# Patient Record
Sex: FEMALE | Race: BLACK OR AFRICAN AMERICAN | NOT HISPANIC OR LATINO | Employment: OTHER | URBAN - METROPOLITAN AREA
[De-identification: names, ages, dates, MRNs, and addresses within clinical notes are randomized per-mention and may not be internally consistent; named-entity substitution may affect disease eponyms.]

---

## 2017-01-13 ENCOUNTER — OFFICE VISIT (OUTPATIENT)
Dept: SURGERY | Facility: CLINIC | Age: 42
End: 2017-01-13
Payer: MEDICARE

## 2017-01-13 VITALS
HEIGHT: 65 IN | TEMPERATURE: 99 F | SYSTOLIC BLOOD PRESSURE: 157 MMHG | HEART RATE: 99 BPM | BODY MASS INDEX: 41.65 KG/M2 | WEIGHT: 250 LBS | DIASTOLIC BLOOD PRESSURE: 101 MMHG

## 2017-01-13 DIAGNOSIS — E66.01 OBESITY, MORBID, BMI 40.0-49.9: ICD-10-CM

## 2017-01-13 DIAGNOSIS — K21.9 GASTROESOPHAGEAL REFLUX DISEASE, ESOPHAGITIS PRESENCE NOT SPECIFIED: ICD-10-CM

## 2017-01-13 DIAGNOSIS — I67.9 CEREBROVASCULAR DISEASE: ICD-10-CM

## 2017-01-13 DIAGNOSIS — R10.11 RUQ ABDOMINAL PAIN: Primary | ICD-10-CM

## 2017-01-13 DIAGNOSIS — R10.13 EPIGASTRIC PAIN: ICD-10-CM

## 2017-01-13 PROCEDURE — 99999 PR PBB SHADOW E&M-EST. PATIENT-LVL III: CPT | Mod: PBBFAC,,, | Performed by: SURGERY

## 2017-01-13 PROCEDURE — 1159F MED LIST DOCD IN RCRD: CPT | Mod: S$GLB,,, | Performed by: SURGERY

## 2017-01-13 PROCEDURE — 4010F ACE/ARB THERAPY RXD/TAKEN: CPT | Mod: S$GLB,,, | Performed by: SURGERY

## 2017-01-13 PROCEDURE — 3046F HEMOGLOBIN A1C LEVEL >9.0%: CPT | Mod: S$GLB,,, | Performed by: SURGERY

## 2017-01-13 PROCEDURE — 99215 OFFICE O/P EST HI 40 MIN: CPT | Mod: S$GLB,,, | Performed by: SURGERY

## 2017-01-13 RX ORDER — PANTOPRAZOLE SODIUM 40 MG/1
40 TABLET, DELAYED RELEASE ORAL DAILY
Qty: 30 TABLET | Refills: 11 | Status: SHIPPED | OUTPATIENT
Start: 2017-01-13 | End: 2021-02-05 | Stop reason: SDUPTHER

## 2017-01-13 NOTE — PROGRESS NOTES
Subjective:       Patient ID: Jaimee Quintana is a 41 y.o. female.    Chief Complaint: Follow-up    HPI   Patient presents for reevaluation of right upper quadrant pain.  She states that she is not having an in any abdominal pain.  She does notice occasional discomfort after meals.  She is unsure exactly of the location, then points the epigastrium.  She also complains of burning sensation every morning when she wakes up is present in the epigastrium and radiates towards the mediastinum.  Sometimes this is associated with nausea.  She states that she has acid reflux but has never been on any medication for it.  She has been trying to stay on a low-fat diet.  She is unsure if she has lost any weight.  She has a history of hypertension, her blood pressure was mildly elevated stable 157/101.  She did not take all of her blood pressure pills this morning, then states that she is not sure why her blood pressure is elevated.  We discussed that her blood pressure medications help keep her blood pressure within the normal range and encouraged her to take her medications.  She has a history of low back pain which is intermittently bothering her.    Review of Systems    All systems were reviewed and are negative, except that mentioned in the HPI.    Objective:      Physical Exam   Constitutional: She is oriented to person, place, and time. She appears well-developed and well-nourished. No distress.   HENT:   Head: Normocephalic and atraumatic.   Eyes: EOM are normal. Pupils are equal, round, and reactive to light. No scleral icterus.   Neck: Normal range of motion. No JVD present.   Cardiovascular: Normal rate and regular rhythm.    Pulmonary/Chest: Effort normal and breath sounds normal. No respiratory distress.   Large pendulous breasts   Abdominal: Soft. Bowel sounds are normal. She exhibits no distension and no mass. There is tenderness (mild tenderness to deep palpation in the epigastric region). There is no rebound and  no guarding.   Obese   Musculoskeletal: Normal range of motion.   Neurological: She is alert and oriented to person, place, and time. No cranial nerve deficit.   Skin: Skin is warm and dry. She is not diaphoretic.   Psychiatric: She has a normal mood and affect.       Lab Results   Component Value Date    WBC 10.92 07/23/2016    HGB 14.5 07/23/2016    HCT 41.8 07/23/2016    MCV 85 07/23/2016     07/23/2016     CMP  Sodium   Date Value Ref Range Status   07/23/2016 135 (L) 136 - 145 mmol/L Final     Potassium   Date Value Ref Range Status   07/23/2016 4.1 3.5 - 5.1 mmol/L Final     Chloride   Date Value Ref Range Status   07/23/2016 100 95 - 110 mmol/L Final     CO2   Date Value Ref Range Status   07/23/2016 21 (L) 23 - 29 mmol/L Final     Glucose   Date Value Ref Range Status   07/23/2016 310 (H) 70 - 110 mg/dL Final     BUN, Bld   Date Value Ref Range Status   07/23/2016 8 6 - 20 mg/dL Final     Creatinine   Date Value Ref Range Status   07/23/2016 0.8 0.5 - 1.4 mg/dL Final     Calcium   Date Value Ref Range Status   07/23/2016 9.9 8.7 - 10.5 mg/dL Final     Total Protein   Date Value Ref Range Status   07/23/2016 7.7 6.0 - 8.4 g/dL Final     Albumin   Date Value Ref Range Status   07/23/2016 3.6 3.5 - 5.2 g/dL Final     Total Bilirubin   Date Value Ref Range Status   07/23/2016 0.8 0.1 - 1.0 mg/dL Final     Comment:     For infants and newborns, interpretation of results should be based  on gestational age, weight and in agreement with clinical  observations.  Premature Infant recommended reference ranges:  Up to 24 hours.............<8.0 mg/dL  Up to 48 hours............<12.0 mg/dL  3-5 days..................<15.0 mg/dL  6-29 days.................<15.0 mg/dL       Alkaline Phosphatase   Date Value Ref Range Status   07/23/2016 78 55 - 135 U/L Final     AST   Date Value Ref Range Status   07/23/2016 16 10 - 40 U/L Final     ALT   Date Value Ref Range Status   07/23/2016 22 10 - 44 U/L Final     Anion Gap    Date Value Ref Range Status   07/23/2016 14 8 - 16 mmol/L Final     eGFR if    Date Value Ref Range Status   07/23/2016 >60 >60 mL/min/1.73 m^2 Final     eGFR if non    Date Value Ref Range Status   07/23/2016 >60 >60 mL/min/1.73 m^2 Final     Comment:     Calculation used to obtain the estimated glomerular filtration  rate (eGFR) is the CKD-EPI equation. Since race is unknown   in our information system, the eGFR values for   -American and Non--American patients are given   for each creatinine result.       Lab Results   Component Value Date    HGBA1C 9.7 (H) 12/05/2016       Ultrasound images and reports were personally reviewed from July 2016.  The report states:  Gallbladder is normally well distended.  There are small, shadowing mobile stones versus sludge.  Sonographic Roth sign is negative.  Gall bladder wall thickness is normal.  The common duct measures 4 mm in diameter near the regina hepatis.  Assessment:       1. RUQ abdominal pain    2. Gastroesophageal reflux disease, esophagitis presence not specified    3. Obesity, morbid, BMI 40.0-49.9    4. Epigastric pain    5. Cerebrovascular disease    6. Uncontrolled type 2 diabetes mellitus without complication, with long-term current use of insulin        Plan:   After further evaluation, the patient is unsure if she has abdominal discomfort after meals.  She also unsure if this is related to her reflux.  We discussed gallbladder surgery.  We also discussed treating her reflux with medication.  Protonix was prescribed at this visit and we will reevaluate her symptoms in 1 month.  If her symptoms are not improving at that time, she will reconsider gallbladder surgery.  We discussed continued weight loss and controlling her blood glucose.  Her last hemoglobin A1c was 9.7 in December 2016, which although quite high, is an improvement.  She also states she has not seen her primary care provider in a while so we  have coordinated that appointment for her.  All of her questions were answered.

## 2017-01-13 NOTE — MR AVS SNAPSHOT
[facility] Surgery  [address] [place] [address]  [address]  [place] [address]  Phone: [phone]                  Jaimee Quintana   2017 9:00 AM   Office Visit    Description:  Female : 1975   Provider:  Lashawn Collier DO   Department:  [facility]           Reason for Visit     Follow-up                To Do List           Future Appointments        Provider Department Dept Phone    3/23/2017 9:00 AM Karen Brown, APRN,ANP-C [facility] - Endocrinology [phone]      Goals (5 Years of Data)     None      [facility]     [facility] Care [facility] -  Assistance  Registered nurses in the [facility] On Call Center provide clinical advisement, health education, appointment booking, and other advisory services.  Call for this free service at [phone].             Medications           Message regarding Medications     Verify the changes and/or additions to your medication regime listed below are the same as discussed with your clinician today.  If any of these changes or additions are incorrect, please notify your healthcare provider.             Verify that the below list of medications is an accurate representation of the medications you are currently taking.  If none reported, the list may be blank. If incorrect, please contact your healthcare provider. Carry this list with you in case of emergency.           Current Medications     aspirin 81 MG Chew Take 81 mg by mouth once daily.    atorvastatin (LIPITOR) 80 MG tablet TAKE 1 TABLET (80 MG TOTAL) BY MOUTH EVERY EVENING.    blood sugar diagnostic Strp True Result; test BID    etodolac (LODINE XL) 400 MG 24 hr tablet Take 1 tablet (400 mg total) by mouth daily as needed (avoid all other NSAID's).    fluoxetine (PROZAC) 20 MG capsule Take 1 capsule (20 mg total) by mouth once daily.    hydrocodone-acetaminophen 5-325mg (NORCO) 5-325 mg per tablet Take 1 tablet by mouth every 6 (six) hours as needed for  "Pain.    insulin glargine (LANTUS SOLOSTAR) 100 unit/mL (3 mL) InPn pen Inject 45 units SQ BID    insulin regular (NOVOLIN R) 100 unit/mL Inj injection 42 units SQ TID AC    lancets Misc True Result; test BID    lisinopril-hydrochlorothiazide (PRINZIDE,ZESTORETIC) 20-12.5 mg per tablet TAKE 1 TABLET BY MOUTH 2 (TWO) TIMES DAILY.    metformin (GLUCOPHAGE) 1000 MG tablet Take 1 tablet (1,000 mg total) by mouth 2 (two) times daily with meals.    nifedipine (ADALAT CC) 60 MG TbSR TAKE 1 TABLET (30 MG TOTAL) BY MOUTH ONCE DAILY.    nifedipine (PROCARDIA-XL) 60 MG (OSM) 24 hr tablet     ondansetron (ZOFRAN-ODT) 4 MG TbDL Take 1 tablet (4 mg total) by mouth every 8 (eight) hours as needed.    pen needle, diabetic (BD ULTRA-FINE ROSA PEN NEEDLES) 32 gauge x 5/32" Ndle 1 Act by Misc.(Non-Drug; Combo Route) route 5 (five) times daily.           Clinical Reference Information           Vital Signs - Last Recorded  Most recent update: 1/13/2017  9:16 AM by Miriam Moody LPN    BP Pulse Temp Ht Wt LMP    (!) 157/101 99 98.6 °F (37 °C) 5' 5" (1.651 m) 113.4 kg (250 lb) 12/05/2016    BMI                41.6 kg/m2          Blood Pressure          Most Recent Value    BP  (!)  157/101      Allergies as of 1/13/2017     No Known Allergies      Immunizations Administered on Date of Encounter - 1/13/2017     None      [facility] Sign-Up     Activating your [facility] account is as easy as 1-2-3!     1) Visit [email or web address], select Sign Up Now, enter this activation code and your date of birth, then select Next.  [email or web address]  Expires: 2/27/2017  9:17 AM      2) Create a username and password to use when you visit [facility] in the future and select a security question in case you lose your password and select Next.    3) Enter your e-mail address and click Sign Up!    Additional Information  If you have questions, please e-mail [email or web address] or call [phone] to talk to our [facility] staff. Remember, [facility] is NOT to be " used for urgent needs. For medical emergencies, dial 911.

## 2017-01-15 DIAGNOSIS — E11.59 HYPERTENSION ASSOCIATED WITH DIABETES: ICD-10-CM

## 2017-01-15 DIAGNOSIS — I15.2 HYPERTENSION ASSOCIATED WITH DIABETES: ICD-10-CM

## 2017-01-15 DIAGNOSIS — E78.5 HYPERLIPIDEMIA: ICD-10-CM

## 2017-01-16 RX ORDER — LISINOPRIL AND HYDROCHLOROTHIAZIDE 12.5; 2 MG/1; MG/1
TABLET ORAL
Qty: 180 TABLET | Refills: 0 | Status: SHIPPED | OUTPATIENT
Start: 2017-01-16 | End: 2017-04-20 | Stop reason: SDUPTHER

## 2017-01-16 RX ORDER — ATORVASTATIN CALCIUM 80 MG/1
TABLET, FILM COATED ORAL
Qty: 90 TABLET | Refills: 0 | Status: SHIPPED | OUTPATIENT
Start: 2017-01-16 | End: 2017-06-16 | Stop reason: SDUPTHER

## 2017-01-17 ENCOUNTER — TELEPHONE (OUTPATIENT)
Dept: INTERNAL MEDICINE | Facility: CLINIC | Age: 42
End: 2017-01-17

## 2017-01-17 NOTE — TELEPHONE ENCOUNTER
Per [facility]'s Health Nurse Navigator, Donna. On 10/17/16 [facility] called to remind pt of her appt for CPAP/supplies on 10/26/16. On 12/1/16 Donna called Jaimee cause she missed her appt on 10/26. Pt was rescheduled on 12/14/16. On 1/16/17 Donna contacted [facility] and was informed that pt missed her appt again. Messages was left for the pt to reschedule her appt. Pt called Donna back and rescheduled her appt for 1/18 at 4pm for CPAP/supplies.

## 2017-02-06 ENCOUNTER — TELEPHONE (OUTPATIENT)
Dept: INTERNAL MEDICINE | Facility: CLINIC | Age: 42
End: 2017-02-06

## 2017-02-06 ENCOUNTER — OFFICE VISIT (OUTPATIENT)
Dept: INTERNAL MEDICINE | Facility: CLINIC | Age: 42
End: 2017-02-06
Payer: MEDICARE

## 2017-02-06 VITALS
WEIGHT: 250.88 LBS | DIASTOLIC BLOOD PRESSURE: 94 MMHG | HEIGHT: 65 IN | BODY MASS INDEX: 41.8 KG/M2 | SYSTOLIC BLOOD PRESSURE: 146 MMHG | HEART RATE: 104 BPM

## 2017-02-06 DIAGNOSIS — R10.13 DYSPEPSIA: ICD-10-CM

## 2017-02-06 DIAGNOSIS — I15.2 HYPERTENSION ASSOCIATED WITH DIABETES: ICD-10-CM

## 2017-02-06 DIAGNOSIS — E11.9 TYPE 2 DIABETES MELLITUS WITHOUT COMPLICATION, WITH LONG-TERM CURRENT USE OF INSULIN: ICD-10-CM

## 2017-02-06 DIAGNOSIS — Z79.4 TYPE 2 DIABETES MELLITUS WITHOUT COMPLICATION, WITH LONG-TERM CURRENT USE OF INSULIN: ICD-10-CM

## 2017-02-06 DIAGNOSIS — E11.9 TYPE 2 DIABETES MELLITUS WITHOUT COMPLICATION, WITH LONG-TERM CURRENT USE OF INSULIN: Primary | ICD-10-CM

## 2017-02-06 DIAGNOSIS — E11.59 HYPERTENSION ASSOCIATED WITH DIABETES: ICD-10-CM

## 2017-02-06 DIAGNOSIS — G47.33 OSA (OBSTRUCTIVE SLEEP APNEA): ICD-10-CM

## 2017-02-06 DIAGNOSIS — K80.20 CALCULUS OF GALLBLADDER WITHOUT CHOLECYSTITIS WITHOUT OBSTRUCTION: ICD-10-CM

## 2017-02-06 DIAGNOSIS — Z79.4 TYPE 2 DIABETES MELLITUS WITHOUT COMPLICATION, WITH LONG-TERM CURRENT USE OF INSULIN: Primary | ICD-10-CM

## 2017-02-06 DIAGNOSIS — E66.01 MORBID OBESITY WITH BMI OF 40.0-44.9, ADULT: ICD-10-CM

## 2017-02-06 PROBLEM — G47.30 SLEEP APNEA: Status: ACTIVE | Noted: 2017-02-06

## 2017-02-06 PROCEDURE — 99999 PR PBB SHADOW E&M-EST. PATIENT-LVL III: CPT | Mod: PBBFAC,,, | Performed by: INTERNAL MEDICINE

## 2017-02-06 PROCEDURE — 99214 OFFICE O/P EST MOD 30 MIN: CPT | Mod: S$GLB,,, | Performed by: INTERNAL MEDICINE

## 2017-02-06 PROCEDURE — 4010F ACE/ARB THERAPY RXD/TAKEN: CPT | Mod: S$GLB,,, | Performed by: INTERNAL MEDICINE

## 2017-02-06 PROCEDURE — 3046F HEMOGLOBIN A1C LEVEL >9.0%: CPT | Mod: S$GLB,,, | Performed by: INTERNAL MEDICINE

## 2017-02-06 RX ORDER — LANCETS
EACH MISCELLANEOUS
Qty: 300 EACH | Refills: 6 | Status: SHIPPED | OUTPATIENT
Start: 2017-02-06 | End: 2017-02-07 | Stop reason: SDUPTHER

## 2017-02-06 RX ORDER — INSULIN PUMP SYRINGE, 3 ML
EACH MISCELLANEOUS
Qty: 1 EACH | Refills: 0 | Status: SHIPPED | OUTPATIENT
Start: 2017-02-06 | End: 2021-02-05 | Stop reason: SDUPTHER

## 2017-02-06 RX ORDER — NIFEDIPINE 90 MG/1
TABLET, FILM COATED, EXTENDED RELEASE ORAL
Qty: 30 TABLET | Refills: 1 | Status: SHIPPED | OUTPATIENT
Start: 2017-02-06 | End: 2018-03-29

## 2017-02-06 RX ORDER — PEN NEEDLE, DIABETIC 30 GX3/16"
1 NEEDLE, DISPOSABLE MISCELLANEOUS
Qty: 300 EACH | Refills: 6 | Status: SHIPPED | OUTPATIENT
Start: 2017-02-06 | End: 2021-06-28 | Stop reason: SDUPTHER

## 2017-02-06 NOTE — MR AVS SNAPSHOT
"    [facility] Internal Medicine   [place]  [place] [address]  Phone: [phone]  Fax: [phone]                  Jaimee Quintana   2017 2:20 PM   Office Visit    Description:  Female : 1975   Provider:  Kory Taylor MD   Department:  [facility]           Reason for Visit     Diabetes           Diagnoses this Visit        Comments    Type 2 diabetes mellitus without complication, with long-term current use of insulin    -  Primary     Hypertension associated with diabetes         Calculus of gallbladder without cholecystitis without obstruction         Dyspepsia         CESAR (obstructive sleep apnea)         Morbid obesity with BMI of 40.0-44.9, adult                To Do List           Future Appointments        Provider Department Dept Phone    2017 11:20 AM Lashawn Collier DO [facility] General Surgery [phone]    3/15/2017 2:20 PM Kory Taylor MD [facility] [phone]    3/23/2017 9:00 AM Karen Brown APRN,ANP-C [facility] - Endocrinology [phone]      Goals (5 Years of Data)     None      Follow-Up and Disposition     Return in about 1 month (around 3/6/2017).       These Medications        Disp Refills Start End    nifedipine (ADALAT CC) 90 MG TbSR 30 tablet 1 2017     TAKE 1 TABLET (30 MG TOTAL) BY MOUTH ONCE DAILY.    Pharmacy: [facility] #[id number] - [place] - [address] [address] Ph #: [phone]       insulin regular (NOVOLIN R) 100 unit/mL Inj injection 30 mL 3 2017     45 units SQ TID AC    Pharmacy: [facility]/pharmacy #[id number] - [place] - [address] [address] Ph #: [phone]       pen needle, diabetic (BD ULTRA-FINE ROSA PEN NEEDLES) 32 gauge x 5/32" Ndle 300 each 6 2017     1 Act by Misc.(Non-Drug; Combo Route) route 5 (five) times daily. - Misc.(Non-Drug; Combo Route)    Pharmacy: [facility]/pharmacy #[id number] - [place], [place] - [address] [address] Ph #: [phone]       blood-glucose meter (FREESTYLE SYSTEM " KIT) kit 1 each 0 2/6/2017 2/6/2018    Use as instructed; Truemetrix    Pharmacy: [facility]/pharmacy #[id number] - [place], [place] - [address] [address] Ph #: [phone]       lancets Misc 300 each 6 2/6/2017     True Metrix; test BID    Pharmacy: [facility]/pharmacy #[id number] - [place], [place] - [address] [address] Ph #: [phone]       blood sugar diagnostic Strp 300 strip 6 2/6/2017     Truemetrix; test BID    Pharmacy: [facility]/pharmacy #[id number] - [place], [place] - [address] [address] Ph #: [phone]         [facility]     [facility] On Call Nurse [facility] Line - 24/7 Assistance  Registered nurses in the [facility] On Call Center provide clinical advisement, health education, appointment booking, and other advisory services.  Call for this free service at [phone].             Medications           Message regarding Medications     Verify the changes and/or additions to your medication regime listed below are the same as discussed with your clinician today.  If any of these changes or additions are incorrect, please notify your healthcare provider.        START taking these NEW medications        Refills    blood-glucose meter (FREESTYLE SYSTEM KIT) kit 0    Sig: Use as instructed; Truemetrix    Class: Print      CHANGE how you are taking these medications     Start Taking Instead of    nifedipine (ADALAT CC) 90 MG TbSR nifedipine (ADALAT CC) 60 MG TbSR    Dosage:  TAKE 1 TABLET (30 MG TOTAL) BY MOUTH ONCE DAILY. Dosage:  TAKE 1 TABLET (30 MG TOTAL) BY MOUTH ONCE DAILY.    Reason for Change:  Reorder     insulin regular (NOVOLIN R) 100 unit/mL Inj injection insulin regular (NOVOLIN R) 100 unit/mL Inj injection    Dosage:  45 units SQ TID AC Dosage:  42 units SQ TID AC    Reason for Change:  Reorder     lancets Misc lancets Misc    Dosage:  True Metrix; test BID Dosage:  True Result; test BID    Reason for Change:  Reorder     blood sugar diagnostic Strp blood sugar diagnostic Strp    Dosage:  Truemetrix; test BID Dosage:  True Result; test BID  "   Reason for Change:  Reorder       STOP taking these medications     nifedipine (PROCARDIA-XL) 60 MG (OSM) 24 hr tablet            Verify that the below list of medications is an accurate representation of the medications you are currently taking.  If none reported, the list may be blank. If incorrect, please contact your healthcare provider. Carry this list with you in case of emergency.           Current Medications     aspirin 81 MG Chew Take 81 mg by mouth once daily.    atorvastatin (LIPITOR) 80 MG tablet TAKE 1 TABLET EVERY EVENING    blood sugar diagnostic Strp Truemetrix; test BID    etodolac (LODINE XL) 400 MG 24 hr tablet Take 1 tablet (400 mg total) by mouth daily as needed (avoid all other NSAID's).    fluoxetine (PROZAC) 20 MG capsule Take 1 capsule (20 mg total) by mouth once daily.    hydrocodone-acetaminophen 5-325mg (NORCO) 5-325 mg per tablet Take 1 tablet by mouth every 6 (six) hours as needed for Pain.    insulin glargine (LANTUS SOLOSTAR) 100 unit/mL (3 mL) InPn pen Inject 45 units SQ BID    insulin regular (NOVOLIN R) 100 unit/mL Inj injection 45 units SQ TID AC    lancets Misc True Metrix; test BID    lisinopril-hydrochlorothiazide (PRINZIDE,ZESTORETIC) 20-12.5 mg per tablet TAKE 1 TABLET BY MOUTH 2 (TWO) TIMES DAILY.    metformin (GLUCOPHAGE) 1000 MG tablet Take 1 tablet (1,000 mg total) by mouth 2 (two) times daily with meals.    nifedipine (ADALAT CC) 90 MG TbSR TAKE 1 TABLET (30 MG TOTAL) BY MOUTH ONCE DAILY.    ondansetron (ZOFRAN-ODT) 4 MG TbDL Take 1 tablet (4 mg total) by mouth every 8 (eight) hours as needed.    pantoprazole (PROTONIX) 40 MG tablet Take 1 tablet (40 mg total) by mouth once daily.    pen needle, diabetic (BD ULTRA-FINE ROSA PEN NEEDLES) 32 gauge x 5/32" Ndle 1 Act by Misc.(Non-Drug; Combo Route) route 5 (five) times daily.    blood-glucose meter (FREESTYLE SYSTEM KIT) kit Use as instructed; Truemetrix           Clinical Reference Information           Your Vitals " "Were     BP Pulse Height Weight Last Period BMI    146/94 (BP Location: Left arm, Patient Position: Sitting, BP Method: Manual) 104 5' 5" (1.651 m) 113.8 kg (250 lb 14.1 oz) 12/05/2016 41.75 kg/m2      Blood Pressure          Most Recent Value    BP  (!)  146/94      Allergies as of 2/6/2017     No Known Allergies      Immunizations Administered on Date of Encounter - 2/6/2017     None      [facility] Sign-Up     Activating your [facility] account is as easy as 1-2-3!     1) Visit [email or web address], select Sign Up Now, enter this activation code and your date of birth, then select Next.  [email or web address]  Expires: 2/27/2017  9:17 AM      2) Create a username and password to use when you visit [facility] in the future and select a security question in case you lose your password and select Next.    3) Enter your e-mail address and click Sign Up!    Additional Information  If you have questions, please e-mail [email or web address] or call [phone] to talk to our [facility] staff. Remember, [facility] is NOT to be used for urgent needs. For medical emergencies, dial 911.         Language Assistance Services     ATTENTION: Language assistance services are available, free of charge. Please call [phone].      ATENCIÓN: Si habla español, tiene a reyes disposición servicios gratuitos de asistencia lingüística. Llame al [phone].     CHÚ Ý: N?u b?n nói Ti?ng Vi?t, có các d?ch v? h? tr? ngôn ng? mi?n phí dành cho b?n. G?i s? [phone].         [facility] complies with applicable Federal civil rights laws and does not discriminate on the basis of race, color, national origin, age, disability, or sex.        "

## 2017-02-06 NOTE — PROGRESS NOTES
Subjective:       Patient ID: Jaimee Quintana is a 41 y.o. female.    Chief Complaint: Diabetes    HPI  Pt. Here for f/u for diabetes and HTN; I reviewed labs dated 12/5/16; diabetes was poorly controlled but slightly improved; BP is borderline elevated; pt. Has gained weight; pt. Is using CPAP for CESAR; she is on lantus 38 units BID and 42 units TID but needs refill on needles and is not always compliant with insulin; I explained risks of non-compliance; she has increased nifedipine to 60 mg QD; pt. Is seeing surgery for cholelithiasis and surgery is being considered; she was started on protonix for dyspepsia; she also needs true metrix glucometer, test strips and lancets   Review of Systems   Constitutional: Negative for chills, fatigue and fever.   HENT: Negative for congestion, rhinorrhea and sore throat.    Respiratory: Negative for cough, shortness of breath and wheezing.    Cardiovascular: Negative for chest pain.   Gastrointestinal: Positive for abdominal pain. Negative for nausea and vomiting.        GERD: protonix helps; occassional RUQ pain noted   Genitourinary: Negative for dysuria.   Musculoskeletal: Negative for arthralgias.   Skin: Negative for rash.   Neurological: Negative for dizziness and headaches.   Psychiatric/Behavioral: Negative for sleep disturbance. The patient is not nervous/anxious.        Objective:      Physical Exam   Constitutional: She is oriented to person, place, and time.   Morbid obesity   Eyes: EOM are normal.   Neck: Normal range of motion.   Cardiovascular: Normal rate, regular rhythm and normal heart sounds.    Pulmonary/Chest: Effort normal and breath sounds normal.   Abdominal: Soft. There is no tenderness. There is no rebound and no guarding.   Musculoskeletal: Normal range of motion.   Neurological: She is alert and oriented to person, place, and time.   Skin: No rash noted.       Assessment:       1. Type 2 diabetes mellitus without complication, with long-term current  "use of insulin Sub-optimally controlled   2. Hypertension associated with diabetes Sub-optimally controlled   3. Calculus of gallbladder without cholecystitis without obstruction Active   4. Dyspepsia Active   5. CESAR (obstructive sleep apnea) Well controlled   6. Morbid obesity with BMI of 40.0-44.9, adult Sub-optimally controlled       Plan:         Type 2 diabetes mellitus without complication, with long-term current use of insulin  Comments:  continue lantus and increase short acting to 45 units TID AC; encouraged ADA diet and encouraged compliance; re-evaluate in 1 month  Orders:  -     insulin regular (NOVOLIN R) 100 unit/mL Inj injection; 45 units SQ TID AC  Dispense: 30 mL; Refill: 3  -     pen needle, diabetic (BD ULTRA-FINE ROSA PEN NEEDLES) 32 gauge x 5/32" Ndle; 1 Act by Misc.(Non-Drug; Combo Route) route 5 (five) times daily.  Dispense: 300 each; Refill: 6  -     blood-glucose meter (FREESTYLE SYSTEM KIT) kit; Use as instructed; Truemetrix  Dispense: 1 each; Refill: 0  -     lancets Misc; True Metrix; test BID  Dispense: 300 each; Refill: 6  -     blood sugar diagnostic Strp; Truemetrix; test BID  Dispense: 300 strip; Refill: 6    Hypertension associated with diabetes  Comments:  increase nefedipine to 90 mg QD and encouraged low Na diet and weight loss; repeat BP on f/u in 1 month   Orders:  -     nifedipine (ADALAT CC) 90 MG TbSR; TAKE 1 TABLET (30 MG TOTAL) BY MOUTH ONCE DAILY.  Dispense: 30 tablet; Refill: 1    Calculus of gallbladder without cholecystitis without obstruction  Comments:  asymptomatic at this time; f/u surgery who is monitoring    Dyspepsia  Comments:  GERD: continue protonix     CESAR (obstructive sleep apnea)  Comments:  continue CPAP    Morbid obesity with BMI of 40.0-44.9, adult  Comments:  encouraged diet and explained risks       "

## 2017-02-07 DIAGNOSIS — Z79.4 TYPE 2 DIABETES MELLITUS WITHOUT COMPLICATION, WITH LONG-TERM CURRENT USE OF INSULIN: ICD-10-CM

## 2017-02-07 DIAGNOSIS — E11.9 TYPE 2 DIABETES MELLITUS WITHOUT COMPLICATION, WITH LONG-TERM CURRENT USE OF INSULIN: ICD-10-CM

## 2017-02-07 RX ORDER — LANCETS
EACH MISCELLANEOUS
Qty: 200 EACH | Refills: 6 | Status: SHIPPED | OUTPATIENT
Start: 2017-02-07 | End: 2021-02-05 | Stop reason: SDUPTHER

## 2017-02-07 NOTE — TELEPHONE ENCOUNTER
Spoke to Donna, [facility] Nav[facility] and stated that pt's quantity on her lancets had to be changed from 300 to 200 to match the BID testing. Also, spoke to Chilo, pharmacist at [facility] and needs clarification on pts Nifedipine 90mg.

## 2017-02-07 NOTE — TELEPHONE ENCOUNTER
----- Message from Xochitl Rahman sent at 2/7/2017  9:30 AM CST -----  Contact: Donna From [facility] [phone]  Calling to talk to nurse concerning the quantity of lancets should be 300 if she is testing 3 times a day. Please advice

## 2017-02-08 RX ORDER — NAPROXEN SODIUM 220 MG
1 TABLET ORAL 2 TIMES DAILY
Qty: 200 EACH | Refills: 6 | COMMUNITY
Start: 2017-02-08 | End: 2017-03-17

## 2017-02-13 ENCOUNTER — OFFICE VISIT (OUTPATIENT)
Dept: SURGERY | Facility: CLINIC | Age: 42
End: 2017-02-13
Payer: MEDICARE

## 2017-02-13 ENCOUNTER — TELEPHONE (OUTPATIENT)
Dept: SURGERY | Facility: CLINIC | Age: 42
End: 2017-02-13

## 2017-02-13 ENCOUNTER — LAB VISIT (OUTPATIENT)
Dept: LAB | Facility: HOSPITAL | Age: 42
End: 2017-02-13
Attending: SURGERY
Payer: MEDICARE

## 2017-02-13 VITALS
TEMPERATURE: 98 F | HEIGHT: 65 IN | HEART RATE: 112 BPM | DIASTOLIC BLOOD PRESSURE: 77 MMHG | BODY MASS INDEX: 41.65 KG/M2 | SYSTOLIC BLOOD PRESSURE: 130 MMHG | WEIGHT: 250 LBS

## 2017-02-13 DIAGNOSIS — K80.50 BILIARY COLIC: Primary | ICD-10-CM

## 2017-02-13 DIAGNOSIS — R16.0 HEPATOMEGALY: ICD-10-CM

## 2017-02-13 DIAGNOSIS — E66.01 SEVERE OBESITY (BMI >= 40): ICD-10-CM

## 2017-02-13 DIAGNOSIS — K80.50 BILIARY COLIC: ICD-10-CM

## 2017-02-13 DIAGNOSIS — Z12.31 VISIT FOR SCREENING MAMMOGRAM: ICD-10-CM

## 2017-02-13 LAB
ALBUMIN SERPL BCP-MCNC: 3.8 G/DL
ALP SERPL-CCNC: 87 U/L
ALT SERPL W/O P-5'-P-CCNC: 41 U/L
ANION GAP SERPL CALC-SCNC: 10 MMOL/L
AST SERPL-CCNC: 28 U/L
BASOPHILS # BLD AUTO: 0.01 K/UL
BASOPHILS NFR BLD: 0.1 %
BILIRUB SERPL-MCNC: 0.4 MG/DL
BUN SERPL-MCNC: 9 MG/DL
CALCIUM SERPL-MCNC: 9.7 MG/DL
CHLORIDE SERPL-SCNC: 100 MMOL/L
CO2 SERPL-SCNC: 23 MMOL/L
CREAT SERPL-MCNC: 0.9 MG/DL
DIFFERENTIAL METHOD: NORMAL
EOSINOPHIL # BLD AUTO: 0.1 K/UL
EOSINOPHIL NFR BLD: 1.1 %
ERYTHROCYTE [DISTWIDTH] IN BLOOD BY AUTOMATED COUNT: 12.7 %
EST. GFR  (AFRICAN AMERICAN): >60 ML/MIN/1.73 M^2
EST. GFR  (NON AFRICAN AMERICAN): >60 ML/MIN/1.73 M^2
GLUCOSE SERPL-MCNC: 418 MG/DL
HCT VFR BLD AUTO: 44.6 %
HGB BLD-MCNC: 15.3 G/DL
LYMPHOCYTES # BLD AUTO: 2.4 K/UL
LYMPHOCYTES NFR BLD: 24.8 %
MCH RBC QN AUTO: 29.7 PG
MCHC RBC AUTO-ENTMCNC: 34.3 %
MCV RBC AUTO: 87 FL
MONOCYTES # BLD AUTO: 0.5 K/UL
MONOCYTES NFR BLD: 5.1 %
NEUTROPHILS # BLD AUTO: 6.7 K/UL
NEUTROPHILS NFR BLD: 68.6 %
PLATELET # BLD AUTO: 242 K/UL
PMV BLD AUTO: 11.5 FL
POTASSIUM SERPL-SCNC: 4.2 MMOL/L
PROT SERPL-MCNC: 8.2 G/DL
RBC # BLD AUTO: 5.15 M/UL
SODIUM SERPL-SCNC: 133 MMOL/L
WBC # BLD AUTO: 9.8 K/UL

## 2017-02-13 PROCEDURE — 99999 PR PBB SHADOW E&M-EST. PATIENT-LVL III: CPT | Mod: PBBFAC,,, | Performed by: SURGERY

## 2017-02-13 PROCEDURE — 99215 OFFICE O/P EST HI 40 MIN: CPT | Mod: 57,S$GLB,, | Performed by: SURGERY

## 2017-02-13 PROCEDURE — 36415 COLL VENOUS BLD VENIPUNCTURE: CPT

## 2017-02-13 PROCEDURE — 3046F HEMOGLOBIN A1C LEVEL >9.0%: CPT | Mod: S$GLB,,, | Performed by: SURGERY

## 2017-02-13 PROCEDURE — 80053 COMPREHEN METABOLIC PANEL: CPT

## 2017-02-13 PROCEDURE — 85025 COMPLETE CBC W/AUTO DIFF WBC: CPT

## 2017-02-13 PROCEDURE — 4010F ACE/ARB THERAPY RXD/TAKEN: CPT | Mod: S$GLB,,, | Performed by: SURGERY

## 2017-02-13 NOTE — TELEPHONE ENCOUNTER
----- Message from Lashawn Collier DO sent at 2/13/2017 11:55 AM CST -----  Please instruct the patient to avoid aspirin, fish oil and any other aspirin containing products (Goody powder) for 1 week prior to surgery (3/8).  Thanks.  2-13-17  1600-called, no answer, left message to call clinic.

## 2017-02-13 NOTE — PROGRESS NOTES
Subjective:       Patient ID: Jaimee Quintana is a 41 y.o. female.    Chief Complaint: Post-op Evaluation    HPI   Jaimee presents for follow-up of gallstones.  She had an ultrasound July which noted cholelithiasis.  Patient denies current abdominal pain.  She does not remember having abdominal pain recently.  She remembers having nausea 2 weeks ago without vomiting.  She states that this began after eating, but is not sure what she ate.  She is having normal bowel movements.  Blood sugar is not controlled, although improvement in her hemoglobin A1c was noted, now measuring 9.7.  We discussed the importance of controlling her sugars.  She pulled a piece of chocolate cake out of her pocket during our interview, and threw it in the trash.    Review of Systems    All systems were reviewed and are negative, except that mentioned in the HPI.    Objective:      Physical Exam   Constitutional: She is oriented to person, place, and time. She appears well-developed and well-nourished. No distress.   HENT:   Head: Normocephalic and atraumatic.   Eyes: EOM are normal. Pupils are equal, round, and reactive to light. No scleral icterus.   Neck: Normal range of motion. No JVD present.   Cardiovascular: Normal rate and regular rhythm.    No murmur heard.  Pulmonary/Chest: Effort normal and breath sounds normal. No respiratory distress.   Abdominal: Soft. Bowel sounds are normal. She exhibits no distension and no mass. There is tenderness (ight upper quadrant). There is no rebound and no guarding. No hernia.   Musculoskeletal: Normal range of motion.   Neurological: She is alert and oriented to person, place, and time. No cranial nerve deficit.   Skin: Skin is warm and dry. She is not diaphoretic.   Psychiatric: She has a normal mood and affect.       Lab Results   Component Value Date    WBC 10.92 07/23/2016    HGB 14.5 07/23/2016    HCT 41.8 07/23/2016    MCV 85 07/23/2016     07/23/2016     CMP  Sodium   Date Value Ref  Range Status   07/23/2016 135 (L) 136 - 145 mmol/L Final     Potassium   Date Value Ref Range Status   07/23/2016 4.1 3.5 - 5.1 mmol/L Final     Chloride   Date Value Ref Range Status   07/23/2016 100 95 - 110 mmol/L Final     CO2   Date Value Ref Range Status   07/23/2016 21 (L) 23 - 29 mmol/L Final     Glucose   Date Value Ref Range Status   07/23/2016 310 (H) 70 - 110 mg/dL Final     BUN, Bld   Date Value Ref Range Status   07/23/2016 8 6 - 20 mg/dL Final     Creatinine   Date Value Ref Range Status   07/23/2016 0.8 0.5 - 1.4 mg/dL Final     Calcium   Date Value Ref Range Status   07/23/2016 9.9 8.7 - 10.5 mg/dL Final     Total Protein   Date Value Ref Range Status   07/23/2016 7.7 6.0 - 8.4 g/dL Final     Albumin   Date Value Ref Range Status   07/23/2016 3.6 3.5 - 5.2 g/dL Final     Total Bilirubin   Date Value Ref Range Status   07/23/2016 0.8 0.1 - 1.0 mg/dL Final     Comment:     For infants and newborns, interpretation of results should be based  on gestational age, weight and in agreement with clinical  observations.  Premature Infant recommended reference ranges:  Up to 24 hours.............<8.0 mg/dL  Up to 48 hours............<12.0 mg/dL  3-5 days..................<15.0 mg/dL  6-29 days.................<15.0 mg/dL       Alkaline Phosphatase   Date Value Ref Range Status   07/23/2016 78 55 - 135 U/L Final     AST   Date Value Ref Range Status   07/23/2016 16 10 - 40 U/L Final     ALT   Date Value Ref Range Status   07/23/2016 22 10 - 44 U/L Final     Anion Gap   Date Value Ref Range Status   07/23/2016 14 8 - 16 mmol/L Final     eGFR if    Date Value Ref Range Status   07/23/2016 >60 >60 mL/min/1.73 m^2 Final     eGFR if non    Date Value Ref Range Status   07/23/2016 >60 >60 mL/min/1.73 m^2 Final     Comment:     Calculation used to obtain the estimated glomerular filtration  rate (eGFR) is the CKD-EPI equation. Since race is unknown   in our information system, the eGFR  values for   -American and Non--American patients are given   for each creatinine result.       Lab Results   Component Value Date    HGBA1C 9.7 (H) 12/05/2016     Hepatitis panel from August 2016 was negative.    U/S 7/2016 images and report were personally reviewed.  Report states:  There is diffusely increased hepatic echogenicity with loss of portal venous interfaces.  Hepatomegaly is noted.  No focal hepatic mass or intrahepatic biliary dilatation is seen.  Gallbladder is normally well distended.  There are small, shadowing mobile stones versus sludge.  Sonographic Roth sign is negative.  Gall bladder wall thickness is normal.  The common duct measures 4 mm in diameter near the regina hepatis.    Assessment:       1. Biliary colic    2. Severe obesity (BMI >= 40)    3. Uncontrolled type 2 diabetes mellitus without complication, with long-term current use of insulin    4. Hepatomegaly    5. Visit for screening mammogram        Plan:   The pathology of the patient's disease was discussed. Written handouts were explained and given to the patient.  In light of her RUQ discomfort on exam, elective laparoscopic cholecystectomy was recommended. The surgical procedure, risks, postop recovery and consent were discussed. All questions were answered and the consent was signed. Signs and symptoms of worsening disease was discussed.  The patient will call or go to ER should those symptoms develop.  Repeat labs were ordered at this visit.  Again, I stressed the importance of strict glucose control to optimize her wound healing.  Her screening mammo-was also scheduled at this visit.  All of her questions were answered.  Surgery is scheduled for March 8 by patient request.

## 2017-02-13 NOTE — MR AVS SNAPSHOT
[facility]  [address] [place] [address]  4th Floor [facility]  [place] [address]  Phone: [phone]                  Jaimee Quintana   2017 11:20 AM   Office Visit    Description:  Female : 1975   Provider:  Lashawn Collier DO   Department:  [facility]           Reason for Visit     Post-op Evaluation                To Do List           Future Appointments        Provider Department Dept Phone    2017 11:20 AM Lashawn Collier DO [facility] [phone]    3/15/2017 2:20 PM Kory Taylor MD [facility] Internal Medicine [phone]    3/23/2017 9:00 AM BLADIMIR Enciso,ANP-C [facility] - Endocrinology [phone]      Goals (5 Years of Data)     None      [facility]     [facility] -  Assistance  Registered nurses in the [facility] provide clinical advisement, health education, appointment booking, and other advisory services.  Call for this free service at [phone].             Medications           Message regarding Medications     Verify the changes and/or additions to your medication regime listed below are the same as discussed with your clinician today.  If any of these changes or additions are incorrect, please notify your healthcare provider.        STOP taking these medications     hydrocodone-acetaminophen 5-325mg (NORCO) 5-325 mg per tablet Take 1 tablet by mouth every 6 (six) hours as needed for Pain.           Verify that the below list of medications is an accurate representation of the medications you are currently taking.  If none reported, the list may be blank. If incorrect, please contact your healthcare provider. Carry this list with you in case of emergency.           Current Medications     aspirin 81 MG Chew Take 81 mg by mouth once daily.    atorvastatin (LIPITOR) 80 MG tablet TAKE 1 TABLET EVERY EVENING    blood sugar diagnostic Strp Truemetrix; test BID    blood-glucose  "meter (FREESTYLE SYSTEM KIT) kit Use as instructed; Truemetrix    etodolac (LODINE XL) 400 MG 24 hr tablet Take 1 tablet (400 mg total) by mouth daily as needed (avoid all other NSAID's).    fluoxetine (PROZAC) 20 MG capsule Take 1 capsule (20 mg total) by mouth once daily.    insulin glargine (LANTUS SOLOSTAR) 100 unit/mL (3 mL) InPn pen Inject 45 units SQ BID    insulin regular (NOVOLIN R) 100 unit/mL Inj injection 45 units SQ TID AC    insulin syringe-needle U-100 (BD INSULIN SYRINGE ULTRA-FINE) 0.5 mL 31 gauge x 5/16 Syrg 1 Syringe by Misc.(Non-Drug; Combo Route) route 2 (two) times daily. Test BID    DX: E11.9, Z79.4    lancets Misc True Metrix; test BID      DX: E11.9, Z79.4    lisinopril-hydrochlorothiazide (PRINZIDE,ZESTORETIC) 20-12.5 mg per tablet TAKE 1 TABLET BY MOUTH 2 (TWO) TIMES DAILY.    metformin (GLUCOPHAGE) 1000 MG tablet Take 1 tablet (1,000 mg total) by mouth 2 (two) times daily with meals.    nifedipine (ADALAT CC) 90 MG TbSR TAKE 1 TABLET (30 MG TOTAL) BY MOUTH ONCE DAILY.    ondansetron (ZOFRAN-ODT) 4 MG TbDL Take 1 tablet (4 mg total) by mouth every 8 (eight) hours as needed.    pantoprazole (PROTONIX) 40 MG tablet Take 1 tablet (40 mg total) by mouth once daily.    pen needle, diabetic (BD ULTRA-FINE ROSA PEN NEEDLES) 32 gauge x 5/32" Ndle 1 Act by Misc.(Non-Drug; Combo Route) route 5 (five) times daily.           Clinical Reference Information           Your Vitals Were     BP Pulse Temp Height Weight Last Period    130/77 112 98.3 °F (36.8 °C) 5' 5" (1.651 m) 113.4 kg (250 lb) 01/20/2017 (Approximate)    BMI                41.6 kg/m2          Blood Pressure          Most Recent Value    BP  130/77      Allergies as of 2/13/2017     No Known Allergies      Immunizations Administered on Date of Encounter - 2/13/2017     None      [facility] Sign-Up     Activating your [facility] account is as easy as 1-2-3!     1) Visit [email or web address], select Sign Up Now, enter this activation code and your " date of birth, then select Next.  [email or web address]  Expires: 2/27/2017  9:17 AM      2) Create a username and password to use when you visit [facility] in the future and select a security question in case you lose your password and select Next.    3) Enter your e-mail address and click Sign Up!    Additional Information  If you have questions, please e-mail [email or web address] or call [phone] to talk to our [facility] staff. Remember, [facility] is NOT to be used for urgent needs. For medical emergencies, dial 911.         Language Assistance Services     ATTENTION: Language assistance services are available, free of charge. Please call [phone].      ATENCIÓN: Si habla español, tiene a reyes disposición servicios gratuitos de asistencia lingüística. Llame al [phone].     CHÚ Ý: N?u b?n nói Ti?ng Vi?t, có các d?ch v? h? tr? ngôn ng? mi?n phí dành cho b?n. G?i s? [phone].         [facility] complies with applicable Federal civil rights laws and does not discriminate on the basis of race, color, national origin, age, disability, or sex.

## 2017-02-14 NOTE — TELEPHONE ENCOUNTER
----- Message from Lashawn Collier DO sent at 2/13/2017 11:55 AM CST -----  Please instruct the patient to avoid aspirin, fish oil and any other aspirin containing products (Goody powder) for 1 week prior to surgery (3/8).  Thanks.  2-14-17  Per Dr. Collier, patient given above instructions . Patient voiced understanding and will stop ASA/fish oil/ASA containgin productis on February 28,2017.

## 2017-02-14 NOTE — TELEPHONE ENCOUNTER
----- Message from Lashawn Collier, DO sent at 2/13/2017  2:08 PM CST -----  Pls let patient know that her labs were normal with the exception of very high glucose. She will have to have better glucose control during the time of her surgery. Thx  2-14-17 Per Dr. Collier, patient given about test results and to watch her sugar intake. Patient voiced understanding.

## 2017-02-16 ENCOUNTER — HOSPITAL ENCOUNTER (OUTPATIENT)
Dept: RADIOLOGY | Facility: HOSPITAL | Age: 42
Discharge: HOME | End: 2017-02-16
Attending: INTERNAL MEDICINE
Payer: MEDICARE

## 2017-02-16 DIAGNOSIS — Z12.31 OTHER SCREENING MAMMOGRAM: ICD-10-CM

## 2017-02-16 PROCEDURE — 77067 SCR MAMMO BI INCL CAD: CPT | Mod: TC

## 2017-02-16 PROCEDURE — 77063 BREAST TOMOSYNTHESIS BI: CPT | Mod: 26,,, | Performed by: RADIOLOGY

## 2017-02-16 PROCEDURE — 77067 SCR MAMMO BI INCL CAD: CPT | Mod: 26,,, | Performed by: RADIOLOGY

## 2017-03-02 ENCOUNTER — ANESTHESIA EVENT (OUTPATIENT)
Dept: SURGERY | Facility: HOSPITAL | Age: 42
End: 2017-03-02
Payer: MEDICARE

## 2017-03-02 ENCOUNTER — HOSPITAL ENCOUNTER (OUTPATIENT)
Dept: PREADMISSION TESTING | Facility: HOSPITAL | Age: 42
Discharge: HOME | End: 2017-03-02
Attending: ORTHOPAEDIC SURGERY
Payer: MEDICARE

## 2017-03-02 ENCOUNTER — TELEPHONE (OUTPATIENT)
Dept: SURGERY | Facility: CLINIC | Age: 42
End: 2017-03-02

## 2017-03-02 ENCOUNTER — CLINICAL SUPPORT (OUTPATIENT)
Dept: LAB | Facility: HOSPITAL | Age: 42
End: 2017-03-02
Attending: SURGERY
Payer: MEDICARE

## 2017-03-02 VITALS
DIASTOLIC BLOOD PRESSURE: 104 MMHG | SYSTOLIC BLOOD PRESSURE: 194 MMHG | OXYGEN SATURATION: 99 % | RESPIRATION RATE: 20 BRPM | BODY MASS INDEX: 42.69 KG/M2 | HEART RATE: 84 BPM | HEIGHT: 65 IN | WEIGHT: 256.19 LBS

## 2017-03-02 DIAGNOSIS — Z01.818 PRE-OP TESTING: ICD-10-CM

## 2017-03-02 DIAGNOSIS — Z01.818 PRE-OP TESTING: Primary | ICD-10-CM

## 2017-03-02 PROCEDURE — 93005 ELECTROCARDIOGRAM TRACING: CPT

## 2017-03-02 RX ORDER — LIDOCAINE HYDROCHLORIDE 10 MG/ML
1 INJECTION, SOLUTION EPIDURAL; INFILTRATION; INTRACAUDAL; PERINEURAL ONCE
Status: CANCELLED | OUTPATIENT
Start: 2017-03-02 | End: 2017-03-02

## 2017-03-02 RX ORDER — SODIUM CHLORIDE, SODIUM LACTATE, POTASSIUM CHLORIDE, CALCIUM CHLORIDE 600; 310; 30; 20 MG/100ML; MG/100ML; MG/100ML; MG/100ML
INJECTION, SOLUTION INTRAVENOUS CONTINUOUS
Status: CANCELLED | OUTPATIENT
Start: 2017-03-02

## 2017-03-02 NOTE — DISCHARGE INSTRUCTIONS
Your surgery is scheduled for 3/8/17.    Please report to Outpatient Surgery Intake Office on the 2nd FLOOR at 8:20 a.m.          INSTRUCTIONS IMPORTANT!!!  ¨ Do not eat or drink after 12 midnight-including water. OK to brush teeth, no   gum, candy or mints!    ¨ Take only these medicines with a small swallow of water-morning of surgery: Lisinopril       ____  Do not wear makeup, including mascara.  ____  No powder, lotions or creams to surgical area.  ____  Please remove all jewelry, including piercings and leave at home.  ____  No money or valuables needed. Please leave at home.  ____  Please bring any documents given by your doctor.  ____  If going home the same day, arrange for a ride home. You will not be able to             drive if Anesthesia was used.  ____  Wear loose fitting clothing. Allow for dressings, bandages.  ____  Stop Aspirin, Ibuprofen, Motrin and Aleve at least 3-5 days before surgery, unless otherwise instructed by your doctor, or the nurse.   You MAY use Tylenol/acetaminophen until day of surgery.  ____  Wash the surgical area with Hibiclens the night before surgery, and again the             morning of surgery.  Be sure to rinse hibiclens off completely (if instructed by   nurse).  ____  If you take diabetic medication, do not take am of surgery unless instructed by Doctor.  ____  Call MD for temperature above 101 degrees.  ____ Stop taking any Fish Oil supplement or any Vitamins that contain Vitamin E at least 5 days prior to surgery.  ____ Do Not wear your contact lenses the day of your procedure.  You may wear your glasses.        I have read or had read and explained to me, and understand the above information.  Additional comments or instructions:  For additional questions call [phone]     Take a Hibiclens shower twice a day for 3 days prior to surgery, including the morning of surgery.   Gargle with Listerine twice a day for 2 days prior to surgery, including the morning of  surgery.      Pre-Op Bathing Instructions    Before surgery, you can play an important role in your own health.    Because skin is not sterile, we need to be sure that your skin is as free of germs as possible. By following the instructions below, you can reduce the number of germs on your skin before surgery.    IMPORTANT: You will need to shower with a special soap called Hibiclens*, available at any pharmacy.  If you are allergic to Chlorhexidine (the antiseptic in Hibiclens), use an antibacterial soap such as Dial Soap for your preoperative shower.  You will shower with Hibiclens both the night before your surgery and the morning of your surgery.  Do not use Hibiclens on the head, face or genitals to avoid injury to those areas.    STEP #1: THE NIGHT BEFORE YOUR SURGERY     1. Do not shave the area of your body where your surgery will be performed.  2. Shower and wash your hair and body as usual with your normal soap and shampoo.  3. Rinse your hair and body thoroughly after you shower to remove all soap residue.  4. With your hand, apply one packet of Hibiclens soap to the surgical site.   5. Wash the site gently for five (5) minutes. Do not scrub your skin too hard.   6. Do not wash with your regular soap after Hibiclens is used.  7. Rinse your body thoroughly.  8. Pat yourself dry with a clean, soft towel.  9. Do not use lotion, cream, or powder.  10. Wear clean clothes.    STEP #2: THE MORNING OF YOUR SURGERY     1. Repeat Step #1.    * Not to be used by people allergic to Chlorhexidine.          Cholecystectomy     Clips close off the duct connecting the gallbladder to the bile duct. The gallbladder is then removed.     Youve had painful attacks caused by gallstones. To treat the problem, your healthcare provider wants to remove your gallbladder. This surgery is called cholecystectomy. Removing the gallbladder can relieve pain. It will also prevent future attacks. You can live a healthy life without your  gallbladder. You may also be able to go back to eating foods you enjoyed before your gallbladder problems started.  Before your surgery  Be prepared:  · Tell your provider what medicines you take. Include those bought over the counter. Also include herbs or supplements. Be sure to mention if you take prescription blood thinners. This includes warfarin, clopidogrel, and aspirin.  · Have any tests your provider asks for, such as blood tests.  · Dont eat or drink after midnight, the night before your surgery. This includes water, coffee, and mints. However, you may need to take some medicine with sips of water--talk with your healthcare provider.  The day of surgery  When you arrive, you will prepare for surgery:  · An IV line will be put into a vein in your arm or hand. This gives you fluids and medicine.  · An anesthesiologist will talk with you about anesthesia. This is medicine used to prevent pain. You will receive general anesthesia. This puts you into a state like deep sleep through the procedure.  During surgery  There are 2 methods for removing the gallbladder. Your healthcare provider will choose which method is best for you:  · Laparoscopic cholecystectomy. This is most common. During surgery, 2 to 4 small incisions are made. A thin tube with a camera is used. This is called a laparoscope. The scope is put through one of the incisions. It sends images to a video screen. Surgical tools are put through other incisions. The gallbladder is removed using the scope and these tools.  · Open cholecystectomy. One larger incision is made. The surgeon sees and works through this incision. Open surgery is most often used when scarring or other factors make it a better choice for you.  In some cases, safety requires a change from laparoscopic to open surgery during the procedure.  After surgery  You will be sent to a room to wake up from the anesthesia. You will likely go home the same day. In some cases, an overnight  stay is needed. If you had open cholecystectomy, you may need to stay in the hospital for a few days. When you are released to go home, have a family member or friend ready to drive you.  Risks and possible complications of gallbladder surgery  All surgeries have risks. The risks of gallbladder surgery include:  · Bleeding  · Infection  · Injury to the common bile duct or nearby organs  · Blood clots in the legs  · Bile leaks  · Hernia at incision site  · Pnemonia   Date Last Reviewed: 7/1/2016 © 2000-2016 [employer]. [address] [place] [address]. All rights reserved. This information is not intended as a substitute for professional medical care. Always follow your healthcare professional's instructions.        Having Laparoscopic Cholecystectomy  Small incisions are made in your belly (abdomen). The scope is put through one of the incisions. Surgical tools are put through other incisions.  Small clips are used to close off the connection between the gallbladder and the bile duct. The gallbladder is then detached from the liver.  The gallbladder is removed through one of the incisions. Bile still flows from the liver to the small intestine.  When the surgery is done, all tools are removed. Incisions are closed with stitches (sutures) or staples. Sometimes, a laparoscopic surgery may need to be changed to an open surgery. This method uses one large incision. This change may happen because of scar tissue, unusual anatomy, or for some other reason.     Possible incision sites.   Youve had painful attacks caused by gallstones. Because of this, you are having surgery to remove your gallbladder. This is called cholecystectomy. A method called laparoscopy will be used. This allows surgery to be done through a few small cuts (incisions).  Before your surgery  · Tell your provider what medicines you take. This includes prescription medicines, over-the-counter medicines, street drugs, herbs,  vitamins, and other supplements. Be sure to mention if you take prescription blood thinners. This includes warfarin, clopidogrel, ibuprofen, and aspirin.  · If you drink alcohol, tell your provider how much you drink. This is very important if you are a heavy drinker or have had alcohol withdrawal symptoms in the past. Alcohol withdrawal can be dangerous. But the symptoms can be safely managed if your healthcare provider knows your alcohol history.  · Have any tests your provider asks for, such as blood tests.  · Dont eat or drink after midnight the night before your surgery. This includes water, coffee, and mints.  The day of surgery  · Your provider may have you take your normal medicine with a sip of water. Check with your provider.   When you arrive, you will prepare for surgery:  · An IV (intravenous) line will be put into a vein in your arm or hand. This gives you fluids and medicine.  · An anesthesiologist will talk with you about anesthesia. This is medicine used to prevent pain. You will receive general anesthesia. This puts you into a deep sleep-like state through the procedure.  During laparoscopic surgery  For this surgery, a thin tube with a tiny camera is used. This is called a laparoscope. The scope sends images from inside your body to a video screen. This lets the surgeon view and work on your gallbladder:  · Small incisions are made in your belly (abdomen). The scope is put through one of the incisions. Surgical tools are put through other incisions.  · Small clips are used to close off the connection between the gallbladder and the bile duct. The gallbladder is then detached from the liver.  · The gallbladder is removed through one of the incisions. Bile still flows from the liver to the small intestine.  · When the surgery is done, all tools are removed. Incisions are closed with stitches (sutures) or staples. Sometimes, a laparoscopic surgery may need to be changed to an open surgery. This  method uses one large incision. This change may happen because of scar tissue, unusual anatomy, or for some other reason.  After surgery  You will be sent to a room to wake up from the anesthesia. You will likely go home the same day. In some cases, an overnight stay is needed. When you are released to go home, have a family member or friend ready to drive you.  Risks and possible complications of gallbladder surgery  All surgeries have risks. The risks of gallbladder surgery include:  · Bleeding  · Infection  · Injury to the common bile duct or nearby organs  · Blood clots in the legs  · Bile leaks  · Hernia at incision site  · Pneumonia   Date Last Reviewed: 7/1/2016  © 6702-5161 [employer]. [address] [place] [address], [place] [address]. All rights reserved. This information is not intended as a substitute for professional medical care. Always follow your healthcare professional's instructions.

## 2017-03-02 NOTE — ANESTHESIA PREPROCEDURE EVALUATION
2017     Jaimee Quintana is a 41 y.o., female is scheduled for laparoscopic cholecystectomy under [facility] on 3/08/2017.    PAST ANES IN EPIC 2017  none    ANES-RELATED MED/SURG 2017  HTN  DM II  HLD  Morbid Obesity  CESAR  Dyspepsia  CVA hx    Past Surgical History:   Procedure Laterality Date     SECTION       ALLERGIES  Review of patient's allergies indicates:  No Known Allergies    ANES-RELATED  HOME Rx 2017  Nifedipine  Metformin Pantoprazole  Lisinopril-hctz  Insulin  Atorvastatin  ASA-81    [facility] Anesthesia Evaluation      I have reviewed the Medications.     Review of Systems  Anesthesia Hx:  No problems with previous Anesthesia History of prior surgery of interest to airway management or planning: Previous anesthesia: Epidural, General Denies Family Hx of Anesthesia complications.   Denies Personal Hx of Anesthesia complications.   Social:  Former Smoker, Alcohol Use    Hematology/Oncology:  Hematology Normal        EENT/Dental:EENT/Dental Normal   Cardiovascular:   Hypertension (pt admits to non-compliance with all medications; did not take BP medications this morning), poorly controlled Denies Dysrhythmias.   Denies Angina. Orthopnea (pt with anxiety while lying flat) hyperlipidemia      Functional Capacity 3.5 METS    Pulmonary:   Denies Shortness of breath. Sleep Apnea, CPAP    Hepatic/GI:   GERD, well controlled Liver Disease, (fatty liver disease) RUQ pain with abdominal bloating   Neurological:   TIA,  TIA - Transient Ischemic Attack , Most recent TIA was on  , has had 1 TIA    Endocrine:   Diabetes, poorly controlled, type 2, using insulin  Diabetes, Type 2 Diabetes , controlled by oral hypoglycemics, insulin. , most recent HgA1c value was 9.5 on 2016.    Psych:   depression        Wt Readings from Last 3 Encounters:   17 116.2 kg (256 lb 2.8 oz)    02/13/17 113.4 kg (250 lb)   02/06/17 113.8 kg (250 lb 14.1 oz)     Temp Readings from Last 3 Encounters:   02/13/17 36.8 °C (98.3 °F)   01/13/17 37 °C (98.6 °F)   10/10/16 36.9 °C (98.4 °F)     BP Readings from Last 3 Encounters:   03/02/17 (!) 194/104   02/13/17 130/77   02/06/17 (!) 146/94     Pulse Readings from Last 3 Encounters:   03/02/17 84   02/13/17 (!) 112   02/06/17 104       Physical Exam  General:  Morbid Obesity Pt is poor historian    Airway/Jaw/Neck:  Airway Findings: Mouth Opening: Normal Tongue: Normal  General Airway Assessment: Adult  Mallampati: III  TM Distance: Normal, at least 6 cm  Jaw/Neck Findings:  Neck Findings:  Short Neck, Girth Increased     Eyes/Ears/Nose:  EYES/EARS/NOSE FINDINGS: Normal   Dental:  Dental Findings: Periodontal disease, Severe    Chest/Lungs:  Chest/Lungs Clear    Heart/Vascular:  Heart Findings: Normal Heart murmur: negative    Abdomen:  Abdomen Findings: (RUQ)  Normal, Soft, Tenderness       Mental Status:  Mental Status Findings:  Anxious, Alert and Oriented, Cooperative       Lab Results   Component Value Date    WBC 9.80 02/13/2017    HGB 15.3 02/13/2017    HCT 44.6 02/13/2017    MCV 87 02/13/2017     02/13/2017       Chemistry        Component Value Date/Time     (L) 02/13/2017 1201    K 4.2 02/13/2017 1201     02/13/2017 1201    CO2 23 02/13/2017 1201    BUN 9 02/13/2017 1201    CREATININE 0.9 02/13/2017 1201     (H) 02/13/2017 1201        Component Value Date/Time    CALCIUM 9.7 02/13/2017 1201    ALKPHOS 87 02/13/2017 1201    AST 28 02/13/2017 1201    ALT 41 02/13/2017 1201    BILITOT 0.4 02/13/2017 1201        Lab Results   Component Value Date    ALBUMIN 3.8 02/13/2017     Lab Results   Component Value Date    TSH 0.755 12/05/2016     CXR  none    EKG 2017-03-02  Normal sinus rhythm  Normal ECG  When compared with ECG of 01-APR-2015 13:14,  No significant change was found  Confirmed by Dameon    Exercise Stress Echo  12/2015  CONCLUSIONS   No evidence of stress induced myocardial ischemia.   duke treadmill score is 3 (intermediate risk for severe CAD). stress images are non-diagnostic, if further testing is needed consider another modality.   This document has been electronically    SIGNED BY: Renzo Santaan MD On: 04/17/2015 13:49      Anesthesia Plan  Type of Anesthesia, risks & benefits discussed:  Anesthesia Type:  general  Patient's Preference:   Intra-op Monitoring Plan:   Intra-op Monitoring Plan Comments:   Post Op Pain Control Plan:   Post Op Pain Control Plan Comments:   Induction:   IV  Beta Blocker:  Patient is not currently on a Beta-Blocker (No further documentation required).       Informed Consent: Patient understands risks and agrees with Anesthesia plan.  Questions answered.   ASA Score: 3     Day of Surgery Review of History & Physical:        Anesthesia Plan Notes: Anesthesia consent will be obtained prior to procedure on 3/08/2017.          Ready For Surgery From Anesthesia Perspective.

## 2017-03-02 NOTE — IP AVS SNAPSHOT
[facility]  [address]  [place] [address]  Phone: [phone]           Patient Discharge Instructions    Our goal is to set you up for success. This packet includes information on your condition, medications, and your home care. It will help you to care for yourself so you don't get sicker.     Please ask your nurse if you have any questions.        There are many details to remember when preparing for your surgery. Here is what you will need to do, please ask your nurse if there are more specific instructions and if you have any questions:    1. 24 hours before procedure Do not smoke or drink alcoholic beverages 24 hours prior to your procedure    2. Eating before procedure Do not eat or drink anything 8 hours before your procedure - this includes gum, mints, and candy.     3. Day of procedure Please remove all jewelry for the procedure. If you wear contact lenses, dentures, hearing aids or glasses, bring a container to put them in during your surgery and give to a family member for safekeeping.  If your doctor has scheduled you for an overnight stay, bring a small overnight bag with any personal items that you need.    4. After procedure Make arrangements in advance for transportation home by a responsible adult. It is not safe to drive a vehicle during the 24 hours following surgery.     PLEASE NOTE: You may be contacted the day before your surgery to confirm your surgery date and arrival time. The Surgery schedule has many variables which may affect the time of your surgery case. Family members should be available if your surgery time changes.                [facility]  Unless otherwise directed by your provider, please contact [facility], our nurse care line that is available for 24/7 assistance.     [phone] (toll-free)    Registered nurses in the [facility] provide clinical advisement, health education, appointment booking, and other advisory services.                     ** Verify the list of medication(s) below is accurate and up to date. Carry this with you in case of emergency. If your medications have changed, please notify your healthcare provider.             Medication List      TAKE these medications        Additional Info                      aspirin 81 MG Chew   Refills:  0   Dose:  81 mg    Instructions:  Take 81 mg by mouth once daily.     Begin Date    AM    Noon    PM    Bedtime       atorvastatin 80 MG tablet   Commonly known as:  LIPITOR   Quantity:  90 tablet   Refills:  0    Instructions:  TAKE 1 TABLET EVERY EVENING     Begin Date    AM    Noon    PM    Bedtime       blood sugar diagnostic Strp   Quantity:  300 strip   Refills:  6    Instructions:  Truemetrix; test BID     Begin Date    AM    Noon    PM    Bedtime       blood-glucose meter kit   Commonly known as:  FREESTYLE SYSTEM KIT   Quantity:  1 each   Refills:  0    Instructions:  Use as instructed; Truemetrix     Begin Date    AM    Noon    PM    Bedtime       insulin glargine 100 unit/mL (3 mL) Inpn pen   Commonly known as:  LANTUS SOLOSTAR   Quantity:  45 mL   Refills:  3    Instructions:  Inject 45 units SQ BID     Begin Date    AM    Noon    PM    Bedtime       insulin regular 100 unit/mL injection   Commonly known as:  NOVOLIN R   Quantity:  30 mL   Refills:  3    Instructions:  45 units SQ TID AC     Begin Date    AM    Noon    PM    Bedtime       insulin syringe-needle U-100 0.5 mL 31 gauge x 5/16 Syrg   Commonly known as:  BD INSULIN SYRINGE ULTRA-FINE   Quantity:  200 each   Refills:  6   Dose:  1 Syringe    Instructions:  1 Syringe by Misc.(Non-Drug; Combo Route) route 2 (two) times daily. Test BID    DX: E11.9, Z79.4     Begin Date    AM    Noon    PM    Bedtime       lancets Misc   Quantity:  200 each   Refills:  6    Instructions:  True Metrix; test BID      DX: E11.9, Z79.4     Begin Date    AM    Noon    PM    Bedtime       lisinopril-hydrochlorothiazide 20-12.5 mg per tablet  "  Commonly known as:  PRINZIDE,ZESTORETIC   Quantity:  180 tablet   Refills:  0    Instructions:  TAKE 1 TABLET BY MOUTH 2 (TWO) TIMES DAILY.     Begin Date    AM    Noon    PM    Bedtime       metformin 1000 MG tablet   Commonly known as:  GLUCOPHAGE   Quantity:  180 tablet   Refills:  3   Dose:  1000 mg    Instructions:  Take 1 tablet (1,000 mg total) by mouth 2 (two) times daily with meals.     Begin Date    AM    Noon    PM    Bedtime       nifedipine 90 MG Tbsr   Commonly known as:  ADALAT CC   Quantity:  30 tablet   Refills:  1    Instructions:  TAKE 1 TABLET (30 MG TOTAL) BY MOUTH ONCE DAILY.     Begin Date    AM    Noon    PM    Bedtime       pantoprazole 40 MG tablet   Commonly known as:  PROTONIX   Quantity:  30 tablet   Refills:  11   Dose:  40 mg    Instructions:  Take 1 tablet (40 mg total) by mouth once daily.     Begin Date    AM    Noon    PM    Bedtime       pen needle, diabetic 32 gauge x 5/32" Ndle   Commonly known as:  BD ULTRA-FINE ROSA PEN NEEDLES   Quantity:  300 each   Refills:  6   Dose:  1 Act    Instructions:  1 Act by Misc.(Non-Drug; Combo Route) route 5 (five) times daily.     Begin Date    AM    Noon    PM    Bedtime                  Please bring to all follow up appointments:    1. A copy of your discharge instructions.  2. All medicines you are currently taking in their original bottles.  3. Identification and insurance card.    Please arrive 15 minutes ahead of scheduled appointment time.    Please call 24 hours in advance if you must reschedule your appointment and/or time.        Your Scheduled Appointments     Mar 15, 2017  2:20 PM CDT   Established Patient Visit with MD David Sood - Internal Medicine ([place])    [address]  [place] [id number] [phone]            Mar 20, 2017 10:20 AM CDT   Post OP with DO [place] Ruiz - General Surgery ([place])    [address] [facility]  4th Floor [place] [address] [phone]            " Mar 23, 2017  9:00 AM CDT   Established Patient with BLADIMIR Enciso,ANP-C   [facility] - Endocrinology ([place] )    [address]  [facility] [address] [phone]            Apr 17, 2017 10:00 AM CDT   Post OP with Lashawn Collier DO   [place] [facility] ([place])    [address]  4th [address] [place]  [facility] [address] [phone]              Your Future Surgeries/Procedures     Mar 08, 2017   Surgery with Lashawn Collier, DO   [facility] ([facility])    [address]  [facility] [address] [phone]                  Discharge Instructions       Your surgery is scheduled for 3/8/17.    Please report to Outpatient Surgery Intake Office on the 2nd FLOOR at 8:20 a.m.          INSTRUCTIONS IMPORTANT!!!  ¨ Do not eat or drink after 12 midnight-including water. OK to brush teeth, no   gum, candy or mints!    ¨ Take only these medicines with a small swallow of water-morning of surgery: Lisinopril       ____  Do not wear makeup, including mascara.  ____  No powder, lotions or creams to surgical area.  ____  Please remove all jewelry, including piercings and leave at home.  ____  No money or valuables needed. Please leave at home.  ____  Please bring any documents given by your doctor.  ____  If going home the same day, arrange for a ride home. You will not be able to             drive if Anesthesia was used.  ____  Wear loose fitting clothing. Allow for dressings, bandages.  ____  Stop Aspirin, Ibuprofen, Motrin and Aleve at least 3-5 days before surgery, unless otherwise instructed by your doctor, or the nurse.   You MAY use Tylenol/acetaminophen until day of surgery.  ____  Wash the surgical area with Hibiclens the night before surgery, and again the             morning of surgery.  Be sure to rinse hibiclens off completely (if instructed by   nurse).  ____  If you take diabetic medication, do not take am of surgery unless instructed  by Doctor.  ____  Call MD for temperature above 101 degrees.  ____ Stop taking any Fish Oil supplement or any Vitamins that contain Vitamin E at least 5 days prior to surgery.  ____ Do Not wear your contact lenses the day of your procedure.  You may wear your glasses.        I have read or had read and explained to me, and understand the above information.  Additional comments or instructions:  For additional questions call [phone]     Take a Hibiclens shower twice a day for 3 days prior to surgery, including the morning of surgery.   Gargle with Listerine twice a day for 2 days prior to surgery, including the morning of surgery.      Pre-Op Bathing Instructions    Before surgery, you can play an important role in your own health.    Because skin is not sterile, we need to be sure that your skin is as free of germs as possible. By following the instructions below, you can reduce the number of germs on your skin before surgery.    IMPORTANT: You will need to shower with a special soap called Hibiclens*, available at any pharmacy.  If you are allergic to Chlorhexidine (the antiseptic in Hibiclens), use an antibacterial soap such as Dial Soap for your preoperative shower.  You will shower with Hibiclens both the night before your surgery and the morning of your surgery.  Do not use Hibiclens on the head, face or genitals to avoid injury to those areas.    STEP #1: THE NIGHT BEFORE YOUR SURGERY     1. Do not shave the area of your body where your surgery will be performed.  2. Shower and wash your hair and body as usual with your normal soap and shampoo.  3. Rinse your hair and body thoroughly after you shower to remove all soap residue.  4. With your hand, apply one packet of Hibiclens soap to the surgical site.   5. Wash the site gently for five (5) minutes. Do not scrub your skin too hard.   6. Do not wash with your regular soap after Hibiclens is used.  7. Rinse your body thoroughly.  8. Pat yourself dry with a  clean, soft towel.  9. Do not use lotion, cream, or powder.  10. Wear clean clothes.    STEP #2: THE MORNING OF YOUR SURGERY     1. Repeat Step #1.    * Not to be used by people allergic to Chlorhexidine.          Cholecystectomy     Clips close off the duct connecting the gallbladder to the bile duct. The gallbladder is then removed.     Youve had painful attacks caused by gallstones. To treat the problem, your healthcare provider wants to remove your gallbladder. This surgery is called cholecystectomy. Removing the gallbladder can relieve pain. It will also prevent future attacks. You can live a healthy life without your gallbladder. You may also be able to go back to eating foods you enjoyed before your gallbladder problems started.  Before your surgery  Be prepared:  · Tell your provider what medicines you take. Include those bought over the counter. Also include herbs or supplements. Be sure to mention if you take prescription blood thinners. This includes warfarin, clopidogrel, and aspirin.  · Have any tests your provider asks for, such as blood tests.  · Dont eat or drink after midnight, the night before your surgery. This includes water, coffee, and mints. However, you may need to take some medicine with sips of water--talk with your healthcare provider.  The day of surgery  When you arrive, you will prepare for surgery:  · An IV line will be put into a vein in your arm or hand. This gives you fluids and medicine.  · An anesthesiologist will talk with you about anesthesia. This is medicine used to prevent pain. You will receive general anesthesia. This puts you into a state like deep sleep through the procedure.  During surgery  There are 2 methods for removing the gallbladder. Your healthcare provider will choose which method is best for you:  · Laparoscopic cholecystectomy. This is most common. During surgery, 2 to 4 small incisions are made. A thin tube with a camera is used. This is called a  laparoscope. The scope is put through one of the incisions. It sends images to a video screen. Surgical tools are put through other incisions. The gallbladder is removed using the scope and these tools.  · Open cholecystectomy. One larger incision is made. The surgeon sees and works through this incision. Open surgery is most often used when scarring or other factors make it a better choice for you.  In some cases, safety requires a change from laparoscopic to open surgery during the procedure.  After surgery  You will be sent to a room to wake up from the anesthesia. You will likely go home the same day. In some cases, an overnight stay is needed. If you had open cholecystectomy, you may need to stay in the hospital for a few days. When you are released to go home, have a family member or friend ready to drive you.  Risks and possible complications of gallbladder surgery  All surgeries have risks. The risks of gallbladder surgery include:  · Bleeding  · Infection  · Injury to the common bile duct or nearby organs  · Blood clots in the legs  · Bile leaks  · Hernia at incision site  · Pnemonia   Date Last Reviewed: 7/1/2016 © 2000-2016 [employer]. [address] [place] [address]. All rights reserved. This information is not intended as a substitute for professional medical care. Always follow your healthcare professional's instructions.        Having Laparoscopic Cholecystectomy  Small incisions are made in your belly (abdomen). The scope is put through one of the incisions. Surgical tools are put through other incisions.  Small clips are used to close off the connection between the gallbladder and the bile duct. The gallbladder is then detached from the liver.  The gallbladder is removed through one of the incisions. Bile still flows from the liver to the small intestine.  When the surgery is done, all tools are removed. Incisions are closed with stitches (sutures) or staples. Sometimes, a  laparoscopic surgery may need to be changed to an open surgery. This method uses one large incision. This change may happen because of scar tissue, unusual anatomy, or for some other reason.     Possible incision sites.   Youve had painful attacks caused by gallstones. Because of this, you are having surgery to remove your gallbladder. This is called cholecystectomy. A method called laparoscopy will be used. This allows surgery to be done through a few small cuts (incisions).  Before your surgery  · Tell your provider what medicines you take. This includes prescription medicines, over-the-counter medicines, street drugs, herbs, vitamins, and other supplements. Be sure to mention if you take prescription blood thinners. This includes warfarin, clopidogrel, ibuprofen, and aspirin.  · If you drink alcohol, tell your provider how much you drink. This is very important if you are a heavy drinker or have had alcohol withdrawal symptoms in the past. Alcohol withdrawal can be dangerous. But the symptoms can be safely managed if your healthcare provider knows your alcohol history.  · Have any tests your provider asks for, such as blood tests.  · Dont eat or drink after midnight the night before your surgery. This includes water, coffee, and mints.  The day of surgery  · Your provider may have you take your normal medicine with a sip of water. Check with your provider.   When you arrive, you will prepare for surgery:  · An IV (intravenous) line will be put into a vein in your arm or hand. This gives you fluids and medicine.  · An anesthesiologist will talk with you about anesthesia. This is medicine used to prevent pain. You will receive general anesthesia. This puts you into a deep sleep-like state through the procedure.  During laparoscopic surgery  For this surgery, a thin tube with a tiny camera is used. This is called a laparoscope. The scope sends images from inside your body to a video screen. This lets the surgeon  view and work on your gallbladder:  · Small incisions are made in your belly (abdomen). The scope is put through one of the incisions. Surgical tools are put through other incisions.  · Small clips are used to close off the connection between the gallbladder and the bile duct. The gallbladder is then detached from the liver.  · The gallbladder is removed through one of the incisions. Bile still flows from the liver to the small intestine.  · When the surgery is done, all tools are removed. Incisions are closed with stitches (sutures) or staples. Sometimes, a laparoscopic surgery may need to be changed to an open surgery. This method uses one large incision. This change may happen because of scar tissue, unusual anatomy, or for some other reason.  After surgery  You will be sent to a room to wake up from the anesthesia. You will likely go home the same day. In some cases, an overnight stay is needed. When you are released to go home, have a family member or friend ready to drive you.  Risks and possible complications of gallbladder surgery  All surgeries have risks. The risks of gallbladder surgery include:  · Bleeding  · Infection  · Injury to the common bile duct or nearby organs  · Blood clots in the legs  · Bile leaks  · Hernia at incision site  · Pneumonia   Date Last Reviewed: 7/1/2016  © 2816-8961 [employer]. [address] [place] [address], [place] [address]. All rights reserved. This information is not intended as a substitute for professional medical care. Always follow your healthcare professional's instructions.            Admission Information     Date & Time Provider Department CSN    3/2/2017 11:00 AM Lashawn Collier DO [facility] [id number]      Care Providers     Provider Role Specialty Primary office phone    Lashawn Collier DO Attending Provider General Surgery [phone]      Your Vitals Were     Last Period                   01/05/2017           Recent Lab  Values        7/30/2010 8/31/2010 1/19/2015 10/26/2015 7/14/2016 12/5/2016            1:06 PM  3:55 PM 10:52 AM  9:01 AM  8:28 AM  9:32 AM      A1C 5.4 5.9 11.9 (H) 11.2 (H) 10.3 (H) 9.7 (H)      Comment for A1C at  8:28 AM on 7/14/2016:  According to ADA guidelines, hemoglobin A1C <7.0% represents  optimal control in non-pregnant diabetic patients.  Different  metrics may apply to specific populations.   Standards of Medical Care in Diabetes - 2016.  For the purpose of screening for the presence of diabetes:  <5.7%     Consistent with the absence of diabetes  5.7-6.4%  Consistent with increasing risk for diabetes   (prediabetes)  >or=6.5%  Consistent with diabetes  Currently no consensus exists for use of hemoglobin A1C  for diagnosis of diabetes for children.      Comment for A1C at  9:32 AM on 12/5/2016:  According to ADA guidelines, hemoglobin A1C <7.0% represents  optimal control in non-pregnant diabetic patients.  Different  metrics may apply to specific populations.   Standards of Medical Care in Diabetes - 2016.  For the purpose of screening for the presence of diabetes:  <5.7%     Consistent with the absence of diabetes  5.7-6.4%  Consistent with increasing risk for diabetes   (prediabetes)  >or=6.5%  Consistent with diabetes  Currently no consensus exists for use of hemoglobin A1C  for diagnosis of diabetes for children.        Allergies as of 3/2/2017     No Known Allergies      Advance Directives     An advance directive is a document which, in the event you are no longer able to make decisions for yourself, tells your healthcare team what kind of treatment you do or do not want to receive, or who you would like to make those decisions for you.  If you do not currently have an advance directive, [facility] encourages you to create one.  For more information call:  [phone]), [phone] ([phone]),  or log on to [email or web address].        Smoking Cessation     If you would like  to quit smoking:   You may be eligible for free services if you are a [place] resident and started smoking cigarettes before September 1, 1988.  Call the Smoking Cessation Trust (SCT) toll free at (544) 922-4674 or (724) 401-7336.   Call 1-800-QUIT-NOW if you do not meet the above criteria.            Language Assistance Services     ATTENTION: Language assistance services are available, free of charge. Please call [phone].      ATENCIÓN: Si habla meri, tiene a reyes disposición servicios gratuitos de asistencia lingüística. Llame al [phone].     CHÚ Ý: N?u b?n nói Ti?ng Vi?t, có các d?ch v? h? tr? ngôn ng? mi?n phí dành cho b?n. G?i s? [phone].        Diabetes Discharge Instructions                                   [facility] Sign-Up     Activating your [facility] account is as easy as 1-2-3!     1) Visit [email or web address], select Sign Up Now, enter this activation code and your date of birth, then select Next.  [email or web address]  Expires: 4/16/2017 11:21 AM      2) Create a username and password to use when you visit [facility] in the future and select a security question in case you lose your password and select Next.    3) Enter your e-mail address and click Sign Up!    Additional Information  If you have questions, please e-mail [email or web address] or call [phone] to talk to our [facility] staff. Remember, [facility] is NOT to be used for urgent needs. For medical emergencies, dial 911.          [facility] complies with applicable Federal civil rights laws and does not discriminate on the basis of race, color, national origin, age, disability, or sex.

## 2017-03-02 NOTE — TELEPHONE ENCOUNTER
----- Message from [place] Sams sent at 3/2/2017  2:09 PM CST -----  Contact: [phone]/ self   Pt its requesting to speak with you in regarding her up coming procedure . Please advise   3-2-17 Patient called with questions about her surgery and is requesting to speak with Dr Collier. I explained that Dr. Collier was out of the clinic but that I would make sure she got the message. Patient voiced understanding.

## 2017-03-08 ENCOUNTER — SURGERY (OUTPATIENT)
Age: 42
End: 2017-03-08

## 2017-03-08 ENCOUNTER — HOSPITAL ENCOUNTER (OUTPATIENT)
Facility: HOSPITAL | Age: 42
Discharge: HOME | End: 2017-03-08
Attending: SURGERY | Admitting: SURGERY
Payer: MEDICARE

## 2017-03-08 ENCOUNTER — ANESTHESIA (OUTPATIENT)
Dept: SURGERY | Facility: HOSPITAL | Age: 42
End: 2017-03-08
Payer: MEDICARE

## 2017-03-08 VITALS
TEMPERATURE: 98 F | BODY MASS INDEX: 42.65 KG/M2 | HEIGHT: 65 IN | SYSTOLIC BLOOD PRESSURE: 159 MMHG | DIASTOLIC BLOOD PRESSURE: 73 MMHG | WEIGHT: 256 LBS | HEART RATE: 90 BPM | OXYGEN SATURATION: 98 % | RESPIRATION RATE: 18 BRPM

## 2017-03-08 DIAGNOSIS — K80.50 BILIARY COLIC: ICD-10-CM

## 2017-03-08 PROBLEM — R16.0 HEPATOMEGALY: Status: ACTIVE | Noted: 2017-03-08

## 2017-03-08 LAB
B-HCG UR QL: NEGATIVE
CTP QC/QA: YES
POCT GLUCOSE: 187 MG/DL (ref 70–110)

## 2017-03-08 PROCEDURE — G0378 HOSPITAL OBSERVATION PER HR: HCPCS

## 2017-03-08 PROCEDURE — 71000039 HC RECOVERY, EACH ADD'L HOUR: Performed by: SURGERY

## 2017-03-08 PROCEDURE — 71000015 HC POSTOP RECOV 1ST HR: Performed by: SURGERY

## 2017-03-08 PROCEDURE — 81025 URINE PREGNANCY TEST: CPT | Performed by: SURGERY

## 2017-03-08 PROCEDURE — 37000008 HC ANESTHESIA 1ST 15 MINUTES: Performed by: SURGERY

## 2017-03-08 PROCEDURE — 63600175 PHARM REV CODE 636 W HCPCS: Performed by: ANESTHESIOLOGY

## 2017-03-08 PROCEDURE — 71000033 HC RECOVERY, INTIAL HOUR: Performed by: SURGERY

## 2017-03-08 PROCEDURE — 36000708 HC OR TIME LEV III 1ST 15 MIN: Performed by: SURGERY

## 2017-03-08 PROCEDURE — 25000003 PHARM REV CODE 250: Performed by: ANESTHESIOLOGY

## 2017-03-08 PROCEDURE — 37000009 HC ANESTHESIA EA ADD 15 MINS: Performed by: SURGERY

## 2017-03-08 PROCEDURE — 27201423 OPTIME MED/SURG SUP & DEVICES STERILE SUPPLY: Performed by: SURGERY

## 2017-03-08 PROCEDURE — 36000709 HC OR TIME LEV III EA ADD 15 MIN: Performed by: SURGERY

## 2017-03-08 PROCEDURE — 25000003 PHARM REV CODE 250: Performed by: SURGERY

## 2017-03-08 PROCEDURE — 71000016 HC POSTOP RECOV ADDL HR: Performed by: SURGERY

## 2017-03-08 PROCEDURE — 88304 TISSUE EXAM BY PATHOLOGIST: CPT | Performed by: PATHOLOGY

## 2017-03-08 PROCEDURE — 88304 TISSUE EXAM BY PATHOLOGIST: CPT | Mod: 26,,, | Performed by: PATHOLOGY

## 2017-03-08 PROCEDURE — 47562 LAPAROSCOPIC CHOLECYSTECTOMY: CPT | Mod: ,,, | Performed by: SURGERY

## 2017-03-08 RX ORDER — LABETALOL HYDROCHLORIDE 5 MG/ML
10 INJECTION, SOLUTION INTRAVENOUS ONCE
Status: COMPLETED | OUTPATIENT
Start: 2017-03-08 | End: 2017-03-08

## 2017-03-08 RX ORDER — PROPOFOL 10 MG/ML
VIAL (ML) INTRAVENOUS
Status: DISCONTINUED | OUTPATIENT
Start: 2017-03-08 | End: 2017-03-08

## 2017-03-08 RX ORDER — NEOSTIGMINE METHYLSULFATE 1 MG/ML
INJECTION, SOLUTION INTRAVENOUS
Status: DISCONTINUED | OUTPATIENT
Start: 2017-03-08 | End: 2017-03-08

## 2017-03-08 RX ORDER — ONDANSETRON 2 MG/ML
INJECTION INTRAMUSCULAR; INTRAVENOUS
Status: DISCONTINUED | OUTPATIENT
Start: 2017-03-08 | End: 2017-03-08

## 2017-03-08 RX ORDER — OXYCODONE AND ACETAMINOPHEN 5; 325 MG/1; MG/1
TABLET ORAL
Qty: 40 TABLET | Refills: 0 | Status: SHIPPED | OUTPATIENT
Start: 2017-03-08 | End: 2017-06-16

## 2017-03-08 RX ORDER — HYDROMORPHONE HYDROCHLORIDE 2 MG/ML
0.2 INJECTION, SOLUTION INTRAMUSCULAR; INTRAVENOUS; SUBCUTANEOUS EVERY 5 MIN PRN
Status: DISCONTINUED | OUTPATIENT
Start: 2017-03-08 | End: 2017-03-08 | Stop reason: HOSPADM

## 2017-03-08 RX ORDER — ONDANSETRON 2 MG/ML
4 INJECTION INTRAMUSCULAR; INTRAVENOUS DAILY PRN
Status: DISCONTINUED | OUTPATIENT
Start: 2017-03-08 | End: 2017-03-08 | Stop reason: HOSPADM

## 2017-03-08 RX ORDER — LIDOCAINE HCL/PF 100 MG/5ML
SYRINGE (ML) INTRAVENOUS
Status: DISCONTINUED | OUTPATIENT
Start: 2017-03-08 | End: 2017-03-08

## 2017-03-08 RX ORDER — DEXAMETHASONE SODIUM PHOSPHATE 4 MG/ML
INJECTION, SOLUTION INTRA-ARTICULAR; INTRALESIONAL; INTRAMUSCULAR; INTRAVENOUS; SOFT TISSUE
Status: DISCONTINUED | OUTPATIENT
Start: 2017-03-08 | End: 2017-03-08

## 2017-03-08 RX ORDER — SODIUM CHLORIDE 0.9 % (FLUSH) 0.9 %
3 SYRINGE (ML) INJECTION
Status: DISCONTINUED | OUTPATIENT
Start: 2017-03-08 | End: 2017-03-08 | Stop reason: HOSPADM

## 2017-03-08 RX ORDER — MIDAZOLAM HYDROCHLORIDE 1 MG/ML
INJECTION, SOLUTION INTRAMUSCULAR; INTRAVENOUS
Status: DISCONTINUED | OUTPATIENT
Start: 2017-03-08 | End: 2017-03-08

## 2017-03-08 RX ORDER — ACETAMINOPHEN 10 MG/ML
INJECTION, SOLUTION INTRAVENOUS
Status: DISCONTINUED | OUTPATIENT
Start: 2017-03-08 | End: 2017-03-08

## 2017-03-08 RX ORDER — ROCURONIUM BROMIDE 10 MG/ML
INJECTION, SOLUTION INTRAVENOUS
Status: DISCONTINUED | OUTPATIENT
Start: 2017-03-08 | End: 2017-03-08

## 2017-03-08 RX ORDER — HYDROMORPHONE HYDROCHLORIDE 2 MG/ML
0.4 INJECTION, SOLUTION INTRAMUSCULAR; INTRAVENOUS; SUBCUTANEOUS EVERY 5 MIN PRN
Status: COMPLETED | OUTPATIENT
Start: 2017-03-08 | End: 2017-03-08

## 2017-03-08 RX ORDER — OXYCODONE AND ACETAMINOPHEN 5; 325 MG/1; MG/1
1 TABLET ORAL EVERY 4 HOURS PRN
Status: DISCONTINUED | OUTPATIENT
Start: 2017-03-08 | End: 2017-03-08 | Stop reason: HOSPADM

## 2017-03-08 RX ORDER — KETOROLAC TROMETHAMINE 30 MG/ML
INJECTION, SOLUTION INTRAMUSCULAR; INTRAVENOUS
Status: DISCONTINUED | OUTPATIENT
Start: 2017-03-08 | End: 2017-03-08

## 2017-03-08 RX ORDER — FENTANYL CITRATE 50 UG/ML
INJECTION, SOLUTION INTRAMUSCULAR; INTRAVENOUS
Status: DISCONTINUED | OUTPATIENT
Start: 2017-03-08 | End: 2017-03-08

## 2017-03-08 RX ORDER — METRONIDAZOLE 500 MG/100ML
INJECTION, SOLUTION INTRAVENOUS
Status: DISCONTINUED | OUTPATIENT
Start: 2017-03-08 | End: 2017-03-08

## 2017-03-08 RX ORDER — SODIUM CHLORIDE, SODIUM LACTATE, POTASSIUM CHLORIDE, CALCIUM CHLORIDE 600; 310; 30; 20 MG/100ML; MG/100ML; MG/100ML; MG/100ML
INJECTION, SOLUTION INTRAVENOUS CONTINUOUS
Status: DISCONTINUED | OUTPATIENT
Start: 2017-03-08 | End: 2017-03-08 | Stop reason: HOSPADM

## 2017-03-08 RX ORDER — LABETALOL HYDROCHLORIDE 5 MG/ML
INJECTION, SOLUTION INTRAVENOUS
Status: DISCONTINUED | OUTPATIENT
Start: 2017-03-08 | End: 2017-03-08

## 2017-03-08 RX ORDER — BUPIVACAINE HYDROCHLORIDE 5 MG/ML
INJECTION, SOLUTION EPIDURAL; INTRACAUDAL
Status: DISCONTINUED | OUTPATIENT
Start: 2017-03-08 | End: 2017-03-08 | Stop reason: HOSPADM

## 2017-03-08 RX ORDER — SUCCINYLCHOLINE CHLORIDE 20 MG/ML
INJECTION INTRAMUSCULAR; INTRAVENOUS
Status: DISCONTINUED | OUTPATIENT
Start: 2017-03-08 | End: 2017-03-08

## 2017-03-08 RX ORDER — GLYCOPYRROLATE 0.2 MG/ML
INJECTION INTRAMUSCULAR; INTRAVENOUS
Status: DISCONTINUED | OUTPATIENT
Start: 2017-03-08 | End: 2017-03-08

## 2017-03-08 RX ORDER — LIDOCAINE HYDROCHLORIDE 10 MG/ML
1 INJECTION, SOLUTION EPIDURAL; INFILTRATION; INTRACAUDAL; PERINEURAL ONCE
Status: DISCONTINUED | OUTPATIENT
Start: 2017-03-08 | End: 2017-03-08 | Stop reason: HOSPADM

## 2017-03-08 RX ORDER — FAMOTIDINE 10 MG/ML
20 INJECTION INTRAVENOUS
Status: DISCONTINUED | OUTPATIENT
Start: 2017-03-08 | End: 2017-03-08 | Stop reason: HOSPADM

## 2017-03-08 RX ORDER — SODIUM CHLORIDE, SODIUM LACTATE, POTASSIUM CHLORIDE, CALCIUM CHLORIDE 600; 310; 30; 20 MG/100ML; MG/100ML; MG/100ML; MG/100ML
125 INJECTION, SOLUTION INTRAVENOUS CONTINUOUS
Status: ACTIVE | OUTPATIENT
Start: 2017-03-08 | End: 2017-03-08

## 2017-03-08 RX ORDER — SODIUM CHLORIDE, SODIUM LACTATE, POTASSIUM CHLORIDE, CALCIUM CHLORIDE 600; 310; 30; 20 MG/100ML; MG/100ML; MG/100ML; MG/100ML
INJECTION, SOLUTION INTRAVENOUS CONTINUOUS PRN
Status: DISCONTINUED | OUTPATIENT
Start: 2017-03-08 | End: 2017-03-08

## 2017-03-08 RX ADMIN — METRONIDAZOLE 500 MG: 500 SOLUTION INTRAVENOUS at 11:03

## 2017-03-08 RX ADMIN — FENTANYL CITRATE 50 MCG: 50 INJECTION, SOLUTION INTRAMUSCULAR; INTRAVENOUS at 11:03

## 2017-03-08 RX ADMIN — CEFTRIAXONE 2 G: 1 INJECTION, SOLUTION INTRAVENOUS at 11:03

## 2017-03-08 RX ADMIN — HYDROMORPHONE HYDROCHLORIDE 0.4 MG: 2 INJECTION INTRAMUSCULAR; INTRAVENOUS; SUBCUTANEOUS at 02:03

## 2017-03-08 RX ADMIN — LABETALOL HYDROCHLORIDE 10 MG: 5 INJECTION, SOLUTION INTRAVENOUS at 01:03

## 2017-03-08 RX ADMIN — PROMETHAZINE HYDROCHLORIDE 6.25 MG: 25 INJECTION INTRAMUSCULAR; INTRAVENOUS at 02:03

## 2017-03-08 RX ADMIN — HYDROMORPHONE HYDROCHLORIDE 0.4 MG: 2 INJECTION INTRAMUSCULAR; INTRAVENOUS; SUBCUTANEOUS at 01:03

## 2017-03-08 RX ADMIN — ACETAMINOPHEN 1000 MG: 10 INJECTION, SOLUTION INTRAVENOUS at 12:03

## 2017-03-08 RX ADMIN — LABETALOL HYDROCHLORIDE 10 MG: 5 INJECTION, SOLUTION INTRAVENOUS at 12:03

## 2017-03-08 RX ADMIN — SODIUM CHLORIDE, SODIUM LACTATE, POTASSIUM CHLORIDE, AND CALCIUM CHLORIDE: .6; .31; .03; .02 INJECTION, SOLUTION INTRAVENOUS at 11:03

## 2017-03-08 RX ADMIN — BUPIVACAINE HYDROCHLORIDE 30 ML: 5 INJECTION, SOLUTION EPIDURAL; INTRACAUDAL; PERINEURAL at 12:03

## 2017-03-08 RX ADMIN — MIDAZOLAM 2 MG: 1 INJECTION INTRAMUSCULAR; INTRAVENOUS at 11:03

## 2017-03-08 RX ADMIN — PROPOFOL 200 MG: 10 INJECTION, EMULSION INTRAVENOUS at 11:03

## 2017-03-08 RX ADMIN — ROCURONIUM BROMIDE 50 MG: 10 INJECTION, SOLUTION INTRAVENOUS at 11:03

## 2017-03-08 RX ADMIN — NEOSTIGMINE METHYLSULFATE 5 MG: 1 INJECTION INTRAVENOUS at 12:03

## 2017-03-08 RX ADMIN — FENTANYL CITRATE 100 MCG: 50 INJECTION, SOLUTION INTRAMUSCULAR; INTRAVENOUS at 11:03

## 2017-03-08 RX ADMIN — DEXAMETHASONE SODIUM PHOSPHATE 4 MG: 4 INJECTION, SOLUTION INTRAMUSCULAR; INTRAVENOUS at 11:03

## 2017-03-08 RX ADMIN — LIDOCAINE HYDROCHLORIDE 100 MG: 20 INJECTION, SOLUTION INTRAVENOUS at 11:03

## 2017-03-08 RX ADMIN — ONDANSETRON 4 MG: 2 INJECTION, SOLUTION INTRAMUSCULAR; INTRAVENOUS at 12:03

## 2017-03-08 RX ADMIN — SUCCINYLCHOLINE CHLORIDE 140 MG: 20 INJECTION, SOLUTION INTRAMUSCULAR; INTRAVENOUS at 11:03

## 2017-03-08 RX ADMIN — GLYCOPYRROLATE 0.4 MG: 0.2 INJECTION, SOLUTION INTRAMUSCULAR; INTRAVENOUS at 12:03

## 2017-03-08 RX ADMIN — KETOROLAC TROMETHAMINE 30 MG: 30 INJECTION, SOLUTION INTRAMUSCULAR; INTRAVENOUS at 12:03

## 2017-03-08 NOTE — ANESTHESIA POSTPROCEDURE EVALUATION
"Anesthesia Post Evaluation    Patient: Jaimee Quintana    Procedure(s) Performed: Procedure(s) (LRB):  CHOLECYSTECTOMY-LAPAROSCOPIC (N/A)      Patient location during evaluation: PACU  Patient participation: Yes- Able to Participate  Level of consciousness: awake and alert  Post-procedure vital signs: reviewed and stable  Pain management: adequate  Airway patency: patent  PONV status at discharge: No PONV  Anesthetic complications: no      Cardiovascular status: hemodynamically stable  Respiratory status: unassisted, spontaneous ventilation and nasal cannula  Hydration status: euvolemic  Follow-up not needed.        Visit Vitals    /65    Pulse 90    Temp 36.4 °C (97.5 °F) (Oral)    Resp 14    Ht 5' 5" (1.651 m)    Wt 116.1 kg (256 lb)    LMP 01/05/2017    SpO2 97%    Breastfeeding No    BMI 42.6 kg/m2       Pain/Deng Score: Pain Assessment Performed: Yes (3/8/2017  2:30 PM)  Presence of Pain: non-verbal indicators absent (3/8/2017  2:30 PM)  Pain Rating Prior to Med Admin: 9 (3/8/2017  1:55 PM)  Deng Score: 8 (3/8/2017  1:15 PM)      "

## 2017-03-08 NOTE — DISCHARGE INSTRUCTIONS
Discharge Instructions for Laparoscopic Cholecystectomy  You have had a procedure known as a laparoscopic cholecystectomy. A laparoscopic cholecystectomy is a procedure to remove your gallbladder. People who have this procedure usually recover more quickly and have less pain than with open gallbladder surgery (called open cholecystectomy). Many surgeons recommend a low-fat diet, avoiding fried food in particular, for the first month after surgery.   You can live a full and healthy life without your gallbladder. This includes eating the foods and doing the things you enjoyed before your gallbladder problems started.    Home care  Recommendations for home care include the following:   · Ask someone to drive you to your appointments for the next 3 days. Dont drive until you are no longer taking pain medicine and are able to step on the brake pedal without hesitation.   · Wash the skin around your incision daily with mild soap and water. It's OK to shower the day after your surgery.  · Eat your regular diet. It is wise to stay away from rich, greasy, or spicy food for a few days.  · Remember, it takes at least 1 week for you to get most of your strength and energy back.  · Make an office visit to talk to your healthcare provider if the following symptoms dont go away within a week after your surgery:  ¨ Fatigue  ¨ Pain around the incision  ¨ Diarrhea or constipation  ¨ Loss of appetite  ¨      When to call your healthcare provider  Call your healthcare provider immediately if you have any of the following:  · Yellowing of your eyes or skin (jaundice)  · Chills  · Fever of 100.4°F (38.0°C) or higher, or as directed by your healthcare provider   · Redness, swelling, increasing pain, pus, or a foul smell at the incision site  · Dark or rust-colored urine  · Stool that is lesli-colored or light in color instead of brown  · Increasing belly pain  · Rectal bleeding  · Leg swelling or shortness of breath       Incision  Care  Remember: Follow-up visits allow your doctor to make sure your incision is healing well. Be sure to keep your appointments.   Sutures (stitches), surgical staples, adhesive tapes,or surgical glue may be used to close incisions. They also help stop bleeding and speed healing. Instead of sutures, your wound may have been closed with special strips of tape called Steri-Strips. Treat these the same way you would sutures. To help your incision heal, follow the tips on this handout.    Keeping Your Incision Clean and Dry  · Avoid doing things that could cause dirt or sweat to get on your incision.  · Dont pick at scabs. They help protect the wound.  · To keep the wound dry when around water, cover it with a plastic bag or plastic wrap. You could also use rubber gloves to protect sutures on a hand.  · If sutures get damp, pat them dry.    Changing Your Dressing  Leave the dressing (bandage) in place until 48 hours (2 days) after surgery. Change it only as directed, using clean hands.  · After the first 48 hours the incision wound will have closed. At this point, you may leave the incision uncovered and open to the air. However, if your incision is in the axilla or groin (or another area that gets damp/moist), apply gauze to this region to keep your incision dry.  Change the gauze daily and as needed.   · Cover your incision if your clothing is rubbing it or causing irritation.  · Change your dressing if it gets wet or soiled.  · Leave the Steri-Strips (surgical tape) in place, these will fall off on their own.    Call Your Health Care Provider If You Notice Any of These Signs:  · The wound opens spontaneously  · Increased soreness, pain, or tenderness after 24 hours  · A red streak, increased redness, or puffiness near the wound  · White, yellowish, or bad-smelling discharge from the wound  · Bleeding that cant be stopped by applying pressure  · Steri-Strips fall off or stitches dissolve before the wound  heals  · Fever above 101.0°F (38.3°C)   © 7901-7238 [employer]. [address] [place] [address], [place] [address]. All rights reserved. This information is not intended as a substitute for professional medical care. Always follow your healthcare professional's instructions.      CONSTIPATION  Narcotic pain medication may cause constipation. Take over-the-counter stool softeners as directed (Colace, Senna, Dulcolax, etc.)    Pain Control  You make alternate ibuprofen with your narcotic pain medication for additional pain relief. Do not take extra Tylenol with your narcotic presciption.

## 2017-03-08 NOTE — INTERVAL H&P NOTE
The patient has been examined and the H&P has been reviewed:    Pt reports BG in 160's, which is improved.    Anesthesia/Surgery risks, benefits and alternative options discussed and understood by patient/family.          There are no hospital problems to display for this patient.

## 2017-03-08 NOTE — ANESTHESIA RELEASE NOTE
"Anesthesia Release from PACU Note    Patient: Jaimee Quintana    Procedure(s) Performed: Procedure(s) (LRB):  CHOLECYSTECTOMY-LAPAROSCOPIC (N/A)    Anesthesia type: general    Post pain: Adequate analgesia    Post assessment: no apparent anesthetic complications    Last Vitals:   Visit Vitals    /65    Pulse 90    Temp 36.4 °C (97.5 °F) (Oral)    Resp 14    Ht 5' 5" (1.651 m)    Wt 116.1 kg (256 lb)    LMP 01/05/2017    SpO2 97%    Breastfeeding No    BMI 42.6 kg/m2       Post vital signs: stable    Level of consciousness: awake and oriented    Nausea/Vomiting: no nausea/no vomiting    Complications: none    Airway Patency: patent    Respiratory: unassisted, spontaneous ventilation, nasal cannula    Cardiovascular: stable    Hydration: euvolemic     Pain under ribs no longer present  "

## 2017-03-08 NOTE — OP NOTE
[facility]  Surgery Department  Operative Note    SUMMARY     Date of Procedure: 3/8/2017     Procedure: Procedure(s) (LRB):  CHOLECYSTECTOMY-LAPAROSCOPIC (N/A)     Surgeon(s) and Role:     * Lashawn Collier DO - Primary    Assisting Surgeon: None    Pre-Operative Diagnosis: Biliary colic [K80.50], hepatomegaly, morbid obesity (BMI >44)    Post-Operative Diagnosis: Post-Op Diagnosis Codes:     * Biliary colic [K80.50], hepatomegaly, morbid obesity (BMI >44)     * Umbilical hernia    Anesthesia: General    Technical Procedures Used:   The patient was seen again in the Holding Room. The risks, benefits, complications, treatment options, and expected outcomes were discussed with the patient. The possibilities of reaction to medication, pulmonary aspiration, perforation of viscus, bleeding, recurrent infection, finding a normal gallbladder, the need for additional procedures, failure to diagnose a condition, the possible need to convert to an open procedure, and creating a complication requiring transfusion or operation were discussed with the patient. The patient concurred with the proposed plan, giving informed consent. The patient was taken to Operating Room and a Time Out was held.    Prior to the induction of general anesthesia, antibiotic prophylaxis was administered.  General endotracheal anesthesia was then administered and tolerated well. After the induction, the abdomen was prepped in the usual sterile fashion. The patient was positioned in the supine position.    Local anesthetic agent was injected into the skin approximately 15cm below the xiphoid (approximately 10cm above the umbilicus) and an incision made. The Veress needle was inserted through this defect and the abdomen was insufflated.  A 5mm trocar was placed through this defect next and the 5mm high definition camera was then inserted.  Additional trocars were introduced under direct vision. Two 5mm trocars were placed in the  right upper quadrant and an 11mm trocar was placed in the epigastric region. All skin incisions were infiltrated with a local anesthetic agent before making the incision and placing the trocars. Additional local was infiltrated at the conclusion of the procedure as well. A total of 30cc of 0.5% bupivicaine was used.  A fat-filled umbilical hernia (approximately 1.5-2cm) was noted upon intra-abdominal inspection.  This was not repaired at this operation as it was not included in our already placed trocars. Additionally, patient would benefit from mesh placement in the future due to her co-morbidities and morbid obesity which increase her risk of recurrence.     The liver was large and was carefully retracted when the tail of the gallbladder was retracted cephalad. Adhesions were lysed bluntly and with the electrocautery where indicated, taking care not to injure any adjacent organs or viscus. The infundibulum was grasped and retracted laterally, exposing the peritoneum overlying the triangle of Calot. This was then divided and exposed in a blunt fashion. The cystic duct was clearly identified and bluntly dissected circumferentially. This was photo-documented. The junctions of the gallbladder and cystic duct were clearly identified prior to the division of any linear structure.     The cystic duct was then doubly ligated with surgical clips proximally and singly clipped distally and divided. The cystic artery was identified, dissected free, ligated with clips and divided in a similar fashion.  The gallbladder was dissected from the liver bed in retrograde fashion with the electrocautery. The intact gallbladder was removed using the endopouch bag. The liver bed inspected. Hemostasis was achieved with the electrocautery.    Pneumoperitoneum was aspirated and the trocars were removed. The epigastric fascial layer was closed with a 0 Vicryl figure-of-eight suture. The subcuticular tissues were then closed with 4-0 undyed  monocryl, steri-strips were applied, followed by gauze and a tegaderm.  The patient was extubated successfully and transferred in stable condition to PACU.  Instrument, sponge, and needle counts were correct at closure and at the conclusion of the case.     Description of the Findings of the Procedure: minimal inflammation, large distended gallbladder, large fatty liver    Significant Surgical Tasks Conducted by the Assistant(s), if Applicable: none    Complications: No    Estimated Blood Loss (EBL): <25mL           Implants: * No implants in log *    Specimens:   Specimen (12h ago through future)    Start     Ordered    03/08/17 1205  Specimen to Pathology - Surgery  Once     Comments:  1. Gallbladder    03/08/17 1204                  Condition: Good    Disposition: PACU - hemodynamically stable.    Attestation: I was present and scrubbed for the entire procedure.

## 2017-03-08 NOTE — PLAN OF CARE
Signed out from pacu per Dr Carr. Report called to Audi Adan/ops. Transported to ops via stretcher,sr upx2

## 2017-03-08 NOTE — IP AVS SNAPSHOT
hospitals  [address]  [place] [address]  Phone: [phone]           Patient Discharge Instructions     Our goal is to set you up for success. This packet includes information on your condition, medications, and your home care. It will help you to care for yourself so you don't get sicker and need to go back to the hospital.     Please ask your nurse if you have any questions.        There are many details to remember when preparing to leave the hospital. Here is what you will need to do:    1. Take your medicine. If you are prescribed medications, review your Medication List in the following pages. You may have new medications to  at the pharmacy and others that you'll need to stop taking. Review the instructions for how and when to take your medications. Talk with your doctor or nurses if you are unsure of what to do.     2. Go to your follow-up appointments. Specific follow-up information is listed in the following pages. Your may be contacted by a transition nurse or clinical provider about future appointments. Be sure we have all of the phone numbers to reach you, if needed. Please contact your provider's office if you are unable to make an appointment.     3. Watch for warning signs. Your doctor or nurse will give you detailed warning signs to watch for and when to call for assistance. These instructions may also include educational information about your condition. If you experience any of warning signs to your health, call your doctor.               [facility]  Unless otherwise directed by your provider, please contact [facility], our nurse care line that is available for 24/7 assistance.     [phone] (toll-free)    Registered nurses in the [facility] provide clinical advisement, health education, appointment booking, and other advisory services.                    ** Verify the list of medication(s) below is accurate and up to date. Carry this  with you in case of emergency. If your medications have changed, please notify your healthcare provider.             Medication List      START taking these medications        Additional Info                      oxycodone-acetaminophen 5-325 mg per tablet   Commonly known as:  PERCOCET   Quantity:  40 tablet   Refills:  0    Instructions:  Take 1-2 tablets PO q4-6hours PRN pain     Begin Date    AM    Noon    PM    Bedtime         ASK your doctor about these medications        Additional Info                      aspirin 81 MG Chew   Refills:  0   Dose:  81 mg    Instructions:  Take 81 mg by mouth once daily.     Begin Date    AM    Noon    PM    Bedtime       atorvastatin 80 MG tablet   Commonly known as:  LIPITOR   Quantity:  90 tablet   Refills:  0    Instructions:  TAKE 1 TABLET EVERY EVENING     Begin Date    AM    Noon    PM    Bedtime       blood sugar diagnostic Strp   Quantity:  300 strip   Refills:  6    Instructions:  Truemetrix; test BID     Begin Date    AM    Noon    PM    Bedtime       blood-glucose meter kit   Commonly known as:  FREESTYLE SYSTEM KIT   Quantity:  1 each   Refills:  0    Instructions:  Use as instructed; Truemetrix     Begin Date    AM    Noon    PM    Bedtime       insulin glargine 100 unit/mL (3 mL) Inpn pen   Commonly known as:  LANTUS SOLOSTAR   Quantity:  45 mL   Refills:  3    Instructions:  Inject 45 units SQ BID     Begin Date    AM    Noon    PM    Bedtime       insulin regular 100 unit/mL injection   Commonly known as:  NOVOLIN R   Quantity:  30 mL   Refills:  3    Instructions:  45 units SQ TID AC     Begin Date    AM    Noon    PM    Bedtime       insulin syringe-needle U-100 0.5 mL 31 gauge x 5/16 Syrg   Commonly known as:  BD INSULIN SYRINGE ULTRA-FINE   Quantity:  200 each   Refills:  6   Dose:  1 Syringe    Instructions:  1 Syringe by Misc.(Non-Drug; Combo Route) route 2 (two) times daily. Test BID    DX: E11.9, Z79.4     Begin Date    AM    Noon    PM    Bedtime     "   lancets Misc   Quantity:  200 each   Refills:  6    Instructions:  True Metrix; test BID      DX: E11.9, Z79.4     Begin Date    AM    Noon    PM    Bedtime       lisinopril-hydrochlorothiazide 20-12.5 mg per tablet   Commonly known as:  PRINZIDE,ZESTORETIC   Quantity:  180 tablet   Refills:  0    Instructions:  TAKE 1 TABLET BY MOUTH 2 (TWO) TIMES DAILY.     Begin Date    AM    Noon    PM    Bedtime       metformin 1000 MG tablet   Commonly known as:  GLUCOPHAGE   Quantity:  180 tablet   Refills:  3   Dose:  1000 mg    Instructions:  Take 1 tablet (1,000 mg total) by mouth 2 (two) times daily with meals.     Begin Date    AM    Noon    PM    Bedtime       nifedipine 90 MG Tbsr   Commonly known as:  ADALAT CC   Quantity:  30 tablet   Refills:  1    Instructions:  TAKE 1 TABLET (30 MG TOTAL) BY MOUTH ONCE DAILY.     Begin Date    AM    Noon    PM    Bedtime       pantoprazole 40 MG tablet   Commonly known as:  PROTONIX   Quantity:  30 tablet   Refills:  11   Dose:  40 mg    Instructions:  Take 1 tablet (40 mg total) by mouth once daily.     Begin Date    AM    Noon    PM    Bedtime       pen needle, diabetic 32 gauge x 5/32" Ndle   Commonly known as:  BD ULTRA-FINE ROSA PEN NEEDLES   Quantity:  300 each   Refills:  6   Dose:  1 Act    Instructions:  1 Act by Misc.(Non-Drug; Combo Route) route 5 (five) times daily.     Begin Date    AM    Noon    PM    Bedtime            Where to Get Your Medications      These medications were sent to [facility] - [address]  [address], [place] [address]     Phone:  [phone]     oxycodone-acetaminophen 5-325 mg per tablet                  Please bring to all follow up appointments:    1. A copy of your discharge instructions.  2. All medicines you are currently taking in their original bottles.  3. Identification and insurance card.    Please arrive 15 minutes ahead of scheduled appointment " time.    Please call 24 hours in advance if you must reschedule your appointment and/or time.        Your Scheduled Appointments     Mar 15, 2017  2:20 PM CDT   Established Patient Visit with MD David Sood - Internal Medicine ([place])    [address]  [place] [address]   [phone]            Mar 20, 2017 10:20 AM CDT   Post OP with DO [place] Ruiz - General Surgery ([place])    [address]  4th [address] [facility]  [place] [id number] [phone]            Mar 23, 2017  9:00 AM CDT   Established Patient with Karen Brown, APRN,ANP-C   [facility] - Endocrinology ([address] [address] )    [address]  [facility] [id number] [phone]            Apr 17, 2017 10:00 AM CDT   Post OP with DO [place] Ruiz - General Surgery ([place])    [address]  [address] [facility]  [place] [id number] [phone]                  Discharge Instructions         Discharge Instructions for Laparoscopic Cholecystectomy  You have had a procedure known as a laparoscopic cholecystectomy. A laparoscopic cholecystectomy is a procedure to remove your gallbladder. People who have this procedure usually recover more quickly and have less pain than with open gallbladder surgery (called open cholecystectomy). Many surgeons recommend a low-fat diet, avoiding fried food in particular, for the first month after surgery.   You can live a full and healthy life without your gallbladder. This includes eating the foods and doing the things you enjoyed before your gallbladder problems started.    Home care  Recommendations for home care include the following:   · Ask someone to drive you to your appointments for the next 3 days. Dont drive until you are no longer taking pain medicine and are able to step on the brake pedal without hesitation.   · Wash the skin around your incision daily with mild soap and water. It's OK to shower the day after your surgery.  · Eat your  regular diet. It is wise to stay away from rich, greasy, or spicy food for a few days.  · Remember, it takes at least 1 week for you to get most of your strength and energy back.  · Make an office visit to talk to your healthcare provider if the following symptoms dont go away within a week after your surgery:  ¨ Fatigue  ¨ Pain around the incision  ¨ Diarrhea or constipation  ¨ Loss of appetite  ¨      When to call your healthcare provider  Call your healthcare provider immediately if you have any of the following:  · Yellowing of your eyes or skin (jaundice)  · Chills  · Fever of 100.4°F (38.0°C) or higher, or as directed by your healthcare provider   · Redness, swelling, increasing pain, pus, or a foul smell at the incision site  · Dark or rust-colored urine  · Stool that is lesli-colored or light in color instead of brown  · Increasing belly pain  · Rectal bleeding  · Leg swelling or shortness of breath       Incision Care  Remember: Follow-up visits allow your doctor to make sure your incision is healing well. Be sure to keep your appointments.   Sutures (stitches), surgical staples, adhesive tapes,or surgical glue may be used to close incisions. They also help stop bleeding and speed healing. Instead of sutures, your wound may have been closed with special strips of tape called Steri-Strips. Treat these the same way you would sutures. To help your incision heal, follow the tips on this handout.    Keeping Your Incision Clean and Dry  · Avoid doing things that could cause dirt or sweat to get on your incision.  · Dont pick at scabs. They help protect the wound.  · To keep the wound dry when around water, cover it with a plastic bag or plastic wrap. You could also use rubber gloves to protect sutures on a hand.  · If sutures get damp, pat them dry.    Changing Your Dressing  Leave the dressing (bandage) in place until 48 hours (2 days) after surgery. Change it only as directed, using clean hands.  · After the  first 48 hours the incision wound will have closed. At this point, you may leave the incision uncovered and open to the air. However, if your incision is in the axilla or groin (or another area that gets damp/moist), apply gauze to this region to keep your incision dry.  Change the gauze daily and as needed.   · Cover your incision if your clothing is rubbing it or causing irritation.  · Change your dressing if it gets wet or soiled.  · Leave the Steri-Strips (surgical tape) in place, these will fall off on their own.    Call Your Health Care Provider If You Notice Any of These Signs:  · The wound opens spontaneously  · Increased soreness, pain, or tenderness after 24 hours  · A red streak, increased redness, or puffiness near the wound  · White, yellowish, or bad-smelling discharge from the wound  · Bleeding that cant be stopped by applying pressure  · Steri-Strips fall off or stitches dissolve before the wound heals  · Fever above 101.0°F (38.3°C)   © 0176-3915 [employer]. [address] [place] [address]. All rights reserved. This information is not intended as a substitute for professional medical care. Always follow your healthcare professional's instructions.      CONSTIPATION  Narcotic pain medication may cause constipation. Take over-the-counter stool softeners as directed (Colace, Senna, Dulcolax, etc.)    Pain Control  You make alternate ibuprofen with your narcotic pain medication for additional pain relief. Do not take extra Tylenol with your narcotic presciption.              Primary Diagnosis     Your primary diagnosis was:  Gall Bladder Pain      Admission Information     Date & Time Provider Department CSN    3/8/2017  8:57 AM Lashawn Collier DO [facility] [id number]      Care Providers     Provider Role Specialty Primary office phone    Lashawn Collier DO Attending Provider General Surgery [phone]    Lashawn Collier DO Surgeon  General  "Surgery [phone]      Your Vitals Were     BP Pulse Temp Resp Height Weight    138/64 90 98 °F (36.7 °C) (Oral) 18 5' 5" (1.651 m) 116.1 kg (256 lb)    Last Period SpO2 BMI          01/05/2017 94% 42.6 kg/m2        Recent Lab Values        7/30/2010 8/31/2010 1/19/2015 10/26/2015 7/14/2016 12/5/2016            1:06 PM  3:55 PM 10:52 AM  9:01 AM  8:28 AM  9:32 AM      A1C 5.4 5.9 11.9 (H) 11.2 (H) 10.3 (H) 9.7 (H)      Comment for A1C at  8:28 AM on 7/14/2016:  According to ADA guidelines, hemoglobin A1C <7.0% represents  optimal control in non-pregnant diabetic patients.  Different  metrics may apply to specific populations.   Standards of Medical Care in Diabetes - 2016.  For the purpose of screening for the presence of diabetes:  <5.7%     Consistent with the absence of diabetes  5.7-6.4%  Consistent with increasing risk for diabetes   (prediabetes)  >or=6.5%  Consistent with diabetes  Currently no consensus exists for use of hemoglobin A1C  for diagnosis of diabetes for children.      Comment for A1C at  9:32 AM on 12/5/2016:  According to ADA guidelines, hemoglobin A1C <7.0% represents  optimal control in non-pregnant diabetic patients.  Different  metrics may apply to specific populations.   Standards of Medical Care in Diabetes - 2016.  For the purpose of screening for the presence of diabetes:  <5.7%     Consistent with the absence of diabetes  5.7-6.4%  Consistent with increasing risk for diabetes   (prediabetes)  >or=6.5%  Consistent with diabetes  Currently no consensus exists for use of hemoglobin A1C  for diagnosis of diabetes for children.        Pending Labs     Order Current Status    Specimen to Pathology - Surgery Collected (03/08/17 1205)      Allergies as of 3/8/2017     No Known Allergies      Advance Directives     An advance directive is a document which, in the event you are no longer able to make decisions for yourself, tells your healthcare team what kind of treatment you do or do not " want to receive, or who you would like to make those decisions for you.  If you do not currently have an advance directive, [facility] encourages you to create one.  For more information call:  [phone]), [phone] ([phone]),  or log on to [email or web address].        Language Assistance Services     ATTENTION: Language assistance services are available, free of charge. Please call [phone].      ATENCIÓN: Si habla español, tiene a reyes disposición servicios gratuitos de asistencia lingüística. Llame al [phone].     CHÚ Ý: N?u b?n nói Ti?ng Vi?t, có các d?ch v? h? tr? ngôn ng? mi?n phí dành cho b?n. G?i s? [phone].        Diabetes Discharge Instructions                                   [facility] Sign-Up     Activating your [facility] account is as easy as 1-2-3!     1) Visit [email or web address], select Sign Up Now, enter this activation code and your date of birth, then select Next.  [email or web address]  Expires: 4/16/2017 11:21 AM      2) Create a username and password to use when you visit [facility] in the future and select a security question in case you lose your password and select Next.    3) Enter your e-mail address and click Sign Up!    Additional Information  If you have questions, please e-mail [email or web address] or call [phone] to talk to our [facility] staff. Remember, [facility] is NOT to be used for urgent needs. For medical emergencies, dial 911.          [facility] complies with applicable Federal civil rights laws and does not discriminate on the basis of race, color, national origin, age, disability, or sex.

## 2017-03-08 NOTE — TRANSFER OF CARE
"Anesthesia Transfer of Care Note    Patient: Jaimee Quintana    Procedure(s) Performed: Procedure(s) (LRB):  CHOLECYSTECTOMY-LAPAROSCOPIC (N/A)    Patient location: PACU    Anesthesia Type: general    Transport from OR: Transported from OR on 6-10 L/min O2 by face mask with adequate spontaneous ventilation    Post pain: adequate analgesia    Post assessment: no apparent anesthetic complications    Post vital signs: stable    Level of consciousness: awake and alert    Nausea/Vomiting: no nausea/vomiting    Complications: none          Last vitals:   Visit Vitals 1300    /108    Pulse 87    Temp 36.4 °C (97.5 °F) (Oral)    Resp 14    Ht 5' 5" (1.651 m)    Wt 116.1 kg (256 lb)    LMP 01/05/2017    SpO2 98%    Breastfeeding No    BMI 42.6 kg/m2     "

## 2017-03-09 NOTE — DISCHARGE SUMMARY
[facility]  Discharge Summary  General Surgery      Admit Date: 3/8/2017    Discharge Date and Time: 3/8/2017  4:35 PM    Attending Physician: Lashawn Collier     Discharge Provider: Lashawn Collier    Reason for Admission: outpatient surgery    Procedures Performed: Procedure(s) (LRB):  CHOLECYSTECTOMY-LAPAROSCOPIC (N/A)    Hospital Course (synopsis of major diagnoses, care, treatment, and services provided during the course of the hospital stay): Patient presented for outpatient surgery, tolerated it well and was discharged home.     Consults: none    Significant Diagnostic Studies: path    Final Diagnoses:   Principal Problem: Biliary colic   Secondary Diagnoses:   Active Hospital Problems    Diagnosis  POA    *Biliary colic [K80.50]  Yes    Hepatomegaly [R16.0]  Yes    Diabetes mellitus type 2, uncontrolled, without complications [E11.65]  Yes    Fatty liver [K76.0]  Yes     encouraged weight loss      Morbid obesity with BMI of 40.0-44.9, adult [E66.01, Z68.41]  Not Applicable    HTN (hypertension) without retinopathy [I10]  Yes      Resolved Hospital Problems    Diagnosis Date Resolved POA   No resolved problems to display.       Discharged Condition: good    Disposition: Home or Self Care    Follow Up/Patient Instructions: see discharge instructions    Medications:  Reconciled Home Medications:   Discharge Medication List as of 3/8/2017  3:51 PM      START taking these medications    Details   oxycodone-acetaminophen (PERCOCET) 5-325 mg per tablet Take 1-2 tablets PO q4-6hours PRN pain, Normal         CONTINUE these medications which have NOT CHANGED    Details   aspirin 81 MG Chew Take 81 mg by mouth once daily., Until Discontinued, Historical Med      atorvastatin (LIPITOR) 80 MG tablet TAKE 1 TABLET EVERY EVENING, Normal      blood-glucose meter (FREESTYLE SYSTEM KIT) kit Use as instructed; Truemetrix, Print      insulin glargine (LANTUS SOLOSTAR) 100 unit/mL (3 mL) InPn pen Inject  "45 units SQ BID, Normal      insulin regular (NOVOLIN R) 100 unit/mL Inj injection 45 units SQ TID AC, Normal      insulin syringe-needle U-100 (BD INSULIN SYRINGE ULTRA-FINE) 0.5 mL 31 gauge x 5/16 Syrg 1 Syringe by Misc.(Non-Drug; Combo Route) route 2 (two) times daily. Test BID    DX: E11.9, Z79.4, Starting 2/8/2017, Until Discontinued, OTC      lancets Misc True Metrix; test BID      DX: E11.9, Z79.4, Print      lisinopril-hydrochlorothiazide (PRINZIDE,ZESTORETIC) 20-12.5 mg per tablet TAKE 1 TABLET BY MOUTH 2 (TWO) TIMES DAILY., Normal      metformin (GLUCOPHAGE) 1000 MG tablet Take 1 tablet (1,000 mg total) by mouth 2 (two) times daily with meals., Starting 9/27/2016, Until Wed 9/27/17, Normal      nifedipine (ADALAT CC) 90 MG TbSR TAKE 1 TABLET (30 MG TOTAL) BY MOUTH ONCE DAILY., Normal      pantoprazole (PROTONIX) 40 MG tablet Take 1 tablet (40 mg total) by mouth once daily., Starting 1/13/2017, Until Sat 1/13/18, Normal      pen needle, diabetic (BD ULTRA-FINE ROSA PEN NEEDLES) 32 gauge x 5/32" Ndle 1 Act by Misc.(Non-Drug; Combo Route) route 5 (five) times daily., Starting 2/6/2017, Until Discontinued, Normal      blood sugar diagnostic Strp Truemetrix; test BID, Normal           No discharge procedures on file.      "

## 2017-03-15 ENCOUNTER — OFFICE VISIT (OUTPATIENT)
Dept: INTERNAL MEDICINE | Facility: CLINIC | Age: 42
End: 2017-03-15
Payer: MEDICARE

## 2017-03-15 VITALS
WEIGHT: 253.06 LBS | DIASTOLIC BLOOD PRESSURE: 74 MMHG | HEART RATE: 103 BPM | HEIGHT: 65 IN | SYSTOLIC BLOOD PRESSURE: 132 MMHG | BODY MASS INDEX: 42.16 KG/M2

## 2017-03-15 DIAGNOSIS — E11.59 HYPERTENSION ASSOCIATED WITH DIABETES: Primary | ICD-10-CM

## 2017-03-15 DIAGNOSIS — Z90.49 HISTORY OF CHOLECYSTECTOMY: ICD-10-CM

## 2017-03-15 DIAGNOSIS — E11.9 TYPE 2 DIABETES MELLITUS WITHOUT COMPLICATION, WITH LONG-TERM CURRENT USE OF INSULIN: ICD-10-CM

## 2017-03-15 DIAGNOSIS — Z79.4 TYPE 2 DIABETES MELLITUS WITHOUT COMPLICATION, WITH LONG-TERM CURRENT USE OF INSULIN: ICD-10-CM

## 2017-03-15 DIAGNOSIS — E66.01 MORBID OBESITY WITH BMI OF 40.0-44.9, ADULT: ICD-10-CM

## 2017-03-15 DIAGNOSIS — Z00.00 PREVENTATIVE HEALTH CARE: ICD-10-CM

## 2017-03-15 DIAGNOSIS — I15.2 HYPERTENSION ASSOCIATED WITH DIABETES: Primary | ICD-10-CM

## 2017-03-15 PROCEDURE — 99999 PR PBB SHADOW E&M-EST. PATIENT-LVL III: CPT | Mod: PBBFAC,,, | Performed by: INTERNAL MEDICINE

## 2017-03-15 PROCEDURE — 4010F ACE/ARB THERAPY RXD/TAKEN: CPT | Mod: S$GLB,,, | Performed by: INTERNAL MEDICINE

## 2017-03-15 PROCEDURE — 99214 OFFICE O/P EST MOD 30 MIN: CPT | Mod: S$GLB,,, | Performed by: INTERNAL MEDICINE

## 2017-03-15 PROCEDURE — 3046F HEMOGLOBIN A1C LEVEL >9.0%: CPT | Mod: S$GLB,,, | Performed by: INTERNAL MEDICINE

## 2017-03-15 PROCEDURE — 1160F RVW MEDS BY RX/DR IN RCRD: CPT | Mod: S$GLB,,, | Performed by: INTERNAL MEDICINE

## 2017-03-15 NOTE — PROGRESS NOTES
Subjective:       Patient ID: Jaimee Quintana is a 41 y.o. female.    Chief Complaint: Diabetes    HPI Pt. Here for f/u for diabetes; she has increased novilin R to 45 units TID; BS run 170-200 which she states is an improvement; she is on lantus 30 units BID; she should be on 38 units BID and states she cannot draw 38 units with the needles she has; I will have [facility] nurse facilitator investigate what is covered by her insurance and asked her to take her normal dosing; of note, pt. Recently had cholecystectomy and is scheduled for post op f/u with surgery; she has gained a few pounds; HGBA1C dated 12/5/16 was 9.7  Review of Systems   Constitutional: Negative for chills, fatigue and fever.   HENT: Negative for congestion, rhinorrhea and sore throat.    Respiratory: Negative for cough, shortness of breath and wheezing.    Cardiovascular: Negative for chest pain.   Gastrointestinal: Negative for abdominal pain, nausea and vomiting.   Genitourinary: Negative for dysuria.   Musculoskeletal: Negative for arthralgias.   Skin: Negative for rash.   Neurological: Negative for dizziness and headaches.   Psychiatric/Behavioral: Negative for sleep disturbance. The patient is not nervous/anxious.        Objective:      Physical Exam   Constitutional: She is oriented to person, place, and time.   Morbid obesity   Eyes: EOM are normal.   Neck: Normal range of motion.   Cardiovascular: Normal rate, regular rhythm and normal heart sounds.    Pulmonary/Chest: Effort normal and breath sounds normal.   Abdominal: Soft. There is no tenderness. There is no rebound and no guarding.   Musculoskeletal: Normal range of motion.   Neurological: She is alert and oriented to person, place, and time.   Skin: No rash noted.       Assessment:       1. Hypertension associated with diabetes Well controlled   2. Type 2 diabetes mellitus without complication, with long-term current use of insulin Sub-optimally controlled   3. History of  cholecystectomy Active   4. Morbid obesity with BMI of 40.0-44.9, adult Sub-optimally controlled   5. Preventative health care Active       Plan:         Hypertension associated with diabetes  Comments:  continue current regimen and encouraged low Na diet and weight loss  Orders:  -     CBC auto differential; Future; Expected date: 4/5/17  -     Comprehensive metabolic panel; Future; Expected date: 4/5/17    Type 2 diabetes mellitus without complication, with long-term current use of insulin  Comments:  asked pt. to take insulin as prescribed and explained risks of non-compliance; Donna to clarify limiting factors with needles and I will refill  Orders:  -     CBC auto differential; Future; Expected date: 4/5/17  -     Hemoglobin A1c; Future; Expected date: 4/5/17    History of cholecystectomy  Comments:  f/u surgery who is managing post-op     Morbid obesity with BMI of 40.0-44.9, adult  Comments:  encouraged diet and explained risks     Preventative health care  -     CBC auto differential; Future; Expected date: 4/5/17  -     Comprehensive metabolic panel; Future; Expected date: 4/5/17

## 2017-03-15 NOTE — MR AVS SNAPSHOT
Formerly [facility], [facility]   [place]  [place] [address]  Phone: [phone]  Fax: [phone]                  Jaimee Quintana   3/15/2017 2:20 PM   Office Visit    Description:  Female : 1975   Provider:  Kory Taylor MD   Department:  [facility]           Reason for Visit     Diabetes           Diagnoses this Visit        Comments    Hypertension associated with diabetes    -  Primary continue current regimen and encouraged low Na diet and weight loss    Type 2 diabetes mellitus without complication, with long-term current use of insulin     asked pt. to take insulin as prescribed and explained risks of non-compliance; Donna to clarify limiting factors with needles and I will refill    History of cholecystectomy     f/u surgery who is managing post-op     Morbid obesity with BMI of 40.0-44.9, adult     encouraged diet and explained risks     Preventative health care                To Do List           Future Appointments        Provider Department Dept Phone    3/20/2017 10:20 AM Lashawn Collier DO [facility] [phone]    3/23/2017 9:00 AM BLADIMIR Enciso,[facility] - Endocrinology [phone]    2017 10:00 AM Lashawn Collier DO [facility] [phone]    2017 8:00 AM ERIC KENNER [facility] [phone]    2017 10:00 AM Kory Taylor MD [facility] [phone]      Goals (5 Years of Data)     None      Follow-Up and Disposition     Return in about 1 month (around 4/15/2017).    Follow-up and Disposition History      [facility]     [facility] -  Assistance  Registered nurses in the [facility] provide clinical advisement, health education, appointment booking, and other advisory services.  Call for this free service at [phone].             Medications           Message regarding Medications     Verify the changes and/or  "additions to your medication regime listed below are the same as discussed with your clinician today.  If any of these changes or additions are incorrect, please notify your healthcare provider.             Verify that the below list of medications is an accurate representation of the medications you are currently taking.  If none reported, the list may be blank. If incorrect, please contact your healthcare provider. Carry this list with you in case of emergency.           Current Medications     aspirin 81 MG Chew Take 81 mg by mouth once daily.    atorvastatin (LIPITOR) 80 MG tablet TAKE 1 TABLET EVERY EVENING    blood sugar diagnostic Strp Truemetrix; test BID    blood-glucose meter (FREESTYLE SYSTEM KIT) kit Use as instructed; Truemetrix    insulin glargine (LANTUS SOLOSTAR) 100 unit/mL (3 mL) InPn pen Inject 45 units SQ BID    insulin regular (NOVOLIN R) 100 unit/mL Inj injection 45 units SQ TID AC    insulin syringe-needle U-100 (BD INSULIN SYRINGE ULTRA-FINE) 0.5 mL 31 gauge x 5/16 Syrg 1 Syringe by Misc.(Non-Drug; Combo Route) route 2 (two) times daily. Test BID    DX: E11.9, Z79.4    lancets Misc True Metrix; test BID      DX: E11.9, Z79.4    lisinopril-hydrochlorothiazide (PRINZIDE,ZESTORETIC) 20-12.5 mg per tablet TAKE 1 TABLET BY MOUTH 2 (TWO) TIMES DAILY.    metformin (GLUCOPHAGE) 1000 MG tablet Take 1 tablet (1,000 mg total) by mouth 2 (two) times daily with meals.    nifedipine (ADALAT CC) 90 MG TbSR TAKE 1 TABLET (30 MG TOTAL) BY MOUTH ONCE DAILY.    oxycodone-acetaminophen (PERCOCET) 5-325 mg per tablet Take 1-2 tablets PO q4-6hours PRN pain    pantoprazole (PROTONIX) 40 MG tablet Take 1 tablet (40 mg total) by mouth once daily.    pen needle, diabetic (BD ULTRA-FINE ROSA PEN NEEDLES) 32 gauge x 5/32" Ndle 1 Act by Misc.(Non-Drug; Combo Route) route 5 (five) times daily.           Clinical Reference Information           Your Vitals Were     BP Pulse Height Weight Last Period BMI    132/74 (BP " "Location: Right arm, Patient Position: Sitting, BP Method: Manual) 103 5' 5" (1.651 m) 114.8 kg (253 lb 1.4 oz) 01/05/2017 42.12 kg/m2      Blood Pressure          Most Recent Value    BP  132/74      Allergies as of 3/15/2017     No Known Allergies      Immunizations Administered on Date of Encounter - 3/15/2017     None      Orders Placed During Today's Visit     Future Labs/Procedures Expected by Expires    CBC auto differential  4/5/2017 11/15/2017    Comprehensive metabolic panel  4/5/2017 11/15/2017    Hemoglobin A1c  4/5/2017 11/15/2017      [facility] Sign-Up     Activating your [facility] account is as easy as 1-2-3!     1) Visit [email or web address], select Sign Up Now, enter this activation code and your date of birth, then select Next.  [email or web address]  Expires: 4/16/2017 12:21 PM      2) Create a username and password to use when you visit [facility] in the future and select a security question in case you lose your password and select Next.    3) Enter your e-mail address and click Sign Up!    Additional Information  If you have questions, please e-mail [email or web address] or call [phone] to talk to our [facility] staff. Remember, [facility] is NOT to be used for urgent needs. For medical emergencies, dial 911.         Language Assistance Services     ATTENTION: Language assistance services are available, free of charge. Please call [phone].      ATENCIÓN: Si habla español, tiene a reyes disposición servicios gratuitos de asistencia lingüística. Llame al [phone].     SIMA Ý: N?u b?n nói Ti?ng Vi?t, có các d?ch v? h? tr? ngôn ng? mi?n phí dành cho b?n. G?i s? [phone].         [facility] complies with applicable Federal civil rights laws and does not discriminate on the basis of race, color, national origin, age, disability, or sex.        "

## 2017-03-17 RX ORDER — SYRINGE-NEEDLE,INSULIN,0.5 ML 30 GX5/16"
SYRINGE, EMPTY DISPOSABLE MISCELLANEOUS
Qty: 200 EACH | Refills: 5 | Status: SHIPPED | OUTPATIENT
Start: 2017-03-17 | End: 2018-08-03

## 2017-03-20 ENCOUNTER — OFFICE VISIT (OUTPATIENT)
Dept: SURGERY | Facility: CLINIC | Age: 42
End: 2017-03-20
Payer: MEDICARE

## 2017-03-20 VITALS
DIASTOLIC BLOOD PRESSURE: 93 MMHG | TEMPERATURE: 98 F | BODY MASS INDEX: 42.06 KG/M2 | HEIGHT: 65 IN | SYSTOLIC BLOOD PRESSURE: 138 MMHG | HEART RATE: 96 BPM | WEIGHT: 252.44 LBS

## 2017-03-20 DIAGNOSIS — K81.1 CHRONIC CHOLECYSTITIS: Primary | ICD-10-CM

## 2017-03-20 PROCEDURE — 99024 POSTOP FOLLOW-UP VISIT: CPT | Mod: S$GLB,,, | Performed by: SURGERY

## 2017-03-20 PROCEDURE — 99999 PR PBB SHADOW E&M-EST. PATIENT-LVL III: CPT | Mod: PBBFAC,,, | Performed by: SURGERY

## 2017-03-20 NOTE — PROGRESS NOTES
"Jaimee Quintana is a 41 y.o. female patient.   1. Chronic cholecystitis      Past Medical History:   Diagnosis Date    CVA (cerebral infarction) 2003         Depression     Diabetes mellitus, type 2     Hyperlipidemia     Hypertension      Past Surgical History Pertinent Negatives:   Procedure Date Noted    CATARACT EXTRACTION 10/16/2012    CORNEAL TRANSPLANT 10/16/2012    RETINAL DETACHMENT SURGERY 10/16/2012    STRABISMUS SURGERY 10/16/2012     Review of patient's allergies indicates:  No Known Allergies  There are no hospital problems to display for this patient.    Blood pressure (!) 138/93, pulse 96, temperature 97.8 °F (36.6 °C), temperature source Oral, height 5' 5" (1.651 m), weight 114.5 kg (252 lb 6.8 oz), last menstrual period 03/15/2017.    Subjective   Occas discomfort, no n/v, no f/c. No wound problems. She would like to return to work later this week.    Objective   NAD  Abd- incisions c/d/i, healing well    Path- chronic cholecystitis     Assessment & Plan   42yo female 2wk s/p lap sophie  -pt is healing well  -continue with restictions  -f/u in 4 weeks, or sooner with questions or problems  -all questions were answered  -work release note given    Lashawn Collier, DO  3/20/2017  "

## 2017-03-23 ENCOUNTER — OFFICE VISIT (OUTPATIENT)
Dept: ENDOCRINOLOGY | Facility: CLINIC | Age: 42
End: 2017-03-23
Payer: MEDICARE

## 2017-03-23 VITALS
HEART RATE: 76 BPM | DIASTOLIC BLOOD PRESSURE: 74 MMHG | BODY MASS INDEX: 41.73 KG/M2 | HEIGHT: 65 IN | SYSTOLIC BLOOD PRESSURE: 130 MMHG | WEIGHT: 250.5 LBS

## 2017-03-23 DIAGNOSIS — Z79.4 TYPE 2 DIABETES MELLITUS WITHOUT COMPLICATION, WITH LONG-TERM CURRENT USE OF INSULIN: ICD-10-CM

## 2017-03-23 DIAGNOSIS — Z79.4 TYPE 2 DIABETES MELLITUS WITH HYPERGLYCEMIA, WITH LONG-TERM CURRENT USE OF INSULIN: ICD-10-CM

## 2017-03-23 DIAGNOSIS — E11.9 TYPE 2 DIABETES MELLITUS WITHOUT COMPLICATION, WITH LONG-TERM CURRENT USE OF INSULIN: ICD-10-CM

## 2017-03-23 DIAGNOSIS — E78.5 HYPERLIPIDEMIA, UNSPECIFIED HYPERLIPIDEMIA TYPE: ICD-10-CM

## 2017-03-23 DIAGNOSIS — K76.0 FATTY LIVER: ICD-10-CM

## 2017-03-23 DIAGNOSIS — E11.59 HYPERTENSION ASSOCIATED WITH DIABETES: ICD-10-CM

## 2017-03-23 DIAGNOSIS — G47.33 OSA (OBSTRUCTIVE SLEEP APNEA): ICD-10-CM

## 2017-03-23 DIAGNOSIS — I15.2 HYPERTENSION ASSOCIATED WITH DIABETES: ICD-10-CM

## 2017-03-23 DIAGNOSIS — E66.01 MORBID OBESITY WITH BMI OF 40.0-44.9, ADULT: ICD-10-CM

## 2017-03-23 DIAGNOSIS — E11.65 TYPE 2 DIABETES MELLITUS WITH HYPERGLYCEMIA, WITH LONG-TERM CURRENT USE OF INSULIN: ICD-10-CM

## 2017-03-23 DIAGNOSIS — E11.42 DIABETIC PERIPHERAL NEUROPATHY ASSOCIATED WITH TYPE 2 DIABETES MELLITUS: Primary | ICD-10-CM

## 2017-03-23 PROBLEM — G47.30 SLEEP APNEA: Status: RESOLVED | Noted: 2017-02-06 | Resolved: 2017-03-23

## 2017-03-23 PROCEDURE — 99999 PR PBB SHADOW E&M-EST. PATIENT-LVL III: CPT | Mod: PBBFAC,,, | Performed by: NURSE PRACTITIONER

## 2017-03-23 PROCEDURE — 99214 OFFICE O/P EST MOD 30 MIN: CPT | Mod: S$GLB,,, | Performed by: NURSE PRACTITIONER

## 2017-03-23 PROCEDURE — 3046F HEMOGLOBIN A1C LEVEL >9.0%: CPT | Mod: S$GLB,,, | Performed by: NURSE PRACTITIONER

## 2017-03-23 PROCEDURE — 4010F ACE/ARB THERAPY RXD/TAKEN: CPT | Mod: S$GLB,,, | Performed by: NURSE PRACTITIONER

## 2017-03-23 PROCEDURE — 1160F RVW MEDS BY RX/DR IN RCRD: CPT | Mod: S$GLB,,, | Performed by: NURSE PRACTITIONER

## 2017-03-23 RX ORDER — INSULIN GLARGINE 100 [IU]/ML
INJECTION, SOLUTION SUBCUTANEOUS
Qty: 45 ML | Refills: 3
Start: 2017-03-23 | End: 2017-06-16 | Stop reason: SDUPTHER

## 2017-03-23 NOTE — PROGRESS NOTES
"CC: Patient is here for management of Type 2 diabetes and review of medical conditions as listed in visit diagnosis.      HPI: Ms. Jaimee Quintana is a 41 y.o. Black or  female who was diagnosed with Type 2 DM at age 23. She has never been hospitalized for DM. (+) FH of DM in father. No personal or family history of pancreatitis or thyroid cancer.     She arrives as a new patient to me today. Previously seen with Dr. Patel in September 2016.     BG log presented for review. States she checks her BG "from time to time when I'm feeling dizzy".     Log review shows she is monitoring infrequently and readings are 167-300s. She is noted to skips days up to 1 week of BG monitoring at a time.     She reports she sometimes misses doses of her regular insulin.     Takes Novolin R 20 minutes before meals.     Walks for exercise: 3x/week for 20 minutes.     Reports she sometimes snacks between meals on popcorn or chips.     States 1 bottle of Novolin R lasts her ~ 2 weeks and Lantus lasts ~3 days.     PREVIOUS DIABETES MEDICATIONS  Actos    CURRENT DIABETIC MEDS: Lantus 38 units bid, Novolin R 45 units before meals, Glucophage 1,000 mg bid (Lantus pen, Novolin R vial)    Last Podiatry Exam: No    REVIEW OF SYSTEMS  General: no weakness, fatigue, or weight changes.   Eyes: no double or blurred vision, eye pain, or redness; last eye exam="I have one coming up Monday. I had one last year".   Cardiovascular: no chest pain, palpitations, edema, or murmurs.   Respiratory: no cough or dyspnea.   GI: no heartburn, nausea, or changes in bowel patterns; good appetite.   Skin: no rashes, dryness, itching, or reactions at insulin injection sites; (+) rotation of insulin injection sites.    Neuro: no numbness, tingling, tremors, or vertigo.   Endocrine: no polyuria, polydipsia, polyphagia, heat or cold intolerance.     Vital Signs  /74 (BP Location: Right arm, Patient Position: Sitting)  Pulse 76  Ht 5' 5" (1.651 " m)  Wt 113.6 kg (250 lb 8 oz)  LMP 03/15/2017  BMI 41.69 kg/m2    Hemoglobin A1C   Date Value Ref Range Status   12/05/2016 9.7 (H) 4.5 - 6.2 % Final     Comment:     According to ADA guidelines, hemoglobin A1C <7.0% represents  optimal control in non-pregnant diabetic patients.  Different  metrics may apply to specific populations.   Standards of Medical Care in Diabetes - 2016.  For the purpose of screening for the presence of diabetes:  <5.7%     Consistent with the absence of diabetes  5.7-6.4%  Consistent with increasing risk for diabetes   (prediabetes)  >or=6.5%  Consistent with diabetes  Currently no consensus exists for use of hemoglobin A1C  for diagnosis of diabetes for children.     07/14/2016 10.3 (H) 4.5 - 6.2 % Final     Comment:     According to ADA guidelines, hemoglobin A1C <7.0% represents  optimal control in non-pregnant diabetic patients.  Different  metrics may apply to specific populations.   Standards of Medical Care in Diabetes - 2016.  For the purpose of screening for the presence of diabetes:  <5.7%     Consistent with the absence of diabetes  5.7-6.4%  Consistent with increasing risk for diabetes   (prediabetes)  >or=6.5%  Consistent with diabetes  Currently no consensus exists for use of hemoglobin A1C  for diagnosis of diabetes for children.     10/26/2015 11.2 (H) 4.5 - 6.2 % Final      Lab Results   Component Value Date    CREATININE 0.9 02/13/2017      Lab Results   Component Value Date    TSH 0.755 12/05/2016      Lab Results   Component Value Date    LDLCALC 112.4 07/14/2016        PHYSICAL EXAMINATION  Constitutional: Appears well, no distress  Neck: Supple, trachea midline.   Respiratory: CTA without wheezes, even and unlabored.  Cardiovascular: RRR; no carotid bruits or murmurs.   Lymph: DP pulses  2+ bilaterally; no edema.   Skin: warm and dry; no injection site reactions, no acanthosis nigracans observed.  Neuro:patient alert and cooperative, normal affect.    Diabetes Foot  Exam:   Decreased vibratory response to 128 Hz tuning fork bilaterally.   Protective Sensation (w/ 10 gram monofilament):  Right: Decreased  Left: Decreased    Visual Inspection:  Dry Skin -  Bilateral and no open wounds, amputations, callouses, deformities, or interdigital maceration.     Pedal Pulses (DP):   Right: Present  Left: Present     Assessment/Plan    1. Diabetic peripheral neuropathy associated with type 2 diabetes mellitus     DM uncontrolled. Upon additional questioning it is noted she frequently misses prandial insulin doses.     Continue current insulin doses along with Metformin. Instructed to avoid missing doses in order to improve BG readings.     Monitor readings 4x/day. Send logs in 1-2 weeks.     Discussed use of VGo. Will consider this depending on her readings.     She may also benefit from a GLP 1 Agonist.      2. Fatty liver: Lipid control, weight loss, BG control may help.    3. Hyperlipidemia, unspecified hyperlipidemia type: Continue Atorvastatin.    4. Hypertension associated with diabetes: BP stable. Continue current meds. On ACEi, diuretic, and CCB.    5. Morbid obesity with BMI of 40.0-44.9, adult: Weight loss, physical activity advised.    6. CESAR (obstructive sleep apnea): Follow with PCP for management. Doesn't use c-pap nightly.        FOLLOW UP  Return in about 3 months (around 6/23/2017).     Orders Placed This Encounter   Procedures    Hemoglobin A1c     Standing Status:   Future     Standing Expiration Date:   3/23/2018    Lipid panel     Standing Status:   Future     Standing Expiration Date:   3/23/2018    Hemoglobin A1c     Standing Status:   Future     Standing Expiration Date:   3/23/2018

## 2017-03-23 NOTE — MR AVS SNAPSHOT
[facility] - Endocrinology  [address]  [facility] [address]  Phone: [phone]                  Jaimee Quintnaa   3/23/2017 2:30 PM   Office Visit    Description:  Female : 1975   Provider:  BLADIMIR Enciso,ANP-C   Department:  [facility] - Endocrinology           Reason for Visit     Diabetes Mellitus           Diagnoses this Visit        Comments    Diabetic peripheral neuropathy associated with type 2 diabetes mellitus    -  Primary     Type 2 diabetes mellitus with hyperglycemia, with long-term current use of insulin         Fatty liver         Hyperlipidemia, unspecified hyperlipidemia type         Hypertension associated with diabetes         Morbid obesity with BMI of 40.0-44.9, adult         CESAR (obstructive sleep apnea)                To Do List           Future Appointments        Provider Department Dept Phone    3/23/2017 3:45 PM LAB, APPOINTMENT [place] [facility] [phone]    2017 10:00 AM Lashawn Collier DO [facility] General Surgery [phone]    2017 8:00 AM ERIC KENNER [facility] [phone]    2017 10:00 AM oKry Taylor MD [place] - Internal Medicine [phone]    2017 9:00 AM LAB, [place] [facility] [phone]      Goals (5 Years of Data)     None      Follow-Up and Disposition     Return in about 3 months (around 2017).      [facility]     [facility] Care [facility] -  Assistance  Registered nurses in the [facility] provide clinical advisement, health education, appointment booking, and other advisory services.  Call for this free service at [phone].             Medications           Message regarding Medications     Verify the changes and/or additions to your medication regime listed below are the same as discussed with your clinician today.  If any of these changes or additions are incorrect, please notify your healthcare  "provider.             Verify that the below list of medications is an accurate representation of the medications you are currently taking.  If none reported, the list may be blank. If incorrect, please contact your healthcare provider. Carry this list with you in case of emergency.           Current Medications     aspirin 81 MG Chew Take 81 mg by mouth once daily.    atorvastatin (LIPITOR) 80 MG tablet TAKE 1 TABLET EVERY EVENING    blood sugar diagnostic Strp Truemetrix; test BID    blood-glucose meter (FREESTYLE SYSTEM KIT) kit Use as instructed; Truemetrix    insulin glargine (LANTUS SOLOSTAR) 100 unit/mL (3 mL) InPn pen Inject 45 units SQ BID    insulin regular (NOVOLIN R) 100 unit/mL Inj injection 45 units SQ TID AC    insulin syringe-needle U-100 1 mL 30 gauge x 5/16 Syrg Use as directed; Test BID   DX: E11.9, Z79.4    lancets Misc True Metrix; test BID      DX: E11.9, Z79.4    lisinopril-hydrochlorothiazide (PRINZIDE,ZESTORETIC) 20-12.5 mg per tablet TAKE 1 TABLET BY MOUTH 2 (TWO) TIMES DAILY.    metformin (GLUCOPHAGE) 1000 MG tablet Take 1 tablet (1,000 mg total) by mouth 2 (two) times daily with meals.    nifedipine (ADALAT CC) 90 MG TbSR TAKE 1 TABLET (30 MG TOTAL) BY MOUTH ONCE DAILY.    oxycodone-acetaminophen (PERCOCET) 5-325 mg per tablet Take 1-2 tablets PO q4-6hours PRN pain    pantoprazole (PROTONIX) 40 MG tablet Take 1 tablet (40 mg total) by mouth once daily.    pen needle, diabetic (BD ULTRA-FINE ROSA PEN NEEDLES) 32 gauge x 5/32" Ndle 1 Act by Misc.(Non-Drug; Combo Route) route 5 (five) times daily.           Clinical Reference Information           Your Vitals Were     BP Pulse Height Weight Last Period BMI    130/74 (BP Location: Right arm, Patient Position: Sitting) 76 5' 5" (1.651 m) 113.6 kg (250 lb 8 oz) 03/15/2017 41.69 kg/m2      Blood Pressure          Most Recent Value    BP  130/74      Allergies as of 3/23/2017     No Known Allergies      Immunizations Administered on Date of " Encounter - 3/23/2017     None      Orders Placed During Today's Visit     Future Labs/Procedures Expected by Expires    Hemoglobin A1c  3/23/2017 3/23/2018    Hemoglobin A1c  3/23/2017 3/23/2018    Lipid panel  3/23/2017 3/23/2018      [facility] Sign-Up     Activating your [facility] account is as easy as 1-2-3!     1) Visit [email or web address], select Sign Up Now, enter this activation code and your date of birth, then select Next.  [email or web address]  Expires: 4/16/2017 12:21 PM      2) Create a username and password to use when you visit [facility] in the future and select a security question in case you lose your password and select Next.    3) Enter your e-mail address and click Sign Up!    Additional Information  If you have questions, please e-mail [email or web address] or call [phone] to talk to our [facility] staff. Remember, [facility] is NOT to be used for urgent needs. For medical emergencies, dial 911.         Language Assistance Services     ATTENTION: Language assistance services are available, free of charge. Please call [phone].      ATENCIÓN: Si habla español, tiene a reyes disposición servicios gratuitos de asistencia lingüística. Llame al [phone].     CHÚ Ý: N?u b?n nói Ti?ng Vi?t, có các d?ch v? h? tr? ngôn ng? mi?n phí dành cho b?n. G?i s? [phone].         [facility] complies with applicable Federal civil rights laws and does not discriminate on the basis of race, color, national origin, age, disability, or sex.

## 2017-04-17 ENCOUNTER — OFFICE VISIT (OUTPATIENT)
Dept: SURGERY | Facility: CLINIC | Age: 42
End: 2017-04-17
Payer: MEDICARE

## 2017-04-17 VITALS
HEART RATE: 96 BPM | TEMPERATURE: 98 F | BODY MASS INDEX: 42.7 KG/M2 | SYSTOLIC BLOOD PRESSURE: 147 MMHG | WEIGHT: 256.31 LBS | HEIGHT: 65 IN | DIASTOLIC BLOOD PRESSURE: 94 MMHG

## 2017-04-17 DIAGNOSIS — K81.1 CHRONIC CHOLECYSTITIS: Primary | ICD-10-CM

## 2017-04-17 DIAGNOSIS — E66.01 SEVERE OBESITY (BMI >= 40): ICD-10-CM

## 2017-04-17 PROCEDURE — 99024 POSTOP FOLLOW-UP VISIT: CPT | Mod: S$GLB,,, | Performed by: SURGERY

## 2017-04-17 PROCEDURE — 99999 PR PBB SHADOW E&M-EST. PATIENT-LVL III: CPT | Mod: PBBFAC,,, | Performed by: SURGERY

## 2017-04-17 PROCEDURE — 99499 UNLISTED E&M SERVICE: CPT | Mod: S$GLB,,, | Performed by: SURGERY

## 2017-04-17 NOTE — MR AVS SNAPSHOT
[facility] Surgery  [address] [place] [address]  [address] [place] [address]  Phone: [phone]                  Jaimee Quintana   2017 10:00 AM   Office Visit    Description:  Female : 1975   Provider:  Lashawn Collier DO   Department:  [facility]                To Do List           Future Appointments        Provider Department Dept Phone    2017 8:00 AM [facility] [place] [facility] [phone]    2017 10:00 AM Kory Taylor MD [facility] Internal Medicine [phone]    2017 9:00 AM [facility] [place] [facility] [phone]    2017 2:00 PM BLADIMIR Enciso,ANP-C [facility] - Endocrinology [phone]      Goals (5 Years of Data)     None      [facility]     [facility] Care [facility] -  Assistance  Unless otherwise directed by your provider, please contact [facility], our nurse care line that is available for  assistance.     Registered nurses in the [facility] provide: appointment scheduling, clinical advisement, health education, and other advisory services.  Call: [phone] (toll free)               Medications           Message regarding Medications     Verify the changes and/or additions to your medication regime listed below are the same as discussed with your clinician today.  If any of these changes or additions are incorrect, please notify your healthcare provider.             Verify that the below list of medications is an accurate representation of the medications you are currently taking.  If none reported, the list may be blank. If incorrect, please contact your healthcare provider. Carry this list with you in case of emergency.           Current Medications     aspirin 81 MG Chew Take 81 mg by mouth once daily.    atorvastatin (LIPITOR) 80 MG tablet TAKE 1 TABLET EVERY EVENING    blood sugar diagnostic Strp Truemetrix; test BID    blood-glucose meter (FREESTYLE  "SYSTEM KIT) kit Use as instructed; Truemetrix    insulin glargine (LANTUS SOLOSTAR) 100 unit/mL (3 mL) InPn pen Inject 38 units SQ BID    insulin regular (NOVOLIN R) 100 unit/mL Inj injection 45 units SQ TID AC    insulin syringe-needle U-100 1 mL 30 gauge x 5/16 Syrg Use as directed; Test BID   DX: E11.9, Z79.4    lancets Misc True Metrix; test BID      DX: E11.9, Z79.4    lisinopril-hydrochlorothiazide (PRINZIDE,ZESTORETIC) 20-12.5 mg per tablet TAKE 1 TABLET BY MOUTH 2 (TWO) TIMES DAILY.    metformin (GLUCOPHAGE) 1000 MG tablet Take 1 tablet (1,000 mg total) by mouth 2 (two) times daily with meals.    nifedipine (ADALAT CC) 90 MG TbSR TAKE 1 TABLET (30 MG TOTAL) BY MOUTH ONCE DAILY.    oxycodone-acetaminophen (PERCOCET) 5-325 mg per tablet Take 1-2 tablets PO q4-6hours PRN pain    pantoprazole (PROTONIX) 40 MG tablet Take 1 tablet (40 mg total) by mouth once daily.    pen needle, diabetic (BD ULTRA-FINE ROSA PEN NEEDLES) 32 gauge x 5/32" Ndle 1 Act by Misc.(Non-Drug; Combo Route) route 5 (five) times daily.           Clinical Reference Information           Your Vitals Were     BP Pulse Temp Height Weight Last Period    147/94 (BP Location: Left arm, Patient Position: Sitting) 96 98.2 °F (36.8 °C) (Oral) 5' 5" (1.651 m) 116.3 kg (256 lb 4.6 oz) 04/15/2017    BMI                42.65 kg/m2          Blood Pressure          Most Recent Value    BP  (!)  147/94      Allergies as of 4/17/2017     No Known Allergies      Immunizations Administered on Date of Encounter - 4/17/2017     None      [facility] Sign-Up     Activating your [facility] account is as easy as 1-2-3!     1) Visit [email or web address], select Sign Up Now, enter this activation code and your date of birth, then select Next.  [email or web address]  Expires: 6/1/2017 10:09 AM      2) Create a username and password to use when you visit [facility] in the future and select a security question in case you lose your password and select Next.    3) Enter your e-mail " address and click Sign Up!    Additional Information  If you have questions, please e-mail [email or web address] or call [phone] to talk to our [facility] staff. Remember, [facility] is NOT to be used for urgent needs. For medical emergencies, dial 911.         Language Assistance Services     ATTENTION: Language assistance services are available, free of charge. Please call [phone].      ATENCIÓN: Si habla meri, tiene a reyes disposición servicios gratuitos de asistencia lingüística. Llame al [phone].     CHÚ Ý: N?u b?n nói Ti?ng Vi?t, có các d?ch v? h? tr? ngôn ng? mi?n phí dành cho b?n. G?i s? [phone].         [facility] complies with applicable Federal civil rights laws and does not discriminate on the basis of race, color, national origin, age, disability, or sex.

## 2017-04-20 DIAGNOSIS — E11.59 HYPERTENSION ASSOCIATED WITH DIABETES: ICD-10-CM

## 2017-04-20 DIAGNOSIS — I15.2 HYPERTENSION ASSOCIATED WITH DIABETES: ICD-10-CM

## 2017-04-20 RX ORDER — LISINOPRIL AND HYDROCHLOROTHIAZIDE 12.5; 2 MG/1; MG/1
TABLET ORAL
Qty: 180 TABLET | Refills: 0 | Status: SHIPPED | OUTPATIENT
Start: 2017-04-20 | End: 2017-07-24 | Stop reason: SDUPTHER

## 2017-04-20 NOTE — PROGRESS NOTES
"Jaimee Quintana is a 41 y.o. female patient.   1. Chronic cholecystitis    2. Severe obesity (BMI >= 40)    3. Uncontrolled type 2 diabetes mellitus without complication, with long-term current use of insulin      Past Medical History:   Diagnosis Date    CVA (cerebral infarction) 2003         Depression     Diabetes mellitus, type 2     Hyperlipidemia     Hypertension      Past Surgical History Pertinent Negatives:   Procedure Date Noted    CATARACT EXTRACTION 10/16/2012    CORNEAL TRANSPLANT 10/16/2012    RETINAL DETACHMENT SURGERY 10/16/2012    STRABISMUS SURGERY 10/16/2012     Review of patient's allergies indicates:  No Known Allergies  There are no hospital problems to display for this patient.    Blood pressure (!) 147/94, pulse 96, temperature 98.2 °F (36.8 °C), temperature source Oral, height 5' 5" (1.651 m), weight 116.3 kg (256 lb 4.6 oz), last menstrual period 04/15/2017.    Subjective   Patient has no complaints.  Tolerating regular diet.  No pain.  Normal bowel movements.    Objective   NAD  Abd- incision are clean, dry, intact     Assessment & Plan    41-year-old female 6 weeks status post laparoscopic cholecystectomy:  The patient is healing well.  All questions were answered.  Can return to unrestricted activity.  RTC prn    Lashawn Collier DO  4/19/2017  "

## 2017-06-16 ENCOUNTER — LAB VISIT (OUTPATIENT)
Dept: LAB | Facility: HOSPITAL | Age: 42
End: 2017-06-16
Attending: INTERNAL MEDICINE
Payer: MEDICARE

## 2017-06-16 ENCOUNTER — OFFICE VISIT (OUTPATIENT)
Dept: INTERNAL MEDICINE | Facility: CLINIC | Age: 42
End: 2017-06-16
Payer: MEDICARE

## 2017-06-16 VITALS
SYSTOLIC BLOOD PRESSURE: 128 MMHG | DIASTOLIC BLOOD PRESSURE: 88 MMHG | HEIGHT: 65 IN | OXYGEN SATURATION: 97 % | HEART RATE: 104 BPM | WEIGHT: 253.5 LBS | BODY MASS INDEX: 42.24 KG/M2

## 2017-06-16 DIAGNOSIS — Z00.00 PREVENTATIVE HEALTH CARE: ICD-10-CM

## 2017-06-16 DIAGNOSIS — E66.01 MORBID OBESITY WITH BMI OF 40.0-44.9, ADULT: ICD-10-CM

## 2017-06-16 DIAGNOSIS — E11.59 HYPERTENSION ASSOCIATED WITH DIABETES: ICD-10-CM

## 2017-06-16 DIAGNOSIS — E11.9 TYPE 2 DIABETES MELLITUS WITHOUT COMPLICATION, WITH LONG-TERM CURRENT USE OF INSULIN: ICD-10-CM

## 2017-06-16 DIAGNOSIS — E11.9 TYPE 2 DIABETES MELLITUS WITHOUT COMPLICATION, WITH LONG-TERM CURRENT USE OF INSULIN: Primary | ICD-10-CM

## 2017-06-16 DIAGNOSIS — I15.2 HYPERTENSION ASSOCIATED WITH DIABETES: ICD-10-CM

## 2017-06-16 DIAGNOSIS — Z79.4 TYPE 2 DIABETES MELLITUS WITHOUT COMPLICATION, WITH LONG-TERM CURRENT USE OF INSULIN: ICD-10-CM

## 2017-06-16 DIAGNOSIS — Z79.4 TYPE 2 DIABETES MELLITUS WITHOUT COMPLICATION, WITH LONG-TERM CURRENT USE OF INSULIN: Primary | ICD-10-CM

## 2017-06-16 DIAGNOSIS — E78.5 HYPERLIPIDEMIA, UNSPECIFIED HYPERLIPIDEMIA TYPE: ICD-10-CM

## 2017-06-16 DIAGNOSIS — E11.42 DIABETIC PERIPHERAL NEUROPATHY ASSOCIATED WITH TYPE 2 DIABETES MELLITUS: ICD-10-CM

## 2017-06-16 LAB
ALBUMIN SERPL BCP-MCNC: 3.5 G/DL
ALP SERPL-CCNC: 89 U/L
ALT SERPL W/O P-5'-P-CCNC: 44 U/L
ANION GAP SERPL CALC-SCNC: 11 MMOL/L
AST SERPL-CCNC: 27 U/L
BASOPHILS # BLD AUTO: 0.01 K/UL
BASOPHILS NFR BLD: 0.1 %
BILIRUB SERPL-MCNC: 0.3 MG/DL
BUN SERPL-MCNC: 10 MG/DL
CALCIUM SERPL-MCNC: 9.4 MG/DL
CHLORIDE SERPL-SCNC: 101 MMOL/L
CO2 SERPL-SCNC: 23 MMOL/L
CREAT SERPL-MCNC: 0.8 MG/DL
DIFFERENTIAL METHOD: NORMAL
EOSINOPHIL # BLD AUTO: 0.2 K/UL
EOSINOPHIL NFR BLD: 2.2 %
ERYTHROCYTE [DISTWIDTH] IN BLOOD BY AUTOMATED COUNT: 13.1 %
EST. GFR  (AFRICAN AMERICAN): >60 ML/MIN/1.73 M^2
EST. GFR  (NON AFRICAN AMERICAN): >60 ML/MIN/1.73 M^2
GLUCOSE SERPL-MCNC: 342 MG/DL
HCT VFR BLD AUTO: 44.4 %
HGB BLD-MCNC: 14.9 G/DL
LYMPHOCYTES # BLD AUTO: 2.5 K/UL
LYMPHOCYTES NFR BLD: 32.3 %
MCH RBC QN AUTO: 28.9 PG
MCHC RBC AUTO-ENTMCNC: 33.6 %
MCV RBC AUTO: 86 FL
MONOCYTES # BLD AUTO: 0.4 K/UL
MONOCYTES NFR BLD: 5 %
NEUTROPHILS # BLD AUTO: 4.6 K/UL
NEUTROPHILS NFR BLD: 60 %
PLATELET # BLD AUTO: 210 K/UL
PMV BLD AUTO: 11.4 FL
POTASSIUM SERPL-SCNC: 4.4 MMOL/L
PROT SERPL-MCNC: 7.6 G/DL
RBC # BLD AUTO: 5.16 M/UL
SODIUM SERPL-SCNC: 135 MMOL/L
WBC # BLD AUTO: 7.73 K/UL

## 2017-06-16 PROCEDURE — 4010F ACE/ARB THERAPY RXD/TAKEN: CPT | Mod: S$GLB,,, | Performed by: INTERNAL MEDICINE

## 2017-06-16 PROCEDURE — 99214 OFFICE O/P EST MOD 30 MIN: CPT | Mod: S$GLB,,, | Performed by: INTERNAL MEDICINE

## 2017-06-16 PROCEDURE — 99999 PR PBB SHADOW E&M-EST. PATIENT-LVL III: CPT | Mod: PBBFAC,,, | Performed by: INTERNAL MEDICINE

## 2017-06-16 PROCEDURE — 3046F HEMOGLOBIN A1C LEVEL >9.0%: CPT | Mod: S$GLB,,, | Performed by: INTERNAL MEDICINE

## 2017-06-16 RX ORDER — INSULIN GLARGINE 100 [IU]/ML
INJECTION, SOLUTION SUBCUTANEOUS
Qty: 45 ML | Refills: 1
Start: 2017-06-16 | End: 2017-07-28 | Stop reason: SDUPTHER

## 2017-06-16 RX ORDER — ATORVASTATIN CALCIUM 80 MG/1
80 TABLET, FILM COATED ORAL NIGHTLY
Qty: 90 TABLET | Refills: 1 | Status: SHIPPED | OUTPATIENT
Start: 2017-06-16 | End: 2017-12-13 | Stop reason: SDUPTHER

## 2017-06-16 NOTE — PROGRESS NOTES
Subjective:       Patient ID: Jaimee Quintana is a 41 y.o. female.    Chief Complaint: Follow-up (3 mos); Hypertension; and Diabetes    HPI Pt. Here for f/u for HTN and DM; labs are pending for today; HGBA1C dated 3/23/17 was 10.7; she states she is taking lantus 38 units BID and 42 units TID AC; she will increase to 45 units TID; she is not taking lipitor and she would like refill; I explained risks of non-compliance; weight is elevated but stable; eye exam; Neurontin helps neuropathy   Review of Systems   Constitutional: Negative for chills, fatigue and fever.   HENT: Negative for congestion, rhinorrhea and sore throat.    Respiratory: Negative for cough, shortness of breath and wheezing.    Cardiovascular: Negative for chest pain.   Gastrointestinal: Negative for abdominal pain, nausea and vomiting.   Genitourinary: Negative for dysuria.   Musculoskeletal: Negative for arthralgias.   Skin: Negative for rash.   Neurological: Negative for dizziness and headaches.   Psychiatric/Behavioral: Negative for sleep disturbance. The patient is not nervous/anxious.        Objective:      Physical Exam   Constitutional: She is oriented to person, place, and time.   Morbid obesity   Eyes: EOM are normal.   Neck: Normal range of motion.   Cardiovascular: Normal rate, regular rhythm and normal heart sounds.    Pulmonary/Chest: Effort normal and breath sounds normal.   Abdominal: Soft. There is no tenderness. There is no rebound and no guarding.   Musculoskeletal: Normal range of motion.   Neurological: She is alert and oriented to person, place, and time.   Skin: No rash noted.       Assessment:       1. Type 2 diabetes mellitus without complication, with long-term current use of insulin Sub-optimally controlled   2. Hypertension associated with diabetes Well controlled   3. Diabetic peripheral neuropathy associated with type 2 diabetes mellitus Well controlled   4. Hyperlipidemia, unspecified hyperlipidemia type Active   5.  Morbid obesity with BMI of 40.0-44.9, adult Sub-optimally controlled       Plan:         Type 2 diabetes mellitus without complication, with long-term current use of insulin  Comments:  continue current regimen and increase novilin R to 45 units TID AC; encouraged strict ADA diet and f/u HGBA1C when available; re-evaluate in 1 month   Orders:  -     insulin glargine (LANTUS SOLOSTAR) 100 unit/mL (3 mL) InPn pen; Inject 38 units SQ BID  Dispense: 45 mL; Refill: 1  -     insulin regular (NOVOLIN R) 100 unit/mL Inj injection; 45 units SQ TID AC  Dispense: 30 mL; Refill: 1    Hypertension associated with diabetes  Comments:  continue current regimen and encouraged low Na diet and weight loss    Diabetic peripheral neuropathy associated with type 2 diabetes mellitus  Comments:  continue current regimen     Hyperlipidemia, unspecified hyperlipidemia type  Comments:  restart lipitor and encouraged diet modification; explained risks of non-compliance  Orders:  -     atorvastatin (LIPITOR) 80 MG tablet; Take 1 tablet (80 mg total) by mouth every evening.  Dispense: 90 tablet; Refill: 1    Morbid obesity with BMI of 40.0-44.9, adult  Comments:  encouraged diet and explained risks

## 2017-06-17 LAB
ESTIMATED AVG GLUCOSE: 280 MG/DL
HBA1C MFR BLD HPLC: 11.4 %

## 2017-06-19 ENCOUNTER — TELEPHONE (OUTPATIENT)
Dept: INTERNAL MEDICINE | Facility: CLINIC | Age: 42
End: 2017-06-19

## 2017-06-19 PROBLEM — Z00.00 PREVENTATIVE HEALTH CARE: Status: RESOLVED | Noted: 2017-03-15 | Resolved: 2017-06-19

## 2017-06-24 DIAGNOSIS — Z79.4 TYPE 2 DIABETES MELLITUS WITHOUT COMPLICATION, WITH LONG-TERM CURRENT USE OF INSULIN: ICD-10-CM

## 2017-06-24 DIAGNOSIS — E11.9 TYPE 2 DIABETES MELLITUS WITHOUT COMPLICATION, WITH LONG-TERM CURRENT USE OF INSULIN: ICD-10-CM

## 2017-06-24 RX ORDER — HUMAN INSULIN 100 [IU]/ML
INJECTION, SOLUTION SUBCUTANEOUS
Qty: 30 ML | Refills: 1 | Status: SHIPPED | OUTPATIENT
Start: 2017-06-24 | End: 2017-07-28 | Stop reason: SDUPTHER

## 2017-07-06 ENCOUNTER — OFFICE VISIT (OUTPATIENT)
Dept: OBSTETRICS AND GYNECOLOGY | Facility: CLINIC | Age: 42
End: 2017-07-06
Payer: MEDICARE

## 2017-07-06 VITALS
WEIGHT: 254.88 LBS | HEIGHT: 65 IN | BODY MASS INDEX: 42.46 KG/M2 | SYSTOLIC BLOOD PRESSURE: 160 MMHG | DIASTOLIC BLOOD PRESSURE: 92 MMHG

## 2017-07-06 DIAGNOSIS — Z01.411 ENCOUNTER FOR GYNECOLOGICAL EXAMINATION WITH ABNORMAL FINDING: Primary | ICD-10-CM

## 2017-07-06 DIAGNOSIS — N89.8 VAGINAL DISCHARGE: ICD-10-CM

## 2017-07-06 DIAGNOSIS — N76.2 ACUTE VULVITIS: ICD-10-CM

## 2017-07-06 DIAGNOSIS — R30.0 DYSURIA: ICD-10-CM

## 2017-07-06 DIAGNOSIS — Z12.4 SCREENING FOR MALIGNANT NEOPLASM OF THE CERVIX: ICD-10-CM

## 2017-07-06 DIAGNOSIS — Z91.89 PERSONAL HISTORY PRESENTING HAZARDS TO HEALTH: ICD-10-CM

## 2017-07-06 PROCEDURE — G0101 CA SCREEN;PELVIC/BREAST EXAM: HCPCS | Mod: S$GLB,,, | Performed by: OBSTETRICS & GYNECOLOGY

## 2017-07-06 PROCEDURE — 99999 PR PBB SHADOW E&M-EST. PATIENT-LVL II: CPT | Mod: PBBFAC,,, | Performed by: OBSTETRICS & GYNECOLOGY

## 2017-07-06 PROCEDURE — 88175 CYTOPATH C/V AUTO FLUID REDO: CPT

## 2017-07-06 PROCEDURE — 87086 URINE CULTURE/COLONY COUNT: CPT

## 2017-07-06 PROCEDURE — 87660 TRICHOMONAS VAGIN DIR PROBE: CPT

## 2017-07-06 PROCEDURE — 87480 CANDIDA DNA DIR PROBE: CPT

## 2017-07-06 PROCEDURE — 87591 N.GONORRHOEAE DNA AMP PROB: CPT

## 2017-07-06 RX ORDER — TRIAMCINOLONE ACETONIDE 1 MG/G
CREAM TOPICAL 2 TIMES DAILY
Qty: 45 G | Refills: 0 | Status: SHIPPED | OUTPATIENT
Start: 2017-07-06 | End: 2018-03-19 | Stop reason: SDUPTHER

## 2017-07-06 NOTE — PROGRESS NOTES
"OBSTETRIC HISTORY:   OB History      Para Term  AB Living    3 2 2 0 1 2    SAB TAB Ectopic Multiple Live Births    1 0 0 0 2        COMPREHENSIVE GYN HISTORY:  PAP History: Denies abnormal Paps.  Infection History: Denies STDs. Denies PID.  Benign History: Denies uterine fibroids. Reports ovarian cysts. Denies endometriosis. Denies other conditions.  Cancer History: Denies cervical cancer. Denies uterine cancer or hyperplasia. Denies ovarian cancer. Denies vulvar cancer or pre-cancer. Denies vaginal cancer or precancer.  Denies breast cancer. Denies colon cancer.  Sexual Activity History: Denies currently being sexually active.  Menstrual History: Age of menarche: 13 years.  Dysmenorrhea History: Denies dysmenorrhea.  Contraception History: Condoms  Patient is high risk for Medicare as she had greater than 5 sexual partners in a lifetime.       HPI:   41 y.o.  Patient's last menstrual period was 2017.   Patient is  here for Medicare pap, pelvic and breast exam. She has complaints of vulvar and vaginal itch, vaginal discharge and dysuria .    ROS:  GENERAL: Denies weight gain or weight loss. Feeling well overall.   SKIN: Denies rash or lesions.   HEAD: Denies headache.   NODES: Denies enlarged lymph nodes.   CHEST: Denies shortness of breath.   ABDOMEN: No abdominal pain, constipation, diarrhea, nausea, vomiting or rectal bleeding.   URINARY: No frequency, hematuria.  REPRODUCTIVE: See HPI.   BREASTS: The patient denies pain, lumps, or nipple discharge.   HEMATOLOGIC: No easy bruisability.   MUSCULOSKELETAL: Denies joint pain or back pain.   NEUROLOGIC: Denies weakness.   PSYCHIATRIC: Denies depression, anxiety or mood swings.    PE:   BP (!) 160/92   Ht 5' 5" (1.651 m)   Wt 115.6 kg (254 lb 13.6 oz)   LMP 2017   BMI 42.41 kg/m²   APPEARANCE: Well nourished, well developed, in no acute distress.  ABDOMEN: Soft. No tenderness or masses. No hernias.  BREASTS: Symmetrical, no skin changes " or visible lesions. No palpable masses, nipple discharge or adenopathy bilaterally.  PELVIC:   VULVA: No lesions. Normal female genitalia.  URETHRAL MEATUS: Normal size and location, no lesions, no prolapse.  URETHRA: No masses, tenderness, prolapse or scarring.  VAGINA: Moist and well rugated, some discharge, no significant cystocele or rectocele.  CERVIX: No lesions and discharge.  UTERUS: Normal size, regular shape, mobile, non-tender, bladder base nontender.  ADNEXA: No masses or tenderness.    PROCEDURES:  Pap smear      Assessment/Plan:  Medicare pap/pelvic/breast exam--high risk secondary to more than 5 partners in lifetime  Vaginal discharge/vulvar itch-- Affirm/Gc/CT  Dysuria-- urine culture  RTO 1 year

## 2017-07-07 LAB
C TRACH DNA SPEC QL NAA+PROBE: NOT DETECTED
CANDIDA RRNA VAG QL PROBE: NEGATIVE
G VAGINALIS RRNA GENITAL QL PROBE: POSITIVE
N GONORRHOEA DNA SPEC QL NAA+PROBE: NOT DETECTED
T VAGINALIS RRNA GENITAL QL PROBE: NEGATIVE

## 2017-07-08 ENCOUNTER — TELEPHONE (OUTPATIENT)
Dept: OBSTETRICS AND GYNECOLOGY | Facility: HOSPITAL | Age: 42
End: 2017-07-08

## 2017-07-08 LAB
BACTERIA UR CULT: NORMAL
BACTERIA UR CULT: NORMAL

## 2017-07-08 RX ORDER — METRONIDAZOLE 500 MG/1
500 TABLET ORAL EVERY 12 HOURS
Qty: 14 TABLET | Refills: 0 | Status: SHIPPED | OUTPATIENT
Start: 2017-07-08 | End: 2017-07-15

## 2017-07-08 NOTE — TELEPHONE ENCOUNTER
Notify has BV RX sent to [facility] for Flagyl  Notify urine culture contaminated if still having symptoms needs an in and out cath

## 2017-07-21 DIAGNOSIS — M54.50 LOW BACK PAIN WITHOUT SCIATICA: ICD-10-CM

## 2017-07-21 DIAGNOSIS — I15.2 HYPERTENSION ASSOCIATED WITH DIABETES: ICD-10-CM

## 2017-07-21 DIAGNOSIS — E11.59 HYPERTENSION ASSOCIATED WITH DIABETES: ICD-10-CM

## 2017-07-22 NOTE — TELEPHONE ENCOUNTER
Please have pt. Come in to discuss labs; have [facility] nurse coordinator, Donna, assist in contacting this pt. To come in for appt.

## 2017-07-24 DIAGNOSIS — I15.2 HYPERTENSION ASSOCIATED WITH DIABETES: ICD-10-CM

## 2017-07-24 DIAGNOSIS — E11.59 HYPERTENSION ASSOCIATED WITH DIABETES: ICD-10-CM

## 2017-07-24 RX ORDER — ETODOLAC 400 MG/1
400 TABLET, EXTENDED RELEASE ORAL DAILY PRN
Qty: 30 TABLET | Refills: 1 | Status: SHIPPED | OUTPATIENT
Start: 2017-07-24 | End: 2017-10-02 | Stop reason: SDUPTHER

## 2017-07-24 RX ORDER — LISINOPRIL AND HYDROCHLOROTHIAZIDE 12.5; 2 MG/1; MG/1
TABLET ORAL
Qty: 180 TABLET | Refills: 1 | Status: SHIPPED | OUTPATIENT
Start: 2017-07-24 | End: 2017-12-15

## 2017-07-24 RX ORDER — LISINOPRIL AND HYDROCHLOROTHIAZIDE 12.5; 2 MG/1; MG/1
TABLET ORAL
Qty: 180 TABLET | Refills: 1 | OUTPATIENT
Start: 2017-07-24

## 2017-07-28 ENCOUNTER — OFFICE VISIT (OUTPATIENT)
Dept: INTERNAL MEDICINE | Facility: CLINIC | Age: 42
End: 2017-07-28
Payer: MEDICARE

## 2017-07-28 ENCOUNTER — TELEPHONE (OUTPATIENT)
Dept: FAMILY MEDICINE | Facility: CLINIC | Age: 42
End: 2017-07-28

## 2017-07-28 VITALS
SYSTOLIC BLOOD PRESSURE: 144 MMHG | HEIGHT: 65 IN | HEART RATE: 98 BPM | OXYGEN SATURATION: 98 % | BODY MASS INDEX: 43.02 KG/M2 | WEIGHT: 258.19 LBS | DIASTOLIC BLOOD PRESSURE: 90 MMHG

## 2017-07-28 DIAGNOSIS — E66.01 MORBID OBESITY WITH BMI OF 40.0-44.9, ADULT: ICD-10-CM

## 2017-07-28 DIAGNOSIS — E11.59 HYPERTENSION ASSOCIATED WITH DIABETES: Primary | ICD-10-CM

## 2017-07-28 DIAGNOSIS — E11.9 TYPE 2 DIABETES MELLITUS WITHOUT COMPLICATION, WITH LONG-TERM CURRENT USE OF INSULIN: ICD-10-CM

## 2017-07-28 DIAGNOSIS — E78.5 HYPERLIPIDEMIA, UNSPECIFIED HYPERLIPIDEMIA TYPE: ICD-10-CM

## 2017-07-28 DIAGNOSIS — I15.2 HYPERTENSION ASSOCIATED WITH DIABETES: Primary | ICD-10-CM

## 2017-07-28 DIAGNOSIS — Z79.4 TYPE 2 DIABETES MELLITUS WITHOUT COMPLICATION, WITH LONG-TERM CURRENT USE OF INSULIN: ICD-10-CM

## 2017-07-28 PROCEDURE — 99214 OFFICE O/P EST MOD 30 MIN: CPT | Mod: S$GLB,,, | Performed by: INTERNAL MEDICINE

## 2017-07-28 PROCEDURE — 99999 PR PBB SHADOW E&M-EST. PATIENT-LVL IV: CPT | Mod: PBBFAC,,, | Performed by: INTERNAL MEDICINE

## 2017-07-28 PROCEDURE — 3046F HEMOGLOBIN A1C LEVEL >9.0%: CPT | Mod: S$GLB,,, | Performed by: INTERNAL MEDICINE

## 2017-07-28 PROCEDURE — 4010F ACE/ARB THERAPY RXD/TAKEN: CPT | Mod: S$GLB,,, | Performed by: INTERNAL MEDICINE

## 2017-07-28 RX ORDER — METOPROLOL SUCCINATE 25 MG/1
25 TABLET, EXTENDED RELEASE ORAL DAILY
Qty: 30 TABLET | Refills: 1 | Status: SHIPPED | OUTPATIENT
Start: 2017-07-28 | End: 2017-07-28 | Stop reason: SDUPTHER

## 2017-07-28 RX ORDER — INSULIN GLARGINE 100 [IU]/ML
INJECTION, SOLUTION SUBCUTANEOUS
Qty: 45 ML | Refills: 1 | Status: SHIPPED | OUTPATIENT
Start: 2017-07-28 | End: 2017-09-15 | Stop reason: SDUPTHER

## 2017-07-28 RX ORDER — METOPROLOL SUCCINATE 25 MG/1
25 TABLET, EXTENDED RELEASE ORAL DAILY
Qty: 30 TABLET | Refills: 1 | Status: SHIPPED | OUTPATIENT
Start: 2017-07-28 | End: 2017-11-08 | Stop reason: SDUPTHER

## 2017-07-28 NOTE — PROGRESS NOTES
Subjective:       Patient ID: Jaimee Quintana is a 41 y.o. female.    Chief Complaint: Results (discuss labs)    HPI Pt. Here for f/u for DM, hyperlipedemia and HTN; she has not increased novolin to 45 units TID; she is taking 40 units BID; taking lantus 38 units BID; I explained in detail risks of non-compliance; I reviewed labs dated 6/16/17; HGBA1C is 11.4; she has restarted lipitor; BP is borderline elevated; she has gained weight  Review of Systems   Constitutional: Negative for chills, fatigue and fever.   HENT: Negative for congestion, rhinorrhea and sore throat.    Respiratory: Negative for cough, shortness of breath and wheezing.    Cardiovascular: Negative for chest pain.   Gastrointestinal: Negative for abdominal pain, nausea and vomiting.   Genitourinary: Negative for dysuria.   Musculoskeletal: Negative for arthralgias.   Skin: Negative for rash.   Neurological: Negative for dizziness and headaches.   Psychiatric/Behavioral: Negative for sleep disturbance. The patient is not nervous/anxious.        Objective:      Physical Exam   Constitutional: She is oriented to person, place, and time.   Morbid obesity   Eyes: EOM are normal.   Neck: Normal range of motion.   Cardiovascular: Normal rate, regular rhythm and normal heart sounds.    Pulmonary/Chest: Effort normal and breath sounds normal.   Abdominal: Soft. There is no tenderness. There is no rebound and no guarding.   Musculoskeletal: Normal range of motion.   Neurological: She is alert and oriented to person, place, and time.   Skin: No rash noted.       Assessment:       1. Hypertension associated with diabetes Sub-optimally controlled   2. Type 2 diabetes mellitus without complication, with long-term current use of insulin Poorly controlled   3. Hyperlipidemia, unspecified hyperlipidemia type Active   4. Morbid obesity with BMI of 40.0-44.9, adult Sub-optimally controlled       Plan:         Hypertension associated with  diabetes  Comments:  continue current regimen and start toprol XL 25 mg QD; encouraged low Na diet and weight loss; repeat BP on f/u in 1 month   Orders:  -     Discontinue: metoprolol succinate (TOPROL-XL) 25 MG 24 hr tablet; Take 1 tablet (25 mg total) by mouth once daily.  Dispense: 30 tablet; Refill: 1  -     metoprolol succinate (TOPROL-XL) 25 MG 24 hr tablet; Take 1 tablet (25 mg total) by mouth once daily.  Dispense: 30 tablet; Refill: 1    Type 2 diabetes mellitus without complication, with long-term current use of insulin  Comments:  asked pt. to take novilin 45 units TID and increase lantus to 42 units BID; encouraged ADA diet and re-evaluate in 1 month; refer to endocrine  Orders:  -     Ambulatory consult to Endocrinology  -     insulin glargine (LANTUS SOLOSTAR) 100 unit/mL (3 mL) InPn pen; Inject 42 units SQ BID  Dispense: 45 mL; Refill: 1  -     insulin regular (NOVOLIN R) 100 unit/mL Inj injection; Inject 45 Units into the skin 3 (three) times daily before meals.  Dispense: 30 mL; Refill: 1    Hyperlipidemia, unspecified hyperlipidemia type  Comments:  continue lipitor and encouraged diet modification     Morbid obesity with BMI of 40.0-44.9, adult  Comments:  encouraged diet and explained risks

## 2017-07-28 NOTE — TELEPHONE ENCOUNTER
----- Message from Kami Bustamante sent at 7/28/2017 11:25 AM CDT -----  Contact: self/[phone]  Patient called to state she cannot come in at 1pm...wants to come in after 2pm

## 2017-08-03 ENCOUNTER — OFFICE VISIT (OUTPATIENT)
Dept: ENDOCRINOLOGY | Facility: CLINIC | Age: 42
End: 2017-08-03
Payer: MEDICARE

## 2017-08-03 VITALS
WEIGHT: 254.44 LBS | HEART RATE: 78 BPM | SYSTOLIC BLOOD PRESSURE: 128 MMHG | DIASTOLIC BLOOD PRESSURE: 80 MMHG | HEIGHT: 65 IN | BODY MASS INDEX: 42.39 KG/M2

## 2017-08-03 DIAGNOSIS — E11.9 TYPE 2 DIABETES MELLITUS WITHOUT COMPLICATION, WITH LONG-TERM CURRENT USE OF INSULIN: Primary | ICD-10-CM

## 2017-08-03 DIAGNOSIS — E04.2 MULTINODULAR GOITER (NONTOXIC): ICD-10-CM

## 2017-08-03 DIAGNOSIS — E66.01 MORBID OBESITY WITH BMI OF 40.0-44.9, ADULT: ICD-10-CM

## 2017-08-03 DIAGNOSIS — I67.9 CEREBROVASCULAR DISEASE: ICD-10-CM

## 2017-08-03 DIAGNOSIS — E78.5 HYPERLIPIDEMIA, UNSPECIFIED HYPERLIPIDEMIA TYPE: ICD-10-CM

## 2017-08-03 DIAGNOSIS — Z79.4 TYPE 2 DIABETES MELLITUS WITHOUT COMPLICATION, WITH LONG-TERM CURRENT USE OF INSULIN: Primary | ICD-10-CM

## 2017-08-03 DIAGNOSIS — E11.59 HYPERTENSION ASSOCIATED WITH DIABETES: ICD-10-CM

## 2017-08-03 DIAGNOSIS — I15.2 HYPERTENSION ASSOCIATED WITH DIABETES: ICD-10-CM

## 2017-08-03 DIAGNOSIS — E11.42 DIABETIC PERIPHERAL NEUROPATHY ASSOCIATED WITH TYPE 2 DIABETES MELLITUS: ICD-10-CM

## 2017-08-03 PROCEDURE — 99999 PR PBB SHADOW E&M-EST. PATIENT-LVL IV: CPT | Mod: PBBFAC,,, | Performed by: INTERNAL MEDICINE

## 2017-08-03 PROCEDURE — 99214 OFFICE O/P EST MOD 30 MIN: CPT | Mod: S$GLB,,, | Performed by: INTERNAL MEDICINE

## 2017-08-03 PROCEDURE — 3008F BODY MASS INDEX DOCD: CPT | Mod: S$GLB,,, | Performed by: INTERNAL MEDICINE

## 2017-08-03 PROCEDURE — 4010F ACE/ARB THERAPY RXD/TAKEN: CPT | Mod: S$GLB,,, | Performed by: INTERNAL MEDICINE

## 2017-08-03 PROCEDURE — 99499 UNLISTED E&M SERVICE: CPT | Mod: S$GLB,,, | Performed by: INTERNAL MEDICINE

## 2017-08-03 PROCEDURE — 3046F HEMOGLOBIN A1C LEVEL >9.0%: CPT | Mod: S$GLB,,, | Performed by: INTERNAL MEDICINE

## 2017-08-03 NOTE — MEDICAL/APP STUDENT
"Subjective:       Patient ID: Jaimee Quintana is a 41 y.o. female.    Chief Complaint: Diabetes Mellitus    HPI   Referred by PCP for uncontrolled DMII.     Pt has history of DM type 2, obesity, HTN, HLD, and CVA. DM type 2 diagnosed at 23. Was initially on oral medications for 1 year after diagnosis but has been on insulin since.     Checks BGL ~9x/wk.   Reports she knows she is supposed to check her sugar more regularly but does not because it's "a lot of sticking"   Reports sugars are 200-400's. States she feels like herself when her sugars are high.   Occasionally forgets to take insulin in AM throughout the day.     Has been to ED for hyperglycemia and biliary colic ~3 months ago.  Cholecystectomy 3/2017. States she is a little concerned about her diabetes and her weight since hospital admission and miscarriage in 2015.    Diet fair to poor. States she eats what is available including burger, croissant sandwich, mac and cheese, chicken and rice, etc. Drinks sugary drinks and teas. Snacks during the day on fruits. States she tries not to have too many desserts but will have a yogurt.     Occasional exercise - states she sometimes tries to walk 1/2 mile.     Diabetic foot exam performed today.   Last saw diabetes educator 3/2017        Review of Systems   Constitutional: Positive for activity change and fatigue. Negative for chills and fever.        Feels uninterested in doing things. Weight gain of 5-10lbs.    HENT: Negative for sneezing and sore throat.    Eyes: Negative for pain and discharge.        Endorses blurred vision at night   Respiratory: Positive for shortness of breath. Negative for chest tightness.         SOB with activity    Cardiovascular: Positive for palpitations. Negative for chest pain and leg swelling.   Gastrointestinal: Positive for diarrhea. Negative for abdominal pain, constipation, nausea and vomiting.   Endocrine: Positive for heat intolerance, polydipsia and polyuria. Negative for " cold intolerance and polyphagia.   Genitourinary: Positive for frequency and urgency. Negative for dysuria.   Musculoskeletal: Negative for arthralgias and myalgias.   Skin: Negative for color change.   Neurological: Negative for dizziness, light-headedness and headaches.   Psychiatric/Behavioral: The patient is nervous/anxious.        Objective:      Physical Exam   Constitutional: She is oriented to person, place, and time. She appears well-developed and well-nourished.   HENT:   Head: Normocephalic and atraumatic.   Eyes: EOM are normal. Pupils are equal, round, and reactive to light.   Neck: Normal range of motion. Neck supple.   Cardiovascular: Regular rhythm and normal heart sounds.    Pulmonary/Chest: Effort normal and breath sounds normal.   Abdominal: Soft. Bowel sounds are normal.   Musculoskeletal: Normal range of motion. She exhibits edema.   2+ LE edema b/l   Feet:   Right Foot:   Skin Integrity: Positive for dry skin.   Left Foot:   Skin Integrity: Positive for dry skin.   Neurological: She is alert and oriented to person, place, and time. She displays abnormal reflex.   Skin: Skin is warm and dry.       Assessment:       1. Diabetes Mellitus, Type 2, uncontrolled with long term insulin  2. Hypertension associated with diabetes  3.  Cerebrovascular disease  4. Morbid obesity  5. Hyperlipidemia  6. Goiter      Plan:       Diabetes Mellitus, Type 2, Uncontrolled with long-term Insulin    - Last HbA1c 11.4%    - Pt has significant insulin resistance    - Discussed regular BG checks     - Emphasized regularly taking insulin    - Referral to Diabetes Education for further diet and medication management     - Currently prescribed 45U Lantus BID and 42U Novolin TID     - Continue metformin 1000mg PO BID    - Referral for opthalmology     - BGL logs given to patient, will take to diabetes education    Hypertension Associated with Diabetes    - BP elevated today despite reported compliance with medication    -  Discussed weight loss and regular exercise    - Managed by PCP, currently taking toprolol XL 25mg     Morbid Obesity    - Encouraged weight loss and diet monitoring.     - Broached possible lap band      - Referral to education about weight loss surgery    Hyperlipidemia    - Encouraged weight loss and diet monitoring     - Continue Lipitor    Goiter    - Discussed importance of FNA with patient, as previous FNA appointment missed.     - Reschedule FNA       RTC in 6-8 weeks to meet with NP      Patient discussed with and seen by Dr. Patel, Endocrinology     Sofie López, MS4

## 2017-08-03 NOTE — PROGRESS NOTES
"Subjective:      Patient ID: Jaimee Quintana is a 41 y.o. female.    Chief Complaint:  Diabetes Mellitus    Referral from Dr. Horn    History of Present Illness  Ms. Quintana presents today for follow up of type 2 diabetes. Last seen by me in 9/2016 and VANDANA Brown NP in 3/2017.      Has active history of type 2 DM, obesity, HTN, and HLP. Has history of CVA.     Has had type 2 diabetes mellitus since the age of 23. Was on oral diabetes meds only for about 1 year after diagnosis and has been on insulin since that time. Previously followed with Dr. Chanel at [facility] for diabetes.     Current diabetes meds:  Metformin 100 mg PO TWICE DAILY  Lantus 42 units TWICE DAILY  Regular insulin via vial 45 units AC     Frequently missing insulin doses. Misses at least one shot of basal insulin every other day.  Also frequently misses prandial insulin doses and she does not take her mealtime insulin before each meal.      She does not eat 3 sets meals per day and will mostly graze throughout the day.     Checking glucoses very infrequently. Checks glucoses once per day on avg.     No logs available for review today:  Self reported glucoses are 200's-400's    She reports that she "feels better" when her glucoses are in the 400's    Endorses both polyuria and polydipsia.     No hypoglycemia     Complications:   No retinopathy - but due for eye exam now  No nephropathy - last urine micro WNL  Has neuropathy     Last saw diabetes educator 2/2016     Has HTN, with normal bp today. Reports compliance with antihypertensives. On ACEI     HLP on statin.    Was found to have a MNG with large left lobe thyroid nodule on previous ultrasound but patient never made appt for FNA.    Review of Systems   Constitutional: Negative for chills and fever.   Gastrointestinal: Negative for nausea.       Objective:   Physical Exam   Nursing note and vitals reviewed.  Feet no cuts or  scratches  Shoes appropriate  Decreased vibratory sensation in bilateral " feet  Acanthosis at the neck    Lab Review:   Results for REBA FLORES (MRN [id number]) as of 8/3/2017 08:22   Ref. Range 12/5/2016 09:32 2/13/2017 12:01 3/8/2017 11:10 3/23/2017 15:15 6/16/2017 09:41   Hemoglobin A1C Latest Ref Range: 4.0 - 5.6 % 9.7 (H)   10.7 (H) 11.4 (H)   Estimated Avg Glucose Latest Ref Range: 68 - 131 mg/dL 232 (H)   260 (H) 280 (H)     Results for REBA FLORES (MRN [id number]) as of 8/3/2017 08:22   Ref. Range 6/16/2017 09:41   Sodium Latest Ref Range: 136 - 145 mmol/L 135 (L)   Potassium Latest Ref Range: 3.5 - 5.1 mmol/L 4.4   Chloride Latest Ref Range: 95 - 110 mmol/L 101   CO2 Latest Ref Range: 23 - 29 mmol/L 23   Anion Gap Latest Ref Range: 8 - 16 mmol/L 11   BUN, Bld Latest Ref Range: 6 - 20 mg/dL 10   Creatinine Latest Ref Range: 0.5 - 1.4 mg/dL 0.8   eGFR if non African American Latest Ref Range: >60 mL/min/1.73 m^2 >60.0   eGFR if African American Latest Ref Range: >60 mL/min/1.73 m^2 >60.0   Glucose Latest Ref Range: 70 - 110 mg/dL 342 (H)   Calcium Latest Ref Range: 8.7 - 10.5 mg/dL 9.4   Alkaline Phosphatase Latest Ref Range: 55 - 135 U/L 89   Total Protein Latest Ref Range: 6.0 - 8.4 g/dL 7.6   Albumin Latest Ref Range: 3.5 - 5.2 g/dL 3.5   Total Bilirubin Latest Ref Range: 0.1 - 1.0 mg/dL 0.3   AST Latest Ref Range: 10 - 40 U/L 27   ALT Latest Ref Range: 10 - 44 U/L 44       Assessment:     1. Type 2 diabetes mellitus without complication, with long-term current use of insulin    2. Hypertension associated with diabetes    3. Cerebrovascular disease    4. Morbid obesity with BMI of 40.0-44.9, adult    5. Hyperlipidemia, unspecified hyperlipidemia type    6. Multinodular goiter (nontoxic)    7. Diabetic peripheral neuropathy associated with type 2 diabetes mellitus      Plan:        1.) Patient with long standing type 2 diabetes that is uncontrolled  --A1c worsening due to non-compliance and poor dietary habits  --Would not increase insulin doses any further at this point as  patient not taking insulin as prescribed  --She need diabetes education ASAP  --Explained the importance of compliance with her insulin and complications of poor diabetes control  --Explained importance of sticking to a regular diet with three meals daily and minimal snacks  --Unclear what overall insulin needs are at this point due to non-compliance, explained to her that we cannot make accurate or safe adjustments to her insulin regimen unless she checks her glucoses  --I have given her logs to fill out and advised her to check glucoses at least twice daily (fasting and to alternate pre-meal)  --Will continue Lantus at 42 units TWICE DAILY  --Continue regular insulin 45 units TID AC  --Will continue metformin to 1000 mg PO TWICE DAILY  --Needs eye exam and will schedule  --UTD with urine micro  --Will schedule f/u with NP in 6-8 weeks (stressed importance of not missing future appointments)     2.) Bp at goal  --Patient reports compliance with bp meds     3.) Avoid hypoglycemia     4.) Increases insulin resistance  --Counseled on diet/exercise and importance of weight loss  --Explained to her that she may consider bariatric surgery in the future if unable to lose weight     5.) On statin     6.) Needs FNA of left lobe thyroid nodule  --Will schedule    7.) Better long term blood glucose control to prevent progression     RTC in 6-8 weeks with NP     Copy to Dr. Kory Patel M.D. Staff Endocrinology

## 2017-08-03 NOTE — LETTER
August 3, 2017      Kory Taylor MD  [address] [place] [address]           [facility] - Endo/Diab/Metab  [address]  [facility] [address]  Phone: [phone]  Fax: [phone]          Patient: Jaimee Quintana   MR Number: [id number]   YOB: 1975   Date of Visit: 8/3/2017       Dear Dr. Kory Taylor:    Thank you for referring Jaimee Quintana to me for evaluation. Attached you will find relevant portions of my assessment and plan of care.    If you have questions, please do not hesitate to call me. I look forward to following Jaimee Quintana along with you.    Sincerely,    Alberto Patel MD    Enclosure  CC:  No Recipients    If you would like to receive this communication electronically, please contact [email or web address] or [phone] to request more information on [facility] [employer] Link access.    For providers and/or their staff who would like to refer a patient to [facility], please contact us through our one-stop-shop provider referral line, [facility], at [phone].    If you feel you have received this communication in error or would no longer like to receive these types of communications, please e-mail [email or web address]

## 2017-08-22 ENCOUNTER — OFFICE VISIT (OUTPATIENT)
Dept: OPTOMETRY | Facility: CLINIC | Age: 42
End: 2017-08-22
Payer: MEDICARE

## 2017-08-22 ENCOUNTER — CLINICAL SUPPORT (OUTPATIENT)
Dept: DIABETES | Facility: CLINIC | Age: 42
End: 2017-08-22
Payer: MEDICARE

## 2017-08-22 DIAGNOSIS — H52.13 MYOPIA WITH ASTIGMATISM AND PRESBYOPIA, BILATERAL: ICD-10-CM

## 2017-08-22 DIAGNOSIS — H52.203 MYOPIA WITH ASTIGMATISM AND PRESBYOPIA, BILATERAL: ICD-10-CM

## 2017-08-22 DIAGNOSIS — E11.9 TYPE 2 DIABETES MELLITUS WITHOUT OPHTHALMIC MANIFESTATIONS: Primary | ICD-10-CM

## 2017-08-22 DIAGNOSIS — Z79.4 TYPE 2 DIABETES MELLITUS WITHOUT COMPLICATION, WITH LONG-TERM CURRENT USE OF INSULIN: ICD-10-CM

## 2017-08-22 DIAGNOSIS — E11.9 TYPE 2 DIABETES MELLITUS WITHOUT COMPLICATION, WITH LONG-TERM CURRENT USE OF INSULIN: ICD-10-CM

## 2017-08-22 DIAGNOSIS — H52.4 MYOPIA WITH ASTIGMATISM AND PRESBYOPIA, BILATERAL: ICD-10-CM

## 2017-08-22 PROCEDURE — 92015 DETERMINE REFRACTIVE STATE: CPT | Mod: S$GLB,,, | Performed by: OPTOMETRIST

## 2017-08-22 PROCEDURE — G0108 DIAB MANAGE TRN  PER INDIV: HCPCS | Mod: S$GLB,,, | Performed by: DIETITIAN, REGISTERED

## 2017-08-22 PROCEDURE — 99499 UNLISTED E&M SERVICE: CPT | Mod: S$GLB,,, | Performed by: OPTOMETRIST

## 2017-08-22 PROCEDURE — 99999 PR PBB SHADOW E&M-EST. PATIENT-LVL II: CPT | Mod: PBBFAC,,, | Performed by: OPTOMETRIST

## 2017-08-22 PROCEDURE — 92014 COMPRE OPH EXAM EST PT 1/>: CPT | Mod: S$GLB,,, | Performed by: OPTOMETRIST

## 2017-08-22 NOTE — PROGRESS NOTES
Diabetes Education  Author: Elisha Kendrick RD  Date: 8/22/2017    Diabetes Education Visit  Diabetes Education Record Assessment/Progress: Initial    Diabetes Type  Diabetes Type : Type II         Nutrition  Meal Planning: snacks between meal (reports does not eat meals but snacks throughout the day - seafood, sandwich, a lot of fruit, hot dog, drinks orange juice, Crystal light, water, coffee with sweetener and cream)    Monitoring   Self Monitoring : Reports she is SMBG 3 times daily but then presented log with only dates 8/4-8/10 and only 5 readings all in 300s  Blood Glucose Logs: Yes    Exercise   Exercise Type: walking  Frequency:  (3-4 days weekly)    Current Diabetes Treatment   Current Treatment: Oral Medication, Insulin (Metformin 1000mg BID, Regular 45 units TID before each meal, Lantus 42 units BID. )    Social History  Preferred Learning Method: Face to Face, Reading Materials  Primary Support: Self      Barriers to Change  Barriers to Change: None  Learning Challenges : None    Readiness to Learn   Readiness to Learn : Acceptance    Cultural Influences  Cultural Influences: No    Diabetes Education Assessment/Progress    Acute Complications (preventing, detecting, and treating acute complications): Discussion, Instructed, Competent (verbalizes/demonstrates), Written Materials Provided, Individual Session (Discussed causes, s/s and appropriate treatment of hypoglycemia and hyperglycemia. She denies any hypos.)    Chronic Complications (preventing, detecting, and treating chronic complications): Discussion, Instructed, Competent (verbalizes/demonstrates), Written Materials Provided, Individual Session (Getting eye exam today. Reviewed care schedule and home foot care. )    Diabetes Disease Process (diabetes disease process and treatment options): Discussion, Instructed, Competent (verbalizes/demonstrates), Written Materials Provided, Individual Session    Nutrition (Incorporating nutritional management  into one's lifestyle): Discussion, Instructed, Competent (verbalizes/demonstrates), Written Materials Provided, Individual Session (Explained foods she eats as snacks constitute meals worth of CHO and discussed how grazing/snacking throughout the day hinders blood sugar control. Reviewed sources of CHO, stressed portion control and demonstrated with food models and measuring cups, and structuring 3 meals daily, ~4-5 hours apart and if snacking between, choosing mostly 0-5g CHO snacks. )    Physical Activity (incorporating physical activity into one's lifestyle): Discussion, Instructed, Competent (verbalizes/demonstrates), Written Materials Provided, Individual Session (Discussed goals and benefits. Encouraged 150 min per week. )    Medications (states correct name, dose, onset, peak, duration, side effects & timing of meds): Discussion, Instructed, Competent (verbalizes/demonstrates), Written Materials Provided, Individual Session (Reviewed timing, dosage and action time of Regular insulin and Lantus. Denies insulin getting exposed to heat - keeps in cold pack when away from home. Self-administers into abdomen about 20 min prior to eating, rotates sites, and denies any lumps/knots at injection sites. She endorses missing Regular about 4 times per week and Lantus about 3 times per week. Reinforced importance of taking doses consistently - explained role of insulin in the body using visual aid and stressed importance of avoiding missing doses. )    Monitoring (monitoring blood glucose/other parameters & using results): Discussion, Instructed, Competent (verbalizes/demonstrates), Written Materials Provided, Individual Session (Reviewed SMBG 4 times daily AC/HS, goal BG readings, keeping log and sending log in 1-2 weeks with taking MDI more consistently for her endocrine provider to review and titrate insulin as needed. Stressed importance of logs. )    Goal Setting and Problem Solving (verbalizes behavior change  strategies & sets realistic goals): Discussion, Individual Session    Behavior Change (developing personal strategies to health & behavior change): Discussion, Individual Session    Goals  Healthy Eating: Set (Will eat 3 meals daily.)  Start Date: 08/22/17  Target Date: 11/22/17  Medications: Set (Will set alarm on phone to help avoid missing insulin doses.)  Start Date: 08/22/17  Target Date: 11/22/17         Diabetes Care Plan/Intervention  Education Plan/Intervention: Individual Follow-Up DSMT (3 month follow-up scheduled. )    Diabetes Meal Plan  Carbohydrate Per Meal: 30-45g  Carbohydrate Per Snack :  (mostly 0-5g CHO)    Education Units of Time   Time Spent: 60 min      Health Maintenance Topics with due status: Not Due       Topic Last Completion Date    TETANUS VACCINE 11/19/2014    Pneumococcal PPSV23 (Medium Risk) 11/19/2014    Mammogram 02/16/2017    Foot Exam 03/23/2017    Eye Exam 05/16/2017    Hemoglobin A1c 06/16/2017    Pap Smear with HPV Cotest 07/06/2017     Health Maintenance Due   Topic Date Due    Lipid Panel  07/14/2017    Influenza Vaccine  08/01/2017

## 2017-08-22 NOTE — PROGRESS NOTES
HPI     Last eye exam was 3/14/16 with Dr. Caballero.  Patient states no vision changes since last exam. Didn't fill glasses rx   after last exam. Feels she needs them especially at night. OU are itchy   and watery when driving at night.   Patient denies diplopia, headaches, flashes/floaters, and pain.    Hemoglobin A1C       Date                     Value               Ref Range             Status                06/16/2017               11.4 (H)            4.0 - 5.6 %           Final                  Last edited by Tsering Alexis on 8/22/2017  2:58 PM. (History)            Assessment /Plan     For exam results, see Encounter Report.    Type 2 diabetes mellitus without ophthalmic manifestations    Myopia with astigmatism and presbyopia, bilateral            1. No retinopathy--monitor yearly.  Educated pt eye health correlates with blood sugar control.    2.  Rx given          RTC 1 year for dm exam.

## 2017-09-08 ENCOUNTER — LAB VISIT (OUTPATIENT)
Dept: LAB | Facility: HOSPITAL | Age: 42
End: 2017-09-08
Attending: INTERNAL MEDICINE
Payer: MEDICARE

## 2017-09-08 DIAGNOSIS — E11.9 TYPE 2 DIABETES MELLITUS WITHOUT COMPLICATION, WITH LONG-TERM CURRENT USE OF INSULIN: ICD-10-CM

## 2017-09-08 DIAGNOSIS — Z79.4 TYPE 2 DIABETES MELLITUS WITHOUT COMPLICATION, WITH LONG-TERM CURRENT USE OF INSULIN: ICD-10-CM

## 2017-09-08 LAB
ESTIMATED AVG GLUCOSE: 298 MG/DL
HBA1C MFR BLD HPLC: 12 %

## 2017-09-08 PROCEDURE — 36415 COLL VENOUS BLD VENIPUNCTURE: CPT

## 2017-09-08 PROCEDURE — 83036 HEMOGLOBIN GLYCOSYLATED A1C: CPT

## 2017-09-12 ENCOUNTER — TELEPHONE (OUTPATIENT)
Dept: ENDOCRINOLOGY | Facility: CLINIC | Age: 42
End: 2017-09-12

## 2017-09-12 NOTE — TELEPHONE ENCOUNTER
----- Message from Jennifer Aurea sent at 9/12/2017 10:40 AM CDT -----  Contact: Jaimee    tel:   [phone]   Pt.says she needs to get rescheduled for the biopsy.  Seeing you on 9/15th.@ 3pm.    Wants to have it done next week.   Would like a morning appt. Between 9:30- 12pm.        Would like to have the biopsy explained to her,  Has some questions.

## 2017-09-12 NOTE — TELEPHONE ENCOUNTER
Rescheduled u/s to Nov 1 at 3:15PM.  Mailed appt slip to her with information about procedure which will answer her questions she may have.

## 2017-09-15 ENCOUNTER — OFFICE VISIT (OUTPATIENT)
Dept: ENDOCRINOLOGY | Facility: CLINIC | Age: 42
End: 2017-09-15
Payer: MEDICARE

## 2017-09-15 VITALS
HEIGHT: 65 IN | SYSTOLIC BLOOD PRESSURE: 131 MMHG | WEIGHT: 254.81 LBS | DIASTOLIC BLOOD PRESSURE: 84 MMHG | HEART RATE: 79 BPM | BODY MASS INDEX: 42.45 KG/M2

## 2017-09-15 DIAGNOSIS — G47.33 OSA (OBSTRUCTIVE SLEEP APNEA): ICD-10-CM

## 2017-09-15 DIAGNOSIS — E11.42 DIABETIC PERIPHERAL NEUROPATHY ASSOCIATED WITH TYPE 2 DIABETES MELLITUS: Primary | ICD-10-CM

## 2017-09-15 DIAGNOSIS — E66.01 MORBID OBESITY WITH BMI OF 40.0-44.9, ADULT: ICD-10-CM

## 2017-09-15 DIAGNOSIS — E78.5 HYPERLIPIDEMIA, UNSPECIFIED HYPERLIPIDEMIA TYPE: ICD-10-CM

## 2017-09-15 DIAGNOSIS — I15.2 HYPERTENSION ASSOCIATED WITH DIABETES: ICD-10-CM

## 2017-09-15 DIAGNOSIS — E11.9 TYPE 2 DIABETES MELLITUS WITHOUT COMPLICATION, WITH LONG-TERM CURRENT USE OF INSULIN: ICD-10-CM

## 2017-09-15 DIAGNOSIS — Z79.4 TYPE 2 DIABETES MELLITUS WITHOUT COMPLICATION, WITH LONG-TERM CURRENT USE OF INSULIN: ICD-10-CM

## 2017-09-15 DIAGNOSIS — E11.59 HYPERTENSION ASSOCIATED WITH DIABETES: ICD-10-CM

## 2017-09-15 DIAGNOSIS — K76.0 FATTY LIVER: ICD-10-CM

## 2017-09-15 PROCEDURE — 3008F BODY MASS INDEX DOCD: CPT | Mod: S$GLB,,, | Performed by: NURSE PRACTITIONER

## 2017-09-15 PROCEDURE — 99999 PR PBB SHADOW E&M-EST. PATIENT-LVL IV: CPT | Mod: PBBFAC,,, | Performed by: NURSE PRACTITIONER

## 2017-09-15 PROCEDURE — 4010F ACE/ARB THERAPY RXD/TAKEN: CPT | Mod: S$GLB,,, | Performed by: NURSE PRACTITIONER

## 2017-09-15 PROCEDURE — 99214 OFFICE O/P EST MOD 30 MIN: CPT | Mod: S$GLB,,, | Performed by: NURSE PRACTITIONER

## 2017-09-15 PROCEDURE — 3046F HEMOGLOBIN A1C LEVEL >9.0%: CPT | Mod: S$GLB,,, | Performed by: NURSE PRACTITIONER

## 2017-09-15 RX ORDER — INSULIN GLARGINE 100 [IU]/ML
INJECTION, SOLUTION SUBCUTANEOUS
Qty: 45 ML | Refills: 1
Start: 2017-09-15 | End: 2017-11-03 | Stop reason: SDUPTHER

## 2017-09-15 RX ORDER — INSULIN ASPART 100 [IU]/ML
INJECTION, SOLUTION INTRAVENOUS; SUBCUTANEOUS
Qty: 2 VIAL | Refills: 11 | Status: SHIPPED | OUTPATIENT
Start: 2017-09-15 | End: 2017-11-08 | Stop reason: SDUPTHER

## 2017-09-15 NOTE — PROGRESS NOTES
"CC: Patient is here for management of Type 2 diabetes and review of medical conditions as listed in visit diagnosis.      HPI: Ms. Jaimee Quintana is a 42 y.o. Black or  female who was diagnosed with Type 2 DM at age 23. She has never been hospitalized for DM. (+) FH of DM in father. No personal or family history of pancreatitis or thyroid cancer.     Last seen with Dr. Patel for management of thyroid nodule. Needs FNA, which is scheduled for 11/1/17.     A1C has trended up. DM uncontrolled.     Reports she sometimes takes prandial insulin without eating.     Doesn't miss doses of basal insulin.     No logs to review. Checks BG readings 3x/day. States readings have been 280-314.     No hypoglycemia.     Exercises 3x/week via walking, climbing stairs. Walks for 5 blocks around home.     PREVIOUS DIABETES MEDICATIONS  Actos    CURRENT DIABETIC MEDS: Lantus 42 units bid, Novolin R 45 units before meals, Glucophage 1,000 mg bid (Lantus pen, Novolin R vial)  DM Education: 8/22/17    Last Podiatry Exam: No    REVIEW OF SYSTEMS  General: no weakness, fatigue, or weight changes.   Eyes: no double or blurred vision, eye pain, or redness; last eye exam=8/22/17   Cardiovascular: no chest pain, palpitations, edema, or murmurs.   Respiratory: no cough or dyspnea.   GI: no heartburn, nausea, or changes in bowel patterns; good appetite.   Skin: no rashes, dryness, itching, or reactions at insulin injection sites; (+) rotation of insulin injection sites.    Neuro: no numbness, tingling, tremors, or vertigo.   Endocrine: no polyuria, polydipsia, polyphagia, heat or cold intolerance.     Vital Signs  /84 (BP Location: Right arm, Patient Position: Sitting)   Pulse 79   Ht 5' 5" (1.651 m)   Wt 115.6 kg (254 lb 12.8 oz)   BMI 42.40 kg/m²     Hemoglobin A1C   Date Value Ref Range Status   09/08/2017 12.0 (H) 4.0 - 5.6 % Final     Comment:     According to ADA guidelines, hemoglobin A1c <7.0% represents  optimal " control in non-pregnant diabetic patients. Different  metrics may apply to specific patient populations.   Standards of Medical Care in Diabetes-2016.  For the purpose of screening for the presence of diabetes:  <5.7%     Consistent with the absence of diabetes  5.7-6.4%  Consistent with increasing risk for diabetes   (prediabetes)  >or=6.5%  Consistent with diabetes  Currently, no consensus exists for use of hemoglobin A1c  for diagnosis of diabetes for children.  This Hemoglobin A1c assay has significant interference with fetal   hemoglobin   (HbF). The results are invalid for patients with abnormal amounts of   HbF,   including those with known Hereditary Persistence   of Fetal Hemoglobin. Heterozygous hemoglobin variants (HbAS, HbAC,   HbAD, HbAE, HbA2) do not significantly interfere with this assay;   however, presence of multiple variants in a sample may impact the %   interference.     06/16/2017 11.4 (H) 4.0 - 5.6 % Final     Comment:     According to ADA guidelines, hemoglobin A1c <7.0% represents  optimal control in non-pregnant diabetic patients. Different  metrics may apply to specific patient populations.   Standards of Medical Care in Diabetes-2016.  For the purpose of screening for the presence of diabetes:  <5.7%     Consistent with the absence of diabetes  5.7-6.4%  Consistent with increasing risk for diabetes   (prediabetes)  >or=6.5%  Consistent with diabetes  Currently, no consensus exists for use of hemoglobin A1c  for diagnosis of diabetes for children.  This Hemoglobin A1c assay has significant interference with fetal   hemoglobin   (HbF). The results are invalid for patients with abnormal amounts of   HbF,   including those with known Hereditary Persistence   of Fetal Hemoglobin. Heterozygous hemoglobin variants (HbAS, HbAC,   HbAD, HbAE, HbA2) do not significantly interfere with this assay;   however, presence of multiple variants in a sample may impact the %   interference.     03/23/2017  10.7 (H) 4.5 - 6.2 % Final     Comment:     According to ADA guidelines, hemoglobin A1C <7.0% represents  optimal control in non-pregnant diabetic patients.  Different  metrics may apply to specific populations.   Standards of Medical Care in Diabetes - 2016.  For the purpose of screening for the presence of diabetes:  <5.7%     Consistent with the absence of diabetes  5.7-6.4%  Consistent with increasing risk for diabetes   (prediabetes)  >or=6.5%  Consistent with diabetes  Currently no consensus exists for use of hemoglobin A1C  for diagnosis of diabetes for children.        Lab Results   Component Value Date    CREATININE 0.8 06/16/2017      Lab Results   Component Value Date    TSH 0.755 12/05/2016      Lab Results   Component Value Date    LDLCALC 112.4 07/14/2016      Assessment/Plan  Diabetic peripheral neuropathy associated with type 2 diabetes mellitus   DM uncontrolled  Will try to start patient on VGo, depending on cost--begin VGo 30, 5 clicks with each meal  In the interim, increase Lantus to 50 units twice daily, Novolin R 48 units with meals.   Attempted to add Novolin R correction scale, but patient had difficulty understanding concepts today  Monitor readings 4x/day--bring readings to review with next visit  She may also benefit from a GLP 1 Agonist, but hold off until after thyroid biopsy    Fatty liver  Lipid control, weight loss, BG control    Hyperlipidemia, unspecified hyperlipidemia type  Continue Atorvastatin  Lipids with next visit    Hypertension associated with diabetes  BP stable  Continue current meds     Morbid obesity with BMI of 40.0-44.9, adult  Weight loss, physical activity advised  Body mass index is 42.4 kg/m².     CESAR (obstructive sleep apnea)  Follow with PCP for management  Encouraged c-pap adherence    FOLLOW UP  Return in about 3 months (around 12/15/2017).

## 2017-09-15 NOTE — PATIENT INSTRUCTIONS
Lantus 50 units twice daily  Novolin R 48 units before each meal  Do not give Novolin R if you are not eating      **If you are able to get the  VGo device and insulin, notify me and we will have you set up for training**    **Do not start VGo device without additional training**

## 2017-09-18 ENCOUNTER — TELEPHONE (OUTPATIENT)
Dept: ENDOCRINOLOGY | Facility: CLINIC | Age: 42
End: 2017-09-18

## 2017-09-18 ENCOUNTER — TELEPHONE (OUTPATIENT)
Dept: DIABETES | Facility: CLINIC | Age: 42
End: 2017-09-18

## 2017-09-18 NOTE — TELEPHONE ENCOUNTER
----- Message from Jennifer Villar sent at 9/18/2017  1:37 PM CDT -----  Contact: Jaimee  tel:  [phone]  Pt.says she got the medication, Insulin.  Needs you to help her schedule the training session for the insulin.  Would like to come for the education on this week around 10am.      Pt.says she can come Thursday or Friday for the class.  SHe needs time to inform her boss.

## 2017-09-18 NOTE — TELEPHONE ENCOUNTER
Left message for pt to return call to schedule Diabetes education - insulin training.  Contact information provided.

## 2017-10-02 DIAGNOSIS — M54.50 LOW BACK PAIN WITHOUT SCIATICA: ICD-10-CM

## 2017-10-02 RX ORDER — ETODOLAC 400 MG/1
400 TABLET, EXTENDED RELEASE ORAL DAILY PRN
Qty: 30 TABLET | Refills: 0 | Status: SHIPPED | OUTPATIENT
Start: 2017-10-02 | End: 2018-12-05 | Stop reason: SDUPTHER

## 2017-10-03 ENCOUNTER — PATIENT MESSAGE (OUTPATIENT)
Dept: ADMINISTRATIVE | Facility: HOSPITAL | Age: 42
End: 2017-10-03

## 2017-10-23 RX ORDER — METFORMIN HYDROCHLORIDE 1000 MG/1
TABLET ORAL
Qty: 180 TABLET | Refills: 3 | Status: SHIPPED | OUTPATIENT
Start: 2017-10-23 | End: 2018-05-28 | Stop reason: SDUPTHER

## 2017-11-01 ENCOUNTER — HOSPITAL ENCOUNTER (OUTPATIENT)
Dept: ENDOCRINOLOGY | Facility: CLINIC | Age: 42
Discharge: HOME | End: 2017-11-01
Attending: INTERNAL MEDICINE
Payer: MEDICARE

## 2017-11-01 DIAGNOSIS — E04.2 MULTINODULAR GOITER (NONTOXIC): ICD-10-CM

## 2017-11-01 PROCEDURE — 10022 US FINE NEEDLE ASPIRATION WITH IMAGING: CPT | Mod: S$GLB,,, | Performed by: INTERNAL MEDICINE

## 2017-11-01 PROCEDURE — 76942 ECHO GUIDE FOR BIOPSY: CPT | Mod: S$GLB,,, | Performed by: INTERNAL MEDICINE

## 2017-11-01 PROCEDURE — 88173 CYTOPATH EVAL FNA REPORT: CPT | Performed by: PATHOLOGY

## 2017-11-01 PROCEDURE — 88173 CYTOPATH EVAL FNA REPORT: CPT | Mod: 26,,, | Performed by: PATHOLOGY

## 2017-11-03 DIAGNOSIS — E11.9 TYPE 2 DIABETES MELLITUS WITHOUT COMPLICATION, WITH LONG-TERM CURRENT USE OF INSULIN: ICD-10-CM

## 2017-11-03 DIAGNOSIS — Z79.4 TYPE 2 DIABETES MELLITUS WITHOUT COMPLICATION, WITH LONG-TERM CURRENT USE OF INSULIN: ICD-10-CM

## 2017-11-03 NOTE — TELEPHONE ENCOUNTER
I received a refill request for lantus 42 units BID and med list shows she is on 50 units BID; please clarify with pt. So I can refill.

## 2017-11-03 NOTE — TELEPHONE ENCOUNTER
Spoke with patient to clarify lantus dose, patient states she does not know she is currently at work, states she will call back when she gets home

## 2017-11-06 ENCOUNTER — PATIENT MESSAGE (OUTPATIENT)
Dept: ENDOCRINOLOGY | Facility: CLINIC | Age: 42
End: 2017-11-06

## 2017-11-06 DIAGNOSIS — E04.2 MULTINODULAR GOITER (NONTOXIC): Primary | ICD-10-CM

## 2017-11-06 NOTE — TELEPHONE ENCOUNTER
Called patient regarding thyroid FNA result with no answer. Left voicemail with call back number and will send [facility] message. FNA of the left lobe nodule is benign. She needs a repeat thyroid ultrasound and follow up with me in 1 year.

## 2017-11-07 ENCOUNTER — TELEPHONE (OUTPATIENT)
Dept: INTERNAL MEDICINE | Facility: CLINIC | Age: 42
End: 2017-11-07

## 2017-11-07 NOTE — TELEPHONE ENCOUNTER
Patient states she does not remember how many units of Lantus she takes a day, she thinks its 50 units twice a day but not sure. Patient requested to be seen soon for medication problem, patient booked appt tomorrow for 10 am. Informed patient to bring medications w/ her so it would be easy to clarify meds and dosage. Patient voices understanding.

## 2017-11-08 ENCOUNTER — OFFICE VISIT (OUTPATIENT)
Dept: INTERNAL MEDICINE | Facility: CLINIC | Age: 42
End: 2017-11-08
Payer: MEDICARE

## 2017-11-08 VITALS
BODY MASS INDEX: 42.24 KG/M2 | DIASTOLIC BLOOD PRESSURE: 100 MMHG | HEIGHT: 65 IN | SYSTOLIC BLOOD PRESSURE: 148 MMHG | WEIGHT: 253.5 LBS

## 2017-11-08 DIAGNOSIS — Z23 NEEDS FLU SHOT: ICD-10-CM

## 2017-11-08 DIAGNOSIS — E66.01 MORBID OBESITY WITH BMI OF 40.0-44.9, ADULT: ICD-10-CM

## 2017-11-08 DIAGNOSIS — E11.9 TYPE 2 DIABETES MELLITUS WITHOUT COMPLICATION, WITH LONG-TERM CURRENT USE OF INSULIN: Primary | ICD-10-CM

## 2017-11-08 DIAGNOSIS — E04.1 THYROID NODULE: ICD-10-CM

## 2017-11-08 DIAGNOSIS — Z79.4 TYPE 2 DIABETES MELLITUS WITHOUT COMPLICATION, WITH LONG-TERM CURRENT USE OF INSULIN: Primary | ICD-10-CM

## 2017-11-08 DIAGNOSIS — I15.2 HYPERTENSION ASSOCIATED WITH DIABETES: ICD-10-CM

## 2017-11-08 DIAGNOSIS — E11.59 HYPERTENSION ASSOCIATED WITH DIABETES: ICD-10-CM

## 2017-11-08 DIAGNOSIS — G47.33 OSA ON CPAP: ICD-10-CM

## 2017-11-08 PROCEDURE — 90688 IIV4 VACCINE SPLT 0.5 ML IM: CPT | Mod: S$GLB,,, | Performed by: INTERNAL MEDICINE

## 2017-11-08 PROCEDURE — 99999 PR PBB SHADOW E&M-EST. PATIENT-LVL IV: CPT | Mod: PBBFAC,,, | Performed by: INTERNAL MEDICINE

## 2017-11-08 PROCEDURE — G0008 ADMIN INFLUENZA VIRUS VAC: HCPCS | Mod: S$GLB,,, | Performed by: INTERNAL MEDICINE

## 2017-11-08 PROCEDURE — 99214 OFFICE O/P EST MOD 30 MIN: CPT | Mod: S$GLB,,, | Performed by: INTERNAL MEDICINE

## 2017-11-08 RX ORDER — METOPROLOL SUCCINATE 25 MG/1
25 TABLET, EXTENDED RELEASE ORAL 2 TIMES DAILY
Qty: 60 TABLET | Refills: 1 | Status: SHIPPED | OUTPATIENT
Start: 2017-11-08 | End: 2017-12-13 | Stop reason: SDUPTHER

## 2017-11-08 RX ORDER — INSULIN GLARGINE 100 [IU]/ML
50 INJECTION, SOLUTION SUBCUTANEOUS 2 TIMES DAILY
Qty: 45 ML | Refills: 1 | Status: SHIPPED | OUTPATIENT
Start: 2017-11-08 | End: 2017-12-15

## 2017-11-08 NOTE — PROGRESS NOTES
Pt. ID: Jaimee Quintana is a 42 y.o. female      Chief complaint:   Chief Complaint   Patient presents with    Medication Refill       HPI: Pt. Here for f/u for DM and HTN; of note pt. Had thyroid biopsy which was benign and endocrine will repeat in 1 year; pt. Will be started on VGO and is on lantus 50 units BID and novolin R 48 units TID AC; she states she is taking 45 units R TID  and lantus 45 units BID; HGBA1C dated 9/8/17 was 12; BS run 130-170 range; pt. Is on CPAP and is not always compliant with CPAP; I explained in detail risks of non-compliance; pt. Has a hx of poor comprehension of meds and compliance with meds. I reviewed meds with pt. And explained risks of non-compliance; BP is borderline elevated; pt. Has lost a few pounds; she would like a flu shot      Review of Systems   Constitutional: Negative for chills and fever.   Respiratory: Negative for cough and shortness of breath.    Cardiovascular: Negative for chest pain.   Gastrointestinal: Negative for abdominal pain, nausea and vomiting.   Genitourinary: Negative for dysuria.   Psychiatric/Behavioral: Negative for depression. The patient is not nervous/anxious.          Objective:    Physical Exam   Constitutional: She is oriented to person, place, and time.   Morbid obesity   Eyes: EOM are normal.   Neck: Normal range of motion.   Cardiovascular: Normal rate, regular rhythm and normal heart sounds.    Pulmonary/Chest: Effort normal and breath sounds normal.   Abdominal: Soft. There is no tenderness. There is no rebound and no guarding.   Musculoskeletal: Normal range of motion.   Neurological: She is alert and oriented to person, place, and time.   Skin: No rash noted.         Health Maintenance   Topic Date Due    Lipid Panel  07/14/2017    Hemoglobin A1c  03/08/2018    Foot Exam  03/23/2018    Eye Exam  08/22/2018    Mammogram  02/16/2019    Pap Smear with HPV Cotest  07/06/2020    TETANUS VACCINE  11/19/2024    Pneumococcal PPSV23  (Medium Risk) (2) 09/15/2041    Influenza Vaccine  Completed         Assessment:     1. Type 2 diabetes mellitus without complication, with long-term current use of insulin Sub-optimally controlled   2. Hypertension associated with diabetes Sub-optimally controlled   3. Thyroid nodule Stable   4. CESAR on CPAP Active   5. Morbid obesity with BMI of 40.0-44.9, adult Sub-optimally controlled   6. Needs flu shot Active         Plan: Type 2 diabetes mellitus without complication, with long-term current use of insulin  Comments:  scheduled to start VGo; continue insulin and reviewed accurate dosing with pt.; encouraged stricter ADA diet and f/u endocrine who is managing  Orders:  -     insulin regular (NOVOLIN R) 100 unit/mL Inj injection; Inject 48 Units into the skin 3 (three) times daily before meals.  Dispense: 30 mL; Refill: 1    Hypertension associated with diabetes  Comments:  continue current regimen and increase toprol XL to 25 mg BID; encouraged low Na diet and weight loss; repeat BP on f/u in 1 month   Orders:  -     metoprolol succinate (TOPROL-XL) 25 MG 24 hr tablet; Take 1 tablet (25 mg total) by mouth 2 (two) times daily.  Dispense: 60 tablet; Refill: 1    Thyroid nodule  Comments:  f/u endocrine who is managing     CESAR on CPAP  Comments:  continue CPAP and encouraged nightly use and explained risks of non-compliance    Morbid obesity with BMI of 40.0-44.9, adult  Comments:  encouraged diet and explained risks     Needs flu shot  -     Influenza - Quadrivalent (3 years & older)        Problem List Items Addressed This Visit        Cardiac/Vascular    Hypertension associated with diabetes    Relevant Medications    metoprolol succinate (TOPROL-XL) 25 MG 24 hr tablet       ID    Needs flu shot    Relevant Orders    Influenza - Quadrivalent (3 years & older)       Endocrine    Morbid obesity with BMI of 40.0-44.9, adult    Type 2 diabetes mellitus without complication, with long-term current use of insulin -  Primary    Relevant Medications    insulin regular (NOVOLIN R) 100 unit/mL Inj injection    Thyroid nodule       Other    CESAR on CPAP

## 2017-11-22 ENCOUNTER — TELEPHONE (OUTPATIENT)
Dept: ENDOCRINOLOGY | Facility: CLINIC | Age: 42
End: 2017-11-22

## 2017-11-22 NOTE — TELEPHONE ENCOUNTER
----- Message from Corry Chester sent at 11/22/2017 11:33 AM CST -----  Contact: Self [phone]   PT called for test results.

## 2017-11-22 NOTE — TELEPHONE ENCOUNTER
Left message for patient that GHAZAL Patel Sent you a [employer] message on 11/6 that wasn't read. GHAZAL Patel would like to let you know that the biopsy result of the thyroid was benign or non-cancerous. The plan is to repeat a thyroid ultrasound in 1 year and see her back in clinic after that

## 2017-11-22 NOTE — TELEPHONE ENCOUNTER
Sent patient a [employer] message on 11/6 that wasn't read. Please let patient know that the biopsy result of the thyroid was benign or non-cancerous. The plan is to repeat a thyroid ultrasound in 1 year and see her back in clinic after that.

## 2017-11-28 ENCOUNTER — CLINICAL SUPPORT (OUTPATIENT)
Dept: DIABETES | Facility: CLINIC | Age: 42
End: 2017-11-28
Payer: MEDICARE

## 2017-11-28 VITALS — BODY MASS INDEX: 42.37 KG/M2 | WEIGHT: 254.63 LBS

## 2017-11-28 DIAGNOSIS — Z79.4 TYPE 2 DIABETES MELLITUS WITHOUT COMPLICATION, WITH LONG-TERM CURRENT USE OF INSULIN: ICD-10-CM

## 2017-11-28 DIAGNOSIS — E11.9 TYPE 2 DIABETES MELLITUS WITHOUT COMPLICATION, WITH LONG-TERM CURRENT USE OF INSULIN: ICD-10-CM

## 2017-11-28 PROCEDURE — G0108 DIAB MANAGE TRN  PER INDIV: HCPCS | Mod: S$GLB,,, | Performed by: DIETITIAN, REGISTERED

## 2017-11-28 NOTE — PROGRESS NOTES
Diabetes Education  Author: Elisha Kendrick RD  Date: 11/28/2017    Diabetes Education Visit  Diabetes Education Record Assessment/Progress: Comprehensive/Group (Here for VGo start)    Diabetes Type  Diabetes Type : Type II         Nutrition  Meal Planning: diet drinks, water, 3 meals per day, snacks between meal    Monitoring   Self Monitoring : Reports SMBG 3 times daily - no log today  Blood Glucose Logs: No         Current Diabetes Treatment   Current Treatment: Insulin, Oral Medication (Metformin 1000mg BID. Currently on Novolin R 48 units before each meal and Lantus 50 units BID. Changing to VGo30 with Novolin R 5 clicks before each meal. )    Social History  Preferred Learning Method: Face to Face, Reading Materials  Primary Support: Self             Barriers to Change  Barriers to Change: None  Learning Challenges : Other (Possible health literacy deficit - in NP's last visit note, pt was not started on correction scale d/t pt had difficulty comprehending scale use)    Readiness to Learn   Readiness to Learn : Acceptance    Cultural Influences  Cultural Influences: No    Diabetes Education Assessment/Progress    Medications (states correct name, dose, onset, peak, duration, side effects & timing of meds): Discussion, Return Demonstration, Demonstration, Needs Instruction, Individual Session, Instructed, Written Materials Provided (Provided VGo training.    Patient is here for instructions on use of the V-Go 30 which will provide 30 units of basal insulin given over 24 hours (1.25 units/hr) and up to 36 units of on-demand bolus dosing in 2-Unit increments. Patient will be giving 10 units of Novolin R at each meal (5 clicks). Patient had been instructed to hold long-acting insulin this am. Patient also advised will discontinue taking Lantus pen and Novolin R with syringe and only use the Novolin R vial with the V-Go system. She verbally confirm holding Lantus this am. She brought both Novolin N and Novolin R  to the appt today. Explained that Novolin N is a very different insulin and do not use it with VGo; only use the Novolin R with VGo as prescribed.     Patient was instructed on the following:    Insert vial into EZ Fill and leave in place until vial is empty   Remove plug and insert V-Go    Draw insulin into EZ Fill and fill V-Go with insulin (check that V-Go is full of insulin)    Remove V-Go and clean and replace plug (advised to wipe the circular opening with an alcohol swab before replacing)    Select site for V-Go (site selection reviewed) and prepare infusion site with aseptic technique    Remove button cover and adhesive liner    Attach V-Go to site selected and insert needle by pushing needle button in to activate basal rate    Instructed patient on how to activate the bolus ready button and to push the bolus delivery button into the V-Go to deliver 2 units of insulin (1 click = 2 units). Patient advised that once all 36 units of the on-demand bolus have been given, the bolus buttons will lock, but the basal insulin will continue for the remainder of the 24 hours    To remove, release needle by sliding and pressing the needle release button    Remove the V-Go and discard (the V-Go is 100% disposable)   Patient instructed that the V-Go needs to be changed every 24 hours.    Patient was able to perform successful return demonstration of all.     General precautions reviewed with the patient:    V-Go needs to be removed before having an X-ray, MRI or CT scan (or any similar test or procedure). Replace with a new V-Go after the test or procedure is completed. Patient also advised to check with provider before any type of surgical procedure to see if the V-Go can be worn.    Patient advised to monitor BG 4 times a day (pre-meals and at HS)    Patient advised to keep back up insulin pens (non-) should a problem develop with the V-Go. Patient also advised should have directions from endocrine  "provider regarding insulin doses should need arise to switch back to insulin pens temporarily.    The V-Go should not be exposed to direct sunlight, hot tub, whirlpool or sauna use (replace with a new filled V-Go afterward)    Check that the V-Go is securely in place during and after periods of increased physical activity, exposure to water or gone under water to the depth of 3 feet, 3 inches (the V-Go can go under water and continue to work safely)    Reviewed s/s and tx of hypoglycemia    All of patient's questions and concerns were addressed. Patient instructed to keep a BG log (log sheets provided) and send BG readings to endocrine provider in 1 week for review. Phone number was provided and patient advised to call with any questions or concerns.  )    Monitoring (monitoring blood glucose/other parameters & using results): Discussion, Individual Session, Needs Review, Instructed, Written Materials Provided, Comprehends Key Points (Advised to SMBG 4 times daily AC/HS, keep BG log and send log to NP in 1 week for review and insulin titration if needed. Provided log and addressed envelops.)    Acute Complications (preventing, detecting, and treating acute complications): Discussion, Needs Review, Individual Session, Instructed, Written Materials Provided, Comprehends Key Points (Reviewed s/s and proper treatment of hypoglycemia with "rule of 15.")    Cognitive (knowledge of self-management skills, functional health literacy): Discussion, Individual Session    Psychosocial (emotional response to diabetes): Discussion, Individual Session    Diabetes Distress and Support Systems: Not Covered/Deferred     Behavioral (readiness for change, lifestyle practices, self-care behaviors): Discussion, Individual Session    Goals  Patient has selected/evaluated goals during today's session: Yes, evaluated  Healthy Eating: In Progress  Medications: Set (Previous goal no longer relevant with medication change. New goal: Pt to " take grey button clicks before each meal and change VGo device Q24 hours. )  Start Date: 11/28/17  Target Date: 02/28/18         Diabetes Care Plan/Intervention  Education Plan/Intervention: Individual Follow-Up DSMT (DE follow-up scheduled for ~6 weeks. Pt has endocrine NP follow-up scheduled in ~2 weeks.)         Education Units of Time   Time Spent: 75 min      Health Maintenance Topics with due status: Not Due       Topic Last Completion Date    TETANUS VACCINE 11/19/2014    Pneumococcal PPSV23 (Medium Risk) 11/19/2014    Mammogram 02/16/2017    Foot Exam 03/23/2017    Pap Smear with HPV Cotest 07/06/2017    Eye Exam 08/22/2017    Hemoglobin A1c 09/08/2017     Health Maintenance Due   Topic Date Due    Lipid Panel  07/14/2017

## 2017-11-28 NOTE — Clinical Note
Weston Jones Ms. Mariano successfully started VGo with Novolin R today. I gave her log sheets and envelops to mail in logs. She also has an appt with you on 12/15.   Thank you! Elisha

## 2017-12-08 ENCOUNTER — LAB VISIT (OUTPATIENT)
Dept: LAB | Facility: HOSPITAL | Age: 42
End: 2017-12-08
Attending: NURSE PRACTITIONER
Payer: MEDICARE

## 2017-12-08 DIAGNOSIS — E11.42 DIABETIC PERIPHERAL NEUROPATHY ASSOCIATED WITH TYPE 2 DIABETES MELLITUS: ICD-10-CM

## 2017-12-08 LAB
CHOLEST SERPL-MCNC: 192 MG/DL
CHOLEST/HDLC SERPL: 3.6 {RATIO}
ESTIMATED AVG GLUCOSE: 298 MG/DL
HBA1C MFR BLD HPLC: 12 %
HDLC SERPL-MCNC: 53 MG/DL
HDLC SERPL: 27.6 %
LDLC SERPL CALC-MCNC: 113.2 MG/DL
NONHDLC SERPL-MCNC: 139 MG/DL
TRIGL SERPL-MCNC: 129 MG/DL

## 2017-12-08 PROCEDURE — 36415 COLL VENOUS BLD VENIPUNCTURE: CPT | Mod: PO

## 2017-12-08 PROCEDURE — 80061 LIPID PANEL: CPT

## 2017-12-08 PROCEDURE — 83036 HEMOGLOBIN GLYCOSYLATED A1C: CPT

## 2017-12-13 ENCOUNTER — OFFICE VISIT (OUTPATIENT)
Dept: INTERNAL MEDICINE | Facility: CLINIC | Age: 42
End: 2017-12-13
Payer: MEDICARE

## 2017-12-13 VITALS
SYSTOLIC BLOOD PRESSURE: 141 MMHG | BODY MASS INDEX: 42.42 KG/M2 | HEART RATE: 68 BPM | HEIGHT: 65 IN | WEIGHT: 254.63 LBS | DIASTOLIC BLOOD PRESSURE: 102 MMHG

## 2017-12-13 DIAGNOSIS — I15.2 HYPERTENSION ASSOCIATED WITH DIABETES: ICD-10-CM

## 2017-12-13 DIAGNOSIS — E11.9 TYPE 2 DIABETES MELLITUS WITHOUT COMPLICATION, WITH LONG-TERM CURRENT USE OF INSULIN: Primary | ICD-10-CM

## 2017-12-13 DIAGNOSIS — E11.59 HYPERTENSION ASSOCIATED WITH DIABETES: ICD-10-CM

## 2017-12-13 DIAGNOSIS — Z91.199 POOR COMPLIANCE: ICD-10-CM

## 2017-12-13 DIAGNOSIS — E78.5 HYPERLIPIDEMIA, UNSPECIFIED HYPERLIPIDEMIA TYPE: ICD-10-CM

## 2017-12-13 DIAGNOSIS — Z79.4 TYPE 2 DIABETES MELLITUS WITHOUT COMPLICATION, WITH LONG-TERM CURRENT USE OF INSULIN: Primary | ICD-10-CM

## 2017-12-13 DIAGNOSIS — E66.01 MORBID OBESITY WITH BMI OF 40.0-44.9, ADULT: ICD-10-CM

## 2017-12-13 PROCEDURE — 99999 PR PBB SHADOW E&M-EST. PATIENT-LVL IV: CPT | Mod: PBBFAC,,, | Performed by: INTERNAL MEDICINE

## 2017-12-13 PROCEDURE — 99214 OFFICE O/P EST MOD 30 MIN: CPT | Mod: S$GLB,,, | Performed by: INTERNAL MEDICINE

## 2017-12-13 RX ORDER — ATORVASTATIN CALCIUM 80 MG/1
80 TABLET, FILM COATED ORAL NIGHTLY
Qty: 90 TABLET | Refills: 1 | Status: SHIPPED | OUTPATIENT
Start: 2017-12-13 | End: 2018-05-28 | Stop reason: SDUPTHER

## 2017-12-13 RX ORDER — METOPROLOL SUCCINATE 25 MG/1
25 TABLET, EXTENDED RELEASE ORAL 2 TIMES DAILY
Qty: 180 TABLET | Refills: 1 | Status: SHIPPED | OUTPATIENT
Start: 2017-12-13 | End: 2018-05-28 | Stop reason: SDUPTHER

## 2017-12-13 NOTE — PROGRESS NOTES
Pt. ID: Jaimee Quintana is a 42 y.o. female      Chief complaint:   Chief Complaint   Patient presents with    Diabetes     follow up       HPI: Pt. Here for f/u for DM and HTN; I reviewed labs dated 12/8/17; HGBA1C was 12; she states she is not always compliant with VGO; she currently does not have VGO on; I explained risks of non-compliance; she is scheduled to see endocrine on Friday; cholesterol was slightly above goal and she is on lipitor 80 mg QPM but states she has been missing doses; she states she has increased toprol XL to 25 mg BID but on questioning, pt. Is unfamiliar with meds; BP is elevated; weight is elevated but stable; I hand a lengthy discussion with pt. Regarding poor compliance with meds and risks related to non-compliance discussed      Review of Systems   Constitutional: Negative for chills and fever.   Respiratory: Negative for cough and shortness of breath.    Cardiovascular: Negative for chest pain.   Gastrointestinal: Negative for abdominal pain, nausea and vomiting.   Genitourinary: Negative for dysuria.         Objective:    Physical Exam   Constitutional: She is oriented to person, place, and time.   Morbid obesity   Eyes: EOM are normal.   Neck: Normal range of motion.   Cardiovascular: Normal rate, regular rhythm and normal heart sounds.    Pulmonary/Chest: Effort normal and breath sounds normal.   Abdominal: Soft. There is no tenderness. There is no rebound and no guarding.   Musculoskeletal: Normal range of motion.   Neurological: She is alert and oriented to person, place, and time.   Skin: No rash noted.         Health Maintenance   Topic Date Due    Foot Exam  03/23/2018    Hemoglobin A1c  06/08/2018    Eye Exam  08/22/2018    Lipid Panel  12/08/2018    Mammogram  02/16/2019    Pap Smear with HPV Cotest  07/06/2020    TETANUS VACCINE  11/19/2024    Pneumococcal PPSV23 (Medium Risk) (2) 09/15/2041    Influenza Vaccine  Completed         Assessment:     1. Type 2  diabetes mellitus without complication, with long-term current use of insulin Sub-optimally controlled   2. Hypertension associated with diabetes Sub-optimally controlled   3. Hyperlipidemia, unspecified hyperlipidemia type Sub-optimally controlled   4. Poor compliance Active   5. Morbid obesity with BMI of 40.0-44.9, adult Sub-optimally controlled         Plan: Type 2 diabetes mellitus without complication, with long-term current use of insulin  Comments:  pt. is not taking insulin via VGO as prescribed and I advised her to comply; encouraged stricter ADA diet; f/u endocrine who is managing     Hypertension associated with diabetes  Comments:  elevation may be related to compliance issues; asked pt. to take meds as prescribed and encouraged low Na diet and weight loss; repeat BP on f/u in 1 month   Orders:  -     metoprolol succinate (TOPROL-XL) 25 MG 24 hr tablet; Take 1 tablet (25 mg total) by mouth 2 (two) times daily.  Dispense: 180 tablet; Refill: 1    Hyperlipidemia, unspecified hyperlipidemia type  Comments:  asked pt. to take statin nightly and explained risks of non-compliance; encouraged strict diet modification   Orders:  -     atorvastatin (LIPITOR) 80 MG tablet; Take 1 tablet (80 mg total) by mouth every evening.  Dispense: 90 tablet; Refill: 1    Poor compliance  Comments:  I asked pt. to bring all her meds on 1 month f/u to review together    Morbid obesity with BMI of 40.0-44.9, adult  Comments:  encouraged diet and explained risks         Problem List Items Addressed This Visit        Psychiatric    Poor compliance    Overview     I asked pt. to bring all her meds on 1 month f/u to review together            Cardiac/Vascular    Hyperlipidemia    Relevant Medications    atorvastatin (LIPITOR) 80 MG tablet    Hypertension associated with diabetes    Relevant Medications    metoprolol succinate (TOPROL-XL) 25 MG 24 hr tablet       Endocrine    Morbid obesity with BMI of 40.0-44.9, adult    Type 2  diabetes mellitus without complication, with long-term current use of insulin - Primary

## 2017-12-15 ENCOUNTER — OFFICE VISIT (OUTPATIENT)
Dept: ENDOCRINOLOGY | Facility: CLINIC | Age: 42
End: 2017-12-15
Payer: MEDICARE

## 2017-12-15 VITALS
SYSTOLIC BLOOD PRESSURE: 150 MMHG | DIASTOLIC BLOOD PRESSURE: 98 MMHG | HEIGHT: 65 IN | HEART RATE: 100 BPM | BODY MASS INDEX: 42.11 KG/M2 | WEIGHT: 252.75 LBS

## 2017-12-15 DIAGNOSIS — G47.33 OSA ON CPAP: ICD-10-CM

## 2017-12-15 DIAGNOSIS — K76.0 FATTY LIVER: ICD-10-CM

## 2017-12-15 DIAGNOSIS — E66.01 MORBID OBESITY WITH BMI OF 40.0-44.9, ADULT: ICD-10-CM

## 2017-12-15 DIAGNOSIS — E11.59 HYPERTENSION ASSOCIATED WITH DIABETES: ICD-10-CM

## 2017-12-15 DIAGNOSIS — I15.2 HYPERTENSION ASSOCIATED WITH DIABETES: ICD-10-CM

## 2017-12-15 DIAGNOSIS — E04.2 MULTINODULAR GOITER (NONTOXIC): ICD-10-CM

## 2017-12-15 DIAGNOSIS — E11.42 DIABETIC PERIPHERAL NEUROPATHY ASSOCIATED WITH TYPE 2 DIABETES MELLITUS: Primary | ICD-10-CM

## 2017-12-15 DIAGNOSIS — E78.5 HYPERLIPIDEMIA, UNSPECIFIED HYPERLIPIDEMIA TYPE: ICD-10-CM

## 2017-12-15 PROCEDURE — 99999 PR PBB SHADOW E&M-EST. PATIENT-LVL IV: CPT | Mod: PBBFAC,,, | Performed by: NURSE PRACTITIONER

## 2017-12-15 PROCEDURE — 99214 OFFICE O/P EST MOD 30 MIN: CPT | Mod: S$GLB,,, | Performed by: NURSE PRACTITIONER

## 2017-12-15 RX ORDER — LISINOPRIL AND HYDROCHLOROTHIAZIDE 20; 25 MG/1; MG/1
1 TABLET ORAL 2 TIMES DAILY
Qty: 180 TABLET | Refills: 3 | Status: SHIPPED | OUTPATIENT
Start: 2017-12-15 | End: 2018-03-12 | Stop reason: SDUPTHER

## 2017-12-15 NOTE — PATIENT INSTRUCTIONS
VGo 40, 6 clicks each meal  Lisinopril-HCTZ 20/25 mg twice daily  Follow up with Primary care for blood pressure  Check blood pressure daily at home

## 2017-12-15 NOTE — PROGRESS NOTES
"CC: Patient is here for management of Type 2 diabetes and review of medical conditions as listed in visit diagnosis.      HPI: Ms. Jaimee Quintana is a 42 y.o. Black or  female who was diagnosed with Type 2 DM at age 23. She has never been hospitalized for DM. (+) FH of DM in father. No personal or family history of pancreatitis or thyroid cancer.     Last seen with Dr. Patel for management of thyroid nodule. Had FNA November 2017, which was benign. Will have repeat thyroid US in 1 year.     A1C unchanged, uncontrolled.     No logs to review. States BG improving to 200s-300s when previous numbers were up to 500s.     Denies hypoglycemia.     Changes VGo daily and takes 5 clicks in relationship to meals.     Continues to exercise via walking at times.     24 hour diet recall  Breakfast=Grits, sausage, eggs  Lunch=Slice of pizza and tangerine; 1 cup of water  Dinner=[employer] hamburger and fries; lemonade slush    PREVIOUS DIABETES MEDICATIONS  Actos    CURRENT DIABETIC MEDS: VGo 30 units daily, 5 clicks each meal  DM Education: 8/22/17    Last Podiatry Exam: No    REVIEW OF SYSTEMS  General: no weakness, fatigue, or weight changes.   Eyes: no double or blurred vision, eye pain, or redness; last eye exam=8/22/17   Cardiovascular: no chest pain, palpitations, edema, or murmurs.   Respiratory: no cough or dyspnea.   GI: no heartburn, nausea, or changes in bowel patterns; good appetite.   Skin: no rashes, dryness, itching, or reactions at insulin injection sites; (+) rotation of insulin injection sites.    Neuro: no numbness, tingling, tremors, or vertigo.   Endocrine: no polyuria, polydipsia, polyphagia, heat or cold intolerance.     Vital Signs  BP (!) 150/98 (BP Location: Left arm, Patient Position: Sitting, BP Method: Medium (Manual))   Pulse 100   Ht 5' 5" (1.651 m)   Wt 114.7 kg (252 lb 12.1 oz)   LMP 12/04/2017   BMI 42.06 kg/m²     Hemoglobin A1C   Date Value Ref Range Status   12/08/2017 12.0 " (H) 4.0 - 5.6 % Final     Comment:     According to ADA guidelines, hemoglobin A1c <7.0% represents  optimal control in non-pregnant diabetic patients. Different  metrics may apply to specific patient populations.   Standards of Medical Care in Diabetes-2016.  For the purpose of screening for the presence of diabetes:  <5.7%     Consistent with the absence of diabetes  5.7-6.4%  Consistent with increasing risk for diabetes   (prediabetes)  >or=6.5%  Consistent with diabetes  Currently, no consensus exists for use of hemoglobin A1c  for diagnosis of diabetes for children.  This Hemoglobin A1c assay has significant interference with fetal   hemoglobin   (HbF). The results are invalid for patients with abnormal amounts of   HbF,   including those with known Hereditary Persistence   of Fetal Hemoglobin. Heterozygous hemoglobin variants (HbAS, HbAC,   HbAD, HbAE, HbA2) do not significantly interfere with this assay;   however, presence of multiple variants in a sample may impact the %   interference.     09/08/2017 12.0 (H) 4.0 - 5.6 % Final     Comment:     According to ADA guidelines, hemoglobin A1c <7.0% represents  optimal control in non-pregnant diabetic patients. Different  metrics may apply to specific patient populations.   Standards of Medical Care in Diabetes-2016.  For the purpose of screening for the presence of diabetes:  <5.7%     Consistent with the absence of diabetes  5.7-6.4%  Consistent with increasing risk for diabetes   (prediabetes)  >or=6.5%  Consistent with diabetes  Currently, no consensus exists for use of hemoglobin A1c  for diagnosis of diabetes for children.  This Hemoglobin A1c assay has significant interference with fetal   hemoglobin   (HbF). The results are invalid for patients with abnormal amounts of   HbF,   including those with known Hereditary Persistence   of Fetal Hemoglobin. Heterozygous hemoglobin variants (HbAS, HbAC,   HbAD, HbAE, HbA2) do not significantly interfere with this  assay;   however, presence of multiple variants in a sample may impact the %   interference.     06/16/2017 11.4 (H) 4.0 - 5.6 % Final     Comment:     According to ADA guidelines, hemoglobin A1c <7.0% represents  optimal control in non-pregnant diabetic patients. Different  metrics may apply to specific patient populations.   Standards of Medical Care in Diabetes-2016.  For the purpose of screening for the presence of diabetes:  <5.7%     Consistent with the absence of diabetes  5.7-6.4%  Consistent with increasing risk for diabetes   (prediabetes)  >or=6.5%  Consistent with diabetes  Currently, no consensus exists for use of hemoglobin A1c  for diagnosis of diabetes for children.  This Hemoglobin A1c assay has significant interference with fetal   hemoglobin   (HbF). The results are invalid for patients with abnormal amounts of   HbF,   including those with known Hereditary Persistence   of Fetal Hemoglobin. Heterozygous hemoglobin variants (HbAS, HbAC,   HbAD, HbAE, HbA2) do not significantly interfere with this assay;   however, presence of multiple variants in a sample may impact the %   interference.        Lab Results   Component Value Date    CREATININE 0.8 06/16/2017      Lab Results   Component Value Date    TSH 0.755 12/05/2016      Lab Results   Component Value Date    LDLCALC 113.2 12/08/2017      PHYSICAL EXAMINATION  Constitutional: Appears well, no distress  Neck: Supple, trachea midline.   Respiratory: CTA without wheezes, even and unlabored.  Cardiovascular: RRR; no carotid bruits or murmurs; (+) DP pulses; no edema.   Lymph: no lymphadenopathy palpated  Skin: warm and dry; no injection site reactions, no acanthosis nigracans observed.  Neuro:patient alert and cooperative, normal affect; steady gait.    Diabetes Foot Exam:   Protective Sensation (w/ 10 gram monofilament):  Right: Decreased  Left: Decreased    Visual Inspection:  Normal -  Bilateral    Pedal Pulses:   Right: Present  Left:  Present    Posterior tibialis:   Right:Present  Left: Present       Assessment/Plan  Diabetic peripheral neuropathy associated with type 2 diabetes mellitus   DM uncontrolled  Change to  VGo 40, 6 clicks each meal  Work on reducing intake of high carb foods  Continue Metformin  Consider SGLT2 inhibitor, GLP 1 agonist, or DPP-IV inhibitor  Monitor readings 4x/day--bring readings to review with next visit    Fatty liver  Lipid control, weight loss, BG control    Hyperlipidemia, unspecified hyperlipidemia type  Continue Atorvastatin 80 mg daily    Hypertension associated with diabetes  BP not at goal  Recently started Metoprolol  Managed by PCP  Currently on Metoprolol, Nifedipine, Lisinopril-HCTZ  Continue current meds     Morbid obesity with BMI of 40.0-44.9, adult  Weight loss, physical activity advised  Body mass index is 42.06 kg/m².     CESAR (obstructive sleep apnea)  Follow with PCP for management  Continue c-pap adherence    Goiter  Follows with Dr. Patel for management  See above    FOLLOW UP  Return in about 3 months (around 3/15/2018).

## 2018-01-19 DIAGNOSIS — E11.59 HYPERTENSION ASSOCIATED WITH DIABETES: ICD-10-CM

## 2018-01-19 DIAGNOSIS — I15.2 HYPERTENSION ASSOCIATED WITH DIABETES: ICD-10-CM

## 2018-01-22 ENCOUNTER — TELEPHONE (OUTPATIENT)
Dept: INTERNAL MEDICINE | Facility: CLINIC | Age: 43
End: 2018-01-22

## 2018-01-22 NOTE — TELEPHONE ENCOUNTER
Notify pt. I received a refill request for procardia and med list shows she is on nifedipine; please clarify what pt. Is taking and dosing so I can refill

## 2018-01-26 ENCOUNTER — TELEPHONE (OUTPATIENT)
Dept: FAMILY MEDICINE | Facility: CLINIC | Age: 43
End: 2018-01-26

## 2018-02-02 DIAGNOSIS — E11.9 TYPE 2 DIABETES MELLITUS WITHOUT COMPLICATION: ICD-10-CM

## 2018-02-15 RX ORDER — NIFEDIPINE 60 MG/1
TABLET, EXTENDED RELEASE ORAL
Qty: 30 TABLET | Refills: 0 | Status: SHIPPED | OUTPATIENT
Start: 2018-02-15 | End: 2018-03-29

## 2018-03-12 DIAGNOSIS — E11.59 HYPERTENSION ASSOCIATED WITH DIABETES: ICD-10-CM

## 2018-03-12 DIAGNOSIS — I15.2 HYPERTENSION ASSOCIATED WITH DIABETES: ICD-10-CM

## 2018-03-12 RX ORDER — LISINOPRIL AND HYDROCHLOROTHIAZIDE 12.5; 2 MG/1; MG/1
TABLET ORAL
Qty: 180 TABLET | Refills: 0 | Status: SHIPPED | OUTPATIENT
Start: 2018-03-12 | End: 2018-05-28 | Stop reason: SDUPTHER

## 2018-03-16 DIAGNOSIS — I15.2 HYPERTENSION ASSOCIATED WITH DIABETES: ICD-10-CM

## 2018-03-16 DIAGNOSIS — E11.59 HYPERTENSION ASSOCIATED WITH DIABETES: ICD-10-CM

## 2018-03-19 ENCOUNTER — TELEPHONE (OUTPATIENT)
Dept: INTERNAL MEDICINE | Facility: CLINIC | Age: 43
End: 2018-03-19

## 2018-03-19 NOTE — TELEPHONE ENCOUNTER
Notify pt. She has adalat and procardia in her med list; please clarify what she is on and dose, so I can refill

## 2018-03-20 RX ORDER — TRIAMCINOLONE ACETONIDE 1 MG/G
CREAM TOPICAL
Qty: 45 G | Refills: 0 | Status: SHIPPED | OUTPATIENT
Start: 2018-03-20 | End: 2018-08-03

## 2018-03-20 NOTE — TELEPHONE ENCOUNTER
Attempted to call pt, daughter answered the phone states mother is not home but she will tell her mom to call the office.

## 2018-03-29 RX ORDER — NIFEDIPINE 60 MG/1
TABLET, EXTENDED RELEASE ORAL
Qty: 30 TABLET | Refills: 0 | Status: SHIPPED | OUTPATIENT
Start: 2018-03-29 | End: 2018-05-01 | Stop reason: SDUPTHER

## 2018-04-12 ENCOUNTER — TELEPHONE (OUTPATIENT)
Dept: SURGERY | Facility: CLINIC | Age: 43
End: 2018-04-12

## 2018-04-12 NOTE — TELEPHONE ENCOUNTER
----- Message from Candi Rubio sent at 4/12/2018 11:57 AM CDT -----  Contact: [phone]/self  Refill request for pantoprazole (PROTONIX) 40 MG tablet  Please call and advise  4-12-18 [phone]  Returned call, no answer, left message for patient to call PCP for refill on above medication.

## 2018-05-01 DIAGNOSIS — E11.59 HYPERTENSION ASSOCIATED WITH DIABETES: ICD-10-CM

## 2018-05-01 DIAGNOSIS — I15.2 HYPERTENSION ASSOCIATED WITH DIABETES: ICD-10-CM

## 2018-05-01 RX ORDER — NIFEDIPINE 60 MG/1
TABLET, EXTENDED RELEASE ORAL
Qty: 30 TABLET | Refills: 3 | Status: SHIPPED | OUTPATIENT
Start: 2018-05-01 | End: 2018-05-28 | Stop reason: SDUPTHER

## 2018-05-28 ENCOUNTER — OFFICE VISIT (OUTPATIENT)
Dept: INTERNAL MEDICINE | Facility: CLINIC | Age: 43
End: 2018-05-28
Payer: MEDICARE

## 2018-05-28 VITALS
DIASTOLIC BLOOD PRESSURE: 82 MMHG | BODY MASS INDEX: 41.29 KG/M2 | HEIGHT: 65 IN | HEART RATE: 64 BPM | SYSTOLIC BLOOD PRESSURE: 136 MMHG | WEIGHT: 247.81 LBS

## 2018-05-28 DIAGNOSIS — Z00.00 PREVENTATIVE HEALTH CARE: ICD-10-CM

## 2018-05-28 DIAGNOSIS — R79.9 ABNORMAL FINDING OF BLOOD CHEMISTRY: ICD-10-CM

## 2018-05-28 DIAGNOSIS — E78.5 HYPERLIPIDEMIA, UNSPECIFIED HYPERLIPIDEMIA TYPE: ICD-10-CM

## 2018-05-28 DIAGNOSIS — Z79.4 TYPE 2 DIABETES MELLITUS WITHOUT COMPLICATION, WITH LONG-TERM CURRENT USE OF INSULIN: Primary | ICD-10-CM

## 2018-05-28 DIAGNOSIS — I15.2 HYPERTENSION ASSOCIATED WITH DIABETES: ICD-10-CM

## 2018-05-28 DIAGNOSIS — E11.9 TYPE 2 DIABETES MELLITUS WITHOUT COMPLICATION, WITH LONG-TERM CURRENT USE OF INSULIN: Primary | ICD-10-CM

## 2018-05-28 DIAGNOSIS — E66.01 MORBID OBESITY WITH BMI OF 40.0-44.9, ADULT: ICD-10-CM

## 2018-05-28 DIAGNOSIS — E11.59 HYPERTENSION ASSOCIATED WITH DIABETES: ICD-10-CM

## 2018-05-28 PROCEDURE — 99999 PR PBB SHADOW E&M-EST. PATIENT-LVL IV: CPT | Mod: PBBFAC,,, | Performed by: INTERNAL MEDICINE

## 2018-05-28 PROCEDURE — 99214 OFFICE O/P EST MOD 30 MIN: CPT | Mod: S$GLB,,, | Performed by: INTERNAL MEDICINE

## 2018-05-28 PROCEDURE — 3046F HEMOGLOBIN A1C LEVEL >9.0%: CPT | Mod: CPTII,S$GLB,, | Performed by: INTERNAL MEDICINE

## 2018-05-28 PROCEDURE — 3008F BODY MASS INDEX DOCD: CPT | Mod: CPTII,S$GLB,, | Performed by: INTERNAL MEDICINE

## 2018-05-28 RX ORDER — METFORMIN HYDROCHLORIDE 1000 MG/1
1000 TABLET ORAL 2 TIMES DAILY WITH MEALS
Qty: 180 TABLET | Refills: 1 | Status: SHIPPED | OUTPATIENT
Start: 2018-05-28 | End: 2018-08-30 | Stop reason: SDUPTHER

## 2018-05-28 RX ORDER — METOPROLOL SUCCINATE 25 MG/1
25 TABLET, EXTENDED RELEASE ORAL 2 TIMES DAILY
Qty: 180 TABLET | Refills: 1 | Status: SHIPPED | OUTPATIENT
Start: 2018-05-28 | End: 2018-06-28 | Stop reason: SDUPTHER

## 2018-05-28 RX ORDER — NIFEDIPINE 60 MG/1
TABLET, EXTENDED RELEASE ORAL
Qty: 90 TABLET | Refills: 1 | Status: SHIPPED | OUTPATIENT
Start: 2018-05-28 | End: 2018-08-30 | Stop reason: SDUPTHER

## 2018-05-28 RX ORDER — ATORVASTATIN CALCIUM 80 MG/1
80 TABLET, FILM COATED ORAL NIGHTLY
Qty: 90 TABLET | Refills: 1 | Status: SHIPPED | OUTPATIENT
Start: 2018-05-28 | End: 2018-08-30 | Stop reason: SDUPTHER

## 2018-05-28 RX ORDER — LISINOPRIL AND HYDROCHLOROTHIAZIDE 12.5; 2 MG/1; MG/1
1 TABLET ORAL 2 TIMES DAILY
Qty: 180 TABLET | Refills: 1 | Status: SHIPPED | OUTPATIENT
Start: 2018-05-28 | End: 2018-08-30 | Stop reason: ALTCHOICE

## 2018-05-28 NOTE — PROGRESS NOTES
Pt. ID: Jaimee Quintana is a 42 y.o. female      Chief complaint:   Chief Complaint   Patient presents with    Diabetes     follow up       HPI: Pt. Here for f/u for DM and HTN; HGBA1C dated 12/8/17 was 12; pt. Is seeing endocrine and is now compliant with V-go; BP is borderline elevated; pt. Has lost weight; she needs refill on meds;  She would like mammo every other year; pt. Has been non-compliant with ASA 81 mg QD and I advised her to restart and explained risks of non-compliance      Review of Systems   Constitutional: Negative for chills and fever.   Respiratory: Negative for cough and shortness of breath.    Cardiovascular: Negative for chest pain.   Gastrointestinal: Negative for abdominal pain, nausea and vomiting.   Genitourinary: Negative for dysuria.         Objective:    Physical Exam   Constitutional: She is oriented to person, place, and time.   Morbid obesity   Eyes: EOM are normal.   Neck: Normal range of motion.   Cardiovascular: Normal rate, regular rhythm and normal heart sounds.    Pulmonary/Chest: Effort normal and breath sounds normal.   Abdominal: Soft. There is no tenderness. There is no rebound and no guarding.   Musculoskeletal: Normal range of motion.   Neurological: She is alert and oriented to person, place, and time.   Skin: No rash noted.   Vitals reviewed.        Health Maintenance   Topic Date Due    Hemoglobin A1c  06/08/2018    Influenza Vaccine  08/01/2018    Eye Exam  08/22/2018    Lipid Panel  12/08/2018    Foot Exam  12/15/2018    Mammogram  02/16/2019    Pap Smear with HPV Cotest  07/06/2020    TETANUS VACCINE  11/19/2024    Pneumococcal PPSV23 (Medium Risk) (2) 09/15/2041         Assessment:     1. Type 2 diabetes mellitus without complication, with long-term current use of insulin Poorly controlled   2. Hypertension associated with diabetes Well controlled   3. Hyperlipidemia, unspecified hyperlipidemia type Sub-optimally controlled   4. Morbid obesity with BMI  of 40.0-44.9, adult Sub-optimally controlled   5. Preventative health care Active   6. Abnormal finding of blood chemistry  Active         Plan: Type 2 diabetes mellitus without complication, with long-term current use of insulin  Comments:  continue current regimen and f/u endocrine who is managing; restart asa 81 mg QD; repeat HGBA1C on f/u in 1 month   Orders:  -     CBC auto differential; Future; Expected date: 06/18/2018  -     Comprehensive metabolic panel; Future; Expected date: 06/18/2018  -     Hemoglobin A1c; Future; Expected date: 06/18/2018  -     Microalbumin/creatinine urine ratio; Future; Expected date: 06/18/2018  -     metFORMIN (GLUCOPHAGE) 1000 MG tablet; Take 1 tablet (1,000 mg total) by mouth 2 (two) times daily with meals.  Dispense: 180 tablet; Refill: 1    Hypertension associated with diabetes  Comments:  continue current regimen and encouraged low Na diet and weight loss  Orders:  -     CBC auto differential; Future; Expected date: 06/18/2018  -     Comprehensive metabolic panel; Future; Expected date: 06/18/2018  -     Urinalysis; Future; Expected date: 06/18/2018  -     lisinopril-hydrochlorothiazide (PRINZIDE,ZESTORETIC) 20-12.5 mg per tablet; Take 1 tablet by mouth 2 (two) times daily.  Dispense: 180 tablet; Refill: 1  -     metoprolol succinate (TOPROL-XL) 25 MG 24 hr tablet; Take 1 tablet (25 mg total) by mouth 2 (two) times daily.  Dispense: 180 tablet; Refill: 1  -     NIFEdipine (PROCARDIA-XL) 60 MG (OSM) 24 hr tablet; TAKE 1 TABLET (60 MG TOTAL) BY MOUTH ONCE DAILY.  Dispense: 90 tablet; Refill: 1    Hyperlipidemia, unspecified hyperlipidemia type  Comments:  asked pt. to take statin nightly and explained risks of non-compliance; encouraged strict diet modification   Orders:  -     atorvastatin (LIPITOR) 80 MG tablet; Take 1 tablet (80 mg total) by mouth every evening.  Dispense: 90 tablet; Refill: 1    Morbid obesity with BMI of 40.0-44.9, adult  Comments:  encouraged diet and  explained risks     Preventative health care  -     CBC auto differential; Future; Expected date: 06/18/2018  -     Comprehensive metabolic panel; Future; Expected date: 06/18/2018  -     Lipid panel; Future; Expected date: 06/18/2018  -     Urinalysis; Future; Expected date: 06/18/2018    Abnormal finding of blood chemistry   -     Lipid panel; Future; Expected date: 06/18/2018        Problem List Items Addressed This Visit        Cardiac/Vascular    Hyperlipidemia    Relevant Medications    atorvastatin (LIPITOR) 80 MG tablet    Hypertension associated with diabetes    Relevant Medications    lisinopril-hydrochlorothiazide (PRINZIDE,ZESTORETIC) 20-12.5 mg per tablet    metoprolol succinate (TOPROL-XL) 25 MG 24 hr tablet    NIFEdipine (PROCARDIA-XL) 60 MG (OSM) 24 hr tablet    Other Relevant Orders    CBC auto differential    Comprehensive metabolic panel    Urinalysis       Endocrine    Morbid obesity with BMI of 40.0-44.9, adult    Type 2 diabetes mellitus with diabetic polyneuropathy, with long-term current use of insulin - Primary    Relevant Medications    metFORMIN (GLUCOPHAGE) 1000 MG tablet       Other    Preventative health care    Relevant Orders    CBC auto differential    Comprehensive metabolic panel    Lipid panel    Urinalysis    Abnormal finding of blood chemistry     Relevant Orders    Lipid panel

## 2018-06-19 ENCOUNTER — LAB VISIT (OUTPATIENT)
Dept: LAB | Facility: HOSPITAL | Age: 43
End: 2018-06-19
Attending: INTERNAL MEDICINE
Payer: MEDICARE

## 2018-06-19 DIAGNOSIS — E11.9 TYPE 2 DIABETES MELLITUS WITHOUT COMPLICATION, WITH LONG-TERM CURRENT USE OF INSULIN: ICD-10-CM

## 2018-06-19 DIAGNOSIS — I15.2 HYPERTENSION ASSOCIATED WITH DIABETES: ICD-10-CM

## 2018-06-19 DIAGNOSIS — Z79.4 TYPE 2 DIABETES MELLITUS WITHOUT COMPLICATION, WITH LONG-TERM CURRENT USE OF INSULIN: ICD-10-CM

## 2018-06-19 DIAGNOSIS — R79.9 ABNORMAL FINDING OF BLOOD CHEMISTRY: ICD-10-CM

## 2018-06-19 DIAGNOSIS — E11.59 HYPERTENSION ASSOCIATED WITH DIABETES: ICD-10-CM

## 2018-06-19 DIAGNOSIS — Z00.00 PREVENTATIVE HEALTH CARE: ICD-10-CM

## 2018-06-19 LAB
ALBUMIN SERPL BCP-MCNC: 3.5 G/DL
ALP SERPL-CCNC: 91 U/L
ALT SERPL W/O P-5'-P-CCNC: 37 U/L
ANION GAP SERPL CALC-SCNC: 9 MMOL/L
AST SERPL-CCNC: 23 U/L
BASOPHILS # BLD AUTO: 0.02 K/UL
BASOPHILS NFR BLD: 0.2 %
BILIRUB SERPL-MCNC: 0.4 MG/DL
BUN SERPL-MCNC: 13 MG/DL
CALCIUM SERPL-MCNC: 9.2 MG/DL
CHLORIDE SERPL-SCNC: 102 MMOL/L
CHOLEST SERPL-MCNC: 204 MG/DL
CHOLEST/HDLC SERPL: 4.1 {RATIO}
CO2 SERPL-SCNC: 21 MMOL/L
CREAT SERPL-MCNC: 0.8 MG/DL
DIFFERENTIAL METHOD: NORMAL
EOSINOPHIL # BLD AUTO: 0.2 K/UL
EOSINOPHIL NFR BLD: 2.1 %
ERYTHROCYTE [DISTWIDTH] IN BLOOD BY AUTOMATED COUNT: 12.2 %
EST. GFR  (AFRICAN AMERICAN): >60 ML/MIN/1.73 M^2
EST. GFR  (NON AFRICAN AMERICAN): >60 ML/MIN/1.73 M^2
ESTIMATED AVG GLUCOSE: 301 MG/DL
GLUCOSE SERPL-MCNC: 351 MG/DL
HBA1C MFR BLD HPLC: 12.1 %
HCT VFR BLD AUTO: 46.4 %
HDLC SERPL-MCNC: 50 MG/DL
HDLC SERPL: 24.5 %
HGB BLD-MCNC: 15.6 G/DL
IMM GRANULOCYTES # BLD AUTO: 0.04 K/UL
IMM GRANULOCYTES NFR BLD AUTO: 0.5 %
LDLC SERPL CALC-MCNC: 125.2 MG/DL
LYMPHOCYTES # BLD AUTO: 2.4 K/UL
LYMPHOCYTES NFR BLD: 28.9 %
MCH RBC QN AUTO: 29.9 PG
MCHC RBC AUTO-ENTMCNC: 33.6 G/DL
MCV RBC AUTO: 89 FL
MONOCYTES # BLD AUTO: 0.5 K/UL
MONOCYTES NFR BLD: 6.4 %
NEUTROPHILS # BLD AUTO: 5.1 K/UL
NEUTROPHILS NFR BLD: 61.9 %
NONHDLC SERPL-MCNC: 154 MG/DL
NRBC BLD-RTO: 0 /100 WBC
PLATELET # BLD AUTO: 222 K/UL
PMV BLD AUTO: 12.1 FL
POTASSIUM SERPL-SCNC: 4.4 MMOL/L
PROT SERPL-MCNC: 7.7 G/DL
RBC # BLD AUTO: 5.21 M/UL
SODIUM SERPL-SCNC: 132 MMOL/L
TRIGL SERPL-MCNC: 144 MG/DL
WBC # BLD AUTO: 8.28 K/UL

## 2018-06-19 PROCEDURE — 36415 COLL VENOUS BLD VENIPUNCTURE: CPT | Mod: PO

## 2018-06-19 PROCEDURE — 80061 LIPID PANEL: CPT

## 2018-06-19 PROCEDURE — 85025 COMPLETE CBC W/AUTO DIFF WBC: CPT

## 2018-06-19 PROCEDURE — 80053 COMPREHEN METABOLIC PANEL: CPT

## 2018-06-19 PROCEDURE — 83036 HEMOGLOBIN GLYCOSYLATED A1C: CPT

## 2018-06-20 ENCOUNTER — TELEPHONE (OUTPATIENT)
Dept: INTERNAL MEDICINE | Facility: CLINIC | Age: 43
End: 2018-06-20

## 2018-06-28 ENCOUNTER — OFFICE VISIT (OUTPATIENT)
Dept: INTERNAL MEDICINE | Facility: CLINIC | Age: 43
End: 2018-06-28
Payer: MEDICARE

## 2018-06-28 VITALS
BODY MASS INDEX: 41.65 KG/M2 | DIASTOLIC BLOOD PRESSURE: 95 MMHG | SYSTOLIC BLOOD PRESSURE: 152 MMHG | HEIGHT: 65 IN | OXYGEN SATURATION: 98 % | WEIGHT: 250 LBS | HEART RATE: 90 BPM

## 2018-06-28 DIAGNOSIS — I15.2 HYPERTENSION ASSOCIATED WITH DIABETES: ICD-10-CM

## 2018-06-28 DIAGNOSIS — E87.1 HYPONATREMIA: ICD-10-CM

## 2018-06-28 DIAGNOSIS — F32.A DEPRESSION, UNSPECIFIED DEPRESSION TYPE: ICD-10-CM

## 2018-06-28 DIAGNOSIS — G47.33 OSA (OBSTRUCTIVE SLEEP APNEA): ICD-10-CM

## 2018-06-28 DIAGNOSIS — Z79.4 TYPE 2 DIABETES MELLITUS WITH DIABETIC POLYNEUROPATHY, WITH LONG-TERM CURRENT USE OF INSULIN: Primary | ICD-10-CM

## 2018-06-28 DIAGNOSIS — E66.01 MORBID OBESITY WITH BODY MASS INDEX (BMI) OF 40.0 TO 44.9 IN ADULT: ICD-10-CM

## 2018-06-28 DIAGNOSIS — E78.5 HYPERLIPIDEMIA, UNSPECIFIED HYPERLIPIDEMIA TYPE: ICD-10-CM

## 2018-06-28 DIAGNOSIS — E11.59 HYPERTENSION ASSOCIATED WITH DIABETES: ICD-10-CM

## 2018-06-28 DIAGNOSIS — E11.42 TYPE 2 DIABETES MELLITUS WITH DIABETIC POLYNEUROPATHY, WITH LONG-TERM CURRENT USE OF INSULIN: Primary | ICD-10-CM

## 2018-06-28 PROCEDURE — 99214 OFFICE O/P EST MOD 30 MIN: CPT | Mod: S$GLB,,, | Performed by: INTERNAL MEDICINE

## 2018-06-28 PROCEDURE — 3008F BODY MASS INDEX DOCD: CPT | Mod: CPTII,S$GLB,, | Performed by: INTERNAL MEDICINE

## 2018-06-28 PROCEDURE — 99999 PR PBB SHADOW E&M-EST. PATIENT-LVL V: CPT | Mod: PBBFAC,,, | Performed by: INTERNAL MEDICINE

## 2018-06-28 PROCEDURE — 3046F HEMOGLOBIN A1C LEVEL >9.0%: CPT | Mod: CPTII,S$GLB,, | Performed by: INTERNAL MEDICINE

## 2018-06-28 RX ORDER — FLUOXETINE HYDROCHLORIDE 20 MG/1
20 CAPSULE ORAL DAILY
Qty: 30 CAPSULE | Refills: 1 | Status: SHIPPED | OUTPATIENT
Start: 2018-06-28 | End: 2018-08-25 | Stop reason: SDUPTHER

## 2018-06-28 RX ORDER — METOPROLOL SUCCINATE 25 MG/1
TABLET, EXTENDED RELEASE ORAL
Qty: 90 TABLET | Refills: 1 | Status: SHIPPED | OUTPATIENT
Start: 2018-06-28 | End: 2018-08-25 | Stop reason: SDUPTHER

## 2018-06-28 RX ORDER — EZETIMIBE 10 MG/1
10 TABLET ORAL DAILY
Qty: 30 TABLET | Refills: 2 | Status: SHIPPED | OUTPATIENT
Start: 2018-06-28 | End: 2018-08-30 | Stop reason: SDUPTHER

## 2018-06-28 NOTE — PROGRESS NOTES
Pt. ID: Jaimee Quintana is a 42 y.o. female      Chief complaint:   Chief Complaint   Patient presents with    Follow-up       HPI: Pt. Here for f/u for DM and HTN; I reviewed labs dated 6/19/18; sodium dropped slightly; I advised pt. To limit water intake by 1/2 and drink other unsweetened fluids; cholesterol is not at goal; HGBA1C was poorly controlled at 12.1; glucosuria was noted; pt. Sees endocrine and is on VGO; BP is borderline elevated; she has gained a few pounds; she states she is not always compliant with CPAP and I advised her to use nightly and explained risks of non-compliance; she takes asa 81 mg QD; pt. Would like to restart prozac for depression which helped in the past; she states she just did not get it filled; she denies suicidal ideationpt. Has a hx of poor compliance and I encouraged compliance with medical management and explained risks of non-compliance      Review of Systems   Constitutional: Negative for chills and fever.   Respiratory: Negative for cough and shortness of breath.    Cardiovascular: Negative for chest pain.   Gastrointestinal: Negative for abdominal pain, nausea and vomiting.   Genitourinary: Negative for dysuria.   Psychiatric/Behavioral: Positive for depression. Negative for suicidal ideas.         Objective:    Physical Exam   Constitutional: She is oriented to person, place, and time.   Morbid obesity   Eyes: EOM are normal.   Neck: Normal range of motion.   Cardiovascular: Normal rate, regular rhythm and normal heart sounds.    Pulmonary/Chest: Effort normal and breath sounds normal.   Abdominal: Soft. There is no tenderness. There is no rebound and no guarding.   Musculoskeletal: Normal range of motion.   Neurological: She is alert and oriented to person, place, and time.   Skin: No rash noted.   Vitals reviewed.        Health Maintenance   Topic Date Due    Influenza Vaccine  08/01/2018    Eye Exam  08/22/2018    Foot Exam  12/15/2018    Hemoglobin A1c   12/19/2018    Mammogram  02/16/2019    Lipid Panel  06/19/2019    Pap Smear with HPV Cotest  07/06/2020    TETANUS VACCINE  11/19/2024    Pneumococcal PPSV23 (Medium Risk) (2) 09/15/2041         Assessment:     1. Type 2 diabetes mellitus with diabetic polyneuropathy, with long-term current use of insulin Sub-optimally controlled   2. Hypertension associated with diabetes Sub-optimally controlled   3. Hyponatremia Stable   4. CESAR (obstructive sleep apnea) Active   5. Hyperlipidemia, unspecified hyperlipidemia type Sub-optimally controlled   6. Depression, unspecified depression type Sub-optimally controlled   7. Morbid obesity with body mass index (BMI) of 40.0 to 44.9 in adult Sub-optimally controlled         Plan: Type 2 diabetes mellitus with diabetic polyneuropathy, with long-term current use of insulin  Comments:  continue current regimen/VGO and encouraged stricter ADA diet; I asked pt. to contact endocrine for further management of VGO  Orders:  -     Comprehensive metabolic panel; Future; Expected date: 07/28/2018    Hypertension associated with diabetes  Comments:  continue current regimen and increase metoprolal to 50 mg QAM and 25 mg QPM; encouraged low Na diet and weight loss; repeat BP on f/u in 2 months  Orders:  -     Comprehensive metabolic panel; Future; Expected date: 07/28/2018  -     metoprolol succinate (TOPROL-XL) 25 MG 24 hr tablet; 2 tabs (50 mg) PO QAM and 1 tab (25 mg) PO QPM  Dispense: 90 tablet; Refill: 1    Hyponatremia  Comments:  asked pt. to decrease water intake by 1/2 and substitute with other unsweetened fluids; repeat Na on f/u in 2 months  Orders:  -     Comprehensive metabolic panel; Future; Expected date: 07/28/2018    CESAR (obstructive sleep apnea)  Comments:  asked pt. to use CPAP nightly     Hyperlipidemia, unspecified hyperlipidemia type  Comments:  continue lipitor and start zetia; encouraged stricter diet modification  Orders:  -     ezetimibe (ZETIA) 10 mg tablet;  Take 1 tablet (10 mg total) by mouth once daily.  Dispense: 30 tablet; Refill: 2  -     Lipid panel; Future; Expected date: 07/28/2018    Depression, unspecified depression type  Comments:  start prozac and re-evaluate in 2 months  Orders:  -     FLUoxetine (PROZAC) 20 MG capsule; Take 1 capsule (20 mg total) by mouth once daily.  Dispense: 30 capsule; Refill: 1    Morbid obesity with body mass index (BMI) of 40.0 to 44.9 in adult  Comments:  encouraged diet and explained risks         Problem List Items Addressed This Visit        Psychiatric    Depression    Relevant Medications    FLUoxetine (PROZAC) 20 MG capsule       Cardiac/Vascular    Hyperlipidemia    Relevant Medications    ezetimibe (ZETIA) 10 mg tablet    Other Relevant Orders    Lipid panel    Hypertension associated with diabetes    Relevant Medications    metoprolol succinate (TOPROL-XL) 25 MG 24 hr tablet    Other Relevant Orders    Comprehensive metabolic panel       Renal/    Hyponatremia    Relevant Orders    Comprehensive metabolic panel       Endocrine    Type 2 diabetes mellitus with diabetic polyneuropathy, with long-term current use of insulin - Primary    Relevant Orders    Comprehensive metabolic panel    Morbid obesity with body mass index (BMI) of 40.0 to 44.9 in adult       Other    CESAR (obstructive sleep apnea)

## 2018-07-13 NOTE — TELEPHONE ENCOUNTER
"SCRIBE #1 NOTE: I, Lisa Alma, am scribing for, and in the presence of, Deshaun Brothers Jr., MD. I have scribed the entire note.      History      Chief Complaint   Patient presents with    Drug Overdose       Review of patient's allergies indicates:  No Known Allergies     HPI   HPI    7/13/2018, 1:14 PM   History obtained from the patient and nurses      History of Present Illness: Julien Trevino is a 27 y.o. male patient who presents to the Emergency Department for a drug overdose. Pt came in with no pulse. IV was established and pt was given 2 mg of Narcan with positive response. Pt admits to using heroine for "a while" in left AC. Pt denies HI and SI.    Arrival mode: Personal vehicle      PCP: Primary Doctor No       Past Medical History:  History reviewed. No pertinent medical Hx.    Past Surgical History:  History reviewed. No pertinent surgical Hx.      Family History:  History reviewed. No pertinent family Hx.    Social History:  Social History     Social History Main Topics    Smoking status: Current Every Day Smoker     Packs/day: 1.00    Smokeless tobacco: Unknown    Alcohol use Yes    Drug use: Yes     Types: IV    Sexual activity: Unknown       ROS   Review of Systems   Unable to perform ROS: Acuity of condition   Constitutional:        (+) drug overdose   Psychiatric/Behavioral: Negative for suicidal ideas.        (-) HI     Physical Exam      Initial Vitals [07/13/18 1309]   BP Pulse Resp Temp SpO2   139/75 106 16 98 °F (36.7 °C) 97 %      MAP       --          Physical Exam  Nursing Notes and Vital Signs Reviewed.  Vital signs and nursing notes reviewed.  Constitutional: Patient is in no acute distress. Awake and alert. Well-developed and well-nourished.   Head: Atraumatic. Normocephalic.  Eyes: PERRL. EOM intact. Conjunctivae nl. No scleral icterus.  ENT: Mucous membranes are moist. Oropharynx is clear.  Neck: Supple. Full ROM. No lymphadenopathy.  Cardiovascular: Regular rate and rhythm. " Left patient message to call office   No murmurs, rubs, or gallops. Distal pulses are 2+ and symmetric .  Pulmonary/Chest: No respiratory distress. Clear to auscultation bilaterally. No wheezing, rales, or rhonchi. Pt was apneic initially before Narcan was given.   Abdominal: Soft. Non-distended.   Musculoskeletal: Moves all extremities. No edema.   Skin: Warm and dry.  Neurological: Awake, alert, and oriented x3. GCS of 15. No acute focal neurological deficits are appreciated.  Psychiatric:               Suicidal Ideations: No              Suicidal Plan: No specific plan to harm self              Homicidal Ideations: No                ED Course    Critical Care  Date/Time: 7/14/2018 5:33 PM  Performed by: VIKY NAILS JR  Authorized by: VIKY NAILS JR   Direct patient critical care time: 10 minutes  Additional history critical care time: 5 minutes  Ordering / reviewing critical care time: 5 minutes  Documentation critical care time: 10 minutes  Consulting other physicians critical care time: 0 minutes  Consult with family critical care time: 5 minutes  Other critical care time: 10 minutes  Total critical care time (exclusive of procedural time) : 45 minutes  Critical care time was exclusive of separately billable procedures and treating other patients and teaching time.  Critical care was necessary to treat or prevent imminent or life-threatening deterioration of the following conditions: toxidrome and respiratory failure.  Critical care was time spent personally by me on the following activities: blood draw for specimens, development of treatment plan with patient or surrogate, discussions with consultants, evaluation of patient's response to treatment, examination of patient, obtaining history from patient or surrogate, ordering and performing treatments and interventions, ordering and review of laboratory studies, pulse oximetry and re-evaluation of patient's condition.        ED Vital Signs:  Vitals:    07/13/18 1309 07/13/18 1320 07/13/18  1321 07/13/18 1330   BP: 139/75 133/78  129/77   Pulse: 106 103 100 96   Resp: 16 11  13   Temp: 98 °F (36.7 °C)      TempSrc: Oral      SpO2: 97% 100%  100%   Weight: 69.6 kg (153 lb 8 oz)          Abnormal Lab Results:  Labs Reviewed   CBC W/ AUTO DIFFERENTIAL - Abnormal; Notable for the following:        Result Value    Mono # 1.2 (*)     All other components within normal limits   COMPREHENSIVE METABOLIC PANEL - Abnormal; Notable for the following:     Glucose 117 (*)     Alkaline Phosphatase 44 (*)     All other components within normal limits   ACETAMINOPHEN LEVEL - Abnormal; Notable for the following:     Acetaminophen (Tylenol), Serum <3.0 (*)     All other components within normal limits   TSH   ALCOHOL,MEDICAL (ETHANOL)   TROPONIN I   B-TYPE NATRIURETIC PEPTIDE        All Lab Results:  Results for orders placed or performed during the hospital encounter of 07/13/18   CBC auto differential   Result Value Ref Range    WBC 10.62 3.90 - 12.70 K/uL    RBC 5.37 4.60 - 6.20 M/uL    Hemoglobin 16.1 14.0 - 18.0 g/dL    Hematocrit 46.0 40.0 - 54.0 %    MCV 86 82 - 98 fL    MCH 30.0 27.0 - 31.0 pg    MCHC 35.0 32.0 - 36.0 g/dL    RDW 14.3 11.5 - 14.5 %    Platelets 341 150 - 350 K/uL    MPV 11.0 9.2 - 12.9 fL    Gran # (ANC) 5.0 1.8 - 7.7 K/uL    Lymph # 3.9 1.0 - 4.8 K/uL    Mono # 1.2 (H) 0.3 - 1.0 K/uL    Eos # 0.4 0.0 - 0.5 K/uL    Baso # 0.04 0.00 - 0.20 K/uL    Gran% 47.5 38.0 - 73.0 %    Lymph% 37.0 18.0 - 48.0 %    Mono% 11.2 4.0 - 15.0 %    Eosinophil% 4.1 0.0 - 8.0 %    Basophil% 0.4 0.0 - 1.9 %    Poik Slight     Tear Drop Cells Occasional     Differential Method Automated    Comprehensive metabolic panel   Result Value Ref Range    Sodium 140 136 - 145 mmol/L    Potassium 4.0 3.5 - 5.1 mmol/L    Chloride 106 95 - 110 mmol/L    CO2 24 23 - 29 mmol/L    Glucose 117 (H) 70 - 110 mg/dL    BUN, Bld 12 6 - 20 mg/dL    Creatinine 1.1 0.5 - 1.4 mg/dL    Calcium 9.5 8.7 - 10.5 mg/dL    Total Protein 7.3 6.0 - 8.4  g/dL    Albumin 4.1 3.5 - 5.2 g/dL    Total Bilirubin 0.9 0.1 - 1.0 mg/dL    Alkaline Phosphatase 44 (L) 55 - 135 U/L    AST 27 10 - 40 U/L    ALT 29 10 - 44 U/L    Anion Gap 10 8 - 16 mmol/L    eGFR if African American >60 >60 mL/min/1.73 m^2    eGFR if non African American >60 >60 mL/min/1.73 m^2   TSH   Result Value Ref Range    TSH 0.648 0.400 - 4.000 uIU/mL   Ethanol   Result Value Ref Range    Alcohol, Medical, Serum <10 <10 mg/dL   Acetaminophen level   Result Value Ref Range    Acetaminophen (Tylenol), Serum <3.0 (L) 10.0 - 20.0 ug/mL   Troponin I #1   Result Value Ref Range    Troponin I 0.009 0.000 - 0.026 ng/mL   B-Type natriuretic peptide (BNP)   Result Value Ref Range    BNP <10 0 - 99 pg/mL                The EKG was ordered, reviewed, and independently interpreted by the ED provider.  Interpretation time: 1335  Rate: 98 BPM  Rhythm: normal sinus rhythm  Interpretation: Normal ECG. No ST segment or T wave abnormalities. No STEMI.    The Emergency Provider reviewed the vital signs and test results, which are outlined above.    ED Discussion     2:17 PM: Advised patient on the risks of leaving without being treated. Patient disconnected self from monitor, walked out without waiting for further workup. Able to ambulate without assistance or difficulty. AAO X3 and not intoxicated. Patient is eloping at this time. Attempted to find pt. Bathrooms were checked and security was notified. Pt left premises and was picked up by one if his friends or a family member. Father was in the room prior to pt having eloped. It has been over an hour since pt received Narcan.    ED Medication(s):  Medications   sodium chloride 0.9% bolus 1,000 mL (1,000 mLs Intravenous New Bag 7/13/18 1198)       There are no discharge medications for this patient.            Medical Decision Making    Medical Decision Making:   Clinical Tests:   Lab Tests: Ordered  Medical Tests: Reviewed and Ordered           Scribe Attestation:   Scribe  #1: I performed the above scribed service and the documentation accurately describes the services I performed. I attest to the accuracy of the note.    Attending:   Physician Attestation Statement for Scribe #1: I, Deshaun Brothers Jr., MD, personally performed the services described in this documentation, as scribed by Lisa Marquez, in my presence, and it is both accurate and complete.          Clinical Impression       ICD-10-CM ICD-9-CM   1. Heroin overdose T40.1X1A 965.01     E980.0       Disposition:   Disposition: Eloped  Condition: Stable         Deshaun Brothers Jr., MD  07/14/18 1736

## 2018-08-03 ENCOUNTER — OFFICE VISIT (OUTPATIENT)
Dept: OBSTETRICS AND GYNECOLOGY | Facility: CLINIC | Age: 43
End: 2018-08-03
Payer: MEDICARE

## 2018-08-03 VITALS
DIASTOLIC BLOOD PRESSURE: 102 MMHG | SYSTOLIC BLOOD PRESSURE: 154 MMHG | WEIGHT: 253.75 LBS | BODY MASS INDEX: 42.28 KG/M2 | HEIGHT: 65 IN

## 2018-08-03 DIAGNOSIS — N89.8 VAGINAL DISCHARGE: ICD-10-CM

## 2018-08-03 DIAGNOSIS — Z12.4 ROUTINE CERVICAL SMEAR: ICD-10-CM

## 2018-08-03 DIAGNOSIS — N76.2 ACUTE VULVITIS: ICD-10-CM

## 2018-08-03 DIAGNOSIS — Z91.89 PERSONAL HISTORY PRESENTING HAZARDS TO HEALTH: Primary | ICD-10-CM

## 2018-08-03 DIAGNOSIS — Z12.31 ENCOUNTER FOR SCREENING MAMMOGRAM FOR MALIGNANT NEOPLASM OF BREAST: ICD-10-CM

## 2018-08-03 LAB
CANDIDA RRNA VAG QL PROBE: NEGATIVE
G VAGINALIS RRNA GENITAL QL PROBE: POSITIVE
T VAGINALIS RRNA GENITAL QL PROBE: NEGATIVE

## 2018-08-03 PROCEDURE — 88142 CYTOPATH C/V THIN LAYER: CPT

## 2018-08-03 PROCEDURE — 87491 CHLMYD TRACH DNA AMP PROBE: CPT

## 2018-08-03 PROCEDURE — 99999 PR PBB SHADOW E&M-EST. PATIENT-LVL III: CPT | Mod: PBBFAC,,, | Performed by: OBSTETRICS & GYNECOLOGY

## 2018-08-03 PROCEDURE — G0101 CA SCREEN;PELVIC/BREAST EXAM: HCPCS | Mod: S$GLB,,, | Performed by: OBSTETRICS & GYNECOLOGY

## 2018-08-03 PROCEDURE — 87510 GARDNER VAG DNA DIR PROBE: CPT

## 2018-08-03 PROCEDURE — 87480 CANDIDA DNA DIR PROBE: CPT

## 2018-08-03 RX ORDER — TRIAMCINOLONE ACETONIDE 1 MG/G
CREAM TOPICAL 2 TIMES DAILY
Qty: 15 G | Refills: 0 | Status: SHIPPED | OUTPATIENT
Start: 2018-08-03 | End: 2018-08-05

## 2018-08-03 NOTE — PROGRESS NOTES
OBSTETRIC HISTORY:   OB History      Para Term  AB Living    3 2 2 0 1 2    SAB TAB Ectopic Multiple Live Births    1 0 0 0 2         COMPREHENSIVE GYN HISTORY:  PAP History: Denies abnormal Paps.  Infection History: Denies STDs. Denies PID.  Benign History: Denies uterine fibroids. Reports ovarian cysts. Denies endometriosis. Denies other conditions.  Cancer History: Denies cervical cancer. Denies uterine cancer or hyperplasia. Denies ovarian cancer. Denies vulvar cancer or pre-cancer. Denies vaginal cancer or precancer.  Denies breast cancer. Denies colon cancer.  Sexual Activity History: Denies currently being sexually active.  Menstrual History: Age of menarche: 13 years.  Dysmenorrhea History: Denies dysmenorrhea.  Contraception History: Condoms  Patient is high risk for Medicare as she had greater than 5 sexual partners in a lifetime.      HPI:   42 y.o.  Patient's last menstrual period was 2018 (approximate).   Patient is  here for Medicare pap, pelvic and breast exam. She is high risk.  She has no complaints. Asking about whether safe to get pregnant but because of her personal risks she would be very high risk and it is NOT recommended she get pregnant. She states she is walking to try to lose weight to help lower blood pressure.She denies bladder, breast and bowel complaints.    ROS:  GENERAL: Denies weight gain or weight loss. Feeling well overall.   SKIN: Denies rash or lesions.   HEAD: Denies headache.   NODES: Denies enlarged lymph nodes.   CHEST: Denies shortness of breath.   ABDOMEN: No abdominal pain, constipation, diarrhea, nausea, vomiting or rectal bleeding.   URINARY: No frequency, dysuria, hematuria, or burning on urination.  REPRODUCTIVE: See HPI.   BREASTS: The patient denies pain, lumps, or nipple discharge.   HEMATOLOGIC: No easy bruisability.   MUSCULOSKELETAL: Denies joint pain or back pain.   NEUROLOGIC: Denies weakness.   PSYCHIATRIC: Denies depression, anxiety or  "mood swings.    PE:   BP (!) 154/102   Ht 5' 5" (1.651 m)   Wt 115.1 kg (253 lb 12 oz)   LMP 07/25/2018 (Approximate)   BMI 42.23 kg/m²   APPEARANCE: Well nourished, well developed, in no acute distress.  NECK: Neck symmetric without  thyromegaly.  NODES: No inguinal, cervical lymph node enlargement.  CHEST: Lungs clear to auscultation.  HEART: Regular rate and rhythm, no murmurs, rubs or gallops.  ABDOMEN: Soft. No tenderness or masses. No hernias.  BREASTS: Symmetrical, no skin changes or visible lesions. No palpable masses, nipple discharge or adenopathy bilaterally.  PELVIC:   VULVA: Hyperkeratosis. Normal female genitalia.  URETHRAL MEATUS: Normal size and location, no lesions, no prolapse.  URETHRA: No masses, tenderness, prolapse or scarring.  VAGINA: Moist and well rugated, some frothy discharge, no significant cystocele or rectocele.  CERVIX: No lesions and discharge.  UTERUS: Normal size, regular shape, mobile, non-tender, bladder base nontender.  ADNEXA: No masses or tenderness.    PROCEDURES:  Pap smear  Affirm  Gc/CT      Assessment/Plan:  Medicare pap/pelvic/breast exam--high risk secondary to more than 5 partners in lifetime  RTO 1 year  Hyperkeratosis-- acute vulvitis-- chronic scratching  Vaginal discharge  Continue condoms as birth control    "

## 2018-08-05 ENCOUNTER — TELEPHONE (OUTPATIENT)
Dept: OBSTETRICS AND GYNECOLOGY | Facility: CLINIC | Age: 43
End: 2018-08-05

## 2018-08-05 LAB
C TRACH DNA SPEC QL NAA+PROBE: NOT DETECTED
N GONORRHOEA DNA SPEC QL NAA+PROBE: NOT DETECTED

## 2018-08-05 RX ORDER — TRIAMCINOLONE ACETONIDE 1 MG/G
CREAM TOPICAL
Qty: 45 G | Refills: 0 | Status: SHIPPED | OUTPATIENT
Start: 2018-08-05 | End: 2018-10-05 | Stop reason: SDUPTHER

## 2018-08-06 NOTE — TELEPHONE ENCOUNTER
----- Message from Bryan Alvarez sent at 8/6/2018  1:34 PM CDT -----  Contact: self/[phone]  Papsmear results.

## 2018-08-06 NOTE — TELEPHONE ENCOUNTER
Called patient and notified her of her affirm results and patient would like oral medications sent to her pharmacy. Patient verbalized understanding.

## 2018-08-06 NOTE — TELEPHONE ENCOUNTER
----- Message from Kami Bustamante sent at 8/6/2018 10:29 AM CDT -----  Contact: self/[phone]  Patient is returning your call.  Please advise

## 2018-08-07 ENCOUNTER — HOSPITAL ENCOUNTER (OUTPATIENT)
Dept: RADIOLOGY | Facility: HOSPITAL | Age: 43
Discharge: HOME | End: 2018-08-07
Attending: OBSTETRICS & GYNECOLOGY
Payer: MEDICARE

## 2018-08-07 DIAGNOSIS — Z12.31 ENCOUNTER FOR SCREENING MAMMOGRAM FOR MALIGNANT NEOPLASM OF BREAST: ICD-10-CM

## 2018-08-07 PROCEDURE — 77067 SCR MAMMO BI INCL CAD: CPT | Mod: TC

## 2018-08-07 PROCEDURE — 77067 SCR MAMMO BI INCL CAD: CPT | Mod: 26,,, | Performed by: RADIOLOGY

## 2018-08-07 PROCEDURE — 77063 BREAST TOMOSYNTHESIS BI: CPT | Mod: 26,,, | Performed by: RADIOLOGY

## 2018-08-25 DIAGNOSIS — F32.A DEPRESSION, UNSPECIFIED DEPRESSION TYPE: ICD-10-CM

## 2018-08-25 DIAGNOSIS — I15.2 HYPERTENSION ASSOCIATED WITH DIABETES: ICD-10-CM

## 2018-08-25 DIAGNOSIS — E11.59 HYPERTENSION ASSOCIATED WITH DIABETES: ICD-10-CM

## 2018-08-27 RX ORDER — FLUOXETINE HYDROCHLORIDE 20 MG/1
CAPSULE ORAL
Qty: 90 CAPSULE | Refills: 0 | Status: SHIPPED | OUTPATIENT
Start: 2018-08-27 | End: 2018-10-22 | Stop reason: SDUPTHER

## 2018-08-27 RX ORDER — METOPROLOL SUCCINATE 25 MG/1
TABLET, EXTENDED RELEASE ORAL
Qty: 90 TABLET | Refills: 0 | Status: SHIPPED | OUTPATIENT
Start: 2018-08-27 | End: 2018-09-26 | Stop reason: SDUPTHER

## 2018-08-28 ENCOUNTER — LAB VISIT (OUTPATIENT)
Dept: LAB | Facility: HOSPITAL | Age: 43
End: 2018-08-28
Attending: INTERNAL MEDICINE
Payer: MEDICARE

## 2018-08-28 DIAGNOSIS — E11.9 TYPE 2 DIABETES MELLITUS WITHOUT COMPLICATION: ICD-10-CM

## 2018-08-28 LAB
ALBUMIN/CREAT UR: 64.8 UG/MG
CREAT UR-MCNC: 71 MG/DL
MICROALBUMIN UR DL<=1MG/L-MCNC: 46 UG/ML

## 2018-08-28 PROCEDURE — 82043 UR ALBUMIN QUANTITATIVE: CPT

## 2018-08-30 ENCOUNTER — OFFICE VISIT (OUTPATIENT)
Dept: INTERNAL MEDICINE | Facility: CLINIC | Age: 43
End: 2018-08-30
Payer: MEDICARE

## 2018-08-30 VITALS
OXYGEN SATURATION: 99 % | DIASTOLIC BLOOD PRESSURE: 105 MMHG | WEIGHT: 248.88 LBS | HEART RATE: 97 BPM | HEIGHT: 65 IN | SYSTOLIC BLOOD PRESSURE: 156 MMHG | BODY MASS INDEX: 41.47 KG/M2

## 2018-08-30 DIAGNOSIS — E87.1 HYPONATREMIA: ICD-10-CM

## 2018-08-30 DIAGNOSIS — I15.2 HYPERTENSION ASSOCIATED WITH DIABETES: ICD-10-CM

## 2018-08-30 DIAGNOSIS — Z91.199 POOR COMPLIANCE: ICD-10-CM

## 2018-08-30 DIAGNOSIS — E11.59 HYPERTENSION ASSOCIATED WITH DIABETES: ICD-10-CM

## 2018-08-30 DIAGNOSIS — E78.5 HYPERLIPIDEMIA, UNSPECIFIED HYPERLIPIDEMIA TYPE: ICD-10-CM

## 2018-08-30 DIAGNOSIS — E11.9 TYPE 2 DIABETES MELLITUS WITHOUT COMPLICATION, WITH LONG-TERM CURRENT USE OF INSULIN: Primary | ICD-10-CM

## 2018-08-30 DIAGNOSIS — E66.01 MORBID OBESITY WITH BODY MASS INDEX (BMI) OF 40.0 TO 44.9 IN ADULT: ICD-10-CM

## 2018-08-30 DIAGNOSIS — Z79.4 TYPE 2 DIABETES MELLITUS WITHOUT COMPLICATION, WITH LONG-TERM CURRENT USE OF INSULIN: Primary | ICD-10-CM

## 2018-08-30 DIAGNOSIS — R80.9 MICROALBUMINURIA: ICD-10-CM

## 2018-08-30 PROCEDURE — 99214 OFFICE O/P EST MOD 30 MIN: CPT | Mod: S$GLB,,, | Performed by: INTERNAL MEDICINE

## 2018-08-30 PROCEDURE — 99999 PR PBB SHADOW E&M-EST. PATIENT-LVL III: CPT | Mod: PBBFAC,,, | Performed by: INTERNAL MEDICINE

## 2018-08-30 PROCEDURE — 3008F BODY MASS INDEX DOCD: CPT | Mod: CPTII,S$GLB,, | Performed by: INTERNAL MEDICINE

## 2018-08-30 PROCEDURE — 3046F HEMOGLOBIN A1C LEVEL >9.0%: CPT | Mod: CPTII,S$GLB,, | Performed by: INTERNAL MEDICINE

## 2018-08-30 RX ORDER — METFORMIN HYDROCHLORIDE 1000 MG/1
1000 TABLET ORAL 2 TIMES DAILY WITH MEALS
Qty: 180 TABLET | Refills: 1 | Status: SHIPPED | OUTPATIENT
Start: 2018-08-30 | End: 2019-03-20 | Stop reason: SDUPTHER

## 2018-08-30 RX ORDER — ATORVASTATIN CALCIUM 80 MG/1
80 TABLET, FILM COATED ORAL NIGHTLY
Qty: 90 TABLET | Refills: 1 | Status: SHIPPED | OUTPATIENT
Start: 2018-08-30 | End: 2020-09-03 | Stop reason: SDUPTHER

## 2018-08-30 RX ORDER — EZETIMIBE 10 MG/1
10 TABLET ORAL DAILY
Qty: 90 TABLET | Refills: 1 | Status: SHIPPED | OUTPATIENT
Start: 2018-08-30 | End: 2019-01-31 | Stop reason: SDUPTHER

## 2018-08-30 RX ORDER — LISINOPRIL 20 MG/1
20 TABLET ORAL 2 TIMES DAILY
Qty: 180 TABLET | Refills: 0 | Status: SHIPPED | OUTPATIENT
Start: 2018-08-30 | End: 2018-12-17 | Stop reason: SDUPTHER

## 2018-08-30 RX ORDER — NIFEDIPINE 60 MG/1
TABLET, EXTENDED RELEASE ORAL
Qty: 90 TABLET | Refills: 1 | Status: SHIPPED | OUTPATIENT
Start: 2018-08-30 | End: 2021-02-05 | Stop reason: SDUPTHER

## 2018-08-30 NOTE — PROGRESS NOTES
Pt. ID: Jaimee Quintana is a 42 y.o. female      Chief complaint:   Chief Complaint   Patient presents with    Diabetes       HPI: Pt. Here for f/u for DM and HTN; she is using VGO and sees endocrine but has not f/u in some time; she has increased her metoprolal dose but just took AM dose in the office; BP remains elevated; I reviewed labs dated 8/28/18; microalbuminuria is noted and she is taking lisinopril; sodium is low but stable; she has decreased water intake and drinking other unsweetened liquids; cholesterol is elevated and she is on lipitor and zetia; pt. States she misses doses of meds and has hx of poor compliance with both meds and diet; I explained in detail risks of non-compliance with meds and diet;  Weight is elevated but stable; pt. Needs refills on meds; LMP: 3 weeks; cautioned on use of meds with pregnancy and notify me for medication changes if she is trying to get pregnant or becomes pregnant    Review of Systems   Constitutional: Negative for chills and fever.   Respiratory: Negative for cough and shortness of breath.    Cardiovascular: Negative for chest pain.   Gastrointestinal: Negative for abdominal pain, nausea and vomiting.   Genitourinary: Negative for dysuria.         Objective:    Physical Exam   Constitutional: She is oriented to person, place, and time.   Morbid obesity    Eyes: EOM are normal.   Neck: Normal range of motion.   Cardiovascular: Normal rate, regular rhythm and normal heart sounds.   Pulmonary/Chest: Effort normal and breath sounds normal.   Abdominal: Soft. There is no tenderness. There is no rebound and no guarding.   Musculoskeletal: Normal range of motion.   Neurological: She is alert and oriented to person, place, and time.   Skin: No rash noted.   Vitals reviewed.        Health Maintenance   Topic Date Due    Influenza Vaccine  08/01/2018    Eye Exam  08/22/2018    Foot Exam  12/15/2018    Hemoglobin A1c  12/19/2018    Lipid Panel  08/28/2019    Mammogram   08/07/2020    Pap Smear with HPV Cotest  08/03/2021    TETANUS VACCINE  11/19/2024    Pneumococcal PPSV23 (Medium Risk) (2) 09/15/2041         Assessment:     1. Type 2 diabetes mellitus without complication, with long-term current use of insulin Poorly controlled   2. Hypertension associated with diabetes Poorly controlled   3. Hyponatremia Stable   4. Hyperlipidemia, unspecified hyperlipidemia type Sub-optimally controlled   5. Microalbuminuria Active   6. Poor compliance Active   7. Morbid obesity with body mass index (BMI) of 40.0 to 44.9 in adult Poorly controlled         Plan: Type 2 diabetes mellitus without complication, with long-term current use of insulin  Comments:  VGO management as per endocrine and encouraged stricter ADA diet  Orders:  -     metFORMIN (GLUCOPHAGE) 1000 MG tablet; Take 1 tablet (1,000 mg total) by mouth 2 (two) times daily with meals.  Dispense: 180 tablet; Refill: 1  -     Comprehensive metabolic panel; Future; Expected date: 09/20/2018    Hypertension associated with diabetes  Comments:  D/C lisinopril HCTZ and start lisinopril 20 mg BID; continue other BP meds; encouraged low Na diet and weight loss; repeat BP on f/u in 1 month   Orders:  -     lisinopril (PRINIVIL,ZESTRIL) 20 MG tablet; Take 1 tablet (20 mg total) by mouth 2 (two) times daily.  Dispense: 180 tablet; Refill: 0  -     NIFEdipine (PROCARDIA-XL) 60 MG (OSM) 24 hr tablet; TAKE 1 TABLET (60 MG TOTAL) BY MOUTH ONCE DAILY.  Dispense: 90 tablet; Refill: 1  -     Comprehensive metabolic panel; Future; Expected date: 09/20/2018    Hyponatremia  Comments:  D/C HCTZ and limit water intake and substitute for other unsweetened liquids; repeat Na on f/u in 1 month   Orders:  -     Comprehensive metabolic panel; Future; Expected date: 09/20/2018    Hyperlipidemia, unspecified hyperlipidemia type  Comments:  continue current regimen and encouraged stricter diet modification which is an issue along with medication  compliance  Orders:  -     atorvastatin (LIPITOR) 80 MG tablet; Take 1 tablet (80 mg total) by mouth every evening.  Dispense: 90 tablet; Refill: 1  -     ezetimibe (ZETIA) 10 mg tablet; Take 1 tablet (10 mg total) by mouth once daily.  Dispense: 90 tablet; Refill: 1    Microalbuminuria  Comments:  continue ACE    Poor compliance  Comments:  encouraged compliance with meds and diet and explained risks of non-compliance    Morbid obesity with body mass index (BMI) of 40.0 to 44.9 in adult  Comments:  encouraged diet and explained risks         Problem List Items Addressed This Visit        Psychiatric    Poor compliance    Overview     I asked pt. to bring all her meds on 1 month f/u to review together            Cardiac/Vascular    Hyperlipidemia    Relevant Medications    atorvastatin (LIPITOR) 80 MG tablet    ezetimibe (ZETIA) 10 mg tablet    Hypertension associated with diabetes    Relevant Medications    lisinopril (PRINIVIL,ZESTRIL) 20 MG tablet    NIFEdipine (PROCARDIA-XL) 60 MG (OSM) 24 hr tablet    Other Relevant Orders    Comprehensive metabolic panel       Renal/    Hyponatremia    Relevant Orders    Comprehensive metabolic panel    Microalbuminuria       Endocrine    Type 2 diabetes mellitus with diabetic polyneuropathy, with long-term current use of insulin - Primary    Relevant Medications    metFORMIN (GLUCOPHAGE) 1000 MG tablet    Morbid obesity with body mass index (BMI) of 40.0 to 44.9 in adult

## 2018-09-26 DIAGNOSIS — E11.59 HYPERTENSION ASSOCIATED WITH DIABETES: ICD-10-CM

## 2018-09-26 DIAGNOSIS — I15.2 HYPERTENSION ASSOCIATED WITH DIABETES: ICD-10-CM

## 2018-09-26 RX ORDER — METOPROLOL SUCCINATE 25 MG/1
TABLET, EXTENDED RELEASE ORAL
Qty: 90 TABLET | Refills: 2 | Status: SHIPPED | OUTPATIENT
Start: 2018-09-26 | End: 2018-11-08 | Stop reason: SDUPTHER

## 2018-09-27 ENCOUNTER — LAB VISIT (OUTPATIENT)
Dept: LAB | Facility: HOSPITAL | Age: 43
End: 2018-09-27
Attending: INTERNAL MEDICINE
Payer: MEDICARE

## 2018-09-27 DIAGNOSIS — E11.59 HYPERTENSION ASSOCIATED WITH DIABETES: ICD-10-CM

## 2018-09-27 DIAGNOSIS — I15.2 HYPERTENSION ASSOCIATED WITH DIABETES: ICD-10-CM

## 2018-09-27 DIAGNOSIS — Z79.4 TYPE 2 DIABETES MELLITUS WITHOUT COMPLICATION, WITH LONG-TERM CURRENT USE OF INSULIN: ICD-10-CM

## 2018-09-27 DIAGNOSIS — E87.1 HYPONATREMIA: ICD-10-CM

## 2018-09-27 DIAGNOSIS — E11.9 TYPE 2 DIABETES MELLITUS WITHOUT COMPLICATION, WITH LONG-TERM CURRENT USE OF INSULIN: ICD-10-CM

## 2018-09-27 LAB
ALBUMIN SERPL BCP-MCNC: 3.5 G/DL
ALP SERPL-CCNC: 75 U/L
ALT SERPL W/O P-5'-P-CCNC: 37 U/L
ANION GAP SERPL CALC-SCNC: 8 MMOL/L
AST SERPL-CCNC: 20 U/L
BILIRUB SERPL-MCNC: 0.5 MG/DL
BUN SERPL-MCNC: 7 MG/DL
CALCIUM SERPL-MCNC: 9.3 MG/DL
CHLORIDE SERPL-SCNC: 103 MMOL/L
CO2 SERPL-SCNC: 25 MMOL/L
CREAT SERPL-MCNC: 0.8 MG/DL
EST. GFR  (AFRICAN AMERICAN): >60 ML/MIN/1.73 M^2
EST. GFR  (NON AFRICAN AMERICAN): >60 ML/MIN/1.73 M^2
GLUCOSE SERPL-MCNC: 316 MG/DL
POTASSIUM SERPL-SCNC: 4.3 MMOL/L
PROT SERPL-MCNC: 7.2 G/DL
SODIUM SERPL-SCNC: 136 MMOL/L

## 2018-09-27 PROCEDURE — 80053 COMPREHEN METABOLIC PANEL: CPT

## 2018-09-27 PROCEDURE — 36415 COLL VENOUS BLD VENIPUNCTURE: CPT | Mod: PO

## 2018-10-05 RX ORDER — TRIAMCINOLONE ACETONIDE 1 MG/G
CREAM TOPICAL
Qty: 45 G | Refills: 0 | Status: SHIPPED | OUTPATIENT
Start: 2018-10-05 | End: 2018-12-18 | Stop reason: SDUPTHER

## 2018-10-14 RX ORDER — TRIAMCINOLONE ACETONIDE 1 MG/G
CREAM TOPICAL
Qty: 45 G | Refills: 0 | OUTPATIENT
Start: 2018-10-14

## 2018-10-18 ENCOUNTER — PATIENT MESSAGE (OUTPATIENT)
Dept: FAMILY MEDICINE | Facility: CLINIC | Age: 43
End: 2018-10-18

## 2018-10-19 ENCOUNTER — TELEPHONE (OUTPATIENT)
Dept: INTERNAL MEDICINE | Facility: CLINIC | Age: 43
End: 2018-10-19

## 2018-10-19 DIAGNOSIS — F32.A DEPRESSION, UNSPECIFIED DEPRESSION TYPE: ICD-10-CM

## 2018-10-19 NOTE — TELEPHONE ENCOUNTER
----- Message from Soniya Garcia sent at 10/19/2018 11:33 AM CDT -----  Floridalma with [facility] called.   No. [phone]    What is the status of Fluoxetine refill.  Please call.

## 2018-10-22 ENCOUNTER — TELEPHONE (OUTPATIENT)
Dept: PAIN MEDICINE | Facility: CLINIC | Age: 43
End: 2018-10-22

## 2018-10-22 DIAGNOSIS — F32.A DEPRESSION, UNSPECIFIED DEPRESSION TYPE: ICD-10-CM

## 2018-10-22 RX ORDER — FLUOXETINE HYDROCHLORIDE 20 MG/1
20 CAPSULE ORAL DAILY
Qty: 90 CAPSULE | Refills: 0 | Status: SHIPPED | OUTPATIENT
Start: 2018-10-22 | End: 2018-12-05 | Stop reason: SDUPTHER

## 2018-10-25 RX ORDER — FLUOXETINE HYDROCHLORIDE 20 MG/1
20 CAPSULE ORAL DAILY
Qty: 90 CAPSULE | Refills: 0 | OUTPATIENT
Start: 2018-10-25

## 2018-11-08 DIAGNOSIS — I15.2 HYPERTENSION ASSOCIATED WITH DIABETES: ICD-10-CM

## 2018-11-08 DIAGNOSIS — E11.59 HYPERTENSION ASSOCIATED WITH DIABETES: ICD-10-CM

## 2018-11-08 RX ORDER — METOPROLOL SUCCINATE 25 MG/1
TABLET, EXTENDED RELEASE ORAL
Qty: 90 TABLET | Refills: 0 | Status: SHIPPED | OUTPATIENT
Start: 2018-11-08 | End: 2018-12-17 | Stop reason: SDUPTHER

## 2018-12-05 DIAGNOSIS — F32.A DEPRESSION, UNSPECIFIED DEPRESSION TYPE: ICD-10-CM

## 2018-12-05 DIAGNOSIS — M54.50 LOW BACK PAIN WITHOUT SCIATICA: ICD-10-CM

## 2018-12-05 RX ORDER — ETODOLAC 400 MG/1
400 TABLET, EXTENDED RELEASE ORAL DAILY PRN
Qty: 90 TABLET | Refills: 0 | Status: SHIPPED | OUTPATIENT
Start: 2018-12-05 | End: 2019-10-16

## 2018-12-05 RX ORDER — FLUOXETINE HYDROCHLORIDE 20 MG/1
20 CAPSULE ORAL DAILY
Qty: 90 CAPSULE | Refills: 0 | Status: SHIPPED | OUTPATIENT
Start: 2018-12-05 | End: 2019-03-01 | Stop reason: SDUPTHER

## 2018-12-05 NOTE — TELEPHONE ENCOUNTER
I received a pregnancy alert when trying to refill meds; I could not find positive pregnancy test; please clarify with pt. With LMP

## 2018-12-13 ENCOUNTER — TELEPHONE (OUTPATIENT)
Dept: ADMINISTRATIVE | Facility: HOSPITAL | Age: 43
End: 2018-12-13

## 2018-12-13 DIAGNOSIS — Z79.4 TYPE 2 DIABETES MELLITUS WITH DIABETIC POLYNEUROPATHY, WITH LONG-TERM CURRENT USE OF INSULIN: Primary | ICD-10-CM

## 2018-12-13 DIAGNOSIS — E11.42 TYPE 2 DIABETES MELLITUS WITH DIABETIC POLYNEUROPATHY, WITH LONG-TERM CURRENT USE OF INSULIN: Primary | ICD-10-CM

## 2018-12-17 ENCOUNTER — OFFICE VISIT (OUTPATIENT)
Dept: INTERNAL MEDICINE | Facility: CLINIC | Age: 43
End: 2018-12-17
Payer: MEDICARE

## 2018-12-17 ENCOUNTER — CLINICAL SUPPORT (OUTPATIENT)
Dept: FAMILY MEDICINE | Facility: CLINIC | Age: 43
End: 2018-12-17
Attending: INTERNAL MEDICINE
Payer: MEDICARE

## 2018-12-17 VITALS
DIASTOLIC BLOOD PRESSURE: 115 MMHG | HEIGHT: 65 IN | HEART RATE: 72 BPM | SYSTOLIC BLOOD PRESSURE: 150 MMHG | WEIGHT: 245.38 LBS | OXYGEN SATURATION: 97 % | BODY MASS INDEX: 40.88 KG/M2

## 2018-12-17 DIAGNOSIS — E11.3393 MODERATE NONPROLIFERATIVE DIABETIC RETINOPATHY OF BOTH EYES WITHOUT MACULAR EDEMA ASSOCIATED WITH TYPE 2 DIABETES MELLITUS: Primary | ICD-10-CM

## 2018-12-17 DIAGNOSIS — E11.9 DIABETIC EYE EXAM: ICD-10-CM

## 2018-12-17 DIAGNOSIS — E87.1 HYPONATREMIA: ICD-10-CM

## 2018-12-17 DIAGNOSIS — I15.2 HYPERTENSION ASSOCIATED WITH DIABETES: Primary | ICD-10-CM

## 2018-12-17 DIAGNOSIS — Z23 NEEDS FLU SHOT: ICD-10-CM

## 2018-12-17 DIAGNOSIS — Z79.4 TYPE 2 DIABETES MELLITUS WITH DIABETIC POLYNEUROPATHY, WITH LONG-TERM CURRENT USE OF INSULIN: ICD-10-CM

## 2018-12-17 DIAGNOSIS — E11.42 TYPE 2 DIABETES MELLITUS WITH DIABETIC POLYNEUROPATHY, WITH LONG-TERM CURRENT USE OF INSULIN: ICD-10-CM

## 2018-12-17 DIAGNOSIS — E78.5 HYPERLIPIDEMIA, UNSPECIFIED HYPERLIPIDEMIA TYPE: ICD-10-CM

## 2018-12-17 DIAGNOSIS — E11.59 HYPERTENSION ASSOCIATED WITH DIABETES: Primary | ICD-10-CM

## 2018-12-17 DIAGNOSIS — Z01.00 DIABETIC EYE EXAM: ICD-10-CM

## 2018-12-17 DIAGNOSIS — N91.2 AMENORRHEA: ICD-10-CM

## 2018-12-17 DIAGNOSIS — Z00.00 PREVENTATIVE HEALTH CARE: ICD-10-CM

## 2018-12-17 DIAGNOSIS — S21.209D WOUND OF BACK, UNSPECIFIED LATERALITY, SUBSEQUENT ENCOUNTER: ICD-10-CM

## 2018-12-17 DIAGNOSIS — E66.01 MORBID OBESITY WITH BMI OF 40.0-44.9, ADULT: ICD-10-CM

## 2018-12-17 PROBLEM — S21.209A BACK WOUND: Status: ACTIVE | Noted: 2018-12-17

## 2018-12-17 PROCEDURE — 99499 UNLISTED E&M SERVICE: CPT | Mod: S$GLB,,, | Performed by: INTERNAL MEDICINE

## 2018-12-17 PROCEDURE — 99999 PR PBB SHADOW E&M-EST. PATIENT-LVL V: CPT | Mod: PBBFAC,,, | Performed by: INTERNAL MEDICINE

## 2018-12-17 PROCEDURE — 90686 IIV4 VACC NO PRSV 0.5 ML IM: CPT | Mod: S$GLB,,, | Performed by: INTERNAL MEDICINE

## 2018-12-17 PROCEDURE — 99214 OFFICE O/P EST MOD 30 MIN: CPT | Mod: 25,S$GLB,, | Performed by: INTERNAL MEDICINE

## 2018-12-17 PROCEDURE — 99999 PR PBB SHADOW E&M-EST. PATIENT-LVL II: CPT | Mod: PBBFAC,,,

## 2018-12-17 PROCEDURE — G0008 ADMIN INFLUENZA VIRUS VAC: HCPCS | Mod: S$GLB,,, | Performed by: INTERNAL MEDICINE

## 2018-12-17 PROCEDURE — 92250 FUNDUS PHOTOGRAPHY W/I&R: CPT | Mod: S$GLB,,, | Performed by: OPHTHALMOLOGY

## 2018-12-17 PROCEDURE — 3046F HEMOGLOBIN A1C LEVEL >9.0%: CPT | Mod: CPTII,S$GLB,, | Performed by: INTERNAL MEDICINE

## 2018-12-17 PROCEDURE — 81025 URINE PREGNANCY TEST: CPT

## 2018-12-17 PROCEDURE — 3008F BODY MASS INDEX DOCD: CPT | Mod: CPTII,S$GLB,, | Performed by: INTERNAL MEDICINE

## 2018-12-17 RX ORDER — MUPIROCIN 20 MG/G
OINTMENT TOPICAL 2 TIMES DAILY
Qty: 30 G | Refills: 0 | Status: SHIPPED | OUTPATIENT
Start: 2018-12-17 | End: 2018-12-27

## 2018-12-17 RX ORDER — LISINOPRIL 20 MG/1
20 TABLET ORAL 2 TIMES DAILY
Qty: 60 TABLET | Refills: 1 | Status: SHIPPED | OUTPATIENT
Start: 2018-12-17 | End: 2019-02-01 | Stop reason: ALTCHOICE

## 2018-12-17 RX ORDER — METOPROLOL SUCCINATE 50 MG/1
50 TABLET, EXTENDED RELEASE ORAL 2 TIMES DAILY
Qty: 60 TABLET | Refills: 1 | Status: SHIPPED | OUTPATIENT
Start: 2018-12-17 | End: 2018-12-26 | Stop reason: SDUPTHER

## 2018-12-17 NOTE — PROGRESS NOTES
Pt. ID: Jaimee Quintana is a 43 y.o. female      Chief complaint:   Chief Complaint   Patient presents with    Follow-up    Diabetes       HPI: Pt. Here for f/u for DM and HTN; pt. Has stopped lisinopril HCTZ and started lisinopril but she does not have that bottle with her other bottles and is not sure if she is taking it; BP is elevated;  repeat sodium has normalized dated 9/27/18;  HGBA1C dated 6/19/18 was 12.1 and she is on VGO which is managed by endocrine; She has lost a few pounds; pt. Has a hx of poor history of compliance with meds and I explained risks of non-compliance; pt. Has ulceration to low back for past 6 months; LMP: 11/20/18; she would like a flu shot; she would like diabetic eye camera      Review of Systems   Constitutional: Negative for chills and fever.   Respiratory: Negative for cough and shortness of breath.    Cardiovascular: Negative for chest pain.   Gastrointestinal: Negative for abdominal pain, nausea and vomiting.   Genitourinary: Negative for dysuria.   Skin:        Wound to low back region         Objective:    Physical Exam   Constitutional: She is oriented to person, place, and time.   Morbid obesity     Eyes: EOM are normal.   Neck: Normal range of motion.   Cardiovascular: Normal rate, regular rhythm and normal heart sounds.   Pulmonary/Chest: Effort normal and breath sounds normal.   Abdominal: Soft. There is no tenderness. There is no rebound and no guarding.   Musculoskeletal: Normal range of motion.   Neurological: She is alert and oriented to person, place, and time.   Skin: No rash noted.   Diabetic foot exam: sensation intact L foot and diminished sensation to R foot and no ulcers B/L; quarter size wound to low back    Vitals reviewed.        Health Maintenance   Topic Date Due    Eye Exam  08/22/2018    Hemoglobin A1c  02/28/2019    Lipid Panel  08/28/2019    Foot Exam  12/17/2019    Mammogram  08/07/2020    Pap Smear with HPV Cotest  08/03/2021    TETANUS  VACCINE  11/19/2024    Pneumococcal Vaccine (Medium Risk)  Completed    Influenza Vaccine  Completed         Assessment:     1. Hypertension associated with diabetes Sub-optimally controlled   2. Type 2 diabetes mellitus with diabetic polyneuropathy, with long-term current use of insulin Well controlled   3. Hyponatremia Poorly controlled   4. Wound of back, unspecified laterality, subsequent encounter Active   5. Amenorrhea Active   6. Hyperlipidemia, unspecified hyperlipidemia type Active   7. Morbid obesity with BMI of 40.0-44.9, adult Sub-optimally controlled   8. Needs flu shot Active   9. Diabetic eye exam Active   10. Preventative health care Active         Plan: Hypertension associated with diabetes  Comments:  increase toprol XL to 50 mg BID and pt. will verify if she is taking lisinopril and bring all bottles to 1 month f/u; encouraged low Na diet and weight loss   Orders:  -     metoprolol succinate (TOPROL-XL) 50 MG 24 hr tablet; Take 1 tablet (50 mg total) by mouth 2 (two) times daily.  Dispense: 60 tablet; Refill: 1  -     CBC auto differential; Future; Expected date: 12/17/2018  -     Comprehensive metabolic panel; Future; Expected date: 12/17/2018  -     Urinalysis; Future; Expected date: 12/17/2018    Type 2 diabetes mellitus with diabetic polyneuropathy, with long-term current use of insulin  -     CBC auto differential; Future; Expected date: 12/17/2018  -     Comprehensive metabolic panel; Future; Expected date: 12/17/2018  -     Hemoglobin A1c; Future; Expected date: 12/17/2018  -     Urinalysis; Future; Expected date: 12/17/2018  -     Microalbumin/creatinine urine ratio; Future; Expected date: 12/17/2018    Hyponatremia  Comments:  D/C lisinopril HCTZ and start lisinopril 20 mg BID; continue other BP meds; encouraged low Na diet and weight loss; repeat BP on f/u in 1 month   Orders:  -     lisinopril (PRINIVIL,ZESTRIL) 20 MG tablet; Take 1 tablet (20 mg total) by mouth 2 (two) times daily.   Dispense: 60 tablet; Refill: 1  -     Comprehensive metabolic panel; Future; Expected date: 12/17/2018    Wound of back, unspecified laterality, subsequent encounter  Comments:  bactroban to wound and refer to wound care   Orders:  -     Ambulatory referral to Wound Clinic  -     mupirocin (BACTROBAN) 2 % ointment; Apply topically 2 (two) times daily. for 10 days  Dispense: 30 g; Refill: 0    Amenorrhea  Comments:  LMP close to 4 weeks ago; get urine pregnancy   Orders:  -     PREGNANCY TEST, URINE RAPID    Hyperlipidemia, unspecified hyperlipidemia type  Comments:  continue current regimen and encouraged diet modification; repeat lipids on f/u in 1 month   Orders:  -     Lipid panel; Future; Expected date: 12/17/2018    Morbid obesity with BMI of 40.0-44.9, adult  Comments:  encouraged diet and explained risks     Needs flu shot  -     Influenza - Quadrivalent (3 years & older) (PF)    Diabetic eye exam  -     Diabetic Eye Screening Photo; Future    Preventative health care  -     CBC auto differential; Future; Expected date: 12/17/2018  -     Comprehensive metabolic panel; Future; Expected date: 12/17/2018  -     Lipid panel; Future; Expected date: 12/17/2018  -     Urinalysis; Future; Expected date: 12/17/2018        Problem List Items Addressed This Visit        Ophtho    Diabetic eye exam    Relevant Orders    Diabetic Eye Screening Photo       Cardiac/Vascular    Hyperlipidemia    Relevant Orders    Lipid panel    Hypertension associated with diabetes - Primary    Relevant Medications    metoprolol succinate (TOPROL-XL) 50 MG 24 hr tablet    lisinopril (PRINIVIL,ZESTRIL) 20 MG tablet    Other Relevant Orders    CBC auto differential    Comprehensive metabolic panel    Urinalysis       Renal/    Amenorrhea    Relevant Orders    PREGNANCY TEST, URINE RAPID       ID    Needs flu shot    Relevant Orders    Influenza - Quadrivalent (3 years & older) (PF)       Endocrine    Type 2 diabetes mellitus with diabetic  polyneuropathy, with long-term current use of insulin    Relevant Orders    CBC auto differential    Comprehensive metabolic panel    Hemoglobin A1c    Urinalysis    Microalbumin/creatinine urine ratio    Morbid obesity with BMI of 40.0-44.9, adult       Orthopedic    Back wound    Relevant Medications    mupirocin (BACTROBAN) 2 % ointment    Other Relevant Orders    Ambulatory referral to Wound Clinic       Other    Preventative health care    Relevant Orders    CBC auto differential    Comprehensive metabolic panel    Lipid panel    Urinalysis

## 2018-12-17 NOTE — PROGRESS NOTES
Jaimee Quintana is a 43 y.o. female here for a diabetic eye screening with non-dilated fundus photos per Dr. Taylor.    Patient cooperative?: Yes  Small pupils?: Yes  Last eye exam: 08/22/2017  For exam results, see Encounter Report.

## 2018-12-18 LAB — B-HCG UR QL: NEGATIVE

## 2018-12-18 RX ORDER — TRIAMCINOLONE ACETONIDE 1 MG/G
CREAM TOPICAL
Qty: 45 G | Refills: 0 | Status: SHIPPED | OUTPATIENT
Start: 2018-12-18 | End: 2019-09-16 | Stop reason: ALTCHOICE

## 2018-12-24 ENCOUNTER — HOSPITAL ENCOUNTER (EMERGENCY)
Facility: HOSPITAL | Age: 43
Discharge: HOME | End: 2018-12-24
Attending: EMERGENCY MEDICINE
Payer: MEDICARE

## 2018-12-24 VITALS
DIASTOLIC BLOOD PRESSURE: 86 MMHG | HEART RATE: 85 BPM | OXYGEN SATURATION: 98 % | BODY MASS INDEX: 46.65 KG/M2 | RESPIRATION RATE: 16 BRPM | TEMPERATURE: 98 F | SYSTOLIC BLOOD PRESSURE: 164 MMHG | WEIGHT: 280 LBS | HEIGHT: 65 IN

## 2018-12-24 DIAGNOSIS — N76.0 VULVOVAGINITIS: Primary | ICD-10-CM

## 2018-12-24 LAB
B-HCG UR QL: NEGATIVE
BACTERIA #/AREA URNS HPF: ABNORMAL /HPF
BACTERIA GENITAL QL WET PREP: ABNORMAL
BILIRUB UR QL STRIP: ABNORMAL
CLARITY UR: CLEAR
CLUE CELLS VAG QL WET PREP: ABNORMAL
COLOR UR: YELLOW
CTP QC/QA: YES
FILAMENT FUNGI VAG WET PREP-#/AREA: ABNORMAL
GLUCOSE UR QL STRIP: ABNORMAL
HGB UR QL STRIP: ABNORMAL
KETONES UR QL STRIP: NEGATIVE
LEUKOCYTE ESTERASE UR QL STRIP: NEGATIVE
MICROSCOPIC COMMENT: ABNORMAL
NITRITE UR QL STRIP: NEGATIVE
PH UR STRIP: 6 [PH] (ref 5–8)
POCT GLUCOSE: 270 MG/DL (ref 70–110)
PROT UR QL STRIP: NEGATIVE
RBC #/AREA URNS HPF: 20 /HPF (ref 0–4)
SP GR UR STRIP: 1.02 (ref 1–1.03)
SPECIMEN SOURCE: ABNORMAL
T VAGINALIS GENITAL QL WET PREP: ABNORMAL
URN SPEC COLLECT METH UR: ABNORMAL
UROBILINOGEN UR STRIP-ACNC: NEGATIVE EU/DL
WBC #/AREA URNS HPF: 5 /HPF (ref 0–5)
WBC #/AREA VAG WET PREP: ABNORMAL
YEAST GENITAL QL WET PREP: ABNORMAL
YEAST URNS QL MICRO: ABNORMAL

## 2018-12-24 PROCEDURE — 25000003 PHARM REV CODE 250: Performed by: PHYSICIAN ASSISTANT

## 2018-12-24 PROCEDURE — 87491 CHLMYD TRACH DNA AMP PROBE: CPT

## 2018-12-24 PROCEDURE — 81000 URINALYSIS NONAUTO W/SCOPE: CPT

## 2018-12-24 PROCEDURE — 81025 URINE PREGNANCY TEST: CPT | Performed by: EMERGENCY MEDICINE

## 2018-12-24 PROCEDURE — 87210 SMEAR WET MOUNT SALINE/INK: CPT

## 2018-12-24 PROCEDURE — 99284 EMERGENCY DEPT VISIT MOD MDM: CPT

## 2018-12-24 RX ORDER — ACETAMINOPHEN 500 MG
1000 TABLET ORAL
Status: COMPLETED | OUTPATIENT
Start: 2018-12-24 | End: 2018-12-24

## 2018-12-24 RX ORDER — DOXYLAMINE SUCCINATE 25 MG
TABLET ORAL 2 TIMES DAILY
Qty: 30 G | Refills: 0 | Status: SHIPPED | OUTPATIENT
Start: 2018-12-24 | End: 2019-10-16

## 2018-12-24 RX ADMIN — ACETAMINOPHEN 1000 MG: 500 TABLET ORAL at 06:12

## 2018-12-24 NOTE — ED NOTES
APPEARANCE: Alert, oriented and in no acute distress.  CARDIAC: Normal rate and rhythm.   PERIPHERAL VASCULAR: peripheral pulses present. Normal cap refill. No edema. Warm to touch.    RESPIRATORY:Normal rate and effort. Respirations are equal and unlabored no obvious signs of distress.  GASTRO: soft, no tenderness, no abdominal distention.  MUSC: Full ROM. No bony tenderness or soft tissue tenderness. No obvious deformity.  SKIN: Skin is warm and dry, normal skin turgor, mucous membranes moist.  NEURO: 5/5 strength major flexors/extensors bilaterally. Sensory intact to light touch bilaterally. Jabari coma scale: eyes open spontaneously-4, oriented & converses-5, obeys commands-6. No neurological abnormalities.   MENTAL STATUS: awake, alert and aware of environment.  EYE: PERRL, both eyes: pupils brisk and reactive to light. Normal size.  ENT: EARS: no obvious drainage. NOSE: no active bleeding.

## 2018-12-24 NOTE — ED NOTES
"Burning and itching to genital area.  Was seen recently by GYN and diagnosed with UTI.  Pt was given a "cream" to use for the burning and itching which helped temporarily but has returned.  Denies fever, nausea/vomiting/dysuria or hematuria.  Pt reports has not taken her blood pressure medication today  "

## 2018-12-25 PROBLEM — E11.3393 MODERATE NONPROLIFERATIVE DIABETIC RETINOPATHY OF BOTH EYES WITHOUT MACULAR EDEMA ASSOCIATED WITH TYPE 2 DIABETES MELLITUS: Status: ACTIVE | Noted: 2018-12-25

## 2018-12-25 LAB
C TRACH DNA SPEC QL NAA+PROBE: NOT DETECTED
N GONORRHOEA DNA SPEC QL NAA+PROBE: NOT DETECTED

## 2018-12-25 NOTE — ED PROVIDER NOTES
Encounter Date: 2018       History     Chief Complaint   Patient presents with    Female  Problem     Burning upon urination, with vaginal itching and discharge x3 weeks     42 yo female with PMh of DM2, HTN, depression, CVA, GERD, HLD presents to the Ed with complaints of vaginal burning and itching x 2 weeks. Patient states the discomfort is constant but increases with urination. Also admits to a white/ yellow discharge. Patient states she saw her ob/gyn and was prescribed a cream which initially relieved symptoms but then stopped working. I cant not find a recent encounter with her Ob/gyn in her chart. Denies pelvic pain, vaginal bleeding, hematuria, lesions. She states her menstrual period just ended.       The history is provided by the patient. No  was used.     Review of patient's allergies indicates:  No Known Allergies  Past Medical History:   Diagnosis Date    CVA (cerebral infarction)          Depression     Diabetes mellitus, type 2     GERD (gastroesophageal reflux disease)     Hyperlipidemia     Hypertension     Obesity     Stroke      Past Surgical History:   Procedure Laterality Date     SECTION      CHOLECYSTECTOMY      CHOLECYSTECTOMY-LAPAROSCOPIC N/A 3/8/2017    [facility]    DILATION AND CURETTAGE OF UTERUS      GALLBLADDER SURGERY  2017    stone removed     Family History   Problem Relation Age of Onset    Stroke Mother     Thyroid disease Mother     Diabetes Mother     Cataracts Father     Hypertension Father     Arthritis Father     Diabetes Father     Hypertension Sister     Stroke Sister     Thyroid disease Sister     Heart disease Sister     Thyroid disease Daughter     Asthma Daughter     No Known Problems Brother     No Known Problems Maternal Aunt     No Known Problems Maternal Uncle     No Known Problems Paternal Aunt     No Known Problems Paternal Uncle     No Known Problems  Maternal Grandmother     No Known Problems Maternal Grandfather     No Known Problems Paternal Grandmother     No Known Problems Paternal Grandfather     Amblyopia Neg Hx     Blindness Neg Hx     Cancer Neg Hx     Glaucoma Neg Hx     Macular degeneration Neg Hx     Retinal detachment Neg Hx     Strabismus Neg Hx      Social History     Tobacco Use    Smoking status: Former Smoker     Packs/day: 1.00     Years: 18.00     Pack years: 18.00     Types: Cigarettes     Start date:      Last attempt to quit: 2005     Years since quittin.9    Smokeless tobacco: Never Used    Tobacco comment: quit approx 13 years ago   Substance Use Topics    Alcohol use: No    Drug use: No     Review of Systems   Constitutional: Negative for fever.   Genitourinary: Positive for dysuria, vaginal discharge and vaginal pain. Negative for hematuria, pelvic pain and vaginal bleeding.   All other systems reviewed and are negative.      Physical Exam     Initial Vitals [18 1650]   BP Pulse Resp Temp SpO2   (!) 196/108 99 16 98.3 °F (36.8 °C) 99 %      MAP       --         Physical Exam    Nursing note and vitals reviewed.  Constitutional: Vital signs are normal. She appears well-developed and well-nourished. No distress.   HENT:   Head: Normocephalic and atraumatic.   Nose: Nose normal.   Eyes: Conjunctivae and lids are normal.   Neck: Normal range of motion and phonation normal.   Cardiovascular: Normal rate, regular rhythm and normal heart sounds.   Pulmonary/Chest: Effort normal and breath sounds normal.   Abdominal: Soft. Normal appearance and bowel sounds are normal.   Genitourinary: Pelvic exam was performed with patient supine. There is erythema in the vagina. Vaginal discharge (brown) found.   Genitourinary Comments: Multiple linear fissures noted to vulva and vaginal os. Excoriations also noted. No ulcerations. Patient states increased pain with pelvic exam   Musculoskeletal: Normal range of motion.    Neurological: She is alert and oriented to person, place, and time.   Skin: Skin is warm and dry.   Psychiatric: She has a normal mood and affect. Her speech is normal and behavior is normal. Judgment and thought content normal. Cognition and memory are normal.         ED Course   Procedures  Labs Reviewed   URINALYSIS, REFLEX TO URINE CULTURE - Abnormal; Notable for the following components:       Result Value    Glucose, UA 3+ (*)     Bilirubin (UA) 2+ (*)     Occult Blood UA 3+ (*)     All other components within normal limits    Narrative:     Preferred Collection Type->Urine, Clean Catch   URINALYSIS MICROSCOPIC - Abnormal; Notable for the following components:    RBC, UA 20 (*)     All other components within normal limits    Narrative:     Preferred Collection Type->Urine, Clean Catch   VAGINAL SCREEN - Abnormal; Notable for the following components:    Bacteria - Vaginal Screen Few (*)     All other components within normal limits   POCT GLUCOSE - Abnormal; Notable for the following components:    POCT Glucose 270 (*)     All other components within normal limits   C. TRACHOMATIS/N. GONORRHOEAE BY AMP DNA   C. TRACHOMATIS/N. GONORRHOEAE BY AMP DNA   C. TRACHOMATIS/N. GONORRHOEAE BY AMP DNA   POCT URINE PREGNANCY   POCT GLUCOSE MONITORING CONTINUOUS          Imaging Results    None          Medical Decision Making:   Initial Assessment:   42 yo female with vaginal burning and itching x 2 weeks, yellow/ white discharge.  exam reveals brown discharge in the vaginal vault, multiple minor superficial fissures to vulva and vaginal os with excoriations. No ulcerations noted.   Differential Diagnosis:   Vulvovaginitis, yeast infection, herpes  Clinical Tests:   Lab Tests: Ordered and Reviewed  ED Management:  Vaginal screen with few bacteria, UA with glucose, protein, and blood.   Patients physical and symptoms consistent with vulvovaginitis. She will be discharged with rx for miconazole and instructed to follow  up with her Ob/Gyn as soon as possible. Return to ER with new or worsening symptoms. She states understanding and agreement with Treatment plan.                       Clinical Impression:   The encounter diagnosis was Vulvovaginitis.                             Melinda Vasquez PA-C  12/24/18 1912

## 2018-12-26 ENCOUNTER — PATIENT MESSAGE (OUTPATIENT)
Dept: FAMILY MEDICINE | Facility: CLINIC | Age: 43
End: 2018-12-26

## 2018-12-26 ENCOUNTER — TELEPHONE (OUTPATIENT)
Dept: FAMILY MEDICINE | Facility: CLINIC | Age: 43
End: 2018-12-26

## 2018-12-26 ENCOUNTER — HOSPITAL ENCOUNTER (OUTPATIENT)
Dept: WOUND CARE | Facility: HOSPITAL | Age: 43
Discharge: HOME | End: 2018-12-26
Attending: FAMILY MEDICINE
Payer: MEDICARE

## 2018-12-26 VITALS
DIASTOLIC BLOOD PRESSURE: 84 MMHG | TEMPERATURE: 98 F | HEIGHT: 65 IN | BODY MASS INDEX: 45.32 KG/M2 | HEART RATE: 98 BPM | WEIGHT: 272 LBS | SYSTOLIC BLOOD PRESSURE: 182 MMHG

## 2018-12-26 DIAGNOSIS — E11.3393 MODERATE NONPROLIFERATIVE DIABETIC RETINOPATHY OF BOTH EYES WITHOUT MACULAR EDEMA ASSOCIATED WITH TYPE 2 DIABETES MELLITUS: ICD-10-CM

## 2018-12-26 DIAGNOSIS — E11.59 HYPERTENSION ASSOCIATED WITH DIABETES: ICD-10-CM

## 2018-12-26 DIAGNOSIS — S21.201A WOUND OF RIGHT SIDE OF BACK, INITIAL ENCOUNTER: Primary | ICD-10-CM

## 2018-12-26 DIAGNOSIS — I15.2 HYPERTENSION ASSOCIATED WITH DIABETES: ICD-10-CM

## 2018-12-26 DIAGNOSIS — E11.42 DIABETIC PERIPHERAL NEUROPATHY ASSOCIATED WITH TYPE 2 DIABETES MELLITUS: ICD-10-CM

## 2018-12-26 DIAGNOSIS — S21.202A WOUND OF LEFT SIDE OF BACK, INITIAL ENCOUNTER: ICD-10-CM

## 2018-12-26 PROCEDURE — 99213 OFFICE O/P EST LOW 20 MIN: CPT

## 2018-12-26 PROCEDURE — 87070 CULTURE OTHR SPECIMN AEROBIC: CPT

## 2018-12-26 PROCEDURE — 87075 CULTR BACTERIA EXCEPT BLOOD: CPT

## 2018-12-26 PROCEDURE — 87147 CULTURE TYPE IMMUNOLOGIC: CPT

## 2018-12-26 RX ORDER — METOPROLOL SUCCINATE 50 MG/1
50 TABLET, EXTENDED RELEASE ORAL 2 TIMES DAILY
Qty: 180 TABLET | Refills: 0 | Status: SHIPPED | OUTPATIENT
Start: 2018-12-26 | End: 2019-01-10 | Stop reason: SDUPTHER

## 2018-12-26 RX ORDER — METRONIDAZOLE 500 MG/1
TABLET ORAL
Qty: 14 TABLET | Refills: 0 | Status: SHIPPED | OUTPATIENT
Start: 2018-12-26 | End: 2019-09-16 | Stop reason: ALTCHOICE

## 2018-12-26 RX ORDER — METOPROLOL SUCCINATE 25 MG/1
TABLET, EXTENDED RELEASE ORAL
Qty: 90 TABLET | Refills: 11 | OUTPATIENT
Start: 2018-12-26

## 2018-12-26 NOTE — PROGRESS NOTES
[facility]                Progress Note    Subjective:       Patient ID: Jaimee Quintana is a 43 y.o. female.    Chief Complaint: Non-healing Wound    Admit for a non healing wound to mid back.  Patient reports the wound has been present for a couple of months and may have started from a bug bite.  She admits to picking and scratching at the area.  Hx of DM.  Culture obtained.  Patient advised not to scratch or pick at the wound and instructed on wound care.  Verbalized understanding.          Review of Systems   Constitutional: Negative.    HENT: Negative.    Eyes: Negative.    Respiratory: Negative.    Cardiovascular: Negative.    Gastrointestinal: Negative.    Genitourinary: Negative.    Musculoskeletal: Negative.    Skin: Positive for color change, rash and wound.   Neurological: Negative.    Psychiatric/Behavioral: Negative.          Objective:        Physical Exam   Constitutional: She is oriented to person, place, and time. She appears well-developed and well-nourished.   HENT:   Head: Normocephalic.   Eyes: Conjunctivae and EOM are normal. Pupils are equal, round, and reactive to light.   Neck: Normal range of motion. Neck supple.   Cardiovascular: Normal rate, regular rhythm, normal heart sounds and intact distal pulses.   Pulmonary/Chest: Effort normal and breath sounds normal.   Abdominal: Soft. Bowel sounds are normal.   Musculoskeletal: Normal range of motion.   Neurological: She is alert and oriented to person, place, and time. She has normal reflexes.   Skin: Skin is warm and dry.       Vitals:    12/26/18 1007   BP: (!) 182/84   Pulse: 98   Temp: 98.2 °F (36.8 °C)       Assessment:           ICD-10-CM ICD-9-CM   1. Wound of right side of back, initial encounter S21.201A 876.0   2. Diabetic peripheral neuropathy associated with type 2 diabetes mellitus E11.42 250.60     357.2            Wound 12/26/18 1025 Ulceration midline Back #1 (Active)    12/26/18 1025    Pre-existing: Yes   Primary Wound Type: Ulceration   Side:    Orientation: midline   Location: Back   Wound/PI Number (optional): #1   Ankle-Brachial Index:    Pulses:    Removal Indication and Assessment:    Wound Outcome:    (Retired) Wound Type:    (Retired) Wound Length (cm):    (Retired) Wound Width (cm):    (Retired) Depth (cm):    Wound Description (Comments):    Removal Indications:    Wound Image   12/26/2018 10:24 AM   Dressing Appearance Open to air 12/26/2018 10:24 AM   Drainage Amount Scant 12/26/2018 10:24 AM   Drainage Characteristics/Odor Serosanguineous 12/26/2018 10:24 AM   Appearance Pink;Moist 12/26/2018 10:24 AM   Tissue loss description Full thickness 12/26/2018 10:24 AM   Red (%), Wound Tissue Color 100 % 12/26/2018 10:24 AM   Periwound Area Hemosiderin Staining;Indurated;Dry 12/26/2018 10:24 AM   Wound Edges Rolled/closed;Defined 12/26/2018 10:24 AM   Wound Length (cm) 1.7 cm 12/26/2018 10:24 AM   Wound Width (cm) 1 cm 12/26/2018 10:24 AM   Wound Depth (cm) 0.1 cm 12/26/2018 10:24 AM   Wound Volume (cm^3) 0.17 cm^3 12/26/2018 10:24 AM   Wound Surface Area (cm^2) 1.7 cm^2 12/26/2018 10:24 AM   Care Cleansed with: 12/26/2018 10:24 AM   Dressing Applied;Silver 12/26/2018 10:24 AM   Periwound Care Skin barrier film applied 12/26/2018 10:24 AM   Dressing Change Due 12/28/18 12/26/2018 10:24 AM       Wound #1: midline back  Cleansed with saline, cavilon reji wound, covered with aquacel ag with border dressing.    Wound care to be done on Mon, Wed, Fri  Culture obtained.     Plan:          Follow up with Dr. Larson in 1 week on Wed 1/2/19        [unfilled]

## 2018-12-28 ENCOUNTER — TELEPHONE (OUTPATIENT)
Dept: INTERNAL MEDICINE | Facility: CLINIC | Age: 43
End: 2018-12-28

## 2018-12-28 NOTE — TELEPHONE ENCOUNTER
----- Message from Ginger Eid sent at 12/28/2018  8:54 AM CST -----  Contact: self, [phone]  Patient called in returning your call. Please advise.

## 2018-12-29 LAB — BACTERIA SPEC AEROBE CULT: NORMAL

## 2018-12-31 LAB — BACTERIA SPEC ANAEROBE CULT: NORMAL

## 2019-01-06 ENCOUNTER — HOSPITAL ENCOUNTER (EMERGENCY)
Facility: HOSPITAL | Age: 44
Discharge: PSYCHIATRIC OR SUBSTANCE USE FACILITY | End: 2019-01-06
Attending: EMERGENCY MEDICINE
Payer: MEDICARE

## 2019-01-06 VITALS
OXYGEN SATURATION: 99 % | RESPIRATION RATE: 20 BRPM | BODY MASS INDEX: 45.82 KG/M2 | HEART RATE: 97 BPM | WEIGHT: 275 LBS | DIASTOLIC BLOOD PRESSURE: 91 MMHG | SYSTOLIC BLOOD PRESSURE: 160 MMHG | HEIGHT: 65 IN | TEMPERATURE: 98 F

## 2019-01-06 DIAGNOSIS — R45.851 SUICIDAL THOUGHTS: Primary | ICD-10-CM

## 2019-01-06 DIAGNOSIS — R06.02 SOB (SHORTNESS OF BREATH): ICD-10-CM

## 2019-01-06 LAB
ALBUMIN SERPL BCP-MCNC: 3.4 G/DL
ALP SERPL-CCNC: 89 U/L
ALT SERPL W/O P-5'-P-CCNC: 37 U/L
AMPHET+METHAMPHET UR QL: NEGATIVE
ANION GAP SERPL CALC-SCNC: 10 MMOL/L
APAP SERPL-MCNC: <3 UG/ML
AST SERPL-CCNC: 22 U/L
BACTERIA #/AREA URNS HPF: ABNORMAL /HPF
BARBITURATES UR QL SCN>200 NG/ML: NEGATIVE
BASOPHILS # BLD AUTO: 0.01 K/UL
BASOPHILS NFR BLD: 0.1 %
BENZODIAZ UR QL SCN>200 NG/ML: NEGATIVE
BILIRUB SERPL-MCNC: 0.2 MG/DL
BILIRUB UR QL STRIP: NEGATIVE
BUN SERPL-MCNC: 11 MG/DL
BZE UR QL SCN: NEGATIVE
CALCIUM SERPL-MCNC: 9.3 MG/DL
CANNABINOIDS UR QL SCN: NEGATIVE
CHLORIDE SERPL-SCNC: 102 MMOL/L
CLARITY UR: CLEAR
CO2 SERPL-SCNC: 22 MMOL/L
COLOR UR: YELLOW
CREAT SERPL-MCNC: 0.7 MG/DL
CREAT UR-MCNC: 40.2 MG/DL
DIFFERENTIAL METHOD: NORMAL
EOSINOPHIL # BLD AUTO: 0.2 K/UL
EOSINOPHIL NFR BLD: 2.3 %
ERYTHROCYTE [DISTWIDTH] IN BLOOD BY AUTOMATED COUNT: 12.3 %
EST. GFR  (AFRICAN AMERICAN): >60 ML/MIN/1.73 M^2
EST. GFR  (NON AFRICAN AMERICAN): >60 ML/MIN/1.73 M^2
ETHANOL SERPL-MCNC: <10 MG/DL
GLUCOSE SERPL-MCNC: 334 MG/DL
GLUCOSE UR QL STRIP: ABNORMAL
HCT VFR BLD AUTO: 43.7 %
HGB BLD-MCNC: 15 G/DL
HGB UR QL STRIP: NEGATIVE
KETONES UR QL STRIP: ABNORMAL
LEUKOCYTE ESTERASE UR QL STRIP: NEGATIVE
LYMPHOCYTES # BLD AUTO: 3.1 K/UL
LYMPHOCYTES NFR BLD: 39.1 %
MCH RBC QN AUTO: 30 PG
MCHC RBC AUTO-ENTMCNC: 34.3 G/DL
MCV RBC AUTO: 87 FL
METHADONE UR QL SCN>300 NG/ML: NEGATIVE
MICROSCOPIC COMMENT: ABNORMAL
MONOCYTES # BLD AUTO: 0.5 K/UL
MONOCYTES NFR BLD: 6.1 %
NEUTROPHILS # BLD AUTO: 4.1 K/UL
NEUTROPHILS NFR BLD: 52 %
NITRITE UR QL STRIP: NEGATIVE
OPIATES UR QL SCN: NEGATIVE
PCP UR QL SCN>25 NG/ML: NEGATIVE
PH UR STRIP: 8 [PH] (ref 5–8)
PLATELET # BLD AUTO: 235 K/UL
PMV BLD AUTO: 11.2 FL
POCT GLUCOSE: 303 MG/DL (ref 70–110)
POCT GLUCOSE: 307 MG/DL (ref 70–110)
POTASSIUM SERPL-SCNC: 4.6 MMOL/L
PROT SERPL-MCNC: 7.3 G/DL
PROT UR QL STRIP: NEGATIVE
RBC # BLD AUTO: 5 M/UL
SALICYLATES SERPL-MCNC: <5 MG/DL
SODIUM SERPL-SCNC: 134 MMOL/L
SP GR UR STRIP: 1.01 (ref 1–1.03)
TOXICOLOGY INFORMATION: NORMAL
TROPONIN I SERPL DL<=0.01 NG/ML-MCNC: 0.01 NG/ML
TSH SERPL DL<=0.005 MIU/L-ACNC: 2.78 UIU/ML
URN SPEC COLLECT METH UR: ABNORMAL
UROBILINOGEN UR STRIP-ACNC: NEGATIVE EU/DL
WBC # BLD AUTO: 7.93 K/UL
YEAST URNS QL MICRO: ABNORMAL

## 2019-01-06 PROCEDURE — 25000003 PHARM REV CODE 250: Performed by: EMERGENCY MEDICINE

## 2019-01-06 PROCEDURE — 93010 EKG 12-LEAD: ICD-10-PCS | Mod: ,,, | Performed by: INTERNAL MEDICINE

## 2019-01-06 PROCEDURE — 81000 URINALYSIS NONAUTO W/SCOPE: CPT | Mod: 59

## 2019-01-06 PROCEDURE — 93005 ELECTROCARDIOGRAM TRACING: CPT

## 2019-01-06 PROCEDURE — 80053 COMPREHEN METABOLIC PANEL: CPT

## 2019-01-06 PROCEDURE — 80307 DRUG TEST PRSMV CHEM ANLYZR: CPT

## 2019-01-06 PROCEDURE — 80320 DRUG SCREEN QUANTALCOHOLS: CPT

## 2019-01-06 PROCEDURE — 99285 EMERGENCY DEPT VISIT HI MDM: CPT | Mod: 25

## 2019-01-06 PROCEDURE — 96374 THER/PROPH/DIAG INJ IV PUSH: CPT

## 2019-01-06 PROCEDURE — 84443 ASSAY THYROID STIM HORMONE: CPT

## 2019-01-06 PROCEDURE — 96372 THER/PROPH/DIAG INJ SC/IM: CPT | Mod: 59

## 2019-01-06 PROCEDURE — 85025 COMPLETE CBC W/AUTO DIFF WBC: CPT

## 2019-01-06 PROCEDURE — 82962 GLUCOSE BLOOD TEST: CPT

## 2019-01-06 PROCEDURE — 80329 ANALGESICS NON-OPIOID 1 OR 2: CPT

## 2019-01-06 PROCEDURE — 93010 ELECTROCARDIOGRAM REPORT: CPT | Mod: ,,, | Performed by: INTERNAL MEDICINE

## 2019-01-06 PROCEDURE — 84484 ASSAY OF TROPONIN QUANT: CPT

## 2019-01-06 PROCEDURE — 63600175 PHARM REV CODE 636 W HCPCS: Performed by: EMERGENCY MEDICINE

## 2019-01-06 RX ORDER — FLUCONAZOLE 200 MG/1
200 TABLET ORAL
Status: COMPLETED | OUTPATIENT
Start: 2019-01-06 | End: 2019-01-06

## 2019-01-06 RX ORDER — METFORMIN HYDROCHLORIDE 500 MG/1
1000 TABLET ORAL ONCE
Status: COMPLETED | OUTPATIENT
Start: 2019-01-06 | End: 2019-01-06

## 2019-01-06 RX ADMIN — LORAZEPAM 1 MG: 2 INJECTION INTRAMUSCULAR; INTRAVENOUS at 03:01

## 2019-01-06 RX ADMIN — METFORMIN HYDROCHLORIDE 1000 MG: 500 TABLET ORAL at 08:01

## 2019-01-06 RX ADMIN — INSULIN HUMAN 10 UNITS: 100 INJECTION, SOLUTION PARENTERAL at 06:01

## 2019-01-06 RX ADMIN — FLUCONAZOLE 200 MG: 200 TABLET ORAL at 06:01

## 2019-01-06 NOTE — ED NOTES
Upon reassessment, Pt laying in bed, visibly calmer. She is speaking to family members that are not in the room with her.

## 2019-01-06 NOTE — ED PROVIDER NOTES
"Encounter Date: 2019    SCRIBE #1 NOTE: I, Helen Campbell, am scribing for, and in the presence of,  Dr. Hutchinson. I have scribed the entire note.       History     Chief Complaint   Patient presents with    Anxiety     PT C/O SOB AND FEELING ANXIOUS ONSET PTA AFTER FAMILY ISSUES THIS EVENING.     Jaimee Quintana is a 43 y.o. female who  has a past medical history of CVA (cerebral infarction) (), Depression, Diabetes mellitus, type 2, GERD (gastroesophageal reflux disease), Hyperlipidemia, Hypertension, Obesity, and Stroke.    The patient presents to the ED via EMS due to feeling anxious and SOB with an onset of PTA due to family issues this evening. The patient states she was gasping for air for a couple of hours. The patient reports she is not safe with the man that she is with at this moment, stating he is only there for child support. She reports she would like to find somewhere safe. The patient reports most of her stress is caused by the man she is with and her two daughters. She reports of wanting to hurt herself and "to be with my mother and brother." She has no other complaint at this time. She denies A/VH      The history is provided by the patient.     Review of patient's allergies indicates:  No Known Allergies  Past Medical History:   Diagnosis Date    CVA (cerebral infarction)          Depression     Diabetes mellitus, type 2     GERD (gastroesophageal reflux disease)     Hyperlipidemia     Hypertension     Obesity     Stroke      Past Surgical History:   Procedure Laterality Date     SECTION      CHOLECYSTECTOMY      CHOLECYSTECTOMY-LAPAROSCOPIC N/A 3/8/2017    Performed by Lashawn Collier DO at [facility] OR    DILATION AND CURETTAGE OF UTERUS      GALLBLADDER SURGERY  2017    stone removed     Family History   Problem Relation Age of Onset    Stroke Mother     Thyroid disease Mother     Diabetes Mother     Cataracts Father     Hypertension Father     Arthritis " Father     Diabetes Father     Hypertension Sister     Stroke Sister     Thyroid disease Sister     Heart disease Sister     Thyroid disease Daughter     Asthma Daughter     No Known Problems Brother     No Known Problems Maternal Aunt     No Known Problems Maternal Uncle     No Known Problems Paternal Aunt     No Known Problems Paternal Uncle     No Known Problems Maternal Grandmother     No Known Problems Maternal Grandfather     No Known Problems Paternal Grandmother     No Known Problems Paternal Grandfather     Amblyopia Neg Hx     Blindness Neg Hx     Cancer Neg Hx     Glaucoma Neg Hx     Macular degeneration Neg Hx     Retinal detachment Neg Hx     Strabismus Neg Hx      Social History     Tobacco Use    Smoking status: Former Smoker     Packs/day: 1.00     Years: 18.00     Pack years: 18.00     Types: Cigarettes     Start date:      Last attempt to quit:      Years since quittin.0    Smokeless tobacco: Never Used    Tobacco comment: quit approx 13 years ago   Substance Use Topics    Alcohol use: No    Drug use: No     Review of Systems   Constitutional: Negative for chills and fever.   HENT: Negative for sore throat.    Respiratory: Positive for shortness of breath. Negative for cough.    Cardiovascular: Negative for chest pain.   Gastrointestinal: Negative for nausea and vomiting.   Genitourinary: Negative for dysuria, frequency and urgency.   Musculoskeletal: Negative for back pain, neck pain and neck stiffness.   Skin: Negative for rash and wound.   Neurological: Negative for syncope and weakness.   Hematological: Does not bruise/bleed easily.   Psychiatric/Behavioral: Positive for suicidal ideas. Negative for agitation, behavioral problems and confusion. The patient is nervous/anxious.        Physical Exam     Initial Vitals [19 0300]   BP Pulse Resp Temp SpO2   (!) 175/96 94 18 98.7 °F (37.1 °C) 100 %      MAP       --         Physical Exam    Nursing note  and vitals reviewed.  Constitutional: She appears well-developed and well-nourished. She is not diaphoretic. No distress.   Tearful   HENT:   Head: Normocephalic and atraumatic.   Mouth/Throat: Oropharynx is clear and moist.   Eyes: EOM are normal. Pupils are equal, round, and reactive to light.   Neck: No tracheal deviation present.   Cardiovascular: Normal rate, regular rhythm, normal heart sounds and intact distal pulses.   Pulmonary/Chest: Breath sounds normal. No stridor. No respiratory distress. She has no wheezes.   Abdominal: Soft. Bowel sounds are normal. She exhibits no distension and no mass. There is no tenderness.   Musculoskeletal: Normal range of motion. She exhibits no edema.   Neurological: She is alert and oriented to person, place, and time. No cranial nerve deficit or sensory deficit.   Skin: Skin is warm and dry. Capillary refill takes less than 2 seconds. No rash noted.   Psychiatric: She has a normal mood and affect. Her behavior is normal. Thought content normal.         ED Course   Procedures  Labs Reviewed   COMPREHENSIVE METABOLIC PANEL - Abnormal; Notable for the following components:       Result Value    Sodium 134 (*)     CO2 22 (*)     Glucose 334 (*)     Albumin 3.4 (*)     All other components within normal limits   URINALYSIS, REFLEX TO URINE CULTURE - Abnormal; Notable for the following components:    Glucose, UA 3+ (*)     Ketones, UA Trace (*)     All other components within normal limits    Narrative:     Preferred Collection Type->Urine, Clean Catch   ACETAMINOPHEN LEVEL - Abnormal; Notable for the following components:    Acetaminophen (Tylenol), Serum <3.0 (*)     All other components within normal limits   SALICYLATE LEVEL - Abnormal; Notable for the following components:    Salicylate Lvl <5.0 (*)     All other components within normal limits   URINALYSIS MICROSCOPIC - Abnormal; Notable for the following components:    Yeast, UA Occasional (*)     All other components  within normal limits    Narrative:     Preferred Collection Type->Urine, Clean Catch   CBC W/ AUTO DIFFERENTIAL   TSH   DRUG SCREEN PANEL, URINE EMERGENCY    Narrative:     Preferred Collection Type->Urine, Clean Catch   ALCOHOL,MEDICAL (ETHANOL)   TROPONIN I     EKG Readings: (Independently Interpreted)   Rate of 97. Normal sinus rhythm. T wave inversion in lead I. No ST segment changes. No STEMI       Imaging Results          X-Ray Chest AP Portable (Final result)  Result time 01/06/19 04:11:41    Final result by Ninoska Burt MD (01/06/19 04:11:41)                 Impression:      No acute intrathoracic abnormality identified on this single radiographic view of the chest.      Electronically signed by: Ninoska Burt MD  Date:    01/06/2019  Time:    04:11             Narrative:    EXAMINATION:  XR CHEST AP PORTABLE    CLINICAL HISTORY:  dyspnea;    TECHNIQUE:  Single frontal view of the chest was performed.    COMPARISON:  None    FINDINGS:  The cardiomediastinal silhouette is within normal limits. The visualized airway is unremarkable.  The lungs appear symmetrically aerated without definite focal alveolar consolidation. No large pleural effusion or pneumothorax is appreciated.Visualized osseous structures demonstrate no acute abnormality.                                 Medical Decision Making:   Differential Diagnosis:   Differential Diagnosis includes, but is not limited to:  Decompensated psychiatric disease (schizophrenia, bipolar disorder, major depression), excited delirium, medication noncompliance, substance abuse/withdrawal, intentional drug overdose, medication toxicity, APAP/ASA overdose, acute stress reaction, personality disorder, malingering, metabolic derangement    Independently Interpreted Test(s):   I have ordered and independently interpreted EKG Reading(s) - see prior notes  Clinical Tests:   Lab Tests: Ordered and Reviewed  Radiological Study: Ordered and Reviewed  Medical Tests: Ordered  and Reviewed  ED Management:  44 yo female with anxiety, sob and si  Labs show hyperglycemia - given insulin , candiuria given diflucan  GIven ativan for anxiety, reports dyspnea has resolved      Due to patient's presentation, history, and current condition, a PEC was completed for the safety of the patient and medical staff during evaluation and management.  One-to-one observation was initiated.     The patient is medically cleared for psychiatric evaluation and transfer to inpatient psychiatric facility as necessary.                        Clinical Impression:     1. Suicidal thoughts    2. SOB (shortness of breath)         Scribe attestation I, Daniel Hutchinson,  personally performed the services described in this documentation. All medical record entries made by the scribe were at my direction and in my presence.  I have reviewed the chart and agree that the record reflects my personal performance and is accurate and complete. Daniel Hutchinson M.D. 1:34 PM01/07/2019                      Daniel Hutchinson Jr., MD  01/07/19 [id number]

## 2019-01-06 NOTE — ED NOTES
Pt moved to room 9 and sitter assigned to pt. Staff at BS. AC called for paper scrubs large enough to fit pt.

## 2019-01-06 NOTE — ED NOTES
Please check one of the boxes located underneath the Nearest Relative address section and one of the boxes after the number 2. under IS PATIENT CURRENTLY section on the PEC and re fax it.

## 2019-01-06 NOTE — ED NOTES
Pt getting increasing more upset and hysterical when attempting to complete suicide assessment.  Will attempt to reassess later when patient more clam.

## 2019-01-06 NOTE — ED TRIAGE NOTES
Patient accepted by Addie at [facility] ([address], [facility] [address]) for the service of Dr. Diaz.  Report to be called to [phone].

## 2019-01-06 NOTE — ED NOTES
SPOKE TO PT'S SISTER ABELARDO AND UPDATED HER ON PT WITH PT'S PERMISSION. ARRANGEMENTS MADE FOR ABELARDO TO COME AND  PT'S CHILDREN PER PT REQUEST.

## 2019-01-06 NOTE — ED NOTES
Updated PEC received in Centralized Placement.  Awaiting lab results and provider notes stating that the pt is medically cleared for placement.

## 2019-01-06 NOTE — ED NOTES
Admit packet faxed to [facility], [facility], [facility], and [facility], [facility], [facility], [facility], [facility], [facility]. Waiting for response.

## 2019-01-06 NOTE — ED NOTES
Pt clothes placed in pt belonging back and placed in closet.  Pt only has this 1 bag. All personal belongings were taken home with sister and daughters.

## 2019-01-06 NOTE — ED NOTES
Pt presents to ED via [facility]. Pt c/o SOB and feeling anxious after family issues this evening.

## 2019-01-06 NOTE — ED NOTES
PEC received in Centralized Placement.  Awaiting lab results and provider notes stating that the pt is medically cleared for placement.

## 2019-01-10 ENCOUNTER — TELEPHONE (OUTPATIENT)
Dept: FAMILY MEDICINE | Facility: CLINIC | Age: 44
End: 2019-01-10

## 2019-01-10 DIAGNOSIS — E11.59 HYPERTENSION ASSOCIATED WITH DIABETES: ICD-10-CM

## 2019-01-10 DIAGNOSIS — I15.2 HYPERTENSION ASSOCIATED WITH DIABETES: ICD-10-CM

## 2019-01-10 RX ORDER — METOPROLOL SUCCINATE 50 MG/1
50 TABLET, EXTENDED RELEASE ORAL 2 TIMES DAILY
Qty: 180 TABLET | Refills: 0 | Status: SHIPPED | OUTPATIENT
Start: 2019-01-10 | End: 2019-02-01

## 2019-01-10 RX ORDER — METOPROLOL SUCCINATE 25 MG/1
TABLET, EXTENDED RELEASE ORAL
Qty: 90 TABLET | Refills: 0 | OUTPATIENT
Start: 2019-01-10

## 2019-01-14 ENCOUNTER — LAB VISIT (OUTPATIENT)
Dept: LAB | Facility: HOSPITAL | Age: 44
End: 2019-01-14
Attending: INTERNAL MEDICINE
Payer: MEDICARE

## 2019-01-14 DIAGNOSIS — Z79.4 TYPE 2 DIABETES MELLITUS WITH DIABETIC POLYNEUROPATHY, WITH LONG-TERM CURRENT USE OF INSULIN: ICD-10-CM

## 2019-01-14 DIAGNOSIS — E87.1 HYPONATREMIA: ICD-10-CM

## 2019-01-14 DIAGNOSIS — I15.2 HYPERTENSION ASSOCIATED WITH DIABETES: ICD-10-CM

## 2019-01-14 DIAGNOSIS — E11.59 HYPERTENSION ASSOCIATED WITH DIABETES: ICD-10-CM

## 2019-01-14 DIAGNOSIS — E78.5 HYPERLIPIDEMIA, UNSPECIFIED HYPERLIPIDEMIA TYPE: ICD-10-CM

## 2019-01-14 DIAGNOSIS — Z00.00 PREVENTATIVE HEALTH CARE: ICD-10-CM

## 2019-01-14 DIAGNOSIS — E11.42 TYPE 2 DIABETES MELLITUS WITH DIABETIC POLYNEUROPATHY, WITH LONG-TERM CURRENT USE OF INSULIN: ICD-10-CM

## 2019-01-14 LAB
ALBUMIN SERPL BCP-MCNC: 3.4 G/DL
ALP SERPL-CCNC: 63 U/L
ALT SERPL W/O P-5'-P-CCNC: 34 U/L
ANION GAP SERPL CALC-SCNC: 9 MMOL/L
AST SERPL-CCNC: 15 U/L
BASOPHILS # BLD AUTO: 0.04 K/UL
BASOPHILS NFR BLD: 0.6 %
BILIRUB SERPL-MCNC: 0.3 MG/DL
BUN SERPL-MCNC: 8 MG/DL
CALCIUM SERPL-MCNC: 9.6 MG/DL
CHLORIDE SERPL-SCNC: 102 MMOL/L
CHOLEST SERPL-MCNC: 209 MG/DL
CHOLEST/HDLC SERPL: 3.7 {RATIO}
CO2 SERPL-SCNC: 23 MMOL/L
CREAT SERPL-MCNC: 0.7 MG/DL
DIFFERENTIAL METHOD: NORMAL
EOSINOPHIL # BLD AUTO: 0.2 K/UL
EOSINOPHIL NFR BLD: 2.2 %
ERYTHROCYTE [DISTWIDTH] IN BLOOD BY AUTOMATED COUNT: 12.1 %
EST. GFR  (AFRICAN AMERICAN): >60 ML/MIN/1.73 M^2
EST. GFR  (NON AFRICAN AMERICAN): >60 ML/MIN/1.73 M^2
ESTIMATED AVG GLUCOSE: 306 MG/DL
GLUCOSE SERPL-MCNC: 169 MG/DL
HBA1C MFR BLD HPLC: 12.3 %
HCT VFR BLD AUTO: 47.6 %
HDLC SERPL-MCNC: 57 MG/DL
HDLC SERPL: 27.3 %
HGB BLD-MCNC: 15.6 G/DL
IMM GRANULOCYTES # BLD AUTO: 0.03 K/UL
IMM GRANULOCYTES NFR BLD AUTO: 0.4 %
LDLC SERPL CALC-MCNC: 130.4 MG/DL
LYMPHOCYTES # BLD AUTO: 2.2 K/UL
LYMPHOCYTES NFR BLD: 30.2 %
MCH RBC QN AUTO: 29.5 PG
MCHC RBC AUTO-ENTMCNC: 32.8 G/DL
MCV RBC AUTO: 90 FL
MONOCYTES # BLD AUTO: 0.4 K/UL
MONOCYTES NFR BLD: 5.9 %
NEUTROPHILS # BLD AUTO: 4.4 K/UL
NEUTROPHILS NFR BLD: 60.7 %
NONHDLC SERPL-MCNC: 152 MG/DL
NRBC BLD-RTO: 0 /100 WBC
PLATELET # BLD AUTO: 231 K/UL
PMV BLD AUTO: 11.6 FL
POTASSIUM SERPL-SCNC: 4.1 MMOL/L
PROT SERPL-MCNC: 7.6 G/DL
RBC # BLD AUTO: 5.29 M/UL
SODIUM SERPL-SCNC: 134 MMOL/L
TRIGL SERPL-MCNC: 108 MG/DL
WBC # BLD AUTO: 7.24 K/UL

## 2019-01-14 PROCEDURE — 83036 HEMOGLOBIN GLYCOSYLATED A1C: CPT

## 2019-01-14 PROCEDURE — 80061 LIPID PANEL: CPT

## 2019-01-14 PROCEDURE — 85025 COMPLETE CBC W/AUTO DIFF WBC: CPT

## 2019-01-14 PROCEDURE — 36415 COLL VENOUS BLD VENIPUNCTURE: CPT | Mod: PO

## 2019-01-14 PROCEDURE — 80053 COMPREHEN METABOLIC PANEL: CPT

## 2019-01-15 ENCOUNTER — TELEPHONE (OUTPATIENT)
Dept: FAMILY MEDICINE | Facility: CLINIC | Age: 44
End: 2019-01-15

## 2019-01-31 DIAGNOSIS — E78.5 HYPERLIPIDEMIA, UNSPECIFIED HYPERLIPIDEMIA TYPE: ICD-10-CM

## 2019-01-31 DIAGNOSIS — M54.50 LOW BACK PAIN WITHOUT SCIATICA: ICD-10-CM

## 2019-02-01 ENCOUNTER — OFFICE VISIT (OUTPATIENT)
Dept: INTERNAL MEDICINE | Facility: CLINIC | Age: 44
End: 2019-02-01
Payer: MEDICARE

## 2019-02-01 VITALS
HEART RATE: 104 BPM | DIASTOLIC BLOOD PRESSURE: 96 MMHG | HEIGHT: 65 IN | SYSTOLIC BLOOD PRESSURE: 150 MMHG | OXYGEN SATURATION: 98 % | BODY MASS INDEX: 41.03 KG/M2 | WEIGHT: 246.25 LBS

## 2019-02-01 DIAGNOSIS — E11.59 HYPERTENSION ASSOCIATED WITH DIABETES: ICD-10-CM

## 2019-02-01 DIAGNOSIS — E78.5 HYPERLIPIDEMIA, UNSPECIFIED HYPERLIPIDEMIA TYPE: ICD-10-CM

## 2019-02-01 DIAGNOSIS — Z79.4 TYPE 2 DIABETES MELLITUS WITHOUT COMPLICATION, WITH LONG-TERM CURRENT USE OF INSULIN: Primary | ICD-10-CM

## 2019-02-01 DIAGNOSIS — F32.A DEPRESSION WITH SUICIDAL IDEATION: ICD-10-CM

## 2019-02-01 DIAGNOSIS — E11.9 TYPE 2 DIABETES MELLITUS WITHOUT COMPLICATION, WITH LONG-TERM CURRENT USE OF INSULIN: Primary | ICD-10-CM

## 2019-02-01 DIAGNOSIS — E87.1 HYPONATREMIA: ICD-10-CM

## 2019-02-01 DIAGNOSIS — I15.2 HYPERTENSION ASSOCIATED WITH DIABETES: ICD-10-CM

## 2019-02-01 DIAGNOSIS — E66.01 MORBID OBESITY WITH BMI OF 45.0-49.9, ADULT: ICD-10-CM

## 2019-02-01 DIAGNOSIS — R45.851 DEPRESSION WITH SUICIDAL IDEATION: ICD-10-CM

## 2019-02-01 PROCEDURE — 99499 UNLISTED E&M SERVICE: CPT | Mod: S$GLB,,, | Performed by: INTERNAL MEDICINE

## 2019-02-01 PROCEDURE — 99214 OFFICE O/P EST MOD 30 MIN: CPT | Mod: S$GLB,,, | Performed by: INTERNAL MEDICINE

## 2019-02-01 PROCEDURE — 99214 PR OFFICE/OUTPT VISIT, EST, LEVL IV, 30-39 MIN: ICD-10-PCS | Mod: S$GLB,,, | Performed by: INTERNAL MEDICINE

## 2019-02-01 PROCEDURE — 3046F PR MOST RECENT HEMOGLOBIN A1C LEVEL > 9.0%: ICD-10-PCS | Mod: CPTII,S$GLB,, | Performed by: INTERNAL MEDICINE

## 2019-02-01 PROCEDURE — 99999 PR PBB SHADOW E&M-EST. PATIENT-LVL V: CPT | Mod: PBBFAC,,, | Performed by: INTERNAL MEDICINE

## 2019-02-01 PROCEDURE — 3046F HEMOGLOBIN A1C LEVEL >9.0%: CPT | Mod: CPTII,S$GLB,, | Performed by: INTERNAL MEDICINE

## 2019-02-01 PROCEDURE — 3008F BODY MASS INDEX DOCD: CPT | Mod: CPTII,S$GLB,, | Performed by: INTERNAL MEDICINE

## 2019-02-01 PROCEDURE — 99499 RISK ADDL DX/OHS AUDIT: ICD-10-PCS | Mod: S$GLB,,, | Performed by: INTERNAL MEDICINE

## 2019-02-01 PROCEDURE — 99999 PR PBB SHADOW E&M-EST. PATIENT-LVL V: ICD-10-PCS | Mod: PBBFAC,,, | Performed by: INTERNAL MEDICINE

## 2019-02-01 PROCEDURE — 3008F PR BODY MASS INDEX (BMI) DOCUMENTED: ICD-10-PCS | Mod: CPTII,S$GLB,, | Performed by: INTERNAL MEDICINE

## 2019-02-01 RX ORDER — METOPROLOL SUCCINATE 100 MG/1
100 TABLET, EXTENDED RELEASE ORAL 2 TIMES DAILY
Qty: 60 TABLET | Refills: 1 | Status: SHIPPED | OUTPATIENT
Start: 2019-02-01 | End: 2019-03-19 | Stop reason: SDUPTHER

## 2019-02-01 RX ORDER — OLMESARTAN MEDOXOMIL 20 MG/1
20 TABLET ORAL DAILY
Qty: 30 TABLET | Refills: 1 | Status: SHIPPED | OUTPATIENT
Start: 2019-02-01 | End: 2019-10-16

## 2019-02-05 RX ORDER — ETODOLAC 400 MG/1
400 TABLET, EXTENDED RELEASE ORAL DAILY PRN
Qty: 90 TABLET | Refills: 0 | OUTPATIENT
Start: 2019-02-05

## 2019-02-05 RX ORDER — EZETIMIBE 10 MG/1
10 TABLET ORAL DAILY
Qty: 90 TABLET | Refills: 0 | Status: SHIPPED | OUTPATIENT
Start: 2019-02-05 | End: 2019-12-16

## 2019-02-12 RX ORDER — LISINOPRIL 20 MG/1
TABLET ORAL
Qty: 60 TABLET | Refills: 1 | OUTPATIENT
Start: 2019-02-12

## 2019-02-22 ENCOUNTER — PATIENT MESSAGE (OUTPATIENT)
Dept: INTERNAL MEDICINE | Facility: CLINIC | Age: 44
End: 2019-02-22

## 2019-02-22 DIAGNOSIS — E78.5 HYPERLIPIDEMIA, UNSPECIFIED HYPERLIPIDEMIA TYPE: ICD-10-CM

## 2019-02-22 DIAGNOSIS — E11.59 HYPERTENSION ASSOCIATED WITH DIABETES: ICD-10-CM

## 2019-02-22 DIAGNOSIS — I15.2 HYPERTENSION ASSOCIATED WITH DIABETES: ICD-10-CM

## 2019-02-22 NOTE — TELEPHONE ENCOUNTER
I received a pregnancy alert for pt. When trying to refill meds; please clarify pt. LMP, so I can refill

## 2019-02-22 NOTE — TELEPHONE ENCOUNTER
Called patient's cell and home #'s, w/ restrictions of not accepting this call.. Sent patient a portal message.

## 2019-02-27 ENCOUNTER — TELEPHONE (OUTPATIENT)
Dept: INTERNAL MEDICINE | Facility: CLINIC | Age: 44
End: 2019-02-27

## 2019-02-27 NOTE — TELEPHONE ENCOUNTER
Called patient's cell and home number, it has restrictions, could not leave a message.   Physical Therapy Daily Treatment     Visit Count: 4  Plan of Care Dates: Initial: 2018 Through: 2018  Insurance Information:   WORK COMP INFORMATION:  WORK COMP CARRIER:  Meredith Meza  CARRIER PHONE:  [phone]  :  Samanta Carrillo   PHONE:  [phone]  DIAGNOSIS: M48.02   DATE OF INJURY:  10/30/2017  CLAIM #:  [id number]  STATUS:  Active  Next Referring Provider Visit: None scheduled    Referred by: Andrzej Welch MD  Medical Diagnosis (from order):   Cervical spinal stenosis [M48.02]  Insurance: 1. EDDYRUDY JOHN  2. WC-MAXWELL ROSALINDADEMETRIUS    Date of onset/injury: 10/30/2017  Diagnosis Precautions: Previous history of left eye malignancy   Chart reviewed: Relevant co-morbidities, allergies, tests and medications:   2018 CERVICAL MRI:  1. Mild to moderate central stenosis at C4-5 secondary to central disc  small herniation and at C5-6 secondary to posterior disc osteophyte  complex. Residual AP diameter of the canal is 5.5 mm.  2. Relative foraminal stenosis bilaterally C4-C7.     2017 EMG:  This EMG/nerve conduction study of right arm was an abnormal study showin. Mild Right median neuropathy across the Right wrist joint. (mild right carpal tunnel syndrome).  2. Mild right C7,C8 cervical radiculopathy.    SUBJECTIVE   Patient reports that his arm pain can reach 8/10. It comes and goes with activity.     Current Pain: 0/10 at best up to 8/10.      Functional Change: None    OBJECTIVE     Treatment   Therapeutic Exercise:   Pec stretch in doorway 30 seconds x 3 no numbness with arms held below 90 degrees abduction.   Supine active assisted range of motion bilateral shoulder flexion x 10   Rows against blue  theratube 2 x 10   Pull backs against blue theratube  2 x 10     Manual Therapy:   Soft tissue mobilization to decrease muscle pain and tension bilateral cervical paraspinals, levator scapulae, Upper trapezii    Modalities:  Mechanical Cervical  Traction (93786):  With hot pack   Patient has been made aware of potential contraindications and possible risks associated with the use of modality and has agreed.  Position: supine Duration: 15 minutes   Intermittent: 25/13 pounds, 60/10 seconds hold/relax  Results: Patient will monitor ; no adverse reaction to treatment     Current Home Program (not performed this date except as noted above):   Seated chin tucks  Gentle medial nerve nerve flossing  Pectoral stretch   Supine self assisted shoulder flexion   Rows green theratube  Pull backs green theratube    PLAN   Continue to monitor response to cervical traction   Continue manual treatments as effective  Recheck rows and pull backs   Progress exercises as able    ASSESSMENT   51 year old male who originally presented with neck pain and Right upper extremity radicular symptoms secondary to cervical stenosis.      Today:  Patient reported intermittent right arm numbness with pectoralis stretching and pull backs. Increased maximum traction force to 25 lbs. Patient will continue to monitor effects of cervical traction.     Pain after treatment: 0/10    Result of above outlined education: Verbalizes understanding and Demonstrates understanding    Goals:       To be obtained by end of this plan of care:  1. HEP: Pt independent with modified and progressed HEP.  2. Pain: Patient will decrease cervical and arm pain to 2/10 to aid in sleeping undisturbed through a night.   3. Radicular: Patient will decrease radicular symptoms by 50 % to aid in completing work activities without discomfort or limitations.  4. Pt will be able to sleep 6 hours without disruption from pain.   5. Neck Disability Index: Patient will complete form to reflect an improved score from initial score of 22% to less than or equal to 1% (scored 0-100, higher score indicates higher disability) to indicate pt reported improvement in function/disability/impairment (minimal detectable change: 21%).  NOTE:  most of patient's symptoms are arm related and DASH may be more appropriate.    THERAPY DAILY BILLING   Primary Insurance: [employer]  Secondary Insurance: [employer]    Evaluation Procedures:  No evaluation codes were used on this date of service    Timed Procedures:  Manual Therapy, 10 minutes  Therapeutic Exercise, 15 minutes    Untimed Procedures:  Hot/Cold Pack Therapy  Mechanical Traction    Total Treatment Time: 40 minutes    The referring provider's electronic or written signature on the evaluation authorizes the therapy plan of care and certifies the need for these services, furnished under this plan of care while under their care.  Physician Signature on file.    Shawn Hunter PT was present and supervised this patient visit. Notes were reviewed and care agreed upon.

## 2019-03-01 ENCOUNTER — LAB VISIT (OUTPATIENT)
Dept: LAB | Facility: HOSPITAL | Age: 44
End: 2019-03-01
Attending: INTERNAL MEDICINE
Payer: MEDICARE

## 2019-03-01 DIAGNOSIS — E11.59 HYPERTENSION ASSOCIATED WITH DIABETES: ICD-10-CM

## 2019-03-01 DIAGNOSIS — E87.1 HYPONATREMIA: ICD-10-CM

## 2019-03-01 DIAGNOSIS — E78.5 HYPERLIPIDEMIA, UNSPECIFIED HYPERLIPIDEMIA TYPE: ICD-10-CM

## 2019-03-01 DIAGNOSIS — I15.2 HYPERTENSION ASSOCIATED WITH DIABETES: ICD-10-CM

## 2019-03-01 DIAGNOSIS — F32.A DEPRESSION, UNSPECIFIED DEPRESSION TYPE: ICD-10-CM

## 2019-03-01 DIAGNOSIS — E11.9 TYPE 2 DIABETES MELLITUS WITHOUT COMPLICATION, WITH LONG-TERM CURRENT USE OF INSULIN: ICD-10-CM

## 2019-03-01 DIAGNOSIS — Z79.4 TYPE 2 DIABETES MELLITUS WITHOUT COMPLICATION, WITH LONG-TERM CURRENT USE OF INSULIN: ICD-10-CM

## 2019-03-01 LAB
ALBUMIN SERPL BCP-MCNC: 3.4 G/DL
ALP SERPL-CCNC: 77 U/L
ALT SERPL W/O P-5'-P-CCNC: 36 U/L
ANION GAP SERPL CALC-SCNC: 9 MMOL/L
AST SERPL-CCNC: 22 U/L
BILIRUB SERPL-MCNC: 0.4 MG/DL
BUN SERPL-MCNC: 7 MG/DL
CALCIUM SERPL-MCNC: 9.7 MG/DL
CHLORIDE SERPL-SCNC: 101 MMOL/L
CO2 SERPL-SCNC: 25 MMOL/L
CREAT SERPL-MCNC: 0.7 MG/DL
EST. GFR  (AFRICAN AMERICAN): >60 ML/MIN/1.73 M^2
EST. GFR  (NON AFRICAN AMERICAN): >60 ML/MIN/1.73 M^2
GLUCOSE SERPL-MCNC: 229 MG/DL
POTASSIUM SERPL-SCNC: 3.7 MMOL/L
PROT SERPL-MCNC: 7.2 G/DL
SODIUM SERPL-SCNC: 135 MMOL/L

## 2019-03-01 PROCEDURE — 80053 COMPREHEN METABOLIC PANEL: CPT

## 2019-03-01 PROCEDURE — 36415 COLL VENOUS BLD VENIPUNCTURE: CPT | Mod: PO

## 2019-03-04 RX ORDER — FLUOXETINE HYDROCHLORIDE 20 MG/1
20 CAPSULE ORAL DAILY
Qty: 90 CAPSULE | Refills: 0 | Status: SHIPPED | OUTPATIENT
Start: 2019-03-04 | End: 2020-09-03 | Stop reason: SDUPTHER

## 2019-03-06 DIAGNOSIS — M54.50 LOW BACK PAIN WITHOUT SCIATICA: ICD-10-CM

## 2019-03-06 DIAGNOSIS — E78.5 HYPERLIPIDEMIA, UNSPECIFIED HYPERLIPIDEMIA TYPE: ICD-10-CM

## 2019-03-06 RX ORDER — ETODOLAC 400 MG/1
400 TABLET, EXTENDED RELEASE ORAL DAILY PRN
Qty: 90 TABLET | Refills: 0 | OUTPATIENT
Start: 2019-03-06

## 2019-03-06 RX ORDER — NIFEDIPINE 60 MG/1
TABLET, EXTENDED RELEASE ORAL
Qty: 30 TABLET | Refills: 2 | OUTPATIENT
Start: 2019-03-06

## 2019-03-06 RX ORDER — ATORVASTATIN CALCIUM 80 MG/1
TABLET, FILM COATED ORAL
Qty: 30 TABLET | Refills: 2 | OUTPATIENT
Start: 2019-03-06

## 2019-03-06 RX ORDER — EZETIMIBE 10 MG/1
10 TABLET ORAL DAILY
Qty: 90 TABLET | Refills: 0 | OUTPATIENT
Start: 2019-03-06 | End: 2020-03-05

## 2019-03-10 ENCOUNTER — PATIENT MESSAGE (OUTPATIENT)
Dept: FAMILY MEDICINE | Facility: CLINIC | Age: 44
End: 2019-03-10

## 2019-03-19 DIAGNOSIS — I15.2 HYPERTENSION ASSOCIATED WITH DIABETES: ICD-10-CM

## 2019-03-19 DIAGNOSIS — E11.59 HYPERTENSION ASSOCIATED WITH DIABETES: ICD-10-CM

## 2019-03-19 RX ORDER — METOPROLOL SUCCINATE 100 MG/1
100 TABLET, EXTENDED RELEASE ORAL 2 TIMES DAILY
Qty: 60 TABLET | Refills: 1 | Status: SHIPPED | OUTPATIENT
Start: 2019-03-19 | End: 2019-10-16

## 2019-03-20 DIAGNOSIS — Z79.4 TYPE 2 DIABETES MELLITUS WITHOUT COMPLICATION, WITH LONG-TERM CURRENT USE OF INSULIN: ICD-10-CM

## 2019-03-20 DIAGNOSIS — E11.9 TYPE 2 DIABETES MELLITUS WITHOUT COMPLICATION, WITH LONG-TERM CURRENT USE OF INSULIN: ICD-10-CM

## 2019-03-20 RX ORDER — METFORMIN HYDROCHLORIDE 1000 MG/1
TABLET ORAL
Qty: 60 TABLET | Refills: 2 | Status: SHIPPED | OUTPATIENT
Start: 2019-03-20 | End: 2019-10-16

## 2019-04-02 DIAGNOSIS — E78.5 HYPERLIPIDEMIA, UNSPECIFIED HYPERLIPIDEMIA TYPE: ICD-10-CM

## 2019-04-02 DIAGNOSIS — E11.59 HYPERTENSION ASSOCIATED WITH DIABETES: ICD-10-CM

## 2019-04-02 DIAGNOSIS — I15.2 HYPERTENSION ASSOCIATED WITH DIABETES: ICD-10-CM

## 2019-04-02 RX ORDER — ATORVASTATIN CALCIUM 80 MG/1
TABLET, FILM COATED ORAL
Qty: 30 TABLET | Refills: 2 | OUTPATIENT
Start: 2019-04-02

## 2019-04-02 RX ORDER — NIFEDIPINE 60 MG/1
TABLET, EXTENDED RELEASE ORAL
Qty: 30 TABLET | Refills: 2 | OUTPATIENT
Start: 2019-04-02

## 2019-04-11 ENCOUNTER — OFFICE VISIT (OUTPATIENT)
Dept: PSYCHIATRY | Facility: CLINIC | Age: 44
End: 2019-04-11
Payer: MEDICARE

## 2019-04-11 DIAGNOSIS — F32.A DEPRESSION, UNSPECIFIED DEPRESSION TYPE: Primary | ICD-10-CM

## 2019-04-11 DIAGNOSIS — F41.1 ANXIETY STATE: ICD-10-CM

## 2019-04-11 PROCEDURE — 90791 PR PSYCHIATRIC DIAGNOSTIC EVALUATION: ICD-10-PCS | Mod: S$GLB,,, | Performed by: SOCIAL WORKER

## 2019-04-11 PROCEDURE — 90791 PSYCH DIAGNOSTIC EVALUATION: CPT | Mod: S$GLB,,, | Performed by: SOCIAL WORKER
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
## 2019-04-26 DIAGNOSIS — E11.9 TYPE 2 DIABETES MELLITUS WITHOUT COMPLICATION: ICD-10-CM

## 2019-05-20 DIAGNOSIS — S21.209D WOUND OF BACK, UNSPECIFIED LATERALITY, SUBSEQUENT ENCOUNTER: ICD-10-CM

## 2019-05-20 RX ORDER — MUPIROCIN 20 MG/G
OINTMENT TOPICAL 2 TIMES DAILY
Qty: 22 G | Refills: 0 | OUTPATIENT
Start: 2019-05-20 | End: 2019-05-30

## 2019-05-30 ENCOUNTER — OFFICE VISIT (OUTPATIENT)
Dept: PSYCHIATRY | Facility: CLINIC | Age: 44
End: 2019-05-30
Payer: MEDICARE

## 2019-05-30 DIAGNOSIS — F32.A DEPRESSION, UNSPECIFIED DEPRESSION TYPE: Primary | ICD-10-CM

## 2019-05-30 DIAGNOSIS — F41.1 ANXIETY STATE: ICD-10-CM

## 2019-05-30 PROCEDURE — 90834 PR PSYCHOTHERAPY W/PATIENT, 45 MIN: ICD-10-PCS | Mod: HCNC,S$GLB,, | Performed by: SOCIAL WORKER

## 2019-05-30 PROCEDURE — 90834 PSYTX W PT 45 MINUTES: CPT | Mod: HCNC,S$GLB,, | Performed by: SOCIAL WORKER
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
## 2019-08-12 ENCOUNTER — TELEPHONE (OUTPATIENT)
Dept: OBSTETRICS AND GYNECOLOGY | Facility: CLINIC | Age: 44
End: 2019-08-12

## 2019-08-12 DIAGNOSIS — Z12.39 SCREENING BREAST EXAMINATION: Primary | ICD-10-CM

## 2019-08-12 NOTE — TELEPHONE ENCOUNTER
----- Message from Candi Rubio sent at 8/12/2019  1:43 PM CDT -----  Contact: [phone]/self  Patient requesting Mammo orders be put in the system  Please advise

## 2019-08-12 NOTE — TELEPHONE ENCOUNTER
Patient scheduled appointment for annual exam  She is requesting orders for her mammogram  Order pended

## 2019-08-12 NOTE — TELEPHONE ENCOUNTER
----- Message from Alejandra Sanz sent at 8/12/2019  9:53 AM CDT -----  Contact: [phone]  Pt called to speak to the nurse regarding her care and would like a call back today at

## 2019-08-21 ENCOUNTER — HOSPITAL ENCOUNTER (OUTPATIENT)
Dept: RADIOLOGY | Facility: HOSPITAL | Age: 44
Discharge: HOME | End: 2019-08-21
Attending: OBSTETRICS & GYNECOLOGY
Payer: MEDICARE

## 2019-08-21 DIAGNOSIS — Z12.39 SCREENING BREAST EXAMINATION: ICD-10-CM

## 2019-08-21 PROCEDURE — 77067 MAMMO DIGITAL SCREENING BILAT WITH TOMOSYNTHESIS_CAD: ICD-10-PCS | Mod: 26,,, | Performed by: RADIOLOGY

## 2019-08-21 PROCEDURE — 77063 BREAST TOMOSYNTHESIS BI: CPT | Mod: 26,,, | Performed by: RADIOLOGY

## 2019-08-21 PROCEDURE — 77063 MAMMO DIGITAL SCREENING BILAT WITH TOMOSYNTHESIS_CAD: ICD-10-PCS | Mod: 26,,, | Performed by: RADIOLOGY

## 2019-08-21 PROCEDURE — 77067 SCR MAMMO BI INCL CAD: CPT | Mod: TC

## 2019-08-21 PROCEDURE — 77067 SCR MAMMO BI INCL CAD: CPT | Mod: 26,,, | Performed by: RADIOLOGY

## 2019-09-12 RX ORDER — LISINOPRIL 20 MG/1
TABLET ORAL
Qty: 60 TABLET | Refills: 2 | OUTPATIENT
Start: 2019-09-12

## 2019-09-13 ENCOUNTER — PATIENT OUTREACH (OUTPATIENT)
Dept: ADMINISTRATIVE | Facility: OTHER | Age: 44
End: 2019-09-13

## 2019-09-16 ENCOUNTER — OFFICE VISIT (OUTPATIENT)
Dept: OBSTETRICS AND GYNECOLOGY | Facility: CLINIC | Age: 44
End: 2019-09-16
Payer: MEDICARE

## 2019-09-16 ENCOUNTER — LAB VISIT (OUTPATIENT)
Dept: LAB | Facility: HOSPITAL | Age: 44
End: 2019-09-16
Attending: OBSTETRICS & GYNECOLOGY
Payer: MEDICARE

## 2019-09-16 VITALS
DIASTOLIC BLOOD PRESSURE: 82 MMHG | HEIGHT: 65 IN | SYSTOLIC BLOOD PRESSURE: 130 MMHG | WEIGHT: 238.38 LBS | BODY MASS INDEX: 39.72 KG/M2

## 2019-09-16 DIAGNOSIS — Z11.3 SCREEN FOR STD (SEXUALLY TRANSMITTED DISEASE): ICD-10-CM

## 2019-09-16 DIAGNOSIS — Z91.89 PERSONAL HISTORY PRESENTING HAZARDS TO HEALTH: Primary | ICD-10-CM

## 2019-09-16 DIAGNOSIS — Z12.4 ROUTINE CERVICAL SMEAR: ICD-10-CM

## 2019-09-16 DIAGNOSIS — R35.0 FREQUENCY OF URINATION: ICD-10-CM

## 2019-09-16 DIAGNOSIS — Z12.39 SCREENING BREAST EXAMINATION: ICD-10-CM

## 2019-09-16 DIAGNOSIS — Z91.89 PERSONAL HISTORY PRESENTING HAZARDS TO HEALTH: ICD-10-CM

## 2019-09-16 DIAGNOSIS — Z12.31 ENCOUNTER FOR SCREENING MAMMOGRAM FOR MALIGNANT NEOPLASM OF BREAST: ICD-10-CM

## 2019-09-16 PROCEDURE — 87481 CANDIDA DNA AMP PROBE: CPT | Mod: 59

## 2019-09-16 PROCEDURE — 87491 CHLMYD TRACH DNA AMP PROBE: CPT | Mod: 59

## 2019-09-16 PROCEDURE — 99499 UNLISTED E&M SERVICE: CPT | Mod: S$PBB,HCNC,, | Performed by: OBSTETRICS & GYNECOLOGY

## 2019-09-16 PROCEDURE — G0101 CA SCREEN;PELVIC/BREAST EXAM: HCPCS | Mod: S$GLB,,, | Performed by: OBSTETRICS & GYNECOLOGY

## 2019-09-16 PROCEDURE — 99499 RISK ADDL DX/OHS AUDIT: ICD-10-PCS | Mod: S$PBB,HCNC,, | Performed by: OBSTETRICS & GYNECOLOGY

## 2019-09-16 PROCEDURE — 86703 HIV-1/HIV-2 1 RESULT ANTBDY: CPT

## 2019-09-16 PROCEDURE — 99999 PR PBB SHADOW E&M-EST. PATIENT-LVL III: CPT | Mod: PBBFAC,,, | Performed by: OBSTETRICS & GYNECOLOGY

## 2019-09-16 PROCEDURE — 87624 HPV HI-RISK TYP POOLED RSLT: CPT

## 2019-09-16 PROCEDURE — 99213 OFFICE O/P EST LOW 20 MIN: CPT | Mod: PBBFAC,PO,25 | Performed by: OBSTETRICS & GYNECOLOGY

## 2019-09-16 PROCEDURE — 87086 URINE CULTURE/COLONY COUNT: CPT

## 2019-09-16 PROCEDURE — 36415 COLL VENOUS BLD VENIPUNCTURE: CPT

## 2019-09-16 PROCEDURE — 87801 DETECT AGNT MULT DNA AMPLI: CPT

## 2019-09-16 PROCEDURE — G0101 PR CA SCREEN;PELVIC/BREAST EXAM: ICD-10-PCS | Mod: S$GLB,,, | Performed by: OBSTETRICS & GYNECOLOGY

## 2019-09-16 PROCEDURE — 87340 HEPATITIS B SURFACE AG IA: CPT

## 2019-09-16 PROCEDURE — 86803 HEPATITIS C AB TEST: CPT

## 2019-09-16 PROCEDURE — 88175 CYTOPATH C/V AUTO FLUID REDO: CPT

## 2019-09-16 PROCEDURE — G0101 CA SCREEN;PELVIC/BREAST EXAM: HCPCS | Mod: PBBFAC,PO | Performed by: OBSTETRICS & GYNECOLOGY

## 2019-09-16 PROCEDURE — 99999 PR PBB SHADOW E&M-EST. PATIENT-LVL III: ICD-10-PCS | Mod: PBBFAC,,, | Performed by: OBSTETRICS & GYNECOLOGY

## 2019-09-16 PROCEDURE — 86592 SYPHILIS TEST NON-TREP QUAL: CPT

## 2019-09-16 RX ORDER — IBUPROFEN 800 MG/1
800 TABLET ORAL EVERY 8 HOURS PRN
Refills: 0 | COMMUNITY
Start: 2019-09-09 | End: 2023-05-22 | Stop reason: DRUGHIGH

## 2019-09-16 RX ORDER — AMOXICILLIN 500 MG/1
500 CAPSULE ORAL 3 TIMES DAILY
Refills: 0 | COMMUNITY
Start: 2019-09-09 | End: 2019-10-16

## 2019-09-16 NOTE — PROGRESS NOTES
"OBSTETRIC HISTORY:   OB History        3    Para   2    Term   2       0    AB   1    Living   2       SAB   1    TAB   0    Ectopic   0    Multiple   0    Live Births   2                COMPREHENSIVE GYN HISTORY:  PAP History: Denies abnormal Paps.  Infection History: Denies STDs. Denies PID.  Benign History: Denies uterine fibroids. Reports ovarian cysts. Denies endometriosis. Denies other conditions.  Cancer History: Denies cervical cancer. Denies uterine cancer or hyperplasia. Denies ovarian cancer. Denies vulvar cancer or pre-cancer. Denies vaginal cancer or precancer.  Denies breast cancer. Denies colon cancer.  Sexual Activity History: Denies currently being sexually active.  Menstrual History: Age of menarche: 13 years.  Dysmenorrhea History: Denies dysmenorrhea.  Contraception History: Condoms  Patient is high risk for Medicare as she had greater than 5 sexual partners in a lifetime.         HPI:   44 y.o.  Patient's last menstrual period was 2019 (approximate).   Patient is  here for Medicare pap, pelvic and breast exam. She is high risk as above. She wants STD testing. Complaints of vulvar and vaginal irritation and loss of urine with sex. She denies breast and bowel complaints.    ROS:  GENERAL: Denies weight gain or weight loss. Feeling well overall.   SKIN: Denies rash or lesions.   HEAD: Denies headache.   NODES: Denies enlarged lymph nodes.   CHEST: Denies shortness of breath.   ABDOMEN: No abdominal pain, constipation, diarrhea, nausea, vomiting or rectal bleeding.   URINARY: No dysuria, hematuria, or burning on urination. +frequency  REPRODUCTIVE: See HPI.   BREASTS: The patient denies pain, lumps, or nipple discharge.   HEMATOLOGIC: No easy bruisability.   MUSCULOSKELETAL: Denies joint pain or back pain.   NEUROLOGIC: Denies weakness.   PSYCHIATRIC: Denies depression, anxiety or mood swings.    PE:   /82   Ht 5' 5" (1.651 m)   Wt 108.1 kg (238 lb 6.4 oz)   LMP " 09/02/2019 (Approximate)   BMI 39.67 kg/m²   APPEARANCE: Well nourished, well developed, in no acute distress.  NODES: No inguinal, cervical lymph node enlargement.  CHEST: Lungs clear to auscultation.  HEART: Regular rate and rhythm, no murmurs, rubs or gallops.  ABDOMEN: Soft. No tenderness or masses. No hernias.  BREASTS: Symmetrical, no skin changes or visible lesions. No palpable masses, nipple discharge or adenopathy bilaterally.  PELVIC:   VULVA: No lesions but hyperkeratosis. Normal female genitalia.  URETHRAL MEATUS: Normal size and location, no lesions, no prolapse.  URETHRA: No masses, tenderness, prolapse or scarring.  VAGINA: Moist and well rugated, some clear discharge, no significant cystocele or rectocele.  CERVIX: No lesions and discharge.  UTERUS: Normal size, regular shape, mobile, non-tender, bladder base nontender.  ADNEXA: No masses or tenderness. Difficult exam 2/2 habitus    PROCEDURES:  Pap smear  HRHPV  STD testing-- Affirm, GC/CT, HIV, RPR, Hepatitis B and C   Urine culture  Vaginal discharge-- clear    Assessment/Plan:  Medicare high risk greater than 5 partners in lifetime  Screen STD  Loss of urine with sex and frequency-- if lost weight and controlled diabetes this would help with both of these issues. I have encouraged patient to follow up with Endocrinology as it appears she has been non-compliant

## 2019-09-17 LAB
C TRACH DNA SPEC QL NAA+PROBE: NOT DETECTED
HBV SURFACE AG SERPL QL IA: NEGATIVE
HCV AB SERPL QL IA: NEGATIVE
HIV 1+2 AB+HIV1 P24 AG SERPL QL IA: NEGATIVE
N GONORRHOEA DNA SPEC QL NAA+PROBE: NOT DETECTED
RPR SER QL: NORMAL

## 2019-09-18 ENCOUNTER — PATIENT MESSAGE (OUTPATIENT)
Dept: OBSTETRICS AND GYNECOLOGY | Facility: CLINIC | Age: 44
End: 2019-09-18

## 2019-09-18 LAB
BACTERIA UR CULT: NORMAL
BACTERIA UR CULT: NORMAL
BACTERIAL VAGINOSIS DNA: NEGATIVE
CANDIDA GLABRATA DNA: POSITIVE
CANDIDA KRUSEI DNA: NEGATIVE
CANDIDA RRNA VAG QL PROBE: POSITIVE
T VAGINALIS RRNA GENITAL QL PROBE: NEGATIVE

## 2019-09-18 RX ORDER — CLOTRIMAZOLE AND BETAMETHASONE DIPROPIONATE 10; .64 MG/G; MG/G
CREAM TOPICAL
Qty: 15 G | Refills: 0 | Status: SHIPPED | OUTPATIENT
Start: 2019-09-18 | End: 2019-10-16

## 2019-09-18 RX ORDER — METRONIDAZOLE 500 MG/1
500 TABLET ORAL EVERY 12 HOURS
Qty: 14 TABLET | Refills: 0 | Status: SHIPPED | OUTPATIENT
Start: 2019-09-18 | End: 2019-09-25

## 2019-09-18 RX ORDER — TERCONAZOLE 4 MG/G
1 CREAM VAGINAL NIGHTLY
Qty: 45 G | Refills: 0 | Status: SHIPPED | OUTPATIENT
Start: 2019-09-18 | End: 2019-09-25

## 2019-09-19 ENCOUNTER — TELEPHONE (OUTPATIENT)
Dept: INTERNAL MEDICINE | Facility: CLINIC | Age: 44
End: 2019-09-19

## 2019-09-19 LAB
HPV HR 12 DNA CVX QL NAA+PROBE: POSITIVE
HPV16 AG SPEC QL: NEGATIVE
HPV18 DNA SPEC QL NAA+PROBE: NEGATIVE

## 2019-09-19 NOTE — TELEPHONE ENCOUNTER
----- Message from Gloria Mijares sent at 9/19/2019  3:40 PM CDT -----  Contact: pt  Pt missed a call and would the nurse to return their call.    Pt can be reached at [phone]

## 2019-09-20 ENCOUNTER — TELEPHONE (OUTPATIENT)
Dept: FAMILY MEDICINE | Facility: CLINIC | Age: 44
End: 2019-09-20

## 2019-09-23 ENCOUNTER — TELEPHONE (OUTPATIENT)
Dept: OBSTETRICS AND GYNECOLOGY | Facility: CLINIC | Age: 44
End: 2019-09-23

## 2019-09-23 NOTE — TELEPHONE ENCOUNTER
TRC    Pap smear was normal and HRHPV was positive (NOT 16 or 18)  Recommend repeat co-testing in one year

## 2019-09-24 NOTE — TELEPHONE ENCOUNTER
----- Message from Aurea Sims sent at 9/24/2019 11:49 AM CDT -----  Contact: patient  Pt missed a call and would like the nurse to return their call.    Pt can be reached at [phone]       Thanks  KB

## 2019-09-25 ENCOUNTER — PATIENT MESSAGE (OUTPATIENT)
Dept: OBSTETRICS AND GYNECOLOGY | Facility: CLINIC | Age: 44
End: 2019-09-25

## 2019-10-02 ENCOUNTER — LAB VISIT (OUTPATIENT)
Dept: LAB | Facility: HOSPITAL | Age: 44
End: 2019-10-02
Attending: INTERNAL MEDICINE
Payer: MEDICARE

## 2019-10-02 ENCOUNTER — OFFICE VISIT (OUTPATIENT)
Dept: INTERNAL MEDICINE | Facility: CLINIC | Age: 44
End: 2019-10-02
Payer: MEDICARE

## 2019-10-02 VITALS
WEIGHT: 239.44 LBS | BODY MASS INDEX: 39.89 KG/M2 | SYSTOLIC BLOOD PRESSURE: 130 MMHG | TEMPERATURE: 98 F | HEIGHT: 65 IN | HEART RATE: 95 BPM | OXYGEN SATURATION: 98 % | DIASTOLIC BLOOD PRESSURE: 90 MMHG

## 2019-10-02 DIAGNOSIS — E11.42 TYPE 2 DIABETES MELLITUS WITH DIABETIC POLYNEUROPATHY, WITH LONG-TERM CURRENT USE OF INSULIN: ICD-10-CM

## 2019-10-02 DIAGNOSIS — I10 ESSENTIAL HYPERTENSION: Primary | ICD-10-CM

## 2019-10-02 DIAGNOSIS — Z79.4 TYPE 2 DIABETES MELLITUS WITH DIABETIC POLYNEUROPATHY, WITH LONG-TERM CURRENT USE OF INSULIN: ICD-10-CM

## 2019-10-02 DIAGNOSIS — I10 ESSENTIAL HYPERTENSION: ICD-10-CM

## 2019-10-02 DIAGNOSIS — G47.33 OSA (OBSTRUCTIVE SLEEP APNEA): ICD-10-CM

## 2019-10-02 DIAGNOSIS — Z00.00 PREVENTATIVE HEALTH CARE: ICD-10-CM

## 2019-10-02 LAB
ALBUMIN SERPL BCP-MCNC: 3.7 G/DL (ref 3.5–5.2)
ALP SERPL-CCNC: 78 U/L (ref 55–135)
ALT SERPL W/O P-5'-P-CCNC: 26 U/L (ref 10–44)
ANION GAP SERPL CALC-SCNC: 11 MMOL/L (ref 8–16)
AST SERPL-CCNC: 17 U/L (ref 10–40)
BILIRUB SERPL-MCNC: 0.4 MG/DL (ref 0.1–1)
BUN SERPL-MCNC: 10 MG/DL (ref 6–20)
CALCIUM SERPL-MCNC: 9.2 MG/DL (ref 8.7–10.5)
CHLORIDE SERPL-SCNC: 103 MMOL/L (ref 95–110)
CO2 SERPL-SCNC: 21 MMOL/L (ref 23–29)
CREAT SERPL-MCNC: 0.8 MG/DL (ref 0.5–1.4)
EST. GFR  (AFRICAN AMERICAN): >60 ML/MIN/1.73 M^2
EST. GFR  (NON AFRICAN AMERICAN): >60 ML/MIN/1.73 M^2
ESTIMATED AVG GLUCOSE: 295 MG/DL (ref 68–131)
GLUCOSE SERPL-MCNC: 319 MG/DL (ref 70–110)
HBA1C MFR BLD HPLC: 11.9 % (ref 4–5.6)
POTASSIUM SERPL-SCNC: 4.2 MMOL/L (ref 3.5–5.1)
PROT SERPL-MCNC: 7.7 G/DL (ref 6–8.4)
SODIUM SERPL-SCNC: 135 MMOL/L (ref 136–145)

## 2019-10-02 PROCEDURE — 99499 UNLISTED E&M SERVICE: CPT | Mod: HCNC,,, | Performed by: INTERNAL MEDICINE

## 2019-10-02 PROCEDURE — 99499 RISK ADDL DX/OHS AUDIT: ICD-10-PCS | Mod: HCNC,S$GLB,, | Performed by: INTERNAL MEDICINE

## 2019-10-02 PROCEDURE — 99999 PR PBB SHADOW E&M-EST. PATIENT-LVL V: ICD-10-PCS | Mod: PBBFAC,HCNC,, | Performed by: INTERNAL MEDICINE

## 2019-10-02 PROCEDURE — 99999 PR PBB SHADOW E&M-EST. PATIENT-LVL V: CPT | Mod: PBBFAC,HCNC,, | Performed by: INTERNAL MEDICINE

## 2019-10-02 PROCEDURE — 3046F PR MOST RECENT HEMOGLOBIN A1C LEVEL > 9.0%: ICD-10-PCS | Mod: HCNC,CPTII,S$GLB, | Performed by: INTERNAL MEDICINE

## 2019-10-02 PROCEDURE — 80053 COMPREHEN METABOLIC PANEL: CPT

## 2019-10-02 PROCEDURE — 83036 HEMOGLOBIN GLYCOSYLATED A1C: CPT

## 2019-10-02 PROCEDURE — 3046F HEMOGLOBIN A1C LEVEL >9.0%: CPT | Mod: HCNC,CPTII,S$GLB, | Performed by: INTERNAL MEDICINE

## 2019-10-02 PROCEDURE — 99499 UNLISTED E&M SERVICE: CPT | Mod: HCNC,S$GLB,, | Performed by: INTERNAL MEDICINE

## 2019-10-02 PROCEDURE — 36415 COLL VENOUS BLD VENIPUNCTURE: CPT | Mod: PO

## 2019-10-02 PROCEDURE — 99499 RISK ADDL DX/OHS AUDIT: ICD-10-PCS | Mod: HCNC,,, | Performed by: INTERNAL MEDICINE

## 2019-10-02 PROCEDURE — 99396 PREV VISIT EST AGE 40-64: CPT | Mod: HCNC,S$GLB,, | Performed by: INTERNAL MEDICINE

## 2019-10-02 PROCEDURE — 99396 PR PREVENTIVE VISIT,EST,40-64: ICD-10-PCS | Mod: HCNC,S$GLB,, | Performed by: INTERNAL MEDICINE

## 2019-10-02 RX ORDER — ASPIRIN 81 MG/1
81 TABLET ORAL DAILY
COMMUNITY
End: 2023-05-22

## 2019-10-02 NOTE — PROGRESS NOTES
Subjective:      Patient ID: Jaimee Quintana is a 44 y.o. female.    Chief Complaint: Establish Care    #Last visit/Previous Provider  2/2019    #Interim Hx  No urgent care. Hospitalization. Going to dentist     #Concerns Today  See DM below    #Chronic Problems    DM assessment  Seen by endocrinology -last visit 2017   Complication; neuropathy, retinopathy --> recommended for retina specialist   Medication: adherence poor-  not taking VoG    Vision/Podiatry; recommended for retina specialist. UTD on podiatry   Glucose log; not checking  Diet/Exercise; not working out  , poor diet   labs   umacr-10mg/g   Hba1c 12.3%  Preventative  -Vaccine: PPS23, hepB  -Statin    4. HTN  Unclear what medication she is taking  Chart review shows - Metoprolol , benicar, Nifedipine,   Last bp 130/82    5. HLD  Unclear what she is taking - chart review shows Zetia, atorvastatin      Health Maintenance   Topic Date Due    Foot Exam  12/17/2019    Eye Exam  12/25/2019    Hemoglobin A1c  01/02/2020    Lipid Panel  01/14/2020    Low Dose Statin  10/02/2020    Mammogram  08/21/2021    Pap Smear with HPV Cotest  09/16/2022    TETANUS VACCINE  11/19/2024    Pneumococcal Vaccine (Medium Risk)  Completed         HPI  Review of Systems   Constitutional: Positive for fatigue. Negative for activity change, chills and diaphoresis.   HENT: Negative for congestion and postnasal drip.    Eyes: Positive for visual disturbance. Negative for redness.        One episode of blurry vision a few months ago just for a few minutes - self resolved   Respiratory: Negative for shortness of breath.    Cardiovascular: Negative for chest pain and palpitations.   Gastrointestinal: Negative for abdominal distention, blood in stool, diarrhea and nausea.   Endocrine: Positive for polyuria.   Musculoskeletal: Negative for arthralgias and back pain.   Skin: Negative for pallor.   Neurological: Positive for headaches. Negative for dizziness, tremors, seizures,  syncope, speech difficulty, weakness and numbness.       Objective:      Physical Exam   Constitutional: She is oriented to person, place, and time. She appears well-developed and well-nourished. No distress.   HENT:   Head: Normocephalic.   Nose: Nose normal.   Mouth/Throat: Oropharynx is clear and moist.   Poor dentition   Eyes: Pupils are equal, round, and reactive to light. Conjunctivae and EOM are normal. Right eye exhibits no discharge. Left eye exhibits no discharge.   Neck: Normal range of motion. No thyromegaly present.   Cardiovascular: Normal rate, regular rhythm and normal heart sounds. Exam reveals no gallop and no friction rub.   No murmur heard.  Pulmonary/Chest: Effort normal. No respiratory distress.   Abdominal: Soft. Bowel sounds are normal. She exhibits no distension. There is no tenderness.   Musculoskeletal: Normal range of motion. She exhibits no edema or deformity.   Neurological: She is alert and oriented to person, place, and time. She displays normal reflexes. No cranial nerve deficit or sensory deficit. She exhibits normal muscle tone. Coordination normal.   Skin: Skin is warm. Capillary refill takes less than 2 seconds. She is not diaphoretic.   Psychiatric: She has a normal mood and affect.   Nursing note and vitals reviewed.      Assessment:       1. Essential hypertension    2. Type 2 diabetes mellitus with diabetic polyneuropathy, with long-term current use of insulin    3. CESAR (obstructive sleep apnea)    4. Preventative health care            Plan:       Jaimee was seen today for establish care.    Diagnoses and all orders for this visit:    Essential hypertension   Slightly above goal today - unsure what medication  She is taking   Will have her call back with all meds to review to decide what  she should be taking   -     Hemoglobin A1c; Future  -     Comprehensive metabolic panel; Future    Type 2 diabetes mellitus with diabetic polyneuropathy, with long-term current use of  insulin   Repeat labs today . Suspect elevation given her poor control   Suspect we will be resuming insulin in the near future  -     Hemoglobin A1c; Future  -     Comprehensive metabolic panel; Future      Preventative health care

## 2019-10-02 NOTE — PATIENT INSTRUCTIONS
We were happy to see you today    1. For your Diabetes   -we would like to have check you sugar numbers in the lab   -we will likely need to go on the insulin  - if you are interested in going back on Vgo then you will have to go to endocrinology    2. Please return to clinic in 2-3 weeks and make sure to bring all your medication

## 2019-10-03 ENCOUNTER — TELEPHONE (OUTPATIENT)
Dept: INTERNAL MEDICINE | Facility: CLINIC | Age: 44
End: 2019-10-03

## 2019-10-03 DIAGNOSIS — E11.65 TYPE 2 DIABETES MELLITUS WITH HYPERGLYCEMIA, WITH LONG-TERM CURRENT USE OF INSULIN: Primary | ICD-10-CM

## 2019-10-03 DIAGNOSIS — Z79.4 TYPE 2 DIABETES MELLITUS WITH HYPERGLYCEMIA, WITH LONG-TERM CURRENT USE OF INSULIN: Primary | ICD-10-CM

## 2019-10-03 RX ORDER — INSULIN GLARGINE 100 [IU]/ML
40 INJECTION, SOLUTION SUBCUTANEOUS DAILY
Qty: 30 ML | Refills: 0 | Status: SHIPPED | OUTPATIENT
Start: 2019-10-03 | End: 2019-10-16

## 2019-10-03 NOTE — PROGRESS NOTES
Called patient and review results of labwork   Continue uncontrolled hba1c  Patient willing to resume insulin  Will send to pharmacy  Will return to call to review the rest of her medications   [facility]

## 2019-10-03 NOTE — TELEPHONE ENCOUNTER
----- Message from Pooja Alfonso sent at 10/3/2019  2:02 PM CDT -----  Contact: pt  Pt would like a call back about Rx      can be reached at [phone]

## 2019-10-16 ENCOUNTER — OFFICE VISIT (OUTPATIENT)
Dept: INTERNAL MEDICINE | Facility: CLINIC | Age: 44
End: 2019-10-16
Payer: MEDICARE

## 2019-10-16 ENCOUNTER — TELEPHONE (OUTPATIENT)
Dept: INTERNAL MEDICINE | Facility: CLINIC | Age: 44
End: 2019-10-16

## 2019-10-16 VITALS
SYSTOLIC BLOOD PRESSURE: 130 MMHG | HEART RATE: 95 BPM | OXYGEN SATURATION: 99 % | HEIGHT: 65 IN | WEIGHT: 240.31 LBS | DIASTOLIC BLOOD PRESSURE: 80 MMHG | BODY MASS INDEX: 40.04 KG/M2

## 2019-10-16 DIAGNOSIS — E11.42 DIABETIC PERIPHERAL NEUROPATHY ASSOCIATED WITH TYPE 2 DIABETES MELLITUS: ICD-10-CM

## 2019-10-16 DIAGNOSIS — Z79.4 TYPE 2 DIABETES MELLITUS WITH HYPERGLYCEMIA, WITH LONG-TERM CURRENT USE OF INSULIN: Primary | ICD-10-CM

## 2019-10-16 DIAGNOSIS — Z79.4 TYPE 2 DIABETES MELLITUS WITHOUT COMPLICATION, WITH LONG-TERM CURRENT USE OF INSULIN: ICD-10-CM

## 2019-10-16 DIAGNOSIS — I10 ESSENTIAL HYPERTENSION: ICD-10-CM

## 2019-10-16 DIAGNOSIS — E11.65 TYPE 2 DIABETES MELLITUS WITH HYPERGLYCEMIA, WITH LONG-TERM CURRENT USE OF INSULIN: Primary | ICD-10-CM

## 2019-10-16 DIAGNOSIS — I15.2 HYPERTENSION ASSOCIATED WITH DIABETES: ICD-10-CM

## 2019-10-16 DIAGNOSIS — E11.9 TYPE 2 DIABETES MELLITUS WITHOUT COMPLICATION, WITH LONG-TERM CURRENT USE OF INSULIN: ICD-10-CM

## 2019-10-16 DIAGNOSIS — E11.59 HYPERTENSION ASSOCIATED WITH DIABETES: ICD-10-CM

## 2019-10-16 DIAGNOSIS — E11.622 TYPE 2 DIABETES MELLITUS WITH OTHER SKIN ULCER (CODE): ICD-10-CM

## 2019-10-16 PROCEDURE — 99999 PR PBB SHADOW E&M-EST. PATIENT-LVL IV: CPT | Mod: PBBFAC,HCNC,, | Performed by: INTERNAL MEDICINE

## 2019-10-16 PROCEDURE — 3046F PR MOST RECENT HEMOGLOBIN A1C LEVEL > 9.0%: ICD-10-PCS | Mod: HCNC,CPTII,S$GLB, | Performed by: INTERNAL MEDICINE

## 2019-10-16 PROCEDURE — 99499 RISK ADDL DX/OHS AUDIT: ICD-10-PCS | Mod: HCNC,S$GLB,, | Performed by: INTERNAL MEDICINE

## 2019-10-16 PROCEDURE — 99999 PR PBB SHADOW E&M-EST. PATIENT-LVL IV: ICD-10-PCS | Mod: PBBFAC,HCNC,, | Performed by: INTERNAL MEDICINE

## 2019-10-16 PROCEDURE — 3046F HEMOGLOBIN A1C LEVEL >9.0%: CPT | Mod: HCNC,CPTII,S$GLB, | Performed by: INTERNAL MEDICINE

## 2019-10-16 PROCEDURE — 3008F BODY MASS INDEX DOCD: CPT | Mod: HCNC,CPTII,S$GLB, | Performed by: INTERNAL MEDICINE

## 2019-10-16 PROCEDURE — 99214 OFFICE O/P EST MOD 30 MIN: CPT | Mod: HCNC,S$GLB,, | Performed by: INTERNAL MEDICINE

## 2019-10-16 PROCEDURE — 3008F PR BODY MASS INDEX (BMI) DOCUMENTED: ICD-10-PCS | Mod: HCNC,CPTII,S$GLB, | Performed by: INTERNAL MEDICINE

## 2019-10-16 PROCEDURE — 99214 PR OFFICE/OUTPT VISIT, EST, LEVL IV, 30-39 MIN: ICD-10-PCS | Mod: HCNC,S$GLB,, | Performed by: INTERNAL MEDICINE

## 2019-10-16 PROCEDURE — 2024F 7 FLD RTA PHOTO EVC RTNOPTHY: CPT | Mod: HCNC,S$GLB,, | Performed by: INTERNAL MEDICINE

## 2019-10-16 PROCEDURE — 2024F PR 7 FIELD PHOTOS WITH INTERP/ REVIEW: ICD-10-PCS | Mod: HCNC,S$GLB,, | Performed by: INTERNAL MEDICINE

## 2019-10-16 PROCEDURE — 99499 UNLISTED E&M SERVICE: CPT | Mod: HCNC,S$GLB,, | Performed by: INTERNAL MEDICINE

## 2019-10-16 RX ORDER — BENAZEPRIL HYDROCHLORIDE AND HYDROCHLOROTHIAZIDE 20; 12.5 MG/1; MG/1
1 TABLET ORAL DAILY
Qty: 90 TABLET | Refills: 3 | Status: SHIPPED | OUTPATIENT
Start: 2019-10-16 | End: 2020-01-13

## 2019-10-16 RX ORDER — INSULIN GLARGINE 100 [IU]/ML
50 INJECTION, SOLUTION SUBCUTANEOUS DAILY
Qty: 30 ML | Refills: 0 | Status: SHIPPED | OUTPATIENT
Start: 2019-10-16 | End: 2019-10-19 | Stop reason: SDUPTHER

## 2019-10-16 RX ORDER — METOPROLOL SUCCINATE 100 MG/1
100 TABLET, EXTENDED RELEASE ORAL DAILY
Qty: 60 TABLET | Refills: 1 | Status: SHIPPED | OUTPATIENT
Start: 2019-10-16 | End: 2020-03-02

## 2019-10-16 RX ORDER — METFORMIN HYDROCHLORIDE 1000 MG/1
1000 TABLET ORAL 2 TIMES DAILY
Qty: 60 TABLET | Refills: 2 | Status: SHIPPED | OUTPATIENT
Start: 2019-10-16 | End: 2020-01-13

## 2019-10-16 NOTE — PROGRESS NOTES
Subjective:       Patient ID: Jaimee Quintana is a 44 y.o. female.    Chief Complaint: No chief complaint on file.    Patient returns today for DM follow up and multiple problem      DM assessment  Providers: Seen by endocrinology -last visit 2017   Complications; neuropathy, retinopathy  Medication: adherence poor due to poor understanding. patient has poor insight into medications. She brings her medications to clinic today excpet for insulin. She reports taking lantus 40 mg BID and occasionally with the BG below  Vision/Podiatry: recommended for retina specialist- Overdue 1/2019.  UTD on podiatry   Glucose log; last 2 weeks . Patient reports she checks her BG at random times irrespective of meal time    10/14- 394   10/   10/12 - 297   10/7 - 377  Diet/Exercise; not working out, poor diet   Last labs              umacr-10mg/g              Hba1c 12.3%  Preventative: Vaccine: UTD PPS23, on statin.  overdue for hep B testing          HTN   Medication: Pt brings her medications today and it appears she is on multiple different does of antihypertensivies. Was very unclear about what medications she should be taking Metoprolol , benicar, Nifedipine, and lisinopril brought today.   Home Bps: does not check  Symptoms: Denies CP, SOB, FNS, changes in urination  Last labs: BMP, BUN/Cr -wnl     HLD  She is on Zetia, atorvastatin without side effects of medicaitons    HPI  Review of Systems   Constitutional: Negative for activity change, chills, diaphoresis, fatigue, fever and unexpected weight change.   Eyes: Negative for redness and visual disturbance.   Respiratory: Negative for shortness of breath.    Cardiovascular: Negative for chest pain and palpitations.   Gastrointestinal: Negative for abdominal distention and blood in stool.   Genitourinary: Negative for difficulty urinating, dysuria, frequency and menstrual problem.   Neurological: Negative for syncope and headaches.       Objective:         /80 (BP  "Location: Left arm, Patient Position: Sitting, BP Method: Medium (Manual))   Pulse 95   Ht 5' 5" (1.651 m)   Wt 109 kg (240 lb 4.8 oz)   SpO2 99%   BMI 39.99 kg/m²     Physical Exam   Constitutional: She is oriented to person, place, and time. She appears well-developed and well-nourished. No distress.   HENT:   Head: Normocephalic.   Nose: Nose normal.   Mouth/Throat: Oropharynx is clear and moist.   Eyes: Pupils are equal, round, and reactive to light. Conjunctivae and EOM are normal. Right eye exhibits no discharge. Left eye exhibits no discharge.   Neck: Normal range of motion.   Cardiovascular: Normal rate, regular rhythm and normal heart sounds. Exam reveals no gallop and no friction rub.   No murmur heard.  Pulmonary/Chest: Effort normal. No respiratory distress.   Abdominal: Soft. Bowel sounds are normal. She exhibits no distension.   Musculoskeletal: Normal range of motion. She exhibits no edema or deformity.   Neurological: She is alert and oriented to person, place, and time. No cranial nerve deficit.   Skin: Skin is warm. She is not diaphoretic.   Psychiatric: She has a normal mood and affect.   Nursing note and vitals reviewed.        Lab Results   Component Value Date    HGBA1C 11.9 (H) 10/02/2019     CMP  Sodium   Date Value Ref Range Status   10/02/2019 135 (L) 136 - 145 mmol/L Final     Potassium   Date Value Ref Range Status   10/02/2019 4.2 3.5 - 5.1 mmol/L Final     Chloride   Date Value Ref Range Status   10/02/2019 103 95 - 110 mmol/L Final     CO2   Date Value Ref Range Status   10/02/2019 21 (L) 23 - 29 mmol/L Final     Glucose   Date Value Ref Range Status   10/02/2019 319 (H) 70 - 110 mg/dL Final     BUN, Bld   Date Value Ref Range Status   10/02/2019 10 6 - 20 mg/dL Final     Creatinine   Date Value Ref Range Status   10/02/2019 0.8 0.5 - 1.4 mg/dL Final     Calcium   Date Value Ref Range Status   10/02/2019 9.2 8.7 - 10.5 mg/dL Final     Total Protein   Date Value Ref Range Status "   10/02/2019 7.7 6.0 - 8.4 g/dL Final     Albumin   Date Value Ref Range Status   10/02/2019 3.7 3.5 - 5.2 g/dL Final     Total Bilirubin   Date Value Ref Range Status   10/02/2019 0.4 0.1 - 1.0 mg/dL Final     Comment:     For infants and newborns, interpretation of results should be based  on gestational age, weight and in agreement with clinical  observations.  Premature Infant recommended reference ranges:  Up to 24 hours.............<8.0 mg/dL  Up to 48 hours............<12.0 mg/dL  3-5 days..................<15.0 mg/dL  6-29 days.................<15.0 mg/dL       Alkaline Phosphatase   Date Value Ref Range Status   10/02/2019 78 55 - 135 U/L Final     AST   Date Value Ref Range Status   10/02/2019 17 10 - 40 U/L Final     ALT   Date Value Ref Range Status   10/02/2019 26 10 - 44 U/L Final     Anion Gap   Date Value Ref Range Status   10/02/2019 11 8 - 16 mmol/L Final     eGFR if    Date Value Ref Range Status   10/02/2019 >60.0 >60 mL/min/1.73 m^2 Final     eGFR if non    Date Value Ref Range Status   10/02/2019 >60.0 >60 mL/min/1.73 m^2 Final     Comment:     Calculation used to obtain the estimated glomerular filtration  rate (eGFR) is the CKD-EPI equation.            Assessment:       1. Diabetic peripheral neuropathy associated with type 2 diabetes mellitus    2. Essential hypertension        Plan:       Jaimee was seen today for follow-up and diabetes.    Diagnoses and all orders for this visit:    Diabetic peripheral neuropathy associated with type 2 diabetes mellitus  Poorly controlled and patient with limited understanding of medical management of chronic diseases. Will illicit the assistance of CM referral as well as DM management referral as she had little understand of checking her blood glucoses. As far as her medical management we will increase her insulin to 50BID daily as she is essentially already on this dose. I suspect we will need to change to basaglar in  the near future if her insulin requirement remain this high -although it will be helpful to have accurate BG which she was instructed on doing. As an adjunct we will add on trulicity for further assistance to manage BG and for wt loss and cardiovascular benefit.    - Trulicity 0.75mgqweeks   - DM education referral    - CM referral    - Lantus 50U BID    - check Hepatitis B status      Essential hypertension  Patient remain is is at goal today although she has limited understand into her medical management of this chronic disease. Referred her to digital medicine program to assist with the home monitoring of Bps for overall better control. Will switch to benzapril-hctz combination pill for easy of medication dosing. Unclear why metorpolol succinate XL was dosed 2 times a day but for ease of medication administration will change to Daily    - Benzapril-HCTZ 20mg-12.5mg and BMP in 2 weeks   - Metoprolol  Succinate 100mg daily    - Referred to Digital medicine program   - RTC in one months for follow up          I spent 30 mins with the patient explaining proper glucose home monitoring as well as reviewing and consueling on proper medication administration.

## 2019-10-16 NOTE — PATIENT INSTRUCTIONS
WE were happy to see you today    We gave you a referral to Diabetes education and  to help with your medical problem    Please return in one month for followup

## 2019-10-19 PROBLEM — E11.622 TYPE 2 DIABETES MELLITUS WITH OTHER SKIN ULCER (CODE): Status: ACTIVE | Noted: 2019-10-19

## 2019-10-19 RX ORDER — INSULIN GLARGINE 100 [IU]/ML
50 INJECTION, SOLUTION SUBCUTANEOUS 2 TIMES DAILY
Qty: 90 ML | Refills: 0
Start: 2019-10-19 | End: 2020-01-13

## 2019-10-29 ENCOUNTER — OUTPATIENT CASE MANAGEMENT (OUTPATIENT)
Dept: ADMINISTRATIVE | Facility: OTHER | Age: 44
End: 2019-10-29

## 2019-10-29 NOTE — LETTER
October 30, 2019    Jaimee Quintana  [address] [place] [address]             [facility] [address] [place] [address] Dear Ms. Jaimee Mariano:    Welcome to [facility]s Complex Care Management Program.  It was a pleasure talking with you today.  My name is Mckenzie Tolentino RN and I look forward to being your Care Manager.  My goal is to help you function at the healthiest and highest level possible.  You can contact me directly at [phone].     As an [facility] patient with [employer], some of the services we may be able to provide include:      Development of an individualized care plan with a Registered Nurse    Connection with a    Connection with available resources and services     Coordinate communication among your care team members    Provide coaching and education    Help you understand your doctors treatment plan   Help you obtain information about your insurance coverage.     All services provided by [facility]s Complex Care Managers and other care team members are coordinated with and communicated to your primary care team.    As part of your enrollment, you will be receiving education materials and more information about these services in your [facility] account, by phone or through the mail.  If you do not wish to participate or receive information, please contact our office at [phone].      Sincerely,        Mckenzie Tolentino RN  [facility]   Out-patient RN Complex Care Manager                                                                              -2-      [facility]:    Mckenzie Tolentino RN, [facility]- [facility]-    Tel: [phone], Mon-Fri, 8 am- 4:30 pm  Dom Alvarado,  [facility]  Tel:   [phone], Mon-Fri, 8 am- 4:30 pm  [facility] Main Office- TEL:  [phone]     Office Hours Mon-Fri, 8 am - 4:30 pm   [facility] On [facility], 24/7 Nurse Help  Line (non emergent)- TEL:  [phone]        [employer]:    [employer] 24 Hour Nurse Line--TEL:  [phone]  [employer]- Over-the-Counter Benefit-- TEL:  [phone]  Simi Cabrera, David Representative-- TEL:  [phone], ext [phone] can help to change [employer] plan to reflect either Diabetes or Heart Disease or both in order to lower co pay costs for medical appointments.   [employer]- [place], [facility]  [employer]- Well [employer] -TEL:  [phone].  Call once home within 30 days after a discharge from an Inpatient Stay at the Hospital, Rehab or Skilled Facility. 10 meals are delivered to your home from [employer]. All meals are Heart Healthy, Low Sodium.   [facility]- Transportation Reservation-- TEL:  [phone]  Mon-Fri, 8 am-5 pm, 3 business days notice required.

## 2019-10-31 NOTE — PROGRESS NOTES
Outpatient Care Management  Initial Patient Assessment    Patient: Jaimee Quintana  MRN: [id number]  Date of Service: 10/30/19  Completed by: Mckenzie Tolentino RN  Referral Date: 10/16/2019  Program: Case Management (High Risk)    Reason for Visit   Patient presents with    OPCM Chart Review     10/29/19    OPCM RN First Assessment Attempt     10/29/19    Initial Assessment     10/30/19    PHQ-9    Plan Of Care    OPCM Enrollment Call       Summary:    Patient referred to OPCM for high risk by Dr. Ponce, PCP.  Spoke with patient telephonically. Explained OPCM program. Patient in agreement to have OPCM assist & manage health issues.  Completed initial screen/assessment/Med Rec with discrepancies found:  Pt reports need to  the following rxs:  Atorvastatin, benazepril-HCTZ, Zetia & nifedipine from pharmacy/PHQ9= 3. Pt denies SI/HI. Pt initially vague to whether she has Paxil at home to take, then states that she does. Pt states her chief concerns are about dxs HTN, DM & Depression. Pt adds that not having a working vehicle currently is adding to her depression/stress. Pt currently reports not having BP machine, not having up-to-date BG test strips & unclear matching glucometer.   Care coordination with PCP and OPCM LCSW.   Pt agrees to be contacted by OPCM LCSW and for f/u RN CM call in one week.       .  Assessment Documentation     OPCM Initial Assessment    Involvement of Care  Do I have permission to speak with other family members about your care?:  Yes (Comment: Leonora Quintana- sister)  Assessment completed by:  Patient  Patient Reported Insurance  Verified current insurance plan:  [employer] Medicare Advantage  [employer] benefits discussed:  OTC prescription discounts  Current Health Status  Patient Health Rating  Compared to other people your age, how would you rate your health?:  Fair  Patient Reported Labs & Vitals  Any patient reported labs and/or vitals?:  No  Social Determinants  Advanced Care Planning  Do  you have any of the following?:  Caregiver make informed decisions on your behalf (Comment: I barely talk to my sister about my health, my daughter is 15 yo.  Leonora Quintana. )  If yes, do we have a copy?:  No  If no, would the patient like Advance Directive resources?:  Yes  Advanced Care Planning resources provided?:  Yes  Is Advanced Care Planning an area of need?:  Yes  Support  Caregiver presence?:  No (Comment: 15 and 7 yo daughters, pt hesitates to say her sisterHayley Quintana but that no one really knows about her health)  Present activity level:  not limited in any of these ways  Who takes you to your medical appointments?:  friend  Housing  Living arrangements:  family members  Number of people in home:  3  Type of residence:  apartment  Own or rent?:  rent  Permanent residence?:  yes  Does the patient's residence have any of the following?:  n/a (Comment: No steps)  Is housing an area of need?:  no  Access to [employer] & Technology  Does the patient have access to any of the following devices or technologies?:  SmartPhone  Clinical Assessment  Medication Adherence  How does the patient obtain their medications?:  pharmacy delivery  How many days a week do you miss medications?:  never  Do you use a pill box or medication chart to help you manage your medications?:  no  Do you sometimes have difficulty refilling your medications?:  yes (see comment)  Medication reconciliation completed?:  Yes  Is medication adherence an area of need?:  Yes  *Active medication list was reviewed and reconciled with patient and/or caregiver:    Nutritional Status  Diet:  Diabetic diet, low sodium, low cholesterol  Change in appetite?:  no  Recent changes in weight?:  no  Dentition:  N/A  Is nutrition an area of need?:  yes  Labs  Do you have regular lab work to monitor your medications?:  yes  Type of lab work:  A1c  Where do you get your lab work done?:  [facility]  Is lab adherence an area of need?:  no  PHQ Depression  "Screen  Does patient's PHQ Depression Screening indicate a barrier to meeting self-care needs?:  No  Cognitive/Behavioral Health  Alert and oriented?:  yes  Difficulty thinking?:  no (Comment: Difficulty understanding health conditions in general)  Requires prompting?:  no  Requires assistance for routine expression?:  no  Is Cognitive/Behavioral health an area of need?:  no  Culture/Christian  Does patient have cultural or Muslim beliefs that may impact ability to access healthcare?:  no  Communication  Language preference:  English   needed?:  no  Hearing problems?:  no  Decreased vision?:  yes (Comment: Night vision is hard. Wants to get transition glasses)  Legally blind?:  no  Vision assistance:  glasses  Is Communication an area of need?:  no  Health Literacy  Preferred learning method:   (Comment: "Doesn't matter" )  How often do you need to have someone help you read instructions, pamphlets, or other written material from your doctor or pharmacy?:  never  Is there a Health Literacy need?:  yes  Activities of Daily Living  Ambulation:  independent  Dressing:  independent  Bathing:  independent  Transfers:  independent  Toileting:  independent  Feeding:  independent  Cleaning home/chores:  independent  Telephone use:  independent  Shopping/attending doctors' appointments:  independent  Paying bills:  independent  Taking meds:  independent  Climbing stairs:  independent  Fall Risk  Patient mobility status:  Ambulatory  Number of falls in the past 12 months:  0  Fall risk?:  No  Equipment/Current Services  Equipment/supplies used in home:  tub seat, CPAP/BPAP machine (Comment: Reports using her CPAP)  Current services:  injections (Comment: new to [employer])  Is Equipment/Supplies/Services an area of need?:  no  Community & Government Programs  Support:  none  Government suppot assistance:  N/A  [facility]:  N/A  Community Resource Assessment  Based on the assessment of " needs:  Patient is in need of community resources  OPCM  consulted to assist with the following:  other (see comment) (Comment: Potential community resources )  Completion of Initial Assessment  Is the Initial Assessment Complete at this time?:  yes         Reviewed and provided basic information on available community resources for mental health, transportation, wellness resources, and palliative care programs with patient and/or caregiver.    Problem List and History     Patient Active Problem List   Diagnosis    Type 2 diabetes mellitus with diabetic polyneuropathy, with long-term current use of insulin    HTN (hypertension) without retinopathy    Hyperlipidemia    Cerebrovascular disease    Depression with suicidal ideation    AMA (advanced maternal age) multigravida 35+    Positive pregnancy test    Morbid obesity with BMI of 45.0-49.9, adult    Chest pain    Hyponatremia    Miscarriage    Hypertension associated with diabetes    Low back pain without sciatica    CESAR (obstructive sleep apnea)    Anxiety    Urinary tract infection with hematuria    RUQ abdominal pain    Calculus of gallbladder without cholecystitis without obstruction    Fatty liver    Bilateral low back pain without sciatica    Decreased functional mobility    Poor posture    Weakness of both lower extremities    Dyspepsia    Biliary colic    Hepatomegaly    History of cholecystectomy    Diabetic eye exam    Diabetic peripheral neuropathy associated with type 2 diabetes mellitus    Multinodular goiter (nontoxic)    Thyroid nodule    Needs flu shot    Poor compliance    Abnormal finding of blood chemistry     Microalbuminuria    Back wound    Amenorrhea    Moderate nonproliferative diabetic retinopathy of both eyes without macular edema associated with type 2 diabetes mellitus    Type 2 diabetes mellitus with other skin ulcer (CODE)       Reviewed Active Problem List with patient and/or  Caregiver.    Medical History:  Reviewed medical history with patient and/or caregiver    Social History:  Reviewed social history with patient and/or caregiver    Complex Care Plan     Care plan was discussed and completed today with input from patient and/or caregiver.    Goals        Outpatient Case Management     Patient/caregiver will have an action plan in place to manage and prevent complications of  Diabetes prior to discharge from OPCM. - Priority: HIGH       Overall Time to Completion  2 months from 10/31/2019        OPCM Identified Patient Barriers:  Advanced Care Planning:  Yes  -Referred to OPCM   Nutrition:  Yes     -Care Plan Created  Housing:  no  Medication Adherence:  Yes   -Care Plan Created  Lab Adherence:  no  Cognitive/Behavioral Health:  no  Communication:  no  Health Literacy:  Yes   -Care Plan Created  Equipment/Supplies/Services:  Yes- -Care Plan Created  Culural/Advent Beliefs:  no  PHQ:  Yes    -Referred to OPCM Social Workerr  Fall Risk:  Neg     OPCM Identified Disease Education Barriers:  Depression:  Pos     -Care Plan Created/-Referred to OPCM   Hypertension:  Pos   -Care Plan Created  Diabetes:  Pos (Comment: And needs Dilated Diabetes Eye Exam)   -Care Plan Created    Short Term Goals  Patient/caregiver will establish appointment with CDE within 1 week.  Interventions                   Encourage compliance with upcoming CDE appt 11/5.             10/30--- Patient/Caregiver agrees to attend CDE appt on 11/5.  (Pt reports plans to call [facility] transportation to schedule her ride to 11/5 appt)                          Patient/Caregiver agrees to OPCM follow up within one week to assess progress to goal.      Status  · Partially met           Clinical Reference Documents Added to Patient Instructions       Document    DEPRESSION, WHAT CAN CAUSE (ENGLISH)    DEPRESSION: TIPS TO HELP YOURSELF  (ENGLISH)    HIGH BLOOD PRESSURE, WHAT IS?  (ENGLISH)    SALT, TIPS  FOR USING LESS (ENGLISH)    SUICIDE, RECOGNIZING WARNING SIGNS IN YOURSELF (ENGLISH)             Patient/caregiver will have an action plan in place to manage and prevent complications of Depression prior to discharge from OPCM. - Priority: HIGH      Overall Time to Completion  2 months from 10/31/2019       OPCM Identified Patient Barriers:  Advanced Care Planning:  Yes  -Referred to OPCM   Nutrition:  Yes     -Care Plan Created  Housing:  no  Medication Adherence:  Yes   -Care Plan Created  Lab Adherence:  no  Cognitive/Behavioral Health:  no  Communication:  no  Health Literacy:  Yes   -Care Plan Created  Equipment/Supplies/Services:  Yes- -Care Plan Created  Culural/Temple Beliefs:  no  PHQ:  Yes    -Referred to OPCM   Fall Risk:  Neg     OPCM Identified Disease Education Barriers:  Depression:  Pos     -Care Plan Created/-Referred to OPCM   Hypertension:  Pos   -Care Plan Created  Diabetes:  Pos (Comment: And needs Dilated Diabetes Eye Exam)   -Care Plan Created        Short Term Goals  Patient/caregiver will verbalize dosage/route/frequency/indication of Paxil 20 mg daily within 1 week.  Interventions   · Encourage Medication Compliance.  · Provide contact information for 24 hour Crisis Line number- [facility].  · Recognize and provide educational material (WILLIAM).   · 10/30-- PHQ2= 2 today. Pt denies SI/HI.   · 10/30- Provided pt with education on the importance of taking Paxil daily and never stopping Paxil abruptly d/t to serious medical consequences involve if stopped ie severe depression, suicidal ideation.   10/30-- Patient/Caregiver agrees to adher to Paxil as prescribed without suddenly stopping Paxil or to discuss first with her physician before plans to stop Paxil by 1 wk.  · Patient/Caregiver agrees to OPCM follow up within one week to assess progress to goal.      Status  · Partially met           Clinical Reference Documents Added to Patient Instructions        Document    DEPRESSION, WHAT CAN CAUSE (ENGLISH)    DEPRESSION: TIPS TO HELP YOURSELF  (ENGLISH)    HIGH BLOOD PRESSURE, WHAT IS?  (ENGLISH)    SALT, TIPS FOR USING LESS (ENGLISH)    SUICIDE, RECOGNIZING WARNING SIGNS IN YOURSELF (ENGLISH)             Patient/caregiver will have an action plan in place to manage and prevent complications of HTN prior to discharge from OPCM. - Priority: HIGH      Overall Time to Completion  2 months from 10/31/2019     OPCM Identified Patient Barriers:  Advanced Care Planning:  Yes  -Referred to OPCM   Nutrition:  Yes     -Care Plan Created  Housing:  no  Medication Adherence:  Yes   -Care Plan Created  Lab Adherence:  no  Cognitive/Behavioral Health:  no  Communication:  no  Health Literacy:  Yes   -Care Plan Created  Equipment/Supplies/Services:  Yes- -Care Plan Created  Culural/Orthodoxy Beliefs:  no  PHQ:  Yes    -Referred to OPCM   Advanced Care Planning-- -Referred to OPCM   Fall Risk:  Neg     OPCM Identified Disease Education Barriers:  Depression:  Pos     -Care Plan Created/-Referred to OPCM   Hypertension:  Pos   -Care Plan Created  Diabetes:  Pos (Comment: And needs Dilated Diabetes Eye Exam)   -Care Plan Created           Short Term Goals  Patient/caregiver will maintain follow-up appointment with Physician within 1 month.  Interventions   · Empower patient/caregiver to discuss treatment plan with Physician/care team.  · Encourage compliance with Physician follow-ups.  · Encourage compliance with scheduled laboratory testing and procedure.  · Facilitate referral to Digital Hypertension program.        10/30-- pt currently has not been contacted through Patient Portal with questionnaire for DIG HTN.       Status  · Partially met      Patient/caregiver will measure and record the blood pressure 1 times per day for 2 weeks.  Interventions   · Assess for availability of working blood pressure cuff in home  setting.  · Mail Blood pressure logs for home use.   · 10/30-- Pt currently does not have own BP mach at home.  10/30--Patient/Caregiver agrees to purchase own BP machine from [facility] by 2 wks.   ·             Patient/Caregiver agrees to OPCM follow up within 2 wks to assess progress to goal.      Status  · Partially met      Patient/caregiver will verbalize 3 food items Low Sodium within 1 month.  Interventions   · Encourage Dietary Compliance.  · Recognize and provide educational material (WILLIAM).  · Review eating/nutrition habits.     Status  · Partially met      Patient/caregiver will verbalize 2 ways of preventing complications due to disease process within 1 month.  Interventions   · Empower patient/caregiver to discuss treatment plan with Physician/care team.  · Encourage compliance with Physician follow-ups.  · Encourage compliance with scheduled laboratory testing and procedure.  · Encourage Dietary Compliance.  · Encourage Exercise.  · Encourage Medication Compliance.  · Mail pill box.     Status  · Partially met           Clinical Reference Documents Added to Patient Instructions       Document    DEPRESSION, WHAT CAN CAUSE (ENGLISH)    DEPRESSION: TIPS TO HELP YOURSELF  (ENGLISH)    HIGH BLOOD PRESSURE, WHAT IS?  (ENGLISH)    SALT, TIPS FOR USING LESS (ENGLISH)    SUICIDE, RECOGNIZING WARNING SIGNS IN YOURSELF (ENGLISH)             Patient/caregiver will have knowledge of resources available in order to obtain the services that are needed prior to discharge from OPCM. - Priority: MOD      Overall Time to Completion  2 months from 10/31/2019     [facility] [place] Identified Patient Barriers:  Advanced Care Planning:  Yes  -Referred to OPCM   Nutrition:  Yes     -Care Plan Created  Housing:  no  Medication Adherence:  Yes   -Care Plan Created  Lab Adherence:  no  Cognitive/Behavioral Health:  no  Communication:  no  Health Literacy:  Yes   -Care Plan Created  Equipment/Supplies/Services:  Yes- -Care Plan  Created  Culural/Hindu Beliefs:  no  PHQ:  Yes    -Referred to OPCM   Fall Risk:  Neg     OPCM Identified Disease Education Barriers:  Depression:  Pos     -Care Plan Created/-Referred to OPCM   Hypertension:  Pos   -Care Plan Created  Diabetes:  Pos (Comment: And needs Dilated Diabetes Eye Exam)   -Care Plan Created        Short Term Goals  Patient/caregiver will have contact information for identified community resources IE:OPCM  for follow-up within 3 weeks.  Interventions         Mailed Diabetes Management Guide.    Done 10/31        Mail [employer] OTC Benefit Contact Information.   Done 10/31  · Refer to Outpatient Case Management Social Worker.   Done 10/31  · 10/30-- Provided pt with contact for OPCM RN ALONDRA & [facility] and mailed the same along with Welcome Letter, education material,  OTC 2019 catalog. Done 10/31  10/30-- Patient/Caregiver agrees to receive educational material and read information by 2 wks.  ·             Patient/Caregiver agrees to OPCM follow up within 2wks  to assess progress to goal.    Status  · Partially met           Clinical Reference Documents Added to Patient Instructions       Document    DEPRESSION, WHAT CAN CAUSE (ENGLISH)    DEPRESSION: TIPS TO HELP YOURSELF  (ENGLISH)    HIGH BLOOD PRESSURE, WHAT IS?  (ENGLISH)    SALT, TIPS FOR USING LESS (ENGLISH)    SUICIDE, RECOGNIZING WARNING SIGNS IN YOURSELF (ENGLISH)                  Patient Instructions     Instructions were provided via the [employer] patient resources and are available for the patient to view on the patient portal.    No follow-ups on file.    Todays OPCM Self-Management Care Plan was developed with the patients/caregivers input and was based on identified barriers from todays assessment.  Goals were written today with the patient/caregiver and the patient has agreed to work towards these goals to improve his/her overall well-being. Patient verbalized understanding of  the care plan, goals, and all of today's instructions. Encouraged patient/caregiver to communicate with his/her physician and health care team about health conditions and the treatment plan.  Provided my contact information today and encouraged patient/caregiver to call me with any questions as needed.

## 2019-11-05 ENCOUNTER — CLINICAL SUPPORT (OUTPATIENT)
Dept: DIABETES | Facility: CLINIC | Age: 44
End: 2019-11-05
Payer: MEDICARE

## 2019-11-05 DIAGNOSIS — E11.42 DIABETIC PERIPHERAL NEUROPATHY ASSOCIATED WITH TYPE 2 DIABETES MELLITUS: ICD-10-CM

## 2019-11-05 PROCEDURE — G0108 PR DIAB MANAGE TRN  PER INDIV: ICD-10-PCS | Mod: HCNC,S$GLB,, | Performed by: INTERNAL MEDICINE

## 2019-11-05 PROCEDURE — G0108 DIAB MANAGE TRN  PER INDIV: HCPCS | Mod: HCNC,S$GLB,, | Performed by: INTERNAL MEDICINE

## 2019-11-05 PROCEDURE — 99999 PR PBB SHADOW E&M-EST. PATIENT-LVL II: CPT | Mod: PBBFAC,HCNC,,

## 2019-11-05 PROCEDURE — 99999 PR PBB SHADOW E&M-EST. PATIENT-LVL II: ICD-10-PCS | Mod: PBBFAC,HCNC,,

## 2019-11-06 VITALS — WEIGHT: 248 LBS | BODY MASS INDEX: 41.27 KG/M2

## 2019-11-06 NOTE — PROGRESS NOTES
Diabetes Education  Author: Hal Ji RN  Date: 11/6/2019  Diabetes Care Management Summary  Diabetes Education Record Assessment/Progress: Initial  Current Diabetes Risk Level: High   Last A1c:   Lab Results   Component Value Date    HGBA1C 11.9 (H) 10/02/2019     Last Visit with Diabetes Educator: : 11/05/2019  Last OPCM Referral: : 10/16/2019   Enrolled in OPCM: Yes     Diabetes Type  Diabetes Type : Type II  Diabetes History  Diabetes Diagnosis: >10 years  Current Treatment: Oral Medication, Diet, Injectable, Exercise, Insulin  Health Maintenance was reviewed today with patient. Discussed with patient importance of routine eye exams, foot exams/foot care, blood work (i.e.: A1c, microalbumin, and lipid), dental visits, yearly flu vaccine, and pneumonia vaccine as indicated by PCP. Patient verbalized understanding.     Health Maintenance Topics with due status: Not Due       Topic Last Completion Date    TETANUS VACCINE 11/19/2014    Eye Exam 12/25/2018    Lipid Panel 01/14/2019    Mammogram 08/21/2019    Pap Smear with HPV Cotest 09/16/2019    Hemoglobin A1c 10/02/2019    Low Dose Statin 10/16/2019     Health Maintenance Due   Topic Date Due    Foot Exam  12/17/2019   Nutrition  Meal Planning: skipping meals, drinks regular soda, eats out often, snacks between meal  What type of beverages do you drink?: regular soda/tea, milk, sport drinks  Meal Plan 24 Hour Recall - Breakfast: nothing- chews gum  Meal Plan 24 Hour Recall - Lunch: smothered pork chop  Meal Plan 24 Hour Recall - Dinner: tuna salad  Meal Plan 24 Hour Recall - Snack: banana  Monitoring   Self Monitoring : pt has a meter and is testing 3 times a day. instructed pt to test fasting in am and before lantus at night and before trulicity once a week. instructed pt on the normal bs ranges. instructed pt to keep a record of her bs's and to bring the record to her md visits.  Blood Glucose Logs: No  Do you use a personal continuous glucose monitor?:  No  In the last month, how often have you had a low blood sugar reaction?: more than once a week  What are your symptoms of low blood sugar?: dizzy, weak, nervous, confused, grouchy-- several times a week  How do you treat low blood sugar?: did not treat- not aware low bs's  Can you tell when your blood sugar is too high?: no  Exercise   Exercise Type: none  Current Diabetes Treatment   Current Treatment: Oral Medication, Diet, Injectable, Exercise, Insulin  Social History  Preferred Learning Method: Face to Face, Group Education, Demonstration, Reading Materials  Primary Support: Self  Educational Level: High School    DDS-2 Score  ( > 3 = SIGNIFICANT DISTRESS): 2   Barriers to Change  Barriers to Change: Cognitive  Readiness to Learn   Readiness to Learn : Acceptance  Cultural Influences  Cultural Influences: None  Diabetes Education Assessment/Progress  Diabetes Disease Process (diabetes disease process and treatment options): Discussion, Instructed, Demonstrates Understanding/Competency(verbalizes/demonstrates), Individual Session, Written Materials Provided  Nutrition (Incorporating nutritional management into one's lifestyle): Discussion, Demonstration, Instructed, Demonstrates Understanding/Competency (verbalizes/demonstrates), Individual Session, Written Materials Provided  Physical Activity (incorporating physical activity into one's lifestyle): Discussion, Instructed, Demonstrates Understanding/Competency (verbalizes/demonstrates), Individual Session, Written Materials Provided  Medications (states correct name, dose, onset, peak, duration, side effects & timing of meds): Discussion, Instructed, Demonstrates Understanding/Competency(verbalizes/demonstrates), Individual Session, Written Materials Provided  Monitoring (monitoring blood glucose/other parameters & using results): Discussion, Instructed, Demonstrates Understanding/Competency (verbalizes/demonstrates), Individual Session, Written Materials  Provided  Acute Complications (preventing, detecting, and treating acute complications): Discussion, Instructed, Demonstrates Understanding/Competency (verbalizes/demonstrates), Individual Session, Written Materials Provided  Chronic Complications (preventing, detecting, and treating chronic complications): Discussion, Instructed, Demonstrates Understanding/Competency (verbalizes/demonstrates), Individual Session, Written Materials Provided  Clinical (diabetes, other pertinent medical history, and relevant comorbidities reviewed during visit): Discussion, Instructed, Demonstrates Understanding/Competency (verbalizes/demonstrates), Individual Session  Cognitive (knowledge of self-management skills, functional health literacy): Discussion, Instructed, Demonstrates Understanding/Competency (verbalizes/demonstrates), Individual Session  Psychosocial (emotional response to diabetes): Discussion, Instructed, Demonstrates Understanding/Competency (verbalizes/demonstrates), Individual Session  Diabetes Distress and Support Systems: Discussion, Instructed, Demonstrates Understanding/Competency (verbalizes/demonstrates), Individual Session  Behavioral (readiness for change, lifestyle practices, self-care behaviors): Discussion, Instructed, Demonstrates Understanding/Competency (verbalizes/demonstrates), Individual Session  Goals  Patient has selected/evaluated goals during today's session: Yes, selected  Healthy Eating: Set  Start Date: 11/06/19  Target Date: 01/06/20  Monitoring: Set  Start Date: 11/06/19  Target Date: 01/06/20  Diabetes Care Plan/Intervention  Education Plan/Intervention: Group Follow-Up DSMT, Eye Exam, Foot Exam  Diabetes Meal Plan  Restrictions: Restricted Carbohydrate  Calories: 1800  Carbohydrate Per Meal: 30-45g  Carbohydrate Per Snack : 15-20g  Instructed pt on the food groups, how to read labels and count carbs. Pt was given sample menus and meal plans as examples. Pt skips breakfast daily. Pt gets  most of her food from out and it is fast food. Pt has frequent episodes of hypoglycemia because she is not eating enough food,. Discussed with pt the importance of eating 3 balanced and portioned meals per day. Discussed with pt how to make the healthier choices when eating out. Discussed with pt how to put her meals together. Discussed with pt how to make balanced meals with little or no cooking using and sandwich and other foods. Discussed with pt foods that she likes that she could include in her meal plan. Discussed the use of the crock pot, insta pot and air fryer to eat healthier foods. Discussed with pt the importance of planning her meals and having the food available. The plate method was used to show pt how her food should look at at meal. Pt had fair understanding of meal plan and will need reinforcement. Pt was advised to call for any problems or questions.  Today's Self-Management Care Plan was developed with the patient's input and is based on barriers identified during today's assessment.    The long and short-term goals in the care plan were written with the patient/caregiver's input. The patient has agreed to work toward these goals to improve her overall diabetes control.      The patient received a copy of today's self-management plan and verbalized understanding of the care plan, goals, and all of today's instructions.      The patient was encouraged to communicate with her physician and care team regarding her condition(s) and treatment.  I provided the patient with my contact information today and encouraged her to contact me via phone or patient portal as needed.     Education Units of Time   Time Spent: 60 min

## 2019-11-13 ENCOUNTER — OUTPATIENT CASE MANAGEMENT (OUTPATIENT)
Dept: ADMINISTRATIVE | Facility: OTHER | Age: 44
End: 2019-11-13

## 2019-11-13 NOTE — PROGRESS NOTES
Outpatient Care Management  Plan of Care Follow Up Visit    Patient: Jaimee Quintana  MRN: [id number]  Date of Service: 11/13/2019  Completed by: Mckenzie Tolentino RN  Referral Date: 10/16/2019  Program: Case Management (High Risk)    Reason for Visit   Patient presents with    OPCM RN First Follow-Up Attempt     11/13/19       Brief Summary:   Pt completed CDE appt, has received new BP machine and is aware of upcoming Diabetes Class and next CDE appt.   Pt is adhering to Paxil and reports stability in mental health. Scheduling conflict with today's PCP appt. Pt to reschedule.   Pt continues with transportation barrier while vehicle in disrepair.     Patient Summary     Involvement of Care:  Do I have permission to speak with other family members about your care?       Problem List     Patient Active Problem List   Diagnosis    Type 2 diabetes mellitus with diabetic polyneuropathy, with long-term current use of insulin    HTN (hypertension) without retinopathy    Hyperlipidemia    Cerebrovascular disease    Depression with suicidal ideation    AMA (advanced maternal age) multigravida 35+    Positive pregnancy test    Morbid obesity with BMI of 45.0-49.9, adult    Chest pain    Hyponatremia    Miscarriage    Hypertension associated with diabetes    Low back pain without sciatica    CESAR (obstructive sleep apnea)    Anxiety    Urinary tract infection with hematuria    RUQ abdominal pain    Calculus of gallbladder without cholecystitis without obstruction    Fatty liver    Bilateral low back pain without sciatica    Decreased functional mobility    Poor posture    Weakness of both lower extremities    Dyspepsia    Biliary colic    Hepatomegaly    History of cholecystectomy    Diabetic eye exam    Diabetic peripheral neuropathy associated with type 2 diabetes mellitus    Multinodular goiter (nontoxic)    Thyroid nodule    Needs flu shot    Poor compliance    Abnormal finding of blood  chemistry     Microalbuminuria    Back wound    Amenorrhea    Moderate nonproliferative diabetic retinopathy of both eyes without macular edema associated with type 2 diabetes mellitus    Type 2 diabetes mellitus with other skin ulcer (CODE)       Reviewed Active Problem List with patient and/or Caregiver.    Patient Reported Labs & Vitals:  1.  Any Patient Reported Labs & Vitals?     2.  Patient Reported Blood Pressure:     3.  Patient Reported Pulse:     4.  Patient Reported Weight (Kg):     5.  Patient Reported Blood Glucose (mg/dl):       Medical History:  Reviewed medical history with patient and/or caregiver    Social History:  Reviewed social history with patient and/or caregiver    Clinical Assessment     Reviewed and provided basic information on available community resources for mental health, transportation, wellness resources, and palliative care programs with patient and/or caregiver.    Complex Care Plan     Care plan was discussed and completed today with input from patient and/or caregiver.    Goals        Outpatient Case Management     Patient/caregiver will have an action plan in place to manage and prevent complications of  Diabetes prior to discharge from [facility]. - Priority: HIGH       Overall Time to Completion  2 months from 10/31/2019        OPCM Identified Patient Barriers:  Advanced Care Planning:  Yes  -Referred to OPCM   Nutrition:  Yes     -Care Plan Created  Housing:  no  Medication Adherence:  Yes   -Care Plan Created  Lab Adherence:  no  Cognitive/Behavioral Health:  no  Communication:  no  Health Literacy:  Yes   -Care Plan Created  Equipment/Supplies/Services:  Yes- -Care Plan Created  Culural/Scientologist Beliefs:  no  PHQ:  Yes    -Referred to OPCM Social Workerr  Fall Risk:  Neg     OPCM Identified Disease Education Barriers:  Depression:  Pos     -Care Plan Created/-Referred to OPCM   Hypertension:  Pos   -Care Plan Created  Diabetes:  Pos (Comment: And  needs Dilated Diabetes Eye Exam)   -Care Plan Created    Short Term Goals  Patient/caregiver will establish appointment with CDE within 1 week.  Interventions                   Encourage compliance with upcoming CDE appt 11/5.             10/30--- Patient/Caregiver agrees to attend CDE appt on 11/5.  (Pt reports plans to call [facility] transportation to schedule her ride to 11/5 appt)                          Patient/Caregiver agrees to OPCM follow up within one week to assess progress to goal.   11/13-- Pt s/p CDE 11/5- with plans to attend Diabetes Class 11/21 and then 2nd CDE appt 12/12.      Status  · Partially met       Patient/caregiver will state 2 s/s hypoglycemia and 2 s/s hyperglycemia within 1 month. (Added 11/13/19)         Interventions    S/S hypoglycemia-  Shaking, sweating, weakness, dizziness, nervousness, irritable, hunger, mental confusion.    S/S hyperglycemia--Increased thirst, hunger, urination, sleepiness, nausea, blurry vision, rapid breathing, mental confusion.   11/13-- Provided education on possible effects on BG due to not eating a meal (breakfast). Pt reports not eating breakfast. Explained how not eating a meal can cause low blood sugars or high blood sugars from the body producing glucose from the liver to compensate for state of starvation.               Clinical Reference Documents Added to Patient Instructions       Document    DEPRESSION, WHAT CAN CAUSE (ENGLISH)    DEPRESSION: TIPS TO HELP YOURSELF  (ENGLISH)    HIGH BLOOD PRESSURE, WHAT IS?  (ENGLISH)    SALT, TIPS FOR USING LESS (ENGLISH)    SUICIDE, RECOGNIZING WARNING SIGNS IN YOURSELF (ENGLISH)             Patient/caregiver will have an action plan in place to manage and prevent complications of Depression prior to discharge from OPCM. - Priority: HIGH      Overall Time to Completion  2 months from 10/31/2019       OPCM Identified Patient Barriers:  Advanced Care Planning:  Yes  -Referred to OPCM   Nutrition:  Yes      -Care Plan Created  Housing:  no  Medication Adherence:  Yes   -Care Plan Created  Lab Adherence:  no  Cognitive/Behavioral Health:  no  Communication:  no  Health Literacy:  Yes   -Care Plan Created  Equipment/Supplies/Services:  Yes- -Care Plan Created  Culural/Islam Beliefs:  no  PHQ:  Yes    -Referred to OPCM   Fall Risk:  Neg     OPCM Identified Disease Education Barriers:  Depression:  Pos     -Care Plan Created/-Referred to OPCM   Hypertension:  Pos   -Care Plan Created  Diabetes:  Pos (Comment: And needs Dilated Diabetes Eye Exam)   -Care Plan Created        Short Term Goals  Patient/caregiver will verbalize dosage/route/frequency/indication of Paxil 20 mg daily within 1 week.  Interventions   · Encourage Medication Compliance.  · Provide contact information for 24 hour Crisis Line number- [facility].  · Recognize and provide educational material (WILLIAM).   · 10/30-- PHQ2= 2 today. Pt denies SI/HI.   · 10/30- Provided pt with education on the importance of taking Paxil daily and never stopping Paxil abruptly d/t to serious medical consequences involved if stopped ie severe depression, suicidal ideation.   10/30-- Patient/Caregiver agrees to adher to Paxil as prescribed without suddenly stopping Paxil or to discuss first with her physician before plans to stop Paxil by 1 wk.  · Patient/Caregiver agrees to OPCM follow up within one week to assess progress to goal.   · 11/13- Patient confirms adherence to Paxil daily. Pt reports managing her mental health adequately and reports her car is now going into the shop so progress with her transportation is being made.      Status  · Met   11/13           Clinical Reference Documents Added to Patient Instructions       Document    DEPRESSION, WHAT CAN CAUSE (ENGLISH)    DEPRESSION: TIPS TO HELP YOURSELF  (ENGLISH)    HIGH BLOOD PRESSURE, WHAT IS?  (ENGLISH)    SALT, TIPS FOR USING LESS (ENGLISH)    SUICIDE, RECOGNIZING WARNING SIGNS IN  YOURSELF (ENGLISH)             Patient/caregiver will have an action plan in place to manage and prevent complications of HTN prior to discharge from OPCM. - Priority: HIGH      Overall Time to Completion  2 months from 10/31/2019     OPCM Identified Patient Barriers:  Advanced Care Planning:  Yes  -Referred to OPCM   Nutrition:  Yes     -Care Plan Created  Housing:  no  Medication Adherence:  Yes   -Care Plan Created  Lab Adherence:  no  Cognitive/Behavioral Health:  no  Communication:  no  Health Literacy:  Yes   -Care Plan Created  Equipment/Supplies/Services:  Yes- -Care Plan Created  Culural/Mormon Beliefs:  no  PHQ:  Yes    -Referred to OPCM   Advanced Care Planning-- -Referred to OPCM   Fall Risk:  Neg     OPCM Identified Disease Education Barriers:  Depression:  Pos     -Care Plan Created/-Referred to OPCM   Hypertension:  Pos   -Care Plan Created  Diabetes:  Pos (Comment: And needs Dilated Diabetes Eye Exam)   -Care Plan Created           Short Term Goals  Patient/caregiver will maintain follow-up appointment with Physician within 1 month.  Interventions   · Empower patient/caregiver to discuss treatment plan with Physician/care team.  · Encourage compliance with Physician follow-ups.  · Encourage compliance with scheduled laboratory testing and procedure.  · Facilitate referral to Digital Hypertension program.        10/30-- pt currently has not been contacted through Patient Portal with questionnaire for DIG HTN.    · 11/13--Pt missed her PCP appt today 11/13 due to conflict with her daughter's med appt at same time.         11/13-- Patient/Caregiver agrees to reschedule her PCP appt by 2 wks.                     Patient/Caregiver agrees to OPCM follow up within 2 wks to assess progress to goal.      Status  · Partially met      Patient/caregiver will measure and record the blood pressure 1 times per day for 2 weeks.  Interventions   · Assess for  availability of working blood pressure cuff in home setting.  · Mail Blood pressure logs for home use.   · 10/30-- Pt currently does not have own BP mach at home.  10/30--Patient/Caregiver agrees to purchase own BP machine from [facility] by 2 wks.   ·             Patient/Caregiver agrees to OPCM follow up within 2 wks to assess progress to goal.   · 11/13-- Pt confirms receiving BP machine through [facility].      Status  · Partially met      Patient/caregiver will verbalize 3 food items Low Sodium within 1 month.  Interventions   · Encourage Dietary Compliance.  · Recognize and provide educational material (KRAMES).  · Review eating/nutrition habits.     Status  · Partially met      Patient/caregiver will verbalize 2 ways of preventing complications due to disease process within 1 month.  Interventions   · Empower patient/caregiver to discuss treatment plan with Physician/care team.  · Encourage compliance with Physician follow-ups.  · Encourage compliance with scheduled laboratory testing and procedure.  · Encourage Dietary Compliance.  · Encourage Exercise.  · Encourage Medication Compliance.  · Mail pill box.     Status  · Partially met           Clinical Reference Documents Added to Patient Instructions       Document    DEPRESSION, WHAT CAN CAUSE (ENGLISH)    DEPRESSION: TIPS TO HELP YOURSELF  (ENGLISH)    HIGH BLOOD PRESSURE, WHAT IS?  (ENGLISH)    SALT, TIPS FOR USING LESS (ENGLISH)    SUICIDE, RECOGNIZING WARNING SIGNS IN YOURSELF (ENGLISH)             Patient/caregiver will have knowledge of resources available in order to obtain the services that are needed prior to discharge from OPCM. - Priority: MOD      Overall Time to Completion  2 months from 10/31/2019     OPCM Identified Patient Barriers:  Advanced Care Planning:  Yes  -Referred to OPCM   Nutrition:  Yes     -Care Plan Created  Housing:  no  Medication Adherence:  Yes   -Care Plan Created  Lab Adherence:  no  Cognitive/Behavioral Health:   no  Communication:  no  Health Literacy:  Yes   -Care Plan Created  Equipment/Supplies/Services:  Yes- -Care Plan Created  Culural/Hinduism Beliefs:  no  PHQ:  Yes    -Referred to OPCM   Fall Risk:  Neg     OPCM Identified Disease Education Barriers:  Depression:  Pos     -Care Plan Created/-Referred to OPCM   Hypertension:  Pos   -Care Plan Created  Diabetes:  Pos (Comment: And needs Dilated Diabetes Eye Exam)   -Care Plan Created        Short Term Goals  Patient/caregiver will have contact information for identified community resources IE:OPCM  for follow-up within 3 weeks.  Interventions         Mailed Diabetes Management Guide.    Done 10/31        Mail [employer] [facility] Benefit Contact Information.   Done 10/31  · Refer to Outpatient Case Management Social Worker.   Done 10/31  · 10/30-- Provided pt with contact for OPCM RN [facility]ONDRA & [facility] and mailed the same along with Welcome Letter, education material, [facility] 2019 catalog, Pill Box. Done 10/31  10/30-- Patient/Caregiver agrees to receive educational material and read information by 2 wks.  ·             Patient/Caregiver agrees to OPCM follow up within 2wks  to assess progress to goal.   ·   11/13-- Pt confirms receipt of mailings and reports using the [facility] benefit to get her BP machine. Pt reports being very happy to use the OTC benefit and is working on having all meds delivered via [facility].      Status  · Met  11/13           Clinical Reference Documents Added to Patient Instructions       Document    DEPRESSION, WHAT CAN CAUSE (ENGLISH)    DEPRESSION: TIPS TO HELP YOURSELF  (ENGLISH)    HIGH BLOOD PRESSURE, WHAT IS?  (ENGLISH)    SALT, TIPS FOR USING LESS (ENGLISH)    SUICIDE, RECOGNIZING WARNING SIGNS IN YOURSELF (ENGLISH)                  Patient Instructions     Instructions were provided via the [employer] patient resources and are available for the patient to view on the patient portal.    Next  Steps:  Continue to provide education on DM/HTN management, f/u on rescheduled PCP appt and transfer of rxs to [facility].     No follow-ups on file.      Todays OPCM Self-Management Care Plan was developed with the patients/caregivers input and was based on identified barriers from todays assessment.  Goals were written today with the patient/caregiver and the patient has agreed to work towards these goals to improve his/her overall well-being. Patient verbalized understanding of the care plan, goals, and all of today's instructions. Encouraged patient/caregiver to communicate with his/her physician and health care team about health conditions and the treatment plan.  Provided my contact information today and encouraged patient/caregiver to call me with any questions as needed.

## 2019-11-27 ENCOUNTER — TELEPHONE (OUTPATIENT)
Dept: INTERNAL MEDICINE | Facility: CLINIC | Age: 44
End: 2019-11-27

## 2019-11-27 ENCOUNTER — OUTPATIENT CASE MANAGEMENT (OUTPATIENT)
Dept: ADMINISTRATIVE | Facility: OTHER | Age: 44
End: 2019-11-27

## 2019-11-27 NOTE — TELEPHONE ENCOUNTER
Patient just wanted to clarified that all medications needed to go to [facility]. FYI.

## 2019-11-27 NOTE — TELEPHONE ENCOUNTER
----- Message from Christiano Aranda sent at 11/27/2019 12:23 PM CST -----  Contact: [phone]/ self   Patient requesting to speak with you regarding a fax [facility] sent over. Please call.

## 2019-12-06 ENCOUNTER — CLINICAL SUPPORT (OUTPATIENT)
Dept: DIABETES | Facility: CLINIC | Age: 44
End: 2019-12-06
Payer: MEDICARE

## 2019-12-06 DIAGNOSIS — Z79.4 TYPE 2 DIABETES MELLITUS WITH DIABETIC POLYNEUROPATHY, WITH LONG-TERM CURRENT USE OF INSULIN: ICD-10-CM

## 2019-12-06 DIAGNOSIS — E11.42 TYPE 2 DIABETES MELLITUS WITH DIABETIC POLYNEUROPATHY, WITH LONG-TERM CURRENT USE OF INSULIN: ICD-10-CM

## 2019-12-06 PROCEDURE — G0109 PR DIAB MANAGE TRN GROUP: ICD-10-PCS | Mod: HCNC,S$GLB,, | Performed by: DIETITIAN, REGISTERED

## 2019-12-06 PROCEDURE — G0109 DIAB MANAGE TRN IND/GROUP: HCPCS | Mod: HCNC,S$GLB,, | Performed by: DIETITIAN, REGISTERED

## 2019-12-07 NOTE — PROGRESS NOTES
Diabetes Education  Author: Gela Prasad RD  Date: 12/6/2019    Diabetes Care Management Summary  Diabetes Education Record Assessment/Progress: Comprehensive/Group     Last A1c:   Lab Results   Component Value Date    HGBA1C 11.9 (H) 10/02/2019     Last Visit with Diabetes Educator: : 12/06/2019  Last OPCM Referral: : 10/16/2019   Enrolled in OPCM: Yes      Diabetes Type  Diabetes Type : Type II         Health Maintenance was reviewed today with patient. Discussed with patient importance of routine eye exams, foot exams/foot care, blood work (i.e.: A1c, microalbumin, and lipid), dental visits, yearly flu vaccine, and pneumonia vaccine as indicated by PCP. Patient verbalized understanding.     Health Maintenance Topics with due status: Not Due       Topic Last Completion Date    TETANUS VACCINE 11/19/2014    Lipid Panel 01/14/2019    Mammogram 08/21/2019    Pap Smear with HPV Cotest 09/16/2019    Hemoglobin A1c 10/02/2019    Low Dose Statin 10/16/2019     Health Maintenance Due   Topic Date Due    Foot Exam  12/17/2019    Eye Exam  12/25/2019     Diabetes Education Assessment/Progress  Diabetes Disease Process (diabetes disease process and treatment options): Comprehends Key Points, Written Materials Provided, Class, Discussion  Nutrition (Incorporating nutritional management into one's lifestyle): Comprehends Key Points, Written Materials Provided, Class, Discussion, Demonstration  Physical Activity (incorporating physical activity into one's lifestyle): Comprehends Key Points, Written Materials Provided, Class, Discussion  Medications (states correct name, dose, onset, peak, duration, side effects & timing of meds): Comprehends Key Points, Written Materials Provided, Class, Discussion  Monitoring (monitoring blood glucose/other parameters & using results): Comprehends Key Points, Written Materials Provided, Class, Discussion  Acute Complications (preventing, detecting, and treating acute complications):  Comprehends Key Points, Written Materials Provided, Class, Discussion  Chronic Complications (preventing, detecting, and treating chronic complications): Comprehends Key Points, Written Materials Provided, Class, Discussion  Cognitive (knowledge of self-management skills, functional health literacy): Comprehends Key Points, Written Materials Provided, Class, Discussion  Psychosocial (emotional response to diabetes): Comprehends Key Points, Written Materials Provided, Class, Discussion  Behavioral (readiness for change, lifestyle practices, self-care behaviors): Comprehends Key Points, Written Materials Provided, Class, Discussion    Today's Self-Management Care Plan was developed with the patient's input and is based on barriers identified during today's assessment.    The long and short-term goals in the care plan were written with the patient/caregiver's input. The patient has agreed to work toward these goals to improve her overall diabetes control.      The patient received a copy of today's self-management plan and verbalized understanding of the care plan, goals, and all of today's instructions.      The patient was encouraged to communicate with her physician and care team regarding her condition(s) and treatment.  I provided the patient with my contact information today and encouraged her to contact me via phone or patient portal as needed.     Education Units of Time   Time Spent: 180 min     Diabetes Education - Basic Group Class    Patient attended Diabetes Self Management Training group class today.     Class education content focus was on:    Healthy Eating - CHO Counting  Portion Sizes  Nutrition Facts Label Reading  Incorporating Physical Activity      Information presented today focused on preparing participants to make informed decisions, engage in effective diabetes self-management and implement self-care behaviors that allow individuals to maximize their physical and psychological well-being.      Provided each participant with  Managing Diabetes Guidebook    Class evaluation, verbal pre-test and written post-test were given and completed.     Class was taught by Gela Prasad RD    Post Test score: 80    Patient was participative in both parts of the class and asked appropriate questions.     Total Class Duration: 3 hours

## 2019-12-12 ENCOUNTER — CLINICAL SUPPORT (OUTPATIENT)
Dept: DIABETES | Facility: CLINIC | Age: 44
End: 2019-12-12
Payer: MEDICARE

## 2019-12-12 DIAGNOSIS — E78.5 HYPERLIPIDEMIA, UNSPECIFIED HYPERLIPIDEMIA TYPE: ICD-10-CM

## 2019-12-12 DIAGNOSIS — E11.42 TYPE 2 DIABETES MELLITUS WITH DIABETIC POLYNEUROPATHY, WITH LONG-TERM CURRENT USE OF INSULIN: ICD-10-CM

## 2019-12-12 DIAGNOSIS — Z79.4 TYPE 2 DIABETES MELLITUS WITH DIABETIC POLYNEUROPATHY, WITH LONG-TERM CURRENT USE OF INSULIN: ICD-10-CM

## 2019-12-12 PROCEDURE — G0108 DIAB MANAGE TRN  PER INDIV: HCPCS | Mod: HCNC,S$GLB,, | Performed by: INTERNAL MEDICINE

## 2019-12-12 PROCEDURE — 99999 PR PBB SHADOW E&M-EST. PATIENT-LVL I: ICD-10-PCS | Mod: PBBFAC,HCNC,,

## 2019-12-12 PROCEDURE — G0108 PR DIAB MANAGE TRN  PER INDIV: ICD-10-PCS | Mod: HCNC,S$GLB,, | Performed by: INTERNAL MEDICINE

## 2019-12-12 PROCEDURE — 99999 PR PBB SHADOW E&M-EST. PATIENT-LVL I: CPT | Mod: PBBFAC,HCNC,,

## 2019-12-16 VITALS — BODY MASS INDEX: 41.27 KG/M2 | WEIGHT: 248 LBS

## 2019-12-16 RX ORDER — EZETIMIBE 10 MG/1
TABLET ORAL
Qty: 30 TABLET | Refills: 5 | Status: SHIPPED | OUTPATIENT
Start: 2019-12-16 | End: 2021-02-05 | Stop reason: SDUPTHER

## 2019-12-16 NOTE — PROGRESS NOTES
Diabetes Education  Author: Hal Ji RN  Date: 12/16/2019  Diabetes Care Management Summary  Diabetes Education Record Assessment/Progress: Post Program/Follow-up  Current Diabetes Risk Level: High   Last A1c:   Lab Results   Component Value Date    HGBA1C 11.9 (H) 10/02/2019     Last Visit with Diabetes Educator: : 12/12/2019  Last OPCM Referral: : 10/16/2019   Enrolled in OPCM: Yes    Diabetes Type  Diabetes Type : Type II  Diabetes History  Current Treatment: Oral Medication, Diet, Injectable, Exercise  Health Maintenance was reviewed today with patient. Discussed with patient importance of routine eye exams, foot exams/foot care, blood work (i.e.: A1c, microalbumin, and lipid), dental visits, yearly flu vaccine, and pneumonia vaccine as indicated by PCP. Patient verbalized understanding.     Health Maintenance Topics with due status: Not Due       Topic Last Completion Date    TETANUS VACCINE 11/19/2014    Lipid Panel 01/14/2019    Mammogram 08/21/2019    Pap Smear with HPV Cotest 09/16/2019    Hemoglobin A1c 10/02/2019    Low Dose Statin 10/16/2019     Health Maintenance Due   Topic Date Due    Foot Exam  12/17/2019    Eye Exam  12/25/2019   Nutrition  Meal Planning: drinks regular soda, water, 3 meals per day, snacks between meal  What type of beverages do you drink?: regular soda/tea, juice, milk  Meal Plan 24 Hour Recall - Breakfast: 2 slices pizza  Meal Plan 24 Hour Recall - Lunch: turkey neck  Meal Plan 24 Hour Recall - Dinner: ham sandwich, milk, chips  Meal Plan 24 Hour Recall - Snack: cereal  Monitoring   Self Monitoring : pt has a meter and is checking 3 times a day. pt reports bs's between 195 and 400. instructed pt to keep log of bs's and to bring the log to her md visits.  Blood Glucose Logs: No  Do you use a personal continuous glucose monitor?: No  In the last month, how often have you had a low blood sugar reaction?: never  Can you tell when your blood sugar is too high?: no  Exercise    Exercise Type: none  Current Diabetes Treatment   Current Treatment: Oral Medication, Diet, Injectable, Exercise  Social History  Preferred Learning Method: Demonstration, Reading Materials  Primary Support: Self  DDS-2 Score  ( > 3 = SIGNIFICANT DISTRESS): 2   Barriers to Change  Barriers to Change: None  Learning Challenges : Literacy  Readiness to Learn   Readiness to Learn : Acceptance  Cultural Influences  Cultural Influences: None  Diabetes Education Assessment/Progress  Diabetes Disease Process (diabetes disease process and treatment options): Discussion, Instructed, Demonstrates Understanding/Competency(verbalizes/demonstrates), Individual Session  Nutrition (Incorporating nutritional management into one's lifestyle): Discussion, Demonstration, Instructed, Demonstrates Understanding/Competency (verbalizes/demonstrates), Individual Session, Written Materials Provided  Physical Activity (incorporating physical activity into one's lifestyle): Discussion, Instructed, Demonstrates Understanding/Competency (verbalizes/demonstrates), Individual Session  Medications (states correct name, dose, onset, peak, duration, side effects & timing of meds): Instructed, Demonstrates Understanding/Competency(verbalizes/demonstrates), Individual Session  Monitoring (monitoring blood glucose/other parameters & using results): Discussion, Instructed, Demonstrates Understanding/Competency (verbalizes/demonstrates), Individual Session  Acute Complications (preventing, detecting, and treating acute complications): Discussion, Needs Review, Instructed, Demonstrates Understanding/Competency (verbalizes/demonstrates), Individual Session  Chronic Complications (preventing, detecting, and treating chronic complications): Discussion, Needs Review, Instructed, Demonstrates Understanding/Competency (verbalizes/demonstrates), Individual Session  Clinical (diabetes, other pertinent medical history, and relevant comorbidities reviewed during  "visit): Discussion, Instructed, Demonstrates Understanding/Competency (verbalizes/demonstrates), Individual Session  Cognitive (knowledge of self-management skills, functional health literacy): Discussion, Instructed, Demonstrates Understanding/Competency (verbalizes/demonstrates), Individual Session  Psychosocial (emotional response to diabetes): Discussion, Instructed, Demonstrates Understanding/Competency (verbalizes/demonstrates), Individual Session  Diabetes Distress and Support Systems: Discussion, Instructed, Demonstrates Understanding/Competency (verbalizes/demonstrates), Individual Session  Behavioral (readiness for change, lifestyle practices, self-care behaviors): Discussion, Instructed, Demonstrates Understanding/Competency (verbalizes/demonstrates), Individual Session  Goals  Patient has selected/evaluated goals during today's session: Yes, evaluated  Healthy Eating: % Met  Met Percentage : 50%  Medications: % Met  Met Percentage : 75%  Diabetes Care Plan/Intervention  Education Plan/Intervention: Individual Follow-Up DSMT  Diabetes Meal Plan  Restrictions: Restricted Carbohydrate  Calories: 1800  Carbohydrate Per Meal: 30-45g  Carbohydrate Per Snack : 15-20g  Pt came to clinic for fu visit. Pt still is having difficulty grasping the concept of the meal planning. Pt states that her bs"s have elevated in the past week because she is out of Lantus and needs to get some. She drinks milk, sweet tea- about 2 cups per day and juice. Pt states that she does not like water. Discussed with pt the fact that sweet tea, milk and juice have carbs and need to be counted as part of her carbs for her meal. In addition, discussed portions with her which she has not been following. Encouraged pt to add water to her daily fluid intake. Pt appears to have some comprehension issues regarding understanding her meal plan. Pt is saving up food so that she can eat at her daughters birthday party. Discussed with pt that each " meal is individual and we cannot save food to eat a splurge at a meal. This can cause carb stacking and higher blood sugars. Discussed with pt the food groups, how to read labels and count carbs. Discussed with pt how to put her meals together. Discussed with pt foods that she likes that she can include in her meal plan. Discussed with pt the importance of eating balanced and portioned. Pt does not like planning and wants to eat what she wants when she wants. Will continue to work with ptl Pt was advised to call for any problems or questions. Pt will fu in one month.  Today's Self-Management Care Plan was developed with the patient's input and is based on barriers identified during today's assessment.    The long and short-term goals in the care plan were written with the patient/caregiver's input. The patient has agreed to work toward these goals to improve her overall diabetes control.      The patient received a copy of today's self-management plan and verbalized understanding of the care plan, goals, and all of today's instructions.      The patient was encouraged to communicate with her physician and care team regarding her condition(s) and treatment.  I provided the patient with my contact information today and encouraged her to contact me via phone or patient portal as needed.     Education Units of Time   Time Spent: 60 min

## 2020-01-06 ENCOUNTER — PATIENT OUTREACH (OUTPATIENT)
Dept: ADMINISTRATIVE | Facility: OTHER | Age: 45
End: 2020-01-06

## 2020-01-08 ENCOUNTER — CLINICAL SUPPORT (OUTPATIENT)
Dept: DIABETES | Facility: CLINIC | Age: 45
End: 2020-01-08
Payer: MEDICARE

## 2020-01-08 DIAGNOSIS — E11.42 TYPE 2 DIABETES MELLITUS WITH DIABETIC POLYNEUROPATHY, WITH LONG-TERM CURRENT USE OF INSULIN: ICD-10-CM

## 2020-01-08 DIAGNOSIS — Z79.4 TYPE 2 DIABETES MELLITUS WITH DIABETIC POLYNEUROPATHY, WITH LONG-TERM CURRENT USE OF INSULIN: ICD-10-CM

## 2020-01-08 PROCEDURE — 99999 PR PBB SHADOW E&M-EST. PATIENT-LVL I: CPT | Mod: PBBFAC,HCNC,,

## 2020-01-08 PROCEDURE — 99999 PR PBB SHADOW E&M-EST. PATIENT-LVL I: ICD-10-PCS | Mod: PBBFAC,HCNC,,

## 2020-01-08 PROCEDURE — G0108 DIAB MANAGE TRN  PER INDIV: HCPCS | Mod: HCNC,S$GLB,, | Performed by: INTERNAL MEDICINE

## 2020-01-08 PROCEDURE — G0108 PR DIAB MANAGE TRN  PER INDIV: ICD-10-PCS | Mod: HCNC,S$GLB,, | Performed by: INTERNAL MEDICINE

## 2020-01-09 VITALS — WEIGHT: 250 LBS | BODY MASS INDEX: 41.6 KG/M2

## 2020-01-09 NOTE — PROGRESS NOTES
Diabetes Education  Author: Hal Ji RN  Date: 1/9/2020  Diabetes Care Management Summary  Diabetes Education Record Assessment/Progress: Post Program/Follow-up  Current Diabetes Risk Level: High   Last A1c:   Lab Results   Component Value Date    HGBA1C 11.9 (H) 10/02/2019   Last Visit with Diabetes Educator: : 01/08/2020  Last OPCM Referral: : 10/16/2019   Enrolled in OPCM: Yes  Diabetes Type  Diabetes Type : Type II  Diabetes History  Current Treatment: Oral Medication, Diet, Injectable, Exercise  Health Maintenance was reviewed today with patient. Discussed with patient importance of routine eye exams, foot exams/foot care, blood work (i.e.: A1c, microalbumin, and lipid), dental visits, yearly flu vaccine, and pneumonia vaccine as indicated by PCP. Patient verbalized understanding.     Health Maintenance Topics with due status: Not Due       Topic Last Completion Date    TETANUS VACCINE 11/19/2014    Lipid Panel 01/14/2019    Mammogram 08/21/2019    Pap Smear with HPV Cotest 09/16/2019    Low Dose Statin 10/16/2019     Health Maintenance Due   Topic Date Due    Foot Exam  12/17/2019    Eye Exam  12/25/2019    Hemoglobin A1c  01/02/2020   Nutrition  Meal Planning: skipping meals, drinks regular soda, water, snacks between meal  What type of beverages do you drink?: regular soda/tea, juice, water  Meal Plan 24 Hour Recall - Breakfast: baked chicken, 2 slice bread  Meal Plan 24 Hour Recall - Lunch: nothing  Meal Plan 24 Hour Recall - Dinner: baked chicken, mashed potatoes, green beans  Meal Plan 24 Hour Recall - Snack: cereal  Monitoring   Self Monitoring : pt has a meter and is testing 2 times a day before injections. pt reports bs's ranging from 200-300. pt keeps a record of her bs's. Pt was instructed to continue on her testing regimen.  Blood Glucose Logs: Yes  In the last month, how often have you had a low blood sugar reaction?: never  Can you tell when your blood sugar is too high?: no  Exercise    Exercise Type: none  Current Diabetes Treatment   Current Treatment: Oral Medication, Diet, Injectable, Exercise  Social History  Preferred Learning Method: Face to Face, Demonstration, Reading Materials  Primary Support: Self  DDS-2 Score  ( > 3 = SIGNIFICANT DISTRESS): 2   Barriers to Change  Barriers to Change: None  Readiness to Learn   Readiness to Learn : Acceptance  Cultural Influences  Cultural Influences: None  Diabetes Education Assessment/Progress  Diabetes Disease Process (diabetes disease process and treatment options): Not Covered/Deferred  Nutrition (Incorporating nutritional management into one's lifestyle): Discussion, Demonstration, Instructed, Demonstrates Understanding/Competency (verbalizes/demonstrates), Individual Session, Written Materials Provided  Physical Activity (incorporating physical activity into one's lifestyle): Discussion, Instructed, Demonstrates Understanding/Competency (verbalizes/demonstrates), Individual Session  Medications (states correct name, dose, onset, peak, duration, side effects & timing of meds): Discussion, Instructed, Demonstrates Understanding/Competency(verbalizes/demonstrates), Individual Session  Monitoring (monitoring blood glucose/other parameters & using results): Discussion, Instructed, Demonstrates Understanding/Competency (verbalizes/demonstrates), Individual Session  Acute Complications (preventing, detecting, and treating acute complications): Discussion, Instructed, Demonstrates Understanding/Competency (verbalizes/demonstrates), Individual Session  Chronic Complications (preventing, detecting, and treating chronic complications): Discussion, Instructed, Demonstrates Understanding/Competency (verbalizes/demonstrates), Individual Session  Clinical (diabetes, other pertinent medical history, and relevant comorbidities reviewed during visit): Discussion, Instructed, Demonstrates Understanding/Competency (verbalizes/demonstrates), Individual  Session  Cognitive (knowledge of self-management skills, functional health literacy): Discussion, Instructed, Demonstrates Understanding/Competency (verbalizes/demonstrates), Individual Session  Psychosocial (emotional response to diabetes): Discussion, Instructed, Demonstrates Understanding/Competency (verbalizes/demonstrates), Individual Session  Diabetes Distress and Support Systems: Discussion, Instructed, Demonstrates Understanding/Competency (verbalizes/demonstrates), Individual Session  Behavioral (readiness for change, lifestyle practices, self-care behaviors): Discussion, Instructed, Demonstrates Understanding/Competency (verbalizes/demonstrates), Individual Session  Goals  Patient has selected/evaluated goals during today's session: Yes, evaluated  Healthy Eating: % Met  Met Percentage : 50%  Medications: % Met  Met Percentage : 50%  Diabetes Meal Plan  Restrictions: Restricted Carbohydrate  Calories: 1800  Carbohydrate Per Meal: 30-45g  Carbohydrate Per Snack : 15-20g  Pt came to clinic for fu visit. pts bs's are still running high in the 200-300 range. Pt reports that she is not taking her Metformin consistently and usually takes only 1000 mg per day instead of 2000 mg per day. Discussed with pt the importance of taking all of her medication prescribed for her diabetes to assist in lowering her bs's. Pt states that she would make sure that she takes the medication 2 times a day. Pt states that she is compliant with her other diabetic medications. 24 hour meal recall showed that pt usually skips one meal per day. Pt also states that she drinks a large thermos of juice daily because she is thirsty. Discussed with pt the fact that juice has carbs and those carbs need to be counted as part of her food for her meals. Discussed with pt the small portion of juice that is acceptable. Pt states that she would stop drinking the juice. Reviewed meal recall with pt and discussed what needed to be added to her meals  to make them balanced. Pt had fair understanding of meal plan but still seems to be missing some of the concepts. Pt will need reinforcement. Pt will work on meal plan. Will fu in one month. Pt was advised to call for any problems or questions.  Today's Self-Management Care Plan was developed with the patient's input and is based on barriers identified during today's assessment.    The long and short-term goals in the care plan were written with the patient/caregiver's input. The patient has agreed to work toward these goals to improve her overall diabetes control.      The patient received a copy of today's self-management plan and verbalized understanding of the care plan, goals, and all of today's instructions.      The patient was encouraged to communicate with her physician and care team regarding her condition(s) and treatment.  I provided the patient with my contact information today and encouraged her to contact me via phone or patient portal as needed.     Education Units of Time   Time Spent: 60 min

## 2020-01-10 DIAGNOSIS — E11.9 TYPE 2 DIABETES MELLITUS WITHOUT COMPLICATION: ICD-10-CM

## 2020-01-11 NOTE — PROGRESS NOTES
"Subjective:       Patient ID: Jaimee Quintana is a 44 y.o. female.    Chief Complaint: Follow-up and Diabetes    Patient returns today for DM follow up and multiple problem      DM assessment  Providers: previously Seen by endocrinology  Complications; neuropathy, retinopathy  Medication:   - Lantus 50 U BID  - metformin 1000mg BID not taking   - trulicity 0.75 mg qweeks   Vision/Podiatry:   recommended for retina specialist- Overdue since1/2019  Overdue on  podiatry   Glucose log;   See below  Uncontrolled but slightly improving   Diet/Exercise;   Last labs              umacr-10mg/g              Hba1c 12.3% -11.9 - 10/2019  Preventative: Vaccine: UTD PPS23, on statin.    .    HTN  Complication: no CVA/CKD/CAD  Last labs : 10/2019 - na-135 otherwise BMP wnl  Medication  - benazepril 20mg daily   - HCTz 12.5mg daily  - toprol 100mg dialy  - nifedipine 60mg   Home Bps; not checking . Has cuff at home will bring to next visit  Symptoms: denies CP, SOB, changes in urination, sudden weakness, numbness      HLD  She is on Zetia, atorvastatin without side effects of medicaitons    HPI  Review of Systems   Constitutional: Negative for activity change, chills, diaphoresis, fatigue, fever and unexpected weight change.   Eyes: Negative for redness and visual disturbance.   Respiratory: Negative for shortness of breath.    Cardiovascular: Negative for chest pain and palpitations.   Gastrointestinal: Negative for abdominal distention and blood in stool.   Genitourinary: Negative for difficulty urinating, dysuria, frequency and menstrual problem.   Neurological: Negative for syncope and headaches.       Objective:         BP (!) 160/100 (BP Location: Right arm, Patient Position: Sitting, BP Method: Medium (Manual))   Pulse 97   Ht 5' 5" (1.651 m)   Wt 113 kg (249 lb 1.9 oz)   SpO2 99%   BMI 41.46 kg/m²     Physical Exam   Constitutional: She is oriented to person, place, and time. She appears well-developed and " well-nourished. No distress.   HENT:   Head: Normocephalic.   Nose: Nose normal.   Mouth/Throat: Oropharynx is clear and moist.   Eyes: Pupils are equal, round, and reactive to light. Conjunctivae and EOM are normal. Right eye exhibits no discharge. Left eye exhibits no discharge.   Neck: Normal range of motion.   Cardiovascular: Normal rate, regular rhythm and normal heart sounds. Exam reveals no gallop and no friction rub.   No murmur heard.  Pulses:       Dorsalis pedis pulses are 1+ on the right side, and 1+ on the left side.        Posterior tibial pulses are 1+ on the right side, and 1+ on the left side.   Pulmonary/Chest: Effort normal. No respiratory distress.   Abdominal: Soft. Bowel sounds are normal. She exhibits no distension.   Musculoskeletal: Normal range of motion. She exhibits no edema or deformity.        Right foot: There is normal range of motion and no deformity.        Left foot: There is normal range of motion and no deformity.   Feet:   Right Foot:   Protective Sensation: 10 sites tested. 4 sites sensed.   Skin Integrity: Positive for callus and dry skin. Negative for ulcer, blister, skin breakdown, erythema or warmth.   Left Foot:   Protective Sensation: 10 sites tested. 4 sites sensed.   Skin Integrity: Positive for callus and dry skin. Negative for ulcer, blister, skin breakdown, erythema or warmth.   Neurological: She is alert and oriented to person, place, and time. No cranial nerve deficit.   Skin: Skin is warm. She is not diaphoretic.   Psychiatric: She has a normal mood and affect.   Nursing note and vitals reviewed.        Lab Results   Component Value Date    HGBA1C 8.4 (H) 01/13/2020     CMP  Sodium   Date Value Ref Range Status   10/02/2019 135 (L) 136 - 145 mmol/L Final     Potassium   Date Value Ref Range Status   10/02/2019 4.2 3.5 - 5.1 mmol/L Final     Chloride   Date Value Ref Range Status   10/02/2019 103 95 - 110 mmol/L Final     CO2   Date Value Ref Range Status    10/02/2019 21 (L) 23 - 29 mmol/L Final     Glucose   Date Value Ref Range Status   10/02/2019 319 (H) 70 - 110 mg/dL Final     BUN, Bld   Date Value Ref Range Status   10/02/2019 10 6 - 20 mg/dL Final     Creatinine   Date Value Ref Range Status   10/02/2019 0.8 0.5 - 1.4 mg/dL Final     Calcium   Date Value Ref Range Status   10/02/2019 9.2 8.7 - 10.5 mg/dL Final     Total Protein   Date Value Ref Range Status   10/02/2019 7.7 6.0 - 8.4 g/dL Final     Albumin   Date Value Ref Range Status   10/02/2019 3.7 3.5 - 5.2 g/dL Final     Total Bilirubin   Date Value Ref Range Status   10/02/2019 0.4 0.1 - 1.0 mg/dL Final     Comment:     For infants and newborns, interpretation of results should be based  on gestational age, weight and in agreement with clinical  observations.  Premature Infant recommended reference ranges:  Up to 24 hours.............<8.0 mg/dL  Up to 48 hours............<12.0 mg/dL  3-5 days..................<15.0 mg/dL  6-29 days.................<15.0 mg/dL       Alkaline Phosphatase   Date Value Ref Range Status   10/02/2019 78 55 - 135 U/L Final     AST   Date Value Ref Range Status   10/02/2019 17 10 - 40 U/L Final     ALT   Date Value Ref Range Status   10/02/2019 26 10 - 44 U/L Final     Anion Gap   Date Value Ref Range Status   10/02/2019 11 8 - 16 mmol/L Final     eGFR if    Date Value Ref Range Status   10/02/2019 >60.0 >60 mL/min/1.73 m^2 Final     eGFR if non    Date Value Ref Range Status   10/02/2019 >60.0 >60 mL/min/1.73 m^2 Final     Comment:     Calculation used to obtain the estimated glomerular filtration  rate (eGFR) is the CKD-EPI equation.            Assessment:       1. Hypertension associated with diabetes    2. Diabetic peripheral neuropathy associated with type 2 diabetes mellitus    3. Obesity, Class III, BMI 40-49.9 (morbid obesity)    4. Type 2 diabetes mellitus with diabetic polyneuropathy, with long-term current use of insulin    5.  Essential hypertension        Plan:         Jaimee was seen today for follow-up and diabetes.    Diagnoses and all orders for this visit:    Hypertension associated with diabetes  -- Patient is not at goal today  -- Labs are reviewed and wnl  -- antihypertensive regimen    Increase   - benazepril 20 --> 40mg daily    - HCTz 12.5 --> 25 mg daily  - Continue current toprol 100mg daily,  nifedipine 60mg   --  return for BP follow up 4 weeks   -     benazepril-hydrochlorthiazide (LOTENSIN HCT) 20-12.5 mg per tablet; Take 2 tablets by mouth once daily.      Type 2 diabetes mellitus with diabetic polyneuropathy, with long-term current use of insulin  Diabetic peripheral neuropathy associated with type 2 diabetes mellitus  -- Patient is at not goal today  -- Labs are reviewed and BG are somewhat improving in the last week  --  Instructed to take metformin BID every day  -- Increase trulicity to 1.5mg weekly  --  return for DM follow up in 4 weeks  -     Hemoglobin A1c; Future  -     Ambulatory Referral to Ophthalmology   -     Ambulatory Referral to Podiatry  -     dulaglutide (TRULICITY) 1.5 mg/0.5 mL PnIj; Inject 1.5 mg into the skin every 7 days.    Obesity, Class III, BMI 40-49.9 (morbid obesity)   I reviewed routine weight management option and recommendation for diet and exercise to include 4-5 servings of fruits and vegetables per day, avoidance of fast food, fried food, sweetend beverages and to exercise 150min/week.

## 2020-01-13 ENCOUNTER — OFFICE VISIT (OUTPATIENT)
Dept: INTERNAL MEDICINE | Facility: CLINIC | Age: 45
End: 2020-01-13
Payer: MEDICARE

## 2020-01-13 ENCOUNTER — LAB VISIT (OUTPATIENT)
Dept: LAB | Facility: HOSPITAL | Age: 45
End: 2020-01-13
Attending: INTERNAL MEDICINE
Payer: MEDICARE

## 2020-01-13 VITALS
OXYGEN SATURATION: 99 % | HEART RATE: 97 BPM | SYSTOLIC BLOOD PRESSURE: 160 MMHG | BODY MASS INDEX: 41.51 KG/M2 | WEIGHT: 249.13 LBS | DIASTOLIC BLOOD PRESSURE: 100 MMHG | HEIGHT: 65 IN

## 2020-01-13 DIAGNOSIS — Z79.4 TYPE 2 DIABETES MELLITUS WITHOUT COMPLICATION, WITH LONG-TERM CURRENT USE OF INSULIN: ICD-10-CM

## 2020-01-13 DIAGNOSIS — E11.42 TYPE 2 DIABETES MELLITUS WITH DIABETIC POLYNEUROPATHY, WITH LONG-TERM CURRENT USE OF INSULIN: ICD-10-CM

## 2020-01-13 DIAGNOSIS — E11.9 TYPE 2 DIABETES MELLITUS WITHOUT COMPLICATION, WITH LONG-TERM CURRENT USE OF INSULIN: ICD-10-CM

## 2020-01-13 DIAGNOSIS — E11.42 DIABETIC PERIPHERAL NEUROPATHY ASSOCIATED WITH TYPE 2 DIABETES MELLITUS: ICD-10-CM

## 2020-01-13 DIAGNOSIS — Z79.4 TYPE 2 DIABETES MELLITUS WITH DIABETIC POLYNEUROPATHY, WITH LONG-TERM CURRENT USE OF INSULIN: ICD-10-CM

## 2020-01-13 DIAGNOSIS — E11.65 TYPE 2 DIABETES MELLITUS WITH HYPERGLYCEMIA, WITH LONG-TERM CURRENT USE OF INSULIN: ICD-10-CM

## 2020-01-13 DIAGNOSIS — I15.2 HYPERTENSION ASSOCIATED WITH DIABETES: Primary | ICD-10-CM

## 2020-01-13 DIAGNOSIS — Z79.4 TYPE 2 DIABETES MELLITUS WITH HYPERGLYCEMIA, WITH LONG-TERM CURRENT USE OF INSULIN: ICD-10-CM

## 2020-01-13 DIAGNOSIS — I10 ESSENTIAL HYPERTENSION: ICD-10-CM

## 2020-01-13 DIAGNOSIS — E66.01 OBESITY, CLASS III, BMI 40-49.9 (MORBID OBESITY): ICD-10-CM

## 2020-01-13 DIAGNOSIS — E11.59 HYPERTENSION ASSOCIATED WITH DIABETES: Primary | ICD-10-CM

## 2020-01-13 LAB
ESTIMATED AVG GLUCOSE: 194 MG/DL (ref 68–131)
HBA1C MFR BLD HPLC: 8.4 % (ref 4–5.6)

## 2020-01-13 PROCEDURE — 99999 PR PBB SHADOW E&M-EST. PATIENT-LVL V: CPT | Mod: PBBFAC,HCNC,, | Performed by: INTERNAL MEDICINE

## 2020-01-13 PROCEDURE — 99214 OFFICE O/P EST MOD 30 MIN: CPT | Mod: HCNC,S$GLB,, | Performed by: INTERNAL MEDICINE

## 2020-01-13 PROCEDURE — 83036 HEMOGLOBIN GLYCOSYLATED A1C: CPT | Mod: HCNC

## 2020-01-13 PROCEDURE — 36415 COLL VENOUS BLD VENIPUNCTURE: CPT | Mod: HCNC

## 2020-01-13 PROCEDURE — 3008F BODY MASS INDEX DOCD: CPT | Mod: HCNC,CPTII,S$GLB, | Performed by: INTERNAL MEDICINE

## 2020-01-13 PROCEDURE — 99999 PR PBB SHADOW E&M-EST. PATIENT-LVL V: ICD-10-PCS | Mod: PBBFAC,HCNC,, | Performed by: INTERNAL MEDICINE

## 2020-01-13 PROCEDURE — 3008F PR BODY MASS INDEX (BMI) DOCUMENTED: ICD-10-PCS | Mod: HCNC,CPTII,S$GLB, | Performed by: INTERNAL MEDICINE

## 2020-01-13 PROCEDURE — 99214 PR OFFICE/OUTPT VISIT, EST, LEVL IV, 30-39 MIN: ICD-10-PCS | Mod: HCNC,S$GLB,, | Performed by: INTERNAL MEDICINE

## 2020-01-13 RX ORDER — BENAZEPRIL HYDROCHLORIDE AND HYDROCHLOROTHIAZIDE 20; 12.5 MG/1; MG/1
2 TABLET ORAL DAILY
Qty: 180 TABLET | Refills: 3 | Status: SHIPPED | OUTPATIENT
Start: 2020-01-13 | End: 2020-09-03 | Stop reason: SDUPTHER

## 2020-01-13 RX ORDER — INSULIN GLARGINE 100 [IU]/ML
50 INJECTION, SOLUTION SUBCUTANEOUS 2 TIMES DAILY
Qty: 30 ML | Refills: 0 | Status: SHIPPED | OUTPATIENT
Start: 2020-01-13 | End: 2021-02-05 | Stop reason: SDUPTHER

## 2020-01-13 RX ORDER — METFORMIN HYDROCHLORIDE 1000 MG/1
TABLET ORAL
Qty: 180 TABLET | Refills: 0 | Status: SHIPPED | OUTPATIENT
Start: 2020-01-13 | End: 2020-04-14

## 2020-01-13 NOTE — PATIENT INSTRUCTIONS
We were happy to see you today    For your Testing  Please have your labs and imaging test done at your earliest convience    1. Blood     For your Medication   1. We increase the truclicity to 1.5mg every week  2. Increase the blood pressure pill benazapirl-HCTZ to 2 tabs daily   For more information about side effects please visit medlineplus.gov      For your Referrals      Eye doctor       Please return to clinic in      One month

## 2020-01-15 NOTE — PROGRESS NOTES
Called patient and updated with labs  Hba1c is improved 8.4   Will return in 4 weeks for BP follow up

## 2020-01-17 DIAGNOSIS — E11.9 TYPE 2 DIABETES MELLITUS WITHOUT COMPLICATION: ICD-10-CM

## 2020-01-28 DIAGNOSIS — E11.42 DIABETIC PERIPHERAL NEUROPATHY ASSOCIATED WITH TYPE 2 DIABETES MELLITUS: ICD-10-CM

## 2020-01-28 RX ORDER — DULAGLUTIDE 0.75 MG/.5ML
INJECTION, SOLUTION SUBCUTANEOUS
Qty: 2 SYRINGE | Refills: 3 | OUTPATIENT
Start: 2020-01-28

## 2020-01-29 ENCOUNTER — PATIENT OUTREACH (OUTPATIENT)
Dept: ADMINISTRATIVE | Facility: HOSPITAL | Age: 45
End: 2020-01-29

## 2020-01-31 ENCOUNTER — PATIENT OUTREACH (OUTPATIENT)
Dept: ADMINISTRATIVE | Facility: OTHER | Age: 45
End: 2020-01-31

## 2020-02-03 ENCOUNTER — OFFICE VISIT (OUTPATIENT)
Dept: OPTOMETRY | Facility: CLINIC | Age: 45
End: 2020-02-03
Payer: COMMERCIAL

## 2020-02-03 DIAGNOSIS — E11.3493 SEVERE NONPROLIFERATIVE DIABETIC RETINOPATHY OF BOTH EYES WITHOUT MACULAR EDEMA ASSOCIATED WITH TYPE 2 DIABETES MELLITUS: ICD-10-CM

## 2020-02-03 DIAGNOSIS — H52.13 MYOPIA OF BOTH EYES WITH ASTIGMATISM: ICD-10-CM

## 2020-02-03 DIAGNOSIS — Z01.00 ROUTINE EYE EXAM: Primary | ICD-10-CM

## 2020-02-03 DIAGNOSIS — H52.203 MYOPIA OF BOTH EYES WITH ASTIGMATISM: ICD-10-CM

## 2020-02-03 PROCEDURE — 92015 DETERMINE REFRACTIVE STATE: CPT | Mod: S$GLB,,, | Performed by: OPTOMETRIST

## 2020-02-03 PROCEDURE — 92014 COMPRE OPH EXAM EST PT 1/>: CPT | Mod: S$GLB,,, | Performed by: OPTOMETRIST

## 2020-02-03 PROCEDURE — 99999 PR PBB SHADOW E&M-EST. PATIENT-LVL II: CPT | Mod: PBBFAC,,, | Performed by: OPTOMETRIST

## 2020-02-03 PROCEDURE — 99999 PR PBB SHADOW E&M-EST. PATIENT-LVL II: ICD-10-PCS | Mod: PBBFAC,,, | Performed by: OPTOMETRIST

## 2020-02-03 PROCEDURE — 92015 PR REFRACTION: ICD-10-PCS | Mod: S$GLB,,, | Performed by: OPTOMETRIST

## 2020-02-03 PROCEDURE — 92014 PR EYE EXAM, EST PATIENT,COMPREHESV: ICD-10-PCS | Mod: S$GLB,,, | Performed by: OPTOMETRIST

## 2020-02-03 NOTE — LETTER
February 3, 2020      Sukumar Ponce III, MD  [address] [facility] [place] [address]           [facility] - Optometry  [address]  [place] [address]  Phone: [phone]  Fax: [phone]          Patient: Jaimee Quintana   MR Number: [id number]   YOB: 1975   Date of Visit: 2/3/2020       Dear Dr. Sukumar Ponce III:    Thank you for referring Jaimee Quintana to me for evaluation. Attached you will find relevant portions of my assessment and plan of care.    If you have questions, please do not hesitate to call me. I look forward to following Jaimee Quintana along with you.    Sincerely,    Uzma Caballero, OD    Enclosure  CC:  No Recipients    If you would like to receive this communication electronically, please contact [email or web address] or [phone] to request more information on [employer] access.    For providers and/or their staff who would like to refer a patient to [facility], please contact us through our one-stop-shop provider referral line, [facility] , at [phone].    If you feel you have received this communication in error or would no longer like to receive these types of communications, please e-mail [email or web address]

## 2020-02-03 NOTE — PROGRESS NOTES
HPI     Last eye exam was 8/22/17 with   Pt here for routine eye exam.    Pt states she does not currently wear glasses for distance or near [facility], but   she will take a glasses rx if her prescription has changed.  Pt states the glare at night bothers her.  Pt would like to know if there is a certain type of glasses that she can   wear at night that are not transitions that can help with the glare.  Patient denies diplopia, headaches, flashes/floaters, and pain.  No eye drops, and no eye sx.      Hemoglobin A1C       Date                     Value               Ref Range             Status                01/13/2020               8.4 (H)             4.0 - 5.6 %           Final                   Last edited by Cleo Green on 2/3/2020  1:04 PM. (History)            Assessment /Plan     For exam results, see Encounter Report.    Routine eye exam    Myopia of both eyes with astigmatism    Severe nonproliferative diabetic retinopathy of both eyes without macular edema associated with type 2 diabetes mellitus          1-2.  Driving rx given.  3.  Educated pt on diabetic changes. Patient reports better blood sugar control since starting Trulicity.  Refer to retina.

## 2020-02-08 ENCOUNTER — LAB VISIT (OUTPATIENT)
Dept: LAB | Facility: HOSPITAL | Age: 45
End: 2020-02-08
Attending: INTERNAL MEDICINE
Payer: MEDICARE

## 2020-02-08 DIAGNOSIS — E11.9 TYPE 2 DIABETES MELLITUS WITHOUT COMPLICATION: ICD-10-CM

## 2020-02-08 LAB
CHOLEST SERPL-MCNC: 184 MG/DL (ref 120–199)
CHOLEST/HDLC SERPL: 3.8 {RATIO} (ref 2–5)
HDLC SERPL-MCNC: 48 MG/DL (ref 40–75)
HDLC SERPL: 26.1 % (ref 20–50)
LDLC SERPL CALC-MCNC: 113.4 MG/DL (ref 63–159)
NONHDLC SERPL-MCNC: 136 MG/DL
TRIGL SERPL-MCNC: 113 MG/DL (ref 30–150)

## 2020-02-08 PROCEDURE — 36415 COLL VENOUS BLD VENIPUNCTURE: CPT | Mod: HCNC,PO

## 2020-02-08 PROCEDURE — 80061 LIPID PANEL: CPT | Mod: HCNC

## 2020-02-11 ENCOUNTER — OFFICE VISIT (OUTPATIENT)
Dept: OPHTHALMOLOGY | Facility: CLINIC | Age: 45
End: 2020-02-11
Payer: MEDICARE

## 2020-02-11 VITALS — HEART RATE: 94 BPM | DIASTOLIC BLOOD PRESSURE: 100 MMHG | SYSTOLIC BLOOD PRESSURE: 178 MMHG

## 2020-02-11 DIAGNOSIS — H35.033 HYPERTENSIVE RETINOPATHY, BILATERAL: ICD-10-CM

## 2020-02-11 PROCEDURE — 99999 PR PBB SHADOW E&M-EST. PATIENT-LVL III: ICD-10-PCS | Mod: PBBFAC,HCNC,, | Performed by: OPHTHALMOLOGY

## 2020-02-11 PROCEDURE — 92201 OPSCPY EXTND RTA DRAW UNI/BI: CPT | Mod: HCNC,S$GLB,, | Performed by: OPHTHALMOLOGY

## 2020-02-11 PROCEDURE — 99999 PR PBB SHADOW E&M-EST. PATIENT-LVL III: CPT | Mod: PBBFAC,HCNC,, | Performed by: OPHTHALMOLOGY

## 2020-02-11 PROCEDURE — 92014 PR EYE EXAM, EST PATIENT,COMPREHESV: ICD-10-PCS | Mod: HCNC,S$GLB,, | Performed by: OPHTHALMOLOGY

## 2020-02-11 PROCEDURE — 92014 COMPRE OPH EXAM EST PT 1/>: CPT | Mod: HCNC,S$GLB,, | Performed by: OPHTHALMOLOGY

## 2020-02-11 PROCEDURE — 92134 POSTERIOR SEGMENT OCT RETINA (OCULAR COHERENCE TOMOGRAPHY)-BOTH EYES: ICD-10-PCS | Mod: HCNC,S$GLB,, | Performed by: OPHTHALMOLOGY

## 2020-02-11 PROCEDURE — 92134 CPTRZ OPH DX IMG PST SGM RTA: CPT | Mod: HCNC,S$GLB,, | Performed by: OPHTHALMOLOGY

## 2020-02-11 PROCEDURE — 92201 PR OPHTHALMOSCOPY, EXT, W/RET DRAW/SCLERAL DEPR, I&R, UNI/BI: ICD-10-PCS | Mod: HCNC,S$GLB,, | Performed by: OPHTHALMOLOGY

## 2020-02-11 RX ORDER — FLUORESCEIN 500 MG/ML
5 INJECTION INTRAVENOUS ONCE
Status: COMPLETED | OUTPATIENT
Start: 2020-02-11 | End: 2021-05-11

## 2020-02-11 NOTE — PROGRESS NOTES
"HPI     Severe nonproliferative diabetic retinopathy consult per Dr. Caballero   DLS-02/03/2020 Dr. Caballero     Pt sts only problem having with vision is driving at night or night time   vision in general has been going on for  Years. Just picked up new glasses   rx today and was told it will take a week or two to get use to them.   Denies pain   (?)Flashes "when in the dark everything starts getting blurry" (-)Floaters  (-)Photophobia  (-)Glare    No gtts     No past eye sx     Last edited by Elena Chan on 2/11/2020  9:56 AM. (History)        OCT non central DME OU    A/P    1. Severe NPDR OU  T2 uncontrolled on insulin    2. DME OU  Non central  Monitor    3. HTN Ret OU  BS/BP/chol control        6 months OCT/WF FA OD  "

## 2020-02-11 NOTE — LETTER
February 11, 2020      Uzam Caballero, OD  [address]  [place] [address]           [facility] - Ophthalmology  [address]  [place] [address]  Phone: [phone]  Fax: [phone]          Patient: Jaimee Quintana   MR Number: [id number]   YOB: 1975   Date of Visit: 2/11/2020       Dear Dr. Uzma Caballero:    Thank you for referring Jaimee Quintana to me for evaluation. Attached you will find relevant portions of my assessment and plan of care.    If you have questions, please do not hesitate to call me. I look forward to following Jaimee Quintana along with you.    Sincerely,    GHAZAL Tillman MD    Enclosure  CC:  No Recipients    If you would like to receive this communication electronically, please contact [email or web address] or [phone] to request more information on [employer] Link access.    For providers and/or their staff who would like to refer a patient to [facility], please contact us through our one-stop-shop provider referral line, [facility], at [phone].    If you feel you have received this communication in error or would no longer like to receive these types of communications, please e-mail [email or web address]

## 2020-02-12 ENCOUNTER — CLINICAL SUPPORT (OUTPATIENT)
Dept: DIABETES | Facility: CLINIC | Age: 45
End: 2020-02-12
Payer: MEDICARE

## 2020-02-12 PROCEDURE — G0108 PR DIAB MANAGE TRN  PER INDIV: ICD-10-PCS | Mod: HCNC,S$GLB,, | Performed by: INTERNAL MEDICINE

## 2020-02-12 PROCEDURE — G0108 DIAB MANAGE TRN  PER INDIV: HCPCS | Mod: HCNC,S$GLB,, | Performed by: INTERNAL MEDICINE

## 2020-02-12 PROCEDURE — 99999 PR PBB SHADOW E&M-EST. PATIENT-LVL I: ICD-10-PCS | Mod: PBBFAC,HCNC,,

## 2020-02-12 PROCEDURE — 99999 PR PBB SHADOW E&M-EST. PATIENT-LVL I: CPT | Mod: PBBFAC,HCNC,,

## 2020-02-13 ENCOUNTER — OFFICE VISIT (OUTPATIENT)
Dept: INTERNAL MEDICINE | Facility: CLINIC | Age: 45
End: 2020-02-13
Payer: MEDICARE

## 2020-02-13 VITALS
OXYGEN SATURATION: 100 % | WEIGHT: 246.94 LBS | HEIGHT: 65 IN | BODY MASS INDEX: 41.14 KG/M2 | WEIGHT: 248 LBS | SYSTOLIC BLOOD PRESSURE: 160 MMHG | BODY MASS INDEX: 41.27 KG/M2 | HEART RATE: 98 BPM | DIASTOLIC BLOOD PRESSURE: 90 MMHG

## 2020-02-13 DIAGNOSIS — I15.2 HYPERTENSION ASSOCIATED WITH DIABETES: ICD-10-CM

## 2020-02-13 DIAGNOSIS — E11.42 TYPE 2 DIABETES MELLITUS WITH DIABETIC POLYNEUROPATHY, WITH LONG-TERM CURRENT USE OF INSULIN: Primary | ICD-10-CM

## 2020-02-13 DIAGNOSIS — E11.59 HYPERTENSION ASSOCIATED WITH DIABETES: ICD-10-CM

## 2020-02-13 DIAGNOSIS — E66.01 OBESITY, CLASS III, BMI 40-49.9 (MORBID OBESITY): ICD-10-CM

## 2020-02-13 DIAGNOSIS — Z79.4 TYPE 2 DIABETES MELLITUS WITH DIABETIC POLYNEUROPATHY, WITH LONG-TERM CURRENT USE OF INSULIN: Primary | ICD-10-CM

## 2020-02-13 PROCEDURE — 99214 PR OFFICE/OUTPT VISIT, EST, LEVL IV, 30-39 MIN: ICD-10-PCS | Mod: HCNC,S$GLB,, | Performed by: INTERNAL MEDICINE

## 2020-02-13 PROCEDURE — 3052F PR MOST RECENT HEMOGLOBIN A1C LEVEL 8.0 - < 9.0%: ICD-10-PCS | Mod: HCNC,CPTII,S$GLB, | Performed by: INTERNAL MEDICINE

## 2020-02-13 PROCEDURE — 3008F BODY MASS INDEX DOCD: CPT | Mod: HCNC,CPTII,S$GLB, | Performed by: INTERNAL MEDICINE

## 2020-02-13 PROCEDURE — 99999 PR PBB SHADOW E&M-EST. PATIENT-LVL V: ICD-10-PCS | Mod: PBBFAC,HCNC,, | Performed by: INTERNAL MEDICINE

## 2020-02-13 PROCEDURE — 3052F HG A1C>EQUAL 8.0%<EQUAL 9.0%: CPT | Mod: HCNC,CPTII,S$GLB, | Performed by: INTERNAL MEDICINE

## 2020-02-13 PROCEDURE — 99214 OFFICE O/P EST MOD 30 MIN: CPT | Mod: HCNC,S$GLB,, | Performed by: INTERNAL MEDICINE

## 2020-02-13 PROCEDURE — 99999 PR PBB SHADOW E&M-EST. PATIENT-LVL V: CPT | Mod: PBBFAC,HCNC,, | Performed by: INTERNAL MEDICINE

## 2020-02-13 PROCEDURE — 3008F PR BODY MASS INDEX (BMI) DOCUMENTED: ICD-10-PCS | Mod: HCNC,CPTII,S$GLB, | Performed by: INTERNAL MEDICINE

## 2020-02-13 RX ORDER — SPIRONOLACTONE 25 MG/1
25 TABLET ORAL DAILY
Qty: 90 TABLET | Refills: 0 | Status: SHIPPED | OUTPATIENT
Start: 2020-02-13 | End: 2020-06-01 | Stop reason: SDUPTHER

## 2020-02-13 RX ORDER — SPIRONOLACTONE 25 MG/1
25 TABLET ORAL DAILY
Qty: 30 TABLET | Refills: 11 | Status: SHIPPED | OUTPATIENT
Start: 2020-02-13 | End: 2020-02-13

## 2020-02-13 NOTE — PROGRESS NOTES
"Subjective:       Patient ID: Jaimee Quintana is a 44 y.o. female.    Chief Complaint: Diabetes  DM, HTN     HTN  Complication: no CVA/CKD/CAD  Last labs : 10/2019 - na-135 otherwise BMP wnl  Medication  - benazepril  40mg daily   - HCTZ 25 mg mg daily  - metoprol 100mg daily   - nifedipine 60mg   Home Bps; she has been checking her blood pressures but she has had wild swings in these levels  Anywhere from the systolics 120s to 200s/diastolic 70s to 90s.  She is using a wrist cuff  Symptoms: denies CP, SOB, changes in urination, sudden weakness, numbness      DM assessment  Providers: previously Seen by endocrinology  Complications; neuropathy, retinopathy,   Medication:   - Lantus 50 U BID  - metformin 1000mg BID   -  increased to 1.5mg qweek   Vision/Podiatry:  Last ophtho appointment was 02/11/2020.   recommended for retina specialist- Overdue since1/2019  Glucose log; see below   Diet/Exercise; she reports that she is doing better with her diet.  She does not take juices  with her.  She has joint exam.  Last labs              umacr-10mg/g              Hba1c  8.4% -  1/13/2020   Preventative: Vaccine: UTD PPS23, on statin.      HLD  She is on Zetia, atorvastatin without side effects of medicaitons  Last cholesterol level was checked and within normal limits    HPI  Review of Systems   Constitutional: Negative for activity change, chills, diaphoresis, fatigue, fever and unexpected weight change.   Eyes: Negative for redness and visual disturbance.   Respiratory: Negative for shortness of breath.    Cardiovascular: Negative for chest pain and palpitations.   Gastrointestinal: Negative for abdominal distention and blood in stool.   Genitourinary: Negative for difficulty urinating, dysuria, frequency and menstrual problem.   Neurological: Negative for syncope and headaches.       Objective:         BP (!) 160/90 (BP Location: Right arm, Patient Position: Sitting, BP Method: Medium (Manual))   Pulse 98   Ht 5' 5" " (1.651 m)   Wt 112 kg (246 lb 14.6 oz)   SpO2 100%   BMI 41.09 kg/m²     Physical Exam   Constitutional: She is oriented to person, place, and time. She appears well-developed and well-nourished. No distress.   HENT:   Head: Normocephalic.   Nose: Nose normal.   Mouth/Throat: Oropharynx is clear and moist.   Eyes: Pupils are equal, round, and reactive to light. Conjunctivae and EOM are normal. Right eye exhibits no discharge. Left eye exhibits no discharge.   Neck: Normal range of motion.   Cardiovascular: Normal rate, regular rhythm and normal heart sounds. Exam reveals no gallop and no friction rub.   No murmur heard.  Pulmonary/Chest: Effort normal. No stridor. No respiratory distress. She has no wheezes. She has no rales. She exhibits no tenderness.   Abdominal: Soft. Bowel sounds are normal. She exhibits no distension.   Musculoskeletal: Normal range of motion. She exhibits no edema or deformity.   Neurological: She is alert and oriented to person, place, and time. No cranial nerve deficit.   Skin: Skin is warm. She is not diaphoretic.   Psychiatric: She has a normal mood and affect.   Nursing note and vitals reviewed.        Lab Results   Component Value Date    HGBA1C 8.4 (H) 01/13/2020     CMP  Sodium   Date Value Ref Range Status   10/02/2019 135 (L) 136 - 145 mmol/L Final     Potassium   Date Value Ref Range Status   10/02/2019 4.2 3.5 - 5.1 mmol/L Final     Chloride   Date Value Ref Range Status   10/02/2019 103 95 - 110 mmol/L Final     CO2   Date Value Ref Range Status   10/02/2019 21 (L) 23 - 29 mmol/L Final     Glucose   Date Value Ref Range Status   10/02/2019 319 (H) 70 - 110 mg/dL Final     BUN, Bld   Date Value Ref Range Status   10/02/2019 10 6 - 20 mg/dL Final     Creatinine   Date Value Ref Range Status   10/02/2019 0.8 0.5 - 1.4 mg/dL Final     Calcium   Date Value Ref Range Status   10/02/2019 9.2 8.7 - 10.5 mg/dL Final     Total Protein   Date Value Ref Range Status   10/02/2019 7.7 6.0 -  8.4 g/dL Final     Albumin   Date Value Ref Range Status   10/02/2019 3.7 3.5 - 5.2 g/dL Final     Total Bilirubin   Date Value Ref Range Status   10/02/2019 0.4 0.1 - 1.0 mg/dL Final     Comment:     For infants and newborns, interpretation of results should be based  on gestational age, weight and in agreement with clinical  observations.  Premature Infant recommended reference ranges:  Up to 24 hours.............<8.0 mg/dL  Up to 48 hours............<12.0 mg/dL  3-5 days..................<15.0 mg/dL  6-29 days.................<15.0 mg/dL       Alkaline Phosphatase   Date Value Ref Range Status   10/02/2019 78 55 - 135 U/L Final     AST   Date Value Ref Range Status   10/02/2019 17 10 - 40 U/L Final     ALT   Date Value Ref Range Status   10/02/2019 26 10 - 44 U/L Final     Anion Gap   Date Value Ref Range Status   10/02/2019 11 8 - 16 mmol/L Final     eGFR if    Date Value Ref Range Status   10/02/2019 >60.0 >60 mL/min/1.73 m^2 Final     eGFR if non    Date Value Ref Range Status   10/02/2019 >60.0 >60 mL/min/1.73 m^2 Final     Comment:     Calculation used to obtain the estimated glomerular filtration  rate (eGFR) is the CKD-EPI equation.            Assessment:       1. Type 2 diabetes mellitus with diabetic polyneuropathy, with long-term current use of insulin    2. Hypertension associated with diabetes        Plan:         Jaimee was seen today for diabetes.    Diagnoses and all orders for this visit:    Type 2 diabetes mellitus with diabetic polyneuropathy, with long-term current use of insulin  -- Patient is at not goal today  -- blood sugars are looking much better than previous and her hemoglobin A1c is trending in the right direction  -- will continue her insulin regimen and trulicit and metformin BID every day  --  return for DM follow up in 8 weeks  -     Hemoglobin A1c; Future  -     dulaglutide (TRULICITY) 1.5 mg/0.5 mL PnIj; Inject 1.5 mg into the skin every 7  days.    Obesity, Class III, BMI 40-49.9 (morbid obesity)   I reviewed routine weight management option and recommendation for diet and exercise to include 4-5 servings of fruits and vegetables per day, avoidance of fast food, fried food, sweetend beverages and to exercise 150min/week.     Hypertension associated with diabetes  -- Patient is not at goal today  -- she is a bit frustrated because she has done so well with her diabetes and her cholesterol and her blood pressure still not at goal  -- Labs are reviewed and wnl  -- antihypertensive regimen we will add   - aldactone 25mg daily   -- We will continue  benazepril  40mg daily- HCTZ  25 mg daily combication.  I am I prefer switching the hydrochlorothiazide to chlorthalidone although she is frustrated pill burden and switching medications so we will add on the additional therapy as above for now  -- Continue current toprol 100mg daily,  nifedipine 60mg   --  return for BP follow up 8 weeks   -     benazepril-hydrochlorthiazide (LOTENSIN HCT) 20-12.5 mg per tablet; Take 2 tablets by mouth once daily.  -     spironolactone (ALDACTONE) 25 MG tablet; Take 1 tablet (25 mg total) by mouth once daily.  -     Basic metabolic panel; Standing

## 2020-02-13 NOTE — PROGRESS NOTES
Diabetes Education  Author: Hal Ji RN  Date: 2/13/2020  Diabetes Care Management Summary  Diabetes Education Record Assessment/Progress: Post Program/Follow-up  Current Diabetes Risk Level: High   Last A1c:   Lab Results   Component Value Date    HGBA1C 8.4 (H) 01/13/2020     Last Visit with Diabetes Educator: : 02/12/2020  Last OPCM Referral: : 10/16/2019   Enrolled in OPCM: No  Diabetes Type  Diabetes Type : Type II  Diabetes History  Current Treatment: Oral Medication, Diet, Injectable, Exercise, Insulin  Health Maintenance was reviewed today with patient. Discussed with patient importance of routine eye exams, foot exams/foot care, blood work (i.e.: A1c, microalbumin, and lipid), dental visits, yearly flu vaccine, and pneumonia vaccine as indicated by PCP. Patient verbalized understanding.     Health Maintenance Topics with due status: Not Due       Topic Last Completion Date    TETANUS VACCINE 11/19/2014    Mammogram 08/21/2019    Pap Smear with HPV Cotest 09/16/2019    Hemoglobin A1c 01/13/2020    Lipid Panel 02/08/2020    Low Dose Statin 02/11/2020    Eye Exam 02/11/2020    Foot Exam 02/12/2020     There are no preventive care reminders to display for this patient.  Nutrition  Meal Planning: water, 3 meals per day, artificial sweeteners, eats out seldom  What type of sweetener do you use?: Equal  What type of beverages do you drink?: diet soda/tea, water  Meal Plan 24 Hour Recall - Breakfast: oatmeal, milk, butter  Meal Plan 24 Hour Recall - Lunch: pizza, fruit cup  Meal Plan 24 Hour Recall - Dinner: fish, salad corn  Monitoring   Self Monitoring : pt has a meter and is testing 2 times a day before her injections. pts bs's are ranging from . pt keeps a log of her bs's  Blood Glucose Logs: Yes  Do you use a personal continuous glucose monitor?: No  In the last month, how often have you had a low blood sugar reaction?: never  Can you tell when your blood sugar is too high?: no  Exercise    Exercise Type: none, exercise class  Current Diabetes Treatment   Current Treatment: Oral Medication, Diet, Injectable, Exercise, Insulin  Social History  Preferred Learning Method: Face to Face, Demonstration, Reading Materials  Primary Support: Self  DDS-2 Score  ( > 3 = SIGNIFICANT DISTRESS): 2   Barriers to Change  Barriers to Change: None  Learning Challenges : None  Readiness to Learn   Readiness to Learn : Acceptance  Cultural Influences  Cultural Influences: None  Diabetes Education Assessment/Progress  Diabetes Disease Process (diabetes disease process and treatment options): Not Covered/Deferred  Nutrition (Incorporating nutritional management into one's lifestyle): Discussion, Demonstration, Instructed, Demonstrates Understanding/Competency (verbalizes/demonstrates), Individual Session  Physical Activity (incorporating physical activity into one's lifestyle): Discussion, Instructed, Demonstrates Understanding/Competency (verbalizes/demonstrates), Individual Session  Medications (states correct name, dose, onset, peak, duration, side effects & timing of meds): Discussion, Instructed, Demonstrates Understanding/Competency(verbalizes/demonstrates), Individual Session  Monitoring (monitoring blood glucose/other parameters & using results): Discussion, Instructed, Demonstrates Understanding/Competency (verbalizes/demonstrates), Individual Session  Acute Complications (preventing, detecting, and treating acute complications): Discussion, Instructed, Demonstrates Understanding/Competency (verbalizes/demonstrates), Individual Session  Chronic Complications (preventing, detecting, and treating chronic complications): Discussion, Instructed, Demonstrates Understanding/Competency (verbalizes/demonstrates), Individual Session  Clinical (diabetes, other pertinent medical history, and relevant comorbidities reviewed during visit): Discussion, Instructed, Demonstrates Understanding/Competency  (verbalizes/demonstrates), Individual Session  Cognitive (knowledge of self-management skills, functional health literacy): Discussion, Instructed, Demonstrates Understanding/Competency (verbalizes/demonstrates), Individual Session  Psychosocial (emotional response to diabetes): Discussion, Instructed, Demonstrates Understanding/Competency (verbalizes/demonstrates), Individual Session  Diabetes Distress and Support Systems: Discussion, Instructed, Demonstrates Understanding/Competency (verbalizes/demonstrates), Individual Session  Behavioral (readiness for change, lifestyle practices, self-care behaviors): Discussion, Instructed, Demonstrates Understanding/Competency (verbalizes/demonstrates), Individual Session  Goals  Patient has selected/evaluated goals during today's session: Yes, evaluated  Healthy Eating: % Met  Met Percentage : 75%  Diabetes Care Plan/Intervention  Education Plan/Intervention: Individual Follow-Up DSMT  Diabetes Meal Plan  Restrictions: Restricted Carbohydrate  Calories: 1800  Carbohydrate Per Meal: 30-45g  Carbohydrate Per Snack : 15-20g  Pt came to clinic for fu visit. Since last visit pts A1C has gone from 11.9 to 8.4%. Pt has made many changes. She is now taking her Metformin 2 times a day instead of once a day and is consistent in taking her medication. She has discontinued drinking the jugs of juice that she was drinking and is now drinking water and crystal light. Pt is now eating 3 meals per day. She has joined a gym but has not started yet. She is having some eye issues and had to see a retina specialist. Pt has been working on her meals. 24 hour meal recall showed that pt is eating her 3 meals. Discussed with pt what needed to be added to her meals to make them more balanced. Discussed with pt how to put her meals together. Pt will continue to work on improving her eating. She is encouraged by the progress that she has made. Pt will fu in a month. Pt was advised to call for any  problems or questions.   Today's Self-Management Care Plan was developed with the patient's input and is based on barriers identified during today's assessment.    The long and short-term goals in the care plan were written with the patient/caregiver's input. The patient has agreed to work toward these goals to improve her overall diabetes control.      The patient received a copy of today's self-management plan and verbalized understanding of the care plan, goals, and all of today's instructions.      The patient was encouraged to communicate with her physician and care team regarding her condition(s) and treatment.  I provided the patient with my contact information today and encouraged her to contact me via phone or patient portal as needed.     Education Units of Time   Time Spent: 60 min

## 2020-02-13 NOTE — PATIENT INSTRUCTIONS
We were happy to see you today    For your Testing  Please have your labs and imaging test done at your earliest convience        For your Medication   We added a blood pressure medication   For more information about side effects please visit medlineplus.gov      Please return to clinic in      2 month

## 2020-03-01 DIAGNOSIS — I15.2 HYPERTENSION ASSOCIATED WITH DIABETES: ICD-10-CM

## 2020-03-01 DIAGNOSIS — E11.59 HYPERTENSION ASSOCIATED WITH DIABETES: ICD-10-CM

## 2020-03-02 RX ORDER — METOPROLOL SUCCINATE 100 MG/1
TABLET, EXTENDED RELEASE ORAL
Qty: 60 TABLET | Refills: 1 | Status: SHIPPED | OUTPATIENT
Start: 2020-03-02 | End: 2020-06-29

## 2020-03-05 ENCOUNTER — OFFICE VISIT (OUTPATIENT)
Dept: PODIATRY | Facility: CLINIC | Age: 45
End: 2020-03-05
Payer: MEDICARE

## 2020-03-05 VITALS — DIASTOLIC BLOOD PRESSURE: 85 MMHG | SYSTOLIC BLOOD PRESSURE: 150 MMHG | HEART RATE: 100 BPM

## 2020-03-05 PROCEDURE — 99999 PR PBB SHADOW E&M-EST. PATIENT-LVL II: ICD-10-PCS | Mod: PBBFAC,HCNC,, | Performed by: PODIATRIST

## 2020-03-05 PROCEDURE — 3052F PR MOST RECENT HEMOGLOBIN A1C LEVEL 8.0 - < 9.0%: ICD-10-PCS | Mod: HCNC,CPTII,S$GLB, | Performed by: PODIATRIST

## 2020-03-05 PROCEDURE — 3052F HG A1C>EQUAL 8.0%<EQUAL 9.0%: CPT | Mod: HCNC,CPTII,S$GLB, | Performed by: PODIATRIST

## 2020-03-05 PROCEDURE — 99999 PR PBB SHADOW E&M-EST. PATIENT-LVL II: CPT | Mod: PBBFAC,HCNC,, | Performed by: PODIATRIST

## 2020-03-05 PROCEDURE — 99203 OFFICE O/P NEW LOW 30 MIN: CPT | Mod: HCNC,S$GLB,, | Performed by: PODIATRIST

## 2020-03-05 PROCEDURE — 99203 PR OFFICE/OUTPT VISIT, NEW, LEVL III, 30-44 MIN: ICD-10-PCS | Mod: HCNC,S$GLB,, | Performed by: PODIATRIST

## 2020-03-05 NOTE — LETTER
March 10, 2020      Sukumar Ponce III, MD  [address] [facility] [address]           [facility]  [address]  [place] [address]  Phone: [phone]          Patient: Jaimee Quintana   MR Number: [id number]   YOB: 1975   Date of Visit: 3/5/2020       Dear Dr. Sukumar Ponce III:    Thank you for referring Jaimee Quintana to me for evaluation. Attached you will find relevant portions of my assessment and plan of care.    If you have questions, please do not hesitate to call me. I look forward to following Jaimee Quintana along with you.    Sincerely,    Prakash Coelho, DPMANUEL    Enclosure  CC:  No Recipients    If you would like to receive this communication electronically, please contact [email or web address] or [phone] to request more information on [employer] access.    For providers and/or their staff who would like to refer a patient to [facility], please contact us through our one-stop-shop provider referral line, [facility], at [phone].    If you feel you have received this communication in error or would no longer like to receive these types of communications, please e-mail [email or web address]

## 2020-03-10 NOTE — PROGRESS NOTES
Subjective:      Patient ID: Jaimee Quintana is a 44 y.o. female.    Chief Complaint: Diabetic Foot Exam (last pcp Inge  2/13/20); Foot Pain; and Numbness    Jaimee is a 44 y.o. female who presents to the clinic upon referral from Dr. Ponce  for evaluation and treatment of diabetic feet. Jaimee has a past medical history of CVA (cerebral infarction) (2003), Depression, Diabetes mellitus, type 2, GERD (gastroesophageal reflux disease), Hyperlipidemia, Hypertension, Moderate nonproliferative diabetic retinopathy, Obesity, and Stroke. Patient relates no major problem with feet. Only complaints today consist of routine diabetic foot evaluation.    PCP: Sam Ponce III, MD    Date Last Seen by PCP:   Chief Complaint   Patient presents with    Diabetic Foot Exam     last pcp Inge  2/13/20    Foot Pain    Numbness         Current shoe gear: Casual shoes    Hemoglobin A1C   Date Value Ref Range Status   01/13/2020 8.4 (H) 4.0 - 5.6 % Final     Comment:     ADA Screening Guidelines:  5.7-6.4%  Consistent with prediabetes  >or=6.5%  Consistent with diabetes  High levels of fetal hemoglobin interfere with the HbA1C  assay. Heterozygous hemoglobin variants (HbS, HgC, etc)do  not significantly interfere with this assay.   However, presence of multiple variants may affect accuracy.     10/02/2019 11.9 (H) 4.0 - 5.6 % Final     Comment:     ADA Screening Guidelines:  5.7-6.4%  Consistent with prediabetes  >or=6.5%  Consistent with diabetes  High levels of fetal hemoglobin interfere with the HbA1C  assay. Heterozygous hemoglobin variants (HbS, HgC, etc)do  not significantly interfere with this assay.   However, presence of multiple variants may affect accuracy.     01/14/2019 12.3 (H) 4.0 - 5.6 % Final     Comment:     ADA Screening Guidelines:  5.7-6.4%  Consistent with prediabetes  >or=6.5%  Consistent with diabetes  High levels of fetal hemoglobin interfere with the HbA1C  assay. Heterozygous hemoglobin variants (HbS,  HgC, etc)do  not significantly interfere with this assay.   However, presence of multiple variants may affect accuracy.             Review of Systems   Constitution: Negative for chills, decreased appetite, fever and malaise/fatigue.   HENT: Negative for congestion, hearing loss, nosebleeds and tinnitus.    Eyes: Negative for double vision, pain, photophobia and visual disturbance.   Cardiovascular: Negative for chest pain, claudication, cyanosis and leg swelling.   Respiratory: Negative for cough, hemoptysis, shortness of breath and wheezing.    Endocrine: Negative for cold intolerance and heat intolerance.   Hematologic/Lymphatic: Negative for adenopathy and bleeding problem.   Skin: Positive for dry skin and nail changes. Negative for color change, itching and suspicious lesions.   Musculoskeletal: Negative for arthritis, joint pain, myalgias and stiffness.   Gastrointestinal: Negative for abdominal pain, jaundice, nausea and vomiting.   Genitourinary: Negative for dysuria, frequency and hematuria.   Neurological: Negative for difficulty with concentration, loss of balance, numbness, paresthesias and sensory change.   Psychiatric/Behavioral: Negative for altered mental status, hallucinations and suicidal ideas. The patient is not nervous/anxious.    Allergic/Immunologic: Negative for environmental allergies and persistent infections.           Objective:      Physical Exam   Constitutional: She is oriented to person, place, and time. She appears well-developed and well-nourished.   HENT:   Head: Normocephalic and atraumatic.   Cardiovascular:   Pulses:       Dorsalis pedis pulses are 2+ on the right side, and 2+ on the left side.        Posterior tibial pulses are 2+ on the right side, and 2+ on the left side.   Pulmonary/Chest: Effort normal.   Musculoskeletal: Normal range of motion.   Inspection and palpation of the muscles joints and bones of both lower extremities reveal that muscle strength for the anterior  lateral and posterior muscle groups and intrinsic muscle groups of the foot are all 5 over 5 symmetrical.  Ankle subtalar midtarsal and digital joint range of motion are within normal limits, nonpainful, without crepitus or effusion.  Patient exhibits a normal angle and base of gait.  Palpation of the tendons reveal no defects.   Neurological: She is alert and oriented to person, place, and time.   Sharp dull light touch vibratory proprioceptive sensation are intact bilaterally.  Deep tendon reflexes to patellar and Achilles tendon are symmetrical 2 over 4 bilaterally.  No ankle clonus or Babinski reflexes noted bilaterally.  Coordination is normal to both feet and lower extremities.   Skin: Skin is warm and dry. Capillary refill takes 2 to 3 seconds.   Skin turgor is normal bilaterally.  Skin texture is dry to both lower extremities.  No lesions or rashes or wounds appreciated bilaterally.  Nail plates 1 through 5 bilaterally are minimally discolored and thickened.   Psychiatric: She has a normal mood and affect. Her behavior is normal.   Vitals reviewed.            Assessment:       Encounter Diagnosis   Name Primary?   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      Shoe inspection. Diabetic Foot Education. Patient reminded of the importance of good nutrition and blood sugar control to help prevent podiatric complications of diabetes. Patient instructed on proper foot hygeine. We discussed wearing proper shoe  gear, daily foot inspections and Diabetic foot education in detail.    Return to clinic in 3-6 months or sooner if problems arise   .

## 2020-03-11 ENCOUNTER — CLINICAL SUPPORT (OUTPATIENT)
Dept: DIABETES | Facility: CLINIC | Age: 45
End: 2020-03-11
Payer: MEDICARE

## 2020-03-11 PROCEDURE — 99999 PR PBB SHADOW E&M-EST. PATIENT-LVL I: CPT | Mod: PBBFAC,HCNC,,

## 2020-03-11 PROCEDURE — G0108 PR DIAB MANAGE TRN  PER INDIV: ICD-10-PCS | Mod: HCNC,S$GLB,, | Performed by: INTERNAL MEDICINE

## 2020-03-11 PROCEDURE — G0108 DIAB MANAGE TRN  PER INDIV: HCPCS | Mod: HCNC,S$GLB,, | Performed by: INTERNAL MEDICINE

## 2020-03-11 PROCEDURE — 99999 PR PBB SHADOW E&M-EST. PATIENT-LVL I: ICD-10-PCS | Mod: PBBFAC,HCNC,,

## 2020-03-12 VITALS — BODY MASS INDEX: 40.1 KG/M2 | WEIGHT: 241 LBS

## 2020-03-12 NOTE — PROGRESS NOTES
Diabetes Education  Author: Hal Ji RN  Date: 3/12/2020  Diabetes Care Management Summary  Diabetes Education Record Assessment/Progress: Post Program/Follow-up  Current Diabetes Risk Level: Moderate   Last A1c:   Lab Results   Component Value Date    HGBA1C 8.4 (H) 01/13/2020     Last visit with Diabetes Educator: : 03/11/2020  Diabetes Type  Diabetes Type : Type II  Diabetes History  Current Treatment: Oral Medication, Diet, Injectable, Exercise  Health Maintenance was reviewed today with patient. Discussed with patient importance of routine eye exams, foot exams/foot care, blood work (i.e.: A1c, microalbumin, and lipid), dental visits, yearly flu vaccine, and pneumonia vaccine as indicated by PCP. Patient verbalized understanding.     Health Maintenance Topics with due status: Not Due       Topic Last Completion Date    TETANUS VACCINE 11/19/2014    Mammogram 08/21/2019    Pap Smear with HPV Cotest 09/16/2019    Hemoglobin A1c 01/13/2020    Lipid Panel 02/08/2020    Eye Exam 02/11/2020    Low Dose Statin 02/13/2020    Foot Exam 02/13/2020     There are no preventive care reminders to display for this patient.    Nutrition  Meal Planning: skipping meals, water, eats out often, snacks between meal  What type of beverages do you drink?: water, diet soda/tea  Meal Plan 24 Hour Recall - Breakfast: grits, turkey sausage, egg  Meal Plan 24 Hour Recall - Lunch: 4 lbs crayfish  Meal Plan 24 Hour Recall - Dinner: salad, macaroni and cheese and hot dogs  Meal Plan 24 Hour Recall - Snack: ranch dip and chips  Monitoring   Self Monitoring : pt has a meter and is testing2 times a day fasting in am and in the evening pt reports am bs's  and evening 101-161. Pt states she keeps a record of her bs's but did not bring it today.  Blood Glucose Logs: Yes  Do you use a personal continuous glucose monitor?: No  In the last month, how often have you had a low blood sugar reaction?: never  Can you tell when your blood  sugar is too high?: no  Exercise   Exercise Type: use exercise equipment  Intensity: Low  Frequency: 3-5 Times per week  Duration: 30 min  Current Diabetes Treatment   Current Treatment: Oral Medication, Diet, Injectable, Exercise  Social History  Preferred Learning Method: Face to Face, Demonstration, Reading Materials  Primary Support: Self  DDS-2 Score  ( > 3 = SIGNIFICANT DISTRESS): 1   Barriers to Change  Barriers to Change: None  Learning Challenges : None  Readiness to Learn   Readiness to Learn : Acceptance  Cultural Influences  Cultural Influences: None  Diabetes Education Assessment/Progress  Diabetes Disease Process (diabetes disease process and treatment options): Not Covered/Deferred  Nutrition (Incorporating nutritional management into one's lifestyle): Discussion, Demonstration, Instructed, Demonstrates Understanding/Competency (verbalizes/demonstrates), Individual Session, Written Materials Provided  Physical Activity (incorporating physical activity into one's lifestyle): Discussion, Instructed, Demonstrates Understanding/Competency (verbalizes/demonstrates), Individual Session, Written Materials Provided  Medications (states correct name, dose, onset, peak, duration, side effects & timing of meds): Discussion, Instructed, Demonstrates Understanding/Competency(verbalizes/demonstrates), Individual Session  Monitoring (monitoring blood glucose/other parameters & using results): Discussion, Demonstrates Understanding/Competency (verbalizes/demonstrates), Individual Session  Acute Complications (preventing, detecting, and treating acute complications): Discussion, Instructed, Demonstrates Understanding/Competency (verbalizes/demonstrates), Individual Session  Chronic Complications (preventing, detecting, and treating chronic complications): Discussion, Instructed, Demonstrates Understanding/Competency (verbalizes/demonstrates), Individual Session  Clinical (diabetes, other pertinent medical history, and  relevant comorbidities reviewed during visit): Discussion, Instructed, Demonstrates Understanding/Competency (verbalizes/demonstrates), Individual Session  Cognitive (knowledge of self-management skills, functional health literacy): Discussion, Instructed, Demonstrates Understanding/Competency (verbalizes/demonstrates), Individual Session  Psychosocial (emotional response to diabetes): Discussion, Instructed, Demonstrates Understanding/Competency (verbalizes/demonstrates), Individual Session  Diabetes Distress and Support Systems: Discussion, Instructed, Demonstrates Understanding/Competency (verbalizes/demonstrates), Individual Session  Behavioral (readiness for change, lifestyle practices, self-care behaviors): Discussion, Instructed, Demonstrates Understanding/Competency (verbalizes/demonstrates), Individual Session  Goals  Patient has selected/evaluated goals during today's session: Yes, evaluated  Healthy Eating: % Met  Met Percentage : 75%  Monitoring: % Met  Met Percentage : 100%  Diabetes Care Plan/Intervention  Education Plan/Intervention: Individual Follow-Up DSMT  Diabetes Meal Plan  Restrictions: Restricted Carbohydrate  Calories: 1800  Carbohydrate Per Snack : 15-20g  Pt came to clinic for fu visit. Pt is doing much better with managing her diabetes and making life style changes. She had stopped drinking the jugs of juice that she was doing. She now drinks water and unsweetened tea. Pt is now eating 3 meals per day and is working on making them balanced. Pt has joined a fitness center and goes 2 times a week. Pt is taking her medications as indicated. 24 hour meal recall showed that pt is doing well with her meals. Discussed with pt what needed to be added to her meals to make them more balanced. Discussed with pt that when she eats crayfish which should be limited she needs to make sure that she eats her carbs and veggies along with that. Pt will continue to work on meal plas. Pt is also challenged  with a low sodium meal plan for her hypertension. Discussed with pt that she should avoid canned foods, processed foods, frozen foods, crayfish because they are high in sodium. Pt wants to get her b/p down so she will work on this. Pt was advised to call for any problems or questions.   Today's Self-Management Care Plan was developed with the patient's input and is based on barriers identified during today's assessment.    The long and short-term goals in the care plan were written with the patient/caregiver's input. The patient has agreed to work toward these goals to improve her overall diabetes control.      The patient received a copy of today's self-management plan and verbalized understanding of the care plan, goals, and all of today's instructions.      The patient was encouraged to communicate with her physician and care team regarding her condition(s) and treatment.  I provided the patient with my contact information today and encouraged her to contact me via phone or patient portal as needed.     Education Units of Time   Time Spent: 60 min

## 2020-04-14 ENCOUNTER — LAB VISIT (OUTPATIENT)
Dept: LAB | Facility: HOSPITAL | Age: 45
End: 2020-04-14
Attending: INTERNAL MEDICINE
Payer: MEDICARE

## 2020-04-14 DIAGNOSIS — E11.9 TYPE 2 DIABETES MELLITUS WITHOUT COMPLICATION, WITH LONG-TERM CURRENT USE OF INSULIN: ICD-10-CM

## 2020-04-14 DIAGNOSIS — E11.42 TYPE 2 DIABETES MELLITUS WITH DIABETIC POLYNEUROPATHY, WITH LONG-TERM CURRENT USE OF INSULIN: ICD-10-CM

## 2020-04-14 DIAGNOSIS — Z79.4 TYPE 2 DIABETES MELLITUS WITHOUT COMPLICATION, WITH LONG-TERM CURRENT USE OF INSULIN: ICD-10-CM

## 2020-04-14 DIAGNOSIS — Z79.4 TYPE 2 DIABETES MELLITUS WITH DIABETIC POLYNEUROPATHY, WITH LONG-TERM CURRENT USE OF INSULIN: ICD-10-CM

## 2020-04-14 LAB
ESTIMATED AVG GLUCOSE: 163 MG/DL (ref 68–131)
HBA1C MFR BLD HPLC: 7.3 % (ref 4–5.6)

## 2020-04-14 PROCEDURE — 36415 COLL VENOUS BLD VENIPUNCTURE: CPT | Mod: HCNC,PO

## 2020-04-14 PROCEDURE — 83036 HEMOGLOBIN GLYCOSYLATED A1C: CPT | Mod: HCNC

## 2020-04-14 RX ORDER — METFORMIN HYDROCHLORIDE 1000 MG/1
TABLET ORAL
Qty: 180 TABLET | Refills: 0 | Status: SHIPPED | OUTPATIENT
Start: 2020-04-14 | End: 2020-07-07

## 2020-04-27 NOTE — PROGRESS NOTES
Congratulations on the improvement in your Hemoglyobin A1c!!  Please continue to take your medications as prescribed  Please let us know if you have any questions with regards to medications  We will see you in 2 weeks at your scheduled follow-up appointment

## 2020-05-06 ENCOUNTER — TELEPHONE (OUTPATIENT)
Dept: DIABETES | Facility: CLINIC | Age: 45
End: 2020-05-06

## 2020-05-07 ENCOUNTER — CLINICAL SUPPORT (OUTPATIENT)
Dept: DIABETES | Facility: CLINIC | Age: 45
End: 2020-05-07
Payer: MEDICARE

## 2020-05-07 VITALS — WEIGHT: 240 LBS | BODY MASS INDEX: 39.94 KG/M2

## 2020-05-07 DIAGNOSIS — E11.622 TYPE 2 DIABETES MELLITUS WITH OTHER SKIN ULCER (CODE): ICD-10-CM

## 2020-05-07 PROCEDURE — G0108 DIAB MANAGE TRN  PER INDIV: HCPCS | Mod: HCNC,S$GLB,, | Performed by: INTERNAL MEDICINE

## 2020-05-07 PROCEDURE — 99999 PR PBB SHADOW E&M-EST. PATIENT-LVL I: CPT | Mod: PBBFAC,HCNC,,

## 2020-05-07 PROCEDURE — G0108 PR DIAB MANAGE TRN  PER INDIV: ICD-10-PCS | Mod: HCNC,S$GLB,, | Performed by: INTERNAL MEDICINE

## 2020-05-07 PROCEDURE — 99999 PR PBB SHADOW E&M-EST. PATIENT-LVL I: ICD-10-PCS | Mod: PBBFAC,HCNC,,

## 2020-05-07 NOTE — PROGRESS NOTES
Diabetes Education  Author: Hal Ji RN  Date: 5/7/2020  Diabetes Care Management Summary  Diabetes Education Record Assessment/Progress: Post Program/Follow-up  Current Diabetes Risk Level: Moderate     Last A1c:   Lab Results   Component Value Date    HGBA1C 7.3 (H) 04/14/2020     Last visit with Diabetes Educator: : 05/07/2020      Diabetes Type  Diabetes Type : Type II  Diabetes History  Current Treatment: Oral Medication, Diet, Exercise, Insulin  Health Maintenance was reviewed today with patient. Discussed with patient importance of routine eye exams, foot exams/foot care, blood work (i.e.: A1c, microalbumin, and lipid), dental visits, yearly flu vaccine, and pneumonia vaccine as indicated by PCP. Patient verbalized understanding.     Health Maintenance Topics with due status: Not Due       Topic Last Completion Date    TETANUS VACCINE 11/19/2014    Mammogram 08/21/2019    Pap Smear with HPV Cotest 09/16/2019    Lipid Panel 02/08/2020    Eye Exam 02/11/2020    Low Dose Statin 02/13/2020    Foot Exam 02/13/2020    Hemoglobin A1c 04/14/2020     There are no preventive care reminders to display for this patient.  Nutrition  Meal Planning: skipping meals, water  What type of beverages do you drink?: diet soda/tea, water  Meal Plan 24 Hour Recall - Breakfast: coffee  Meal Plan 24 Hour Recall - Lunch: pork chop, salad, milk  Meal Plan 24 Hour Recall - Dinner: 2 crabs  Monitoring   Self Monitoring : pt has a meter and is testing 3 times a day before injections and at bedtime. pt reports bs's in the 100-110 range in am and 149 in pm  Blood Glucose Logs: Yes  Do you use a personal continuous glucose monitor?: No  In the last month, how often have you had a low blood sugar reaction?: never  Can you tell when your blood sugar is too high?: no  Exercise   Exercise Type: use exercise equipment  Frequency: 3-5 Times per week  Duration: 15 min  Current Diabetes Treatment   Current Treatment: Oral Medication, Diet,  Exercise, Insulin  Social History  Primary Support: Self    DDS-2 Score  ( > 3 = SIGNIFICANT DISTRESS): 1   Barriers to Change  Barriers to Change: None  Learning Challenges : None  Readiness to Learn   Readiness to Learn : Acceptance  Cultural Influences  Cultural Influences: None  Diabetes Education Assessment/Progress  Diabetes Disease Process (diabetes disease process and treatment options): Not Covered/Deferred  Nutrition (Incorporating nutritional management into one's lifestyle): Discussion, Instructed, Demonstrates Understanding/Competency (verbalizes/demonstrates), Individual Session  Physical Activity (incorporating physical activity into one's lifestyle): Discussion, Instructed, Demonstrates Understanding/Competency (verbalizes/demonstrates), Individual Session  Medications (states correct name, dose, onset, peak, duration, side effects & timing of meds): Discussion, Instructed, Demonstrates Understanding/Competency(verbalizes/demonstrates), Individual Session  Monitoring (monitoring blood glucose/other parameters & using results): Discussion, Instructed, Demonstrates Understanding/Competency (verbalizes/demonstrates), Individual Session  Acute Complications (preventing, detecting, and treating acute complications): Not Covered/Deferred  Chronic Complications (preventing, detecting, and treating chronic complications): Not Covered/Deferred  Clinical (diabetes, other pertinent medical history, and relevant comorbidities reviewed during visit): Discussion, Instructed, Demonstrates Understanding/Competency (verbalizes/demonstrates), Individual Session  Cognitive (knowledge of self-management skills, functional health literacy): Discussion, Instructed, Demonstrates Understanding/Competency (verbalizes/demonstrates), Individual Session  Psychosocial (emotional response to diabetes): Discussion, Instructed, Demonstrates Understanding/Competency (verbalizes/demonstrates), Individual Session  Diabetes Distress  and Support Systems: Discussion, Instructed, Demonstrates Understanding/Competency (verbalizes/demonstrates), Individual Session  Behavioral (readiness for change, lifestyle practices, self-care behaviors): Discussion, Instructed, Demonstrates Understanding/Competency (verbalizes/demonstrates), Individual Session  Goals  Patient has selected/evaluated goals during today's session: Yes, evaluated  Healthy Eating: % Met  Met Percentage : 50%  Physical Activity: % Met  Met Percentage : 75%  Monitoring: % Met  Met Percentage : 100%  Diabetes Care Plan/Intervention  Education Plan/Intervention: Individual Follow-Up DSMT  Diabetes Meal Plan  Restrictions: Restricted Carbohydrate  Calories: 1800  Carbohydrate Per Meal: 30-45g  Carbohydrate Per Snack : 15-20g  Pt completed diabetes fu visit via phone. Pt reports that her bs's are in normal range. Pt has been doing squats and walking since she is unable to go to the fitness center. Pt states that her bs's have been lower since she has been home. Pt is drinking only water. pts a1c has decreased from 8.4 to 7.3. 24 hour meal recall showed that pt did not eat balanced yesterday. She skipped a meal and did not eat completely at her other meals. Discussed with pt again how to put her meals together and what needs to be included in her meals. Pt had been doing very well with her meals and the past and she was encouraged not to slip up. Pt will continue to work on meal plan, Discussed covid 19 precautions with pt. Pt was advised to call for any questions or problems. Will fu in 2 weeks.  Today's Self-Management Care Plan was developed with the patient's input and is based on barriers identified during today's assessment.    The long and short-term goals in the care plan were written with the patient/caregiver's input. The patient has agreed to work toward these goals to improve her overall diabetes control.      The patient received a copy of today's self-management plan and  verbalized understanding of the care plan, goals, and all of today's instructions.      The patient was encouraged to communicate with her physician and care team regarding her condition(s) and treatment.  I provided the patient with my contact information today and encouraged her to contact me via phone or patient portal as needed.     Education Units of Time   Time Spent: 60 min

## 2020-05-28 ENCOUNTER — CLINICAL SUPPORT (OUTPATIENT)
Dept: DIABETES | Facility: CLINIC | Age: 45
End: 2020-05-28
Payer: MEDICARE

## 2020-05-28 VITALS — WEIGHT: 240 LBS | BODY MASS INDEX: 39.94 KG/M2

## 2020-05-28 DIAGNOSIS — E11.622 TYPE 2 DIABETES MELLITUS WITH OTHER SKIN ULCER (CODE): ICD-10-CM

## 2020-05-28 PROCEDURE — G0108 DIAB MANAGE TRN  PER INDIV: HCPCS | Mod: HCNC,95,, | Performed by: INTERNAL MEDICINE

## 2020-05-28 PROCEDURE — 99999 PR PBB SHADOW E&M-EST. PATIENT-LVL I: CPT | Mod: PBBFAC,HCNC,,

## 2020-05-28 PROCEDURE — G0108 PR DIAB MANAGE TRN  PER INDIV: ICD-10-PCS | Mod: HCNC,95,, | Performed by: INTERNAL MEDICINE

## 2020-05-28 PROCEDURE — 99999 PR PBB SHADOW E&M-EST. PATIENT-LVL I: ICD-10-PCS | Mod: PBBFAC,HCNC,,

## 2020-05-28 NOTE — PROGRESS NOTES
Telephone - [id number] [phone]    Diabetes Care Specialist Telehealth Visit Note  This visit was conducted using Telehealth/Telephonic Technology. It was in the patient's best interest to receive diabetes self-management education and support services in this format to prevent the exposure to COVID-19. Diabetes Education  Author: Hal Ji RN  Date: 5/28/2020  Diabetes Care Management Summary  Diabetes Education Record Assessment/Progress: Post Program/Follow-up  Current Diabetes Risk Level: Moderate   Last A1c:   Lab Results   Component Value Date    HGBA1C 7.3 (H) 04/14/2020     Last visit with Diabetes Educator: : 05/28/2020  Diabetes Type  Diabetes Type : Type II  Diabetes History  Current Treatment: Diet, Injectable, Exercise, Insulin  Health Maintenance was reviewed today with patient. Discussed with patient importance of routine eye exams, foot exams/foot care, blood work (i.e.: A1c, microalbumin, and lipid), dental visits, yearly flu vaccine, and pneumonia vaccine as indicated by PCP. Patient verbalized understanding.     Health Maintenance Topics with due status: Not Due       Topic Last Completion Date    TETANUS VACCINE 11/19/2014    Mammogram 08/21/2019    Pap Smear with HPV Cotest 09/16/2019    Lipid Panel 02/08/2020    Eye Exam 02/11/2020    Low Dose Statin 02/13/2020    Foot Exam 02/13/2020    Hemoglobin A1c 04/14/2020     There are no preventive care reminders to display for this patient.  Nutrition  Meal Planning: skipping meals, water  What type of beverages do you drink?: water  Meal Plan 24 Hour Recall - Breakfast: nothing  Meal Plan 24 Hour Recall - Lunch: grilled chicken,   Meal Plan 24 Hour Recall - Dinner: meat pie, rice, shrimp  Monitoring   Self Monitoring : pt has a meter and is testing 3 times a day. pt reports bs's  130-150  Blood Glucose Logs: No  Do you use a personal continuous glucose monitor?: No  In the last month, how often have you had a low blood sugar reaction?:  never  Can you tell when your blood sugar is too high?: no  Exercise   Exercise Type: use exercise equipment  Intensity: Low  Frequency: 3-5 Times per week  Duration: 15 min  Current Diabetes Treatment   Current Treatment: Diet, Injectable, Exercise, Insulin  Social History  Primary Support: Self  DDS-2 Score  ( > 3 = SIGNIFICANT DISTRESS): 1   Barriers to Change  Barriers to Change: None  Learning Challenges : None  Readiness to Learn   Readiness to Learn : Acceptance  Cultural Influences  Cultural Influences: None  Diabetes Education Assessment/Progress  Diabetes Disease Process (diabetes disease process and treatment options): Not Covered/Deferred  Nutrition (Incorporating nutritional management into one's lifestyle): Discussion, Instructed, Individual Session, Demonstrates Understanding/Competency (verbalizes/demonstrates)  Physical Activity (incorporating physical activity into one's lifestyle): Discussion, Instructed, Individual Session, Demonstrates Understanding/Competency (verbalizes/demonstrates)  Medications (states correct name, dose, onset, peak, duration, side effects & timing of meds): Discussion, Instructed, Individual Session, Demonstrates Understanding/Competency(verbalizes/demonstrates)  Monitoring (monitoring blood glucose/other parameters & using results): Discussion, Instructed, Individual Session, Demonstrates Understanding/Competency (verbalizes/demonstrates)  Acute Complications (preventing, detecting, and treating acute complications): Not Covered/Deferred  Chronic Complications (preventing, detecting, and treating chronic complications): Not Covered/Deferred  Clinical (diabetes, other pertinent medical history, and relevant comorbidities reviewed during visit): Discussion, Instructed, Individual Session, Demonstrates Understanding/Competency (verbalizes/demonstrates)  Cognitive (knowledge of self-management skills, functional health literacy): Discussion, Instructed, Individual Session,  Demonstrates Understanding/Competency (verbalizes/demonstrates)  Psychosocial (emotional response to diabetes): Discussion, Instructed, Individual Session, Demonstrates Understanding/Competency (verbalizes/demonstrates)  Diabetes Distress and Support Systems: Discussion, Instructed, Individual Session, Demonstrates Understanding/Competency (verbalizes/demonstrates)  Behavioral (readiness for change, lifestyle practices, self-care behaviors): Discussion, Instructed, Individual Session, Demonstrates Understanding/Competency (verbalizes/demonstrates)  Goals  Patient has selected/evaluated goals during today's session: Yes, evaluated  Healthy Eating: % Met  Met Percentage : 50%  Monitoring: % Met  Met Percentage : 75%  Diabetes Care Plan/Intervention  Education Plan/Intervention: Individual Follow-Up DSMT  Diabetes Meal Plan  Restrictions: Restricted Carbohydrate  Calories: 1800  Carbohydrate Per Meal: 30-45g  Carbohydrate Per Snack : 15-20g  Pt completed diabetes fu visit via phone. Pt checks her bs's 3 times a day and states that they are a little high. Pt is drinking only water. Pt had been doing better with her meal plan in the past and now she is starting to skip meals and not eating complete meals. Reviewed the food groups, how to read labels and count carbs with pt. Discussed with pt what needs to be added to her meals to make them more balanced. Discussed with pt the importance of eating 3 balanced and portioned meals. Encouraged pt to try to get back on track. Encouraged pt to plan her meals either the day before or before each meal and do a plate check. Pt states that she will try to work on it. Discussed covid 19 precautions with pt. Pt was advised to call for any problems or questions. Will fu in one month  Today's Self-Management Care Plan was developed with the patient's input and is based on barriers identified during today's assessment.    The long and short-term goals in the care plan were written with  the patient/caregiver's input. The patient has agreed to work toward these goals to improve her overall diabetes control.      The patient received a copy of today's self-management plan and verbalized understanding of the care plan, goals, and all of today's instructions.      The patient was encouraged to communicate with her physician and care team regarding her condition(s) and treatment.  I provided the patient with my contact information today and encouraged her to contact me via phone or patient portal as needed.     Education Units of Time   Time Spent: 60 min

## 2020-06-01 ENCOUNTER — OFFICE VISIT (OUTPATIENT)
Dept: INTERNAL MEDICINE | Facility: CLINIC | Age: 45
End: 2020-06-01
Payer: MEDICARE

## 2020-06-01 VITALS
OXYGEN SATURATION: 99 % | DIASTOLIC BLOOD PRESSURE: 72 MMHG | BODY MASS INDEX: 41.51 KG/M2 | WEIGHT: 249.13 LBS | TEMPERATURE: 98 F | HEIGHT: 65 IN | SYSTOLIC BLOOD PRESSURE: 130 MMHG | HEART RATE: 97 BPM

## 2020-06-01 DIAGNOSIS — E11.42 DIABETIC PERIPHERAL NEUROPATHY ASSOCIATED WITH TYPE 2 DIABETES MELLITUS: ICD-10-CM

## 2020-06-01 DIAGNOSIS — E11.42 TYPE 2 DIABETES MELLITUS WITH DIABETIC POLYNEUROPATHY, WITH LONG-TERM CURRENT USE OF INSULIN: Primary | ICD-10-CM

## 2020-06-01 DIAGNOSIS — I15.2 HYPERTENSION ASSOCIATED WITH DIABETES: ICD-10-CM

## 2020-06-01 DIAGNOSIS — E66.01 OBESITY, CLASS III, BMI 40-49.9 (MORBID OBESITY): ICD-10-CM

## 2020-06-01 DIAGNOSIS — Z79.4 TYPE 2 DIABETES MELLITUS WITH DIABETIC POLYNEUROPATHY, WITH LONG-TERM CURRENT USE OF INSULIN: Primary | ICD-10-CM

## 2020-06-01 DIAGNOSIS — E11.59 HYPERTENSION ASSOCIATED WITH DIABETES: ICD-10-CM

## 2020-06-01 PROCEDURE — 99214 PR OFFICE/OUTPT VISIT, EST, LEVL IV, 30-39 MIN: ICD-10-PCS | Mod: HCNC,S$GLB,, | Performed by: INTERNAL MEDICINE

## 2020-06-01 PROCEDURE — 99499 UNLISTED E&M SERVICE: CPT | Mod: HCNC,S$GLB,, | Performed by: INTERNAL MEDICINE

## 2020-06-01 PROCEDURE — 3008F PR BODY MASS INDEX (BMI) DOCUMENTED: ICD-10-PCS | Mod: HCNC,CPTII,S$GLB, | Performed by: INTERNAL MEDICINE

## 2020-06-01 PROCEDURE — 99999 PR PBB SHADOW E&M-EST. PATIENT-LVL IV: CPT | Mod: PBBFAC,HCNC,, | Performed by: INTERNAL MEDICINE

## 2020-06-01 PROCEDURE — 3051F HG A1C>EQUAL 7.0%<8.0%: CPT | Mod: HCNC,CPTII,S$GLB, | Performed by: INTERNAL MEDICINE

## 2020-06-01 PROCEDURE — 99214 OFFICE O/P EST MOD 30 MIN: CPT | Mod: HCNC,S$GLB,, | Performed by: INTERNAL MEDICINE

## 2020-06-01 PROCEDURE — 3008F BODY MASS INDEX DOCD: CPT | Mod: HCNC,CPTII,S$GLB, | Performed by: INTERNAL MEDICINE

## 2020-06-01 PROCEDURE — 99999 PR PBB SHADOW E&M-EST. PATIENT-LVL IV: ICD-10-PCS | Mod: PBBFAC,HCNC,, | Performed by: INTERNAL MEDICINE

## 2020-06-01 PROCEDURE — 3051F PR MOST RECENT HEMOGLOBIN A1C LEVEL 7.0 - < 8.0%: ICD-10-PCS | Mod: HCNC,CPTII,S$GLB, | Performed by: INTERNAL MEDICINE

## 2020-06-01 PROCEDURE — 99499 RISK ADDL DX/OHS AUDIT: ICD-10-PCS | Mod: HCNC,S$GLB,, | Performed by: INTERNAL MEDICINE

## 2020-06-01 RX ORDER — SPIRONOLACTONE 25 MG/1
25 TABLET ORAL DAILY
Qty: 90 TABLET | Refills: 0 | Status: SHIPPED | OUTPATIENT
Start: 2020-06-01 | End: 2021-02-05 | Stop reason: SDUPTHER

## 2020-06-01 NOTE — PROGRESS NOTES
Subjective:       Patient ID: Jaimee Quintana is a 44 y.o. female.    Chief Complaint: No chief complaint on file.      Interim hx    None     Concerns today    none  Chronic problems         HTN  Complication: no CVA/CKD/CAD  Last labs : BMP wnl  Medication  - benazepril  40mg daily - HCTZ 25 mg mg daily  - metoprol 100mg daily   - nifedipine 60mg   - aldactone 25 mg daily   Home Bps; still wrist cuff   Symptoms: denies CP, SOB, changes in urination, sudden weakness, numbness      DM assessment  Providers: previously Seen by endocrinology  Complication; neuropathy, retinopathy,  Medication:adherent to  -   - hba1c -   Lab Results   Component Value Date    HGBA1C 7.3 (H) 04/14/2020    HGBA1C 8.4 (H) 01/13/2020    HGBA1C 11.9 (H) 10/02/2019      - umacr -   Lab Results   Component Value Date    MICALBCREAT 10.1 01/14/2019   ]  Vision/Podiatry: UTD  DM teaching; enrolled  Preventative: _UTD PPS23, on statin.        HLD  She is on Zetia, atorvastatin without side effects of medicaitons  Last cholesterol level was checked and within normal limits    Health Maintenance   Topic Date Due    Hemoglobin A1c  07/14/2020    Lipid Panel  02/08/2021    Eye Exam  02/11/2021    Foot Exam  02/13/2021    Low Dose Statin  06/01/2021    Mammogram  08/21/2021    Pap Smear with HPV Cotest  09/16/2022    TETANUS VACCINE  11/19/2024    Pneumococcal Vaccine (Medium Risk)  Completed       HPI  Review of Systems   Constitutional: Negative for activity change, chills, diaphoresis, fatigue, fever and unexpected weight change.   Eyes: Negative for redness and visual disturbance.   Respiratory: Negative for shortness of breath.    Cardiovascular: Negative for chest pain and palpitations.   Gastrointestinal: Negative for abdominal distention and blood in stool.   Genitourinary: Negative for difficulty urinating, dysuria, frequency and menstrual problem.   Neurological: Negative for syncope and headaches.       Objective:         There  were no vitals taken for this visit.    Physical Exam   Constitutional: She is oriented to person, place, and time. She appears well-developed and well-nourished. No distress.   HENT:   Head: Normocephalic.   Nose: Nose normal.   Mouth/Throat: Oropharynx is clear and moist.   Eyes: Pupils are equal, round, and reactive to light. Conjunctivae and EOM are normal. Right eye exhibits no discharge. Left eye exhibits no discharge.   Neck: Normal range of motion.   Cardiovascular: Normal rate, regular rhythm and normal heart sounds. Exam reveals no gallop and no friction rub.   No murmur heard.  Pulmonary/Chest: Effort normal. No stridor. No respiratory distress. She has no wheezes. She has no rales. She exhibits no tenderness.   Abdominal: Soft. Bowel sounds are normal. She exhibits no distension.   Musculoskeletal: Normal range of motion. She exhibits no edema or deformity.   Neurological: She is alert and oriented to person, place, and time. No cranial nerve deficit.   Skin: Skin is warm. She is not diaphoretic.   Psychiatric: She has a normal mood and affect.   Nursing note and vitals reviewed.        Lab Results   Component Value Date    HGBA1C 7.3 (H) 04/14/2020     CMP  Sodium   Date Value Ref Range Status   10/02/2019 135 (L) 136 - 145 mmol/L Final     Potassium   Date Value Ref Range Status   10/02/2019 4.2 3.5 - 5.1 mmol/L Final     Chloride   Date Value Ref Range Status   10/02/2019 103 95 - 110 mmol/L Final     CO2   Date Value Ref Range Status   10/02/2019 21 (L) 23 - 29 mmol/L Final     Glucose   Date Value Ref Range Status   10/02/2019 319 (H) 70 - 110 mg/dL Final     BUN, Bld   Date Value Ref Range Status   10/02/2019 10 6 - 20 mg/dL Final     Creatinine   Date Value Ref Range Status   10/02/2019 0.8 0.5 - 1.4 mg/dL Final     Calcium   Date Value Ref Range Status   10/02/2019 9.2 8.7 - 10.5 mg/dL Final     Total Protein   Date Value Ref Range Status   10/02/2019 7.7 6.0 - 8.4 g/dL Final     Albumin   Date  Value Ref Range Status   10/02/2019 3.7 3.5 - 5.2 g/dL Final     Total Bilirubin   Date Value Ref Range Status   10/02/2019 0.4 0.1 - 1.0 mg/dL Final     Comment:     For infants and newborns, interpretation of results should be based  on gestational age, weight and in agreement with clinical  observations.  Premature Infant recommended reference ranges:  Up to 24 hours.............<8.0 mg/dL  Up to 48 hours............<12.0 mg/dL  3-5 days..................<15.0 mg/dL  6-29 days.................<15.0 mg/dL       Alkaline Phosphatase   Date Value Ref Range Status   10/02/2019 78 55 - 135 U/L Final     AST   Date Value Ref Range Status   10/02/2019 17 10 - 40 U/L Final     ALT   Date Value Ref Range Status   10/02/2019 26 10 - 44 U/L Final     Anion Gap   Date Value Ref Range Status   10/02/2019 11 8 - 16 mmol/L Final     eGFR if    Date Value Ref Range Status   10/02/2019 >60.0 >60 mL/min/1.73 m^2 Final     eGFR if non    Date Value Ref Range Status   10/02/2019 >60.0 >60 mL/min/1.73 m^2 Final     Comment:     Calculation used to obtain the estimated glomerular filtration  rate (eGFR) is the CKD-EPI equation.            Assessment:       No diagnosis found.    Plan:           Jaimee was seen today for diabetes.    Diagnoses and all orders for this visit:    Type 2 diabetes mellitus with diabetic polyneuropathy, with long-term current use of insulin  -- Patient is at goal today  -- Labs are reviewed and wnl  -- DM  regimen will cont as below   --  return for DM follow up 3 months    -     Hemoglobin A1C; Future  -     Ambulatory referral/consult to Podiatry; Future    Hypertension associated with diabetes  -- Patient is at goal today  -- Labs are reviewed and wnl  -- antihypertensive regimen will cont as below  --  return for BP follow up 3 months  -     Basic metabolic panel; Future    Obesity, Class III, BMI 40-49.9 (morbid obesity)  I reviewed routine weight management option and  "recommendation for diet and exercise to include 4-5 servings of fruits and vegetables per day, avoidance of fast food, fried food, sweetend beverages and to exercise 150min/week.                   Future Appointments   Date Time Provider Department Center   6/1/2020 11:20 AM Sukumar Ponce III, MD [facility]     Medication List with Changes/Refills   Current Medications    ASPIRIN (ECOTRIN) 81 MG EC TABLET    Take 81 mg by mouth once daily.    ATORVASTATIN (LIPITOR) 80 MG TABLET    Take 1 tablet (80 mg total) by mouth every evening.    BENAZEPRIL-HYDROCHLORTHIAZIDE (LOTENSIN HCT) 20-12.5 MG PER TABLET    Take 2 tablets by mouth once daily.    BLOOD SUGAR DIAGNOSTIC STRP    Truemetrix; test BID    BLOOD-GLUCOSE METER (FREESTYLE SYSTEM KIT) KIT    Use as instructed; Truemetrix    DULAGLUTIDE (TRULICITY) 1.5 MG/0.5 ML PNIJ    Inject 1.5 mg into the skin every 7 days.    EZETIMIBE (ZETIA) 10 MG TABLET    TAKE 1 TABLET BY MOUTH EVERY DAY    FLUOXETINE 20 MG CAPSULE    Take 1 capsule (20 mg total) by mouth once daily.    IBUPROFEN (ADVIL,MOTRIN) 800 MG TABLET    Take 800 mg by mouth every 8 (eight) hours as needed.    INSULIN (LANTUS SOLOSTAR U-100 INSULIN) GLARGINE 100 UNITS/ML (3ML) SUBQ PEN    Inject 50 Units into the skin 2 (two) times daily.    LANCETS MISC    True Metrix; test BID      DX: E11.9, Z79.4    METFORMIN (GLUCOPHAGE) 1000 MG TABLET    TAKE 1 TABLET BY MOUTH TWICE A DAY    METOPROLOL SUCCINATE (TOPROL-XL) 100 MG 24 HR TABLET    TAKE 1 TABLET BY MOUTH EVERY DAY    NIFEDIPINE (PROCARDIA-XL) 60 MG (OSM) 24 HR TABLET    TAKE 1 TABLET (60 MG TOTAL) BY MOUTH ONCE DAILY.    PANTOPRAZOLE (PROTONIX) 40 MG TABLET    Take 1 tablet (40 mg total) by mouth once daily.    PEN NEEDLE, DIABETIC (BD ULTRA-FINE ROSA PEN NEEDLES) 32 GAUGE X 5/32" NDLE    1 Act by Misc.(Non-Drug; Combo Route) route 5 (five) times daily.    PEN NEEDLE, DIABETIC 32 GAUGE X 5/32" NDLE    Use with insulin once daily    SPIRONOLACTONE " (ALDACTONE) 25 MG TABLET    Take 1 tablet (25 mg total) by mouth once daily.       Disclaimer:  This note has been generated using voice-recognition software. There may be grammatical or spelling errors that have been missed during proof-reading     Disclaimer:  This note has been generated using voice-recognition software. There may be grammatical or spelling errors that have been missed during proof-reading

## 2020-06-01 NOTE — PATIENT INSTRUCTIONS
We were happy to see you today    For your Testing  Please have your labs and imaging test done in July 2020       For your Medication   No new medication changes  For more information about side effects please visit medlineplus.gov      For your Referrals  Podiatry         Please return to clinic in      3 months

## 2020-06-03 ENCOUNTER — OFFICE VISIT (OUTPATIENT)
Dept: PODIATRY | Facility: CLINIC | Age: 45
End: 2020-06-03
Payer: MEDICARE

## 2020-06-03 VITALS
SYSTOLIC BLOOD PRESSURE: 178 MMHG | WEIGHT: 252.19 LBS | HEIGHT: 65 IN | BODY MASS INDEX: 42.02 KG/M2 | HEART RATE: 103 BPM | DIASTOLIC BLOOD PRESSURE: 95 MMHG

## 2020-06-03 DIAGNOSIS — Z79.4 TYPE 2 DIABETES MELLITUS WITH DIABETIC POLYNEUROPATHY, WITH LONG-TERM CURRENT USE OF INSULIN: ICD-10-CM

## 2020-06-03 DIAGNOSIS — E11.42 TYPE 2 DIABETES MELLITUS WITH DIABETIC POLYNEUROPATHY, WITH LONG-TERM CURRENT USE OF INSULIN: ICD-10-CM

## 2020-06-03 DIAGNOSIS — E11.69 ONYCHOMYCOSIS OF MULTIPLE TOENAILS WITH TYPE 2 DIABETES MELLITUS: Primary | ICD-10-CM

## 2020-06-03 DIAGNOSIS — B35.1 ONYCHOMYCOSIS OF MULTIPLE TOENAILS WITH TYPE 2 DIABETES MELLITUS: Primary | ICD-10-CM

## 2020-06-03 PROCEDURE — 99999 PR PBB SHADOW E&M-EST. PATIENT-LVL V: ICD-10-PCS | Mod: PBBFAC,HCNC,, | Performed by: PODIATRIST

## 2020-06-03 PROCEDURE — 3008F BODY MASS INDEX DOCD: CPT | Mod: HCNC,CPTII,S$GLB, | Performed by: PODIATRIST

## 2020-06-03 PROCEDURE — 99213 OFFICE O/P EST LOW 20 MIN: CPT | Mod: HCNC,S$GLB,, | Performed by: PODIATRIST

## 2020-06-03 PROCEDURE — 3051F PR MOST RECENT HEMOGLOBIN A1C LEVEL 7.0 - < 8.0%: ICD-10-PCS | Mod: HCNC,CPTII,S$GLB, | Performed by: PODIATRIST

## 2020-06-03 PROCEDURE — 3051F HG A1C>EQUAL 7.0%<8.0%: CPT | Mod: HCNC,CPTII,S$GLB, | Performed by: PODIATRIST

## 2020-06-03 PROCEDURE — 99213 PR OFFICE/OUTPT VISIT, EST, LEVL III, 20-29 MIN: ICD-10-PCS | Mod: HCNC,S$GLB,, | Performed by: PODIATRIST

## 2020-06-03 PROCEDURE — 3008F PR BODY MASS INDEX (BMI) DOCUMENTED: ICD-10-PCS | Mod: HCNC,CPTII,S$GLB, | Performed by: PODIATRIST

## 2020-06-03 PROCEDURE — 99999 PR PBB SHADOW E&M-EST. PATIENT-LVL V: CPT | Mod: PBBFAC,HCNC,, | Performed by: PODIATRIST

## 2020-06-03 NOTE — LETTER
Rachel 3, 2020      Sukumar Ponce III, MD  [address]  [place] [address]           [facility]  [address], [address]  [place] [address]  Phone: [phone]  Fax: [phone]          Patient: Jaimee Quintana   MR Number: [id number]   YOB: 1975   Date of Visit: 6/3/2020       Dear Dr. Sukumar Ponce III:    Thank you for referring Jaimee Quintana to me for evaluation. Attached you will find relevant portions of my assessment and plan of care.    If you have questions, please do not hesitate to call me. I look forward to following Jaimee Quintana along with you.    Sincerely,    Fadia Wilson, EVONNE    Enclosure  CC:  No Recipients    If you would like to receive this communication electronically, please contact [email or web address] or [phone] to request more information on [employer] access.    For providers and/or their staff who would like to refer a patient to [facility], please contact us through our one-stop-shop provider referral line, [facility], at [phone].    If you feel you have received this communication in error or would no longer like to receive these types of communications, please e-mail [email or web address]

## 2020-06-03 NOTE — PROGRESS NOTES
Subjective:      Patient ID: Jaimee Quintana is a 44 y.o. female.    Chief Complaint: Numbness (B/L feet); Diabetes Mellitus; Diabetic Foot Exam; Nail Care; and PCP Visit (Dr. Ponce last visit 6/1/2020)    Jaimee is a 44 y.o. female who presents to the clinic for evaluation and treatment of high risk feet. Jaimee has a past medical history of CVA (cerebral infarction) (2003), Depression, Diabetes mellitus, type 2, GERD (gastroesophageal reflux disease), Hyperlipidemia, Hypertension, Moderate nonproliferative diabetic retinopathy, Obesity, and Stroke. The patient's chief complaint is long, thick toenails. This patient has documented high risk feet requiring routine maintenance secondary to peripheral neuropathy.  Complains of numbness and tingling.  Known to Dr. Coelho for diabetic foot assessment.        PCP: Sam Ponce III, MD    Date Last Seen by PCP:  In epic    Current shoe gear:  Affected Foot: Casual shoes     Unaffected Foot: Casual shoes    Hemoglobin A1C   Date Value Ref Range Status   04/14/2020 7.3 (H) 4.0 - 5.6 % Final     Comment:     ADA Screening Guidelines:  5.7-6.4%  Consistent with prediabetes  >or=6.5%  Consistent with diabetes  High levels of fetal hemoglobin interfere with the HbA1C  assay. Heterozygous hemoglobin variants (HbS, HgC, etc)do  not significantly interfere with this assay.   However, presence of multiple variants may affect accuracy.     01/13/2020 8.4 (H) 4.0 - 5.6 % Final     Comment:     ADA Screening Guidelines:  5.7-6.4%  Consistent with prediabetes  >or=6.5%  Consistent with diabetes  High levels of fetal hemoglobin interfere with the HbA1C  assay. Heterozygous hemoglobin variants (HbS, HgC, etc)do  not significantly interfere with this assay.   However, presence of multiple variants may affect accuracy.     10/02/2019 11.9 (H) 4.0 - 5.6 % Final     Comment:     ADA Screening Guidelines:  5.7-6.4%  Consistent with prediabetes  >or=6.5%  Consistent with diabetes  High levels  of fetal hemoglobin interfere with the HbA1C  assay. Heterozygous hemoglobin variants (HbS, HgC, etc)do  not significantly interfere with this assay.   However, presence of multiple variants may affect accuracy.         Review of Systems   Constitution: Negative for chills, decreased appetite, fever and malaise/fatigue.   HENT: Negative for congestion, ear discharge and sore throat.    Eyes: Negative for discharge and pain.   Cardiovascular: Negative for chest pain, claudication and leg swelling.   Respiratory: Negative for cough and shortness of breath.    Skin: Negative for color change, nail changes and rash.   Musculoskeletal: Positive for arthritis and back pain. Negative for joint pain, joint swelling and muscle weakness.   Gastrointestinal: Negative for bloating, abdominal pain, diarrhea, nausea and vomiting.   Genitourinary: Negative for flank pain and hematuria.   Neurological: Positive for numbness. Negative for headaches and weakness.   Psychiatric/Behavioral: Negative for altered mental status.             Past Medical History:   Diagnosis Date    CVA (cerebral infarction)          Depression     Diabetes mellitus, type 2     GERD (gastroesophageal reflux disease)     Hyperlipidemia     Hypertension     Moderate nonproliferative diabetic retinopathy     Obesity     Stroke        Past Surgical History:   Procedure Laterality Date     SECTION      CHOLECYSTECTOMY      DILATION AND CURETTAGE OF UTERUS      GALLBLADDER SURGERY  2017    stone removed       Family History   Problem Relation Age of Onset    Stroke Mother     Thyroid disease Mother     Diabetes Mother     Cataracts Father     Hypertension Father     Arthritis Father     Diabetes Father     Hypertension Sister     Stroke Sister     Thyroid disease Sister     Heart disease Sister     Thyroid disease Daughter     Asthma Daughter     No Known Problems Brother     No Known Problems Maternal Aunt     No  Known Problems Maternal Uncle     No Known Problems Paternal Aunt     No Known Problems Paternal Uncle     No Known Problems Maternal Grandmother     No Known Problems Maternal Grandfather     No Known Problems Paternal Grandmother     No Known Problems Paternal Grandfather     Amblyopia Neg Hx     Blindness Neg Hx     Cancer Neg Hx     Glaucoma Neg Hx     Macular degeneration Neg Hx     Retinal detachment Neg Hx     Strabismus Neg Hx        Social History     Socioeconomic History    Marital status: Single     Spouse name: Not on file    Number of children: Not on file    Years of education: Not on file    Highest education level: Not on file   Occupational History    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   Social Needs    Financial resource strain: Not on file    Food insecurity:     Worry: Not on file     Inability: Not on file    Transportation needs:     Medical: Not on file     Non-medical: Not on file   Tobacco Use    Smoking status: Former Smoker     Packs/day: 1.00     Years: 18.00     Pack years: 18.00     Types: Cigarettes     Start date: 1987     Last attempt to quit: 2005     Years since quitting: 15.4    Smokeless tobacco: Never Used    Tobacco comment: quit approx 13 years ago   Substance and Sexual Activity    Alcohol use: Yes     Alcohol/week: 3.0 standard drinks     Types: 3 Cans of beer per week    Drug use: No    Sexual activity: Yes     Partners: Male     Birth control/protection: None   Lifestyle    Physical activity:     Days per week: Not on file     Minutes per session: Not on file    Stress: Not on file   Relationships    Social connections:     Talks on phone: Not on file     Gets together: Not on file     Attends Restorationism service: Not on file     Active member of club or organization: Not on file     Attends meetings of clubs or organizations: Not on file     Relationship status: Not on file   Other Topics Concern    Not on file   Social  "History Narrative    Daughter - Kavon       Current Outpatient Medications   Medication Sig Dispense Refill    aspirin (ECOTRIN) 81 MG EC tablet Take 81 mg by mouth once daily.      atorvastatin (LIPITOR) 80 MG tablet Take 1 tablet (80 mg total) by mouth every evening. 90 tablet 1    benazepril-hydrochlorthiazide (LOTENSIN HCT) 20-12.5 mg per tablet Take 2 tablets by mouth once daily. 180 tablet 3    blood sugar diagnostic Strp Truemetrix; test  strip 6    dulaglutide (TRULICITY) 1.5 mg/0.5 mL PnIj Inject 1.5 mg into the skin every 7 days. 1 Syringe 3    FLUoxetine 20 MG capsule Take 1 capsule (20 mg total) by mouth once daily. 90 capsule 0    ibuprofen (ADVIL,MOTRIN) 800 MG tablet Take 800 mg by mouth every 8 (eight) hours as needed.  0    lancets Misc True Metrix; test BID      DX: E11.9, Z79.4 200 each 6    metFORMIN (GLUCOPHAGE) 1000 MG tablet TAKE 1 TABLET BY MOUTH TWICE A  tablet 0    metoprolol succinate (TOPROL-XL) 100 MG 24 hr tablet TAKE 1 TABLET BY MOUTH EVERY DAY 60 tablet 1    NIFEdipine (PROCARDIA-XL) 60 MG (OSM) 24 hr tablet TAKE 1 TABLET (60 MG TOTAL) BY MOUTH ONCE DAILY. 90 tablet 1    pantoprazole (PROTONIX) 40 MG tablet Take 1 tablet (40 mg total) by mouth once daily. 30 tablet 11    pen needle, diabetic (BD ULTRA-FINE ROSA PEN NEEDLES) 32 gauge x 5/32" Ndle 1 Act by Misc.(Non-Drug; Combo Route) route 5 (five) times daily. 300 each 6    pen needle, diabetic 32 gauge x 5/32" Ndle Use with insulin once daily 100 each 0    spironolactone (ALDACTONE) 25 MG tablet Take 1 tablet (25 mg total) by mouth once daily. 90 tablet 0    blood-glucose meter (FREESTYLE SYSTEM KIT) kit Use as instructed; Truemetrix 1 each 0    ezetimibe (ZETIA) 10 mg tablet TAKE 1 TABLET BY MOUTH EVERY DAY 30 tablet 5    insulin (LANTUS SOLOSTAR U-100 INSULIN) glargine 100 units/mL (3mL) SubQ pen Inject 50 Units into the skin 2 (two) times daily. 30 mL 0     Current Facility-Administered Medications " "  Medication Dose Route Frequency Provider Last Rate Last Dose    fluorescein 500 mg/5 mL (10 %) injection 500 mg  5 mL Intravenous Once D. Donte Tillman MD           Review of patient's allergies indicates:  No Known Allergies    Vitals:    06/03/20 0854   BP: (!) 178/95   Pulse: 103   Weight: 114.4 kg (252 lb 3.2 oz)   Height: 5' 5" (1.651 m)   PainSc:   7       Objective:      Physical Exam   Constitutional: She is oriented to person, place, and time. She appears well-developed and well-nourished. No distress.   HENT:   Nose: Nose normal.   Eyes: Conjunctivae are normal.   Neck: Normal range of motion.   Pulmonary/Chest: Effort normal. She exhibits no tenderness.   Abdominal: There is no tenderness.   Neurological: She is alert and oriented to person, place, and time.   Psychiatric: She has a normal mood and affect. Her behavior is normal.       Vascular: Distal DP/PT pulses palpable 2/4. CRT < 3 sec to tips of toes. No vericosities noted to LEs. Hair growth present LE, warm to touch LE, No edema noted to LE.    Dermatologic: No open lesions, lacerations or wounds. Interdigital spaces clean, dry and intact. No erythema, rubor, calor noted LE  Toenails 1-5 bilaterally are elongated by 2-3 mm, thickened by 2-3 mm, discolored/yellowed.     Musculoskeletal: MMT 5/5 in DF/PF/Inv/Ev resistance with no reproduction of pain in any direction. Passive range of motion of ankle and pedal joints is painless. No calf tenderness LE, Compartments soft/compressible.     Neurological: Light touch, proprioception, and sharp/dull sensation are all intact. Protective threshold with the [place] monofilament is intact. Vibratory sensation intact.         Assessment:       Encounter Diagnoses   Name Primary?    Type 2 diabetes mellitus with diabetic polyneuropathy, with long-term current use of insulin     Onychomycosis of multiple toenails with type 2 diabetes mellitus Yes         Plan:       Jaimee was seen today for " numbness, diabetes mellitus, diabetic foot exam, nail care and pcp visit.    Diagnoses and all orders for this visit:    Onychomycosis of multiple toenails with type 2 diabetes mellitus    Type 2 diabetes mellitus with diabetic polyneuropathy, with long-term current use of insulin  -     Ambulatory referral/consult to Podiatry  -     DIABETIC SHOES FOR HOME USE      I counseled the patient on her conditions, their implications and medical management.    44 y.o. female with diabetic foot risk assessment.     -Rx diabetic shoe  -nails x 10 sharply debrided with nail Nipper.  Tolerated well.  Verbal consent was obtained.    -Shoe inspection. General Foot Education. Patient reminded of the importance of good nutrition. Patient instructed on proper foot hygeine. We discussed wearing proper shoe gear, daily foot inspections, never walking without protective shoe gear, caution putting sharp instruments to feet. Discussed general foot care:  Wear comfortable, proper fitting shoes. Wash feet daily. Dry well. After drying, apply moisturizer to feet (no lotion to webspaces). Inspect feet daily for skin breaks, blisters, swelling, or redness. Wear cotton socks (preferably white)  Change socks every day. Do NOT walk barefoot. Do NOT use heating pads or warm/hot water soaks   -It was discussed the importance of wearing shoes with adequate room in toe box to accommodate toe deformities. Recommended New Balance/[employer]ics shoe brands with adequate arch supports to alleviate abnormal pressure and improve stability of foot while walking. Avoid flat shoes and barefoot walking as these will exacerbate or worsen symptoms.   -Advised for optimal glucose control and maintenance per primary care physician. Patient was also educated on healthy diet that is naturally rich in nutrients and low in fat and calories.   -The nature of the condition, options for management, as well as potential risks and complications were discussed in detail with  patient. Patient was amenable to my recommendations and left my office fully informed and will follow up as instructed or sooner if necessary.    -Patient was advised of signs and symptoms of infection including redness, drainage, purulence, odor, streaking, fever, chills and I advised patient to seek medical attention (ER or urgent care) if these symptoms arise.   -f/u 6 months       Note dictated with voice recognition software, please excuse any grammatical errors.

## 2020-06-26 ENCOUNTER — TELEPHONE (OUTPATIENT)
Dept: OBSTETRICS AND GYNECOLOGY | Facility: CLINIC | Age: 45
End: 2020-06-26

## 2020-06-26 DIAGNOSIS — Z12.31 ENCOUNTER FOR SCREENING MAMMOGRAM FOR BREAST CANCER: Primary | ICD-10-CM

## 2020-06-26 NOTE — TELEPHONE ENCOUNTER
----- Message from Mckenzie Miller sent at 6/26/2020 10:47 AM CDT -----  Contact: [phone]/Self  Type:  Mammogram    Caller is requesting to schedule their annual mammogram appointment.  Order is not listed in EPIC.  Please enter order and contact patient to schedule.  Name of Caller:Pt  Where would they like the mammogram performed?[facility]   Would the patient rather a call back or a response via [facility]? Call back   Best Call Back Number:[phone]  Additional Information:

## 2020-09-03 ENCOUNTER — LAB VISIT (OUTPATIENT)
Dept: LAB | Facility: HOSPITAL | Age: 45
End: 2020-09-03
Attending: INTERNAL MEDICINE
Payer: MEDICARE

## 2020-09-03 ENCOUNTER — OUTPATIENT CASE MANAGEMENT (OUTPATIENT)
Dept: ADMINISTRATIVE | Facility: OTHER | Age: 45
End: 2020-09-03

## 2020-09-03 ENCOUNTER — OFFICE VISIT (OUTPATIENT)
Dept: INTERNAL MEDICINE | Facility: CLINIC | Age: 45
End: 2020-09-03
Payer: MEDICARE

## 2020-09-03 VITALS
BODY MASS INDEX: 42.39 KG/M2 | HEART RATE: 104 BPM | OXYGEN SATURATION: 98 % | DIASTOLIC BLOOD PRESSURE: 76 MMHG | TEMPERATURE: 98 F | SYSTOLIC BLOOD PRESSURE: 120 MMHG | WEIGHT: 254.44 LBS | HEIGHT: 65 IN

## 2020-09-03 DIAGNOSIS — F32.A DEPRESSION, UNSPECIFIED DEPRESSION TYPE: ICD-10-CM

## 2020-09-03 DIAGNOSIS — I10 ESSENTIAL HYPERTENSION: ICD-10-CM

## 2020-09-03 DIAGNOSIS — E78.5 HYPERLIPIDEMIA, UNSPECIFIED HYPERLIPIDEMIA TYPE: ICD-10-CM

## 2020-09-03 DIAGNOSIS — E11.42 TYPE 2 DIABETES MELLITUS WITH DIABETIC POLYNEUROPATHY, WITH LONG-TERM CURRENT USE OF INSULIN: ICD-10-CM

## 2020-09-03 DIAGNOSIS — Z79.4 TYPE 2 DIABETES MELLITUS WITH DIABETIC POLYNEUROPATHY, WITH LONG-TERM CURRENT USE OF INSULIN: ICD-10-CM

## 2020-09-03 DIAGNOSIS — E11.42 TYPE 2 DIABETES MELLITUS WITH DIABETIC POLYNEUROPATHY, WITH LONG-TERM CURRENT USE OF INSULIN: Primary | ICD-10-CM

## 2020-09-03 DIAGNOSIS — Z79.4 TYPE 2 DIABETES MELLITUS WITH DIABETIC POLYNEUROPATHY, WITH LONG-TERM CURRENT USE OF INSULIN: Primary | ICD-10-CM

## 2020-09-03 LAB
ANION GAP SERPL CALC-SCNC: 8 MMOL/L (ref 8–16)
BUN SERPL-MCNC: 9 MG/DL (ref 6–20)
CALCIUM SERPL-MCNC: 9.4 MG/DL (ref 8.7–10.5)
CHLORIDE SERPL-SCNC: 105 MMOL/L (ref 95–110)
CO2 SERPL-SCNC: 24 MMOL/L (ref 23–29)
CREAT SERPL-MCNC: 0.7 MG/DL (ref 0.5–1.4)
EST. GFR  (AFRICAN AMERICAN): >60 ML/MIN/1.73 M^2
EST. GFR  (NON AFRICAN AMERICAN): >60 ML/MIN/1.73 M^2
ESTIMATED AVG GLUCOSE: 183 MG/DL (ref 68–131)
GLUCOSE SERPL-MCNC: 184 MG/DL (ref 70–110)
HBA1C MFR BLD HPLC: 8 % (ref 4–5.6)
POTASSIUM SERPL-SCNC: 4.1 MMOL/L (ref 3.5–5.1)
SODIUM SERPL-SCNC: 137 MMOL/L (ref 136–145)

## 2020-09-03 PROCEDURE — 99214 OFFICE O/P EST MOD 30 MIN: CPT | Mod: HCNC,25,S$GLB, | Performed by: INTERNAL MEDICINE

## 2020-09-03 PROCEDURE — 3051F PR MOST RECENT HEMOGLOBIN A1C LEVEL 7.0 - < 8.0%: ICD-10-PCS | Mod: HCNC,CPTII,S$GLB, | Performed by: INTERNAL MEDICINE

## 2020-09-03 PROCEDURE — 99499 UNLISTED E&M SERVICE: CPT | Mod: S$GLB,,, | Performed by: INTERNAL MEDICINE

## 2020-09-03 PROCEDURE — 80048 BASIC METABOLIC PNL TOTAL CA: CPT | Mod: HCNC

## 2020-09-03 PROCEDURE — 90686 IIV4 VACC NO PRSV 0.5 ML IM: CPT | Mod: HCNC,S$GLB,, | Performed by: INTERNAL MEDICINE

## 2020-09-03 PROCEDURE — 3051F HG A1C>EQUAL 7.0%<8.0%: CPT | Mod: HCNC,CPTII,S$GLB, | Performed by: INTERNAL MEDICINE

## 2020-09-03 PROCEDURE — 99499 RISK ADDL DX/OHS AUDIT: ICD-10-PCS | Mod: ,,, | Performed by: INTERNAL MEDICINE

## 2020-09-03 PROCEDURE — 90686 FLU VACCINE (QUAD) GREATER THAN OR EQUAL TO 3YO PRESERVATIVE FREE IM: ICD-10-PCS | Mod: HCNC,S$GLB,, | Performed by: INTERNAL MEDICINE

## 2020-09-03 PROCEDURE — 83036 HEMOGLOBIN GLYCOSYLATED A1C: CPT | Mod: HCNC

## 2020-09-03 PROCEDURE — 36415 COLL VENOUS BLD VENIPUNCTURE: CPT | Mod: HCNC,PO

## 2020-09-03 PROCEDURE — 99499 RISK ADDL DX/OHS AUDIT: ICD-10-PCS | Mod: S$GLB,,, | Performed by: INTERNAL MEDICINE

## 2020-09-03 PROCEDURE — 99999 PR PBB SHADOW E&M-EST. PATIENT-LVL V: CPT | Mod: PBBFAC,HCNC,, | Performed by: INTERNAL MEDICINE

## 2020-09-03 PROCEDURE — 3008F BODY MASS INDEX DOCD: CPT | Mod: HCNC,CPTII,S$GLB, | Performed by: INTERNAL MEDICINE

## 2020-09-03 PROCEDURE — G0008 FLU VACCINE (QUAD) GREATER THAN OR EQUAL TO 3YO PRESERVATIVE FREE IM: ICD-10-PCS | Mod: HCNC,S$GLB,, | Performed by: INTERNAL MEDICINE

## 2020-09-03 PROCEDURE — 99214 PR OFFICE/OUTPT VISIT, EST, LEVL IV, 30-39 MIN: ICD-10-PCS | Mod: HCNC,25,S$GLB, | Performed by: INTERNAL MEDICINE

## 2020-09-03 PROCEDURE — G0008 ADMIN INFLUENZA VIRUS VAC: HCPCS | Mod: HCNC,S$GLB,, | Performed by: INTERNAL MEDICINE

## 2020-09-03 PROCEDURE — 3008F PR BODY MASS INDEX (BMI) DOCUMENTED: ICD-10-PCS | Mod: HCNC,CPTII,S$GLB, | Performed by: INTERNAL MEDICINE

## 2020-09-03 PROCEDURE — 99499 UNLISTED E&M SERVICE: CPT | Mod: ,,, | Performed by: INTERNAL MEDICINE

## 2020-09-03 PROCEDURE — 99999 PR PBB SHADOW E&M-EST. PATIENT-LVL V: ICD-10-PCS | Mod: PBBFAC,HCNC,, | Performed by: INTERNAL MEDICINE

## 2020-09-03 RX ORDER — ATORVASTATIN CALCIUM 80 MG/1
80 TABLET, FILM COATED ORAL NIGHTLY
Qty: 30 TABLET | Refills: 0 | Status: SHIPPED | OUTPATIENT
Start: 2020-09-03 | End: 2020-09-25

## 2020-09-03 RX ORDER — FLUOXETINE HYDROCHLORIDE 20 MG/1
20 CAPSULE ORAL DAILY
Qty: 30 CAPSULE | Refills: 0 | Status: SHIPPED | OUTPATIENT
Start: 2020-09-03 | End: 2020-09-25

## 2020-09-03 RX ORDER — BENAZEPRIL HYDROCHLORIDE AND HYDROCHLOROTHIAZIDE 20; 12.5 MG/1; MG/1
2 TABLET ORAL DAILY
Qty: 180 TABLET | Refills: 0 | Status: SHIPPED | OUTPATIENT
Start: 2020-09-03 | End: 2020-12-14

## 2020-09-03 NOTE — PROGRESS NOTES
Subjective:       Patient ID: Jaimee Quintana is a 44 y.o. female.    Chief Complaint: Follow-up and Diabetes      Interim hx    None     Concerns today    none  Chronic problems      HTN  Complication: no CVA/CKD/CAD  Last labs : BMP wnl except hyponatremia  Medication  - benazepril  40mg daily - HCTZ 25 mg mg daily  - metoprol 100mg daily   - nifedipine 60mg   - aldactone 25 mg daily   Home Bps; still with wrist cuff   Symptoms: denies CP, SOB, changes in urination, sudden weakness, numbness      DM assessment  Providers: previously Seen by endocrinology  Complication; neuropathy, retinopathy,  Medication:adherent to  -Trulicity 1.5mg   - Lantus 50mg BID  - Metformin 1000mg BID   - hba1c -   Lab Results   Component Value Date    HGBA1C 7.3 (H) 04/14/2020    HGBA1C 8.4 (H) 01/13/2020    HGBA1C 11.9 (H) 10/02/2019      - umacr -   Lab Results   Component Value Date    MICALBCREAT 10.1 01/14/2019     Vision/Podiatry: UTD  DM teaching; enrolled  Preventative: _UTD PPS23, on statin.      Health Maintenance Due   Topic Date Due    Hemoglobin A1c  07/14/2020    Influenza Vaccine (1) 08/01/2020     Health Maintenance Topics with due status: Not Due       Topic Last Completion Date    TETANUS VACCINE 11/19/2014    Mammogram 08/21/2019    Cervical Cancer Screening 09/16/2019    Lipid Panel 02/08/2020    Eye Exam 02/11/2020    Foot Exam 02/13/2020    Low Dose Statin 09/03/2020           HPI  Review of Systems   Constitutional: Negative for activity change, chills, diaphoresis, fatigue, fever and unexpected weight change.   Eyes: Negative for redness and visual disturbance.   Respiratory: Negative for shortness of breath.    Cardiovascular: Negative for chest pain and palpitations.   Gastrointestinal: Negative for abdominal distention and blood in stool.   Genitourinary: Negative for difficulty urinating, dysuria, frequency and menstrual problem.   Neurological: Negative for syncope and headaches.       Objective:      "    /76 (BP Location: Right arm, Patient Position: Sitting, BP Method: Large (Manual))   Pulse 104   Temp 98.4 °F (36.9 °C) (Temporal)   Ht 5' 5" (1.651 m)   Wt 115.4 kg (254 lb 6.6 oz)   SpO2 98%   BMI 42.34 kg/m²     Physical Exam  Vitals signs and nursing note reviewed.   Constitutional:       General: She is not in acute distress.     Appearance: She is well-developed. She is not diaphoretic.   HENT:      Head: Normocephalic.      Nose: Nose normal.   Eyes:      General:         Right eye: No discharge.         Left eye: No discharge.      Conjunctiva/sclera: Conjunctivae normal.      Pupils: Pupils are equal, round, and reactive to light.   Neck:      Musculoskeletal: Normal range of motion.   Cardiovascular:      Rate and Rhythm: Normal rate and regular rhythm.      Heart sounds: Normal heart sounds. No murmur. No friction rub. No gallop.    Pulmonary:      Effort: Pulmonary effort is normal. No respiratory distress.      Breath sounds: No stridor. No wheezing or rales.   Chest:      Chest wall: No tenderness.   Abdominal:      General: Bowel sounds are normal. There is no distension.      Palpations: Abdomen is soft.   Musculoskeletal: Normal range of motion.         General: No deformity.   Skin:     General: Skin is warm.   Neurological:      Mental Status: She is alert and oriented to person, place, and time.      Cranial Nerves: No cranial nerve deficit.           ASCVD  The 10-year ASCVD risk score (Santhosh JUANIS Jr., et al., 2013) is: 4.1%    Values used to calculate the score:      Age: 44 years      Sex: Female      Is Non- : Yes      Diabetic: Yes      Tobacco smoker: No      Systolic Blood Pressure: 120 mmHg      Is BP treated: Yes      HDL Cholesterol: 48 mg/dL      Total Cholesterol: 184 mg/dL    Basic Labs  CMP  Sodium   Date Value Ref Range Status   10/02/2019 135 (L) 136 - 145 mmol/L Final     Potassium   Date Value Ref Range Status   10/02/2019 4.2 3.5 - 5.1 " mmol/L Final     Chloride   Date Value Ref Range Status   10/02/2019 103 95 - 110 mmol/L Final     CO2   Date Value Ref Range Status   10/02/2019 21 (L) 23 - 29 mmol/L Final     Glucose   Date Value Ref Range Status   10/02/2019 319 (H) 70 - 110 mg/dL Final     BUN, Bld   Date Value Ref Range Status   10/02/2019 10 6 - 20 mg/dL Final     Creatinine   Date Value Ref Range Status   10/02/2019 0.8 0.5 - 1.4 mg/dL Final     Calcium   Date Value Ref Range Status   10/02/2019 9.2 8.7 - 10.5 mg/dL Final     Total Protein   Date Value Ref Range Status   10/02/2019 7.7 6.0 - 8.4 g/dL Final     Albumin   Date Value Ref Range Status   10/02/2019 3.7 3.5 - 5.2 g/dL Final     Total Bilirubin   Date Value Ref Range Status   10/02/2019 0.4 0.1 - 1.0 mg/dL Final     Comment:     For infants and newborns, interpretation of results should be based  on gestational age, weight and in agreement with clinical  observations.  Premature Infant recommended reference ranges:  Up to 24 hours.............<8.0 mg/dL  Up to 48 hours............<12.0 mg/dL  3-5 days..................<15.0 mg/dL  6-29 days.................<15.0 mg/dL       Alkaline Phosphatase   Date Value Ref Range Status   10/02/2019 78 55 - 135 U/L Final     AST   Date Value Ref Range Status   10/02/2019 17 10 - 40 U/L Final     ALT   Date Value Ref Range Status   10/02/2019 26 10 - 44 U/L Final     Anion Gap   Date Value Ref Range Status   10/02/2019 11 8 - 16 mmol/L Final     eGFR if    Date Value Ref Range Status   10/02/2019 >60.0 >60 mL/min/1.73 m^2 Final     eGFR if non    Date Value Ref Range Status   10/02/2019 >60.0 >60 mL/min/1.73 m^2 Final     Comment:     Calculation used to obtain the estimated glomerular filtration  rate (eGFR) is the CKD-EPI equation.        Lab Results   Component Value Date    WBC 7.24 01/14/2019    HGB 15.6 01/14/2019    HCT 47.6 01/14/2019    MCV 90 01/14/2019     01/14/2019       Lab Results   Component  Value Date    TSH 2.783 01/06/2019         STD  Lab Results   Component Value Date    HIV1X2 Negative 10/21/2010    [id number] Negative 09/16/2019     Lab Results   Component Value Date    RPR Non-reactive 09/16/2019     Lab Results   Component Value Date    HEPAIGM Negative 08/22/2016    HEPBIGM Negative 08/22/2016    HEPCAB Negative 09/16/2019       Lipids  Lab Results   Component Value Date    CHOL 184 02/08/2020     Lab Results   Component Value Date    HDL 48 02/08/2020     Lab Results   Component Value Date    LDLCALC 113.4 02/08/2020     Lab Results   Component Value Date    TRIG 113 02/08/2020     Lab Results   Component Value Date    CHOLHDL 26.1 02/08/2020       DM  Lab Results   Component Value Date    HGBA1C 7.3 (H) 04/14/2020       Assessment:       1. Type 2 diabetes mellitus with diabetic polyneuropathy, with long-term current use of insulin    2. Hyperlipidemia, unspecified hyperlipidemia type Sub-optimally controlled   3. Depression, unspecified depression type Sub-optimally controlled   4. Essential hypertension        Plan:         Jaimee was seen today for follow-up and diabetes.    Diagnoses and all orders for this visit:    Type 2 diabetes mellitus with diabetic polyneuropathy, with long-term current use of insulin  Chronic ;controlled  -- Patient is at goal today  -- Labs are reviewed and wnl  -- cont current regimen as below ( she reports having issues with [employer] sending mail order)  --  return for  follow up 3 months   -     Hemoglobin A1C; Standing  -     Hemoglobin A1C; Future  -     Microalbumin/creatinine urine ratio  -     Basic metabolic panel; Future  -     Ambulatory referral/consult to Outpatient Case Management    Hyperlipidemia, unspecified hyperlipidemia type  Chronic; stable  Cont current meds    Depression, unspecified depression type  Chrnoic; stable  Cont currement     Essential hypertension  chrnoic ;stable  -- Patient is at goal today  -- Labs are reviewed and wnl  --  "antihypertensive regimen will cont as below   --  return for BP follow up 3 months  -     benazepril-hydrochlorthiazide (LOTENSIN HCT) 20-12.5 mg per tablet; Take 2 tablets by mouth once daily.          Future Appointments   Date Time Provider Department Center   9/17/2020 12:30 PM [facility] MAMMO1 [facility] MAMMO [place] [facility]   9/17/2020  1:15 PM Aubrie Dwyer MD [facility] OBGYN [facility]   12/2/2020  9:00 AM Fadia Wilson DPM [facility] POD [place]   12/3/2020  8:00 AM LAB, [facility] [place]     Medication List with Changes/Refills   Current Medications    ASPIRIN (ECOTRIN) 81 MG EC TABLET    Take 81 mg by mouth once daily.    BLOOD SUGAR DIAGNOSTIC STRP    Truemetrix; test BID    BLOOD-GLUCOSE METER (FREESTYLE SYSTEM KIT) KIT    Use as instructed; Truemetrix    DULAGLUTIDE (TRULICITY) 1.5 MG/0.5 ML PNIJ    Inject 1.5 mg into the skin every 7 days.    EZETIMIBE (ZETIA) 10 MG TABLET    TAKE 1 TABLET BY MOUTH EVERY DAY    IBUPROFEN (ADVIL,MOTRIN) 800 MG TABLET    Take 800 mg by mouth every 8 (eight) hours as needed.    INSULIN (LANTUS SOLOSTAR U-100 INSULIN) GLARGINE 100 UNITS/ML (3ML) SUBQ PEN    Inject 50 Units into the skin 2 (two) times daily.    LANCETS MISC    True Metrix; test BID      DX: E11.9, Z79.4    METFORMIN (GLUCOPHAGE) 1000 MG TABLET    TAKE 1 TABLET BY MOUTH TWICE A DAY    METOPROLOL SUCCINATE (TOPROL-XL) 100 MG 24 HR TABLET    TAKE 1 TABLET BY MOUTH EVERY DAY    NIFEDIPINE (PROCARDIA-XL) 60 MG (OSM) 24 HR TABLET    TAKE 1 TABLET (60 MG TOTAL) BY MOUTH ONCE DAILY.    PANTOPRAZOLE (PROTONIX) 40 MG TABLET    Take 1 tablet (40 mg total) by mouth once daily.    PEN NEEDLE, DIABETIC (BD ULTRA-FINE ROSA PEN NEEDLES) 32 GAUGE X 5/32" NDLE    1 Act by Misc.(Non-Drug; Combo Route) route 5 (five) times daily.    PEN NEEDLE, DIABETIC 32 GAUGE X 5/32" NDLE    Use with insulin once daily    SPIRONOLACTONE (ALDACTONE) 25 MG TABLET    Take 1 tablet (25 mg total) by mouth once daily.   Changed and/or Refilled Medications    " Modified Medication Previous Medication    ATORVASTATIN (LIPITOR) 80 MG TABLET atorvastatin (LIPITOR) 80 MG tablet       Take 1 tablet (80 mg total) by mouth every evening.    Take 1 tablet (80 mg total) by mouth every evening.    BENAZEPRIL-HYDROCHLORTHIAZIDE (LOTENSIN HCT) 20-12.5 MG PER TABLET benazepril-hydrochlorthiazide (LOTENSIN HCT) 20-12.5 mg per tablet       Take 2 tablets by mouth once daily.    Take 2 tablets by mouth once daily.    FLUOXETINE 20 MG CAPSULE FLUoxetine 20 MG capsule       Take 1 capsule (20 mg total) by mouth once daily.    Take 1 capsule (20 mg total) by mouth once daily.       Disclaimer:  This note has been generated using voice-recognition software. There may be grammatical or spelling errors that have been missed during proof-reading

## 2020-09-08 NOTE — PATIENT INSTRUCTIONS
Established High Blood Pressure    High blood pressure (hypertension) is a chronic disease. Often, healthcare providers dont know what causes it. But it can be caused by certain health conditions and medicines.  If you have high blood pressure, you may not have any symptoms. If you do have symptoms, they may include headache, dizziness, changes in your vision, chest pain, and shortness of breath. But even without symptoms, high blood pressure thats not treated raises your risk for heart attack and stroke. High blood pressure is a serious health risk and shouldnt be ignored.  A blood pressure reading is made up of two numbers: a higher number over a lower number. The top number is the systolic pressure. The bottom number is the diastolic pressure. A normal blood pressure is a systolic pressure of  less than 120 over a diastolic pressure of less than 80. You will see your blood pressure readings written together. For example, a person with a systolic pressure of 188 and a diastolic pressure of 78 will have 118/78 written in the medical record.  High blood pressure is when either the top number is 140 or higher, or the bottom number is 90 or higher. This must be the result when taking your blood pressure a number of times. The blood pressures between normal and high are called prehypertension.  Home care  If you have high blood pressure, you should do what is listed below to lower your blood pressure. If you are taking medicines for high blood pressure, these methods may reduce or end your need for medicines in the future.  · Begin a weight-loss program if you are overweight.  · Cut back on how much salt you get in your diet. Heres how to do this:  ¨ Dont eat foods that have a lot of salt. These include olives, pickles, smoked meats, and salted potato chips.  ¨ Dont add salt to your food at the table.  ¨ Use only small amounts of salt when cooking.  · Start an exercise program. Talk with your healthcare  provider about the type of exercise program that would be best for you. It doesn't have to be hard. Even brisk walking for 20 minutes 3 times a week is a good form of exercise.  · Dont take medicines that stimulate the heart. This includes many over-the-counter cold and sinus decongestant pills and sprays, as well as diet pills. Check the warnings about hypertension on the label. Before buying any over-the-counter medicines or supplements, always ask the pharmacist about the product's potential interaction with your high blood pressure and your high blood pressure medicines.  · Stimulants such as amphetamine or cocaine could be deadly for someone with high blood pressure. Never take these.  · Limit how much caffeine you get in your diet. Switch to caffeine-free products.  · Stop smoking. If you are a long-time smoker, this can be hard. Talk to your healthcare provider about medicines and nicotine replacement options to help you. Also, enroll in a stop-smoking program to make it more likely that you will quit for good.  · Learn how to handle stress. This is an important part of any program to lower blood pressure. Learn about relaxation methods like meditation, yoga, or biofeedback.  · If your provider prescribed medicines, take them exactly as directed. Missing doses may cause your blood pressure get out of control.  · If you miss a dose or doses, check with your healthcare provider or pharmacist about what to do.  · Consider buying an automatic blood pressure machine. Ask your provider for a recommendation. You can get one of these at most pharmacies.     The American Heart Association recommends the following guidelines for home blood pressure monitoring:  · Don't smoke or drink coffee for 30 minutes before taking your blood pressure.  · Go to the bathroom before the test.  · Relax for 5 minutes before taking the measurement.  · Sit with your back supported (don't sit on a couch or soft chair); keep your feet on  the floor uncrossed. Place your arm on a solid flat surface (like a table) with the upper part of the arm at heart level. Place the middle of the cuff directly above the eye of the elbow. Check the monitor's instruction manual for an illustration.  · Take multiple readings. When you measure, take 2 to 3 readings one minute apart and record all of the results.  · Take your blood pressure at the same time every day, or as your healthcare provider recommends.  · Record the date, time, and blood pressure reading.  · Take the record with you to your next medical appointment. If your blood pressure monitor has a built-in memory, simply take the monitor with you to your next appointment.  · Call your provider if you have several high readings. Don't be frightened by a single high blood pressure reading, but if you get several high readings, check in with your healthcare provider.  · Note: When blood pressure reaches a systolic (top number) of 180 or higher OR diastolic (bottom number) of 110 or higher, seek emergency medical treatment.  Follow-up care  You will need to see your healthcare provider regularly. This is to check your blood pressure and to make changes to your medicines. Make a follow-up appointment as directed. Bring the record of your home blood pressure readings to the appointment.  When to seek medical advice  Call your healthcare provider right away if any of these occur:  · Blood pressure reaches a systolic (upper number) of 180 or higher OR a diastolic (bottom number) of 110 or higher  · Chest pain or shortness of breath  · Severe headache  · Throbbing or rushing sound in the ears  · Nosebleed  · Sudden severe pain in your belly (abdomen)  · Extreme drowsiness, confusion, or fainting  · Dizziness or spinning sensation (vertigo)  · Weakness of an arm or leg or one side of the face  · You have problems speaking or seeing   Date Last Reviewed: 12/1/2016  © 5740-2057 [employer]. [address] [place] [address]  [place] [address]. All rights reserved. This information is not intended as a substitute for professional medical care. Always follow your healthcare professional's instructions.        Eating Heart-Healthy Food: Using the DASH Plan    Eating for your heart doesnt have to be hard or boring. You just need to know how to make healthier choices. The DASH eating plan has been developed to help you do just that. DASH stands for Dietary Approaches to Stop Hypertension. It is a plan that has been proven to be healthier for your heart and to lower your risk for high blood pressure. It can also help lower your risk for cancer, heart disease, osteoporosis, and diabetes.  Choosing from each food group  Choose foods from each of the food groups below each day. Try to get the recommended number of servings for each food group. The serving numbers are based on a diet of 2,000 calories a day. Talk to your doctor if youre unsure about your calorie needs. Along with getting the correct servings, the DASH plan also recommends a sodium intake less than 2,300 mg per day.        Grains  Servings: 6 to 8 a day  A serving is:  · 1 slice bread  · 1 ounce dry cereal  · Half a cup cooked rice, pasta or cereal  Best choices: Whole grains and any grains high in fiber. Vegetables  Servings: 4 to 5 a day  A serving is:  · 1 cup raw leafy vegetable  · Half a cup cut-up raw or cooked vegetable  · Half a cup vegetable juice  Best choices: Fresh or frozen vegetables prepared without added salt or fat.   Fruits  Servings: 4 to 5 a day  A serving is:  · 1 medium fruit  · One-quarter cup dried fruit  · Half a cup fresh, frozen, or canned fruit  · Half a cup of 100% fruit juices  Best choices: A variety of fresh fruits of different colors. Whole fruits are a better choice than fruit juices. Low-fat or fat-free dairy  Servings: 2 to 3 a day  A serving is:  · 1 cup milk  · 1 cup yogurt  · One and a half ounces cheese  Best choices: Skim or 1%  milk, low-fat or fat-free yogurt or buttermilk, and low-fat cheeses.         Lean meats, poultry, fish  Servings: 6 or fewer a day  A serving is:  · 1 ounce cooked meats, poultry, or fish  · 1 egg  Best choices: Lean poultry and fish. Trim away visible fat. Broil, grill, roast, or boil instead of frying. Remove skin from poultry before eating. Limit how much red meat you eat.  Nuts, seeds, beans  Servings: 4 to 5 a week  A serving is:  · One-third cup nuts (one and a half ounces)  · 2 tablespoons nut butter or seeds  · Half a cup cooked dry beans or legumes  Best choices: Dry roasted nuts with no salt added, lentils, kidney beans, garbanzo beans, and whole marques beans.   Fats and oils  Servings: 2 to 3 a day  A serving is:  · 1 teaspoon vegetable oil  · 1 teaspoon soft margarine  · 1 tablespoon mayonnaise  · 2 tablespoons salad dressing  Best choices: Nut and vegetable oils (nontropical vegetable oils), such as olive and canola oil. Sweets  Servings: 5 a week or fewer  A serving is:  · 1 tablespoon sugar, maple syrup, or honey  · 1 tablespoon jam or jelly  · 1 half-ounce jelly beans (about 15)  · 1 cup lemonade  Best choices: Dried fruit can be a satisfying sweet. Choose low-fat sweets. And watch your serving sizes!      For more on the DASH eating plan, visit:  www.nhlbi.nih.gov/health/health-topics/topics/dash   Date Last Reviewed: 6/1/2016  © 5581-7732 [employer]. [address] [place] [address], [place] [address]. All rights reserved. This information is not intended as a substitute for professional medical care. Always follow your healthcare professional's instructions.        Eating Heart-Healthy Foods  Eating has a big impact on your heart health. In fact, eating healthier can improve several of your heart risks at once. For instance, it helps you manage weight, cholesterol, and blood pressure. Here are ideas to help you make heart-healthy changes without giving up all the foods and flavors you  love.  Getting started  · Talk with your health care provider about eating plans, such as the DASH or Mediterranean diet. You may also be referred to a dietitian.  · Change a few things at a time. Give yourself time to get used to a few eating changes before adding more.  · Work to create a tasty, healthy eating plan that you can stick to for the rest of your life.    Goals for healthy eating  Below are some tips to improve your eating habits:  · Limit saturated fats and trans fats. Saturated fats raise your levels of cholesterol, so keep these fats to a minimum. They are found in foods such as fatty meats, whole milk, cheese, and palm and coconut oils. Avoid trans fats because they lower good cholesterol as well as raise bad cholesterol. Trans fats are most often found in processed foods.  · Reduce sodium (salt) intake. Eating too much salt may increase your blood pressure. Limit your sodium intake to 2,300 milligrams (mg) per day, or less if your health care provider recommends it. Dining out less often and eating fewer processed foods are two great ways to decrease the amount of salt you consume.  · Managing calories. A calorie is a unit of energy. Your body burns calories for fuel, but if you eat more calories than your body burns, the extras are stored as fat. Your health care provider can help you create a diet plan to manage your calories. This will likely include eating healthier foods as well as exercising regularly. To help you track your progress, keep a diary to record what you eat and how often you exercise.  Choose the right foods  Aim to make these foods staples of your diet. If you have diabetes, you may have different recommendations than what is listed here:  · Fruits and vegetable provide plenty of nutrients without a lot of calories. At meals, fill half your plate with these foods. Split the other half of your plate between whole grains and lean protein.  · Whole grains are high in fiber and rich  in vitamins and nutrients. Good choices include whole-wheat bread, pasta, and brown rice.  · Lean proteins give you nutrition with less fat. Good choices include fish, skinless chicken, and beans.  · Low-fat or nonfat dairy provides nutrients without a lot of fat. Try low-fat or nonfat milk, cheese, or yogurt.  · Healthy fats can be good for you in small amounts. These are unsaturated fats, such as olive oil, nuts, and fish. Try to have at least 2 servings per week of fatty fish such as salmon, sardines, mackerel, rainbow trout, and albacore tuna. These contain omega-3 fatty acids, which are good for your heart. Flaxseed is another source of a heart-healthy fat.  More on heart healthy eating    Read food labels  Healthy eating starts at the grocery store. Be sure to pay attention to food labels on packaged foods. Look for products that are high in fiber and protein, and low in saturated fat, cholesterol, and sodium. Avoid products that contain trans fat. And pay close attention to serving size. For instance, if you plan to eat two servings, double all the numbers on the label.  Prepare food right  A key part of healthy cooking is cutting down on added fat and salt. Look on the internet for lower-fat, lower-sodium recipes. Also, try these tips:  · Remove fat from meat and skin from poultry before cooking.  · Skim fat from the surface of soups and sauces.  · Broil, boil, bake, steam, grill, and microwave food without added fats.  · Choose ingredients that spice up your food without adding calories, fat, or sodium. Try these items: horseradish, hot sauce, lemon, mustard, nonfat salad dressings, and vinegar. For salt-free herbs and spices, try basil, cilantro, cinnamon, pepper, and rosemary.  Date Last Reviewed: 6/25/2015  © 6661-7131 [employer]. [address] [place] [address], [place] [address]. All rights reserved. This information is not intended as a substitute for professional medical care. Always follow  your healthcare professional's instructions.        Low-Salt Choices  Eating salt (sodium) can make your body retain too much water. Excess water makes your heart work harder. Canned, packaged, and frozen foods are easy to prepare, but they are often high in sodium. Here are some ideas for low-salt foods you can easily prepare yourself.    For breakfast  · Fruit or 100% fruit juice  · Whole-wheat bread or an English muffin. Compare sodium content on labels.  · Low-fat milk or yogurt  · Unsalted eggs  · Shredded wheat  · Corn tortillas  · Unsalted steamed rice  · Regular (not instant) hot cereal, made without salt  Stay away from:  · Sausage, greene, and ham  · Flour tortillas  · Packaged muffins, pancakes, and biscuits  · Instant hot cereals  · Cottage cheese  For lunch and dinner  · Fresh fish, chicken, turkey, or meat--baked, broiled, or roasted without salt  · Dry beans, cooked without salt  · Tofu, stir-fried without salt  · Unsalted fresh fruit and vegetables, or frozen or canned fruit and vegetables with no added salt  Stay away from:  · Lunch or deli meat that is cured or smoked  · Cheese  · Tomato juice and catsup  · Canned vegetables, soups, and fish not labeled as no-salt-added or reduced sodium  · Packaged gravies and sauces  · Olives, pickles, and relish  · Bottled salad dressings  For snacks and desserts  · Yogurt  · Unsalted, air popped popcorn  · Unsalted nuts or seeds  Stay away from:  · Pies and cakes  · Packaged dessert mixes  · Pizza  · Canned and packaged puddings  · Pretzels, chips, crackers, and nuts--unless the label says unsalted  Date Last Reviewed: 6/17/2015  © 1428-6312 [employer]. [address] [place] [address], [place] [address]. All rights reserved. This information is not intended as a substitute for professional medical care. Always follow your healthcare professional's instructions.

## 2020-09-08 NOTE — PROGRESS NOTES
Outpatient Care Management  Initial Patient Assessment    Patient: Jaimee Quintana  MRN: [id number]  Date of Service: 09/03/2020  Completed by: Tiffanie Beth RN  Referral Date: 09/03/2020  Program: Case Management (High Risk)    Reason for Visit   Patient presents with    OPCM Chart Review     9/3/20    OPCM RN First Assessment Attempt     9/4/20    OPCM Enrollment Call     9/8/20    Initial Assessment     9/8/20    Plan Of Care     9/8/20       Brief Summary: Opcm referral received on 9/3/20 from patient pcp Dr. Ponce for DM II. Risk Score 73% OPCM RN spoke with patient and explained OPCM and benefits of enrollment, patient agreed to enroll and she is aware she may opt out at any time and there is no cost to this program. Medication reconciliation complete no discrepancies noted. PHQ score=2 patient has expressed feeling down sometimes due to COVID and have to social distance from family. Patient reports she live at home with her two daughters 9 and 16. She takes care of all her ADLs and IADLs and still drives however currently having some car trouble so she reports using [employer] transportation when needed for clinic appointments. CM discussed digital med order for HTN and diabetes that has been placed by Dr. Ponce patient was not aware of order. CM advised she go into [facility] to sign up. Patient reports she has her own b/p cuff at home and feels she can take and monitor her own b/p but will go in and sign up for Digital diabetes program. Ms Quintana has agreed to monitor her b/p daily and record results. CM discussed signs and symptoms of HTN and medication and dietary compliance. Patient agrees to read [employer] material on HTN and low sodium diet once received. Patient reports no food ,housing or transportation insecurities at this time. CM discused POA with patient and she is willing to discuss further with OPCM LCSW. Patient reports she is interested in [employer] meal delivery and she reports she is already using  her [employer] OTC benefits. CM will continue to follow up.    Assessment Documentation     OPCM Initial Assessment    Involvement of Care  Do I have permission to speak with other family members about your care?: Yes (Comment: Hayley Quintana/ Sister or Iris)  Assessment completed by: Patient  Identified Areas of Need  Advanced Care Planning: Yes (Comment: refer to [facility])  Housing: no  Medication Adherence: No  *Active medication list was reviewed and reconciled with patient and/or caregiver:   Nutrition: no  Lab Adherence: no  Depression: No (Comment: Feels down sometime not often)  Cognitive/Behavioral Health: no  Communication: no  Health Literacy: no  Fall risk?: No  Equipment/Supplies/Services: no          Problem List and History         Reviewed medical and social history with patient and/or caregiver. A complex care plan was discussed and completed today, with input from patient and/or caregiver.    Patient Instructions     Instructions were provided via the [facility] patient resources and are available for the patient to view on the patient portal, if active.    Next steps: CM will call [employer] for meal delivery  C/m will follow up with b/p montoring  F/u digital med sign up for DM  F/u medication and dietary compliance        Todays OPCM Self-Management Care Plan was developed with the patients/caregivers input and was based on identified barriers from todays assessment.  Goals were written today with the patient/caregiver and the patient has agreed to work towards these goals to improve his/her overall well-being. Patient verbalized understanding of the care plan, goals, and all of today's instructions. Encouraged patient/caregiver to communicate with his/her physician and health care team about health conditions and the treatment plan.  Provided my contact information today and encouraged patient/caregiver to call me with any questions as needed.

## 2020-09-09 DIAGNOSIS — E11.9 TYPE 2 DIABETES MELLITUS: ICD-10-CM

## 2020-09-17 ENCOUNTER — OFFICE VISIT (OUTPATIENT)
Dept: OBSTETRICS AND GYNECOLOGY | Facility: CLINIC | Age: 45
End: 2020-09-17
Payer: MEDICARE

## 2020-09-17 ENCOUNTER — HOSPITAL ENCOUNTER (OUTPATIENT)
Dept: RADIOLOGY | Facility: HOSPITAL | Age: 45
Discharge: HOME | End: 2020-09-17
Attending: OBSTETRICS & GYNECOLOGY
Payer: MEDICARE

## 2020-09-17 VITALS
HEIGHT: 60 IN | BODY MASS INDEX: 49.35 KG/M2 | SYSTOLIC BLOOD PRESSURE: 154 MMHG | WEIGHT: 251.38 LBS | DIASTOLIC BLOOD PRESSURE: 100 MMHG

## 2020-09-17 DIAGNOSIS — Z91.89 PERSONAL HISTORY PRESENTING HAZARDS TO HEALTH: Primary | ICD-10-CM

## 2020-09-17 DIAGNOSIS — Z11.3 SCREEN FOR STD (SEXUALLY TRANSMITTED DISEASE): ICD-10-CM

## 2020-09-17 DIAGNOSIS — Z12.31 ENCOUNTER FOR SCREENING MAMMOGRAM FOR BREAST CANCER: ICD-10-CM

## 2020-09-17 DIAGNOSIS — R87.619 ABNORMAL CERVICAL PAPANICOLAOU SMEAR, UNSPECIFIED ABNORMAL PAP FINDING: ICD-10-CM

## 2020-09-17 DIAGNOSIS — N89.8 VAGINAL DISCHARGE: ICD-10-CM

## 2020-09-17 DIAGNOSIS — N77.1 VAGINITIS, VULVITIS AND VULVOVAGINITIS IN DISEASES CLASSIFIED ELSEWHERE: ICD-10-CM

## 2020-09-17 PROCEDURE — 88175 CYTOPATH C/V AUTO FLUID REDO: CPT | Mod: HCNC

## 2020-09-17 PROCEDURE — 77063 BREAST TOMOSYNTHESIS BI: CPT | Mod: 26,HCNC,, | Performed by: RADIOLOGY

## 2020-09-17 PROCEDURE — 77067 MAMMO DIGITAL SCREENING BILAT WITH TOMOSYNTHESIS_CAD: ICD-10-PCS | Mod: 26,HCNC,, | Performed by: RADIOLOGY

## 2020-09-17 PROCEDURE — G0101 CA SCREEN;PELVIC/BREAST EXAM: HCPCS | Mod: HCNC,S$GLB,, | Performed by: OBSTETRICS & GYNECOLOGY

## 2020-09-17 PROCEDURE — 87624 HPV HI-RISK TYP POOLED RSLT: CPT | Mod: HCNC

## 2020-09-17 PROCEDURE — 77063 MAMMO DIGITAL SCREENING BILAT WITH TOMOSYNTHESIS_CAD: ICD-10-PCS | Mod: 26,HCNC,, | Performed by: RADIOLOGY

## 2020-09-17 PROCEDURE — 77067 SCR MAMMO BI INCL CAD: CPT | Mod: 26,HCNC,, | Performed by: RADIOLOGY

## 2020-09-17 PROCEDURE — G0101 PR CA SCREEN;PELVIC/BREAST EXAM: ICD-10-PCS | Mod: HCNC,S$GLB,, | Performed by: OBSTETRICS & GYNECOLOGY

## 2020-09-17 PROCEDURE — 87491 CHLMYD TRACH DNA AMP PROBE: CPT | Mod: HCNC

## 2020-09-17 PROCEDURE — 99999 PR PBB SHADOW E&M-EST. PATIENT-LVL IV: CPT | Mod: PBBFAC,HCNC,, | Performed by: OBSTETRICS & GYNECOLOGY

## 2020-09-17 PROCEDURE — 99999 PR PBB SHADOW E&M-EST. PATIENT-LVL IV: ICD-10-PCS | Mod: PBBFAC,HCNC,, | Performed by: OBSTETRICS & GYNECOLOGY

## 2020-09-17 PROCEDURE — 77067 SCR MAMMO BI INCL CAD: CPT | Mod: TC,HCNC

## 2020-09-17 NOTE — PROGRESS NOTES
OBSTETRIC HISTORY:   OB History        3    Para   2    Term   2       0    AB   1    Living   2       SAB   1    TAB   0    Ectopic   0    Multiple   0    Live Births   2                COMPREHENSIVE GYN HISTORY:  PAP History: Reports abnormal Paps. Date  Normal Pap +HRHPV (not 16/18)  Infection History: Denies STDs. Denies PID.  Benign History: Denies uterine fibroids. Reports ovarian cysts. Denies endometriosis. Denies other conditions.  Cancer History: Denies cervical cancer. Denies uterine cancer or hyperplasia. Denies ovarian cancer. Denies vulvar cancer or pre-cancer. Denies vaginal cancer or precancer.  Denies breast cancer. Denies colon cancer.  Sexual Activity History: Denies currently being sexually active.  Menstrual History: Age of menarche: 13 years.  Dysmenorrhea History: Denies dysmenorrhea.  Contraception History: Condoms  Patient is high risk for Medicare as she had greater than 5 sexual partners in a lifetime.         HPI:   45 y.o.  Patient's last menstrual period was 2020.   Patient is  here for Medicare pap, pelvic and breast exam and she is high risk. She also had an abnormal pap last year. She has no complaints. She denies bladder, breast and bowel complaints.    ROS:  GENERAL: Denies weight gain or weight loss. Feeling well overall.   SKIN: Denies rash or lesions.   HEAD: Denies headache.   NODES: Denies enlarged lymph nodes.   CHEST: Denies shortness of breath.   ABDOMEN: No abdominal pain, constipation, diarrhea, nausea, vomiting or rectal bleeding.   URINARY: No frequency, dysuria, hematuria, or burning on urination.  REPRODUCTIVE: See HPI.   BREASTS: The patient denies pain, lumps, or nipple discharge.   HEMATOLOGIC: No easy bruisability.   MUSCULOSKELETAL: Denies joint pain or back pain.   NEUROLOGIC: Denies weakness.   PSYCHIATRIC: Denies depression, anxiety or mood swings.    PE:   BP (!) 154/100   Ht 5' (1.524 m)   Wt 114 kg (251 lb 6.4 oz)   LMP  09/13/2020   BMI 49.10 kg/m²   APPEARANCE: Well nourished, well developed, in no acute distress.  NECK: Neck symmetric without  thyromegaly.  NODES: No inguinal, cervical lymph node enlargement.  CHEST: Lungs clear to auscultation.  HEART: Regular rate and rhythm, no murmurs, rubs or gallops.  ABDOMEN: Soft. No tenderness or masses. No hernias.  BREASTS: Symmetrical, no skin changes or visible lesions. No palpable masses, nipple discharge or adenopathy bilaterally.  PELVIC:   VULVA: No lesions. Normal female genitalia.  URETHRAL MEATUS: Normal size and location, no lesions, no prolapse.  URETHRA: No masses, tenderness, prolapse or scarring.  VAGINA: Moist and well rugated, abnormal discharge, no significant cystocele or rectocele.  CERVIX: No lesions and discharge.  UTERUS: Normal size, regular shape, mobile, non-tender, bladder base nontender.  ADNEXA: No masses or tenderness.    PROCEDURES:  Pap smear  HRHPV  GC./CT    Assessment/Plan:  Medicare pap, pelvic and breast exam  High risk more than 5 partners in a year  Vaginal discharge and odor  Abnormal Pap smear  Screen STD

## 2020-09-21 ENCOUNTER — TELEPHONE (OUTPATIENT)
Dept: INTERNAL MEDICINE | Facility: CLINIC | Age: 45
End: 2020-09-21

## 2020-09-21 ENCOUNTER — OUTPATIENT CASE MANAGEMENT (OUTPATIENT)
Dept: ADMINISTRATIVE | Facility: OTHER | Age: 45
End: 2020-09-21

## 2020-09-21 NOTE — TELEPHONE ENCOUNTER
----- Message from Juanita Willis sent at 9/21/2020 12:06 PM CDT -----  CALL TYPE: OUTSIDE REQUEST    Contact/Name of Who is Calling: Danette monge/ [facility]  What is the request in detail: verify patient info, faxed diabetic supplies request  Call Back Number: [phone]

## 2020-09-21 NOTE — TELEPHONE ENCOUNTER
Returned pharmacy's phone call. Called to confirm faxed paperwork. Paperwork was indeed faxed correctly.

## 2020-09-21 NOTE — LETTER
October 1, 2020    Jaimee Quintana  [address] [place] [address]             [facility] [address] [place] [address] Dear Ms Quintana      My name is Tiffanie and I work with [facility]'s Outpatient Case Management Department. I have been unsuccessful at reaching you to follow-up to see how you have been doing. Please call me back at your earliest convenience to discuss your health care needs.      I can be reached at [phone] from 8:00AM to 4:30 PM on Monday thru Friday. [facility] also has a program where a nurse is available 24/7 to answer questions or provide medical advice, their number is [phone].    Thank You,        Tiffanie Beth RN  Outpatient Case Management/Disease Management  [phone]

## 2020-09-24 ENCOUNTER — TELEPHONE (OUTPATIENT)
Dept: OBSTETRICS AND GYNECOLOGY | Facility: CLINIC | Age: 45
End: 2020-09-24

## 2020-09-24 ENCOUNTER — PATIENT MESSAGE (OUTPATIENT)
Dept: OBSTETRICS AND GYNECOLOGY | Facility: CLINIC | Age: 45
End: 2020-09-24

## 2020-09-24 LAB
HPV HR 12 DNA SPEC QL NAA+PROBE: POSITIVE
HPV16 AG SPEC QL: NEGATIVE
HPV18 DNA SPEC QL NAA+PROBE: NEGATIVE

## 2020-09-24 NOTE — TELEPHONE ENCOUNTER
Sent portal message the HRHPV was positive and therefore as discussed at appt she will need COLPO.  I will need a large metal speculum for colpo

## 2020-09-25 ENCOUNTER — OUTPATIENT CASE MANAGEMENT (OUTPATIENT)
Dept: ADMINISTRATIVE | Facility: OTHER | Age: 45
End: 2020-09-25

## 2020-09-25 NOTE — TELEPHONE ENCOUNTER
"Patient contacted, colposcopy scheduled 11/18/2020 @ 12:45.  When asked if she will be menstruating at that time patient stated she didn't think so but she "trying to get rid of those".  I notified her that average age of menopause is 51-52, she is 45 so may be a little ways away from menopause.  Patient then stated she heard of "a procedure that makes your cycle go away".  Notified if she is referring to an endometrial ablation, she would have to meet certain criteria for insurance to pay for that surgery.  I asked her about her cycles, are they coming regularly and how heavy is the flow?  Patient states her cycle comes about every 28-30 days lasting for 3-4 days with a moderate flow.  Notified that her cycles sound normal and that I did not think that she would meet criteria.  Patient verbalized understanding. Notified needs to come the day of her colposcopy able to give urine specimen for pregnancy test.  Patient verbalized understanding.    "

## 2020-09-30 ENCOUNTER — PATIENT MESSAGE (OUTPATIENT)
Dept: ADMINISTRATIVE | Facility: OTHER | Age: 45
End: 2020-09-30

## 2020-09-30 NOTE — PROGRESS NOTES
2nd attempt to complete Social Work Assessment for OPCM; left message requesting a return call. LCSW will send a message to patient portal with contact information requesting a return call.  OPCM RN notified.

## 2020-10-03 LAB
FINAL PATHOLOGIC DIAGNOSIS: NORMAL
Lab: NORMAL

## 2020-10-09 NOTE — PROGRESS NOTES
OPCM Encounter  Unable to reach patient after 3 attempts  OPCM letter mailed  CM will close case at this time    Tiffanie Beth RN  Outpatient Care Management

## 2020-10-09 NOTE — PROGRESS NOTES
LCSW sent message to patient portal after 2nd attempt to complete SW Assessment with contact information requesting a return call and pt has not reached out to this LCSW.  LCSW will close case and notified OPCM RN.

## 2020-11-09 ENCOUNTER — TELEPHONE (OUTPATIENT)
Dept: OBSTETRICS AND GYNECOLOGY | Facility: CLINIC | Age: 45
End: 2020-11-09

## 2020-11-09 NOTE — TELEPHONE ENCOUNTER
----- Message from Bryan Alvarez sent at 11/9/2020  9:43 AM CST -----  Type:  Needs Medical Advice    Who Called: patient  Would the patient rather a call back or a response via [facility]? call  Best Call Back Number: [phone]  Additional Information: She needs to know how long will her procedure take.

## 2020-11-13 DIAGNOSIS — E11.9 TYPE 2 DIABETES MELLITUS: ICD-10-CM

## 2020-11-16 ENCOUNTER — TELEPHONE (OUTPATIENT)
Dept: FAMILY MEDICINE | Facility: CLINIC | Age: 45
End: 2020-11-16

## 2020-11-16 NOTE — TELEPHONE ENCOUNTER
Outreach to patient regarding b/p states she takes her medication at 10am - informed will call vback at noon for b/p check

## 2020-11-18 ENCOUNTER — PROCEDURE VISIT (OUTPATIENT)
Dept: OBSTETRICS AND GYNECOLOGY | Facility: CLINIC | Age: 45
End: 2020-11-18
Payer: MEDICARE

## 2020-11-18 VITALS — DIASTOLIC BLOOD PRESSURE: 86 MMHG | WEIGHT: 256 LBS | SYSTOLIC BLOOD PRESSURE: 122 MMHG | BODY MASS INDEX: 50 KG/M2

## 2020-11-18 DIAGNOSIS — R87.810 CERVICAL HIGH RISK HPV (HUMAN PAPILLOMAVIRUS) TEST POSITIVE: Primary | ICD-10-CM

## 2020-11-18 LAB
B-HCG UR QL: NEGATIVE
CTP QC/QA: YES

## 2020-11-18 PROCEDURE — 88342 IMHCHEM/IMCYTCHM 1ST ANTB: CPT | Mod: 26,HCNC,, | Performed by: STUDENT IN AN ORGANIZED HEALTH CARE EDUCATION/TRAINING PROGRAM

## 2020-11-18 PROCEDURE — 88342 IMHCHEM/IMCYTCHM 1ST ANTB: CPT | Mod: HCNC | Performed by: STUDENT IN AN ORGANIZED HEALTH CARE EDUCATION/TRAINING PROGRAM

## 2020-11-18 PROCEDURE — 88342 CHG IMMUNOCYTOCHEMISTRY: ICD-10-PCS | Mod: 26,HCNC,, | Performed by: STUDENT IN AN ORGANIZED HEALTH CARE EDUCATION/TRAINING PROGRAM

## 2020-11-18 PROCEDURE — 88305 TISSUE EXAM BY PATHOLOGIST: ICD-10-PCS | Mod: 26,HCNC,, | Performed by: STUDENT IN AN ORGANIZED HEALTH CARE EDUCATION/TRAINING PROGRAM

## 2020-11-18 PROCEDURE — 88305 TISSUE EXAM BY PATHOLOGIST: CPT | Mod: 26,HCNC,, | Performed by: STUDENT IN AN ORGANIZED HEALTH CARE EDUCATION/TRAINING PROGRAM

## 2020-11-18 PROCEDURE — 88305 TISSUE EXAM BY PATHOLOGIST: CPT | Mod: 59,HCNC | Performed by: STUDENT IN AN ORGANIZED HEALTH CARE EDUCATION/TRAINING PROGRAM

## 2020-11-18 PROCEDURE — 99499 UNLISTED E&M SERVICE: CPT | Mod: HCNC,S$GLB,, | Performed by: OBSTETRICS & GYNECOLOGY

## 2020-11-18 PROCEDURE — 57454 COLPOSCOPY: ICD-10-PCS | Mod: HCNC,S$GLB,, | Performed by: OBSTETRICS & GYNECOLOGY

## 2020-11-18 PROCEDURE — 99499 NO LOS: ICD-10-PCS | Mod: HCNC,S$GLB,, | Performed by: OBSTETRICS & GYNECOLOGY

## 2020-11-18 PROCEDURE — 57454 BX/CURETT OF CERVIX W/SCOPE: CPT | Mod: HCNC,S$GLB,, | Performed by: OBSTETRICS & GYNECOLOGY

## 2020-11-18 NOTE — PROCEDURES
Colposcopy    Date/Time: 11/18/2020 12:45 PM  Performed by: Aubrie Dwyer MD  Authorized by: Aubrie Dwyer MD     Consent Done?:  Yes (Written)  Timeout:Immediately prior to procedure a time out was called to verify the correct patient, procedure, equipment, support staff and site/side marked as required  Assistants?: Yes    List of assistants:  OCTAVIO Finn was present for the entire procedure.    Colposcopy Site:  Cervix  Acrowhite Lesion? Yes (Minimal hard to see entire transformation zone)    Atypical Vessels: No    Transformation Zone Adequate?: No    Biopsy?: Yes         Location:  Cervix ((9 00))  ECC Performed?: Yes    LEEP Performed?: No     Patient tolerated the procedure well with no immediate complications.   Post-operative instructions were provided for the patient.   Patient was discharged and will follow up if any complications occur     Accidentally noted as 3 on pathology report but was 9 o'clock

## 2020-11-25 LAB
FINAL PATHOLOGIC DIAGNOSIS: NORMAL
GROSS: NORMAL

## 2020-11-30 ENCOUNTER — PATIENT MESSAGE (OUTPATIENT)
Dept: OBSTETRICS AND GYNECOLOGY | Facility: HOSPITAL | Age: 45
End: 2020-11-30

## 2020-11-30 NOTE — TELEPHONE ENCOUNTER
[facility]    The following portal message also sent:    Jaimee,  I tried to call you but could not reach you. Your recent cervical biopsies were negative. This means you need a repeat Pap and HPV in one year. Dr. Dwyer

## 2020-12-02 ENCOUNTER — CLINICAL SUPPORT (OUTPATIENT)
Dept: DIABETES | Facility: CLINIC | Age: 45
End: 2020-12-02
Payer: MEDICARE

## 2020-12-02 DIAGNOSIS — E11.9 TYPE 2 DIABETES MELLITUS: ICD-10-CM

## 2020-12-02 DIAGNOSIS — E11.65 TYPE 2 DIABETES MELLITUS WITH HYPERGLYCEMIA, WITH LONG-TERM CURRENT USE OF INSULIN: ICD-10-CM

## 2020-12-02 DIAGNOSIS — Z79.4 TYPE 2 DIABETES MELLITUS WITH HYPERGLYCEMIA, WITH LONG-TERM CURRENT USE OF INSULIN: ICD-10-CM

## 2020-12-02 PROCEDURE — 99999 PR PBB SHADOW E&M-EST. PATIENT-LVL I: ICD-10-PCS | Mod: PBBFAC,HCNC,, | Performed by: DIETITIAN, REGISTERED

## 2020-12-02 PROCEDURE — 99999 PR PBB SHADOW E&M-EST. PATIENT-LVL I: CPT | Mod: PBBFAC,HCNC,, | Performed by: DIETITIAN, REGISTERED

## 2020-12-02 PROCEDURE — G0108 PR DIAB MANAGE TRN  PER INDIV: ICD-10-PCS | Mod: HCNC,S$GLB,, | Performed by: DIETITIAN, REGISTERED

## 2020-12-02 PROCEDURE — G0108 DIAB MANAGE TRN  PER INDIV: HCPCS | Mod: HCNC,S$GLB,, | Performed by: DIETITIAN, REGISTERED

## 2020-12-02 NOTE — PLAN OF CARE
Criteria met for discharge. IV removed,instructions explained,copy given, RX filled prior to discharge. Denies c/o pain, tolerating po well, voiding without difficulty.Verbalizes understanding of instructions, has no further questions.dishcharged home with family in good condition.   Per BEULAH Mattson, orders placed for covid swabbing.    Pat Hinds, CMA

## 2020-12-03 ENCOUNTER — PATIENT MESSAGE (OUTPATIENT)
Dept: PODIATRY | Facility: CLINIC | Age: 45
End: 2020-12-03

## 2020-12-07 NOTE — PROGRESS NOTES
Diabetes Education  Author: Gela Prasad RD  Date: 12/7/2020    Diabetes Care Management Summary  Diabetes Education Record Assessment/Progress: Post Program/Follow-up  Current Diabetes Risk Level: Moderate     Last A1c:   Lab Results   Component Value Date    HGBA1C 8.2 (H) 12/04/2020     Last visit with Diabetes Educator: : 05/28/2020      Diabetes Type  Diabetes Type : Type II    Diabetes History  Diabetes Diagnosis: >10 years  Current Treatment: Oral Medication, Insulin, Injectable(Metformin, Lantus 50u BID, Trulicity)  Reviewed Problem List with Patient: Yes    Health Maintenance was reviewed today with patient. Discussed with patient importance of routine eye exams, foot exams/foot care, blood work (i.e.: A1c, microalbumin, and lipid), dental visits, yearly flu vaccine, and pneumonia vaccine as indicated by PCP. Patient verbalized understanding.     Health Maintenance Topics with due status: Not Due       Topic Last Completion Date    TETANUS VACCINE 11/19/2014    Eye Exam 02/11/2020    Foot Exam 02/13/2020    Cervical Cancer Screening 09/17/2020    Mammogram 09/17/2020    Low Dose Statin 11/18/2020    Lipid Panel 12/04/2020    Hemoglobin A1c 12/04/2020     There are no preventive care reminders to display for this patient.    Nutrition  What type of beverages do you drink?: diet soda/tea, regular soda/tea(crystal light)  Meal Plan 24 Hour Recall - Breakfast: usually skips, cereal and milk or grits and eggs  Meal Plan 24 Hour Recall - Lunch: soup or sandwich  Meal Plan 24 Hour Recall - Dinner: corn and rice, same as bf & lunch  Meal Plan 24 Hour Recall - Snack: chips    Monitoring   Self Monitoring : 1-2x/day  Blood Glucose Logs: No  Do you use a personal continuous glucose monitor?: No    Exercise   Exercise Type: bike riding(Patient notes bike is sole method of transportation)  Intensity: Low  Frequency: Daily  Duration: 30 min    Current Diabetes Treatment   Current Treatment: Oral Medication, Insulin,  Injectable(Metformin, Lantus 50u BID, Trulicity)    Social History  Preferred Learning Method: Face to Face  Primary Support: Self         Barriers to Change  Barriers to Change: None  Learning Challenges : None    Readiness to Learn   Readiness to Learn : Acceptance    Cultural Influences  Cultural Influences: None    Diabetes Education Assessment/Progress  Diabetes Disease Process (diabetes disease process and treatment options): Written Materials Provided, Discussion, Individual Session, Demonstrates Understanding/Competency(verbalizes/demonstrates), Comprehends Key Points, Instructed  Nutrition (Incorporating nutritional management into one's lifestyle): Discussion, Comprehends Key Points, Needs Review, Written Materials Provided, Individual Session, Demonstrates Understanding/Competency (verbalizes/demonstrates), Instructed    Today's Self-Management Care Plan was developed with the patient's input and is based on barriers identified during today's assessment.    The long and short-term goals in the care plan were written with the patient/caregiver's input. The patient has agreed to work toward these goals to improve her overall diabetes control.      The patient received a copy of today's self-management plan and verbalized understanding of the care plan, goals, and all of today's instructions.      The patient was encouraged to communicate with her physician and care team regarding her condition(s) and treatment.  I provided the patient with my contact information today and encouraged her to contact me via phone or patient portal as needed.

## 2020-12-09 ENCOUNTER — PATIENT MESSAGE (OUTPATIENT)
Dept: INTERNAL MEDICINE | Facility: CLINIC | Age: 45
End: 2020-12-09

## 2020-12-09 NOTE — TELEPHONE ENCOUNTER
"Medication List with Changes/Refills   Current Medications    ASPIRIN (ECOTRIN) 81 MG EC TABLET    Take 81 mg by mouth once daily.    ATORVASTATIN (LIPITOR) 80 MG TABLET    TAKE 1 TABLET BY MOUTH EVERY EVENING    BENAZEPRIL-HYDROCHLORTHIAZIDE (LOTENSIN HCT) 20-12.5 MG PER TABLET    Take 2 tablets by mouth once daily.    BLOOD SUGAR DIAGNOSTIC STRP    Truemetrix; test BID    BLOOD-GLUCOSE METER (FREESTYLE SYSTEM KIT) KIT    Use as instructed; Truemetrix    DULAGLUTIDE (TRULICITY) 1.5 MG/0.5 ML PNIJ    Inject 1.5 mg into the skin every 7 days.    EZETIMIBE (ZETIA) 10 MG TABLET    TAKE 1 TABLET BY MOUTH EVERY DAY    FLUOXETINE 20 MG CAPSULE    TAKE 1 CAPSULE BY MOUTH EVERY DAY    IBUPROFEN (ADVIL,MOTRIN) 800 MG TABLET    Take 800 mg by mouth every 8 (eight) hours as needed.    INSULIN (LANTUS SOLOSTAR U-100 INSULIN) GLARGINE 100 UNITS/ML (3ML) SUBQ PEN    Inject 50 Units into the skin 2 (two) times daily.    LANCETS MISC    True Metrix; test BID      DX: E11.9, Z79.4    METFORMIN (GLUCOPHAGE) 1000 MG TABLET    TAKE 1 TABLET BY MOUTH TWICE A DAY    METOPROLOL SUCCINATE (TOPROL-XL) 100 MG 24 HR TABLET    TAKE 1 TABLET BY MOUTH EVERY DAY    NIFEDIPINE (PROCARDIA-XL) 60 MG (OSM) 24 HR TABLET    TAKE 1 TABLET (60 MG TOTAL) BY MOUTH ONCE DAILY.    PANTOPRAZOLE (PROTONIX) 40 MG TABLET    Take 1 tablet (40 mg total) by mouth once daily.    PEN NEEDLE, DIABETIC (BD ULTRA-FINE ROSA PEN NEEDLES) 32 GAUGE X 5/32" NDLE    1 Act by Misc.(Non-Drug; Combo Route) route 5 (five) times daily.    PEN NEEDLE, DIABETIC 32 GAUGE X 5/32" NDLE    Use with insulin once daily    SPIRONOLACTONE (ALDACTONE) 25 MG TABLET    Take 1 tablet (25 mg total) by mouth once daily.       "

## 2020-12-11 ENCOUNTER — PATIENT MESSAGE (OUTPATIENT)
Dept: OTHER | Facility: OTHER | Age: 45
End: 2020-12-11

## 2020-12-11 DIAGNOSIS — E11.9 TYPE 2 DIABETES MELLITUS WITHOUT COMPLICATION, WITH LONG-TERM CURRENT USE OF INSULIN: ICD-10-CM

## 2020-12-11 DIAGNOSIS — Z79.4 TYPE 2 DIABETES MELLITUS WITHOUT COMPLICATION, WITH LONG-TERM CURRENT USE OF INSULIN: ICD-10-CM

## 2020-12-11 DIAGNOSIS — I10 ESSENTIAL HYPERTENSION: ICD-10-CM

## 2020-12-11 DIAGNOSIS — F32.A DEPRESSION, UNSPECIFIED DEPRESSION TYPE: ICD-10-CM

## 2020-12-11 DIAGNOSIS — E78.5 HYPERLIPIDEMIA, UNSPECIFIED HYPERLIPIDEMIA TYPE: ICD-10-CM

## 2020-12-11 NOTE — TELEPHONE ENCOUNTER
No new care gaps identified.  Powered by [employer]. Reference number: [id number]. 12/11/2020 5:39:56 PM   CST

## 2020-12-14 RX ORDER — BENAZEPRIL HYDROCHLORIDE AND HYDROCHLOROTHIAZIDE 20; 12.5 MG/1; MG/1
TABLET ORAL
Qty: 180 TABLET | Refills: 2 | Status: SHIPPED | OUTPATIENT
Start: 2020-12-14 | End: 2021-01-05

## 2020-12-14 RX ORDER — METFORMIN HYDROCHLORIDE 1000 MG/1
TABLET ORAL
Qty: 180 TABLET | Refills: 0 | Status: SHIPPED | OUTPATIENT
Start: 2020-12-14 | End: 2021-02-05 | Stop reason: SDUPTHER

## 2020-12-14 RX ORDER — FLUOXETINE HYDROCHLORIDE 20 MG/1
CAPSULE ORAL
Qty: 90 CAPSULE | Refills: 2 | Status: SHIPPED | OUTPATIENT
Start: 2020-12-14 | End: 2021-02-17 | Stop reason: SDUPTHER

## 2020-12-14 RX ORDER — ATORVASTATIN CALCIUM 80 MG/1
TABLET, FILM COATED ORAL
Qty: 90 TABLET | Refills: 2 | Status: SHIPPED | OUTPATIENT
Start: 2020-12-14 | End: 2021-02-05 | Stop reason: SDUPTHER

## 2020-12-17 ENCOUNTER — OFFICE VISIT (OUTPATIENT)
Dept: PODIATRY | Facility: CLINIC | Age: 45
End: 2020-12-17
Payer: MEDICARE

## 2020-12-17 VITALS
WEIGHT: 258.5 LBS | RESPIRATION RATE: 16 BRPM | BODY MASS INDEX: 50.75 KG/M2 | HEART RATE: 91 BPM | HEIGHT: 60 IN | SYSTOLIC BLOOD PRESSURE: 186 MMHG | DIASTOLIC BLOOD PRESSURE: 108 MMHG

## 2020-12-17 DIAGNOSIS — E11.42 DIABETIC PERIPHERAL NEUROPATHY ASSOCIATED WITH TYPE 2 DIABETES MELLITUS: Primary | ICD-10-CM

## 2020-12-17 DIAGNOSIS — E11.9 COMPREHENSIVE DIABETIC FOOT EXAMINATION, TYPE 2 DM, ENCOUNTER FOR: ICD-10-CM

## 2020-12-17 DIAGNOSIS — B35.1 TINEA UNGUIUM: ICD-10-CM

## 2020-12-17 PROCEDURE — 99999 PR PBB SHADOW E&M-EST. PATIENT-LVL IV: CPT | Mod: PBBFAC,HCNC,, | Performed by: PODIATRIST

## 2020-12-17 PROCEDURE — 99499 NO LOS: ICD-10-PCS | Mod: HCNC,S$GLB,, | Performed by: PODIATRIST

## 2020-12-17 PROCEDURE — 1126F AMNT PAIN NOTED NONE PRSNT: CPT | Mod: HCNC,S$GLB,, | Performed by: PODIATRIST

## 2020-12-17 PROCEDURE — 3008F PR BODY MASS INDEX (BMI) DOCUMENTED: ICD-10-PCS | Mod: HCNC,CPTII,S$GLB, | Performed by: PODIATRIST

## 2020-12-17 PROCEDURE — 1126F PR PAIN SEVERITY QUANTIFIED, NO PAIN PRESENT: ICD-10-PCS | Mod: HCNC,S$GLB,, | Performed by: PODIATRIST

## 2020-12-17 PROCEDURE — 3008F BODY MASS INDEX DOCD: CPT | Mod: HCNC,CPTII,S$GLB, | Performed by: PODIATRIST

## 2020-12-17 PROCEDURE — 11721 PR DEBRIDEMENT OF NAILS, 6 OR MORE: ICD-10-PCS | Mod: Q9,HCNC,S$GLB, | Performed by: PODIATRIST

## 2020-12-17 PROCEDURE — 99999 PR PBB SHADOW E&M-EST. PATIENT-LVL IV: ICD-10-PCS | Mod: PBBFAC,HCNC,, | Performed by: PODIATRIST

## 2020-12-17 PROCEDURE — 99499 UNLISTED E&M SERVICE: CPT | Mod: HCNC,S$GLB,, | Performed by: PODIATRIST

## 2020-12-17 PROCEDURE — 11721 DEBRIDE NAIL 6 OR MORE: CPT | Mod: Q9,HCNC,S$GLB, | Performed by: PODIATRIST

## 2020-12-17 NOTE — PROGRESS NOTES
Subjective:      Patient ID: Jaimee Quintana is a 45 y.o. female.    Chief Complaint: Diabetic Foot Exam (B/L feet), Nail Care (cant clip small toenails), and Dr. Ponce 09/03/2020 (PCP visit)    Jaimee is a 45 y.o. female who presents to the clinic for evaluation and treatment of high risk feet. Jaimee has a past medical history of Cervical high risk HPV (human papillomavirus) test positive (09/17/2020), CVA (cerebral infarction) (2003), Depression, Diabetes mellitus, type 2, GERD (gastroesophageal reflux disease), Hyperlipidemia, Hypertension, Moderate nonproliferative diabetic retinopathy, Obesity, and Stroke. The patient's chief complaint is long, thick toenails. This patient has documented high risk feet requiring routine maintenance secondary to peripheral neuropathy.  Complains of numbness and tingling.  Known to Dr. Coelho for diabetic foot assessment. Ambulating in DM shoe.     PCP: Sam Ponce III, MD    Date Last Seen by PCP:  In epic        Hemoglobin A1C   Date Value Ref Range Status   12/04/2020 8.2 (H) 4.0 - 5.6 % Final     Comment:     ADA Screening Guidelines:  5.7-6.4%  Consistent with prediabetes  >or=6.5%  Consistent with diabetes  High levels of fetal hemoglobin interfere with the HbA1C  assay. Heterozygous hemoglobin variants (HbS, HgC, etc)do  not significantly interfere with this assay.   However, presence of multiple variants may affect accuracy.     09/03/2020 8.0 (H) 4.0 - 5.6 % Final     Comment:     ADA Screening Guidelines:  5.7-6.4%  Consistent with prediabetes  >or=6.5%  Consistent with diabetes  High levels of fetal hemoglobin interfere with the HbA1C  assay. Heterozygous hemoglobin variants (HbS, HgC, etc)do  not significantly interfere with this assay.   However, presence of multiple variants may affect accuracy.     04/14/2020 7.3 (H) 4.0 - 5.6 % Final     Comment:     ADA Screening Guidelines:  5.7-6.4%  Consistent with prediabetes  >or=6.5%  Consistent with diabetes  High  levels of fetal hemoglobin interfere with the HbA1C  assay. Heterozygous hemoglobin variants (HbS, HgC, etc)do  not significantly interfere with this assay.   However, presence of multiple variants may affect accuracy.         Review of Systems   Constitution: Negative for chills, decreased appetite, fever and malaise/fatigue.   HENT: Negative for congestion, ear discharge and sore throat.    Eyes: Negative for discharge and pain.   Cardiovascular: Negative for chest pain, claudication and leg swelling.   Respiratory: Negative for cough and shortness of breath.    Skin: Positive for color change. Negative for nail changes and rash.   Musculoskeletal: Positive for arthritis and back pain. Negative for joint pain, joint swelling and muscle weakness.   Gastrointestinal: Negative for bloating, abdominal pain, diarrhea, nausea and vomiting.   Genitourinary: Negative for flank pain and hematuria.   Neurological: Positive for numbness. Negative for headaches and weakness.   Psychiatric/Behavioral: Negative for altered mental status.             Past Medical History:   Diagnosis Date    Cervical high risk HPV (human papillomavirus) test positive 2020    NOT 16 OR 18    CVA (cerebral infarction) 2003         Depression     Diabetes mellitus, type 2     GERD (gastroesophageal reflux disease)     Hyperlipidemia     Hypertension     Moderate nonproliferative diabetic retinopathy     Obesity     Stroke        Past Surgical History:   Procedure Laterality Date     SECTION      CHOLECYSTECTOMY      DILATION AND CURETTAGE OF UTERUS      GALLBLADDER SURGERY  2017    stone removed       Family History   Problem Relation Age of Onset    Stroke Mother     Thyroid disease Mother     Diabetes Mother     Cataracts Father     Hypertension Father     Arthritis Father     Diabetes Father     Hypertension Sister     Stroke Sister     Thyroid disease Sister     Heart disease Sister     Thyroid disease  Daughter     Asthma Daughter     No Known Problems Brother     No Known Problems Maternal Aunt     No Known Problems Maternal Uncle     No Known Problems Paternal Aunt     No Known Problems Paternal Uncle     No Known Problems Maternal Grandmother     No Known Problems Maternal Grandfather     No Known Problems Paternal Grandmother     No Known Problems Paternal Grandfather     Amblyopia Neg Hx     Blindness Neg Hx     Cancer Neg Hx     Glaucoma Neg Hx     Macular degeneration Neg Hx     Retinal detachment Neg Hx     Strabismus Neg Hx        Social History     Socioeconomic History    Marital status: Single     Spouse name: Not on file    Number of children: Not on file    Years of education: Not on file    Highest education level: Not on file   Occupational History    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   Social Needs    Financial resource strain: Not on file    Food insecurity     Worry: Not on file     Inability: Not on file    Transportation needs     Medical: Not on file     Non-medical: Not on file   Tobacco Use    Smoking status: Current Every Day Smoker     Packs/day: 0.25     Types: Cigarettes    Smokeless tobacco: Never Used   Substance and Sexual Activity    Alcohol use: Yes     Alcohol/week: 3.0 standard drinks     Types: 3 Cans of beer per week    Drug use: No    Sexual activity: Yes     Partners: Male     Birth control/protection: None   Lifestyle    Physical activity     Days per week: Not on file     Minutes per session: Not on file    Stress: Not on file   Relationships    Social connections     Talks on phone: Not on file     Gets together: Not on file     Attends Jainism service: Not on file     Active member of club or organization: Not on file     Attends meetings of clubs or organizations: Not on file     Relationship status: Not on file   Other Topics Concern    Not on file   Social History Narrative    Daughter - Kavon       Current  "Outpatient Medications   Medication Sig Dispense Refill    aspirin (ECOTRIN) 81 MG EC tablet Take 81 mg by mouth once daily.      atorvastatin (LIPITOR) 80 MG tablet TAKE 1 TABLET BY MOUTH EVERY DAY IN THE EVENING 90 tablet 2    benazepril-hydrochlorthiazide (LOTENSIN HCT) 20-12.5 mg per tablet TAKE 2 TABLETS BY MOUTH EVERY  tablet 2    blood sugar diagnostic Strp Truemetrix; test  strip 6    dulaglutide (TRULICITY) 1.5 mg/0.5 mL PnIj Inject 1.5 mg into the skin every 7 days. 1 Syringe 3    ezetimibe (ZETIA) 10 mg tablet TAKE 1 TABLET BY MOUTH EVERY DAY 30 tablet 5    FLUoxetine 20 MG capsule TAKE 1 CAPSULE BY MOUTH EVERY DAY 90 capsule 2    ibuprofen (ADVIL,MOTRIN) 800 MG tablet Take 800 mg by mouth every 8 (eight) hours as needed.  0    lancets Misc True Metrix; test BID      DX: E11.9, Z79.4 200 each 6    metFORMIN (GLUCOPHAGE) 1000 MG tablet TAKE 1 TABLET BY MOUTH TWICE A  tablet 0    metoprolol succinate (TOPROL-XL) 100 MG 24 hr tablet TAKE 1 TABLET BY MOUTH EVERY DAY 90 tablet 1    NIFEdipine (PROCARDIA-XL) 60 MG (OSM) 24 hr tablet TAKE 1 TABLET (60 MG TOTAL) BY MOUTH ONCE DAILY. 90 tablet 1    pantoprazole (PROTONIX) 40 MG tablet Take 1 tablet (40 mg total) by mouth once daily. 30 tablet 11    pen needle, diabetic (BD ULTRA-FINE ROSA PEN NEEDLES) 32 gauge x 5/32" Ndle 1 Act by Misc.(Non-Drug; Combo Route) route 5 (five) times daily. 300 each 6    pen needle, diabetic 32 gauge x 5/32" Ndle Use with insulin once daily 100 each 0    spironolactone (ALDACTONE) 25 MG tablet Take 1 tablet (25 mg total) by mouth once daily. 90 tablet 0    blood-glucose meter (FREESTYLE SYSTEM KIT) kit Use as instructed; Truemetrix 1 each 0    insulin (LANTUS SOLOSTAR U-100 INSULIN) glargine 100 units/mL (3mL) SubQ pen Inject 50 Units into the skin 2 (two) times daily. 30 mL 0     Current Facility-Administered Medications   Medication Dose Route Frequency Provider Last Rate Last Dose    " fluorescein 500 mg/5 mL (10 %) injection 500 mg  5 mL Intravenous Once D. Donte Tillman MD           Review of patient's allergies indicates:  No Known Allergies    Vitals:    12/17/20 1258   BP: (!) 186/108   Pulse: 91   Resp: 16   Weight: 117.3 kg (258 lb 8 oz)   Height: 5' (1.524 m)   PainSc: 0-No pain       Objective:      Physical Exam  Constitutional:       General: She is not in acute distress.     Appearance: She is well-developed.   HENT:      Nose: Nose normal.   Eyes:      Conjunctiva/sclera: Conjunctivae normal.   Neck:      Musculoskeletal: Normal range of motion.   Pulmonary:      Effort: Pulmonary effort is normal.   Chest:      Chest wall: No tenderness.   Abdominal:      Tenderness: There is no abdominal tenderness.   Neurological:      Mental Status: She is alert and oriented to person, place, and time.   Psychiatric:         Behavior: Behavior normal.       Vascular: Distal DP pulses palpable 2/4 and PT 0/4. CRT < 3 sec to tips of toes. No vericosities noted to LEs. Hair growth present LE, warm to touch LE, No edema noted to LE.    Dermatologic: No open lesions, lacerations or wounds. Interdigital spaces clean, dry and intact. No erythema, rubor, calor noted LE  Toenails 1-5 bilaterally are elongated by 2-3 mm, thickened by 2-3 mm, discolored/yellowed.     Musculoskeletal: MMT 5/5 in DF/PF/Inv/Ev resistance with no reproduction of pain in any direction. Passive range of motion of ankle and pedal joints is painless. No calf tenderness LE, Compartments soft/compressible.     Neurological:  Light touch, proprioception, and sharp/dull sensation are decreased. Protective threshold with the [place]-Wienstein monofilament is decreased. Vibratory sensation decreased.          Assessment:       Encounter Diagnoses   Name Primary?    Diabetic peripheral neuropathy associated with type 2 diabetes mellitus Yes    Tinea unguium     Comprehensive diabetic foot examination, type 2 DM, encounter for           Plan:       Jaimee was seen today for diabetic foot exam, nail care and dr. ny 09/03/2020.    Diagnoses and all orders for this visit:    Diabetic peripheral neuropathy associated with type 2 diabetes mellitus    Tinea unguium    Comprehensive diabetic foot examination, type 2 DM, encounter for      I counseled the patient on her conditions, their implications and medical management.    45 y.o. female with diabetic foot risk assessment.     -nails x 10 sharply debrided with nail Nipper.  Tolerated well.  Verbal consent was obtained.    -Shoe inspection. General Foot Education. Patient reminded of the importance of good nutrition. Patient instructed on proper foot hygeine. We discussed wearing proper shoe gear, daily foot inspections, never walking without protective shoe gear, caution putting sharp instruments to feet. Discussed general foot care:  Wear comfortable, proper fitting shoes. Wash feet daily. Dry well. After drying, apply moisturizer to feet (no lotion to webspaces). Inspect feet daily for skin breaks, blisters, swelling, or redness. Wear cotton socks (preferably white)  Change socks every day. Do NOT walk barefoot. Do NOT use heating pads or warm/hot water soaks   -It was discussed the importance of wearing shoes with adequate room in toe box to accommodate toe deformities. Recommended New Balance/[employer]ics shoe brands with adequate arch supports to alleviate abnormal pressure and improve stability of foot while walking. Avoid flat shoes and barefoot walking as these will exacerbate or worsen symptoms.   -Advised for optimal glucose control and maintenance per primary care physician. Patient was also educated on healthy diet that is naturally rich in nutrients and low in fat and calories.   -The nature of the condition, options for management, as well as potential risks and complications were discussed in detail with patient. Patient was amenable to my recommendations and left my office fully informed  and will follow up as instructed or sooner if necessary.    -Patient was advised of signs and symptoms of infection including redness, drainage, purulence, odor, streaking, fever, chills and I advised patient to seek medical attention (ER or urgent care) if these symptoms arise.   -f/u 6 months     Note dictated with voice recognition software, please excuse any grammatical errors.

## 2020-12-22 ENCOUNTER — PATIENT MESSAGE (OUTPATIENT)
Dept: ADMINISTRATIVE | Facility: OTHER | Age: 45
End: 2020-12-22

## 2020-12-30 ENCOUNTER — NUTRITION (OUTPATIENT)
Dept: DIABETES | Facility: CLINIC | Age: 45
End: 2020-12-30
Payer: MEDICARE

## 2020-12-30 ENCOUNTER — PATIENT MESSAGE (OUTPATIENT)
Dept: ADMINISTRATIVE | Facility: OTHER | Age: 45
End: 2020-12-30

## 2020-12-30 DIAGNOSIS — E11.622 TYPE 2 DIABETES MELLITUS WITH OTHER SKIN ULCER (CODE): ICD-10-CM

## 2020-12-30 PROCEDURE — G0108 DIAB MANAGE TRN  PER INDIV: HCPCS | Mod: S$GLB,,, | Performed by: DIETITIAN, REGISTERED

## 2020-12-30 PROCEDURE — G0108 PR DIAB MANAGE TRN  PER INDIV: ICD-10-PCS | Mod: S$GLB,,, | Performed by: DIETITIAN, REGISTERED

## 2021-01-01 NOTE — TELEPHONE ENCOUNTER
Tried calling patient to inform her that the mammogram order was placed  No answer and voice mail not set up   Statement Selected

## 2021-01-04 DIAGNOSIS — I10 ESSENTIAL HYPERTENSION: ICD-10-CM

## 2021-01-05 RX ORDER — BENAZEPRIL HYDROCHLORIDE AND HYDROCHLOROTHIAZIDE 20; 12.5 MG/1; MG/1
TABLET ORAL
Qty: 90 TABLET | Refills: 3 | Status: SHIPPED | OUTPATIENT
Start: 2021-01-05 | End: 2021-02-05 | Stop reason: SDUPTHER

## 2021-01-26 ENCOUNTER — TELEPHONE (OUTPATIENT)
Dept: INTERNAL MEDICINE | Facility: CLINIC | Age: 46
End: 2021-01-26

## 2021-01-26 DIAGNOSIS — E11.9 TYPE 2 DIABETES MELLITUS WITHOUT COMPLICATION, WITH LONG-TERM CURRENT USE OF INSULIN: ICD-10-CM

## 2021-01-26 DIAGNOSIS — Z79.4 TYPE 2 DIABETES MELLITUS WITHOUT COMPLICATION, WITH LONG-TERM CURRENT USE OF INSULIN: ICD-10-CM

## 2021-02-04 ENCOUNTER — LAB VISIT (OUTPATIENT)
Dept: PRIMARY CARE CLINIC | Facility: OTHER | Age: 46
End: 2021-02-04
Attending: INTERNAL MEDICINE
Payer: MEDICARE

## 2021-02-04 DIAGNOSIS — Z20.822 ENCOUNTER FOR LABORATORY TESTING FOR COVID-19 VIRUS: ICD-10-CM

## 2021-02-04 PROCEDURE — U0003 INFECTIOUS AGENT DETECTION BY NUCLEIC ACID (DNA OR RNA); SEVERE ACUTE RESPIRATORY SYNDROME CORONAVIRUS 2 (SARS-COV-2) (CORONAVIRUS DISEASE [COVID-19]), AMPLIFIED PROBE TECHNIQUE, MAKING USE OF HIGH THROUGHPUT TECHNOLOGIES AS DESCRIBED BY CMS-2020-01-R: HCPCS

## 2021-02-05 ENCOUNTER — OFFICE VISIT (OUTPATIENT)
Dept: FAMILY MEDICINE | Facility: CLINIC | Age: 46
End: 2021-02-05
Payer: MEDICARE

## 2021-02-05 ENCOUNTER — TELEPHONE (OUTPATIENT)
Dept: FAMILY MEDICINE | Facility: CLINIC | Age: 46
End: 2021-02-05

## 2021-02-05 VITALS
BODY MASS INDEX: 51.68 KG/M2 | HEART RATE: 95 BPM | TEMPERATURE: 97 F | OXYGEN SATURATION: 99 % | SYSTOLIC BLOOD PRESSURE: 180 MMHG | DIASTOLIC BLOOD PRESSURE: 90 MMHG | HEIGHT: 60 IN | WEIGHT: 263.25 LBS

## 2021-02-05 DIAGNOSIS — E78.5 HYPERLIPIDEMIA, UNSPECIFIED HYPERLIPIDEMIA TYPE: ICD-10-CM

## 2021-02-05 DIAGNOSIS — E11.65 TYPE 2 DIABETES MELLITUS WITH HYPERGLYCEMIA, WITH LONG-TERM CURRENT USE OF INSULIN: Primary | ICD-10-CM

## 2021-02-05 DIAGNOSIS — I15.2 HYPERTENSION ASSOCIATED WITH DIABETES: ICD-10-CM

## 2021-02-05 DIAGNOSIS — E11.59 HYPERTENSION ASSOCIATED WITH DIABETES: ICD-10-CM

## 2021-02-05 DIAGNOSIS — Z79.4 TYPE 2 DIABETES MELLITUS WITH HYPERGLYCEMIA, WITH LONG-TERM CURRENT USE OF INSULIN: Primary | ICD-10-CM

## 2021-02-05 LAB — SARS-COV-2 RNA RESP QL NAA+PROBE: NOT DETECTED

## 2021-02-05 PROCEDURE — 99999 PR PBB SHADOW E&M-EST. PATIENT-LVL IV: CPT | Mod: PBBFAC,,, | Performed by: STUDENT IN AN ORGANIZED HEALTH CARE EDUCATION/TRAINING PROGRAM

## 2021-02-05 PROCEDURE — 3052F PR MOST RECENT HEMOGLOBIN A1C LEVEL 8.0 - < 9.0%: ICD-10-PCS | Mod: CPTII,S$GLB,, | Performed by: STUDENT IN AN ORGANIZED HEALTH CARE EDUCATION/TRAINING PROGRAM

## 2021-02-05 PROCEDURE — 3008F PR BODY MASS INDEX (BMI) DOCUMENTED: ICD-10-PCS | Mod: CPTII,S$GLB,, | Performed by: STUDENT IN AN ORGANIZED HEALTH CARE EDUCATION/TRAINING PROGRAM

## 2021-02-05 PROCEDURE — 99499 RISK ADDL DX/OHS AUDIT: ICD-10-PCS | Mod: S$GLB,,, | Performed by: STUDENT IN AN ORGANIZED HEALTH CARE EDUCATION/TRAINING PROGRAM

## 2021-02-05 PROCEDURE — 99499 UNLISTED E&M SERVICE: CPT | Mod: S$GLB,,, | Performed by: STUDENT IN AN ORGANIZED HEALTH CARE EDUCATION/TRAINING PROGRAM

## 2021-02-05 PROCEDURE — 99214 OFFICE O/P EST MOD 30 MIN: CPT | Mod: S$GLB,,, | Performed by: STUDENT IN AN ORGANIZED HEALTH CARE EDUCATION/TRAINING PROGRAM

## 2021-02-05 PROCEDURE — 3008F BODY MASS INDEX DOCD: CPT | Mod: CPTII,S$GLB,, | Performed by: STUDENT IN AN ORGANIZED HEALTH CARE EDUCATION/TRAINING PROGRAM

## 2021-02-05 PROCEDURE — 1126F AMNT PAIN NOTED NONE PRSNT: CPT | Mod: S$GLB,,, | Performed by: STUDENT IN AN ORGANIZED HEALTH CARE EDUCATION/TRAINING PROGRAM

## 2021-02-05 PROCEDURE — 1126F PR PAIN SEVERITY QUANTIFIED, NO PAIN PRESENT: ICD-10-PCS | Mod: S$GLB,,, | Performed by: STUDENT IN AN ORGANIZED HEALTH CARE EDUCATION/TRAINING PROGRAM

## 2021-02-05 PROCEDURE — 99214 PR OFFICE/OUTPT VISIT, EST, LEVL IV, 30-39 MIN: ICD-10-PCS | Mod: S$GLB,,, | Performed by: STUDENT IN AN ORGANIZED HEALTH CARE EDUCATION/TRAINING PROGRAM

## 2021-02-05 PROCEDURE — 3052F HG A1C>EQUAL 8.0%<EQUAL 9.0%: CPT | Mod: CPTII,S$GLB,, | Performed by: STUDENT IN AN ORGANIZED HEALTH CARE EDUCATION/TRAINING PROGRAM

## 2021-02-05 PROCEDURE — 99999 PR PBB SHADOW E&M-EST. PATIENT-LVL IV: ICD-10-PCS | Mod: PBBFAC,,, | Performed by: STUDENT IN AN ORGANIZED HEALTH CARE EDUCATION/TRAINING PROGRAM

## 2021-02-05 RX ORDER — ATORVASTATIN CALCIUM 80 MG/1
80 TABLET, FILM COATED ORAL NIGHTLY
Qty: 90 TABLET | Refills: 0 | Status: SHIPPED | OUTPATIENT
Start: 2021-02-05 | End: 2021-04-22 | Stop reason: SDUPTHER

## 2021-02-05 RX ORDER — BENAZEPRIL HYDROCHLORIDE AND HYDROCHLOROTHIAZIDE 20; 12.5 MG/1; MG/1
1 TABLET ORAL DAILY
Qty: 90 TABLET | Refills: 0 | Status: SHIPPED | OUTPATIENT
Start: 2021-02-05 | End: 2021-04-22 | Stop reason: SDUPTHER

## 2021-02-05 RX ORDER — PANTOPRAZOLE SODIUM 40 MG/1
40 TABLET, DELAYED RELEASE ORAL DAILY
Qty: 30 TABLET | Refills: 3 | Status: SHIPPED | OUTPATIENT
Start: 2021-02-05 | End: 2021-04-22 | Stop reason: SDUPTHER

## 2021-02-05 RX ORDER — INSULIN PUMP SYRINGE, 3 ML
EACH MISCELLANEOUS
Qty: 1 EACH | Refills: 0 | Status: SHIPPED | OUTPATIENT
Start: 2021-02-05 | End: 2021-02-05 | Stop reason: SDUPTHER

## 2021-02-05 RX ORDER — INSULIN PUMP SYRINGE, 3 ML
EACH MISCELLANEOUS
Qty: 1 EACH | Refills: 0 | Status: SHIPPED | OUTPATIENT
Start: 2021-02-05 | End: 2021-02-05

## 2021-02-05 RX ORDER — LANCETS
EACH MISCELLANEOUS
Qty: 200 EACH | Refills: 6 | Status: SHIPPED | OUTPATIENT
Start: 2021-02-05 | End: 2021-02-05

## 2021-02-05 RX ORDER — CLONIDINE HYDROCHLORIDE 0.1 MG/1
0.1 TABLET ORAL
Status: COMPLETED | OUTPATIENT
Start: 2021-02-05 | End: 2021-02-05

## 2021-02-05 RX ORDER — LANCETS
EACH MISCELLANEOUS
Qty: 200 EACH | Refills: 6 | Status: SHIPPED | OUTPATIENT
Start: 2021-02-05 | End: 2021-02-05 | Stop reason: SDUPTHER

## 2021-02-05 RX ORDER — METOPROLOL SUCCINATE 100 MG/1
100 TABLET, EXTENDED RELEASE ORAL DAILY
Qty: 90 TABLET | Refills: 0 | Status: SHIPPED | OUTPATIENT
Start: 2021-02-05 | End: 2021-02-17 | Stop reason: SDUPTHER

## 2021-02-05 RX ORDER — METFORMIN HYDROCHLORIDE 1000 MG/1
1000 TABLET ORAL 2 TIMES DAILY
Qty: 180 TABLET | Refills: 0 | Status: SHIPPED | OUTPATIENT
Start: 2021-02-05 | End: 2021-04-22 | Stop reason: SDUPTHER

## 2021-02-05 RX ORDER — SPIRONOLACTONE 25 MG/1
25 TABLET ORAL DAILY
Qty: 90 TABLET | Refills: 0 | Status: SHIPPED | OUTPATIENT
Start: 2021-02-05 | End: 2021-02-17 | Stop reason: SDUPTHER

## 2021-02-05 RX ORDER — INSULIN GLARGINE 100 [IU]/ML
65 INJECTION, SOLUTION SUBCUTANEOUS 2 TIMES DAILY
Qty: 30 ML | Refills: 0 | Status: SHIPPED | OUTPATIENT
Start: 2021-02-05 | End: 2021-04-22 | Stop reason: SDUPTHER

## 2021-02-05 RX ORDER — NIFEDIPINE 60 MG/1
TABLET, EXTENDED RELEASE ORAL
Qty: 90 TABLET | Refills: 0 | Status: SHIPPED | OUTPATIENT
Start: 2021-02-05 | End: 2021-04-22 | Stop reason: SDUPTHER

## 2021-02-05 RX ADMIN — CLONIDINE HYDROCHLORIDE 0.1 MG: 0.1 TABLET ORAL at 02:02

## 2021-02-08 RX ORDER — EZETIMIBE 10 MG/1
10 TABLET ORAL DAILY
Qty: 30 TABLET | Refills: 0 | Status: SHIPPED | OUTPATIENT
Start: 2021-02-08 | End: 2021-02-08

## 2021-02-08 RX ORDER — EZETIMIBE 10 MG/1
10 TABLET ORAL DAILY
Qty: 30 TABLET | Refills: 0 | Status: SHIPPED | OUTPATIENT
Start: 2021-02-08 | End: 2021-02-22

## 2021-02-11 ENCOUNTER — CLINICAL SUPPORT (OUTPATIENT)
Dept: FAMILY MEDICINE | Facility: CLINIC | Age: 46
End: 2021-02-11
Payer: MEDICARE

## 2021-02-11 VITALS — SYSTOLIC BLOOD PRESSURE: 120 MMHG | DIASTOLIC BLOOD PRESSURE: 86 MMHG

## 2021-02-11 DIAGNOSIS — I10 HYPERTENSION, UNSPECIFIED TYPE: Primary | ICD-10-CM

## 2021-02-11 PROCEDURE — 99999 PR PBB SHADOW E&M-EST. PATIENT-LVL I: ICD-10-PCS | Mod: PBBFAC,,,

## 2021-02-11 PROCEDURE — 99999 PR PBB SHADOW E&M-EST. PATIENT-LVL I: CPT | Mod: PBBFAC,,,

## 2021-02-16 DIAGNOSIS — E11.42 DIABETIC PERIPHERAL NEUROPATHY ASSOCIATED WITH TYPE 2 DIABETES MELLITUS: ICD-10-CM

## 2021-02-16 DIAGNOSIS — I15.2 HYPERTENSION ASSOCIATED WITH DIABETES: ICD-10-CM

## 2021-02-16 DIAGNOSIS — E11.59 HYPERTENSION ASSOCIATED WITH DIABETES: ICD-10-CM

## 2021-02-16 DIAGNOSIS — F32.A DEPRESSION, UNSPECIFIED DEPRESSION TYPE: ICD-10-CM

## 2021-02-18 RX ORDER — METOPROLOL SUCCINATE 100 MG/1
100 TABLET, EXTENDED RELEASE ORAL DAILY
Qty: 90 TABLET | Refills: 2 | Status: SHIPPED | OUTPATIENT
Start: 2021-02-18 | End: 2021-06-28 | Stop reason: SDUPTHER

## 2021-02-18 RX ORDER — DULAGLUTIDE 1.5 MG/.5ML
INJECTION, SOLUTION SUBCUTANEOUS
Qty: 6 ML | Refills: 0 | Status: SHIPPED | OUTPATIENT
Start: 2021-02-18 | End: 2021-04-21

## 2021-02-18 RX ORDER — SPIRONOLACTONE 25 MG/1
25 TABLET ORAL DAILY
Qty: 90 TABLET | Refills: 2 | Status: SHIPPED | OUTPATIENT
Start: 2021-02-18 | End: 2021-06-28 | Stop reason: SDUPTHER

## 2021-02-18 RX ORDER — FLUOXETINE HYDROCHLORIDE 20 MG/1
20 CAPSULE ORAL DAILY
Qty: 90 CAPSULE | Refills: 2 | Status: SHIPPED | OUTPATIENT
Start: 2021-02-18 | End: 2021-06-28 | Stop reason: SDUPTHER

## 2021-02-26 ENCOUNTER — PES CALL (OUTPATIENT)
Dept: ADMINISTRATIVE | Facility: CLINIC | Age: 46
End: 2021-02-26

## 2021-03-02 DIAGNOSIS — E11.65 TYPE 2 DIABETES MELLITUS WITH HYPERGLYCEMIA, WITH LONG-TERM CURRENT USE OF INSULIN: ICD-10-CM

## 2021-03-02 DIAGNOSIS — Z79.4 TYPE 2 DIABETES MELLITUS WITH HYPERGLYCEMIA, WITH LONG-TERM CURRENT USE OF INSULIN: ICD-10-CM

## 2021-03-09 ENCOUNTER — TELEPHONE (OUTPATIENT)
Dept: INTERNAL MEDICINE | Facility: CLINIC | Age: 46
End: 2021-03-09

## 2021-03-20 ENCOUNTER — IMMUNIZATION (OUTPATIENT)
Dept: PRIMARY CARE CLINIC | Facility: CLINIC | Age: 46
End: 2021-03-20
Payer: MEDICAID

## 2021-03-20 DIAGNOSIS — Z23 NEED FOR VACCINATION: Primary | ICD-10-CM

## 2021-03-20 PROCEDURE — 0001A PR IMMUNIZ ADMIN, SARS-COV-2 COVID-19 VACC, 30MCG/0.3ML, 1ST DOSE: ICD-10-PCS | Mod: CV19,S$GLB,, | Performed by: INTERNAL MEDICINE

## 2021-03-20 PROCEDURE — 91300 PR SARS-COV- 2 COVID-19 VACCINE, NO PRSV, 30MCG/0.3ML, IM: ICD-10-PCS | Mod: S$GLB,,, | Performed by: INTERNAL MEDICINE

## 2021-03-20 PROCEDURE — 0001A PR IMMUNIZ ADMIN, SARS-COV-2 COVID-19 VACC, 30MCG/0.3ML, 1ST DOSE: CPT | Mod: CV19,S$GLB,, | Performed by: INTERNAL MEDICINE

## 2021-03-20 PROCEDURE — 91300 PR SARS-COV- 2 COVID-19 VACCINE, NO PRSV, 30MCG/0.3ML, IM: CPT | Mod: S$GLB,,, | Performed by: INTERNAL MEDICINE

## 2021-03-20 RX ADMIN — Medication 0.3 ML: at 04:03

## 2021-03-22 ENCOUNTER — PATIENT OUTREACH (OUTPATIENT)
Dept: ADMINISTRATIVE | Facility: OTHER | Age: 46
End: 2021-03-22

## 2021-03-29 ENCOUNTER — OFFICE VISIT (OUTPATIENT)
Dept: OPTOMETRY | Facility: CLINIC | Age: 46
End: 2021-03-29
Payer: COMMERCIAL

## 2021-03-29 DIAGNOSIS — H53.9 VISION DISTURBANCE: Primary | ICD-10-CM

## 2021-03-29 DIAGNOSIS — H52.13 MYOPIA OF BOTH EYES WITH ASTIGMATISM: ICD-10-CM

## 2021-03-29 DIAGNOSIS — H52.203 MYOPIA OF BOTH EYES WITH ASTIGMATISM: ICD-10-CM

## 2021-03-29 PROCEDURE — 99213 OFFICE O/P EST LOW 20 MIN: CPT | Mod: PBBFAC | Performed by: OPTOMETRIST

## 2021-03-29 PROCEDURE — 92012 INTRM OPH EXAM EST PATIENT: CPT | Mod: S$GLB,,, | Performed by: OPTOMETRIST

## 2021-03-29 PROCEDURE — 92012 PR EYE EXAM, EST PATIENT,INTERMED: ICD-10-PCS | Mod: S$GLB,,, | Performed by: OPTOMETRIST

## 2021-03-29 PROCEDURE — 99999 PR PBB SHADOW E&M-EST. PATIENT-LVL III: ICD-10-PCS | Mod: PBBFAC,,, | Performed by: OPTOMETRIST

## 2021-03-29 PROCEDURE — 92015 PR REFRACTION: ICD-10-PCS | Mod: S$GLB,,, | Performed by: OPTOMETRIST

## 2021-03-29 PROCEDURE — 92015 DETERMINE REFRACTIVE STATE: CPT | Mod: S$GLB,,, | Performed by: OPTOMETRIST

## 2021-03-29 PROCEDURE — 99999 PR PBB SHADOW E&M-EST. PATIENT-LVL III: CPT | Mod: PBBFAC,,, | Performed by: OPTOMETRIST

## 2021-03-29 RX ORDER — GLUCOSAM/CHON-MSM1/C/MANG/BOSW 500-416.6
TABLET ORAL
COMMUNITY
Start: 2021-02-06 | End: 2021-06-28 | Stop reason: SDUPTHER

## 2021-03-29 RX ORDER — BLOOD-GLUCOSE METER
EACH MISCELLANEOUS
COMMUNITY
Start: 2021-02-06

## 2021-04-05 ENCOUNTER — PATIENT MESSAGE (OUTPATIENT)
Dept: ADMINISTRATIVE | Facility: HOSPITAL | Age: 46
End: 2021-04-05

## 2021-04-11 ENCOUNTER — IMMUNIZATION (OUTPATIENT)
Dept: PRIMARY CARE CLINIC | Facility: CLINIC | Age: 46
End: 2021-04-11
Payer: COMMERCIAL

## 2021-04-11 DIAGNOSIS — Z79.4 TYPE 2 DIABETES MELLITUS WITH HYPERGLYCEMIA, WITH LONG-TERM CURRENT USE OF INSULIN: Primary | ICD-10-CM

## 2021-04-11 DIAGNOSIS — Z23 NEED FOR VACCINATION: Primary | ICD-10-CM

## 2021-04-11 DIAGNOSIS — E11.65 TYPE 2 DIABETES MELLITUS WITH HYPERGLYCEMIA, WITH LONG-TERM CURRENT USE OF INSULIN: Primary | ICD-10-CM

## 2021-04-11 DIAGNOSIS — E78.5 HYPERLIPIDEMIA, UNSPECIFIED HYPERLIPIDEMIA TYPE: ICD-10-CM

## 2021-04-11 DIAGNOSIS — I15.2 HYPERTENSION ASSOCIATED WITH DIABETES: ICD-10-CM

## 2021-04-11 DIAGNOSIS — E11.59 HYPERTENSION ASSOCIATED WITH DIABETES: ICD-10-CM

## 2021-04-11 PROCEDURE — 91300 PR SARS-COV- 2 COVID-19 VACCINE, NO PRSV, 30MCG/0.3ML, IM: ICD-10-PCS | Mod: S$GLB,,, | Performed by: INTERNAL MEDICINE

## 2021-04-11 PROCEDURE — 0002A PR IMMUNIZ ADMIN, SARS-COV-2 COVID-19 VACC, 30MCG/0.3ML, 2ND DOSE: CPT | Mod: CV19,S$GLB,, | Performed by: INTERNAL MEDICINE

## 2021-04-11 PROCEDURE — 91300 PR SARS-COV- 2 COVID-19 VACCINE, NO PRSV, 30MCG/0.3ML, IM: CPT | Mod: S$GLB,,, | Performed by: INTERNAL MEDICINE

## 2021-04-11 PROCEDURE — 0002A PR IMMUNIZ ADMIN, SARS-COV-2 COVID-19 VACC, 30MCG/0.3ML, 2ND DOSE: ICD-10-PCS | Mod: CV19,S$GLB,, | Performed by: INTERNAL MEDICINE

## 2021-04-11 RX ADMIN — Medication 0.3 ML: at 03:04

## 2021-04-12 ENCOUNTER — TELEPHONE (OUTPATIENT)
Dept: INTERNAL MEDICINE | Facility: CLINIC | Age: 46
End: 2021-04-12

## 2021-04-13 ENCOUNTER — TELEPHONE (OUTPATIENT)
Dept: OBSTETRICS AND GYNECOLOGY | Facility: CLINIC | Age: 46
End: 2021-04-13

## 2021-04-21 ENCOUNTER — PATIENT OUTREACH (OUTPATIENT)
Dept: ADMINISTRATIVE | Facility: HOSPITAL | Age: 46
End: 2021-04-21

## 2021-04-21 ENCOUNTER — PATIENT MESSAGE (OUTPATIENT)
Dept: ADMINISTRATIVE | Facility: HOSPITAL | Age: 46
End: 2021-04-21

## 2021-04-22 DIAGNOSIS — E11.42 DIABETIC PERIPHERAL NEUROPATHY ASSOCIATED WITH TYPE 2 DIABETES MELLITUS: ICD-10-CM

## 2021-04-22 DIAGNOSIS — Z79.4 TYPE 2 DIABETES MELLITUS WITH HYPERGLYCEMIA, WITH LONG-TERM CURRENT USE OF INSULIN: ICD-10-CM

## 2021-04-22 DIAGNOSIS — E11.65 TYPE 2 DIABETES MELLITUS WITH HYPERGLYCEMIA, WITH LONG-TERM CURRENT USE OF INSULIN: ICD-10-CM

## 2021-04-22 DIAGNOSIS — F32.A DEPRESSION, UNSPECIFIED DEPRESSION TYPE: ICD-10-CM

## 2021-04-22 DIAGNOSIS — E11.59 HYPERTENSION ASSOCIATED WITH DIABETES: ICD-10-CM

## 2021-04-22 DIAGNOSIS — I15.2 HYPERTENSION ASSOCIATED WITH DIABETES: ICD-10-CM

## 2021-04-22 RX ORDER — SPIRONOLACTONE 25 MG/1
25 TABLET ORAL DAILY
Qty: 90 TABLET | Refills: 2 | Status: CANCELLED | OUTPATIENT
Start: 2021-04-22 | End: 2022-01-17

## 2021-04-22 RX ORDER — DULAGLUTIDE 1.5 MG/.5ML
1.5 INJECTION, SOLUTION SUBCUTANEOUS WEEKLY
Qty: 12 PEN | Refills: 0 | Status: CANCELLED | OUTPATIENT
Start: 2021-04-22

## 2021-04-22 RX ORDER — METOPROLOL SUCCINATE 100 MG/1
100 TABLET, EXTENDED RELEASE ORAL DAILY
Qty: 90 TABLET | Refills: 2 | Status: CANCELLED | OUTPATIENT
Start: 2021-04-22

## 2021-04-22 RX ORDER — FLUOXETINE HYDROCHLORIDE 20 MG/1
20 CAPSULE ORAL DAILY
Qty: 90 CAPSULE | Refills: 2 | Status: CANCELLED | OUTPATIENT
Start: 2021-04-22

## 2021-04-22 RX ORDER — PEN NEEDLE, DIABETIC 30 GX3/16"
NEEDLE, DISPOSABLE MISCELLANEOUS
Qty: 100 EACH | Refills: 0 | Status: CANCELLED | OUTPATIENT
Start: 2021-04-22

## 2021-05-07 ENCOUNTER — LAB VISIT (OUTPATIENT)
Dept: LAB | Facility: HOSPITAL | Age: 46
End: 2021-05-07
Attending: INTERNAL MEDICINE
Payer: COMMERCIAL

## 2021-05-07 DIAGNOSIS — E11.59 HYPERTENSION ASSOCIATED WITH DIABETES: ICD-10-CM

## 2021-05-07 DIAGNOSIS — I15.2 HYPERTENSION ASSOCIATED WITH DIABETES: ICD-10-CM

## 2021-05-07 DIAGNOSIS — E11.65 TYPE 2 DIABETES MELLITUS WITH HYPERGLYCEMIA, WITH LONG-TERM CURRENT USE OF INSULIN: ICD-10-CM

## 2021-05-07 DIAGNOSIS — Z79.4 TYPE 2 DIABETES MELLITUS WITH HYPERGLYCEMIA, WITH LONG-TERM CURRENT USE OF INSULIN: ICD-10-CM

## 2021-05-07 LAB
ALBUMIN/CREAT UR: 48.5 UG/MG (ref 0–30)
CREAT UR-MCNC: 103 MG/DL (ref 15–325)
MICROALBUMIN UR DL<=1MG/L-MCNC: 50 UG/ML

## 2021-05-07 PROCEDURE — 82570 ASSAY OF URINE CREATININE: CPT | Performed by: INTERNAL MEDICINE

## 2021-05-07 PROCEDURE — 82043 UR ALBUMIN QUANTITATIVE: CPT | Performed by: INTERNAL MEDICINE

## 2021-05-11 ENCOUNTER — OFFICE VISIT (OUTPATIENT)
Dept: OPHTHALMOLOGY | Facility: CLINIC | Age: 46
End: 2021-05-11
Payer: COMMERCIAL

## 2021-05-11 DIAGNOSIS — H35.033 HYPERTENSIVE RETINOPATHY, BILATERAL: ICD-10-CM

## 2021-05-11 PROCEDURE — 92201 OPSCPY EXTND RTA DRAW UNI/BI: CPT | Mod: S$GLB,,, | Performed by: OPHTHALMOLOGY

## 2021-05-11 PROCEDURE — 92235 FLUORESCEIN ANGIOGRAPHY - OU - BOTH EYES: ICD-10-PCS | Mod: S$GLB,,, | Performed by: OPHTHALMOLOGY

## 2021-05-11 PROCEDURE — 92014 COMPRE OPH EXAM EST PT 1/>: CPT | Mod: S$GLB,,, | Performed by: OPHTHALMOLOGY

## 2021-05-11 PROCEDURE — 92235 FLUORESCEIN ANGRPH MLTIFRAME: CPT | Mod: S$GLB,,, | Performed by: OPHTHALMOLOGY

## 2021-05-11 PROCEDURE — 92201 PR OPHTHALMOSCOPY, EXT, W/RET DRAW/SCLERAL DEPR, I&R, UNI/BI: ICD-10-PCS | Mod: S$GLB,,, | Performed by: OPHTHALMOLOGY

## 2021-05-11 PROCEDURE — 99999 PR PBB SHADOW E&M-EST. PATIENT-LVL IV: CPT | Mod: PBBFAC,,, | Performed by: OPHTHALMOLOGY

## 2021-05-11 PROCEDURE — 92134 POSTERIOR SEGMENT OCT RETINA (OCULAR COHERENCE TOMOGRAPHY)-BOTH EYES: ICD-10-PCS | Mod: S$GLB,,, | Performed by: OPHTHALMOLOGY

## 2021-05-11 PROCEDURE — 99999 PR PBB SHADOW E&M-EST. PATIENT-LVL IV: ICD-10-PCS | Mod: PBBFAC,,, | Performed by: OPHTHALMOLOGY

## 2021-05-11 PROCEDURE — 92014 PR EYE EXAM, EST PATIENT,COMPREHESV: ICD-10-PCS | Mod: S$GLB,,, | Performed by: OPHTHALMOLOGY

## 2021-05-11 PROCEDURE — 92134 CPTRZ OPH DX IMG PST SGM RTA: CPT | Mod: S$GLB,,, | Performed by: OPHTHALMOLOGY

## 2021-05-11 RX ORDER — FLUORESCEIN 500 MG/ML
5 INJECTION INTRAVENOUS ONCE
Status: SHIPPED | OUTPATIENT
Start: 2021-05-11

## 2021-05-11 RX ADMIN — FLUORESCEIN 500 MG: 500 INJECTION INTRAVENOUS at 10:05

## 2021-05-14 ENCOUNTER — OFFICE VISIT (OUTPATIENT)
Dept: INTERNAL MEDICINE | Facility: CLINIC | Age: 46
End: 2021-05-14
Payer: COMMERCIAL

## 2021-05-14 ENCOUNTER — HOSPITAL ENCOUNTER (OUTPATIENT)
Dept: RADIOLOGY | Facility: HOSPITAL | Age: 46
Discharge: HOME | End: 2021-05-14
Attending: INTERNAL MEDICINE
Payer: COMMERCIAL

## 2021-05-14 VITALS
SYSTOLIC BLOOD PRESSURE: 124 MMHG | WEIGHT: 252 LBS | RESPIRATION RATE: 16 BRPM | DIASTOLIC BLOOD PRESSURE: 72 MMHG | BODY MASS INDEX: 41.99 KG/M2 | OXYGEN SATURATION: 99 % | HEART RATE: 86 BPM | HEIGHT: 65 IN

## 2021-05-14 DIAGNOSIS — R06.02 SOB (SHORTNESS OF BREATH): Primary | ICD-10-CM

## 2021-05-14 DIAGNOSIS — R06.02 SOB (SHORTNESS OF BREATH): ICD-10-CM

## 2021-05-14 PROCEDURE — 99999 PR PBB SHADOW E&M-EST. PATIENT-LVL V: ICD-10-PCS | Mod: PBBFAC,,, | Performed by: INTERNAL MEDICINE

## 2021-05-14 PROCEDURE — 99999 PR PBB SHADOW E&M-EST. PATIENT-LVL V: CPT | Mod: PBBFAC,,, | Performed by: INTERNAL MEDICINE

## 2021-05-14 PROCEDURE — 71046 X-RAY EXAM CHEST 2 VIEWS: CPT | Mod: TC,FY,PO

## 2021-05-14 PROCEDURE — 71046 X-RAY EXAM CHEST 2 VIEWS: CPT | Mod: 26,,, | Performed by: RADIOLOGY

## 2021-05-14 PROCEDURE — 71046 XR CHEST PA AND LATERAL: ICD-10-PCS | Mod: 26,,, | Performed by: RADIOLOGY

## 2021-05-14 PROCEDURE — 93005 ELECTROCARDIOGRAM TRACING: CPT | Mod: S$GLB,,, | Performed by: INTERNAL MEDICINE

## 2021-05-14 PROCEDURE — 93010 EKG 12-LEAD: ICD-10-PCS | Mod: S$GLB,,, | Performed by: INTERNAL MEDICINE

## 2021-05-14 PROCEDURE — 93005 EKG 12-LEAD: ICD-10-PCS | Mod: S$GLB,,, | Performed by: INTERNAL MEDICINE

## 2021-05-14 PROCEDURE — 99214 PR OFFICE/OUTPT VISIT, EST, LEVL IV, 30-39 MIN: ICD-10-PCS | Mod: S$GLB,,, | Performed by: INTERNAL MEDICINE

## 2021-05-14 PROCEDURE — 99214 OFFICE O/P EST MOD 30 MIN: CPT | Mod: S$GLB,,, | Performed by: INTERNAL MEDICINE

## 2021-05-14 PROCEDURE — 93010 ELECTROCARDIOGRAM REPORT: CPT | Mod: S$GLB,,, | Performed by: INTERNAL MEDICINE

## 2021-05-14 RX ORDER — INSULIN ASPART 100 [IU]/ML
10 INJECTION, SOLUTION INTRAVENOUS; SUBCUTANEOUS DAILY
Qty: 9 ML | Refills: 0 | Status: SHIPPED | OUTPATIENT
Start: 2021-05-14 | End: 2021-06-28 | Stop reason: SDUPTHER

## 2021-05-18 ENCOUNTER — PATIENT MESSAGE (OUTPATIENT)
Dept: INTERNAL MEDICINE | Facility: CLINIC | Age: 46
End: 2021-05-18

## 2021-05-18 DIAGNOSIS — E11.42 DIABETIC PERIPHERAL NEUROPATHY ASSOCIATED WITH TYPE 2 DIABETES MELLITUS: ICD-10-CM

## 2021-05-18 DIAGNOSIS — I63.89 OTHER CEREBRAL INFARCTION: ICD-10-CM

## 2021-05-18 DIAGNOSIS — R06.02 SOB (SHORTNESS OF BREATH): ICD-10-CM

## 2021-05-18 DIAGNOSIS — Z79.4 TYPE 2 DIABETES MELLITUS WITH HYPERGLYCEMIA, WITH LONG-TERM CURRENT USE OF INSULIN: Primary | ICD-10-CM

## 2021-05-18 DIAGNOSIS — E11.65 TYPE 2 DIABETES MELLITUS WITH HYPERGLYCEMIA, WITH LONG-TERM CURRENT USE OF INSULIN: Primary | ICD-10-CM

## 2021-05-18 RX ORDER — DULAGLUTIDE 1.5 MG/.5ML
1.5 INJECTION, SOLUTION SUBCUTANEOUS WEEKLY
Qty: 12 PEN | Refills: 0 | Status: SHIPPED | OUTPATIENT
Start: 2021-05-18 | End: 2021-06-28 | Stop reason: SDUPTHER

## 2021-05-19 ENCOUNTER — TELEPHONE (OUTPATIENT)
Dept: DIABETES | Facility: CLINIC | Age: 46
End: 2021-05-19

## 2021-05-24 ENCOUNTER — HOSPITAL ENCOUNTER (OUTPATIENT)
Dept: RADIOLOGY | Facility: HOSPITAL | Age: 46
Discharge: HOME | End: 2021-05-24
Attending: INTERNAL MEDICINE
Payer: COMMERCIAL

## 2021-05-24 DIAGNOSIS — I63.89 OTHER CEREBRAL INFARCTION: ICD-10-CM

## 2021-05-24 PROCEDURE — 70450 CT HEAD/BRAIN W/O DYE: CPT | Mod: TC,PO

## 2021-06-11 ENCOUNTER — OFFICE VISIT (OUTPATIENT)
Dept: INTERNAL MEDICINE | Facility: CLINIC | Age: 46
End: 2021-06-11
Payer: COMMERCIAL

## 2021-06-11 ENCOUNTER — PATIENT MESSAGE (OUTPATIENT)
Dept: ADMINISTRATIVE | Facility: OTHER | Age: 46
End: 2021-06-11

## 2021-06-11 VITALS
SYSTOLIC BLOOD PRESSURE: 138 MMHG | BODY MASS INDEX: 44.08 KG/M2 | HEART RATE: 96 BPM | HEIGHT: 65 IN | RESPIRATION RATE: 17 BRPM | DIASTOLIC BLOOD PRESSURE: 94 MMHG | WEIGHT: 264.56 LBS | OXYGEN SATURATION: 99 %

## 2021-06-11 DIAGNOSIS — E11.65 TYPE 2 DIABETES MELLITUS WITH HYPERGLYCEMIA, WITH LONG-TERM CURRENT USE OF INSULIN: Primary | ICD-10-CM

## 2021-06-11 DIAGNOSIS — E78.5 HYPERLIPIDEMIA, UNSPECIFIED HYPERLIPIDEMIA TYPE: ICD-10-CM

## 2021-06-11 DIAGNOSIS — E11.42 DIABETIC PERIPHERAL NEUROPATHY ASSOCIATED WITH TYPE 2 DIABETES MELLITUS: ICD-10-CM

## 2021-06-11 DIAGNOSIS — I63.89 OTHER CEREBRAL INFARCTION: ICD-10-CM

## 2021-06-11 DIAGNOSIS — Z79.4 TYPE 2 DIABETES MELLITUS WITHOUT COMPLICATION, WITH LONG-TERM CURRENT USE OF INSULIN: ICD-10-CM

## 2021-06-11 DIAGNOSIS — K76.0 FATTY LIVER: ICD-10-CM

## 2021-06-11 DIAGNOSIS — Z79.4 TYPE 2 DIABETES MELLITUS WITH HYPERGLYCEMIA, WITH LONG-TERM CURRENT USE OF INSULIN: Primary | ICD-10-CM

## 2021-06-11 DIAGNOSIS — E04.2 MULTINODULAR GOITER (NONTOXIC): ICD-10-CM

## 2021-06-11 DIAGNOSIS — G47.33 OSA (OBSTRUCTIVE SLEEP APNEA): ICD-10-CM

## 2021-06-11 DIAGNOSIS — E11.9 TYPE 2 DIABETES MELLITUS WITHOUT COMPLICATION, WITH LONG-TERM CURRENT USE OF INSULIN: ICD-10-CM

## 2021-06-11 DIAGNOSIS — E11.59 HYPERTENSION ASSOCIATED WITH DIABETES: ICD-10-CM

## 2021-06-11 DIAGNOSIS — I15.2 HYPERTENSION ASSOCIATED WITH DIABETES: ICD-10-CM

## 2021-06-11 PROCEDURE — 99214 OFFICE O/P EST MOD 30 MIN: CPT | Mod: S$GLB,,, | Performed by: INTERNAL MEDICINE

## 2021-06-11 PROCEDURE — 99214 PR OFFICE/OUTPT VISIT, EST, LEVL IV, 30-39 MIN: ICD-10-PCS | Mod: S$GLB,,, | Performed by: INTERNAL MEDICINE

## 2021-06-11 PROCEDURE — 99999 PR PBB SHADOW E&M-EST. PATIENT-LVL V: ICD-10-PCS | Mod: PBBFAC,,, | Performed by: INTERNAL MEDICINE

## 2021-06-11 PROCEDURE — 99999 PR PBB SHADOW E&M-EST. PATIENT-LVL V: CPT | Mod: PBBFAC,,, | Performed by: INTERNAL MEDICINE

## 2021-06-11 PROCEDURE — 99215 OFFICE O/P EST HI 40 MIN: CPT | Mod: PBBFAC,PO | Performed by: INTERNAL MEDICINE

## 2021-06-11 RX ORDER — EMPAGLIFLOZIN 10 MG/1
10 TABLET, FILM COATED ORAL DAILY
Qty: 30 TABLET | Status: CANCELLED | OUTPATIENT
Start: 2021-06-11

## 2021-06-11 RX ORDER — INSULIN GLARGINE 100 [IU]/ML
60 INJECTION, SOLUTION SUBCUTANEOUS DAILY
Qty: 54 ML | Refills: 3 | Status: SHIPPED | OUTPATIENT
Start: 2021-06-11 | End: 2021-06-25 | Stop reason: SDUPTHER

## 2021-06-14 ENCOUNTER — TELEPHONE (OUTPATIENT)
Dept: ADMINISTRATIVE | Facility: OTHER | Age: 46
End: 2021-06-14

## 2021-06-15 ENCOUNTER — TELEPHONE (OUTPATIENT)
Dept: ADMINISTRATIVE | Facility: OTHER | Age: 46
End: 2021-06-15

## 2021-06-15 DIAGNOSIS — Z79.4 TYPE 2 DIABETES MELLITUS WITH HYPERGLYCEMIA, WITH LONG-TERM CURRENT USE OF INSULIN: Primary | ICD-10-CM

## 2021-06-15 DIAGNOSIS — E11.65 TYPE 2 DIABETES MELLITUS WITH HYPERGLYCEMIA, WITH LONG-TERM CURRENT USE OF INSULIN: Primary | ICD-10-CM

## 2021-06-16 ENCOUNTER — CLINICAL SUPPORT (OUTPATIENT)
Dept: DIABETES | Facility: CLINIC | Age: 46
End: 2021-06-16
Payer: COMMERCIAL

## 2021-06-16 DIAGNOSIS — E11.65 TYPE 2 DIABETES MELLITUS WITH HYPERGLYCEMIA, WITH LONG-TERM CURRENT USE OF INSULIN: ICD-10-CM

## 2021-06-16 DIAGNOSIS — Z79.4 TYPE 2 DIABETES MELLITUS WITH HYPERGLYCEMIA, WITH LONG-TERM CURRENT USE OF INSULIN: ICD-10-CM

## 2021-06-16 PROCEDURE — G0108 PR DIAB MANAGE TRN  PER INDIV: ICD-10-PCS | Mod: S$GLB,,, | Performed by: DIETITIAN, REGISTERED

## 2021-06-16 PROCEDURE — G0108 DIAB MANAGE TRN  PER INDIV: HCPCS | Mod: S$GLB,,, | Performed by: DIETITIAN, REGISTERED

## 2021-06-18 ENCOUNTER — OFFICE VISIT (OUTPATIENT)
Dept: PODIATRY | Facility: CLINIC | Age: 46
End: 2021-06-18
Payer: COMMERCIAL

## 2021-06-18 VITALS
DIASTOLIC BLOOD PRESSURE: 110 MMHG | WEIGHT: 257.5 LBS | HEART RATE: 98 BPM | SYSTOLIC BLOOD PRESSURE: 162 MMHG | HEIGHT: 65 IN | BODY MASS INDEX: 42.9 KG/M2 | OXYGEN SATURATION: 99 %

## 2021-06-18 DIAGNOSIS — F17.200 SMOKING: ICD-10-CM

## 2021-06-18 DIAGNOSIS — E11.9 COMPREHENSIVE DIABETIC FOOT EXAMINATION, TYPE 2 DM, ENCOUNTER FOR: ICD-10-CM

## 2021-06-18 DIAGNOSIS — E11.42 DIABETIC PERIPHERAL NEUROPATHY ASSOCIATED WITH TYPE 2 DIABETES MELLITUS: Primary | ICD-10-CM

## 2021-06-18 PROCEDURE — 99214 OFFICE O/P EST MOD 30 MIN: CPT | Mod: PBBFAC,PN | Performed by: PODIATRIST

## 2021-06-18 PROCEDURE — 99999 PR PBB SHADOW E&M-EST. PATIENT-LVL IV: ICD-10-PCS | Mod: PBBFAC,,, | Performed by: PODIATRIST

## 2021-06-18 PROCEDURE — 99213 PR OFFICE/OUTPT VISIT, EST, LEVL III, 20-29 MIN: ICD-10-PCS | Mod: S$GLB,,, | Performed by: PODIATRIST

## 2021-06-18 PROCEDURE — 99213 OFFICE O/P EST LOW 20 MIN: CPT | Mod: S$GLB,,, | Performed by: PODIATRIST

## 2021-06-18 PROCEDURE — 99999 PR PBB SHADOW E&M-EST. PATIENT-LVL IV: CPT | Mod: PBBFAC,,, | Performed by: PODIATRIST

## 2021-06-22 ENCOUNTER — OFFICE VISIT (OUTPATIENT)
Dept: OPHTHALMOLOGY | Facility: CLINIC | Age: 46
End: 2021-06-22
Payer: COMMERCIAL

## 2021-06-22 PROCEDURE — 99999 PR PBB SHADOW E&M-EST. PATIENT-LVL III: CPT | Mod: PBBFAC,,, | Performed by: OPHTHALMOLOGY

## 2021-06-22 PROCEDURE — 99999 PR PBB SHADOW E&M-EST. PATIENT-LVL III: ICD-10-PCS | Mod: PBBFAC,,, | Performed by: OPHTHALMOLOGY

## 2021-06-22 PROCEDURE — 99499 UNLISTED E&M SERVICE: CPT | Mod: S$GLB,,, | Performed by: OPHTHALMOLOGY

## 2021-06-22 PROCEDURE — 67228 TREATMENT X10SV RETINOPATHY: CPT | Mod: LT,S$GLB,, | Performed by: OPHTHALMOLOGY

## 2021-06-22 PROCEDURE — 67228 PR TREATMENT EXTENSIVE RETINOPATHY, PHOTOCOAGULATION: ICD-10-PCS | Mod: LT,S$GLB,, | Performed by: OPHTHALMOLOGY

## 2021-06-22 PROCEDURE — 99499 NO LOS: ICD-10-PCS | Mod: S$GLB,,, | Performed by: OPHTHALMOLOGY

## 2021-06-25 ENCOUNTER — TELEPHONE (OUTPATIENT)
Dept: FAMILY MEDICINE | Facility: CLINIC | Age: 46
End: 2021-06-25

## 2021-06-25 DIAGNOSIS — E11.65 TYPE 2 DIABETES MELLITUS WITH HYPERGLYCEMIA, WITH LONG-TERM CURRENT USE OF INSULIN: ICD-10-CM

## 2021-06-25 DIAGNOSIS — Z79.4 TYPE 2 DIABETES MELLITUS WITH HYPERGLYCEMIA, WITH LONG-TERM CURRENT USE OF INSULIN: ICD-10-CM

## 2021-06-25 RX ORDER — INSULIN GLARGINE 100 [IU]/ML
60 INJECTION, SOLUTION SUBCUTANEOUS DAILY
Qty: 54 ML | Refills: 3 | Status: SHIPPED | OUTPATIENT
Start: 2021-06-25 | End: 2021-07-14 | Stop reason: SDUPTHER

## 2021-06-28 DIAGNOSIS — E11.42 DIABETIC PERIPHERAL NEUROPATHY ASSOCIATED WITH TYPE 2 DIABETES MELLITUS: ICD-10-CM

## 2021-06-28 DIAGNOSIS — Z79.4 TYPE 2 DIABETES MELLITUS WITH HYPERGLYCEMIA, WITH LONG-TERM CURRENT USE OF INSULIN: ICD-10-CM

## 2021-06-28 DIAGNOSIS — E78.5 HYPERLIPIDEMIA, UNSPECIFIED HYPERLIPIDEMIA TYPE: ICD-10-CM

## 2021-06-28 DIAGNOSIS — E11.65 TYPE 2 DIABETES MELLITUS WITH HYPERGLYCEMIA, WITH LONG-TERM CURRENT USE OF INSULIN: ICD-10-CM

## 2021-06-28 DIAGNOSIS — R06.02 SOB (SHORTNESS OF BREATH): ICD-10-CM

## 2021-06-28 DIAGNOSIS — E11.59 HYPERTENSION ASSOCIATED WITH DIABETES: ICD-10-CM

## 2021-06-28 DIAGNOSIS — F32.A DEPRESSION, UNSPECIFIED DEPRESSION TYPE: ICD-10-CM

## 2021-06-28 DIAGNOSIS — I15.2 HYPERTENSION ASSOCIATED WITH DIABETES: ICD-10-CM

## 2021-06-28 RX ORDER — INSULIN ASPART 100 [IU]/ML
10 INJECTION, SOLUTION INTRAVENOUS; SUBCUTANEOUS DAILY
Qty: 9 ML | Refills: 1 | Status: SHIPPED | OUTPATIENT
Start: 2021-06-28 | End: 2021-07-14 | Stop reason: SDUPTHER

## 2021-06-28 RX ORDER — ATORVASTATIN CALCIUM 80 MG/1
80 TABLET, FILM COATED ORAL NIGHTLY
Qty: 90 TABLET | Refills: 3 | Status: SHIPPED | OUTPATIENT
Start: 2021-06-28 | End: 2021-07-16 | Stop reason: SDUPTHER

## 2021-06-28 RX ORDER — PANTOPRAZOLE SODIUM 40 MG/1
40 TABLET, DELAYED RELEASE ORAL DAILY
Qty: 90 TABLET | Refills: 3 | Status: SHIPPED | OUTPATIENT
Start: 2021-06-28 | End: 2021-07-16 | Stop reason: SDUPTHER

## 2021-06-28 RX ORDER — METFORMIN HYDROCHLORIDE 1000 MG/1
1000 TABLET ORAL 2 TIMES DAILY
Qty: 180 TABLET | Refills: 1 | Status: SHIPPED | OUTPATIENT
Start: 2021-06-28 | End: 2021-07-16 | Stop reason: SDUPTHER

## 2021-06-28 RX ORDER — SPIRONOLACTONE 25 MG/1
25 TABLET ORAL DAILY
Qty: 90 TABLET | Refills: 1 | Status: SHIPPED | OUTPATIENT
Start: 2021-06-28 | End: 2021-07-09 | Stop reason: SDUPTHER

## 2021-06-28 RX ORDER — GLUCOSAM/CHON-MSM1/C/MANG/BOSW 500-416.6
1 TABLET ORAL 2 TIMES DAILY
Qty: 200 EACH | Refills: 3 | Status: SHIPPED | OUTPATIENT
Start: 2021-06-28 | End: 2021-07-14 | Stop reason: SDUPTHER

## 2021-06-28 RX ORDER — PEN NEEDLE, DIABETIC 30 GX3/16"
NEEDLE, DISPOSABLE MISCELLANEOUS
Qty: 100 EACH | Refills: 3 | Status: SHIPPED | OUTPATIENT
Start: 2021-06-28 | End: 2021-07-14 | Stop reason: SDUPTHER

## 2021-06-28 RX ORDER — DULAGLUTIDE 1.5 MG/.5ML
1.5 INJECTION, SOLUTION SUBCUTANEOUS WEEKLY
Qty: 12 PEN | Refills: 1 | Status: SHIPPED | OUTPATIENT
Start: 2021-06-28 | End: 2021-07-09 | Stop reason: SDUPTHER

## 2021-06-28 RX ORDER — EZETIMIBE 10 MG/1
10 TABLET ORAL DAILY
Qty: 90 TABLET | Refills: 3 | Status: SHIPPED | OUTPATIENT
Start: 2021-06-28 | End: 2021-07-16 | Stop reason: SDUPTHER

## 2021-06-28 RX ORDER — BENAZEPRIL HYDROCHLORIDE AND HYDROCHLOROTHIAZIDE 20; 12.5 MG/1; MG/1
1 TABLET ORAL DAILY
Qty: 90 TABLET | Refills: 1 | Status: SHIPPED | OUTPATIENT
Start: 2021-06-28 | End: 2021-07-09 | Stop reason: SDUPTHER

## 2021-06-28 RX ORDER — NIFEDIPINE 60 MG/1
TABLET, EXTENDED RELEASE ORAL
Qty: 90 TABLET | Refills: 1 | Status: SHIPPED | OUTPATIENT
Start: 2021-06-28 | End: 2021-07-09 | Stop reason: SDUPTHER

## 2021-06-28 RX ORDER — METOPROLOL SUCCINATE 100 MG/1
100 TABLET, EXTENDED RELEASE ORAL DAILY
Qty: 90 TABLET | Refills: 1 | Status: SHIPPED | OUTPATIENT
Start: 2021-06-28 | End: 2021-07-09 | Stop reason: SDUPTHER

## 2021-06-28 RX ORDER — FLUOXETINE HYDROCHLORIDE 20 MG/1
20 CAPSULE ORAL DAILY
Qty: 90 CAPSULE | Refills: 3 | Status: SHIPPED | OUTPATIENT
Start: 2021-06-28 | End: 2021-07-09

## 2021-07-01 DIAGNOSIS — E11.42 DIABETIC PERIPHERAL NEUROPATHY ASSOCIATED WITH TYPE 2 DIABETES MELLITUS: ICD-10-CM

## 2021-07-01 RX ORDER — DULAGLUTIDE 1.5 MG/.5ML
1.5 INJECTION, SOLUTION SUBCUTANEOUS WEEKLY
Qty: 12 PEN | Refills: 1 | Status: CANCELLED | OUTPATIENT
Start: 2021-07-01

## 2021-07-02 ENCOUNTER — TELEPHONE (OUTPATIENT)
Dept: ADMINISTRATIVE | Facility: HOSPITAL | Age: 46
End: 2021-07-02

## 2021-07-02 ENCOUNTER — TELEPHONE (OUTPATIENT)
Dept: PODIATRY | Facility: CLINIC | Age: 46
End: 2021-07-02

## 2021-07-06 ENCOUNTER — TELEPHONE (OUTPATIENT)
Dept: SLEEP MEDICINE | Facility: CLINIC | Age: 46
End: 2021-07-06

## 2021-07-06 RX ORDER — PEN NEEDLE, DIABETIC 30 GX3/16"
NEEDLE, DISPOSABLE MISCELLANEOUS
Qty: 100 EACH | Refills: 0 | Status: SHIPPED | OUTPATIENT
Start: 2021-07-06 | End: 2021-07-16 | Stop reason: SDUPTHER

## 2021-07-09 ENCOUNTER — OFFICE VISIT (OUTPATIENT)
Dept: INTERNAL MEDICINE | Facility: CLINIC | Age: 46
End: 2021-07-09
Payer: COMMERCIAL

## 2021-07-09 VITALS
BODY MASS INDEX: 42.76 KG/M2 | DIASTOLIC BLOOD PRESSURE: 76 MMHG | SYSTOLIC BLOOD PRESSURE: 120 MMHG | HEIGHT: 65 IN | OXYGEN SATURATION: 99 % | HEART RATE: 96 BPM | RESPIRATION RATE: 18 BRPM | WEIGHT: 256.63 LBS

## 2021-07-09 DIAGNOSIS — E11.59 HYPERTENSION ASSOCIATED WITH DIABETES: Primary | ICD-10-CM

## 2021-07-09 DIAGNOSIS — E04.1 THYROID NODULE: ICD-10-CM

## 2021-07-09 DIAGNOSIS — R87.618 ABNORMAL PAPANICOLAOU SMEAR OF CERVIX WITH POSITIVE HUMAN PAPILLOMA VIRUS (HPV) TEST: ICD-10-CM

## 2021-07-09 DIAGNOSIS — F33.0 MILD EPISODE OF RECURRENT MAJOR DEPRESSIVE DISORDER: ICD-10-CM

## 2021-07-09 DIAGNOSIS — E11.42 DIABETIC PERIPHERAL NEUROPATHY ASSOCIATED WITH TYPE 2 DIABETES MELLITUS: ICD-10-CM

## 2021-07-09 DIAGNOSIS — E66.01 CLASS 3 SEVERE OBESITY DUE TO EXCESS CALORIES WITH SERIOUS COMORBIDITY AND BODY MASS INDEX (BMI) OF 40.0 TO 44.9 IN ADULT: ICD-10-CM

## 2021-07-09 DIAGNOSIS — I15.2 HYPERTENSION ASSOCIATED WITH DIABETES: Primary | ICD-10-CM

## 2021-07-09 DIAGNOSIS — F32.A DEPRESSION, UNSPECIFIED DEPRESSION TYPE: ICD-10-CM

## 2021-07-09 PROBLEM — S21.209A BACK WOUND: Status: RESOLVED | Noted: 2018-12-17 | Resolved: 2021-07-09

## 2021-07-09 PROBLEM — Z91.199 POOR COMPLIANCE: Status: RESOLVED | Noted: 2017-12-13 | Resolved: 2021-07-09

## 2021-07-09 PROBLEM — Z01.00 DIABETIC EYE EXAM: Status: RESOLVED | Noted: 2017-03-15 | Resolved: 2021-07-09

## 2021-07-09 PROBLEM — R16.0 HEPATOMEGALY: Status: RESOLVED | Noted: 2017-03-08 | Resolved: 2021-07-09

## 2021-07-09 PROBLEM — R80.9 MICROALBUMINURIA: Status: RESOLVED | Noted: 2018-08-30 | Resolved: 2021-07-09

## 2021-07-09 PROBLEM — N91.2 AMENORRHEA: Status: RESOLVED | Noted: 2018-12-17 | Resolved: 2021-07-09

## 2021-07-09 PROBLEM — E11.9 DIABETIC EYE EXAM: Status: RESOLVED | Noted: 2017-03-15 | Resolved: 2021-07-09

## 2021-07-09 PROBLEM — Z23 NEEDS FLU SHOT: Status: RESOLVED | Noted: 2017-11-08 | Resolved: 2021-07-09

## 2021-07-09 PROBLEM — K80.50 BILIARY COLIC: Status: RESOLVED | Noted: 2017-03-08 | Resolved: 2021-07-09

## 2021-07-09 PROBLEM — R10.13 DYSPEPSIA: Status: RESOLVED | Noted: 2017-02-06 | Resolved: 2021-07-09

## 2021-07-09 PROBLEM — E04.2 MULTINODULAR GOITER (NONTOXIC): Status: RESOLVED | Noted: 2017-08-03 | Resolved: 2021-07-09

## 2021-07-09 PROCEDURE — 99999 PR PBB SHADOW E&M-EST. PATIENT-LVL IV: CPT | Mod: PBBFAC,,, | Performed by: INTERNAL MEDICINE

## 2021-07-09 PROCEDURE — 99214 PR OFFICE/OUTPT VISIT, EST, LEVL IV, 30-39 MIN: ICD-10-PCS | Mod: S$GLB,,, | Performed by: INTERNAL MEDICINE

## 2021-07-09 PROCEDURE — 99214 OFFICE O/P EST MOD 30 MIN: CPT | Mod: S$GLB,,, | Performed by: INTERNAL MEDICINE

## 2021-07-09 PROCEDURE — 99214 OFFICE O/P EST MOD 30 MIN: CPT | Mod: PBBFAC,PO | Performed by: INTERNAL MEDICINE

## 2021-07-09 PROCEDURE — 99999 PR PBB SHADOW E&M-EST. PATIENT-LVL IV: ICD-10-PCS | Mod: PBBFAC,,, | Performed by: INTERNAL MEDICINE

## 2021-07-09 RX ORDER — METOPROLOL SUCCINATE 100 MG/1
100 TABLET, EXTENDED RELEASE ORAL DAILY
Qty: 90 TABLET | Refills: 1 | Status: SHIPPED | OUTPATIENT
Start: 2021-07-09 | End: 2021-07-14 | Stop reason: SDUPTHER

## 2021-07-09 RX ORDER — FLUOXETINE HYDROCHLORIDE 40 MG/1
40 CAPSULE ORAL DAILY
Qty: 90 CAPSULE | Refills: 0 | Status: SHIPPED | OUTPATIENT
Start: 2021-07-09 | End: 2021-07-14 | Stop reason: SDUPTHER

## 2021-07-09 RX ORDER — SPIRONOLACTONE 25 MG/1
25 TABLET ORAL DAILY
Qty: 90 TABLET | Refills: 1 | Status: SHIPPED | OUTPATIENT
Start: 2021-07-09 | End: 2021-07-14 | Stop reason: SDUPTHER

## 2021-07-09 RX ORDER — DULAGLUTIDE 1.5 MG/.5ML
1.5 INJECTION, SOLUTION SUBCUTANEOUS WEEKLY
Qty: 12 PEN | Refills: 1 | Status: SHIPPED | OUTPATIENT
Start: 2021-07-09 | End: 2021-07-14 | Stop reason: SDUPTHER

## 2021-07-09 RX ORDER — NIFEDIPINE 60 MG/1
TABLET, EXTENDED RELEASE ORAL
Qty: 90 TABLET | Refills: 1 | Status: SHIPPED | OUTPATIENT
Start: 2021-07-09 | End: 2021-07-14 | Stop reason: SDUPTHER

## 2021-07-09 RX ORDER — BENAZEPRIL HYDROCHLORIDE AND HYDROCHLOROTHIAZIDE 20; 12.5 MG/1; MG/1
1 TABLET ORAL DAILY
Qty: 90 TABLET | Refills: 1 | Status: SHIPPED | OUTPATIENT
Start: 2021-07-09 | End: 2021-07-15 | Stop reason: SDUPTHER

## 2021-07-12 ENCOUNTER — PATIENT MESSAGE (OUTPATIENT)
Dept: PODIATRY | Facility: CLINIC | Age: 46
End: 2021-07-12

## 2021-07-12 ENCOUNTER — TELEPHONE (OUTPATIENT)
Dept: PODIATRY | Facility: CLINIC | Age: 46
End: 2021-07-12

## 2021-07-14 DIAGNOSIS — I15.2 HYPERTENSION ASSOCIATED WITH DIABETES: ICD-10-CM

## 2021-07-14 DIAGNOSIS — E11.42 DIABETIC PERIPHERAL NEUROPATHY ASSOCIATED WITH TYPE 2 DIABETES MELLITUS: ICD-10-CM

## 2021-07-14 DIAGNOSIS — F32.A DEPRESSION, UNSPECIFIED DEPRESSION TYPE: ICD-10-CM

## 2021-07-14 DIAGNOSIS — E78.5 HYPERLIPIDEMIA, UNSPECIFIED HYPERLIPIDEMIA TYPE: ICD-10-CM

## 2021-07-14 DIAGNOSIS — E11.59 HYPERTENSION ASSOCIATED WITH DIABETES: ICD-10-CM

## 2021-07-14 DIAGNOSIS — E11.65 TYPE 2 DIABETES MELLITUS WITH HYPERGLYCEMIA, WITH LONG-TERM CURRENT USE OF INSULIN: ICD-10-CM

## 2021-07-14 DIAGNOSIS — R06.02 SOB (SHORTNESS OF BREATH): ICD-10-CM

## 2021-07-14 DIAGNOSIS — Z79.4 TYPE 2 DIABETES MELLITUS WITH HYPERGLYCEMIA, WITH LONG-TERM CURRENT USE OF INSULIN: ICD-10-CM

## 2021-07-14 RX ORDER — GLUCOSAM/CHON-MSM1/C/MANG/BOSW 500-416.6
1 TABLET ORAL 2 TIMES DAILY
Qty: 200 EACH | Refills: 3 | Status: CANCELLED | OUTPATIENT
Start: 2021-07-14

## 2021-07-14 RX ORDER — PEN NEEDLE, DIABETIC 30 GX3/16"
NEEDLE, DISPOSABLE MISCELLANEOUS
Qty: 100 EACH | Refills: 0 | Status: CANCELLED | OUTPATIENT
Start: 2021-07-14

## 2021-07-15 ENCOUNTER — HOSPITAL ENCOUNTER (OUTPATIENT)
Dept: RADIOLOGY | Facility: HOSPITAL | Age: 46
Discharge: HOME | End: 2021-07-15
Attending: INTERNAL MEDICINE
Payer: COMMERCIAL

## 2021-07-15 DIAGNOSIS — E04.2 MULTINODULAR GOITER (NONTOXIC): ICD-10-CM

## 2021-07-15 PROCEDURE — 76536 US SOFT TISSUE HEAD NECK THYROID: ICD-10-PCS | Mod: 26,,, | Performed by: RADIOLOGY

## 2021-07-15 PROCEDURE — 76536 US EXAM OF HEAD AND NECK: CPT | Mod: TC

## 2021-07-15 PROCEDURE — 76536 US EXAM OF HEAD AND NECK: CPT | Mod: 26,,, | Performed by: RADIOLOGY

## 2021-07-15 RX ORDER — NIFEDIPINE 60 MG/1
TABLET, EXTENDED RELEASE ORAL
Qty: 90 TABLET | Refills: 3 | Status: SHIPPED | OUTPATIENT
Start: 2021-07-15 | End: 2021-07-16 | Stop reason: SDUPTHER

## 2021-07-15 RX ORDER — PEN NEEDLE, DIABETIC 30 GX3/16"
NEEDLE, DISPOSABLE MISCELLANEOUS
Qty: 200 EACH | Refills: 0 | Status: SHIPPED | OUTPATIENT
Start: 2021-07-15 | End: 2021-07-16 | Stop reason: SDUPTHER

## 2021-07-15 RX ORDER — LANCETS 28 GAUGE
EACH MISCELLANEOUS
Qty: 200 EACH | Refills: 3 | Status: SHIPPED | OUTPATIENT
Start: 2021-07-15 | End: 2021-07-16 | Stop reason: SDUPTHER

## 2021-07-15 RX ORDER — EZETIMIBE 10 MG/1
10 TABLET ORAL DAILY
Qty: 90 TABLET | Refills: 3 | OUTPATIENT
Start: 2021-07-15

## 2021-07-15 RX ORDER — DULAGLUTIDE 1.5 MG/.5ML
1.5 INJECTION, SOLUTION SUBCUTANEOUS WEEKLY
Qty: 12 PEN | Refills: 0 | Status: SHIPPED | OUTPATIENT
Start: 2021-07-15 | End: 2021-07-16 | Stop reason: SDUPTHER

## 2021-07-15 RX ORDER — INSULIN ASPART 100 [IU]/ML
10 INJECTION, SOLUTION INTRAVENOUS; SUBCUTANEOUS DAILY
Qty: 15 ML | Refills: 0 | Status: SHIPPED | OUTPATIENT
Start: 2021-07-15 | End: 2021-07-16 | Stop reason: SDUPTHER

## 2021-07-15 RX ORDER — BENAZEPRIL HYDROCHLORIDE AND HYDROCHLOROTHIAZIDE 20; 12.5 MG/1; MG/1
1 TABLET ORAL DAILY
Qty: 90 TABLET | Refills: 1 | OUTPATIENT
Start: 2021-07-15

## 2021-07-15 RX ORDER — ATORVASTATIN CALCIUM 80 MG/1
80 TABLET, FILM COATED ORAL NIGHTLY
Qty: 90 TABLET | Refills: 3 | OUTPATIENT
Start: 2021-07-15

## 2021-07-15 RX ORDER — PANTOPRAZOLE SODIUM 40 MG/1
40 TABLET, DELAYED RELEASE ORAL DAILY
Qty: 90 TABLET | Refills: 3 | OUTPATIENT
Start: 2021-07-15 | End: 2022-07-15

## 2021-07-15 RX ORDER — METOPROLOL SUCCINATE 100 MG/1
100 TABLET, EXTENDED RELEASE ORAL DAILY
Qty: 90 TABLET | Refills: 0 | Status: SHIPPED | OUTPATIENT
Start: 2021-07-15 | End: 2021-07-16 | Stop reason: SDUPTHER

## 2021-07-15 RX ORDER — SPIRONOLACTONE 25 MG/1
25 TABLET ORAL DAILY
Qty: 90 TABLET | Refills: 1 | Status: SHIPPED | OUTPATIENT
Start: 2021-07-15 | End: 2021-07-16 | Stop reason: SDUPTHER

## 2021-07-15 RX ORDER — METFORMIN HYDROCHLORIDE 1000 MG/1
1000 TABLET ORAL 2 TIMES DAILY
Qty: 180 TABLET | Refills: 1 | OUTPATIENT
Start: 2021-07-15

## 2021-07-15 RX ORDER — INSULIN GLARGINE 100 [IU]/ML
60 INJECTION, SOLUTION SUBCUTANEOUS DAILY
Qty: 60 ML | Refills: 0 | Status: SHIPPED | OUTPATIENT
Start: 2021-07-15 | End: 2021-07-16 | Stop reason: SDUPTHER

## 2021-07-15 RX ORDER — FLUOXETINE HYDROCHLORIDE 40 MG/1
40 CAPSULE ORAL DAILY
Qty: 90 CAPSULE | Refills: 0 | Status: SHIPPED | OUTPATIENT
Start: 2021-07-15 | End: 2021-07-16 | Stop reason: SDUPTHER

## 2021-07-15 RX ORDER — BENAZEPRIL HYDROCHLORIDE AND HYDROCHLOROTHIAZIDE 20; 12.5 MG/1; MG/1
1 TABLET ORAL DAILY
Qty: 90 TABLET | Refills: 3 | Status: SHIPPED | OUTPATIENT
Start: 2021-07-15 | End: 2021-07-16 | Stop reason: SDUPTHER

## 2021-07-22 ENCOUNTER — TELEPHONE (OUTPATIENT)
Dept: INTERNAL MEDICINE | Facility: CLINIC | Age: 46
End: 2021-07-22

## 2021-07-22 DIAGNOSIS — E11.42 DIABETIC PERIPHERAL NEUROPATHY ASSOCIATED WITH TYPE 2 DIABETES MELLITUS: Primary | ICD-10-CM

## 2021-07-27 ENCOUNTER — PATIENT MESSAGE (OUTPATIENT)
Dept: PODIATRY | Facility: CLINIC | Age: 46
End: 2021-07-27

## 2021-08-02 ENCOUNTER — TELEPHONE (OUTPATIENT)
Dept: INTERNAL MEDICINE | Facility: CLINIC | Age: 46
End: 2021-08-02

## 2021-08-02 ENCOUNTER — PATIENT MESSAGE (OUTPATIENT)
Dept: ADMINISTRATIVE | Facility: HOSPITAL | Age: 46
End: 2021-08-02

## 2021-08-02 ENCOUNTER — PATIENT OUTREACH (OUTPATIENT)
Dept: ADMINISTRATIVE | Facility: HOSPITAL | Age: 46
End: 2021-08-02

## 2021-08-03 ENCOUNTER — OFFICE VISIT (OUTPATIENT)
Dept: OPHTHALMOLOGY | Facility: CLINIC | Age: 46
End: 2021-08-03
Payer: COMMERCIAL

## 2021-08-03 PROCEDURE — 67228 TREATMENT X10SV RETINOPATHY: CPT | Mod: LT,S$GLB,, | Performed by: OPHTHALMOLOGY

## 2021-08-03 PROCEDURE — 99499 NO LOS: ICD-10-PCS | Mod: S$GLB,,, | Performed by: OPHTHALMOLOGY

## 2021-08-03 PROCEDURE — 99499 UNLISTED E&M SERVICE: CPT | Mod: S$GLB,,, | Performed by: OPHTHALMOLOGY

## 2021-08-03 PROCEDURE — 67228 PR TREATMENT EXTENSIVE RETINOPATHY, PHOTOCOAGULATION: ICD-10-PCS | Mod: LT,S$GLB,, | Performed by: OPHTHALMOLOGY

## 2021-08-03 PROCEDURE — 99999 PR PBB SHADOW E&M-EST. PATIENT-LVL II: ICD-10-PCS | Mod: PBBFAC,,, | Performed by: OPHTHALMOLOGY

## 2021-08-03 PROCEDURE — 99999 PR PBB SHADOW E&M-EST. PATIENT-LVL II: CPT | Mod: PBBFAC,,, | Performed by: OPHTHALMOLOGY

## 2021-08-16 ENCOUNTER — TELEPHONE (OUTPATIENT)
Dept: ADMINISTRATIVE | Facility: HOSPITAL | Age: 46
End: 2021-08-16

## 2021-10-04 ENCOUNTER — PATIENT MESSAGE (OUTPATIENT)
Dept: ADMINISTRATIVE | Facility: HOSPITAL | Age: 46
End: 2021-10-04

## 2021-10-12 ENCOUNTER — TELEPHONE (OUTPATIENT)
Dept: OBSTETRICS AND GYNECOLOGY | Facility: CLINIC | Age: 46
End: 2021-10-12

## 2021-10-12 DIAGNOSIS — Z12.31 ENCOUNTER FOR SCREENING MAMMOGRAM FOR MALIGNANT NEOPLASM OF BREAST: Primary | ICD-10-CM

## 2021-11-02 DIAGNOSIS — E11.42 DIABETIC PERIPHERAL NEUROPATHY ASSOCIATED WITH TYPE 2 DIABETES MELLITUS: ICD-10-CM

## 2021-11-04 ENCOUNTER — PATIENT OUTREACH (OUTPATIENT)
Dept: ADMINISTRATIVE | Facility: OTHER | Age: 46
End: 2021-11-04
Payer: MEDICAID

## 2021-11-04 DIAGNOSIS — E11.42 DIABETIC PERIPHERAL NEUROPATHY ASSOCIATED WITH TYPE 2 DIABETES MELLITUS: ICD-10-CM

## 2021-11-05 ENCOUNTER — PATIENT MESSAGE (OUTPATIENT)
Dept: ADMINISTRATIVE | Facility: HOSPITAL | Age: 46
End: 2021-11-05
Payer: MEDICAID

## 2021-11-08 ENCOUNTER — OFFICE VISIT (OUTPATIENT)
Dept: OBSTETRICS AND GYNECOLOGY | Facility: CLINIC | Age: 46
End: 2021-11-08
Payer: COMMERCIAL

## 2021-11-08 VITALS — DIASTOLIC BLOOD PRESSURE: 78 MMHG | WEIGHT: 253.5 LBS | BODY MASS INDEX: 42.19 KG/M2 | SYSTOLIC BLOOD PRESSURE: 128 MMHG

## 2021-11-08 DIAGNOSIS — Z91.89 PERSONAL HISTORY PRESENTING HAZARDS TO HEALTH: ICD-10-CM

## 2021-11-08 DIAGNOSIS — N76.2 ACUTE VULVITIS: ICD-10-CM

## 2021-11-08 DIAGNOSIS — R87.810 CERVICAL HIGH RISK HPV (HUMAN PAPILLOMAVIRUS) TEST POSITIVE: Primary | ICD-10-CM

## 2021-11-08 DIAGNOSIS — N90.89 VULVAR LESION: ICD-10-CM

## 2021-11-08 DIAGNOSIS — Z11.3 SCREEN FOR STD (SEXUALLY TRANSMITTED DISEASE): ICD-10-CM

## 2021-11-08 DIAGNOSIS — R87.619 ABNORMAL CERVICAL PAPANICOLAOU SMEAR, UNSPECIFIED ABNORMAL PAP FINDING: ICD-10-CM

## 2021-11-08 PROCEDURE — 88141 CYTOPATH C/V INTERPRET: CPT | Mod: ,,, | Performed by: PATHOLOGY

## 2021-11-08 PROCEDURE — 99214 PR OFFICE/OUTPT VISIT, EST, LEVL IV, 30-39 MIN: ICD-10-PCS | Mod: S$GLB,,, | Performed by: OBSTETRICS & GYNECOLOGY

## 2021-11-08 PROCEDURE — 87591 N.GONORRHOEAE DNA AMP PROB: CPT | Mod: 59 | Performed by: OBSTETRICS & GYNECOLOGY

## 2021-11-08 PROCEDURE — 87491 CHLMYD TRACH DNA AMP PROBE: CPT | Mod: 59 | Performed by: OBSTETRICS & GYNECOLOGY

## 2021-11-08 PROCEDURE — 88175 CYTOPATH C/V AUTO FLUID REDO: CPT | Performed by: PATHOLOGY

## 2021-11-08 PROCEDURE — 99214 OFFICE O/P EST MOD 30 MIN: CPT | Mod: S$GLB,,, | Performed by: OBSTETRICS & GYNECOLOGY

## 2021-11-08 PROCEDURE — 99999 PR PBB SHADOW E&M-EST. PATIENT-LVL III: ICD-10-PCS | Mod: PBBFAC,,, | Performed by: OBSTETRICS & GYNECOLOGY

## 2021-11-08 PROCEDURE — 99999 PR PBB SHADOW E&M-EST. PATIENT-LVL III: CPT | Mod: PBBFAC,,, | Performed by: OBSTETRICS & GYNECOLOGY

## 2021-11-08 PROCEDURE — 88141 PR  CYTOPATH CERV/VAG INTERPRET: ICD-10-PCS | Mod: ,,, | Performed by: PATHOLOGY

## 2021-11-08 PROCEDURE — 87624 HPV HI-RISK TYP POOLED RSLT: CPT | Performed by: OBSTETRICS & GYNECOLOGY

## 2021-11-08 PROCEDURE — 99213 OFFICE O/P EST LOW 20 MIN: CPT | Mod: PBBFAC,PO | Performed by: OBSTETRICS & GYNECOLOGY

## 2021-11-08 PROCEDURE — 87481 CANDIDA DNA AMP PROBE: CPT | Mod: 59 | Performed by: OBSTETRICS & GYNECOLOGY

## 2021-11-09 ENCOUNTER — OFFICE VISIT (OUTPATIENT)
Dept: OPHTHALMOLOGY | Facility: CLINIC | Age: 46
End: 2021-11-09
Payer: COMMERCIAL

## 2021-11-09 DIAGNOSIS — H35.033 HYPERTENSIVE RETINOPATHY, BILATERAL: ICD-10-CM

## 2021-11-09 PROCEDURE — 99213 OFFICE O/P EST LOW 20 MIN: CPT | Mod: PBBFAC | Performed by: OPHTHALMOLOGY

## 2021-11-09 PROCEDURE — 92014 PR EYE EXAM, EST PATIENT,COMPREHESV: ICD-10-PCS | Mod: S$PBB,,, | Performed by: OPHTHALMOLOGY

## 2021-11-09 PROCEDURE — 92201 OPSCPY EXTND RTA DRAW UNI/BI: CPT | Mod: S$PBB,,, | Performed by: OPHTHALMOLOGY

## 2021-11-09 PROCEDURE — 92134 POSTERIOR SEGMENT OCT RETINA (OCULAR COHERENCE TOMOGRAPHY)-BOTH EYES: ICD-10-PCS | Mod: 26,S$PBB,, | Performed by: OPHTHALMOLOGY

## 2021-11-09 PROCEDURE — 92134 CPTRZ OPH DX IMG PST SGM RTA: CPT | Mod: PBBFAC | Performed by: OPHTHALMOLOGY

## 2021-11-09 PROCEDURE — 92201 PR OPHTHALMOSCOPY, EXT, W/RET DRAW/SCLERAL DEPR, I&R, UNI/BI: ICD-10-PCS | Mod: S$PBB,,, | Performed by: OPHTHALMOLOGY

## 2021-11-09 PROCEDURE — 92014 COMPRE OPH EXAM EST PT 1/>: CPT | Mod: S$PBB,,, | Performed by: OPHTHALMOLOGY

## 2021-11-09 PROCEDURE — 99999 PR PBB SHADOW E&M-EST. PATIENT-LVL III: ICD-10-PCS | Mod: PBBFAC,,, | Performed by: OPHTHALMOLOGY

## 2021-11-09 PROCEDURE — 99999 PR PBB SHADOW E&M-EST. PATIENT-LVL III: CPT | Mod: PBBFAC,,, | Performed by: OPHTHALMOLOGY

## 2021-11-09 PROCEDURE — 92201 OPSCPY EXTND RTA DRAW UNI/BI: CPT | Mod: PBBFAC | Performed by: OPHTHALMOLOGY

## 2021-11-09 RX ORDER — FLUORESCEIN 500 MG/ML
5 INJECTION INTRAVENOUS ONCE
Status: COMPLETED | OUTPATIENT
Start: 2021-11-09 | End: 2022-05-10

## 2021-11-11 ENCOUNTER — OFFICE VISIT (OUTPATIENT)
Dept: OBSTETRICS AND GYNECOLOGY | Facility: CLINIC | Age: 46
End: 2021-11-11
Payer: COMMERCIAL

## 2021-11-11 VITALS — DIASTOLIC BLOOD PRESSURE: 72 MMHG | WEIGHT: 253.5 LBS | BODY MASS INDEX: 42.19 KG/M2 | SYSTOLIC BLOOD PRESSURE: 120 MMHG

## 2021-11-11 DIAGNOSIS — N90.89 VULVAR LESION: Primary | ICD-10-CM

## 2021-11-11 LAB
C TRACH DNA SPEC QL NAA+PROBE: NOT DETECTED
N GONORRHOEA DNA SPEC QL NAA+PROBE: NOT DETECTED

## 2021-11-11 PROCEDURE — 88312 SPECIAL STAINS GROUP 1: CPT | Performed by: PATHOLOGY

## 2021-11-11 PROCEDURE — 88342 CHG IMMUNOCYTOCHEMISTRY: ICD-10-PCS | Mod: 26,,, | Performed by: PATHOLOGY

## 2021-11-11 PROCEDURE — 99499 NO LOS: ICD-10-PCS | Mod: S$GLB,,, | Performed by: OBSTETRICS & GYNECOLOGY

## 2021-11-11 PROCEDURE — 88305 TISSUE EXAM BY PATHOLOGIST: CPT | Mod: 26,,, | Performed by: PATHOLOGY

## 2021-11-11 PROCEDURE — 88305 TISSUE EXAM BY PATHOLOGIST: ICD-10-PCS | Mod: 26,,, | Performed by: PATHOLOGY

## 2021-11-11 PROCEDURE — 88342 IMHCHEM/IMCYTCHM 1ST ANTB: CPT | Mod: 26,,, | Performed by: PATHOLOGY

## 2021-11-11 PROCEDURE — 99999 PR PBB SHADOW E&M-EST. PATIENT-LVL III: ICD-10-PCS | Mod: PBBFAC,,, | Performed by: OBSTETRICS & GYNECOLOGY

## 2021-11-11 PROCEDURE — 56605 BIOPSY OF VULVA/PERINEUM: CPT | Mod: PBBFAC,PO | Performed by: OBSTETRICS & GYNECOLOGY

## 2021-11-11 PROCEDURE — 56605 BIOPSY OF VULVA/PERINEUM: CPT | Mod: S$GLB,,, | Performed by: OBSTETRICS & GYNECOLOGY

## 2021-11-11 PROCEDURE — 88312 SPECIAL STAINS GROUP 1: CPT | Mod: 26,,, | Performed by: PATHOLOGY

## 2021-11-11 PROCEDURE — 99213 OFFICE O/P EST LOW 20 MIN: CPT | Mod: PBBFAC,25,PO | Performed by: OBSTETRICS & GYNECOLOGY

## 2021-11-11 PROCEDURE — 88305 TISSUE EXAM BY PATHOLOGIST: CPT | Performed by: PATHOLOGY

## 2021-11-11 PROCEDURE — 99999 PR PBB SHADOW E&M-EST. PATIENT-LVL III: CPT | Mod: PBBFAC,,, | Performed by: OBSTETRICS & GYNECOLOGY

## 2021-11-11 PROCEDURE — 88342 IMHCHEM/IMCYTCHM 1ST ANTB: CPT | Performed by: PATHOLOGY

## 2021-11-11 PROCEDURE — 99499 UNLISTED E&M SERVICE: CPT | Mod: S$GLB,,, | Performed by: OBSTETRICS & GYNECOLOGY

## 2021-11-11 PROCEDURE — 88312 PR  SPECIAL STAINS,GROUP I: ICD-10-PCS | Mod: 26,,, | Performed by: PATHOLOGY

## 2021-11-11 PROCEDURE — 56605 PR BIOPSY VULVA/PERINEUM,ONE LESN: ICD-10-PCS | Mod: S$GLB,,, | Performed by: OBSTETRICS & GYNECOLOGY

## 2021-11-14 DIAGNOSIS — E11.42 DIABETIC PERIPHERAL NEUROPATHY ASSOCIATED WITH TYPE 2 DIABETES MELLITUS: ICD-10-CM

## 2021-11-17 ENCOUNTER — PATIENT MESSAGE (OUTPATIENT)
Dept: OBSTETRICS AND GYNECOLOGY | Facility: CLINIC | Age: 46
End: 2021-11-17
Payer: MEDICAID

## 2021-11-17 LAB
BACTERIAL VAGINOSIS DNA: POSITIVE
CANDIDA GLABRATA DNA: NEGATIVE
CANDIDA KRUSEI DNA: NEGATIVE
CANDIDA RRNA VAG QL PROBE: POSITIVE
FINAL PATHOLOGIC DIAGNOSIS: NORMAL
Lab: NORMAL
T VAGINALIS RRNA GENITAL QL PROBE: NEGATIVE

## 2021-11-17 RX ORDER — METRONIDAZOLE 500 MG/1
500 TABLET ORAL EVERY 12 HOURS
Qty: 14 TABLET | Refills: 0 | Status: SHIPPED | OUTPATIENT
Start: 2021-11-17 | End: 2021-11-24

## 2021-11-17 RX ORDER — NYSTATIN 100000 U/G
CREAM TOPICAL 2 TIMES DAILY
Qty: 30 G | Refills: 1 | Status: SHIPPED | OUTPATIENT
Start: 2021-11-17 | End: 2023-05-22

## 2021-11-17 RX ORDER — FLUCONAZOLE 100 MG/1
100 TABLET ORAL
Qty: 3 TABLET | Refills: 0 | Status: SHIPPED | OUTPATIENT
Start: 2021-11-17 | End: 2021-11-24

## 2021-11-22 ENCOUNTER — PATIENT MESSAGE (OUTPATIENT)
Dept: OBSTETRICS AND GYNECOLOGY | Facility: HOSPITAL | Age: 46
End: 2021-11-22
Payer: MEDICAID

## 2021-11-22 LAB
FINAL PATHOLOGIC DIAGNOSIS: NORMAL
GROSS: NORMAL
Lab: NORMAL
MICROSCOPIC EXAM: NORMAL

## 2021-12-08 ENCOUNTER — PATIENT OUTREACH (OUTPATIENT)
Dept: ADMINISTRATIVE | Facility: OTHER | Age: 46
End: 2021-12-08
Payer: MEDICAID

## 2021-12-09 ENCOUNTER — OFFICE VISIT (OUTPATIENT)
Dept: PODIATRY | Facility: CLINIC | Age: 46
End: 2021-12-09
Payer: COMMERCIAL

## 2021-12-09 VITALS — WEIGHT: 253 LBS | HEIGHT: 65 IN | BODY MASS INDEX: 42.15 KG/M2

## 2021-12-09 DIAGNOSIS — E11.9 COMPREHENSIVE DIABETIC FOOT EXAMINATION, TYPE 2 DM, ENCOUNTER FOR: ICD-10-CM

## 2021-12-09 DIAGNOSIS — F17.200 SMOKING: ICD-10-CM

## 2021-12-09 DIAGNOSIS — E11.42 DIABETIC PERIPHERAL NEUROPATHY ASSOCIATED WITH TYPE 2 DIABETES MELLITUS: Primary | ICD-10-CM

## 2021-12-09 PROCEDURE — 99213 PR OFFICE/OUTPT VISIT, EST, LEVL III, 20-29 MIN: ICD-10-PCS | Mod: S$GLB,,, | Performed by: PODIATRIST

## 2021-12-09 PROCEDURE — 99213 OFFICE O/P EST LOW 20 MIN: CPT | Mod: S$GLB,,, | Performed by: PODIATRIST

## 2021-12-09 PROCEDURE — 99999 PR PBB SHADOW E&M-EST. PATIENT-LVL IV: CPT | Mod: PBBFAC,,, | Performed by: PODIATRIST

## 2021-12-09 PROCEDURE — 99999 PR PBB SHADOW E&M-EST. PATIENT-LVL IV: ICD-10-PCS | Mod: PBBFAC,,, | Performed by: PODIATRIST

## 2021-12-09 PROCEDURE — 99214 OFFICE O/P EST MOD 30 MIN: CPT | Mod: PBBFAC,PN | Performed by: PODIATRIST

## 2022-03-14 ENCOUNTER — PATIENT MESSAGE (OUTPATIENT)
Dept: ADMINISTRATIVE | Facility: OTHER | Age: 47
End: 2022-03-14
Payer: MEDICAID

## 2022-03-19 ENCOUNTER — OFFICE VISIT (OUTPATIENT)
Dept: URGENT CARE | Facility: CLINIC | Age: 47
End: 2022-03-19
Payer: MEDICARE

## 2022-03-19 VITALS
WEIGHT: 253 LBS | OXYGEN SATURATION: 98 % | RESPIRATION RATE: 18 BRPM | BODY MASS INDEX: 42.15 KG/M2 | HEART RATE: 109 BPM | TEMPERATURE: 97 F | SYSTOLIC BLOOD PRESSURE: 144 MMHG | HEIGHT: 65 IN | DIASTOLIC BLOOD PRESSURE: 94 MMHG

## 2022-03-19 DIAGNOSIS — N89.8 VAGINAL ITCHING: ICD-10-CM

## 2022-03-19 DIAGNOSIS — L73.9 FOLLICULITIS: ICD-10-CM

## 2022-03-19 DIAGNOSIS — N89.8 VAGINAL DISCHARGE: Primary | ICD-10-CM

## 2022-03-19 LAB
B-HCG UR QL: NEGATIVE
BILIRUB UR QL STRIP: NEGATIVE
CTP QC/QA: YES
GLUCOSE UR QL STRIP: POSITIVE
KETONES UR QL STRIP: NEGATIVE
LEUKOCYTE ESTERASE UR QL STRIP: NEGATIVE
PH, POC UA: 5 (ref 5–8)
POC BLOOD, URINE: NEGATIVE
POC NITRATES, URINE: NEGATIVE
PROT UR QL STRIP: NEGATIVE
SP GR UR STRIP: 1.01 (ref 1–1.03)
UROBILINOGEN UR STRIP-ACNC: NORMAL (ref 0.1–1.1)

## 2022-03-19 PROCEDURE — 87491 CHLMYD TRACH DNA AMP PROBE: CPT | Mod: 59 | Performed by: PHYSICIAN ASSISTANT

## 2022-03-19 PROCEDURE — 3077F SYST BP >= 140 MM HG: CPT | Mod: CPTII,S$GLB,, | Performed by: PHYSICIAN ASSISTANT

## 2022-03-19 PROCEDURE — 3008F PR BODY MASS INDEX (BMI) DOCUMENTED: ICD-10-PCS | Mod: CPTII,S$GLB,, | Performed by: PHYSICIAN ASSISTANT

## 2022-03-19 PROCEDURE — 99213 PR OFFICE/OUTPT VISIT, EST, LEVL III, 20-29 MIN: ICD-10-PCS | Mod: S$GLB,,, | Performed by: PHYSICIAN ASSISTANT

## 2022-03-19 PROCEDURE — 3080F PR MOST RECENT DIASTOLIC BLOOD PRESSURE >= 90 MM HG: ICD-10-PCS | Mod: CPTII,S$GLB,, | Performed by: PHYSICIAN ASSISTANT

## 2022-03-19 PROCEDURE — 1160F PR REVIEW ALL MEDS BY PRESCRIBER/CLIN PHARMACIST DOCUMENTED: ICD-10-PCS | Mod: CPTII,S$GLB,, | Performed by: PHYSICIAN ASSISTANT

## 2022-03-19 PROCEDURE — 1160F RVW MEDS BY RX/DR IN RCRD: CPT | Mod: CPTII,S$GLB,, | Performed by: PHYSICIAN ASSISTANT

## 2022-03-19 PROCEDURE — 3080F DIAST BP >= 90 MM HG: CPT | Mod: CPTII,S$GLB,, | Performed by: PHYSICIAN ASSISTANT

## 2022-03-19 PROCEDURE — 81025 POCT URINE PREGNANCY: ICD-10-PCS | Mod: S$GLB,,, | Performed by: PHYSICIAN ASSISTANT

## 2022-03-19 PROCEDURE — 1159F PR MEDICATION LIST DOCUMENTED IN MEDICAL RECORD: ICD-10-PCS | Mod: CPTII,S$GLB,, | Performed by: PHYSICIAN ASSISTANT

## 2022-03-19 PROCEDURE — 3077F PR MOST RECENT SYSTOLIC BLOOD PRESSURE >= 140 MM HG: ICD-10-PCS | Mod: CPTII,S$GLB,, | Performed by: PHYSICIAN ASSISTANT

## 2022-03-19 PROCEDURE — 4010F PR ACE/ARB THEARPY RXD/TAKEN: ICD-10-PCS | Mod: CPTII,S$GLB,, | Performed by: PHYSICIAN ASSISTANT

## 2022-03-19 PROCEDURE — 87481 CANDIDA DNA AMP PROBE: CPT | Mod: 59 | Performed by: PHYSICIAN ASSISTANT

## 2022-03-19 PROCEDURE — 87591 N.GONORRHOEAE DNA AMP PROB: CPT | Mod: 59 | Performed by: PHYSICIAN ASSISTANT

## 2022-03-19 PROCEDURE — 1159F MED LIST DOCD IN RCRD: CPT | Mod: CPTII,S$GLB,, | Performed by: PHYSICIAN ASSISTANT

## 2022-03-19 PROCEDURE — 4010F ACE/ARB THERAPY RXD/TAKEN: CPT | Mod: CPTII,S$GLB,, | Performed by: PHYSICIAN ASSISTANT

## 2022-03-19 PROCEDURE — 3008F BODY MASS INDEX DOCD: CPT | Mod: CPTII,S$GLB,, | Performed by: PHYSICIAN ASSISTANT

## 2022-03-19 PROCEDURE — 99213 OFFICE O/P EST LOW 20 MIN: CPT | Mod: S$GLB,,, | Performed by: PHYSICIAN ASSISTANT

## 2022-03-19 PROCEDURE — 87801 DETECT AGNT MULT DNA AMPLI: CPT | Performed by: PHYSICIAN ASSISTANT

## 2022-03-19 PROCEDURE — 81003 POCT URINALYSIS, DIPSTICK, AUTOMATED, W/O SCOPE: ICD-10-PCS | Mod: QW,S$GLB,, | Performed by: PHYSICIAN ASSISTANT

## 2022-03-19 PROCEDURE — 81025 URINE PREGNANCY TEST: CPT | Mod: S$GLB,,, | Performed by: PHYSICIAN ASSISTANT

## 2022-03-19 PROCEDURE — 81003 URINALYSIS AUTO W/O SCOPE: CPT | Mod: QW,S$GLB,, | Performed by: PHYSICIAN ASSISTANT

## 2022-03-19 PROCEDURE — 86592 SYPHILIS TEST NON-TREP QUAL: CPT | Performed by: PHYSICIAN ASSISTANT

## 2022-03-19 PROCEDURE — 87389 HIV-1 AG W/HIV-1&-2 AB AG IA: CPT | Performed by: PHYSICIAN ASSISTANT

## 2022-03-19 RX ORDER — MUPIROCIN 20 MG/G
OINTMENT TOPICAL 2 TIMES DAILY
Qty: 15 G | Refills: 0 | Status: SHIPPED | OUTPATIENT
Start: 2022-03-19 | End: 2023-05-22

## 2022-03-19 RX ORDER — FLUCONAZOLE 150 MG/1
TABLET ORAL
Qty: 2 TABLET | Refills: 0 | Status: SHIPPED | OUTPATIENT
Start: 2022-03-19 | End: 2023-05-22

## 2022-03-19 RX ORDER — AMMONIUM LACTATE 12 G/100G
LOTION TOPICAL
COMMUNITY
Start: 2022-02-02 | End: 2023-05-22

## 2022-03-19 RX ORDER — LISINOPRIL AND HYDROCHLOROTHIAZIDE 10; 12.5 MG/1; MG/1
1 TABLET ORAL DAILY
COMMUNITY
Start: 2021-11-27 | End: 2023-05-22

## 2022-03-19 NOTE — PATIENT INSTRUCTIONS
PLEASE READ YOUR DISCHARGE INSTRUCTIONS ENTIRELY AS IT CONTAINS IMPORTANT INFORMATION.  Apply the antibiotic ointment twice daily to the bump on your left inner leg until healed.  - Rest.    - Drink plenty of fluids.    - Tylenol or Ibuprofen as directed as needed for fever/pain.    - If you were prescribed antibiotics, please take them to completion.  - If you are female and on birth control pills - please use additional methods of contraception to prevent pregnancy while on antibiotics and for one cycle after.   - If you were prescribed a narcotic medication, a cough syrup, or a muscle relaxer, do not drive or operate heavy equipment or machinery while taking these medications, as they can cause drowsiness.   - If you smoke, please stop smoking.  -You must understand that you've received an Urgent Care treatment only and that you may be released before all your medical problems are known or treated. You, the patient, will    arrange for follow up care as instructed. Please arrange follow up with your primary medical clinic as soon as possible.   - Follow up with your PCP or specialty clinic as directed in the next 1-2 weeks if not improved or as needed.  You can call [phone] to schedule an appointment with the appropriate provider.    - Please return to Urgent Care or to the Emergency Department if your symptoms worsen.    Patient aware and verbalized understanding.

## 2022-03-19 NOTE — PROGRESS NOTES
"Subjective:       Patient ID: Jaimee Quintana is a 46 y.o. female.    Vitals:  height is 5' 5" (1.651 m) and weight is 114.8 kg (253 lb). Her oral temperature is 97.4 °F (36.3 °C). Her blood pressure is 144/94 (abnormal) and her pulse is 109. Her respiration is 18 and oxygen saturation is 98%.     Chief Complaint: Vaginal discharge    Ms. Quintana presents for evaluation of vaginal discharge, itching that started 3 days ago.  She is sexually active and has multiple male partners, does not use protection.  She reports a yellowish discharge with a lot of itching.   She also has an ingrown hair on her left inner thigh.  She is having pain with intercourse.  She denies any flank pain, fevers, chills, N/V.  She has not taken anything for her symptoms.     Exposure to STD   The patient's primary symptoms include a discharge and genital itching. The patient's pertinent negatives include no dysuria, genital lesions or pelvic pain. This is a new problem. The current episode started in the past 7 days. The problem has been gradually worsening. The vaginal discharge was white. Pertinent negatives include no abdominal pain, diaphoresis, fever, sore throat or urinary frequency. She has tried nothing for the symptoms. Risk factors include history of STDs.       Constitution: Negative for appetite change, chills, sweating, fatigue and fever.   HENT: Negative for ear pain, ear discharge, hearing loss, drooling, congestion, postnasal drip, sinus pain, sinus pressure and sore throat.    Neck: Negative for neck stiffness and painful lymph nodes.   Cardiovascular: Negative for chest trauma, chest pain, leg swelling, palpitations, sob on exertion and passing out.   Eyes: Negative for eye pain and blurred vision.   Respiratory: Negative for chest tightness, cough, sputum production, shortness of breath and wheezing.    Gastrointestinal: Negative for abdominal trauma, abdominal pain, nausea, vomiting and diarrhea.   Genitourinary: Positive " for vaginal pain, vaginal discharge, painful intercourse and painful ejaculation. Negative for dysuria, frequency, urgency, vaginal bleeding, vaginal odor, genital sore and pelvic pain.   Musculoskeletal: Negative for joint pain, joint swelling, muscle cramps and muscle ache.   Skin: Negative for rash.   Allergic/Immunologic: Negative for itching and sneezing.   Neurological: Negative for dizziness, history of vertigo, light-headedness, passing out, facial drooping, speech difficulty, coordination disturbances, loss of balance, headaches and altered mental status.   Hematologic/Lymphatic: Negative for swollen lymph nodes and easy bruising/bleeding. Does not bruise/bleed easily.   Psychiatric/Behavioral: Negative for altered mental status.       Objective:      Physical Exam   Constitutional: She is oriented to person, place, and time. She appears well-developed.   HENT:   Head: Normocephalic and atraumatic.   Ears:   Right Ear: External ear normal.   Left Ear: External ear normal.   Nose: Nose normal. No nasal deformity. No epistaxis.   Mouth/Throat: Oropharynx is clear and moist and mucous membranes are normal.   Eyes: Lids are normal.   Neck: Trachea normal and phonation normal. Neck supple.   Cardiovascular: Normal pulses.   Pulmonary/Chest: Effort normal.   Abdominal: Normal appearance and bowel sounds are normal. She exhibits no distension. Soft. There is no abdominal tenderness.   Genitourinary:    Cervix normal.         No vaginal rash and no pubic lice.    Pelvic exam was performed with patient in the lithotomy position.   There is no rash, tenderness, lesion or injury on the right labia. There is no rash, tenderness, lesion or injury on the left labia.    Vaginal discharge present.      No vaginal erythema, tenderness or bleeding.   No erythema, tenderness or bleeding in the vagina.    No foreign body in the vagina.      No signs of injury or lesions in the vagina.           Comments: Inner labia with skin  "discoloration bilaterally, this has been noted in prior OBGYN exam.  Oily discharge on speculum exam - patient states used baby oil "all over" yesterday     Neurological: She is alert and oriented to person, place, and time.   Skin: Skin is warm, dry and intact.   Psychiatric: Her speech is normal and behavior is normal.   Nursing note and vitals reviewed.          Results for orders placed or performed in visit on 03/19/22   POCT Urinalysis, Dipstick, Automated, W/O Scope   Result Value Ref Range    POC Blood, Urine Negative Negative    POC Bilirubin, Urine Negative Negative    POC Urobilinogen, Urine Normal 0.1 - 1.1    POC Ketones, Urine Negative Negative    POC Protein, Urine Negative Negative    POC Nitrates, Urine Negative Negative    POC Glucose, Urine Positive (A) Negative    pH, UA 5.0 5 - 8    POC Specific Gravity, Urine 1.015 1.003 - 1.029    POC Leukocytes, Urine Negative Negative   POCT urine pregnancy   Result Value Ref Range    POC Preg Test, Ur Negative Negative     Acceptable Yes        Assessment:       1. Vaginal discharge    2. Vaginal itching    3. Folliculitis          Plan:         Vaginal discharge  -     POCT Urinalysis, Dipstick, Automated, W/O Scope  -     POCT urine pregnancy  -     HIV 1/2 Ag/Ab (4th Gen)  -     RPR  -     Vaginosis Screen by DNA Probe  -     C. trachomatis/N. gonorrhoeae by [facility]; Urine    Vaginal itching    Folliculitis    Other orders  -     fluconazole (DIFLUCAN) 150 MG Tab; Take 1 tab PO once.  You may repeat in 72 hours if symptoms persist.  Dispense: 2 tablet; Refill: 0  -     mupirocin (BACTROBAN) 2 % ointment; Apply topically 2 (two) times daily.  Dispense: 15 g; Refill: 0    Diagnoses and plan discussed with the patient, as well as the expected course and duration of her symptoms. All questions and concerns were addressed prior to discharge.  She was advised to follow up with her PCP within 1 week if symptoms do not improve. Emergency " department precautions were given. Patient verbalized understanding and was happy with the plan of care.   Note dictated with voice recognition software, please excuse any grammatical errors.    Patient Instructions   PLEASE READ YOUR DISCHARGE INSTRUCTIONS ENTIRELY AS IT CONTAINS IMPORTANT INFORMATION.  Apply the antibiotic ointment twice daily to the bump on your left inner leg until healed.  - Rest.    - Drink plenty of fluids.    - Tylenol or Ibuprofen as directed as needed for fever/pain.    - If you were prescribed antibiotics, please take them to completion.  - If you are female and on birth control pills - please use additional methods of contraception to prevent pregnancy while on antibiotics and for one cycle after.   - If you were prescribed a narcotic medication, a cough syrup, or a muscle relaxer, do not drive or operate heavy equipment or machinery while taking these medications, as they can cause drowsiness.   - If you smoke, please stop smoking.  -You must understand that you've received an Urgent Care treatment only and that you may be released before all your medical problems are known or treated. You, the patient, will    arrange for follow up care as instructed. Please arrange follow up with your primary medical clinic as soon as possible.   - Follow up with your PCP or specialty clinic as directed in the next 1-2 weeks if not improved or as needed.  You can call [phone] to schedule an appointment with the appropriate provider.    - Please return to Urgent Care or to the Emergency Department if your symptoms worsen.    Patient aware and verbalized understanding.

## 2022-03-20 LAB
C TRACH DNA SPEC QL NAA+PROBE: NOT DETECTED
N GONORRHOEA DNA SPEC QL NAA+PROBE: NOT DETECTED

## 2022-03-21 ENCOUNTER — TELEPHONE (OUTPATIENT)
Dept: URGENT CARE | Facility: CLINIC | Age: 47
End: 2022-03-21
Payer: MEDICARE

## 2022-03-21 LAB
BACTERIAL VAGINOSIS DNA: NEGATIVE
CANDIDA GLABRATA DNA: POSITIVE
CANDIDA KRUSEI DNA: NEGATIVE
CANDIDA RRNA VAG QL PROBE: POSITIVE
RPR SER QL: NORMAL
T VAGINALIS RRNA GENITAL QL PROBE: NEGATIVE

## 2022-03-21 NOTE — TELEPHONE ENCOUNTER
Called patient to discuss negative RPR & gonorrhea & chlamydia.  HIV & Vaginosis panel pending.  No answer, left VM.

## 2022-03-22 ENCOUNTER — TELEPHONE (OUTPATIENT)
Dept: URGENT CARE | Facility: CLINIC | Age: 47
End: 2022-03-22
Payer: MEDICARE

## 2022-03-22 LAB — HIV 1+2 AB+HIV1 P24 AG SERPL QL IA: NEGATIVE

## 2022-03-22 NOTE — TELEPHONE ENCOUNTER
Informed patient of +candida on vaginosis swab for which she was treated appropriately at time of clinic visit. GC/chlmaydia and RPR negative. HIV pending.

## 2022-03-23 ENCOUNTER — TELEPHONE (OUTPATIENT)
Dept: URGENT CARE | Facility: CLINIC | Age: 47
End: 2022-03-23
Payer: MEDICARE

## 2022-03-23 NOTE — TELEPHONE ENCOUNTER
Discussed negative HIV test with patient.  All other test results have been previously discussed.  She verbalized understanding.

## 2022-05-10 ENCOUNTER — OFFICE VISIT (OUTPATIENT)
Dept: OPHTHALMOLOGY | Facility: CLINIC | Age: 47
End: 2022-05-10
Payer: MEDICARE

## 2022-05-10 DIAGNOSIS — H35.033 HYPERTENSIVE RETINOPATHY, BILATERAL: ICD-10-CM

## 2022-05-10 PROCEDURE — 1160F PR REVIEW ALL MEDS BY PRESCRIBER/CLIN PHARMACIST DOCUMENTED: ICD-10-PCS | Mod: CPTII,S$GLB,, | Performed by: OPHTHALMOLOGY

## 2022-05-10 PROCEDURE — 99999 PR PBB SHADOW E&M-EST. PATIENT-LVL IV: ICD-10-PCS | Mod: PBBFAC,,, | Performed by: OPHTHALMOLOGY

## 2022-05-10 PROCEDURE — 1159F PR MEDICATION LIST DOCUMENTED IN MEDICAL RECORD: ICD-10-PCS | Mod: CPTII,S$GLB,, | Performed by: OPHTHALMOLOGY

## 2022-05-10 PROCEDURE — 4010F ACE/ARB THERAPY RXD/TAKEN: CPT | Mod: CPTII,S$GLB,, | Performed by: OPHTHALMOLOGY

## 2022-05-10 PROCEDURE — 92134 CPTRZ OPH DX IMG PST SGM RTA: CPT | Mod: S$GLB,,, | Performed by: OPHTHALMOLOGY

## 2022-05-10 PROCEDURE — 1159F MED LIST DOCD IN RCRD: CPT | Mod: CPTII,S$GLB,, | Performed by: OPHTHALMOLOGY

## 2022-05-10 PROCEDURE — 99999 PR PBB SHADOW E&M-EST. PATIENT-LVL IV: CPT | Mod: PBBFAC,,, | Performed by: OPHTHALMOLOGY

## 2022-05-10 PROCEDURE — 92235 FLUORESCEIN ANGRPH MLTIFRAME: CPT | Mod: S$GLB,,, | Performed by: OPHTHALMOLOGY

## 2022-05-10 PROCEDURE — 92235 FLUORESCEIN ANGIOGRAPHY - OU - BOTH EYES: ICD-10-PCS | Mod: S$GLB,,, | Performed by: OPHTHALMOLOGY

## 2022-05-10 PROCEDURE — 92014 PR EYE EXAM, EST PATIENT,COMPREHESV: ICD-10-PCS | Mod: S$GLB,,, | Performed by: OPHTHALMOLOGY

## 2022-05-10 PROCEDURE — 92134 POSTERIOR SEGMENT OCT RETINA (OCULAR COHERENCE TOMOGRAPHY)-BOTH EYES: ICD-10-PCS | Mod: S$GLB,,, | Performed by: OPHTHALMOLOGY

## 2022-05-10 PROCEDURE — 92201 OPSCPY EXTND RTA DRAW UNI/BI: CPT | Mod: S$GLB,,, | Performed by: OPHTHALMOLOGY

## 2022-05-10 PROCEDURE — 4010F PR ACE/ARB THEARPY RXD/TAKEN: ICD-10-PCS | Mod: CPTII,S$GLB,, | Performed by: OPHTHALMOLOGY

## 2022-05-10 PROCEDURE — 92014 COMPRE OPH EXAM EST PT 1/>: CPT | Mod: S$GLB,,, | Performed by: OPHTHALMOLOGY

## 2022-05-10 PROCEDURE — 92201 PR OPHTHALMOSCOPY, EXT, W/RET DRAW/SCLERAL DEPR, I&R, UNI/BI: ICD-10-PCS | Mod: S$GLB,,, | Performed by: OPHTHALMOLOGY

## 2022-05-10 PROCEDURE — 1160F RVW MEDS BY RX/DR IN RCRD: CPT | Mod: CPTII,S$GLB,, | Performed by: OPHTHALMOLOGY

## 2022-05-10 RX ORDER — FLUORESCEIN 500 MG/ML
5 INJECTION INTRAVENOUS ONCE
Status: COMPLETED | OUTPATIENT
Start: 2022-05-10 | End: 2023-01-24

## 2022-05-10 RX ADMIN — FLUORESCEIN 500 MG: 500 INJECTION INTRAVENOUS at 10:05

## 2022-05-10 NOTE — PROGRESS NOTES
"HPI     Diabetic Eye Exam      Additional comments: Patient Jaimee Quintana is a 46 year old female.              Comments     Pt here for 6 month mOCT/DFE OU and FA OD. Pt went to [facility] on   03/25/2022 with Dr. Cristofer Fry and was diagnosed with severe NPDR OU and   had fundus photos performed at visit. Pt states she saw her PCP ZEB Bobby in late-April 2022 and stated that BS levels are "getting   high." Pt states that she still has blurry VA but has not had glasses Rx   updated yet. Pt states white [place] flashes OU all day long, especially   when eyes are closed.    DLS: 11/09/2021 with Dr. 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 Severe NPDR OD, PDR OS  T2 uncontrolled on insulin  S/p PRP OS    Monitor for NV OD    2. DME OU  Non central  Monitor    3. HTN Ret OU  BS/BP/chol control      6 months OCT/WFFA OD        "

## 2022-08-05 NOTE — ED NOTES
Ok per pt to contact sister Iris with updates.  [phone]     Recorded ECG: HR=70 Condition=Condition 1

## 2022-09-12 ENCOUNTER — HOSPITAL ENCOUNTER (EMERGENCY)
Facility: HOSPITAL | Age: 47
Discharge: HOME | End: 2022-09-12
Attending: EMERGENCY MEDICINE
Payer: MEDICARE

## 2022-09-12 VITALS
WEIGHT: 255 LBS | OXYGEN SATURATION: 96 % | DIASTOLIC BLOOD PRESSURE: 85 MMHG | TEMPERATURE: 100 F | BODY MASS INDEX: 42.43 KG/M2 | RESPIRATION RATE: 20 BRPM | SYSTOLIC BLOOD PRESSURE: 186 MMHG | HEART RATE: 100 BPM

## 2022-09-12 DIAGNOSIS — I15.9 SECONDARY HYPERTENSION: ICD-10-CM

## 2022-09-12 DIAGNOSIS — E11.65 HYPERGLYCEMIA DUE TO DIABETES MELLITUS: Primary | ICD-10-CM

## 2022-09-12 LAB
ALBUMIN SERPL BCP-MCNC: 3.5 G/DL (ref 3.5–5.2)
ALLENS TEST: ABNORMAL
ALP SERPL-CCNC: 102 U/L (ref 55–135)
ALT SERPL W/O P-5'-P-CCNC: 46 U/L (ref 10–44)
ANION GAP SERPL CALC-SCNC: 15 MMOL/L (ref 8–16)
AST SERPL-CCNC: 25 U/L (ref 10–40)
BACTERIA #/AREA URNS HPF: ABNORMAL /HPF
BASOPHILS # BLD AUTO: 0.01 K/UL (ref 0–0.2)
BASOPHILS NFR BLD: 0.1 % (ref 0–1.9)
BILIRUB SERPL-MCNC: 0.4 MG/DL (ref 0.1–1)
BILIRUB UR QL STRIP: NEGATIVE
BUN SERPL-MCNC: 7 MG/DL (ref 6–20)
CALCIUM SERPL-MCNC: 9.4 MG/DL (ref 8.7–10.5)
CHLORIDE SERPL-SCNC: 100 MMOL/L (ref 95–110)
CLARITY UR: CLEAR
CO2 SERPL-SCNC: 17 MMOL/L (ref 23–29)
COLOR UR: COLORLESS
CREAT SERPL-MCNC: 1 MG/DL (ref 0.5–1.4)
DELSYS: ABNORMAL
DIFFERENTIAL METHOD: ABNORMAL
EOSINOPHIL # BLD AUTO: 0.1 K/UL (ref 0–0.5)
EOSINOPHIL NFR BLD: 1.2 % (ref 0–8)
ERYTHROCYTE [DISTWIDTH] IN BLOOD BY AUTOMATED COUNT: 12.1 % (ref 11.5–14.5)
EST. GFR  (NO RACE VARIABLE): >60 ML/MIN/1.73 M^2
FIO2: 21
GLUCOSE SERPL-MCNC: 554 MG/DL (ref 70–110)
GLUCOSE UR QL STRIP: ABNORMAL
HCO3 UR-SCNC: 24.1 MMOL/L (ref 24–28)
HCT VFR BLD AUTO: 43 % (ref 37–48.5)
HGB BLD-MCNC: 14.7 G/DL (ref 12–16)
HGB UR QL STRIP: NEGATIVE
IMM GRANULOCYTES # BLD AUTO: 0.06 K/UL (ref 0–0.04)
IMM GRANULOCYTES NFR BLD AUTO: 0.7 % (ref 0–0.5)
KETONES UR QL STRIP: NEGATIVE
LEUKOCYTE ESTERASE UR QL STRIP: ABNORMAL
LIPASE SERPL-CCNC: 38 U/L (ref 4–60)
LYMPHOCYTES # BLD AUTO: 2 K/UL (ref 1–4.8)
LYMPHOCYTES NFR BLD: 23.6 % (ref 18–48)
MCH RBC QN AUTO: 29.9 PG (ref 27–31)
MCHC RBC AUTO-ENTMCNC: 34.2 G/DL (ref 32–36)
MCV RBC AUTO: 87 FL (ref 82–98)
MICROSCOPIC COMMENT: ABNORMAL
MODE: ABNORMAL
MONOCYTES # BLD AUTO: 0.5 K/UL (ref 0.3–1)
MONOCYTES NFR BLD: 5.7 % (ref 4–15)
NEUTROPHILS # BLD AUTO: 5.9 K/UL (ref 1.8–7.7)
NEUTROPHILS NFR BLD: 68.7 % (ref 38–73)
NITRITE UR QL STRIP: NEGATIVE
NRBC BLD-RTO: 0 /100 WBC
PCO2 BLDA: 39.6 MMHG (ref 35–45)
PH SMN: 7.39 [PH] (ref 7.35–7.45)
PH UR STRIP: 6 [PH] (ref 5–8)
PLATELET # BLD AUTO: 207 K/UL (ref 150–450)
PMV BLD AUTO: 11.5 FL (ref 9.2–12.9)
PO2 BLDA: 54 MMHG (ref 40–60)
POC BE: -1 MMOL/L
POC SATURATED O2: 87 % (ref 95–100)
POC TCO2: 25 MMOL/L (ref 24–29)
POCT GLUCOSE: 389 MG/DL (ref 70–110)
POTASSIUM SERPL-SCNC: 3.8 MMOL/L (ref 3.5–5.1)
PROT SERPL-MCNC: 7.6 G/DL (ref 6–8.4)
PROT UR QL STRIP: NEGATIVE
RBC # BLD AUTO: 4.92 M/UL (ref 4–5.4)
RBC #/AREA URNS HPF: 1 /HPF (ref 0–4)
SAMPLE: ABNORMAL
SITE: ABNORMAL
SODIUM SERPL-SCNC: 132 MMOL/L (ref 136–145)
SP GR UR STRIP: 1.03 (ref 1–1.03)
SP02: 96
URN SPEC COLLECT METH UR: ABNORMAL
UROBILINOGEN UR STRIP-ACNC: NEGATIVE EU/DL
WBC # BLD AUTO: 8.64 K/UL (ref 3.9–12.7)
WBC #/AREA URNS HPF: 5 /HPF (ref 0–5)
YEAST URNS QL MICRO: ABNORMAL

## 2022-09-12 PROCEDURE — 81000 URINALYSIS NONAUTO W/SCOPE: CPT | Performed by: PHYSICIAN ASSISTANT

## 2022-09-12 PROCEDURE — 99900035 HC TECH TIME PER 15 MIN (STAT)

## 2022-09-12 PROCEDURE — 82803 BLOOD GASES ANY COMBINATION: CPT

## 2022-09-12 PROCEDURE — 96360 HYDRATION IV INFUSION INIT: CPT

## 2022-09-12 PROCEDURE — 99284 EMERGENCY DEPT VISIT MOD MDM: CPT | Mod: 25

## 2022-09-12 PROCEDURE — 25000003 PHARM REV CODE 250: Performed by: EMERGENCY MEDICINE

## 2022-09-12 PROCEDURE — 85025 COMPLETE CBC W/AUTO DIFF WBC: CPT | Performed by: PHYSICIAN ASSISTANT

## 2022-09-12 PROCEDURE — 94761 N-INVAS EAR/PLS OXIMETRY MLT: CPT

## 2022-09-12 PROCEDURE — 83690 ASSAY OF LIPASE: CPT | Performed by: PHYSICIAN ASSISTANT

## 2022-09-12 PROCEDURE — 82962 GLUCOSE BLOOD TEST: CPT

## 2022-09-12 PROCEDURE — 80053 COMPREHEN METABOLIC PANEL: CPT | Performed by: PHYSICIAN ASSISTANT

## 2022-09-12 RX ORDER — CLONIDINE HYDROCHLORIDE 0.1 MG/1
0.1 TABLET ORAL
Status: COMPLETED | OUTPATIENT
Start: 2022-09-12 | End: 2022-09-12

## 2022-09-12 RX ADMIN — CLONIDINE HYDROCHLORIDE 0.1 MG: 0.1 TABLET ORAL at 05:09

## 2022-09-12 RX ADMIN — SODIUM CHLORIDE 1000 ML: 0.9 INJECTION, SOLUTION INTRAVENOUS at 04:09

## 2022-09-12 NOTE — FIRST PROVIDER EVALUATION
Emergency Department TeleTriage Encounter Note      CHIEF COMPLAINT    Chief Complaint   Patient presents with    Abdominal Pain     Described as cramping, with n/v, + pain with urination, symptoms began today        VITAL SIGNS   Initial Vitals [09/12/22 1327]   BP Pulse Resp Temp SpO2   (!) 191/90 104 18 98.5 °F (36.9 °C) 98 %      MAP       --            ALLERGIES    Review of patient's allergies indicates:  No Known Allergies    PROVIDER TRIAGE NOTE  This is a teletriage evaluation of a 46 y.o. female presenting to the ED complaining of abdominal pain. Patient reports nausea, vomiting, and dysuria. Symptoms began this morning.     Initial orders will be placed and care will be transferred to an alternate provider when patient is roomed for a full evaluation. Any additional orders and the final disposition will be determined by that provider.         ORDERS  Labs Reviewed   CBC W/ AUTO DIFFERENTIAL   COMPREHENSIVE METABOLIC PANEL   LIPASE   URINALYSIS, REFLEX TO URINE CULTURE       ED Orders (720h ago, onward)      Start Ordered     Status Ordering Provider    09/12/22 1341 09/12/22 1340  Vital signs  Every 2 hours         Ordered JAYA, RADHA G.    09/12/22 1340 09/12/22 1340  Diet NPO  Diet effective now         Ordered JAYA, RADHA G.    09/12/22 1340 09/12/22 1340  Insert peripheral IV  Once         Ordered JAYA, RADHA G.    09/12/22 1340 09/12/22 1340  CBC W/ AUTO DIFFERENTIAL  STAT         Ordered JAYA, RADHA G.    09/12/22 1340 09/12/22 1340  Comp. Metabolic Panel  STAT         Ordered JAYA, RADHA G.    09/12/22 1340 09/12/22 1340  Lipase  STAT         Ordered JAYA, RADHA G.    09/12/22 1340 09/12/22 1340  Urinalysis, Reflex to Urine Culture Urine, Clean Catch  STAT         Ordered JAYA, RADHA G.              Virtual Visit Note: The provider triage portion of this emergency department evaluation and documentation was performed via [employer], a HIPAA-compliant telemedicine application, in concert  with a tele-presenter in the room. A face to face patient evaluation with one of my colleagues will occur once the patient is placed in an emergency department room.      DISCLAIMER: This note was prepared with [employer] voice recognition transcription software. Garbled syntax, mangled pronouns, and other bizarre constructions may be attributed to that software system.

## 2022-09-12 NOTE — ED PROVIDER NOTES
Chief Complaint: upper abdominal pain     History of Present Illness:    Jaimee Quintana 46 y.o. with a  has a past medical history of Cervical high risk HPV (human papillomavirus) test positive (2020), CVA (cerebral infarction) (), Depression, Diabetes mellitus, type 2, GERD (gastroesophageal reflux disease), Hyperlipidemia, Hypertension, Moderate nonproliferative diabetic retinopathy, Obesity, and Stroke. who presents to the emergency department today with a complaint of upper abdominal pain that started today. Cramping across the upper abdomen. Does not radiate. Had some nausea and vomiting. No hematemesis. No bilious vomiting, no coffee ground emesis. No diarrhea. No fevers, chills or sweats. She has been out of her meds for 3 months. She just got them filled today.     ROS    Constitutional: No fever, no chills.  Eyes: No discharge. No pain.  HENT: No nasal drainage. No ear ache. No sore throat.  Cardiovascular: No chest pain, no palpitations.  Respiratory: No cough, no shortness of breath.  Gastrointestinal: See above. No diarrhea.  Genitourinary: No hematuria, dysuria, urgency.  Musculoskeletal: No back pain.   Skin: No rashes, no lesions.  Neurological: No headache, no focal weakness.    Otherwise remaining ROS negative     The history is provided by the patient      ALLERGIES REVIEWED  MEDICATIONS REVIEWED  PMH/PSH/SOC/FH REVIEWED :  Jaimee Quintana  has a past medical history of Cervical high risk HPV (human papillomavirus) test positive (2020), CVA (cerebral infarction) (), Depression, Diabetes mellitus, type 2, GERD (gastroesophageal reflux disease), Hyperlipidemia, Hypertension, Moderate nonproliferative diabetic retinopathy, Obesity, and Stroke. and   has a past surgical history that includes  section; Gallbladder surgery (2017); Cholecystectomy; and Dilation and curettage of uterus. with  reports that she quit smoking about 19 months ago. Her smoking use included  cigarettes. She smoked an average of .25 packs per day. She has never used smokeless tobacco. She reports current alcohol use of about 3.0 standard drinks per week. She reports that she does not use drugs. and a family history includes Arthritis in her father; Asthma in her daughter; Cataracts in her father; Diabetes in her father and mother; Heart disease in her sister; Hypertension in her father and sister; No Known Problems in her brother, maternal aunt, maternal grandfather, maternal grandmother, maternal uncle, paternal aunt, paternal grandfather, paternal grandmother, and paternal uncle; Stroke in her mother and sister; Thyroid disease in her daughter, mother, and sister.      Nursing/Ancillary staff note reviewed.  VS reviewed         Physical Exam     BP (!) 186/85   Pulse 100   Temp 99.9 °F (37.7 °C) (Oral)   Resp 20   Wt 115.7 kg (255 lb)   SpO2 96%   BMI 42.43 kg/m²     Physical Exam    General Appearance: The patient is alert, has no immediate need for airway protection and no signs of toxicity. No acute distress. Lying in bed but able to sit up without difficulty.   HEENT: Eyes: Pupils equal and round no pallor or injection. Extra ocular movements intact. No drainage.       Mouth: Mucous membranes are moist. Oropharynx clear.   Neck:Neck is supple non-tender. No lymphadenopathy. No stridor.   Respiratory: There are no retractions, lungs are clear to auscultation. No wheezing, no crackles. Chest wall nontender to palpation.   Cardiovascular: Regular rate and rhythm. No murmurs, rubs or gallops.  Gastrointestinal:  Abdomen is soft and non-tender to palpation in all quadrants, no masses, bowel sounds normal. No guarding, no rebound.  No pulsatile mass.   Neurological: Alert and oriented x 4. CN II-XII grossly intact. No focal weakness. Strength intact 5/5 bilaterally in upper and lower extremities.   Skin: Warm and dry, no rashes.  Musculoskeletal: Extremities are non-tender, non-swollen and have  full range of motion. Back NTTP along the midline.     DIFFERENTIAL DIAGNOSIS: After history and physical exam a differential diagnosis was considered, but was not limited to, gastroenteritis, gastritis, ulcer, cholecystitis, gallstones, pancreatitis, ileus, small bowel obstruction, appendicitis, constipation, DKA, HHS.         I independently interpreted the lab results and it showed the following:hyperglycemia, Ph 7.393, CBC, UA unremarkable. Lipase normal.                 ED Course     Medications   sodium chloride 0.9% bolus 1,000 mL (0 mLs Intravenous Stopped 9/12/22 1711)   cloNIDine tablet 0.1 mg (0.1 mg Oral Given 9/12/22 1737)         ED Course as of 09/12/22 1747   Mon Sep 12, 2022   1653 POC PH: 7.393  Not in DKA.    [JA]      ED Course User Index  [JA] Dorian Chester MD              Medical Decision Making:   History:   I obtained history from:  The patient  Old Medical Records: I decided to obtain old medical records.   Reviewed and summarized the old medical record and it showed: pts PCP is ZEB Rios.      Pts LA  shows:       Clinical Tests:   I have ordered and independently interpreted Lab Tests: Ordered and Reviewed        ED Management:  Jaimee Quintana  presents to the emergency Department today with upper abdominal pain. Found to be hyperglycemic. Her glucose has improved with IVFs. She just refilled her insulin, she will take as prescribed. She is feeling much improved with IVFs. Pain resolved. Lipase normal. No need for admission at this time.  I will discharge her home to follow up with her PCP.  The pt is comfortable with this plan and comfortable going home at this time. After taking into careful account the historical factors and physical exam findings of the patient's presentation today, in conjunction with the empirical and objective data obtained on ED workup, no acute emergent medical condition requiring admission has been identified. The patient appears to be  low risk for an emergent medical condition and I feel it is safe and appropriate at this time for the patient to be discharged to follow-up as detailed in their discharge instructions for reevaluation and possible continued outpatient workup and management. Regardless, an unremarkable evaluation in the ED does not preclude the development or presence of a serious or life threatening condition. As such, patient was instructed to return immediately for any worsening or change in current symptoms. Precautions for return discussed at length.  Discharge and follow-up instructions discussed with the patient who expressed understanding and willingness to comply with my recommendations.    Voice recognition software utilized in this note.              Impression      The primary encounter diagnosis was Hyperglycemia due to diabetes mellitus. A diagnosis of Secondary hypertension was also pertinent to this visit.                New Prescriptions    No medications on file        Follow-up Information       Schedule an appointment as soon as possible for a visit  with ZEB Rios.    Specialty: Family Medicine  Contact information:  [address] [id number] [phone]                                            Dorian Cehster MD  09/12/22 8658

## 2022-10-25 ENCOUNTER — TELEPHONE (OUTPATIENT)
Dept: OBSTETRICS AND GYNECOLOGY | Facility: CLINIC | Age: 47
End: 2022-10-25
Payer: MEDICARE

## 2022-10-25 DIAGNOSIS — Z12.39 SCREENING BREAST EXAMINATION: Primary | ICD-10-CM

## 2022-10-25 NOTE — TELEPHONE ENCOUNTER
----- Message from Michael Sanz sent at 10/25/2022 10:34 AM CDT -----  Contact: pt  Type: Requesting to speak with nurse        Who Called: PT  Regarding: orders for mammo needed.  Would the patient rather a call back or a response via [facility]? Call back  Best Call Back Number: [phone]  Additional Information:

## 2022-11-01 ENCOUNTER — TELEPHONE (OUTPATIENT)
Dept: FAMILY MEDICINE | Facility: CLINIC | Age: 47
End: 2022-11-01
Payer: MEDICARE

## 2022-11-07 ENCOUNTER — TELEPHONE (OUTPATIENT)
Dept: FAMILY MEDICINE | Facility: CLINIC | Age: 47
End: 2022-11-07
Payer: MEDICARE

## 2022-11-21 ENCOUNTER — OFFICE VISIT (OUTPATIENT)
Dept: OBSTETRICS AND GYNECOLOGY | Facility: CLINIC | Age: 47
End: 2022-11-21
Payer: MEDICARE

## 2022-11-21 VITALS
DIASTOLIC BLOOD PRESSURE: 98 MMHG | WEIGHT: 241.38 LBS | SYSTOLIC BLOOD PRESSURE: 162 MMHG | BODY MASS INDEX: 40.17 KG/M2

## 2022-11-21 DIAGNOSIS — Z01.419 WELL WOMAN EXAM WITH ROUTINE GYNECOLOGICAL EXAM: ICD-10-CM

## 2022-11-21 DIAGNOSIS — Z12.4 SCREENING FOR CERVICAL CANCER: Primary | ICD-10-CM

## 2022-11-21 PROCEDURE — 1159F PR MEDICATION LIST DOCUMENTED IN MEDICAL RECORD: ICD-10-PCS | Mod: CPTII,S$GLB,, | Performed by: OBSTETRICS & GYNECOLOGY

## 2022-11-21 PROCEDURE — 88175 CYTOPATH C/V AUTO FLUID REDO: CPT | Performed by: STUDENT IN AN ORGANIZED HEALTH CARE EDUCATION/TRAINING PROGRAM

## 2022-11-21 PROCEDURE — 4010F PR ACE/ARB THEARPY RXD/TAKEN: ICD-10-PCS | Mod: CPTII,S$GLB,, | Performed by: OBSTETRICS & GYNECOLOGY

## 2022-11-21 PROCEDURE — 1160F PR REVIEW ALL MEDS BY PRESCRIBER/CLIN PHARMACIST DOCUMENTED: ICD-10-PCS | Mod: CPTII,S$GLB,, | Performed by: OBSTETRICS & GYNECOLOGY

## 2022-11-21 PROCEDURE — 4010F ACE/ARB THERAPY RXD/TAKEN: CPT | Mod: CPTII,S$GLB,, | Performed by: OBSTETRICS & GYNECOLOGY

## 2022-11-21 PROCEDURE — 99212 OFFICE O/P EST SF 10 MIN: CPT | Mod: PBBFAC,PO | Performed by: OBSTETRICS & GYNECOLOGY

## 2022-11-21 PROCEDURE — 3080F DIAST BP >= 90 MM HG: CPT | Mod: CPTII,S$GLB,, | Performed by: OBSTETRICS & GYNECOLOGY

## 2022-11-21 PROCEDURE — 87625 HPV TYPES 16 & 18 ONLY: CPT | Performed by: STUDENT IN AN ORGANIZED HEALTH CARE EDUCATION/TRAINING PROGRAM

## 2022-11-21 PROCEDURE — 1160F RVW MEDS BY RX/DR IN RCRD: CPT | Mod: CPTII,S$GLB,, | Performed by: OBSTETRICS & GYNECOLOGY

## 2022-11-21 PROCEDURE — 3008F BODY MASS INDEX DOCD: CPT | Mod: CPTII,S$GLB,, | Performed by: OBSTETRICS & GYNECOLOGY

## 2022-11-21 PROCEDURE — 3080F PR MOST RECENT DIASTOLIC BLOOD PRESSURE >= 90 MM HG: ICD-10-PCS | Mod: CPTII,S$GLB,, | Performed by: OBSTETRICS & GYNECOLOGY

## 2022-11-21 PROCEDURE — G0101 CA SCREEN;PELVIC/BREAST EXAM: HCPCS | Mod: GC,S$GLB,, | Performed by: OBSTETRICS & GYNECOLOGY

## 2022-11-21 PROCEDURE — G0101 PR CA SCREEN;PELVIC/BREAST EXAM: ICD-10-PCS | Mod: GC,S$GLB,, | Performed by: OBSTETRICS & GYNECOLOGY

## 2022-11-21 PROCEDURE — 3077F PR MOST RECENT SYSTOLIC BLOOD PRESSURE >= 140 MM HG: ICD-10-PCS | Mod: CPTII,S$GLB,, | Performed by: OBSTETRICS & GYNECOLOGY

## 2022-11-21 PROCEDURE — 99999 PR PBB SHADOW E&M-EST. PATIENT-LVL II: ICD-10-PCS | Mod: PBBFAC,,, | Performed by: OBSTETRICS & GYNECOLOGY

## 2022-11-21 PROCEDURE — 87624 HPV HI-RISK TYP POOLED RSLT: CPT | Performed by: STUDENT IN AN ORGANIZED HEALTH CARE EDUCATION/TRAINING PROGRAM

## 2022-11-21 PROCEDURE — 99999 PR PBB SHADOW E&M-EST. PATIENT-LVL II: CPT | Mod: PBBFAC,,, | Performed by: OBSTETRICS & GYNECOLOGY

## 2022-11-21 PROCEDURE — 3008F PR BODY MASS INDEX (BMI) DOCUMENTED: ICD-10-PCS | Mod: CPTII,S$GLB,, | Performed by: OBSTETRICS & GYNECOLOGY

## 2022-11-21 PROCEDURE — 3077F SYST BP >= 140 MM HG: CPT | Mod: CPTII,S$GLB,, | Performed by: OBSTETRICS & GYNECOLOGY

## 2022-11-21 PROCEDURE — 1159F MED LIST DOCD IN RCRD: CPT | Mod: CPTII,S$GLB,, | Performed by: OBSTETRICS & GYNECOLOGY

## 2022-11-21 NOTE — PROGRESS NOTES
CC: Annual check-up    SUBJECTIVE:   47 y.o. female   for annual routine Pap and checkup. Last LMP about 1 month ago, regular, 4-5 days. Has history of 2 miscarriages. S/p colposcopy in . Reports occasional yeast infections, uses nystatin prn. Normal mammo 1 month ago. No other complaints.          Past Medical History:   Diagnosis Date    Cervical high risk HPV (human papillomavirus) test positive 2020    NOT 16 OR 18    CVA (cerebral infarction)          Depression     Diabetes mellitus, type 2     GERD (gastroesophageal reflux disease)     Hyperlipidemia     Hypertension     Moderate nonproliferative diabetic retinopathy     Obesity     Stroke      Past Surgical History:   Procedure Laterality Date     SECTION      CHOLECYSTECTOMY      DILATION AND CURETTAGE OF UTERUS      GALLBLADDER SURGERY  2017    stone removed     Social History     Socioeconomic History    Marital status: Single   Occupational History    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   Tobacco Use    Smoking status: Former     Packs/day: 0.25     Types: Cigarettes     Quit date: 2021     Years since quittin.8    Smokeless tobacco: Never   Substance and Sexual Activity    Alcohol use: Yes     Alcohol/week: 3.0 standard drinks     Types: 3 Cans of beer per week    Drug use: No    Sexual activity: Yes     Partners: Male     Birth control/protection: None   Social History Narrative    Daughter - Kavon     Family History   Problem Relation Age of Onset    Stroke Mother     Thyroid disease Mother     Diabetes Mother     Cataracts Father     Hypertension Father     Arthritis Father     Diabetes Father     Hypertension Sister     Stroke Sister     Thyroid disease Sister     Heart disease Sister     Thyroid disease Daughter     Asthma Daughter     No Known Problems Brother     No Known Problems Maternal Aunt     No Known Problems Maternal Uncle     No Known Problems Paternal Aunt     No Known Problems  Paternal Uncle     No Known Problems Maternal Grandmother     No Known Problems Maternal Grandfather     No Known Problems Paternal Grandmother     No Known Problems Paternal Grandfather     Amblyopia Neg Hx     Blindness Neg Hx     Cancer Neg Hx     Glaucoma Neg Hx     Macular degeneration Neg Hx     Retinal detachment Neg Hx     Strabismus Neg Hx      OB History    Para Term  AB Living   3 2 2 0 1 2   SAB IAB Ectopic Multiple Live Births   1 0 0 0 2      # Outcome Date GA Lbr Jose/2nd Weight Sex Delivery Anes PTL Lv   3 SAB 03/30/15     SAB      2 Term 12/15/10 38w0d   F CS-LTranv   ANNY   1 Term 03 38w0d   F CS-LTranv  N ANNY         Current Outpatient Medications   Medication Sig Dispense Refill    ammonium lactate (LAC-HYDRIN) 12 % lotion SMARTSIG:Sparingly Topical Twice Daily      aspirin (ECOTRIN) 81 MG EC tablet Take 81 mg by mouth once daily.      atorvastatin (LIPITOR) 80 MG tablet Take 1 tablet (80 mg total) by mouth every evening. 90 tablet 3    benazepril-hydrochlorthiazide (LOTENSIN HCT) 20-12.5 mg per tablet Take 1 tablet by mouth once daily. 90 tablet 3    blood sugar diagnostic Strp Use to test blood glucose two (2) times daily as directed with insurance preferred meter and supplies 200 each 3    dulaglutide (TRULICITY) 1.5 mg/0.5 mL pen injector Inject 1.5 mg into the skin once a week. 12 pen 0    ezetimibe (ZETIA) 10 mg tablet Take 1 tablet (10 mg total) by mouth once daily. 90 tablet 3    fluconazole (DIFLUCAN) 150 MG Tab Take 1 tab PO once.  You may repeat in 72 hours if symptoms persist. 2 tablet 0    FLUoxetine 40 MG capsule Take 1 capsule (40 mg total) by mouth once daily. 90 capsule 0    ibuprofen (ADVIL,MOTRIN) 800 MG tablet Take 800 mg by mouth every 8 (eight) hours as needed.  0    insulin (BASAGLAR KWIKPEN U-100 INSULIN) glargine 100 units/mL (3mL) SubQ pen Inject 60 Units into the skin once daily. 60 mL 0    insulin aspart U-100 (NOVOLOG) 100 unit/mL (3 mL) InPn pen  "Inject 10 Units into the skin once daily. 15 mL 0    lancets (TRUEPLUS LANCETS) 28 gauge Misc Use to test blood glucose two (2) times daily as directed with insurance preferred meter and supplies 200 each 3    LIDOcaine HCL 2 % Crea Apply 1 application topically daily as needed (pain). 1 each 0    lisinopriL-hydrochlorothiazide (PRINZIDE,ZESTORETIC) 10-12.5 mg per tablet Take 1 tablet by mouth once daily.      metFORMIN (GLUCOPHAGE) 1000 MG tablet Take 1 tablet (1,000 mg total) by mouth 2 (two) times daily. 180 tablet 1    metoprolol succinate (TOPROL-XL) 100 MG 24 hr tablet Take 1 tablet (100 mg total) by mouth once daily. 90 tablet 0    mupirocin (BACTROBAN) 2 % ointment Apply topically 2 (two) times daily. 15 g 0    NIFEdipine (PROCARDIA-XL) 60 MG (OSM) 24 hr tablet TAKE 1 TABLET (60 MG TOTAL) BY MOUTH ONCE DAILY. 90 tablet 3    nystatin (MYCOSTATIN) cream Apply topically 2 (two) times daily. Apply for 14 days to vulva 30 g 1    pantoprazole (PROTONIX) 40 MG tablet Take 1 tablet (40 mg total) by mouth once daily. 90 tablet 3    pen needle, diabetic (BD ULTRA-FINE ROSA PEN NEEDLE) 32 gauge x 5/32" Ndle Use with insulin once daily 100 each 0    pen needle, diabetic 32 gauge x 5/32" Ndle Use with injectable DM supplies twice daily as directed 200 each 0    spironolactone (ALDACTONE) 25 MG tablet Take 1 tablet (25 mg total) by mouth once daily. 90 tablet 1    TRUE METRIX GO GLUCOSE METER [facility] [place] [facility]        Current Facility-Administered Medications   Medication Dose Route Frequency Provider Last Rate Last Admin    fluorescein 500 mg/5 mL (10 %) injection 500 mg  5 mL Intravenous Once D. Donte Tillman MD        fluorescein 500 mg/5 mL (10 %) injection 500 mg  5 mL Intravenous Once D. Donte Tillman MD         Allergies: Patient has no known allergies.     ROS:  Constitutional: no weight loss, weight gain, fever, fatigue  Eyes:  No vision changes, glasses/contacts  ENT/Mouth: No ulcers, sinus problems, ears ringing, " headache  Cardiovascular: No inability to lie flat, chest pain, exercise intolerance, swelling, heart palpitations  Respiratory: No wheezing, coughing blood, shortness of breath, or cough  Gastrointestinal: No diarrhea, bloody stool, nausea/vomiting, constipation, gas, hemorrhoids  Genitourinary: No blood in urine, painful urination, urgency of urination, frequency of urination, incomplete emptying, incontinence, abnormal bleeding, painful periods, heavy periods, vaginal discharge, vaginal odor, painful intercourse, sexual problems, bleeding after intercourse.  Musculoskeletal: No muscle weakness  Skin/Breast: No painful breasts, nipple discharge, masses, rash, ulcers  Neurological: No passing out, seizures, numbness, headache  Endocrine: No diabetes, hypothyroid, hyperthyroid, hot flashes, hair loss, abnormal hair growth, ance  Psychiatric: No depression, crying  Hematologic: No bruises, bleeding, swollen lymph nodes, anemia.      OBJECTIVE:   The patient appears well, alert, oriented x 3, in no distress.  BP (!) 162/98   Wt 109.5 kg (241 lb 6.5 oz)   BMI 40.17 kg/m²   NECK: no thyromegaly, trachea midline  SKIN: no acne, striae, hirsutism  BREAST EXAM: breasts appear normal, no suspicious masses, no skin or nipple changes or axillary nodes  ABDOMEN: no hernias, masses, or hepatosplenomegaly  GENITALIA: normal external genitalia, no erythema, no discharge  URETHRA: normal urethra, normal urethral meatus  VAGINA: Normal  CERVIX: no lesions or cervical motion tenderness  UTERUS: normal size, contour, position, consistency, mobility, non-tender  ADNEXA: no mass, fullness, tenderness      ASSESSMENT:   well woman  1. Screening for cervical cancer        PLAN:   pap smear  return annually or prn    Donald Acosta MD  [facility] PGY-2  [place] - OBGYN

## 2022-11-28 LAB
CLINICAL INFO: NORMAL
CYTO CVX: NORMAL
CYTOLOGIST CVX/VAG CYTO: NORMAL
CYTOLOGIST CVX/VAG CYTO: NORMAL
CYTOLOGY CMNT CVX/VAG CYTO-IMP: NORMAL
CYTOLOGY PAP THIN PREP EXPLANATION: NORMAL
DATE OF PREVIOUS PAP: NORMAL
DATE PREVIOUS BX: NO
GEN CATEG CVX/VAG CYTO-IMP: NORMAL
HPV I/H RISK 4 DNA CVX QL NAA+PROBE: DETECTED
HPV16 DNA CVX QL PROBE+SIG AMP: NOT DETECTED
HPV18 DNA CVX QL PROBE+SIG AMP: NOT DETECTED
LMP START DATE: NORMAL
MICROORGANISM CVX/VAG CYTO: NORMAL
PATHOLOGIST CVX/VAG CYTO: NORMAL
SERVICE CMNT-IMP: NORMAL
SPECIMEN SOURCE CVX/VAG CYTO: NORMAL
STAT OF ADQ CVX/VAG CYTO-IMP: NORMAL

## 2022-11-29 ENCOUNTER — TELEPHONE (OUTPATIENT)
Dept: OBSTETRICS AND GYNECOLOGY | Facility: HOSPITAL | Age: 47
End: 2022-11-29
Payer: MEDICARE

## 2022-11-29 NOTE — TELEPHONE ENCOUNTER
Notify pt    Your most recent pap smear was mildly abnormal. As you know, the pap smear is a screening test for cervical cancer.  Your test showed you did NOT have an HPV type that puts you at immediate risk of severe dysplasia or cancer and therefore does NOT warrant any further testing. All we should is another pap and rpt HPV test in 1yr.   Girish Alvarado MD

## 2022-11-30 ENCOUNTER — TELEPHONE (OUTPATIENT)
Dept: OBSTETRICS AND GYNECOLOGY | Facility: CLINIC | Age: 47
End: 2022-11-30
Payer: MEDICARE

## 2022-11-30 NOTE — TELEPHONE ENCOUNTER
----- Message from Beatrice Young sent at 11/30/2022 11:25 AM CST -----  Type:  Patient Returning Call    Who Called:Pt   Who Left Message for Patient:Yessi   Does the patient know what this is regarding?: yes   Would the patient rather a call back or a response via [facility]? Call back   Best Call Back Number:[phone]  Additional Information: pt is at home

## 2022-12-22 ENCOUNTER — OFFICE VISIT (OUTPATIENT)
Dept: PODIATRY | Facility: CLINIC | Age: 47
End: 2022-12-22
Payer: MEDICARE

## 2022-12-22 VITALS — HEIGHT: 65 IN | WEIGHT: 240.19 LBS | BODY MASS INDEX: 40.02 KG/M2

## 2022-12-22 DIAGNOSIS — E66.01 MORBID OBESITY: ICD-10-CM

## 2022-12-22 DIAGNOSIS — M79.672 LEFT FOOT PAIN: Primary | ICD-10-CM

## 2022-12-22 DIAGNOSIS — E11.9 COMPREHENSIVE DIABETIC FOOT EXAMINATION, TYPE 2 DM, ENCOUNTER FOR: ICD-10-CM

## 2022-12-22 DIAGNOSIS — F17.200 SMOKING: ICD-10-CM

## 2022-12-22 DIAGNOSIS — E11.42 DIABETIC PERIPHERAL NEUROPATHY ASSOCIATED WITH TYPE 2 DIABETES MELLITUS: ICD-10-CM

## 2022-12-22 PROCEDURE — 3008F PR BODY MASS INDEX (BMI) DOCUMENTED: ICD-10-PCS | Mod: CPTII,S$GLB,, | Performed by: PODIATRIST

## 2022-12-22 PROCEDURE — 99213 PR OFFICE/OUTPT VISIT, EST, LEVL III, 20-29 MIN: ICD-10-PCS | Mod: S$GLB,,, | Performed by: PODIATRIST

## 2022-12-22 PROCEDURE — 99999 PR PBB SHADOW E&M-EST. PATIENT-LVL II: CPT | Mod: PBBFAC,,, | Performed by: PODIATRIST

## 2022-12-22 PROCEDURE — 4010F PR ACE/ARB THEARPY RXD/TAKEN: ICD-10-PCS | Mod: CPTII,S$GLB,, | Performed by: PODIATRIST

## 2022-12-22 PROCEDURE — 99999 PR PBB SHADOW E&M-EST. PATIENT-LVL II: ICD-10-PCS | Mod: PBBFAC,,, | Performed by: PODIATRIST

## 2022-12-22 PROCEDURE — 3008F BODY MASS INDEX DOCD: CPT | Mod: CPTII,S$GLB,, | Performed by: PODIATRIST

## 2022-12-22 PROCEDURE — 99213 OFFICE O/P EST LOW 20 MIN: CPT | Mod: S$GLB,,, | Performed by: PODIATRIST

## 2022-12-22 PROCEDURE — 4010F ACE/ARB THERAPY RXD/TAKEN: CPT | Mod: CPTII,S$GLB,, | Performed by: PODIATRIST

## 2022-12-22 NOTE — PROGRESS NOTES
Subjective:      Patient ID: Jaimee Quintana is a 47 y.o. female.    Chief Complaint: Nail Care (Pt present for nail care with Rt foot pain)    Jaimee is a 47 y.o. female who presents to the clinic for evaluation and treatment of high risk feet. Jaimee has a past medical history of Cervical high risk HPV (human papillomavirus) test positive (09/17/2020), CVA (cerebral infarction) (2003), Depression, Diabetes mellitus, type 2, GERD (gastroesophageal reflux disease), Hyperlipidemia, Hypertension, Moderate nonproliferative diabetic retinopathy, Obesity, and Stroke. This patient has documented high risk feet requiring routine maintenance secondary to peripheral neuropathy.  Complains of numbness and tingling.  Known to Dr. Coelho for diabetic foot assessment.  She smokes. 1 pack lasts for a week.  Ambulating in flat shoes.  She has not tried topical pain cream for neuropathic symptoms.  Tells me glucose is not well controlled.    12/22/22 Follow-up for diabetic foot risk assessment and left foot pain over 5th toe.  Patient tells me she dropped a can on top of 5th toe 4 days ago.  Complains of pain as aching and throbbing.  Ambulating in regular walking shoes.  Patient tells me glucose is not well controlled at this time.      PCP: ZEB Rios    Date Last Seen by PCP:  In epic      Hemoglobin A1C   Date Value Ref Range Status   05/07/2021 9.0 (H) 4.0 - 5.6 % Final     Comment:     ADA Screening Guidelines:  5.7-6.4%  Consistent with prediabetes  >or=6.5%  Consistent with diabetes    High levels of fetal hemoglobin interfere with the HbA1C  assay. Heterozygous hemoglobin variants (HbS, HgC, etc)do  not significantly interfere with this assay.   However, presence of multiple variants may affect accuracy.     12/04/2020 8.2 (H) 4.0 - 5.6 % Final     Comment:     ADA Screening Guidelines:  5.7-6.4%  Consistent with prediabetes  >or=6.5%  Consistent with diabetes  High levels of fetal hemoglobin interfere  with the HbA1C  assay. Heterozygous hemoglobin variants (HbS, HgC, etc)do  not significantly interfere with this assay.   However, presence of multiple variants may affect accuracy.     2020 8.0 (H) 4.0 - 5.6 % Final     Comment:     ADA Screening Guidelines:  5.7-6.4%  Consistent with prediabetes  >or=6.5%  Consistent with diabetes  High levels of fetal hemoglobin interfere with the HbA1C  assay. Heterozygous hemoglobin variants (HbS, HgC, etc)do  not significantly interfere with this assay.   However, presence of multiple variants may affect accuracy.         Review of Systems   Constitutional: Negative for chills, decreased appetite, fever and malaise/fatigue.   HENT:  Negative for congestion, ear discharge and sore throat.    Eyes:  Negative for discharge and pain.   Cardiovascular:  Negative for chest pain, claudication and leg swelling.   Respiratory:  Negative for cough and shortness of breath.    Skin:  Positive for color change. Negative for nail changes and rash.   Musculoskeletal:  Positive for arthritis and back pain. Negative for joint pain, joint swelling and muscle weakness.        L foot pain     Gastrointestinal:  Negative for bloating, abdominal pain, diarrhea, nausea and vomiting.   Genitourinary:  Negative for flank pain and hematuria.   Neurological:  Positive for numbness. Negative for headaches and weakness.   Psychiatric/Behavioral:  Negative for altered mental status.            Past Medical History:   Diagnosis Date    Cervical high risk HPV (human papillomavirus) test positive 2020    NOT 16 OR 18    CVA (cerebral infarction) 2003         Depression     Diabetes mellitus, type 2     GERD (gastroesophageal reflux disease)     Hyperlipidemia     Hypertension     Moderate nonproliferative diabetic retinopathy     Obesity     Stroke        Past Surgical History:   Procedure Laterality Date     SECTION      CHOLECYSTECTOMY      DILATION AND CURETTAGE OF UTERUS       GALLBLADDER SURGERY  2017    stone removed       Family History   Problem Relation Age of Onset    Stroke Mother     Thyroid disease Mother     Diabetes Mother     Cataracts Father     Hypertension Father     Arthritis Father     Diabetes Father     Hypertension Sister     Stroke Sister     Thyroid disease Sister     Heart disease Sister     Thyroid disease Daughter     Asthma Daughter     No Known Problems Brother     No Known Problems Maternal Aunt     No Known Problems Maternal Uncle     No Known Problems Paternal Aunt     No Known Problems Paternal Uncle     No Known Problems Maternal Grandmother     No Known Problems Maternal Grandfather     No Known Problems Paternal Grandmother     No Known Problems Paternal Grandfather     Amblyopia Neg Hx     Blindness Neg Hx     Cancer Neg Hx     Glaucoma Neg Hx     Macular degeneration Neg Hx     Retinal detachment Neg Hx     Strabismus Neg Hx        Social History     Socioeconomic History    Marital status: Single   Occupational History    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   Tobacco Use    Smoking status: Former     Packs/day: 0.25     Types: Cigarettes     Quit date: 2021     Years since quittin.8    Smokeless tobacco: Never   Substance and Sexual Activity    Alcohol use: Yes     Alcohol/week: 3.0 standard drinks     Types: 3 Cans of beer per week    Drug use: No    Sexual activity: Yes     Partners: Male     Birth control/protection: None   Social History Narrative    Daughter - Kavon       Current Outpatient Medications   Medication Sig Dispense Refill    ammonium lactate (LAC-HYDRIN) 12 % lotion SMARTSIG:Sparingly Topical Twice Daily      aspirin (ECOTRIN) 81 MG EC tablet Take 81 mg by mouth once daily.      atorvastatin (LIPITOR) 80 MG tablet Take 1 tablet (80 mg total) by mouth every evening. 90 tablet 3    benazepril-hydrochlorthiazide (LOTENSIN HCT) 20-12.5 mg per tablet Take 1 tablet by mouth once daily. 90 tablet 3    blood  sugar diagnostic Strp Use to test blood glucose two (2) times daily as directed with insurance preferred meter and supplies 200 each 3    dulaglutide (TRULICITY) 1.5 mg/0.5 mL pen injector Inject 1.5 mg into the skin once a week. 12 pen 0    ezetimibe (ZETIA) 10 mg tablet Take 1 tablet (10 mg total) by mouth once daily. 90 tablet 3    fluconazole (DIFLUCAN) 150 MG Tab Take 1 tab PO once.  You may repeat in 72 hours if symptoms persist. 2 tablet 0    FLUoxetine 40 MG capsule Take 1 capsule (40 mg total) by mouth once daily. 90 capsule 0    ibuprofen (ADVIL,MOTRIN) 800 MG tablet Take 800 mg by mouth every 8 (eight) hours as needed.  0    insulin (BASAGLAR KWIKPEN U-100 INSULIN) glargine 100 units/mL (3mL) SubQ pen Inject 60 Units into the skin once daily. 60 mL 0    insulin aspart U-100 (NOVOLOG) 100 unit/mL (3 mL) InPn pen Inject 10 Units into the skin once daily. 15 mL 0    lancets (TRUEPLUS LANCETS) 28 gauge Misc Use to test blood glucose two (2) times daily as directed with insurance preferred meter and supplies 200 each 3    LIDOcaine HCL 2 % Crea Apply 1 application topically daily as needed (pain). 1 each 0    lisinopriL-hydrochlorothiazide (PRINZIDE,ZESTORETIC) 10-12.5 mg per tablet Take 1 tablet by mouth once daily.      metFORMIN (GLUCOPHAGE) 1000 MG tablet Take 1 tablet (1,000 mg total) by mouth 2 (two) times daily. 180 tablet 1    metoprolol succinate (TOPROL-XL) 100 MG 24 hr tablet Take 1 tablet (100 mg total) by mouth once daily. 90 tablet 0    mupirocin (BACTROBAN) 2 % ointment Apply topically 2 (two) times daily. 15 g 0    NIFEdipine (PROCARDIA-XL) 60 MG (OSM) 24 hr tablet TAKE 1 TABLET (60 MG TOTAL) BY MOUTH ONCE DAILY. 90 tablet 3    nystatin (MYCOSTATIN) cream Apply topically 2 (two) times daily. Apply for 14 days to vulva 30 g 1    pantoprazole (PROTONIX) 40 MG tablet Take 1 tablet (40 mg total) by mouth once daily. 90 tablet 3    pen needle, diabetic (BD ULTRA-FINE ROSA PEN NEEDLE) 32 gauge x  "5/32" Ndle Use with insulin once daily 100 each 0    pen needle, diabetic 32 gauge x 5/32" Ndle Use with injectable DM supplies twice daily as directed 200 each 0    spironolactone (ALDACTONE) 25 MG tablet Take 1 tablet (25 mg total) by mouth once daily. 90 tablet 1    TRUE METRIX GO GLUCOSE METER Misc        Current Facility-Administered Medications   Medication Dose Route Frequency Provider Last Rate Last Admin    fluorescein 500 mg/5 mL (10 %) injection 500 mg  5 mL Intravenous Once D. Donte Tillman MD        fluorescein 500 mg/5 mL (10 %) injection 500 mg  5 mL Intravenous Once D. Donte Tillman MD           Review of patient's allergies indicates:  No Known Allergies    Vitals:    12/22/22 1310   Weight: 109 kg (240 lb 3.2 oz)   Height: 5' 5" (1.651 m)   PainSc:   8   PainLoc: Foot         Objective:      Physical Exam  Constitutional:       General: She is not in acute distress.     Appearance: She is well-developed.   HENT:      Nose: Nose normal.   Eyes:      Conjunctiva/sclera: Conjunctivae normal.   Pulmonary:      Effort: Pulmonary effort is normal.   Chest:      Chest wall: No tenderness.   Abdominal:      Tenderness: There is no abdominal tenderness.   Musculoskeletal:      Cervical back: Normal range of motion.   Neurological:      Mental Status: She is alert and oriented to person, place, and time.   Psychiatric:         Behavior: Behavior normal.     Vascular: Distal DP pulses palpable 1/4 and PT 0/4. CRT < 3 sec to tips of toes. No vericosities noted to LEs. Hair growth present LE, warm to touch LE, No edema noted to LE.    Dermatologic: No open lesions, lacerations or wounds. Interdigital spaces clean, dry and intact. No erythema, rubor, calor noted LE  B/l feet: Toenails 5 bilaterally are elongated by 2-3 mm, thickened by 2-3 mm, discolored/yellowed, dystrophic, brittle with subungual debris. No incurvation. Mild xerosis noted with some skin pigmentation changes.     Musculoskeletal: MMT " 5/5 in DF/PF/Inv/Ev resistance with no reproduction of pain in any direction. Passive range of motion of ankle and pedal joints is painless. No calf tenderness LE, Compartments soft/compressible.   L foot: pain over 5th toe.    Neurological:  Light touch, proprioception, and sharp/dull sensation are decreased. Protective threshold with the [place] monofilament is decreased. Vibratory sensation decreased.        Assessment:       Encounter Diagnoses   Name Primary?    Left foot pain Yes    Diabetic peripheral neuropathy associated with type 2 diabetes mellitus     Comprehensive diabetic foot examination, type 2 DM, encounter for     Morbid obesity     Smoking            Plan:       Jaimee was seen today for nail care.    Diagnoses and all orders for this visit:    Left foot pain  -     X-Ray Foot Complete Left; Future    Diabetic peripheral neuropathy associated with type 2 diabetes mellitus    Comprehensive diabetic foot examination, type 2 DM, encounter for    Morbid obesity    Smoking      I counseled the patient on her conditions, their implications and medical management.    47 y.o. female with diabetic foot risk assessment with L 5th toe pain.     -L foot xrays reviewed. No fracture on 5th toe.   -doing well from diabetic foot standpoint. Intact pulses. No open ulcers.  Chronically thickened and painful bilateral 5th toe nail.  I sharply debrided 5th toenail with nail nipper.     -Shoe inspection. General Foot Education. Patient reminded of the importance of good nutrition. Patient instructed on proper foot hygeine. We discussed wearing proper shoe gear, daily foot inspections, never walking without protective shoe gear, caution putting sharp instruments to feet. Discussed general foot care:  Wear comfortable, proper fitting shoes. Wash feet daily. Dry well. After drying, apply moisturizer to feet (no lotion to webspaces). Inspect feet daily for skin breaks, blisters, swelling, or redness. Wear  cotton socks (preferably white)  Change socks every day. Do NOT walk barefoot. Do NOT use heating pads or warm/hot water soaks   -It was discussed the importance of wearing shoes with adequate room in toe box to accommodate toe deformities. Recommended New Balance/[employer]ics shoe brands with adequate arch supports to alleviate abnormal pressure and improve stability of foot while walking. Avoid flat shoes and barefoot walking as these will exacerbate or worsen symptoms.   -Advised for optimal glucose control and maintenance per primary care physician. Patient was also educated on healthy diet that is naturally rich in nutrients and low in fat and calories.   -The nature of the condition, options for management, as well as potential risks and complications were discussed in detail with patient. Patient was amenable to my recommendations and left my office fully informed and will follow up as instructed or sooner if necessary.    -Patient was advised of signs and symptoms of infection including redness, drainage, purulence, odor, streaking, fever, chills and I advised patient to seek medical attention (ER or urgent care) if these symptoms arise.   -Discuss in detail the harmful effects of nicotine/tobacco/cigarette smoking, especially in relation to the lower extremity. Recommend consultation with primary care provider for further discussed of smoking cessation methods. Smoking & Tobacco use cessation couseling was rendered at today's visit; intermediate, bewteen 3 and 10 minutes.  -f/u 1 year     Note dictated with voice recognition software, please excuse any grammatical errors.

## 2023-01-03 ENCOUNTER — PES CALL (OUTPATIENT)
Dept: ADMINISTRATIVE | Facility: CLINIC | Age: 48
End: 2023-01-03
Payer: MEDICARE

## 2023-01-24 ENCOUNTER — OFFICE VISIT (OUTPATIENT)
Dept: OPHTHALMOLOGY | Facility: CLINIC | Age: 48
End: 2023-01-24
Payer: MEDICARE

## 2023-01-24 DIAGNOSIS — E11.3513 TYPE 2 DIABETES MELLITUS WITH BOTH EYES AFFECTED BY PROLIFERATIVE RETINOPATHY AND MACULAR EDEMA, WITH LONG-TERM CURRENT USE OF INSULIN: Primary | ICD-10-CM

## 2023-01-24 DIAGNOSIS — Z79.4 TYPE 2 DIABETES MELLITUS WITH BOTH EYES AFFECTED BY PROLIFERATIVE RETINOPATHY AND MACULAR EDEMA, WITH LONG-TERM CURRENT USE OF INSULIN: Primary | ICD-10-CM

## 2023-01-24 DIAGNOSIS — H35.033 HYPERTENSIVE RETINOPATHY, BILATERAL: ICD-10-CM

## 2023-01-24 PROCEDURE — 1159F PR MEDICATION LIST DOCUMENTED IN MEDICAL RECORD: ICD-10-PCS | Mod: CPTII,S$GLB,, | Performed by: OPHTHALMOLOGY

## 2023-01-24 PROCEDURE — 92134 POSTERIOR SEGMENT OCT RETINA (OCULAR COHERENCE TOMOGRAPHY)-BOTH EYES: ICD-10-PCS | Mod: S$GLB,,, | Performed by: OPHTHALMOLOGY

## 2023-01-24 PROCEDURE — 92235 FLUORESCEIN ANGIOGRAPHY - OU - BOTH EYES: ICD-10-PCS | Mod: S$GLB,,, | Performed by: OPHTHALMOLOGY

## 2023-01-24 PROCEDURE — 92014 PR EYE EXAM, EST PATIENT,COMPREHESV: ICD-10-PCS | Mod: S$GLB,,, | Performed by: OPHTHALMOLOGY

## 2023-01-24 PROCEDURE — 92014 COMPRE OPH EXAM EST PT 1/>: CPT | Mod: S$GLB,,, | Performed by: OPHTHALMOLOGY

## 2023-01-24 PROCEDURE — 99999 PR PBB SHADOW E&M-EST. PATIENT-LVL IV: ICD-10-PCS | Mod: PBBFAC,,, | Performed by: OPHTHALMOLOGY

## 2023-01-24 PROCEDURE — 92134 CPTRZ OPH DX IMG PST SGM RTA: CPT | Mod: S$GLB,,, | Performed by: OPHTHALMOLOGY

## 2023-01-24 PROCEDURE — 1159F MED LIST DOCD IN RCRD: CPT | Mod: CPTII,S$GLB,, | Performed by: OPHTHALMOLOGY

## 2023-01-24 PROCEDURE — 92235 FLUORESCEIN ANGRPH MLTIFRAME: CPT | Mod: S$GLB,,, | Performed by: OPHTHALMOLOGY

## 2023-01-24 PROCEDURE — 1160F RVW MEDS BY RX/DR IN RCRD: CPT | Mod: CPTII,S$GLB,, | Performed by: OPHTHALMOLOGY

## 2023-01-24 PROCEDURE — 1160F PR REVIEW ALL MEDS BY PRESCRIBER/CLIN PHARMACIST DOCUMENTED: ICD-10-PCS | Mod: CPTII,S$GLB,, | Performed by: OPHTHALMOLOGY

## 2023-01-24 PROCEDURE — 99999 PR PBB SHADOW E&M-EST. PATIENT-LVL IV: CPT | Mod: PBBFAC,,, | Performed by: OPHTHALMOLOGY

## 2023-01-24 RX ADMIN — FLUORESCEIN 500 MG: 500 INJECTION INTRAVENOUS at 02:01

## 2023-01-24 NOTE — PROGRESS NOTES
HPI    Overdue 6m OCT/DFE/WFFA >OD    Pt states her va is worse since last visit. Pt advised she noticed an   sudden decrease about 2 weeks ago. Pt also advised that her latest A1C was   17.9. Pt c/o flashes for about 2 weeks, no floaters. Dry/ itchy OU    Flashes  No floaters  No pain  Gtts: At's PRN OU    Last edited by Amairani Dyer on 1/24/2023  1:53 PM.             OCT non central DME OU    WFFA - OD sig NP with NV  OS - Regressed NV after BP  with late macular leakage    A/P    1. PDR OU  T2 uncontrolled on insulin  S/p PRP OS    Plan PRP OD    2. DME OU  Non central  Monitor    3. HTN Ret OU  BS/BP/chol control      2-3 weeks PRP OD

## 2023-02-14 ENCOUNTER — OFFICE VISIT (OUTPATIENT)
Dept: OPHTHALMOLOGY | Facility: CLINIC | Age: 48
End: 2023-02-14
Payer: MEDICARE

## 2023-02-14 DIAGNOSIS — Z79.4 TYPE 2 DIABETES MELLITUS WITH BOTH EYES AFFECTED BY PROLIFERATIVE RETINOPATHY AND MACULAR EDEMA, WITH LONG-TERM CURRENT USE OF INSULIN: Primary | ICD-10-CM

## 2023-02-14 DIAGNOSIS — E11.3513 TYPE 2 DIABETES MELLITUS WITH BOTH EYES AFFECTED BY PROLIFERATIVE RETINOPATHY AND MACULAR EDEMA, WITH LONG-TERM CURRENT USE OF INSULIN: Primary | ICD-10-CM

## 2023-02-14 DIAGNOSIS — H35.033 HYPERTENSIVE RETINOPATHY, BILATERAL: ICD-10-CM

## 2023-02-14 PROCEDURE — 99999 PR PBB SHADOW E&M-EST. PATIENT-LVL II: CPT | Mod: PBBFAC,,, | Performed by: OPHTHALMOLOGY

## 2023-02-14 PROCEDURE — 67228 PR TREATMENT EXTENSIVE RETINOPATHY, PHOTOCOAGULATION: ICD-10-PCS | Mod: RT,S$GLB,, | Performed by: OPHTHALMOLOGY

## 2023-02-14 PROCEDURE — 1160F PR REVIEW ALL MEDS BY PRESCRIBER/CLIN PHARMACIST DOCUMENTED: ICD-10-PCS | Mod: CPTII,S$GLB,, | Performed by: OPHTHALMOLOGY

## 2023-02-14 PROCEDURE — 67228 TREATMENT X10SV RETINOPATHY: CPT | Mod: RT,S$GLB,, | Performed by: OPHTHALMOLOGY

## 2023-02-14 PROCEDURE — 99499 NO LOS: ICD-10-PCS | Mod: S$GLB,,, | Performed by: OPHTHALMOLOGY

## 2023-02-14 PROCEDURE — 99999 PR PBB SHADOW E&M-EST. PATIENT-LVL II: ICD-10-PCS | Mod: PBBFAC,,, | Performed by: OPHTHALMOLOGY

## 2023-02-14 PROCEDURE — 1159F PR MEDICATION LIST DOCUMENTED IN MEDICAL RECORD: ICD-10-PCS | Mod: CPTII,S$GLB,, | Performed by: OPHTHALMOLOGY

## 2023-02-14 PROCEDURE — 1159F MED LIST DOCD IN RCRD: CPT | Mod: CPTII,S$GLB,, | Performed by: OPHTHALMOLOGY

## 2023-02-14 PROCEDURE — 1160F RVW MEDS BY RX/DR IN RCRD: CPT | Mod: CPTII,S$GLB,, | Performed by: OPHTHALMOLOGY

## 2023-02-14 PROCEDURE — 99499 UNLISTED E&M SERVICE: CPT | Mod: S$GLB,,, | Performed by: OPHTHALMOLOGY

## 2023-02-14 RX ORDER — INSULIN GLARGINE 100 [IU]/ML
80 INJECTION, SOLUTION SUBCUTANEOUS 2 TIMES DAILY
COMMUNITY
End: 2023-05-31

## 2023-02-20 NOTE — PROGRESS NOTES
HPI    3 wk PRP OD today.     DLS 01/24/2023 by Dr. CULLEN Tillman MD    Cc: pt states : no new changes and I am nervous about the laser and   driving afterwards.     -eye pain  ++diplopia  ++blurriness OD  --headaches  ++dizziness ( onset x 2 days)     Eye Meds: NONE    POHx:             Prior OCT non central DME OU    Prior WFFA - OD sig NP with NV  OS - Regressed NV after BP  with late macular leakage    A/P    1. PDR OU  T2 uncontrolled on insulin  S/p PRP OS    Plan PRP OD today    2. DME OU  Non central  Monitor    3. HTN Ret OU  BS/BP/chol control      2-3 weeks PRP OD complete      Risks, benefits, and alternatives to treatment discussed in detail with the patient.  The patient voiced understanding and wished to proceed with the procedure    Laser Procedure Note  Dx: PDR OD  Laser: PRP OD  Argon  Spot: 200  Power: 150-  Dur: 0.1s  #:  [id number]  Complications: None  F/U as above

## 2023-03-14 ENCOUNTER — OFFICE VISIT (OUTPATIENT)
Dept: OPHTHALMOLOGY | Facility: CLINIC | Age: 48
End: 2023-03-14
Payer: MEDICARE

## 2023-03-14 DIAGNOSIS — E11.3513 TYPE 2 DIABETES MELLITUS WITH BOTH EYES AFFECTED BY PROLIFERATIVE RETINOPATHY AND MACULAR EDEMA, WITH LONG-TERM CURRENT USE OF INSULIN: Primary | ICD-10-CM

## 2023-03-14 DIAGNOSIS — Z79.4 TYPE 2 DIABETES MELLITUS WITH BOTH EYES AFFECTED BY PROLIFERATIVE RETINOPATHY AND MACULAR EDEMA, WITH LONG-TERM CURRENT USE OF INSULIN: Primary | ICD-10-CM

## 2023-03-14 PROCEDURE — 99499 UNLISTED E&M SERVICE: CPT | Mod: S$GLB,,, | Performed by: OPHTHALMOLOGY

## 2023-03-14 PROCEDURE — 67228 PR TREATMENT EXTENSIVE RETINOPATHY, PHOTOCOAGULATION: ICD-10-PCS | Mod: RT,S$GLB,, | Performed by: OPHTHALMOLOGY

## 2023-03-14 PROCEDURE — 1159F MED LIST DOCD IN RCRD: CPT | Mod: CPTII,S$GLB,, | Performed by: OPHTHALMOLOGY

## 2023-03-14 PROCEDURE — 67228 TREATMENT X10SV RETINOPATHY: CPT | Mod: RT,S$GLB,, | Performed by: OPHTHALMOLOGY

## 2023-03-14 PROCEDURE — 99999 PR PBB SHADOW E&M-EST. PATIENT-LVL III: CPT | Mod: PBBFAC,,, | Performed by: OPHTHALMOLOGY

## 2023-03-14 PROCEDURE — 99999 PR PBB SHADOW E&M-EST. PATIENT-LVL III: ICD-10-PCS | Mod: PBBFAC,,, | Performed by: OPHTHALMOLOGY

## 2023-03-14 PROCEDURE — 99499 NO LOS: ICD-10-PCS | Mod: S$GLB,,, | Performed by: OPHTHALMOLOGY

## 2023-03-14 PROCEDURE — 1159F PR MEDICATION LIST DOCUMENTED IN MEDICAL RECORD: ICD-10-PCS | Mod: CPTII,S$GLB,, | Performed by: OPHTHALMOLOGY

## 2023-03-14 NOTE — PROGRESS NOTES
HPI    Patient here today for PRP fill- in OD. VA stable ou, no pain and no f/f.    Last edited by Elena Massey on 3/14/2023  3:09 PM.             Prior OCT non central DME OU    Prior WFFA - OD sig NP with NV  OS - Regressed NV after BP  with late macular leakage    A/P    1. PDR OU  T2 uncontrolled on insulin  S/p PRP OS    Plan PRP OD today    2. DME OU  Non central  Monitor    3. HTN Ret OU  BS/BP/chol control      1 month OCT and dilate      Risks, benefits, and alternatives to treatment discussed in detail with the patient.  The patient voiced understanding and wished to proceed with the procedure    Laser Procedure Note  Dx: PDR OD  Laser: PRP OD superior 180  Argon  Spot: 200  Power: 150-170  Dur: 0.1s  #:  802  Complications: None  F/U as above

## 2023-04-14 ENCOUNTER — TELEPHONE (OUTPATIENT)
Dept: OPHTHALMOLOGY | Facility: CLINIC | Age: 48
End: 2023-04-14
Payer: MEDICARE

## 2023-04-14 NOTE — TELEPHONE ENCOUNTER
----- Message from Alia Regalado sent at 4/14/2023  2:32 PM CDT -----  Regarding: advice  Contact: self @ [phone]  Pt says she is returning Elena's call.

## 2023-04-14 NOTE — TELEPHONE ENCOUNTER
----- Message from Addie Bentley sent at 4/14/2023 11:59 AM CDT -----  Regarding: Upcoming appt  Contact: Pt Transportation  Pt transportation is calling in regards to pt appt for Tuesday 4/18. They would like to know if the pt appt is urgent or if it can be safely rescheduled. Please adv pt      Confirmed contact below:   Contact Name:Jaimee Quintana  Phone Number: [phone]

## 2023-05-16 ENCOUNTER — OFFICE VISIT (OUTPATIENT)
Dept: OPHTHALMOLOGY | Facility: CLINIC | Age: 48
End: 2023-05-16
Payer: MEDICARE

## 2023-05-16 DIAGNOSIS — H35.033 HYPERTENSIVE RETINOPATHY, BILATERAL: ICD-10-CM

## 2023-05-16 DIAGNOSIS — Z79.4 TYPE 2 DIABETES MELLITUS WITH BOTH EYES AFFECTED BY PROLIFERATIVE RETINOPATHY AND MACULAR EDEMA, WITH LONG-TERM CURRENT USE OF INSULIN: Primary | ICD-10-CM

## 2023-05-16 DIAGNOSIS — E11.3513 TYPE 2 DIABETES MELLITUS WITH BOTH EYES AFFECTED BY PROLIFERATIVE RETINOPATHY AND MACULAR EDEMA, WITH LONG-TERM CURRENT USE OF INSULIN: Primary | ICD-10-CM

## 2023-05-16 PROCEDURE — 92201 OPSCPY EXTND RTA DRAW UNI/BI: CPT | Mod: ,,, | Performed by: OPHTHALMOLOGY

## 2023-05-16 PROCEDURE — 92201 PR OPHTHALMOSCOPY, EXT, W/RET DRAW/SCLERAL DEPR, I&R, UNI/BI: ICD-10-PCS | Mod: ,,, | Performed by: OPHTHALMOLOGY

## 2023-05-16 PROCEDURE — 1159F MED LIST DOCD IN RCRD: CPT | Mod: CPTII,,, | Performed by: OPHTHALMOLOGY

## 2023-05-16 PROCEDURE — 99999 PR PBB SHADOW E&M-EST. PATIENT-LVL IV: ICD-10-PCS | Mod: PBBFAC,,, | Performed by: OPHTHALMOLOGY

## 2023-05-16 PROCEDURE — 1160F RVW MEDS BY RX/DR IN RCRD: CPT | Mod: CPTII,,, | Performed by: OPHTHALMOLOGY

## 2023-05-16 PROCEDURE — 92134 CPTRZ OPH DX IMG PST SGM RTA: CPT | Mod: ,,, | Performed by: OPHTHALMOLOGY

## 2023-05-16 PROCEDURE — 92014 PR EYE EXAM, EST PATIENT,COMPREHESV: ICD-10-PCS | Mod: ,,, | Performed by: OPHTHALMOLOGY

## 2023-05-16 PROCEDURE — 1160F PR REVIEW ALL MEDS BY PRESCRIBER/CLIN PHARMACIST DOCUMENTED: ICD-10-PCS | Mod: CPTII,,, | Performed by: OPHTHALMOLOGY

## 2023-05-16 PROCEDURE — 92014 COMPRE OPH EXAM EST PT 1/>: CPT | Mod: ,,, | Performed by: OPHTHALMOLOGY

## 2023-05-16 PROCEDURE — 92134 POSTERIOR SEGMENT OCT RETINA (OCULAR COHERENCE TOMOGRAPHY)-BOTH EYES: ICD-10-PCS | Mod: ,,, | Performed by: OPHTHALMOLOGY

## 2023-05-16 PROCEDURE — 1159F PR MEDICATION LIST DOCUMENTED IN MEDICAL RECORD: ICD-10-PCS | Mod: CPTII,,, | Performed by: OPHTHALMOLOGY

## 2023-05-16 PROCEDURE — 99999 PR PBB SHADOW E&M-EST. PATIENT-LVL IV: CPT | Mod: PBBFAC,,, | Performed by: OPHTHALMOLOGY

## 2023-05-16 NOTE — PROGRESS NOTES
HPI    DLS: 03/14/2023 Dr. Tillman    Eye Med's: No gtts    47 y.o. female is here for 1 month OCT and dilate. Denies eye pain and   flashes/floaters. Occasional tearing, right eye. No noticeable VA changes   since last visit. No problems with glare.   Last edited by LARISA Leach on 5/16/2023 10:39 AM.             OCT non central DME OU - low grade and stable    Prior WFFA - OD sig NP with NV  OS - Regressed NV after BP  with late macular leakage    A/P    1. PDR OU  T2 uncontrolled on insulin  S/p PRP OU      2. DME OU  Non central  Monitor    3. HTN Ret OU  BS/BP/chol control      6 month OCT and dilate

## 2023-05-22 ENCOUNTER — HOSPITAL ENCOUNTER (INPATIENT)
Facility: HOSPITAL | Age: 48
LOS: 1 days | Discharge: HOME | DRG: 291 | End: 2023-05-23
Attending: EMERGENCY MEDICINE | Admitting: STUDENT IN AN ORGANIZED HEALTH CARE EDUCATION/TRAINING PROGRAM
Payer: MEDICARE

## 2023-05-22 DIAGNOSIS — E78.5 HYPERLIPIDEMIA ASSOCIATED WITH TYPE 2 DIABETES MELLITUS: ICD-10-CM

## 2023-05-22 DIAGNOSIS — Z86.73 HISTORY OF CVA (CEREBROVASCULAR ACCIDENT) WITHOUT RESIDUAL DEFICITS: ICD-10-CM

## 2023-05-22 DIAGNOSIS — R06.02 SHORTNESS OF BREATH: ICD-10-CM

## 2023-05-22 DIAGNOSIS — Z91.148 NONCOMPLIANCE WITH MEDICATIONS: ICD-10-CM

## 2023-05-22 DIAGNOSIS — E11.69 HYPERLIPIDEMIA ASSOCIATED WITH TYPE 2 DIABETES MELLITUS: ICD-10-CM

## 2023-05-22 DIAGNOSIS — E78.5 HYPERLIPIDEMIA, UNSPECIFIED HYPERLIPIDEMIA TYPE: ICD-10-CM

## 2023-05-22 DIAGNOSIS — R09.02 HYPOXIA: ICD-10-CM

## 2023-05-22 DIAGNOSIS — E11.69 TYPE 2 DIABETES MELLITUS WITH OTHER SPECIFIED COMPLICATION, WITH LONG-TERM CURRENT USE OF INSULIN: ICD-10-CM

## 2023-05-22 DIAGNOSIS — R07.9 CHEST PAIN, UNSPECIFIED TYPE: ICD-10-CM

## 2023-05-22 DIAGNOSIS — R79.89 ELEVATED TROPONIN: ICD-10-CM

## 2023-05-22 DIAGNOSIS — Z79.4 TYPE 2 DIABETES MELLITUS WITH OTHER SPECIFIED COMPLICATION, WITH LONG-TERM CURRENT USE OF INSULIN: ICD-10-CM

## 2023-05-22 DIAGNOSIS — K21.9 GASTROESOPHAGEAL REFLUX DISEASE, UNSPECIFIED WHETHER ESOPHAGITIS PRESENT: ICD-10-CM

## 2023-05-22 DIAGNOSIS — I50.9 ACUTE CONGESTIVE HEART FAILURE, UNSPECIFIED HEART FAILURE TYPE: Primary | ICD-10-CM

## 2023-05-22 DIAGNOSIS — I50.23 ACUTE ON CHRONIC SYSTOLIC CONGESTIVE HEART FAILURE: ICD-10-CM

## 2023-05-22 PROBLEM — E11.9 TYPE 2 DIABETES MELLITUS: Status: ACTIVE | Noted: 2023-05-22

## 2023-05-22 LAB
ALBUMIN SERPL BCP-MCNC: 3.8 G/DL (ref 3.5–5.2)
ALLENS TEST: ABNORMAL
ALP SERPL-CCNC: 108 U/L (ref 55–135)
ALT SERPL W/O P-5'-P-CCNC: 91 U/L (ref 10–44)
ANION GAP SERPL CALC-SCNC: 10 MMOL/L (ref 8–16)
ANION GAP SERPL CALC-SCNC: 11 MMOL/L (ref 8–16)
ASCENDING AORTA: 2.71 CM
AST SERPL-CCNC: 39 U/L (ref 10–40)
AV INDEX (PROSTH): 0.85
AV MEAN GRADIENT: 2 MMHG
AV PEAK GRADIENT: 3 MMHG
AV VALVE AREA: 2.6 CM2
AV VELOCITY RATIO: 0.83
B-OH-BUTYR BLD STRIP-SCNC: 0.1 MMOL/L (ref 0–0.5)
BASOPHILS # BLD AUTO: 0.03 K/UL (ref 0–0.2)
BASOPHILS NFR BLD: 0.2 % (ref 0–1.9)
BILIRUB SERPL-MCNC: 0.6 MG/DL (ref 0.1–1)
BNP SERPL-MCNC: 209 PG/ML (ref 0–99)
BSA FOR ECHO PROCEDURE: 2.31 M2
BUN SERPL-MCNC: 6 MG/DL (ref 6–20)
BUN SERPL-MCNC: 6 MG/DL (ref 6–20)
CALCIUM SERPL-MCNC: 10 MG/DL (ref 8.7–10.5)
CALCIUM SERPL-MCNC: 10.4 MG/DL (ref 8.7–10.5)
CHLORIDE SERPL-SCNC: 101 MMOL/L (ref 95–110)
CHLORIDE SERPL-SCNC: 103 MMOL/L (ref 95–110)
CO2 SERPL-SCNC: 23 MMOL/L (ref 23–29)
CO2 SERPL-SCNC: 26 MMOL/L (ref 23–29)
CREAT SERPL-MCNC: 0.7 MG/DL (ref 0.5–1.4)
CREAT SERPL-MCNC: 0.8 MG/DL (ref 0.5–1.4)
CV ECHO LV RWT: 0.44 CM
DELSYS: ABNORMAL
DIFFERENTIAL METHOD: ABNORMAL
DOP CALC AO PEAK VEL: 0.81 M/S
DOP CALC AO VTI: 13 CM
DOP CALC LVOT AREA: 3 CM2
DOP CALC LVOT DIAMETER: 1.97 CM
DOP CALC LVOT PEAK VEL: 0.67 M/S
DOP CALC LVOT STROKE VOLUME: 33.82 CM3
DOP CALCLVOT PEAK VEL VTI: 11.1 CM
E WAVE DECELERATION TIME: 186.2 MSEC
E/A RATIO: 0.84
ECHO LV POSTERIOR WALL: 0.87 CM (ref 0.6–1.1)
EJECTION FRACTION: 50 %
EOSINOPHIL # BLD AUTO: 0.1 K/UL (ref 0–0.5)
EOSINOPHIL NFR BLD: 0.9 % (ref 0–8)
ERYTHROCYTE [DISTWIDTH] IN BLOOD BY AUTOMATED COUNT: 12.2 % (ref 11.5–14.5)
EST. GFR  (NO RACE VARIABLE): >60 ML/MIN/1.73 M^2
EST. GFR  (NO RACE VARIABLE): >60 ML/MIN/1.73 M^2
ESTIMATED AVG GLUCOSE: 292 MG/DL (ref 68–131)
FIO2: 21
FRACTIONAL SHORTENING: 22 % (ref 28–44)
GLUCOSE SERPL-MCNC: 167 MG/DL (ref 70–110)
GLUCOSE SERPL-MCNC: 197 MG/DL (ref 70–110)
HBA1C MFR BLD: 11.8 % (ref 4–5.6)
HCO3 UR-SCNC: 27.1 MMOL/L (ref 24–28)
HCT VFR BLD AUTO: 46.2 % (ref 37–48.5)
HGB BLD-MCNC: 15.5 G/DL (ref 12–16)
IMM GRANULOCYTES # BLD AUTO: 0.12 K/UL (ref 0–0.04)
IMM GRANULOCYTES NFR BLD AUTO: 1 % (ref 0–0.5)
INTERVENTRICULAR SEPTUM: 0.82 CM (ref 0.6–1.1)
IVRT: 117.03 MSEC
LA MAJOR: 4.96 CM
LA MINOR: 5.39 CM
LA WIDTH: 4 CM
LEFT ATRIUM SIZE: 4.27 CM
LEFT ATRIUM VOLUME INDEX MOD: 19.9 ML/M2
LEFT ATRIUM VOLUME INDEX: 34.1 ML/M2
LEFT ATRIUM VOLUME MOD: 43.88 CM3
LEFT ATRIUM VOLUME: 75 CM3
LEFT INTERNAL DIMENSION IN SYSTOLE: 3.08 CM (ref 2.1–4)
LEFT VENTRICLE DIASTOLIC VOLUME INDEX: 28.18 ML/M2
LEFT VENTRICLE DIASTOLIC VOLUME: 61.99 ML
LEFT VENTRICLE MASS INDEX: 45 G/M2
LEFT VENTRICLE SYSTOLIC VOLUME INDEX: 13.3 ML/M2
LEFT VENTRICLE SYSTOLIC VOLUME: 29.35 ML
LEFT VENTRICULAR INTERNAL DIMENSION IN DIASTOLE: 3.95 CM (ref 3.5–6)
LEFT VENTRICULAR MASS: 98.59 G
LVOT MG: 0.9 MMHG
LVOT MV: 0.45 CM/S
LYMPHOCYTES # BLD AUTO: 2.1 K/UL (ref 1–4.8)
LYMPHOCYTES NFR BLD: 17.3 % (ref 18–48)
MAGNESIUM SERPL-MCNC: 1.7 MG/DL (ref 1.6–2.6)
MCH RBC QN AUTO: 29.8 PG (ref 27–31)
MCHC RBC AUTO-ENTMCNC: 33.5 G/DL (ref 32–36)
MCV RBC AUTO: 89 FL (ref 82–98)
MODE: ABNORMAL
MONOCYTES # BLD AUTO: 0.6 K/UL (ref 0.3–1)
MONOCYTES NFR BLD: 4.8 % (ref 4–15)
MV PEAK A VEL: 0.69 M/S
MV PEAK E VEL: 0.58 M/S
MV STENOSIS PRESSURE HALF TIME: 54 MS
MV VALVE AREA P 1/2 METHOD: 4.07 CM2
NEUTROPHILS # BLD AUTO: 9.4 K/UL (ref 1.8–7.7)
NEUTROPHILS NFR BLD: 75.8 % (ref 38–73)
NRBC BLD-RTO: 0 /100 WBC
OHS LV EJECTION FRACTION SIMPSONS BIPLANE MOD: 5 %
PCO2 BLDA: 44.2 MMHG (ref 35–45)
PH SMN: 7.4 [PH] (ref 7.35–7.45)
PHOSPHATE SERPL-MCNC: 3 MG/DL (ref 2.7–4.5)
PLATELET # BLD AUTO: 332 K/UL (ref 150–450)
PMV BLD AUTO: 10.9 FL (ref 9.2–12.9)
PO2 BLDA: 30 MMHG (ref 40–60)
POC BE: 2 MMOL/L
POC SATURATED O2: 56 % (ref 95–100)
POC TCO2: 28 MMOL/L (ref 24–29)
POCT GLUCOSE: 143 MG/DL (ref 70–110)
POCT GLUCOSE: 150 MG/DL (ref 70–110)
POCT GLUCOSE: 195 MG/DL (ref 70–110)
POTASSIUM SERPL-SCNC: 3.8 MMOL/L (ref 3.5–5.1)
POTASSIUM SERPL-SCNC: 4 MMOL/L (ref 3.5–5.1)
PROT SERPL-MCNC: 8.7 G/DL (ref 6–8.4)
PULM VEIN S/D RATIO: 1.29
PV PEAK D VEL: 0.14 M/S
PV PEAK S VEL: 0.18 M/S
RA MAJOR: 4.84 CM
RA WIDTH: 4 CM
RBC # BLD AUTO: 5.2 M/UL (ref 4–5.4)
RIGHT VENTRICULAR END-DIASTOLIC DIMENSION: 2.69 CM
RV TISSUE DOPPLER FREE WALL SYSTOLIC VELOCITY 1 (APICAL 4 CHAMBER VIEW): 0.01 CM/S
SAMPLE: ABNORMAL
SITE: ABNORMAL
SODIUM SERPL-SCNC: 137 MMOL/L (ref 136–145)
SODIUM SERPL-SCNC: 137 MMOL/L (ref 136–145)
SP02: 93
STJ: 2.83 CM
TROPONIN I SERPL DL<=0.01 NG/ML-MCNC: 0.05 NG/ML (ref 0–0.03)
TROPONIN I SERPL DL<=0.01 NG/ML-MCNC: 0.05 NG/ML (ref 0–0.03)
WBC # BLD AUTO: 12.4 K/UL (ref 3.9–12.7)

## 2023-05-22 PROCEDURE — 11000001 HC ACUTE MED/SURG PRIVATE ROOM

## 2023-05-22 PROCEDURE — 83880 ASSAY OF NATRIURETIC PEPTIDE: CPT | Performed by: EMERGENCY MEDICINE

## 2023-05-22 PROCEDURE — 84484 ASSAY OF TROPONIN QUANT: CPT | Mod: 91 | Performed by: STUDENT IN AN ORGANIZED HEALTH CARE EDUCATION/TRAINING PROGRAM

## 2023-05-22 PROCEDURE — 93010 EKG 12-LEAD: ICD-10-PCS | Mod: ,,, | Performed by: INTERNAL MEDICINE

## 2023-05-22 PROCEDURE — 63600175 PHARM REV CODE 636 W HCPCS: Performed by: EMERGENCY MEDICINE

## 2023-05-22 PROCEDURE — 99291 CRITICAL CARE FIRST HOUR: CPT

## 2023-05-22 PROCEDURE — 93005 ELECTROCARDIOGRAM TRACING: CPT

## 2023-05-22 PROCEDURE — 25000003 PHARM REV CODE 250: Performed by: STUDENT IN AN ORGANIZED HEALTH CARE EDUCATION/TRAINING PROGRAM

## 2023-05-22 PROCEDURE — 82803 BLOOD GASES ANY COMBINATION: CPT

## 2023-05-22 PROCEDURE — 84484 ASSAY OF TROPONIN QUANT: CPT | Performed by: EMERGENCY MEDICINE

## 2023-05-22 PROCEDURE — 63600175 PHARM REV CODE 636 W HCPCS: Performed by: STUDENT IN AN ORGANIZED HEALTH CARE EDUCATION/TRAINING PROGRAM

## 2023-05-22 PROCEDURE — 80053 COMPREHEN METABOLIC PANEL: CPT | Performed by: EMERGENCY MEDICINE

## 2023-05-22 PROCEDURE — 83036 HEMOGLOBIN GLYCOSYLATED A1C: CPT | Performed by: STUDENT IN AN ORGANIZED HEALTH CARE EDUCATION/TRAINING PROGRAM

## 2023-05-22 PROCEDURE — 82962 GLUCOSE BLOOD TEST: CPT

## 2023-05-22 PROCEDURE — 99900035 HC TECH TIME PER 15 MIN (STAT)

## 2023-05-22 PROCEDURE — 94761 N-INVAS EAR/PLS OXIMETRY MLT: CPT

## 2023-05-22 PROCEDURE — 83735 ASSAY OF MAGNESIUM: CPT | Performed by: STUDENT IN AN ORGANIZED HEALTH CARE EDUCATION/TRAINING PROGRAM

## 2023-05-22 PROCEDURE — 85025 COMPLETE CBC W/AUTO DIFF WBC: CPT | Performed by: EMERGENCY MEDICINE

## 2023-05-22 PROCEDURE — 93010 ELECTROCARDIOGRAM REPORT: CPT | Mod: 76,,, | Performed by: INTERNAL MEDICINE

## 2023-05-22 PROCEDURE — 93010 ELECTROCARDIOGRAM REPORT: CPT | Mod: ,,, | Performed by: INTERNAL MEDICINE

## 2023-05-22 PROCEDURE — 27000221 HC OXYGEN, UP TO 24 HOURS

## 2023-05-22 PROCEDURE — 36415 COLL VENOUS BLD VENIPUNCTURE: CPT | Performed by: STUDENT IN AN ORGANIZED HEALTH CARE EDUCATION/TRAINING PROGRAM

## 2023-05-22 PROCEDURE — 25000003 PHARM REV CODE 250: Performed by: EMERGENCY MEDICINE

## 2023-05-22 PROCEDURE — 96374 THER/PROPH/DIAG INJ IV PUSH: CPT

## 2023-05-22 PROCEDURE — 82010 KETONE BODYS QUAN: CPT | Performed by: EMERGENCY MEDICINE

## 2023-05-22 PROCEDURE — 84100 ASSAY OF PHOSPHORUS: CPT | Performed by: STUDENT IN AN ORGANIZED HEALTH CARE EDUCATION/TRAINING PROGRAM

## 2023-05-22 PROCEDURE — 80048 BASIC METABOLIC PNL TOTAL CA: CPT | Mod: XB | Performed by: STUDENT IN AN ORGANIZED HEALTH CARE EDUCATION/TRAINING PROGRAM

## 2023-05-22 PROCEDURE — 25500020 PHARM REV CODE 255: Performed by: INTERNAL MEDICINE

## 2023-05-22 RX ORDER — NIFEDIPINE 60 MG/1
TABLET, EXTENDED RELEASE ORAL
COMMUNITY
End: 2023-05-22 | Stop reason: SDUPTHER

## 2023-05-22 RX ORDER — INSULIN ASPART 100 [IU]/ML
1-10 INJECTION, SOLUTION INTRAVENOUS; SUBCUTANEOUS
Status: DISCONTINUED | OUTPATIENT
Start: 2023-05-22 | End: 2023-05-23 | Stop reason: HOSPADM

## 2023-05-22 RX ORDER — PANTOPRAZOLE SODIUM 40 MG/1
40 TABLET, DELAYED RELEASE ORAL DAILY
COMMUNITY

## 2023-05-22 RX ORDER — FUROSEMIDE 10 MG/ML
40 INJECTION INTRAMUSCULAR; INTRAVENOUS
Status: DISCONTINUED | OUTPATIENT
Start: 2023-05-22 | End: 2023-05-23 | Stop reason: HOSPADM

## 2023-05-22 RX ORDER — LIDOCAINE 50 MG/G
1 PATCH TOPICAL DAILY PRN
Status: DISCONTINUED | OUTPATIENT
Start: 2023-05-22 | End: 2023-05-23 | Stop reason: HOSPADM

## 2023-05-22 RX ORDER — TALC
9 POWDER (GRAM) TOPICAL NIGHTLY PRN
Status: DISCONTINUED | OUTPATIENT
Start: 2023-05-22 | End: 2023-05-23 | Stop reason: HOSPADM

## 2023-05-22 RX ORDER — FUROSEMIDE 10 MG/ML
40 INJECTION INTRAMUSCULAR; INTRAVENOUS
Status: COMPLETED | OUTPATIENT
Start: 2023-05-22 | End: 2023-05-22

## 2023-05-22 RX ORDER — ONDANSETRON 8 MG/1
8 TABLET, ORALLY DISINTEGRATING ORAL EVERY 8 HOURS PRN
Status: DISCONTINUED | OUTPATIENT
Start: 2023-05-22 | End: 2023-05-23 | Stop reason: HOSPADM

## 2023-05-22 RX ORDER — NIFEDIPINE 60 MG/1
60 TABLET, EXTENDED RELEASE ORAL EVERY MORNING
COMMUNITY
Start: 2023-05-13 | End: 2023-05-22 | Stop reason: SDUPTHER

## 2023-05-22 RX ORDER — IBUPROFEN 200 MG
200 TABLET ORAL EVERY 6 HOURS PRN
COMMUNITY

## 2023-05-22 RX ORDER — DEXTROSE 40 %
30 GEL (GRAM) ORAL
Status: DISCONTINUED | OUTPATIENT
Start: 2023-05-22 | End: 2023-05-23 | Stop reason: HOSPADM

## 2023-05-22 RX ORDER — GLUCAGON 1 MG
1 KIT INJECTION
Status: DISCONTINUED | OUTPATIENT
Start: 2023-05-22 | End: 2023-05-23 | Stop reason: HOSPADM

## 2023-05-22 RX ORDER — ACETAMINOPHEN 325 MG/1
650 TABLET ORAL EVERY 8 HOURS PRN
Status: DISCONTINUED | OUTPATIENT
Start: 2023-05-22 | End: 2023-05-23 | Stop reason: HOSPADM

## 2023-05-22 RX ORDER — ASPIRIN 81 MG/1
81 TABLET ORAL DAILY
Status: DISCONTINUED | OUTPATIENT
Start: 2023-05-23 | End: 2023-05-23 | Stop reason: HOSPADM

## 2023-05-22 RX ORDER — ENOXAPARIN SODIUM 100 MG/ML
40 INJECTION SUBCUTANEOUS EVERY 12 HOURS
Status: DISCONTINUED | OUTPATIENT
Start: 2023-05-22 | End: 2023-05-23 | Stop reason: HOSPADM

## 2023-05-22 RX ORDER — DEXTROSE 40 %
15 GEL (GRAM) ORAL
Status: DISCONTINUED | OUTPATIENT
Start: 2023-05-22 | End: 2023-05-23 | Stop reason: HOSPADM

## 2023-05-22 RX ORDER — ATORVASTATIN CALCIUM 40 MG/1
80 TABLET, FILM COATED ORAL NIGHTLY
Status: DISCONTINUED | OUTPATIENT
Start: 2023-05-22 | End: 2023-05-23 | Stop reason: HOSPADM

## 2023-05-22 RX ORDER — ASPIRIN 325 MG
325 TABLET ORAL
Status: COMPLETED | OUTPATIENT
Start: 2023-05-22 | End: 2023-05-22

## 2023-05-22 RX ORDER — NIFEDIPINE 60 MG/1
60 TABLET, EXTENDED RELEASE ORAL DAILY
Status: DISCONTINUED | OUTPATIENT
Start: 2023-05-23 | End: 2023-05-23 | Stop reason: HOSPADM

## 2023-05-22 RX ORDER — AMOXICILLIN 250 MG
1 CAPSULE ORAL 2 TIMES DAILY PRN
Status: DISCONTINUED | OUTPATIENT
Start: 2023-05-22 | End: 2023-05-23 | Stop reason: HOSPADM

## 2023-05-22 RX ORDER — SODIUM CHLORIDE 0.9 % (FLUSH) 0.9 %
5 SYRINGE (ML) INJECTION
Status: DISCONTINUED | OUTPATIENT
Start: 2023-05-22 | End: 2023-05-23 | Stop reason: HOSPADM

## 2023-05-22 RX ADMIN — ENOXAPARIN SODIUM 40 MG: 40 INJECTION SUBCUTANEOUS at 09:05

## 2023-05-22 RX ADMIN — ATORVASTATIN CALCIUM 80 MG: 40 TABLET, FILM COATED ORAL at 02:05

## 2023-05-22 RX ADMIN — FUROSEMIDE 40 MG: 10 INJECTION, SOLUTION INTRAMUSCULAR; INTRAVENOUS at 09:05

## 2023-05-22 RX ADMIN — HUMAN ALBUMIN MICROSPHERES AND PERFLUTREN 0.11 MG: 10; .22 INJECTION, SOLUTION INTRAVENOUS at 03:05

## 2023-05-22 RX ADMIN — Medication 9 MG: at 09:05

## 2023-05-22 RX ADMIN — ASPIRIN 325 MG: 325 TABLET ORAL at 01:05

## 2023-05-22 RX ADMIN — FUROSEMIDE 40 MG: 10 INJECTION, SOLUTION INTRAMUSCULAR; INTRAVENOUS at 01:05

## 2023-05-22 NOTE — ASSESSMENT & PLAN NOTE
Reports not taking cholesterol medications    Plan:  - Qualifies for Atorvastatin given chronic conditions, will start while inpatient  - F/u FLP

## 2023-05-22 NOTE — ASSESSMENT & PLAN NOTE
Taking Pantoprazole at home, but reports inconsistent use    Plan:  - Given adverse affects of PPI, hold for now, consider restarting/ choosing alternative agent if warranted

## 2023-05-22 NOTE — SUBJECTIVE & OBJECTIVE
Past Medical History:   Diagnosis Date    Cataract     Cervical high risk HPV (human papillomavirus) test positive 2020    NOT 16 OR 18    CVA (cerebral infarction) 2003         Depression     Diabetes mellitus, type 2     GERD (gastroesophageal reflux disease)     Hyperlipidemia     Hypertension     Moderate nonproliferative diabetic retinopathy     Obesity     Stroke        Past Surgical History:   Procedure Laterality Date     SECTION      CHOLECYSTECTOMY      DILATION AND CURETTAGE OF UTERUS      GALLBLADDER SURGERY  2017    stone removed       Review of patient's allergies indicates:  No Known Allergies    Current Facility-Administered Medications on File Prior to Encounter   Medication    fluorescein 500 mg/5 mL (10 %) injection 500 mg     Current Outpatient Medications on File Prior to Encounter   Medication Sig    ammonium lactate (LAC-HYDRIN) 12 % lotion SMARTSIG:Sparingly Topical Twice Daily    aspirin (ECOTRIN) 81 MG EC tablet Take 81 mg by mouth once daily.    atorvastatin (LIPITOR) 80 MG tablet Take 1 tablet (80 mg total) by mouth every evening.    benazepril-hydrochlorthiazide (LOTENSIN HCT) 20-12.5 mg per tablet Take 1 tablet by mouth once daily.    blood sugar diagnostic Strp Use to test blood glucose two (2) times daily as directed with insurance preferred meter and supplies    dulaglutide (TRULICITY) 1.5 mg/0.5 mL pen injector Inject 1.5 mg into the skin once a week.    ergocalciferol (ERGOCALCIFEROL) 50,000 unit Cap 1 capsule.    ezetimibe (ZETIA) 10 mg tablet Take 1 tablet (10 mg total) by mouth once daily.    fluconazole (DIFLUCAN) 150 MG Tab Take 1 tab PO once.  You may repeat in 72 hours if symptoms persist.    FLUoxetine 40 MG capsule Take 1 capsule (40 mg total) by mouth once daily.    ibuprofen (ADVIL,MOTRIN) 800 MG tablet Take 800 mg by mouth every 8 (eight) hours as needed.    insulin aspart U-100 (NOVOLOG) 100 unit/mL (3 mL) InPn pen Inject 10 Units into the skin once  "daily.    insulin glargine 100 units/mL SubQ pen Inject 100 Units into the skin 2 (two) times daily.    lancets (TRUEPLUS LANCETS) 28 gauge Misc Use to test blood glucose two (2) times daily as directed with insurance preferred meter and supplies    LIDOcaine HCL 2 % Crea Apply 1 application topically daily as needed (pain).    lisinopriL-hydrochlorothiazide (PRINZIDE,ZESTORETIC) 10-12.5 mg per tablet Take 1 tablet by mouth once daily.    metFORMIN (GLUCOPHAGE) 1000 MG tablet Take 1 tablet (1,000 mg total) by mouth 2 (two) times daily.    metoprolol succinate (TOPROL-XL) 100 MG 24 hr tablet Take 1 tablet (100 mg total) by mouth once daily.    mupirocin (BACTROBAN) 2 % ointment Apply topically 2 (two) times daily.    NIFEdipine (PROCARDIA-XL) 60 MG (OSM) 24 hr tablet TAKE 1 TABLET (60 MG TOTAL) BY MOUTH ONCE DAILY.    nystatin (MYCOSTATIN) cream Apply topically 2 (two) times daily. Apply for 14 days to vulva    pantoprazole (PROTONIX) 40 MG tablet Take 1 tablet (40 mg total) by mouth once daily.    pen needle, diabetic (BD ULTRA-FINE ROSA PEN NEEDLE) 32 gauge x 5/32" Ndle Use with insulin once daily    pen needle, diabetic 32 gauge x 5/32" Ndle Use with injectable DM supplies twice daily as directed    spironolactone (ALDACTONE) 25 MG tablet Take 1 tablet (25 mg total) by mouth once daily.    TRUE METRIX GO GLUCOSE METER Misc      Family History       Problem Relation (Age of Onset)    Arthritis Father    Asthma Daughter    Cataracts Father    Diabetes Mother, Father    Heart disease Sister    Hypertension Father, Sister    No Known Problems Brother, Maternal Aunt, Maternal Uncle, Paternal Aunt, Paternal Uncle, Maternal Grandmother, Maternal Grandfather, Paternal Grandmother, Paternal Grandfather    Stroke Mother, Sister    Thyroid disease Mother, Sister, Daughter          Tobacco Use    Smoking status: Former     Packs/day: 0.25     Types: Cigarettes     Quit date: 2021     Years since quittin.3    Smokeless " tobacco: Never   Substance and Sexual Activity    Alcohol use: Yes     Alcohol/week: 3.0 standard drinks     Types: 3 Cans of beer per week     Comment: Rare    Drug use: No    Sexual activity: Yes     Partners: Male     Birth control/protection: None     Review of Systems   Constitutional:  Negative for fatigue and fever.   HENT:  Negative for congestion and sore throat.    Respiratory:  Positive for cough and shortness of breath.    Cardiovascular:  Positive for chest pain and leg swelling.   Gastrointestinal:  Negative for abdominal pain, diarrhea, nausea and vomiting.   Genitourinary:  Negative for dysuria.   Musculoskeletal:  Negative for back pain and myalgias.   Skin:  Negative for color change.   Neurological:  Negative for dizziness and headaches.   Objective:     Vital Signs (Most Recent):  Temp: 97.1 °F (36.2 °C) (05/22/23 1104)  Pulse: 102 (05/22/23 1254)  Resp: 18 (05/22/23 1254)  BP: (!) 123/59 (05/22/23 1204)  SpO2: 95 % (05/22/23 1254) Vital Signs (24h Range):  Temp:  [97.1 °F (36.2 °C)] 97.1 °F (36.2 °C)  Pulse:  [] 102  Resp:  [18-19] 18  SpO2:  [88 %-96 %] 95 %  BP: (112-123)/(56-59) 123/59     Weight: 116.1 kg (256 lb)  Body mass index is 42.6 kg/m².     Physical Exam  Constitutional:       Appearance: Normal appearance. She is normal weight.      Comments: Resting comfortably in bed. On 3 L NC satting well   HENT:      Head: Normocephalic and atraumatic.      Right Ear: External ear normal.      Left Ear: External ear normal.      Mouth/Throat:      Mouth: Mucous membranes are moist.      Pharynx: Oropharynx is clear.   Eyes:      Extraocular Movements: Extraocular movements intact.      Conjunctiva/sclera: Conjunctivae normal.      Pupils: Pupils are equal, round, and reactive to light.   Cardiovascular:      Rate and Rhythm: Normal rate and regular rhythm.      Pulses: Normal pulses.      Heart sounds: Normal heart sounds.   Pulmonary:      Effort: Pulmonary effort is normal.      Breath  sounds: Normal breath sounds. No wheezing or rales.   Abdominal:      General: Abdomen is flat. Bowel sounds are normal.      Tenderness: There is no abdominal tenderness.   Musculoskeletal:      Cervical back: Normal range of motion.      Right lower leg: Edema (1+) present.      Left lower leg: Edema (1+) present.   Neurological:      Mental Status: She is alert and oriented to person, place, and time. Mental status is at baseline.   Psychiatric:         Mood and Affect: Mood normal.         Behavior: Behavior normal.         Thought Content: Thought content normal.            CRANIAL NERVES     CN III, IV, VI   Pupils are equal, round, and reactive to light.     Significant Labs: All pertinent labs within the past 24 hours have been reviewed.  CBC:   Recent Labs   Lab 05/22/23  1243   WBC 12.40   HGB 15.5   HCT 46.2        CMP:   Recent Labs   Lab 05/22/23  1243      K 4.0      CO2 23   *   BUN 6   CREATININE 0.8   CALCIUM 10.4   PROT 8.7*   ALBUMIN 3.8   BILITOT 0.6   ALKPHOS 108   AST 39   ALT 91*   ANIONGAP 11     Cardiac Markers:   Recent Labs   Lab 05/22/23  1243   *     Troponin:   Recent Labs   Lab 05/22/23  1243   TROPONINI 0.049*       Significant Imaging: I have reviewed all pertinent imaging results/findings within the past 24 hours.

## 2023-05-22 NOTE — ASSESSMENT & PLAN NOTE
Patient's dyspnea, volume overload indicated by BNP, elevated trop, and early edematous changes on chest x-ray suggest possible acute heart failure exacerbation.  Stress Echo 5 years ago appears unremerkable  S/p Lasix 40 mg IV in ED    Plan:  - Administer loop diuretics as needed for volume overload, titrate to a UOP > 0.5cc/kg/hr  - Obtain baseline ECHO to evaluate cardiac function.  - Consider cardiology consult for further management if indicated

## 2023-05-22 NOTE — ASSESSMENT & PLAN NOTE
Reported history, no residual deficits.  Reports not taking ASA or other risk modifying agents    Plan:  - Will start ASA and high intensity statin and continue while inpatient

## 2023-05-22 NOTE — PHARMACY MED REC
"  Admission Medication History     The home medication history was taken by Juanita Worthington CPhT.    Medication history obtained from, Patient, [facility], and [facility] Verified    You may go to "Admission" then "Reconcile Home Medications" tabs to review and/or act upon these items.     The home medication list has been updated by the Pharmacy department.   Please read ALL comments highlighted in yellow.   Please address this information as you see fit.    Feel free to contact us if you have any questions or require assistance.      The medications listed below were removed from the home medication list.  Please reorder if appropriate:  Patient reports no longer taking the following medication(s):  Ammonium Lactate 12% lotion  Aspirin 81 mg  Atorvastatin 80 mg  Lotensin HCT 20-12.5 mg  Dulaglutide 1.5 mg/0.5ml pen  Ezetimibe 10 mg  Fluconazole 150 mg  Fluoxetine 40 mg  Novolog 100 unit/ml   Lidocaine 2% cream  Prinzide 10-12.5 mg  Metformin 1000 mg  Metoprolol  mg  Mupirocin 2% ointment  Nystatin cream  Spironolactone 25 mg      Juanita Worthington CPhT.  Ext [phone]             .        "

## 2023-05-22 NOTE — ASSESSMENT & PLAN NOTE
Patient with history of cardiovascular risk factors (hypertension, diabetes, hyperlipidemia, obesity, and smoking) presents with dyspnea and chest pain. Elevated troponin and non-specific ST abnormalities on EKG raise concern for Acute Coronary Syndrome.  All of these findings may be consistent with CHF exacerbation, but must r/o ACS    Plan:  - Continue cardiac monitoring.  - Obtain repeat troponin/EKGs, continue to trend until plateau/downtrend  - Low threshold for ACS protocol and cards consult

## 2023-05-22 NOTE — ASSESSMENT & PLAN NOTE
The patient reports not being compliant with her medications, including nifedipine for hypertension, metformin for diabetes, pantoprazole for possible GERD, and insulin. This non-compliance likely contributed to her current presentation.  Patient has a long list of medication on her medication list, appears to not be taking most medications    Plan:  - Reinforce the importance of medication compliance.  - Involve social work to address barriers to compliance.  - Close follow-up with her primary care provider for medication reconciliation and adherence monitoring.

## 2023-05-22 NOTE — ASSESSMENT & PLAN NOTE
A1c 9 (5/2021)  Unclear of patient's compliance with insulin regimen but does report to take Metformin (but not in dispense report)  Insulin regimen per dispense report is Lantus, unclear the amount of units  On chart review, patient appears to have had associated retinopathy and macular edema relating to DM    Plan:  - SSI with goal of 140-180, will like need long acting  - F/u A1c

## 2023-05-22 NOTE — ED NOTES
Patient presents to ED from home via EMS with c/o anxiety. Patient O2 sat 89-91% on RA. Denies chest pain, but reports SOB. Patient reports she has been having lots of stress in her home life lately. Denies SI/HI, but states she feels very anxious today. Patient AAOx4 and ambulatory @ baseline.

## 2023-05-22 NOTE — H&P
"[facility] Emergency [facility] Medicine  History & Physical    Patient Name: Jaimee Quintana  MRN: [id number]  Patient Class: IP- Inpatient  Admission Date: 5/22/2023  Attending Physician: Cal Sin MD  Primary Care Provider: Donald Acosta MD         Patient information was obtained from patient and ER records.     Subjective:     Principal Problem:SOB (shortness of breath)    Chief Complaint:   Chief Complaint   Patient presents with    Anxiety     Pt arrived from home via [facility] with complaint of anxiety after taking care of her children this morning without any help. Pt denies HI or SI, pt states "I just need help, it's too overwhelming". Pt arrived awake and alert.         HPI: A 47-year-old female with a past medical history of obesity, diabetes, hypertension, hyperlipidemia, and a self-reported history of stroke (with no residual deficits) presents with a chief complaint of shortness of breath and chest pain. 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 She reports having chest pain and shortness of breath over the past two weeks to the point where she nearly passed out while riding a bike and was unable to walk her daughter to school this morning. She denies any current chest pain but reports ongoing shortness of breath. She does not have a history of using home oxygen but is currently on oxygen supplementation due to her current symptoms. Unsure about her home medications. However, reports taking a blood pressure medication, Metformin, a type of insulin, and acid reflux medication. Denies taking ASA s/p CVA/TIA or any cholesterol medications. Has had primary care done in [place], scheduled to establish with [facility] next month. Denies ELSY, HA.    ED Course: Upon initial presentation to the ED, the patient was dyspneic and her oxygen saturation was at 88%. The EKG showed non-specific ST abnormalities. Her initial troponin was elevated at 0.049 and there was " evidence of volume overload as indicated by her BNP of 209, though this may be confounded by her body habitus. Chest x-ray revealed early edematous changes. She was given aspirin in the ED and her oxygen saturation improved to 93% on 2L supplemental oxygen. She did not report any pain with breathing, and there were no obvious signs of DVT or PE. She was given Lasix 40 mg IV. The decision was made to admit her for further evaluation and management, primarily focusing on the potential need for further diuresis and echocardiogram baseline.      Past Medical History:   Diagnosis Date    Cataract     Cervical high risk HPV (human papillomavirus) test positive 2020    NOT 16 OR 18    CVA (cerebral infarction)          Depression     Diabetes mellitus, type 2     GERD (gastroesophageal reflux disease)     Hyperlipidemia     Hypertension     Moderate nonproliferative diabetic retinopathy     Obesity     Stroke        Past Surgical History:   Procedure Laterality Date     SECTION      CHOLECYSTECTOMY      DILATION AND CURETTAGE OF UTERUS      GALLBLADDER SURGERY  2017    stone removed       Review of patient's allergies indicates:  No Known Allergies    Current Facility-Administered Medications on File Prior to Encounter   Medication    fluorescein 500 mg/5 mL (10 %) injection 500 mg     Current Outpatient Medications on File Prior to Encounter   Medication Sig    ammonium lactate (LAC-HYDRIN) 12 % lotion SMARTSIG:Sparingly Topical Twice Daily    aspirin (ECOTRIN) 81 MG EC tablet Take 81 mg by mouth once daily.    atorvastatin (LIPITOR) 80 MG tablet Take 1 tablet (80 mg total) by mouth every evening.    benazepril-hydrochlorthiazide (LOTENSIN HCT) 20-12.5 mg per tablet Take 1 tablet by mouth once daily.    blood sugar diagnostic Strp Use to test blood glucose two (2) times daily as directed with insurance preferred meter and supplies    dulaglutide (TRULICITY) 1.5 mg/0.5 mL pen injector Inject 1.5 mg  "into the skin once a week.    ergocalciferol (ERGOCALCIFEROL) 50,000 unit Cap 1 capsule.    ezetimibe (ZETIA) 10 mg tablet Take 1 tablet (10 mg total) by mouth once daily.    fluconazole (DIFLUCAN) 150 MG Tab Take 1 tab PO once.  You may repeat in 72 hours if symptoms persist.    FLUoxetine 40 MG capsule Take 1 capsule (40 mg total) by mouth once daily.    ibuprofen (ADVIL,MOTRIN) 800 MG tablet Take 800 mg by mouth every 8 (eight) hours as needed.    insulin aspart U-100 (NOVOLOG) 100 unit/mL (3 mL) InPn pen Inject 10 Units into the skin once daily.    insulin glargine 100 units/mL SubQ pen Inject 100 Units into the skin 2 (two) times daily.    lancets (TRUEPLUS LANCETS) 28 gauge Misc Use to test blood glucose two (2) times daily as directed with insurance preferred meter and supplies    LIDOcaine HCL 2 % Crea Apply 1 application topically daily as needed (pain).    lisinopriL-hydrochlorothiazide (PRINZIDE,ZESTORETIC) 10-12.5 mg per tablet Take 1 tablet by mouth once daily.    metFORMIN (GLUCOPHAGE) 1000 MG tablet Take 1 tablet (1,000 mg total) by mouth 2 (two) times daily.    metoprolol succinate (TOPROL-XL) 100 MG 24 hr tablet Take 1 tablet (100 mg total) by mouth once daily.    mupirocin (BACTROBAN) 2 % ointment Apply topically 2 (two) times daily.    NIFEdipine (PROCARDIA-XL) 60 MG (OSM) 24 hr tablet TAKE 1 TABLET (60 MG TOTAL) BY MOUTH ONCE DAILY.    nystatin (MYCOSTATIN) cream Apply topically 2 (two) times daily. Apply for 14 days to vulva    pantoprazole (PROTONIX) 40 MG tablet Take 1 tablet (40 mg total) by mouth once daily.    pen needle, diabetic (BD ULTRA-FINE ROSA PEN NEEDLE) 32 gauge x 5/32" Ndle Use with insulin once daily    pen needle, diabetic 32 gauge x 5/32" Ndle Use with injectable DM supplies twice daily as directed    spironolactone (ALDACTONE) 25 MG tablet Take 1 tablet (25 mg total) by mouth once daily.    TRUE METRIX GO GLUCOSE METER Misc      Family History       Problem Relation (Age of " Onset)    Arthritis Father    Asthma Daughter    Cataracts Father    Diabetes Mother, Father    Heart disease Sister    Hypertension Father, Sister    No Known Problems Brother, Maternal Aunt, Maternal Uncle, Paternal Aunt, Paternal Uncle, Maternal Grandmother, Maternal Grandfather, Paternal Grandmother, Paternal Grandfather    Stroke Mother, Sister    Thyroid disease Mother, Sister, Daughter          Tobacco Use    Smoking status: Former     Packs/day: 0.25     Types: Cigarettes     Quit date: 2021     Years since quittin.3    Smokeless tobacco: Never   Substance and Sexual Activity    Alcohol use: Yes     Alcohol/week: 3.0 standard drinks     Types: 3 Cans of beer per week     Comment: Rare    Drug use: No    Sexual activity: Yes     Partners: Male     Birth control/protection: None     Review of Systems   Constitutional:  Negative for fatigue and fever.   HENT:  Negative for congestion and sore throat.    Respiratory:  Positive for cough and shortness of breath.    Cardiovascular:  Positive for chest pain and leg swelling.   Gastrointestinal:  Negative for abdominal pain, diarrhea, nausea and vomiting.   Genitourinary:  Negative for dysuria.   Musculoskeletal:  Negative for back pain and myalgias.   Skin:  Negative for color change.   Neurological:  Negative for dizziness and headaches.   Objective:     Vital Signs (Most Recent):  Temp: 97.1 °F (36.2 °C) (23 1104)  Pulse: 102 (23 1254)  Resp: 18 (23 1254)  BP: (!) 123/59 (23 1204)  SpO2: 95 % (23 1254) Vital Signs (24h Range):  Temp:  [97.1 °F (36.2 °C)] 97.1 °F (36.2 °C)  Pulse:  [] 102  Resp:  [18-19] 18  SpO2:  [88 %-96 %] 95 %  BP: (112-123)/(56-59) 123/59     Weight: 116.1 kg (256 lb)  Body mass index is 42.6 kg/m².     Physical Exam  Constitutional:       Appearance: Normal appearance. She is normal weight.      Comments: Resting comfortably in bed. On 3 L NC satting well   HENT:      Head: Normocephalic and  atraumatic.      Right Ear: External ear normal.      Left Ear: External ear normal.      Mouth/Throat:      Mouth: Mucous membranes are moist.      Pharynx: Oropharynx is clear.   Eyes:      Extraocular Movements: Extraocular movements intact.      Conjunctiva/sclera: Conjunctivae normal.      Pupils: Pupils are equal, round, and reactive to light.   Cardiovascular:      Rate and Rhythm: Normal rate and regular rhythm.      Pulses: Normal pulses.      Heart sounds: Normal heart sounds.   Pulmonary:      Effort: Pulmonary effort is normal.      Breath sounds: Normal breath sounds. No wheezing or rales.   Abdominal:      General: Abdomen is flat. Bowel sounds are normal.      Tenderness: There is no abdominal tenderness.   Musculoskeletal:      Cervical back: Normal range of motion.      Right lower leg: Edema (1+) present.      Left lower leg: Edema (1+) present.   Neurological:      Mental Status: She is alert and oriented to person, place, and time. Mental status is at baseline.   Psychiatric:         Mood and Affect: Mood normal.         Behavior: Behavior normal.         Thought Content: Thought content normal.            CRANIAL NERVES     CN III, IV, VI   Pupils are equal, round, and reactive to light.     Significant Labs: All pertinent labs within the past 24 hours have been reviewed.  CBC:   Recent Labs   Lab 05/22/23  1243   WBC 12.40   HGB 15.5   HCT 46.2        CMP:   Recent Labs   Lab 05/22/23  1243      K 4.0      CO2 23   *   BUN 6   CREATININE 0.8   CALCIUM 10.4   PROT 8.7*   ALBUMIN 3.8   BILITOT 0.6   ALKPHOS 108   AST 39   ALT 91*   ANIONGAP 11     Cardiac Markers:   Recent Labs   Lab 05/22/23  1243   *     Troponin:   Recent Labs   Lab 05/22/23  1243   TROPONINI 0.049*       Significant Imaging: I have reviewed all pertinent imaging results/findings within the past 24 hours.    Assessment/Plan:     * SOB (shortness of breath)  Patient's dyspnea, volume overload  indicated by BNP, elevated trop, and early edematous changes on chest x-ray suggest possible acute heart failure exacerbation.  Stress Echo 5 years ago appears unremerkable  S/p Lasix 40 mg IV in ED    Plan:  - Administer loop diuretics as needed for volume overload, titrate to a UOP > 0.5-1 cc/kg/hr  - Obtain baseline ECHO to evaluate cardiac function.  - Consider cardiology consult for further management if indicated      Elevated troponin  Patient with history of cardiovascular risk factors (hypertension, diabetes, hyperlipidemia, obesity, and smoking) presents with dyspnea and chest pain. Elevated troponin and non-specific ST abnormalities on EKG raise concern for Acute Coronary Syndrome.  All of these findings may be consistent with CHF exacerbation, but must r/o ACS    Plan:  - Continue cardiac monitoring.  - Obtain repeat troponin/EKGs, continue to trend until plateau/downtrend  - Low threshold for ACS protocol and cards consult      Type 2 diabetes mellitus  A1c 9 (5/2021)  Unclear of patient's compliance with insulin regimen but does report to take Metformin (but not in dispense report)  Insulin regimen per dispense report is Lantus, unclear the amount of units  On chart review, patient appears to have had associated retinopathy and macular edema relating to DM    Plan:  - SSI with goal of 140-180, will like need long acting  - F/u A1c     HTN (hypertension) without retinopathy  On CR, appears to have had associated retinopathy in the past, appears stable at this time  Extensive medication list, however reports only on Nifidpine 60 mg QD (recently started taking again)    Plan:  - Continue home meds  - Monitor BP  - Monitor for signs of retinopathy      Hyperlipidemia  Reports not taking cholesterol medications    Plan:  - Qualifies for Atorvastatin given chronic conditions, will start while inpatient  - F/u FLP      Noncompliance with medications  The patient reports not being compliant with her  medications, including nifedipine for hypertension, metformin for diabetes, pantoprazole for possible GERD, and insulin. This non-compliance likely contributed to her current presentation.  Patient has a long list of medication on her medication list, appears to not be taking most medications    Plan:  - Reinforce the importance of medication compliance.  - Involve social work to address barriers to compliance.  - Close follow-up with her primary care provider for medication reconciliation and adherence monitoring.      History of CVA (cerebrovascular accident) without residual deficits  Reported history, no residual deficits.  Reports not taking ASA or other risk modifying agents    Plan:  - Will start ASA and high intensity statin and continue while inpatient      GERD (gastroesophageal reflux disease)  Taking Pantoprazole at home, but reports inconsistent use    Plan:  - Given adverse affects of PPI, hold for now, consider restarting/ choosing alternative agent if warranted        VTE Risk Mitigation (From admission, onward)           Ordered     enoxaparin injection 40 mg  Every 12 hours         05/22/23 1356     IP VTE HIGH RISK PATIENT  Once         05/22/23 1356     Place sequential compression device  Until discontinued         05/22/23 1356                               Amrit Miller MD  Department of Hospital Medicine  [place] - Emergency Dept

## 2023-05-22 NOTE — ED PROVIDER NOTES
"Encounter Date: 2023       History     Chief Complaint   Patient presents with    Anxiety     Pt arrived from home via [facility] with complaint of anxiety after taking care of her children this morning without any help. Pt denies HI or SI, pt states "I just need help, it's too overwhelming". Pt arrived awake and alert.      47-year-old female with multiple medical problems including depression, diabetes, GERD, hyperlipidemia, hypertension, obesity, prior stroke although patient declines deficits comes in complaining of anxiety.  States she is been under lot of stress lately.  She had take her 12-year-old to walk to the local library for a LEEP test.  She felt she was having some chest discomfort and shortness breath and could not walk to the place so she called her older daughter to bring her child came to the emergency room.  She is not having chest pain at this time.  She is not having as much short of breath but still feels short of breath.  History is limited as patient is a poor historian.  Unable to describe or give context to the chest pain.    Review of patient's allergies indicates:  No Known Allergies  Past Medical History:   Diagnosis Date    Cataract     Cervical high risk HPV (human papillomavirus) test positive 2020    NOT 16 OR 18    CVA (cerebral infarction) 2003         Depression     Diabetes mellitus, type 2     GERD (gastroesophageal reflux disease)     Hyperlipidemia     Hypertension     Moderate nonproliferative diabetic retinopathy     Obesity     Stroke      Past Surgical History:   Procedure Laterality Date     SECTION      CHOLECYSTECTOMY      DILATION AND CURETTAGE OF UTERUS      GALLBLADDER SURGERY  2017    stone removed     Family History   Problem Relation Age of Onset    Stroke Mother     Thyroid disease Mother     Diabetes Mother     Cataracts Father     Hypertension Father     Arthritis Father     Diabetes Father     Hypertension Sister     Stroke Sister  "    Thyroid disease Sister     Heart disease Sister     Thyroid disease Daughter     Asthma Daughter     No Known Problems Brother     No Known Problems Maternal Aunt     No Known Problems Maternal Uncle     No Known Problems Paternal Aunt     No Known Problems Paternal Uncle     No Known Problems Maternal Grandmother     No Known Problems Maternal Grandfather     No Known Problems Paternal Grandmother     No Known Problems Paternal Grandfather     Amblyopia Neg Hx     Blindness Neg Hx     Cancer Neg Hx     Glaucoma Neg Hx     Macular degeneration Neg Hx     Retinal detachment Neg Hx     Strabismus Neg Hx      Social History     Tobacco Use    Smoking status: Former     Packs/day: 0.25     Types: Cigarettes     Quit date: 2021     Years since quittin.3    Smokeless tobacco: Never   Substance Use Topics    Alcohol use: Yes     Alcohol/week: 3.0 standard drinks     Types: 3 Cans of beer per week     Comment: Rare    Drug use: No     Review of Systems   Constitutional:  Negative for fever.   HENT:  Negative for sore throat.    Eyes:  Negative for visual disturbance.   Respiratory:  Positive for shortness of breath.    Cardiovascular:  Positive for chest pain.   Gastrointestinal:  Negative for abdominal pain.   Genitourinary:  Negative for difficulty urinating.   Musculoskeletal:  Negative for back pain.   Skin:  Negative for rash.   Neurological:  Negative for headaches.     Physical Exam     Initial Vitals   BP Pulse Resp Temp SpO2   23 1104 23 1104 23 1241 23 1104 23 1104   (!) 112/56 96 19 97.1 °F (36.2 °C) (!) 88 %      MAP       --                Physical Exam    Nursing note and vitals reviewed.  Constitutional: She appears well-developed and well-nourished.   Eyes: EOM are normal. Pupils are equal, round, and reactive to light.   Neck: Neck supple. No thyromegaly present. No JVD present.   Normal range of motion.  Cardiovascular:  Normal rate, regular rhythm, normal heart  sounds and intact distal pulses.     Exam reveals no gallop and no friction rub.       No murmur heard.  Pulmonary/Chest: She has no wheezes. She has no rhonchi. She has rales.   tachypnea   Abdominal: Abdomen is soft. Bowel sounds are normal. She exhibits no distension. There is no abdominal tenderness. There is no rebound and no guarding.   Musculoskeletal:         General: Edema present. No tenderness. Normal range of motion.      Cervical back: Normal range of motion and neck supple.     Neurological: She is alert and oriented to person, place, and time. She has normal strength.   Skin: Skin is warm and dry. Capillary refill takes less than 2 seconds.       ED Course   Critical Care    Date/Time: 5/22/2023 7:05 PM  Performed by: Filiberto Ghosh MD  Authorized by: Filiberto Ghosh MD   Direct patient critical care time: 20 minutes  Additional history critical care time: 5 minutes  Ordering / reviewing critical care time: 10 minutes  Documentation critical care time: 10 minutes  Consulting other physicians critical care time: 10 minutes  Total critical care time (exclusive of procedural time) : 55 minutes  Critical care time was exclusive of teaching time and separately billable procedures and treating other patients.  Critical care was necessary to treat or prevent imminent or life-threatening deterioration of the following conditions: cardiac failure.  Critical care was time spent personally by me on the following activities: development of treatment plan with patient or surrogate, discussions with consultants, evaluation of patient's response to treatment, examination of patient, obtaining history from patient or surrogate, ordering and performing treatments and interventions, ordering and review of laboratory studies, ordering and review of radiographic studies, pulse oximetry, re-evaluation of patient's condition, review of old charts and interpretation of cardiac output measurements.      Labs  Reviewed   CBC W/ AUTO DIFFERENTIAL - Abnormal; Notable for the following components:       Result Value    Immature Granulocytes 1.0 (*)     Gran # (ANC) 9.4 (*)     Immature Grans (Abs) 0.12 (*)     Gran % 75.8 (*)     Lymph % 17.3 (*)     All other components within normal limits   COMPREHENSIVE METABOLIC PANEL - Abnormal; Notable for the following components:    Glucose 197 (*)     Total Protein 8.7 (*)     ALT 91 (*)     All other components within normal limits   TROPONIN I - Abnormal; Notable for the following components:    Troponin I 0.049 (*)     All other components within normal limits   B-TYPE NATRIURETIC PEPTIDE - Abnormal; Notable for the following components:     (*)     All other components within normal limits   POCT GLUCOSE - Abnormal; Notable for the following components:    POCT Glucose 195 (*)     All other components within normal limits   ISTAT PROCEDURE - Abnormal; Notable for the following components:    POC PO2 30 (*)     POC SATURATED O2 56 (*)     All other components within normal limits   BETA - HYDROXYBUTYRATE, SERUM   MAGNESIUM   PHOSPHORUS     EKG Readings: (Independently Interpreted)   Initial Reading: No STEMI. Rhythm: Normal Sinus Rhythm. Heart Rate: 98. Ectopy: No Ectopy. ST Segments: Non-Specific ST Segment Depression.   Inferolateral ST and T-wave abnormalities.   ECG Results              EKG 12-lead (Final result)  Result time 05/23/23 08:03:54      Final result by Interface, Lab In [facility] (05/23/23 08:03:54)                   Narrative:    Test Reason : R06.02,    Vent. Rate : 098 BPM     Atrial Rate : 098 BPM     P-R Int : 136 ms          QRS Dur : 072 ms      QT Int : 358 ms       P-R-T Axes : 072 054 234 degrees     QTc Int : 457 ms    Normal sinus rhythm  ST and T wave abnormality, consider inferolateral ischemia  Abnormal ECG  When compared with ECG of 21-FEB-2023 00:36,  T wave changes worse inferolateral leads   Confirmed by Anthony Xiao MDmillpark ([id number]) on  5/23/2023 8:03:46 AM    Referred By: System System           Confirmed By:Emanuel Xiao MD                                  Imaging Results              X-Ray Chest AP Portable (Final result)  Result time 05/22/23 13:35:33      Final result by Juan Carlos Patel MD (05/22/23 13:35:33)                   Impression:      Findings may reflect sequela of pulmonary edema/CHF pattern with trace right pleural effusion versus artifact accentuated by AP portable technique and chest wall.      Electronically signed by: Juan Carlos Patel MD  Date:    05/22/2023  Time:    13:35               Narrative:    EXAMINATION:  XR CHEST AP PORTABLE    CLINICAL HISTORY:  CHF;    TECHNIQUE:  Single frontal view of the chest was performed.    COMPARISON:  Chest radiograph 05/14/2021    FINDINGS:  Monitoring leads overlie the chest.  Patient is rotated.  Resolution is limited by body habitus with underpenetration.    Cardiomediastinal silhouette is midline and mildly prominent which may be magnified by chest wall and AP portable technique.  There is slight prominence of the central pulmonary vasculature.  Hilar contours are within normal limits.  Interval increased bilateral perihilar and basilar interstitial coarsening with patchy opacities.  Slight blunting of the right costophrenic angle which may relate to artifact versus pleural thickening/scarring or trace pleural effusion.  No sizable pleural effusion on the left.  The lungs are otherwise well expanded without large consolidation or pneumothorax.  PA and lateral views can be obtained.                                       Medications   aspirin EC tablet 81 mg (81 mg Oral Given 5/23/23 0926)   atorvastatin tablet 80 mg (80 mg Oral Given 5/22/23 1450)   NIFEdipine 24 hr tablet 60 mg (60 mg Oral Given 5/23/23 0926)   sodium chloride 0.9% flush 5 mL (has no administration in time range)   melatonin tablet 9 mg (9 mg Oral Given 5/22/23 2034)   senna-docusate 8.6-50 mg per tablet 1 tablet  (has no administration in time range)   acetaminophen tablet 650 mg (has no administration in time range)   LIDOcaine 5 % patch 1 patch (has no administration in time range)   insulin aspart U-100 pen 1-10 Units (has no administration in time range)   glucagon (human recombinant) injection 1 mg (has no administration in time range)   dextrose 10% bolus 125 mL 125 mL (has no administration in time range)   dextrose 10% bolus 250 mL 250 mL (has no administration in time range)   dextrose 40 % gel 15,000 mg (has no administration in time range)   dextrose 40 % gel 30,000 mg (has no administration in time range)   enoxaparin injection 40 mg (40 mg Subcutaneous Given 5/23/23 0926)   ondansetron disintegrating tablet 8 mg (8 mg Oral Given 5/23/23 0929)   furosemide injection 40 mg (40 mg Intravenous Given 5/23/23 0807)   potassium chloride SA CR tablet 40 mEq (40 mEq Oral Not Given 5/23/23 1400)   furosemide injection 40 mg (40 mg Intravenous Given 5/22/23 1315)   aspirin tablet 325 mg (325 mg Oral Given 5/22/23 1335)   perflutren protein-A microsphr 0.22 mg/mL IV susp (0.11 mg Intravenous Given 5/22/23 1530)   Patient's troponin is minimally elevated.  Her EKG is abnormal.  She appears to have some evidence of new onset congestive heart failure.  Her initial room air oxygen saturation was 88%.  Will admit for further evaluation of acute coronary syndrome and evaluation and treatment of new onset congestive heart failure.     Additional MDM:   Heart Score:    History:          Slightly suspicious.  ECG:             Nonspecific repolarisation disturbance  Age:               45-65 years  Risk factors: >= 3 risk factors or history of atherosclerotic disease  Troponin:       1-2x normal limit  Final Score: 5                       Clinical Impression:   Final diagnoses:  [R06.02] Shortness of breath  [R07.9] Chest pain, unspecified type  [R09.02] Hypoxia  [I50.9] Acute congestive heart failure, unspecified heart failure type  (Primary)        ED Disposition Condition    Admit Stable                Filiberto Ghosh MD  05/23/23 4953

## 2023-05-22 NOTE — ED NOTES
Patient voided 500 ml urine output. Patient transferred to 5th floor via stretcher with transporter wearing cardiac and O2 monitoring, and 3L NC.

## 2023-05-22 NOTE — HPI
A 47-year-old female with a past medical history of obesity, diabetes, hypertension, hyperlipidemia, and a self-reported history of stroke (with no residual deficits) presents with a chief complaint of shortness of breath and chest pain. 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 She reports having chest pain and shortness of breath over the past two weeks to the point where she nearly passed out while riding a bike and was unable to walk her daughter to school this morning. She denies any current chest pain but reports ongoing shortness of breath. She does not have a history of using home oxygen but is currently on oxygen supplementation due to her current symptoms. Unsure about her home medications. However, reports taking a blood pressure medication, Metformin, a type of insulin, and acid reflux medication. Denies taking ASA s/p CVA/TIA or any cholesterol medications. Has had primary care done in [place], scheduled to establish with [facility] next month. Denies ELSY, HA.    ED Course: Upon initial presentation to the ED, the patient was dyspneic and her oxygen saturation was at 88%. The EKG showed non-specific ST abnormalities. Her initial troponin was elevated at 0.049 and there was evidence of volume overload as indicated by her BNP of 209, though this may be confounded by her body habitus. Chest x-ray revealed early edematous changes. She was given aspirin in the ED and her oxygen saturation improved to 93% on 2L supplemental oxygen. She did not report any pain with breathing, and there were no obvious signs of DVT or PE. She was given Lasix 40 mg IV. The decision was made to admit her for further evaluation and management, primarily focusing on the potential need for further diuresis and echocardiogram baseline.

## 2023-05-22 NOTE — ASSESSMENT & PLAN NOTE
On CR, appears to have had associated retinopathy in the past, appears stable at this time  Extensive medication list, however reports only on Nifidpine 60 mg QD (recently started taking again)    Plan:  - Continue home meds  - Monitor BP  - Monitor for signs of retinopathy

## 2023-05-22 NOTE — PLAN OF CARE
VN cued into pt's room for introduction with pt's permission.  VN role explained and informed pt that VN would be working with bedside nurse and the rest of the care team.  Fall risk and bed alarm protocol education provided.  Instructed pt to call for assistance and agreeable.  Allowed time for questions. Pt discussed anxiety issues regarding her children and not having support. Emotional support provided. NAD noted.  Will cont to be available as needed.      05/22/23 1715   Admission   Initial VN Admission Questions Complete   Communication Issues? None   Shift   Virtual Nurse - Patient Verbalized Approval Of Camera Use;VN Rounding   Safety/Activity   Patient Rounds call light in patient/parent reach;clutter free environment maintained;visualized patient   Safety Promotion/Fall Prevention Fall Risk reviewed with patient/family;instructed to call staff for mobility   Pain/Comfort/Sleep   Preferred Pain Scale number (Numeric Rating Pain Scale)   Pain Rating (0-10): Rest 0

## 2023-05-22 NOTE — ED NOTES
Report called to [facility] 5th floor. Patient awake and alert, sitting up in bed on 3L NC @ 95% SaO2, IV lasix given, pure wick device in place. Telemetry box requested with transport.

## 2023-05-23 VITALS
SYSTOLIC BLOOD PRESSURE: 132 MMHG | HEIGHT: 65 IN | BODY MASS INDEX: 42.54 KG/M2 | TEMPERATURE: 98 F | DIASTOLIC BLOOD PRESSURE: 73 MMHG | RESPIRATION RATE: 20 BRPM | OXYGEN SATURATION: 96 % | HEART RATE: 95 BPM | WEIGHT: 255.31 LBS

## 2023-05-23 PROBLEM — I50.23 ACUTE ON CHRONIC SYSTOLIC CONGESTIVE HEART FAILURE: Status: ACTIVE | Noted: 2023-05-22

## 2023-05-23 LAB
ALBUMIN SERPL BCP-MCNC: 3 G/DL (ref 3.5–5.2)
ALP SERPL-CCNC: 85 U/L (ref 55–135)
ALT SERPL W/O P-5'-P-CCNC: 63 U/L (ref 10–44)
ANION GAP SERPL CALC-SCNC: 10 MMOL/L (ref 8–16)
AST SERPL-CCNC: 23 U/L (ref 10–40)
BASOPHILS # BLD AUTO: 0.02 K/UL (ref 0–0.2)
BASOPHILS NFR BLD: 0.2 % (ref 0–1.9)
BILIRUB SERPL-MCNC: 0.7 MG/DL (ref 0.1–1)
BUN SERPL-MCNC: 7 MG/DL (ref 6–20)
CALCIUM SERPL-MCNC: 9.5 MG/DL (ref 8.7–10.5)
CHLORIDE SERPL-SCNC: 101 MMOL/L (ref 95–110)
CHOLEST SERPL-MCNC: 186 MG/DL (ref 120–199)
CHOLEST/HDLC SERPL: 3.8 {RATIO} (ref 2–5)
CO2 SERPL-SCNC: 25 MMOL/L (ref 23–29)
CREAT SERPL-MCNC: 0.7 MG/DL (ref 0.5–1.4)
DIFFERENTIAL METHOD: NORMAL
EOSINOPHIL # BLD AUTO: 0.2 K/UL (ref 0–0.5)
EOSINOPHIL NFR BLD: 2.2 % (ref 0–8)
ERYTHROCYTE [DISTWIDTH] IN BLOOD BY AUTOMATED COUNT: 12.4 % (ref 11.5–14.5)
EST. GFR  (NO RACE VARIABLE): >60 ML/MIN/1.73 M^2
GLUCOSE SERPL-MCNC: 143 MG/DL (ref 70–110)
HCT VFR BLD AUTO: 42.1 % (ref 37–48.5)
HDLC SERPL-MCNC: 49 MG/DL (ref 40–75)
HDLC SERPL: 26.3 % (ref 20–50)
HGB BLD-MCNC: 14.1 G/DL (ref 12–16)
IMM GRANULOCYTES # BLD AUTO: 0.04 K/UL (ref 0–0.04)
IMM GRANULOCYTES NFR BLD AUTO: 0.4 % (ref 0–0.5)
LDLC SERPL CALC-MCNC: 113 MG/DL (ref 63–159)
LYMPHOCYTES # BLD AUTO: 2.7 K/UL (ref 1–4.8)
LYMPHOCYTES NFR BLD: 29.8 % (ref 18–48)
MAGNESIUM SERPL-MCNC: 1.6 MG/DL (ref 1.6–2.6)
MCH RBC QN AUTO: 29.6 PG (ref 27–31)
MCHC RBC AUTO-ENTMCNC: 33.5 G/DL (ref 32–36)
MCV RBC AUTO: 88 FL (ref 82–98)
MONOCYTES # BLD AUTO: 0.5 K/UL (ref 0.3–1)
MONOCYTES NFR BLD: 5.4 % (ref 4–15)
NEUTROPHILS # BLD AUTO: 5.6 K/UL (ref 1.8–7.7)
NEUTROPHILS NFR BLD: 62 % (ref 38–73)
NONHDLC SERPL-MCNC: 137 MG/DL
NRBC BLD-RTO: 0 /100 WBC
PHOSPHATE SERPL-MCNC: 4.9 MG/DL (ref 2.7–4.5)
PLATELET # BLD AUTO: 299 K/UL (ref 150–450)
PMV BLD AUTO: 10.5 FL (ref 9.2–12.9)
POCT GLUCOSE: 152 MG/DL (ref 70–110)
POCT GLUCOSE: 261 MG/DL (ref 70–110)
POTASSIUM SERPL-SCNC: 3.5 MMOL/L (ref 3.5–5.1)
PROT SERPL-MCNC: 7 G/DL (ref 6–8.4)
RBC # BLD AUTO: 4.77 M/UL (ref 4–5.4)
SODIUM SERPL-SCNC: 136 MMOL/L (ref 136–145)
TRIGL SERPL-MCNC: 120 MG/DL (ref 30–150)
WBC # BLD AUTO: 9 K/UL (ref 3.9–12.7)

## 2023-05-23 PROCEDURE — 94761 N-INVAS EAR/PLS OXIMETRY MLT: CPT

## 2023-05-23 PROCEDURE — 80061 LIPID PANEL: CPT | Performed by: STUDENT IN AN ORGANIZED HEALTH CARE EDUCATION/TRAINING PROGRAM

## 2023-05-23 PROCEDURE — 99900035 HC TECH TIME PER 15 MIN (STAT)

## 2023-05-23 PROCEDURE — 25000003 PHARM REV CODE 250: Performed by: STUDENT IN AN ORGANIZED HEALTH CARE EDUCATION/TRAINING PROGRAM

## 2023-05-23 PROCEDURE — 85025 COMPLETE CBC W/AUTO DIFF WBC: CPT | Performed by: STUDENT IN AN ORGANIZED HEALTH CARE EDUCATION/TRAINING PROGRAM

## 2023-05-23 PROCEDURE — 83735 ASSAY OF MAGNESIUM: CPT | Performed by: STUDENT IN AN ORGANIZED HEALTH CARE EDUCATION/TRAINING PROGRAM

## 2023-05-23 PROCEDURE — 84100 ASSAY OF PHOSPHORUS: CPT | Performed by: STUDENT IN AN ORGANIZED HEALTH CARE EDUCATION/TRAINING PROGRAM

## 2023-05-23 PROCEDURE — 27000221 HC OXYGEN, UP TO 24 HOURS

## 2023-05-23 PROCEDURE — 63600175 PHARM REV CODE 636 W HCPCS: Performed by: STUDENT IN AN ORGANIZED HEALTH CARE EDUCATION/TRAINING PROGRAM

## 2023-05-23 PROCEDURE — 80053 COMPREHEN METABOLIC PANEL: CPT | Performed by: STUDENT IN AN ORGANIZED HEALTH CARE EDUCATION/TRAINING PROGRAM

## 2023-05-23 PROCEDURE — 36415 COLL VENOUS BLD VENIPUNCTURE: CPT | Performed by: STUDENT IN AN ORGANIZED HEALTH CARE EDUCATION/TRAINING PROGRAM

## 2023-05-23 RX ORDER — ASPIRIN 81 MG/1
81 TABLET ORAL DAILY
Qty: 60 TABLET | Refills: 1 | Status: SHIPPED | OUTPATIENT
Start: 2023-05-23 | End: 2024-01-06

## 2023-05-23 RX ORDER — POTASSIUM CHLORIDE 29.8 MG/ML
40 INJECTION INTRAVENOUS EVERY 4 HOURS
Status: DISCONTINUED | OUTPATIENT
Start: 2023-05-23 | End: 2023-05-23

## 2023-05-23 RX ORDER — POTASSIUM CHLORIDE 20 MEQ/1
20 TABLET, EXTENDED RELEASE ORAL DAILY PRN
Qty: 30 TABLET | Refills: 1 | Status: SHIPPED | OUTPATIENT
Start: 2023-05-23 | End: 2023-05-31

## 2023-05-23 RX ORDER — FUROSEMIDE 20 MG/1
20 TABLET ORAL DAILY PRN
Qty: 30 TABLET | Refills: 1 | Status: SHIPPED | OUTPATIENT
Start: 2023-05-23 | End: 2023-06-19 | Stop reason: DRUGHIGH

## 2023-05-23 RX ORDER — POTASSIUM CHLORIDE 20 MEQ/1
40 TABLET, EXTENDED RELEASE ORAL EVERY 4 HOURS
Status: DISCONTINUED | OUTPATIENT
Start: 2023-05-23 | End: 2023-05-23 | Stop reason: HOSPADM

## 2023-05-23 RX ORDER — ATORVASTATIN CALCIUM 80 MG/1
80 TABLET, FILM COATED ORAL NIGHTLY
Qty: 60 TABLET | Refills: 1 | Status: SHIPPED | OUTPATIENT
Start: 2023-05-23

## 2023-05-23 RX ADMIN — ENOXAPARIN SODIUM 40 MG: 40 INJECTION SUBCUTANEOUS at 09:05

## 2023-05-23 RX ADMIN — FUROSEMIDE 40 MG: 10 INJECTION, SOLUTION INTRAMUSCULAR; INTRAVENOUS at 08:05

## 2023-05-23 RX ADMIN — ASPIRIN 81 MG: 81 TABLET, COATED ORAL at 09:05

## 2023-05-23 RX ADMIN — NIFEDIPINE 60 MG: 60 TABLET, FILM COATED, EXTENDED RELEASE ORAL at 09:05

## 2023-05-23 RX ADMIN — POTASSIUM CHLORIDE 40 MEQ: 1500 TABLET, EXTENDED RELEASE ORAL at 09:05

## 2023-05-23 RX ADMIN — ONDANSETRON 8 MG: 8 TABLET, ORALLY DISINTEGRATING ORAL at 09:05

## 2023-05-23 NOTE — PLAN OF CARE
Discharge orders noted. No DME or Home Health ordered. [facility] appointment scheduled. Family will transport home at discharge. No further Case Management needs.    I verified with nurse patient was weaned from her oxygen.    Patient Contacts    Name Relation Home Work Mobile   Iris Quintana Sister   [phone]   Kimberly Quintana Daughter   [phone]   Abilio Quintana Father   [phone]   KAYLAN QUINTANA Sister   [phone]     Future Appointments   Date Time Provider Department Center   5/31/2023  3:00 PM [facility]   6/19/2023  1:20 PM Donald Acosta MD [facility]         05/23/23 1446   Final Note   Assessment Type Final Discharge Note   Anticipated Discharge Disposition Home   Hospital Resources/Appts/Education Provided Appointments scheduled by Navigator/Coordinator   Post-Acute Status   Discharge Delays None known at this time     Crystal Sevilla RN    [phone]

## 2023-05-23 NOTE — ASSESSMENT & PLAN NOTE
Reports not taking cholesterol medications  FLP on admission unremarkable    Plan:  - Qualifies for Atorvastatin given DM and history of CVA, will start while inpatient

## 2023-05-23 NOTE — ASSESSMENT & PLAN NOTE
A1c 9 (5/2021) with repeat 11.8 on admission  Unclear of patient's compliance with insulin regimen but does report to take Metformin (but not in dispense report)  Insulin regimen per dispense report is Lantus, apparently 80 U BID, again unclear if compliant  On chart review, patient appears to have had associated retinopathy and macular edema relating to DM  Has not needed SSI or long acting during this admission    Plan:  - SSI with goal of 140-180

## 2023-05-23 NOTE — SUBJECTIVE & OBJECTIVE
Interval History: NAEON. VSS, afebrile. SOB improved. Satting well on 1L NC. Good UOP    Review of Systems   Constitutional:  Negative for fatigue and fever.   HENT:  Negative for congestion and sore throat.    Respiratory:  Positive for shortness of breath. Negative for cough.    Cardiovascular:  Positive for leg swelling. Negative for chest pain.   Gastrointestinal:  Negative for abdominal pain, diarrhea, nausea and vomiting.   Genitourinary:  Negative for dysuria.   Musculoskeletal:  Negative for back pain and myalgias.   Skin:  Negative for color change.   Neurological:  Negative for dizziness and headaches.   Objective:     Vital Signs (Most Recent):  Temp: 98.1 °F (36.7 °C) (05/23/23 0742)  Pulse: 91 (05/23/23 0742)  Resp: 20 (05/23/23 0742)  BP: 127/67 (05/23/23 0742)  SpO2: 96 % (05/23/23 0742) Vital Signs (24h Range):  Temp:  [97.1 °F (36.2 °C)-98.2 °F (36.8 °C)] 98.1 °F (36.7 °C)  Pulse:  [] 91  Resp:  [18-34] 20  SpO2:  [88 %-96 %] 96 %  BP: (112-165)/() 127/67     Weight: 115.8 kg (255 lb 4.7 oz)  Body mass index is 42.48 kg/m².    Intake/Output Summary (Last 24 hours) at 5/23/2023 0864  Last data filed at 5/23/2023 0412  Gross per 24 hour   Intake --   Output 1400 ml   Net -1400 ml         Physical Exam  Constitutional:       Appearance: Normal appearance. She is normal weight.      Comments: Resting comfortably in bed. On 1 L NC satting well   HENT:      Head: Normocephalic and atraumatic.      Right Ear: External ear normal.      Left Ear: External ear normal.      Mouth/Throat:      Mouth: Mucous membranes are moist.      Pharynx: Oropharynx is clear.   Eyes:      Extraocular Movements: Extraocular movements intact.      Conjunctiva/sclera: Conjunctivae normal.      Pupils: Pupils are equal, round, and reactive to light.   Cardiovascular:      Rate and Rhythm: Normal rate and regular rhythm.      Pulses: Normal pulses.      Heart sounds: Normal heart sounds.   Pulmonary:      Effort:  Pulmonary effort is normal.      Breath sounds: Normal breath sounds. No wheezing or rales.   Abdominal:      General: Abdomen is flat. Bowel sounds are normal.      Tenderness: There is no abdominal tenderness.   Musculoskeletal:      Cervical back: Normal range of motion.      Right lower leg: Edema (Trace) present.      Left lower leg: Edema (Trace) present.   Neurological:      Mental Status: She is alert and oriented to person, place, and time. Mental status is at baseline.   Psychiatric:         Mood and Affect: Mood normal.         Behavior: Behavior normal.         Thought Content: Thought content normal.           Significant Labs: All pertinent labs within the past 24 hours have been reviewed.  CBC:   Recent Labs   Lab 05/22/23  1243 05/23/23  0246   WBC 12.40 9.00   HGB 15.5 14.1   HCT 46.2 42.1    299     CMP:   Recent Labs   Lab 05/22/23  1243 05/22/23  1846 05/23/23  0246    137 136   K 4.0 3.8 3.5    101 101   CO2 23 26 25   * 167* 143*   BUN 6 6 7   CREATININE 0.8 0.7 0.7   CALCIUM 10.4 10.0 9.5   PROT 8.7*  --  7.0   ALBUMIN 3.8  --  3.0*   BILITOT 0.6  --  0.7   ALKPHOS 108  --  85   AST 39  --  23   ALT 91*  --  63*   ANIONGAP 11 10 10       Significant Imaging: I have reviewed all pertinent imaging results/findings within the past 24 hours.    Echo    Result Date: 5/22/2023  · The left ventricle is normal in size with concentric remodeling and low   normal systolic function.  · The estimated ejection fraction is 50%.  · Normal left ventricular diastolic function.  · With normal right ventricular systolic function.

## 2023-05-23 NOTE — PROGRESS NOTES
[facility] Medicine  Progress Note    Patient Name: Jaimee Quintana  MRN: [id number]  Patient Class: IP- Inpatient   Admission Date: 5/22/2023  Length of Stay: 1 days  Attending Physician: Merrick Mccall III, MD  Primary Care Provider: Donald Acosta MD        Subjective:     Principal Problem:Acute on chronic systolic congestive heart failure        HPI:  A 47-year-old female with a past medical history of obesity, diabetes, hypertension, hyperlipidemia, and a self-reported history of stroke (with no residual deficits) presents with a chief complaint of shortness of breath and chest pain. 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 She reports having chest pain and shortness of breath over the past two weeks to the point where she nearly passed out while riding a bike and was unable to walk her daughter to school this morning. She denies any current chest pain but reports ongoing shortness of breath. She does not have a history of using home oxygen but is currently on oxygen supplementation due to her current symptoms. Unsure about her home medications. However, reports taking a blood pressure medication, Metformin, a type of insulin, and acid reflux medication. Denies taking ASA s/p CVA/TIA or any cholesterol medications. Has had primary care done in [place], scheduled to establish with [facility] next month. Denies NVD, HA.    ED Course: Upon initial presentation to the ED, the patient was dyspneic and her oxygen saturation was at 88%. The EKG showed non-specific ST abnormalities. Her initial troponin was elevated at 0.049 and there was evidence of volume overload as indicated by her BNP of 209, though this may be confounded by her body habitus. Chest x-ray revealed early edematous changes. She was given aspirin in the ED and her oxygen saturation improved to 93% on 2L supplemental oxygen. She did not report any pain with breathing, and there were no obvious signs  of DVT or PE. She was given Lasix 40 mg IV. The decision was made to admit her for further evaluation and management, primarily focusing on the potential need for further diuresis and echocardiogram baseline.      Overview/Hospital Course:  No notes on file    Interval History: NAEON. VSS, afebrile. SOB improved. Satting well on 1L NC. Good UOP    Review of Systems   Constitutional:  Negative for fatigue and fever.   HENT:  Negative for congestion and sore throat.    Respiratory:  Positive for shortness of breath. Negative for cough.    Cardiovascular:  Positive for leg swelling. Negative for chest pain.   Gastrointestinal:  Negative for abdominal pain, diarrhea, nausea and vomiting.   Genitourinary:  Negative for dysuria.   Musculoskeletal:  Negative for back pain and myalgias.   Skin:  Negative for color change.   Neurological:  Negative for dizziness and headaches.   Objective:     Vital Signs (Most Recent):  Temp: 98.1 °F (36.7 °C) (05/23/23 0742)  Pulse: 91 (05/23/23 0742)  Resp: 20 (05/23/23 0742)  BP: 127/67 (05/23/23 0742)  SpO2: 96 % (05/23/23 0742) Vital Signs (24h Range):  Temp:  [97.1 °F (36.2 °C)-98.2 °F (36.8 °C)] 98.1 °F (36.7 °C)  Pulse:  [] 91  Resp:  [18-34] 20  SpO2:  [88 %-96 %] 96 %  BP: (112-165)/() 127/67     Weight: 115.8 kg (255 lb 4.7 oz)  Body mass index is 42.48 kg/m².    Intake/Output Summary (Last 24 hours) at 5/23/2023 0833  Last data filed at 5/23/2023 0412  Gross per 24 hour   Intake --   Output 1400 ml   Net -1400 ml         Physical Exam  Constitutional:       Appearance: Normal appearance. She is normal weight.      Comments: Resting comfortably in bed. On 1 L NC satting well   HENT:      Head: Normocephalic and atraumatic.      Right Ear: External ear normal.      Left Ear: External ear normal.      Mouth/Throat:      Mouth: Mucous membranes are moist.      Pharynx: Oropharynx is clear.   Eyes:      Extraocular Movements: Extraocular movements intact.       Conjunctiva/sclera: Conjunctivae normal.      Pupils: Pupils are equal, round, and reactive to light.   Cardiovascular:      Rate and Rhythm: Normal rate and regular rhythm.      Pulses: Normal pulses.      Heart sounds: Normal heart sounds.   Pulmonary:      Effort: Pulmonary effort is normal.      Breath sounds: Normal breath sounds. No wheezing or rales.   Abdominal:      General: Abdomen is flat. Bowel sounds are normal.      Tenderness: There is no abdominal tenderness.   Musculoskeletal:      Cervical back: Normal range of motion.      Right lower leg: Edema (Trace) present.      Left lower leg: Edema (Trace) present.   Neurological:      Mental Status: She is alert and oriented to person, place, and time. Mental status is at baseline.   Psychiatric:         Mood and Affect: Mood normal.         Behavior: Behavior normal.         Thought Content: Thought content normal.           Significant Labs: All pertinent labs within the past 24 hours have been reviewed.  CBC:   Recent Labs   Lab 05/22/23  1243 05/23/23  0246   WBC 12.40 9.00   HGB 15.5 14.1   HCT 46.2 42.1    299     CMP:   Recent Labs   Lab 05/22/23  1243 05/22/23  1846 05/23/23  0246    137 136   K 4.0 3.8 3.5    101 101   CO2 23 26 25   * 167* 143*   BUN 6 6 7   CREATININE 0.8 0.7 0.7   CALCIUM 10.4 10.0 9.5   PROT 8.7*  --  7.0   ALBUMIN 3.8  --  3.0*   BILITOT 0.6  --  0.7   ALKPHOS 108  --  85   AST 39  --  23   ALT 91*  --  63*   ANIONGAP 11 10 10       Significant Imaging: I have reviewed all pertinent imaging results/findings within the past 24 hours.    Echo    Result Date: 5/22/2023  · The left ventricle is normal in size with concentric remodeling and low   normal systolic function.  · The estimated ejection fraction is 50%.  · Normal left ventricular diastolic function.  · With normal right ventricular systolic function.           Assessment/Plan:      * Acute on chronic systolic congestive heart failure  Patient's  dyspnea, volume overload indicated by BNP, elevated trop, and early edematous changes on chest x-ray suggest acute heart failure exacerbation.  This is further evidenced by echo revealing a mildy decreased EF  S/p Lasix 40 mg IV in ED with good symptomatic improvement    Plan:  - Continue loop diuretics as needed for volume overload, titrate to a UOP > 0.5cc/kg/hr  - Wean O2, amb O2 trial when appropriate  - Will likely need to be discharged with diuretics outpatient      Elevated troponin  Patient with history of cardiovascular risk factors (hypertension, diabetes, hyperlipidemia, obesity, and smoking) presents with dyspnea and chest pain.   Elevated troponin and non-specific ST abnormalities on EKG raise concern for Acute Coronary Syndrome.  All of these findings may be consistent with CHF exacerbation, but must r/o ACS    Plan:  - Trop downtrended, discontinued trending, low concerns for ACS at this time, but low threshold to initiate ACS protocol if acute change in cardiovascular status  - Continue cardiac monitoring.      Type 2 diabetes mellitus  A1c 9 (5/2021) with repeat 11.8 on admission  Unclear of patient's compliance with insulin regimen but does report to take Metformin (but not in dispense report)  Insulin regimen per dispense report is Lantus, apparently 80 U BID, again unclear if compliant  On chart review, patient appears to have had associated retinopathy and macular edema relating to DM  Has not needed SSI or long acting during this admission    Plan:  - SSI with goal of 140-180    HTN (hypertension) without retinopathy  On CR, appears to have had associated retinopathy in the past, appears stable at this time  Extensive medication list, however reports only on Nifidpine 60 mg QD (recently started taking again)    Plan:  - Continue home meds  - Monitor BP  - Monitor for signs of retinopathy      Hyperlipidemia  Reports not taking cholesterol medications  FLP on admission unremarkable    Plan:  -  Qualifies for Atorvastatin given DM and history of CVA, will start while inpatient    Noncompliance with medications  The patient reports not being compliant with her medications, including nifedipine for hypertension, metformin for diabetes, pantoprazole for possible GERD, and insulin. This non-compliance likely contributed to her current presentation.  Patient has a long list of medication on her medication list, appears to not be taking most medications    Plan:  - Reinforce the importance of medication compliance.  - Involve social work to address barriers to compliance.  - Close follow-up with her primary care provider for medication reconciliation and adherence monitoring.      History of CVA (cerebrovascular accident) without residual deficits  Reported history, no residual deficits.  Reports not taking ASA or other risk modifying agents    Plan:  - Will start ASA and high intensity statin and continue while inpatient      GERD (gastroesophageal reflux disease)  Taking Pantoprazole at home, but reports inconsistent use    Plan:  - Given adverse affects of PPI, hold for now, consider restarting/ choosing alternative agent if warranted        VTE Risk Mitigation (From admission, onward)           Ordered     enoxaparin injection 40 mg  Every 12 hours         05/22/23 1356     IP VTE HIGH RISK PATIENT  Once         05/22/23 1356     Place sequential compression device  Until discontinued         05/22/23 1356                    Discharge Planning   MALGORZATA:      Code Status: Full Code   Is the patient medically ready for discharge?:     Reason for patient still in hospital (select all that apply): Patient trending condition and Treatment  Discharge Plan A: Home                  Amrit Miller MD  Department of Hospital Medicine   [facility]

## 2023-05-23 NOTE — PLAN OF CARE
Introduced as VN and will be reviewing discharge instructions.  Educated patient on reason for admission, home medication list, and discharge instructions including when to return to ED and the following doctor appointments.  Education per flowsheet.  Opportunity given for questions and questions answered.  Nurse notified of   completion of discharge education. Wheelchair at bedside

## 2023-05-23 NOTE — ASSESSMENT & PLAN NOTE
Patient with history of cardiovascular risk factors (hypertension, diabetes, hyperlipidemia, obesity, and smoking) presents with dyspnea and chest pain.   Elevated troponin and non-specific ST abnormalities on EKG raise concern for Acute Coronary Syndrome.  All of these findings may be consistent with CHF exacerbation, but must r/o ACS    Plan:  - Trop downtrended, discontinued trending, low concerns for ACS at this time, but low threshold to initiate ACS protocol if acute change in cardiovascular status  - Continue cardiac monitoring.

## 2023-05-23 NOTE — ASSESSMENT & PLAN NOTE
Patient's dyspnea, volume overload indicated by BNP, elevated trop, and early edematous changes on chest x-ray suggest acute heart failure exacerbation.  This is further evidenced by echo revealing a mildy decreased EF  S/p Lasix 40 mg IV in ED with good symptomatic improvement    Plan:  - Continue loop diuretics as needed for volume overload, titrate to a UOP > 0.5cc/kg/hr  - Wean O2, amb O2 trial when appropriate  - Will likely need to be discharged with diuretics outpatient

## 2023-05-23 NOTE — PLAN OF CARE
went to meet with patient. Patient reports she is independent and lives with her children at home. She has a cane and glucometer at home. Patient does not have Home Health. She is currently wearing oxygen now, but she does not wear at home (02 weaning prior to discharge). Patient does not drive, but she uses Medicaid Transportation or family provides assistance. Family/Friend will transport home at discharge. PCP follow-up noted to be scheduled.  will request sooner appointment. Per Dr. Aranda, no Cardiology follow-up needed. Patient encouraged to call with any questions or concerns.  will continue to follow patient through transitions of care and assist with any discharge needs.     [facility] appointment scheduled.    Patient Contacts    Name Relation Home Work Mobile   Iris Quintana Sister   [phone]   Kimberly Quintana Daughter   [phone]   Abilio Quintana Father   [phone]   KAYLAN QUINTANA Sister   [phone]     Future Appointments   Date Time Provider Department Center   5/31/2023  3:00 PM [facility]   6/19/2023  1:20 PM Donald Acosta MD [facility]         05/23/23 1018   Discharge Assessment   Assessment Type Discharge Planning Assessment   Confirmed/corrected address, phone number and insurance Yes   Confirmed Demographics Correct on Facesheet   Source of Information patient   People in Home child(shaylee), adult;child(shaylee), dependent   Facility Arrived From: Home   Do you expect to return to your current living situation? Yes   Prior to hospitilization cognitive status: Alert/Oriented   Current cognitive status: Alert/Oriented   Walking or Climbing Stairs ambulation difficulty, requires equipment   Equipment Currently Used at Home cane, quad;glucometer   Do you take prescription medications? Yes   Do you have prescription coverage? Yes   Do you have any problems affording any of your prescribed medications? No   Is  the patient taking medications as prescribed? yes   Who is going to help you get home at discharge? Iris (Sister) Phone#[phone]   How do you get to doctors appointments? family or friend will provide;health plan transportation   Are you on dialysis? No   Do you take coumadin? No   Discharge Plan A Home   Discharge Plan B Home with family   DME Needed Upon Discharge  none   Discharge Plan discussed with: Patient   Transition of Care Barriers None   Physical Activity   On average, how many days per week do you engage in moderate to strenuous exercise (like a brisk walk)? Patient refu   On average, how many minutes do you engage in exercise at this level? Patient refu   Financial Resource Strain   How hard is it for you to pay for the very basics like food, housing, medical care, and heating? Patient refu   Housing Stability   In the last 12 months, was there a time when you were not able to pay the mortgage or rent on time? OR   In the last 12 months, was there a time when you did not have a steady place to sleep or slept in a shelter (including now)? OR   Transportation Needs   In the past 12 months, has lack of transportation kept you from medical appointments or from getting medications? Patient refu   In the past 12 months, has lack of transportation kept you from meetings, work, or from getting things needed for daily living? Patient refu   Food Insecurity   Within the past 12 months, you worried that your food would run out before you got the money to buy more. Patient refu   Within the past 12 months, the food you bought just didn't last and you didn't have money to get more. Patient refu   Stress   Do you feel stress - tense, restless, nervous, or anxious, or unable to sleep at night because your mind is troubled all the time - these days? Patient refu   Social Connections   In a typical week, how many times do you talk on the phone with family, friends, or neighbors? More than 3   How often do you get  together with friends or relatives? More than 3   How often do you attend Synagogue or Druze services? Patient refu   Do you belong to any clubs or organizations such as Synagogue groups, unions, fraternal or athletic groups, or school groups? Patient refu   How often do you attend meetings of the clubs or organizations you belong to? Patient refu   Are you , , , , never , or living with a partner? Patient refu   Alcohol Use   Q1: How often do you have a drink containing alcohol? Patient refu   Q2: How many drinks containing alcohol do you have on a typical day when you are drinking? Patient refu   Q3: How often do you have six or more drinks on one occasion? Patient refu     Crystal Sevilla RN    [phone]

## 2023-05-24 ENCOUNTER — PATIENT OUTREACH (OUTPATIENT)
Dept: ADMINISTRATIVE | Facility: CLINIC | Age: 48
End: 2023-05-24
Payer: MEDICARE

## 2023-05-24 NOTE — DISCHARGE SUMMARY
[facility] Medicine  Discharge Summary      Patient Name: Jaimee Quintana  MRN: [id number]  JEREMY: [id number]  Patient Class: IP- Inpatient  Admission Date: 5/22/2023  Hospital Length of Stay: 1 days  Discharge Date and Time: 5/23/2023  4:36 PM  Attending Physician: Cal Sin III, MD   Discharging Provider: Amrit Miller MD  Primary Care Provider: Donald Acosta MD    Primary Care Team: Networked reference to record PCT     HPI:   A 47-year-old female with a past medical history of obesity, diabetes, hypertension, hyperlipidemia, and a self-reported history of stroke (with no residual deficits) presents with a chief complaint of shortness of breath and chest pain. 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 She reports having chest pain and shortness of breath over the past two weeks to the point where she nearly passed out while riding a bike and was unable to walk her daughter to school this morning. She denies any current chest pain but reports ongoing shortness of breath. She does not have a history of using home oxygen but is currently on oxygen supplementation due to her current symptoms. Unsure about her home medications. However, reports taking a blood pressure medication, Metformin, a type of insulin, and acid reflux medication. Denies taking ASA s/p CVA/TIA or any cholesterol medications. Has had primary care done in [place], scheduled to establish with [facility] next month. Denies NVD, HA.    ED Course: Upon initial presentation to the ED, the patient was dyspneic and her oxygen saturation was at 88%. The EKG showed non-specific ST abnormalities. Her initial troponin was elevated at 0.049 and there was evidence of volume overload as indicated by her BNP of 209, though this may be confounded by her body habitus. Chest x-ray revealed early edematous changes. She was given aspirin in the ED and her oxygen saturation improved to 93% on 2L supplemental  oxygen. She did not report any pain with breathing, and there were no obvious signs of DVT or PE. She was given Lasix 40 mg IV. The decision was made to admit her for further evaluation and management, primarily focusing on the potential need for further diuresis and echocardiogram baseline.      * No surgery found *      Hospital Course:   Patient's dyspnea, volume overload indicated by BNP, elevated trop, and early edematous changes on chest x-ray suggested acute heart failure exacerbation. This is was confirmed by echo revealing a mildy decreased EF (50%). She was continue on scheduled lasix with appropriate UOP. The following day we were able to wean O2, and patient passed ambulatory O2 trial. Patient with history of cardiovascular risk factors (hypertension, diabetes, hyperlipidemia, obesity, and smoking) presents with dyspnea and chest pain. In the setting of tropenemia, ACS was initially on the differential. However, trop downtrended, and patient's cardiovascular status remained stable. Patient has T2DM with an A1c 11.8 on admission. It was unclear of patient's compliance with insulin regimen but does report to take Metformin (but not in dispense report). She also reported to be taking short acting as well, but it was unable to be confirmed on med list/dispense report. Interestingly, patient did not require any insulin during the admission (neither SSI or long acting) and her BG's remained stable. She was tolerated a diabetic diet. Given history of CVA and current diabetic, patient was started on high intensity statin. The patient reports not being compliant with her medications, including nifedipine for hypertension, metformin for diabetes, pantoprazole for possible GERD, and insulin. This non-compliance likely contributed to her current presentation. Nonetheless, patient's chronic issues were adequately managed during the admission. On the day of discharge patient was back to baseline and hemodynamically  stable. She was ambulating and satting well on RA. She was sent home with Lasix prn (with potassium), ASA (history of CVA), as well as high intensity statin. Patient will need to follow up with PCP for hospital D/C Follow up. Given history of medication non compliance and new Dx of HFrEF, close follow-up with PCP is strongly advised. She has apt with [facility] early next month. Advised to go to apt. Reinforced the importance of medication compliance. May need social work to address barriers to compliance. Current med rec is most appropriate going forward. Although may need to have diabetic medications adjusted. Educated patient on appropriate diabetic management and appropriate times to inject insulin. Given controlled BG without the use of insulin while inpatient, advised patient to be cautious with current dose of insuline. Instructed only to inject if sugars are above 200 and if she is tolerating PO intake. Patient sent home with educational handouts on how to track her weight going forward. Patient agreeable to discharge plan, expressed understanding, all questions answered, return precautions were discussed.        Goals of Care Treatment Preferences:  Code Status: Full Code      Consults:     No new Assessment & Plan notes have been filed under this hospital service since the last note was generated.  Service: Hospital Medicine    Final Active Diagnoses:    Diagnosis Date Noted POA    PRINCIPAL PROBLEM:  Acute on chronic systolic congestive heart failure [I50.23] 05/22/2023 Yes    Elevated troponin [R77.8] 05/22/2023 Unknown    Type 2 diabetes mellitus [E11.9] 05/22/2023 Unknown    HTN (hypertension) without retinopathy [I10] 03/13/2014 Yes    Hyperlipidemia [E78.5]  Yes    Noncompliance with medications [Z91.148] 05/22/2023 Not Applicable    GERD (gastroesophageal reflux disease) [K21.9] 05/22/2023 Unknown    History of CVA (cerebrovascular accident) without residual deficits [Z86.73] 05/22/2023 Not  Applicable      Problems Resolved During this Admission:       Discharged Condition: good    Disposition: Home or Self Care    Follow Up:    Patient Instructions:      Diet diabetic     Diet Cardiac     Notify your health care provider if you experience any of the following:  persistent nausea and vomiting or diarrhea     Notify your health care provider if you experience any of the following:  severe uncontrolled pain     Notify your health care provider if you experience any of the following:  difficulty breathing or increased cough     Notify your health care provider if you experience any of the following:  severe persistent headache     Notify your health care provider if you experience any of the following:  persistent dizziness, light-headedness, or visual disturbances     Notify your health care provider if you experience any of the following:  increased confusion or weakness     Activity as tolerated       Significant Diagnostic Studies:     Recent Labs   Lab 05/22/23  1243 05/23/23  0246   WBC 12.40 9.00   HGB 15.5 14.1   HCT 46.2 42.1   MCV 89 88   RBC 5.20 4.77   MCH 29.8 29.6   MCHC 33.5 33.5   RDW 12.2 12.4    299   MPV 10.9 10.5   GRAN 75.8*  9.4* 62.0  5.6   LYMPH 17.3*  2.1 29.8  2.7   MONO 4.8  0.6 5.4  0.5   EOSINOPHIL 0.9 2.2   BASOPHIL 0.2 0.2       Recent Labs   Lab 05/22/23  1243 05/22/23  1509 05/22/23  1846 05/23/23  0246     --  137 136   K 4.0  --  3.8 3.5     --  101 101   CO2 23  --  26 25   BUN 6  --  6 7   CREATININE 0.8  --  0.7 0.7   *  --  167* 143*   CALCIUM 10.4  --  10.0 9.5   PROT 8.7*  --   --  7.0   ALBUMIN 3.8  --   --  3.0*   ALKPHOS 108  --   --  85   BILITOT 0.6  --   --  0.7   ALT 91*  --   --  63*   AST 39  --   --  23   ANIONGAP 11  --  10 10   MG  --  1.7  --  1.6   PHOS  --  3.0  --  4.9*       No results for input(s): COLORU, APPEARANCEUA, PHUR, SPECGRAV, PROTEINUA, GLUCUA, KETONESU, BILIRUBINUA, OCCULTUA, UROBILINOGEN, NITRITE,  LEUKOCYTESUR, RBCUA, WBCUA, BACTERIA, SQUAMEPITHEL, HYALINECASTS, GRANULARCAST, MICROCMT in the last 168 hours.    Invalid input(s): SPECIMENU, AMORPHOUSU    Recent Labs   Lab 05/22/23  1241   PH 7.396   PCO2 44.2   PO2 30*   HCO3 27.1   POCSATURATED 56*   BE 2       Recent Labs   Lab 05/22/23  1243 05/22/23  1845   TROPONINI 0.049* 0.046*       No results for input(s): PT, INR, APTT in the last 168 hours.    Lab Results   Component Value Date    HGBA1C 11.8 (H) 05/22/2023       No results for input(s): TSH, E6LVJZG, S3LBESI, THYROIDAB, FREET4 in the last 168 hours.    Microbiology Results (last 7 days)     ** No results found for the last 168 hours. **          X-Ray Chest AP Portable    Result Date: 5/22/2023  EXAMINATION: XR CHEST AP PORTABLE CLINICAL HISTORY: CHF; TECHNIQUE: Single frontal view of the chest was performed. COMPARISON: Chest radiograph 05/14/2021 FINDINGS: Monitoring leads overlie the chest.  Patient is rotated.  Resolution is limited by body habitus with underpenetration. Cardiomediastinal silhouette is midline and mildly prominent which may be magnified by chest wall and AP portable technique.  There is slight prominence of the central pulmonary vasculature.  Hilar contours are within normal limits.  Interval increased bilateral perihilar and basilar interstitial coarsening with patchy opacities.  Slight blunting of the right costophrenic angle which may relate to artifact versus pleural thickening/scarring or trace pleural effusion.  No sizable pleural effusion on the left.  The lungs are otherwise well expanded without large consolidation or pneumothorax.  PA and lateral views can be obtained.     Findings may reflect sequela of pulmonary edema/CHF pattern with trace right pleural effusion versus artifact accentuated by AP portable technique and chest wall. Electronically signed by: Juan Carlos Patel MD Date:    05/22/2023 Time:    13:35    Posterior Segment OCT Retina-Both eyes    Result Date:  5/16/2023  See prog note    Echo    Result Date: 5/22/2023  · The left ventricle is normal in size with concentric remodeling and low normal systolic function. · The estimated ejection fraction is 50%. · Normal left ventricular diastolic function. · With normal right ventricular systolic function.      Pending Diagnostic Studies:     None         Medications:  Reconciled Home Medications:      Medication List      START taking these medications    furosemide 20 MG tablet  Commonly known as: LASIX  Take 1 tablet (20 mg total) by mouth daily as needed (Swelling or shortness of breath).     potassium chloride SA 20 MEQ tablet  Commonly known as: K-DUR,KLOR-CON  Take 1 tablet (20 mEq total) by mouth daily as needed (Take with Lasix).        CHANGE how you take these medications    NIFEdipine 60 MG (OSM) 24 hr tablet  Commonly known as: PROCARDIA-XL  TAKE 1 TABLET (60 MG TOTAL) BY MOUTH ONCE DAILY.  What changed:   · how much to take  · how to take this  · when to take this  · additional instructions        CONTINUE taking these medications    aspirin 81 MG EC tablet  Commonly known as: ECOTRIN  Take 1 tablet (81 mg total) by mouth once daily.     atorvastatin 80 MG tablet  Commonly known as: LIPITOR  Take 1 tablet (80 mg total) by mouth every evening.     blood sugar diagnostic Strp  Use to test blood glucose two (2) times daily as directed with insurance preferred meter and supplies     ergocalciferol 50,000 unit Cap  Commonly known as: ERGOCALCIFEROL  Take 50,000 Units by mouth every Saturday.     ibuprofen 200 MG tablet  Commonly known as: ADVIL,MOTRIN  Take 200 mg by mouth every 6 (six) hours as needed for Pain.     insulin glargine 100 units/mL SubQ pen  Inject 80 Units into the skin 2 (two) times daily.     lancets 28 gauge Misc  Commonly known as: TRUEPLUS LANCETS  Use to test blood glucose two (2) times daily as directed with insurance preferred meter and supplies     pantoprazole 40 MG tablet  Commonly known  "as: PROTONIX  Take 40 mg by mouth once daily.     * pen needle, diabetic 32 gauge x 5/32" Ndle  Commonly known as: BD ULTRA-FINE ROSA PEN NEEDLE  Use with insulin once daily     * pen needle, diabetic 32 gauge x 5/32" Ndle  Use with injectable DM supplies twice daily as directed     TRUE METRIX GO GLUCOSE METER Misc  Generic drug: blood-glucose meter         * This list has 2 medication(s) that are the same as other medications prescribed for you. Read the directions carefully, and ask your doctor or other care provider to review them with you.                Indwelling Lines/Drains at time of discharge:   Lines/Drains/Airways     None                 Time spent on the discharge of patient: >30 minutes         Amrit Miller MD  Department of Hospital Medicine  [facility]  "

## 2023-05-24 NOTE — HOSPITAL COURSE
Patient's dyspnea, volume overload indicated by BNP, elevated trop, and early edematous changes on chest x-ray suggested acute heart failure exacerbation. This is was confirmed by echo revealing a mildy decreased EF (50%). She was continue on scheduled lasix with appropriate UOP. The following day we were able to wean O2, and patient passed ambulatory O2 trial. Patient with history of cardiovascular risk factors (hypertension, diabetes, hyperlipidemia, obesity, and smoking) presents with dyspnea and chest pain. In the setting of tropenemia, ACS was initially on the differential. However, trop downtrended, and patient's cardiovascular status remained stable. Patient has T2DM with an A1c 11.8 on admission. It was unclear of patient's compliance with insulin regimen but does report to take Metformin (but not in dispense report). She also reported to be taking short acting as well, but it was unable to be confirmed on med list/dispense report. Interestingly, patient did not require any insulin during the admission (neither SSI or long acting) and her BG's remained stable. She was tolerated a diabetic diet. Given history of CVA and current diabetic, patient was started on high intensity statin. The patient reports not being compliant with her medications, including nifedipine for hypertension, metformin for diabetes, pantoprazole for possible GERD, and insulin. This non-compliance likely contributed to her current presentation. Nonetheless, patient's chronic issues were adequately managed during the admission. On the day of discharge patient was back to baseline and hemodynamically stable. She was ambulating and satting well on RA. She was sent home with Lasix prn (with potassium), ASA (history of CVA), as well as high intensity statin. Patient will need to follow up with PCP for hospital D/C Follow up. Given history of medication non compliance and new Dx of HFrEF, close follow-up with PCP is strongly advised. She has  apt with [facility] early next month. Advised to go to apt. Reinforced the importance of medication compliance. May need social work to address barriers to compliance. Current med rec is most appropriate going forward. Although may need to have diabetic medications adjusted. Educated patient on appropriate diabetic management and appropriate times to inject insulin. Given controlled BG without the use of insulin while inpatient, advised patient to be cautious with current dose of insuline. Instructed only to inject if sugars are above 200 and if she is tolerating PO intake. Patient sent home with educational handouts on how to track her weight going forward. Patient agreeable to discharge plan, expressed understanding, all questions answered, return precautions were discussed.

## 2023-05-24 NOTE — PROGRESS NOTES
C3 nurse attempted to contact Jaimee Quintana  for a TCC post hospital discharge follow up call. No answer. Left voicemail with callback information. The patient has a scheduled HOSFU appointment with [facility] Tierney on 5/31/23 @ 1500.     Please do not reply to this message, as this inbox is not routinely monitored.

## 2023-05-25 ENCOUNTER — NURSE TRIAGE (OUTPATIENT)
Dept: ADMINISTRATIVE | Facility: CLINIC | Age: 48
End: 2023-05-25
Payer: MEDICARE

## 2023-05-25 NOTE — PROGRESS NOTES
2nd Attempt made to reach patient for TCC call. Left voicemail please call [phone] leave first name, last name, and  for Lorena I will return your call.

## 2023-05-25 NOTE — PROGRESS NOTES
C3 nurse spoke with Jaimee Quintana  for a TCC post hospital discharge follow up call. The patient has a scheduled HOSFU appointment with [facility] Tierney on 5/31/23 @ 1500.

## 2023-05-25 NOTE — TELEPHONE ENCOUNTER
Pt calling for a missed call and I I told her of the follow up appt and she said thats what she was calling about. Pt said that she missed a call and was reaching back out but saw the appt in her upcoming appt and she said that she will schedule transportation

## 2023-05-25 NOTE — TELEPHONE ENCOUNTER
Pt called x2 with no contact made. VM left    Reason for Disposition   Message left on identified voicemail    Protocols used: No Contact or Duplicate Contact Call-A-[place]

## 2023-05-31 ENCOUNTER — OFFICE VISIT (OUTPATIENT)
Dept: FAMILY MEDICINE | Facility: HOSPITAL | Age: 48
End: 2023-05-31
Payer: MEDICARE

## 2023-05-31 VITALS
BODY MASS INDEX: 41.36 KG/M2 | WEIGHT: 248.25 LBS | HEART RATE: 95 BPM | DIASTOLIC BLOOD PRESSURE: 80 MMHG | HEIGHT: 65 IN | OXYGEN SATURATION: 98 % | SYSTOLIC BLOOD PRESSURE: 146 MMHG

## 2023-05-31 DIAGNOSIS — E11.622 TYPE 2 DIABETES MELLITUS WITH OTHER SKIN ULCER (CODE): ICD-10-CM

## 2023-05-31 DIAGNOSIS — I50.23 ACUTE ON CHRONIC SYSTOLIC CONGESTIVE HEART FAILURE: Primary | ICD-10-CM

## 2023-05-31 DIAGNOSIS — E87.6 LOW SERUM POTASSIUM: ICD-10-CM

## 2023-05-31 DIAGNOSIS — E55.9 HYPOVITAMINOSIS D: ICD-10-CM

## 2023-05-31 PROCEDURE — 99214 OFFICE O/P EST MOD 30 MIN: CPT | Performed by: PHYSICIAN ASSISTANT

## 2023-05-31 RX ORDER — INSULIN GLARGINE 100 [IU]/ML
80 INJECTION, SOLUTION SUBCUTANEOUS 2 TIMES DAILY
Qty: 30 ML | Refills: 6 | Status: SHIPPED | OUTPATIENT
Start: 2023-05-31 | End: 2023-09-21

## 2023-05-31 RX ORDER — DULAGLUTIDE 0.75 MG/.5ML
0.75 INJECTION, SOLUTION SUBCUTANEOUS
Qty: 4 PEN | Refills: 2 | Status: SHIPPED | OUTPATIENT
Start: 2023-05-31 | End: 2023-07-19 | Stop reason: SDUPTHER

## 2023-05-31 RX ORDER — POTASSIUM CHLORIDE 600 MG/1
16 TABLET, FILM COATED, EXTENDED RELEASE ORAL 2 TIMES DAILY
Qty: 180 TABLET | Refills: 3 | Status: SHIPPED | OUTPATIENT
Start: 2023-05-31 | End: 2023-08-21

## 2023-05-31 RX ORDER — LISINOPRIL AND HYDROCHLOROTHIAZIDE 10; 12.5 MG/1; MG/1
1 TABLET ORAL DAILY
COMMUNITY
End: 2023-06-19

## 2023-05-31 RX ORDER — SPIRONOLACTONE 25 MG/1
25 TABLET ORAL DAILY
COMMUNITY
End: 2023-06-19

## 2023-05-31 RX ORDER — METFORMIN HYDROCHLORIDE 500 MG/1
1000 TABLET, EXTENDED RELEASE ORAL 2 TIMES DAILY WITH MEALS
Qty: 360 TABLET | Refills: 3 | Status: SHIPPED | OUTPATIENT
Start: 2023-05-31 | End: 2023-06-19

## 2023-05-31 RX ORDER — METOPROLOL SUCCINATE 100 MG/1
100 TABLET, EXTENDED RELEASE ORAL DAILY
COMMUNITY

## 2023-05-31 RX ORDER — INSULIN ASPART 100 [IU]/ML
30 INJECTION, SOLUTION INTRAVENOUS; SUBCUTANEOUS 2 TIMES DAILY
Qty: 30 ML | Refills: 12 | Status: SHIPPED | OUTPATIENT
Start: 2023-05-31 | End: 2024-05-30

## 2023-05-31 RX ORDER — FLUOXETINE HYDROCHLORIDE 40 MG/1
40 CAPSULE ORAL DAILY
COMMUNITY

## 2023-05-31 RX ORDER — ERGOCALCIFEROL 1.25 MG/1
50000 CAPSULE ORAL
Qty: 12 CAPSULE | Refills: 1 | Status: SHIPPED | OUTPATIENT
Start: 2023-06-03 | End: 2023-09-21

## 2023-05-31 NOTE — PROGRESS NOTES
FAMILY MEDICINE  New Visit Progress Note   Recent Hospital Discharge     PRESENTING HISTORY     Chief Complaint/Reason for Admission:  Follow up Hospital Discharge   Chief Complaint   Patient presents with    Follow-up     hos     PCP: Donald Acosta MD    History of Present Illness:  Ms. Jaimee Quintana is a 47 y.o. female who was recently admitted to the hospital.    Admission Date: 5/22/2023  Hospital Length of Stay: 1 days  Discharge Date and Time: 5/23/2023  4:36 PM  ___________________________________________________________________    HPI:   A 47-year-old female with PMH of HTN, T2DM, HLD, and a self-reported history of stroke (with no residual deficits) here today for hospital follow up. Recently presented to the ED with SOB and chest pain over the prior two weeks.    In the ED she was dyspneic and her oxygen saturation was at 88%. EKG showed non-specific ST abnormalities. Initial troponin was elevated at 0.049 and BNP was 209. CXR with edematous changes. O2 93% on 2L supplemental oxygen. Also given Lasix 40 mg IV. Presentation suggested acute heart failure exacerbation. ECHO with a mildy decreased EF (50%). Lasix continued with appropriate UOP. Supplemental O2 d/c'd prior to discharge. Discharged with Lasix PRN for swelling, SOB, weight gain.     Today Ms. Quintana is unaccompanied during visit. She has been doing well since admission. Her SOB was resolved. She reports taking all medication as prescribed (has them all with her, but still seems to be some confusion). Has been taking Lasix daily instead of PRN. Has not been doing daily weights. Has been trying to work on diet. Today she has with her statin, ASA, lisinopril-hctz, metoprolol 100mg, nifedipine, and spironolactone. Also PRN Lasix. No SOB, LE swelling, PND, orthopnea.     T2DM: Prescribed Trulicity, Lantus, Novolog, metformin. Patient reporting quite high doses of insulin. Home BG still high. Some question of adherence.    HTN: BP a bit elevated.  Prescribed meds above. Room to increase ACEi or CCB. No CP, SOB.     Review of Systems  Negative other than those listed above.    PAST HISTORY:     Past Medical History:   Diagnosis Date    Cataract     Cervical high risk HPV (human papillomavirus) test positive 2020    NOT 16 OR 18    CVA (cerebral infarction)          Depression     Diabetes mellitus, type 2     GERD (gastroesophageal reflux disease)     Hyperlipidemia     Hypertension     Moderate nonproliferative diabetic retinopathy     Obesity     Stroke        Past Surgical History:   Procedure Laterality Date     SECTION      CHOLECYSTECTOMY      DILATION AND CURETTAGE OF UTERUS      GALLBLADDER SURGERY  2017    stone removed       Family History   Problem Relation Age of Onset    Stroke Mother     Thyroid disease Mother     Diabetes Mother     Cataracts Father     Hypertension Father     Arthritis Father     Diabetes Father     Hypertension Sister     Stroke Sister     Thyroid disease Sister     Heart disease Sister     Thyroid disease Daughter     Asthma Daughter     No Known Problems Brother     No Known Problems Maternal Aunt     No Known Problems Maternal Uncle     No Known Problems Paternal Aunt     No Known Problems Paternal Uncle     No Known Problems Maternal Grandmother     No Known Problems Maternal Grandfather     No Known Problems Paternal Grandmother     No Known Problems Paternal Grandfather     Amblyopia Neg Hx     Blindness Neg Hx     Cancer Neg Hx     Glaucoma Neg Hx     Macular degeneration Neg Hx     Retinal detachment Neg Hx     Strabismus Neg Hx        Social History     Socioeconomic History    Marital status: Single   Occupational History    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   Tobacco Use    Smoking status: Former     Packs/day: 0.25     Types: Cigarettes     Quit date: 2021     Years since quittin.3    Smokeless tobacco: Never   Substance and Sexual Activity    Alcohol use: Yes      Alcohol/week: 3.0 standard drinks     Types: 3 Cans of beer per week     Comment: Rare    Drug use: No    Sexual activity: Yes     Partners: Male     Birth control/protection: None   Social History Narrative    Daughter - Kavon     Social Determinants of Health     Financial Resource Strain: Unknown    Difficulty of Paying Living Expenses: Patient refused   Food Insecurity: Unknown    Worried About Running Out of Food in the Last Year: Patient refused    Ran Out of Food in the Last Year: Patient refused   Transportation Needs: Unknown    Lack of Transportation (Medical): Patient refused    Lack of Transportation (Non-Medical): Patient refused   Physical Activity: Unknown    Days of Exercise per Week: Patient refused    Minutes of Exercise per Session: Patient refused   Stress: Unknown    Feeling of Stress : Patient refused   Social Connections: Unknown    Frequency of Communication with Friends and Family: More than three times a week    Frequency of Social Gatherings with Friends and Family: More than three times a week    Attends [employer] Services: Patient refused    Active Member of Clubs or Organizations: Patient refused    Attends Club or Organization Meetings: Patient refused    Marital Status: Patient refused   Housing Stability: Unknown    Unable to Pay for Housing in the Last Year: Patient refused    Unstable Housing in the Last Year: Patient refused       MEDICATIONS & ALLERGIES:     Current Outpatient Medications on File Prior to Visit   Medication Sig Dispense Refill    aspirin (ECOTRIN) 81 MG EC tablet Take 1 tablet (81 mg total) by mouth once daily. 60 tablet 1    atorvastatin (LIPITOR) 80 MG tablet Take 1 tablet (80 mg total) by mouth every evening. 60 tablet 1    blood sugar diagnostic Strp Use to test blood glucose two (2) times daily as directed with insurance preferred meter and supplies 200 each 3    FLUoxetine 40 MG capsule Take 40 mg by mouth once daily.      furosemide (LASIX) 20 MG tablet  "Take 1 tablet (20 mg total) by mouth daily as needed (Swelling or shortness of breath). 30 tablet 1    ibuprofen (ADVIL,MOTRIN) 200 MG tablet Take 200 mg by mouth every 6 (six) hours as needed for Pain.      lancets (TRUEPLUS LANCETS) 28 gauge Misc Use to test blood glucose two (2) times daily as directed with insurance preferred meter and supplies 200 each 3    lisinopriL-hydrochlorothiazide (PRINZIDE,ZESTORETIC) 10-12.5 mg per tablet Take 1 tablet by mouth once daily.      metoprolol succinate (TOPROL-XL) 100 MG 24 hr tablet Take 100 mg by mouth once daily.      NIFEdipine (PROCARDIA-XL) 60 MG (OSM) 24 hr tablet TAKE 1 TABLET (60 MG TOTAL) BY MOUTH ONCE DAILY. (Patient taking differently: Take 60 mg by mouth once daily.) 90 tablet 3    pantoprazole (PROTONIX) 40 MG tablet Take 40 mg by mouth once daily.      pen needle, diabetic (BD ULTRA-FINE ROSA PEN NEEDLE) 32 gauge x 5/32" Ndle Use with insulin once daily 100 each 0    pen needle, diabetic 32 gauge x 5/32" Ndle Use with injectable DM supplies twice daily as directed 200 each 0    spironolactone (ALDACTONE) 25 MG tablet Take 25 mg by mouth once daily.      TRUE METRIX GO GLUCOSE METER [facility]        Current Facility-Administered Medications on File Prior to Visit   Medication Dose Route Frequency Provider Last Rate Last Admin    fluorescein 500 mg/5 mL (10 %) injection 500 mg  5 mL Intravenous Once D. Donte Tillman MD            Review of patient's allergies indicates:  No Known Allergies    OBJECTIVE:     Vital Signs:  Vitals:    05/31/23 1445   BP: (!) 146/80   BP Location: Left arm   Patient Position: Sitting   BP Method: Large (Automatic)   Pulse: 95   SpO2: 98%   Weight: 112.6 kg (248 lb 3.8 oz)   Height: 5' 5" (1.651 m)     Wt Readings from Last 3 Encounters:   05/31/23 1445 112.6 kg (248 lb 3.8 oz)   05/22/23 1600 115.8 kg (255 lb 4.7 oz)   05/22/23 1522 116.1 kg (256 lb)   05/22/23 1104 116.1 kg (256 lb)   02/21/23 0016 108.9 kg (240 lb)     Body mass " "index is 41.31 kg/m².   98%    Physical Exam:  BP (!) 146/80 (BP Location: Left arm, Patient Position: Sitting, BP Method: Large (Automatic))   Pulse 95   Ht 5' 5" (1.651 m)   Wt 112.6 kg (248 lb 3.8 oz)   LMP 05/08/2023   SpO2 98%   BMI 41.31 kg/m²   General appearance: Alert, cooperative, no distress  Constitutional: Oriented to person, place, and time. +Obese  HEENT: Normocephalic, atraumatic, neck symmetrical, no nasal discharge   Eyes: Conjunctivae/corneas clear, EOM's intact  Lungs: +Clear to auscultation bilaterally  Heart: +Regular rate and rhythm   Extremities: extremities symmetric; no edema  Integument: Skin color, texture, turgor normal  Neurologic: Alert and oriented X 3, normal strength, normal coordination and gait  Psychiatric: no pressured speech     Laboratory  Lab Results   Component Value Date    WBC 9.00 05/23/2023    HGB 14.1 05/23/2023    HCT 42.1 05/23/2023    MCV 88 05/23/2023     05/23/2023     BMP  Lab Results   Component Value Date     05/23/2023    K 3.5 05/23/2023     05/23/2023    CO2 25 05/23/2023    BUN 7 05/23/2023    CREATININE 0.7 05/23/2023    CALCIUM 9.5 05/23/2023    ANIONGAP 10 05/23/2023    EGFRNORACEVR >60 05/23/2023     Lab Results   Component Value Date    ALT 63 (H) 05/23/2023    AST 23 05/23/2023    ALKPHOS 85 05/23/2023    BILITOT 0.7 05/23/2023     Lab Results   Component Value Date    INR 0.9 12/17/2010    INR 0.9 12/15/2010     Lab Results   Component Value Date    HGBA1C 11.8 (H) 05/22/2023     No results for input(s): POCTGLUCOSE in the last 72 hours.    ASSESSMENT & PLAN:     Acute on chronic systolic congestive heart failure   -Currently prescribed GDMT; some adherence questions. Discussed daily weights and PRN lasix dosing, low salt diet. Consider Cardiology referral.     Hypovitaminosis D   -Will refill today. Repeat with next labs.  -     ergocalciferol (ERGOCALCIFEROL) 50,000 unit Cap; Take 1 capsule (50,000 Units total) by mouth " every Saturday.  Dispense: 12 capsule; Refill: 1    Type 2 diabetes mellitus with other skin ulcer (CODE)   -A1c while inpatient 11.8%. Patient on quite large doses of insulin, will restart metformin and increase to 2000mg daily. Would likely benefit from SGLT2 as well; could increase Trulicity.     -     insulin (LANTUS SOLOSTAR U-100 INSULIN) glargine 100 units/mL SubQ pen; Inject 80 Units into the skin 2 (two) times a day.  Dispense: 30 mL; Refill: 6  -     insulin aspart U-100 (NOVOLOG FLEXPEN U-100 INSULIN) 100 unit/mL (3 mL) InPn pen; Inject 30 Units into the skin 2 (two) times a day.  Dispense: 30 mL; Refill: 12  -     metFORMIN (GLUCOPHAGE-XR) 500 MG ER 24hr tablet; Take 2 tablets (1,000 mg total) by mouth 2 (two) times daily with meals.  Dispense: 360 tablet; Refill: 3  -     dulaglutide (TRULICITY) 0.75 mg/0.5 mL pen injector; Inject 0.75 mg into the skin every 7 days.  Dispense: 4 pen; Refill: 2    Low serum potassium  -     potassium chloride (KLOR-CON) 8 MEQ TbSR; Take 2 tablets (16 mEq total) by mouth 2 (two) times daily.  Dispense: 180 tablet; Refill: 3      Scheduled Follow-up :  Future Appointments   Date Time Provider Department Center   6/19/2023  1:20 PM Donald Acosta MD [facility]       Post Visit Medication List:     Medication List            Accurate as of May 31, 2023 11:59 PM. If you have any questions, ask your nurse or doctor.                START taking these medications      insulin aspart U-100 100 unit/mL (3 mL) Inpn pen  Commonly known as: NovoLOG FlexPen U-100 Insulin  Inject 30 Units into the skin 2 (two) times a day.  Started by: Cleveland Tierney PA-C     metFORMIN 500 MG ER 24hr tablet  Commonly known as: GLUCOPHAGE-XR  Take 2 tablets (1,000 mg total) by mouth 2 (two) times daily with meals.  Started by: Cleveland Tierney PA-C     potassium chloride 8 MEQ Tbsr  Commonly known as: KLOR-CON  Take 2 tablets (16 mEq total) by mouth 2 (two) times daily.  Replaces: potassium  "chloride SA 20 MEQ tablet  Started by: Cleveland Tierney PA-C     TRULICITY 0.75 mg/0.5 mL pen injector  Generic drug: dulaglutide  Inject 0.75 mg into the skin every 7 days.  Started by: Cleveland Tierney PA-C            CHANGE how you take these medications      insulin glargine 100 units/mL SubQ pen  Commonly known as: LANTUS SOLOSTAR U-100 INSULIN  Inject 80 Units into the skin 2 (two) times a day.  What changed: when to take this  Changed by: Cleveland Tierney PA-C     NIFEdipine 60 MG (OSM) 24 hr tablet  Commonly known as: PROCARDIA-XL  TAKE 1 TABLET (60 MG TOTAL) BY MOUTH ONCE DAILY.  What changed:   how much to take  how to take this  when to take this  additional instructions            CONTINUE taking these medications      aspirin 81 MG EC tablet  Commonly known as: ECOTRIN  Take 1 tablet (81 mg total) by mouth once daily.     atorvastatin 80 MG tablet  Commonly known as: LIPITOR  Take 1 tablet (80 mg total) by mouth every evening.     blood sugar diagnostic Strp  Use to test blood glucose two (2) times daily as directed with insurance preferred meter and supplies     ergocalciferol 50,000 unit Cap  Commonly known as: ERGOCALCIFEROL  Take 1 capsule (50,000 Units total) by mouth every Saturday.  Start taking on: Rachel 3, 2023     FLUoxetine 40 MG capsule     furosemide 20 MG tablet  Commonly known as: LASIX  Take 1 tablet (20 mg total) by mouth daily as needed (Swelling or shortness of breath).     ibuprofen 200 MG tablet  Commonly known as: ADVIL,MOTRIN     lancets 28 gauge Misc  Commonly known as: TRUEPLUS LANCETS  Use to test blood glucose two (2) times daily as directed with insurance preferred meter and supplies     lisinopriL-hydrochlorothiazide 10-12.5 mg per tablet  Commonly known as: PRINZIDE,ZESTORETIC     metoprolol succinate 100 MG 24 hr tablet  Commonly known as: TOPROL-XL     pantoprazole 40 MG tablet  Commonly known as: PROTONIX     * pen needle, diabetic 32 gauge x 5/32" Ndle  Commonly known as: BD " "ULTRA-FINE ROSA PEN NEEDLE  Use with insulin once daily     * pen needle, diabetic 32 gauge x 5/32" Ndle  Use with injectable DM supplies twice daily as directed     spironolactone 25 MG tablet  Commonly known as: ALDACTONE     TRUE METRIX GO GLUCOSE METER Misc  Generic drug: blood-glucose meter           * This list has 2 medication(s) that are the same as other medications prescribed for you. Read the directions carefully, and ask your doctor or other care provider to review them with you.                STOP taking these medications      potassium chloride SA 20 MEQ tablet  Commonly known as: K-DUR,KLOR-CON  Replaced by: potassium chloride 8 MEQ Tbsr  Stopped by: Cleveland Tierney PA-C               Where to Get Your Medications        These medications were sent to [facility]/pharmacy #[id number] - [place] - [address] [facility]  [address] [place] [address]      Phone: [phone]   ergocalciferol 50,000 unit Cap  insulin aspart U-100 100 unit/mL (3 mL) Inpn pen  insulin glargine 100 units/mL SubQ pen  metFORMIN 500 MG ER 24hr tablet  potassium chloride 8 MEQ Tbsr  TRULICITY 0.75 mg/0.5 mL pen injector         Signing Provider:  Cleveland Tierney PA-C      "

## 2023-06-19 ENCOUNTER — OFFICE VISIT (OUTPATIENT)
Dept: FAMILY MEDICINE | Facility: HOSPITAL | Age: 48
End: 2023-06-19
Payer: MEDICARE

## 2023-06-19 VITALS
HEART RATE: 92 BPM | SYSTOLIC BLOOD PRESSURE: 159 MMHG | WEIGHT: 251.31 LBS | BODY MASS INDEX: 41.87 KG/M2 | DIASTOLIC BLOOD PRESSURE: 92 MMHG | HEIGHT: 65 IN

## 2023-06-19 DIAGNOSIS — I15.2 HYPERTENSION ASSOCIATED WITH DIABETES: ICD-10-CM

## 2023-06-19 DIAGNOSIS — Z79.4 TYPE 2 DIABETES MELLITUS WITH BOTH EYES AFFECTED BY PROLIFERATIVE RETINOPATHY AND MACULAR EDEMA, WITH LONG-TERM CURRENT USE OF INSULIN: Primary | ICD-10-CM

## 2023-06-19 DIAGNOSIS — E11.3513 TYPE 2 DIABETES MELLITUS WITH BOTH EYES AFFECTED BY PROLIFERATIVE RETINOPATHY AND MACULAR EDEMA, WITH LONG-TERM CURRENT USE OF INSULIN: Primary | ICD-10-CM

## 2023-06-19 DIAGNOSIS — E11.59 HYPERTENSION ASSOCIATED WITH DIABETES: ICD-10-CM

## 2023-06-19 DIAGNOSIS — I50.32 CHRONIC HEART FAILURE WITH PRESERVED EJECTION FRACTION: ICD-10-CM

## 2023-06-19 DIAGNOSIS — Z12.11 SCREENING FOR COLON CANCER: ICD-10-CM

## 2023-06-19 PROCEDURE — 99215 OFFICE O/P EST HI 40 MIN: CPT | Performed by: STUDENT IN AN ORGANIZED HEALTH CARE EDUCATION/TRAINING PROGRAM

## 2023-06-19 RX ORDER — FUROSEMIDE 40 MG/1
40 TABLET ORAL DAILY
Qty: 60 TABLET | Refills: 1 | Status: SHIPPED | OUTPATIENT
Start: 2023-06-19 | End: 2024-01-06

## 2023-06-19 RX ORDER — LISINOPRIL AND HYDROCHLOROTHIAZIDE 20; 25 MG/1; MG/1
1 TABLET ORAL DAILY
Qty: 90 TABLET | Refills: 1 | Status: SHIPPED | OUTPATIENT
Start: 2023-06-19 | End: 2023-09-29 | Stop reason: DRUGHIGH

## 2023-06-19 RX ORDER — NIFEDIPINE 90 MG/1
90 TABLET, EXTENDED RELEASE ORAL DAILY
Qty: 90 TABLET | Refills: 1 | Status: SHIPPED | OUTPATIENT
Start: 2023-06-19 | End: 2023-10-04 | Stop reason: DRUGHIGH

## 2023-06-19 RX ORDER — METFORMIN HYDROCHLORIDE 1000 MG/1
1000 TABLET ORAL 2 TIMES DAILY WITH MEALS
Qty: 180 TABLET | Refills: 3 | Status: SHIPPED | OUTPATIENT
Start: 2023-06-19 | End: 2023-10-04 | Stop reason: DRUGHIGH

## 2023-06-19 NOTE — PROGRESS NOTES
Clinic Note  [facility]    Subjective:      Jaimee Quintana is a 47 y.o. female with PMHx HF, DM2, HTN, obesity.    HF - hospitalized for exacerbation last month, now on lasix PRN 20 mg but today still complains of periodic SOB, leg swelling. Sent home with metoprolol 100, aldactone 25, lisinopril 10/hctz 12.5 and reports adherence. Does not have follow up with cardiology.    DM - A1C from last month 11.8, on metformin 500 BID, 0.75 trulicity and 80 BID lantus, 30 BID short acting.     HTN - checks at home, consistently -190. Reports adherence to lisinopril 10/hctz 12.5, metoprolol 100, nifedipine 60, aldactone 25.       Review of Systems   Constitutional:  Negative for chills and fever.   HENT:  Negative for congestion and sore throat.    Respiratory:  Negative for cough.    Cardiovascular:  Negative for chest pain and palpitations.   Gastrointestinal:  Negative for abdominal pain, diarrhea, nausea and vomiting.   Genitourinary:  Negative for dysuria.   Musculoskeletal:  Negative for joint pain and myalgias.   Neurological:  Negative for dizziness, tingling, weakness and headaches.   Psychiatric/Behavioral:  Negative for depression. The patient is not nervous/anxious.         Objective:      Vitals:    06/19/23 1307   BP: (!) 159/92   Pulse: 92     Body mass index is 41.82 kg/m².    Physical Exam  Constitutional:       General: She is not in acute distress.     Appearance: Normal appearance. She is well-developed. She is obese. She is not diaphoretic.   Cardiovascular:      Rate and Rhythm: Normal rate and regular rhythm.      Pulses: Normal pulses.   Pulmonary:      Effort: Pulmonary effort is normal. No respiratory distress.      Breath sounds: Normal breath sounds. No wheezing.   Abdominal:      General: Abdomen is flat. Bowel sounds are normal. There is no distension.      Palpations: Abdomen is soft.   Musculoskeletal:         General: No signs of injury. Normal range of motion.       Cervical back: Normal range of motion. No rigidity.      Right lower leg: Edema present.      Left lower leg: Edema present.   Skin:     General: Skin is warm and dry.      Capillary Refill: Capillary refill takes less than 2 seconds.      Coloration: Skin is not pale.      Findings: No rash.   Neurological:      General: No focal deficit present.      Mental Status: She is alert and oriented to person, place, and time.   Psychiatric:         Mood and Affect: Mood normal.         Behavior: Behavior normal.        Assessment/Plan:      Jaieme Quintana is a 47 y.o. year old female who presents to clinic     1. Type 2 diabetes mellitus with both eyes affected by proliferative retinopathy and macular edema, with long-term current use of insulin    Uncontrolled, raising dose of metformin from to 1,000 BID    - metFORMIN (GLUCOPHAGE) 1000 MG tablet; Take 1 tablet (1,000 mg total) by mouth 2 (two) times daily with meals.  Dispense: 180 tablet; Refill: 3  - Hemoglobin A1C; Future  - Comprehensive Metabolic Panel; Future    2. Chronic heart failure with preserved ejection fraction    Uncontrolled, no respiratory distress today but patient with 2+ bilateral edema, history concerning for pulmonary congestion. Raising dose of lasix to scheduled 40 mg daily, with request to follow up in 2 weeks for repeat labs and clinical assessment.    - furosemide (LASIX) 40 MG tablet; Take 1 tablet (40 mg total) by mouth once daily.  Dispense: 60 tablet; Refill: 1  - Ambulatory referral/consult to Cardiology; Future    3. Screening for colon cancer    - Cologuard Screening (Multitarget Stool DNA); Future  - Cologuard Screening (Multitarget Stool DNA)    4. Hypertension associated with diabetes    Uncontrolled, raising dose of meds    - lisinopriL-hydrochlorothiazide (PRINZIDE,ZESTORETIC) 20-25 mg Tab; Take 1 tablet by mouth once daily.  Dispense: 90 tablet; Refill: 1  - NIFEdipine (PROCARDIA-XL) 90 MG (OSM) 24 hr tablet; Take 1 tablet (90  mg total) by mouth once daily.  Dispense: 90 tablet; Refill: 1      Patient discussed with attending physician, Dr. Yesy Acosta MD  [facility] PGY-2  06/19/2023      The following information is provided to all patients.  This information is to help you find resources for any of the problems found today that may be affecting your health:                Living healthy guide: www.[facility].[place].[facility]       Understanding Diabetes: www.diabetes.org       Eating healthy: www.cdc.gov/healthyweight      [facility] home safety checklist: www.cdc.gov/steadi/patient.html      Agency on Aging: www.goea.[place].[facility]       Alcoholics anonymous (AA): www.aa.org      Physical Activity: www.shi.nih.gov/cp9zhfp       Tobacco use: www.quitwithusla.org

## 2023-06-27 ENCOUNTER — OFFICE VISIT (OUTPATIENT)
Dept: CARDIOLOGY | Facility: CLINIC | Age: 48
End: 2023-06-27
Payer: MEDICARE

## 2023-06-27 VITALS
OXYGEN SATURATION: 98 % | SYSTOLIC BLOOD PRESSURE: 103 MMHG | DIASTOLIC BLOOD PRESSURE: 68 MMHG | BODY MASS INDEX: 41.48 KG/M2 | HEIGHT: 65 IN | WEIGHT: 249 LBS | HEART RATE: 88 BPM

## 2023-06-27 DIAGNOSIS — E66.01 CLASS 3 SEVERE OBESITY DUE TO EXCESS CALORIES WITH SERIOUS COMORBIDITY AND BODY MASS INDEX (BMI) OF 40.0 TO 44.9 IN ADULT: ICD-10-CM

## 2023-06-27 DIAGNOSIS — I15.2 HYPERTENSION ASSOCIATED WITH DIABETES: Primary | ICD-10-CM

## 2023-06-27 DIAGNOSIS — E78.5 HYPERLIPIDEMIA ASSOCIATED WITH TYPE 2 DIABETES MELLITUS: ICD-10-CM

## 2023-06-27 DIAGNOSIS — Z79.4 TYPE 2 DIABETES MELLITUS WITH BOTH EYES AFFECTED BY PROLIFERATIVE RETINOPATHY AND MACULAR EDEMA, WITH LONG-TERM CURRENT USE OF INSULIN: ICD-10-CM

## 2023-06-27 DIAGNOSIS — Z86.73 HISTORY OF CVA (CEREBROVASCULAR ACCIDENT) WITHOUT RESIDUAL DEFICITS: ICD-10-CM

## 2023-06-27 DIAGNOSIS — F33.0 MILD EPISODE OF RECURRENT MAJOR DEPRESSIVE DISORDER: ICD-10-CM

## 2023-06-27 DIAGNOSIS — E11.69 HYPERLIPIDEMIA ASSOCIATED WITH TYPE 2 DIABETES MELLITUS: ICD-10-CM

## 2023-06-27 DIAGNOSIS — E11.42 DIABETIC PERIPHERAL NEUROPATHY ASSOCIATED WITH TYPE 2 DIABETES MELLITUS: ICD-10-CM

## 2023-06-27 DIAGNOSIS — I50.32 CHRONIC DIASTOLIC CONGESTIVE HEART FAILURE: ICD-10-CM

## 2023-06-27 DIAGNOSIS — I50.32 CHRONIC HEART FAILURE WITH PRESERVED EJECTION FRACTION: ICD-10-CM

## 2023-06-27 DIAGNOSIS — E11.59 HYPERTENSION ASSOCIATED WITH DIABETES: Primary | ICD-10-CM

## 2023-06-27 DIAGNOSIS — G47.33 OSA (OBSTRUCTIVE SLEEP APNEA): ICD-10-CM

## 2023-06-27 DIAGNOSIS — I73.9 BILATERAL CLAUDICATION OF LOWER LIMB: ICD-10-CM

## 2023-06-27 DIAGNOSIS — E11.3513 TYPE 2 DIABETES MELLITUS WITH BOTH EYES AFFECTED BY PROLIFERATIVE RETINOPATHY AND MACULAR EDEMA, WITH LONG-TERM CURRENT USE OF INSULIN: ICD-10-CM

## 2023-06-27 PROBLEM — R79.89 ELEVATED TROPONIN: Status: RESOLVED | Noted: 2023-05-22 | Resolved: 2023-06-27

## 2023-06-27 PROCEDURE — 99204 PR OFFICE/OUTPT VISIT, NEW, LEVL IV, 45-59 MIN: ICD-10-PCS | Mod: 25,S$GLB,, | Performed by: INTERNAL MEDICINE

## 2023-06-27 PROCEDURE — 1159F MED LIST DOCD IN RCRD: CPT | Mod: CPTII,S$GLB,, | Performed by: INTERNAL MEDICINE

## 2023-06-27 PROCEDURE — 1160F PR REVIEW ALL MEDS BY PRESCRIBER/CLIN PHARMACIST DOCUMENTED: ICD-10-PCS | Mod: CPTII,S$GLB,, | Performed by: INTERNAL MEDICINE

## 2023-06-27 PROCEDURE — 99204 OFFICE O/P NEW MOD 45 MIN: CPT | Mod: 25,S$GLB,, | Performed by: INTERNAL MEDICINE

## 2023-06-27 PROCEDURE — 93010 EKG 12-LEAD: ICD-10-PCS | Mod: S$GLB,,, | Performed by: INTERNAL MEDICINE

## 2023-06-27 PROCEDURE — 3046F PR MOST RECENT HEMOGLOBIN A1C LEVEL > 9.0%: ICD-10-PCS | Mod: CPTII,S$GLB,, | Performed by: INTERNAL MEDICINE

## 2023-06-27 PROCEDURE — 3008F BODY MASS INDEX DOCD: CPT | Mod: CPTII,S$GLB,, | Performed by: INTERNAL MEDICINE

## 2023-06-27 PROCEDURE — 3074F PR MOST RECENT SYSTOLIC BLOOD PRESSURE < 130 MM HG: ICD-10-PCS | Mod: CPTII,S$GLB,, | Performed by: INTERNAL MEDICINE

## 2023-06-27 PROCEDURE — 93010 ELECTROCARDIOGRAM REPORT: CPT | Mod: S$GLB,,, | Performed by: INTERNAL MEDICINE

## 2023-06-27 PROCEDURE — 3078F PR MOST RECENT DIASTOLIC BLOOD PRESSURE < 80 MM HG: ICD-10-PCS | Mod: CPTII,S$GLB,, | Performed by: INTERNAL MEDICINE

## 2023-06-27 PROCEDURE — 93005 ELECTROCARDIOGRAM TRACING: CPT | Mod: S$GLB,,, | Performed by: INTERNAL MEDICINE

## 2023-06-27 PROCEDURE — 93005 EKG 12-LEAD: ICD-10-PCS | Mod: S$GLB,,, | Performed by: INTERNAL MEDICINE

## 2023-06-27 PROCEDURE — 4010F PR ACE/ARB THEARPY RXD/TAKEN: ICD-10-PCS | Mod: CPTII,S$GLB,, | Performed by: INTERNAL MEDICINE

## 2023-06-27 PROCEDURE — 3078F DIAST BP <80 MM HG: CPT | Mod: CPTII,S$GLB,, | Performed by: INTERNAL MEDICINE

## 2023-06-27 PROCEDURE — 3046F HEMOGLOBIN A1C LEVEL >9.0%: CPT | Mod: CPTII,S$GLB,, | Performed by: INTERNAL MEDICINE

## 2023-06-27 PROCEDURE — 1160F RVW MEDS BY RX/DR IN RCRD: CPT | Mod: CPTII,S$GLB,, | Performed by: INTERNAL MEDICINE

## 2023-06-27 PROCEDURE — 99999 PR PBB SHADOW E&M-EST. PATIENT-LVL V: ICD-10-PCS | Mod: PBBFAC,,, | Performed by: INTERNAL MEDICINE

## 2023-06-27 PROCEDURE — 1159F PR MEDICATION LIST DOCUMENTED IN MEDICAL RECORD: ICD-10-PCS | Mod: CPTII,S$GLB,, | Performed by: INTERNAL MEDICINE

## 2023-06-27 PROCEDURE — 3074F SYST BP LT 130 MM HG: CPT | Mod: CPTII,S$GLB,, | Performed by: INTERNAL MEDICINE

## 2023-06-27 PROCEDURE — 4010F ACE/ARB THERAPY RXD/TAKEN: CPT | Mod: CPTII,S$GLB,, | Performed by: INTERNAL MEDICINE

## 2023-06-27 PROCEDURE — 3008F PR BODY MASS INDEX (BMI) DOCUMENTED: ICD-10-PCS | Mod: CPTII,S$GLB,, | Performed by: INTERNAL MEDICINE

## 2023-06-27 PROCEDURE — 99999 PR PBB SHADOW E&M-EST. PATIENT-LVL V: CPT | Mod: PBBFAC,,, | Performed by: INTERNAL MEDICINE

## 2023-06-27 NOTE — ASSESSMENT & PLAN NOTE
Check ALEENA and arterial doppler of BLE given multiple risk factors for PAD.    
Continue current therapy.  Management per PCP.    
Continue medical therapy.  Management per PCP.    
Continue medical therapy.  Risk factor and lifestyle modifications.    
Encouraged lifestyle modifications (diet, exercise, and weight loss).    - refer to bariatric med    
Goal LDL < 70, last  5/23/2023.  On high intensity statin therapy.  - continue statin therapy and add ezetimibe 10 mg daily   - enroll in dig med prgm   - risk factor and lifestyle modifications   
Improving.  Goal BP < 130/80.  Compliant w/ meds.    - continue current therapy   - enroll in dig med prgm  - risk factor and lifestyle modifications   
Not on CPAP.  Refer to sleep med.      
Patient is identified as having Diastolic (HFpEF) heart failure that is Chronic. CHF is currently controlled. Latest ECHO performed and demonstrates- Results for orders placed during the hospital encounter of 05/22/23    Echo    Interpretation Summary  · The left ventricle is normal in size with concentric remodeling and low normal systolic function.  · The estimated ejection fraction is 50%.  · Normal left ventricular diastolic function.  · With normal right ventricular systolic function.  . Continue Beta Blocker, ACE/ARB and Furosemide and monitor clinical status closely. Monitor on telemetry. Patient is on CHF pathway.  Monitor strict Is&Os and daily weights.  Place on fluid restriction of 2 L. Cardiology has been consulted. Continue to stress to patient importance of self efficacy and  on diet for CHF. Last BNP reviewed- and noted below @LABRCNTIP(BNP,BNPTRIAGEBLO)@.    Continue current therapy   Risk factor and lifestyle modifications   
Uncontrolled.  Goal hgba1c < 7%, last 11.4% 6/19/2023.    - continue current therapy for now   - management per PCP  - risk factor and lifestyle modifications   
yes

## 2023-06-27 NOTE — PROGRESS NOTES
Subjective:    Patient ID:  Jaimee Quintana is a 47 y.o. female who presents for evaluation of [facility] (Family history of CHF)      PCP/Referring: Donald Acosta MD     HPI  Pt is a 46 yo F w/ PMH of CVA, DM2 w/ hgba1c 11.4%, HTN, HLD, CESAR not on CPAP, MO w/ BMI 41.8, and HFrEF- recovered LVEF 50% who presents to [facility].  She was hospitalized 2023-2023 for decompensated CHF and was diuresed and meds were adjusted (see d/c sum 2023).  Since this time she notes intermittent episodes of BLE edema.  She notes compliance w/ meds and denies side effects.  She has been monitoring her BP at home however states that it has been high at home but has improved since med changes per her PCP.  She denies cp, sob, orthopnea, PND, presyncope, LOC, palpitations.  She notes claudication of her BLE w/ ambulation < 1/2 block.  She does not exercise however states she is active w/ riding her bike and walking around her block and denies cp or sob.        Past Medical History:   Diagnosis Date    Cataract     Cervical high risk HPV (human papillomavirus) test positive 2020    NOT 16 OR 18    CVA (cerebral infarction)          Depression     Diabetes mellitus, type 2     GERD (gastroesophageal reflux disease)     Hyperlipidemia     Hypertension     Moderate nonproliferative diabetic retinopathy     Obesity     Stroke      Past Surgical History:   Procedure Laterality Date     SECTION      CHOLECYSTECTOMY      DILATION AND CURETTAGE OF UTERUS      GALLBLADDER SURGERY  2017    stone removed     Social History     Socioeconomic History    Marital status: Single   Occupational History    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   Tobacco Use    Smoking status: Former     Packs/day: 0.25     Types: Cigarettes     Quit date: 2021     Years since quittin.4    Smokeless tobacco: Never   Substance and Sexual Activity    Alcohol use: Yes     Alcohol/week: 3.0 standard drinks      Types: 3 Cans of beer per week     Comment: Rare    Drug use: No    Sexual activity: Yes     Partners: Male     Birth control/protection: None   Social History Narrative    Daughter - Kavon     Social Determinants of Health     Financial Resource Strain: Unknown    Difficulty of Paying Living Expenses: Patient refused   Food Insecurity: Unknown    Worried About Running Out of Food in the Last Year: Patient refused    Ran Out of Food in the Last Year: Patient refused   Transportation Needs: Unknown    Lack of Transportation (Medical): Patient refused    Lack of Transportation (Non-Medical): Patient refused   Physical Activity: Unknown    Days of Exercise per Week: Patient refused    Minutes of Exercise per Session: Patient refused   Stress: Unknown    Feeling of Stress : Patient refused   Social Connections: Unknown    Frequency of Communication with Friends and Family: More than three times a week    Frequency of Social Gatherings with Friends and Family: More than three times a week    Attends Christian Services: Patient refused    Active Member of Clubs or Organizations: Patient refused    Attends Club or Organization Meetings: Patient refused    Marital Status: Patient refused   Housing Stability: Unknown    Unable to Pay for Housing in the Last Year: Patient refused    Unstable Housing in the Last Year: Patient refused     Family History   Problem Relation Age of Onset    Stroke Mother     Thyroid disease Mother     Diabetes Mother     Cataracts Father     Hypertension Father     Arthritis Father     Diabetes Father     Hypertension Sister     Stroke Sister     Thyroid disease Sister     Heart disease Sister     Thyroid disease Daughter     Asthma Daughter     No Known Problems Brother     No Known Problems Maternal Aunt     No Known Problems Maternal Uncle     No Known Problems Paternal Aunt     No Known Problems Paternal Uncle     No Known Problems Maternal Grandmother     No Known Problems Maternal  Grandfather     No Known Problems Paternal Grandmother     No Known Problems Paternal Grandfather     Amblyopia Neg Hx     Blindness Neg Hx     Cancer Neg Hx     Glaucoma Neg Hx     Macular degeneration Neg Hx     Retinal detachment Neg Hx     Strabismus Neg Hx        Review of patient's allergies indicates:  No Known Allergies    Medication List with Changes/Refills   Current Medications    ASPIRIN (ECOTRIN) 81 MG EC TABLET    Take 1 tablet (81 mg total) by mouth once daily.    ATORVASTATIN (LIPITOR) 80 MG TABLET    Take 1 tablet (80 mg total) by mouth every evening.    BLOOD SUGAR DIAGNOSTIC STRP    Use to test blood glucose two (2) times daily as directed with insurance preferred meter and supplies    DULAGLUTIDE (TRULICITY) 0.75 MG/0.5 ML PEN INJECTOR    Inject 0.75 mg into the skin every 7 days.    ERGOCALCIFEROL (ERGOCALCIFEROL) 50,000 UNIT CAP    Take 1 capsule (50,000 Units total) by mouth every Saturday.    FLUOXETINE 40 MG CAPSULE    Take 40 mg by mouth once daily.    FUROSEMIDE (LASIX) 40 MG TABLET    Take 1 tablet (40 mg total) by mouth once daily.    IBUPROFEN (ADVIL,MOTRIN) 200 MG TABLET    Take 200 mg by mouth every 6 (six) hours as needed for Pain.    INSULIN (LANTUS SOLOSTAR U-100 INSULIN) GLARGINE 100 UNITS/ML SUBQ PEN    Inject 80 Units into the skin 2 (two) times a day.    INSULIN ASPART U-100 (NOVOLOG FLEXPEN U-100 INSULIN) 100 UNIT/ML (3 ML) INPN PEN    Inject 30 Units into the skin 2 (two) times a day.    LANCETS (TRUEPLUS LANCETS) 28 GAUGE MISC    Use to test blood glucose two (2) times daily as directed with insurance preferred meter and supplies    LISINOPRIL-HYDROCHLOROTHIAZIDE (PRINZIDE,ZESTORETIC) 20-25 MG TAB    Take 1 tablet by mouth once daily.    METFORMIN (GLUCOPHAGE) 1000 MG TABLET    Take 1 tablet (1,000 mg total) by mouth 2 (two) times daily with meals.    METOPROLOL SUCCINATE (TOPROL-XL) 100 MG 24 HR TABLET    Take 100 mg by mouth once daily.    NIFEDIPINE (PROCARDIA-XL) 90 MG  "(OSM) 24 HR TABLET    Take 1 tablet (90 mg total) by mouth once daily.    PANTOPRAZOLE (PROTONIX) 40 MG TABLET    Take 40 mg by mouth once daily.    PEN NEEDLE, DIABETIC (BD ULTRA-FINE ROSA PEN NEEDLE) 32 GAUGE X 5/32" NDLE    Use with insulin once daily    PEN NEEDLE, DIABETIC 32 GAUGE X 5/32" NDLE    Use with injectable DM supplies twice daily as directed    POTASSIUM CHLORIDE (KLOR-CON) 8 MEQ TBSR    Take 2 tablets (16 mEq total) by mouth 2 (two) times daily.    TRUE METRIX GO GLUCOSE METER MISC           Review of Systems   Constitutional: Negative for diaphoresis and fever.   HENT:  Negative for congestion and hearing loss.    Eyes:  Negative for blurred vision and pain.   Cardiovascular:  Positive for claudication and leg swelling. Negative for chest pain, dyspnea on exertion, near-syncope, palpitations and syncope.   Respiratory:  Positive for sleep disturbances due to breathing and snoring. Negative for shortness of breath.    Hematologic/Lymphatic: Negative for bleeding problem. Does not bruise/bleed easily.   Skin:  Negative for color change and poor wound healing.   Gastrointestinal:  Negative for abdominal pain and nausea.   Genitourinary:  Negative for bladder incontinence and flank pain.   Neurological:  Negative for focal weakness and light-headedness.      Objective:   /68 (BP Location: Right arm, Patient Position: Sitting, BP Method: Large (Manual))   Pulse 88   Ht 5' 5" (1.651 m)   Wt 112.9 kg (249 lb)   LMP 06/02/2023   SpO2 98%   BMI 41.44 kg/m²    Physical Exam  Constitutional:       Appearance: She is well-developed. She is obese. She is not diaphoretic.   HENT:      Head: Normocephalic and atraumatic.   Eyes:      General: No scleral icterus.     Pupils: Pupils are equal, round, and reactive to light.   Neck:      Vascular: No JVD.   Cardiovascular:      Rate and Rhythm: Normal rate and regular rhythm.      Pulses: Intact distal pulses.      Heart sounds: S1 normal and S2 normal. " No murmur heard.    No friction rub. No gallop.   Pulmonary:      Effort: Pulmonary effort is normal. No respiratory distress.      Breath sounds: Normal breath sounds. No wheezing or rales.   Chest:      Chest wall: No tenderness.   Abdominal:      General: Bowel sounds are normal. There is no distension.      Palpations: Abdomen is soft. There is no mass.      Tenderness: There is no abdominal tenderness. There is no rebound.   Musculoskeletal:         General: No tenderness. Normal range of motion.      Cervical back: Normal range of motion and neck supple.      Right lower leg: No edema.      Left lower leg: No edema.   Skin:     General: Skin is warm and dry.      Coloration: Skin is not pale.   Neurological:      Mental Status: She is alert and oriented to person, place, and time.      Coordination: Coordination normal.      Deep Tendon Reflexes: Reflexes normal.   Psychiatric:         Behavior: Behavior normal.         Judgment: Judgment normal.         EC2023- reviewed.  NSR, TWI inferolaterally, prolonged Qtc.      Echo: 2023- reviewed.    Summary    The left ventricle is normal in size with concentric remodeling and low normal systolic function.  The estimated ejection fraction is 50%.  Normal left ventricular diastolic function.  With normal right ventricular systolic function.       Assessment:       1. Hypertension associated with diabetes    2. Chronic heart failure with preserved ejection fraction    3. Hyperlipidemia associated with type 2 diabetes mellitus    4. History of CVA (cerebrovascular accident) without residual deficits    5. Diabetic peripheral neuropathy associated with type 2 diabetes mellitus    6. Mild episode of recurrent major depressive disorder    7. Chronic diastolic congestive heart failure    8. Type 2 diabetes mellitus with both eyes affected by proliferative retinopathy and macular edema, with long-term current use of insulin    9. Class 3 severe obesity due to  excess calories with serious comorbidity and body mass index (BMI) of 40.0 to 44.9 in adult    10. CESAR (obstructive sleep apnea)    11. Bilateral claudication of lower limb         Plan:         History of CVA (cerebrovascular accident) without residual deficits  Continue medical therapy.  Risk factor and lifestyle modifications.      Diabetic peripheral neuropathy associated with type 2 diabetes mellitus  Continue medical therapy.  Management per PCP.      Mild episode of recurrent major depressive disorder  Continue current therapy.  Management per PCP.      Hypertension associated with diabetes  Improving.  Goal BP < 130/80.  Compliant w/ meds.    - continue current therapy   - enroll in dig med prgm  - risk factor and lifestyle modifications     Hyperlipidemia associated with type 2 diabetes mellitus  Goal LDL < 70, last  5/23/2023.  On high intensity statin therapy.  - continue statin therapy and add ezetimibe 10 mg daily   - enroll in dig med prgm   - risk factor and lifestyle modifications     Chronic diastolic congestive heart failure  Patient is identified as having Diastolic (HFpEF) heart failure that is Chronic. CHF is currently controlled. Latest ECHO performed and demonstrates- Results for orders placed during the hospital encounter of 05/22/23    Echo    Interpretation Summary  · The left ventricle is normal in size with concentric remodeling and low normal systolic function.  · The estimated ejection fraction is 50%.  · Normal left ventricular diastolic function.  · With normal right ventricular systolic function.  . Continue Beta Blocker, ACE/ARB and Furosemide and monitor clinical status closely. Monitor on telemetry. Patient is on CHF pathway.  Monitor strict Is&Os and daily weights.  Place on fluid restriction of 2 L. Cardiology has been consulted. Continue to stress to patient importance of self efficacy and  on diet for CHF. Last BNP reviewed- and noted below  @LABRCNTIP(BNP,BNPTRIAGEBLO)@.    Continue current therapy   Risk factor and lifestyle modifications     Type 2 diabetes mellitus with both eyes affected by proliferative retinopathy and macular edema, with long-term current use of insulin  Uncontrolled.  Goal hgba1c < 7%, last 11.4% 6/19/2023.    - continue current therapy for now   - management per PCP  - risk factor and lifestyle modifications     Class 3 severe obesity due to excess calories with serious comorbidity and body mass index (BMI) of 40.0 to 44.9 in adult  Encouraged lifestyle modifications (diet, exercise, and weight loss).    - refer to bariatric med      CESAR (obstructive sleep apnea)  Not on CPAP.  Refer to sleep med.        Bilateral claudication of lower limb  Check ALEENA and arterial doppler of BLE given multiple risk factors for PAD.        Total duration of face to face visit time 30 minutes.  Total time spent counseling greater than fifty percent of total visit time.  Counseling included discussion regarding imaging findings, diagnosis, possibilities, treatment options, risks and benefits.  The patient had many questions regarding the options and long-term effects      Anoop Beth M.D.  Interventional Cardiology

## 2023-07-16 LAB — NONINV COLON CA DNA+OCC BLD SCRN STL QL: NEGATIVE

## 2023-07-18 ENCOUNTER — TELEPHONE (OUTPATIENT)
Dept: CARDIOLOGY | Facility: CLINIC | Age: 48
End: 2023-07-18
Payer: MEDICAID

## 2023-07-18 ENCOUNTER — TELEPHONE (OUTPATIENT)
Dept: PODIATRY | Facility: CLINIC | Age: 48
End: 2023-07-18
Payer: MEDICAID

## 2023-07-18 NOTE — TELEPHONE ENCOUNTER
----- Message from Anoop Beth III, MD sent at 7/18/2023  2:10 PM CDT -----  Please contact the patient and let them know that their results were mildly abnormal however I could get her into PAD rehab which would help her walk further w/o pain through exercise if she is interested.  Thank you for your time.

## 2023-07-18 NOTE — TELEPHONE ENCOUNTER
Attempted to call pt no answer and her voicemail is full, I called pt's sister Iris and she stated that she was not around pt and that she would try calling her and tell her to call office back.

## 2023-07-19 ENCOUNTER — TELEPHONE (OUTPATIENT)
Dept: CARDIOLOGY | Facility: CLINIC | Age: 48
End: 2023-07-19
Payer: MEDICAID

## 2023-07-19 DIAGNOSIS — E11.69 HYPERLIPIDEMIA ASSOCIATED WITH TYPE 2 DIABETES MELLITUS: ICD-10-CM

## 2023-07-19 DIAGNOSIS — E11.59 HYPERTENSION ASSOCIATED WITH DIABETES: Primary | ICD-10-CM

## 2023-07-19 DIAGNOSIS — I70.213 ATHEROSCLEROSIS OF NATIVE ARTERIES OF EXTREMITIES WITH INTERMITTENT CLAUDICATION, BILATERAL LEGS: ICD-10-CM

## 2023-07-19 DIAGNOSIS — E11.622 TYPE 2 DIABETES MELLITUS WITH OTHER SKIN ULCER (CODE): ICD-10-CM

## 2023-07-19 DIAGNOSIS — E78.5 HYPERLIPIDEMIA ASSOCIATED WITH TYPE 2 DIABETES MELLITUS: ICD-10-CM

## 2023-07-19 DIAGNOSIS — I15.2 HYPERTENSION ASSOCIATED WITH DIABETES: Primary | ICD-10-CM

## 2023-07-19 NOTE — TELEPHONE ENCOUNTER
Spoke with pt to inform her that  her results showed mildly abnormal, and that Dr. Beth could get her into PAD rehab which would help her walk further w/o pain through exercise if she is interested. Pt stated that she is interested in PAD rehab.

## 2023-07-20 RX ORDER — DULAGLUTIDE 0.75 MG/.5ML
0.75 INJECTION, SOLUTION SUBCUTANEOUS
Qty: 4 PEN | Refills: 2 | Status: SHIPPED | OUTPATIENT
Start: 2023-07-20 | End: 2023-09-12 | Stop reason: SDUPTHER

## 2023-07-21 ENCOUNTER — PATIENT MESSAGE (OUTPATIENT)
Dept: ADMINISTRATIVE | Facility: OTHER | Age: 48
End: 2023-07-21
Payer: MEDICAID

## 2023-08-02 ENCOUNTER — OFFICE VISIT (OUTPATIENT)
Dept: PODIATRY | Facility: CLINIC | Age: 48
End: 2023-08-02
Payer: MEDICARE

## 2023-08-02 VITALS — HEIGHT: 65 IN | WEIGHT: 249 LBS | BODY MASS INDEX: 41.48 KG/M2

## 2023-08-02 DIAGNOSIS — E11.42 DIABETIC PERIPHERAL NEUROPATHY ASSOCIATED WITH TYPE 2 DIABETES MELLITUS: Primary | ICD-10-CM

## 2023-08-02 DIAGNOSIS — E66.01 MORBID OBESITY: ICD-10-CM

## 2023-08-02 DIAGNOSIS — M79.676 PAIN DUE TO ONYCHOMYCOSIS OF TOENAIL: ICD-10-CM

## 2023-08-02 DIAGNOSIS — B35.1 PAIN DUE TO ONYCHOMYCOSIS OF TOENAIL: ICD-10-CM

## 2023-08-02 DIAGNOSIS — E11.9 COMPREHENSIVE DIABETIC FOOT EXAMINATION, TYPE 2 DM, ENCOUNTER FOR: ICD-10-CM

## 2023-08-02 DIAGNOSIS — B35.1 TINEA UNGUIUM: ICD-10-CM

## 2023-08-02 PROCEDURE — 1159F MED LIST DOCD IN RCRD: CPT | Mod: CPTII,S$GLB,, | Performed by: PODIATRIST

## 2023-08-02 PROCEDURE — 4010F PR ACE/ARB THEARPY RXD/TAKEN: ICD-10-PCS | Mod: CPTII,S$GLB,, | Performed by: PODIATRIST

## 2023-08-02 PROCEDURE — 99213 OFFICE O/P EST LOW 20 MIN: CPT | Mod: 25,S$GLB,, | Performed by: PODIATRIST

## 2023-08-02 PROCEDURE — 4010F ACE/ARB THERAPY RXD/TAKEN: CPT | Mod: CPTII,S$GLB,, | Performed by: PODIATRIST

## 2023-08-02 PROCEDURE — 1160F RVW MEDS BY RX/DR IN RCRD: CPT | Mod: CPTII,S$GLB,, | Performed by: PODIATRIST

## 2023-08-02 PROCEDURE — 3046F PR MOST RECENT HEMOGLOBIN A1C LEVEL > 9.0%: ICD-10-PCS | Mod: CPTII,S$GLB,, | Performed by: PODIATRIST

## 2023-08-02 PROCEDURE — 99999 PR PBB SHADOW E&M-EST. PATIENT-LVL III: CPT | Mod: PBBFAC,,, | Performed by: PODIATRIST

## 2023-08-02 PROCEDURE — 1160F PR REVIEW ALL MEDS BY PRESCRIBER/CLIN PHARMACIST DOCUMENTED: ICD-10-PCS | Mod: CPTII,S$GLB,, | Performed by: PODIATRIST

## 2023-08-02 PROCEDURE — 11721 DEBRIDE NAIL 6 OR MORE: CPT | Mod: Q9,S$GLB,, | Performed by: PODIATRIST

## 2023-08-02 PROCEDURE — 1159F PR MEDICATION LIST DOCUMENTED IN MEDICAL RECORD: ICD-10-PCS | Mod: CPTII,S$GLB,, | Performed by: PODIATRIST

## 2023-08-02 PROCEDURE — 3008F PR BODY MASS INDEX (BMI) DOCUMENTED: ICD-10-PCS | Mod: CPTII,S$GLB,, | Performed by: PODIATRIST

## 2023-08-02 PROCEDURE — 99213 PR OFFICE/OUTPT VISIT, EST, LEVL III, 20-29 MIN: ICD-10-PCS | Mod: 25,S$GLB,, | Performed by: PODIATRIST

## 2023-08-02 PROCEDURE — 11721 PR DEBRIDEMENT OF NAILS, 6 OR MORE: ICD-10-PCS | Mod: Q9,S$GLB,, | Performed by: PODIATRIST

## 2023-08-02 PROCEDURE — 3046F HEMOGLOBIN A1C LEVEL >9.0%: CPT | Mod: CPTII,S$GLB,, | Performed by: PODIATRIST

## 2023-08-02 PROCEDURE — 99999 PR PBB SHADOW E&M-EST. PATIENT-LVL III: ICD-10-PCS | Mod: PBBFAC,,, | Performed by: PODIATRIST

## 2023-08-02 PROCEDURE — 3008F BODY MASS INDEX DOCD: CPT | Mod: CPTII,S$GLB,, | Performed by: PODIATRIST

## 2023-08-02 NOTE — PROGRESS NOTES
Subjective:      Patient ID: Jaimee Quintana is a 47 y.o. female.    Chief Complaint: Nail Care (Nail care bilateral + left ankle f/u )    Jaimee is a 47 y.o. female who presents to the clinic for evaluation and treatment of high risk feet. Jaimee has a past medical history of Cataract, Cervical high risk HPV (human papillomavirus) test positive (09/17/2020), CVA (cerebral infarction) (2003), Depression, Diabetes mellitus, type 2, GERD (gastroesophageal reflux disease), Hyperlipidemia, Hypertension, Moderate nonproliferative diabetic retinopathy, Obesity, and Stroke. This patient has documented high risk feet requiring routine maintenance secondary to peripheral neuropathy.  Complains of numbness and tingling.  Known to Dr. Coelho for diabetic foot assessment.  She smokes. 1 pack lasts for a week.  Ambulating in flat shoes.  She has not tried topical pain cream for neuropathic symptoms.  Tells me glucose is not well controlled.    12/22/22 Follow-up for diabetic foot risk assessment and left foot pain over 5th toe.  Patient tells me she dropped a can on top of 5th toe 4 days ago.  Complains of pain as aching and throbbing.  Ambulating in regular walking shoes.  Patient tells me glucose is not well controlled at this time.     8/2/23 diabetic foot risk assessments with painful, elongated toenails x 10. Recently had Adup which shows no stenosis on b/l LE. Recently diagnosed with CHF. Known to Dr. Beth.      PCP: Donald Acosta MD    Date Last Seen by PCP:  In epic      Hemoglobin A1C   Date Value Ref Range Status   06/19/2023 11.4 (H) 4.0 - 5.6 % Final     Comment:     ADA Screening Guidelines:  5.7-6.4%  Consistent with prediabetes  >or=6.5%  Consistent with diabetes    High levels of fetal hemoglobin interfere with the HbA1C  assay. Heterozygous hemoglobin variants (HbS, HgC, etc)do  not significantly interfere with this assay.   However, presence of multiple variants may affect accuracy.     05/22/2023  11.8 (H) 4.0 - 5.6 % Final     Comment:     ADA Screening Guidelines:  5.7-6.4%  Consistent with prediabetes  >or=6.5%  Consistent with diabetes    High levels of fetal hemoglobin interfere with the HbA1C  assay. Heterozygous hemoglobin variants (HbS, HgC, etc)do  not significantly interfere with this assay.   However, presence of multiple variants may affect accuracy.     05/07/2021 9.0 (H) 4.0 - 5.6 % Final     Comment:     ADA Screening Guidelines:  5.7-6.4%  Consistent with prediabetes  >or=6.5%  Consistent with diabetes    High levels of fetal hemoglobin interfere with the HbA1C  assay. Heterozygous hemoglobin variants (HbS, HgC, etc)do  not significantly interfere with this assay.   However, presence of multiple variants may affect accuracy.         Review of Systems   Constitutional: Negative for chills, decreased appetite, fever and malaise/fatigue.   HENT:  Negative for congestion, ear discharge and sore throat.    Eyes:  Negative for discharge and pain.   Cardiovascular:  Negative for chest pain, claudication and leg swelling.   Respiratory:  Negative for cough and shortness of breath.    Skin:  Positive for color change. Negative for nail changes and rash.   Musculoskeletal:  Positive for arthritis and back pain. Negative for joint pain, joint swelling and muscle weakness.   Gastrointestinal:  Negative for bloating, abdominal pain, diarrhea, nausea and vomiting.   Genitourinary:  Negative for flank pain and hematuria.   Neurological:  Positive for numbness. Negative for headaches and weakness.   Psychiatric/Behavioral:  Negative for altered mental status.              Past Medical History:   Diagnosis Date    Cataract     Cervical high risk HPV (human papillomavirus) test positive 09/17/2020    NOT 16 OR 18    CVA (cerebral infarction) 2003         Depression     Diabetes mellitus, type 2     GERD (gastroesophageal reflux disease)     Hyperlipidemia     Hypertension     Moderate nonproliferative diabetic  retinopathy     Obesity     Stroke        Past Surgical History:   Procedure Laterality Date     SECTION      CHOLECYSTECTOMY      DILATION AND CURETTAGE OF UTERUS      GALLBLADDER SURGERY  2017    stone removed       Family History   Problem Relation Age of Onset    Stroke Mother     Thyroid disease Mother     Diabetes Mother     Cataracts Father     Hypertension Father     Arthritis Father     Diabetes Father     Hypertension Sister     Stroke Sister     Thyroid disease Sister     Heart disease Sister     Thyroid disease Daughter     Asthma Daughter     No Known Problems Brother     No Known Problems Maternal Aunt     No Known Problems Maternal Uncle     No Known Problems Paternal Aunt     No Known Problems Paternal Uncle     No Known Problems Maternal Grandmother     No Known Problems Maternal Grandfather     No Known Problems Paternal Grandmother     No Known Problems Paternal Grandfather     Amblyopia Neg Hx     Blindness Neg Hx     Cancer Neg Hx     Glaucoma Neg Hx     Macular degeneration Neg Hx     Retinal detachment Neg Hx     Strabismus Neg Hx        Social History     Socioeconomic History    Marital status: Single   Occupational History    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   Tobacco Use    Smoking status: Former     Current packs/day: 0.00     Types: Cigarettes     Quit date: 2021     Years since quittin.4    Smokeless tobacco: Never   Substance and Sexual Activity    Alcohol use: Yes     Alcohol/week: 3.0 standard drinks of alcohol     Types: 3 Cans of beer per week     Comment: Rare    Drug use: No    Sexual activity: Yes     Partners: Male     Birth control/protection: None   Social History Narrative    Daughter - Kavon     Social Determinants of Health     Financial Resource Strain: Unknown (2023)    Overall Financial Resource Strain (CARDIA)     Difficulty of Paying Living Expenses: Patient refused   Food Insecurity: Unknown (2023)    Hunger Vital  Sign     Worried About Running Out of Food in the Last Year: Patient refused     Ran Out of Food in the Last Year: Patient refused   Transportation Needs: Unknown (5/23/2023)    PRAPARE - Transportation     Lack of Transportation (Medical): Patient refused     Lack of Transportation (Non-Medical): Patient refused   Physical Activity: Unknown (5/23/2023)    Exercise Vital Sign     Days of Exercise per Week: Patient refused     Minutes of Exercise per Session: Patient refused   Stress: Unknown (5/23/2023)    [place] citizen [place] [employer] - Occupational Stress Questionnaire     Feeling of Stress : Patient refused   Social Connections: Unknown (5/23/2023)    Social Connection and Isolation Panel [NHANES]     Frequency of Communication with Friends and Family: More than three times a week     Frequency of Social Gatherings with Friends and Family: More than three times a week     Attends Gnosticism Services: Patient refused     Active Member of Clubs or Organizations: Patient refused     Attends Club or Organization Meetings: Patient refused     Marital Status: Patient refused   Housing Stability: Unknown (5/23/2023)    Housing Stability Vital Sign     Unable to Pay for Housing in the Last Year: Patient refused     Unstable Housing in the Last Year: Patient refused       Current Outpatient Medications   Medication Sig Dispense Refill    aspirin (ECOTRIN) 81 MG EC tablet Take 1 tablet (81 mg total) by mouth once daily. 60 tablet 1    atorvastatin (LIPITOR) 80 MG tablet Take 1 tablet (80 mg total) by mouth every evening. 60 tablet 1    blood sugar diagnostic Strp Use to test blood glucose two (2) times daily as directed with insurance preferred meter and supplies 200 each 3    dulaglutide (TRULICITY) 0.75 mg/0.5 mL pen injector Inject 0.75 mg into the skin every 7 days. 4 pen 2    ergocalciferol (ERGOCALCIFEROL) 50,000 unit Cap Take 1 capsule (50,000 Units total) by mouth every Saturday. 12 capsule 1     "FLUoxetine 40 MG capsule Take 40 mg by mouth once daily.      furosemide (LASIX) 40 MG tablet Take 1 tablet (40 mg total) by mouth once daily. 60 tablet 1    ibuprofen (ADVIL,MOTRIN) 200 MG tablet Take 200 mg by mouth every 6 (six) hours as needed for Pain.      insulin (LANTUS SOLOSTAR U-100 INSULIN) glargine 100 units/mL SubQ pen Inject 80 Units into the skin 2 (two) times a day. 30 mL 6    insulin aspart U-100 (NOVOLOG FLEXPEN U-100 INSULIN) 100 unit/mL (3 mL) InPn pen Inject 30 Units into the skin 2 (two) times a day. 30 mL 12    lancets (TRUEPLUS LANCETS) 28 gauge Misc Use to test blood glucose two (2) times daily as directed with insurance preferred meter and supplies 200 each 3    lisinopriL-hydrochlorothiazide (PRINZIDE,ZESTORETIC) 20-25 mg Tab Take 1 tablet by mouth once daily. 90 tablet 1    metFORMIN (GLUCOPHAGE) 1000 MG tablet Take 1 tablet (1,000 mg total) by mouth 2 (two) times daily with meals. 180 tablet 3    metoprolol succinate (TOPROL-XL) 100 MG 24 hr tablet Take 100 mg by mouth once daily.      NIFEdipine (PROCARDIA-XL) 90 MG (OSM) 24 hr tablet Take 1 tablet (90 mg total) by mouth once daily. 90 tablet 1    pantoprazole (PROTONIX) 40 MG tablet Take 40 mg by mouth once daily.      pen needle, diabetic (BD ULTRA-FINE ROSA PEN NEEDLE) 32 gauge x 5/32" Ndle Use with insulin once daily 100 each 0    pen needle, diabetic 32 gauge x 5/32" Ndle Use with injectable DM supplies twice daily as directed 200 each 0    potassium chloride (KLOR-CON) 8 MEQ TbSR Take 2 tablets (16 mEq total) by mouth 2 (two) times daily. 180 tablet 3    TRUE METRIX GO GLUCOSE METER [facility] [place]        Current Facility-Administered Medications   Medication Dose Route Frequency Provider Last Rate Last Admin    fluorescein 500 mg/5 mL (10 %) injection 500 mg  5 mL Intravenous Once D. Donte Tillman MD           Review of patient's allergies indicates:  No Known Allergies    Vitals:    08/02/23 1314   Weight: 112.9 kg (249 lb)   Height: " "5' 5" (1.651 m)   PainSc:   6   PainLoc: Foot           Objective:      Physical Exam  Constitutional:       General: She is not in acute distress.     Appearance: She is well-developed.   HENT:      Nose: Nose normal.   Eyes:      Conjunctiva/sclera: Conjunctivae normal.   Pulmonary:      Effort: Pulmonary effort is normal.   Chest:      Chest wall: No tenderness.   Abdominal:      Tenderness: There is no abdominal tenderness.   Musculoskeletal:      Cervical back: Normal range of motion.   Neurological:      Mental Status: She is alert and oriented to person, place, and time.   Psychiatric:         Behavior: Behavior normal.       Vascular: Distal DP pulses palpable 1/4 and PT 1/4. CRT < 3 sec to tips of toes. No vericosities noted to LEs. Hair growth present LE, warm to touch LE, No edema noted to LE.    Dermatologic: No open lesions, lacerations or wounds. Interdigital spaces clean, dry and intact. No erythema, rubor, calor noted LE  B/l feet: Toenails 5 bilaterally are elongated by 2-3 mm, thickened by 2-3 mm, discolored/yellowed, dystrophic, brittle with subungual debris. No incurvation. Mild xerosis noted with some skin pigmentation changes.     Musculoskeletal: MMT 5/5 in DF/PF/Inv/Ev resistance with no reproduction of pain in any direction. Passive range of motion of ankle and pedal joints is painless. No calf tenderness LE, Compartments soft/compressible.     Neurological:  Light touch, proprioception, and sharp/dull sensation are decreased. Protective threshold with the Williford-Wienstein monofilament is decreased. Vibratory sensation decreased.        Assessment:       Encounter Diagnoses   Name Primary?    Diabetic peripheral neuropathy associated with type 2 diabetes mellitus Yes    Comprehensive diabetic foot examination, type 2 DM, encounter for     Morbid obesity     Pain due to onychomycosis of toenail     Tinea unguium              Plan:       Jaimee was seen today for nail care.    Diagnoses and " all orders for this visit:    Diabetic peripheral neuropathy associated with type 2 diabetes mellitus    Comprehensive diabetic foot examination, type 2 DM, encounter for    Morbid obesity    Pain due to onychomycosis of toenail    Tinea unguium        I counseled the patient on her conditions, their implications and medical management.    47 y.o. female with diabetic foot risk assessment with painful, elongated, mycotic toenails x 10.     -toenails x 10 sharply debrided with nail nipper. Tolerated well.     -Shoe inspection. General Foot Education. Patient reminded of the importance of good nutrition. Patient instructed on proper foot hygeine. We discussed wearing proper shoe gear, daily foot inspections, never walking without protective shoe gear, caution putting sharp instruments to feet. Discussed general foot care:  Wear comfortable, proper fitting shoes. Wash feet daily. Dry well. After drying, apply moisturizer to feet (no lotion to webspaces). Inspect feet daily for skin breaks, blisters, swelling, or redness. Wear cotton socks (preferably white)  Change socks every day. Do NOT walk barefoot. Do NOT use heating pads or warm/hot water soaks   -It was discussed the importance of wearing shoes with adequate room in toe box to accommodate toe deformities. Recommended New Balance/[employer]ics shoe brands with adequate arch supports to alleviate abnormal pressure and improve stability of foot while walking. Avoid flat shoes and barefoot walking as these will exacerbate or worsen symptoms.   -Advised for optimal glucose control and maintenance per primary care physician. Patient was also educated on healthy diet that is naturally rich in nutrients and low in fat and calories.   -The nature of the condition, options for management, as well as potential risks and complications were discussed in detail with patient. Patient was amenable to my recommendations and left my office fully informed and will follow up as instructed  or sooner if necessary.    -Patient was advised of signs and symptoms of infection including redness, drainage, purulence, odor, streaking, fever, chills and I advised patient to seek medical attention (ER or urgent care) if these symptoms arise.   -f/u 1 year     Note dictated with voice recognition software, please excuse any grammatical errors.

## 2023-08-21 DIAGNOSIS — E87.6 LOW SERUM POTASSIUM: ICD-10-CM

## 2023-08-21 RX ORDER — POTASSIUM CHLORIDE 600 MG/1
TABLET, FILM COATED, EXTENDED RELEASE ORAL
Qty: 180 TABLET | Refills: 11 | Status: ON HOLD | OUTPATIENT
Start: 2023-08-21 | End: 2023-10-10

## 2023-09-11 ENCOUNTER — TELEPHONE (OUTPATIENT)
Dept: OPHTHALMOLOGY | Facility: CLINIC | Age: 48
End: 2023-09-11
Payer: MEDICARE

## 2023-09-11 NOTE — TELEPHONE ENCOUNTER
----- Message from Allyn Grossman sent at 9/11/2023  3:12 PM CDT -----  Regarding: appt  Contact: [phone]  Pt got a call to schedule from recall letter. It is for Oct. Attempted to schedule, no access. Pls call to discuss.

## 2023-09-12 DIAGNOSIS — E11.622 TYPE 2 DIABETES MELLITUS WITH OTHER SKIN ULCER (CODE): ICD-10-CM

## 2023-09-13 RX ORDER — DULAGLUTIDE 0.75 MG/.5ML
0.75 INJECTION, SOLUTION SUBCUTANEOUS
Qty: 4 PEN | Refills: 2 | Status: ON HOLD | OUTPATIENT
Start: 2023-09-13 | End: 2023-10-11 | Stop reason: HOSPADM

## 2023-09-18 DIAGNOSIS — E55.9 HYPOVITAMINOSIS D: ICD-10-CM

## 2023-09-19 DIAGNOSIS — E11.622 TYPE 2 DIABETES MELLITUS WITH OTHER SKIN ULCER (CODE): ICD-10-CM

## 2023-09-21 RX ORDER — INSULIN GLARGINE 100 [IU]/ML
INJECTION, SOLUTION SUBCUTANEOUS
Qty: 30 ML | Refills: 5 | Status: ON HOLD | OUTPATIENT
Start: 2023-09-21 | End: 2023-10-10 | Stop reason: HOSPADM

## 2023-09-21 RX ORDER — ERGOCALCIFEROL 1.25 MG/1
CAPSULE ORAL
Qty: 12 CAPSULE | Refills: 11 | Status: ON HOLD | OUTPATIENT
Start: 2023-09-21 | End: 2023-10-10

## 2023-09-25 ENCOUNTER — TELEPHONE (OUTPATIENT)
Dept: BARIATRICS | Facility: CLINIC | Age: 48
End: 2023-09-25
Payer: MEDICARE

## 2023-09-29 ENCOUNTER — OFFICE VISIT (OUTPATIENT)
Dept: CARDIOLOGY | Facility: CLINIC | Age: 48
End: 2023-09-29
Payer: MEDICARE

## 2023-09-29 VITALS
HEIGHT: 65 IN | OXYGEN SATURATION: 97 % | BODY MASS INDEX: 40.65 KG/M2 | SYSTOLIC BLOOD PRESSURE: 140 MMHG | DIASTOLIC BLOOD PRESSURE: 90 MMHG | WEIGHT: 244 LBS | HEART RATE: 96 BPM

## 2023-09-29 DIAGNOSIS — E11.42 DIABETIC PERIPHERAL NEUROPATHY ASSOCIATED WITH TYPE 2 DIABETES MELLITUS: ICD-10-CM

## 2023-09-29 DIAGNOSIS — E11.69 HYPERLIPIDEMIA ASSOCIATED WITH TYPE 2 DIABETES MELLITUS: ICD-10-CM

## 2023-09-29 DIAGNOSIS — F33.0 MILD EPISODE OF RECURRENT MAJOR DEPRESSIVE DISORDER: ICD-10-CM

## 2023-09-29 DIAGNOSIS — G47.33 OSA (OBSTRUCTIVE SLEEP APNEA): ICD-10-CM

## 2023-09-29 DIAGNOSIS — E11.3513 TYPE 2 DIABETES MELLITUS WITH BOTH EYES AFFECTED BY PROLIFERATIVE RETINOPATHY AND MACULAR EDEMA, WITH LONG-TERM CURRENT USE OF INSULIN: ICD-10-CM

## 2023-09-29 DIAGNOSIS — I73.9 BILATERAL CLAUDICATION OF LOWER LIMB: ICD-10-CM

## 2023-09-29 DIAGNOSIS — Z79.4 TYPE 2 DIABETES MELLITUS WITH BOTH EYES AFFECTED BY PROLIFERATIVE RETINOPATHY AND MACULAR EDEMA, WITH LONG-TERM CURRENT USE OF INSULIN: ICD-10-CM

## 2023-09-29 DIAGNOSIS — E66.01 CLASS 3 SEVERE OBESITY DUE TO EXCESS CALORIES WITH SERIOUS COMORBIDITY AND BODY MASS INDEX (BMI) OF 40.0 TO 44.9 IN ADULT: ICD-10-CM

## 2023-09-29 DIAGNOSIS — E11.59 HYPERTENSION ASSOCIATED WITH DIABETES: Primary | ICD-10-CM

## 2023-09-29 DIAGNOSIS — E78.5 HYPERLIPIDEMIA ASSOCIATED WITH TYPE 2 DIABETES MELLITUS: ICD-10-CM

## 2023-09-29 DIAGNOSIS — I15.2 HYPERTENSION ASSOCIATED WITH DIABETES: Primary | ICD-10-CM

## 2023-09-29 DIAGNOSIS — I50.32 CHRONIC DIASTOLIC CONGESTIVE HEART FAILURE: ICD-10-CM

## 2023-09-29 DIAGNOSIS — Z86.73 HISTORY OF CVA (CEREBROVASCULAR ACCIDENT) WITHOUT RESIDUAL DEFICITS: ICD-10-CM

## 2023-09-29 PROCEDURE — 4010F PR ACE/ARB THEARPY RXD/TAKEN: ICD-10-PCS | Mod: CPTII,S$GLB,,

## 2023-09-29 PROCEDURE — 3080F PR MOST RECENT DIASTOLIC BLOOD PRESSURE >= 90 MM HG: ICD-10-PCS | Mod: CPTII,S$GLB,,

## 2023-09-29 PROCEDURE — 99214 PR OFFICE/OUTPT VISIT, EST, LEVL IV, 30-39 MIN: ICD-10-PCS | Mod: S$GLB,,,

## 2023-09-29 PROCEDURE — 3046F PR MOST RECENT HEMOGLOBIN A1C LEVEL > 9.0%: ICD-10-PCS | Mod: CPTII,S$GLB,,

## 2023-09-29 PROCEDURE — 1159F PR MEDICATION LIST DOCUMENTED IN MEDICAL RECORD: ICD-10-PCS | Mod: CPTII,S$GLB,,

## 2023-09-29 PROCEDURE — 1160F PR REVIEW ALL MEDS BY PRESCRIBER/CLIN PHARMACIST DOCUMENTED: ICD-10-PCS | Mod: CPTII,S$GLB,,

## 2023-09-29 PROCEDURE — 99999 PR PBB SHADOW E&M-EST. PATIENT-LVL IV: CPT | Mod: PBBFAC,,,

## 2023-09-29 PROCEDURE — 3008F PR BODY MASS INDEX (BMI) DOCUMENTED: ICD-10-PCS | Mod: CPTII,S$GLB,,

## 2023-09-29 PROCEDURE — 3077F SYST BP >= 140 MM HG: CPT | Mod: CPTII,S$GLB,,

## 2023-09-29 PROCEDURE — 3077F PR MOST RECENT SYSTOLIC BLOOD PRESSURE >= 140 MM HG: ICD-10-PCS | Mod: CPTII,S$GLB,,

## 2023-09-29 PROCEDURE — 3080F DIAST BP >= 90 MM HG: CPT | Mod: CPTII,S$GLB,,

## 2023-09-29 PROCEDURE — 4010F ACE/ARB THERAPY RXD/TAKEN: CPT | Mod: CPTII,S$GLB,,

## 2023-09-29 PROCEDURE — 1160F RVW MEDS BY RX/DR IN RCRD: CPT | Mod: CPTII,S$GLB,,

## 2023-09-29 PROCEDURE — 99214 OFFICE O/P EST MOD 30 MIN: CPT | Mod: S$GLB,,,

## 2023-09-29 PROCEDURE — 99999 PR PBB SHADOW E&M-EST. PATIENT-LVL IV: ICD-10-PCS | Mod: PBBFAC,,,

## 2023-09-29 PROCEDURE — 3008F BODY MASS INDEX DOCD: CPT | Mod: CPTII,S$GLB,,

## 2023-09-29 PROCEDURE — 1159F MED LIST DOCD IN RCRD: CPT | Mod: CPTII,S$GLB,,

## 2023-09-29 PROCEDURE — 3046F HEMOGLOBIN A1C LEVEL >9.0%: CPT | Mod: CPTII,S$GLB,,

## 2023-09-29 RX ORDER — LISINOPRIL 40 MG/1
40 TABLET ORAL DAILY
Qty: 90 TABLET | Refills: 3 | Status: SHIPPED | OUTPATIENT
Start: 2023-09-29 | End: 2024-02-29

## 2023-09-29 RX ORDER — HYDROCHLOROTHIAZIDE 25 MG/1
25 TABLET ORAL DAILY
Qty: 90 TABLET | Refills: 3 | Status: ON HOLD | OUTPATIENT
Start: 2023-09-29 | End: 2023-10-10 | Stop reason: HOSPADM

## 2023-09-29 NOTE — ASSESSMENT & PLAN NOTE
CV Exercise ALEENA study: 7/18/23  Conclusion     Mild PAD of left lower extremity with exercise (ALEENA 0.89).   Normal TBIs and toe pressures bilaterally  Arterial US BLE 7/3/23  Impression:     Major arteries of both lower extremities are patent.     Hemodynamically significant stenoses identified within the distal popliteal arteries bilaterally.  A hemodynamically significant stenosis within the proximal right anterior tibial artery is also suspected.    -Currently attends [facility] 3 X week

## 2023-09-29 NOTE — ASSESSMENT & PLAN NOTE
Goal LDL < 70, last  5/23/2023.  On high intensity statin therapy.  - continue statin therapy -atorvastatin 80 mg  and add ezetimibe 10 mg daily   - enroll in dig med prgm   - risk factor and lifestyle modifications

## 2023-09-29 NOTE — ASSESSMENT & PLAN NOTE
Uncontrolled.  Goal hgba1c < 7%, last 11.4% 6/19/2023.    - continue current therapy for now   - management per PCP  - risk factor and lifestyle modifications

## 2023-09-29 NOTE — ASSESSMENT & PLAN NOTE
Improving.  Goal BP < 130/80.  Compliant w/ meds.    -in office 140/90   - continue current therapy: furosemide 40 mg , nifedipine 90 mg,   -increase lisinopril to 40 mg, HCTZ 20-25 mg,   - enroll in dig med prgm  - risk factor and lifestyle modifications

## 2023-09-29 NOTE — ASSESSMENT & PLAN NOTE
Patient is identified as having Diastolic (HFpEF) heart failure that is Chronic. CHF is currently controlled. Latest ECHO performed and demonstrates- Results for orders placed during the hospital encounter of 05/22/23    Echo    Interpretation Summary  · The left ventricle is normal in size with concentric remodeling and low normal systolic function.  · The estimated ejection fraction is 50%.  · Normal left ventricular diastolic function.  · With normal right ventricular systolic function.  . Continue Beta Blocker, ACE/ARB and Furosemide and monitor clinical status closely. Monitor on telemetry. Patient is on CHF pathway.  Monitor strict Is&Os and daily weights.  Place on fluid restriction of 2 L. Cardiology has been consulted. Continue to stress to patient importance of self efficacy and  on diet for CHF. Last BNP reviewed- and noted below @LABRCNTIP(BNP,BNPTRIAGEBLO)@.    Continue current therapy : furosemide 40 mg, metoprolol succinate 100 mg, lisinopril-HCTZ 20-25 mg   Risk factor and lifestyle modifications

## 2023-09-29 NOTE — ASSESSMENT & PLAN NOTE
Encouraged lifestyle modifications (diet, exercise, and weight loss).    - refer to bariatric med

## 2023-09-29 NOTE — PROGRESS NOTES
Subjective:    Patient ID:  Jaimee Quintana is a 48 y.o. female who presents for follow-up of Follow-up (3 month f/u)      PCP: Donald Acosta MD     Referring Provider:     HPI: Pt is a 48 yo F w/ PMH of CVA, DM2 w/ hgba1c 11.4%, HTN, HLD, CESAR not on CPAP, MO w/ BMI 41.8, and HFrEF- recovered LVEF 50% who presents today for f/u appt. She was last seen by Dr. Beth on 23 to establish care, post hospital admission 2023-2023 for decompensated CHF and was diuresed and meds were adjusted (see d/c sum 2023). She notes compliance w/ meds and denies side effects. She has been monitoring her BP at home however states that her BP runs high at times, -170s. She denies cp, sob, orthopnea, PND, presyncope, LOC, palpitations. She is currently enrolled in [facility] re[facility] and notes some improvement in BLE claudication.   She does not exercise however states she is active w/ riding her bike and walking around her block and denies cp or sob    Past Medical History:   Diagnosis Date    Cataract     Cervical high risk HPV (human papillomavirus) test positive 2020    NOT 16 OR 18    CVA (cerebral infarction)          Depression     Diabetes mellitus, type 2     GERD (gastroesophageal reflux disease)     Hyperlipidemia     Hypertension     Moderate nonproliferative diabetic retinopathy     Obesity     Stroke      Past Surgical History:   Procedure Laterality Date     SECTION      CHOLECYSTECTOMY      DILATION AND CURETTAGE OF UTERUS      GALLBLADDER SURGERY  2017    stone removed     Social History     Socioeconomic History    Marital status: Single   Occupational History    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   Tobacco Use    Smoking status: Former     Current packs/day: 0.00     Types: Cigarettes     Quit date: 2021     Years since quittin.6    Smokeless tobacco: Never   Substance and Sexual Activity    Alcohol use: Yes     Alcohol/week: 3.0 standard drinks of  alcohol     Types: 3 Cans of beer per week     Comment: Rare    Drug use: No    Sexual activity: Yes     Partners: Male     Birth control/protection: None   Social History Narrative    Daughter - Kavon     Social Determinants of Health     Financial Resource Strain: Unknown (5/23/2023)    Overall Financial Resource Strain (CARDIA)     Difficulty of Paying Living Expenses: Patient refused   Food Insecurity: Unknown (5/23/2023)    Hunger Vital Sign     Worried About Running Out of Food in the Last Year: Patient refused     Ran Out of Food in the Last Year: Patient refused   Transportation Needs: Unknown (5/23/2023)    PRAPARE - Transportation     Lack of Transportation (Medical): Patient refused     Lack of Transportation (Non-Medical): Patient refused   Physical Activity: Unknown (5/23/2023)    Exercise Vital Sign     Days of Exercise per Week: Patient refused     Minutes of Exercise per Session: Patient refused   Stress: Unknown (5/23/2023)    [employer] - Occupational Stress Questionnaire     Feeling of Stress : Patient refused   Social Connections: Unknown (5/23/2023)    Social Connection and Isolation Panel [NHANES]     Frequency of Communication with Friends and Family: More than three times a week     Frequency of Social Gatherings with Friends and Family: More than three times a week     Attends Jain Services: Patient refused     Active Member of Clubs or Organizations: Patient refused     Attends Club or Organization Meetings: Patient refused     Marital Status: Patient refused   Housing Stability: Unknown (5/23/2023)    Housing Stability Vital Sign     Unable to Pay for Housing in the Last Year: Patient refused     Unstable Housing in the Last Year: Patient refused     Family History   Problem Relation Age of Onset    Stroke Mother     Thyroid disease Mother     Diabetes Mother     Cataracts Father     Hypertension Father     Arthritis Father     Diabetes Father      Hypertension Sister     Stroke Sister     Thyroid disease Sister     Heart disease Sister     Thyroid disease Daughter     Asthma Daughter     No Known Problems Brother     No Known Problems Maternal Aunt     No Known Problems Maternal Uncle     No Known Problems Paternal Aunt     No Known Problems Paternal Uncle     No Known Problems Maternal Grandmother     No Known Problems Maternal Grandfather     No Known Problems Paternal Grandmother     No Known Problems Paternal Grandfather     Amblyopia Neg Hx     Blindness Neg Hx     Cancer Neg Hx     Glaucoma Neg Hx     Macular degeneration Neg Hx     Retinal detachment Neg Hx     Strabismus Neg Hx        Review of patient's allergies indicates:  No Known Allergies    Medication List with Changes/Refills   Current Medications    ASPIRIN (ECOTRIN) 81 MG EC TABLET    Take 1 tablet (81 mg total) by mouth once daily.    ATORVASTATIN (LIPITOR) 80 MG TABLET    Take 1 tablet (80 mg total) by mouth every evening.    BLOOD SUGAR DIAGNOSTIC STRP    Use to test blood glucose two (2) times daily as directed with insurance preferred meter and supplies    DULAGLUTIDE (TRULICITY) 0.75 MG/0.5 ML PEN INJECTOR    Inject 0.75 mg into the skin every 7 days.    ERGOCALCIFEROL (ERGOCALCIFEROL) 50,000 UNIT CAP    TAKE ONE CAPSULE (50,000 UNITS TOTAL) BY MOUTH EVERY SATURDAY AT 9AM    EZETIMIBE (ZETIA) 10 MG TABLET    Take 1 tablet (10 mg total) by mouth once daily. (For cholesterol)    FLUOXETINE 40 MG CAPSULE    Take 40 mg by mouth once daily.    FUROSEMIDE (LASIX) 40 MG TABLET    Take 1 tablet (40 mg total) by mouth once daily.    IBUPROFEN (ADVIL,MOTRIN) 200 MG TABLET    Take 200 mg by mouth every 6 (six) hours as needed for Pain.    INSULIN ASPART U-100 (NOVOLOG FLEXPEN U-100 INSULIN) 100 UNIT/ML (3 ML) INPN PEN    Inject 30 Units into the skin 2 (two) times a day.    LANCETS (TRUEPLUS LANCETS) 28 GAUGE MISC    Use to test blood glucose two (2) times daily as directed with insurance  "preferred meter and supplies    LANTUS SOLOSTAR U-100 INSULIN GLARGINE 100 UNITS/ML SUBQ PEN    INJECT 80 UNITS SUBCUTANEOUSLY TWICE DAILY (BULK)    LISINOPRIL-HYDROCHLOROTHIAZIDE (PRINZIDE,ZESTORETIC) 20-25 MG TAB    Take 1 tablet by mouth once daily.    METFORMIN (GLUCOPHAGE) 1000 MG TABLET    Take 1 tablet (1,000 mg total) by mouth 2 (two) times daily with meals.    METOPROLOL SUCCINATE (TOPROL-XL) 100 MG 24 HR TABLET    Take 100 mg by mouth once daily.    NIFEDIPINE (PROCARDIA-XL) 90 MG (OSM) 24 HR TABLET    Take 1 tablet (90 mg total) by mouth once daily.    PANTOPRAZOLE (PROTONIX) 40 MG TABLET    Take 40 mg by mouth once daily.    PEN NEEDLE, DIABETIC (BD ULTRA-FINE ROSA PEN NEEDLE) 32 GAUGE X 5/32" NDLE    Use with insulin once daily    PEN NEEDLE, DIABETIC 32 GAUGE X 5/32" NDLE    Use with injectable DM supplies twice daily as directed    POTASSIUM CHLORIDE (KLOR-CON) 8 MEQ TBSR    TAKE TWO TABLETS BY MOUTH TWICE DAILY @ 9AM & 5PM    TRUE METRIX GO GLUCOSE METER MISC           Review of Systems   Constitutional: Negative for diaphoresis and fever.   HENT:  Negative for congestion and hearing loss.    Eyes:  Negative for blurred vision and pain.   Cardiovascular:  Positive for claudication. Negative for chest pain, dyspnea on exertion, leg swelling, near-syncope, palpitations and syncope.   Respiratory:  Positive for sleep disturbances due to breathing and snoring. Negative for shortness of breath.    Hematologic/Lymphatic: Negative for bleeding problem. Does not bruise/bleed easily.   Skin:  Negative for color change and poor wound healing.   Gastrointestinal:  Negative for abdominal pain and nausea.   Genitourinary:  Negative for bladder incontinence and flank pain.   Neurological:  Negative for focal weakness and light-headedness.        Objective:   BP (!) 140/90 (BP Location: Left arm, Patient Position: Sitting, BP Method: Large (Manual))   Pulse 96   Ht 5' 5" (1.651 m)   Wt 110.7 kg (244 lb)   SpO2 " 97%   BMI 40.60 kg/m²    Physical Exam  Constitutional:       Appearance: She is well-developed. She is obese. She is not diaphoretic.   HENT:      Head: Normocephalic and atraumatic.   Eyes:      General: No scleral icterus.     Pupils: Pupils are equal, round, and reactive to light.   Neck:      Vascular: No JVD.   Cardiovascular:      Rate and Rhythm: Normal rate and regular rhythm.      Pulses: Intact distal pulses.      Heart sounds: S1 normal and S2 normal. No murmur heard.     No friction rub. No gallop.   Pulmonary:      Effort: Pulmonary effort is normal. No respiratory distress.      Breath sounds: Normal breath sounds. No wheezing or rales.   Chest:      Chest wall: No tenderness.   Abdominal:      General: Bowel sounds are normal. There is no distension.      Palpations: Abdomen is soft. There is no mass.      Tenderness: There is no abdominal tenderness. There is no rebound.   Musculoskeletal:         General: No tenderness. Normal range of motion.      Cervical back: Normal range of motion and neck supple.   Skin:     General: Skin is warm and dry.      Coloration: Skin is not pale.   Neurological:      Mental Status: She is alert and oriented to person, place, and time.      Coordination: Coordination normal.      Deep Tendon Reflexes: Reflexes normal.   Psychiatric:         Behavior: Behavior normal.         Judgment: Judgment normal.           Arterial US BLE 7/3/23  Impression:     Major arteries of both lower extremities are patent.     Hemodynamically significant stenoses identified within the distal popliteal arteries bilaterally.  A hemodynamically significant stenosis within the proximal right anterior tibial artery is also suspected.     CV Exercise ALEENA study: 23  Conclusion    Mild PAD of left lower extremity with exercise (ALEENA 0.89).  Normal TBIs and toe pressures bilaterally.    EC2023- reviewed.  NSR, TWI inferolaterally, prolonged Qtc.       Echo: 2023- reviewed.     Summary     The left ventricle is normal in size with concentric remodeling and low normal systolic function.  The estimated ejection fraction is 50%.  Normal left ventricular diastolic function.  With normal right ventricular systolic function.    Assessment:       1. Hypertension associated with diabetes    2. Hyperlipidemia associated with type 2 diabetes mellitus    3. Chronic diastolic congestive heart failure    4. Bilateral claudication of lower limb    5. History of CVA (cerebrovascular accident) without residual deficits    6. Diabetic peripheral neuropathy associated with type 2 diabetes mellitus    7. Mild episode of recurrent major depressive disorder    8. Type 2 diabetes mellitus with both eyes affected by proliferative retinopathy and macular edema, with long-term current use of insulin    9. Class 3 severe obesity due to excess calories with serious comorbidity and body mass index (BMI) of 40.0 to 44.9 in adult    10. CESAR (obstructive sleep apnea)         Plan:         Hypertension associated with diabetes  Improving.  Goal BP < 130/80.  Compliant w/ meds.    -in office 140/90   - continue current therapy: furosemide 40 mg , nifedipine 90 mg,   -increase lisinopril to 40 mg, HCTZ 20-25 mg,   - enroll in dig med prgm  - risk factor and lifestyle modifications     Hyperlipidemia associated with type 2 diabetes mellitus  Goal LDL < 70, last  5/23/2023.  On high intensity statin therapy.  - continue statin therapy -atorvastatin 80 mg  and add ezetimibe 10 mg daily   - enroll in dig med prgm   - risk factor and lifestyle modifications     Chronic diastolic congestive heart failure  Patient is identified as having Diastolic (HFpEF) heart failure that is Chronic. CHF is currently controlled. Latest ECHO performed and demonstrates- Results for orders placed during the hospital encounter of 05/22/23    Echo    Interpretation Summary  · The left ventricle is normal in size with concentric remodeling  and low normal systolic function.  · The estimated ejection fraction is 50%.  · Normal left ventricular diastolic function.  · With normal right ventricular systolic function.  . Continue Beta Blocker, ACE/ARB and Furosemide and monitor clinical status closely. Monitor on telemetry. Patient is on CHF pathway.  Monitor strict Is&Os and daily weights.  Place on fluid restriction of 2 L. Cardiology has been consulted. Continue to stress to patient importance of self efficacy and  on diet for CHF. Last BNP reviewed- and noted below @LABRCNTIP(BNP,BNPTRIAGEBLO)@.    Continue current therapy : furosemide 40 mg, metoprolol succinate 100 mg, lisinopril-HCTZ 20-25 mg   Risk factor and lifestyle modifications     Bilateral claudication of lower limb  CV Exercise ALEENA study: 7/18/23  Conclusion    Mild PAD of left lower extremity with exercise (ALEENA 0.89).  Normal TBIs and toe pressures bilaterally  Arterial US BLE 7/3/23  Impression:     Major arteries of both lower extremities are patent.     Hemodynamically significant stenoses identified within the distal popliteal arteries bilaterally.  A hemodynamically significant stenosis within the proximal right anterior tibial artery is also suspected.    -Currently attends [facility] 3 X week     History of CVA (cerebrovascular accident) without residual deficits  Continue medical therapy.  Risk factor and lifestyle modifications.      Diabetic peripheral neuropathy associated with type 2 diabetes mellitus  Continue medical therapy.  Management per PCP.      Mild episode of recurrent major depressive disorder  Continue current therapy.  Management per PCP.      Type 2 diabetes mellitus with both eyes affected by proliferative retinopathy and macular edema, with long-term current use of insulin  Uncontrolled.  Goal hgba1c < 7%, last 11.4% 6/19/2023.    - continue current therapy for now   - management per PCP  - risk factor and lifestyle modifications     Class 3 severe obesity  due to excess calories with serious comorbidity and body mass index (BMI) of 40.0 to 44.9 in adult  Encouraged lifestyle modifications (diet, exercise, and weight loss).    - refer to bariatric med      CESAR (obstructive sleep apnea)  Not on CPAP.  Refer to sleep med.        Total duration of face to face visit time 30 minutes.  Total time spent counseling greater than fifty percent of total visit time.  Counseling included discussion regarding imaging findings, diagnosis, possibilities, treatment options, risks and benefits.  The patient had many questions regarding the options and long-term effects      Vicente Quinones NP  Cardiology

## 2023-10-04 ENCOUNTER — OFFICE VISIT (OUTPATIENT)
Dept: FAMILY MEDICINE | Facility: HOSPITAL | Age: 48
DRG: 871 | End: 2023-10-04
Payer: MEDICARE

## 2023-10-04 ENCOUNTER — HOSPITAL ENCOUNTER (INPATIENT)
Facility: HOSPITAL | Age: 48
LOS: 7 days | Discharge: NURSING, REHAB OR LONG-TERM CARE | DRG: 871 | End: 2023-10-11
Attending: EMERGENCY MEDICINE | Admitting: INTERNAL MEDICINE
Payer: MEDICARE

## 2023-10-04 VITALS
HEART RATE: 122 BPM | DIASTOLIC BLOOD PRESSURE: 78 MMHG | SYSTOLIC BLOOD PRESSURE: 135 MMHG | BODY MASS INDEX: 40.11 KG/M2 | WEIGHT: 240.75 LBS | HEIGHT: 65 IN

## 2023-10-04 DIAGNOSIS — R06.02 SHORTNESS OF BREATH: Primary | ICD-10-CM

## 2023-10-04 DIAGNOSIS — E11.69 TYPE 2 DIABETES MELLITUS WITH OTHER SPECIFIED COMPLICATION, WITH LONG-TERM CURRENT USE OF INSULIN: ICD-10-CM

## 2023-10-04 DIAGNOSIS — R79.89 ELEVATED TROPONIN: ICD-10-CM

## 2023-10-04 DIAGNOSIS — Z91.148 NONCOMPLIANCE WITH MEDICATIONS: ICD-10-CM

## 2023-10-04 DIAGNOSIS — R00.0 TACHYCARDIA: ICD-10-CM

## 2023-10-04 DIAGNOSIS — R73.9 HYPERGLYCEMIA: ICD-10-CM

## 2023-10-04 DIAGNOSIS — R78.81 BACTEREMIA: ICD-10-CM

## 2023-10-04 DIAGNOSIS — I50.32 CHRONIC DIASTOLIC CONGESTIVE HEART FAILURE: ICD-10-CM

## 2023-10-04 DIAGNOSIS — R11.2 NAUSEA AND VOMITING, UNSPECIFIED VOMITING TYPE: Primary | ICD-10-CM

## 2023-10-04 DIAGNOSIS — I21.4 NSTEMI (NON-ST ELEVATED MYOCARDIAL INFARCTION): ICD-10-CM

## 2023-10-04 DIAGNOSIS — I15.2 HYPERTENSION ASSOCIATED WITH DIABETES: ICD-10-CM

## 2023-10-04 DIAGNOSIS — Z79.4 TYPE 2 DIABETES MELLITUS WITH OTHER SPECIFIED COMPLICATION, WITH LONG-TERM CURRENT USE OF INSULIN: ICD-10-CM

## 2023-10-04 DIAGNOSIS — R31.9 URINARY TRACT INFECTION WITH HEMATURIA, SITE UNSPECIFIED: ICD-10-CM

## 2023-10-04 DIAGNOSIS — E11.10 DIABETIC KETOACIDOSIS WITHOUT COMA ASSOCIATED WITH TYPE 2 DIABETES MELLITUS: ICD-10-CM

## 2023-10-04 DIAGNOSIS — R40.0 SOMNOLENCE: ICD-10-CM

## 2023-10-04 DIAGNOSIS — R07.9 CHEST PAIN AT REST: ICD-10-CM

## 2023-10-04 DIAGNOSIS — F33.0 MILD EPISODE OF RECURRENT MAJOR DEPRESSIVE DISORDER: ICD-10-CM

## 2023-10-04 DIAGNOSIS — N39.0 URINARY TRACT INFECTION WITH HEMATURIA, SITE UNSPECIFIED: ICD-10-CM

## 2023-10-04 DIAGNOSIS — E87.20 LACTIC ACIDOSIS: ICD-10-CM

## 2023-10-04 DIAGNOSIS — I49.8 SINUS ARRHYTHMIA: ICD-10-CM

## 2023-10-04 DIAGNOSIS — E87.6 LOW SERUM POTASSIUM: ICD-10-CM

## 2023-10-04 DIAGNOSIS — E11.622 TYPE 2 DIABETES MELLITUS WITH OTHER SKIN ULCER (CODE): ICD-10-CM

## 2023-10-04 DIAGNOSIS — E11.59 HYPERTENSION ASSOCIATED WITH DIABETES: ICD-10-CM

## 2023-10-04 LAB
ALBUMIN SERPL BCP-MCNC: 2.3 G/DL (ref 3.5–5.2)
ALBUMIN SERPL BCP-MCNC: 3.2 G/DL (ref 3.5–5.2)
ALP SERPL-CCNC: 147 U/L (ref 55–135)
ALT SERPL W/O P-5'-P-CCNC: 15 U/L (ref 10–44)
AMPHET+METHAMPHET UR QL: NEGATIVE
ANION GAP SERPL CALC-SCNC: 16 MMOL/L (ref 8–16)
ANION GAP SERPL CALC-SCNC: 19 MMOL/L (ref 8–16)
ANION GAP SERPL CALC-SCNC: 22 MMOL/L (ref 8–16)
AST SERPL-CCNC: 17 U/L (ref 10–40)
B-HCG UR QL: NEGATIVE
B-OH-BUTYR BLD STRIP-SCNC: 0.7 MMOL/L (ref 0–0.5)
BACTERIA #/AREA URNS HPF: ABNORMAL /HPF
BARBITURATES UR QL SCN>200 NG/ML: NEGATIVE
BASOPHILS NFR BLD: 0 % (ref 0–1.9)
BENZODIAZ UR QL SCN>200 NG/ML: NEGATIVE
BILIRUB SERPL-MCNC: 1.1 MG/DL (ref 0.1–1)
BILIRUB UR QL STRIP: NEGATIVE
BNP SERPL-MCNC: 252 PG/ML (ref 0–99)
BUN SERPL-MCNC: 11 MG/DL (ref 6–20)
BUN SERPL-MCNC: 11 MG/DL (ref 6–20)
BUN SERPL-MCNC: 12 MG/DL (ref 6–20)
BZE UR QL SCN: NEGATIVE
CALCIUM SERPL-MCNC: 10.6 MG/DL (ref 8.7–10.5)
CALCIUM SERPL-MCNC: 9 MG/DL (ref 8.7–10.5)
CALCIUM SERPL-MCNC: 9.1 MG/DL (ref 8.7–10.5)
CANNABINOIDS UR QL SCN: NEGATIVE
CHLORIDE SERPL-SCNC: 86 MMOL/L (ref 95–110)
CHLORIDE SERPL-SCNC: 93 MMOL/L (ref 95–110)
CHLORIDE SERPL-SCNC: 94 MMOL/L (ref 95–110)
CLARITY UR: ABNORMAL
CO2 SERPL-SCNC: 14 MMOL/L (ref 23–29)
CO2 SERPL-SCNC: 19 MMOL/L (ref 23–29)
CO2 SERPL-SCNC: 20 MMOL/L (ref 23–29)
COLOR UR: ABNORMAL
CREAT SERPL-MCNC: 1.1 MG/DL (ref 0.5–1.4)
CREAT SERPL-MCNC: 1.2 MG/DL (ref 0.5–1.4)
CREAT SERPL-MCNC: 1.4 MG/DL (ref 0.5–1.4)
CREAT UR-MCNC: 32 MG/DL (ref 15–325)
CREAT UR-MCNC: 32 MG/DL (ref 15–325)
CTP QC/QA: YES
DIFFERENTIAL METHOD: ABNORMAL
EOSINOPHIL NFR BLD: 0 % (ref 0–8)
ERYTHROCYTE [DISTWIDTH] IN BLOOD BY AUTOMATED COUNT: 12.1 % (ref 11.5–14.5)
EST. GFR  (NO RACE VARIABLE): 47 ML/MIN/1.73 M^2
EST. GFR  (NO RACE VARIABLE): 56 ML/MIN/1.73 M^2
EST. GFR  (NO RACE VARIABLE): >60 ML/MIN/1.73 M^2
ESTIMATED AVG GLUCOSE: 258 MG/DL (ref 68–131)
FIO2: 21 %
GIANT PLATELETS BLD QL SMEAR: PRESENT
GLUCOSE SERPL-MCNC: 440 MG/DL (ref 70–110)
GLUCOSE SERPL-MCNC: 533 MG/DL (ref 70–110)
GLUCOSE SERPL-MCNC: 562 MG/DL (ref 70–110)
GLUCOSE UR QL STRIP: ABNORMAL
HBA1C MFR BLD: 10.6 % (ref 4–5.6)
HCT VFR BLD AUTO: 47.1 % (ref 37–48.5)
HGB BLD-MCNC: 16.4 G/DL (ref 12–16)
HGB UR QL STRIP: ABNORMAL
HYALINE CASTS #/AREA URNS LPF: 0 /LPF
IMM GRANULOCYTES # BLD AUTO: ABNORMAL K/UL (ref 0–0.04)
IMM GRANULOCYTES NFR BLD AUTO: ABNORMAL % (ref 0–0.5)
KETONES UR QL STRIP: ABNORMAL
LACTATE SERPL-SCNC: 4.9 MMOL/L (ref 0.5–2.2)
LACTATE SERPL-SCNC: 5.4 MMOL/L (ref 0.5–2.2)
LACTATE SERPL-SCNC: 6.1 MMOL/L (ref 0.5–2.2)
LEUKOCYTE ESTERASE UR QL STRIP: ABNORMAL
LIPASE SERPL-CCNC: 5 U/L (ref 4–60)
LYMPHOCYTES NFR BLD: 4 % (ref 18–48)
MAGNESIUM SERPL-MCNC: 1.4 MG/DL (ref 1.6–2.6)
MCH RBC QN AUTO: 30.3 PG (ref 27–31)
MCHC RBC AUTO-ENTMCNC: 34.8 G/DL (ref 32–36)
MCV RBC AUTO: 87 FL (ref 82–98)
METHADONE UR QL SCN>300 NG/ML: NEGATIVE
MICROSCOPIC COMMENT: ABNORMAL
MONOCYTES NFR BLD: 7 % (ref 4–15)
NEUTROPHILS NFR BLD: 76 % (ref 38–73)
NEUTS BAND NFR BLD MANUAL: 13 %
NITRITE UR QL STRIP: NEGATIVE
NON-SQ EPI CELLS #/AREA URNS HPF: 1 /HPF
NRBC BLD-RTO: 0 /100 WBC
OPIATES UR QL SCN: NEGATIVE
PCO2 BLDA: 34.3 MMHG (ref 35–45)
PCP UR QL SCN>25 NG/ML: NEGATIVE
PH SMN: 7.44 [PH] (ref 7.35–7.45)
PH UR STRIP: 5 [PH] (ref 5–8)
PHOSPHATE SERPL-MCNC: 1.5 MG/DL (ref 2.7–4.5)
PLATELET # BLD AUTO: 164 K/UL (ref 150–450)
PLATELET BLD QL SMEAR: ABNORMAL
PMV BLD AUTO: 12 FL (ref 9.2–12.9)
PO2 BLDA: 32.4 MMHG (ref 40–60)
POC BASE DEFICIT: -0.2 MMOL/L (ref -2–2)
POC HCO3: 23.4 MMOL/L (ref 24–28)
POC MOLECULAR INFLUENZA A AGN: NEGATIVE
POC MOLECULAR INFLUENZA B AGN: NEGATIVE
POC PERFORMED BY: ABNORMAL
POC SATURATED O2: 64.3 % (ref 95–100)
POCT GLUCOSE: 364 MG/DL (ref 70–110)
POCT GLUCOSE: 386 MG/DL (ref 70–110)
POCT GLUCOSE: 386 MG/DL (ref 70–110)
POCT GLUCOSE: 403 MG/DL (ref 70–110)
POCT GLUCOSE: 469 MG/DL (ref 70–110)
POCT GLUCOSE: >500 MG/DL (ref 70–110)
POTASSIUM SERPL-SCNC: 3 MMOL/L (ref 3.5–5.1)
POTASSIUM SERPL-SCNC: 3.3 MMOL/L (ref 3.5–5.1)
POTASSIUM SERPL-SCNC: 3.9 MMOL/L (ref 3.5–5.1)
PROT SERPL-MCNC: 9.3 G/DL (ref 6–8.4)
PROT UR QL STRIP: ABNORMAL
RBC # BLD AUTO: 5.42 M/UL (ref 4–5.4)
RBC #/AREA URNS HPF: 3 /HPF (ref 0–4)
SARS-COV-2 RDRP RESP QL NAA+PROBE: NEGATIVE
SODIUM SERPL-SCNC: 126 MMOL/L (ref 136–145)
SODIUM SERPL-SCNC: 127 MMOL/L (ref 136–145)
SODIUM SERPL-SCNC: 130 MMOL/L (ref 136–145)
SODIUM UR-SCNC: <20 MMOL/L (ref 20–250)
SP GR UR STRIP: 1.02 (ref 1–1.03)
SPECIMEN SOURCE: ABNORMAL
SQUAMOUS #/AREA URNS HPF: 33 /HPF
TOXICOLOGY INFORMATION: NORMAL
TROPONIN I SERPL DL<=0.01 NG/ML-MCNC: 0.03 NG/ML (ref 0–0.03)
TROPONIN I SERPL DL<=0.01 NG/ML-MCNC: 0.04 NG/ML (ref 0–0.03)
TSH SERPL DL<=0.005 MIU/L-ACNC: 0.76 UIU/ML (ref 0.4–4)
URN SPEC COLLECT METH UR: ABNORMAL
UROBILINOGEN UR STRIP-ACNC: NEGATIVE EU/DL
UUN UR-MCNC: 159 MG/DL (ref 140–1050)
WBC # BLD AUTO: 22.64 K/UL (ref 3.9–12.7)
WBC #/AREA URNS HPF: 32 /HPF (ref 0–5)
WBC CLUMPS URNS QL MICRO: ABNORMAL
WBC TOXIC VACUOLES BLD QL SMEAR: PRESENT
YEAST URNS QL MICRO: 0

## 2023-10-04 PROCEDURE — 83605 ASSAY OF LACTIC ACID: CPT | Mod: 91 | Performed by: INTERNAL MEDICINE

## 2023-10-04 PROCEDURE — 63600175 PHARM REV CODE 636 W HCPCS

## 2023-10-04 PROCEDURE — 87186 SC STD MICRODIL/AGAR DIL: CPT | Mod: 59 | Performed by: PHYSICIAN ASSISTANT

## 2023-10-04 PROCEDURE — 20000000 HC ICU ROOM

## 2023-10-04 PROCEDURE — 87186 SC STD MICRODIL/AGAR DIL: CPT | Performed by: EMERGENCY MEDICINE

## 2023-10-04 PROCEDURE — 87077 CULTURE AEROBIC IDENTIFY: CPT | Mod: 59 | Performed by: PHYSICIAN ASSISTANT

## 2023-10-04 PROCEDURE — 83735 ASSAY OF MAGNESIUM: CPT

## 2023-10-04 PROCEDURE — 84540 ASSAY OF URINE/UREA-N: CPT | Performed by: STUDENT IN AN ORGANIZED HEALTH CARE EDUCATION/TRAINING PROGRAM

## 2023-10-04 PROCEDURE — 93010 ELECTROCARDIOGRAM REPORT: CPT | Mod: 76,,, | Performed by: INTERNAL MEDICINE

## 2023-10-04 PROCEDURE — 83036 HEMOGLOBIN GLYCOSYLATED A1C: CPT | Performed by: INTERNAL MEDICINE

## 2023-10-04 PROCEDURE — 63600175 PHARM REV CODE 636 W HCPCS: Performed by: HOSPITALIST

## 2023-10-04 PROCEDURE — 93005 ELECTROCARDIOGRAM TRACING: CPT

## 2023-10-04 PROCEDURE — 85007 BL SMEAR W/DIFF WBC COUNT: CPT | Performed by: PHYSICIAN ASSISTANT

## 2023-10-04 PROCEDURE — 96361 HYDRATE IV INFUSION ADD-ON: CPT

## 2023-10-04 PROCEDURE — 80048 BASIC METABOLIC PNL TOTAL CA: CPT | Mod: XB | Performed by: STUDENT IN AN ORGANIZED HEALTH CARE EDUCATION/TRAINING PROGRAM

## 2023-10-04 PROCEDURE — 25000003 PHARM REV CODE 250: Performed by: STUDENT IN AN ORGANIZED HEALTH CARE EDUCATION/TRAINING PROGRAM

## 2023-10-04 PROCEDURE — 87154 CUL TYP ID BLD PTHGN 6+ TRGT: CPT | Performed by: PHYSICIAN ASSISTANT

## 2023-10-04 PROCEDURE — 87502 INFLUENZA DNA AMP PROBE: CPT

## 2023-10-04 PROCEDURE — 83880 ASSAY OF NATRIURETIC PEPTIDE: CPT | Performed by: STUDENT IN AN ORGANIZED HEALTH CARE EDUCATION/TRAINING PROGRAM

## 2023-10-04 PROCEDURE — 84484 ASSAY OF TROPONIN QUANT: CPT | Mod: 91 | Performed by: INTERNAL MEDICINE

## 2023-10-04 PROCEDURE — 96374 THER/PROPH/DIAG INJ IV PUSH: CPT

## 2023-10-04 PROCEDURE — 63600175 PHARM REV CODE 636 W HCPCS: Performed by: EMERGENCY MEDICINE

## 2023-10-04 PROCEDURE — 84300 ASSAY OF URINE SODIUM: CPT | Performed by: STUDENT IN AN ORGANIZED HEALTH CARE EDUCATION/TRAINING PROGRAM

## 2023-10-04 PROCEDURE — 99214 OFFICE O/P EST MOD 30 MIN: CPT | Mod: 25 | Performed by: STUDENT IN AN ORGANIZED HEALTH CARE EDUCATION/TRAINING PROGRAM

## 2023-10-04 PROCEDURE — 80069 RENAL FUNCTION PANEL: CPT

## 2023-10-04 PROCEDURE — 87088 URINE BACTERIA CULTURE: CPT | Performed by: EMERGENCY MEDICINE

## 2023-10-04 PROCEDURE — 83605 ASSAY OF LACTIC ACID: CPT | Mod: 91 | Performed by: PHYSICIAN ASSISTANT

## 2023-10-04 PROCEDURE — 81025 URINE PREGNANCY TEST: CPT | Performed by: PHYSICIAN ASSISTANT

## 2023-10-04 PROCEDURE — 87077 CULTURE AEROBIC IDENTIFY: CPT | Performed by: EMERGENCY MEDICINE

## 2023-10-04 PROCEDURE — 25000003 PHARM REV CODE 250: Performed by: HOSPITALIST

## 2023-10-04 PROCEDURE — 85027 COMPLETE CBC AUTOMATED: CPT | Performed by: PHYSICIAN ASSISTANT

## 2023-10-04 PROCEDURE — 25000003 PHARM REV CODE 250: Performed by: INTERNAL MEDICINE

## 2023-10-04 PROCEDURE — 80053 COMPREHEN METABOLIC PANEL: CPT | Performed by: PHYSICIAN ASSISTANT

## 2023-10-04 PROCEDURE — 87086 URINE CULTURE/COLONY COUNT: CPT | Performed by: EMERGENCY MEDICINE

## 2023-10-04 PROCEDURE — 84484 ASSAY OF TROPONIN QUANT: CPT | Performed by: PHYSICIAN ASSISTANT

## 2023-10-04 PROCEDURE — 93010 ELECTROCARDIOGRAM REPORT: CPT | Mod: ,,, | Performed by: INTERNAL MEDICINE

## 2023-10-04 PROCEDURE — 80307 DRUG TEST PRSMV CHEM ANLYZR: CPT | Performed by: STUDENT IN AN ORGANIZED HEALTH CARE EDUCATION/TRAINING PROGRAM

## 2023-10-04 PROCEDURE — 82962 GLUCOSE BLOOD TEST: CPT

## 2023-10-04 PROCEDURE — 81000 URINALYSIS NONAUTO W/SCOPE: CPT | Performed by: EMERGENCY MEDICINE

## 2023-10-04 PROCEDURE — 87040 BLOOD CULTURE FOR BACTERIA: CPT | Mod: 59 | Performed by: PHYSICIAN ASSISTANT

## 2023-10-04 PROCEDURE — 99900035 HC TECH TIME PER 15 MIN (STAT)

## 2023-10-04 PROCEDURE — 63600175 PHARM REV CODE 636 W HCPCS: Performed by: INTERNAL MEDICINE

## 2023-10-04 PROCEDURE — 82803 BLOOD GASES ANY COMBINATION: CPT

## 2023-10-04 PROCEDURE — 25000003 PHARM REV CODE 250

## 2023-10-04 PROCEDURE — 84443 ASSAY THYROID STIM HORMONE: CPT | Performed by: STUDENT IN AN ORGANIZED HEALTH CARE EDUCATION/TRAINING PROGRAM

## 2023-10-04 PROCEDURE — 87635 SARS-COV-2 COVID-19 AMP PRB: CPT | Performed by: EMERGENCY MEDICINE

## 2023-10-04 PROCEDURE — 96365 THER/PROPH/DIAG IV INF INIT: CPT

## 2023-10-04 PROCEDURE — 83690 ASSAY OF LIPASE: CPT | Performed by: PHYSICIAN ASSISTANT

## 2023-10-04 PROCEDURE — 25000003 PHARM REV CODE 250: Performed by: PHYSICIAN ASSISTANT

## 2023-10-04 PROCEDURE — 25000003 PHARM REV CODE 250: Performed by: EMERGENCY MEDICINE

## 2023-10-04 PROCEDURE — 93010 EKG 12-LEAD: ICD-10-PCS | Mod: ,,, | Performed by: INTERNAL MEDICINE

## 2023-10-04 PROCEDURE — 99291 CRITICAL CARE FIRST HOUR: CPT

## 2023-10-04 PROCEDURE — 82010 KETONE BODYS QUAN: CPT | Performed by: PHYSICIAN ASSISTANT

## 2023-10-04 RX ORDER — MAGNESIUM SULFATE HEPTAHYDRATE 40 MG/ML
2 INJECTION, SOLUTION INTRAVENOUS ONCE
Status: COMPLETED | OUTPATIENT
Start: 2023-10-04 | End: 2023-10-05

## 2023-10-04 RX ORDER — SODIUM CHLORIDE AND POTASSIUM CHLORIDE 150; 900 MG/100ML; MG/100ML
INJECTION, SOLUTION INTRAVENOUS
Status: COMPLETED | OUTPATIENT
Start: 2023-10-04 | End: 2023-10-04

## 2023-10-04 RX ORDER — FLUOXETINE HYDROCHLORIDE 20 MG/1
40 CAPSULE ORAL DAILY
Status: DISCONTINUED | OUTPATIENT
Start: 2023-10-05 | End: 2023-10-11 | Stop reason: HOSPADM

## 2023-10-04 RX ORDER — NIFEDIPINE 60 MG/1
60 TABLET, EXTENDED RELEASE ORAL EVERY MORNING
Status: ON HOLD | COMMUNITY
Start: 2023-09-08 | End: 2024-01-13 | Stop reason: HOSPADM

## 2023-10-04 RX ORDER — DEXTROSE MONOHYDRATE AND SODIUM CHLORIDE 5; .45 G/100ML; G/100ML
INJECTION, SOLUTION INTRAVENOUS CONTINUOUS PRN
Status: DISCONTINUED | OUTPATIENT
Start: 2023-10-04 | End: 2023-10-11 | Stop reason: HOSPADM

## 2023-10-04 RX ORDER — ONDANSETRON 2 MG/ML
4 INJECTION INTRAMUSCULAR; INTRAVENOUS
Status: COMPLETED | OUTPATIENT
Start: 2023-10-04 | End: 2023-10-04

## 2023-10-04 RX ORDER — LISINOPRIL 20 MG/1
40 TABLET ORAL DAILY
Status: DISCONTINUED | OUTPATIENT
Start: 2023-10-05 | End: 2023-10-11 | Stop reason: HOSPADM

## 2023-10-04 RX ORDER — SODIUM CHLORIDE 0.9 % (FLUSH) 0.9 %
10 SYRINGE (ML) INJECTION
Status: DISCONTINUED | OUTPATIENT
Start: 2023-10-04 | End: 2023-10-11 | Stop reason: HOSPADM

## 2023-10-04 RX ORDER — METOPROLOL SUCCINATE 50 MG/1
100 TABLET, EXTENDED RELEASE ORAL DAILY
Status: DISCONTINUED | OUTPATIENT
Start: 2023-10-05 | End: 2023-10-11 | Stop reason: HOSPADM

## 2023-10-04 RX ORDER — IBUPROFEN 200 MG
24 TABLET ORAL
Status: DISCONTINUED | OUTPATIENT
Start: 2023-10-04 | End: 2023-10-04

## 2023-10-04 RX ORDER — ATORVASTATIN CALCIUM 40 MG/1
80 TABLET, FILM COATED ORAL NIGHTLY
Status: DISCONTINUED | OUTPATIENT
Start: 2023-10-04 | End: 2023-10-11 | Stop reason: HOSPADM

## 2023-10-04 RX ORDER — POTASSIUM CHLORIDE 750 MG/1
30 TABLET, EXTENDED RELEASE ORAL
Status: DISCONTINUED | OUTPATIENT
Start: 2023-10-04 | End: 2023-10-04

## 2023-10-04 RX ORDER — METFORMIN HYDROCHLORIDE 500 MG/1
1000 TABLET, EXTENDED RELEASE ORAL 2 TIMES DAILY WITH MEALS
COMMUNITY
Start: 2023-09-08

## 2023-10-04 RX ORDER — POTASSIUM CHLORIDE 750 MG/1
30 TABLET, EXTENDED RELEASE ORAL
Status: DISCONTINUED | OUTPATIENT
Start: 2023-10-04 | End: 2023-10-05

## 2023-10-04 RX ORDER — MUPIROCIN 20 MG/G
OINTMENT TOPICAL 2 TIMES DAILY
Status: COMPLETED | OUTPATIENT
Start: 2023-10-04 | End: 2023-10-09

## 2023-10-04 RX ORDER — EZETIMIBE 10 MG/1
10 TABLET ORAL DAILY
Status: DISCONTINUED | OUTPATIENT
Start: 2023-10-05 | End: 2023-10-11 | Stop reason: HOSPADM

## 2023-10-04 RX ORDER — SODIUM CHLORIDE, SODIUM LACTATE, POTASSIUM CHLORIDE, CALCIUM CHLORIDE 600; 310; 30; 20 MG/100ML; MG/100ML; MG/100ML; MG/100ML
INJECTION, SOLUTION INTRAVENOUS CONTINUOUS
Status: DISCONTINUED | OUTPATIENT
Start: 2023-10-04 | End: 2023-10-04

## 2023-10-04 RX ORDER — DEXTROSE MONOHYDRATE 100 MG/ML
INJECTION, SOLUTION INTRAVENOUS
Status: DISCONTINUED | OUTPATIENT
Start: 2023-10-04 | End: 2023-10-05

## 2023-10-04 RX ORDER — HEPARIN SODIUM 5000 [USP'U]/ML
5000 INJECTION, SOLUTION INTRAVENOUS; SUBCUTANEOUS EVERY 12 HOURS
Status: DISCONTINUED | OUTPATIENT
Start: 2023-10-04 | End: 2023-10-04

## 2023-10-04 RX ORDER — INSULIN ASPART 100 [IU]/ML
0-10 INJECTION, SOLUTION INTRAVENOUS; SUBCUTANEOUS
Status: DISCONTINUED | OUTPATIENT
Start: 2023-10-04 | End: 2023-10-04

## 2023-10-04 RX ORDER — NIFEDIPINE 30 MG/1
30 TABLET, EXTENDED RELEASE ORAL DAILY
Status: DISCONTINUED | OUTPATIENT
Start: 2023-10-05 | End: 2023-10-11 | Stop reason: HOSPADM

## 2023-10-04 RX ORDER — GLUCAGON 1 MG
1 KIT INJECTION
Status: DISCONTINUED | OUTPATIENT
Start: 2023-10-04 | End: 2023-10-04

## 2023-10-04 RX ORDER — ASPIRIN 81 MG/1
81 TABLET ORAL DAILY
Status: DISCONTINUED | OUTPATIENT
Start: 2023-10-05 | End: 2023-10-11 | Stop reason: HOSPADM

## 2023-10-04 RX ORDER — SODIUM CHLORIDE 9 MG/ML
INJECTION, SOLUTION INTRAVENOUS
Status: DISCONTINUED | OUTPATIENT
Start: 2023-10-04 | End: 2023-10-11 | Stop reason: HOSPADM

## 2023-10-04 RX ORDER — SODIUM CHLORIDE 9 MG/ML
1000 INJECTION, SOLUTION INTRAVENOUS CONTINUOUS
Status: DISCONTINUED | OUTPATIENT
Start: 2023-10-04 | End: 2023-10-04

## 2023-10-04 RX ORDER — ENOXAPARIN SODIUM 100 MG/ML
40 INJECTION SUBCUTANEOUS EVERY 24 HOURS
Status: DISCONTINUED | OUTPATIENT
Start: 2023-10-04 | End: 2023-10-11

## 2023-10-04 RX ORDER — SODIUM,POTASSIUM PHOSPHATES 280-250MG
2 POWDER IN PACKET (EA) ORAL ONCE
Status: COMPLETED | OUTPATIENT
Start: 2023-10-04 | End: 2023-10-04

## 2023-10-04 RX ORDER — IBUPROFEN 200 MG
16 TABLET ORAL
Status: DISCONTINUED | OUTPATIENT
Start: 2023-10-04 | End: 2023-10-04

## 2023-10-04 RX ORDER — PANTOPRAZOLE SODIUM 40 MG/1
40 TABLET, DELAYED RELEASE ORAL DAILY
Status: DISCONTINUED | OUTPATIENT
Start: 2023-10-05 | End: 2023-10-11 | Stop reason: HOSPADM

## 2023-10-04 RX ORDER — HYDROCHLOROTHIAZIDE 25 MG/1
25 TABLET ORAL DAILY
Status: DISCONTINUED | OUTPATIENT
Start: 2023-10-05 | End: 2023-10-08

## 2023-10-04 RX ADMIN — SODIUM CHLORIDE, POTASSIUM CHLORIDE, SODIUM LACTATE AND CALCIUM CHLORIDE 1000 ML: 600; 310; 30; 20 INJECTION, SOLUTION INTRAVENOUS at 04:10

## 2023-10-04 RX ADMIN — CEFTRIAXONE 1 G: 1 INJECTION, POWDER, FOR SOLUTION INTRAMUSCULAR; INTRAVENOUS at 07:10

## 2023-10-04 RX ADMIN — POTASSIUM CHLORIDE: 149 INJECTION, SOLUTION, CONCENTRATE INTRAVENOUS at 09:10

## 2023-10-04 RX ADMIN — VANCOMYCIN HYDROCHLORIDE 2500 MG: 1.25 INJECTION, POWDER, LYOPHILIZED, FOR SOLUTION INTRAVENOUS at 04:10

## 2023-10-04 RX ADMIN — SODIUM CHLORIDE 1000 ML: 9 INJECTION, SOLUTION INTRAVENOUS at 06:10

## 2023-10-04 RX ADMIN — INSULIN DETEMIR 30 UNITS: 100 INJECTION, SOLUTION SUBCUTANEOUS at 04:10

## 2023-10-04 RX ADMIN — ATORVASTATIN CALCIUM 80 MG: 40 TABLET, FILM COATED ORAL at 10:10

## 2023-10-04 RX ADMIN — MUPIROCIN: 20 OINTMENT TOPICAL at 10:10

## 2023-10-04 RX ADMIN — SODIUM CHLORIDE 1000 ML: 9 INJECTION, SOLUTION INTRAVENOUS at 03:10

## 2023-10-04 RX ADMIN — MAGNESIUM SULFATE HEPTAHYDRATE 2 G: 40 INJECTION, SOLUTION INTRAVENOUS at 11:10

## 2023-10-04 RX ADMIN — SODIUM CHLORIDE AND POTASSIUM CHLORIDE: .9; .15 SOLUTION INTRAVENOUS at 04:10

## 2023-10-04 RX ADMIN — POTASSIUM & SODIUM PHOSPHATES POWDER PACK 280-160-250 MG 2 PACKET: 280-160-250 PACK at 10:10

## 2023-10-04 RX ADMIN — SODIUM CHLORIDE 0.1 UNITS/KG/HR: 9 INJECTION, SOLUTION INTRAVENOUS at 06:10

## 2023-10-04 RX ADMIN — POTASSIUM CHLORIDE 30 MEQ: 750 TABLET, EXTENDED RELEASE ORAL at 09:10

## 2023-10-04 RX ADMIN — MAGNESIUM SULFATE HEPTAHYDRATE 2 G: 40 INJECTION, SOLUTION INTRAVENOUS at 10:10

## 2023-10-04 RX ADMIN — ONDANSETRON 4 MG: 2 INJECTION INTRAMUSCULAR; INTRAVENOUS at 04:10

## 2023-10-04 RX ADMIN — SODIUM CHLORIDE: 9 INJECTION, SOLUTION INTRAVENOUS at 10:10

## 2023-10-04 RX ADMIN — PIPERACILLIN AND TAZOBACTAM 4.5 G: 4; .5 INJECTION, POWDER, LYOPHILIZED, FOR SOLUTION INTRAVENOUS; PARENTERAL at 03:10

## 2023-10-04 RX ADMIN — ENOXAPARIN SODIUM 40 MG: 40 INJECTION SUBCUTANEOUS at 07:10

## 2023-10-04 NOTE — ED NOTES
Both Ed providers were notified of patient glucose greater than 500 and lactic acid of 6.1. Nursing started sepsis protocol but needs further orders.  Waiting for provider to sign on to see the patient. Requested for one of provider to see the patient and order IV insulin and antibiotics for sepsis protocol .

## 2023-10-04 NOTE — HPI
49 yo F w/ PMHx of HFpEF (EF 50% 5/23), T2DM (A1c 11.4), HLD, and obesity who presented to the ED from clinic and here with DKA. Pt reports 3 days of worsening malaise, HA's, N, V, and D. She denies any hemoptysis or hematochezia. She also endorses increased urinary frequency but no dysuria during this time. She reports having been out of her trulicity for 1 wk but has been compliant with other home medications including insulin at home. She denies any sick contacts but does live with her two teen children who are in school. She denies any EtoH or additional substance use.      In the ED pt afebrile, , RR 50, /87 and sat 100% on RA. EKG with sinus tach. CXR without signs of consolidation or effusion. VBG w/ pH 7.44, CO2 34.3, O2 32.4, and HCO3 23.4. CB notable for WBC 22.6 Hg 16.4. CMP notable for Na 127, K 3.9, , BUN/Crt 11/1.4, and AG 22. LA 6.1, BHB 0.7, and trop 0.037. UA w/ hazy urine, 1+ protein, 4+ glucose, trace ketones, occult blood, and leuks. Micro with 32 wbc and moderate deshawn. UPT neg. Flu and COVID swab neg. She was given 30u detemir, 1L LR, and 1L NS as well as 1x vanc/zosyn and zofran. Pt was admitted to the [facility] service for management of DKA.

## 2023-10-04 NOTE — ED NOTES
Patient reports nausea and one episode of vomiting while nurse was in the room. Anti nausea medication requested from provider. Insulin also requested due to patient elevated BGL and Gap. Zofran to be ordered. Insulin on hold until ED provider speaks to hospital medicine.

## 2023-10-04 NOTE — PHARMACY MED REC
"    [facility]           Pharmacy  Admission Medication History     The home medication history was taken by Silvia Padgett.      Medication history obtained from Medications listed below were obtained from: Patient/family    Based on information gathered for medication list, you may go to "Admission" then "Reconcile Home Medications" tabs to review and/or act upon those items.     The home medication list has been updated by the Pharmacy department.   Please read ALL comments highlighted in yellow.   Please address this information as you see fit.    Feel free to contact us if you have any questions or require assistance.        Current Facility-Administered Medications on File Prior to Encounter   Medication Dose Route Frequency Provider Last Rate Last Admin    fluorescein 500 mg/5 mL (10 %) injection 500 mg  5 mL Intravenous Once GHAZAL Tillman MD         Current Outpatient Medications on File Prior to Encounter   Medication Sig Dispense Refill    aspirin (ECOTRIN) 81 MG EC tablet Take 1 tablet (81 mg total) by mouth once daily. 60 tablet 1    atorvastatin (LIPITOR) 80 MG tablet Take 1 tablet (80 mg total) by mouth every evening. 60 tablet 1    dulaglutide (TRULICITY) 0.75 mg/0.5 mL pen injector Inject 0.75 mg into the skin every 7 days. (Patient taking differently: Inject 0.75 mg into the skin every Sunday.) 4 pen 2    ergocalciferol (ERGOCALCIFEROL) 50,000 unit Cap TAKE ONE CAPSULE (50,000 UNITS TOTAL) BY MOUTH EVERY SATURDAY AT 9AM (Patient taking differently: Take 50,000 Units by mouth every Saturday.) 12 capsule 11    ezetimibe (ZETIA) 10 mg tablet Take 1 tablet (10 mg total) by mouth once daily. (For cholesterol) 90 tablet 1    furosemide (LASIX) 40 MG tablet Take 1 tablet (40 mg total) by mouth once daily. 60 tablet 1    hydroCHLOROthiazide (HYDRODIURIL) 25 MG tablet Take 1 tablet (25 mg total) by mouth once daily. 90 tablet 3    ibuprofen (ADVIL,MOTRIN) 200 MG tablet Take 200 " "mg by mouth every 6 (six) hours as needed for Pain.      insulin aspart U-100 (NOVOLOG FLEXPEN U-100 INSULIN) 100 unit/mL (3 mL) InPn pen Inject 30 Units into the skin 2 (two) times a day. 30 mL 12    LANTUS SOLOSTAR U-100 INSULIN glargine 100 units/mL SubQ pen INJECT 80 UNITS SUBCUTANEOUSLY TWICE DAILY (BULK) (Patient taking differently: Inject 80 Units into the skin 2 (two) times a day.) 30 mL 5    lisinopriL (PRINIVIL,ZESTRIL) 40 MG tablet Take 1 tablet (40 mg total) by mouth once daily. 90 tablet 3    metFORMIN (GLUCOPHAGE-XR) 500 MG ER 24hr tablet Take 1,000 mg by mouth 2 (two) times daily with meals.      metoprolol succinate (TOPROL-XL) 100 MG 24 hr tablet Take 100 mg by mouth once daily.      NIFEdipine (ADALAT CC) 60 MG TbSR Take 60 mg by mouth every morning.      pantoprazole (PROTONIX) 40 MG tablet Take 40 mg by mouth once daily.      potassium chloride (KLOR-CON) 8 MEQ TbSR TAKE TWO TABLETS BY MOUTH TWICE DAILY @ 9AM & 5PM (Patient taking differently: Take 8 mEq by mouth 2 (two) times daily.) 180 tablet 11    blood sugar diagnostic Strp Use to test blood glucose two (2) times daily as directed with insurance preferred meter and supplies 200 each 3    FLUoxetine 40 MG capsule Take 40 mg by mouth once daily.      lancets (TRUEPLUS LANCETS) 28 gauge Misc Use to test blood glucose two (2) times daily as directed with insurance preferred meter and supplies 200 each 3    pen needle, diabetic (BD ULTRA-FINE ROSA PEN NEEDLE) 32 gauge x 5/32" Ndle Use with insulin once daily 100 each 0    pen needle, diabetic 32 gauge x 5/32" Ndle Use with injectable DM supplies twice daily as directed 200 each 0    TRUE METRIX GO GLUCOSE METER [facility]          Please address this information as you see fit.  Feel free to contact us if you have any questions or require assistance.    Silvia Padgett  [phone]              .          "

## 2023-10-04 NOTE — ED PROVIDER NOTES
Encounter Date: 10/4/2023       History     Chief Complaint   Patient presents with    Vomiting     Pt arrived from clinic next door for further eval of vomiting that started yesterday. Pt also has a headache and increased respirations and heart rate. Pt has been out of her trulicity for about a month. Accu check >500     Patient presents from clinic next door where she was being seen for routine appointment with chief complaint of vomiting.  She has a history of diabetes and has been out of her Trulicity.  She does have a history of DKA in the past.  She admits to some nausea but has no other acute complaints.      Review of patient's allergies indicates:  No Known Allergies  Past Medical History:   Diagnosis Date    Cataract     Cervical high risk HPV (human papillomavirus) test positive 2020    NOT 16 OR 18    CVA (cerebral infarction)          Depression     Diabetes mellitus, type 2     GERD (gastroesophageal reflux disease)     Hyperlipidemia     Hypertension     Moderate nonproliferative diabetic retinopathy     Obesity     Stroke      Past Surgical History:   Procedure Laterality Date     SECTION      CHOLECYSTECTOMY      DILATION AND CURETTAGE OF UTERUS      GALLBLADDER SURGERY  2017    stone removed     Family History   Problem Relation Age of Onset    Stroke Mother     Thyroid disease Mother     Diabetes Mother     Cataracts Father     Hypertension Father     Arthritis Father     Diabetes Father     Hypertension Sister     Stroke Sister     Thyroid disease Sister     Heart disease Sister     Thyroid disease Daughter     Asthma Daughter     No Known Problems Brother     No Known Problems Maternal Aunt     No Known Problems Maternal Uncle     No Known Problems Paternal Aunt     No Known Problems Paternal Uncle     No Known Problems Maternal Grandmother     No Known Problems Maternal Grandfather     No Known Problems Paternal Grandmother     No Known Problems Paternal Grandfather      Amblyopia Neg Hx     Blindness Neg Hx     Cancer Neg Hx     Glaucoma Neg Hx     Macular degeneration Neg Hx     Retinal detachment Neg Hx     Strabismus Neg Hx      Social History     Tobacco Use    Smoking status: Former     Current packs/day: 0.00     Types: Cigarettes     Quit date: 2021     Years since quittin.6    Smokeless tobacco: Never   Substance Use Topics    Alcohol use: Yes     Alcohol/week: 3.0 standard drinks of alcohol     Types: 3 Cans of beer per week     Comment: Rare    Drug use: No     Review of Systems   Gastrointestinal:  Positive for nausea.       Physical Exam     Initial Vitals [10/04/23 1350]   BP Pulse Resp Temp SpO2   (!) 172/87 (!) 123 (!) 50 98.8 °F (37.1 °C) 100 %      MAP       --         Physical Exam    Vitals reviewed.  Constitutional:   Patient is ill-appearing   Cardiovascular:            Tachycardic rate, regular rhythm, no murmurs noted   Pulmonary/Chest:   Tachypnea with clear breath sounds bilaterally   Abdominal: Abdomen is soft. There is no rebound and no guarding.   Musculoskeletal:         General: Normal range of motion.     Neurological:   Patient is lethargic but is able to answer my questions and follow commands   Skin: Skin is warm and dry. No rash noted.         ED Course   Critical Care    Date/Time: 10/4/2023 6:00 PM    Performed by: Bentley Garcia DO  Authorized by: Bentley Garcia DO  Direct patient critical care time: 25 minutes  Ordering / reviewing critical care time: 10 minutes  Documentation critical care time: 5 minutes  Consulting other physicians critical care time: 5 minutes  Total critical care time (exclusive of procedural time) : 45 minutes  Critical care time was exclusive of separately billable procedures and treating other patients.  Critical care was necessary to treat or prevent imminent or life-threatening deterioration of the following conditions: sepsis, circulatory failure and endocrine crisis.  Critical care was time  spent personally by me on the following activities: discussions with consultants, evaluation of patient's response to treatment, examination of patient, obtaining history from patient or surrogate, ordering and performing treatments and interventions, ordering and review of laboratory studies, ordering and review of radiographic studies, re-evaluation of patient's condition and review of old charts.        Labs Reviewed   CBC W/ AUTO DIFFERENTIAL - Abnormal; Notable for the following components:       Result Value    WBC 22.64 (*)     RBC 5.42 (*)     Hemoglobin 16.4 (*)     Gran % 76.0 (*)     Lymph % 4.0 (*)     All other components within normal limits   COMPREHENSIVE METABOLIC PANEL - Abnormal; Notable for the following components:    Sodium 127 (*)     Chloride 86 (*)     CO2 19 (*)     Glucose 562 (*)     Calcium 10.6 (*)     Total Protein 9.3 (*)     Albumin 3.2 (*)     Total Bilirubin 1.1 (*)     Alkaline Phosphatase 147 (*)     eGFR 47 (*)     Anion Gap 22 (*)     All other components within normal limits    Narrative:     GLU critical result(s) called and verbal readback obtained from   Sakina Yee RN. by [facility] 10/04/2023 15:19   LACTIC ACID, PLASMA - Abnormal; Notable for the following components:    Lactate (Lactic Acid) 6.1 (*)     All other components within normal limits    Narrative:     LA critical result(s) called and verbal readback obtained from   Felicita England RN.  by [facility] 10/04/2023 15:11   BETA - HYDROXYBUTYRATE, SERUM - Abnormal; Notable for the following components:    Beta-Hydroxybutyrate 0.7 (*)     All other components within normal limits   TROPONIN I - Abnormal; Notable for the following components:    Troponin I 0.037 (*)     All other components within normal limits   URINALYSIS, REFLEX TO URINE CULTURE - Abnormal; Notable for the following components:    Color, UA Orange (*)     Appearance, UA Hazy (*)     Protein, UA 1+ (*)     Glucose, UA 4+ (*)     Ketones, UA Trace (*)      Occult Blood UA Trace (*)     Leukocytes, UA Trace (*)     All other components within normal limits    Narrative:     Specimen Source->Urine   URINALYSIS MICROSCOPIC - Abnormal; Notable for the following components:    WBC, UA 32 (*)     WBC Clumps, UA Occasional (*)     Bacteria Moderate (*)     Non-Squam Epith 1 (*)     All other components within normal limits    Narrative:     Specimen Source->Urine   LACTIC ACID, PLASMA - Abnormal; Notable for the following components:    Lactate (Lactic Acid) 4.9 (*)     All other components within normal limits    Narrative:     LA critical result(s) called and verbal readback obtained from   Felicita England RN. by [facility] 10/04/2023 17:24   HEMOGLOBIN A1C - Abnormal; Notable for the following components:    Hemoglobin A1C 10.6 (*)     Estimated Avg Glucose 258 (*)     All other components within normal limits   BASIC METABOLIC PANEL - Abnormal; Notable for the following components:    Sodium 126 (*)     Potassium 3.3 (*)     Chloride 93 (*)     CO2 14 (*)     Glucose 533 (*)     Anion Gap 19 (*)     eGFR 56 (*)     All other components within normal limits    Narrative:     GLU critical result(s) called and verbal readback obtained from   Gail Bustamante RN. by [facility] 10/04/2023 18:26   B-TYPE NATRIURETIC PEPTIDE - Abnormal; Notable for the following components:     (*)     All other components within normal limits   SODIUM, URINE, RANDOM - Abnormal; Notable for the following components:    Sodium, Urine <20 (*)     All other components within normal limits    Narrative:     Specimen Source->Urine   POCT GLUCOSE - Abnormal; Notable for the following components:    POCT Glucose >500 (*)     All other components within normal limits   POCT GLUCOSE - Abnormal; Notable for the following components:    POCT Glucose >500 (*)     All other components within normal limits   POCT GLUCOSE - Abnormal; Notable for the following components:    POCT Glucose >500 (*)     All other  components within normal limits   POCT GLUCOSE - Abnormal; Notable for the following components:    POCT Glucose 469 (*)     All other components within normal limits   CULTURE, URINE   LIPASE   CREATININE, URINE, RANDOM    Narrative:     Specimen Source->Urine   UREA NITROGEN, URINE, RANDOM    Narrative:     Specimen Source->Urine   POCT URINE PREGNANCY   POCT INFLUENZA A/B MOLECULAR   SARS-COV-2 RDRP GENE   SARS-COV-2 RDRP GENE     EKG Readings: (Independently Interpreted)   ST rate of 120 right axis, nonspecific st and t wave changes     ECG Results              EKG 12-lead (Final result)  Result time 10/05/23 08:16:30      Final result by Interface, Lab In [facility] (10/05/23 08:16:30)                   Narrative:    Test Reason : R00.0,    Vent. Rate : 120 BPM     Atrial Rate : 120 BPM     P-R Int : 130 ms          QRS Dur : 072 ms      QT Int : 334 ms       P-R-T Axes : 067 054 199 degrees     QTc Int : 472 ms    Sinus tachycardia  Right atrial enlargement  ST/T changes inferolateral leads, consider ischemia   Abnormal ECG  When compared with ECG of 27-JUN-2023 09:25,  ST now depressed in Inferior leads  T wave changes less prominent high lateral leads   Confirmed by Emanuel Xiao MD ([id number]) on 10/5/2023 8:16:23 AM    Referred By: [email or web address]   SELF           Confirmed By:Emanuel Xiao MD                                  Imaging Results              US Retroperitoneal Complete (Final result)  Result time 10/04/23 20:21:56      Final result by Joni Stewart DO (10/04/23 20:21:56)                   Impression:      No significant abnormality.      Electronically signed by: Joni Stewart  Date:    10/04/2023  Time:    20:21               Narrative:    EXAMINATION:  US RETROPERITONEAL COMPLETE    CLINICAL HISTORY:  CATE;    TECHNIQUE:  Ultrasound of the kidneys and urinary bladder was performed including color flow and Doppler evaluation of the kidneys.    COMPARISON:  None.    FINDINGS:  Right  kidney: The right kidney measures 14.3 cm. No cortical thinning. No loss of corticomedullary distinction. Resistive index measures 0.67.  No mass. No renal stone. No hydronephrosis.    Left kidney: The left kidney measures 14.3 cm. No cortical thinning. No loss of corticomedullary distinction. Resistive index measures 0.79.  No mass. No renal stone. No hydronephrosis.    The bladder is partially distended at the time of scanning and has an unremarkable appearance.                                       X-Ray Chest AP Portable (Final result)  Result time 10/04/23 16:46:51      Final result by Richard Anthony MD (10/04/23 16:46:51)                   Impression:      As above.      Electronically signed by: Richard Anthony MD  Date:    10/04/2023  Time:    16:46               Narrative:    EXAMINATION:  XR CHEST AP PORTABLE    CLINICAL HISTORY:  Sepsis;    TECHNIQUE:  Frontal view of the chest was performed.  Patient mildly rotated RPO.    COMPARISON:  05/22/2023    FINDINGS:  Multiple overlying cardiac monitoring leads. The cardiomediastinal silhouette is upper normal in size, likely exaggerated by AP technique.  Pulmonary vascularity appears within normal limits.    Low lung volumes with mild basilar atelectasis.  Mid to upper lung zones clear without large confluent opacity.  No significant pleural fluid or pneumothorax evident on this single projection.                                       Medications   aspirin EC tablet 81 mg (81 mg Oral Given 10/5/23 0824)   atorvastatin tablet 80 mg (80 mg Oral Given 10/4/23 2200)   ezetimibe tablet 10 mg (10 mg Oral Given 10/5/23 0824)   sodium chloride 0.9% flush 10 mL (has no administration in time range)   sodium chloride 0.9% flush 10 mL (has no administration in time range)   dextrose 5 % and 0.45 % NaCl infusion (has no administration in time range)   FLUoxetine capsule 40 mg (40 mg Oral Given 10/5/23 0823)   hydroCHLOROthiazide tablet 25 mg (25 mg Oral Given 10/5/23  0825)   lisinopriL tablet 40 mg (40 mg Oral Given 10/5/23 0824)   metoprolol succinate (TOPROL-XL) 24 hr tablet 100 mg (100 mg Oral Given 10/5/23 0824)   NIFEdipine 24 hr tablet 30 mg (30 mg Oral Given 10/5/23 0824)   pantoprazole EC tablet 40 mg (40 mg Oral Given 10/5/23 0824)   enoxaparin injection 40 mg (40 mg Subcutaneous Given 10/4/23 1925)   0.9%  NaCl infusion ( Intravenous Stopped 10/5/23 0844)   mupirocin 2 % ointment ( Nasal Given 10/5/23 0823)   acetaminophen tablet 500 mg (500 mg Oral Given 10/5/23 0334)   sodium phosphate 30 mmol in dextrose 5 % (D5W) 250 mL IVPB ( Intravenous Verify Only 10/5/23 1131)   potassium, sodium phosphates 280-160-250 mg packet 2 packet (has no administration in time range)   cefTRIAXone (ROCEPHIN) 2 g in dextrose 5 % in water (D5W) 100 mL IVPB (MB+) (has no administration in time range)   glucose chewable tablet 16 g (has no administration in time range)   glucose chewable tablet 24 g (has no administration in time range)   glucagon (human recombinant) injection 1 mg (has no administration in time range)   insulin aspart U-100 pen 0-10 Units (has no administration in time range)   dextrose 10% bolus 125 mL 125 mL (has no administration in time range)   dextrose 10% bolus 250 mL 250 mL (has no administration in time range)   sodium chloride 0.9% bolus 1,000 mL 1,000 mL (0 mLs Intravenous Stopped 10/4/23 1613)   0.9 % NaCl with KCl 20 mEq infusion (0 mL/hr Intravenous Stopped 10/4/23 1858)   piperacillin-tazobactam (ZOSYN) 4.5 g in dextrose 5 % in water (D5W) 100 mL IVPB (MB+) (0 g Intravenous Stopped 10/4/23 1622)   vancomycin (VANCOCIN) 2,500 mg in dextrose 5 % (D5W) 500 mL IVPB (0 mg Intravenous Stopped 10/4/23 1913)   ondansetron injection 4 mg (4 mg Intravenous Given 10/4/23 1602)   lactated ringers bolus 1,000 mL (0 mLs Intravenous Stopped 10/4/23 1838)   insulin regular bolus from bag/infusion 10.9 Units 10.9 mL (10.9 Units Intravenous Bolus from Bag 10/4/23 1847)  "  magnesium sulfate 2g in water 50mL IVPB (premix) (0 g Intravenous Stopped 10/5/23 0007)   magnesium sulfate 2g in water 50mL IVPB (premix) (0 g Intravenous Stopped 10/5/23 0153)   potassium, sodium phosphates 280-160-250 mg packet 2 packet (2 packets Oral Given 10/4/23 2253)   potassium chloride SA CR tablet 40 mEq (40 mEq Oral Given 10/5/23 0440)   insulin detemir U-100 (Levemir) pen 60 Units (60 Units Subcutaneous Given 10/5/23 0637)     Medical Decision Making  Spoke with [facility] who will admit    Amount and/or Complexity of Data Reviewed  Labs:  Decision-making details documented in ED Course.  Radiology:  Decision-making details documented in ED Course.    Risk  Prescription drug management.  Decision regarding hospitalization.               ED Course as of 10/05/23 1148   Wed Oct 04, 2023   1736 CBC auto differential(!)  leukocytosis [CD]   1737 Comprehensive metabolic panel(!!)  Hyperglycemia anion gap met acidosis, psuedohyponatremia [CD]   1738 Troponin I(!)  Chronically elevated [CD]   1738 Lipase  wnl [CD]   1738 Urinalysis, Reflex to Urine Culture Urine, Clean Catch(!)  uti [CD]   1738 POCT COVID-19 Rapid Screening  wnl [CD]   1738 POCT Venous Blood Gas(!!)  No acidosis [CD]   1739 Lactic acid, plasma #2(!!)  Lactic acidosis [CD]   1739 POCT glucose(!!)  hyperglycemia [CD]   1739 X-Ray Chest AP Portable  No acute findings [CD]   1739 Beta - Hydroxybutyrate, Serum(!)  Mildly elevated [CD]      ED Course User Index  [CD] Bentley Garcia,     Sepsis Perfusion Assessment: "I attest a sepsis perfusion exam was performed within 6 hours of sepsis, severe sepsis, or septic shock presentation, following fluid resuscitation."               Clinical Impression:   Final diagnoses:  [R00.0] Tachycardia  [R11.2] Nausea and vomiting, unspecified vomiting type (Primary)  [E87.20] Lactic acidosis  [R73.9] Hyperglycemia        ED Disposition Condition    Admit                 Bentley Garcia, " DO  10/05/23 1153

## 2023-10-04 NOTE — ED NOTES
Charge nurse sent primary Dr. Marie a message to treat glucose greater than 500 before patient assigned a floor bed. MD stated that he ordered levemir. Will administer levemir but requested a fast acting insulin. Glucose recheck was still greater than 500, bmp will be recollected. MD has no further insulin orders at this present time.

## 2023-10-04 NOTE — Clinical Note
Diagnosis: Tachycardia [003552]   Admitting Provider:: RAMON TOLENTINO [[id number]]   Future Attending Provider: AMINA DOBBS [[id number]]   Reason for IP Medical Treatment  (Clinical interventions that can only be accomplished in the IP setting? ) :: sepsis, hyperglycemia   I certify that Inpatient services for greater than or equal to 2 midnights are medically necessary:: Yes   Plans for Post-Acute care--if anticipated (pick the single best option):: A. No post acute care anticipated at this time

## 2023-10-04 NOTE — PROGRESS NOTES
"Clinic Note  [facility]    Subjective:      Jaimee Quintana is a 48 y.o. female with PMHx HFpEF, uncontrolled DM, HTN, class III obesity, HLD. Patient presented to clinic because "wasn't feeling well" for past 2 days. Reports difficulty breathing, abdominal pain, 3 episodes watery diarrhea/day. No sick contacts. Denies cough, CP. History limited by patient's somnolence.    Review of Systems   Constitutional:  Negative for chills and fever.   HENT:  Negative for congestion and sore throat.    Respiratory:  Positive for shortness of breath. Negative for cough.    Cardiovascular:  Negative for chest pain and palpitations.   Gastrointestinal:  Positive for abdominal pain and diarrhea. Negative for nausea and vomiting.   Genitourinary:  Negative for dysuria.   Musculoskeletal:  Negative for joint pain and myalgias.   Neurological:  Negative for dizziness, tingling, weakness and headaches.   Psychiatric/Behavioral:  Negative for depression. The patient is not nervous/anxious.           Objective:      Vitals:    10/04/23 1311   BP: 135/78   Pulse: (!) 122     RR - 40   O2 - 98%  Temp. 97    Body mass index is 40.06 kg/m².      Physical Exam  Constitutional:       Appearance: Normal appearance. She is well-developed. She is obese. She is ill-appearing.   Cardiovascular:      Rate and Rhythm: Regular rhythm. Tachycardia present.      Pulses: Normal pulses.   Pulmonary:      Effort: Respiratory distress present.      Breath sounds: Normal breath sounds.      Comments: Tachynnic, shallow breathing  Abdominal:      General: Abdomen is flat. Bowel sounds are normal.      Palpations: Abdomen is soft.   Musculoskeletal:         General: Normal range of motion.      Cervical back: Normal range of motion.      Right lower leg: No edema.      Left lower leg: No edema.   Skin:     General: Skin is warm and dry.      Capillary Refill: Capillary refill takes less than 2 seconds.      Coloration: Skin is not pale. "   Neurological:      General: No focal deficit present.      Mental Status: She is alert and oriented to person, place, and time.   Psychiatric:         Mood and Affect: Mood normal.         Behavior: Behavior normal.            Assessment/Plan:      Obese, HFpEF 49 yo F subjectively short of breath, hx limited by somnolence. Tachypnic and tachycardic. O2 sats and temp WNL. Patient referred to ED for workup of respiratory distress, increased somnolence.    1. Shortness of breath  2. Somnolence    Patient seen and discussed with attending physician, Dr. Serena Acosta MD  [facility] PGY-3  10/04/2023      The following information is provided to all patients.  This information is to help you find resources for any of the problems found today that may be affecting your health:                Living healthy guide: www.[facility].[place].gov       Understanding Diabetes: www.diabetes.org       Eating healthy: www.cdc.gov/healthyweight      CDC home safety checklist: www.cdc.gov/steadi/patient.html      Agency on Aging: www.goea.[place].gov       Alcoholics anonymous (AA): www.aa.org      Physical Activity: www.shi.nih.gov/nq2gsig       Tobacco use: www.quitwithusla.org

## 2023-10-04 NOTE — H&P
H&P  [facility]    Attending: Serena  Admit Date: 10/4/2023  Today's Date: 10/04/2023      49 yo F w/ PMHx of HFpEF (EF 50% 5/23), T2DM (A1c 11.4), HLD, and obesity who presented to the ED from clinic and here with DKA. Pt reports 3 days of worsening malaise, HA's, N, V, and D. She denies any hemoptysis or hematochezia. She also endorses increased urinary frequency but no dysuria during this time. She reports having been out of her trulicity for 1 wk but has been compliant with other home medications including insulin at home. She denies any sick contacts but does live with her two teen children who are in school. She denies any EtoH or additional substance use.     In the ED pt afebrile, , RR 50, /87 and sat 100% on RA. EKG with sinus tach. CXR without signs of consolidation or effusion. VBG w/ pH 7.44, CO2 34.3, O2 32.4, and HCO3 23.4. CB notable for WBC 22.6 Hg 16.4. CMP notable for Na 127, K 3.9, , BUN/Crt 11/1.4, and AG 22. LA 6.1, BHB 0.7, and trop 0.037. UA w/ hazy urine, 1+ protein, 4+ glucose, trace ketones, occult blood, and leuks. Micro with 32 wbc and moderate deshawn. UPT neg. Flu and COVID swab neg. She was given 30u detemir, 1L LR, and 1L NS as well as 1x vanc/zosyn and zofran. Pt was admitted to the [facility] service for management of DKA.     Review of Systems   Constitutional:  Positive for malaise/fatigue. Negative for chills and fever.   HENT:  Negative for congestion, sinus pain and sore throat.    Eyes:  Negative for photophobia.   Respiratory:  Negative for cough, sputum production and shortness of breath.    Cardiovascular:  Negative for chest pain, palpitations and leg swelling.   Gastrointestinal:  Positive for diarrhea, nausea and vomiting. Negative for abdominal pain, blood in stool and constipation.   Genitourinary:  Positive for frequency. Negative for dysuria and hematuria.   Musculoskeletal:  Negative for myalgias.   Skin:  Negative for rash.   Neurological:   Positive for headaches. Negative for sensory change and weakness.       OBJECTIVE:     Vital Signs Trends/Hx Reviewed  Vitals:    10/04/23 1559 10/04/23 1602 10/04/23 1621 10/04/23 1716   BP:  (!) 186/86  (!) 170/79   BP Location:    Left arm   Pulse: (!) 115 (!) 117     Resp:  (!) 37 (!) 24    Temp:       TempSrc:       SpO2: 96% 96%     Weight:                  Physical Exam  Vitals reviewed.   Constitutional:       General: She is not in acute distress.  HENT:      Head: Normocephalic and atraumatic.      Right Ear: External ear normal.      Left Ear: External ear normal.      Nose: Nose normal.   Eyes:      Conjunctiva/sclera: Conjunctivae normal.      Pupils: Pupils are equal, round, and reactive to light.   Cardiovascular:      Rate and Rhythm: Normal rate and regular rhythm.      Pulses: Normal pulses.      Heart sounds: Normal heart sounds. No murmur heard.     No friction rub. No gallop.   Pulmonary:      Effort: Pulmonary effort is normal.      Breath sounds: Normal breath sounds. No wheezing, rhonchi or rales.   Abdominal:      General: Bowel sounds are normal.      Palpations: There is no mass.      Tenderness: There is no abdominal tenderness.   Musculoskeletal:         General: No swelling or tenderness. Normal range of motion.      Cervical back: Normal range of motion.   Lymphadenopathy:      Cervical: No cervical adenopathy.   Skin:     General: Skin is warm and dry.      Findings: No rash.   Neurological:      General: No focal deficit present.      Mental Status: She is alert.         Recent Labs   Lab 10/04/23  1440   WBC 22.64*   HGB 16.4*   HCT 47.1   MCV 87   RBC 5.42*   MCH 30.3   MCHC 34.8   RDW 12.1      MPV 12.0   GRAN 76.0*   LYMPH 4.0*   MONO 7.0   EOSINOPHIL 0.0   BASOPHIL 0.0     Recent Labs   Lab 10/04/23  1440 10/04/23  1752   * 126*   K 3.9 3.3*   CL 86* 93*   CO2 19* 14*   ANIONGAP 22* 19*   BUN 11 12   CREATININE 1.4 1.2   * 533*   CALCIUM 10.6* 9.1   PROT 9.3*  " --    ALBUMIN 3.2*  --    ALKPHOS 147*  --    BILITOT 1.1*  --    ALT 15  --    AST 17  --      Recent Labs   Lab 10/04/23  1530   COLORU [place]*   APPEARANCEUA Hazy*   PHUR 5.0   SPECGRAV 1.020   PROTEINUA 1+*   GLUCUA 4+*   KETONESU Trace*   BILIRUBINUA Negative   OCCULTUA Trace*   UROBILINOGEN Negative   NITRITE Negative   LEUKOCYTESUR Trace*   RBCUA 3   WBCUA 32*   BACTERIA Moderate*   SQUAMEPITHEL 33   HYALINECASTS 0   MICROCMT SEE COMMENT     Recent Labs   Lab 10/04/23  1440   TROPONINI 0.037*     No results for input(s): "PT", "INR", "APTT" in the last 168 hours.  No results for input(s): "TSH", "M4CAISX", "R4ZYUVT", "THYROIDAB", "FREET4" in the last 168 hours.  X-Ray Chest AP Portable    Result Date: 10/4/2023  EXAMINATION: XR CHEST AP PORTABLE CLINICAL HISTORY: Sepsis; TECHNIQUE: Frontal view of the chest was performed.  Patient mildly rotated RPO. COMPARISON: 05/22/2023 FINDINGS: Multiple overlying cardiac monitoring leads. The cardiomediastinal silhouette is upper normal in size, likely exaggerated by AP technique.  Pulmonary vascularity appears within normal limits. Low lung volumes with mild basilar atelectasis.  Mid to upper lung zones clear without large confluent opacity.  No significant pleural fluid or pneumothorax evident on this single projection.     As above. Electronically signed by: Richard Anthony MD Date:    10/04/2023 Time:    16:46    Microbiology Results (last 7 days)       Procedure Component Value Units Date/Time    Urine culture [[id number]] Collected: 10/04/23 1530    Order Status: No result Specimen: Urine Updated: 10/04/23 1634    Blood culture x two cultures. Draw prior to antibiotics. [[id number]] Collected: 10/04/23 1453    Order Status: Sent Specimen: Blood from Peripheral, Antecubital, Left Updated: 10/04/23 1454    Blood culture x two cultures. Draw prior to antibiotics. [[id number]] Collected: 10/04/23 1440    Order Status: Sent Specimen: Blood from Peripheral, Antecubital, " Right Updated: 10/04/23 1441    Influenza A & B by Molecular [[id number]]     Order Status: Canceled Specimen: Nasopharyngeal Swab               ASSESSMENT & RECOMMENDATIONS     HFpEF (EF 50% 5/23), T2DM (A1c 11.4), HLD, MDD, GERD and obesity who presented to the ED from clinic and here with DKA now on insulin gtt.       Neuro/Psych  #MDD  -Cnt home prozac 40mg qd     CV  #HFpEF (EF 50% 5/23)  - Pt does not appear volume overloaded on exam and CXR clear  -Repeat ECHO pending  -Cnt home medications including metoprolol 100 24hr, lisinopril 40mg qd, and hold lasix 40mg qd while evaluating for volume status     #NSTEMI  -possibly related to elevated bp in /97  -Pt denies any CP or SoB  -EKG w/ sinus tach  -Trop elevated to 0.037  -Repeat q6hr w/ EKG     #HTN  -Pt with elevated BP in ED endorses daily compliance with medications  -cnt home medications including Nifedipine 60mg qd and HCTZ 25mg qd     #HLD:   -Cnt home medications including Asa 81mg qd, atorvastatin 80mg qd, and zetia 10mg qd     Pulm  Sating 100% on RA in no respiratory distress  Pt denies cigarette smoking    #CESAR  -Documented sleep study 2016 but no CPAP at home    FEN/GI  F: NS   E: Na 126, K 3.3  N: NPO    GI  #GERD  -Cnt home Protonix 40mg qd     RENAL  BUN/Cr: 11/1.4, baseline 8/0.8    #CATE  -Likely related to dehydration in setting of DKA  -Urine studies pending  -Retroperitoneal US pending    Continue to monitor renal status and urine output     Heme  H/H 16.4/47.1; stable  WBC 22.64  DVT prophylaxis:Lovenox    Endo  #DKA  -A1c 10.6  -BG on admit 562  -Pt endorses 3 days worsening sx's   -Out of Trulicity (0.75 qwk) for 1 wk but endorses compliance with home aspart 30u BID and lantus 80u bid as well as metformin 500mg ER    TSH pending    ID  -Pt afebrile on admit with elevated wbc (22) on admit likely related to stress/dka  -UA w/ moderate deshawn, leukocytes, and blood  -Urine and bld cx collected  -Pt received 1x Vanc/Zosyn in ED and started  rocephin for potential UTI  -Flu/COVID neg       Oh Manriquez M.D.  [facility]  10/04/2023 6:41 PM

## 2023-10-05 PROBLEM — R78.81 BACTEREMIA: Status: ACTIVE | Noted: 2023-10-05

## 2023-10-05 LAB
ACINETOBACTER CALCOACETICUS/BAUMANNII COMPLEX: NOT DETECTED
ALBUMIN SERPL BCP-MCNC: 1.9 G/DL (ref 3.5–5.2)
ALBUMIN SERPL BCP-MCNC: 2 G/DL (ref 3.5–5.2)
ALBUMIN SERPL BCP-MCNC: 2.3 G/DL (ref 3.5–5.2)
ALBUMIN SERPL BCP-MCNC: 2.3 G/DL (ref 3.5–5.2)
ANION GAP SERPL CALC-SCNC: 11 MMOL/L (ref 8–16)
ANION GAP SERPL CALC-SCNC: 12 MMOL/L (ref 8–16)
ANION GAP SERPL CALC-SCNC: 14 MMOL/L (ref 8–16)
ANION GAP SERPL CALC-SCNC: 15 MMOL/L (ref 8–16)
ANION GAP SERPL CALC-SCNC: 15 MMOL/L (ref 8–16)
ASCENDING AORTA: 2.54 CM
AV INDEX (PROSTH): 0.6
AV MEAN GRADIENT: 4 MMHG
AV PEAK GRADIENT: 7 MMHG
AV VALVE AREA BY VELOCITY RATIO: 1.96 CM²
AV VALVE AREA: 1.77 CM²
AV VELOCITY RATIO: 0.66
BACTEROIDES FRAGILIS: NOT DETECTED
BASOPHILS NFR BLD: 0 % (ref 0–1.9)
BSA FOR ECHO PROCEDURE: 2.22 M2
BUN SERPL-MCNC: 11 MG/DL (ref 6–20)
BUN SERPL-MCNC: 11 MG/DL (ref 6–20)
BUN SERPL-MCNC: 12 MG/DL (ref 6–20)
BUN SERPL-MCNC: 15 MG/DL (ref 6–20)
BUN SERPL-MCNC: 16 MG/DL (ref 6–20)
CALCIUM SERPL-MCNC: 8 MG/DL (ref 8.7–10.5)
CALCIUM SERPL-MCNC: 9 MG/DL (ref 8.7–10.5)
CALCIUM SERPL-MCNC: 9 MG/DL (ref 8.7–10.5)
CALCIUM SERPL-MCNC: 9.1 MG/DL (ref 8.7–10.5)
CALCIUM SERPL-MCNC: 9.1 MG/DL (ref 8.7–10.5)
CANDIDA ALBICANS: NOT DETECTED
CANDIDA AURIS: NOT DETECTED
CANDIDA GLABRATA: NOT DETECTED
CANDIDA KRUSEI: NOT DETECTED
CANDIDA PARAPSILOSIS: NOT DETECTED
CHLORIDE SERPL-SCNC: 102 MMOL/L (ref 95–110)
CHLORIDE SERPL-SCNC: 94 MMOL/L (ref 95–110)
CHLORIDE SERPL-SCNC: 94 MMOL/L (ref 95–110)
CHLORIDE SERPL-SCNC: 98 MMOL/L (ref 95–110)
CHLORIDE SERPL-SCNC: 99 MMOL/L (ref 95–110)
CO2 SERPL-SCNC: 20 MMOL/L (ref 23–29)
CO2 SERPL-SCNC: 21 MMOL/L (ref 23–29)
CO2 SERPL-SCNC: 22 MMOL/L (ref 23–29)
CREAT SERPL-MCNC: 0.8 MG/DL (ref 0.5–1.4)
CREAT SERPL-MCNC: 0.9 MG/DL (ref 0.5–1.4)
CREAT SERPL-MCNC: 1 MG/DL (ref 0.5–1.4)
CREAT SERPL-MCNC: 1.1 MG/DL (ref 0.5–1.4)
CREAT SERPL-MCNC: 1.1 MG/DL (ref 0.5–1.4)
CRYPTOCOCCUS NEOFORMANS/GATTII: NOT DETECTED
CTX-M GENE: NOT DETECTED
CV ECHO LV RWT: 0.58 CM
DIFFERENTIAL METHOD: ABNORMAL
DOHLE BOD BLD QL SMEAR: PRESENT
DOP CALC AO PEAK VEL: 1.28 M/S
DOP CALC AO VTI: 18.2 CM
DOP CALC LVOT AREA: 3 CM2
DOP CALC LVOT DIAMETER: 1.94 CM
DOP CALC LVOT PEAK VEL: 0.85 M/S
DOP CALC LVOT STROKE VOLUME: 32.2 CM3
DOP CALC MV VTI: 12.8 CM
DOP CALCLVOT PEAK VEL VTI: 10.9 CM
E WAVE DECELERATION TIME: 140.71 MSEC
E/A RATIO: 1.14
E/E' RATIO: 13.27 M/S
ECHO LV POSTERIOR WALL: 1.34 CM (ref 0.6–1.1)
ENTEROBACTER CLOACAE COMPLEX: NOT DETECTED
ENTEROBACTERALES: ABNORMAL
ENTEROCOCCUS FAECALIS: NOT DETECTED
ENTEROCOCCUS FAECIUM: NOT DETECTED
EOSINOPHIL NFR BLD: 0 % (ref 0–8)
ERYTHROCYTE [DISTWIDTH] IN BLOOD BY AUTOMATED COUNT: 12.1 % (ref 11.5–14.5)
ESCHERICHIA COLI: DETECTED
EST. GFR  (NO RACE VARIABLE): >60 ML/MIN/1.73 M^2
FRACTIONAL SHORTENING: 18 % (ref 28–44)
GLUCOSE SERPL-MCNC: 187 MG/DL (ref 70–110)
GLUCOSE SERPL-MCNC: 222 MG/DL (ref 70–110)
GLUCOSE SERPL-MCNC: 226 MG/DL (ref 70–110)
GLUCOSE SERPL-MCNC: 403 MG/DL (ref 70–110)
GLUCOSE SERPL-MCNC: 403 MG/DL (ref 70–110)
HAEMOPHILUS INFLUENZAE: NOT DETECTED
HCT VFR BLD AUTO: 34.8 % (ref 37–48.5)
HGB BLD-MCNC: 12.2 G/DL (ref 12–16)
IMM GRANULOCYTES # BLD AUTO: ABNORMAL K/UL (ref 0–0.04)
IMM GRANULOCYTES NFR BLD AUTO: ABNORMAL % (ref 0–0.5)
IMP GENE: NOT DETECTED
INTERVENTRICULAR SEPTUM: 1.13 CM (ref 0.6–1.1)
KLEBSIELLA AEROGENES: NOT DETECTED
KLEBSIELLA OXYTOCA: NOT DETECTED
KLEBSIELLA PNEUMONIAE GROUP: NOT DETECTED
KPC: NOT DETECTED
LA MAJOR: 5.9 CM
LA MINOR: 5.56 CM
LA WIDTH: 3.9 CM
LACTATE SERPL-SCNC: 3.1 MMOL/L (ref 0.5–2.2)
LACTATE SERPL-SCNC: 3.2 MMOL/L (ref 0.5–2.2)
LACTATE SERPL-SCNC: 4.7 MMOL/L (ref 0.5–2.2)
LEFT ATRIUM SIZE: 3.92 CM
LEFT ATRIUM VOLUME INDEX MOD: 27.4 ML/M2
LEFT ATRIUM VOLUME INDEX: 34.9 ML/M2
LEFT ATRIUM VOLUME MOD: 58.3 CM3
LEFT ATRIUM VOLUME: 74.39 CM3
LEFT INTERNAL DIMENSION IN SYSTOLE: 3.75 CM (ref 2.1–4)
LEFT VENTRICLE DIASTOLIC VOLUME INDEX: 45.54 ML/M2
LEFT VENTRICLE DIASTOLIC VOLUME: 97 ML
LEFT VENTRICLE MASS INDEX: 100 G/M2
LEFT VENTRICLE SYSTOLIC VOLUME INDEX: 28.2 ML/M2
LEFT VENTRICLE SYSTOLIC VOLUME: 60.01 ML
LEFT VENTRICULAR INTERNAL DIMENSION IN DIASTOLE: 4.59 CM (ref 3.5–6)
LEFT VENTRICULAR MASS: 212.92 G
LISTERIA MONOCYTOGENES: NOT DETECTED
LV LATERAL E/E' RATIO: 12.17 M/S
LV SEPTAL E/E' RATIO: 14.6 M/S
LVOT MG: 1.92 MMHG
LVOT MV: 0.67 CM/S
LYMPHOCYTES NFR BLD: 6 % (ref 18–48)
MAGNESIUM SERPL-MCNC: 1.8 MG/DL (ref 1.6–2.6)
MAGNESIUM SERPL-MCNC: 1.8 MG/DL (ref 1.6–2.6)
MAGNESIUM SERPL-MCNC: 2.3 MG/DL (ref 1.6–2.6)
MAGNESIUM SERPL-MCNC: 2.5 MG/DL (ref 1.6–2.6)
MAGNESIUM SERPL-MCNC: 2.5 MG/DL (ref 1.6–2.6)
MCH RBC QN AUTO: 30 PG (ref 27–31)
MCHC RBC AUTO-ENTMCNC: 35.1 G/DL (ref 32–36)
MCR-1: NOT DETECTED
MCV RBC AUTO: 86 FL (ref 82–98)
MEC A/C AND MREJ (MRSA): ABNORMAL
MEC A/C: ABNORMAL
MONOCYTES NFR BLD: 5 % (ref 4–15)
MV PEAK A VEL: 0.64 M/S
MV PEAK E VEL: 0.73 M/S
MV PEAK GRADIENT: 2 MMHG
MV STENOSIS PRESSURE HALF TIME: 40.81 MS
MV VALVE AREA BY CONTINUITY EQUATION: 2.52 CM2
MV VALVE AREA P 1/2 METHOD: 5.39 CM2
NDM GENE: NOT DETECTED
NEISSERIA MENINGITIDIS: NOT DETECTED
NEUTROPHILS NFR BLD: 67 % (ref 38–73)
NEUTS BAND NFR BLD MANUAL: 22 %
NRBC BLD-RTO: 0 /100 WBC
OHS LV EJECTION FRACTION SIMPSONS BIPLANE MOD: 36 %
OXA-48-LIKE: NOT DETECTED
PHOSPHATE SERPL-MCNC: 1.3 MG/DL (ref 2.7–4.5)
PHOSPHATE SERPL-MCNC: 1.5 MG/DL (ref 2.7–4.5)
PHOSPHATE SERPL-MCNC: 2 MG/DL (ref 2.7–4.5)
PHOSPHATE SERPL-MCNC: 2.1 MG/DL (ref 2.7–4.5)
PHOSPHATE SERPL-MCNC: 2.1 MG/DL (ref 2.7–4.5)
PISA MRMAX VEL: 1.78 M/S
PISA TR MAX VEL: 2.4 M/S
PLATELET # BLD AUTO: 131 K/UL (ref 150–450)
PLATELET BLD QL SMEAR: ABNORMAL
PMV BLD AUTO: 12.1 FL (ref 9.2–12.9)
POCT GLUCOSE: 184 MG/DL (ref 70–110)
POCT GLUCOSE: 199 MG/DL (ref 70–110)
POCT GLUCOSE: 206 MG/DL (ref 70–110)
POCT GLUCOSE: 221 MG/DL (ref 70–110)
POCT GLUCOSE: 227 MG/DL (ref 70–110)
POCT GLUCOSE: 241 MG/DL (ref 70–110)
POCT GLUCOSE: 258 MG/DL (ref 70–110)
POCT GLUCOSE: 312 MG/DL (ref 70–110)
POCT GLUCOSE: 315 MG/DL (ref 70–110)
POCT GLUCOSE: 327 MG/DL (ref 70–110)
POCT GLUCOSE: 330 MG/DL (ref 70–110)
POTASSIUM SERPL-SCNC: 3.4 MMOL/L (ref 3.5–5.1)
POTASSIUM SERPL-SCNC: 3.6 MMOL/L (ref 3.5–5.1)
POTASSIUM SERPL-SCNC: 3.7 MMOL/L (ref 3.5–5.1)
POTASSIUM SERPL-SCNC: 3.8 MMOL/L (ref 3.5–5.1)
POTASSIUM SERPL-SCNC: 4.8 MMOL/L (ref 3.5–5.1)
POTASSIUM SERPL-SCNC: 4.8 MMOL/L (ref 3.5–5.1)
PROTEUS SPECIES: NOT DETECTED
PSEUDOMONAS AERUGINOSA: NOT DETECTED
PULM VEIN S/D RATIO: 1.7
PV PEAK D VEL: 0.23 M/S
PV PEAK GRADIENT: 3 MMHG
PV PEAK S VEL: 0.39 M/S
PV PEAK VELOCITY: 0.87 M/S
RA MAJOR: 5.24 CM
RA PRESSURE ESTIMATED: 3 MMHG
RA WIDTH: 3.76 CM
RBC # BLD AUTO: 4.07 M/UL (ref 4–5.4)
RIGHT VENTRICULAR END-DIASTOLIC DIMENSION: 2.98 CM
RV TB RVSP: 5 MMHG
RV TISSUE DOPPLER FREE WALL SYSTOLIC VELOCITY 1 (APICAL 4 CHAMBER VIEW): 15.38 CM/S
SALMONELLA SP: NOT DETECTED
SERRATIA MARCESCENS: NOT DETECTED
SINUS: 2.87 CM
SODIUM SERPL-SCNC: 129 MMOL/L (ref 136–145)
SODIUM SERPL-SCNC: 129 MMOL/L (ref 136–145)
SODIUM SERPL-SCNC: 132 MMOL/L (ref 136–145)
SODIUM SERPL-SCNC: 133 MMOL/L (ref 136–145)
SODIUM SERPL-SCNC: 134 MMOL/L (ref 136–145)
STAPHYLOCOCCUS AUREUS: NOT DETECTED
STAPHYLOCOCCUS EPIDERMIDIS: NOT DETECTED
STAPHYLOCOCCUS LUGDUNESIS: NOT DETECTED
STAPHYLOCOCCUS SPECIES: NOT DETECTED
STENOTROPHOMONAS MALTOPHILIA: NOT DETECTED
STJ: 2.4 CM
STREPTOCOCCUS AGALACTIAE: NOT DETECTED
STREPTOCOCCUS PNEUMONIAE: NOT DETECTED
STREPTOCOCCUS PYOGENES: NOT DETECTED
STREPTOCOCCUS SPECIES: NOT DETECTED
TDI LATERAL: 0.06 M/S
TDI SEPTAL: 0.05 M/S
TDI: 0.06 M/S
TR MAX PG: 23 MMHG
TRICUSPID ANNULAR PLANE SYSTOLIC EXCURSION: 1.78 CM
TV REST PULMONARY ARTERY PRESSURE: 26 MMHG
VAN A/B: ABNORMAL
VIM GENE: NOT DETECTED
WBC # BLD AUTO: 19.3 K/UL (ref 3.9–12.7)
WBC TOXIC VACUOLES BLD QL SMEAR: PRESENT
Z-SCORE OF LEFT VENTRICULAR DIMENSION IN END DIASTOLE: -3.91
Z-SCORE OF LEFT VENTRICULAR DIMENSION IN END SYSTOLE: -0.78

## 2023-10-05 PROCEDURE — 83735 ASSAY OF MAGNESIUM: CPT

## 2023-10-05 PROCEDURE — 93005 ELECTROCARDIOGRAM TRACING: CPT

## 2023-10-05 PROCEDURE — 83735 ASSAY OF MAGNESIUM: CPT | Mod: 91

## 2023-10-05 PROCEDURE — 83605 ASSAY OF LACTIC ACID: CPT | Mod: 91 | Performed by: HOSPITALIST

## 2023-10-05 PROCEDURE — 25000003 PHARM REV CODE 250: Performed by: HOSPITALIST

## 2023-10-05 PROCEDURE — 25000003 PHARM REV CODE 250: Performed by: INTERNAL MEDICINE

## 2023-10-05 PROCEDURE — 63600175 PHARM REV CODE 636 W HCPCS: Performed by: STUDENT IN AN ORGANIZED HEALTH CARE EDUCATION/TRAINING PROGRAM

## 2023-10-05 PROCEDURE — 63600175 PHARM REV CODE 636 W HCPCS

## 2023-10-05 PROCEDURE — 25000003 PHARM REV CODE 250: Performed by: STUDENT IN AN ORGANIZED HEALTH CARE EDUCATION/TRAINING PROGRAM

## 2023-10-05 PROCEDURE — 25000003 PHARM REV CODE 250

## 2023-10-05 PROCEDURE — 63600175 PHARM REV CODE 636 W HCPCS: Performed by: INTERNAL MEDICINE

## 2023-10-05 PROCEDURE — 84132 ASSAY OF SERUM POTASSIUM: CPT | Performed by: HOSPITALIST

## 2023-10-05 PROCEDURE — 83605 ASSAY OF LACTIC ACID: CPT | Mod: 91

## 2023-10-05 PROCEDURE — 93010 EKG 12-LEAD: ICD-10-PCS | Mod: ,,, | Performed by: INTERNAL MEDICINE

## 2023-10-05 PROCEDURE — 36415 COLL VENOUS BLD VENIPUNCTURE: CPT

## 2023-10-05 PROCEDURE — 80048 BASIC METABOLIC PNL TOTAL CA: CPT

## 2023-10-05 PROCEDURE — 93010 ELECTROCARDIOGRAM REPORT: CPT | Mod: ,,, | Performed by: INTERNAL MEDICINE

## 2023-10-05 PROCEDURE — 99900035 HC TECH TIME PER 15 MIN (STAT)

## 2023-10-05 PROCEDURE — 84100 ASSAY OF PHOSPHORUS: CPT | Performed by: HOSPITALIST

## 2023-10-05 PROCEDURE — 80069 RENAL FUNCTION PANEL: CPT

## 2023-10-05 PROCEDURE — 85027 COMPLETE CBC AUTOMATED: CPT | Performed by: INTERNAL MEDICINE

## 2023-10-05 PROCEDURE — 80069 RENAL FUNCTION PANEL: CPT | Mod: 91

## 2023-10-05 PROCEDURE — 83605 ASSAY OF LACTIC ACID: CPT

## 2023-10-05 PROCEDURE — 27000221 HC OXYGEN, UP TO 24 HOURS

## 2023-10-05 PROCEDURE — 83735 ASSAY OF MAGNESIUM: CPT | Mod: 91 | Performed by: HOSPITALIST

## 2023-10-05 PROCEDURE — 94761 N-INVAS EAR/PLS OXIMETRY MLT: CPT

## 2023-10-05 PROCEDURE — 11000001 HC ACUTE MED/SURG PRIVATE ROOM

## 2023-10-05 PROCEDURE — 85007 BL SMEAR W/DIFF WBC COUNT: CPT | Performed by: INTERNAL MEDICINE

## 2023-10-05 RX ORDER — IBUPROFEN 200 MG
24 TABLET ORAL
Status: DISCONTINUED | OUTPATIENT
Start: 2023-10-05 | End: 2023-10-11 | Stop reason: HOSPADM

## 2023-10-05 RX ORDER — POTASSIUM CHLORIDE 20 MEQ/1
40 TABLET, EXTENDED RELEASE ORAL ONCE
Status: COMPLETED | OUTPATIENT
Start: 2023-10-05 | End: 2023-10-05

## 2023-10-05 RX ORDER — SODIUM,POTASSIUM PHOSPHATES 280-250MG
1 POWDER IN PACKET (EA) ORAL EVERY 4 HOURS
Status: DISCONTINUED | OUTPATIENT
Start: 2023-10-05 | End: 2023-10-05

## 2023-10-05 RX ORDER — SODIUM,POTASSIUM PHOSPHATES 280-250MG
2 POWDER IN PACKET (EA) ORAL EVERY 6 HOURS
Status: DISCONTINUED | OUTPATIENT
Start: 2023-10-05 | End: 2023-10-06

## 2023-10-05 RX ORDER — DEXTROSE, SODIUM CHLORIDE, SODIUM LACTATE, POTASSIUM CHLORIDE, AND CALCIUM CHLORIDE 5; .6; .31; .03; .02 G/100ML; G/100ML; G/100ML; G/100ML; G/100ML
INJECTION, SOLUTION INTRAVENOUS CONTINUOUS
Status: DISCONTINUED | OUTPATIENT
Start: 2023-10-05 | End: 2023-10-05

## 2023-10-05 RX ORDER — INSULIN ASPART 100 [IU]/ML
0-10 INJECTION, SOLUTION INTRAVENOUS; SUBCUTANEOUS
Status: DISCONTINUED | OUTPATIENT
Start: 2023-10-05 | End: 2023-10-11 | Stop reason: HOSPADM

## 2023-10-05 RX ORDER — IBUPROFEN 200 MG
16 TABLET ORAL
Status: DISCONTINUED | OUTPATIENT
Start: 2023-10-05 | End: 2023-10-11 | Stop reason: HOSPADM

## 2023-10-05 RX ORDER — ACETAMINOPHEN 500 MG
500 TABLET ORAL EVERY 6 HOURS PRN
Status: DISCONTINUED | OUTPATIENT
Start: 2023-10-05 | End: 2023-10-11 | Stop reason: HOSPADM

## 2023-10-05 RX ORDER — GLUCAGON 1 MG
1 KIT INJECTION
Status: DISCONTINUED | OUTPATIENT
Start: 2023-10-05 | End: 2023-10-11 | Stop reason: HOSPADM

## 2023-10-05 RX ADMIN — CEFTRIAXONE SODIUM 2 G: 2 INJECTION, POWDER, FOR SOLUTION INTRAMUSCULAR; INTRAVENOUS at 06:10

## 2023-10-05 RX ADMIN — PANTOPRAZOLE SODIUM 40 MG: 40 TABLET, DELAYED RELEASE ORAL at 08:10

## 2023-10-05 RX ADMIN — INSULIN ASPART 4 UNITS: 100 INJECTION, SOLUTION INTRAVENOUS; SUBCUTANEOUS at 12:10

## 2023-10-05 RX ADMIN — ATORVASTATIN CALCIUM 80 MG: 40 TABLET, FILM COATED ORAL at 09:10

## 2023-10-05 RX ADMIN — MUPIROCIN: 20 OINTMENT TOPICAL at 09:10

## 2023-10-05 RX ADMIN — INSULIN ASPART 4 UNITS: 100 INJECTION, SOLUTION INTRAVENOUS; SUBCUTANEOUS at 09:10

## 2023-10-05 RX ADMIN — ACETAMINOPHEN 500 MG: 500 TABLET ORAL at 12:10

## 2023-10-05 RX ADMIN — ASPIRIN 81 MG: 81 TABLET, COATED ORAL at 08:10

## 2023-10-05 RX ADMIN — INSULIN ASPART 4 UNITS: 100 INJECTION, SOLUTION INTRAVENOUS; SUBCUTANEOUS at 04:10

## 2023-10-05 RX ADMIN — ENOXAPARIN SODIUM 40 MG: 40 INJECTION SUBCUTANEOUS at 04:10

## 2023-10-05 RX ADMIN — SODIUM PHOSPHATE, MONOBASIC, MONOHYDRATE AND SODIUM PHOSPHATE, DIBASIC, ANHYDROUS 30 MMOL: 142; 276 INJECTION, SOLUTION INTRAVENOUS at 08:10

## 2023-10-05 RX ADMIN — FLUOXETINE 40 MG: 20 CAPSULE ORAL at 08:10

## 2023-10-05 RX ADMIN — POTASSIUM & SODIUM PHOSPHATES POWDER PACK 280-160-250 MG 2 PACKET: 280-160-250 PACK at 12:10

## 2023-10-05 RX ADMIN — METOPROLOL SUCCINATE 100 MG: 50 TABLET, EXTENDED RELEASE ORAL at 08:10

## 2023-10-05 RX ADMIN — LISINOPRIL 40 MG: 20 TABLET ORAL at 08:10

## 2023-10-05 RX ADMIN — MUPIROCIN: 20 OINTMENT TOPICAL at 08:10

## 2023-10-05 RX ADMIN — POTASSIUM & SODIUM PHOSPHATES POWDER PACK 280-160-250 MG 2 PACKET: 280-160-250 PACK at 06:10

## 2023-10-05 RX ADMIN — POTASSIUM & SODIUM PHOSPHATES POWDER PACK 280-160-250 MG 2 PACKET: 280-160-250 PACK at 11:10

## 2023-10-05 RX ADMIN — POTASSIUM & SODIUM PHOSPHATES POWDER PACK 280-160-250 MG 1 PACKET: 280-160-250 PACK at 02:10

## 2023-10-05 RX ADMIN — INSULIN DETEMIR 60 UNITS: 100 INJECTION, SOLUTION SUBCUTANEOUS at 06:10

## 2023-10-05 RX ADMIN — POTASSIUM CHLORIDE 40 MEQ: 1500 TABLET, EXTENDED RELEASE ORAL at 04:10

## 2023-10-05 RX ADMIN — EZETIMIBE 10 MG: 10 TABLET ORAL at 08:10

## 2023-10-05 RX ADMIN — DEXTROSE, SODIUM CHLORIDE, SODIUM LACTATE, POTASSIUM CHLORIDE, AND CALCIUM CHLORIDE: 5; .6; .31; .03; .02 INJECTION, SOLUTION INTRAVENOUS at 06:10

## 2023-10-05 RX ADMIN — INSULIN DETEMIR 25 UNITS: 100 INJECTION, SOLUTION SUBCUTANEOUS at 09:10

## 2023-10-05 RX ADMIN — NIFEDIPINE 30 MG: 30 TABLET, FILM COATED, EXTENDED RELEASE ORAL at 08:10

## 2023-10-05 RX ADMIN — ACETAMINOPHEN 500 MG: 500 TABLET ORAL at 03:10

## 2023-10-05 RX ADMIN — SODIUM CHLORIDE 0.1 UNITS/KG/HR: 9 INJECTION, SOLUTION INTRAVENOUS at 06:10

## 2023-10-05 RX ADMIN — POTASSIUM CHLORIDE 40 MEQ: 1500 TABLET, EXTENDED RELEASE ORAL at 02:10

## 2023-10-05 RX ADMIN — HYDROCHLOROTHIAZIDE 25 MG: 25 TABLET ORAL at 08:10

## 2023-10-05 RX ADMIN — POTASSIUM & SODIUM PHOSPHATES POWDER PACK 280-160-250 MG 1 PACKET: 280-160-250 PACK at 05:10

## 2023-10-05 NOTE — PLAN OF CARE
CM met with pt - she is awake but seems confused and drowsy   CM called and spoke with daughter Kimberly Eric [phone]     Dx NSTEMI, DKA     Pt lives with her two adult daughters.  Per Kimberly - pt is independent at home - takes [facility] transportation to get to apts and errands.    Pt will need transportation to home at discharge per daughter.      Pt has a st cane, glucometer and blood pressure machine. No HH prior to admit    [facility] MDs asked to consider pt/ot eval. per Secure Chat.     CM will ask Tita with Scheduling to assist with pcp, Cards f/u.    Future Appointments   Date Time Provider Department Center   10/9/2023  1:00 PM [facility] REHAB, [facility]   10/12/2023  1:00 PM CARD[facility] REHAB, [facility]   10/16/2023  1:00 PM [facility] REHAB, [facility]   10/19/2023  1:00 PM [facility] RE[facility]   10/23/2023  [facility]   10[facility]26/2023  1:00 [facility]   10[facility]30/2023  1:00 PM [facility] REHAB, [facility]   11/2/2023  1:00 [facility]   11/6/2023  1:00 [facility]   11/9/2023  1:00 PM CARDIAC REHAB, [facility]   11/13/2023  1:00 [facility]   11/16/2023  1:00 [facility]   11/20/2023  1:00 [facility]   11/27/2023  1:00 [facility]   11/30/2023  1:00 [facility]   12/4/2023  1:00 [facility]   12/7/2023  1:00 PM [facility]ARD[facility]AC REHAB, [facility]   12/11/2023  1:00 PM CARDIAC REHAB, [facility]   12/14/2023  1:00 PM [facility]ARDIAC REHAB, [facility]   12/18/2023  1:00 PM [facility] REHAB, [facility]   12/21/2023  1:00 PM [facility]            10/05/23 1755   Discharge Assessment   Assessment Type  Discharge Planning Assessment   Confirmed/corrected address, phone number and insurance Yes   Confirmed Demographics Correct on Facesheet   Source of Information family;health record   Communicated MALGORZATA with patient/caregiver Yes   People in Home child(shaylee), adult   Do you expect to return to your current living situation? Yes   Do you have help at home or someone to help you manage your care at home? Yes   Who are your caregiver(s) and their phone number(s)? Daughters Kelley  [phone]   Prior to hospitilization cognitive status: Unable to Assess   Current cognitive status: Unable to Assess   Do you have any problems with: Needs other help   Specify other help family assist; [employer]ish [employer] or [employer]   Equipment Currently Used at Home cane, straight;glucometer;blood pressure machine   Patient currently being followed by outpatient case management? No   Do you currently have service(s) that help you manage your care at home? No   Do you take prescription medications? Yes  ([facility])   Do you have any problems affording any of your prescribed medications? No   Is the patient taking medications as prescribed? yes   Who is going to help you get home at discharge? PT WILL NEED TRANSPORTATION TO HOME AT D/C   How do you get to doctors appointments? public transportation;other (see comments)  ([employer])   Are you on dialysis? No   Do you take coumadin? No   DME Needed Upon Discharge    (TBD)   Discharge Plan discussed with: Adult children   Discharge Plan A Home;Home with family   Discharge Plan B Home;Home with family;Home Health

## 2023-10-05 NOTE — ED NOTES
Received report from Yolis CHOUDHURY; pt AAO x4, in no acute distress, siderails x2, insuline and vanc infusing.

## 2023-10-05 NOTE — NURSING TRANSFER
Nursing Transfer Note      10/4/2023   9:20 PM    Nurse giving handoff: MACEY Zuñiga  Nurse receiving handoff: MACEY Brar    Reason patient is being transferred: meets ICU criteria    Transfer From: ED to ICU    Transfer via stretcher    Transfer with cardiac monitoring    Transported by MACEY Zuñiga    Transfer Vital Signs:  Blood Pressure:137/90  Heart Rate:120  SpO2:96% on room air  Temperature:100.7  Respirations:31    Telemetry: Box Number   Order for Tele Monitor? Yes    4eyes on Skin: yes    Medicines sent: Insulin regular IVPB    Any special needs or follow-up needed: monitor blood glucose every hour    Patient belongings transferred with patient: Yes    Chart send with patient: Yes    Notified: sister Simmons ([phone])    Patient reassessed at: 10/04/2023 @ 2025     Upon arrival to floor: cardiac monitor applied, patient oriented to room, call bell in reach, and bed in lowest position

## 2023-10-05 NOTE — CONSULTS
Consult Note  [facility]    Pulm/Critical Care Attending: Larry Jennings MD  Resident: Candace Smith  Admit Date: 10/4/2023  Today's Date: 10/05/2023  Reason for Consult:  DKA    SUBJECTIVE:     HPI: 48 y.o. F with PMHX of HFpEF, DM type 2, HTN, Obesity and HLD who presented to the ED after being seen at routine clinic appointment. Patient endorsing generalized malaise for approximately 2-3 days. In addition patient has also been endorsing diffuse abdominal pain, watery diarrhea 3x and increased urinary frequency. In the ED initial vital signs notable for hypertension/elevated BP, tachycardia and tachypnea. Patient oxygen saturation stable on RA.  EKG notable for sinus tachycardia. CXR unremarkable. CMP notable for hyponatremia, mild hypokalemia and hyperglycemia. Beta-hydroxybutyrate 0.07. Noted to have lactic acidosis and elevated troponin. CBC notable for leucocytosis.UA notable for evidence of UTI. Patient given 30 units of Detemir, 1 L LR and 1 L NS. Patient given 1x dose of Vanc/zosyn for concern for sepsis. Admitted to ICU level care for medical management of DKA.       Interval History: NAEON. VSS. Initiated on insulin gtt. Plan to switch to sub q insulin this AM. Able to tolerate PO. Endorses mild diffuse abdominal pain. Denies any episodes of diarrhea or vomiting this AM.      Review of patient's allergies indicates:  No Known Allergies    Past Medical History:   Diagnosis Date    Cataract     Cervical high risk HPV (human papillomavirus) test positive 2020    NOT 16 OR 18    CVA (cerebral infarction)          Depression     Diabetes mellitus, type 2     GERD (gastroesophageal reflux disease)     Hyperlipidemia     Hypertension     Moderate nonproliferative diabetic retinopathy     Obesity     Stroke      Past Surgical History:   Procedure Laterality Date     SECTION      CHOLECYSTECTOMY      DILATION AND CURETTAGE OF UTERUS      GALLBLADDER  SURGERY  2017    stone removed     Family History   Problem Relation Age of Onset    Stroke Mother     Thyroid disease Mother     Diabetes Mother     Cataracts Father     Hypertension Father     Arthritis Father     Diabetes Father     Hypertension Sister     Stroke Sister     Thyroid disease Sister     Heart disease Sister     Thyroid disease Daughter     Asthma Daughter     No Known Problems Brother     No Known Problems Maternal Aunt     No Known Problems Maternal Uncle     No Known Problems Paternal Aunt     No Known Problems Paternal Uncle     No Known Problems Maternal Grandmother     No Known Problems Maternal Grandfather     No Known Problems Paternal Grandmother     No Known Problems Paternal Grandfather     Amblyopia Neg Hx     Blindness Neg Hx     Cancer Neg Hx     Glaucoma Neg Hx     Macular degeneration Neg Hx     Retinal detachment Neg Hx     Strabismus Neg Hx      Social History     Tobacco Use    Smoking status: Former     Current packs/day: 0.00     Types: Cigarettes     Quit date: 2021     Years since quittin.6    Smokeless tobacco: Never   Substance Use Topics    Alcohol use: Yes     Alcohol/week: 3.0 standard drinks of alcohol     Types: 3 Cans of beer per week     Comment: Rare    Drug use: No       All medications reviewed.    Review of Systems   Constitutional:  Negative for chills and fever.   HENT:  Negative for congestion and sore throat.    Eyes:  Negative for blurred vision.   Respiratory:  Negative for cough, shortness of breath and wheezing.    Cardiovascular:  Negative for chest pain and leg swelling.   Gastrointestinal:  Positive for abdominal pain. Negative for nausea and vomiting.   Genitourinary:  Negative for dysuria.   Musculoskeletal:  Negative for joint pain and myalgias.   Skin:  Negative for rash.   Neurological:  Negative for dizziness and headaches.       OBJECTIVE:     Vital Signs Trends/Hx Reviewed  Vitals:    10/05/23 0715 10/05/23 0716 10/05/23 0720 10/05/23  0725   BP:       BP Location:       Patient Position:       Pulse: (!) 118 (!) 117 (!) 117 (!) 118   Resp: (!) 36 (!) 33 (!) 33 (!) 42   Temp:   100.2 °F (37.9 °C)    TempSrc:   Oral    SpO2: 99% 100% 99% 99%   Weight:       Height:           Ventilator settings: None  Physical Exam:  General: NAD, cooperative & interactive.  HEENT: AT/NC, PERRL, EOMI, oral and nasal mucosa moist.   Neck: Supple without JVD or palpable LAD.   Cardiac: normal rate, regular rhythm, with no MRG with brisk cap refill and symmetric pulses in distal extremities.  Respiratory: Normal inspection. Symmetric chest rise. Normal palpation and percussion. Auscultation clear bilaterally. No increased work of breathing noted.   Abdomen: Soft, Diffuse tenderness to palpation of abdomen. ND. +BS. No hepatosplenomegaly.   Extremities: No edema.   Neuro: Grossly intact to brief exam. Oriented x3 with appropriate mood/affect to situation.       Laboratory:  Recent Labs   Lab 10/04/23  1558   PH 7.442   PCO2 34.3*   PO2 32.4*   HCO3 23.4*   POCSATURATED 64.3*     Recent Labs   Lab 10/05/23  0510   WBC 19.30*   RBC 4.07   HGB 12.2   HCT 34.8*   *   MCV 86   MCH 30.0   MCHC 35.1     Recent Labs   Lab 10/05/23  0510   *   K 3.4*      CO2 21*   BUN 12   CREATININE 0.8   CALCIUM 8.0*   MG 2.3       Microbiology Data:   Microbiology Results (last 7 days)       Procedure Component Value Units Date/Time    Blood culture x two cultures. Draw prior to antibiotics. [[id number]] Collected: 10/04/23 1453    Order Status: Completed Specimen: Blood from Peripheral, Antecubital, Left Updated: 10/05/23 0636     Blood Culture, Routine Gram stain aer bottle: Gram negative rods      Gram stain rodriguez bottle: Gram negative rods      Results called to and read back by: Nancy Juarez RN 10/05/2023  06:35    Narrative:      Aerobic and anaerobic    Blood culture x two cultures. Draw prior to antibiotics. [[id number]] Collected: 10/04/23 1440    Order Status:  Completed Specimen: Blood from Peripheral, Antecubital, Right Updated: 10/05/23 0635     Blood Culture, Routine Gram stain aer bottle: Gram negative rods      Gram stain rodriguez bottle: Gram negative rods      Results called to and read back by: Nancy Juarez RN 10/05/2023  06:35    Narrative:      Aerobic and anaerobic    Rapid Organism ID by PCR (from Blood culture) [[id number]] Collected: 10/04/23 1440    Order Status: No result Updated: 10/05/23 0614    Influenza A & B by Molecular [[id number]]     Order Status: No result Specimen: Nasopharyngeal Swab     Urine culture [[id number]] Collected: 10/04/23 1530    Order Status: No result Specimen: Urine Updated: 10/04/23 1634    Influenza A & B by Molecular [[id number]]     Order Status: Canceled Specimen: Nasopharyngeal Swab              Chest Imaging:   No new imaging.     Infusions:     dextrose 5 % and 0.45 % NaCl      dextrose 5% lactated ringers 125 mL/hr at 10/05/23 0725    insulin regular 1 units/mL infusion orderable (DKA) 0.05 Units/kg/hr (10/05/23 0727)    potassium chloride 40 mEq in lactated ringers 1,000 mL 125 mL/hr at 10/05/23 0725       Scheduled Medications:    aspirin  81 mg Oral Daily    atorvastatin  80 mg Oral QHS    cefTRIAXone (ROCEPHIN) IVPB  1 g Intravenous Q24H    enoxparin  40 mg Subcutaneous Daily    ezetimibe  10 mg Oral Daily    FLUoxetine  40 mg Oral Daily    hydroCHLOROthiazide  25 mg Oral Daily    lisinopriL  40 mg Oral Daily    metoprolol succinate  100 mg Oral Daily    mupirocin   Nasal BID    NIFEdipine  30 mg Oral Daily    pantoprazole  40 mg Oral Daily    potassium, sodium phosphates  1 packet Oral Q4H    sodium phosphate 30 mmol in dextrose 5 % (D5W) 250 mL IVPB  30 mmol Intravenous Once       PRN Medications:   sodium chloride 0.9%, acetaminophen, dextrose 10 % in water (D10W), dextrose 10 % in water (D10W), dextrose 10%, dextrose 10%, dextrose 10%, dextrose 10%, dextrose 5 % and 0.45 % NaCl, sodium chloride 0.9%, sodium chloride  0.9%    Assessment & Plan:   Patient Active Problem List   Diagnosis    Uncontrolled type 2 diabetes mellitus with right eye affected by severe nonproliferative retinopathy and macular edema, with long-term current use of insulin    HTN (hypertension) without retinopathy    Hyperlipidemia associated with type 2 diabetes mellitus    Cerebrovascular disease    Class 3 severe obesity due to excess calories with serious comorbidity and body mass index (BMI) of 40.0 to 44.9 in adult    Hypertension associated with diabetes    CESAR (obstructive sleep apnea)    UTI (urinary tract infection)    Fatty liver    Bilateral low back pain without sciatica    History of cholecystectomy    Diabetic peripheral neuropathy associated with type 2 diabetes mellitus    Thyroid nodule    Abnormal finding of blood chemistry     Type 2 diabetes mellitus with other skin ulcer (CODE)    Hypertensive retinopathy, bilateral    Type 2 diabetes mellitus with both eyes affected by proliferative retinopathy and macular edema, with long-term current use of insulin    Mild episode of recurrent major depressive disorder    Abnormal Papanicolaou smear of cervix with positive human papilloma virus (HPV) test    Chronic diastolic congestive heart failure    Type 2 diabetes mellitus    Noncompliance with medications    GERD (gastroesophageal reflux disease)    History of CVA (cerebrovascular accident) without residual deficits    Bilateral claudication of lower limb    Tachycardia       ASSESSMENT & RECOMMENDATIONS       Neuro/Psych  No acute issues at this time  Continue to monitor    #MDD  On Prozac 40 mg daily at home.  Will continue home dose    CV    #NSTEMI  Likely secondary to demand ischemia  Initial troponin 0.037. Down trended to 0.03  EKG with evidence of Sinus Tachycardia    #HFpEF  Echo: with EF of 50% and low normal systolic function.  On metoprolol 100 mg, lasix 40 mg and lisinopril 40 mg daily  No evidence of volume overload    Plan  Hold  lasix, continue rest of home meds    #HTN  On ASA 81 mg, Nifedipine 60 mg and HCTZ 25 mg    Plan  Continue home meds    #HLD  On Lipitor 80 mg and Zetia 10mg     Plan  Continue home meds      Pulm  #Tachypnea-Resolved  Most recent VBG as above  Likely 2/2 to concomitant metabolic acidosis and alkalosis in setting of N/V and DKA    #CESAR  Prior sleep study. Does not use CPAP.     FEN/GI  F: Insulin gtt, D5 in  ml/hr and Potassium 40 meq in LR   E: : Na: 134 , K 3.7, CO2: 22, Mg 2.5 and Phos 2  N: Diabetic Cardiac diet  Fluid restriction   Keep Mg 2, K 4  Zofran prn for n/v     #GERD  On Protonix 40 mg daily    Plan  On medication since 2017. Assess need for medication and consider discontinuing medication.    RENAL    #CATE  Likely secondary to DKA  Retroperitoneal US unremarkable  FEurea 49.7%- intrinsic  UOP: 850cc , In: 2152.3cc, net 1302.3cc in last 24 hrs  Strict I&Os  Avoid renal toxic meds  BUN/Cr: 15/0.9, baseline 8/0.8  Continue to monitor renal status and urine output    #Lactic Acidosis  Likely combination of sepsis and DKA  Initial Lactic of 6.1; down trended to 3.2    Plan  Continue to monitor     Heme  H/H 12.2/34.8; stable  WBC 19.3  DVT prophylaxis: Lovenox    Endo  #DKA  BG in 500's on admin. A1C 10.6  Home regimen includes Trulicity 0.75 qwk, Aspart 30 units BID, Lantus 80 units BID and metformin 500 mg ER  Has been out of Trulicity for 1 week since medication was never delivered per patient. Endorses medication adherence  On insulin gtt. Given sub q insulin 60 units this AM. Plan to transition to sub q today.  Maintain glucose 140-180  SSI    TSH:0.759    ID  #Urosepsis concern  UA w/ moderate deshawn, leukocytes, and blood  Blood cultures with Gram negative rods  Urine culture pending  Rapid ID PCR notable for E.coli and Enterobacterales  S/p 1x dose of Vanc and Zosyn  On Rocephin    Plan  Continue Rocephin  Await sensitivities      DVT PPx: -Lovenox  Diet: Diabetic-Cardiac diet  GI: Protonix 40  (for GERD)  Deconditioning: PT/OT  Code Status: Full      Dispo  -  Plan to transition to sub q insulin today. Can step down if AG closes.      Thank you for allowing us to participate in the care of this patient. We will continue to follow. Please call with questions.      Candace Smith MD  [facility], PGY-2  10/05/2023    Pt seen and examined with Pulmonary/Critical Care team and this note was reviewed and validated with the following additional comments: This is urosepsis with septic shock.  Her DKA is mild and is a complication of her sepsis.  Improving with IVFs, abx, insulin.  Abx appropriate.    Critical Care time was spent validating the history and physical exam, reviewing the lab and imaging results, and discussing the care of the patient with the bedside nurse and the patient and/or surrogates. This critical care time did not overlap with that of any other provider or involve time for any procedures.  This patient has a high probability of sudden clinically significant deterioration which requires the highest level of physician preparedness to intervene urgently. I managed/supervised life or organ supporting interventions that required frequent physician assessments. I devoted my full attention in the ICU to the direct care of this patient for this period of time. Organ systems which are failing and require intensive, critical care support are: cardiovascular, renal, infectious disease, metabolic  Critical Care time: 35 minutes    Zeke Avilez MD  Phone [phone]

## 2023-10-05 NOTE — PLAN OF CARE
10/05/23 1617   Admission   Initial VN Admission Questions Complete  (Completed overdue questions)   Shift   Virtual Nurse - Rounding Complete   Pain Management Interventions care clustered;diversional activity provided;pain management plan reviewed with patient/caregiver;quiet environment facilitated;relaxation techniques promoted   Virtual Nurse - Patient Verbalized Approval Of Camera Use;VN Rounding   Type of Frequent Check   Type Patient Rounds;Telemetry Monitoring   Safety/Activity   Patient Rounds bed in low position;ID band on;placement of personal items at bedside;bed wheels locked;call light in patient/parent reach;visualized patient;clutter free environment maintained   Safety Promotion/Fall Prevention assistive device/personal item within reach;bed alarm set;diversional activities provided;Fall Risk reviewed with patient/family;high risk medications identified;medications reviewed;nonskid shoes/socks when out of bed;room near unit station;side rails raised x 2;instructed to call staff for mobility   Safety Precautions emergency equipment at bedside   Activity Management Rolling - L1   Positioning   Body Position position changed independently;supine;head facing, left   Head of Bed (HOB) Positioning HOB at 30-45 degrees   Pain/Comfort/Sleep   Preferred Pain Scale number (Numeric Rating Pain Scale)   Cardiac   Cardiac/Telemetry Monitor On Yes   ECG   Rhythm normal sinus rhythm     VN cued into patient's room for introduction with patient's permission.  VN role explained and informed patient that VN would be working with bedside nurse and rest of care team.  Plan of care reviewed. Fall risk and bed alarm protocol education provided.  Instructed patient to call for assistance.  Patient aware and agreeable.  Patient's chart, labs and vital signs reviewed.  Allowed time for questions.  No acute distress noted.  Will continue to be available as needed.

## 2023-10-05 NOTE — PROGRESS NOTES
eICU Brief Admission Note       Briefly, 49 yo F w/ PMHx of HFpEF (EF 50% 5/23), T2DM (A1c 11.4), HLD, and obesity who presented to the ED from clinic with 3 days of worsening malaise, headache, nausea-vomiting & increased urinary frequency.       Video assessment :  Lying comfortably in bed     Vitals reviewed   Febrile, stable vitals     LABs reviewed       Radiology reviewed     CXR   Multiple overlying cardiac monitoring leads. The cardiomediastinal silhouette is upper normal in size, likely exaggerated by AP technique.  Pulmonary vascularity appears within normal limits.   Low lung volumes with mild basilar atelectasis.  Mid to upper lung zones clear without large confluent opacity.  No significant pleural fluid or pneumothorax evident on this single projection.        Assessment / Plan :  DKA   Sepsis , lactic acidosis -- possible UTI   Low K, Na, Phos , Mg   H/o HFpEF (EF 50% 5/23), T2DM (A1c 11.4), HLD, and obesity  - Insulin drip on hold due to low electrolytes; labs as per protocol   - will supplement electrolytes first   - on Ceftriaxone; f/u cultures   - s/p 2.5 L IVF; on a drip now   - trend lactate   - home meds as tolerated     DVT Px : Lovenox SQ   GI Px : N/A       Patient seen over video : Yes  Chart reviewed : Yes  Spoke with RN : Yes

## 2023-10-05 NOTE — ED NOTES
Sister of patient named Hayley  called for update. Will ask patient if information can be shared with sister. Phone number [phone]

## 2023-10-05 NOTE — PROGRESS NOTES
Progress Note  [facility]    Attending: Dr. Merrick Mccall  Admit Date: 10/4/2023  Today's Date: 10/05/2023    49 yo F w/ PMHx of HFpEF (EF 50% 5/23), T2DM (A1c 11.4), HLD, and obesity who presented to the ED from clinic and here with DKA. Pt reports 3 days of worsening malaise, HA's, N, V, and D. She denies any hemoptysis or hematochezia. She also endorses increased urinary frequency but no dysuria during this time. She reports having been out of her trulicity for 1 wk but has been compliant with other home medications including insulin at home. She denies any sick contacts but does live with her two teen children who are in school. She denies any EtoH or additional substance use.      In the ED pt afebrile, , RR 50, /87 and sat 100% on RA. EKG with sinus tach. CXR without signs of consolidation or effusion. VBG w/ pH 7.44, CO2 34.3, O2 32.4, and HCO3 23.4. CB notable for WBC 22.6 Hg 16.4. CMP notable for Na 127, K 3.9, , BUN/Crt 11/1.4, and AG 22. LA 6.1, BHB 0.7, and trop 0.037. UA w/ hazy urine, 1+ protein, 4+ glucose, trace ketones, occult blood, and leuks. Micro with 32 wbc and moderate deshawn. UPT neg. Flu and COVID swab neg. She was given 30u detemir, 1L LR, and 1L NS as well as 1x vanc/zosyn and zofran. Pt was admitted to the [facility] service for management of DKA.   SUBJECTIVE:     Patient seen and examined this morning. Reports that she is doing better overall, has some lower abdominal pain.  Review of Systems   Constitutional:  Negative for chills and fever.   Cardiovascular:  Negative for chest pain.   Gastrointestinal:  Negative for nausea and vomiting.     OBJECTIVE:     Vital Signs Trends/Hx Reviewed  Vitals:    10/05/23 0430 10/05/23 0445 10/05/23 0645 10/05/23 0700   BP:       Pulse: (!) 121 (!) 120 (!) 117 (!) 118   Resp: (!) 36 (!) 35 (!) 30 (!) 39   Temp:       TempSrc:       SpO2: 97% 98% 99% 98%   Weight:       Height:                    Physical  Exam  Constitutional:       General: She is not in acute distress.  HENT:      Head: Normocephalic.      Mouth/Throat:      Mouth: Mucous membranes are dry.   Eyes:      Extraocular Movements: Extraocular movements intact.   Cardiovascular:      Heart sounds: Normal heart sounds.   Pulmonary:      Effort: Pulmonary effort is normal.      Breath sounds: Normal breath sounds.   Abdominal:      Palpations: Abdomen is soft.      Tenderness: There is abdominal tenderness.      Comments: Tenderness to palpation on the right and left Lower quadrants   Musculoskeletal:         General: Normal range of motion.      Cervical back: Normal range of motion.      Right lower leg: No edema.      Left lower leg: No edema.   Skin:     General: Skin is warm and dry.   Neurological:      Mental Status: She is alert.      Motor: No weakness.         Laboratory:  Recent Labs   Lab 10/05/23  0510   WBC 19.30*   RBC 4.07   HGB 12.2   HCT 34.8*   *   MCV 86   MCH 30.0   MCHC 35.1     Recent Labs   Lab 10/05/23  0510   *   K 3.4*      CO2 21*   BUN 12   CREATININE 0.8   CALCIUM 8.0*   MG 2.3     Recent Labs   Lab 10/04/23  1558   PH 7.442   PCO2 34.3*   PO2 32.4*   HCO3 23.4*   POCSATURATED 64.3*         Chest Imaging:   No new chest imaging.     ASSESSMENT & RECOMMENDATIONS   The patient is a 47y/o with a past med of T2DM, HFpEF, HTN, who presented with DKA despite adherence to medication regimen. She appears to be improving but was noted to have bacteremia    Neuro/Psych  #MDD  -Continue home prozac 40mg qd     CV  BP: 133/66 HR: 118 MAP: 91    #HFpEF (EF 50% 5/2023)  - Pt did not appear volume overloaded on exam and CXR clear    Plan:  -Repeat ECHO pending  -Cnt home medications including metoprolol 100 24hr, lisinopril 40mg qd  - Will hold lasix 40mg qd      #NSTEMI  -possibly related to elevated bp in /97  -Pt denies any CP or SoB  -EKG w/ sinus tach  -Trop elevated to 0.037 trended down to 0.03    #HTN  -Pt  with elevated BP in ED endorses daily compliance with medications  -Continue Nifedipine 60mg qd and HCTZ 25mg qd      #HLD:   -Continue Asa 81mg qd, atorvastatin 80mg qd, and zetia 10mg qd     Pulm  Sating 95% on RA in no respiratory distress off O2  Pt denies cigarette smoking     #CESAR  -Documented sleep study  but no CPAP at home      FEN/GI  F: D5 in LR 125mL/hr, KCL 40meq in LR 1L  E: Na: 134 , K 3.7, CO2: 22, Phos: 2  N: Diabetic and cardiac diet  mery     GI  #GERD  -Cnt home Protonix 40mg qd     RENAL  UOP: 850cc , In: 2152.3cc, net 1302.3cc in last 24 hrs    BUN/Cr: 15/0.9, baseline 8/0.8  Continue to monitor renal status and urine output     #CATE  -Likely related to dehydration in setting of DKA  -Urine studies showed FEUrea of 49.7% (suggests intrinsic)  -Retroperitoneal US: No significant abnormality  Improving    Continue to monitor renal status and urine output    Heme  H/H 12.2/34.8; stable  WBC 19.3 (improved from 22.6)  DVT prophylaxis: Lovenox    Endo  #DKA  -A1c 10.6  -BG on admit 562 improved to 222 today  -Pt endorses 3 days worsening sx's   -Out of Trulicity (0.75 qwk) for 1 wk but endorses compliance with home aspart 30u BID and lantus 80u bid as well as metformin 500mg ER  - Initial A BHB: 0.7, now: A  - TSH: 0.759  - 0.05u/kg/hr regular insulin      ID  -Pt afebrile on admit with elevated wbc (22) on admit likely related to stress/dka  -UA w/ moderate deshawn, leukocytes, and blood  -Urine and bld cx collected  -Pt received 1x Vanc/Zosyn in ED and started rocephin for potential UTI  -Flu/COVID neg   - Initial Lactate: 6.1 improved to 3.1 today  - GNR in blood cx  - Started rocephin     Feeding: Diabetic and Cardiac Diet 200 Calories  Analgesia: None  Sedation: None  Thrombo PPX: Lovenox 40mg  Head of Bed: > 30 degrees  Ulcer PPX: Pantoprazole 40mg   Glucose: goal 140-180s  SBT/SAT: N/A  Bowel Regimen: None  Indwelling Lines: Right hand IV, Left antecubital IV  Abx: None     Code  Status: FULL    ________________________  Jas Bynum Jr, MD  [facility] PGY-1

## 2023-10-05 NOTE — NURSING
2215 Labs reviewed with Dr. Taylor ([facility]), and Dr. Jacome ([facility]).  Pt currently on 40 KCL mEq in LR cont. @ 125/hr.   Orders placed for KCL 30 mEq, PO, x 2 doses, Mag 2 g IVPB x 2, and Na+/PHOS packets x2. Will hold off on restarting Insulin gtt until K+ >3.3 per team.    0011 Renal panel results reviewed with team. Insulin gtt restarted at 0.1. Last .

## 2023-10-05 NOTE — CARE UPDATE
I spoke with the patient and was given permission to discuss her case with her daughter. I talked with the patient's daughter, Kimberly and gave her an update on the patient's condition and the current plans. All questions were answered.

## 2023-10-05 NOTE — PLAN OF CARE
AAOx4. No c/o pain. Tolerating diabetic diet. Remains on RA. Ambulated to toilet, pt very unsteady. Purewick placed. Blood glucose and cardiac monitoring maintained. Bed locked in lowest position, bed alarm set and call bell within reach.

## 2023-10-06 PROBLEM — E11.10 DIABETIC KETOACIDOSIS ASSOCIATED WITH TYPE 2 DIABETES MELLITUS: Status: ACTIVE | Noted: 2023-10-06

## 2023-10-06 PROBLEM — E87.20 LACTIC ACIDOSIS: Status: ACTIVE | Noted: 2023-10-06

## 2023-10-06 PROBLEM — I49.8 SINUS ARRHYTHMIA: Status: ACTIVE | Noted: 2023-10-06

## 2023-10-06 PROBLEM — R11.2 NAUSEA AND VOMITING: Status: ACTIVE | Noted: 2023-10-06

## 2023-10-06 PROBLEM — I21.4 NSTEMI (NON-ST ELEVATED MYOCARDIAL INFARCTION): Status: ACTIVE | Noted: 2023-10-06

## 2023-10-06 PROBLEM — R73.9 HYPERGLYCEMIA: Status: ACTIVE | Noted: 2023-10-06

## 2023-10-06 LAB
ALBUMIN SERPL BCP-MCNC: 2.1 G/DL (ref 3.5–5.2)
ALP SERPL-CCNC: 161 U/L (ref 55–135)
ALT SERPL W/O P-5'-P-CCNC: 16 U/L (ref 10–44)
ANION GAP SERPL CALC-SCNC: 13 MMOL/L (ref 8–16)
AST SERPL-CCNC: 31 U/L (ref 10–40)
BACTERIA UR CULT: ABNORMAL
BASOPHILS # BLD AUTO: 0.08 K/UL (ref 0–0.2)
BASOPHILS NFR BLD: 0.5 % (ref 0–1.9)
BILIRUB SERPL-MCNC: 0.4 MG/DL (ref 0.1–1)
BUN SERPL-MCNC: 18 MG/DL (ref 6–20)
CALCIUM SERPL-MCNC: 8.5 MG/DL (ref 8.7–10.5)
CHLORIDE SERPL-SCNC: 100 MMOL/L (ref 95–110)
CO2 SERPL-SCNC: 15 MMOL/L (ref 23–29)
CREAT SERPL-MCNC: 1 MG/DL (ref 0.5–1.4)
DIFFERENTIAL METHOD: ABNORMAL
EOSINOPHIL # BLD AUTO: 0 K/UL (ref 0–0.5)
EOSINOPHIL NFR BLD: 0.3 % (ref 0–8)
ERYTHROCYTE [DISTWIDTH] IN BLOOD BY AUTOMATED COUNT: 12.4 % (ref 11.5–14.5)
EST. GFR  (NO RACE VARIABLE): >60 ML/MIN/1.73 M^2
GLUCOSE SERPL-MCNC: 266 MG/DL (ref 70–110)
HCT VFR BLD AUTO: 38.6 % (ref 37–48.5)
HGB BLD-MCNC: 13.4 G/DL (ref 12–16)
IMM GRANULOCYTES # BLD AUTO: 0.1 K/UL (ref 0–0.04)
IMM GRANULOCYTES NFR BLD AUTO: 0.6 % (ref 0–0.5)
LACTATE SERPL-SCNC: 1.8 MMOL/L (ref 0.5–2.2)
LYMPHOCYTES # BLD AUTO: 0.8 K/UL (ref 1–4.8)
LYMPHOCYTES NFR BLD: 5.1 % (ref 18–48)
MAGNESIUM SERPL-MCNC: 2.7 MG/DL (ref 1.6–2.6)
MCH RBC QN AUTO: 30.1 PG (ref 27–31)
MCHC RBC AUTO-ENTMCNC: 34.7 G/DL (ref 32–36)
MCV RBC AUTO: 87 FL (ref 82–98)
MONOCYTES # BLD AUTO: 0.7 K/UL (ref 0.3–1)
MONOCYTES NFR BLD: 4.8 % (ref 4–15)
NEUTROPHILS # BLD AUTO: 13.8 K/UL (ref 1.8–7.7)
NEUTROPHILS NFR BLD: 88.7 % (ref 38–73)
NRBC BLD-RTO: 0 /100 WBC
PHOSPHATE SERPL-MCNC: 2.3 MG/DL (ref 2.7–4.5)
PLATELET # BLD AUTO: 121 K/UL (ref 150–450)
PMV BLD AUTO: 12.2 FL (ref 9.2–12.9)
POCT GLUCOSE: 236 MG/DL (ref 70–110)
POCT GLUCOSE: 241 MG/DL (ref 70–110)
POCT GLUCOSE: 241 MG/DL (ref 70–110)
POCT GLUCOSE: 249 MG/DL (ref 70–110)
POTASSIUM SERPL-SCNC: 4.5 MMOL/L (ref 3.5–5.1)
PROT SERPL-MCNC: 6.9 G/DL (ref 6–8.4)
RBC # BLD AUTO: 4.45 M/UL (ref 4–5.4)
SODIUM SERPL-SCNC: 128 MMOL/L (ref 136–145)
TROPONIN I SERPL DL<=0.01 NG/ML-MCNC: 0.03 NG/ML (ref 0–0.03)
WBC # BLD AUTO: 15.57 K/UL (ref 3.9–12.7)

## 2023-10-06 PROCEDURE — 97165 OT EVAL LOW COMPLEX 30 MIN: CPT

## 2023-10-06 PROCEDURE — 63600175 PHARM REV CODE 636 W HCPCS

## 2023-10-06 PROCEDURE — 84100 ASSAY OF PHOSPHORUS: CPT

## 2023-10-06 PROCEDURE — 27000221 HC OXYGEN, UP TO 24 HOURS

## 2023-10-06 PROCEDURE — 94761 N-INVAS EAR/PLS OXIMETRY MLT: CPT

## 2023-10-06 PROCEDURE — 85025 COMPLETE CBC W/AUTO DIFF WBC: CPT

## 2023-10-06 PROCEDURE — 93010 ELECTROCARDIOGRAM REPORT: CPT | Mod: ,,, | Performed by: INTERNAL MEDICINE

## 2023-10-06 PROCEDURE — 36415 COLL VENOUS BLD VENIPUNCTURE: CPT

## 2023-10-06 PROCEDURE — 83605 ASSAY OF LACTIC ACID: CPT

## 2023-10-06 PROCEDURE — 93005 ELECTROCARDIOGRAM TRACING: CPT

## 2023-10-06 PROCEDURE — 25000003 PHARM REV CODE 250: Performed by: STUDENT IN AN ORGANIZED HEALTH CARE EDUCATION/TRAINING PROGRAM

## 2023-10-06 PROCEDURE — 11000001 HC ACUTE MED/SURG PRIVATE ROOM

## 2023-10-06 PROCEDURE — 97116 GAIT TRAINING THERAPY: CPT

## 2023-10-06 PROCEDURE — 87040 BLOOD CULTURE FOR BACTERIA: CPT | Mod: 59

## 2023-10-06 PROCEDURE — 83735 ASSAY OF MAGNESIUM: CPT

## 2023-10-06 PROCEDURE — 63600175 PHARM REV CODE 636 W HCPCS: Performed by: STUDENT IN AN ORGANIZED HEALTH CARE EDUCATION/TRAINING PROGRAM

## 2023-10-06 PROCEDURE — 97535 SELF CARE MNGMENT TRAINING: CPT

## 2023-10-06 PROCEDURE — 27000207 HC ISOLATION

## 2023-10-06 PROCEDURE — 25000003 PHARM REV CODE 250: Performed by: INTERNAL MEDICINE

## 2023-10-06 PROCEDURE — 25000003 PHARM REV CODE 250

## 2023-10-06 PROCEDURE — 99900035 HC TECH TIME PER 15 MIN (STAT)

## 2023-10-06 PROCEDURE — 97162 PT EVAL MOD COMPLEX 30 MIN: CPT

## 2023-10-06 PROCEDURE — 97530 THERAPEUTIC ACTIVITIES: CPT

## 2023-10-06 PROCEDURE — 93010 EKG 12-LEAD: ICD-10-PCS | Mod: ,,, | Performed by: INTERNAL MEDICINE

## 2023-10-06 PROCEDURE — 80053 COMPREHEN METABOLIC PANEL: CPT

## 2023-10-06 PROCEDURE — 84484 ASSAY OF TROPONIN QUANT: CPT

## 2023-10-06 RX ORDER — MAG HYDROX/ALUMINUM HYD/SIMETH 200-200-20
30 SUSPENSION, ORAL (FINAL DOSE FORM) ORAL ONCE
Status: COMPLETED | OUTPATIENT
Start: 2023-10-06 | End: 2023-10-06

## 2023-10-06 RX ORDER — FUROSEMIDE 10 MG/ML
20 INJECTION INTRAMUSCULAR; INTRAVENOUS ONCE
Status: COMPLETED | OUTPATIENT
Start: 2023-10-06 | End: 2023-10-06

## 2023-10-06 RX ORDER — ONDANSETRON 2 MG/ML
4 INJECTION INTRAMUSCULAR; INTRAVENOUS EVERY 8 HOURS PRN
Status: DISCONTINUED | OUTPATIENT
Start: 2023-10-06 | End: 2023-10-11 | Stop reason: HOSPADM

## 2023-10-06 RX ORDER — METOCLOPRAMIDE HYDROCHLORIDE 5 MG/ML
10 INJECTION INTRAMUSCULAR; INTRAVENOUS EVERY 6 HOURS PRN
Status: DISCONTINUED | OUTPATIENT
Start: 2023-10-06 | End: 2023-10-11 | Stop reason: HOSPADM

## 2023-10-06 RX ADMIN — INSULIN ASPART 4 UNITS: 100 INJECTION, SOLUTION INTRAVENOUS; SUBCUTANEOUS at 12:10

## 2023-10-06 RX ADMIN — ENOXAPARIN SODIUM 40 MG: 40 INJECTION SUBCUTANEOUS at 04:10

## 2023-10-06 RX ADMIN — ASPIRIN 81 MG: 81 TABLET, COATED ORAL at 08:10

## 2023-10-06 RX ADMIN — INSULIN ASPART 4 UNITS: 100 INJECTION, SOLUTION INTRAVENOUS; SUBCUTANEOUS at 05:10

## 2023-10-06 RX ADMIN — ATORVASTATIN CALCIUM 80 MG: 40 TABLET, FILM COATED ORAL at 08:10

## 2023-10-06 RX ADMIN — LISINOPRIL 40 MG: 20 TABLET ORAL at 08:10

## 2023-10-06 RX ADMIN — CEFTRIAXONE SODIUM 2 G: 2 INJECTION, POWDER, FOR SOLUTION INTRAMUSCULAR; INTRAVENOUS at 06:10

## 2023-10-06 RX ADMIN — EZETIMIBE 10 MG: 10 TABLET ORAL at 08:10

## 2023-10-06 RX ADMIN — SODIUM PHOSPHATE, MONOBASIC, MONOHYDRATE AND SODIUM PHOSPHATE, DIBASIC, ANHYDROUS 15 MMOL: 142; 276 INJECTION, SOLUTION INTRAVENOUS at 12:10

## 2023-10-06 RX ADMIN — FUROSEMIDE 20 MG: 10 INJECTION, SOLUTION INTRAMUSCULAR; INTRAVENOUS at 12:10

## 2023-10-06 RX ADMIN — FLUOXETINE 40 MG: 20 CAPSULE ORAL at 08:10

## 2023-10-06 RX ADMIN — POTASSIUM & SODIUM PHOSPHATES POWDER PACK 280-160-250 MG 2 PACKET: 280-160-250 PACK at 05:10

## 2023-10-06 RX ADMIN — NIFEDIPINE 30 MG: 30 TABLET, FILM COATED, EXTENDED RELEASE ORAL at 08:10

## 2023-10-06 RX ADMIN — PANTOPRAZOLE SODIUM 40 MG: 40 TABLET, DELAYED RELEASE ORAL at 08:10

## 2023-10-06 RX ADMIN — HYDROCHLOROTHIAZIDE 25 MG: 25 TABLET ORAL at 08:10

## 2023-10-06 RX ADMIN — INSULIN DETEMIR 40 UNITS: 100 INJECTION, SOLUTION SUBCUTANEOUS at 08:10

## 2023-10-06 RX ADMIN — MUPIROCIN: 20 OINTMENT TOPICAL at 08:10

## 2023-10-06 RX ADMIN — ONDANSETRON 4 MG: 2 INJECTION INTRAMUSCULAR; INTRAVENOUS at 08:10

## 2023-10-06 RX ADMIN — INSULIN ASPART 2 UNITS: 100 INJECTION, SOLUTION INTRAVENOUS; SUBCUTANEOUS at 10:10

## 2023-10-06 RX ADMIN — ALUMINUM HYDROXIDE, MAGNESIUM HYDROXIDE, AND SIMETHICONE 30 ML: 200; 200; 20 SUSPENSION ORAL at 04:10

## 2023-10-06 RX ADMIN — METOPROLOL SUCCINATE 100 MG: 50 TABLET, EXTENDED RELEASE ORAL at 08:10

## 2023-10-06 RX ADMIN — INSULIN ASPART 4 UNITS: 100 INJECTION, SOLUTION INTRAVENOUS; SUBCUTANEOUS at 04:10

## 2023-10-06 NOTE — PLAN OF CARE
Pt. AAOx4. C/O abdominal pain and nausea, PRN administered. Pt. C/o chest pain, see previous nursing note. STAT CT, ID consult, EKG, and troponin completed during shift due to Pt. C/o chest pain. Pt. Up to BSC and chair during shift with therapy. CDIFF precautions in place; waiting for stool sample to rule-out. Bed alarm on and call light in reach. AVASYS in place. Pt. Instructed to call for assistance. Plan of care continued.   Problem: Adult Inpatient Plan of Care  Goal: Plan of Care Review  Outcome: Ongoing, Progressing     Problem: Adult Inpatient Plan of Care  Goal: Patient-Specific Goal (Individualized)  Outcome: Ongoing, Progressing     Problem: Adult Inpatient Plan of Care  Goal: Absence of Hospital-Acquired Illness or Injury  Outcome: Ongoing, Progressing     Problem: Adult Inpatient Plan of Care  Goal: Optimal Comfort and Wellbeing  Outcome: Ongoing, Progressing

## 2023-10-06 NOTE — ASSESSMENT & PLAN NOTE
The patient presented from clinic with elevated glucose (>500), trace ketones in the urine, and mildly elevated BHB. DKA thought likely to be 2/2 bacteremia. The patient was stepped down from the ICU on 10/05 and transitioned to 25u Detemir nightly. On 10/06 she was increased to 40u BID.    Plan:  Will continue to monitor glucose levels and adjust insulin regimen as needed.  Will continue with Zofran for Nausea and vomiting

## 2023-10-06 NOTE — PLAN OF CARE
CHIP noted PT/OT recommendations and discussed placement options with pt at bedside. Pt is agreeable to SNF placement near her home. CHIP sent SNF referrals via [facility]. CHIP placed call to [facility] to call in LOCET (Level of Care Eligibility Tool)   SW faxed PASRR (Level 1Pre-Admission Screening and Resident Review)  to [facility]. SW to await 142 for placement. Cm will continue to follow pt through transitions of care and assist with any discharge needs.    Edwin Chaidez, MSBRYON  [phone]    Future Appointments   Date Time Provider Department Center   10/9/2023  1:00 PM CARDIAC REHAB, [facility]   10/12/2023 10:00 AM Cleveland Tierney PA-C [facility]   10/12/2023  1:[facility]   10/16/2023  1:00 [facility]   10/19/2023  1:00 [facility]   10/23/2023  1:00 PM [facility]   10/26/2023  1:00 PM [facility]   10/30/2023  1:00 PM [facility]   11/2/2023  1:00 PM [facility]   11/6/2023  1:00 PM [facility]   11/9/2023  1:00 PM [facility]   11/13/2023  1:00 PM [facility]   11/16/2023  1:00 PM [facility] REHAB, [facility]   11/20/2023  1:00 [facility]   11/27/2023  1:00 PM [facility]   11/30/2023  1:00 PM [facility]   12/4/2023  1:00 PM [facility]   12/7/2023  1:00 PM [facility]   12/11/2023  1:00 PM CARDIAC REHAB, [facility]   12/14/2023  1:00 PM CARDIAC REHAB, [facility]   12/18/2023  1:00 PM CARDIAC REHAB, [facility]   12/21/2023  1:00 [facility]        10/06/23 1603    Post-Acute Status   Post-Acute Authorization Placement   Post-Acute Placement Status Referrals Sent   Coverage [facility]ARE   Discharge Delays None known at this time   Discharge Plan   Discharge Plan A Skilled Nursing Facility

## 2023-10-06 NOTE — NURSING
"Pt. Called nurse to bedside. Pt. C/o chest pain. Pt. States she, "doesn't know how to explain the chest pain, it just hurts." Dr. Lyons team notified. No new orders at this time. Plan of care continued. Pt. VSS.   "

## 2023-10-06 NOTE — CONSULTS
[facility] Infectious Diseases Consult Note    Primary Attending Physician: Adry Lyons MD     Reason for Consult:     E coli bacteremia    Assessment (see problem list below):     Jaimee Quintana is a 48 y.o. female with:  E coli bacteremia from urinary tract source. Left CVA tenderness and CT with left kidney changes  Chest pain. Diabetes, hypertension, hyperlipidemia, heart failure, obesity, CVA, high-risk HPV medical screening    Plan:     Continue ceftriaxone.  Likely will need at least 7 days of therapy depending upon response.  ABx can be given orally depending upon antibiotics susceptibility  Monitor patient progress    Thank you for allowing us to participate in the care of this patient. Please contact me if you have any questions regarding this consult.    Gurpreet GRISSOM Max  [facility]  Pager: [phone]    Subjective:      History of Present Illness:  Jaimee Quintana is a 48 y.o. female with diabetes, hypertension, hyperlipidemia, heart failure, obesity, CVA, high-risk HPV vaginal screening presented from clinic with malaise headaches nausea and vomiting for 3 days.  Found to be in DKA    07/03/2022 patient was seen in the emergency department for dysuria and vaginal irritation.  Patient was found to have E coli in the urine.  Patient was discharged cephalexin and follow up in gyn clinic.    05/22/2023 patient was admitted for increasing shortness of breath and chest pain.  She was found to have a hemoglobin A1c at 11.8.  Patient was treated with statin therapy as well as antihypertensives and discharged on 05/23/2023    10/04/2023 patient presented to clinic then emergency department with 3 day history of weakness, malaise, headaches, nausea and vomiting, loose stool.  Upon evaluation, the patient had T-max of 100.7°.  WBC 22.6.  Blood cultures and urine cultures were obtained and are now positive for Gram-negative rods BC ID E coli.  10/06/2023 abdominal CT was performed showing asymmetric left kidney  enlargement with streaking consistent with pyelonephritis    Infectious disease was consulted for E coli bacteremia    Past Medical History:  Past Medical History:   Diagnosis Date    Cataract     Cervical high risk HPV (human papillomavirus) test positive 2020    NOT 16 OR 18    CVA (cerebral infarction)          Depression     Diabetes mellitus, type 2     GERD (gastroesophageal reflux disease)     Hyperlipidemia     Hypertension     Moderate nonproliferative diabetic retinopathy     Obesity     Stroke        Past Surgical History:  Past Surgical History:   Procedure Laterality Date     SECTION      CHOLECYSTECTOMY      DILATION AND CURETTAGE OF UTERUS      GALLBLADDER SURGERY  2017    stone removed       Allergies:  Review of patient's allergies indicates:  No Known Allergies    Medications:  Reviewed  Antibiotics  Ceftriaxone  Family History:  Family History   Problem Relation Age of Onset    Stroke Mother     Thyroid disease Mother     Diabetes Mother     Cataracts Father     Hypertension Father     Arthritis Father     Diabetes Father     Hypertension Sister     Stroke Sister     Thyroid disease Sister     Heart disease Sister     Thyroid disease Daughter     Asthma Daughter     No Known Problems Brother     No Known Problems Maternal Aunt     No Known Problems Maternal Uncle     No Known Problems Paternal Aunt     No Known Problems Paternal Uncle     No Known Problems Maternal Grandmother     No Known Problems Maternal Grandfather     No Known Problems Paternal Grandmother     No Known Problems Paternal Grandfather     Amblyopia Neg Hx     Blindness Neg Hx     Cancer Neg Hx     Glaucoma Neg Hx     Macular degeneration Neg Hx     Retinal detachment Neg Hx     Strabismus Neg Hx        Social History:  Social History     Tobacco Use    Smoking status: Former     Current packs/day: 0.00     Types: Cigarettes     Quit date: 2021     Years since quittin.6    Smokeless tobacco: Never    Substance Use Topics    Alcohol use: Yes     Alcohol/week: 3.0 standard drinks of alcohol     Types: 3 Cans of beer per week     Comment: Rare    Drug use: No     Review of Systems   Cardiovascular:  Positive for chest pain.   All other systems reviewed and are negative.      Objective:   Last 24 Hour Vital Signs:  BP  Min: 102/65  Max: 141/63  Temp  Av.6 °F (37 °C)  Min: 97.5 °F (36.4 °C)  Max: 100.4 °F (38 °C)  Pulse  Av.1  Min: 99  Max: 112  Resp  Av.8  Min: 16  Max: 49  SpO2  Av %  Min: 86 %  Max: 96 %  Weight  Av.1 kg (247 lb 2.2 oz)  Min: 112.1 kg (247 lb 2.2 oz)  Max: 112.1 kg (247 lb 2.2 oz)  I/O last 3 completed shifts:  In: 3662.9 [P.O.:840; I.V.:890.1; IV Piggyback:1932.9]  Out: 1052 [Urine:1050; Stool:2]    Physical Exam  Vitals and nursing note reviewed.   HENT:      Head: Normocephalic and atraumatic.      Nose: Nose normal. No congestion or rhinorrhea.      Mouth/Throat:      Mouth: Mucous membranes are moist.      Pharynx: Oropharynx is clear. No oropharyngeal exudate.   Eyes:      Extraocular Movements: Extraocular movements intact.      Conjunctiva/sclera: Conjunctivae normal.      Pupils: Pupils are equal, round, and reactive to light.   Cardiovascular:      Rate and Rhythm: Normal rate.      Pulses: Normal pulses.      Heart sounds: No murmur heard.     No friction rub. No gallop.   Pulmonary:      Breath sounds: Normal breath sounds. No rales.   Abdominal:      General: Bowel sounds are normal. There is no distension.      Palpations: Abdomen is soft.      Tenderness: There is no abdominal tenderness. There is left CVA tenderness. There is no guarding.   Genitourinary:     Comments: No marie  Musculoskeletal:      Cervical back: Normal range of motion and neck supple.      Right lower leg: No edema.      Left lower leg: No edema.   Lymphadenopathy:      Cervical: No cervical adenopathy.   Skin:     Findings: No erythema, lesion or rash.   Neurological:      General:  No focal deficit present.      Mental Status: She is alert and oriented to person, place, and time. Mental status is at baseline.       Devices:  PIV  External catheter    Laboratory Results: reviewed  Most Recent Data:  CBC:   Lab Results   Component Value Date    WBC 15.57 (H) 10/06/2023    HGB 13.4 10/06/2023    HCT 38.6 10/06/2023     (L) 10/06/2023    MCV 87 10/06/2023    RDW 12.4 10/06/2023     BMP:   Lab Results   Component Value Date     (L) 10/06/2023    K 4.5 10/06/2023     10/06/2023    CO2 15 (L) 10/06/2023    BUN 18 10/06/2023     (H) 10/06/2023    CALCIUM 8.5 (L) 10/06/2023    MG 2.7 (H) 10/06/2023    PHOS 2.3 (L) 10/06/2023     LFTs:   Lab Results   Component Value Date    PROT 6.9 10/06/2023    ALBUMIN 2.1 (L) 10/06/2023    BILITOT 0.4 10/06/2023    AST 31 10/06/2023    ALKPHOS 161 (H) 10/06/2023    ALT 16 10/06/2023     Urinalysis:   Lab Results   Component Value Date    LABURIN (A) 10/04/2023     GRAM NEGATIVE EDOUARD  >100,000 cfu/ml  Identification and susceptibility pending      COLORU Orange (A) 10/04/2023    SPECGRAV 1.020 10/04/2023    NITRITE Negative 10/04/2023    KETONESU Trace (A) 10/04/2023    UROBILINOGEN Negative 10/04/2023       Trended Lab Data:  Recent Labs   Lab 10/04/23  1440 10/04/23  1752 10/05/23  0510 10/05/23  0752 10/05/23  1326 10/06/23  0403   WBC 22.64*  --  19.30*  --   --  15.57*   HGB 16.4*  --  12.2  --   --  13.4   HCT 47.1  --  34.8*  --   --  38.6     --  131*  --   --  121*   MCV 87  --  86  --   --  87   RDW 12.1  --  12.1  --   --  12.4   *   < > 134* 133* 132* 128*   K 3.9   < > 3.4* 3.7 3.6 4.5   CL 86*   < > 102 99 98 100   CO2 19*   < > 21* 22* 20* 15*   BUN 11   < > 12 15 16 18   *   < > 222* 187* 226* 266*   PROT 9.3*  --   --   --   --  6.9   ALBUMIN 3.2*   < > 1.9* 2.0*  --  2.1*   BILITOT 1.1*  --   --   --   --  0.4   AST 17  --   --   --   --  31   ALKPHOS 147*  --   --   --   --  161*   ALT 15  --   --   --    --  16    < > = values in this interval not displayed.       Microbiology Data:  Blood cultures 10/04/2023 Gram-negative rods.  BC ID E coli.  No ESBL detected  Urine culture 10/04/2023 Gram-negative rods  Blood culture 10/06/2023 in process    Other Results:    Radiology:  Reviewed  Abdominal CT 10/06/2023 asymmetric left kidney enlargement with fat stranding  Ultrasound 10/04/2023 no significant abnormality  Chest x-ray 10/04/2023 with bilateral atelectasis    Problem List  Patient Active Problem List   Diagnosis    Uncontrolled type 2 diabetes mellitus with right eye affected by severe nonproliferative retinopathy and macular edema, with long-term current use of insulin    HTN (hypertension) without retinopathy    Hyperlipidemia associated with type 2 diabetes mellitus    Cerebrovascular disease    Class 3 severe obesity due to excess calories with serious comorbidity and body mass index (BMI) of 40.0 to 44.9 in adult    Hypertension associated with diabetes    CESAR (obstructive sleep apnea)    UTI (urinary tract infection)    Fatty liver    Bilateral low back pain without sciatica    History of cholecystectomy    Diabetic peripheral neuropathy associated with type 2 diabetes mellitus    Thyroid nodule    Abnormal finding of blood chemistry     Type 2 diabetes mellitus with other skin ulcer (CODE)    Hypertensive retinopathy, bilateral    Type 2 diabetes mellitus with both eyes affected by proliferative retinopathy and macular edema, with long-term current use of insulin    Mild episode of recurrent major depressive disorder    Abnormal Papanicolaou smear of cervix with positive human papilloma virus (HPV) test    Chronic diastolic congestive heart failure    Type 2 diabetes mellitus    Noncompliance with medications    GERD (gastroesophageal reflux disease)    History of CVA (cerebrovascular accident) without residual deficits    Bilateral claudication of lower limb    Tachycardia    Bacteremia    Diabetic  ketoacidosis associated with type 2 diabetes mellitus    Nausea and vomiting    Hyperglycemia    Lactic acidosis    NSTEMI (non-ST elevated myocardial infarction)    Sinus arrhythmia     See above for impression and recommendations.

## 2023-10-06 NOTE — PT/OT/SLP EVAL
Physical Therapy Evaluation and Treatment    Patient Name:  Jaimee Quintana   MRN:  [id number]    Recommendations:     Discharge Recommendations:  (moderate intensity therapy)   Discharge Equipment Recommendations: walker, rolling   Barriers to discharge:  limited functional endurance and safety.     Assessment:     Jaimee Quintana is a 48 y.o. female admitted with a medical diagnosis of Bacteremia.  She presents with the following impairments/functional limitations: weakness, impaired endurance, impaired self care skills, impaired functional mobility, gait instability, impaired balance, impaired sensation, decreased lower extremity function, pain, decreased safety awareness, impaired cardiopulmonary response to activity.    PT/OT co-evaluation performed due to anticipated complexity of pt's presentation. Pt was prior MOD I with use of SPC as needed. Pt at this time requires CGA with bed mobility and MIN/MOD A with transfers to standing/ambulation with use of RW. Pt requires MAX verbal cues to progress safety/endurance with mobility. PT recommending moderate intensity therapy. Therapy will continue progress pt as able.     Rehab Prognosis: Good; patient would benefit from acute skilled PT services to address these deficits and reach maximum level of function.    Recent Surgery: * No surgery found *      Plan:     During this hospitalization, patient to be seen 3 x/week to address the identified rehab impairments via therapeutic activities, gait training, therapeutic exercises, neuromuscular re-education and progress toward the following goals:    Plan of Care Expires:  11/06/23    Subjective     Chief Complaint: pain in stomach  Patient/Family Comments/goals: none stated  Pain/Comfort:  Pain Rating 1: 8/10  Location 1:  (stomach)  Pain Addressed 1: Reposition, Cessation of Activity, Nurse notified  Pain Rating Post-Intervention 1: 8/10    Patients cultural, spiritual, Hinduism conflicts given the current situation:  no    Living Environment:  Lives with 2 teen daughters (ages 19 and 12) in 1 story home with no steps. Walk-in shower.   Prior to admission, patients level of function was MOD I with prn use of SPC.  Equipment used at home: bedside commode, bath bench, cane, straight.  DME owned (not currently used): bedside commode and transfer tub bench.  Upon discharge, patient will have assistance from family (not able to provide 24/7 assistance).    Objective:     Communicated with Nurse prior to session.  Patient found HOB elevated with bed alarm, telemetry, oxygen (telesitter)  upon PT entry to room.    General Precautions: Standard, fall  Orthopedic Precautions:N/A   Braces: N/A  Respiratory Status: Nasal cannula, flow 2 L/min    Exams:  Cognitive Exam:  Patient is oriented to Person, Place, Time, and Situation  Sensation:    -       inconsistent testing; limited pt attention span due to drowsiness  RLE ROM: WFL  RLE Strength: WFL  LLE ROM: WFL  LLE Strength: WFL    Functional Mobility:  Bed Mobility:     Supine to Sit: contact guard assistance and increased time and verbal cues to improve safety  Transfers:     Sit to Stand:  minimum assistance with rolling walker  Bed to Chair/BSC: moderate assistance with  rolling walker  using  Step Transfer  Gait: ~15ft with use of RW and MOD A; second person on stand-by with chair follow due to pt's fatigue and limited endurance      AM-PAC 6 CLICK MOBILITY  Total Score:16       Treatment & Education:  Pt educated on role of therapy.   Pt performs mobility as stated above in functional mobility section of note with MIN/MOD A with use of RW.   Pt very fatigued during session and requires verbal cues to improve safe participation during session.   Pt has reported dizziness upon sitting (/66); SpO2 96% on 2L O2.   Pt educated on importance of continued mobility with staff/therapy assistance to progress functional independence and safety.     Patient left up in chair with all lines  intact, call button in reach, chair alarm on, and nurse notified.    GOALS:   Multidisciplinary Problems       Physical Therapy Goals          Problem: Physical Therapy    Goal Priority Disciplines Outcome Goal Variances Interventions   Physical Therapy Goal     PT, PT/OT Ongoing, Progressing     Description: Goals to be met by: 23     Patient will increase functional independence with mobility by performin. Supine to sit with Modified [place]  2. Sit to supine with Modified [place]  3. Sit to stand transfer with Modified [place] with RW  4. Bed to chair transfer with Modified [place] using Rolling Walker  5. Gait  x 150 feet with Modified [place] using Rolling Walker.                          History:     Past Medical History:   Diagnosis Date    Cataract     Cervical high risk HPV (human papillomavirus) test positive 2020    NOT 16 OR 18    CVA (cerebral infarction)          Depression     Diabetes mellitus, type 2     GERD (gastroesophageal reflux disease)     Hyperlipidemia     Hypertension     Moderate nonproliferative diabetic retinopathy     Obesity     Stroke        Past Surgical History:   Procedure Laterality Date     SECTION      CHOLECYSTECTOMY      DILATION AND CURETTAGE OF UTERUS      GALLBLADDER SURGERY  2017    stone removed       Time Tracking:     PT Received On: 10/06/23  PT Start Time: 1053     PT Stop Time: 1122  PT Total Time (min): 29 min With OT    Billable Minutes: Evaluation 10 and Gait Training 19      10/06/2023

## 2023-10-06 NOTE — PROGRESS NOTES
10/06/23 0712   Nurse Notification   Charge Nurse Notified? Yes   Name of Charge Nurse Crystal Garcia RN   Bedside Nurse Notified? Yes   Name of Bedside Nurse Sandra Peña RN   Nurse Notfication Method Secure Chat   Provider Notification   Provider Notified? Yes   Name of Provider Adry Lyons MD   Provider Notification Method Secure Chat   Provider Notified Of AI Deterioration Alert

## 2023-10-06 NOTE — PT/OT/SLP EVAL
Occupational Therapy   Evaluation and Treatment    Name: Jaimee Quintana  MRN: [id number]  Admitting Diagnosis: Bacteremia  Recent Surgery: * No surgery found *      Recommendations:     Discharge Recommendations: other (see comments) (moderate intensity therapy)  Discharge Equipment Recommendations:  other (see comments) (tbd; anticipate rolling walker)  Barriers to discharge:  Other (Comment) (decreased occupational performance)    Assessment:     Jaimee Quintana is a 48 y.o. female with a medical diagnosis of Bacteremia.  She presents with ..The primary encounter diagnosis was Nausea and vomiting, unspecified vomiting type. Diagnoses of Tachycardia, Lactic acidosis, Hyperglycemia, NSTEMI (non-ST elevated myocardial infarction), Elevated troponin, Sinus arrhythmia, Bacteremia [R78.81], Chronic diastolic congestive heart failure [I50.32], Diabetic ketoacidosis without coma associated with type 2 diabetes mellitus [E11.10], Hypertension associated with diabetes [E11.59, I15.2], Mild episode of recurrent major depressive disorder [F33.0], and Urinary tract infection with hematuria, site unspecified [N39.0, R31.9] were also pertinent to this visit.  . Performance deficits affecting function: weakness, impaired endurance, impaired cognition, visual deficits, impaired sensation, impaired self care skills, impaired functional mobility, gait instability, impaired balance, pain, decreased safety awareness, decreased lower extremity function, decreased upper extremity function, decreased coordination, impaired cardiopulmonary response to activity.      Occupational therapy evaluation completed, coeval overlap with physical therapy. Patient with prehospitalization baseline functioning of primarily modified independence in ADLs/IADLs, and mobility, using straight cane during community mobility, self reporting stopping driving about a year ago, residing with 2 children (ages 12 and 19), and depends on older daughter for  transportation assist. Patient on evaluation this date with lethargic presentation, confusion, decreased word finding, decreased temporal pain in stomach limiting performance, and with decreased balance with uncoordinated movements during functional mobility around room requiring partial assist with chair follow. Increased assist needed for all ADL out of true context. Recommending moderate intensity therapy when medically stable. Anticipate need for rolling walker.  Rehab Prognosis: Good; patient would benefit from acute skilled OT services to address these deficits and reach maximum level of function.       Plan:     Patient to be seen 5 x/week to address the above listed problems via self-care/home management, therapeutic activities, therapeutic exercises  Plan of Care Expires: 11/03/23  Plan of Care Reviewed with: patient    Subjective     Chief Complaint: indicates sleepiness  Patient/Family Comments/goals: patient with reports of stomach pain    Occupational Profile:  Living Environment: Patient resides in single story home, no steps to enter, tub shower with a bath bench.  Previous level of function: Patient with prehospitalization baseline functioning of primarily modified independence in ADLs/IADLs, and mobility, using straight cane during community mobility, self reporting stopping driving about a year ago, residing with 2 children (ages 12 and 19), and depends on older daughter for transportation assist - but older daughter works.   Equipment Used at Home: bedside commode, bath bench, cane, straight  Assistance upon Discharge: limited    Pain/Comfort:  Pain Rating 1: 8/10 (stomach)  Pain Addressed 1: Pre-medicate for activity, Distraction, Cessation of Activity  Pain Rating Post-Intervention 1: 8/10 (stomach)      Objective:     Communicated with: nursing prior to session.  Patient found HOB elevated with bed alarm, telemetry, Other (comments) (telesitter) upon OT entry to room.    General Precautions:  Standard, fall  Orthopedic Precautions: N/A  Braces: N/A  Respiratory Status: Nasal cannula, flow 2 L/min    Occupational Performance:    Bed Mobility:    Patient completed Supine to Sit with contact guard assistance and scooting forward with CGA.    Functional Mobility/Transfers:  Patient completed Sit <> Stand Transfer with minimum assistance  with  rolling walker   Patient completed Toilet Transfer Step Transfer technique with moderate assistance with  rolling walker  Patient completed transfer to recliner with moderate assist with rolling walker and eccentric control assist.  Functional Mobility: in room mobility around bed to recliner requiring moderate assist, rolling walker, management of rolling walker cues and chair follow 2/2 decreased upright posture, decreased safety, and swaying while mobilizing    Activities of Daily Living:  Grooming: set up  Upper Body Dressing: minimum assistance ; decreased participation  Lower Body Dressing: maximal assistance ; cueing for cross leg technique; decreased body awareness - cueing needed to prevent excess trunk flexion during performance  Toileting: maximal assistance ; sequential instructions; follow up hygiene assist 2/2 decreased completion    Cognitive/Visual Perceptual:  Cognitive/Psychosocial Skills:     -       Oriented to: Person, Place, Time, and Situation   -       Follows Commands/attention:follow one to two step commands, lethargic  -       Communication: clear; however, decreased / word finding deficits; only can report 5 words with letter F in 60 seconds  -       Memory: Impaired STM and ..Brief Interview for Mental Status (BIMS) performed on this date with score of 6/15 indicating deficits (score of 00-07: severe impairment; score of 08-12: moderate impairment; score of 13-15 is cognitively intact).     -       Safety awareness/insight to disability: impaired   -       Mood/Affect/Coping skills/emotional control: Flat affect and fatigued; but then at  end of session in recliner with cell phone patient with improved attention  Visual/Perceptual:      -decreased acuity; WFL for ADL; DM2 with neuropathy    Physical Exam:  Balance:    -       needs walker and min assist to stabilize  Postural examination/scapula alignment:    -       Rounded shoulders  Upper Extremity Range of Motion:     -       Right Upper Extremity: WFL  -       Left Upper Extremity: WFL  Upper Extremity Strength:    -       Right Upper Extremity: WFL; decreased endurance  -       Left Upper Extremity: WFL; decreased endurance    AMPAC 6 Click ADL:  AMPAC Total Score: 16    Treatment & Education:  Patient educated on role of OT/ POC development. Co-evaluation with physical therapy for safety.. Occupational profile developed with max cues to maximize attention. Patient guided to transition eob for assessment. Initiated ADL / functional mobility training as above with emphasis on safety, proper hand / foot placement, and task simplification. Answered questions within scope.      Patient left up in chair with all lines intact, call button in reach, chair alarm on, and nursing notified    GOALS:   Multidisciplinary Problems       Occupational Therapy Goals          Problem: Occupational Therapy    Goal Priority Disciplines Outcome Interventions   Occupational Therapy Goal     OT, PT/OT Ongoing, Progressing    Description: Goals to be met by: 11/3/2023     Patient will increase functional independence with ADLs by performing:    UE Dressing with Modified [place].  LE Dressing with Modified [place].  Grooming while standing at sink with Modified [place].  Toileting from toilet with Modified [place] for hygiene and clothing management.   Bathing from  sitting eob with setup for wash up method.  Functional mobility to / from bathroom with least restrictive device and transfer to / from commode with modified independence.  Upper extremity exercise program x12 reps per handout, with  independence.                           History:     Past Medical History:   Diagnosis Date    Cataract     Cervical high risk HPV (human papillomavirus) test positive 2020    NOT 16 OR 18    CVA (cerebral infarction)          Depression     Diabetes mellitus, type 2     GERD (gastroesophageal reflux disease)     Hyperlipidemia     Hypertension     Moderate nonproliferative diabetic retinopathy     Obesity     Stroke          Past Surgical History:   Procedure Laterality Date     SECTION      CHOLECYSTECTOMY      DILATION AND CURETTAGE OF UTERUS      GALLBLADDER SURGERY  2017    stone removed       Time Tracking:     OT Date of Treatment: 10/06/23  OT Start Time: 1053  OT Stop Time: 1122  OT Total Time (min): 29 min    Billable Minutes:Evaluation 15 min  Self Care/Home Management 14 min    10/6/2023

## 2023-10-06 NOTE — ASSESSMENT & PLAN NOTE
The patient had blood cultures drawn on admission. These have grown GNR, specifically E.coli. Susceptibilities are still pending. On 10/06, the patient reports worsening diffuse abdominal pain.    Plan:  - Continue with Cftx 2g IV QD  - Repeat Blood cx pending  - Will get CT abdomen/pelvis  - Infectious disease consulted, recommendations welcome

## 2023-10-06 NOTE — PLAN OF CARE
Pt answers questions appropriately. NE at bedside. Medications administered per order. 2L NC. BG monitored. Cardiac monitor maintained. Ambulates with assist to bedside commode; gait unsteady. Encouraged to call with questions/concerns/assistance. Safety.

## 2023-10-06 NOTE — PLAN OF CARE
SW met with pt at bedside this AM to complete DCA. Pt reported that she was having 8/10 pain, but was in a pleasant mood throughout assessment. Pt was drowsy but agreed to answer sw questions. Per pt she lives at home with her drt Tonia [phone], but will likely need transportation at time of d/c as her drt works late hours. Pt stated that she has a cane, BSC, and bath bench at home. Pt expressed that she attended cardiac rehab Monday, Tuesday, and Thursdays. Pt did not have any additional questions or concerns for sw at this time. White board updated with CM name and contact information.  Discharge brochure provided.  Pt encouraged to call with any questions or concerns.  Cm will continue to follow pt through transitions of care and assist with any discharge needs.    ETHEL Hernandez  [phone]    Future Appointments   Date Time Provider Department Center   10/9/2023  1:00 PM [facility]   10/12/2023  1:00 PM [facility]   10/16/2023  1:00 PM [facility]   10/19/2023  1:00 PM [facility]   10/23/2023  1:00 PM [facility]   10/26/2023  1:00 PM [facility]   10/30/2023  1:00 PM [facility]   11/2/2023  1:00 PM CARDIAC REHAB, [facility]   11/6/2023  1:00 PM CARDIAC REHAB, [facility]   11/9/2023  1:00 PM CARDIAC REHAB, [facility]   11/13/2023  1:00 PM CARDIAC REHAB, [facility]   11/16/2023  1:00 PM CARDIAC REHAB, [facility]   11/20/2023  1:00 PM [facility]   11/27/2023  1:00 PM [facility]   11/30/2023  1:00 PM CARDIAC REHAB, [facility]   12/4/2023  1:00 PM CARDIAC REHAB, [facility]   12/7/2023  1:00 PM CARDIAC REHAB, [facility]   12/11/2023  1:00 PM CARDIAC REHAB, [facility]    12/14/2023  1:00 PM CARDIAC REHAB, [facility]   12/18/2023  1:00 PM CARDIAC REHAB, [facility]   12/21/2023  1:00 PM CARDIAC REHAB, [facility]        10/06/23 0944   Discharge Assessment   Assessment Type Discharge Planning Assessment   Confirmed/corrected address, phone number and insurance Yes   Confirmed Demographics Correct on Facesheet   Source of Information patient   When was your last doctors appointment? 10/04/23   Communicated MALGORZATA with patient/caregiver Yes   Reason For Admission Bacteremia   People in Home child(shaylee), adult   Facility Arrived From: Home   Do you expect to return to your current living situation? Yes   Do you have help at home or someone to help you manage your care at home? Yes   Who are your caregiver(s) and their phone number(s)? Melany Randall [phone]   Prior to hospitilization cognitive status: Alert/Oriented   Current cognitive status: Alert/Oriented   Walking or Climbing Stairs ambulation difficulty, requires equipment   Do you have any problems with: Errands/Grocery   Home Accessibility wheelchair accessible   Home Layout Able to live on 1st floor   Equipment Currently Used at Home cane, straight;bedside commode;bath bench   Patient currently being followed by outpatient case management? No   Do you currently have service(s) that help you manage your care at home? No   Do you take prescription medications? Yes   Do you have prescription coverage? Yes   Coverage [employer]   Do you have any problems affording any of your prescribed medications? No   Is the patient taking medications as prescribed? yes   Who is going to help you get home at discharge? likely will need transportation   How do you get to doctors appointments? health plan transportation   Are you on dialysis? No   Do you take coumadin? No   DME Needed Upon Discharge  other (see comments)  (TBD)   Discharge Plan discussed with: Patient   Transition of Care Barriers None    Discharge Plan A Home with family   Physical Activity   On average, how many days per week do you engage in moderate to strenuous exercise (like a brisk walk)? 3 days   On average, how many minutes do you engage in exercise at this level? 140 min   Financial Resource Strain   How hard is it for you to pay for the very basics like food, housing, medical care, and heating? Not hard   Housing Stability   In the last 12 months, was there a time when you were not able to pay the mortgage or rent on time? N   In the last 12 months, was there a time when you did not have a steady place to sleep or slept in a shelter (including now)? N   Transportation Needs   In the past 12 months, has lack of transportation kept you from medical appointments or from getting medications? no   In the past 12 months, has lack of transportation kept you from meetings, work, or from getting things needed for daily living? No   Food Insecurity   Within the past 12 months, you worried that your food would run out before you got the money to buy more. Never true   Within the past 12 months, the food you bought just didn't last and you didn't have money to get more. Never true   Stress   Do you feel stress - tense, restless, nervous, or anxious, or unable to sleep at night because your mind is troubled all the time - these days? Only a littl   Social Connections   In a typical week, how many times do you talk on the phone with family, friends, or neighbors? More than 3   How often do you get together with friends or relatives? More than 3   How often do you attend Judaism or Spiritism services? Never   Do you belong to any clubs or organizations such as Judaism groups, unions, fraternal or athletic groups, or school groups? No   How often do you attend meetings of the clubs or organizations you belong to? Never   Alcohol Use   Q1: How often do you have a drink containing alcohol? Never   Q2: How many drinks containing alcohol do you have on a  typical day when you are drinking? None   Q3: How often do you have six or more drinks on one occasion? Never   OTHER   Name(s) of People in Home Melany Randall [phone]

## 2023-10-06 NOTE — AI DETERIORATION ALERT
Artificial Intelligence Notification  Jose Antonio      Admit Date: 10/4/2023  LOS: 2  Code Status: Full Code   Date of Consult: 10/06/2023  : 1975  Age: 48 y.o.  Weight:   Wt Readings from Last 1 Encounters:   10/05/23 112.1 kg (247 lb 2.2 oz)     Sex: female  Bed: [place] [address] A:   MRN: [id number]  Attending Physician: Adry Lyons MD  Primary Service: Networked reference to record PCT   Time AI Alert Received: ***  Time at Bedside: ***           ***      Vital Signs (Most Recent):  Temp: (!) 100.4 °F (38 °C) (10/06/23 0713)  Pulse: 108 (10/06/23 0713)  Resp: (!) 22 (10/06/23 0713)  BP: (!) 141/63 (10/06/23 0713)  SpO2: (!) 90 % (10/06/23 0713) Vital Signs (24h Range):  Temp:  [97.5 °F (36.4 °C)-101.2 °F (38.4 °C)] 100.4 °F (38 °C)  Pulse:  [] 108  Resp:  [6-49] 22  SpO2:  [86 %-98 %] 90 %  BP: (102-141)/(56-82) 141/63         This encounter was triggered by an Artificial Intelligence Notification.     Artificial Intelligence alert discussed with Primary team:  Name ***      Evaluation: ***    Disposition: ***

## 2023-10-06 NOTE — PLAN OF CARE
Occupational therapy evaluation completed, coeval overlap with physical therapy. Patient with prehospitalization baseline functioning of primarily modified independence in ADLs/IADLs, and mobility, using straight cane during community mobility, self reporting stopping driving about a year ago, residing with 2 children (ages 12 and 19), and depends on older daughter for transportation assist. Patient on evaluation this date with lethargic presentation, confusion, decreased word finding, decreased temporal pain in stomach limiting performance, and with decreased balance with uncoordinated movements during functional mobility around room requiring partial assist with chair follow. Increased assist needed for all ADL out of true context. Recommending moderate intensity therapy when medically stable. Anticipate need for rolling walker.    Problem: Occupational Therapy  Goal: Occupational Therapy Goal  Description: Goals to be met by: 11/3/2023     Patient will increase functional independence with ADLs by performing:    UE Dressing with Modified [place].  LE Dressing with Modified [place].  Grooming while standing at sink with Modified [place].  Toileting from toilet with Modified [place] for hygiene and clothing management.   Bathing from  sitting eob with setup for wash up method.  Functional mobility to / from bathroom with least restrictive device and transfer to / from commode with modified independence.  Upper extremity exercise program x12 reps per handout, with independence.      Outcome: Ongoing, Progressing

## 2023-10-06 NOTE — PLAN OF CARE
Problem: Physical Therapy  Goal: Physical Therapy Goal  Description: Goals to be met by: 23     Patient will increase functional independence with mobility by performin. Supine to sit with Modified [place]  2. Sit to supine with Modified [place]  3. Sit to stand transfer with Modified [place] with RW  4. Bed to chair transfer with Modified [place] using Rolling Walker  5. Gait  x 150 feet with Modified [place] using Rolling Walker.     Outcome: Ongoing, Progressing     PT/OT co-evaluation performed due to anticipated complexity of pt's presentation. Pt was prior MOD I with use of SPC as needed. Pt at this time requires CGA with bed mobility and MIN/MOD A with transfers to standing/ambulation with use of RW. Pt requires MAX verbal cues to progress safety/endurance with mobility. PT recommending moderate intensity therapy. Therapy will continue progress pt as able.

## 2023-10-06 NOTE — PROGRESS NOTES
[facility] Medicine  Progress Note    Patient Name: Jaimee Quintana  MRN: [id number]  Patient Class: IP- Inpatient   Admission Date: 10/4/2023  Length of Stay: 2 days  Attending Physician: Adry Lyons MD  Primary Care Provider: [facility]        Subjective:     Principal Problem:Bacteremia        HPI:  47 yo F w/ PMHx of HFpEF (EF 50% 5/23), T2DM (A1c 11.4), HLD, and obesity who presented to the ED from clinic and here with DKA. Pt reports 3 days of worsening malaise, HA's, N, V, and D. She denies any hemoptysis or hematochezia. She also endorses increased urinary frequency but no dysuria during this time. She reports having been out of her trulicity for 1 wk but has been compliant with other home medications including insulin at home. She denies any sick contacts but does live with her two teen children who are in school. She denies any EtoH or additional substance use.      In the ED pt afebrile, , RR 50, /87 and sat 100% on RA. EKG with sinus tach. CXR without signs of consolidation or effusion. VBG w/ pH 7.44, CO2 34.3, O2 32.4, and HCO3 23.4. CB notable for WBC 22.6 Hg 16.4. CMP notable for Na 127, K 3.9, , BUN/Crt 11/1.4, and AG 22. LA 6.1, BHB 0.7, and trop 0.037. UA w/ hazy urine, 1+ protein, 4+ glucose, trace ketones, occult blood, and leuks. Micro with 32 wbc and moderate deshawn. UPT neg. Flu and COVID swab neg. She was given 30u detemir, 1L LR, and 1L NS as well as 1x vanc/zosyn and zofran. Pt was admitted to the [facility] FM service for management of DKA.       Overview/Hospital Course:  No notes on file    Interval History: The patient was seen this morning. She was noted to have a fever of 100.4F and reported some nausea, vomiting, and diarrhea this morning.     Review of Systems   Gastrointestinal:  Positive for abdominal pain, diarrhea, nausea and vomiting.     Objective:     Vital Signs (Most Recent):  Temp: (!) 100.4 °F (38 °C) (10/06/23  0713)  Pulse: 100 (10/06/23 0759)  Resp: 16 (10/06/23 0759)  BP: (!) 141/63 (10/06/23 0713)  SpO2: (!) 91 % (10/06/23 0759) Vital Signs (24h Range):  Temp:  [97.5 °F (36.4 °C)-101.2 °F (38.4 °C)] 100.4 °F (38 °C)  Pulse:  [] 100  Resp:  [16-49] 16  SpO2:  [86 %-95 %] 91 %  BP: (102-141)/(56-82) 141/63     Weight: 112.1 kg (247 lb 2.2 oz)  Body mass index is 41.13 kg/m².    Intake/Output Summary (Last 24 hours) at 10/6/2023 0948  Last data filed at 10/5/2023 2136  Gross per 24 hour   Intake 982.45 ml   Output 102 ml   Net 880.45 ml         Physical Exam  Constitutional:       General: She is not in acute distress.  HENT:      Head: Normocephalic.   Eyes:      Extraocular Movements: Extraocular movements intact.   Cardiovascular:      Heart sounds: Normal heart sounds.   Pulmonary:      Effort: Pulmonary effort is normal.      Breath sounds: Normal breath sounds.   Abdominal:      Palpations: Abdomen is soft.      Tenderness: There is abdominal tenderness.   Musculoskeletal:      Right lower leg: Edema present.      Left lower leg: Edema present.      Comments: 1+ on bilateral lower extremities   Skin:     General: Skin is warm and dry.   Neurological:      Mental Status: She is alert.             Significant Labs: All pertinent labs within the past 24 hours have been reviewed.  CBC:   Recent Labs   Lab 10/04/23  1440 10/05/23  0510 10/06/23  0403   WBC 22.64* 19.30* 15.57*   HGB 16.4* 12.2 13.4   HCT 47.1 34.8* 38.6    131* 121*     CMP:   Recent Labs   Lab 10/04/23  1440 10/04/23  1752 10/05/23  0510 10/05/23  0752 10/05/23  1326 10/06/23  0403   *   < > 134* 133* 132* 128*   K 3.9   < > 3.4* 3.7 3.6 4.5   CL 86*   < > 102 99 98 100   CO2 19*   < > 21* 22* 20* 15*   *   < > 222* 187* 226* 266*   BUN 11   < > 12 15 16 18   CREATININE 1.4   < > 0.8 0.9 1.0 1.0   CALCIUM 10.6*   < > 8.0* 9.0 9.0 8.5*   PROT 9.3*  --   --   --   --  6.9   ALBUMIN 3.2*   < > 1.9* 2.0*  --  2.1*   BILITOT 1.1*   --   --   --   --  0.4   ALKPHOS 147*  --   --   --   --  161*   AST 17  --   --   --   --  31   ALT 15  --   --   --   --  16   ANIONGAP 22*   < > 11 12 14 13    < > = values in this interval not displayed.       Significant Imaging: I have reviewed all pertinent imaging results/findings within the past 24 hours.      Assessment/Plan:      * Bacteremia  The patient had blood cultures drawn on admission. These have grown GNR, specifically E.coli. Susceptibilities are still pending. On 10/06, the patient reports worsening diffuse abdominal pain.    Plan:  - Continue with Cftx 2g IV QD  - Repeat Blood cx pending  - Will get CT abdomen/pelvis  - Infectious disease consulted, recommendations welcome    Diabetic ketoacidosis associated with type 2 diabetes mellitus  The patient presented from clinic with elevated glucose (>500), trace ketones in the urine, and mildly elevated BHB. DKA thought likely to be 2/2 bacteremia. The patient was stepped down from the ICU on 10/05 and transitioned to 25u Detemir nightly. On 10/06 she was increased to 40u BID.    Plan:  Will continue to monitor glucose levels and adjust insulin regimen as needed.  Will continue with Zofran for Nausea and vomiting      Chronic diastolic congestive heart failure  Plan:  Continue Lisinopril, HCTZ, Metoprolol    Mild episode of recurrent major depressive disorder  Plan:  - Continue with Fluoxetine      UTI (urinary tract infection)  See bacteremia.       Hypertension associated with diabetes  Plan:  Continue HCTZ, Lisinopril, Nifedipine      VTE Risk Mitigation (From admission, onward)         Ordered     enoxaparin injection 40 mg  Daily         10/04/23 1908     IP VTE HIGH RISK PATIENT  Once         10/04/23 1908     Place sequential compression device  Until discontinued         10/04/23 1723                Discharge Planning   MALGORZATA:      Code Status: Full Code   Is the patient medically ready for discharge?:     Reason for patient still in hospital  (select all that apply): Treatment  Discharge Plan A: Home with family                  Jas Bynum MD  Department of [facility] Medicine   [facility]

## 2023-10-07 LAB
ALBUMIN SERPL BCP-MCNC: 1.8 G/DL (ref 3.5–5.2)
ALLENS TEST: YES
ALP SERPL-CCNC: 162 U/L (ref 55–135)
ALT SERPL W/O P-5'-P-CCNC: 16 U/L (ref 10–44)
ANION GAP SERPL CALC-SCNC: 11 MMOL/L (ref 8–16)
ANION GAP SERPL CALC-SCNC: 9 MMOL/L (ref 8–16)
AST SERPL-CCNC: 20 U/L (ref 10–40)
BACTERIA BLD CULT: ABNORMAL
BASOPHILS # BLD AUTO: 0.06 K/UL (ref 0–0.2)
BASOPHILS NFR BLD: 0.4 % (ref 0–1.9)
BILIRUB SERPL-MCNC: 0.4 MG/DL (ref 0.1–1)
BUN SERPL-MCNC: 13 MG/DL (ref 6–20)
BUN SERPL-MCNC: 13 MG/DL (ref 6–20)
BUN SERPL-MCNC: 14 MG/DL (ref 6–20)
BUN SERPL-MCNC: 16 MG/DL (ref 6–20)
CALCIUM SERPL-MCNC: 8.8 MG/DL (ref 8.7–10.5)
CALCIUM SERPL-MCNC: 8.8 MG/DL (ref 8.7–10.5)
CALCIUM SERPL-MCNC: 8.9 MG/DL (ref 8.7–10.5)
CALCIUM SERPL-MCNC: 8.9 MG/DL (ref 8.7–10.5)
CHLORIDE SERPL-SCNC: 95 MMOL/L (ref 95–110)
CHLORIDE SERPL-SCNC: 96 MMOL/L (ref 95–110)
CO2 SERPL-SCNC: 24 MMOL/L (ref 23–29)
CO2 SERPL-SCNC: 25 MMOL/L (ref 23–29)
CO2 SERPL-SCNC: 27 MMOL/L (ref 23–29)
CO2 SERPL-SCNC: 30 MMOL/L (ref 23–29)
CREAT SERPL-MCNC: 0.9 MG/DL (ref 0.5–1.4)
CREAT SERPL-MCNC: 0.9 MG/DL (ref 0.5–1.4)
CREAT SERPL-MCNC: 1 MG/DL (ref 0.5–1.4)
CREAT SERPL-MCNC: 1 MG/DL (ref 0.5–1.4)
DIFFERENTIAL METHOD: ABNORMAL
EOSINOPHIL # BLD AUTO: 0.1 K/UL (ref 0–0.5)
EOSINOPHIL NFR BLD: 0.5 % (ref 0–8)
ERYTHROCYTE [DISTWIDTH] IN BLOOD BY AUTOMATED COUNT: 12.6 % (ref 11.5–14.5)
EST. GFR  (NO RACE VARIABLE): >60 ML/MIN/1.73 M^2
FIO2: 21 %
GLUCOSE SERPL-MCNC: 136 MG/DL (ref 70–110)
GLUCOSE SERPL-MCNC: 139 MG/DL (ref 70–110)
GLUCOSE SERPL-MCNC: 169 MG/DL (ref 70–110)
GLUCOSE SERPL-MCNC: 172 MG/DL (ref 70–110)
HCT VFR BLD AUTO: 37.9 % (ref 37–48.5)
HGB BLD-MCNC: 12.9 G/DL (ref 12–16)
IMM GRANULOCYTES # BLD AUTO: 0.36 K/UL (ref 0–0.04)
IMM GRANULOCYTES NFR BLD AUTO: 2.7 % (ref 0–0.5)
LPM: 5
LYMPHOCYTES # BLD AUTO: 1.2 K/UL (ref 1–4.8)
LYMPHOCYTES NFR BLD: 8.6 % (ref 18–48)
MAGNESIUM SERPL-MCNC: 2.3 MG/DL (ref 1.6–2.6)
MCH RBC QN AUTO: 29.5 PG (ref 27–31)
MCHC RBC AUTO-ENTMCNC: 34 G/DL (ref 32–36)
MCV RBC AUTO: 87 FL (ref 82–98)
MONOCYTES # BLD AUTO: 0.8 K/UL (ref 0.3–1)
MONOCYTES NFR BLD: 5.9 % (ref 4–15)
NEUTROPHILS # BLD AUTO: 11 K/UL (ref 1.8–7.7)
NEUTROPHILS NFR BLD: 81.9 % (ref 38–73)
NRBC BLD-RTO: 0 /100 WBC
PCO2 BLDA: 39.1 MMHG (ref 35–45)
PH SMN: 7.48 [PH] (ref 7.35–7.45)
PHOSPHATE SERPL-MCNC: 2 MG/DL (ref 2.7–4.5)
PHOSPHATE SERPL-MCNC: 2.2 MG/DL (ref 2.7–4.5)
PLATELET # BLD AUTO: 157 K/UL (ref 150–450)
PMV BLD AUTO: 12.1 FL (ref 9.2–12.9)
PO2 BLDA: 52.9 MMHG (ref 80–100)
POC BASE DEFICIT: 5 MMOL/L (ref -2–2)
POC HCO3: 28.9 MMOL/L (ref 24–28)
POC PERFORMED BY: ABNORMAL
POC SATURATED O2: 88.8 % (ref 95–100)
POCT GLUCOSE: 128 MG/DL (ref 70–110)
POCT GLUCOSE: 138 MG/DL (ref 70–110)
POCT GLUCOSE: 162 MG/DL (ref 70–110)
POCT GLUCOSE: 162 MG/DL (ref 70–110)
POTASSIUM SERPL-SCNC: 2.6 MMOL/L (ref 3.5–5.1)
POTASSIUM SERPL-SCNC: 2.9 MMOL/L (ref 3.5–5.1)
POTASSIUM SERPL-SCNC: 3 MMOL/L (ref 3.5–5.1)
POTASSIUM SERPL-SCNC: 3.2 MMOL/L (ref 3.5–5.1)
PROT SERPL-MCNC: 7.1 G/DL (ref 6–8.4)
RBC # BLD AUTO: 4.38 M/UL (ref 4–5.4)
SODIUM SERPL-SCNC: 131 MMOL/L (ref 136–145)
SODIUM SERPL-SCNC: 132 MMOL/L (ref 136–145)
SODIUM SERPL-SCNC: 134 MMOL/L (ref 136–145)
SODIUM SERPL-SCNC: 134 MMOL/L (ref 136–145)
SPECIMEN SOURCE: ABNORMAL
WBC # BLD AUTO: 13.43 K/UL (ref 3.9–12.7)

## 2023-10-07 PROCEDURE — 84100 ASSAY OF PHOSPHORUS: CPT | Mod: 91 | Performed by: STUDENT IN AN ORGANIZED HEALTH CARE EDUCATION/TRAINING PROGRAM

## 2023-10-07 PROCEDURE — 27000646 HC AEROBIKA DEVICE

## 2023-10-07 PROCEDURE — 36415 COLL VENOUS BLD VENIPUNCTURE: CPT

## 2023-10-07 PROCEDURE — 36415 COLL VENOUS BLD VENIPUNCTURE: CPT | Performed by: STUDENT IN AN ORGANIZED HEALTH CARE EDUCATION/TRAINING PROGRAM

## 2023-10-07 PROCEDURE — 25000003 PHARM REV CODE 250: Performed by: INTERNAL MEDICINE

## 2023-10-07 PROCEDURE — 99900035 HC TECH TIME PER 15 MIN (STAT)

## 2023-10-07 PROCEDURE — 94761 N-INVAS EAR/PLS OXIMETRY MLT: CPT

## 2023-10-07 PROCEDURE — 84100 ASSAY OF PHOSPHORUS: CPT

## 2023-10-07 PROCEDURE — 25000003 PHARM REV CODE 250

## 2023-10-07 PROCEDURE — 85025 COMPLETE CBC W/AUTO DIFF WBC: CPT

## 2023-10-07 PROCEDURE — 21400001 HC TELEMETRY ROOM

## 2023-10-07 PROCEDURE — 27000221 HC OXYGEN, UP TO 24 HOURS

## 2023-10-07 PROCEDURE — 36600 WITHDRAWAL OF ARTERIAL BLOOD: CPT

## 2023-10-07 PROCEDURE — 83735 ASSAY OF MAGNESIUM: CPT

## 2023-10-07 PROCEDURE — 25000003 PHARM REV CODE 250: Performed by: STUDENT IN AN ORGANIZED HEALTH CARE EDUCATION/TRAINING PROGRAM

## 2023-10-07 PROCEDURE — 80053 COMPREHEN METABOLIC PANEL: CPT

## 2023-10-07 PROCEDURE — 94799 UNLISTED PULMONARY SVC/PX: CPT

## 2023-10-07 PROCEDURE — 82803 BLOOD GASES ANY COMBINATION: CPT

## 2023-10-07 PROCEDURE — 80048 BASIC METABOLIC PNL TOTAL CA: CPT

## 2023-10-07 PROCEDURE — 63600175 PHARM REV CODE 636 W HCPCS

## 2023-10-07 PROCEDURE — 97530 THERAPEUTIC ACTIVITIES: CPT | Mod: CQ

## 2023-10-07 PROCEDURE — 63600175 PHARM REV CODE 636 W HCPCS: Performed by: INTERNAL MEDICINE

## 2023-10-07 PROCEDURE — 94664 DEMO&/EVAL PT USE INHALER: CPT

## 2023-10-07 RX ORDER — POTASSIUM CHLORIDE 7.45 MG/ML
10 INJECTION INTRAVENOUS
Status: DISCONTINUED | OUTPATIENT
Start: 2023-10-07 | End: 2023-10-07

## 2023-10-07 RX ORDER — SODIUM,POTASSIUM PHOSPHATES 280-250MG
1 POWDER IN PACKET (EA) ORAL ONCE
Status: COMPLETED | OUTPATIENT
Start: 2023-10-07 | End: 2023-10-07

## 2023-10-07 RX ORDER — POTASSIUM CHLORIDE 7.45 MG/ML
10 INJECTION INTRAVENOUS
Status: COMPLETED | OUTPATIENT
Start: 2023-10-07 | End: 2023-10-07

## 2023-10-07 RX ORDER — POTASSIUM CHLORIDE 20 MEQ/1
40 TABLET, EXTENDED RELEASE ORAL ONCE
Status: DISCONTINUED | OUTPATIENT
Start: 2023-10-07 | End: 2023-10-07

## 2023-10-07 RX ORDER — FUROSEMIDE 10 MG/ML
40 INJECTION INTRAMUSCULAR; INTRAVENOUS
Status: DISCONTINUED | OUTPATIENT
Start: 2023-10-07 | End: 2023-10-11 | Stop reason: HOSPADM

## 2023-10-07 RX ORDER — FUROSEMIDE 10 MG/ML
40 INJECTION INTRAMUSCULAR; INTRAVENOUS ONCE
Status: COMPLETED | OUTPATIENT
Start: 2023-10-07 | End: 2023-10-07

## 2023-10-07 RX ADMIN — METOPROLOL SUCCINATE 100 MG: 50 TABLET, EXTENDED RELEASE ORAL at 09:10

## 2023-10-07 RX ADMIN — POTASSIUM CHLORIDE 10 MEQ: 7.46 INJECTION, SOLUTION INTRAVENOUS at 02:10

## 2023-10-07 RX ADMIN — INSULIN DETEMIR 40 UNITS: 100 INJECTION, SOLUTION SUBCUTANEOUS at 09:10

## 2023-10-07 RX ADMIN — MUPIROCIN: 20 OINTMENT TOPICAL at 09:10

## 2023-10-07 RX ADMIN — ASPIRIN 81 MG: 81 TABLET, COATED ORAL at 09:10

## 2023-10-07 RX ADMIN — FLUOXETINE 40 MG: 20 CAPSULE ORAL at 09:10

## 2023-10-07 RX ADMIN — FUROSEMIDE 40 MG: 10 INJECTION, SOLUTION INTRAMUSCULAR; INTRAVENOUS at 02:10

## 2023-10-07 RX ADMIN — INSULIN ASPART 2 UNITS: 100 INJECTION, SOLUTION INTRAVENOUS; SUBCUTANEOUS at 06:10

## 2023-10-07 RX ADMIN — LISINOPRIL 40 MG: 20 TABLET ORAL at 09:10

## 2023-10-07 RX ADMIN — ATORVASTATIN CALCIUM 80 MG: 40 TABLET, FILM COATED ORAL at 08:10

## 2023-10-07 RX ADMIN — NIFEDIPINE 30 MG: 30 TABLET, FILM COATED, EXTENDED RELEASE ORAL at 09:10

## 2023-10-07 RX ADMIN — AMPICILLIN SODIUM 2 G: 2 INJECTION, POWDER, FOR SOLUTION INTRAMUSCULAR; INTRAVENOUS at 12:10

## 2023-10-07 RX ADMIN — HYDROCHLOROTHIAZIDE 25 MG: 25 TABLET ORAL at 09:10

## 2023-10-07 RX ADMIN — POTASSIUM & SODIUM PHOSPHATES POWDER PACK 280-160-250 MG 1 PACKET: 280-160-250 PACK at 10:10

## 2023-10-07 RX ADMIN — MUPIROCIN: 20 OINTMENT TOPICAL at 08:10

## 2023-10-07 RX ADMIN — AMPICILLIN SODIUM 2 G: 2 INJECTION, POWDER, FOR SOLUTION INTRAMUSCULAR; INTRAVENOUS at 05:10

## 2023-10-07 RX ADMIN — PANTOPRAZOLE SODIUM 40 MG: 40 TABLET, DELAYED RELEASE ORAL at 09:10

## 2023-10-07 RX ADMIN — AMPICILLIN SODIUM 2 G: 2 INJECTION, POWDER, FOR SOLUTION INTRAMUSCULAR; INTRAVENOUS at 08:10

## 2023-10-07 RX ADMIN — INSULIN ASPART 2 UNITS: 100 INJECTION, SOLUTION INTRAVENOUS; SUBCUTANEOUS at 12:10

## 2023-10-07 RX ADMIN — POTASSIUM & SODIUM PHOSPHATES POWDER PACK 280-160-250 MG 1 PACKET: 280-160-250 PACK at 05:10

## 2023-10-07 RX ADMIN — EZETIMIBE 10 MG: 10 TABLET ORAL at 09:10

## 2023-10-07 RX ADMIN — POTASSIUM CHLORIDE 10 MEQ: 7.46 INJECTION, SOLUTION INTRAVENOUS at 10:10

## 2023-10-07 RX ADMIN — POTASSIUM CHLORIDE 10 MEQ: 7.46 INJECTION, SOLUTION INTRAVENOUS at 03:10

## 2023-10-07 RX ADMIN — ENOXAPARIN SODIUM 40 MG: 40 INJECTION SUBCUTANEOUS at 05:10

## 2023-10-07 RX ADMIN — POTASSIUM CHLORIDE 10 MEQ: 7.46 INJECTION, SOLUTION INTRAVENOUS at 12:10

## 2023-10-07 NOTE — ASSESSMENT & PLAN NOTE
Patient noted to have pulmonary edema on CXR on 10/07. TTE done during this admission showed decreased systolic function.   Plan:  Continue Lisinopril, HCTZ, Metoprolol  Will continue with diuresis with IV 40 lasix BID  Will start pure wick for monitoring UOP

## 2023-10-07 NOTE — ASSESSMENT & PLAN NOTE
The patient had blood cultures drawn on admission. These have grown GNR, specifically E.coli. Susceptibilities are still pending. On 10/06, the patient reports worsening diffuse abdominal pain.    Plan:  - Continue with Cftx 2g IV QD  - Repeat Blood cx pending, NGTD  - Infectious disease consulted, recommendations welcome

## 2023-10-07 NOTE — PT/OT/SLP PROGRESS
Physical Therapy Treatment    Patient Name:  Jaimee Quintana   MRN:  [id number]    Recommendations:     Discharge Recommendations: other (see comments) (Moderate intensity therapy.)  Discharge Equipment Recommendations: walker, rolling  Barriers to discharge:  limited functional endurance.     Assessment:     Jaimee Quintana is a 48 y.o. female admitted with a medical diagnosis of Bacteremia.  She presents with the following impairments/functional limitations: weakness, impaired endurance, decreased coordination, decreased lower extremity function, impaired functional mobility, gait instability, impaired self care skills.    Rehab Prognosis: Good; patient would benefit from acute skilled PT services to address these deficits and reach maximum level of function.    Recent Surgery: * No surgery found *      Plan:     During this hospitalization, patient to be seen 3 x/week to address the identified rehab impairments via gait training, therapeutic activities, therapeutic exercises, neuromuscular re-education and progress toward the following goals:    Plan of Care Expires:  11/06/23    Subjective     Chief Complaint: Pt with no complaints or concerns at this time.   Patient/Family Comments/goals: Pt agreeable to PT treatment.   Pain/Comfort:  Pain Rating 1: 0/10      Objective:      Patient found HOB elevated with telemetry, oxygen, peripheral IV, PureWick upon PT entry to room.     General Precautions: Standard, fall  Orthopedic Precautions: N/A  Braces: N/A       Functional Mobility:  Bed Mobility:     Supine to Sit: contact guard assistance  Sit to Supine: contact guard assistance  Transfers:     Sit to Stand:  contact guard assistance with rolling walker  Gait: Pt was able to take lateral steps along EOB, RW min assist requiring verbal as well as tactile cueing on proper AD placement and LE step sequencing.    Balance: Pt with good sitting and fair- standing balance.       AM-PAC 6 CLICK MOBILITY  Turning over in  bed (including adjusting bedclothes, sheets and blankets)?: 3  Sitting down on and standing up from a chair with arms (e.g., wheelchair, bedside commode, etc.): 3  Moving from lying on back to sitting on the side of the bed?: 3  Moving to and from a bed to a chair (including a wheelchair)?: 3  Need to walk in hospital room?: 2  Climbing 3-5 steps with a railing?: 2  Basic Mobility Total Score: 16       Treatment & Education:      Patient left right sidelying with all lines intact and call button in reach..    GOALS:   Multidisciplinary Problems       Physical Therapy Goals          Problem: Physical Therapy    Goal Priority Disciplines Outcome Goal Variances Interventions   Physical Therapy Goal     PT, PT/OT Ongoing, Progressing     Description: Goals to be met by: 23     Patient will increase functional independence with mobility by performin. Supine to sit with Modified [place]  2. Sit to supine with Modified [place]  3. Sit to stand transfer with Modified [place] with RW  4. Bed to chair transfer with Modified [place] using Rolling Walker  5. Gait  x 150 feet with Modified [place] using Rolling Walker.                          Time Tracking:     PT Received On: 10/07/23  PT Start Time: 1321     PT Stop Time: 1341  PT Total Time (min): 20 min     Billable Minutes: Therapeutic Activity 20    Treatment Type: Treatment  PT/PTA: PTA     Number of PTA visits since last PT visit: 1     10/07/2023

## 2023-10-07 NOTE — PROGRESS NOTES
[facility] Medicine  Progress Note    Patient Name: Jaimee Quintana  MRN: [id number]  Patient Class: IP- Inpatient   Admission Date: 10/4/2023  Length of Stay: 3 days  Attending Physician: Adry Lyons MD  Primary Care Provider: [facility]        Subjective:     Principal Problem:Bacteremia        HPI:  47 yo F w/ PMHx of HFpEF (EF 50% 5/23), T2DM (A1c 11.4), HLD, and obesity who presented to the ED from clinic and here with DKA. Pt reports 3 days of worsening malaise, HA's, N, V, and D. She denies any hemoptysis or hematochezia. She also endorses increased urinary frequency but no dysuria during this time. She reports having been out of her trulicity for 1 wk but has been compliant with other home medications including insulin at home. She denies any sick contacts but does live with her two teen children who are in school. She denies any EtoH or additional substance use.      In the ED pt afebrile, , RR 50, /87 and sat 100% on RA. EKG with sinus tach. CXR without signs of consolidation or effusion. VBG w/ pH 7.44, CO2 34.3, O2 32.4, and HCO3 23.4. CB notable for WBC 22.6 Hg 16.4. CMP notable for Na 127, K 3.9, , BUN/Crt 11/1.4, and AG 22. LA 6.1, BHB 0.7, and trop 0.037. UA w/ hazy urine, 1+ protein, 4+ glucose, trace ketones, occult blood, and leuks. Micro with 32 wbc and moderate deshawn. UPT neg. Flu and COVID swab neg. She was given 30u detemir, 1L LR, and 1L NS as well as 1x vanc/zosyn and zofran. Pt was admitted to the [facility] FM service for management of DKA.       Overview/Hospital Course:  No notes on file    Interval History: The patient was noted to have increasing oxygen demand, up to 5L. She was seen this morning on 5L in no acute distress. Diarrhea has improved.    Review of Systems   Constitutional:  Negative for fever.   Cardiovascular:  Negative for chest pain.   Gastrointestinal:  Positive for abdominal pain. Negative for diarrhea.      Objective:     Vital Signs (Most Recent):  Temp: 98.8 °F (37.1 °C) (10/07/23 1154)  Pulse: 92 (10/07/23 1202)  Resp: 20 (10/07/23 1154)  BP: 118/67 (10/07/23 1154)  SpO2: (!) 90 % (10/07/23 1154) Vital Signs (24h Range):  Temp:  [97.6 °F (36.4 °C)-99.4 °F (37.4 °C)] 98.8 °F (37.1 °C)  Pulse:  [89-98] 92  Resp:  [18-22] 20  SpO2:  [84 %-94 %] 90 %  BP: (118-141)/(58-73) 118/67     Weight: 111.6 kg (246 lb 0.5 oz)  Body mass index is 40.94 kg/m².    Intake/Output Summary (Last 24 hours) at 10/7/2023 1204  Last data filed at 10/7/2023 1118  Gross per 24 hour   Intake 411.32 ml   Output 750 ml   Net -338.68 ml         Physical Exam  Constitutional:       General: She is not in acute distress.  HENT:      Head: Normocephalic.   Eyes:      Extraocular Movements: Extraocular movements intact.   Cardiovascular:      Heart sounds: Normal heart sounds.   Pulmonary:      Effort: Pulmonary effort is normal. No respiratory distress.      Breath sounds: Normal breath sounds.   Abdominal:      Palpations: Abdomen is soft.      Tenderness: There is abdominal tenderness. There is no guarding or rebound.      Comments: Tenderness improved from yesterday.   Musculoskeletal:      Right lower leg: No edema.      Left lower leg: No edema.   Skin:     General: Skin is warm and dry.   Neurological:      Mental Status: She is alert.   Psychiatric:         Mood and Affect: Mood normal.             Significant Labs: All pertinent labs within the past 24 hours have been reviewed.  CBC:   Recent Labs   Lab 10/06/23  0403 10/07/23  0558   WBC 15.57* 13.43*   HGB 13.4 12.9   HCT 38.6 37.9   * 157     CMP:   Recent Labs   Lab 10/06/23  0403 10/07/23  0558 10/07/23  1206   * 132* 131*   K 4.5 2.6* 3.2*    96 96   CO2 15* 25 24   * 172* 169*   BUN 18 16 14   CREATININE 1.0 0.9 0.9   CALCIUM 8.5* 8.9 8.8   PROT 6.9 7.1  --    ALBUMIN 2.1* 1.8*  --    BILITOT 0.4 0.4  --    ALKPHOS 161* 162*  --    AST 31 20  --    ALT 16  16  --    ANIONGAP 13 11 11       Significant Imaging: I have reviewed all pertinent imaging results/findings within the past 24 hours.      Assessment/Plan:      * Bacteremia  The patient had blood cultures drawn on admission. These have grown GNR, specifically E.coli. Susceptibilities are still pending. On 10/06, the patient reports worsening diffuse abdominal pain.    Plan:  - Continue with Cftx 2g IV QD  - Repeat Blood cx pending, NGTD  - Infectious disease consulted, recommendations welcome    Diabetic ketoacidosis associated with type 2 diabetes mellitus  The patient presented from clinic with elevated glucose (>500), trace ketones in the urine, and mildly elevated BHB. DKA thought likely to be 2/2 bacteremia. The patient was stepped down from the ICU on 10/05 and transitioned to 25u Detemir nightly. On 10/06 she was increased to 40u BID.    Plan:  Will continue to monitor glucose levels and adjust insulin regimen as needed.  Will continue with Zofran for Nausea and vomiting.      Chronic diastolic congestive heart failure  Patient noted to have pulmonary edema on CXR on 10/07. TTE done during this admission showed decreased systolic function.   Plan:  Continue Lisinopril, HCTZ, Metoprolol  Will continue with diuresis with IV 40 lasix BID  Will start pure wick for monitoring UOP    Mild episode of recurrent major depressive disorder  Plan:  - Continue with Fluoxetine.      UTI (urinary tract infection)  See bacteremia.       Hypertension associated with diabetes  Plan:  Continue HCTZ, Lisinopril, Nifedipine today  Will consider switching HCTZ to Spironolactone tomorrow in setting of patient's hypokalemia        VTE Risk Mitigation (From admission, onward)         Ordered     enoxaparin injection 40 mg  Daily         10/04/23 1908     IP VTE HIGH RISK PATIENT  Once         10/04/23 1908     Place sequential compression device  Until discontinued         10/04/23 1723                Discharge Planning   MALGORZATA:       Code Status: Full Code   Is the patient medically ready for discharge?:     Reason for patient still in hospital (select all that apply): Treatment  Discharge Plan A: Skilled Nursing Facility   Discharge Delays: None known at this time              Jas Bynum MD  Department of Hospital Medicine   [facility] Surg

## 2023-10-07 NOTE — PROGRESS NOTES
10/06/23 2320   Nurse Notification   Charge Nurse Notified? Yes  (AI alert)   Name of Charge Nurse MACEY Solis   Bedside Nurse Notified? Yes   Name of Bedside Nurse MACEY Duarte   Nurse Notfication Method Secure Chat   Nurse Notified Of Other  (AI alert)   Provider Notification   Provider Notified? Yes   Name of Provider Dr Colon   Provider Notification Method Telephone   Provider Notified Of AI Deterioration Alert        10/06/23 2308   Vital Signs   Temp 99.2 °F (37.3 °C)   Pulse 95   Resp (!) 22   SpO2 (!) 89 %   BP (!) 119/58   MAP (mmHg) 82     Dr Colon notified of AI alert and pt's most recent vitals above. Md also notified that it was last documented that pt is on o2 nc at 5 liters

## 2023-10-07 NOTE — SUBJECTIVE & OBJECTIVE
Interval History: The patient was noted to have increasing oxygen demand, up to 5L. She was seen this morning on 5L in no acute distress. Diarrhea has improved.    Review of Systems   Constitutional:  Negative for fever.   Cardiovascular:  Negative for chest pain.   Gastrointestinal:  Positive for abdominal pain. Negative for diarrhea.     Objective:     Vital Signs (Most Recent):  Temp: 98.8 °F (37.1 °C) (10/07/23 1154)  Pulse: 92 (10/07/23 1202)  Resp: 20 (10/07/23 1154)  BP: 118/67 (10/07/23 1154)  SpO2: (!) 90 % (10/07/23 1154) Vital Signs (24h Range):  Temp:  [97.6 °F (36.4 °C)-99.4 °F (37.4 °C)] 98.8 °F (37.1 °C)  Pulse:  [89-98] 92  Resp:  [18-22] 20  SpO2:  [84 %-94 %] 90 %  BP: (118-141)/(58-73) 118/67     Weight: 111.6 kg (246 lb 0.5 oz)  Body mass index is 40.94 kg/m².    Intake/Output Summary (Last 24 hours) at 10/7/2023 1204  Last data filed at 10/7/2023 1118  Gross per 24 hour   Intake 411.32 ml   Output 750 ml   Net -338.68 ml         Physical Exam  Constitutional:       General: She is not in acute distress.  HENT:      Head: Normocephalic.   Eyes:      Extraocular Movements: Extraocular movements intact.   Cardiovascular:      Heart sounds: Normal heart sounds.   Pulmonary:      Effort: Pulmonary effort is normal. No respiratory distress.      Breath sounds: Normal breath sounds.   Abdominal:      Palpations: Abdomen is soft.      Tenderness: There is abdominal tenderness. There is no guarding or rebound.      Comments: Tenderness improved from yesterday.   Musculoskeletal:      Right lower leg: No edema.      Left lower leg: No edema.   Skin:     General: Skin is warm and dry.   Neurological:      Mental Status: She is alert.   Psychiatric:         Mood and Affect: Mood normal.             Significant Labs: All pertinent labs within the past 24 hours have been reviewed.  CBC:   Recent Labs   Lab 10/06/23  0403 10/07/23  0558   WBC 15.57* 13.43*   HGB 13.4 12.9   HCT 38.6 37.9   * 157      CMP:   Recent Labs   Lab 10/06/23  0403 10/07/23  0558 10/07/23  1206   * 132* 131*   K 4.5 2.6* 3.2*    96 96   CO2 15* 25 24   * 172* 169*   BUN 18 16 14   CREATININE 1.0 0.9 0.9   CALCIUM 8.5* 8.9 8.8   PROT 6.9 7.1  --    ALBUMIN 2.1* 1.8*  --    BILITOT 0.4 0.4  --    ALKPHOS 161* 162*  --    AST 31 20  --    ALT 16 16  --    ANIONGAP 13 11 11       Significant Imaging: I have reviewed all pertinent imaging results/findings within the past 24 hours.

## 2023-10-07 NOTE — AI DETERIORATION ALERT
Artificial Intelligence Notification  Jose Antonio      Admit Date: 10/4/2023  LOS: 2  Code Status: Full Code   Date of Consult: 10/06/2023  : 1975  Age: 48 y.o.  Weight:   Wt Readings from Last 1 Encounters:   10/05/23 112.1 kg (247 lb 2.2 oz)     Sex: female  Bed: [facility] A:   MRN: [id number]  Attending Physician: Adry Lyons MD  Primary Service: Networked reference to record PCT   Time AI Alert Received: ***  Time at Bedside: ***           ***      Vital Signs (Most Recent):  Temp: 99.2 °F (37.3 °C) (10/06/23 2308)  Pulse: 95 (10/06/23 2308)  Resp: (!) 22 (10/06/23 2308)  BP: (!) 119/58 (10/06/23 2308)  SpO2: (!) 89 % (10/06/23 2308) Vital Signs (24h Range):  Temp:  [97.7 °F (36.5 °C)-100.4 °F (38 °C)] 99.2 °F (37.3 °C)  Pulse:  [] 95  Resp:  [16-24] 22  SpO2:  [84 %-96 %] 89 %  BP: (114-141)/(58-66) 119/58         This encounter was triggered by an Artificial Intelligence Notification.     Artificial Intelligence alert discussed with Primary team:  Name ***      Evaluation: ***    Disposition: ***

## 2023-10-07 NOTE — NURSING
RAPID RESPONSE NURSE PROACTIVE ROUNDING NOTE       Time of Visit:     Admit Date: 10/4/2023  LOS: 2  Code Status: Full Code   Date of Visit: 10/06/2023  : 1975  Age: 48 y.o.  Sex: female  Race: Black or   Bed: K524/K524 A:   MRN: [id number]  Was the patient discharged from an ICU this admission? Yes   Was the patient discharged from a PACU within last 24 hours? No   Did the patient receive conscious sedation/general anesthesia in last 24 hours? No   Was the patient in the ED within the past 24 hours? No   Was the patient on NIPPV within the past 24 hours? No   Attending Physician: Adry Lyons MD  Primary Service: Family Medicine   Time spent at the bedside: < 15 min    SITUATION    Diagnosis: Bacteremia   has a past medical history of Cataract, Cervical high risk HPV (human papillomavirus) test positive, CVA (cerebral infarction), Depression, Diabetes mellitus, type 2, GERD (gastroesophageal reflux disease), Hyperlipidemia, Hypertension, Moderate nonproliferative diabetic retinopathy, Obesity, and Stroke.    Last Vitals:  Temp: 99.4 °F (37.4 °C) (10/06 1917)  Pulse: 91 (10/06 1917)  Resp: 20 (10/06 1917)  BP: 121/66 (10/06 1917)  SpO2: 92 % (10/06 2008)    24 Hour Vitals Range:  Temp:  [97.7 °F (36.5 °C)-100.4 °F (38 °C)]   Pulse:  []   Resp:  [16-24]   BP: (114-141)/(58-66)   SpO2:  [84 %-96 %]       ASSESSMENT/INTERVENTIONS    Pt noted to by hypoxic (O2 84%) on chart review. On my assessment pt sleeping, wakes easily to answer questions, on 2-3 L NC. Increased to 5 L, O2 92%. Respiratory notified.    RECOMMENDATIONS  Wean oxygen as able, possible bipap QHS if continues to desat while sleeping.      Disposition:Remain in room 524    FOLLOW UP    Call back the Rapid Response NurseSamuel at [phone] for additional questions or concerns.

## 2023-10-07 NOTE — AI DETERIORATION ALERT
Artificial Intelligence Notification  Jose Antonio      Admit Date: 10/4/2023  LOS: 2  Code Status: Full Code   Date of Consult: 10/06/2023  : 1975  Age: 48 y.o.  Weight:   Wt Readings from Last 1 Encounters:   10/05/23 112.1 kg (247 lb 2.2 oz)     Sex: female  Bed: [facility] A:   MRN: [id number]  Attending Physician: Adry Lyons MD  Primary Service: Networked reference to record PCT   Time AI Alert Received: 11:18 PM  Time at Bedside: 11:23 PM             Vital Signs (Most Recent):  Temp: 99.2 °F (37.3 °C) (10/06/23 2308)  Pulse: 95 (10/06/23 2308)  Resp: (!) 22 (10/06/23 2308)  BP: (!) 119/58 (10/06/23 2308)  SpO2: (!) 89 % (10/06/23 2308) Vital Signs (24h Range):  Temp:  [98.2 °F (36.8 °C)-100.4 °F (38 °C)] 99.2 °F (37.3 °C)  Pulse:  [] 95  Resp:  [16-24] 22  SpO2:  [84 %-96 %] 89 %  BP: (114-141)/(58-66) 119/58       This encounter was triggered by an Artificial Intelligence Notification.       Artificial Intelligence alert discussed with Primary team: [facility] - Kimberly Colon      Evaluation: Pt not in distress, lying down in bed in Right lateral decubitus position. Bilateral rhonchi appreciated on exam. Pt reports sore throat x2 days. Flu and COVID negative on 10/4/2023. Increasing O2 requirement; she is on 5L NC satting 89% currently.    Disposition: Continue to monitor, will get portable CXR stat.        Kimberly Colon MD, MPH  [facility] PGY-2  10/06/2023

## 2023-10-07 NOTE — ASSESSMENT & PLAN NOTE
Plan:  Continue HCTZ, Lisinopril, Nifedipine today  Will consider switching HCTZ to Spironolactone tomorrow in setting of patient's hypokalemia

## 2023-10-07 NOTE — ASSESSMENT & PLAN NOTE
The patient presented from clinic with elevated glucose (>500), trace ketones in the urine, and mildly elevated BHB. DKA thought likely to be 2/2 bacteremia. The patient was stepped down from the ICU on 10/05 and transitioned to 25u Detemir nightly. On 10/06 she was increased to 40u BID.    Plan:  Will continue to monitor glucose levels and adjust insulin regimen as needed.  Will continue with Zofran for Nausea and vomiting.

## 2023-10-07 NOTE — PLAN OF CARE
Pt is AAOx4. Pt worked with PT today and took lateral steps on side the bed. Pt has a purewick, but uses the bedside commode with assist. Pt has slept most of the shift and reported no pain. Pt has irritation/redness on buttocks, triad cream applied. Antibx administered as ordered.

## 2023-10-07 NOTE — PROGRESS NOTES
[facility] ID note    Patient afebrile.  WBC 13.4.  On ceftriaxone.    Repeat blood cultures negative  Urine culture with E coli pan susceptible   Blood cultures with E coli pan susceptible    We will change patient to IV ampicillin.  If patient continues to improve, patient can be discharged on oral cephalexin 1 g 4 times daily or cefadroxil 1 g twice daily to complete course.  Likely 7-10 days.    Please call if any questions   Gurpreet Santana  [facility] ID  [phone]

## 2023-10-08 PROBLEM — R13.10 DYSPHAGIA: Status: ACTIVE | Noted: 2023-10-08

## 2023-10-08 PROBLEM — R78.81 BACTEREMIA: Chronic | Status: ACTIVE | Noted: 2023-10-05

## 2023-10-08 PROBLEM — R73.9 HYPERGLYCEMIA: Status: RESOLVED | Noted: 2023-10-06 | Resolved: 2023-10-08

## 2023-10-08 PROBLEM — R00.0 TACHYCARDIA: Status: RESOLVED | Noted: 2023-10-04 | Resolved: 2023-10-08

## 2023-10-08 PROBLEM — E87.20 LACTIC ACIDOSIS: Status: RESOLVED | Noted: 2023-10-06 | Resolved: 2023-10-08

## 2023-10-08 PROBLEM — R11.2 NAUSEA AND VOMITING: Status: RESOLVED | Noted: 2023-10-06 | Resolved: 2023-10-08

## 2023-10-08 LAB
ALBUMIN SERPL BCP-MCNC: 1.8 G/DL (ref 3.5–5.2)
ALP SERPL-CCNC: 127 U/L (ref 55–135)
ALT SERPL W/O P-5'-P-CCNC: 18 U/L (ref 10–44)
ANION GAP SERPL CALC-SCNC: 13 MMOL/L (ref 8–16)
ANION GAP SERPL CALC-SCNC: 14 MMOL/L (ref 8–16)
ANION GAP SERPL CALC-SCNC: 14 MMOL/L (ref 8–16)
ANION GAP SERPL CALC-SCNC: 15 MMOL/L (ref 8–16)
ANION GAP SERPL CALC-SCNC: 15 MMOL/L (ref 8–16)
AST SERPL-CCNC: 26 U/L (ref 10–40)
BASOPHILS NFR BLD: 1 % (ref 0–1.9)
BILIRUB SERPL-MCNC: 0.6 MG/DL (ref 0.1–1)
BUN SERPL-MCNC: 12 MG/DL (ref 6–20)
BUN SERPL-MCNC: 13 MG/DL (ref 6–20)
BUN SERPL-MCNC: 14 MG/DL (ref 6–20)
CALCIUM SERPL-MCNC: 8.7 MG/DL (ref 8.7–10.5)
CALCIUM SERPL-MCNC: 8.8 MG/DL (ref 8.7–10.5)
CALCIUM SERPL-MCNC: 8.9 MG/DL (ref 8.7–10.5)
CALCIUM SERPL-MCNC: 9 MG/DL (ref 8.7–10.5)
CALCIUM SERPL-MCNC: 9 MG/DL (ref 8.7–10.5)
CHLORIDE SERPL-SCNC: 92 MMOL/L (ref 95–110)
CHLORIDE SERPL-SCNC: 92 MMOL/L (ref 95–110)
CHLORIDE SERPL-SCNC: 93 MMOL/L (ref 95–110)
CHLORIDE SERPL-SCNC: 94 MMOL/L (ref 95–110)
CHLORIDE SERPL-SCNC: 95 MMOL/L (ref 95–110)
CO2 SERPL-SCNC: 25 MMOL/L (ref 23–29)
CO2 SERPL-SCNC: 26 MMOL/L (ref 23–29)
CO2 SERPL-SCNC: 26 MMOL/L (ref 23–29)
CO2 SERPL-SCNC: 27 MMOL/L (ref 23–29)
CO2 SERPL-SCNC: 28 MMOL/L (ref 23–29)
CREAT SERPL-MCNC: 0.9 MG/DL (ref 0.5–1.4)
CREAT SERPL-MCNC: 1 MG/DL (ref 0.5–1.4)
CREAT SERPL-MCNC: 1 MG/DL (ref 0.5–1.4)
DIFFERENTIAL METHOD: ABNORMAL
DOHLE BOD BLD QL SMEAR: PRESENT
EOSINOPHIL NFR BLD: 0 % (ref 0–8)
ERYTHROCYTE [DISTWIDTH] IN BLOOD BY AUTOMATED COUNT: 12.6 % (ref 11.5–14.5)
EST. GFR  (NO RACE VARIABLE): >60 ML/MIN/1.73 M^2
GIANT PLATELETS BLD QL SMEAR: PRESENT
GLUCOSE SERPL-MCNC: 114 MG/DL (ref 70–110)
GLUCOSE SERPL-MCNC: 117 MG/DL (ref 70–110)
GLUCOSE SERPL-MCNC: 121 MG/DL (ref 70–110)
GLUCOSE SERPL-MCNC: 139 MG/DL (ref 70–110)
GLUCOSE SERPL-MCNC: 151 MG/DL (ref 70–110)
GLUCOSE SERPL-MCNC: 94 MG/DL (ref 70–110)
GLUCOSE SERPL-MCNC: 94 MG/DL (ref 70–110)
HCT VFR BLD AUTO: 37.7 % (ref 37–48.5)
HGB BLD-MCNC: 13 G/DL (ref 12–16)
IMM GRANULOCYTES # BLD AUTO: ABNORMAL K/UL (ref 0–0.04)
IMM GRANULOCYTES NFR BLD AUTO: ABNORMAL % (ref 0–0.5)
LYMPHOCYTES NFR BLD: 9 % (ref 18–48)
MAGNESIUM SERPL-MCNC: 2 MG/DL (ref 1.6–2.6)
MCH RBC QN AUTO: 29.1 PG (ref 27–31)
MCHC RBC AUTO-ENTMCNC: 34.5 G/DL (ref 32–36)
MCV RBC AUTO: 85 FL (ref 82–98)
MONOCYTES NFR BLD: 6 % (ref 4–15)
NEUTROPHILS NFR BLD: 81 % (ref 38–73)
NEUTS BAND NFR BLD MANUAL: 3 %
NRBC BLD-RTO: 0 /100 WBC
PHOSPHATE SERPL-MCNC: 2.4 MG/DL (ref 2.7–4.5)
PLATELET # BLD AUTO: 159 K/UL (ref 150–450)
PLATELET BLD QL SMEAR: ABNORMAL
PMV BLD AUTO: 11.4 FL (ref 9.2–12.9)
POCT GLUCOSE: 125 MG/DL (ref 70–110)
POCT GLUCOSE: 137 MG/DL (ref 70–110)
POCT GLUCOSE: 143 MG/DL (ref 70–110)
POCT GLUCOSE: 94 MG/DL (ref 70–110)
POTASSIUM SERPL-SCNC: 2.6 MMOL/L (ref 3.5–5.1)
POTASSIUM SERPL-SCNC: 2.8 MMOL/L (ref 3.5–5.1)
POTASSIUM SERPL-SCNC: 2.9 MMOL/L (ref 3.5–5.1)
POTASSIUM SERPL-SCNC: 3 MMOL/L (ref 3.5–5.1)
POTASSIUM SERPL-SCNC: 3 MMOL/L (ref 3.5–5.1)
PROT SERPL-MCNC: 7.2 G/DL (ref 6–8.4)
RBC # BLD AUTO: 4.46 M/UL (ref 4–5.4)
SODIUM SERPL-SCNC: 133 MMOL/L (ref 136–145)
SODIUM SERPL-SCNC: 133 MMOL/L (ref 136–145)
SODIUM SERPL-SCNC: 134 MMOL/L (ref 136–145)
SODIUM SERPL-SCNC: 135 MMOL/L (ref 136–145)
WBC # BLD AUTO: 15.69 K/UL (ref 3.9–12.7)
WBC TOXIC VACUOLES BLD QL SMEAR: PRESENT

## 2023-10-08 PROCEDURE — 94799 UNLISTED PULMONARY SVC/PX: CPT

## 2023-10-08 PROCEDURE — 25000003 PHARM REV CODE 250: Performed by: INTERNAL MEDICINE

## 2023-10-08 PROCEDURE — 92610 EVALUATE SWALLOWING FUNCTION: CPT

## 2023-10-08 PROCEDURE — 63600175 PHARM REV CODE 636 W HCPCS

## 2023-10-08 PROCEDURE — 94664 DEMO&/EVAL PT USE INHALER: CPT

## 2023-10-08 PROCEDURE — 36415 COLL VENOUS BLD VENIPUNCTURE: CPT

## 2023-10-08 PROCEDURE — 63600175 PHARM REV CODE 636 W HCPCS: Performed by: INTERNAL MEDICINE

## 2023-10-08 PROCEDURE — 25000003 PHARM REV CODE 250: Performed by: HOSPITALIST

## 2023-10-08 PROCEDURE — 87427 SHIGA-LIKE TOXIN AG IA: CPT | Performed by: STUDENT IN AN ORGANIZED HEALTH CARE EDUCATION/TRAINING PROGRAM

## 2023-10-08 PROCEDURE — 87449 NOS EACH ORGANISM AG IA: CPT | Performed by: STUDENT IN AN ORGANIZED HEALTH CARE EDUCATION/TRAINING PROGRAM

## 2023-10-08 PROCEDURE — 89055 LEUKOCYTE ASSESSMENT FECAL: CPT | Performed by: STUDENT IN AN ORGANIZED HEALTH CARE EDUCATION/TRAINING PROGRAM

## 2023-10-08 PROCEDURE — 80048 BASIC METABOLIC PNL TOTAL CA: CPT | Mod: 91,XB

## 2023-10-08 PROCEDURE — 83735 ASSAY OF MAGNESIUM: CPT

## 2023-10-08 PROCEDURE — 25000003 PHARM REV CODE 250: Performed by: STUDENT IN AN ORGANIZED HEALTH CARE EDUCATION/TRAINING PROGRAM

## 2023-10-08 PROCEDURE — 85007 BL SMEAR W/DIFF WBC COUNT: CPT

## 2023-10-08 PROCEDURE — 84100 ASSAY OF PHOSPHORUS: CPT

## 2023-10-08 PROCEDURE — 27000221 HC OXYGEN, UP TO 24 HOURS

## 2023-10-08 PROCEDURE — 87046 STOOL CULTR AEROBIC BACT EA: CPT | Performed by: STUDENT IN AN ORGANIZED HEALTH CARE EDUCATION/TRAINING PROGRAM

## 2023-10-08 PROCEDURE — 99900035 HC TECH TIME PER 15 MIN (STAT)

## 2023-10-08 PROCEDURE — 92523 SPEECH SOUND LANG COMPREHEN: CPT

## 2023-10-08 PROCEDURE — 25000003 PHARM REV CODE 250

## 2023-10-08 PROCEDURE — 85027 COMPLETE CBC AUTOMATED: CPT

## 2023-10-08 PROCEDURE — 87045 FECES CULTURE AEROBIC BACT: CPT | Performed by: STUDENT IN AN ORGANIZED HEALTH CARE EDUCATION/TRAINING PROGRAM

## 2023-10-08 PROCEDURE — 21400001 HC TELEMETRY ROOM

## 2023-10-08 PROCEDURE — 80053 COMPREHEN METABOLIC PANEL: CPT

## 2023-10-08 PROCEDURE — 94761 N-INVAS EAR/PLS OXIMETRY MLT: CPT

## 2023-10-08 RX ORDER — POTASSIUM CHLORIDE 7.45 MG/ML
10 INJECTION INTRAVENOUS
Status: COMPLETED | OUTPATIENT
Start: 2023-10-08 | End: 2023-10-08

## 2023-10-08 RX ORDER — POTASSIUM CHLORIDE 7.45 MG/ML
10 INJECTION INTRAVENOUS
Status: DISCONTINUED | OUTPATIENT
Start: 2023-10-08 | End: 2023-10-08

## 2023-10-08 RX ORDER — POTASSIUM CHLORIDE 7.45 MG/ML
10 INJECTION INTRAVENOUS 2 TIMES DAILY
Status: DISCONTINUED | OUTPATIENT
Start: 2023-10-08 | End: 2023-10-08

## 2023-10-08 RX ORDER — SODIUM,POTASSIUM PHOSPHATES 280-250MG
2 POWDER IN PACKET (EA) ORAL EVERY 6 HOURS
Status: COMPLETED | OUTPATIENT
Start: 2023-10-08 | End: 2023-10-08

## 2023-10-08 RX ORDER — POTASSIUM CHLORIDE 7.45 MG/ML
10 INJECTION INTRAVENOUS
Status: COMPLETED | OUTPATIENT
Start: 2023-10-08 | End: 2023-10-09

## 2023-10-08 RX ORDER — POTASSIUM CHLORIDE 750 MG/1
10 TABLET, EXTENDED RELEASE ORAL ONCE
Status: DISCONTINUED | OUTPATIENT
Start: 2023-10-08 | End: 2023-10-08

## 2023-10-08 RX ADMIN — ATORVASTATIN CALCIUM 80 MG: 40 TABLET, FILM COATED ORAL at 08:10

## 2023-10-08 RX ADMIN — POTASSIUM CHLORIDE 10 MEQ: 7.46 INJECTION, SOLUTION INTRAVENOUS at 03:10

## 2023-10-08 RX ADMIN — POTASSIUM CHLORIDE 10 MEQ: 7.46 INJECTION, SOLUTION INTRAVENOUS at 04:10

## 2023-10-08 RX ADMIN — MUPIROCIN: 20 OINTMENT TOPICAL at 08:10

## 2023-10-08 RX ADMIN — HYDROCHLOROTHIAZIDE 25 MG: 25 TABLET ORAL at 08:10

## 2023-10-08 RX ADMIN — INSULIN DETEMIR 40 UNITS: 100 INJECTION, SOLUTION SUBCUTANEOUS at 08:10

## 2023-10-08 RX ADMIN — PANTOPRAZOLE SODIUM 40 MG: 40 TABLET, DELAYED RELEASE ORAL at 08:10

## 2023-10-08 RX ADMIN — POTASSIUM CHLORIDE 10 MEQ: 7.46 INJECTION, SOLUTION INTRAVENOUS at 10:10

## 2023-10-08 RX ADMIN — POTASSIUM CHLORIDE 10 MEQ: 7.46 INJECTION, SOLUTION INTRAVENOUS at 02:10

## 2023-10-08 RX ADMIN — AMPICILLIN SODIUM 2 G: 2 INJECTION, POWDER, FOR SOLUTION INTRAMUSCULAR; INTRAVENOUS at 05:10

## 2023-10-08 RX ADMIN — POTASSIUM CHLORIDE 10 MEQ: 7.46 INJECTION, SOLUTION INTRAVENOUS at 08:10

## 2023-10-08 RX ADMIN — FLUOXETINE 40 MG: 20 CAPSULE ORAL at 08:10

## 2023-10-08 RX ADMIN — METOPROLOL SUCCINATE 100 MG: 50 TABLET, EXTENDED RELEASE ORAL at 08:10

## 2023-10-08 RX ADMIN — ACETAMINOPHEN 500 MG: 500 TABLET ORAL at 08:10

## 2023-10-08 RX ADMIN — LISINOPRIL 40 MG: 20 TABLET ORAL at 08:10

## 2023-10-08 RX ADMIN — POTASSIUM CHLORIDE 10 MEQ: 7.46 INJECTION, SOLUTION INTRAVENOUS at 12:10

## 2023-10-08 RX ADMIN — POTASSIUM BICARBONATE 20 MEQ: 391 TABLET, EFFERVESCENT ORAL at 02:10

## 2023-10-08 RX ADMIN — POTASSIUM & SODIUM PHOSPHATES POWDER PACK 280-160-250 MG 2 PACKET: 280-160-250 PACK at 08:10

## 2023-10-08 RX ADMIN — POTASSIUM & SODIUM PHOSPHATES POWDER PACK 280-160-250 MG 2 PACKET: 280-160-250 PACK at 05:10

## 2023-10-08 RX ADMIN — FUROSEMIDE 40 MG: 10 INJECTION, SOLUTION INTRAMUSCULAR; INTRAVENOUS at 02:10

## 2023-10-08 RX ADMIN — EZETIMIBE 10 MG: 10 TABLET ORAL at 08:10

## 2023-10-08 RX ADMIN — NIFEDIPINE 30 MG: 30 TABLET, FILM COATED, EXTENDED RELEASE ORAL at 08:10

## 2023-10-08 RX ADMIN — AMPICILLIN SODIUM 2 G: 2 INJECTION, POWDER, FOR SOLUTION INTRAMUSCULAR; INTRAVENOUS at 12:10

## 2023-10-08 RX ADMIN — INSULIN DETEMIR 40 UNITS: 100 INJECTION, SOLUTION SUBCUTANEOUS at 10:10

## 2023-10-08 RX ADMIN — AMPICILLIN SODIUM 2 G: 2 INJECTION, POWDER, FOR SOLUTION INTRAMUSCULAR; INTRAVENOUS at 09:10

## 2023-10-08 RX ADMIN — POTASSIUM CHLORIDE 10 MEQ: 7.46 INJECTION, SOLUTION INTRAVENOUS at 11:10

## 2023-10-08 RX ADMIN — ENOXAPARIN SODIUM 40 MG: 40 INJECTION SUBCUTANEOUS at 04:10

## 2023-10-08 RX ADMIN — ONDANSETRON 4 MG: 2 INJECTION INTRAMUSCULAR; INTRAVENOUS at 08:10

## 2023-10-08 RX ADMIN — POTASSIUM CHLORIDE 10 MEQ: 7.46 INJECTION, SOLUTION INTRAVENOUS at 05:10

## 2023-10-08 RX ADMIN — AMPICILLIN SODIUM 2 G: 2 INJECTION, POWDER, FOR SOLUTION INTRAMUSCULAR; INTRAVENOUS at 08:10

## 2023-10-08 RX ADMIN — ASPIRIN 81 MG: 81 TABLET, COATED ORAL at 08:10

## 2023-10-08 NOTE — PLAN OF CARE
Pt has been lethargic most of shift. Pt responds to voice and touch but mostly keeps her eyes closed. Pt had a few episodes of loose stool today. ST evaluated pt's swallowing and changed her diet to a soft diet.

## 2023-10-08 NOTE — PT/OT/SLP EVAL
"Speech Language Pathology Evaluation  Cognitive/Bedside Swallow    Patient Name:  Jaimee Quintana   MRN:  [id number]  Admitting Diagnosis: Bacteremia    Recommendations:                  General Recommendations:  follow up indicated  Diet recommendations:  Mechanical soft, Thin liquids - IDDSI Level 0   Aspiration Precautions: 1 bite/sip at a time, Alternating bites/sips, Assistance with meals, and Feed only when awake/alert   General Precautions: Standard, fall  Communication strategies:  none    Assessment:     Jaimee Quintana is a 48 y.o. female with an SLP diagnosis of Dysphagia.  She presents with increased fatigue, decreased attention and decreased seal around utensils   History:     Past Medical History:   Diagnosis Date    Cataract     Cervical high risk HPV (human papillomavirus) test positive 2020    NOT 16 OR 18    CVA (cerebral infarction)          Depression     Diabetes mellitus, type 2     GERD (gastroesophageal reflux disease)     Hyperlipidemia     Hypertension     Lactic acidosis 10/6/2023    Moderate nonproliferative diabetic retinopathy     Nausea and vomiting 10/6/2023    Obesity     Stroke     Tachycardia 10/4/2023       Past Surgical History:   Procedure Laterality Date     SECTION      CHOLECYSTECTOMY      DILATION AND CURETTAGE OF UTERUS      GALLBLADDER SURGERY  2017    stone removed       Social History: Patient lives with 2 daughters.    Prior Intubation HX:  n/a    Modified Barium Swallow: n/a    Chest X-Rays: 10/7/2023 FINDINGS:  "The lungs are hypoexpanded.  There are small bilateral pleural effusions.  There are perihilar interstitial opacities with pulmonary vascular congestion compatible with pulmonary edema.  The there is no pneumothorax.  The cardiac silhouette is enlarged.  Osseous structures demonstrate degenerative changes."       Prior diet: regular diet/thin liquids.      Subjective     Communicated with RN prior to entry. Patient found asleep in bed upon " entry. Patient remained with inconsistent alertness during po trials and required multiple cues to remain awake.     Pain/Comfort:  Pain Rating 1: 0/10  Pain Rating Post-Intervention 1: 0/10    Respiratory Status: Nasal cannula, flow 2 L/min    Objective:     Cognitive Status:    Arousal/Alertness Delayed response to stimuli -required multiple cues to respond in conversation, required 1-2 reminders to sit up while eating   Orientation Oriented x4  Memory Immediate Recall 3/3, delayed recall 1/3       Receptive Language:   Comprehension:      Questions Simple yes/no 80% acc  Complex yes/no 0% acc  Commands  two step basic commands 100%    Expressive Language:  Verbal:    Automatic Speech  Phrase completion 100% and Sentence completion 100%  Repetition Words 100%  Naming Confrontation 100% and Divergent responsive 7 in 1 minute    Motor Speech:  WFL    Voice:   [facility]    Oral Musculature Evaluation  Oral Musculature: WFL  Dentition: edentulous  Secretion Management: adequate  Mucosal Quality: good  Mandibular Strength and Mobility: [facility]  Oral Labial Strength and Mobility: impaired seal  Velar Elevation: [facility]  Buccal Strength and Mobility: impaired  Volitional Cough: strong  Volitional Swallow: able to palpate  Voice Prior to PO Intake: clear    Bedside Swallow Eval:   Consistencies Assessed:  Thin liquids via straw x5, via cup x2  Puree x3  Soft solids x2  Solids x1      Oral Phase:   Decreased closure around utensil  Prolonged mastication  Slow oral transit time    Pharyngeal Phase:   Immediate cough on 1/5 trials of thin  delayed swallow initation  throat clearing    Compensatory Strategies  None    Treatment: SLP provided patient education on SLP recommendations, SLP role, s/s and risks of aspiration, safe swallow precautions, and POC.  SLP communicated recommendations with RN.        Goals:   Multidisciplinary Problems       SLP Goals          Problem: SLP    Goal Priority Disciplines Outcome   SLP Goal     SLP  Ongoing, Progressing   Description: 1. Patient will tolerate mechanical soft diet/thin liquids with no overt s/s of aspiration  2.Patient will participate in ongoing swallow evaluation and tolerate least restrictive diet with no overt s/s of aspiration.   3. Patient will answer complex yes/no questions with 50% acc and mod cueing  4. Patient will participate in ongoing cognitive-linguistic evaluation to best determine cognitive linguistic deficits and goals                           Plan:     Patient to be seen:  2 x/week   Plan of Care expires:     Plan of Care reviewed with:  patient   SLP Follow-Up:  Yes       Discharge recommendations:  Discharge Facility/Level of Care Needs:   Barriers to Discharge:  None    Time Tracking:     SLP Treatment Date:   10/08/23  Speech Start Time:  1343  Speech Stop Time:  1403     Speech Total Time (min):  20 min    Billable Minutes: Yoel Speech/language 10  and Treatment Swallowing Dysfunction 10    10/08/2023

## 2023-10-08 NOTE — PLAN OF CARE
Bedside swallow evaluation completed. Informally, patient was noted to have decreased attention and increased fatigue and confusion and required multiple cues during conversation and po trials to remain awake. Recommend: mechanical soft diet/ thin liquids. ST will continue to follow for ongoing swallow and cognitive assessment.

## 2023-10-08 NOTE — PROGRESS NOTES
[facility] Medicine  Progress Note    Patient Name: Jaimee Quintana  MRN: [id number]  Patient Class: IP- Inpatient   Admission Date: 10/4/2023  Length of Stay: 4 days  Attending Physician: Adry Lyons MD  Primary Care Provider: [facility]        Subjective:     Principal Problem:Bacteremia        HPI:  49 yo F w/ PMHx of HFpEF (EF 50% 5/23), T2DM (A1c 11.4), HLD, and obesity who presented to the ED from clinic and here with DKA. Pt reports 3 days of worsening malaise, HA's, N, V, and D. She denies any hemoptysis or hematochezia. She also endorses increased urinary frequency but no dysuria during this time. She reports having been out of her trulicity for 1 wk but has been compliant with other home medications including insulin at home. She denies any sick contacts but does live with her two teen children who are in school. She denies any EtoH or additional substance use.      In the ED pt afebrile, , RR 50, /87 and sat 100% on RA. EKG with sinus tach. CXR without signs of consolidation or effusion. VBG w/ pH 7.44, CO2 34.3, O2 32.4, and HCO3 23.4. CB notable for WBC 22.6 Hg 16.4. CMP notable for Na 127, K 3.9, , BUN/Crt 11/1.4, and AG 22. LA 6.1, BHB 0.7, and trop 0.037. UA w/ hazy urine, 1+ protein, 4+ glucose, trace ketones, occult blood, and leuks. Micro with 32 wbc and moderate deshawn. UPT neg. Flu and COVID swab neg. She was given 30u detemir, 1L LR, and 1L NS as well as 1x vanc/zosyn and zofran. Pt was admitted to the \[facility]\"" FM service for management of DKA.       Overview/Hospital Course:  No notes on file    Interval History: NAEON. Nursing concerned about dysphagia with medication. SLP evaluation placed. Patient remains on 5L O2, encouraged patient sit up in bed, chair as opposed to lying in bed all day. Will perform ambulatory O2 to further evaluated O2 needs.     Review of Systems   Constitutional:  Negative for chills and fever.   Respiratory:   Negative for shortness of breath and wheezing.    Cardiovascular:  Negative for chest pain.   Gastrointestinal:  Positive for abdominal pain. Negative for diarrhea, nausea and vomiting.     Objective:     Vital Signs (Most Recent):  Temp: 98.1 °F (36.7 °C) (10/08/23 1219)  Pulse: 85 (10/08/23 1219)  Resp: (!) 22 (10/08/23 1219)  BP: 124/68 (10/08/23 1219)  SpO2: (!) 92 % (10/08/23 1219) Vital Signs (24h Range):  Temp:  [98 °F (36.7 °C)-98.6 °F (37 °C)] 98.1 °F (36.7 °C)  Pulse:  [85-98] 85  Resp:  [18-26] 22  SpO2:  [91 %-94 %] 92 %  BP: (118-137)/(61-74) 124/68     Weight: 109.9 kg (242 lb 4.6 oz)  Body mass index is 40.32 kg/m².    Intake/Output Summary (Last 24 hours) at 10/8/2023 1322  Last data filed at 10/7/2023 2230  Gross per 24 hour   Intake 638.18 ml   Output 1050 ml   Net -411.82 ml         Physical Exam  Vitals and nursing note reviewed.   Constitutional:       Appearance: Normal appearance.   HENT:      Head: Normocephalic and atraumatic.   Cardiovascular:      Rate and Rhythm: Normal rate and regular rhythm.      Pulses: Normal pulses.      Heart sounds: Normal heart sounds.   Pulmonary:      Effort: Pulmonary effort is normal.      Breath sounds: Normal breath sounds.   Neurological:      Mental Status: She is alert and oriented to person, place, and time.             Significant Labs: All pertinent labs within the past 24 hours have been reviewed.    Significant Imaging: I have reviewed all pertinent imaging results/findings within the past 24 hours.      Assessment/Plan:      * Bacteremia  The patient had blood cultures drawn on admission. These have grown GNR, specifically E.coli. Susceptibilities are still pending. On 10/06, the patient reports worsening diffuse abdominal pain.  10/8: BCX 10/4: 2/2 E.coli (pansensitive), BCx 10/6: NGTD    Plan:  - Continue with ampicillin  - Repeat Blood cx pending, NGTD  - Infectious disease consulted, may be able to switch to PO regimen if continues to improve      Dysphagia  New onset difficulty with swallowing PO meds  SLP eval ordered      NSTEMI (non-ST elevated myocardial infarction)        Diabetic ketoacidosis associated with type 2 diabetes mellitus  The patient presented from clinic with elevated glucose (>500), trace ketones in the urine, and mildly elevated BHB. DKA thought likely to be 2/2 bacteremia. The patient was stepped down from the ICU on 10/05 and transitioned to 25u Detemir nightly. On 10/06 she was increased to 40u BID.    Plan:  Will continue to monitor glucose levels and adjust insulin regimen as needed.  Will continue with Zofran for Nausea and vomiting.      Chronic diastolic congestive heart failure  Patient noted to have pulmonary edema on CXR on 10/07. TTE done during this admission showed decreased systolic function.   Plan:  Continue Lisinopril, HCTZ, Metoprolol  Will continue with diuresis with IV 40 lasix BID  Will start pure wick for monitoring UOP    Mild episode of recurrent major depressive disorder  Plan:  - Continue with Fluoxetine.      UTI (urinary tract infection)  See bacteremia.       Hypertension associated with diabetes  Plan:  Continue HCTZ, Lisinopril, Nifedipine today  Will consider switching HCTZ to Spironolactone tomorrow in setting of patient's hypokalemia        VTE Risk Mitigation (From admission, onward)         Ordered     enoxaparin injection 40 mg  Daily         10/04/23 1908     IP VTE HIGH RISK PATIENT  Once         10/04/23 1908     Place sequential compression device  Until discontinued         10/04/23 1723                Discharge Planning   MALGORZATA:      Code Status: Full Code   Is the patient medically ready for discharge?:     Reason for patient still in hospital (select all that apply): Treatment and Consult recommendations  Discharge Plan A: Skilled Nursing Facility   Discharge Delays: None known at this time              Michael Mallory DO  Department of Hospital Medicine   [facility] PGY-2

## 2023-10-08 NOTE — PLAN OF CARE
AAOx4. No complaints of pain. Tele monitored. Glucose monitored. Critical lab 2.6 potassium, MD notified, orders placed. 5L NC. Safety maintained. Call bell within reach.

## 2023-10-08 NOTE — SUBJECTIVE & OBJECTIVE
Interval History: NAEON. Nursing concerned about dysphagia with medication. SLP evaluation placed. Patient remains on 5L O2, encouraged patient sit up in bed, chair as opposed to lying in bed all day. Will perform ambulatory O2 to further evaluated O2 needs.     Review of Systems   Constitutional:  Negative for chills and fever.   Respiratory:  Negative for shortness of breath and wheezing.    Cardiovascular:  Negative for chest pain.   Gastrointestinal:  Positive for abdominal pain. Negative for diarrhea, nausea and vomiting.     Objective:     Vital Signs (Most Recent):  Temp: 98.1 °F (36.7 °C) (10/08/23 1219)  Pulse: 85 (10/08/23 1219)  Resp: (!) 22 (10/08/23 1219)  BP: 124/68 (10/08/23 1219)  SpO2: (!) 92 % (10/08/23 1219) Vital Signs (24h Range):  Temp:  [98 °F (36.7 °C)-98.6 °F (37 °C)] 98.1 °F (36.7 °C)  Pulse:  [85-98] 85  Resp:  [18-26] 22  SpO2:  [91 %-94 %] 92 %  BP: (118-137)/(61-74) 124/68     Weight: 109.9 kg (242 lb 4.6 oz)  Body mass index is 40.32 kg/m².    Intake/Output Summary (Last 24 hours) at 10/8/2023 1322  Last data filed at 10/7/2023 2230  Gross per 24 hour   Intake 638.18 ml   Output 1050 ml   Net -411.82 ml         Physical Exam  Vitals and nursing note reviewed.   Constitutional:       Appearance: Normal appearance.   HENT:      Head: Normocephalic and atraumatic.   Cardiovascular:      Rate and Rhythm: Normal rate and regular rhythm.      Pulses: Normal pulses.      Heart sounds: Normal heart sounds.   Pulmonary:      Effort: Pulmonary effort is normal.      Breath sounds: Normal breath sounds.   Neurological:      Mental Status: She is alert and oriented to person, place, and time.             Significant Labs: All pertinent labs within the past 24 hours have been reviewed.    Significant Imaging: I have reviewed all pertinent imaging results/findings within the past 24 hours.

## 2023-10-08 NOTE — ASSESSMENT & PLAN NOTE
The patient had blood cultures drawn on admission. These have grown GNR, specifically E.coli. Susceptibilities are still pending. On 10/06, the patient reports worsening diffuse abdominal pain.  10/8: BCX 10/4: 2/2 E.coli (pansensitive), BCx 10/6: NGTD    Plan:  - Continue with ampicillin  - Repeat Blood cx pending, NGTD  - Infectious disease consulted, may be able to switch to PO regimen if continues to improve

## 2023-10-08 NOTE — PLAN OF CARE
Patient on oxygen in no apparent distress, given aerobika and IS treatment, no adverse reactions will continue to monitor.    [id number] Comprehensive

## 2023-10-09 DIAGNOSIS — E87.6 LOW SERUM POTASSIUM: ICD-10-CM

## 2023-10-09 DIAGNOSIS — E55.9 HYPOVITAMINOSIS D: ICD-10-CM

## 2023-10-09 LAB
ALBUMIN SERPL BCP-MCNC: 1.6 G/DL (ref 3.5–5.2)
ALP SERPL-CCNC: 161 U/L (ref 55–135)
ALT SERPL W/O P-5'-P-CCNC: 15 U/L (ref 10–44)
ANION GAP SERPL CALC-SCNC: 14 MMOL/L (ref 8–16)
ANION GAP SERPL CALC-SCNC: 14 MMOL/L (ref 8–16)
ANION GAP SERPL CALC-SCNC: 15 MMOL/L (ref 8–16)
AST SERPL-CCNC: 27 U/L (ref 10–40)
BASOPHILS # BLD AUTO: 0.11 K/UL (ref 0–0.2)
BASOPHILS NFR BLD: 0.5 % (ref 0–1.9)
BILIRUB SERPL-MCNC: 0.5 MG/DL (ref 0.1–1)
BUN SERPL-MCNC: 14 MG/DL (ref 6–20)
CALCIUM SERPL-MCNC: 8.3 MG/DL (ref 8.7–10.5)
CALCIUM SERPL-MCNC: 8.5 MG/DL (ref 8.7–10.5)
CALCIUM SERPL-MCNC: 8.7 MG/DL (ref 8.7–10.5)
CHLORIDE SERPL-SCNC: 92 MMOL/L (ref 95–110)
CHLORIDE SERPL-SCNC: 93 MMOL/L (ref 95–110)
CHLORIDE SERPL-SCNC: 93 MMOL/L (ref 95–110)
CO2 SERPL-SCNC: 25 MMOL/L (ref 23–29)
CO2 SERPL-SCNC: 25 MMOL/L (ref 23–29)
CO2 SERPL-SCNC: 26 MMOL/L (ref 23–29)
CO2 SERPL-SCNC: 26 MMOL/L (ref 23–29)
CO2 SERPL-SCNC: 27 MMOL/L (ref 23–29)
CREAT SERPL-MCNC: 1 MG/DL (ref 0.5–1.4)
DIFFERENTIAL METHOD: ABNORMAL
EOSINOPHIL # BLD AUTO: 0 K/UL (ref 0–0.5)
EOSINOPHIL NFR BLD: 0.1 % (ref 0–8)
ERYTHROCYTE [DISTWIDTH] IN BLOOD BY AUTOMATED COUNT: 13 % (ref 11.5–14.5)
EST. GFR  (NO RACE VARIABLE): >60 ML/MIN/1.73 M^2
GLUCOSE SERPL-MCNC: 113 MG/DL (ref 70–110)
GLUCOSE SERPL-MCNC: 113 MG/DL (ref 70–110)
GLUCOSE SERPL-MCNC: 129 MG/DL (ref 70–110)
GLUCOSE SERPL-MCNC: 160 MG/DL (ref 70–110)
GLUCOSE SERPL-MCNC: 173 MG/DL (ref 70–110)
HCT VFR BLD AUTO: 37.8 % (ref 37–48.5)
HGB BLD-MCNC: 13.1 G/DL (ref 12–16)
IMM GRANULOCYTES # BLD AUTO: 0.91 K/UL (ref 0–0.04)
IMM GRANULOCYTES NFR BLD AUTO: 4.3 % (ref 0–0.5)
LYMPHOCYTES # BLD AUTO: 1.3 K/UL (ref 1–4.8)
LYMPHOCYTES NFR BLD: 6 % (ref 18–48)
MAGNESIUM SERPL-MCNC: 2 MG/DL (ref 1.6–2.6)
MCH RBC QN AUTO: 29.5 PG (ref 27–31)
MCHC RBC AUTO-ENTMCNC: 34.7 G/DL (ref 32–36)
MCV RBC AUTO: 85 FL (ref 82–98)
MONOCYTES # BLD AUTO: 1.1 K/UL (ref 0.3–1)
MONOCYTES NFR BLD: 5 % (ref 4–15)
NEUTROPHILS # BLD AUTO: 17.9 K/UL (ref 1.8–7.7)
NEUTROPHILS NFR BLD: 84.1 % (ref 38–73)
NRBC BLD-RTO: 0 /100 WBC
PHOSPHATE SERPL-MCNC: 3.5 MG/DL (ref 2.7–4.5)
PLATELET # BLD AUTO: 198 K/UL (ref 150–450)
PMV BLD AUTO: 11.3 FL (ref 9.2–12.9)
POCT GLUCOSE: 114 MG/DL (ref 70–110)
POCT GLUCOSE: 135 MG/DL (ref 70–110)
POCT GLUCOSE: 166 MG/DL (ref 70–110)
POCT GLUCOSE: 173 MG/DL (ref 70–110)
POTASSIUM SERPL-SCNC: 2.9 MMOL/L (ref 3.5–5.1)
POTASSIUM SERPL-SCNC: 3.1 MMOL/L (ref 3.5–5.1)
POTASSIUM SERPL-SCNC: 3.4 MMOL/L (ref 3.5–5.1)
PROT SERPL-MCNC: 7.3 G/DL (ref 6–8.4)
RBC # BLD AUTO: 4.44 M/UL (ref 4–5.4)
SODIUM SERPL-SCNC: 132 MMOL/L (ref 136–145)
SODIUM SERPL-SCNC: 133 MMOL/L (ref 136–145)
WBC # BLD AUTO: 21.26 K/UL (ref 3.9–12.7)
WBC #/AREA STL HPF: NORMAL /[HPF]

## 2023-10-09 PROCEDURE — 87449 NOS EACH ORGANISM AG IA: CPT

## 2023-10-09 PROCEDURE — 83735 ASSAY OF MAGNESIUM: CPT

## 2023-10-09 PROCEDURE — 94799 UNLISTED PULMONARY SVC/PX: CPT

## 2023-10-09 PROCEDURE — 63600175 PHARM REV CODE 636 W HCPCS: Performed by: STUDENT IN AN ORGANIZED HEALTH CARE EDUCATION/TRAINING PROGRAM

## 2023-10-09 PROCEDURE — 94664 DEMO&/EVAL PT USE INHALER: CPT

## 2023-10-09 PROCEDURE — 97535 SELF CARE MNGMENT TRAINING: CPT | Mod: CO

## 2023-10-09 PROCEDURE — 99900035 HC TECH TIME PER 15 MIN (STAT)

## 2023-10-09 PROCEDURE — 63600175 PHARM REV CODE 636 W HCPCS: Performed by: INTERNAL MEDICINE

## 2023-10-09 PROCEDURE — 63600175 PHARM REV CODE 636 W HCPCS

## 2023-10-09 PROCEDURE — 25000003 PHARM REV CODE 250: Performed by: STUDENT IN AN ORGANIZED HEALTH CARE EDUCATION/TRAINING PROGRAM

## 2023-10-09 PROCEDURE — 25000003 PHARM REV CODE 250

## 2023-10-09 PROCEDURE — 25000003 PHARM REV CODE 250: Performed by: INTERNAL MEDICINE

## 2023-10-09 PROCEDURE — 36415 COLL VENOUS BLD VENIPUNCTURE: CPT

## 2023-10-09 PROCEDURE — 27000207 HC ISOLATION

## 2023-10-09 PROCEDURE — 94761 N-INVAS EAR/PLS OXIMETRY MLT: CPT

## 2023-10-09 PROCEDURE — 85025 COMPLETE CBC W/AUTO DIFF WBC: CPT

## 2023-10-09 PROCEDURE — 27000221 HC OXYGEN, UP TO 24 HOURS

## 2023-10-09 PROCEDURE — 84100 ASSAY OF PHOSPHORUS: CPT

## 2023-10-09 PROCEDURE — 80048 BASIC METABOLIC PNL TOTAL CA: CPT | Mod: XB

## 2023-10-09 PROCEDURE — 80053 COMPREHEN METABOLIC PANEL: CPT

## 2023-10-09 PROCEDURE — 27000646 HC AEROBIKA DEVICE

## 2023-10-09 PROCEDURE — 21400001 HC TELEMETRY ROOM

## 2023-10-09 RX ORDER — POTASSIUM CHLORIDE 20 MEQ/1
40 TABLET, EXTENDED RELEASE ORAL ONCE
Status: COMPLETED | OUTPATIENT
Start: 2023-10-09 | End: 2023-10-09

## 2023-10-09 RX ORDER — LIDOCAINE HYDROCHLORIDE 10 MG/ML
1 INJECTION INFILTRATION; PERINEURAL ONCE
Status: DISCONTINUED | OUTPATIENT
Start: 2023-10-09 | End: 2023-10-09

## 2023-10-09 RX ORDER — POTASSIUM CHLORIDE 7.45 MG/ML
10 INJECTION INTRAVENOUS
Status: COMPLETED | OUTPATIENT
Start: 2023-10-09 | End: 2023-10-09

## 2023-10-09 RX ORDER — FUROSEMIDE 10 MG/ML
40 INJECTION INTRAMUSCULAR; INTRAVENOUS ONCE
Status: COMPLETED | OUTPATIENT
Start: 2023-10-09 | End: 2023-10-09

## 2023-10-09 RX ORDER — CEFTRIAXONE 1 G/1
1 INJECTION, POWDER, FOR SOLUTION INTRAMUSCULAR; INTRAVENOUS
Status: DISCONTINUED | OUTPATIENT
Start: 2023-10-09 | End: 2023-10-09

## 2023-10-09 RX ADMIN — PANTOPRAZOLE SODIUM 40 MG: 40 TABLET, DELAYED RELEASE ORAL at 08:10

## 2023-10-09 RX ADMIN — LISINOPRIL 40 MG: 20 TABLET ORAL at 08:10

## 2023-10-09 RX ADMIN — ATORVASTATIN CALCIUM 80 MG: 40 TABLET, FILM COATED ORAL at 09:10

## 2023-10-09 RX ADMIN — POTASSIUM CHLORIDE 10 MEQ: 7.46 INJECTION, SOLUTION INTRAVENOUS at 11:10

## 2023-10-09 RX ADMIN — EZETIMIBE 10 MG: 10 TABLET ORAL at 08:10

## 2023-10-09 RX ADMIN — AMPICILLIN SODIUM 2 G: 2 INJECTION, POWDER, FOR SOLUTION INTRAMUSCULAR; INTRAVENOUS at 12:10

## 2023-10-09 RX ADMIN — ENOXAPARIN SODIUM 40 MG: 40 INJECTION SUBCUTANEOUS at 04:10

## 2023-10-09 RX ADMIN — POTASSIUM CHLORIDE 10 MEQ: 7.46 INJECTION, SOLUTION INTRAVENOUS at 12:10

## 2023-10-09 RX ADMIN — FUROSEMIDE 40 MG: 10 INJECTION, SOLUTION INTRAMUSCULAR; INTRAVENOUS at 02:10

## 2023-10-09 RX ADMIN — POTASSIUM CHLORIDE 10 MEQ: 7.46 INJECTION, SOLUTION INTRAVENOUS at 09:10

## 2023-10-09 RX ADMIN — METOPROLOL SUCCINATE 100 MG: 50 TABLET, EXTENDED RELEASE ORAL at 08:10

## 2023-10-09 RX ADMIN — AMPICILLIN SODIUM 2 G: 2 INJECTION, POWDER, FOR SOLUTION INTRAMUSCULAR; INTRAVENOUS at 04:10

## 2023-10-09 RX ADMIN — INSULIN DETEMIR 40 UNITS: 100 INJECTION, SOLUTION SUBCUTANEOUS at 09:10

## 2023-10-09 RX ADMIN — FLUOXETINE 40 MG: 20 CAPSULE ORAL at 08:10

## 2023-10-09 RX ADMIN — NIFEDIPINE 30 MG: 30 TABLET, FILM COATED, EXTENDED RELEASE ORAL at 08:10

## 2023-10-09 RX ADMIN — INSULIN ASPART 2 UNITS: 100 INJECTION, SOLUTION INTRAVENOUS; SUBCUTANEOUS at 04:10

## 2023-10-09 RX ADMIN — FUROSEMIDE 40 MG: 10 INJECTION, SOLUTION INTRAMUSCULAR; INTRAVENOUS at 11:10

## 2023-10-09 RX ADMIN — MUPIROCIN: 20 OINTMENT TOPICAL at 08:10

## 2023-10-09 RX ADMIN — ASPIRIN 81 MG: 81 TABLET, COATED ORAL at 08:10

## 2023-10-09 RX ADMIN — INSULIN ASPART 1 UNITS: 100 INJECTION, SOLUTION INTRAVENOUS; SUBCUTANEOUS at 09:10

## 2023-10-09 RX ADMIN — CEFTRIAXONE 1 G: 1 INJECTION, POWDER, FOR SOLUTION INTRAMUSCULAR; INTRAVENOUS at 09:10

## 2023-10-09 RX ADMIN — INSULIN DETEMIR 40 UNITS: 100 INJECTION, SOLUTION SUBCUTANEOUS at 08:10

## 2023-10-09 RX ADMIN — FUROSEMIDE 40 MG: 10 INJECTION, SOLUTION INTRAMUSCULAR; INTRAVENOUS at 04:10

## 2023-10-09 RX ADMIN — POTASSIUM CHLORIDE 40 MEQ: 1500 TABLET, EXTENDED RELEASE ORAL at 09:10

## 2023-10-09 RX ADMIN — POTASSIUM CHLORIDE 10 MEQ: 7.46 INJECTION, SOLUTION INTRAVENOUS at 08:10

## 2023-10-09 NOTE — SUBJECTIVE & OBJECTIVE
Interval History: The patient had a fever last night to 100.5F, she had diarrhea this morning. She described it as watery, nonbloody. Still on 5L NC.    Review of Systems   Respiratory:  Negative for shortness of breath.    Cardiovascular:  Negative for chest pain.   Gastrointestinal:  Positive for diarrhea. Negative for abdominal pain.     Objective:     Vital Signs (Most Recent):  Temp: 98.2 °F (36.8 °C) (10/09/23 0744)  Pulse: 93 (10/09/23 0744)  Resp: 18 (10/09/23 0744)  BP: 130/65 (10/09/23 0744)  SpO2: 98 % (10/09/23 0744) Vital Signs (24h Range):  Temp:  [98.1 °F (36.7 °C)-100.5 °F (38.1 °C)] 98.2 °F (36.8 °C)  Pulse:  [85-93] 93  Resp:  [16-22] 18  SpO2:  [91 %-98 %] 98 %  BP: (116-142)/(58-75) 130/65     Weight: 111.3 kg (245 lb 6 oz)  Body mass index is 40.83 kg/m².    Intake/Output Summary (Last 24 hours) at 10/9/2023 0840  Last data filed at 10/9/2023 0551  Gross per 24 hour   Intake 1471.83 ml   Output 825 ml   Net 646.83 ml         Physical Exam  Constitutional:       General: She is not in acute distress.  HENT:      Head: Normocephalic.   Neurological:      Mental Status: She is alert and oriented to person, place, and time.           Significant Labs: All pertinent labs within the past 24 hours have been reviewed.  CBC:   Recent Labs   Lab 10/08/23  0405 10/09/23  0358   WBC 15.69* 21.26*   HGB 13.0 13.1   HCT 37.7 37.8    198     CMP:   Recent Labs   Lab 10/08/23  0405 10/08/23  0835 10/08/23  2008 10/08/23  2339 10/09/23  0358   *  134*   < > 133* 133* 133*  133*   K 2.8*  2.8*   < > 3.0* 3.1* 3.4*  3.4*   CL 94*  94*   < > 92* 92* 93*  93*   CO2 27  27   < > 26 26 25  25   GLU 94  94   < > 139* 129* 113*  113*   BUN 13  13   < > 14 14 14  14   CREATININE 0.9  0.9   < > 1.0 1.0 1.0  1.0   CALCIUM 8.8  8.8   < > 8.8 8.7 8.7  8.7   PROT 7.2  --   --   --  7.3   ALBUMIN 1.8*  --   --   --  1.6*   BILITOT 0.6  --   --   --  0.5   ALKPHOS 127  --   --   --  161*   AST 26   --   --   --  27   ALT 18  --   --   --  15   ANIONGAP 13  13   < > 15 15 15  15    < > = values in this interval not displayed.       Significant Imaging: I have reviewed all pertinent imaging results/findings within the past 24 hours.

## 2023-10-09 NOTE — PT/OT/SLP PROGRESS
"Occupational Therapy   Treatment    Name: Jaimee Quintana  MRN: [id number]  Admitting Diagnosis:  Bacteremia       Recommendations:     Discharge Recommendations: other (see comments) (moderate intensity therapy)  Discharge Equipment Recommendations:  walker, rolling  Barriers to discharge:       Assessment:     Jaimee Quintana is a 48 y.o. female with a medical diagnosis of Bacteremia.  Performance deficits affecting function are weakness, impaired endurance, decreased coordination, decreased lower extremity function, impaired functional mobility, gait instability, impaired self care skills.     Rehab Prognosis:  Fair; patient would benefit from acute skilled OT services to address these deficits and reach maximum level of function.       Plan:     Patient to be seen 5 x/week to address the above listed problems via self-care/home management  Plan of Care Expires: 11/03/23  Plan of Care Reviewed with: patient    Subjective     Chief Complaint: "I have a headache and feel dizzy"  Patient/Family Comments/goals: return to PLOF  Pain/Comfort:  Location - Orientation 1: generalized  Location 1: head  Pain Addressed 1: Nurse notified    Objective:     Communicated with: nsg prior to session.  Patient found right sidelying with PureWick, telemetry, peripheral IV, oxygen (oxygen 3L NC) upon OT entry to room.    General Precautions: Standard, fall    Orthopedic Precautions:N/A  Braces: N/A  Respiratory Status: Nasal cannula, flow 3 L/min/. Pt NC found misplaced on forehead, SpO2 89% after properly positioning NC.      [facility] 6 Click ADL: 16    Treatment & Education:  Pt was lethargic and presented with flat affect   Pt declined ADLs and when asked about oral hygiene stated "I don't have teeth to brush"; educated on importance on oral hygiene, despite being edentulous  Pt educated on importance of keeping NC in the proper position to maintain appropriate SpO2 levels, thus preventing headaches and dizziness  Relayed info to " nurse; reports patient /c frequent episodes of diarrhea     Patient left left sidelying with all lines intact, call button in reach, bed alarm on, and nurse notified    GOALS:   Multidisciplinary Problems       Occupational Therapy Goals          Problem: Occupational Therapy    Goal Priority Disciplines Outcome Interventions   Occupational Therapy Goal     OT, PT/OT Ongoing, Progressing    Description: Goals to be met by: 11/3/2023     Patient will increase functional independence with ADLs by performing:    UE Dressing with Modified [place].  LE Dressing with Modified [place].  Grooming while standing at sink with Modified [place].  Toileting from toilet with Modified [place] for hygiene and clothing management.   Bathing from  sitting eob with setup for wash up method.  Functional mobility to / from bathroom with least restrictive device and transfer to / from commode with modified independence.  Upper extremity exercise program x12 reps per handout, with independence.                           Time Tracking:     OT Date of Treatment: 10/09/23  OT Start Time: 0957  OT Stop Time: 1007  OT Total Time (min): 10 min    Billable Minutes:Self Care/Home Management 10      10/9/2023

## 2023-10-09 NOTE — ASSESSMENT & PLAN NOTE
The patient had blood cultures drawn on admission. These have grown GNR, specifically E.coli. Susceptibilities are still pending. On 10/06, the patient reports worsening diffuse abdominal pain.  10/8: BCX 10/4: 2/2 E.coli (pansensitive), (repeat) BCx 10/6: NGTD    Plan:  - Switched to Rocephin 2/2 worsening condition on ampicillin  - Repeat Blood cx pending, NGTD  - Stool cultures showing campylobacter species  - Infectious disease consulted, may be able to switch to PO regimen if continues to improve

## 2023-10-09 NOTE — ASSESSMENT & PLAN NOTE
Plan:  Continue HCTZ, Lisinopril, Nifedipine today  Will consider switching HCTZ to Spironolactone tomorrow in setting of patient's hypokalemia.

## 2023-10-09 NOTE — PLAN OF CARE
AAOx4. No complaints of pain or n/v. 3 episodes of diarrhea, stool sample collected. Tele monitored. Glucose  monitored. 5L NC. Medications administered per MAR. Safety maintained. Call bell within reach.

## 2023-10-09 NOTE — PROGRESS NOTES
[facility] Medicine  Progress Note    Patient Name: Jaimee Quintana  MRN: [id number]  Patient Class: IP- Inpatient   Admission Date: 10/4/2023  Length of Stay: 5 days  Attending Physician: Adry Lyons MD  Primary Care Provider: [facility]        Subjective:     Principal Problem:Bacteremia        HPI:  47 yo F w/ PMHx of HFpEF (EF 50% 5/23), T2DM (A1c 11.4), HLD, and obesity who presented to the ED from clinic and here with DKA. Pt reports 3 days of worsening malaise, HA's, N, V, and D. She denies any hemoptysis or hematochezia. She also endorses increased urinary frequency but no dysuria during this time. She reports having been out of her trulicity for 1 wk but has been compliant with other home medications including insulin at home. She denies any sick contacts but does live with her two teen children who are in school. She denies any EtoH or additional substance use.      In the ED pt afebrile, , RR 50, /87 and sat 100% on RA. EKG with sinus tach. CXR without signs of consolidation or effusion. VBG w/ pH 7.44, CO2 34.3, O2 32.4, and HCO3 23.4. CB notable for WBC 22.6 Hg 16.4. CMP notable for Na 127, K 3.9, , BUN/Crt 11/1.4, and AG 22. LA 6.1, BHB 0.7, and trop 0.037. UA w/ hazy urine, 1+ protein, 4+ glucose, trace ketones, occult blood, and leuks. Micro with 32 wbc and moderate deshawn. UPT neg. Flu and COVID swab neg. She was given 30u detemir, 1L LR, and 1L NS as well as 1x vanc/zosyn and zofran. Pt was admitted to the [facility] FM service for management of DKA.       Overview/Hospital Course:  No notes on file    Interval History: The patient had a fever last night to 100.5F, she had diarrhea this morning. She described it as watery, nonbloody. Still on 5L NC.    Review of Systems   Respiratory:  Negative for shortness of breath.    Cardiovascular:  Negative for chest pain.   Gastrointestinal:  Positive for diarrhea. Negative for abdominal pain.      Objective:     Vital Signs (Most Recent):  Temp: 98.2 °F (36.8 °C) (10/09/23 0744)  Pulse: 93 (10/09/23 0744)  Resp: 18 (10/09/23 0744)  BP: 130/65 (10/09/23 0744)  SpO2: 98 % (10/09/23 0744) Vital Signs (24h Range):  Temp:  [98.1 °F (36.7 °C)-100.5 °F (38.1 °C)] 98.2 °F (36.8 °C)  Pulse:  [85-93] 93  Resp:  [16-22] 18  SpO2:  [91 %-98 %] 98 %  BP: (116-142)/(58-75) 130/65     Weight: 111.3 kg (245 lb 6 oz)  Body mass index is 40.83 kg/m².    Intake/Output Summary (Last 24 hours) at 10/9/2023 0840  Last data filed at 10/9/2023 0551  Gross per 24 hour   Intake 1471.83 ml   Output 825 ml   Net 646.83 ml         Physical Exam  Constitutional:       General: She is not in acute distress.  HENT:      Head: Normocephalic.   Neurological:      Mental Status: She is alert and oriented to person, place, and time.           Significant Labs: All pertinent labs within the past 24 hours have been reviewed.  CBC:   Recent Labs   Lab 10/08/23  0405 10/09/23  0358   WBC 15.69* 21.26*   HGB 13.0 13.1   HCT 37.7 37.8    198     CMP:   Recent Labs   Lab 10/08/23  0405 10/08/23  0835 10/08/23  2008 10/08/23  2339 10/09/23  0358   *  134*   < > 133* 133* 133*  133*   K 2.8*  2.8*   < > 3.0* 3.1* 3.4*  3.4*   CL 94*  94*   < > 92* 92* 93*  93*   CO2 27  27   < > 26 26 25  25   GLU 94  94   < > 139* 129* 113*  113*   BUN 13  13   < > 14 14 14  14   CREATININE 0.9  0.9   < > 1.0 1.0 1.0  1.0   CALCIUM 8.8  8.8   < > 8.8 8.7 8.7  8.7   PROT 7.2  --   --   --  7.3   ALBUMIN 1.8*  --   --   --  1.6*   BILITOT 0.6  --   --   --  0.5   ALKPHOS 127  --   --   --  161*   AST 26  --   --   --  27   ALT 18  --   --   --  15   ANIONGAP 13  13   < > 15 15 15  15    < > = values in this interval not displayed.       Significant Imaging: I have reviewed all pertinent imaging results/findings within the past 24 hours.      Assessment/Plan:      * Bacteremia  The patient had blood cultures drawn on admission. These  have grown GNR, specifically E.coli. Susceptibilities are still pending. On 10/06, the patient reports worsening diffuse abdominal pain.  10/8: BCX 10/4: 2/2 E.coli (pansensitive), (repeat) BCx 10/6: NGTD    Plan:  - Switched to Rocephin 2/2 worsening condition on ampicillin  - Repeat Blood cx pending, NGTD  - Stool cultures showing campylobacter species  - Infectious disease consulted, may be able to switch to PO regimen if continues to improve     Dysphagia  New onset difficulty with swallowing PO meds  SLP recs include: 1 bite/sip at a time, Alternating bites/sips, Assistance with meals, and Feed only when awake/alert       NSTEMI (non-ST elevated myocardial infarction)        Diabetic ketoacidosis associated with type 2 diabetes mellitus  The patient presented from clinic with elevated glucose (>500), trace ketones in the urine, and mildly elevated BHB. DKA thought likely to be 2/2 bacteremia. The patient was stepped down from the ICU on 10/05 and transitioned to 25u Detemir nightly. On 10/06 she was increased to 40u BID.    Plan:  Will continue to monitor glucose levels and adjust insulin regimen as needed.  Will continue with Zofran for Nausea and vomiting      Chronic diastolic congestive heart failure  Patient noted to have pulmonary edema on CXR on 10/07. TTE done during this admission showed decreased systolic function.   Plan:  Continue Lisinopril and Metoprolol  Will continue with diuresis with IV 40 lasix BID  Given one dose of IV 40mg Lasix extra  Will start pure wick for monitoring UOP    Mild episode of recurrent major depressive disorder  Plan:  - Continue with Fluoxetine      UTI (urinary tract infection)  See bacteremia.       Hypertension associated with diabetes  Plan:  Continue HCTZ, Lisinopril, Nifedipine today  Will consider switching HCTZ to Spironolactone tomorrow in setting of patient's hypokalemia.        VTE Risk Mitigation (From admission, onward)         Ordered     enoxaparin  injection 40 mg  Daily         10/04/23 1908     IP VTE HIGH RISK PATIENT  Once         10/04/23 1908     Place sequential compression device  Until discontinued         10/04/23 1723                Discharge Planning   MALGORZATA:      Code Status: Full Code   Is the patient medically ready for discharge?:     Reason for patient still in hospital (select all that apply): Treatment  Discharge Plan A: Skilled Nursing Facility   Discharge Delays: None known at this time              Jas Bynum MD  Department of Hospital Medicine   [facility] Surg

## 2023-10-09 NOTE — ASSESSMENT & PLAN NOTE
New onset difficulty with swallowing PO meds  SLP recs include: 1 bite/sip at a time, Alternating bites/sips, Assistance with meals, and Feed only when awake/alert

## 2023-10-09 NOTE — PT/OT/SLP PROGRESS
Physical Therapy      Patient Name:  Jaimee Quintana   MRN:  [id number]    Patient not seen today secondary to  (pt asleep in AM and experiencing increased diarrhea in PM(1401),nsg aware). Will follow-up tomorrow.

## 2023-10-09 NOTE — PLAN OF CARE
SW uploaded pt's 142 approval from the [facility] to [facility]. RN CM will continue to follow pt through transitions of care and assist with any discharge needs.    Edwin Dm, MSW  [phone]    Future Appointments   Date Time Provider Department Center   10/9/2023  1:00 PM [facility]   10/12/2023 10:00 AM Cleveland Tierney PA-C [facility] TIMOTHYUFJACIEL [facility]   10/12/2023  1:00 PM [facility]   10/16/2023  1:00 PM [facility]   10/19/2023  1:00 PM [facility]   10/23/2023  1:00 PM [facility]   10/26/2023  1:00 PM [facility]   10/30/2023  1:00 PM [facility]   11/2/2023  1:00 PM [facility]   11/6/2023  1:00 PM [facility]   11/9/2023  1:00 PM [facility]   11/13/2023  1:00 PM [facility]   11/16/2023  1:00 PM [facility]   11/20/2023  1:00 PM [facility]   11/27/2023  1:00 PM [facility]   11/30/2023  1:00 PM [facility]   12/4/2023  1:00 PM [facility]   12/7/2023  1:00 PM [facility]   12/11/2023  1:00 PM [facility]   12/14/2023  1:00 PM [facility]   12/18/2023  1:00 PM CARDIAC REHAB, [facility] CAR [facility]   12/21/2023  1:00 PM CARDIAC REHAB, [facility] CAR [facility]        10/09/23 1110   Post-Acute Status   Post-Acute Authorization Placement   Coverage [facility] MCARE   Discharge Plan   Discharge Plan A Skilled Nursing Facility

## 2023-10-09 NOTE — PROGRESS NOTES
Progress Note  [facility]ious [facility]    ASSESSMENT:   Ecoli septicemia from UTI. PanS. Changed back to ceftriaxone by primary team. Had some low grade temps and increased WBC  Stool culture with campylobacter antigen.  Chest pain. Diabetes, hypertension, hyperlipidemia, heart failure, obesity, CVA, high-risk HPV medical screening    RECOMMENDATIONS:   Can continue ceftriaxone for now although not sure that that she failed ampicillin  May need re-imaging of kidneys  No specific Rx for campylobacter at this time.   No Cdiff testing  Monitor pt progress    Thanks for this consult!. Please call if you have any questions.  Gurpreet Santana  [employer] ID  [phone] pager    INTERVAL HISTORY:   Pt eating with fewer loose stools. Some mild temp elevation    Medications reviewed; current antibiotics:  ceftriaxone    Review of Systems:  ROS    OBJECTIVE:     Vital Signs (Most Recent)  Temp: 97.6 °F (36.4 °C) (10/09/23 1552)  Pulse: 88 (10/09/23 1500)  Resp: 18 (10/09/23 1101)  BP: 121/68 (10/09/23 1431)  SpO2: (!) 94 % (10/09/23 1500)    Temperature Range Min/Max (Last 24H):  Temp:  [97.6 °F (36.4 °C)-100.2 °F (37.9 °C)]     Physical Exam:  Physical Exam  Vitals and nursing note reviewed.   HENT:      Head: Normocephalic and atraumatic.      Nose: Nose normal. No congestion or rhinorrhea.      Mouth/Throat:      Mouth: Mucous membranes are moist.      Pharynx: Oropharynx is clear. No oropharyngeal exudate.   Eyes:      Conjunctiva/sclera: Conjunctivae normal.      Pupils: Pupils are equal, round, and reactive to light.   Cardiovascular:      Rate and Rhythm: Normal rate.      Pulses: Normal pulses.      Heart sounds: No murmur heard.     No friction rub. No gallop.   Pulmonary:      Effort: Pulmonary effort is normal.      Breath sounds: Normal breath sounds. No rales.   Abdominal:      General: Bowel sounds are normal. There is no distension.      Palpations: Abdomen is soft.      Tenderness: There is no abdominal tenderness.  There is no guarding.   Genitourinary:     Comments: No marie  Musculoskeletal:      Cervical back: Neck supple.      Right lower leg: No edema.      Left lower leg: No edema.   Lymphadenopathy:      Cervical: No cervical adenopathy.   Skin:     Findings: No erythema, lesion or rash.   Neurological:      General: No focal deficit present.      Mental Status: She is alert and oriented to person, place, and time. Mental status is at baseline.   Psychiatric:         Mood and Affect: Mood normal.         Behavior: Behavior normal.         Laboratory:  Recent Labs   Lab 10/07/23  0558 10/07/23  1206 10/08/23  0405 10/08/23  0835 10/08/23  2339 10/09/23  0358 10/09/23  1410   WBC 13.43*  --  15.69*  --   --  21.26*  --    HGB 12.9  --  13.0  --   --  13.1  --    HCT 37.9  --  37.7  --   --  37.8  --      --  159  --   --  198  --    *   < > 134*  134*   < > 133* 133*  133* 132*   K 2.6*   < > 2.8*  2.8*   < > 3.1* 3.4*  3.4* 3.4*   CL 96   < > 94*  94*   < > 92* 93*  93* 92*   CO2 25   < > 27  27   < > 26 25  25 26   BUN 16   < > 13  13   < > 14 14  14 14   CREATININE 0.9   < > 0.9  0.9   < > 1.0 1.0  1.0 1.0   AST 20  --  26  --   --  27  --    ALT 16  --  18  --   --  15  --    ALKPHOS 162*  --  127  --   --  161*  --    BILITOT 0.4  --  0.6  --   --  0.5  --     < > = values in this interval not displayed.     Labs: Reviewed    Microbiology:  Stool culture with Campy  BCX and UCX with Ecoli  Other Diagnostic Results:  Reviewed    ASSESSMENT/PLAN:     Active Hospital Problems    Diagnosis  POA    *Bacteremia [R78.81]  Yes     Chronic    Dysphagia [R13.10]  No    Diabetic ketoacidosis associated with type 2 diabetes mellitus [E11.10]  Unknown    NSTEMI (non-ST elevated myocardial infarction) [I21.4]  Yes    Sinus arrhythmia [I49.8]  Yes    Chronic diastolic congestive heart failure [I50.32]  Yes    Mild episode of recurrent major depressive disorder [F33.0]  Yes    UTI (urinary tract infection)  [N39.0]  Yes    Hypertension associated with diabetes [E11.59, I15.2]  Yes      Resolved Hospital Problems    Diagnosis Date Resolved POA    Nausea and vomiting [R11.2] 10/08/2023 Yes    Hyperglycemia [R73.9] 10/08/2023 Yes    Lactic acidosis [E87.20] 10/08/2023 Yes    Tachycardia [R00.0] 10/08/2023 Yes

## 2023-10-09 NOTE — PLAN OF CARE
Referrals for SNF sent Friday per [facility]ined[facility]   Only one facility has expressed interest - [facility]   ALONDRA spoke with [facility]  [phone]    Pt is still on 5 l/min per NC per documentation - the cut off flow is 4l/min for admission.      Updated notes sent per [facility].       CM met with pt and spoke with daughter Kimberly - [phone]  to let them know [facility] the only accepting facility  - pt/daughter agree with [facility].   CM sent daughter's name/# to [facility] per [facility].          10/09/23 1634   Post-Acute Status   Post-Acute Authorization Placement   Post-Acute Placement Status Referrals Sent   Discharge Plan   Discharge Plan A Skilled Nursing Facility

## 2023-10-09 NOTE — PT/OT/SLP PROGRESS
Occupational Therapy      Patient Name:  Jaimee Quintana   MRN:  [id number]    9:23 - Patient not seen today secondary to Xray. Will follow-up in PM.    10/9/2023

## 2023-10-09 NOTE — CONSULTS
Patient with history of T2DM in with DKA/UTI.  Patient states has a working glucometer at home and checks her sugars twice a day in the am/pm.  She states her sugars range from 200s-300s.  She states she takes prescribed Metformin, Lantus and Aspart and ran out of Trulicity about a week ago.  Patient did not know her previous A1C level of 11.8.  Informed this stay the level was done and was 10.6.  States she does not count carbs but could reiterate to me that she should limit high carbohydrate foods such as rice, breads, etc.  Verbal/written education (Diabetes Management Guide) on hyper/hypoglycemia signs & symptoms/treatment, carbohydrate counting diet including how to read a nutrition label for carbs, sample meal plans, and choosing foods low in carbs & sugar. Including physical activity  such as walking was discussed as well.  States she has no barriers to getting to appointments or medications/food.  Would be willing to go to see an Outpatient Diabetic Care &  to assist with further education on an on-going basis.  Will let care management know.

## 2023-10-09 NOTE — ASSESSMENT & PLAN NOTE
Patient noted to have pulmonary edema on CXR on 10/07. TTE done during this admission showed decreased systolic function.   Plan:  Continue Lisinopril and Metoprolol  Will continue with diuresis with IV 40 lasix BID  Given one dose of IV 40mg Lasix extra  Will start pure wick for monitoring UOP

## 2023-10-10 LAB
ALBUMIN SERPL BCP-MCNC: 1.6 G/DL (ref 3.5–5.2)
ALP SERPL-CCNC: 306 U/L (ref 55–135)
ALT SERPL W/O P-5'-P-CCNC: 12 U/L (ref 10–44)
ANION GAP SERPL CALC-SCNC: 11 MMOL/L (ref 8–16)
ANION GAP SERPL CALC-SCNC: 11 MMOL/L (ref 8–16)
ANION GAP SERPL CALC-SCNC: 12 MMOL/L (ref 8–16)
ANION GAP SERPL CALC-SCNC: 13 MMOL/L (ref 8–16)
ANION GAP SERPL CALC-SCNC: 14 MMOL/L (ref 8–16)
AST SERPL-CCNC: 23 U/L (ref 10–40)
BASOPHILS # BLD AUTO: 0.07 K/UL (ref 0–0.2)
BASOPHILS NFR BLD: 0.3 % (ref 0–1.9)
BILIRUB SERPL-MCNC: 0.5 MG/DL (ref 0.1–1)
BUN SERPL-MCNC: 15 MG/DL (ref 6–20)
BUN SERPL-MCNC: 16 MG/DL (ref 6–20)
BUN SERPL-MCNC: 17 MG/DL (ref 6–20)
CALCIUM SERPL-MCNC: 8.2 MG/DL (ref 8.7–10.5)
CALCIUM SERPL-MCNC: 8.5 MG/DL (ref 8.7–10.5)
CALCIUM SERPL-MCNC: 8.6 MG/DL (ref 8.7–10.5)
CALCIUM SERPL-MCNC: 8.6 MG/DL (ref 8.7–10.5)
CALCIUM SERPL-MCNC: 8.7 MG/DL (ref 8.7–10.5)
CHLORIDE SERPL-SCNC: 92 MMOL/L (ref 95–110)
CHLORIDE SERPL-SCNC: 92 MMOL/L (ref 95–110)
CHLORIDE SERPL-SCNC: 93 MMOL/L (ref 95–110)
CO2 SERPL-SCNC: 22 MMOL/L (ref 23–29)
CO2 SERPL-SCNC: 24 MMOL/L (ref 23–29)
CO2 SERPL-SCNC: 27 MMOL/L (ref 23–29)
CO2 SERPL-SCNC: 28 MMOL/L (ref 23–29)
CO2 SERPL-SCNC: 29 MMOL/L (ref 23–29)
CREAT SERPL-MCNC: 1 MG/DL (ref 0.5–1.4)
CREAT SERPL-MCNC: 1.1 MG/DL (ref 0.5–1.4)
DIFFERENTIAL METHOD: ABNORMAL
E COLI SXT1 STL QL IA: NEGATIVE
E COLI SXT2 STL QL IA: NEGATIVE
EOSINOPHIL # BLD AUTO: 0.1 K/UL (ref 0–0.5)
EOSINOPHIL NFR BLD: 0.3 % (ref 0–8)
ERYTHROCYTE [DISTWIDTH] IN BLOOD BY AUTOMATED COUNT: 13.3 % (ref 11.5–14.5)
EST. GFR  (NO RACE VARIABLE): >60 ML/MIN/1.73 M^2
GLUCOSE SERPL-MCNC: 109 MG/DL (ref 70–110)
GLUCOSE SERPL-MCNC: 129 MG/DL (ref 70–110)
GLUCOSE SERPL-MCNC: 139 MG/DL (ref 70–110)
GLUCOSE SERPL-MCNC: 264 MG/DL (ref 70–110)
GLUCOSE SERPL-MCNC: 293 MG/DL (ref 70–110)
HCT VFR BLD AUTO: 38.3 % (ref 37–48.5)
HGB BLD-MCNC: 12.9 G/DL (ref 12–16)
IMM GRANULOCYTES # BLD AUTO: 1.04 K/UL (ref 0–0.04)
IMM GRANULOCYTES NFR BLD AUTO: 4.5 % (ref 0–0.5)
LYMPHOCYTES # BLD AUTO: 1.6 K/UL (ref 1–4.8)
LYMPHOCYTES NFR BLD: 6.8 % (ref 18–48)
MAGNESIUM SERPL-MCNC: 1.9 MG/DL (ref 1.6–2.6)
MCH RBC QN AUTO: 29.3 PG (ref 27–31)
MCHC RBC AUTO-ENTMCNC: 33.7 G/DL (ref 32–36)
MCV RBC AUTO: 87 FL (ref 82–98)
MONOCYTES # BLD AUTO: 0.9 K/UL (ref 0.3–1)
MONOCYTES NFR BLD: 4 % (ref 4–15)
NEUTROPHILS # BLD AUTO: 19.3 K/UL (ref 1.8–7.7)
NEUTROPHILS NFR BLD: 84.1 % (ref 38–73)
NRBC BLD-RTO: 0 /100 WBC
PHOSPHATE SERPL-MCNC: 3.7 MG/DL (ref 2.7–4.5)
PLATELET # BLD AUTO: 248 K/UL (ref 150–450)
PMV BLD AUTO: 11.9 FL (ref 9.2–12.9)
POCT GLUCOSE: 117 MG/DL (ref 70–110)
POCT GLUCOSE: 236 MG/DL (ref 70–110)
POCT GLUCOSE: 256 MG/DL (ref 70–110)
POCT GLUCOSE: 294 MG/DL (ref 70–110)
POTASSIUM SERPL-SCNC: 2.9 MMOL/L (ref 3.5–5.1)
POTASSIUM SERPL-SCNC: 3.2 MMOL/L (ref 3.5–5.1)
POTASSIUM SERPL-SCNC: 3.5 MMOL/L (ref 3.5–5.1)
POTASSIUM SERPL-SCNC: 3.5 MMOL/L (ref 3.5–5.1)
POTASSIUM SERPL-SCNC: 3.7 MMOL/L (ref 3.5–5.1)
PROT SERPL-MCNC: 7.5 G/DL (ref 6–8.4)
RBC # BLD AUTO: 4.4 M/UL (ref 4–5.4)
SARS-COV-2 RDRP RESP QL NAA+PROBE: NEGATIVE
SODIUM SERPL-SCNC: 127 MMOL/L (ref 136–145)
SODIUM SERPL-SCNC: 131 MMOL/L (ref 136–145)
SODIUM SERPL-SCNC: 131 MMOL/L (ref 136–145)
SODIUM SERPL-SCNC: 132 MMOL/L (ref 136–145)
SODIUM SERPL-SCNC: 133 MMOL/L (ref 136–145)
WBC # BLD AUTO: 22.97 K/UL (ref 3.9–12.7)

## 2023-10-10 PROCEDURE — 97530 THERAPEUTIC ACTIVITIES: CPT | Mod: CQ

## 2023-10-10 PROCEDURE — 94664 DEMO&/EVAL PT USE INHALER: CPT

## 2023-10-10 PROCEDURE — 84100 ASSAY OF PHOSPHORUS: CPT

## 2023-10-10 PROCEDURE — 30200315 PPD INTRADERMAL TEST REV CODE 302: Performed by: STUDENT IN AN ORGANIZED HEALTH CARE EDUCATION/TRAINING PROGRAM

## 2023-10-10 PROCEDURE — 21400001 HC TELEMETRY ROOM

## 2023-10-10 PROCEDURE — 97535 SELF CARE MNGMENT TRAINING: CPT | Mod: CO

## 2023-10-10 PROCEDURE — 86580 TB INTRADERMAL TEST: CPT | Performed by: STUDENT IN AN ORGANIZED HEALTH CARE EDUCATION/TRAINING PROGRAM

## 2023-10-10 PROCEDURE — 80053 COMPREHEN METABOLIC PANEL: CPT

## 2023-10-10 PROCEDURE — 63600175 PHARM REV CODE 636 W HCPCS

## 2023-10-10 PROCEDURE — 36415 COLL VENOUS BLD VENIPUNCTURE: CPT | Performed by: STUDENT IN AN ORGANIZED HEALTH CARE EDUCATION/TRAINING PROGRAM

## 2023-10-10 PROCEDURE — 80048 BASIC METABOLIC PNL TOTAL CA: CPT | Mod: 91,XB

## 2023-10-10 PROCEDURE — 83735 ASSAY OF MAGNESIUM: CPT

## 2023-10-10 PROCEDURE — U0002 COVID-19 LAB TEST NON-CDC: HCPCS | Performed by: HOSPITALIST

## 2023-10-10 PROCEDURE — 36415 COLL VENOUS BLD VENIPUNCTURE: CPT

## 2023-10-10 PROCEDURE — 25000003 PHARM REV CODE 250

## 2023-10-10 PROCEDURE — 94761 N-INVAS EAR/PLS OXIMETRY MLT: CPT

## 2023-10-10 PROCEDURE — 80048 BASIC METABOLIC PNL TOTAL CA: CPT | Mod: 91,XB | Performed by: STUDENT IN AN ORGANIZED HEALTH CARE EDUCATION/TRAINING PROGRAM

## 2023-10-10 PROCEDURE — 25000003 PHARM REV CODE 250: Performed by: STUDENT IN AN ORGANIZED HEALTH CARE EDUCATION/TRAINING PROGRAM

## 2023-10-10 PROCEDURE — 94799 UNLISTED PULMONARY SVC/PX: CPT

## 2023-10-10 PROCEDURE — 80048 BASIC METABOLIC PNL TOTAL CA: CPT | Mod: XB

## 2023-10-10 PROCEDURE — 85025 COMPLETE CBC W/AUTO DIFF WBC: CPT

## 2023-10-10 PROCEDURE — 63600175 PHARM REV CODE 636 W HCPCS: Performed by: STUDENT IN AN ORGANIZED HEALTH CARE EDUCATION/TRAINING PROGRAM

## 2023-10-10 PROCEDURE — 97116 GAIT TRAINING THERAPY: CPT | Mod: CQ

## 2023-10-10 PROCEDURE — 99900035 HC TECH TIME PER 15 MIN (STAT)

## 2023-10-10 PROCEDURE — 25000003 PHARM REV CODE 250: Performed by: INTERNAL MEDICINE

## 2023-10-10 RX ORDER — POTASSIUM CHLORIDE 600 MG/1
TABLET, FILM COATED, EXTENDED RELEASE ORAL
Qty: 180 TABLET | Refills: 0 | Status: SHIPPED | OUTPATIENT
Start: 2023-10-10

## 2023-10-10 RX ORDER — CIPROFLOXACIN 500 MG/1
500 TABLET ORAL EVERY 12 HOURS
Qty: 6 TABLET | Refills: 0
Start: 2023-10-10 | End: 2023-10-11 | Stop reason: HOSPADM

## 2023-10-10 RX ORDER — POTASSIUM CHLORIDE 7.45 MG/ML
10 INJECTION INTRAVENOUS
Status: COMPLETED | OUTPATIENT
Start: 2023-10-10 | End: 2023-10-10

## 2023-10-10 RX ORDER — POTASSIUM CHLORIDE 20 MEQ/1
40 TABLET, EXTENDED RELEASE ORAL ONCE
Status: COMPLETED | OUTPATIENT
Start: 2023-10-10 | End: 2023-10-10

## 2023-10-10 RX ORDER — ERGOCALCIFEROL 1.25 MG/1
CAPSULE ORAL
Qty: 12 CAPSULE | Refills: 0 | OUTPATIENT
Start: 2023-10-10 | End: 2023-10-11

## 2023-10-10 RX ORDER — CIPROFLOXACIN 250 MG/1
750 TABLET, FILM COATED ORAL
Status: DISCONTINUED | OUTPATIENT
Start: 2023-10-10 | End: 2023-10-11 | Stop reason: HOSPADM

## 2023-10-10 RX ADMIN — CEFTRIAXONE 1 G: 1 INJECTION, POWDER, FOR SOLUTION INTRAMUSCULAR; INTRAVENOUS at 08:10

## 2023-10-10 RX ADMIN — METOPROLOL SUCCINATE 100 MG: 50 TABLET, EXTENDED RELEASE ORAL at 08:10

## 2023-10-10 RX ADMIN — INSULIN DETEMIR 40 UNITS: 100 INJECTION, SOLUTION SUBCUTANEOUS at 08:10

## 2023-10-10 RX ADMIN — EZETIMIBE 10 MG: 10 TABLET ORAL at 08:10

## 2023-10-10 RX ADMIN — POTASSIUM CHLORIDE 10 MEQ: 7.46 INJECTION, SOLUTION INTRAVENOUS at 07:10

## 2023-10-10 RX ADMIN — FUROSEMIDE 40 MG: 10 INJECTION, SOLUTION INTRAMUSCULAR; INTRAVENOUS at 01:10

## 2023-10-10 RX ADMIN — CIPROFLOXACIN HYDROCHLORIDE 750 MG: 250 TABLET, FILM COATED ORAL at 11:10

## 2023-10-10 RX ADMIN — FLUOXETINE 40 MG: 20 CAPSULE ORAL at 08:10

## 2023-10-10 RX ADMIN — INSULIN ASPART 2 UNITS: 100 INJECTION, SOLUTION INTRAVENOUS; SUBCUTANEOUS at 09:10

## 2023-10-10 RX ADMIN — ENOXAPARIN SODIUM 40 MG: 40 INJECTION SUBCUTANEOUS at 04:10

## 2023-10-10 RX ADMIN — PANTOPRAZOLE SODIUM 40 MG: 40 TABLET, DELAYED RELEASE ORAL at 08:10

## 2023-10-10 RX ADMIN — POTASSIUM CHLORIDE 10 MEQ: 7.46 INJECTION, SOLUTION INTRAVENOUS at 09:10

## 2023-10-10 RX ADMIN — POTASSIUM CHLORIDE 10 MEQ: 7.46 INJECTION, SOLUTION INTRAVENOUS at 10:10

## 2023-10-10 RX ADMIN — FUROSEMIDE 40 MG: 10 INJECTION, SOLUTION INTRAMUSCULAR; INTRAVENOUS at 03:10

## 2023-10-10 RX ADMIN — INSULIN ASPART 6 UNITS: 100 INJECTION, SOLUTION INTRAVENOUS; SUBCUTANEOUS at 12:10

## 2023-10-10 RX ADMIN — INSULIN ASPART 6 UNITS: 100 INJECTION, SOLUTION INTRAVENOUS; SUBCUTANEOUS at 04:10

## 2023-10-10 RX ADMIN — POTASSIUM CHLORIDE 40 MEQ: 1500 TABLET, EXTENDED RELEASE ORAL at 07:10

## 2023-10-10 RX ADMIN — POTASSIUM CHLORIDE 10 MEQ: 7.46 INJECTION, SOLUTION INTRAVENOUS at 08:10

## 2023-10-10 RX ADMIN — TUBERCULIN PURIFIED PROTEIN DERIVATIVE 5 UNITS: 5 INJECTION, SOLUTION INTRADERMAL at 03:10

## 2023-10-10 RX ADMIN — ASPIRIN 81 MG: 81 TABLET, COATED ORAL at 08:10

## 2023-10-10 RX ADMIN — CIPROFLOXACIN HYDROCHLORIDE 750 MG: 250 TABLET, FILM COATED ORAL at 12:10

## 2023-10-10 RX ADMIN — ATORVASTATIN CALCIUM 80 MG: 40 TABLET, FILM COATED ORAL at 08:10

## 2023-10-10 NOTE — PT/OT/SLP PROGRESS
Physical Therapy Treatment    Patient Name:  Jaimee Quintana   MRN:  [id number]    Recommendations:     Discharge Recommendations:  (moderate intensity therapy)  Discharge Equipment Recommendations: walker, rolling  Barriers to discharge:  decreased mobility,strength and endurance    Assessment:     Jaimee Quintana is a 48 y.o. female admitted with a medical diagnosis of Bacteremia.  She presents with the following impairments/functional limitations: weakness, impaired endurance, impaired functional mobility, gait instability, impaired balance, decreased lower extremity function, decreased ROM, impaired coordination, impaired skin,pt with good participation and requires assistance with all mobility at this time,pt will benefit from moderate intensity therapy upon discharge.    Rehab Prognosis: Good; patient would benefit from acute skilled PT services to address these deficits and reach maximum level of function.    Recent Surgery: * No surgery found *      Plan:     During this hospitalization, patient to be seen 3 x/week to address the identified rehab impairments via gait training, therapeutic activities, therapeutic exercises, neuromuscular re-education and progress toward the following goals:    Plan of Care Expires:  11/06/23    Subjective     Chief Complaint: n/a  Patient/Family Comments/goals: pt agreeable to up in chair.  Pain/Comfort:  Pain Rating 1: 0/10      Objective:     Communicated with nsg prior to session.  Patient found supine with bed alarm, PureWick, peripheral IV, telemetry upon PT entry to room.     General Precautions: Standard, fall  Orthopedic Precautions: N/A  Braces: N/A  Respiratory Status: Room air     Functional Mobility:  Bed Mobility:     Supine to Sit: stand by assistance and contact guard assistance  Transfers:     Sit to Stand:  minimum assistance with hand-held assist  Bed to Chair: minimum assistance and of 2 persons with  hand-held assist  using  ambulation  Gait: amb ~15' with  HHA/Min A X 2(Bariatric RW unable to fit behind bed,HHA ambulation was safer)  Balance: fair sitting balance      AM-PAC 6 CLICK MOBILITY  Turning over in bed (including adjusting bedclothes, sheets and blankets)?: 3  Sitting down on and standing up from a chair with arms (e.g., wheelchair, bedside commode, etc.): 3  Moving from lying on back to sitting on the side of the bed?: 3  Moving to and from a bed to a chair (including a wheelchair)?: 3  Need to walk in hospital room?: 2  Climbing 3-5 steps with a railing?: 2  Basic Mobility Total Score: 16       Treatment & Education: pt sat EOB ~6 mins with S,up in chair with needs in reach and set up pt's breakfast tray.      Patient left up in chair with all lines intact, call button in reach, chair alarm on, and nsg notified..    GOALS: see general POC  Multidisciplinary Problems       Physical Therapy Goals          Problem: Physical Therapy    Goal Priority Disciplines Outcome Goal Variances Interventions   Physical Therapy Goal     PT, PT/OT Ongoing, Progressing     Description: Goals to be met by: 23     Patient will increase functional independence with mobility by performin. Supine to sit with Modified [place]  2. Sit to supine with Modified [place]  3. Sit to stand transfer with Modified [place] with RW  4. Bed to chair transfer with Modified [place] using Rolling Walker  5. Gait  x 150 feet with Modified [place] using Rolling Walker.                          Time Tracking:     PT Received On: 10/10/23  PT Start Time: 0815     PT Stop Time: 0838  PT Total Time (min): 23 min     Billable Minutes: Gait Training 12 and Therapeutic Activity 11    Treatment Type: Treatment  PT/PTA: PTA     Number of PTA visits since last PT visit: 2     10/10/2023

## 2023-10-10 NOTE — PLAN OF CARE
Problem: Physical Therapy  Goal: Physical Therapy Goal  Description: Goals to be met by: 23     Patient will increase functional independence with mobility by performin. Supine to sit with Modified [place]  2. Sit to supine with Modified [place]  3. Sit to stand transfer with Modified [place] with RW  4. Bed to chair transfer with Modified [place] using Rolling Walker  5. Gait  x 150 feet with Modified [place] using Rolling Walker.     Outcome: Ongoing, Progressing

## 2023-10-10 NOTE — PROGRESS NOTES
Pharmacist Renal Dose Adjustment Note    Jaimee Quintana is a 48 y.o. female being treated with the medication Ciprofloxacin    Patient Data:    Vital Signs (Most Recent):  Temp: 98.8 °F (37.1 °C) (10/10/23 0716)  Pulse: 86 (10/10/23 0900)  Resp: 18 (10/10/23 0716)  BP: (!) 116/57 (10/10/23 0716)  SpO2: 96 % (10/10/23 0716) Vital Signs (72h Range):  Temp:  [97.6 °F (36.4 °C)-100.5 °F (38.1 °C)]   Pulse:  [85-98]   Resp:  [16-26]   BP: ()/(47-75)   SpO2:  [85 %-98 %]      Recent Labs   Lab 10/10/23  0007 10/10/23  0545 10/10/23  0812   CREATININE 1.0 1.0 1.0     Serum creatinine: 1 mg/dL 10/10/23 0812  Estimated creatinine clearance: 85.6 mL/min    Medication:Ciprofloxacin dose: 500 mg frequency q12h will be changed to medication:Ciprofloxacin dose:750 mg frequency:q12h    Pharmacist's Name: Jaren Pretty  Pharmacist's Extension: [phone]

## 2023-10-10 NOTE — PROGRESS NOTES
CM met with pt - discussed post discharge plans.  Pt states she wants to d/c to [facility] for a SNF stay and therapy at d/c   CM called pt's daughter Daughter:  Kimberly - [phone]--  Confirmed that she agrees with d/c to [facility] - she will complete paperwork in am.

## 2023-10-10 NOTE — CARE UPDATE
[facility]    FACILITY TRANSFER ORDERS      Patient Name: Jaimee Quintana  YOB: 1975    PCP: Donald Acosta MD   PCP Address: [address] / [place] [address]  PCP Phone Number: [phone]  PCP Fax: [phone]    Encounter Date: 10/10/2023    Admit to: SNF    Vital Signs:  Routine    Diagnoses:   Active Hospital Problems    Diagnosis  POA    *Bacteremia [R78.81]  Yes     Chronic    Dysphagia [R13.10]  No    Diabetic ketoacidosis associated with type 2 diabetes mellitus [E11.10]  Unknown    NSTEMI (non-ST elevated myocardial infarction) [I21.4]  Yes    Sinus arrhythmia [I49.8]  Yes    Chronic diastolic congestive heart failure [I50.32]  Yes    Mild episode of recurrent major depressive disorder [F33.0]  Yes    UTI (urinary tract infection) [N39.0]  Yes    Hypertension associated with diabetes [E11.59, I15.2]  Yes      Resolved Hospital Problems    Diagnosis Date Resolved POA    Nausea and vomiting [R11.2] 10/08/2023 Yes    Hyperglycemia [R73.9] 10/08/2023 Yes    Lactic acidosis [E87.20] 10/08/2023 Yes    Tachycardia [R00.0] 10/08/2023 Yes       Allergies:Review of patient's allergies indicates:  No Known Allergies    Diet: Mechanical Soft    Activities: Activity as tolerated    Goals of Care Treatment Preferences:  Code Status: Full Code      Nursing: Per Facility     CONSULTS:    Physical Therapy to evaluate and treat. , Occupational Therapy to evaluate and treat., and Speech Therapy to evaluate and treat for Swallowing.      Medications: Review discharge medications with patient and family and provide education.      Current Discharge Medication List        START taking these medications    Details   ciprofloxacin HCl (CIPRO) 500 MG tablet Take 1 tablet (500 mg total) by mouth every 12 (twelve) hours. for 3 days  Qty: 6 tablet, Refills: 0      insulin detemir U-100, Levemir, 100 unit/mL (3 mL) SubQ InPn pen Inject 40 Units into the skin 2 (two) times daily.  Qty:  24 mL, Refills: 0           CONTINUE these medications which have NOT CHANGED    Details   aspirin (ECOTRIN) 81 MG EC tablet Take 1 tablet (81 mg total) by mouth once daily.  Qty: 60 tablet, Refills: 1      atorvastatin (LIPITOR) 80 MG tablet Take 1 tablet (80 mg total) by mouth every evening.  Qty: 60 tablet, Refills: 1    Comments: Please inactivate all prior scripts with same name and strength including on holds. Please delete all prior scripts with same name and strength including on holds.  Associated Diagnoses: Hyperlipidemia, unspecified hyperlipidemia type      dulaglutide (TRULICITY) 0.75 mg/0.5 mL pen injector Inject 0.75 mg into the skin every 7 days.  Qty: 4 pen , Refills: 2    Associated Diagnoses: Type 2 diabetes mellitus with other skin ulcer (CODE)      ergocalciferol (ERGOCALCIFEROL) 50,000 unit Cap TAKE ONE CAPSULE (50,000 UNITS TOTAL) BY MOUTH EVERY SATURDAY AT 9AM  Qty: 12 capsule, Refills: 11    Associated Diagnoses: Hypovitaminosis D      ezetimibe (ZETIA) 10 mg tablet Take 1 tablet (10 mg total) by mouth once daily. (For cholesterol)  Qty: 90 tablet, Refills: 1    Associated Diagnoses: Hyperlipidemia associated with type 2 diabetes mellitus      furosemide (LASIX) 40 MG tablet Take 1 tablet (40 mg total) by mouth once daily.  Qty: 60 tablet, Refills: 1    Associated Diagnoses: Chronic heart failure with preserved ejection fraction      ibuprofen (ADVIL,MOTRIN) 200 MG tablet Take 200 mg by mouth every 6 (six) hours as needed for Pain.      insulin aspart U-100 (NOVOLOG FLEXPEN U-100 INSULIN) 100 unit/mL (3 mL) InPn pen Inject 30 Units into the skin 2 (two) times a day.  Qty: 30 mL, Refills: 12    Associated Diagnoses: Type 2 diabetes mellitus with other skin ulcer (CODE)      lisinopriL (PRINIVIL,ZESTRIL) 40 MG tablet Take 1 tablet (40 mg total) by mouth once daily.  Qty: 90 tablet, Refills: 3    Comments: Patient request mail-order medications  Associated Diagnoses: Hypertension associated  "with diabetes; Chronic diastolic congestive heart failure      metFORMIN (GLUCOPHAGE-XR) 500 MG ER 24hr tablet Take 1,000 mg by mouth 2 (two) times daily with meals.      metoprolol succinate (TOPROL-XL) 100 MG 24 hr tablet Take 100 mg by mouth once daily.      NIFEdipine (ADALAT CC) 60 MG TbSR Take 60 mg by mouth every morning.      pantoprazole (PROTONIX) 40 MG tablet Take 40 mg by mouth once daily.      potassium chloride (KLOR-CON) 8 MEQ TbSR TAKE TWO TABLETS BY MOUTH TWICE DAILY @ 9AM & 5PM  Qty: 180 tablet, Refills: 11    Associated Diagnoses: Low serum potassium      blood sugar diagnostic Strp Use to test blood glucose two (2) times daily as directed with insurance preferred meter and supplies  Qty: 200 each, Refills: 3    Comments: Please inactivate all prior scripts with same name and strength including any scripts on hold.  Associated Diagnoses: Type 2 diabetes mellitus with hyperglycemia, with long-term current use of insulin      FLUoxetine 40 MG capsule Take 40 mg by mouth once daily.      lancets (TRUEPLUS LANCETS) 28 gauge Misc Use to test blood glucose two (2) times daily as directed with insurance preferred meter and supplies  Qty: 200 each, Refills: 3    Comments: Please inactivate all prior scripts with same name and strength including any scripts on hold.  Associated Diagnoses: Type 2 diabetes mellitus with hyperglycemia, with long-term current use of insulin      !! pen needle, diabetic (BD ULTRA-FINE ROSA PEN NEEDLE) 32 gauge x 5/32" Ndle Use with insulin once daily  Qty: 100 each, Refills: 0      !! pen needle, diabetic 32 gauge x 5/32" Ndle Use with injectable DM supplies twice daily as directed  Qty: 200 each, Refills: 0    Comments: Please inactivate all prior scripts with same name and strength including any scripts on hold.  Associated Diagnoses: Type 2 diabetes mellitus with hyperglycemia, with long-term current use of insulin      TRUE METRIX GO GLUCOSE METER Misc        !! - Potential " duplicate medications found. Please discuss with provider.        STOP taking these medications       hydroCHLOROthiazide (HYDRODIURIL) 25 MG tablet Comments:   Reason for Stopping:         LANTUS SOLOSTAR U-100 INSULIN glargine 100 units/mL SubQ pen Comments:   Reason for Stopping:                  Immunizations Administered as of 10/10/2023       Name Date Dose VIS Date Route Exp Date    COVID-19, MRNA, LN-S, PF (Pfizer) (Purple Cap) 4/11/2021  3:49 PM 0.3 mL 12/12/2020 Intramuscular 7/31/2021    Site: Right deltoid     Given By: Ricky Florez, MACEY     : [employer]     Lot: [id number]     COVID-19, MRNA, LN-S, PF (Pfizer) (Purple Cap) 3/20/2021  4:39 PM 0.3 mL 12/12/2020 Intramuscular 6/30/2021    Site: Right deltoid     Given By: Funmi Arredondo     : [employer]     Lot: [id number]             _________________________________  Jas Bynum MD  10/10/2023

## 2023-10-10 NOTE — PROGRESS NOTES
Per am meeting - pt is ready for d/c to [facility]   CM called and spoke with  Chirag Eric [phone]  ---   She will contact pt's daughter and submit for auth - CM advised by Chirag - pt has [facility] -- auth may take more than a day to receive.   She will expedite request for auth      Daughter:  Kimberly -      -------------------------------  1345 - per Carlyn - No auth rec'd  yet; daughter hasn't yet done paperwork;   orders sent per [facility].

## 2023-10-10 NOTE — PROGRESS NOTES
I assume primary medical responsibility for this patient. I have reviewed the history, physical, and assessement & treatment plan with the resident and agree that the care is reasonable and necessary. This service has been performed by a resident with the presence of a teaching physician under the primary care exception. If necessary, an addendum of additional findings or evaluation beyond the resident documentation will be noted below.    Unknown etiology of SOB, but patient appears very ill on exam and not able to fully follow commands for breathing in office. Sent to ED for admission and further emergent evaluation of potentially life-threatening etiologies.      Adry Lyons MD    [facility]

## 2023-10-10 NOTE — PT/OT/SLP PROGRESS
Occupational Therapy  Visit Attempt    Patient Name:  Jaimee Quintana   MRN:  [id number]    Patient not seen today secondary to Testing/imaging (xray/CT/MRI) (14:19 - Patient [place]     10/10/2023

## 2023-10-10 NOTE — PT/OT/SLP PROGRESS
"Occupational Therapy   Treatment    Name: Jaimee Quintana  MRN: [id number]  Admitting Diagnosis:  Bacteremia       Recommendations:     Discharge Recommendations: other (see comments) (moderate intensity therapy)  Discharge Equipment Recommendations:  walker, rolling  Barriers to discharge:  Other (Comment) (decreased occupational performance)    Assessment:     Jaimee Quintana is a 48 y.o. female with a medical diagnosis of Bacteremia. Performance deficits affecting function are weakness, impaired endurance, impaired functional mobility, gait instability, impaired balance, decreased lower extremity function, decreased ROM, impaired coordination, impaired skin.     Rehab Prognosis:  Good; patient would benefit from acute skilled OT services to address these deficits and reach maximum level of function.       Plan:     Patient to be seen 5 x/week to address the above listed problems via self-care/home management  Plan of Care Expires: 11/03/23  Plan of Care Reviewed with: patient    Subjective     Chief Complaint: "I have to go to the bathroom"  Patient/Family Comments/goals: Return to PLOF  Pain/Comfort:  Pain Rating 1: 0/10    Objective:     Communicated with: nsg prior to session.  Patient found left sidelying with peripheral IV, telemetry upon OT entry to room.    General Precautions: Standard, fall    Orthopedic Precautions:N/A  Braces: N/A  Respiratory Status: Room air     Occupational Performance:     Bed Mobility:    Patient completed Rolling/Turning to Left with  stand by assistance  Patient completed Rolling/Turning to Right with stand by assistance  Patient completed Scooting/Bridging with stand by assistance  Patient completed Supine to Sit with stand by assistance, with side rail, and increased time/effort  Patient completed Sit to Supine with stand by assistance and with side rail     Functional Mobility/Transfers:  Patient completed Sit <> Stand Transfer with minimum assistance  with  rolling walker "   Patient completed Toilet Transfer Step Transfer technique with minimum assistance with  rolling walker and grab bars  Functional Mobility: Pt ambulated from EOB to bathroom w/Marissa using RW     Activities of Daily Living:  Toileting: Pt completed perineal hygiene w/setup assistance, but required Marissa for balance when wiping using RW and grab bars for stability.         [facility] 6 Click ADL: 16    Treatment & Education:  Pt required verbal and visual cues for proper hand placement during transfers throughout session   Demos impaired balance and decreased endurance during functional tasks  Required VCs for PLB throughout session to maintain appropriate breathing pattern/RR  Educated patient on OOB to chair for meals  All questions answered within OT scope of practice     Patient left supine with all lines intact, call button in reach, and nsg notified    GOALS:   Multidisciplinary Problems       Occupational Therapy Goals          Problem: Occupational Therapy    Goal Priority Disciplines Outcome Interventions   Occupational Therapy Goal     OT, PT/OT Ongoing, Progressing    Description: Goals to be met by: 11/3/2023     Patient will increase functional independence with ADLs by performing:    UE Dressing with Modified [place].  LE Dressing with Modified [place].  Grooming while standing at sink with Modified [place].  Toileting from toilet with Modified [place] for hygiene and clothing management.   Bathing from  sitting eob with setup for wash up method.  Functional mobility to / from bathroom with least restrictive device and transfer to / from commode with modified independence.  Upper extremity exercise program x12 reps per handout, with independence.                           Time Tracking:     OT Date of Treatment: 10/10/23  OT Start Time: 1503  OT Stop Time: 1526  OT Total Time (min): 23 min    Billable Minutes:Self Care/Home Management 23    OT/SLICK: SLICK (SOTA)     Number of SLICK visits since  last OT visit: 1    10/10/2023

## 2023-10-11 VITALS
SYSTOLIC BLOOD PRESSURE: 148 MMHG | HEART RATE: 97 BPM | DIASTOLIC BLOOD PRESSURE: 67 MMHG | OXYGEN SATURATION: 92 % | BODY MASS INDEX: 40.55 KG/M2 | TEMPERATURE: 98 F | WEIGHT: 243.38 LBS | RESPIRATION RATE: 20 BRPM | HEIGHT: 65 IN

## 2023-10-11 LAB
ALBUMIN SERPL BCP-MCNC: 1.6 G/DL (ref 3.5–5.2)
ALP SERPL-CCNC: 141 U/L (ref 55–135)
ALT SERPL W/O P-5'-P-CCNC: 11 U/L (ref 10–44)
ANION GAP SERPL CALC-SCNC: 12 MMOL/L (ref 8–16)
ANION GAP SERPL CALC-SCNC: 13 MMOL/L (ref 8–16)
ANION GAP SERPL CALC-SCNC: 13 MMOL/L (ref 8–16)
AST SERPL-CCNC: 17 U/L (ref 10–40)
BACTERIA BLD CULT: NORMAL
BACTERIA BLD CULT: NORMAL
BACTERIA STL CULT: ABNORMAL
BACTERIA STL CULT: ABNORMAL
BASOPHILS # BLD AUTO: 0.05 K/UL (ref 0–0.2)
BASOPHILS NFR BLD: 0.2 % (ref 0–1.9)
BILIRUB SERPL-MCNC: 0.6 MG/DL (ref 0.1–1)
BUN SERPL-MCNC: 14 MG/DL (ref 6–20)
BUN SERPL-MCNC: 14 MG/DL (ref 6–20)
BUN SERPL-MCNC: 15 MG/DL (ref 6–20)
CALCIUM SERPL-MCNC: 8.4 MG/DL (ref 8.7–10.5)
CALCIUM SERPL-MCNC: 8.5 MG/DL (ref 8.7–10.5)
CALCIUM SERPL-MCNC: 8.5 MG/DL (ref 8.7–10.5)
CHLORIDE SERPL-SCNC: 94 MMOL/L (ref 95–110)
CHLORIDE SERPL-SCNC: 95 MMOL/L (ref 95–110)
CHLORIDE SERPL-SCNC: 96 MMOL/L (ref 95–110)
CO2 SERPL-SCNC: 25 MMOL/L (ref 23–29)
CREAT SERPL-MCNC: 0.9 MG/DL (ref 0.5–1.4)
CREAT SERPL-MCNC: 0.9 MG/DL (ref 0.5–1.4)
CREAT SERPL-MCNC: 1 MG/DL (ref 0.5–1.4)
DIFFERENTIAL METHOD: ABNORMAL
EOSINOPHIL # BLD AUTO: 0.1 K/UL (ref 0–0.5)
EOSINOPHIL NFR BLD: 0.3 % (ref 0–8)
ERYTHROCYTE [DISTWIDTH] IN BLOOD BY AUTOMATED COUNT: 13.3 % (ref 11.5–14.5)
EST. GFR  (NO RACE VARIABLE): >60 ML/MIN/1.73 M^2
GLUCOSE SERPL-MCNC: 165 MG/DL (ref 70–110)
GLUCOSE SERPL-MCNC: 171 MG/DL (ref 70–110)
GLUCOSE SERPL-MCNC: 213 MG/DL (ref 70–110)
HCT VFR BLD AUTO: 38.1 % (ref 37–48.5)
HGB BLD-MCNC: 12.8 G/DL (ref 12–16)
IMM GRANULOCYTES # BLD AUTO: 0.95 K/UL (ref 0–0.04)
IMM GRANULOCYTES NFR BLD AUTO: 4.2 % (ref 0–0.5)
LYMPHOCYTES # BLD AUTO: 1.3 K/UL (ref 1–4.8)
LYMPHOCYTES NFR BLD: 5.9 % (ref 18–48)
MAGNESIUM SERPL-MCNC: 1.8 MG/DL (ref 1.6–2.6)
MCH RBC QN AUTO: 29.5 PG (ref 27–31)
MCHC RBC AUTO-ENTMCNC: 33.6 G/DL (ref 32–36)
MCV RBC AUTO: 88 FL (ref 82–98)
MONOCYTES # BLD AUTO: 0.8 K/UL (ref 0.3–1)
MONOCYTES NFR BLD: 3.7 % (ref 4–15)
NEUTROPHILS # BLD AUTO: 19.3 K/UL (ref 1.8–7.7)
NEUTROPHILS NFR BLD: 85.7 % (ref 38–73)
NRBC BLD-RTO: 0 /100 WBC
PHOSPHATE SERPL-MCNC: 2.9 MG/DL (ref 2.7–4.5)
PLATELET # BLD AUTO: 295 K/UL (ref 150–450)
PMV BLD AUTO: 10.3 FL (ref 9.2–12.9)
POCT GLUCOSE: 159 MG/DL (ref 70–110)
POCT GLUCOSE: 174 MG/DL (ref 70–110)
POCT GLUCOSE: 204 MG/DL (ref 70–110)
POTASSIUM SERPL-SCNC: 3.5 MMOL/L (ref 3.5–5.1)
PROT SERPL-MCNC: 7.4 G/DL (ref 6–8.4)
RBC # BLD AUTO: 4.34 M/UL (ref 4–5.4)
SODIUM SERPL-SCNC: 132 MMOL/L (ref 136–145)
SODIUM SERPL-SCNC: 133 MMOL/L (ref 136–145)
SODIUM SERPL-SCNC: 133 MMOL/L (ref 136–145)
WBC # BLD AUTO: 22.51 K/UL (ref 3.9–12.7)

## 2023-10-11 PROCEDURE — 85025 COMPLETE CBC W/AUTO DIFF WBC: CPT

## 2023-10-11 PROCEDURE — 83735 ASSAY OF MAGNESIUM: CPT

## 2023-10-11 PROCEDURE — 94761 N-INVAS EAR/PLS OXIMETRY MLT: CPT

## 2023-10-11 PROCEDURE — 25000003 PHARM REV CODE 250

## 2023-10-11 PROCEDURE — 63600175 PHARM REV CODE 636 W HCPCS: Performed by: HOSPITALIST

## 2023-10-11 PROCEDURE — 25000003 PHARM REV CODE 250: Performed by: STUDENT IN AN ORGANIZED HEALTH CARE EDUCATION/TRAINING PROGRAM

## 2023-10-11 PROCEDURE — 97535 SELF CARE MNGMENT TRAINING: CPT | Mod: CO

## 2023-10-11 PROCEDURE — 80048 BASIC METABOLIC PNL TOTAL CA: CPT | Mod: XB

## 2023-10-11 PROCEDURE — 63600175 PHARM REV CODE 636 W HCPCS

## 2023-10-11 PROCEDURE — 27000646 HC AEROBIKA DEVICE

## 2023-10-11 PROCEDURE — 94664 DEMO&/EVAL PT USE INHALER: CPT

## 2023-10-11 PROCEDURE — 84100 ASSAY OF PHOSPHORUS: CPT

## 2023-10-11 PROCEDURE — 97530 THERAPEUTIC ACTIVITIES: CPT | Mod: CO

## 2023-10-11 PROCEDURE — 25000003 PHARM REV CODE 250: Performed by: INTERNAL MEDICINE

## 2023-10-11 PROCEDURE — 80053 COMPREHEN METABOLIC PANEL: CPT

## 2023-10-11 PROCEDURE — 94799 UNLISTED PULMONARY SVC/PX: CPT

## 2023-10-11 RX ORDER — DULAGLUTIDE 0.75 MG/.5ML
0.75 INJECTION, SOLUTION SUBCUTANEOUS
Qty: 4 PEN | Refills: 11 | Status: SHIPPED | OUTPATIENT
Start: 2023-10-11 | End: 2024-10-10

## 2023-10-11 RX ORDER — MAGNESIUM SULFATE HEPTAHYDRATE 40 MG/ML
2 INJECTION, SOLUTION INTRAVENOUS ONCE
Status: COMPLETED | OUTPATIENT
Start: 2023-10-11 | End: 2023-10-11

## 2023-10-11 RX ORDER — POTASSIUM CHLORIDE 20 MEQ/1
40 TABLET, EXTENDED RELEASE ORAL ONCE
Status: COMPLETED | OUTPATIENT
Start: 2023-10-11 | End: 2023-10-11

## 2023-10-11 RX ORDER — CIPROFLOXACIN 750 MG/1
750 TABLET, FILM COATED ORAL EVERY 12 HOURS
Qty: 4 TABLET | Refills: 0
Start: 2023-10-11 | End: 2023-10-11 | Stop reason: SDUPTHER

## 2023-10-11 RX ORDER — ENOXAPARIN SODIUM 100 MG/ML
40 INJECTION SUBCUTANEOUS EVERY 12 HOURS
Status: DISCONTINUED | OUTPATIENT
Start: 2023-10-11 | End: 2023-10-11 | Stop reason: HOSPADM

## 2023-10-11 RX ORDER — CIPROFLOXACIN 750 MG/1
750 TABLET, FILM COATED ORAL EVERY 12 HOURS
Qty: 4 TABLET | Refills: 0
Start: 2023-10-12 | End: 2023-10-14

## 2023-10-11 RX ADMIN — MAGNESIUM SULFATE HEPTAHYDRATE 2 G: 40 INJECTION, SOLUTION INTRAVENOUS at 08:10

## 2023-10-11 RX ADMIN — POTASSIUM CHLORIDE 40 MEQ: 1500 TABLET, EXTENDED RELEASE ORAL at 08:10

## 2023-10-11 RX ADMIN — INSULIN DETEMIR 40 UNITS: 100 INJECTION, SOLUTION SUBCUTANEOUS at 08:10

## 2023-10-11 RX ADMIN — EZETIMIBE 10 MG: 10 TABLET ORAL at 08:10

## 2023-10-11 RX ADMIN — ENOXAPARIN SODIUM 40 MG: 40 INJECTION SUBCUTANEOUS at 10:10

## 2023-10-11 RX ADMIN — INSULIN ASPART 2 UNITS: 100 INJECTION, SOLUTION INTRAVENOUS; SUBCUTANEOUS at 06:10

## 2023-10-11 RX ADMIN — FUROSEMIDE 40 MG: 10 INJECTION, SOLUTION INTRAMUSCULAR; INTRAVENOUS at 02:10

## 2023-10-11 RX ADMIN — LISINOPRIL 40 MG: 20 TABLET ORAL at 08:10

## 2023-10-11 RX ADMIN — ONDANSETRON 4 MG: 2 INJECTION INTRAMUSCULAR; INTRAVENOUS at 08:10

## 2023-10-11 RX ADMIN — METOPROLOL SUCCINATE 100 MG: 50 TABLET, EXTENDED RELEASE ORAL at 08:10

## 2023-10-11 RX ADMIN — ASPIRIN 81 MG: 81 TABLET, COATED ORAL at 08:10

## 2023-10-11 RX ADMIN — INSULIN ASPART 2 UNITS: 100 INJECTION, SOLUTION INTRAVENOUS; SUBCUTANEOUS at 01:10

## 2023-10-11 RX ADMIN — NIFEDIPINE 30 MG: 30 TABLET, FILM COATED, EXTENDED RELEASE ORAL at 08:10

## 2023-10-11 RX ADMIN — PANTOPRAZOLE SODIUM 40 MG: 40 TABLET, DELAYED RELEASE ORAL at 08:10

## 2023-10-11 RX ADMIN — FLUOXETINE 40 MG: 20 CAPSULE ORAL at 08:10

## 2023-10-11 RX ADMIN — CIPROFLOXACIN HYDROCHLORIDE 750 MG: 250 TABLET, FILM COATED ORAL at 01:10

## 2023-10-11 RX ADMIN — FUROSEMIDE 40 MG: 10 INJECTION, SOLUTION INTRAMUSCULAR; INTRAVENOUS at 03:10

## 2023-10-11 NOTE — PLAN OF CARE
Pt for d/c today to [facility]   [facility] van is set for 5pm Please call  pt is going to room 301. Ask for Nurse Emperatriz.   Daughter Kimberly informed   Pt signed Pt Choice form    MD finalized Cipro orders.      10/11/23 1641   Final Note   Assessment Type Final Discharge Note   Anticipated Discharge Disposition Home   What phone number can be called within the next 1-3 days to see how you are doing after discharge? [phone]   Hospital Resources/Appts/Education Provided Post-Acute resouces added to AVS   Post-Acute Status   Post-Acute Authorization Placement   Post-Acute Placement Status Set-up Complete/Auth obtained   Discharge Delays None known at this time

## 2023-10-11 NOTE — PROGRESS NOTES
[facility]  Progress Note    Patient Name: Jaimee Quintana  MRN: [id number]  Patient Class: IP- Inpatient   Admission Date: 10/4/2023  Length of Stay: 7 days  Attending Physician: Den Faye MD  Primary Care Provider: Donald Acosta MD        Subjective:     Principal Problem:Bacteremia        HPI:  49 yo F w/ PMHx of HFpEF (EF 50% 5/23), T2DM (A1c 11.4), HLD, and obesity who presented to the ED from clinic and here with DKA. Pt reports 3 days of worsening malaise, HA's, N, V, and D. She denies any hemoptysis or hematochezia. She also endorses increased urinary frequency but no dysuria during this time. She reports having been out of her trulicity for 1 wk but has been compliant with other home medications including insulin at home. She denies any sick contacts but does live with her two teen children who are in school. She denies any EtoH or additional substance use.      In the ED pt afebrile, , RR 50, /87 and sat 100% on RA. EKG with sinus tach. CXR without signs of consolidation or effusion. VBG w/ pH 7.44, CO2 34.3, O2 32.4, and HCO3 23.4. CB notable for WBC 22.6 Hg 16.4. CMP notable for Na 127, K 3.9, , BUN/Crt 11/1.4, and AG 22. LA 6.1, BHB 0.7, and trop 0.037. UA w/ hazy urine, 1+ protein, 4+ glucose, trace ketones, occult blood, and leuks. Micro with 32 wbc and moderate deshawn. UPT neg. Flu and COVID swab neg. She was given 30u detemir, 1L LR, and 1L NS as well as 1x vanc/zosyn and zofran. Pt was admitted to the [facility] FM service for management of DKA.     Overview/Hospital Course:  No notes on file    Interval History: Feeling well this am. Pending dc to SNF    Review of Systems   All other systems reviewed and are negative.    Objective:     Vital Signs (Most Recent):  Temp: 98.4 °F (36.9 °C) (10/11/23 1613)  Pulse: 97 (10/11/23 1613)  Resp: 20 (10/11/23 1613)  BP: (!) 148/67 (10/11/23 1613)  SpO2: (!) 92 % (10/11/23 1613) Vital Signs (24h Range):  Temp:  [98.1  °F (36.7 °C)-98.4 °F (36.9 °C)] 98.4 °F (36.9 °C)  Pulse:  [] 97  Resp:  [16-20] 20  SpO2:  [92 %-96 %] 92 %  BP: (127-152)/(60-68) 148/67     Weight: 110.4 kg (243 lb 6.2 oz)  Body mass index is 40.5 kg/m².    Intake/Output Summary (Last 24 hours) at 10/11/2023 1718  Last data filed at 10/11/2023 1230  Gross per 24 hour   Intake 360 ml   Output 0 ml   Net 360 ml         Physical Exam  Vitals and nursing note reviewed.   Constitutional:       Appearance: Normal appearance.   Cardiovascular:      Rate and Rhythm: Normal rate and regular rhythm.      Pulses: Normal pulses.      Heart sounds: Normal heart sounds.   Pulmonary:      Effort: Pulmonary effort is normal.      Breath sounds: Normal breath sounds.   Neurological:      Mental Status: She is alert and oriented to person, place, and time.   Psychiatric:         Mood and Affect: Mood normal.         Behavior: Behavior normal.             Significant Labs: All pertinent labs within the past 24 hours have been reviewed.    Significant Imaging: I have reviewed all pertinent imaging results/findings within the past 24 hours.      Assessment/Plan:      * Bacteremia  The patient had blood cultures drawn on admission. These have grown GNR, specifically E.coli. Susceptibilities are still pending. On 10/06, the patient reports worsening diffuse abdominal pain.  10/8: BCX 10/4: 2/2 E.coli (pansensitive), (repeat) BCx 10/6: NGTD    Plan:  - Switched rocephin to ciprofloxacin for continued outpatient antibiotic management  - Will follow for any acute changes.    Dysphagia  New onset difficulty with swallowing PO meds  SLP recs include: 1 bite/sip at a time, Alternating bites/sips, Assistance with meals, and Feed only when awake/alert       NSTEMI (non-ST elevated myocardial infarction)        Diabetic ketoacidosis associated with type 2 diabetes mellitus  The patient presented from clinic with elevated glucose (>500), trace ketones in the urine, and mildly elevated  BHB. DKA thought likely to be 2/2 bacteremia. The patient was stepped down from the ICU on 10/05 and transitioned to 25u Detemir nightly. On 10/06 she was increased to 40u BID.    Plan:  Will continue to monitor glucose levels and adjust insulin regimen as needed.  Will continue with Zofran for Nausea and vomiting      Chronic diastolic congestive heart failure  Patient noted to have pulmonary edema on CXR on 10/07. TTE done during this admission showed decreased systolic function.   Plan:  Continue Lisinopril and Metoprolol  Will continue with diuresis with IV 40 lasix BID  Given one dose of IV 40mg Lasix extra  Will start pure wick for monitoring UOP    Mild episode of recurrent major depressive disorder  Plan:  - Continue with Fluoxetine      UTI (urinary tract infection)  See bacteremia.       Hypertension associated with diabetes  Plan:  Continue, Lisinopril, Nifedipine   HCTZ d/c'd      VTE Risk Mitigation (From admission, onward)           Ordered     enoxaparin injection 40 mg  Every 12 hours         10/11/23 0851     IP VTE HIGH RISK PATIENT  Once         10/04/23 1908     Place sequential compression device  Until discontinued         10/04/23 1723                    Discharge Planning   MALGORZATA: 10/11/2023     Code Status: Full Code   Is the patient medically ready for discharge?:     Reason for patient still in hospital (select all that apply): Pending disposition  Discharge Plan A: Skilled Nursing Facility   Discharge Delays: None known at this time          Michael Mallory DO  Department of Hospital Medicine   [facility]

## 2023-10-11 NOTE — PROGRESS NOTES
[facility] Medicine  Progress Note    Patient Name: Jaimee Quintana  MRN: [id number]  Patient Class: IP- Inpatient   Admission Date: 10/4/2023  Length of Stay: 6 days  Attending Physician: Den Faye MD  Primary Care Provider: Donald Acosta MD        Subjective:     Principal Problem:Bacteremia        HPI:  47 yo F w/ PMHx of HFpEF (EF 50% 5/23), T2DM (A1c 11.4), HLD, and obesity who presented to the ED from clinic and here with DKA. Pt reports 3 days of worsening malaise, HA's, N, V, and D. She denies any hemoptysis or hematochezia. She also endorses increased urinary frequency but no dysuria during this time. She reports having been out of her trulicity for 1 wk but has been compliant with other home medications including insulin at home. She denies any sick contacts but does live with her two teen children who are in school. She denies any EtoH or additional substance use.      In the ED pt afebrile, , RR 50, /87 and sat 100% on RA. EKG with sinus tach. CXR without signs of consolidation or effusion. VBG w/ pH 7.44, CO2 34.3, O2 32.4, and HCO3 23.4. CB notable for WBC 22.6 Hg 16.4. CMP notable for Na 127, K 3.9, , BUN/Crt 11/1.4, and AG 22. LA 6.1, BHB 0.7, and trop 0.037. UA w/ hazy urine, 1+ protein, 4+ glucose, trace ketones, occult blood, and leuks. Micro with 32 wbc and moderate deshawn. UPT neg. Flu and COVID swab neg. She was given 30u detemir, 1L LR, and 1L NS as well as 1x vanc/zosyn and zofran. Pt was admitted to the [facility] FM service for management of DKA.       Overview/Hospital Course:  No notes on file    Interval History: NAEON. The patient was seen this morning and reports she is doing well overall. She passed her ambulatory O2 yesterday.    Review of Systems   Constitutional:  Negative for chills and fever.   Respiratory:  Negative for shortness of breath.    Cardiovascular:  Negative for chest pain.   Gastrointestinal:  Negative for abdominal pain.      Objective:     Vital Signs (Most Recent):  Temp: 98.1 °F (36.7 °C) (10/10/23 1915)  Pulse: 89 (10/10/23 2023)  Resp: 19 (10/10/23 1915)  BP: (!) 152/67 (10/10/23 1915)  SpO2: (!) 93 % (10/10/23 2043) Vital Signs (24h Range):  Temp:  [98.1 °F (36.7 °C)-98.8 °F (37.1 °C)] 98.1 °F (36.7 °C)  Pulse:  [86-94] 89  Resp:  [18-19] 19  SpO2:  [84 %-98 %] 93 %  BP: (114-152)/(56-69) 152/67     Weight: 109.6 kg (241 lb 10 oz)  Body mass index is 40.21 kg/m².    Intake/Output Summary (Last 24 hours) at 10/10/2023 2323  Last data filed at 10/10/2023 1258  Gross per 24 hour   Intake 120 ml   Output 600 ml   Net -480 ml         Physical Exam  Constitutional:       General: She is not in acute distress.  HENT:      Head: Normocephalic.   Eyes:      Extraocular Movements: Extraocular movements intact.   Cardiovascular:      Heart sounds: Normal heart sounds.   Pulmonary:      Effort: Pulmonary effort is normal.      Breath sounds: Normal breath sounds.   Abdominal:      Palpations: Abdomen is soft.      Tenderness: There is no abdominal tenderness. There is no guarding.   Musculoskeletal:      Cervical back: Normal range of motion.      Right lower leg: No edema.      Left lower leg: No edema.   Neurological:      Mental Status: She is alert.             Significant Labs: All pertinent labs within the past 24 hours have been reviewed.  CBC:   Recent Labs   Lab 10/09/23  0358 10/10/23  0545   WBC 21.26* 22.97*   HGB 13.1 12.9   HCT 37.8 38.3    248     CMP:   Recent Labs   Lab 10/09/23  0358 10/09/23  1410 10/10/23  0545 10/10/23  0812 10/10/23  1310 10/10/23  1820   *  133*   < > 133* 132* 127* 131*   K 3.4*  3.4*   < > 3.2* 3.5 3.7 3.5   CL 93*  93*   < > 93* 92* 93* 93*   CO2 25  25   < > 27 29 22* 24   *  113*   < > 109 129* 293* 264*   BUN 14  14   < > 17 16 16 16   CREATININE 1.0  1.0   < > 1.0 1.0 1.0 1.1   CALCIUM 8.7  8.7   < > 8.5* 8.7 8.2* 8.6*   PROT 7.3  --  7.5  --   --   --    ALBUMIN  1.6*  --  1.6*  --   --   --    BILITOT 0.5  --  0.5  --   --   --    ALKPHOS 161*  --  306*  --   --   --    AST 27  --  23  --   --   --    ALT 15  --  12  --   --   --    ANIONGAP 15  15   < > 13 11 12 14    < > = values in this interval not displayed.       Significant Imaging: I have reviewed all pertinent imaging results/findings within the past 24 hours.      Assessment/Plan:      * Bacteremia  The patient had blood cultures drawn on admission. These have grown GNR, specifically E.coli. Susceptibilities are still pending. On 10/06, the patient reports worsening diffuse abdominal pain.  10/8: BCX 10/4: 2/2 E.coli (pansensitive), (repeat) BCx 10/6: NGTD    Plan:  - Switched rocephin to ciprofloxacin for continued outpatient antibiotic management  - Will follow for any acute changes.    Dysphagia  New onset difficulty with swallowing PO meds  SLP recs include: 1 bite/sip at a time, Alternating bites/sips, Assistance with meals, and Feed only when awake/alert       NSTEMI (non-ST elevated myocardial infarction)        Diabetic ketoacidosis associated with type 2 diabetes mellitus  The patient presented from clinic with elevated glucose (>500), trace ketones in the urine, and mildly elevated BHB. DKA thought likely to be 2/2 bacteremia. The patient was stepped down from the ICU on 10/05 and transitioned to 25u Detemir nightly. On 10/06 she was increased to 40u BID.    Plan:  Will continue to monitor glucose levels and adjust insulin regimen as needed.  Will continue with Zofran for Nausea and vomiting      Chronic diastolic congestive heart failure  Patient noted to have pulmonary edema on CXR on 10/07. TTE done during this admission showed decreased systolic function.   Plan:  Continue Lisinopril and Metoprolol  Will continue with diuresis with IV 40 lasix BID  Given one dose of IV 40mg Lasix extra  Will start pure wick for monitoring UOP    Mild episode of recurrent major depressive disorder  Plan:  - Continue  with Fluoxetine      UTI (urinary tract infection)  See bacteremia.       Hypertension associated with diabetes  Plan:  Continue, Lisinopril, Nifedipine   HCTZ d/c'd      VTE Risk Mitigation (From admission, onward)         Ordered     enoxaparin injection 40 mg  Daily         10/04/23 1908     IP VTE HIGH RISK PATIENT  Once         10/04/23 1908     Place sequential compression device  Until discontinued         10/04/23 1723                Discharge Planning   MALGORZATA:      Code Status: Full Code   Is the patient medically ready for discharge?:     Reason for patient still in hospital (select all that apply): Treatment  Discharge Plan A: Skilled Nursing Facility   Discharge Delays: None known at this time              Jas Bynum MD  Department of Hospital Medicine   [facility]

## 2023-10-11 NOTE — PT/OT/SLP PROGRESS
Physical Therapy      Patient Name:  Jaimee Quintana   MRN:  [id number]    Patient not seen today secondary to  (pt experiencing nausea/vomiting today in AM/PM(0905)). Will follow-up tomorrow.

## 2023-10-11 NOTE — PROGRESS NOTES
Pharmacist Renal Dose Adjustment Note    Jaimee Quintana is a 48 y.o. female being treated with the medication enoxaparin    Patient Data:    Vital Signs (Most Recent):  Temp: 98.1 °F (36.7 °C) (10/11/23 0755)  Pulse: 86 (10/11/23 0832)  Resp: 16 (10/11/23 0832)  BP: 132/63 (10/11/23 0755)  SpO2: 95 % (10/11/23 0832) Vital Signs (72h Range):  Temp:  [97.6 °F (36.4 °C)-100.5 °F (38.1 °C)]   Pulse:  [85-96]   Resp:  [16-22]   BP: ()/(47-75)   SpO2:  [84 %-98 %]      Recent Labs   Lab 10/10/23  1820 10/10/23  2337 10/11/23  0546   CREATININE 1.1 1.0 0.9  0.9     Serum creatinine: 0.9 mg/dL 10/11/23 0546  Estimated creatinine clearance: 95.6 mL/min    Medication:enoxaparin dose: 40mg frequency q24h will be changed to medication:enoxaparin dose:40mg frequency:q12h    Pharmacist's Name: Damien Fuentes  Pharmacist's Extension: [phone]

## 2023-10-11 NOTE — ASSESSMENT & PLAN NOTE
The patient had blood cultures drawn on admission. These have grown GNR, specifically E.coli. Susceptibilities are still pending. On 10/06, the patient reports worsening diffuse abdominal pain.  10/8: BCX 10/4: 2/2 E.coli (pansensitive), (repeat) BCx 10/6: NGTD    Plan:  - Switched rocephin to ciprofloxacin for continued outpatient antibiotic management  - Will follow for any acute changes.

## 2023-10-11 NOTE — PLAN OF CARE
Problem: Adult Inpatient Plan of Care  Goal: Plan of Care Review  10/11/2023 1709 by Aracely Edwards, RN  Outcome: Met  10/11/2023 0839 by Aracely Edwards RN  Outcome: Ongoing, Progressing  Goal: Patient-Specific Goal (Individualized)  Outcome: Met  Goal: Absence of Hospital-Acquired Illness or Injury  Outcome: Met  Goal: Optimal Comfort and Wellbeing  Outcome: Met  Goal: Readiness for Transition of Care  Outcome: Met     Problem: Diabetes Comorbidity  Goal: Blood Glucose Level Within Targeted Range  Outcome: Met     Problem: Skin Injury Risk Increased  Goal: Skin Health and Integrity  Outcome: Met     Problem: Fall Injury Risk  Goal: Absence of Fall and Fall-Related Injury  Outcome: Met     Problem: Heart Failure Comorbidity  Goal: Maintenance of Heart Failure Symptom Control  Outcome: Met     Problem: Hypertension Comorbidity  Goal: Blood Pressure in Desired Range  Outcome: Met     Problem: Adjustment to Illness (Sepsis/Septic Shock)  Goal: Optimal Coping  Outcome: Met     Problem: Bleeding (Sepsis/Septic Shock)  Goal: Absence of Bleeding  Outcome: Met     Problem: Glycemic Control Impaired (Sepsis/Septic Shock)  Goal: Blood Glucose Level Within Desired Range  Outcome: Met     Problem: Infection Progression (Sepsis/Septic Shock)  Goal: Absence of Infection Signs and Symptoms  Outcome: Met     Problem: Nutrition Impaired (Sepsis/Septic Shock)  Goal: Optimal Nutrition Intake  Outcome: Met     Problem: Bariatric Environmental Safety  Goal: Safety Maintained with Care  Outcome: Met     Problem: Fever  Goal: Body Temperature in Desired Range  Outcome: Met     Problem: Occupational Therapy  Goal: Occupational Therapy Goal  Description: Goals to be met by: 11/3/2023     Patient will increase functional independence with ADLs by performing:    UE Dressing with Modified [place].  LE Dressing with Modified [place].  Grooming while standing at sink with Modified [place].  Toileting from toilet with Modified  [place] for hygiene and clothing management.   Bathing from  sitting eob with setup for wash up method.  Functional mobility to / from bathroom with least restrictive device and transfer to / from commode with modified independence.  Upper extremity exercise program x12 reps per handout, with independence.      Outcome: Met     Problem: Physical Therapy  Goal: Physical Therapy Goal  Description: Goals to be met by: 23     Patient will increase functional independence with mobility by performin. Supine to sit with Modified [place]  2. Sit to supine with Modified [place]  3. Sit to stand transfer with Modified [place] with RW  4. Bed to chair transfer with Modified [place] using Rolling Walker  5. Gait  x 150 feet with Modified [place] using Rolling Walker.     Outcome: Met     Problem: Infection  Goal: Absence of Infection Signs and Symptoms  Outcome: Met     Problem: UTI (Urinary Tract Infection)  Goal: Improved Infection Symptoms  Outcome: Met     Problem: Pain Acute  Goal: Acceptable Pain Control and Functional Ability  Outcome: Met     Problem: Nausea and Vomiting  Goal: Fluid and Electrolyte Balance  Outcome: Met     Problem: Activity Intolerance (Pulmonary Impairment)  Goal: Improved Activity Tolerance  Outcome: Met     Problem: Airway Clearance Ineffective (Pulmonary Impairment)  Goal: Effective Airway Clearance  Outcome: Met     Problem: Gas Exchange Impaired (Pulmonary Impairment)  Goal: Optimal Gas Exchange  Outcome: Met     Problem: Fluid Volume Excess  Goal: Fluid Balance  Outcome: Met     Problem: Diarrhea  Goal: Fluid and Electrolyte Balance  Outcome: Met     Problem: Impaired Wound Healing  Goal: Optimal Wound Healing  Outcome: Met     Problem: SLP  Goal: SLP Goal  Description: 1. Patient will tolerate mechanical soft diet/thin liquids with no overt s/s of aspiration  2.Patient will participate in ongoing swallow evaluation and tolerate least restrictive diet  with no overt s/s of aspiration.   3. Patient will answer complex yes/no questions with 50% acc and mod cueing  4. Patient will participate in ongoing cognitive-linguistic evaluation to best determine cognitive linguistic deficits and goals      Outcome: Met     Problem: Swallowing Impairment  Goal: Optimal Eating and Swallowing Without Aspiration  Outcome: Met     Problem: Fatigue  Goal: Improved Activity Tolerance  Outcome: Met     Problem: Confusion Acute  Goal: Optimal Cognitive Function  Outcome: Met

## 2023-10-11 NOTE — PROGRESS NOTES
ALONDRA spoke with Chirag  [phone]  ---   Daughter has done paperwork  needs ppd, neg covid -- sent per [employer]   Per Anabela - she asks that MD change trulicity to generic / long acting insulin.    Secure Chat sent to [facility] FP team with this request.     Auth still pending     CM called Daniela with [facility]  [phone]  asking about an update on auth - left message.   CM called Pa with [facility]  [phone] - left message -- cm rec'd callback - This is not a [facility] #  CM called Mariana with [facility]  [phone] -- left message.      CM called JARROD ANDRES Number of Contact: [phone] -- left message asking for assistance with auth for SNF.

## 2023-10-11 NOTE — PLAN OF CARE
Problem: Adult Inpatient Plan of Care  Goal: Plan of Care Review  Outcome: Ongoing, Progressing  Goal: Patient-Specific Goal (Individualized)  Outcome: Ongoing, Progressing  Goal: Absence of Hospital-Acquired Illness or Injury  Outcome: Ongoing, Progressing  Goal: Optimal Comfort and Wellbeing  Outcome: Ongoing, Progressing     Problem: Diabetes Comorbidity  Goal: Blood Glucose Level Within Targeted Range  Outcome: Ongoing, Progressing     Problem: Skin Injury Risk Increased  Goal: Skin Health and Integrity  Outcome: Ongoing, Progressing     Problem: Fall Injury Risk  Goal: Absence of Fall and Fall-Related Injury  Outcome: Ongoing, Progressing     Problem: Heart Failure Comorbidity  Goal: Maintenance of Heart Failure Symptom Control  Outcome: Ongoing, Progressing     Problem: Hypertension Comorbidity  Goal: Blood Pressure in Desired Range  Outcome: Ongoing, Progressing

## 2023-10-11 NOTE — SUBJECTIVE & OBJECTIVE
Interval History: Feeling well this am. Pending dc to SNF    Review of Systems   All other systems reviewed and are negative.    Objective:     Vital Signs (Most Recent):  Temp: 98.4 °F (36.9 °C) (10/11/23 1613)  Pulse: 97 (10/11/23 1613)  Resp: 20 (10/11/23 1613)  BP: (!) 148/67 (10/11/23 1613)  SpO2: (!) 92 % (10/11/23 1613) Vital Signs (24h Range):  Temp:  [98.1 °F (36.7 °C)-98.4 °F (36.9 °C)] 98.4 °F (36.9 °C)  Pulse:  [] 97  Resp:  [16-20] 20  SpO2:  [92 %-96 %] 92 %  BP: (127-152)/(60-68) 148/67     Weight: 110.4 kg (243 lb 6.2 oz)  Body mass index is 40.5 kg/m².    Intake/Output Summary (Last 24 hours) at 10/11/2023 1718  Last data filed at 10/11/2023 1230  Gross per 24 hour   Intake 360 ml   Output 0 ml   Net 360 ml         Physical Exam  Vitals and nursing note reviewed.   Constitutional:       Appearance: Normal appearance.   Cardiovascular:      Rate and Rhythm: Normal rate and regular rhythm.      Pulses: Normal pulses.      Heart sounds: Normal heart sounds.   Pulmonary:      Effort: Pulmonary effort is normal.      Breath sounds: Normal breath sounds.   Neurological:      Mental Status: She is alert and oriented to person, place, and time.   Psychiatric:         Mood and Affect: Mood normal.         Behavior: Behavior normal.             Significant Labs: All pertinent labs within the past 24 hours have been reviewed.    Significant Imaging: I have reviewed all pertinent imaging results/findings within the past 24 hours.

## 2023-10-11 NOTE — SUBJECTIVE & OBJECTIVE
Interval History: NAEON. The patient was seen this morning and reports she is doing well overall. She passed her ambulatory O2 yesterday.    Review of Systems   Constitutional:  Negative for chills and fever.   Respiratory:  Negative for shortness of breath.    Cardiovascular:  Negative for chest pain.   Gastrointestinal:  Negative for abdominal pain.     Objective:     Vital Signs (Most Recent):  Temp: 98.1 °F (36.7 °C) (10/10/23 1915)  Pulse: 89 (10/10/23 2023)  Resp: 19 (10/10/23 1915)  BP: (!) 152/67 (10/10/23 1915)  SpO2: (!) 93 % (10/10/23 2043) Vital Signs (24h Range):  Temp:  [98.1 °F (36.7 °C)-98.8 °F (37.1 °C)] 98.1 °F (36.7 °C)  Pulse:  [86-94] 89  Resp:  [18-19] 19  SpO2:  [84 %-98 %] 93 %  BP: (114-152)/(56-69) 152/67     Weight: 109.6 kg (241 lb 10 oz)  Body mass index is 40.21 kg/m².    Intake/Output Summary (Last 24 hours) at 10/10/2023 2323  Last data filed at 10/10/2023 1258  Gross per 24 hour   Intake 120 ml   Output 600 ml   Net -480 ml         Physical Exam  Constitutional:       General: She is not in acute distress.  HENT:      Head: Normocephalic.   Eyes:      Extraocular Movements: Extraocular movements intact.   Cardiovascular:      Heart sounds: Normal heart sounds.   Pulmonary:      Effort: Pulmonary effort is normal.      Breath sounds: Normal breath sounds.   Abdominal:      Palpations: Abdomen is soft.      Tenderness: There is no abdominal tenderness. There is no guarding.   Musculoskeletal:      Cervical back: Normal range of motion.      Right lower leg: No edema.      Left lower leg: No edema.   Neurological:      Mental Status: She is alert.             Significant Labs: All pertinent labs within the past 24 hours have been reviewed.  CBC:   Recent Labs   Lab 10/09/23  0358 10/10/23  0545   WBC 21.26* 22.97*   HGB 13.1 12.9   HCT 37.8 38.3    248     CMP:   Recent Labs   Lab 10/09/23  0358 10/09/23  1410 10/10/23  0545 10/10/23  0812 10/10/23  1310 10/10/23  1820   *   133*   < > 133* 132* 127* 131*   K 3.4*  3.4*   < > 3.2* 3.5 3.7 3.5   CL 93*  93*   < > 93* 92* 93* 93*   CO2 25  25   < > 27 29 22* 24   *  113*   < > 109 129* 293* 264*   BUN 14  14   < > 17 16 16 16   CREATININE 1.0  1.0   < > 1.0 1.0 1.0 1.1   CALCIUM 8.7  8.7   < > 8.5* 8.7 8.2* 8.6*   PROT 7.3  --  7.5  --   --   --    ALBUMIN 1.6*  --  1.6*  --   --   --    BILITOT 0.5  --  0.5  --   --   --    ALKPHOS 161*  --  306*  --   --   --    AST 27  --  23  --   --   --    ALT 15  --  12  --   --   --    ANIONGAP 15  15   < > 13 11 12 14    < > = values in this interval not displayed.       Significant Imaging: I have reviewed all pertinent imaging results/findings within the past 24 hours.

## 2023-10-11 NOTE — CARE UPDATE
[facility]    FACILITY TRANSFER ORDERS      Patient Name: Jaimee Quintana  YOB: 1975    PCP: Donald Acosta MD   PCP Address: [address] / [place] [address]  PCP Phone Number: [phone]  PCP Fax: [phone]    Encounter Date: 10/11/2023    Admit to: SNF    Vital Signs:  Routine    Diagnoses:   Active Hospital Problems    Diagnosis  POA    *Bacteremia [R78.81]  Yes     Chronic    Dysphagia [R13.10]  No    Diabetic ketoacidosis associated with type 2 diabetes mellitus [E11.10]  Unknown    NSTEMI (non-ST elevated myocardial infarction) [I21.4]  Yes    Sinus arrhythmia [I49.8]  Yes    Chronic diastolic congestive heart failure [I50.32]  Yes    Mild episode of recurrent major depressive disorder [F33.0]  Yes    UTI (urinary tract infection) [N39.0]  Yes    Hypertension associated with diabetes [E11.59, I15.2]  Yes      Resolved Hospital Problems    Diagnosis Date Resolved POA    Nausea and vomiting [R11.2] 10/08/2023 Yes    Hyperglycemia [R73.9] 10/08/2023 Yes    Lactic acidosis [E87.20] 10/08/2023 Yes    Tachycardia [R00.0] 10/08/2023 Yes       Allergies:Review of patient's allergies indicates:  No Known Allergies    Diet: Mechanical Soft    Activities: Activity as tolerated    Goals of Care Treatment Preferences:  Code Status: Full Code      Nursing: Per Facility     CONSULTS:    Physical Therapy to evaluate and treat. , Occupational Therapy to evaluate and treat., and Speech Therapy to evaluate and treat for Swallowing.      Medications: Review discharge medications with patient and family and provide education.      Current Discharge Medication List        START taking these medications    Details   ciprofloxacin HCl (CIPRO) 750 MG tablet Take 1 tablet (750 mg total) by mouth every 12 (twelve) hours. for 2 days  Qty: 4 tablet, Refills: 0      insulin detemir U-100, Levemir, 100 unit/mL (3 mL) SubQ InPn pen Inject 40 Units into the skin 2 (two) times daily.  Qty:  24 mL, Refills: 0           CONTINUE these medications which have CHANGED    Details   dulaglutide (TRULICITY) 0.75 mg/0.5 mL pen injector Inject 0.75 mg into the skin every 7 days. OK to discontinue this med during SNF stay - resume upon discharge  Qty: 4 pen , Refills: 11           CONTINUE these medications which have NOT CHANGED    Details   aspirin (ECOTRIN) 81 MG EC tablet Take 1 tablet (81 mg total) by mouth once daily.  Qty: 60 tablet, Refills: 1      atorvastatin (LIPITOR) 80 MG tablet Take 1 tablet (80 mg total) by mouth every evening.  Qty: 60 tablet, Refills: 1    Comments: Please inactivate all prior scripts with same name and strength including on holds. Please delete all prior scripts with same name and strength including on holds.  Associated Diagnoses: Hyperlipidemia, unspecified hyperlipidemia type      ezetimibe (ZETIA) 10 mg tablet Take 1 tablet (10 mg total) by mouth once daily. (For cholesterol)  Qty: 90 tablet, Refills: 1    Associated Diagnoses: Hyperlipidemia associated with type 2 diabetes mellitus      furosemide (LASIX) 40 MG tablet Take 1 tablet (40 mg total) by mouth once daily.  Qty: 60 tablet, Refills: 1    Associated Diagnoses: Chronic heart failure with preserved ejection fraction      ibuprofen (ADVIL,MOTRIN) 200 MG tablet Take 200 mg by mouth every 6 (six) hours as needed for Pain.      insulin aspart U-100 (NOVOLOG FLEXPEN U-100 INSULIN) 100 unit/mL (3 mL) InPn pen Inject 30 Units into the skin 2 (two) times a day.  Qty: 30 mL, Refills: 12    Associated Diagnoses: Type 2 diabetes mellitus with other skin ulcer (CODE)      lisinopriL (PRINIVIL,ZESTRIL) 40 MG tablet Take 1 tablet (40 mg total) by mouth once daily.  Qty: 90 tablet, Refills: 3    Comments: Patient request mail-order medications  Associated Diagnoses: Hypertension associated with diabetes; Chronic diastolic congestive heart failure      metFORMIN (GLUCOPHAGE-XR) 500 MG ER 24hr tablet Take 1,000 mg by mouth 2 (two)  "times daily with meals.      metoprolol succinate (TOPROL-XL) 100 MG 24 hr tablet Take 100 mg by mouth once daily.      NIFEdipine (ADALAT CC) 60 MG TbSR Take 60 mg by mouth every morning.      pantoprazole (PROTONIX) 40 MG tablet Take 40 mg by mouth once daily.      blood sugar diagnostic Strp Use to test blood glucose two (2) times daily as directed with insurance preferred meter and supplies  Qty: 200 each, Refills: 3    Comments: Please inactivate all prior scripts with same name and strength including any scripts on hold.  Associated Diagnoses: Type 2 diabetes mellitus with hyperglycemia, with long-term current use of insulin      FLUoxetine 40 MG capsule Take 40 mg by mouth once daily.      lancets (TRUEPLUS LANCETS) 28 gauge Misc Use to test blood glucose two (2) times daily as directed with insurance preferred meter and supplies  Qty: 200 each, Refills: 3    Comments: Please inactivate all prior scripts with same name and strength including any scripts on hold.  Associated Diagnoses: Type 2 diabetes mellitus with hyperglycemia, with long-term current use of insulin      !! pen needle, diabetic (BD ULTRA-FINE ROSA PEN NEEDLE) 32 gauge x 5/32" Ndle Use with insulin once daily  Qty: 100 each, Refills: 0      !! pen needle, diabetic 32 gauge x 5/32" Ndle Use with injectable DM supplies twice daily as directed  Qty: 200 each, Refills: 0    Comments: Please inactivate all prior scripts with same name and strength including any scripts on hold.  Associated Diagnoses: Type 2 diabetes mellitus with hyperglycemia, with long-term current use of insulin      potassium chloride (KLOR-CON) 8 MEQ TbSR TAKE TWO TABLETS BY MOUTH TWICE DAILY @ 9AM & 5PM  Qty: 180 tablet, Refills: 0    Associated Diagnoses: Low serum potassium      TRUE METRIX GO GLUCOSE METER Misc        !! - Potential duplicate medications found. Please discuss with provider.        STOP taking these medications       hydroCHLOROthiazide (HYDRODIURIL) 25 MG " tablet Comments:   Reason for Stopping:         LANTUS SOLOSTAR U-100 INSULIN glargine 100 units/mL SubQ pen Comments:   Reason for Stopping:                  Immunizations Administered as of 10/11/2023       Name Date Dose VIS Date Route Exp Date    COVID-19, MRNA, LN-S, PF (Pfizer) (Purple Cap) 4/11/2021  3:49 PM 0.3 mL 12/12/2020 Intramuscular 7/31/2021    Site: Right deltoid     Given By: Ricky Florez RN     : [employer]     Lot: [id number]     COVID-19, MRNA, LN-S, PF (Pfizer) (Purple Cap) 3/20/2021  4:39 PM 0.3 mL 12/12/2020 Intramuscular 6/30/2021    Site: Right deltoid     Given By: Funmi Arredondo     : [employer]     Lot: [id number]             _________________________________  Edna Chou MD  10/11/2023

## 2023-10-11 NOTE — PT/OT/SLP PROGRESS
"Occupational Therapy   Treatment    Name: Jaimee Quintana  MRN: [id number]  Admitting Diagnosis:  Bacteremia       Recommendations:     Discharge Recommendations: other (see comments) (moderate intensity therapy)  Discharge Equipment Recommendations:  walker, rolling  Barriers to discharge:  Other (Comment) (decreased occupational performance)    Assessment:     Jaimee Quintana is a 48 y.o. female with a medical diagnosis of Bacteremia. Performance deficits affecting function are weakness, impaired endurance, impaired functional mobility, gait instability, impaired balance, decreased lower extremity function, decreased ROM, impaired coordination, impaired skin.     Rehab Prognosis:  Good; patient would benefit from acute skilled OT services to address these deficits and reach maximum level of function.       Plan:     Patient to be seen 5 x/week to address the above listed problems via self-care/home management  Plan of Care Expires: 11/03/23  Plan of Care Reviewed with: patient    Subjective     Chief Complaint: "I'm tired"  Patient/Family Comments/goals: Return to PLOF  Pain/Comfort:  Pain Rating 1: 0/10    Objective:     Communicated with: nsg prior to session.  Patient found supine with peripheral IV, telemetry upon OT entry to room.    General Precautions: Standard, fall    Orthopedic Precautions:N/A  Braces: N/A  Respiratory Status: Room air     Occupational Performance:     Bed Mobility:    Patient completed Rolling/Turning to Left with  stand by assistance  Patient completed Supine to Sit with contact guard assistance     Functional Mobility/Transfers:  Patient completed Sit <> Stand Transfer with contact guard assistance  with  rolling walker   Patient completed Bed <> Chair Transfer using Step Transfer technique with contact guard assistance with rolling walker and arm rests for support  Functional Mobility: Pt ambulated in room and out in montero with CGA using RW for support and VC's for walker management "     Activities of Daily Living:  Grooming: contact guard assistance for stability. Pt stood at sink with RW and completed oral hygiene and face washing with set up assistance.   Upper Body Dressing: Pt donned gown as robe w/Marissa seated EOB       [facility] 6 Click ADL: 16    Treatment & Education:  Pt has fair balance and poor safety awareness, as demo'd by running RW into objects/walls, narrow COLBY during gait, increased impulsivity with all tasks   Pt required VCs for RW mgmt/proximity, gait sequence   All questions answered within OT scope of practice     Patient left up in chair with all lines intact, call button in reach, chair alarm on, and nsg notified    GOALS:   Multidisciplinary Problems       Occupational Therapy Goals          Problem: Occupational Therapy    Goal Priority Disciplines Outcome Interventions   Occupational Therapy Goal     OT, PT/OT Ongoing, Progressing    Description: Goals to be met by: 11/3/2023     Patient will increase functional independence with ADLs by performing:    UE Dressing with Modified Boyd.  LE Dressing with Modified Boyd.  Grooming while standing at sink with Modified Boyd.  Toileting from toilet with Modified Boyd for hygiene and clothing management.   Bathing from  sitting eob with setup for wash up method.  Functional mobility to / from bathroom with least restrictive device and transfer to / from commode with modified independence.  Upper extremity exercise program x12 reps per handout, with independence.                           Time Tracking:     OT Date of Treatment: 10/11/23  OT Start Time: 1045  OT Stop Time: 1116  OT Total Time (min): 31 min    Billable Minutes:Self Care/Home Management 23  Therapeutic Activity 8    OT/SILCK: SLICK (SOTA)     Number of SLICK visits since last OT visit: 2    10/11/2023

## 2023-10-13 NOTE — DISCHARGE SUMMARY
[facility] Medicine  Discharge Summary      Patient Name: Jaimee Quintana  MRN: [id number]  JEREMY: [id number]  Patient Class: IP- Inpatient  Admission Date: 10/4/2023  Hospital Length of Stay: 7 days  Discharge Date and Time: 10/11/2023  Attending Physician: No att. providers found   Discharging Provider: Jas Bynum MD  Primary Care Provider: Donald Acosta MD    Primary Care Team: Networked reference to record PCT     HPI:   47 yo F w/ PMHx of HFpEF (EF 50% 5/23), T2DM (A1c 11.4), HLD, and obesity who presented to the ED from clinic and here with DKA. Pt reports 3 days of worsening malaise, HA's, N, V, and D. She denies any hemoptysis or hematochezia. She also endorses increased urinary frequency but no dysuria during this time. She reports having been out of her trulicity for 1 wk but has been compliant with other home medications including insulin at home. She denies any sick contacts but does live with her two teen children who are in school. She denies any EtoH or additional substance use.      In the ED pt afebrile, , RR 50, /87 and sat 100% on RA. EKG with sinus tach. CXR without signs of consolidation or effusion. VBG w/ pH 7.44, CO2 34.3, O2 32.4, and HCO3 23.4. CB notable for WBC 22.6 Hg 16.4. CMP notable for Na 127, K 3.9, , BUN/Crt 11/1.4, and AG 22. LA 6.1, BHB 0.7, and trop 0.037. UA w/ hazy urine, 1+ protein, 4+ glucose, trace ketones, occult blood, and leuks. Micro with 32 wbc and moderate deshawn. UPT neg. Flu and COVID swab neg. She was given 30u detemir, 1L LR, and 1L NS as well as 1x vanc/zosyn and zofran. Pt was admitted to the [facility] FM service for management of DKA.       * No surgery found *      Hospital Course:   The patient was admitted on 10/04 for DKA. She was sent to the ICU following her ED arrival. Retroperitoneal U/S showed no abnormalities. She was noted to have ST depressions on initial EKG. She received 30u detemir in the ED and was started on an  insulin drip. The drip was weaned down and the patient was eventually transitioned to 25u detemir nightly. Repeat Echo was ordered and showed an EF of 35-40% with grade II diastolic dysfunction. The patient's glucose was still not controlled and insulin was increased to 40u detemir BID. The patient endorsed abdominal pain and tenderness on palpation. CT A/P ordered and showed asymmetric enlargement of left kidney and intraluminal fluid in loops of colon. Both blood and Urine cultures grew pansensitive E. Coli. The patient was noted to have increased oxygen demand, CXR showed pulmonary edema. The patient was given IV lasix 20mg once, but was later transitioned to IV 40mg BID. Repeat CXR showed improvement of edema and effusions. Stool cultures grew campylobacter, per ID, no antibiotic was needed for management of this. The patient was switched from Rocephin to ampicillin, but showed worsening of her condition so she was switched back to rocephin. The patient had and ambulatory O2 test done which showed no need for supplemental oxygen. Repeat U/S ordered per ID recommendation, Retroperitoneal Ultrasound showed no abnormalities. The patient was switched for rocephin to ciprofloxacin while inpatient.    At discharged, the patient will go to a Skilled Nursing Facility to complete her antibiotic regimen and to continue physical therapy. She will be discharged with Ciprofloxacin 750mg for 2 more days of therapy for a total of 10 days of antibiotics. Patient medically stable for discharge at this time. Return precautions discussed. All questions answered. Patient verbalized understanding.          Goals of Care Treatment Preferences:  Code Status: Full Code      Consults:   Consults (From admission, onward)        Status Ordering Provider     Inpatient consult to Diabetes educator  Once        Provider:  (Not yet assigned)    Completed TERESSA BELLO     Inpatient consult to Infectious Diseases  Once        Provider:   Gurpreet Santana MD    Completed LORI HOLDER          No new Assessment & Plan notes have been filed under this hospital service since the last note was generated.  Service: Hospital Medicine    Final Active Diagnoses:    Diagnosis Date Noted POA    PRINCIPAL PROBLEM:  Bacteremia [R78.81] 10/05/2023 Yes     Chronic    Dysphagia [R13.10] 10/08/2023 No    Diabetic ketoacidosis associated with type 2 diabetes mellitus [E11.10] 10/06/2023 Unknown    NSTEMI (non-ST elevated myocardial infarction) [I21.4] 10/06/2023 Yes    Sinus arrhythmia [I49.8] 10/06/2023 Yes    Chronic diastolic congestive heart failure [I50.32] 05/22/2023 Yes    Mild episode of recurrent major depressive disorder [F33.0] 07/09/2021 Yes    UTI (urinary tract infection) [N39.0] 08/18/2016 Yes    Hypertension associated with diabetes [E11.59, I15.2] 10/23/2015 Yes      Problems Resolved During this Admission:    Diagnosis Date Noted Date Resolved POA    Nausea and vomiting [R11.2] 10/06/2023 10/08/2023 Yes    Hyperglycemia [R73.9] 10/06/2023 10/08/2023 Yes    Lactic acidosis [E87.20] 10/06/2023 10/08/2023 Yes    Tachycardia [R00.0] 10/04/2023 10/08/2023 Yes       Discharged Condition: stable    Disposition: Home or Self Care    Follow Up:   Follow-up Information     Center, [facility] Follow up.    Contact information:  [address] [place] [address] [phone]                       Patient Instructions:      Diet diabetic     Nursing communication   Order Comments: Trulicity to be restarted when patient returns home     HOME HEALTH ORDERS     Order Specific Question Answer Comments   What Home Health Agency is the patient currently using? [facility]      Notify your health care provider if you experience any of the following:  temperature >100.4     Notify your health care provider if you experience any of the following:  difficulty breathing or increased cough     Notify your health care provider if you  experience any of the following:  persistent dizziness, light-headedness, or visual disturbances     Notify your health care provider if you experience any of the following:  increased confusion or weakness     Notify your health care provider if you experience any of the following:  persistent nausea and vomiting or diarrhea     Activity as tolerated       Significant Diagnostic Studies: Labs:   CMP   Recent Labs   Lab 10/10/23  2337 10/11/23  0546   * 133*  133*   K 3.5 3.5  3.5   CL 94* 95  96   CO2 25 25  25   * 171*  165*   BUN 15 14  14   CREATININE 1.0 0.9  0.9   CALCIUM 8.5* 8.4*  8.5*   PROT  --  7.4   ALBUMIN  --  1.6*   BILITOT  --  0.6   ALKPHOS  --  141*   AST  --  17   ALT  --  11   ANIONGAP 13 13  12    and CBC   Recent Labs   Lab 10/11/23  0546   WBC 22.51*   HGB 12.8   HCT 38.1          Pending Diagnostic Studies:     None         Medications:  Reconciled Home Medications:      Medication List      START taking these medications    ciprofloxacin HCl 750 MG tablet  Commonly known as: CIPRO  Take 1 tablet (750 mg total) by mouth every 12 (twelve) hours. for 2 days     insulin detemir U-100 (Levemir) 100 unit/mL (3 mL) Inpn pen  Inject 40 Units into the skin 2 (two) times daily.        CHANGE how you take these medications    potassium chloride 8 MEQ Tbsr  Commonly known as: KLOR-CON  TAKE TWO TABLETS BY MOUTH TWICE DAILY @ 9AM & 5PM  What changed: See the new instructions.     TRULICITY 0.75 mg/0.5 mL pen injector  Generic drug: dulaglutide  Inject 0.75 mg into the skin every 7 days. OK to discontinue this med during SNF stay - resume upon discharge  What changed: additional instructions        CONTINUE taking these medications    aspirin 81 MG EC tablet  Commonly known as: ECOTRIN  Take 1 tablet (81 mg total) by mouth once daily.     atorvastatin 80 MG tablet  Commonly known as: LIPITOR  Take 1 tablet (80 mg total) by mouth every evening.     blood sugar diagnostic  "Strp  Use to test blood glucose two (2) times daily as directed with insurance preferred meter and supplies     ezetimibe 10 mg tablet  Commonly known as: ZETIA  Take 1 tablet (10 mg total) by mouth once daily. (For cholesterol)     FLUoxetine 40 MG capsule  Take 40 mg by mouth once daily.     furosemide 40 MG tablet  Commonly known as: LASIX  Take 1 tablet (40 mg total) by mouth once daily.     ibuprofen 200 MG tablet  Commonly known as: ADVIL,MOTRIN  Take 200 mg by mouth every 6 (six) hours as needed for Pain.     insulin aspart U-100 100 unit/mL (3 mL) Inpn pen  Commonly known as: NovoLOG FlexPen U-100 Insulin  Inject 30 Units into the skin 2 (two) times a day.     lancets 28 gauge Misc  Commonly known as: TRUEPLUS LANCETS  Use to test blood glucose two (2) times daily as directed with insurance preferred meter and supplies     lisinopriL 40 MG tablet  Commonly known as: PRINIVIL,ZESTRIL  Take 1 tablet (40 mg total) by mouth once daily.     metFORMIN 500 MG ER 24hr tablet  Commonly known as: GLUCOPHAGE-XR  Take 1,000 mg by mouth 2 (two) times daily with meals.     metoprolol succinate 100 MG 24 hr tablet  Commonly known as: TOPROL-XL  Take 100 mg by mouth once daily.     NIFEdipine 60 MG Tbsr  Commonly known as: ADALAT CC  Take 60 mg by mouth every morning.     pantoprazole 40 MG tablet  Commonly known as: PROTONIX  Take 40 mg by mouth once daily.     * pen needle, diabetic 32 gauge x 5/32" Ndle  Commonly known as: BD ULTRA-FINE ROSA PEN NEEDLE  Use with insulin once daily     * pen needle, diabetic 32 gauge x 5/32" Ndle  Use with injectable DM supplies twice daily as directed     TRUE METRIX GO GLUCOSE METER Misc  Generic drug: blood-glucose meter         * This list has 2 medication(s) that are the same as other medications prescribed for you. Read the directions carefully, and ask your doctor or other care provider to review them with you.            STOP taking these medications    ergocalciferol 50,000 unit " Cap  Commonly known as: ERGOCALCIFEROL     hydroCHLOROthiazide 25 MG tablet  Commonly known as: HYDRODIURIL     LANTUS SOLOSTAR U-100 INSULIN glargine 100 units/mL SubQ pen  Generic drug: insulin            Indwelling Lines/Drains at time of discharge:   Lines/Drains/Airways     None                 Time spent on the discharge of patient: >30 minutes         Jas Bynum MD  Department of [facility] Medicine  [facility]

## 2023-10-13 NOTE — PHYSICIAN QUERY
PT Name: Jaimee Quintana  MR #: [id number]     DOCUMENTATION CLARIFICATION     CDS/: Aracely Navarro RN          Contact Information: [email or web address]    This form is a permanent document in the medical record.     Query Date: October 13, 2023    By submitting this query, we are merely seeking further clarification of documentation.  Please utilize your independent clinical judgment when addressing the question(s) below.  The Medical Record contains the following:  Indicators Supporting Clinical Findings Location in Medical Record   x HR         RR          BP        Temp T 100-100.7, -126, RR 22-37, -79  T 100.9-101.2, -127, RR 21-42, -73  T 100.4, HR , RR 22-24, /59 10/4 vitals  10/5 vitals  10/6 vitals   x Lactic Acid          Procalcitonin  10/04/23 14:40 10/05/23 03:00 10/05/23 10:31   Lactate, Shant 6.1 (HH)    4.7(HH) 3.2 (H)    Lab results    x WBC           Bands          CRP  10/04/23 14:40 10/06/23 04:03 10/08/23 04:05 10/10/23 05:45   WBC 22.64 (H) 15.57 (H) 15.69 (H) 22.97 (H)         10/04/23 14:40 10/05/23 05:10 10/08/23 04:05   Bands 13.0 22.0 3.0    Lab results    x Culture(s) Blood culture:  ESCHERICHIA COLI    Urine culture:  ESCHERICHIA COLI >100,000 cfu/ml 10/4 microbiology     AMS, Confusion, LOC, etc.     x Organ Dysfunction/Failure lactic acidosis     CATE 10/4 ICU PN    10/5 HM PN   x Bacteremia or Sepsis / Septic Sepsis     noted to have bacteremia    Urosepsis concern    Bacteremia      E coli bacteremia from urinary tract source    Ecoli septicemia from UTI 10/4 ICU PN    10/5 HM PN    10/5 CCM Consult    10/6  PN-10/11 DC Summary    10/6 ID Consult    10/9 ID PN    x Known or Suspected Source of Infection documented potential UTI    UA notable for evidence of UTI    UTI 10/4 H&P    10/5 CCM Consult    10/6 HM PN    (Failed) Outpatient Treatment     x Medication piperacillin-tazobactam (ZOSYN) 4.5 g IVPB x1   vancomycin (VANCOCIN) 2,500 mg  IV x1  cefTRIAXone (ROCEPHIN) 1-2g IVPB q24hrs   ampicillin (OMNIPEN) 2 g IVPB q4hrs   ciprofloxacin HCl tablet 750 mg oral q12hrs  10/4 medication    10/4-10/7, 10/9-10/10 meds  10/7-10/9 medications  10/10-10/11 medications   x Treatment lactated ringers bolus 1,000 mL IV x2    sodium chloride 0.9% bolus 1,000 mL x1  0.9 % NaCl with KCl 20 mEq infusion continuous IV at 250ml/hr  10/4 medications    Other elevated wbc (22) on admit likely related to stress/dka 10/4 H&P        Provider, please clarify the diagnosis or diagnoses associated with above clinical findings.    [ x  ] Ecoli septicemia from UTI   [   ] Sepsis Ruled Out. E coli bacteremia from urinary tract source   [   ] Other Infectious Disease (please specify): __________   [  ] Clinically Undetermined         Please document in your progress notes daily for the duration of treatment until resolved and include in your discharge summary.

## 2023-10-13 NOTE — HOSPITAL COURSE
The patient was admitted on 10/04 for DKA. She was sent to the ICU following her ED arrival. Retroperitoneal U/S showed no abnormalities. She was noted to have ST depressions on initial EKG. She received 30u detemir in the ED and was started on an insulin drip. The drip was weaned down and the patient was eventually transitioned to 25u detemir nightly. Repeat Echo was ordered and showed an EF of 35-40% with grade II diastolic dysfunction. The patient's glucose was still not controlled and insulin was increased to 40u detemir BID. The patient endorsed abdominal pain and tenderness on palpation. CT A/P ordered and showed asymmetric enlargement of left kidney and intraluminal fluid in loops of colon. Both blood and Urine cultures grew pansensitive E. Coli. The patient was noted to have increased oxygen demand, CXR showed pulmonary edema. The patient was given IV lasix 20mg once, but was later transitioned to IV 40mg BID. Repeat CXR showed improvement of edema and effusions. Stool cultures grew campylobacter, per ID, no antibiotic was needed for management of this. The patient was switched from Rocephin to ampicillin, but showed worsening of her condition so she was switched back to rocephin. The patient had and ambulatory O2 test done which showed no need for supplemental oxygen. Repeat U/S ordered per ID recommendation, Retroperitoneal Ultrasound showed no abnormalities. The patient was switched for rocephin to ciprofloxacin while inpatient.    At discharged, the patient will go to a Skilled Nursing Facility to complete her antibiotic regimen and to continue physical therapy. She will be discharged with Ciprofloxacin 750mg for 2 more days of therapy for a total of 10 days of antibiotics. Patient medically stable for discharge at this time. Return precautions discussed. All questions answered. Patient verbalized understanding.

## 2023-10-13 NOTE — PHYSICIAN QUERY
PT Name: Jaimee Quintana  MR #: [id number]     DOCUMENTATION CLARIFICATION      CDS/: Aracely Navraro RN          Contact Information: [email or web address]    This form is a permanent document in the medical record.    Query Date: October 13, 2023    By submitting this query, we are merely seeking further clarification of documentation to reflect the severity of illness of your patient. Please utilize your independent clinical judgment when addressing the question(s) below.     The Medical Record contains the following:   Indicators   Supporting Clinical Findings Location in Medical Record     x Chest Pain, Angina Positive for chest pain 10/6 ID PN    Coronary Artery Disease       x EKG EKG w/ sinus tach    EKG 12-lead  Normal sinus rhythm   T wave abnormality, consider lateral ischemia   Abnormal ECG   When compared with ECG of 05-OCT-2023 05:28,   T wave inversion no longer evident in Inferior leads  10/4 H&P    10/6 EKG      x Troponin Trop elevated to 0.037     10/04/23 14:40 10/04/23 20:44 10/06/23 15:42   Troponin I 0.037 (H) 0.030 (H) 0.025    10/4 H&P    Lab results     x Echo Results TTE Summary:  Left Ventricle: The left ventricle is normal in size. There is concentric hypertrophy. Mild global hypokinesis present. There is moderately reduced systolic function with a visually estimated ejection fraction of 35 - 40%. Grade II diastolic dysfunction.  Right Ventricle: Normal right ventricular cavity size. Wall thickness is normal. Right ventricle wall motion  is normal. Systolic function is normal. 10/5 Echo     Angiography       x Documentation of acute cardiac condition NSTEMI  -possibly related to elevated bp in /97  -Pt denies any CP or SoB    NSTEMI  10/4 H&P        10/5 HM PN -10/11 DC Summary     x Medication/Treatment Repeat q6hr w/ EKG   cnt home medications including Nifedipine 60mg qd and HCTZ 25mg qd  10/4 H&P    Other        Provider, please clarify the diagnosis related to the above  documentation:  [  x ] NSTEMI   [   ] NSTEMI/Myocardial Infarction Type 2 due to (please specify): __________   [   ] Other Cardiac Diagnosis (please specify): _______________       Please document in your progress notes daily for the duration of treatment until resolved, and include in your discharge summary.    Form No. [id number]

## 2023-10-13 NOTE — PHYSICIAN QUERY
PT Name: Jaimee Quintana  MR #: [id number]     DOCUMENTATION CLARIFICATION     CDS/: Aracely Navarro RN          Contact Information: [email or web address]    This form is a permanent document in the medical record.     Query Date: October 13, 2023    By submitting this query, we are merely seeking further clarification of documentation.  Please utilize your independent clinical judgment when addressing the question(s) below.    The Medical Record contains the following   Indicators Supporting Clinical Findings Location in Medical Record     x Heart Failure documented PMHx of HFpEF (EF 50% 5/23)    Chronic diastolic congestive heart failure 10/4 H&P    10/7 HM PN      x BNP  10/04/23 17:52    (H)         x EF/Echo TTE Summary:  Left Ventricle: The left ventricle is normal in size. There is concentric hypertrophy. Mild global hypokinesis present. There is moderately reduced systolic function with a visually estimated ejection fraction of 35 - 40%. Grade II diastolic dysfunction.  Right Ventricle: Normal right ventricular cavity size. Wall thickness is normal. Right ventricle wall motion  is normal. Systolic function is normal.    TTE done during this admission showed decreased systolic function.  10/5 Echo                     10/7 HM PN      x Radiology findings Patient noted to have pulmonary edema on CXR on 10/07    CXR Impression:  Pulmonary edema.  Small effusions. 10/7 HM PN     10/7 Radiology     Subjective/Objective Respiratory Conditions        x Recent/Current MI NSTEMI  -possibly related to elevated bp in /97 10/4 H&P    Heart Transplant, LVAD       x Edema, JVD  Right lower leg: Edema present.    Left lower leg: Edema present.      Comments: 1+ on bilateral lower extremities  10/6 HM PN     Ascites       x Diuretics/Meds furosemide injection 20 mg IV x1   furosemide injection 40 mg IV q12hrs   furosemide injection 40 mg IV x1     Continue Lisinopril, HCTZ, Metoprolol  Will continue with  diuresis with IV 40 lasix BID 10/6 medication  10/7-10/11 medications  10/9 medication    10/7 HM PN      x Other Treatment increasing oxygen demand, up to 5L 10/7 HM PN     Other       Heart failure is a clinical diagnosis which includes symptomatic fluid retention, elevated intracardiac pressures, and/or the inability of the heart to deliver adequate blood flow.    Heart Failure with reduced Ejection Fraction (HFrEF) or Systolic Heart Failure (loses ability to contract normally, EF is <40%)    Heart Failure with preserved Ejection Fraction (HFpEF) or Diastolic Heart Failure (stiff ventricles, does not relax properly, EF is >50%)     Heart Failure with Combined Systolic and Diastolic Failure (stiff ventricles, does not relax properly and EF is <50%)    Mid-range or mildly reduced ejection fraction (HFmrEF) is classified as systolic heart failure.  Congestive heart failure with a recovered EF is classified as Diastolic Heart Failure.  Common clues to acute exacerbation:  Rapidly progressive symptoms (w/in 2 weeks of presentation), using IV diuretics, using supplemental O2, pulmonary edema on Xray, new or worsening pleural effusion, +JVD or other signs of volume overload, MI w/in 4 weeks, and/or BNP >500  The clinical guidelines noted are only system guidelines, and do not replace the providers clinical judgment.    Provider, please clarify the acuity and type of the Heart Failure diagnosis associated with the above clinical findings.    [ x  ]  Acute on Chronic Combined Systolic and Diastolic Heart Failure - worsening of CHF signs/symptoms in preexisting CHF   [   ]  Chronic Diastolic Heart Failure (HFpEF) - preexisting and stable   [   ]  Other (please specify): ___________________________________   [  ]  Clinically Undetermined     Please document in your progress notes daily for the duration of treatment until resolved and include in your discharge summary.    References:  American Heart Association editorial  staff. (2017, May). Ejection Fraction Heart Failure Measurement. American Heart Association. https://www.heart.org/en/health-topics/heart-failure/diagnosing-heart-failure/ejection-fraction-heart-failure-measurement#:~:text=Ejection%20fraction%20(EF)%20is%20a,pushed%20out%20with%20each%20heartbeat  PETRA Grewal (2020, December 15). Heart failure with preserved ejection fraction: Clinical manifestations and diagnosis. [employer]. https://www.[employer].Etohum/contents/heart-failure-with-preserved-ejection-fraction-clinical-manifestations-and-diagnosis.  ICD-10-CM/PCS Coding Clinic Third Quarter ICD-10, Effective with discharges: September 8, 2020 [facility] [employer] § Heart failure with mid-range or mildly reduced ejection fraction (2020).  ICD-10-CM/PCS Coding Clinic Third Quarter ICD-10, Effective with discharges: September 8, 2020 [facility] [employer] § Heart failure with recovered ejection fraction (2020).  Form No. [id number]

## 2023-10-25 NOTE — SUBJECTIVE & OBJECTIVE
Interval History: The patient was seen this morning. She was noted to have a fever of 100.4F and reported some nausea, vomiting, and diarrhea this morning.     Review of Systems   Gastrointestinal:  Positive for abdominal pain, diarrhea, nausea and vomiting.     Objective:     Vital Signs (Most Recent):  Temp: (!) 100.4 °F (38 °C) (10/06/23 0713)  Pulse: 100 (10/06/23 0759)  Resp: 16 (10/06/23 0759)  BP: (!) 141/63 (10/06/23 0713)  SpO2: (!) 91 % (10/06/23 0759) Vital Signs (24h Range):  Temp:  [97.5 °F (36.4 °C)-101.2 °F (38.4 °C)] 100.4 °F (38 °C)  Pulse:  [] 100  Resp:  [16-49] 16  SpO2:  [86 %-95 %] 91 %  BP: (102-141)/(56-82) 141/63     Weight: 112.1 kg (247 lb 2.2 oz)  Body mass index is 41.13 kg/m².    Intake/Output Summary (Last 24 hours) at 10/6/2023 0948  Last data filed at 10/5/2023 2136  Gross per 24 hour   Intake 982.45 ml   Output 102 ml   Net 880.45 ml         Physical Exam  Constitutional:       General: She is not in acute distress.  HENT:      Head: Normocephalic.   Eyes:      Extraocular Movements: Extraocular movements intact.   Cardiovascular:      Heart sounds: Normal heart sounds.   Pulmonary:      Effort: Pulmonary effort is normal.      Breath sounds: Normal breath sounds.   Abdominal:      Palpations: Abdomen is soft.      Tenderness: There is abdominal tenderness.   Musculoskeletal:      Right lower leg: Edema present.      Left lower leg: Edema present.      Comments: 1+ on bilateral lower extremities   Skin:     General: Skin is warm and dry.   Neurological:      Mental Status: She is alert.             Significant Labs: All pertinent labs within the past 24 hours have been reviewed.  CBC:   Recent Labs   Lab 10/04/23  1440 10/05/23  0510 10/06/23  0403   WBC 22.64* 19.30* 15.57*   HGB 16.4* 12.2 13.4   HCT 47.1 34.8* 38.6    131* 121*     CMP:   Recent Labs   Lab 10/04/23  1440 10/04/23  1752 10/05/23  0510 10/05/23  0752 10/05/23  1326 10/06/23  0403   *   < > 134*  Is This A New Presentation, Or A Follow-Up?: Skin Lesion 133* 132* 128*   K 3.9   < > 3.4* 3.7 3.6 4.5   CL 86*   < > 102 99 98 100   CO2 19*   < > 21* 22* 20* 15*   *   < > 222* 187* 226* 266*   BUN 11   < > 12 15 16 18   CREATININE 1.4   < > 0.8 0.9 1.0 1.0   CALCIUM 10.6*   < > 8.0* 9.0 9.0 8.5*   PROT 9.3*  --   --   --   --  6.9   ALBUMIN 3.2*   < > 1.9* 2.0*  --  2.1*   BILITOT 1.1*  --   --   --   --  0.4   ALKPHOS 147*  --   --   --   --  161*   AST 17  --   --   --   --  31   ALT 15  --   --   --   --  16   ANIONGAP 22*   < > 11 12 14 13    < > = values in this interval not displayed.       Significant Imaging: I have reviewed all pertinent imaging results/findings within the past 24 hours.   What Type Of Note Output Would You Prefer (Optional)?: Standard Output How Severe Is Your Skin Lesion?: mild Has Your Skin Lesion Been Treated?: not been treated

## 2023-11-02 ENCOUNTER — TELEPHONE (OUTPATIENT)
Dept: FAMILY MEDICINE | Facility: HOSPITAL | Age: 48
End: 2023-11-02
Payer: MEDICARE

## 2023-11-02 DIAGNOSIS — I51.89 COMBINED SYSTOLIC AND DIASTOLIC CARDIAC DYSFUNCTION: Primary | ICD-10-CM

## 2023-11-02 NOTE — TELEPHONE ENCOUNTER
----- Message from Maddie Overton MA sent at 11/2/2023  2:35 PM CDT -----  Regarding: FW: referral    ----- Message -----  From: Floridalma Luna  Sent: 11/2/2023  12:31 PM CDT  To: [facility] Staff  Subject: referral                                         Pt called and said she need a referral for cardiologist . pt # [phone]

## 2023-11-13 DIAGNOSIS — R06.02 SHORTNESS OF BREATH: ICD-10-CM

## 2023-11-13 DIAGNOSIS — I51.89 COMBINED SYSTOLIC AND DIASTOLIC CARDIAC DYSFUNCTION: Primary | ICD-10-CM

## 2023-12-07 ENCOUNTER — TELEPHONE (OUTPATIENT)
Dept: FAMILY MEDICINE | Facility: HOSPITAL | Age: 48
End: 2023-12-07
Payer: MEDICARE

## 2023-12-07 NOTE — TELEPHONE ENCOUNTER
----- Message from Cleveland Tierney PA-C sent at 12/6/2023  8:29 AM CST -----  Regarding: FW: high bllod sugar  Can we get Ms. Quintana a follow up to discuss her diabetes??    MRN: [id number]  ----- Message -----  From: Dorie Lizama RN  Sent: 12/4/2023   4:17 PM CST  To: Ginny Alvarez, CRT; Cleveland Tierney PA-C  Subject: high bllod sugar                                 Good Evening!!                            Ms. Dami Quintana comes to our monitored phase 2 program and her blood sugar was very high today. It was 436 but reports no symptoms. I seen you prescribed her diabetic medications. She took 80 units of lantus last night but also did not take novolog today because she was told not to take it if she did not eat. Ms. Quintana also informed me that  she has been without her trucility since October ( because of pharmacy reasons) and the last time she took it was last night. We informed her that she cannot come to cardiac rehab due to policy if her blood sugar is more than 270. I asked her what she ate last night and she told me steak, gravy and rice plus blackberries with sugar. Ms. Soto reports stress due to death in family and being out of her medications may have attributed to her blood sugar being hi and also has reported to me that she has been better with her diet than previously. Can you please assist her with her blood sugar being so hi. Thank you and if you need to reach out to me please call Monday thru [employer] [phone]

## 2023-12-18 ENCOUNTER — OFFICE VISIT (OUTPATIENT)
Dept: URGENT CARE | Facility: CLINIC | Age: 48
End: 2023-12-18
Payer: MEDICARE

## 2023-12-18 VITALS
TEMPERATURE: 99 F | SYSTOLIC BLOOD PRESSURE: 154 MMHG | WEIGHT: 243.38 LBS | OXYGEN SATURATION: 97 % | HEART RATE: 101 BPM | HEIGHT: 65 IN | RESPIRATION RATE: 18 BRPM | BODY MASS INDEX: 40.55 KG/M2 | DIASTOLIC BLOOD PRESSURE: 96 MMHG

## 2023-12-18 DIAGNOSIS — R09.81 NASAL CONGESTION WITH RHINORRHEA: ICD-10-CM

## 2023-12-18 DIAGNOSIS — R05.8 PRODUCTIVE COUGH: ICD-10-CM

## 2023-12-18 DIAGNOSIS — U07.1 COVID-19: Primary | ICD-10-CM

## 2023-12-18 DIAGNOSIS — I10 ELEVATED BLOOD PRESSURE READING IN OFFICE WITH DIAGNOSIS OF HYPERTENSION: ICD-10-CM

## 2023-12-18 DIAGNOSIS — J34.89 NASAL CONGESTION WITH RHINORRHEA: ICD-10-CM

## 2023-12-18 LAB
CTP QC/QA: YES
SARS-COV-2 AG RESP QL IA.RAPID: POSITIVE

## 2023-12-18 PROCEDURE — 99213 OFFICE O/P EST LOW 20 MIN: CPT | Mod: S$GLB,,, | Performed by: PHYSICIAN ASSISTANT

## 2023-12-18 PROCEDURE — 87811 SARS CORONAVIRUS 2 ANTIGEN POCT, MANUAL READ: ICD-10-PCS | Mod: QW,S$GLB,, | Performed by: PHYSICIAN ASSISTANT

## 2023-12-18 PROCEDURE — 87811 SARS-COV-2 COVID19 W/OPTIC: CPT | Mod: QW,S$GLB,, | Performed by: PHYSICIAN ASSISTANT

## 2023-12-18 PROCEDURE — 99213 PR OFFICE/OUTPT VISIT, EST, LEVL III, 20-29 MIN: ICD-10-PCS | Mod: S$GLB,,, | Performed by: PHYSICIAN ASSISTANT

## 2023-12-18 RX ORDER — BENZONATATE 100 MG/1
100 CAPSULE ORAL 3 TIMES DAILY PRN
Qty: 30 CAPSULE | Refills: 0 | Status: SHIPPED | OUTPATIENT
Start: 2023-12-18 | End: 2023-12-28

## 2023-12-18 RX ORDER — FLUTICASONE PROPIONATE 50 MCG
2 SPRAY, SUSPENSION (ML) NASAL DAILY PRN
Qty: 16 G | Refills: 0 | Status: SHIPPED | OUTPATIENT
Start: 2023-12-18 | End: 2024-01-19

## 2023-12-18 NOTE — PROGRESS NOTES
"Subjective:      Patient ID: Jaimee Quintana is a 48 y.o. female.    Vitals:  height is 5' 5" (1.651 m) and weight is 110.4 kg (243 lb 6.2 oz). Her oral temperature is 98.5 °F (36.9 °C). Her blood pressure is 154/96 (abnormal) and her pulse is 101. Her respiration is 18 and oxygen saturation is 97%.     Chief Complaint: Cough    Jaimee Quintana is a 48 y.o. female who complains of vomiting, cough, runny nose, headaches, sneezing. Sx started 4 days ago. Treatment include nothing at home.     Cough  This is a new problem. The current episode started in the past 7 days. The problem has been gradually worsening. The problem occurs constantly. The cough is Productive of sputum. Associated symptoms include headaches, nasal congestion, postnasal drip and rhinorrhea. Pertinent negatives include no chest pain, chills, ear congestion, ear pain, eye redness, fever, heartburn, hemoptysis, myalgias, rash, sore throat, shortness of breath, sweats, weight loss or wheezing. Nothing aggravates the symptoms. She has tried nothing for the symptoms. The treatment provided no relief. There is no history of asthma, bronchiectasis, bronchitis, COPD, environmental allergies or pneumonia.       Constitution: Positive for fatigue. Negative for chills, fever and generalized weakness.   HENT:  Positive for congestion and postnasal drip. Negative for ear pain, sinus pain, sinus pressure, sore throat, trouble swallowing and voice change.    Neck: Negative for neck stiffness.   Cardiovascular:  Negative for chest pain, leg swelling, palpitations and sob on exertion.   Eyes:  Negative for eye discharge and eye redness.   Respiratory:  Positive for sleep apnea, cough and sputum production. Negative for bloody sputum, shortness of breath and wheezing.    Gastrointestinal:  Positive for vomiting. Negative for abdominal pain, nausea and heartburn.   Musculoskeletal:  Negative for muscle cramps and muscle ache.   Skin:  Negative for rash. "   Allergic/Immunologic: Positive for sneezing. Negative for environmental allergies and seasonal allergies.   Neurological:  Positive for headaches.      Objective:     Physical Exam   Constitutional: She is oriented to person, place, and time.      Comments:Patient is awake and alert, sitting up in exam chair, speaking and answering in complete sentences, in no signs of acute distress     normal  HENT:   Head: Normocephalic and atraumatic.   Ears:   Right Ear: Tympanic membrane, external ear and ear canal normal.   Left Ear: Tympanic membrane, external ear and ear canal normal.   Nose: Rhinorrhea and congestion present.   Mouth/Throat: Mucous membranes are moist. No oropharyngeal exudate or posterior oropharyngeal erythema. Oropharynx is clear.      Comments:  postnasal discharge noted on the posterior pharyngeal wall    Eyes: Conjunctivae are normal. Pupils are equal, round, and reactive to light. Extraocular movement intact   Neck: Neck supple.   Cardiovascular: Normal rate, regular rhythm, normal heart sounds and normal pulses.   Pulmonary/Chest: Effort normal and breath sounds normal. No respiratory distress. She has no wheezes. She has no rhonchi. She has no rales.   Abdominal: Normal appearance.   Musculoskeletal: Normal range of motion.         General: Normal range of motion.      Cervical back: She exhibits tenderness.   Lymphadenopathy:     She has cervical adenopathy.   Neurological: She is alert and oriented to person, place, and time.   Skin: Skin is warm.   Psychiatric: Her behavior is normal. Mood, judgment and thought content normal.   Nursing note and vitals reviewed.      Assessment:     1. COVID-19    2. Nasal congestion with rhinorrhea    3. Productive cough    4. Elevated blood pressure reading in office with diagnosis of hypertension      Patient presents with clinical exam findings and history consistent with above.      On exam, patient is nontoxic appearing and vitals are stable.       Diagnostic testing results were reviewed and discussed with patient/guardian.   Tests ordered in clinic:  Results for orders placed or performed in visit on 12/18/23   SARS Coronavirus 2 Antigen, POCT Manual Read   Result Value Ref Range    SARS Coronavirus 2 Antigen Positive (A) Negative     Acceptable Yes      *Note: Due to a large number of results and/or encounters for the requested time period, some results have not been displayed. A complete set of results can be found in Results Review.       Previous progress notes/admissions/labs and medications were reviewed.      Plan:       COVID-19  -     nirmatrelvir-ritonavir 300 mg (150 mg x 2)-100 mg copackaged tablets (EUA); Take 3 tablets by mouth 2 (two) times daily for 5 days. Each dose contains 2 nirmatrelvir (pink tablets) and 1 ritonavir (white tablet). Take all 3 tablets together  Dispense: 30 tablet; Refill: 0  -     Ambulatory referral/consult to Family Practice    Nasal congestion with rhinorrhea  -     SARS Coronavirus 2 Antigen, POCT Manual Read  -     fluticasone propionate (FLONASE) 50 mcg/actuation nasal spray; 2 sprays (100 mcg total) by Each Nostril route daily as needed for Allergies or Rhinitis.  Dispense: 15.8 mL; Refill: 0    Productive cough  -     benzonatate (TESSALON) 100 MG capsule; Take 1 capsule (100 mg total) by mouth 3 (three) times daily as needed for Cough.  Dispense: 30 capsule; Refill: 0    Elevated blood pressure reading in office with diagnosis of hypertension  -     Ambulatory referral/consult to Family Practice      Recommend oral antihistamine (claritin, zyrtec, allegra,xyzal)  for rhinorrhea, steroid nasal spray (flonase), prescription (tessalon pearls) /OTC cough medicine (Coricidin HBP® Maximum Strength Cold & Flu Day,     Discussed Paxlovid and its use during COVID19. Patient is on a statin(lipitor) for high cholesterol; please hold medication for 10 days while taking Paxlovid.            1) See orders  "for this visit as documented in the electronic medical record.  2) Symptomatic therapy suggested: use acetaminophen/ibuprofen every 6-8 hours prn pain or fever, push fluids.   3) Call or return to clinic prn if these symptoms worsen or fail to improve as anticipated.    Discussed results/diagnosis/plan with patient in clinic.  We had shared decision making for patient's treatment. Patient verbalized understanding and in agreement with current treatment plan.     Patient was instructed to return for re-evaluation with urgent care or PCP for continued outpatient workup and management if symptoms do not improve/worsening symptoms. Strict ED versus clinic precautions given in depth.    Discharge and follow-up instructions given verbally/printed with the patient who expressed understanding. The instructions and results are also available on [facility].              Lena "Karly Fuentes PA-C          Patient Instructions   Recommend oral antihistamine (claritin, zyrtec, allegra,xyzal)  for rhinorrhea, steroid nasal spray (flonase), prescription (tessalon pearls) /OTC cough medicine (Coricidin HBP® Maximum Strength Cold & Flu Day,  Tylenol (Acetaminophen) and/or Motrin (Ibuprofen) as directed for control of pain and/or fever.      Please drink plenty of fluids.  Please get plenty of rest.  Nasal irrigation with a saline spray or Netti Pot like device per their directions is also recommended.  If you  smoke, please stop smoking.    To help ease a sore throat, you can:  Use a sore throat spray.  Suck on hard candy or throat lozenges.  Gargle with warm saltwater a few times each day. Mix of 1/4 teaspoon (1.25 grams) salt in 8 ounces (240 mL) of warm water.  Use a cool mist humidifier to help you breathe easier.      Patient positive for COVID-19.Counseled on CDC recommendations to stay home for at least 5 days and isolate from others in your home. Patient may end isolation after day 5 if asymptomatic or fever-free for 24 hours " (without the use of fever-reducing medication).    Discussed Paxlovid and its use during COVID19. Reviewed medications with patient and possible drug interactions with Paxlovid. patient is on a statin(lipitor) for high cholesterol; please hold medication for 10 days while taking Paxlovid.Paxlovid is given twice daily for 5 days, starting as soon as possible and within 5 days of symptom onset.    The most common side effects of PAXLOVID include: altered sense of taste and diarrhea. Other possible side effects include: headache, vomiting, abdominal pain, nausea,high blood pressure, and feeling generally unwell. PAXLOVID may cause serious side effects, including:Allergic reactions, including severe allergic reactions (anaphylaxis),skin rash, hives, blisters or peeling  skin, painful sores or ulcers in the mouth, nose, throat or genital area, and/ swelling of the mouth, lips, tongue or face.      Discussed prescriptions and over-the-counter medicines to help with patient's symptoms:  A steroid nose spray (flonase) can help with a stuffy nose. It can also help with drainage down the back of your throat.  An antihistamine (loratadine,zyrtec,allegra, xyzal) can help with itching, sneezing, or runny nose.  An antihistamine eye drop can help with itchy eyes.  A decongestant (pseudoephedrine,  Phenylephrine) can help with a stuffy nose. Take <10 days for congestion and rhinorrhea. Once symptoms improve, proceed with loratadine/zyrtec once a day. These ingredients can keep you up all night, decrease appetite, feel jittery, and raise blood pressure with long term use.  OTC Coricidin can be used for patients with hypertension and palpitations because you cannot use ingredients such as pseudoephedrine and phenylephrine for oral decongestants.  Coricidin HBP Cough & Cold (Chlorpheniramine/Dextromethorphan)  Coricidin HBP Maximum Strength Multi-Symptom Flu  (Acetaminophen,Dextromethorphan, Chlorpheniramine)  Coricidin HBP® Maximum  Strength Cold & Flu Day/Night (Acetaminophen,Dextromethorphan, Doxylamine, Guaifenesin)  Coricidin HBP Chest Congestion & Cough (Dextromethorphan + Guaifenesin)    Medications that control cough are suppressants and expectorants. Suppressants are tessalon pearls and dextromethorphan. If you have a productive cough with sputum, you need an expectorant called guaifenesin. Dextromethorphan and Guaifenesin are active ingredients in many OTC cough/cold medications such as Dayquil/Nyquil, Mucinex, and Robitussin Mucus+Chest Congestion.            Common Cold Medicine Ingredients Cheat sheet  Acetaminophen (APAP) -pain reliever/fever reducer  Dextromethorphan - cough suppressant  Guaifenesin - expectorant/thins and loosens mucus  Phenylephrine - nasal decongestant  Diphenhydramine or Doxylamine succinate - antihistamine, helps you fall asleep  Promethazine or Brompheniramine - Prescription strength antihistamines    These OTC cold medications are safe to use if you do not have high blood pressure (hypertension) or palpitations.  DayQuil and NyQuil - Cough, Cold & Flu Relief LiquiCaps  DayQuil: Acetaminophen, Dextromethorphan, and Phenylephrine   NyQuil: Acetaminophen, Dextromethorphan, and Doxylamine  DayQuil and NyQuil SEVERE Maximum Strength Cough, Cold & Flu Relief LiquiCaps  DAYQUIL: Acetaminophen, Dextromethorphan, Guaifenesin, and Phenylephrine  NYQUIL: Acetaminophen, Dextromethorphan, Doxylamine, and Phenylephrine   Mucinex DM: Guaifenesin,Dextromethorphan  Mucinex Maximum Strength Sinus-Max® Pressure, Pain & Cough Liquid Gels: Acetaminophen/Dextromethorphan/ Guaifenesin/Phenylephrine         If not allergic, take Tylenol (Acetaminophen) 650 mg to  1 g every 6 hours as needed for fever and/or Motrin (Ibuprofen) 600 to 800 mg every 6 hours as needed for fever as directed for control of pain and/or fever      If your blood pressure was elevated during your visit and this was discussed with you, please obtain a home  blood pressure cuff and take your blood pressure once daily for two weeks, record values and follow up with your PCP. If you were started on blood pressure medication today, ensure you obtain a follow up appointment with your PCP in 5-7 days for a re-check, if you develop shortness of breath, severe headache, weakness, chest pain, vision problems after leaving urgent care, call 911 and go to the ER immediately.       Please remember that you have received care at an urgent care today. Urgent cares are not emergency rooms and are not equipped to handle life threatening emergencies and cannot rule in or out certain medical conditions and you may be released before all of your medical problems are known or treated.     Please arrange follow up with your primary care physician or speciality clinic within 2-5 days if your signs and symptoms have not resolved or worsen.     Patient can call our Referral Hotline at [phone] to make an appointment.      Please return here or go to the Emergency Department for any concerns or worsening of condition.  Signs of infection. These include a fever of 100.4°F (38°C) or higher, chills, cough, more sputum or change in color of sputum.  You are having so much trouble breathing that you can only say one or two words at a time.  You need to sit upright at all times to be able to breathe and or cannot lie down.  You have trouble breathing when talking or sitting still.  You have a fever of 100.4°F (38°C) or higher or chills.  You have chest pain when you cough, have trouble breathing but can still talk in full sentences, or cough up blood.

## 2023-12-18 NOTE — PATIENT INSTRUCTIONS
Recommend oral antihistamine (claritin, zyrtec, allegra,xyzal)  for rhinorrhea, steroid nasal spray (flonase), prescription (tessalon pearls) /OTC cough medicine (Coricidin HBP® Maximum Strength Cold & Flu Day,  Tylenol (Acetaminophen) and/or Motrin (Ibuprofen) as directed for control of pain and/or fever.      Please drink plenty of fluids.  Please get plenty of rest.  Nasal irrigation with a saline spray or Netti Pot like device per their directions is also recommended.  If you  smoke, please stop smoking.    To help ease a sore throat, you can:  Use a sore throat spray.  Suck on hard candy or throat lozenges.  Gargle with warm saltwater a few times each day. Mix of 1/4 teaspoon (1.25 grams) salt in 8 ounces (240 mL) of warm water.  Use a cool mist humidifier to help you breathe easier.      Patient positive for COVID-19.Counseled on CDC recommendations to stay home for at least 5 days and isolate from others in your home. Patient may end isolation after day 5 if asymptomatic or fever-free for 24 hours (without the use of fever-reducing medication).    Discussed Paxlovid and its use during COVID19. Reviewed medications with patient and possible drug interactions with Paxlovid. patient is on a statin(lipitor) for high cholesterol; please hold medication for 10 days while taking Paxlovid.Paxlovid is given twice daily for 5 days, starting as soon as possible and within 5 days of symptom onset.    The most common side effects of PAXLOVID include: altered sense of taste and diarrhea. Other possible side effects include: headache, vomiting, abdominal pain, nausea,high blood pressure, and feeling generally unwell. PAXLOVID may cause serious side effects, including:Allergic reactions, including severe allergic reactions (anaphylaxis),skin rash, hives, blisters or peeling  skin, painful sores or ulcers in the mouth, nose, throat or genital area, and/ swelling of the mouth, lips, tongue or face.      Discussed prescriptions  and over-the-counter medicines to help with patient's symptoms:  A steroid nose spray (flonase) can help with a stuffy nose. It can also help with drainage down the back of your throat.  An antihistamine (loratadine,zyrtec,allegra, xyzal) can help with itching, sneezing, or runny nose.  An antihistamine eye drop can help with itchy eyes.  A decongestant (pseudoephedrine,  Phenylephrine) can help with a stuffy nose. Take <10 days for congestion and rhinorrhea. Once symptoms improve, proceed with loratadine/zyrtec once a day. These ingredients can keep you up all night, decrease appetite, feel jittery, and raise blood pressure with long term use.  OTC Coricidin can be used for patients with hypertension and palpitations because you cannot use ingredients such as pseudoephedrine and phenylephrine for oral decongestants.  Coricidin HBP Cough & Cold (Chlorpheniramine/Dextromethorphan)  Coricidin HBP Maximum Strength Multi-Symptom Flu  (Acetaminophen,Dextromethorphan, Chlorpheniramine)  Coricidin HBP® Maximum Strength Cold & Flu Day/Night (Acetaminophen,Dextromethorphan, Doxylamine, Guaifenesin)  Coricidin HBP Chest Congestion & Cough (Dextromethorphan + Guaifenesin)    Medications that control cough are suppressants and expectorants. Suppressants are tessalon pearls and dextromethorphan. If you have a productive cough with sputum, you need an expectorant called guaifenesin. Dextromethorphan and Guaifenesin are active ingredients in many OTC cough/cold medications such as Dayquil/Nyquil, Mucinex, and Robitussin Mucus+Chest Congestion.            Common Cold Medicine Ingredients Cheat sheet  Acetaminophen (APAP) -pain reliever/fever reducer  Dextromethorphan - cough suppressant  Guaifenesin - expectorant/thins and loosens mucus  Phenylephrine - nasal decongestant  Diphenhydramine or Doxylamine succinate - antihistamine, helps you fall asleep  Promethazine or Brompheniramine - Prescription strength antihistamines    These  OTC cold medications are safe to use if you do not have high blood pressure (hypertension) or palpitations.  DayQuil and NyQuil - Cough, Cold & Flu Relief LiquiCaps  DayQuil: Acetaminophen, Dextromethorphan, and Phenylephrine   NyQuil: Acetaminophen, Dextromethorphan, and Doxylamine  DayQuil and NyQuil SEVERE Maximum Strength Cough, Cold & Flu Relief LiquiCaps  DAYQUIL: Acetaminophen, Dextromethorphan, Guaifenesin, and Phenylephrine  NYQUIL: Acetaminophen, Dextromethorphan, Doxylamine, and Phenylephrine   Mucinex DM: Guaifenesin,Dextromethorphan  Mucinex Maximum Strength Sinus-Max® Pressure, Pain & Cough Liquid Gels: Acetaminophen/Dextromethorphan/ Guaifenesin/Phenylephrine         If not allergic, take Tylenol (Acetaminophen) 650 mg to  1 g every 6 hours as needed for fever and/or Motrin (Ibuprofen) 600 to 800 mg every 6 hours as needed for fever as directed for control of pain and/or fever      If your blood pressure was elevated during your visit and this was discussed with you, please obtain a home blood pressure cuff and take your blood pressure once daily for two weeks, record values and follow up with your PCP. If you were started on blood pressure medication today, ensure you obtain a follow up appointment with your PCP in 5-7 days for a re-check, if you develop shortness of breath, severe headache, weakness, chest pain, vision problems after leaving urgent care, call 911 and go to the ER immediately.       Please remember that you have received care at an urgent care today. Urgent cares are not emergency rooms and are not equipped to handle life threatening emergencies and cannot rule in or out certain medical conditions and you may be released before all of your medical problems are known or treated.     Please arrange follow up with your primary care physician or speciality clinic within 2-5 days if your signs and symptoms have not resolved or worsen.     Patient can call our Referral Hotline at  [phone] to make an appointment.      Please return here or go to the Emergency Department for any concerns or worsening of condition.  Signs of infection. These include a fever of 100.4°F (38°C) or higher, chills, cough, more sputum or change in color of sputum.  You are having so much trouble breathing that you can only say one or two words at a time.  You need to sit upright at all times to be able to breathe and or cannot lie down.  You have trouble breathing when talking or sitting still.  You have a fever of 100.4°F (38°C) or higher or chills.  You have chest pain when you cough, have trouble breathing but can still talk in full sentences, or cough up blood.

## 2024-01-02 ENCOUNTER — TELEPHONE (OUTPATIENT)
Dept: FAMILY MEDICINE | Facility: CLINIC | Age: 49
End: 2024-01-02
Payer: MEDICARE

## 2024-01-02 NOTE — TELEPHONE ENCOUNTER
----- Message from Dennis Jacques sent at 1/2/2024  3:43 PM CST -----  Contact: Pt  .Type:  Needs Medical Advice    Who Called: pt    Would the patient rather a call back or a response via [facility]?  Call back  Best Call Back Number:  [phone]  Additional Information: Pt. Would like to establish care with the provider

## 2024-01-02 NOTE — TELEPHONE ENCOUNTER
Called and spoke with pt in regards of her message. Pt called to est care with Dr. Mendez. Pt made her apt for 2/29/2024 for 9:40 am to est care.

## 2024-01-05 ENCOUNTER — HOSPITAL ENCOUNTER (EMERGENCY)
Facility: HOSPITAL | Age: 49
Discharge: ANOTHER HOSPITAL | End: 2024-01-06
Attending: EMERGENCY MEDICINE
Payer: MEDICARE

## 2024-01-05 DIAGNOSIS — L02.91 ABSCESS: Primary | ICD-10-CM

## 2024-01-05 DIAGNOSIS — E11.622 TYPE 2 DIABETES MELLITUS WITH OTHER SKIN ULCER (CODE): ICD-10-CM

## 2024-01-05 LAB
ALBUMIN SERPL BCP-MCNC: 2.8 G/DL (ref 3.5–5.2)
ALP SERPL-CCNC: 88 U/L (ref 55–135)
ALT SERPL W/O P-5'-P-CCNC: 9 U/L (ref 10–44)
ANION GAP SERPL CALC-SCNC: 15 MMOL/L (ref 8–16)
AST SERPL-CCNC: 10 U/L (ref 10–40)
B-HCG UR QL: NEGATIVE
B-OH-BUTYR BLD STRIP-SCNC: 0 MMOL/L (ref 0–0.5)
BACTERIA #/AREA URNS HPF: ABNORMAL /HPF
BASOPHILS # BLD AUTO: 0.02 K/UL (ref 0–0.2)
BASOPHILS NFR BLD: 0.1 % (ref 0–1.9)
BILIRUB SERPL-MCNC: 0.5 MG/DL (ref 0.1–1)
BILIRUB UR QL STRIP: NEGATIVE
BUN SERPL-MCNC: 6 MG/DL (ref 6–20)
CALCIUM SERPL-MCNC: 9.6 MG/DL (ref 8.7–10.5)
CHLORIDE SERPL-SCNC: 98 MMOL/L (ref 95–110)
CLARITY UR: CLEAR
CO2 SERPL-SCNC: 18 MMOL/L (ref 23–29)
COLOR UR: COLORLESS
CREAT SERPL-MCNC: 0.9 MG/DL (ref 0.5–1.4)
CTP QC/QA: YES
DIFFERENTIAL METHOD BLD: ABNORMAL
EOSINOPHIL # BLD AUTO: 0 K/UL (ref 0–0.5)
EOSINOPHIL NFR BLD: 0.1 % (ref 0–8)
ERYTHROCYTE [DISTWIDTH] IN BLOOD BY AUTOMATED COUNT: 12.7 % (ref 11.5–14.5)
EST. GFR  (NO RACE VARIABLE): >60 ML/MIN/1.73 M^2
FIO2: 21 %
GLUCOSE SERPL-MCNC: 418 MG/DL (ref 70–110)
GLUCOSE UR QL STRIP: ABNORMAL
HCT VFR BLD AUTO: 36.5 % (ref 37–48.5)
HCT VFR BLD CALC: 41 % (ref 36–54)
HGB BLD-MCNC: 12.4 G/DL (ref 12–16)
HGB BLD-MCNC: 13.4 G/DL (ref 9–18)
HGB UR QL STRIP: ABNORMAL
HYALINE CASTS #/AREA URNS LPF: 0 /LPF
IMM GRANULOCYTES # BLD AUTO: 0.12 K/UL (ref 0–0.04)
IMM GRANULOCYTES NFR BLD AUTO: 0.7 % (ref 0–0.5)
KETONES UR QL STRIP: NEGATIVE
LACTATE SERPL-SCNC: 1.3 MMOL/L (ref 0.5–2.2)
LACTATE SERPL-SCNC: 4.1 MMOL/L (ref 0.5–2.2)
LEUKOCYTE ESTERASE UR QL STRIP: ABNORMAL
LYMPHOCYTES # BLD AUTO: 1.4 K/UL (ref 1–4.8)
LYMPHOCYTES NFR BLD: 7.5 % (ref 18–48)
MCH RBC QN AUTO: 29 PG (ref 27–31)
MCHC RBC AUTO-ENTMCNC: 34 G/DL (ref 32–36)
MCV RBC AUTO: 85 FL (ref 82–98)
MICROSCOPIC COMMENT: ABNORMAL
MONOCYTES # BLD AUTO: 0.9 K/UL (ref 0.3–1)
MONOCYTES NFR BLD: 5 % (ref 4–15)
NEUTROPHILS # BLD AUTO: 15.9 K/UL (ref 1.8–7.7)
NEUTROPHILS NFR BLD: 86.6 % (ref 38–73)
NITRITE UR QL STRIP: NEGATIVE
NRBC BLD-RTO: 0 /100 WBC
PCO2 BLDA: 37.1 MMHG (ref 35–45)
PH SMN: 7.43 [PH] (ref 7.35–7.45)
PH UR STRIP: 7 [PH] (ref 5–8)
PLATELET # BLD AUTO: 255 K/UL (ref 150–450)
PMV BLD AUTO: 10.3 FL (ref 9.2–12.9)
PO2 BLDA: 29.2 MMHG (ref 40–60)
POC BASE DEFICIT: 0.3 MMOL/L (ref -2–2)
POC HCO3: 24.4 MMOL/L (ref 24–28)
POC IONIZED CALCIUM: 1.16 MMOL/L (ref 1.06–1.42)
POC PERFORMED BY: ABNORMAL
POC SATURATED O2: 56.9 % (ref 95–100)
POCT GLUCOSE: 366 MG/DL (ref 70–110)
POCT GLUCOSE: 460 MG/DL (ref 70–110)
POTASSIUM BLD-SCNC: 3.3 MMOL/L (ref 3.5–5.1)
POTASSIUM SERPL-SCNC: 3.7 MMOL/L (ref 3.5–5.1)
PROCALCITONIN SERPL IA-MCNC: 0.37 NG/ML
PROT SERPL-MCNC: 8.1 G/DL (ref 6–8.4)
PROT UR QL STRIP: ABNORMAL
RBC # BLD AUTO: 4.28 M/UL (ref 4–5.4)
RBC #/AREA URNS HPF: 3 /HPF (ref 0–4)
SODIUM BLD-SCNC: 137 MMOL/L (ref 136–145)
SODIUM SERPL-SCNC: 131 MMOL/L (ref 136–145)
SP GR UR STRIP: 1.02 (ref 1–1.03)
SPECIMEN SOURCE: ABNORMAL
URN SPEC COLLECT METH UR: ABNORMAL
UROBILINOGEN UR STRIP-ACNC: NEGATIVE EU/DL
WBC # BLD AUTO: 18.3 K/UL (ref 3.9–12.7)
WBC #/AREA URNS HPF: 15 /HPF (ref 0–5)
YEAST URNS QL MICRO: ABNORMAL

## 2024-01-05 PROCEDURE — 83605 ASSAY OF LACTIC ACID: CPT | Mod: 91,HCNC | Performed by: EMERGENCY MEDICINE

## 2024-01-05 PROCEDURE — 87088 URINE BACTERIA CULTURE: CPT | Mod: HCNC | Performed by: NURSE PRACTITIONER

## 2024-01-05 PROCEDURE — 25000003 PHARM REV CODE 250: Mod: HCNC | Performed by: NURSE PRACTITIONER

## 2024-01-05 PROCEDURE — 87040 BLOOD CULTURE FOR BACTERIA: CPT | Mod: HCNC

## 2024-01-05 PROCEDURE — 56405 I&D VULVA/PERINEAL ABSCESS: CPT | Mod: HCNC

## 2024-01-05 PROCEDURE — 83605 ASSAY OF LACTIC ACID: CPT | Mod: HCNC

## 2024-01-05 PROCEDURE — 82803 BLOOD GASES ANY COMBINATION: CPT | Mod: HCNC

## 2024-01-05 PROCEDURE — 84145 PROCALCITONIN (PCT): CPT | Mod: HCNC

## 2024-01-05 PROCEDURE — 96367 TX/PROPH/DG ADDL SEQ IV INF: CPT | Mod: HCNC

## 2024-01-05 PROCEDURE — 96361 HYDRATE IV INFUSION ADD-ON: CPT | Mod: HCNC,59

## 2024-01-05 PROCEDURE — 82010 KETONE BODYS QUAN: CPT | Mod: HCNC | Performed by: NURSE PRACTITIONER

## 2024-01-05 PROCEDURE — 87086 URINE CULTURE/COLONY COUNT: CPT | Mod: HCNC | Performed by: NURSE PRACTITIONER

## 2024-01-05 PROCEDURE — 87077 CULTURE AEROBIC IDENTIFY: CPT | Mod: HCNC | Performed by: NURSE PRACTITIONER

## 2024-01-05 PROCEDURE — 81000 URINALYSIS NONAUTO W/SCOPE: CPT | Mod: HCNC | Performed by: NURSE PRACTITIONER

## 2024-01-05 PROCEDURE — 99900035 HC TECH TIME PER 15 MIN (STAT): Mod: HCNC

## 2024-01-05 PROCEDURE — 87070 CULTURE OTHR SPECIMN AEROBIC: CPT | Mod: HCNC,59 | Performed by: NURSE PRACTITIONER

## 2024-01-05 PROCEDURE — 25500020 PHARM REV CODE 255: Mod: HCNC | Performed by: EMERGENCY MEDICINE

## 2024-01-05 PROCEDURE — 82962 GLUCOSE BLOOD TEST: CPT | Mod: HCNC,91

## 2024-01-05 PROCEDURE — 25000003 PHARM REV CODE 250: Mod: HCNC | Performed by: EMERGENCY MEDICINE

## 2024-01-05 PROCEDURE — 85025 COMPLETE CBC W/AUTO DIFF WBC: CPT | Mod: HCNC | Performed by: NURSE PRACTITIONER

## 2024-01-05 PROCEDURE — 25000003 PHARM REV CODE 250: Mod: HCNC

## 2024-01-05 PROCEDURE — 87186 SC STD MICRODIL/AGAR DIL: CPT | Mod: 59,HCNC | Performed by: NURSE PRACTITIONER

## 2024-01-05 PROCEDURE — 96365 THER/PROPH/DIAG IV INF INIT: CPT | Mod: 59,HCNC

## 2024-01-05 PROCEDURE — 96375 TX/PRO/DX INJ NEW DRUG ADDON: CPT | Mod: HCNC,59

## 2024-01-05 PROCEDURE — 63600175 PHARM REV CODE 636 W HCPCS: Mod: JZ,JG,HCNC

## 2024-01-05 PROCEDURE — 81025 URINE PREGNANCY TEST: CPT | Mod: HCNC | Performed by: NURSE PRACTITIONER

## 2024-01-05 PROCEDURE — 80053 COMPREHEN METABOLIC PANEL: CPT | Mod: HCNC | Performed by: NURSE PRACTITIONER

## 2024-01-05 PROCEDURE — 63600175 PHARM REV CODE 636 W HCPCS: Mod: HCNC | Performed by: EMERGENCY MEDICINE

## 2024-01-05 RX ORDER — HYDROCODONE BITARTRATE AND ACETAMINOPHEN 5; 325 MG/1; MG/1
1 TABLET ORAL
Status: DISCONTINUED | OUTPATIENT
Start: 2024-01-05 | End: 2024-01-05

## 2024-01-05 RX ORDER — LIDOCAINE HYDROCHLORIDE 10 MG/ML
5 INJECTION, SOLUTION EPIDURAL; INFILTRATION; INTRACAUDAL; PERINEURAL
Status: COMPLETED | OUTPATIENT
Start: 2024-01-05 | End: 2024-01-05

## 2024-01-05 RX ORDER — CLINDAMYCIN PHOSPHATE 600 MG/50ML
600 INJECTION, SOLUTION INTRAVENOUS
Status: DISCONTINUED | OUTPATIENT
Start: 2024-01-05 | End: 2024-01-05 | Stop reason: ALTCHOICE

## 2024-01-05 RX ORDER — KETOROLAC TROMETHAMINE 30 MG/ML
15 INJECTION, SOLUTION INTRAMUSCULAR; INTRAVENOUS
Status: COMPLETED | OUTPATIENT
Start: 2024-01-05 | End: 2024-01-05

## 2024-01-05 RX ORDER — VANCOMYCIN HCL IN 5 % DEXTROSE 1G/250ML
1000 PLASTIC BAG, INJECTION (ML) INTRAVENOUS
Status: DISCONTINUED | OUTPATIENT
Start: 2024-01-05 | End: 2024-01-05

## 2024-01-05 RX ADMIN — CLINDAMYCIN IN 5 PERCENT DEXTROSE 600 MG: 12 INJECTION, SOLUTION INTRAVENOUS at 07:01

## 2024-01-05 RX ADMIN — VANCOMYCIN HYDROCHLORIDE 2500 MG: 500 INJECTION, POWDER, LYOPHILIZED, FOR SOLUTION INTRAVENOUS at 10:01

## 2024-01-05 RX ADMIN — SODIUM CHLORIDE 1000 ML: 9 INJECTION, SOLUTION INTRAVENOUS at 05:01

## 2024-01-05 RX ADMIN — PIPERACILLIN AND TAZOBACTAM 4.5 G: 4; .5 INJECTION, POWDER, LYOPHILIZED, FOR SOLUTION INTRAVENOUS; PARENTERAL at 09:01

## 2024-01-05 RX ADMIN — IOHEXOL 100 ML: 350 INJECTION, SOLUTION INTRAVENOUS at 07:01

## 2024-01-05 RX ADMIN — KETOROLAC TROMETHAMINE 15 MG: 30 INJECTION, SOLUTION INTRAMUSCULAR; INTRAVENOUS at 07:01

## 2024-01-05 RX ADMIN — LIDOCAINE HYDROCHLORIDE 50 MG: 10 INJECTION, SOLUTION EPIDURAL; INFILTRATION; INTRACAUDAL at 06:01

## 2024-01-06 ENCOUNTER — HOSPITAL ENCOUNTER (INPATIENT)
Facility: HOSPITAL | Age: 49
LOS: 7 days | Discharge: HOME HEALTH | DRG: 264 | End: 2024-01-13
Attending: STUDENT IN AN ORGANIZED HEALTH CARE EDUCATION/TRAINING PROGRAM | Admitting: HOSPITALIST
Payer: MEDICARE

## 2024-01-06 ENCOUNTER — ANESTHESIA EVENT (OUTPATIENT)
Dept: SURGERY | Facility: HOSPITAL | Age: 49
DRG: 264 | End: 2024-01-06
Payer: MEDICARE

## 2024-01-06 VITALS
DIASTOLIC BLOOD PRESSURE: 69 MMHG | OXYGEN SATURATION: 97 % | RESPIRATION RATE: 18 BRPM | WEIGHT: 246 LBS | SYSTOLIC BLOOD PRESSURE: 142 MMHG | HEART RATE: 110 BPM | HEIGHT: 65 IN | BODY MASS INDEX: 40.98 KG/M2 | TEMPERATURE: 99 F

## 2024-01-06 DIAGNOSIS — E11.69 TYPE 2 DIABETES MELLITUS WITH OTHER SPECIFIED COMPLICATION, WITH LONG-TERM CURRENT USE OF INSULIN: ICD-10-CM

## 2024-01-06 DIAGNOSIS — L02.214 GROIN ABSCESS: ICD-10-CM

## 2024-01-06 DIAGNOSIS — Z91.89 AT RISK FOR LONG QT SYNDROME: ICD-10-CM

## 2024-01-06 DIAGNOSIS — G47.33 OSA (OBSTRUCTIVE SLEEP APNEA): ICD-10-CM

## 2024-01-06 DIAGNOSIS — Z79.4 TYPE 2 DIABETES MELLITUS WITH OTHER SPECIFIED COMPLICATION, WITH LONG-TERM CURRENT USE OF INSULIN: ICD-10-CM

## 2024-01-06 DIAGNOSIS — N76.82 FOURNIER'S GANGRENE IN FEMALE: Primary | ICD-10-CM

## 2024-01-06 DIAGNOSIS — N15.1 PERINEPHRIC ABSCESS: ICD-10-CM

## 2024-01-06 DIAGNOSIS — Z01.810 PREOP CARDIOVASCULAR EXAM: ICD-10-CM

## 2024-01-06 DIAGNOSIS — R07.9 CHEST PAIN: ICD-10-CM

## 2024-01-06 PROBLEM — E66.01 CLASS 2 SEVERE OBESITY DUE TO EXCESS CALORIES WITH SERIOUS COMORBIDITY AND BODY MASS INDEX (BMI) OF 38.0 TO 38.9 IN ADULT: Status: ACTIVE | Noted: 2024-01-06

## 2024-01-06 PROBLEM — E66.812 CLASS 2 SEVERE OBESITY DUE TO EXCESS CALORIES WITH SERIOUS COMORBIDITY AND BODY MASS INDEX (BMI) OF 38.0 TO 38.9 IN ADULT: Status: ACTIVE | Noted: 2024-01-06

## 2024-01-06 PROBLEM — U07.1 COVID-19: Status: ACTIVE | Noted: 2024-01-06

## 2024-01-06 PROBLEM — N12 PYELONEPHRITIS: Status: ACTIVE | Noted: 2024-01-06

## 2024-01-06 PROBLEM — I50.42 CHRONIC COMBINED SYSTOLIC AND DIASTOLIC CONGESTIVE HEART FAILURE: Status: ACTIVE | Noted: 2024-01-06

## 2024-01-06 LAB
ESTIMATED AVG GLUCOSE: 226 MG/DL (ref 68–131)
HBA1C MFR BLD: 9.5 % (ref 4–5.6)
POCT GLUCOSE: 229 MG/DL (ref 70–110)
POCT GLUCOSE: 237 MG/DL (ref 70–110)
POCT GLUCOSE: 256 MG/DL (ref 70–110)
POCT GLUCOSE: 257 MG/DL (ref 70–110)
POCT GLUCOSE: 266 MG/DL (ref 70–110)
POCT GLUCOSE: 282 MG/DL (ref 70–110)
POCT GLUCOSE: 292 MG/DL (ref 70–110)
POCT GLUCOSE: 334 MG/DL (ref 70–110)
POCT GLUCOSE: 334 MG/DL (ref 70–110)
SARS-COV-2 RDRP RESP QL NAA+PROBE: NEGATIVE
VANCOMYCIN SERPL-MCNC: 7.3 UG/ML

## 2024-01-06 PROCEDURE — 96365 THER/PROPH/DIAG IV INF INIT: CPT | Mod: HCNC

## 2024-01-06 PROCEDURE — 63600175 PHARM REV CODE 636 W HCPCS: Mod: HCNC | Performed by: EMERGENCY MEDICINE

## 2024-01-06 PROCEDURE — 96372 THER/PROPH/DIAG INJ SC/IM: CPT | Mod: 59 | Performed by: STUDENT IN AN ORGANIZED HEALTH CARE EDUCATION/TRAINING PROGRAM

## 2024-01-06 PROCEDURE — 96376 TX/PRO/DX INJ SAME DRUG ADON: CPT | Mod: HCNC

## 2024-01-06 PROCEDURE — 63600175 PHARM REV CODE 636 W HCPCS: Mod: HCNC | Performed by: STUDENT IN AN ORGANIZED HEALTH CARE EDUCATION/TRAINING PROGRAM

## 2024-01-06 PROCEDURE — 83036 HEMOGLOBIN GLYCOSYLATED A1C: CPT | Mod: HCNC | Performed by: STUDENT IN AN ORGANIZED HEALTH CARE EDUCATION/TRAINING PROGRAM

## 2024-01-06 PROCEDURE — 25000003 PHARM REV CODE 250: Mod: HCNC | Performed by: STUDENT IN AN ORGANIZED HEALTH CARE EDUCATION/TRAINING PROGRAM

## 2024-01-06 PROCEDURE — 25000003 PHARM REV CODE 250: Mod: HCNC | Performed by: EMERGENCY MEDICINE

## 2024-01-06 PROCEDURE — 99223 1ST HOSP IP/OBS HIGH 75: CPT | Mod: HCNC,,, | Performed by: SURGERY

## 2024-01-06 PROCEDURE — 25000003 PHARM REV CODE 250: Mod: HCNC | Performed by: HOSPITALIST

## 2024-01-06 PROCEDURE — 82962 GLUCOSE BLOOD TEST: CPT | Mod: HCNC,91

## 2024-01-06 PROCEDURE — 11000001 HC ACUTE MED/SURG PRIVATE ROOM: Mod: HCNC

## 2024-01-06 PROCEDURE — 96366 THER/PROPH/DIAG IV INF ADDON: CPT | Mod: HCNC

## 2024-01-06 PROCEDURE — 36415 COLL VENOUS BLD VENIPUNCTURE: CPT | Mod: HCNC | Performed by: HOSPITALIST

## 2024-01-06 PROCEDURE — 96375 TX/PRO/DX INJ NEW DRUG ADDON: CPT | Mod: HCNC

## 2024-01-06 PROCEDURE — 80202 ASSAY OF VANCOMYCIN: CPT | Mod: HCNC | Performed by: HOSPITALIST

## 2024-01-06 PROCEDURE — 99285 EMERGENCY DEPT VISIT HI MDM: CPT | Mod: 25,HCNC

## 2024-01-06 PROCEDURE — 63600175 PHARM REV CODE 636 W HCPCS: Mod: JZ,JG,HCNC | Performed by: HOSPITALIST

## 2024-01-06 PROCEDURE — U0002 COVID-19 LAB TEST NON-CDC: HCPCS | Mod: HCNC | Performed by: STUDENT IN AN ORGANIZED HEALTH CARE EDUCATION/TRAINING PROGRAM

## 2024-01-06 RX ORDER — FLUTICASONE PROPIONATE 50 MCG
2 SPRAY, SUSPENSION (ML) NASAL DAILY
Status: DISCONTINUED | OUTPATIENT
Start: 2024-01-07 | End: 2024-01-13 | Stop reason: HOSPADM

## 2024-01-06 RX ORDER — IBUPROFEN 200 MG
24 TABLET ORAL
Status: DISCONTINUED | OUTPATIENT
Start: 2024-01-06 | End: 2024-01-13 | Stop reason: HOSPADM

## 2024-01-06 RX ORDER — MORPHINE SULFATE 4 MG/ML
4 INJECTION, SOLUTION INTRAMUSCULAR; INTRAVENOUS EVERY 4 HOURS PRN
Status: DISCONTINUED | OUTPATIENT
Start: 2024-01-06 | End: 2024-01-13 | Stop reason: HOSPADM

## 2024-01-06 RX ORDER — NIFEDIPINE 30 MG/1
60 TABLET, EXTENDED RELEASE ORAL DAILY
Status: DISCONTINUED | OUTPATIENT
Start: 2024-01-07 | End: 2024-01-13

## 2024-01-06 RX ORDER — IBUPROFEN 200 MG
24 TABLET ORAL
Status: DISCONTINUED | OUTPATIENT
Start: 2024-01-06 | End: 2024-01-06 | Stop reason: HOSPADM

## 2024-01-06 RX ORDER — INSULIN ASPART 100 [IU]/ML
0-5 INJECTION, SOLUTION INTRAVENOUS; SUBCUTANEOUS
Status: DISCONTINUED | OUTPATIENT
Start: 2024-01-06 | End: 2024-01-13 | Stop reason: HOSPADM

## 2024-01-06 RX ORDER — CLINDAMYCIN PHOSPHATE 600 MG/50ML
600 INJECTION, SOLUTION INTRAVENOUS
Status: DISCONTINUED | OUTPATIENT
Start: 2024-01-06 | End: 2024-01-08

## 2024-01-06 RX ORDER — FLUOXETINE HYDROCHLORIDE 20 MG/1
40 CAPSULE ORAL DAILY
Status: DISCONTINUED | OUTPATIENT
Start: 2024-01-07 | End: 2024-01-13 | Stop reason: HOSPADM

## 2024-01-06 RX ORDER — PROCHLORPERAZINE EDISYLATE 5 MG/ML
5 INJECTION INTRAMUSCULAR; INTRAVENOUS EVERY 6 HOURS PRN
Status: DISCONTINUED | OUTPATIENT
Start: 2024-01-06 | End: 2024-01-13 | Stop reason: HOSPADM

## 2024-01-06 RX ORDER — NIFEDIPINE 30 MG/1
60 TABLET, EXTENDED RELEASE ORAL DAILY
Status: DISCONTINUED | OUTPATIENT
Start: 2024-01-06 | End: 2024-01-06 | Stop reason: HOSPADM

## 2024-01-06 RX ORDER — EZETIMIBE 10 MG/1
10 TABLET ORAL NIGHTLY
Status: DISCONTINUED | OUTPATIENT
Start: 2024-01-06 | End: 2024-01-13 | Stop reason: HOSPADM

## 2024-01-06 RX ORDER — IBUPROFEN 200 MG
16 TABLET ORAL
Status: DISCONTINUED | OUTPATIENT
Start: 2024-01-06 | End: 2024-01-06 | Stop reason: HOSPADM

## 2024-01-06 RX ORDER — MUPIROCIN 20 MG/G
OINTMENT TOPICAL 2 TIMES DAILY
Status: COMPLETED | OUTPATIENT
Start: 2024-01-06 | End: 2024-01-11

## 2024-01-06 RX ORDER — NALOXONE HCL 0.4 MG/ML
0.02 VIAL (ML) INJECTION
Status: DISCONTINUED | OUTPATIENT
Start: 2024-01-06 | End: 2024-01-13 | Stop reason: HOSPADM

## 2024-01-06 RX ORDER — ALUMINUM HYDROXIDE, MAGNESIUM HYDROXIDE, AND SIMETHICONE 1200; 120; 1200 MG/30ML; MG/30ML; MG/30ML
30 SUSPENSION ORAL 4 TIMES DAILY PRN
Status: DISCONTINUED | OUTPATIENT
Start: 2024-01-06 | End: 2024-01-13 | Stop reason: HOSPADM

## 2024-01-06 RX ORDER — INSULIN ASPART 100 [IU]/ML
0-10 INJECTION, SOLUTION INTRAVENOUS; SUBCUTANEOUS
Status: DISCONTINUED | OUTPATIENT
Start: 2024-01-06 | End: 2024-01-06 | Stop reason: HOSPADM

## 2024-01-06 RX ORDER — SODIUM CHLORIDE 0.9 % (FLUSH) 0.9 %
10 SYRINGE (ML) INJECTION EVERY 12 HOURS PRN
Status: DISCONTINUED | OUTPATIENT
Start: 2024-01-06 | End: 2024-01-13 | Stop reason: HOSPADM

## 2024-01-06 RX ORDER — GLUCAGON 1 MG
1 KIT INJECTION
Status: DISCONTINUED | OUTPATIENT
Start: 2024-01-06 | End: 2024-01-13 | Stop reason: HOSPADM

## 2024-01-06 RX ORDER — LISINOPRIL 20 MG/1
40 TABLET ORAL DAILY
Status: DISCONTINUED | OUTPATIENT
Start: 2024-01-07 | End: 2024-01-13 | Stop reason: HOSPADM

## 2024-01-06 RX ORDER — HYDROCODONE BITARTRATE AND ACETAMINOPHEN 5; 325 MG/1; MG/1
1 TABLET ORAL EVERY 6 HOURS PRN
Status: DISCONTINUED | OUTPATIENT
Start: 2024-01-06 | End: 2024-01-13 | Stop reason: HOSPADM

## 2024-01-06 RX ORDER — LISINOPRIL 10 MG/1
40 TABLET ORAL DAILY
Status: DISCONTINUED | OUTPATIENT
Start: 2024-01-06 | End: 2024-01-06 | Stop reason: HOSPADM

## 2024-01-06 RX ORDER — TALC
6 POWDER (GRAM) TOPICAL NIGHTLY PRN
Status: DISCONTINUED | OUTPATIENT
Start: 2024-01-06 | End: 2024-01-13 | Stop reason: HOSPADM

## 2024-01-06 RX ORDER — ATORVASTATIN CALCIUM 40 MG/1
80 TABLET, FILM COATED ORAL NIGHTLY
Status: DISCONTINUED | OUTPATIENT
Start: 2024-01-07 | End: 2024-01-13 | Stop reason: HOSPADM

## 2024-01-06 RX ORDER — INSULIN GLARGINE 100 [IU]/ML
80 INJECTION, SOLUTION SUBCUTANEOUS 2 TIMES DAILY
COMMUNITY
Start: 2023-12-16

## 2024-01-06 RX ORDER — EZETIMIBE 10 MG/1
10 TABLET ORAL NIGHTLY
Status: DISCONTINUED | OUTPATIENT
Start: 2024-01-06 | End: 2024-01-06 | Stop reason: HOSPADM

## 2024-01-06 RX ORDER — ASPIRIN 81 MG/1
81 TABLET ORAL DAILY
Status: DISCONTINUED | OUTPATIENT
Start: 2024-01-06 | End: 2024-01-06 | Stop reason: HOSPADM

## 2024-01-06 RX ORDER — PANTOPRAZOLE SODIUM 40 MG/1
40 TABLET, DELAYED RELEASE ORAL DAILY
Status: DISCONTINUED | OUTPATIENT
Start: 2024-01-07 | End: 2024-01-13 | Stop reason: HOSPADM

## 2024-01-06 RX ORDER — ONDANSETRON HYDROCHLORIDE 2 MG/ML
4 INJECTION, SOLUTION INTRAVENOUS
Status: COMPLETED | OUTPATIENT
Start: 2024-01-06 | End: 2024-01-06

## 2024-01-06 RX ORDER — HEPARIN SODIUM 5000 [USP'U]/ML
5000 INJECTION, SOLUTION INTRAVENOUS; SUBCUTANEOUS EVERY 8 HOURS
Status: DISCONTINUED | OUTPATIENT
Start: 2024-01-06 | End: 2024-01-13 | Stop reason: HOSPADM

## 2024-01-06 RX ORDER — METOPROLOL SUCCINATE 50 MG/1
100 TABLET, EXTENDED RELEASE ORAL DAILY
Status: DISCONTINUED | OUTPATIENT
Start: 2024-01-07 | End: 2024-01-13 | Stop reason: HOSPADM

## 2024-01-06 RX ORDER — ASPIRIN 81 MG/1
81 TABLET ORAL DAILY
COMMUNITY

## 2024-01-06 RX ORDER — IBUPROFEN 200 MG
16 TABLET ORAL
Status: DISCONTINUED | OUTPATIENT
Start: 2024-01-06 | End: 2024-01-13 | Stop reason: HOSPADM

## 2024-01-06 RX ORDER — CLINDAMYCIN PHOSPHATE 900 MG/50ML
900 INJECTION, SOLUTION INTRAVENOUS
Status: DISCONTINUED | OUTPATIENT
Start: 2024-01-06 | End: 2024-01-06 | Stop reason: RX

## 2024-01-06 RX ORDER — ERGOCALCIFEROL 1.25 MG/1
50000 CAPSULE ORAL
COMMUNITY
Start: 2023-12-07

## 2024-01-06 RX ORDER — ACETAMINOPHEN 325 MG/1
650 TABLET ORAL EVERY 4 HOURS PRN
Status: DISCONTINUED | OUTPATIENT
Start: 2024-01-06 | End: 2024-01-13 | Stop reason: HOSPADM

## 2024-01-06 RX ORDER — ATORVASTATIN CALCIUM 40 MG/1
80 TABLET, FILM COATED ORAL NIGHTLY
Status: DISCONTINUED | OUTPATIENT
Start: 2024-01-06 | End: 2024-01-06 | Stop reason: HOSPADM

## 2024-01-06 RX ORDER — ASPIRIN 81 MG/1
81 TABLET ORAL DAILY
Status: DISCONTINUED | OUTPATIENT
Start: 2024-01-07 | End: 2024-01-13 | Stop reason: HOSPADM

## 2024-01-06 RX ORDER — ACETAMINOPHEN 325 MG/1
650 TABLET ORAL EVERY 8 HOURS PRN
Status: DISCONTINUED | OUTPATIENT
Start: 2024-01-06 | End: 2024-01-13 | Stop reason: HOSPADM

## 2024-01-06 RX ORDER — GLUCAGON 1 MG
1 KIT INJECTION
Status: DISCONTINUED | OUTPATIENT
Start: 2024-01-06 | End: 2024-01-06 | Stop reason: HOSPADM

## 2024-01-06 RX ADMIN — PIPERACILLIN AND TAZOBACTAM 4.5 G: 4; .5 INJECTION, POWDER, LYOPHILIZED, FOR SOLUTION INTRAVENOUS; PARENTERAL at 11:01

## 2024-01-06 RX ADMIN — HEPARIN SODIUM 5000 UNITS: 5000 INJECTION INTRAVENOUS; SUBCUTANEOUS at 10:01

## 2024-01-06 RX ADMIN — CLINDAMYCIN IN 5 PERCENT DEXTROSE 600 MG: 12 INJECTION, SOLUTION INTRAVENOUS at 10:01

## 2024-01-06 RX ADMIN — EZETIMIBE 10 MG: 10 TABLET ORAL at 08:01

## 2024-01-06 RX ADMIN — MEROPENEM 1 G: 1 INJECTION INTRAVENOUS at 08:01

## 2024-01-06 RX ADMIN — HYDROCODONE BITARTRATE AND ACETAMINOPHEN 1 TABLET: 5; 325 TABLET ORAL at 08:01

## 2024-01-06 RX ADMIN — INSULIN HUMAN 11 UNITS: 100 INJECTION, SOLUTION PARENTERAL at 05:01

## 2024-01-06 RX ADMIN — INSULIN ASPART 6 UNITS: 100 INJECTION, SOLUTION INTRAVENOUS; SUBCUTANEOUS at 01:01

## 2024-01-06 RX ADMIN — INSULIN ASPART 1 UNITS: 100 INJECTION, SOLUTION INTRAVENOUS; SUBCUTANEOUS at 09:01

## 2024-01-06 RX ADMIN — INSULIN DETEMIR 25 UNITS: 100 INJECTION, SOLUTION SUBCUTANEOUS at 11:01

## 2024-01-06 RX ADMIN — VANCOMYCIN HYDROCHLORIDE 1500 MG: 1.5 INJECTION, POWDER, LYOPHILIZED, FOR SOLUTION INTRAVENOUS at 11:01

## 2024-01-06 RX ADMIN — INSULIN HUMAN 11 UNITS: 100 INJECTION, SOLUTION PARENTERAL at 01:01

## 2024-01-06 RX ADMIN — ONDANSETRON 4 MG: 2 INJECTION INTRAMUSCULAR; INTRAVENOUS at 05:01

## 2024-01-06 RX ADMIN — LISINOPRIL 40 MG: 10 TABLET ORAL at 11:01

## 2024-01-06 RX ADMIN — ASPIRIN 81 MG: 81 TABLET, COATED ORAL at 11:01

## 2024-01-06 RX ADMIN — NIFEDIPINE 60 MG: 30 TABLET, FILM COATED, EXTENDED RELEASE ORAL at 11:01

## 2024-01-06 RX ADMIN — MUPIROCIN: 20 OINTMENT TOPICAL at 08:01

## 2024-01-06 RX ADMIN — DEXTROSE MONOHYDRATE 100 MG: 5 INJECTION INTRAVENOUS at 07:01

## 2024-01-06 NOTE — ED NOTES
Problem: PHYSICAL THERAPY ADULT  Goal: Performs mobility at highest level of function for planned discharge setting  See evaluation for individualized goals  Description: Treatment/Interventions: Functional transfer training, LE strengthening/ROM, Elevations, Therapeutic exercise, Endurance training, Patient/family training, Equipment eval/education, Bed mobility, Gait training, Spoke to nursing, Spoke to case management, OT  Equipment Recommended: Lonnie Ngo       See flowsheet documentation for full assessment, interventions and recommendations  Outcome: Progressing  Note: Prognosis: Good  Problem List: Decreased strength, Decreased range of motion, Decreased endurance, Impaired balance, Decreased mobility, Pain, Orthopedic restrictions  Assessment: Pt seated in recliner upon arrival, reports that he has been getting up to go to the bathroom frequently, pt admits to placing LLE on floor once with increased pain  Pt educated on the important to maintain NWB at all times  Pt agreeable to ambualtion  Pt requires S only for sit to stand trasnfers, once standing pt ambulates hoppinf 10' fwd and 10' bkward declining further due to fatigue and increased pain  Pt will continue to benenfit from skilled PT to increased strength, endurance and balance to maximize safe fucntional mobility  Barriers to Discharge: Inaccessible home environment, Decreased caregiver support     PT Discharge Recommendation: [id number]     PT - OK to Discharge: Yes(to rehab when medically ready)    See flowsheet documentation for full assessment  Provider at bedside

## 2024-01-06 NOTE — NURSING
Arrived to room 413. AAOx4, VSS on RA, ambulatory, oriented to room, call light in reach, Contact isolation in place, remains NPO. Dr. Nesbitt & Dr. Snyder notified.

## 2024-01-06 NOTE — ED NOTES
"Pt ambulated to and from bathroom by self with steady gait, NADN. Upon returning to room, pt reports having an emesis episode immediately after urinating stating "I be doing that, I have reflux issues". Pt reports still having nausea. Notified MD.  "

## 2024-01-06 NOTE — ED NOTES
Received report from FRANCIE Skaggs RN and assumed care of patient. Patient resting in stretcher comfortably at this time. Respirations even and unlabored. Equal rise and fall to chest noted. Will continue to monitor.

## 2024-01-06 NOTE — ED PROVIDER NOTES
Pt evaluated in person by general surgery. Pt stable per general surgery.     Plan as of Saturday 1/6/24 is to potentially transfer pt to , possibly take to the OR once transferred.     Pt to continue receiving IV abx as scheduled; [facility] consulted for management of hyperglycemia.    Pt comfortable and in no acute distress at this time.    Pt updated regarding plan of care.       Yifan Ansari MD, FACEP   Emergency Medicine       Yifan Ansari MD  01/06/24 [id number]

## 2024-01-06 NOTE — CARE UPDATE
General Surgery Care Update    Patient's case reviewed. I believe that patient would benefit from formal debridement of the right groin in the OR due to tissue non-viability. Spoke with patient and primary provider regarding plans. Plan for OR tomorrow, 1/07/24 at 8:00am. Plan to continue broad spectrum antibiotics, pain control, and wound care until that time. NPO at midnight.     Arturo Snyder MD  [facility]  PGY - 4

## 2024-01-06 NOTE — PROVIDER TRANSFER
Outside Transfer Acceptance Note / [facility]    Referring facility: [facility]   Referring provider: Kelly Loredo  Accepting facility: [facility]   Accepting provider: Cristian Kaplan   Admitting provider: Prabhjot Serrato  Reason for transfer:  General Surgery and Urology consult  Transfer diagnosis: Pyelonephritis, Groin Abscess  Transfer specialty requested: Urology/ General surgery   Transfer specialty notified: Yes  Transfer level: NUMBER 1-5: 2  Bed type requested: Telemetry   Isolation status: Airborne and Contact and Droplet   Admission class or status: IP- Inpatient      Narrative     The patient is a 49 y/o female with PMH of DM, HTN, HLP, CVA, and GERD who presents with concern for gas forming abscess in groin area. WBC 18K, LA 4.1 and down to 1.3. CTAP showed: Findings most compatible with left renal pyelonephritis with perinephric abscesses along the lower pole as described.  There are no calculi and there is no hydronephrosis.  There is retroperitoneal adenopathy in the periaortic region likely reactive. Subcutaneous gas and inflammatory/edematous changes in the mons pubis region on the right with extending to the right inguinal region.  No evidence of focal abscess in this region.  Associated adenopathy.  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 Patient was given vancomycin and zosyn. Patient tachycardic in the 110s but rest of vitals stable. No significant pain. Case discussed with both urology and general surgery; general surgery will consult.     Objective     Vitals: Temp: 98.6 °F (37 °C) (01/05/24 2330)  Pulse: (!) 117 (01/06/24 0101)  Resp: 18 (01/05/24 2330)  BP: (!) 124/98 (01/06/24 0101)  SpO2: 97 % (01/06/24 0101)  Recent Labs: All pertinent labs within the past 24 hours have been reviewed.  Recent imaging: see CT    Airway:     Vent settings:         IV access:        Peripheral IV - Single Lumen 01/05/24 2340 20 G  Posterior;Right Forearm (Active)   Site Assessment Dry;Intact;Clean 01/05/24 2340   Dressing Status Clean;Dry;Intact 01/05/24 2340     Infusions:   Allergies: Review of patient's allergies indicates:  No Known Allergies   NPO: No    Anticoagulation:   Anticoagulants       None             Instructions      [facility]  Admit to Hospital Medicine  Upon patient arrival to floor, please contact Hospital Medicine on call.

## 2024-01-06 NOTE — PHARMACY MED REC
"Admission Medication History     The home medication history was taken by Juanita Worthington CPhT.    Medication history obtained from, Patient Verified    You may go to "Admission" then "Reconcile Home Medications" tabs to review and/or act upon these items.     The home medication list has been updated by the Pharmacy department.   Please read ALL comments highlighted in yellow.   Please address this information as you see fit.    Feel free to contact us if you have any questions or require assistance.      The medications listed below were removed from the home medication list.  Please reorder if appropriate:  Patient reports no longer taking the following medication(s):  Levemir 100 unit/ml pen        Juanita Worthington CPhT.  Ext [phone]               .          "

## 2024-01-06 NOTE — CONSULTS
Consult note  [facility]    Consulted by:Dr. DeJ esus  Reason for Consult: Medical Co-management     History of Present Illness:  Patient is a 48 y.o. female presents with concern for gas-forming abscess of the groin area, leukocytosis.  Patient has past medical history of diabetes, hypertension, hyperlipidemia, prior CVA, GERD.  Hospital is currently on diversion, awaiting transfer to [facility] for surgical evaluation.  However patient has been waiting for transfer for extended period of time, at request of transfer , [facility] family Medicine was consulted for medical comanagement of patient's diabetes and hypertension while patient awaits transfer in ED.    Review of patient's allergies indicates:  No Known Allergies    Past Medical History:   Diagnosis Date    Cataract     Cervical high risk HPV (human papillomavirus) test positive 2020    NOT 16 OR 18    CVA (cerebral infarction)          Depression     Diabetes mellitus, type 2     GERD (gastroesophageal reflux disease)     Hyperlipidemia     Hypertension     Lactic acidosis 10/6/2023    Moderate nonproliferative diabetic retinopathy     Nausea and vomiting 10/6/2023    Obesity     Stroke     Tachycardia 10/4/2023     Past Surgical History:   Procedure Laterality Date     SECTION      CHOLECYSTECTOMY      DILATION AND CURETTAGE OF UTERUS      GALLBLADDER SURGERY  2017    stone removed     Family History   Problem Relation Age of Onset    Stroke Mother     Thyroid disease Mother     Diabetes Mother     Cataracts Father     Hypertension Father     Arthritis Father     Diabetes Father     Hypertension Sister     Stroke Sister     Thyroid disease Sister     Heart disease Sister     Thyroid disease Daughter     Asthma Daughter     No Known Problems Brother     No Known Problems Maternal Aunt     No Known Problems Maternal Uncle     No Known Problems Paternal Aunt     No Known Problems Paternal Uncle     No Known Problems Maternal  Grandmother     No Known Problems Maternal Grandfather     No Known Problems Paternal Grandmother     No Known Problems Paternal Grandfather     Amblyopia Neg Hx     Blindness Neg Hx     Cancer Neg Hx     Glaucoma Neg Hx     Macular degeneration Neg Hx     Retinal detachment Neg Hx     Strabismus Neg Hx      Social History     Tobacco Use    Smoking status: Every Day     Current packs/day: 0.00     Types: Cigarettes     Last attempt to quit: 2021     Years since quittin.9    Smokeless tobacco: Never   Substance Use Topics    Alcohol use: Yes     Alcohol/week: 3.0 standard drinks of alcohol     Types: 3 Cans of beer per week     Comment: Rare    Drug use: No        Review of Systems:   Review of Systems   Constitutional:  Negative for chills, fever and malaise/fatigue.   HENT:  Negative for congestion, ear pain, hearing loss and sore throat.    Eyes:  Negative for blurred vision, pain and redness.   Respiratory:  Negative for cough and shortness of breath.    Cardiovascular:  Negative for chest pain, palpitations and leg swelling.   Gastrointestinal:  Positive for abdominal pain.   Musculoskeletal:  Positive for back pain. Negative for joint pain and myalgias.   Skin:  Negative for rash.   Neurological:  Negative for focal weakness, weakness and headaches.   Psychiatric/Behavioral:  Negative for depression and substance abuse.       OBJECTIVE:     Vital Signs (Most Recent)  Temp: 98.6 °F (37 °C) (24 2330)  Pulse: 108 (24 07)  Resp: 18 (24 07)  BP: (!) 133/59 (24 07)  SpO2: 95 % (24)    Physical Exam  Vitals and nursing note reviewed.   Constitutional:       Appearance: Normal appearance.   HENT:      Head: Normocephalic and atraumatic.      Right Ear: Tympanic membrane normal.      Left Ear: Tympanic membrane normal.      Mouth/Throat:      Mouth: Mucous membranes are moist.      Pharynx: Oropharynx is clear.   Eyes:      Extraocular Movements: Extraocular movements  intact.      Pupils: Pupils are equal, round, and reactive to light.   Cardiovascular:      Rate and Rhythm: Normal rate and regular rhythm.      Pulses: Normal pulses.      Heart sounds: Normal heart sounds.   Pulmonary:      Effort: Pulmonary effort is normal.      Breath sounds: Normal breath sounds.   Abdominal:      General: Abdomen is flat.      Palpations: Abdomen is soft.   Musculoskeletal:         General: Normal range of motion.      Cervical back: Normal range of motion.   Skin:     General: Skin is warm.      Comments: Abscess, see media    Neurological:      General: No focal deficit present.      Mental Status: She is alert and oriented to person, place, and time.   Psychiatric:         Mood and Affect: Mood normal.           Laboratory  Lab Results   Component Value Date    WBC 18.30 (H) 01/05/2024    HGB 12.4 01/05/2024    HCT 41.0 01/05/2024    MCV 85 01/05/2024     01/05/2024      CMP  Sodium   Date Value Ref Range Status   01/05/2024 131 (L) 136 - 145 mmol/L Final     Potassium   Date Value Ref Range Status   01/05/2024 3.7 3.5 - 5.1 mmol/L Final     Chloride   Date Value Ref Range Status   01/05/2024 98 95 - 110 mmol/L Final     CO2   Date Value Ref Range Status   01/05/2024 18 (L) 23 - 29 mmol/L Final     Glucose   Date Value Ref Range Status   01/05/2024 418 (H) 70 - 110 mg/dL Final     BUN   Date Value Ref Range Status   01/05/2024 6 6 - 20 mg/dL Final     Creatinine   Date Value Ref Range Status   01/05/2024 0.9 0.5 - 1.4 mg/dL Final     Calcium   Date Value Ref Range Status   01/05/2024 9.6 8.7 - 10.5 mg/dL Final     Total Protein   Date Value Ref Range Status   01/05/2024 8.1 6.0 - 8.4 g/dL Final     Albumin   Date Value Ref Range Status   01/05/2024 2.8 (L) 3.5 - 5.2 g/dL Final     Total Bilirubin   Date Value Ref Range Status   01/05/2024 0.5 0.1 - 1.0 mg/dL Final     Comment:     For infants and newborns, interpretation of results should be based  on gestational age, weight and in  "agreement with clinical  observations.    Premature Infant recommended reference ranges:  Up to 24 hours.............<8.0 mg/dL  Up to 48 hours............<12.0 mg/dL  3-5 days..................<15.0 mg/dL  6-29 days.................<15.0 mg/dL       Alkaline Phosphatase   Date Value Ref Range Status   01/05/2024 88 55 - 135 U/L Final     AST   Date Value Ref Range Status   01/05/2024 10 10 - 40 U/L Final     Comment:     Specimen slightly hemolyzed     ALT   Date Value Ref Range Status   01/05/2024 9 (L) 10 - 44 U/L Final     Anion Gap   Date Value Ref Range Status   01/05/2024 15 8 - 16 mmol/L Final     eGFR if    Date Value Ref Range Status   05/24/2021 >60.0 >60 mL/min/1.73 m^2 Final     eGFR if non    Date Value Ref Range Status   05/24/2021 >60.0 >60 mL/min/1.73 m^2 Final     Comment:     Calculation used to obtain the estimated glomerular filtration  rate (eGFR) is the CKD-EPI equation.           Lab Results   Component Value Date    INR 0.9 12/17/2010    INR 0.9 12/15/2010      No results for input(s): "CPK", "CPKMB", "TROPONINI", "MB" in the last 168 hours.   No results for input(s): "TROPONINI", "CKTOTAL", "CKMB" in the last 168 hours.  ABGs  Recent Labs   Lab 01/05/24 2039   PH 7.427   PCO2 37.1   PO2 29.2*   HCO3 24.4   POCSATURATED 56.9*     BNP  No results for input(s): "BNP" in the last 168 hours.    Urinalysis  Recent Labs   Lab 01/05/24  1818   COLORU Colorless*   SPECGRAV 1.020   PHUR 7.0   PROTEINUA 1+*   BACTERIA Rare   NITRITE Negative   LEUKOCYTESUR 1+*   UROBILINOGEN Negative   HYALINECASTS 0      LAST HbA1c  Lab Results   Component Value Date    HGBA1C 10.6 (H) 10/04/2023         Diagnostic Results:  Labs: Reviewed  CT: Reviewed  Imaging Results               CT Abdomen Pelvis With IV Contrast NO Oral Contrast (Final result)  Result time 01/05/24 20:28:24      Final result by Audrey Ashley MD (01/05/24 20:28:24)                   Impression:    "   Findings most compatible with left renal pyelonephritis with perinephric abscesses along the lower pole as described.  There are no calculi and there is no hydronephrosis.  There is retroperitoneal adenopathy in the periaortic region likely reactive.    Subcutaneous gas and inflammatory/edematous changes in the mons pubis region on the right with extending to the right inguinal region.  No evidence of focal abscess in this region.  Associated adenopathy.  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    Hepatic steatosis and enlarged right lobe of the liver.    Right renal 2.4 cm cyst.    Cholecystectomy.    Diverticulosis without diverticulitis.    This report was flagged in Epic as abnormal.      Electronically signed by: Audrey Ashley  Date:    01/05/2024  Time:    20:28               Narrative:    EXAMINATION:  CT ABDOMEN PELVIS WITH IV CONTRAST    CLINICAL HISTORY:  Abdominal abscess/infection suspected;    TECHNIQUE:  Low dose axial images, sagittal and coronal reformations were obtained from the lung bases to the pubic symphysis before and following the IV administration of 100 mL of Omnipaque 350 .  Oral contrast was not administered..    COMPARISON:  Renal ultrasound 10/10/2023, CT abdomen pelvis 10/06/2023 .    FINDINGS:  Abdomen:    - Lung bases: Basilar atelectasis noted.  There is no consolidation or pleural effusion.  The imaged lower heart is not significantly enlarged there is no pericardial effusion.    - Liver: There is decreased density of the liver suggesting hepatic steatosis.  No appreciable focal lesions.  There is a prominent right lobe of the liver.    - Gallbladder: Cholecystectomy    - Bile Ducts: No evidence of intra or extra hepatic biliary ductal dilation.    - Spleen: Negative.    - Kidneys: There is symmetric cortical enhancement bilaterally.  There is no evidence of hydronephrosis or nephrolithiasis.  There is no ureterectasis.    Left  kidney contains 2 peripheral cortical and subcapsular lesions which were not identified on the ultrasound 10/10/2023 and were not confidently visualized on the noncontrast CT 10/06/2023 noting the presence of perinephric stranding on the prior CT on the left.  One rim enhancing lesion measures 3.3 cm as seen on axial image 77 series 2.  It is located along the lateral lower pole.  The 2nd similar but smaller lesion is along the lower pole anteriorly and is seen on the axial image 85 series 2 and the coronal image 81 series 601 measuring 2.5 cm.  Low density centers suggest abscesses.  There is also heterogeneous enhancement of the upper pole cortex as best seen on the coronal series is image 97 without convincing abscess.  Motion artifact somewhat limits detection.  Findings along the cortex are consistent with pyelonephritis.    - Adrenals: Unremarkable.    - Pancreas: No mass or peripancreatic fat stranding.    - Retroperitoneum:  There is adenopathy in the periaortic region to the left of midline and.    - Vascular: No abdominal aortic aneurysm.    - Abdominal wall:  Unremarkable.    Pelvis:    In the right mons pubis there is infiltrating/dissecting air/gas throughout the superficial soft tissues extending to the right groin with associated strandy changes but with no focal fluid collection to suggest focal abscess.  There is also a skin defect along the mons to the right of midline with findings most compatible with Jeremiah's gangrene.  Prominent lymph nodes are noted along the right inguinal region and to a lesser degree in the left inguinal region.  Within the pelvis there is no evidence of adenopathy or free fluid.  There is a 2.4 cm right adnexal/ovarian cyst.  Uterus is unremarkable as imaged.  Bladder is unremarkable.    Bowel/Mesentery:    Appendix is normal.  There is no evidence of bowel distension, mesenteric inflammation or mesenteric adenopathy.  Scattered diverticuli are seen in the distal  descending and sigmoid colon.  No evidence of diverticulitis.    Bones:  No acute osseous abnormality and no suspicious lytic or blastic lesion.                                      ASSESSMENT/PLAN:   48 y.o.female has a past medical history of Cataract, Cervical high risk HPV (human papillomavirus) test positive (09/17/2020), CVA (cerebral infarction) (2003), Depression, Diabetes mellitus, type 2, GERD (gastroesophageal reflux disease), Hyperlipidemia, Hypertension, Lactic acidosis (10/6/2023), Moderate nonproliferative diabetic retinopathy, Nausea and vomiting (10/6/2023), Obesity, Stroke, and Tachycardia (10/4/2023). here for concern for gas-forming abscess in the groin area.  [facility] consulted for medical comanagement of diabetes, hypertension while patient is awaiting transfer for surgical evaluation of her primary concern.    Plan:     Diabetes Mellitus, type 2  On 40 units long-acting insulin b.i.d., 20 units short-acting insulin postprandially at home    -currently NPO awaiting surgical evaluation  -restart long-acting insulin at 25 units b.i.d. while NPO, hold postprandial  -MD SSI   -monitor blood glucose t.i.d. while npo., t.i.d. with meals if diet started.    HTN  Resume home nifedipine and lisinopril    Family medicine will continue to follow. Please contact us if you have any further questions. Thank you for the consult.     Vincent Edmonds MD, MPH  [facility], PGY-3

## 2024-01-06 NOTE — CLINICAL REVIEW
Sepsis Screen (most recent)       Sepsis Screen (IP) - 01/06/24 9186       Is the patient's history or complaint suggestive of a possible infection? Yes  -    Are there at least two of the following signs and symptoms present? Yes  -    Sepsis signs/symptoms - Tachycardia Tachycardia     >90  -    Sepsis signs/symptoms - WBC WBC < 4,000 or WBC > 12,000  -    Are any of the following organ dysfunction criteria present and not considered to be due to a chronic condition? Yes  -    Initiate Sepsis Protocol No  -    Reason sepsis not considered Pt. receiving appropriate management  -              User Key  (r) = Recorded By, (t) = Taken By, (c) = Cosigned By      Initials Name     Elena Toledo RN

## 2024-01-06 NOTE — Clinical Note
Left: Back.   Scrubbed with Chlorhexidine/Alcohol.    Hair: N/A.  Skin prep dry before draping.  Prepped by: Marcio Garzon MD 1/8/2024 3:53 PM.

## 2024-01-06 NOTE — CONSULTS
.History and Physical Exam    Patient ID: Jaimee Quintana is a 48 y.o. female.    Chief Complaint: Abscess (Noted to right groin x 3 days, hx of DM CBG in triage- 460, b/p in triage= 205/88)      HPI:  49 y/o woman with several days to weeks history of right groin pain and elevated BG.  Evaluated in ED and found to have abscess and concern for necrotizing infection.  Had I&D at bedside and gas was released.  Currently she is resting comfortably with foul smelling wound of right groin.        Review of Systems   Constitutional:  Negative for chills and unexpected weight change.   HENT:  Negative for congestion, ear pain and sore throat.    Eyes:  Negative for pain and redness.   Respiratory:  Negative for cough and shortness of breath.    Cardiovascular:  Negative for chest pain and palpitations.   Gastrointestinal:  Negative for abdominal distention, abdominal pain and constipation.   Endocrine: Negative for cold intolerance and heat intolerance.   Genitourinary:  Negative for dysuria and frequency.   Musculoskeletal:  Negative for back pain and neck pain.   Skin:  Positive for wound (open wound of right groin with foul smelling drainage on bandage). Negative for pallor and rash.   Neurological:  Negative for syncope, light-headedness and headaches.   Hematological:  Negative for adenopathy. Does not bruise/bleed easily.   Psychiatric/Behavioral:  Negative for confusion and hallucinations.        Current Facility-Administered Medications   Medication Dose Route Frequency Provider Last Rate Last Admin    aspirin EC tablet 81 mg  81 mg Oral Daily Vincent Edmonds MD        atorvastatin tablet 80 mg  80 mg Oral QHS Vincent Edmonds MD        dextrose 10% bolus 125 mL 125 mL  12.5 g Intravenous PRN Vincent Edmonds MD        dextrose 10% bolus 250 mL 250 mL  25 g Intravenous PRN Vincent Edmonds MD        ezetimibe tablet 10 mg  10 mg Oral QHS Vincent Edmonds MD        fluorescein 500 mg/5 mL (10 %) injection 500 mg  5 mL Intravenous  Once GHAZAL Tillman MD        glucagon (human recombinant) injection 1 mg  1 mg Intramuscular PRN Vincent Edmonds MD        glucose chewable tablet 16 g  16 g Oral PRN Vincent Edmonds MD        glucose chewable tablet 24 g  24 g Oral PRN Vincent Edmonds MD        insulin aspart U-100 pen 0-10 Units  0-10 Units Subcutaneous QID (AC + HS) PRN Vincent Edmonds MD        insulin detemir U-100 (Levemir) pen 25 Units  25 Units Subcutaneous BID Vincent Edmonds MD        lisinopriL tablet 40 mg  40 mg Oral Daily Vincent Edmonds MD        NIFEdipine 24 hr tablet 60 mg  60 mg Oral Daily Vincent Edmonds MD        piperacillin-tazobactam (ZOSYN) 4.5 g in dextrose 5 % in water (D5W) 100 mL IVPB (MB+)  4.5 g Intravenous ED 1 Time Yogesh De Jesus MD         Current Outpatient Medications   Medication Sig Dispense Refill    aspirin (ECOTRIN) 81 MG EC tablet Take 81 mg by mouth once daily.      atorvastatin (LIPITOR) 80 MG tablet Take 1 tablet (80 mg total) by mouth every evening. 60 tablet 1    dulaglutide (TRULICITY) 0.75 mg/0.5 mL pen injector Inject 0.75 mg into the skin every 7 days. OK to discontinue this med during SNF stay - resume upon discharge (Patient taking differently: Inject 0.75 mg into the skin every Sunday.) 4 pen 11    ergocalciferol (ERGOCALCIFEROL) 50,000 unit Cap Take 50,000 Units by mouth every Saturday.      ezetimibe (ZETIA) 10 mg tablet Take 1 tablet (10 mg total) by mouth once daily. (For cholesterol) 90 tablet 1    FLUoxetine 40 MG capsule Take 40 mg by mouth once daily.      fluticasone propionate (FLONASE) 50 mcg/actuation nasal spray 2 sprays (100 mcg total) by Each Nostril route daily as needed for Allergies or Rhinitis. 16 g 0    furosemide (LASIX) 40 MG tablet Take 1 tablet (40 mg total) by mouth once daily. 60 tablet 1    ibuprofen (ADVIL,MOTRIN) 200 MG tablet Take 200 mg by mouth every 6 (six) hours as needed for Pain.      insulin aspart U-100 (NOVOLOG FLEXPEN U-100 INSULIN) 100 unit/mL (3 mL) InPn pen  "Inject 30 Units into the skin 2 (two) times a day. 30 mL 12    LANTUS SOLOSTAR U-100 INSULIN glargine 100 units/mL SubQ pen Inject 80 Units into the skin 2 (two) times a day.      lisinopriL (PRINIVIL,ZESTRIL) 40 MG tablet Take 1 tablet (40 mg total) by mouth once daily. 90 tablet 3    metFORMIN (GLUCOPHAGE-XR) 500 MG ER 24hr tablet Take 1,000 mg by mouth 2 (two) times daily with meals.      metoprolol succinate (TOPROL-XL) 100 MG 24 hr tablet Take 100 mg by mouth once daily.      NIFEdipine (ADALAT CC) 60 MG TbSR Take 60 mg by mouth every morning.      pantoprazole (PROTONIX) 40 MG tablet Take 40 mg by mouth once daily.      potassium chloride (KLOR-CON) 8 MEQ TbSR TAKE TWO TABLETS BY MOUTH TWICE DAILY @ 9AM & 5PM 180 tablet 0    blood sugar diagnostic Strp Use to test blood glucose two (2) times daily as directed with insurance preferred meter and supplies 200 each 3    lancets (TRUEPLUS LANCETS) 28 gauge Misc Use to test blood glucose two (2) times daily as directed with insurance preferred meter and supplies 200 each 3    pen needle, diabetic (BD ULTRA-FINE ROSA PEN NEEDLE) 32 gauge x 5/32" Ndle Use with insulin once daily 100 each 0    pen needle, diabetic 32 gauge x 5/32" Ndle Use with injectable DM supplies twice daily as directed 200 each 0    TRUE METRIX GO GLUCOSE METER Misc          Review of patient's allergies indicates:  No Known Allergies    Past Medical History:   Diagnosis Date    Cataract     Cervical high risk HPV (human papillomavirus) test positive 2020    NOT 16 OR 18    CVA (cerebral infarction)          Depression     Diabetes mellitus, type 2     GERD (gastroesophageal reflux disease)     Hyperlipidemia     Hypertension     Lactic acidosis 10/6/2023    Moderate nonproliferative diabetic retinopathy     Nausea and vomiting 10/6/2023    Obesity     Stroke     Tachycardia 10/4/2023       Past Surgical History:   Procedure Laterality Date     SECTION      CHOLECYSTECTOMY      " DILATION AND CURETTAGE OF UTERUS      GALLBLADDER SURGERY  2017    stone removed       Family History   Problem Relation Age of Onset    Stroke Mother     Thyroid disease Mother     Diabetes Mother     Cataracts Father     Hypertension Father     Arthritis Father     Diabetes Father     Hypertension Sister     Stroke Sister     Thyroid disease Sister     Heart disease Sister     Thyroid disease Daughter     Asthma Daughter     No Known Problems Brother     No Known Problems Maternal Aunt     No Known Problems Maternal Uncle     No Known Problems Paternal Aunt     No Known Problems Paternal Uncle     No Known Problems Maternal Grandmother     No Known Problems Maternal Grandfather     No Known Problems Paternal Grandmother     No Known Problems Paternal Grandfather     Amblyopia Neg Hx     Blindness Neg Hx     Cancer Neg Hx     Glaucoma Neg Hx     Macular degeneration Neg Hx     Retinal detachment Neg Hx     Strabismus Neg Hx        Social History     Socioeconomic History    Marital status: Single   Occupational History    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   Tobacco Use    Smoking status: Every Day     Current packs/day: 0.00     Types: Cigarettes     Last attempt to quit: 2021     Years since quittin.9    Smokeless tobacco: Never   Substance and Sexual Activity    Alcohol use: Yes     Alcohol/week: 3.0 standard drinks of alcohol     Types: 3 Cans of beer per week     Comment: Rare    Drug use: No    Sexual activity: Yes     Partners: Male     Birth control/protection: None   Social History Narrative    Daughter - Kavon     Social Determinants of Health     Financial Resource Strain: Low Risk  (10/6/2023)    Overall Financial Resource Strain (CARDIA)     Difficulty of Paying Living Expenses: Not hard at all   Food Insecurity: No Food Insecurity (10/6/2023)    Hunger Vital Sign     Worried About Running Out of Food in the Last Year: Never true     Ran Out of Food in the Last Year:  Never true   Transportation Needs: No Transportation Needs (10/6/2023)    PRAPARE - Transportation     Lack of Transportation (Medical): No     Lack of Transportation (Non-Medical): No   Physical Activity: Sufficiently Active (10/6/2023)    Exercise Vital Sign     Days of Exercise per Week: 3 days     Minutes of Exercise per Session: 140 min   Stress: No Stress Concern Present (10/6/2023)    [employer] [place] [employer] - Occupational Stress Questionnaire     Feeling of Stress : Only a little   Social Connections: Unknown (10/6/2023)    Social Connection and Isolation Panel [NHANES]     Frequency of Communication with Friends and Family: More than three times a week     Frequency of Social Gatherings with Friends and Family: More than three times a week     Attends Yarsanism Services: Never     Active Member of Clubs or Organizations: No     Attends Club or Organization Meetings: Never     Marital Status: Patient declined   Housing Stability: Unknown (10/6/2023)    Housing Stability Vital Sign     Unable to Pay for Housing in the Last Year: No     Unstable Housing in the Last Year: No       Vitals:    01/06/24 0702   BP: (!) 133/59   Pulse: 108   Resp: 18   Temp:        Physical Exam  Constitutional:       Appearance: She is well-developed. She is obese.   HENT:      Head: Normocephalic and atraumatic.   Eyes:      Pupils: Pupils are equal, round, and reactive to light.   Cardiovascular:      Rate and Rhythm: Normal rate and regular rhythm.      Heart sounds: No murmur heard.  Pulmonary:      Effort: Pulmonary effort is normal.      Breath sounds: Normal breath sounds. No wheezing.   Abdominal:      General: There is no distension.      Palpations: Abdomen is soft.      Tenderness: There is no abdominal tenderness.   Musculoskeletal:         General: Normal range of motion.      Cervical back: Normal range of motion and neck supple.   Skin:     General: Skin is warm and dry.      Capillary Refill:  Capillary refill takes less than 2 seconds.      Findings: No rash.      Comments: Small wound opening with foul smelling drainage. Some necrotic tissue underlying and surrounding edema and induration   Neurological:      Mental Status: She is alert and oriented to person, place, and time.   Psychiatric:         Judgment: Judgment normal.         Assessment & Plan:   Not likely necrotizing fascititis or héctor's gangrene, but a significant soft tissue infection with underlying necrotic fat, will need a debriedement in the OR.      Looking at the pictures of her wound prior to the I&D, the skin was already ulcerated explaining the air within the soft tissues and in the abscess cavity.      Plan: recommend transfer to location with functional OR (recommend hospitalist service due to co-morbidities) and should ideally have debridement today, although she is hemodynamically stable.     Risks, benefits, alternatives to the procedure were discussed with the patient, who appears to understand and agree with the treatment plan.

## 2024-01-06 NOTE — ED PROVIDER NOTES
Encounter Date: 2024       History     Chief Complaint   Patient presents with    Abscess     Noted to right groin x 3 days, hx of DM CBG in triage- 460, b/p in triage= 205/88     Patient is a 48-year-old female with a PMHx of CVA, depression, diabetes mellitus type 2, GERD, hyperlipidemia, hypertension, and diabetic retinopathy who presents to emergency room for abscess to right groin area.  Patient states that she believes abscess may have started a few weeks ago, but worsened over the past few weeks.  Denies fever, body aches, numbness, weakness, tingling, other abdominal pain, nausea, vomiting, or changes in bowel movements. Pain is severe in area with overlying skin changes. No treatment attempted PTA. CBG in triage over 500. Patient states that she took insulin last night, but has not taken any medication today.     The history is provided by the patient. No  was used.     Review of patient's allergies indicates:  No Known Allergies  Past Medical History:   Diagnosis Date    Cataract     Cervical high risk HPV (human papillomavirus) test positive 2020    NOT 16 OR 18    CHF (congestive heart failure)     CVA (cerebral infarction)          Depression     Diabetes mellitus, type 2     GERD (gastroesophageal reflux disease)     Hyperlipidemia     Hypertension     Lactic acidosis 10/06/2023    Moderate nonproliferative diabetic retinopathy     Nausea and vomiting 10/06/2023    Obesity     Stroke     Tachycardia 10/04/2023     Past Surgical History:   Procedure Laterality Date     SECTION      CHOLECYSTECTOMY      DILATION AND CURETTAGE OF UTERUS      EYE SURGERY Bilateral     laser    GALLBLADDER SURGERY  2017    stone removed     Family History   Problem Relation Age of Onset    Stroke Mother     Thyroid disease Mother     Diabetes Mother     Cataracts Father     Hypertension Father     Arthritis Father     Diabetes Father     Hypertension Sister     Stroke Sister      Thyroid disease Sister     Heart disease Sister     Thyroid disease Daughter     Asthma Daughter     No Known Problems Brother     No Known Problems Maternal Aunt     No Known Problems Maternal Uncle     No Known Problems Paternal Aunt     No Known Problems Paternal Uncle     No Known Problems Maternal Grandmother     No Known Problems Maternal Grandfather     No Known Problems Paternal Grandmother     No Known Problems Paternal Grandfather     Amblyopia Neg Hx     Blindness Neg Hx     Cancer Neg Hx     Glaucoma Neg Hx     Macular degeneration Neg Hx     Retinal detachment Neg Hx     Strabismus Neg Hx      Social History     Tobacco Use    Smoking status: Every Day     Current packs/day: 0.00     Types: Cigarettes     Last attempt to quit: 2021     Years since quittin.9    Smokeless tobacco: Never   Substance Use Topics    Alcohol use: Yes     Alcohol/week: 3.0 standard drinks of alcohol     Types: 3 Cans of beer per week     Comment: Rare    Drug use: No     Review of Systems   Constitutional:  Negative for chills, diaphoresis, fatigue and fever.   HENT:  Negative for congestion, sore throat and trouble swallowing.    Respiratory:  Negative for cough and shortness of breath.    Cardiovascular:  Negative for chest pain and palpitations.   Gastrointestinal:  Negative for abdominal pain, blood in stool, constipation, diarrhea, nausea and vomiting.   Genitourinary:  Negative for difficulty urinating, dysuria, frequency and urgency.   Musculoskeletal:  Negative for back pain and myalgias.   Skin:  Positive for color change (redness) and wound (groin abscess). Negative for rash.   Neurological:  Negative for weakness, light-headedness, numbness and headaches.       Physical Exam     Initial Vitals [24 1341]   BP Pulse Resp Temp SpO2   (!) 205/88 (!) 114 (!) 24 98.7 °F (37.1 °C) 98 %      MAP       --         Physical Exam    Nursing note and vitals reviewed.  Constitutional: She appears well-developed. She  is not diaphoretic. She is Obese . No distress.   Patient lying in bed, uncomfortable appearing.  Awake and alert.   HENT:   Head: Normocephalic and atraumatic.   Right Ear: External ear normal.   Left Ear: External ear normal.   Eyes: Conjunctivae and EOM are normal. Pupils are equal, round, and reactive to light.   Neck: Neck supple.   Normal range of motion.  Pulmonary/Chest: No respiratory distress.   Abdominal: Abdomen is soft. She exhibits no distension. There is no rebound and no guarding.   Musculoskeletal:         General: No tenderness or edema. Normal range of motion.      Cervical back: Normal range of motion and neck supple.     Neurological: She is alert and oriented to person, place, and time. She has normal strength.   Skin: Skin is warm. Abscess noted.        Psychiatric: She has a normal mood and affect. Her behavior is normal. Thought content normal.         ED Course   I & D - Incision and Drainage    Date/Time: 1/5/2024 11:04 PM  Location procedure was performed: [facility] EMERGENCY DEPARTMENT    Performed by: Sada Chan PA-C  Authorized by: Yifan Ansari MD  Assisting provider: Yifan Ansari MD  Pre-operative diagnosis: Abscess  Post-operative diagnosis: Possible gangrene  Consent Done: Yes  Consent: Verbal consent obtained.  Consent given by: patient  Patient identity confirmed: verbally with patient  Type: abscess  Body area: anogenital  Anesthesia: local infiltration    Anesthesia:  Local Anesthetic: lidocaine 1% without epinephrine  Description of findings: Upon I&D, air expressed from abscess site along with bloody drainage. No pus.   Scalpel size: 10  Incision type: single straight  Complexity: complex  Drainage: bloody  Drainage amount: moderate  Wound treatment: incision, drainage, wound left open and wound packed  Packing material: 1/2 in gauze  Technical procedures used: Incision and drainage with wound packing  Significant surgical tasks conducted by the assistant(s):  None  Complications: No  Estimated blood loss (mL): 2  Specimens: No  Implants: No      Critical Care    Date/Time: 1/5/2024 11:07 PM    Performed by: Sada Chan PA-C  Authorized by: Yifan Ansari MD  Direct patient critical care time: 20 minutes  Additional history critical care time: 5 minutes  Ordering / reviewing critical care time: 15 minutes  Documentation critical care time: 15 minutes  Consulting other physicians critical care time: 10 minutes  Total critical care time (exclusive of procedural time) : 65 minutes  Critical care time was exclusive of separately billable procedures and treating other patients and teaching time.  Critical care was necessary to treat or prevent imminent or life-threatening deterioration of the following conditions: metabolic crisis.  Critical care was time spent personally by me on the following activities: blood draw for specimens, development of treatment plan with patient or surrogate, discussions with consultants, evaluation of patient's response to treatment, examination of patient, obtaining history from patient or surrogate, ordering and performing treatments and interventions, ordering and review of laboratory studies, ordering and review of radiographic studies, pulse oximetry, re-evaluation of patient's condition and review of old charts.        Labs Reviewed   CULTURE, AEROBIC  (SPECIFY SOURCE) - Abnormal; Notable for the following components:       Result Value    Aerobic Bacterial Culture   (*)     Value: GRAM NEGATIVE EDOUARD  Many  Identification and susceptibility pending      Aerobic Bacterial Culture   (*)     Value: STREPTOCOCCUS ANGINOSUS  Many  Susceptibility testing not routinely performed      All other components within normal limits   CULTURE, URINE - Abnormal; Notable for the following components:    Urine Culture, Routine   (*)     Value: GRAM NEGATIVE EDOUARD  >100,000 cfu/ml  Identification and susceptibility pending      All other components  within normal limits    Narrative:     Specimen Source->Urine   CBC W/ AUTO DIFFERENTIAL - Abnormal; Notable for the following components:    WBC 18.30 (*)     Hematocrit 36.5 (*)     Immature Granulocytes 0.7 (*)     Gran # (ANC) 15.9 (*)     Immature Grans (Abs) 0.12 (*)     Gran % 86.6 (*)     Lymph % 7.5 (*)     All other components within normal limits   COMPREHENSIVE METABOLIC PANEL - Abnormal; Notable for the following components:    Sodium 131 (*)     CO2 18 (*)     Glucose 418 (*)     Albumin 2.8 (*)     ALT 9 (*)     All other components within normal limits   URINALYSIS, REFLEX TO URINE CULTURE - Abnormal; Notable for the following components:    Color, UA Colorless (*)     Protein, UA 1+ (*)     Glucose, UA 4+ (*)     Occult Blood UA 1+ (*)     Leukocytes, UA 1+ (*)     All other components within normal limits    Narrative:     Specimen Source->Urine   URINALYSIS MICROSCOPIC - Abnormal; Notable for the following components:    WBC, UA 15 (*)     All other components within normal limits    Narrative:     Specimen Source->Urine   LACTIC ACID, PLASMA - Abnormal; Notable for the following components:    Lactate (Lactic Acid) 4.1 (*)     All other components within normal limits    Narrative:     LA critical result(s) called and verbal readback obtained from   Kimberly Milian RN by KAUSHAL 01/05/2024 19:40   PROCALCITONIN - Abnormal; Notable for the following components:    Procalcitonin 0.37 (*)     All other components within normal limits   HEMOGLOBIN A1C - Abnormal; Notable for the following components:    Hemoglobin A1C 9.5 (*)     Estimated Avg Glucose 226 (*)     All other components within normal limits   POCT GLUCOSE - Abnormal; Notable for the following components:    POCT Glucose 460 (*)     All other components within normal limits   POCT GLUCOSE - Abnormal; Notable for the following components:    POCT Glucose 366 (*)     All other components within normal limits   POCT GLUCOSE - Abnormal; Notable  for the following components:    POCT Glucose 334 (*)     All other components within normal limits   POCT GLUCOSE - Abnormal; Notable for the following components:    POCT Glucose 334 (*)     All other components within normal limits   POCT GLUCOSE - Abnormal; Notable for the following components:    POCT Glucose 292 (*)     All other components within normal limits   POCT GLUCOSE - Abnormal; Notable for the following components:    POCT Glucose 282 (*)     All other components within normal limits   POCT GLUCOSE - Abnormal; Notable for the following components:    POCT Glucose 266 (*)     All other components within normal limits   POCT GLUCOSE - Abnormal; Notable for the following components:    POCT Glucose 256 (*)     All other components within normal limits   POCT GLUCOSE - Abnormal; Notable for the following components:    POCT Glucose 257 (*)     All other components within normal limits   CULTURE, BLOOD   BETA - HYDROXYBUTYRATE, SERUM   LACTIC ACID, PLASMA   POCT URINE PREGNANCY          Imaging Results               CT Abdomen Pelvis With IV Contrast NO Oral Contrast (Final result)  Result time 01/05/24 20:28:24      Final result by Audrey Ashley MD (01/05/24 20:28:24)                   Impression:      Findings most compatible with left renal pyelonephritis with perinephric abscesses along the lower pole as described.  There are no calculi and there is no hydronephrosis.  There is retroperitoneal adenopathy in the periaortic region likely reactive.    Subcutaneous gas and inflammatory/edematous changes in the mons pubis region on the right with extending to the right inguinal region.  No evidence of focal abscess in this region.  Associated adenopathy.  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    Hepatic steatosis and enlarged right lobe of the liver.    Right renal 2.4 cm cyst.    Cholecystectomy.    Diverticulosis without diverticulitis.    This report  was flagged in Epic as abnormal.      Electronically signed by: Audrey Ashley  Date:    01/05/2024  Time:    20:28               Narrative:    EXAMINATION:  CT ABDOMEN PELVIS WITH IV CONTRAST    CLINICAL HISTORY:  Abdominal abscess/infection suspected;    TECHNIQUE:  Low dose axial images, sagittal and coronal reformations were obtained from the lung bases to the pubic symphysis before and following the IV administration of 100 mL of Omnipaque 350 .  Oral contrast was not administered..    COMPARISON:  Renal ultrasound 10/10/2023, CT abdomen pelvis 10/06/2023 .    FINDINGS:  Abdomen:    - Lung bases: Basilar atelectasis noted.  There is no consolidation or pleural effusion.  The imaged lower heart is not significantly enlarged there is no pericardial effusion.    - Liver: There is decreased density of the liver suggesting hepatic steatosis.  No appreciable focal lesions.  There is a prominent right lobe of the liver.    - Gallbladder: Cholecystectomy    - Bile Ducts: No evidence of intra or extra hepatic biliary ductal dilation.    - Spleen: Negative.    - Kidneys: There is symmetric cortical enhancement bilaterally.  There is no evidence of hydronephrosis or nephrolithiasis.  There is no ureterectasis.    Left kidney contains 2 peripheral cortical and subcapsular lesions which were not identified on the ultrasound 10/10/2023 and were not confidently visualized on the noncontrast CT 10/06/2023 noting the presence of perinephric stranding on the prior CT on the left.  One rim enhancing lesion measures 3.3 cm as seen on axial image 77 series 2.  It is located along the lateral lower pole.  The 2nd similar but smaller lesion is along the lower pole anteriorly and is seen on the axial image 85 series 2 and the coronal image 81 series 601 measuring 2.5 cm.  Low density centers suggest abscesses.  There is also heterogeneous enhancement of the upper pole cortex as best seen on the coronal series is image 97 without  convincing abscess.  Motion artifact somewhat limits detection.  Findings along the cortex are consistent with pyelonephritis.    - Adrenals: Unremarkable.    - Pancreas: No mass or peripancreatic fat stranding.    - Retroperitoneum:  There is adenopathy in the periaortic region to the left of midline and.    - Vascular: No abdominal aortic aneurysm.    - Abdominal wall:  Unremarkable.    Pelvis:    In the right mons pubis there is infiltrating/dissecting air/gas throughout the superficial soft tissues extending to the right groin with associated strandy changes but with no focal fluid collection to suggest focal abscess.  There is also a skin defect along the mons to the right of midline with findings most compatible with Jeremiah's gangrene.  Prominent lymph nodes are noted along the right inguinal region and to a lesser degree in the left inguinal region.  Within the pelvis there is no evidence of adenopathy or free fluid.  There is a 2.4 cm right adnexal/ovarian cyst.  Uterus is unremarkable as imaged.  Bladder is unremarkable.    Bowel/Mesentery:    Appendix is normal.  There is no evidence of bowel distension, mesenteric inflammation or mesenteric adenopathy.  Scattered diverticuli are seen in the distal descending and sigmoid colon.  No evidence of diverticulitis.    Bones:  No acute osseous abnormality and no suspicious lytic or blastic lesion.                                       Medications   sodium chloride 0.9% bolus 1,000 mL 1,000 mL (0 mLs Intravenous Stopped 1/5/24 1852)   LIDOcaine (PF) 10 mg/ml (1%) injection 50 mg (50 mg Infiltration Given 1/5/24 1835)   iohexoL (OMNIPAQUE 350) injection 100 mL (100 mLs Intravenous Given 1/5/24 1911)   ketorolac injection 15 mg (15 mg Intravenous Given 1/5/24 1946)   piperacillin-tazobactam (ZOSYN) 4.5 g in dextrose 5 % in water (D5W) 100 mL IVPB (MB+) (0 g Intravenous Stopped 1/5/24 2207)   vancomycin (VANCOCIN) 2,500 mg in dextrose 5 % (D5W) 500 mL IVPB (0 mg  Intravenous Stopped 1/6/24 0241)   insulin regular injection 11 Units 0.11 mL (11 Units Intravenous Given 1/6/24 0153)   insulin regular injection 11 Units 0.11 mL (11 Units Intravenous Given 1/6/24 0517)   ondansetron injection 4 mg (4 mg Intravenous Given 1/6/24 0540)   piperacillin-tazobactam (ZOSYN) 4.5 g in dextrose 5 % in water (D5W) 100 mL IVPB (MB+) (0 g Intravenous Stopped 1/6/24 1213)     Medical Decision Making  Patient presents for abscess with elevated blood sugar and hypertension.  Blood pressure initially 205/88 with a pulse of 114.  Patient afebrile.  Physical exam as stated above.    Differential Diagnosis includes, but is not limited to necrotizing fasciitis, erythema multiforme, Estrella-Manoj syndrome, toxic epidermal necrolysis, cellulitis, abscess, MRSA, or local trauma/contusion.  Lesion not suggestive of erythema multiform, Raj Manoj syndrome, or toxic epidermal necrolysis.  Some evidence of cellulitis on physical exam.  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 Lab work remarkable for elevated white blood cell count to 18 with a lactic acid of 4.1.  No evidence of DKA or hyperosmolar hyperosmotic non-ketotic state. LRINEC score of 3, suggesting immediate operative debridement. Patient given IV Vancomycin and Zosyn.  Plan for transfer versus admission with continued IV antibiotic management and possible surgical intervention.    Diagnostics and plan discussed with patient who verbalized understanding and is amenable.  She is stable at this time.  Patient handed off to Dr. Loredo at 23:00. Pending aerobic, blood cultures, and repeat labs. Awaiting placement.       Amount and/or Complexity of Data Reviewed  Labs: ordered. Decision-making details documented in ED Course.  Radiology: ordered. Decision-making details documented in ED Course.    Risk  Prescription drug management.    Critical Care  Total time providing critical care: 60 minutes               ED Course as of  01/07/24 1549   Fri Jan 05, 2024   1757 POCT glucose(!!)  POCT glucose 460. [BJ]   1830 CBC auto differential(!)  CBC with increase in white blood cell count to 18.3.  Otherwise unremarkable. [BJ]   1832 POCT urine pregnancy  Urine pregnancy negative. [BJ]   1832 Beta - Hydroxybutyrate, Serum  Beta hydroxybutyrate within normal limits. [BJ]   1847 Urinalysis, Reflex to Urine Culture Urine, Clean Catch(!)  Urinalysis with 1+ leukocytes and 1+ occult blood.  4+ glucose. [BJ]   1847 Urinalysis Microscopic(!)  Microscopic urinalysis with increase in white blood cells to 15. [BJ]   1847 Aerobic culture (Specify Source) **CANNOT BE ORDERED AS STAT**  Aerobic culture pending [BJ]   1850 Anion Gap: 15 [LC]   1850 Sodium(!): 131 [LC]   1850 Glucose(!): 418 [LC]   1850 Beta-Hydroxybutyrate: 0.0 [BJ]   1853 WBC(!): 18.30 [LC]   1934 Due to size of abscess and multiple risk factors including diabetes mellitus. Order placed for CT abdomen. Will also give patient pain medication and start on IV antibiotics. [BJ]   1946 Lactic acid, plasma(!!)  Lactic acid of 4.1. [BJ]   2039 POCT Venous Blood Gas with Electrolytes(!!)  Venous blood gas without signs of acidosis. [BJ]   2056 CT Abdomen Pelvis With IV Contrast NO Oral Contrast(!)  Impression:     Findings most compatible with left renal pyelonephritis with perinephric abscesses along the lower pole as described.  There are no calculi and there is no hydronephrosis.  There is retroperitoneal adenopathy in the periaortic region likely reactive.     Subcutaneous gas and inflammatory/edematous changes in the mons pubis region on the right with extending to the right inguinal region.  No evidence of focal abscess in this region.  Associated adenopathy.  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     Hepatic steatosis and enlarged right lobe of the liver.     Right renal 2.4 cm cyst.     Cholecystectomy.     Diverticulosis without diverticulitis.  [BJ]   2056 Due to concern for Jeremiah's gangrene, patient will need transfer to other facility with Urology, general surgery, and possibly ICU placement.  [BJ]   2226 Spoke to transfer center who states they discussed patient with niurka Maldonado, who believes Jeremiah's gangrene highly unlikely due to patient's gender. Will take a look at imaging and call back with further recommendations.  [BJ]   2256 Spoke with Dr. Jesus urology, who believes patient may have subcutaneous gangrene due to findings. Does not believe patient warrants urology consult, as she is without findings of affected urethra. Advised to contact general surgery. [BJ]   2257 Discussed patient with Dr. Padgett, general surgery, who does not believe patient warrants emergent surgical intervention at this time.  States vital signs are stable and are not reflective of Jeremiah's gangrene. Since I&D done, recommends consult to surgery if patient requires addition debridement. Suggested transferring patient to [facility] to have debridement done tomorrow, as [facility] OR's are not available on weekends.  [BJ]   2300 Spoke with supervising physician, Dr. Loredo, who believes patient will need emergent surgical intervention due to LRINEC score of 3 and concerning CT findings. Awaiting placement.  [BJ]   2340 Spoke with Dr Jesus (Urology) and Dr Padgett (surgery) - no indication for Urology at this point. Dr Padgett [place] [facility] [place] [facility] this weekend. I will recheck labs to see if pt is deteriorating; clinically she has been stable.  [CS]   Sat Jan 06, 2024   0135 Accepted to  under Dr Kaplan [CS]      ED Course User Index  [BJ] Sada Chan PA-C  [CS] Kelly Loredo MD  [LC] Yifan Ansari MD                           Clinical Impression:  Final diagnoses:  [L02.91] Abscess (Primary)          ED Disposition Condition    Transfer to Another Facility Stable                Yifan Ansari,  MD  01/07/24 1549

## 2024-01-07 ENCOUNTER — ANESTHESIA (OUTPATIENT)
Dept: SURGERY | Facility: HOSPITAL | Age: 49
DRG: 264 | End: 2024-01-07
Payer: MEDICARE

## 2024-01-07 LAB
ALBUMIN SERPL BCP-MCNC: 2.2 G/DL (ref 3.5–5.2)
ALP SERPL-CCNC: 104 U/L (ref 55–135)
ALT SERPL W/O P-5'-P-CCNC: 8 U/L (ref 10–44)
ANION GAP SERPL CALC-SCNC: 10 MMOL/L (ref 8–16)
AST SERPL-CCNC: 10 U/L (ref 10–40)
BASOPHILS # BLD AUTO: 0.02 K/UL (ref 0–0.2)
BASOPHILS NFR BLD: 0.1 % (ref 0–1.9)
BILIRUB SERPL-MCNC: 0.5 MG/DL (ref 0.1–1)
BUN SERPL-MCNC: 9 MG/DL (ref 6–20)
CALCIUM SERPL-MCNC: 8.6 MG/DL (ref 8.7–10.5)
CHLORIDE SERPL-SCNC: 100 MMOL/L (ref 95–110)
CK SERPL-CCNC: 68 U/L (ref 20–180)
CO2 SERPL-SCNC: 23 MMOL/L (ref 23–29)
CREAT SERPL-MCNC: 0.9 MG/DL (ref 0.5–1.4)
DIFFERENTIAL METHOD BLD: ABNORMAL
EOSINOPHIL # BLD AUTO: 0.2 K/UL (ref 0–0.5)
EOSINOPHIL NFR BLD: 1 % (ref 0–8)
ERYTHROCYTE [DISTWIDTH] IN BLOOD BY AUTOMATED COUNT: 12.3 % (ref 11.5–14.5)
EST. GFR  (NO RACE VARIABLE): >60 ML/MIN/1.73 M^2
GLUCOSE SERPL-MCNC: 297 MG/DL (ref 70–110)
GRAM STN SPEC: NORMAL
HCT VFR BLD AUTO: 31.2 % (ref 37–48.5)
HGB BLD-MCNC: 10.1 G/DL (ref 12–16)
IMM GRANULOCYTES # BLD AUTO: 0.2 K/UL (ref 0–0.04)
IMM GRANULOCYTES NFR BLD AUTO: 1.3 % (ref 0–0.5)
LYMPHOCYTES # BLD AUTO: 1.4 K/UL (ref 1–4.8)
LYMPHOCYTES NFR BLD: 9.7 % (ref 18–48)
MAGNESIUM SERPL-MCNC: 1.6 MG/DL (ref 1.6–2.6)
MCH RBC QN AUTO: 28.5 PG (ref 27–31)
MCHC RBC AUTO-ENTMCNC: 32.4 G/DL (ref 32–36)
MCV RBC AUTO: 88 FL (ref 82–98)
MONOCYTES # BLD AUTO: 0.9 K/UL (ref 0.3–1)
MONOCYTES NFR BLD: 5.7 % (ref 4–15)
NEUTROPHILS # BLD AUTO: 12.2 K/UL (ref 1.8–7.7)
NEUTROPHILS NFR BLD: 82.2 % (ref 38–73)
NRBC BLD-RTO: 0 /100 WBC
PHOSPHATE SERPL-MCNC: 3.2 MG/DL (ref 2.7–4.5)
PLATELET # BLD AUTO: 253 K/UL (ref 150–450)
PMV BLD AUTO: 10.6 FL (ref 9.2–12.9)
POCT GLUCOSE: 258 MG/DL (ref 70–110)
POCT GLUCOSE: 296 MG/DL (ref 70–110)
POCT GLUCOSE: 305 MG/DL (ref 70–110)
POCT GLUCOSE: 315 MG/DL (ref 70–110)
POTASSIUM SERPL-SCNC: 3 MMOL/L (ref 3.5–5.1)
PROT SERPL-MCNC: 6.8 G/DL (ref 6–8.4)
RBC # BLD AUTO: 3.55 M/UL (ref 4–5.4)
SODIUM SERPL-SCNC: 133 MMOL/L (ref 136–145)
TSH SERPL DL<=0.005 MIU/L-ACNC: 2.38 UIU/ML (ref 0.4–4)
VANCOMYCIN TROUGH SERPL-MCNC: 18.3 UG/ML (ref 10–22)
WBC # BLD AUTO: 14.84 K/UL (ref 3.9–12.7)

## 2024-01-07 PROCEDURE — 63600175 PHARM REV CODE 636 W HCPCS: Mod: HCNC | Performed by: NURSE ANESTHETIST, CERTIFIED REGISTERED

## 2024-01-07 PROCEDURE — 0JBB0ZZ EXCISION OF PERINEUM SUBCUTANEOUS TISSUE AND FASCIA, OPEN APPROACH: ICD-10-PCS | Performed by: SURGERY

## 2024-01-07 PROCEDURE — 36000706: Mod: HCNC | Performed by: SURGERY

## 2024-01-07 PROCEDURE — 25000003 PHARM REV CODE 250: Mod: HCNC | Performed by: STUDENT IN AN ORGANIZED HEALTH CARE EDUCATION/TRAINING PROGRAM

## 2024-01-07 PROCEDURE — D9220A PRA ANESTHESIA: Mod: CRNA,,, | Performed by: NURSE ANESTHETIST, CERTIFIED REGISTERED

## 2024-01-07 PROCEDURE — 63600175 PHARM REV CODE 636 W HCPCS: Mod: HCNC | Performed by: ANESTHESIOLOGY

## 2024-01-07 PROCEDURE — 85025 COMPLETE CBC W/AUTO DIFF WBC: CPT | Mod: HCNC | Performed by: HOSPITALIST

## 2024-01-07 PROCEDURE — 80053 COMPREHEN METABOLIC PANEL: CPT | Mod: HCNC | Performed by: HOSPITALIST

## 2024-01-07 PROCEDURE — 25000003 PHARM REV CODE 250: Mod: HCNC | Performed by: NURSE ANESTHETIST, CERTIFIED REGISTERED

## 2024-01-07 PROCEDURE — 25000242 PHARM REV CODE 250 ALT 637 W/ HCPCS: Mod: HCNC | Performed by: STUDENT IN AN ORGANIZED HEALTH CARE EDUCATION/TRAINING PROGRAM

## 2024-01-07 PROCEDURE — 82550 ASSAY OF CK (CPK): CPT | Mod: HCNC | Performed by: HOSPITALIST

## 2024-01-07 PROCEDURE — 99223 1ST HOSP IP/OBS HIGH 75: CPT | Mod: HCNC,,, | Performed by: INTERNAL MEDICINE

## 2024-01-07 PROCEDURE — 36415 COLL VENOUS BLD VENIPUNCTURE: CPT | Mod: HCNC,XB | Performed by: HOSPITALIST

## 2024-01-07 PROCEDURE — 37000009 HC ANESTHESIA EA ADD 15 MINS: Mod: HCNC | Performed by: SURGERY

## 2024-01-07 PROCEDURE — 87070 CULTURE OTHR SPECIMN AEROBIC: CPT | Mod: HCNC | Performed by: SURGERY

## 2024-01-07 PROCEDURE — 88305 TISSUE EXAM BY PATHOLOGIST: CPT | Mod: 26,HCNC,, | Performed by: PATHOLOGY

## 2024-01-07 PROCEDURE — 80202 ASSAY OF VANCOMYCIN: CPT | Mod: HCNC | Performed by: HOSPITALIST

## 2024-01-07 PROCEDURE — 36415 COLL VENOUS BLD VENIPUNCTURE: CPT | Mod: HCNC | Performed by: HOSPITALIST

## 2024-01-07 PROCEDURE — 99223 1ST HOSP IP/OBS HIGH 75: CPT | Mod: 25,HCNC,, | Performed by: SURGERY

## 2024-01-07 PROCEDURE — D9220A PRA ANESTHESIA: Mod: ANES,,, | Performed by: ANESTHESIOLOGY

## 2024-01-07 PROCEDURE — 84443 ASSAY THYROID STIM HORMONE: CPT | Mod: HCNC | Performed by: HOSPITALIST

## 2024-01-07 PROCEDURE — 27201423 OPTIME MED/SURG SUP & DEVICES STERILE SUPPLY: Mod: HCNC | Performed by: SURGERY

## 2024-01-07 PROCEDURE — 37000008 HC ANESTHESIA 1ST 15 MINUTES: Mod: HCNC | Performed by: SURGERY

## 2024-01-07 PROCEDURE — 87075 CULTR BACTERIA EXCEPT BLOOD: CPT | Mod: HCNC | Performed by: SURGERY

## 2024-01-07 PROCEDURE — 87205 SMEAR GRAM STAIN: CPT | Mod: HCNC | Performed by: SURGERY

## 2024-01-07 PROCEDURE — 71000033 HC RECOVERY, INTIAL HOUR: Mod: HCNC | Performed by: SURGERY

## 2024-01-07 PROCEDURE — 36000707: Mod: HCNC | Performed by: SURGERY

## 2024-01-07 PROCEDURE — 87102 FUNGUS ISOLATION CULTURE: CPT | Mod: HCNC | Performed by: SURGERY

## 2024-01-07 PROCEDURE — 83735 ASSAY OF MAGNESIUM: CPT | Mod: HCNC | Performed by: HOSPITALIST

## 2024-01-07 PROCEDURE — 63600175 PHARM REV CODE 636 W HCPCS: Mod: JZ,JG,HCNC | Performed by: HOSPITALIST

## 2024-01-07 PROCEDURE — 87206 SMEAR FLUORESCENT/ACID STAI: CPT | Mod: HCNC | Performed by: SURGERY

## 2024-01-07 PROCEDURE — 87116 MYCOBACTERIA CULTURE: CPT | Mod: HCNC | Performed by: SURGERY

## 2024-01-07 PROCEDURE — 87186 SC STD MICRODIL/AGAR DIL: CPT | Mod: HCNC | Performed by: SURGERY

## 2024-01-07 PROCEDURE — 99223 1ST HOSP IP/OBS HIGH 75: CPT | Mod: HCNC,,, | Performed by: UROLOGY

## 2024-01-07 PROCEDURE — 84100 ASSAY OF PHOSPHORUS: CPT | Mod: HCNC | Performed by: HOSPITALIST

## 2024-01-07 PROCEDURE — 63600175 PHARM REV CODE 636 W HCPCS: Mod: JZ,JG,HCNC | Performed by: STUDENT IN AN ORGANIZED HEALTH CARE EDUCATION/TRAINING PROGRAM

## 2024-01-07 PROCEDURE — 87077 CULTURE AEROBIC IDENTIFY: CPT | Mod: HCNC | Performed by: SURGERY

## 2024-01-07 PROCEDURE — 88305 TISSUE EXAM BY PATHOLOGIST: CPT | Mod: HCNC | Performed by: PATHOLOGY

## 2024-01-07 PROCEDURE — 11004 DBRDMT SKIN XTRNL GENT&PER: CPT | Mod: HCNC,,, | Performed by: SURGERY

## 2024-01-07 PROCEDURE — 25000003 PHARM REV CODE 250: Mod: HCNC | Performed by: HOSPITALIST

## 2024-01-07 PROCEDURE — 11000001 HC ACUTE MED/SURG PRIVATE ROOM: Mod: HCNC

## 2024-01-07 RX ORDER — LIDOCAINE HYDROCHLORIDE 20 MG/ML
INJECTION INTRAVENOUS
Status: DISCONTINUED | OUTPATIENT
Start: 2024-01-07 | End: 2024-01-07

## 2024-01-07 RX ORDER — FENTANYL CITRATE 50 UG/ML
INJECTION, SOLUTION INTRAMUSCULAR; INTRAVENOUS
Status: DISCONTINUED | OUTPATIENT
Start: 2024-01-07 | End: 2024-01-07

## 2024-01-07 RX ORDER — ONDANSETRON 2 MG/ML
INJECTION INTRAMUSCULAR; INTRAVENOUS
Status: DISCONTINUED | OUTPATIENT
Start: 2024-01-07 | End: 2024-01-07

## 2024-01-07 RX ORDER — SODIUM CHLORIDE 0.9 % (FLUSH) 0.9 %
10 SYRINGE (ML) INJECTION
Status: DISCONTINUED | OUTPATIENT
Start: 2024-01-07 | End: 2024-01-07

## 2024-01-07 RX ORDER — MIDAZOLAM HYDROCHLORIDE 1 MG/ML
INJECTION, SOLUTION INTRAMUSCULAR; INTRAVENOUS
Status: DISCONTINUED | OUTPATIENT
Start: 2024-01-07 | End: 2024-01-07

## 2024-01-07 RX ORDER — PHENYLEPHRINE HYDROCHLORIDE 10 MG/ML
INJECTION INTRAVENOUS
Status: DISCONTINUED | OUTPATIENT
Start: 2024-01-07 | End: 2024-01-07

## 2024-01-07 RX ORDER — ROCURONIUM BROMIDE 10 MG/ML
INJECTION, SOLUTION INTRAVENOUS
Status: DISCONTINUED | OUTPATIENT
Start: 2024-01-07 | End: 2024-01-07

## 2024-01-07 RX ORDER — POTASSIUM CHLORIDE 7.45 MG/ML
20 INJECTION INTRAVENOUS ONCE
Status: COMPLETED | OUTPATIENT
Start: 2024-01-07 | End: 2024-01-07

## 2024-01-07 RX ORDER — PROPOFOL 10 MG/ML
VIAL (ML) INTRAVENOUS
Status: DISCONTINUED | OUTPATIENT
Start: 2024-01-07 | End: 2024-01-07

## 2024-01-07 RX ORDER — PROCHLORPERAZINE EDISYLATE 5 MG/ML
INJECTION INTRAMUSCULAR; INTRAVENOUS
Status: DISCONTINUED | OUTPATIENT
Start: 2024-01-07 | End: 2024-01-07

## 2024-01-07 RX ORDER — HYDROMORPHONE HYDROCHLORIDE 2 MG/ML
0.2 INJECTION, SOLUTION INTRAMUSCULAR; INTRAVENOUS; SUBCUTANEOUS EVERY 5 MIN PRN
Status: DISCONTINUED | OUTPATIENT
Start: 2024-01-07 | End: 2024-01-07

## 2024-01-07 RX ORDER — SUCCINYLCHOLINE CHLORIDE 20 MG/ML
INJECTION INTRAMUSCULAR; INTRAVENOUS
Status: DISCONTINUED | OUTPATIENT
Start: 2024-01-07 | End: 2024-01-07

## 2024-01-07 RX ADMIN — PHENYLEPHRINE HYDROCHLORIDE 200 MCG: 10 INJECTION INTRAVENOUS at 08:01

## 2024-01-07 RX ADMIN — CLINDAMYCIN IN 5 PERCENT DEXTROSE 600 MG: 12 INJECTION, SOLUTION INTRAVENOUS at 04:01

## 2024-01-07 RX ADMIN — VANCOMYCIN HYDROCHLORIDE 1500 MG: 1.5 INJECTION, POWDER, LYOPHILIZED, FOR SOLUTION INTRAVENOUS at 10:01

## 2024-01-07 RX ADMIN — ACETAMINOPHEN 650 MG: 325 TABLET ORAL at 05:01

## 2024-01-07 RX ADMIN — PROPOFOL 200 MG: 10 INJECTION, EMULSION INTRAVENOUS at 08:01

## 2024-01-07 RX ADMIN — NIFEDIPINE 60 MG: 30 TABLET, FILM COATED, EXTENDED RELEASE ORAL at 10:01

## 2024-01-07 RX ADMIN — ACETAMINOPHEN 650 MG: 325 TABLET ORAL at 04:01

## 2024-01-07 RX ADMIN — MEROPENEM 1 G: 1 INJECTION INTRAVENOUS at 12:01

## 2024-01-07 RX ADMIN — PHENYLEPHRINE HYDROCHLORIDE 100 MCG: 10 INJECTION INTRAVENOUS at 08:01

## 2024-01-07 RX ADMIN — FLUOXETINE HYDROCHLORIDE 40 MG: 20 CAPSULE ORAL at 10:01

## 2024-01-07 RX ADMIN — LISINOPRIL 40 MG: 20 TABLET ORAL at 10:01

## 2024-01-07 RX ADMIN — CLINDAMYCIN IN 5 PERCENT DEXTROSE 600 MG: 12 INJECTION, SOLUTION INTRAVENOUS at 12:01

## 2024-01-07 RX ADMIN — SODIUM CHLORIDE, SODIUM LACTATE, POTASSIUM CHLORIDE, AND CALCIUM CHLORIDE: .6; .31; .03; .02 INJECTION, SOLUTION INTRAVENOUS at 07:01

## 2024-01-07 RX ADMIN — SUCCINYLCHOLINE CHLORIDE 100 MG: 20 INJECTION, SOLUTION INTRAMUSCULAR; INTRAVENOUS at 08:01

## 2024-01-07 RX ADMIN — MEROPENEM 1 G: 1 INJECTION INTRAVENOUS at 09:01

## 2024-01-07 RX ADMIN — LIDOCAINE HYDROCHLORIDE 100 MG: 20 INJECTION, SOLUTION INTRAVENOUS at 08:01

## 2024-01-07 RX ADMIN — MUPIROCIN: 20 OINTMENT TOPICAL at 10:01

## 2024-01-07 RX ADMIN — MIDAZOLAM HYDROCHLORIDE 2 MG: 1 INJECTION, SOLUTION INTRAMUSCULAR; INTRAVENOUS at 08:01

## 2024-01-07 RX ADMIN — FENTANYL CITRATE 100 MCG: 0.05 INJECTION, SOLUTION INTRAMUSCULAR; INTRAVENOUS at 08:01

## 2024-01-07 RX ADMIN — METOPROLOL SUCCINATE 100 MG: 50 TABLET, EXTENDED RELEASE ORAL at 10:01

## 2024-01-07 RX ADMIN — HEPARIN SODIUM 5000 UNITS: 5000 INJECTION INTRAVENOUS; SUBCUTANEOUS at 09:01

## 2024-01-07 RX ADMIN — EZETIMIBE 10 MG: 10 TABLET ORAL at 09:01

## 2024-01-07 RX ADMIN — POTASSIUM CHLORIDE 10 MEQ: 7.46 INJECTION, SOLUTION INTRAVENOUS at 09:01

## 2024-01-07 RX ADMIN — MEROPENEM 1 G: 1 INJECTION INTRAVENOUS at 04:01

## 2024-01-07 RX ADMIN — ATORVASTATIN CALCIUM 80 MG: 40 TABLET, FILM COATED ORAL at 09:01

## 2024-01-07 RX ADMIN — ONDANSETRON 4 MG: 2 INJECTION, SOLUTION INTRAMUSCULAR; INTRAVENOUS at 08:01

## 2024-01-07 RX ADMIN — HYDROMORPHONE HYDROCHLORIDE 0.2 MG: 2 INJECTION INTRAMUSCULAR; INTRAVENOUS; SUBCUTANEOUS at 09:01

## 2024-01-07 RX ADMIN — CLINDAMYCIN IN 5 PERCENT DEXTROSE 600 MG: 12 INJECTION, SOLUTION INTRAVENOUS at 09:01

## 2024-01-07 RX ADMIN — PANTOPRAZOLE SODIUM 40 MG: 40 TABLET, DELAYED RELEASE ORAL at 10:01

## 2024-01-07 RX ADMIN — FLUTICASONE PROPIONATE 100 MCG: 50 SPRAY, METERED NASAL at 10:01

## 2024-01-07 RX ADMIN — INSULIN ASPART 4 UNITS: 100 INJECTION, SOLUTION INTRAVENOUS; SUBCUTANEOUS at 05:01

## 2024-01-07 RX ADMIN — ROCURONIUM BROMIDE 30 MG: 10 INJECTION, SOLUTION INTRAVENOUS at 08:01

## 2024-01-07 RX ADMIN — PROCHLORPERAZINE EDISYLATE 5 MG: 5 INJECTION INTRAMUSCULAR; INTRAVENOUS at 08:01

## 2024-01-07 RX ADMIN — INSULIN ASPART 1 UNITS: 100 INJECTION, SOLUTION INTRAVENOUS; SUBCUTANEOUS at 09:01

## 2024-01-07 RX ADMIN — INSULIN ASPART 4 UNITS: 100 INJECTION, SOLUTION INTRAVENOUS; SUBCUTANEOUS at 11:01

## 2024-01-07 RX ADMIN — MUPIROCIN: 20 OINTMENT TOPICAL at 09:01

## 2024-01-07 RX ADMIN — SUGAMMADEX 200 MG: 100 INJECTION, SOLUTION INTRAVENOUS at 08:01

## 2024-01-07 RX ADMIN — POTASSIUM CHLORIDE 10 MEQ: 7.46 INJECTION, SOLUTION INTRAVENOUS at 08:01

## 2024-01-07 RX ADMIN — ASPIRIN 81 MG: 81 TABLET, COATED ORAL at 10:01

## 2024-01-07 RX ADMIN — HEPARIN SODIUM 5000 UNITS: 5000 INJECTION INTRAVENOUS; SUBCUTANEOUS at 01:01

## 2024-01-07 NOTE — BRIEF OP NOTE
[facility] Surg  Brief Operative Note    SUMMARY     Surgery Date: 1/7/2024     Surgeon(s) and Role:     * Christos Padgett MD - Primary    Assisting Surgeon: None    Pre-op Diagnosis:  Groin abscess [L02.214]    Post-op Diagnosis:  Post-Op Diagnosis Codes:     * Groin abscess [L02.214]    Procedure(s) (LRB):  IRRIGATION AND DEBRIDEMENT RIGHT GROIN (Right)  IRRIGATION AND DEBRIDEMENT, LOWER EXTREMITY    Anesthesia: General    Implants:  * No implants in log *    Operative Findings:  Moderate amount of necrotic soft tissue in the right groin.  Did not seem to extend through the external oblique fascia.  Wound left open with packing.    Estimated Blood Loss: 20mL    Estimated Blood Loss has been documented.         Specimens:   Specimen (24h ago, onward)       Start     Ordered    01/07/24 0842  Specimen to Pathology, Surgery General Surgery  Once        Comments: Pre-op Diagnosis: Groin abscess [L02.214]Procedure(s):IRRIGATION AND DEBRIDEMENT RIGHT GROINIRRIGATION AND DEBRIDEMENT, LOWER EXTREMITY Number of specimens: 1Name of specimens: right groin tissue     References:    Click here for ordering Quick Tip   Question Answer Comment   Procedure Type: General Surgery    Which provider would you like to cc? CHRISTOS PADGETT    Release to patient Immediate        01/07/24 0843                    [id number]

## 2024-01-07 NOTE — PROGRESS NOTES
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  Pt w/o current pain, f/c/n/v, flank pain etc - she was under impression she was sent here for "stomach surgery", we talked to her daughter and 2 of her sisters on the phone, separately, at her request, as well as Dr Snyder who called to talk to her preop, at the bedside, to clear this up.  Appreciate GS attention to pt, asking  to see in AM as well as we appear to have 2 separate processes here, she may need intervention from  as well although most likely will just need to treat the infection and reimage kidneys at some point.  She had I&D at bedside of right [facility] area in [place], presume material was sent for culture.  Incidentally pt was ill w/ COVID 19 12/18, we were advised on transfer of this, she seems to have fully recovered.  Per pt she was tested again at [facility] and was negative, and Epic prompted me "pt has negative COVID test" - but, we were not able to confirm this result, so we did another rapid which was negative.  I also have requested EKG and CXR preop in this pt w/ CHF, morbid obesity.    Pt is admitted to  services, and Dr Snyder scheduling for OR in AM.      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  Pt w/o current pain, f/c/n/v, flank pain etc - she was under impression she was sent here for "stomach surgery", we talked to her daughter and 2 of her sisters on the phone, separately, at her request, as well as Dr Snyder who called to talk to her preop, at the bedside, to clear this up.  Appreciate GS attention to pt, asking  to see in AM as well as we appear to have 2 separate processes here, she may need intervention from  as well although most likely will just need to treat the infection and reimage kidneys at some point.  She had I&D at bedside of right mons area in [place], presume material was sent for culture.  Incidentally pt was ill w/ COVID 19 , we were advised on transfer of this, she seems to have fully recovered.  Per pt she was tested again at [facility] and was negative, and Epic prompted me "pt has negative COVID test" - but, we were not able to confirm this result, so we did another rapid which was negative.  I also have requested EKG and CXR preop in this pt w/ CHF, morbid obesity.    Pt is admitted to  services,surgery is consulted.  S/P I&D  and irrigation and debridement rightly groin and right leg,cultures pending.    Past Medical History:   Diagnosis Date    Cataract     Cervical high risk HPV (human papillomavirus) test positive 2020    NOT 16 OR 18    CHF (congestive heart failure)     CVA (cerebral infarction)          Depression     Diabetes mellitus, type 2     GERD (gastroesophageal reflux disease)     Hyperlipidemia     Hypertension     Lactic acidosis 10/06/2023    Moderate nonproliferative diabetic retinopathy     Nausea and vomiting 10/06/2023    Obesity     Stroke     Tachycardia 10/04/2023       Past Surgical History:   Procedure Laterality Date     SECTION      CHOLECYSTECTOMY      DILATION " AND CURETTAGE OF UTERUS      EYE SURGERY Bilateral     laser    GALLBLADDER SURGERY  03/2017    stone removed       Review of patient's allergies indicates:  No Known Allergies    Current Facility-Administered Medications on File Prior to Encounter   Medication    [COMPLETED] piperacillin-tazobactam (ZOSYN) 4.5 g in dextrose 5 % in water (D5W) 100 mL IVPB (MB+)    [DISCONTINUED] aspirin EC tablet 81 mg    [DISCONTINUED] atorvastatin tablet 80 mg    [DISCONTINUED] dextrose 10% bolus 125 mL 125 mL    [DISCONTINUED] dextrose 10% bolus 250 mL 250 mL    [DISCONTINUED] ezetimibe tablet 10 mg    [DISCONTINUED] glucagon (human recombinant) injection 1 mg    [DISCONTINUED] glucose chewable tablet 16 g    [DISCONTINUED] glucose chewable tablet 24 g    [DISCONTINUED] insulin aspart U-100 pen 0-10 Units    [DISCONTINUED] insulin detemir U-100 (Levemir) pen 25 Units    [DISCONTINUED] insulin detemir U-100 (Levemir) pen 30 Units    [DISCONTINUED] lisinopriL tablet 40 mg    [DISCONTINUED] NIFEdipine 24 hr tablet 60 mg     Current Outpatient Medications on File Prior to Encounter   Medication Sig    aspirin (ECOTRIN) 81 MG EC tablet Take 81 mg by mouth once daily.    atorvastatin (LIPITOR) 80 MG tablet Take 1 tablet (80 mg total) by mouth every evening.    blood sugar diagnostic Strp Use to test blood glucose two (2) times daily as directed with insurance preferred meter and supplies    dulaglutide (TRULICITY) 0.75 mg/0.5 mL pen injector Inject 0.75 mg into the skin every 7 days. OK to discontinue this med during SNF stay - resume upon discharge (Patient taking differently: Inject 0.75 mg into the skin every Sunday.)    ergocalciferol (ERGOCALCIFEROL) 50,000 unit Cap Take 50,000 Units by mouth every Saturday.    ezetimibe (ZETIA) 10 mg tablet Take 1 tablet (10 mg total) by mouth once daily. (For cholesterol)    FLUoxetine 40 MG capsule Take 40 mg by mouth once daily.    fluticasone propionate (FLONASE) 50 mcg/actuation nasal spray  "2 sprays (100 mcg total) by Each Nostril route daily as needed for Allergies or Rhinitis.    furosemide (LASIX) 40 MG tablet Take 1 tablet (40 mg total) by mouth once daily.    ibuprofen (ADVIL,MOTRIN) 200 MG tablet Take 200 mg by mouth every 6 (six) hours as needed for Pain.    insulin aspart U-100 (NOVOLOG FLEXPEN U-100 INSULIN) 100 unit/mL (3 mL) InPn pen Inject 30 Units into the skin 2 (two) times a day.    lancets (TRUEPLUS LANCETS) 28 gauge Misc Use to test blood glucose two (2) times daily as directed with insurance preferred meter and supplies    LANTUS SOLOSTAR U-100 INSULIN glargine 100 units/mL SubQ pen Inject 80 Units into the skin 2 (two) times a day.    lisinopriL (PRINIVIL,ZESTRIL) 40 MG tablet Take 1 tablet (40 mg total) by mouth once daily.    metFORMIN (GLUCOPHAGE-XR) 500 MG ER 24hr tablet Take 1,000 mg by mouth 2 (two) times daily with meals.    metoprolol succinate (TOPROL-XL) 100 MG 24 hr tablet Take 100 mg by mouth once daily.    NIFEdipine (ADALAT CC) 60 MG TbSR Take 60 mg by mouth every morning.    pantoprazole (PROTONIX) 40 MG tablet Take 40 mg by mouth once daily.    pen needle, diabetic (BD ULTRA-FINE ROSA PEN NEEDLE) 32 gauge x 5/32" Ndle Use with insulin once daily    pen needle, diabetic 32 gauge x 5/32" Ndle Use with injectable DM supplies twice daily as directed    potassium chloride (KLOR-CON) 8 MEQ TbSR TAKE TWO TABLETS BY MOUTH TWICE DAILY @ 9AM & 5PM    TRUE METRIX GO GLUCOSE METER Misc      Family History       Problem Relation (Age of Onset)    Arthritis Father    Asthma Daughter    Cataracts Father    Diabetes Mother, Father    Heart disease Sister    Hypertension Father, Sister    No Known Problems Brother, Maternal Aunt, Maternal Uncle, Paternal Aunt, Paternal Uncle, Maternal Grandmother, Maternal Grandfather, Paternal Grandmother, Paternal Grandfather    Stroke Mother, Sister    Thyroid disease Mother, Sister, Daughter          Tobacco Use    Smoking status: Every Day     " Current packs/day: 0.00     Types: Cigarettes     Last attempt to quit: 2021     Years since quittin.9    Smokeless tobacco: Never   Substance and Sexual Activity    Alcohol use: Yes     Alcohol/week: 3.0 standard drinks of alcohol     Types: 3 Cans of beer per week     Comment: Rare    Drug use: No    Sexual activity: Yes     Partners: Male     Birth control/protection: None     Review of Systems    General: No fevers, weight changes.   HEENT: No visual changes, neck pain, swallowing problems, hoarseness.  CV: No cp/sob, no palpitations, edema.   Pulm: No cough, sob, hemoptysis. No known CESAR.    GI: No n/v/d, no abdominal pain, no melena, hematochezia.   : No problems w/ bladder control, dysuria, hematuria.  Musculoskeletal: No joint pains/stiffness, back pain.   Neuro:  No HA, seizure, focal weakness, falls, dizzy spells.     Objective:     Vital Signs (Most Recent):  Temp: 98.7 °F (37.1 °C) (24 1102)  Pulse: 98 (24 1102)  Resp: 20 (24 1102)  BP: 124/62 (24 1102)  SpO2: (!) 92 % (24 1102) Vital Signs (24h Range):  Temp:  [97.8 °F (36.6 °C)-101.4 °F (38.6 °C)] 98.7 °F (37.1 °C)  Pulse:  [] 98  Resp:  [16-28] 20  SpO2:  [89 %-99 %] 92 %  BP: (101-151)/(52-89) 124/62     Weight: 105 kg (231 lb 7.7 oz)  Body mass index is 38.52 kg/m².     Physical Exam        General:  A&O, NAD  HEENT: neck supple, PERRL/EOMI, EAC clear bilaterally, normal bilateral carotid upstroke w/o bruits, inf turbs clear bilaterally, OC/OP clear  Lungs: CTAB  CV: RRR w/o M/G/R  Abdomen: soft, NTND, no HSM, no bruits/masses/pulsations, massively obese  Ext: no edema  : she has a small incision right mons area, currently w/o any drainage/odor, the surrounding soft tissue is a bit indurated but not particularly tender to touch, she has a lot of redundant fatty tissue in the area.  There is some erythema.  No CVAT.   Rectal: def  Neuro: NF    Significant Labs: All pertinent labs within the past 24  hours have been reviewed.    Significant Imaging: I have reviewed all pertinent imaging results/findings within the past 24 hours.    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    S/P I&D  and irrigation and debridement rightly groin and right leg,on 1.7.24,cultures pending.    COVID-19  Pt indicates she was acutely ill w/ COVID in mid Dec  We were not able to confirm a negative test at [facility], so we got a rapid here, it's negative  She is w/o f/c/cough/SOB  No indication for treatment or isolation     Chronic combined systolic and diastolic congestive heart failure  Pt w/ combined CHF, not sure if there is ischemic component  Appears to be well compensated  Should undergo OPT assessment for CESAR  Will need cardiology f/u  CXR obtained preop, lungs clear, pt w/o CANELA/SOB/CP    Latest ECHO performed and demonstrates- Results for orders placed during the hospital encounter of 10/04/23    Echo    Interpretation Summary    Left Ventricle: The left ventricle is normal in size. There is concentric hypertrophy. Mild global hypokinesis present. There is moderately reduced systolic function with a visually estimated ejection fraction of 35 - 40%. Grade II diastolic dysfunction.    Right Ventricle: Normal right ventricular cavity size. Wall thickness is normal. Right ventricle wall motion  is normal. Systolic function is normal.    Tricuspid Valve: There is mild regurgitation.    Pulmonary Artery: The estimated pulmonary artery systolic pressure is 26 mmHg.    IVC/SVC: Normal venous pressure at 3 mmHg.      Pyelonephritis  Seen on CT  along w/ perinephric abscesses not seen on u/s  Pt is clinically w/ minimal issues - no dysuria, back/flank pain, f/c/n/v  BCX done in [facility] ED  U/a not suggestive of any substantial UTI but the abscesses may not be communicating w/ the collecting system  Given Zosyn and vancomycin in ED  I had started Zosyn and IV doxy here empirically when I wrote admit orders but given review of all information will start meropenem, vancomycin, and clindamycin  Might need ID input  Suprisingly she does not even meet SIRS criteria at this time  Will ask  to see       Class 2 severe obesity due to excess calories with serious comorbidity and body mass index (BMI) of 38.0 to 38.9 in adult  Body mass index is 38.52 kg/m².  Morbid obesity complicates all aspects of disease management from diagnostic modalities to treatment  Weight loss encouraged and health benefits explained to patient        History of CVA (cerebrovascular accident) without residual deficits  Apparently w/o sequelae  Close OPT f/u  Underlying untreated CESAR would increase risk of recurrence        Type 2 diabetes mellitus with both eyes affected by proliferative retinopathy and macular edema, with long-term current use of insulin  A1c 9.5%  SSI      CESAR (obstructive sleep apnea)  Pt denies having CESAR but this record is in her chart  Any recent PSG?  She certainly appears at risk  OPT f/u will be needed  Observe in recovery from surgery, caution w/ narcotics postop        Hyperlipidemia associated with type 2 diabetes mellitus  Home meds as appropriate  Does pt have CAD?      HTN (hypertension) without retinopathy  Home meds as appropriate  Pt is very non toxic appearing and currently w/ normal to high BP      VTE Risk Mitigation (From admission, onward)           Ordered     heparin (porcine) injection 5,000 Units  Every 8 hours         01/06/24 1628     IP VTE HIGH RISK PATIENT  Once         01/06/24 1628     Place sequential compression device  Until discontinued          01/06/24 1628                    Discharge Planning   MALGORZATA:      Code Status: Full Code   Is the patient medically ready for discharge?:     Reason for patient still in hospital (select all that apply): Patient trending condition                     Campbell Liriano MD  Department of Hospital Medicine   [facility] Surg

## 2024-01-07 NOTE — CONSULTS
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 woman transferred here from [place] due to necrotizing fasciitis of the right groin.  CT scan at the time of presentation also showed to left perinephric fluid collections with stranding.  Patient denies flank pain currently.  She was vaguely aware of an issue with the kidney but did not know the details.  She denies dysuria or hematuria.  She has been febrile.    Past Medical History:   Diagnosis Date    Cataract     Cervical high risk HPV (human papillomavirus) test positive 2020    NOT 16 OR 18    CHF (congestive heart failure)     CVA (cerebral infarction)          Depression     Diabetes mellitus, type 2     GERD (gastroesophageal reflux disease)     Hyperlipidemia     Hypertension     Lactic acidosis 10/06/2023    Moderate nonproliferative diabetic retinopathy     Nausea and vomiting 10/06/2023    Obesity     Stroke     Tachycardia 10/04/2023       Past Surgical History:   Procedure Laterality Date     SECTION      CHOLECYSTECTOMY      DILATION AND CURETTAGE OF UTERUS      EYE SURGERY Bilateral     laser    GALLBLADDER SURGERY  2017    stone removed       Review of patient's allergies indicates:  No Known Allergies    Family History       Problem Relation (Age of Onset)    Arthritis Father    Asthma Daughter    Cataracts Father    Diabetes Mother, Father    Heart disease Sister    Hypertension Father, Sister    No Known Problems  Brother, Maternal Aunt, Maternal Uncle, Paternal Aunt, Paternal Uncle, Maternal Grandmother, Maternal Grandfather, Paternal Grandmother, Paternal Grandfather    Stroke Mother, Sister    Thyroid disease Mother, Sister, Daughter            Tobacco Use    Smoking status: Every Day     Current packs/day: 0.00     Types: Cigarettes     Last attempt to quit: 2021     Years since quittin.9    Smokeless tobacco: Never   Substance and Sexual Activity    Alcohol use: Yes     Alcohol/week: 3.0 standard drinks of alcohol     Types: 3 Cans of beer per week     Comment: Rare    Drug use: No    Sexual activity: Yes     Partners: Male     Birth control/protection: None       Review of Systems   Constitutional:  Negative for chills, fatigue and fever.   HENT:  Negative for congestion.    Respiratory:  Negative for chest tightness and shortness of breath.    Cardiovascular:  Negative for chest pain.   Gastrointestinal:  Negative for abdominal distention, constipation, diarrhea, nausea and vomiting.   Genitourinary:  Positive for genital sores and pelvic pain. Negative for difficulty urinating, dysuria, flank pain, frequency, hematuria and urgency.   Musculoskeletal:  Negative for arthralgias.   Skin:  Negative for rash.   Neurological:  Negative for dizziness.   Psychiatric/Behavioral:  Negative for confusion.        Objective:     Temp:  [97.8 °F (36.6 °C)-101.4 °F (38.6 °C)] 98.7 °F (37.1 °C)  Pulse:  [] 98  Resp:  [16-28] 20  SpO2:  [89 %-99 %] 92 %  BP: (101-151)/(52-89) 124/62  Weight: 105 kg (231 lb 7.7 oz)  Body mass index is 38.52 kg/m².    Date 24 07 - 24 0659   Shift 3107-0358 6528-0726 4496-8650 24 Hour Total   INTAKE   IV Piggyback 750   750   Shift Total(mL/kg) 750(7.1)   750(7.1)   OUTPUT   Shift Total(mL/kg)       Weight (kg) 105 105 105 105          Drains       None                    Physical Exam  Vitals and nursing note reviewed.   Constitutional:       Appearance: She is  well-developed.   HENT:      Head: Normocephalic.   Eyes:      Conjunctiva/sclera: Conjunctivae normal.   Neck:      Thyroid: No thyromegaly.      Trachea: No tracheal deviation.   Cardiovascular:      Rate and Rhythm: Normal rate.      Pulses: Normal pulses.      Heart sounds: Normal heart sounds.   Pulmonary:      Effort: Pulmonary effort is normal. No respiratory distress.      Breath sounds: Normal breath sounds. No wheezing.   Abdominal:      General: There is no distension.      Palpations: Abdomen is soft. There is no mass.      Tenderness: There is no abdominal tenderness. There is no right CVA tenderness, left CVA tenderness, guarding or rebound.      Hernia: No hernia is present.      Comments: Groin wound dressed   Musculoskeletal:         General: No tenderness. Normal range of motion.      Cervical back: Normal range of motion.   Lymphadenopathy:      Cervical: No cervical adenopathy.   Skin:     General: Skin is warm and dry.      Findings: No erythema or rash.   Neurological:      Mental Status: She is alert and oriented to person, place, and time.   Psychiatric:         Behavior: Behavior normal.         Thought Content: Thought content normal.         Judgment: Judgment normal.          Significant Labs:    BMP:  Recent Labs   Lab 01/05/24 1751 01/07/24  0519   * 133*   K 3.7 3.0*   CL 98 100   CO2 18* 23   BUN 6 9   CREATININE 0.9 0.9   CALCIUM 9.6 8.6*       CBC:  Recent Labs   Lab 01/05/24 1751 01/05/24  2039 01/07/24  0519   WBC 18.30*  --  14.84*   HGB 12.4  --  10.1*   HCT 36.5* 41.0 31.2*     --  253       Blood Culture:   Recent Labs   Lab 01/05/24  2106   LABBLOO No Growth to date  No Growth to date     Urine Culture:   Recent Labs   Lab 01/05/24  1818   LABURIN GRAM NEGATIVE EDOUARD  >100,000 cfu/ml  Identification and susceptibility pending  *       Significant Imaging:  CT: I have reviewed all results within the past 24 hours and my personal findings are:  Left perinephric  stranding with 2 fluid collections and some lymphadenopathy noted.  Right subcutaneous air in the groin                    Assessment and Plan:     Pyelonephritis  Check renal ultrasound  Consult Interventional Radiology for aspiration versus drainage of the abscesses  NPO after midnight  Follow cultures treat with 2 weeks of antibiotics  I agree with ID consult        VTE Risk Mitigation (From admission, onward)           Ordered     heparin (porcine) injection 5,000 Units  Every 8 hours         01/06/24 1628     IP VTE HIGH RISK PATIENT  Once         01/06/24 1628     Place sequential compression device  Until discontinued         01/06/24 1628                    Thank you for your consult. I will follow-up with patient. Please contact us if you have any additional questions.    Arturo Duran MD  Urology  [facility] S[facility]

## 2024-01-07 NOTE — NURSING
"Pt states, " I accidentally wipe the wound, there is a not to much blood came from it, but it's stop after I wipe it." Pain noted, pain pill prn given. Open to air noted.  Lorena NP notified. Will continue to monitor.  "

## 2024-01-07 NOTE — NURSING
[facility] MEWS TRIGGER FOLLOW UP       MEWS Monitoring, Score is: 2  Indication for review: SpO2 89% RA    Bedside NurseLd contacted, no concerns verbalized at this time, instructed to call [phone] for further concerns or assistance.    Primary RN rechecked 93% on RA.    0412: Notified primary RN of pt temp of 101.4. Primary RN to give PRN tyelnol and remove extra blankets/adjust room temp. Pt on abx; vancomycin, clindamycin, and meropenem. NAD. Care ongoing.

## 2024-01-07 NOTE — ASSESSMENT & PLAN NOTE
Pt w/ combined CHF, not sure if there is ischemic component  Appears to be well compensated  Should undergo OPT assessment for CESAR  Will need cardiology f/u  CXR obtained preop, lungs clear, pt w/o CANELA/SOB/CP    Latest ECHO performed and demonstrates- Results for orders placed during the hospital encounter of 10/04/23    Echo    Interpretation Summary    Left Ventricle: The left ventricle is normal in size. There is concentric hypertrophy. Mild global hypokinesis present. There is moderately reduced systolic function with a visually estimated ejection fraction of 35 - 40%. Grade II diastolic dysfunction.    Right Ventricle: Normal right ventricular cavity size. Wall thickness is normal. Right ventricle wall motion  is normal. Systolic function is normal.    Tricuspid Valve: There is mild regurgitation.    Pulmonary Artery: The estimated pulmonary artery systolic pressure is 26 mmHg.    IVC/SVC: Normal venous pressure at 3 mmHg.

## 2024-01-07 NOTE — ASSESSMENT & PLAN NOTE
Apparently w/o sequelae  Close OPT f/u  Underlying untreated CESAR would increase risk of recurrence

## 2024-01-07 NOTE — ASSESSMENT & PLAN NOTE
Body mass index is 38.52 kg/m².  Morbid obesity complicates all aspects of disease management from diagnostic modalities to treatment  Weight loss encouraged and health benefits explained to patient

## 2024-01-07 NOTE — SUBJECTIVE & OBJECTIVE
Past Medical History:   Diagnosis Date    Cataract     Cervical high risk HPV (human papillomavirus) test positive 2020    NOT 16 OR 18    CHF (congestive heart failure)     CVA (cerebral infarction)          Depression     Diabetes mellitus, type 2     GERD (gastroesophageal reflux disease)     Hyperlipidemia     Hypertension     Lactic acidosis 10/06/2023    Moderate nonproliferative diabetic retinopathy     Nausea and vomiting 10/06/2023    Obesity     Stroke     Tachycardia 10/04/2023       Past Surgical History:   Procedure Laterality Date     SECTION      CHOLECYSTECTOMY      DILATION AND CURETTAGE OF UTERUS      GALLBLADDER SURGERY  2017    stone removed       Review of patient's allergies indicates:  No Known Allergies    Current Facility-Administered Medications on File Prior to Encounter   Medication    [COMPLETED] insulin regular injection 11 Units 0.11 mL    [COMPLETED] insulin regular injection 11 Units 0.11 mL    [COMPLETED] ketorolac injection 15 mg    [COMPLETED] ondansetron injection 4 mg    [COMPLETED] piperacillin-tazobactam (ZOSYN) 4.5 g in dextrose 5 % in water (D5W) 100 mL IVPB (MB+)    [COMPLETED] piperacillin-tazobactam (ZOSYN) 4.5 g in dextrose 5 % in water (D5W) 100 mL IVPB (MB+)    [COMPLETED] vancomycin (VANCOCIN) 2,500 mg in dextrose 5 % (D5W) 500 mL IVPB    [DISCONTINUED] aspirin EC tablet 81 mg    [DISCONTINUED] atorvastatin tablet 80 mg    [COMPLETED] clindamycin in D5W 600 mg/50 mL IVPB 600 mg    [DISCONTINUED] dextrose 10% bolus 125 mL 125 mL    [DISCONTINUED] dextrose 10% bolus 250 mL 250 mL    [DISCONTINUED] ezetimibe tablet 10 mg    [DISCONTINUED] glucagon (human recombinant) injection 1 mg    [DISCONTINUED] glucose chewable tablet 16 g    [DISCONTINUED] glucose chewable tablet 24 g    [DISCONTINUED] insulin aspart U-100 pen 0-10 Units    [DISCONTINUED] insulin detemir U-100 (Levemir) pen 25 Units    [DISCONTINUED] insulin detemir U-100 (Levemir) pen 30 Units     [DISCONTINUED] lisinopriL tablet 40 mg    [DISCONTINUED] NIFEdipine 24 hr tablet 60 mg    [DISCONTINUED] vancomycin in dextrose 5 % 1 gram/250 mL IVPB 1,000 mg     Current Outpatient Medications on File Prior to Encounter   Medication Sig    aspirin (ECOTRIN) 81 MG EC tablet Take 81 mg by mouth once daily.    atorvastatin (LIPITOR) 80 MG tablet Take 1 tablet (80 mg total) by mouth every evening.    blood sugar diagnostic Strp Use to test blood glucose two (2) times daily as directed with insurance preferred meter and supplies    dulaglutide (TRULICITY) 0.75 mg/0.5 mL pen injector Inject 0.75 mg into the skin every 7 days. OK to discontinue this med during SNF stay - resume upon discharge (Patient taking differently: Inject 0.75 mg into the skin every Sunday.)    ergocalciferol (ERGOCALCIFEROL) 50,000 unit Cap Take 50,000 Units by mouth every Saturday.    ezetimibe (ZETIA) 10 mg tablet Take 1 tablet (10 mg total) by mouth once daily. (For cholesterol)    FLUoxetine 40 MG capsule Take 40 mg by mouth once daily.    fluticasone propionate (FLONASE) 50 mcg/actuation nasal spray 2 sprays (100 mcg total) by Each Nostril route daily as needed for Allergies or Rhinitis.    furosemide (LASIX) 40 MG tablet Take 1 tablet (40 mg total) by mouth once daily.    ibuprofen (ADVIL,MOTRIN) 200 MG tablet Take 200 mg by mouth every 6 (six) hours as needed for Pain.    insulin aspart U-100 (NOVOLOG FLEXPEN U-100 INSULIN) 100 unit/mL (3 mL) InPn pen Inject 30 Units into the skin 2 (two) times a day.    lancets (TRUEPLUS LANCETS) 28 gauge Misc Use to test blood glucose two (2) times daily as directed with insurance preferred meter and supplies    LANTUS SOLOSTAR U-100 INSULIN glargine 100 units/mL SubQ pen Inject 80 Units into the skin 2 (two) times a day.    lisinopriL (PRINIVIL,ZESTRIL) 40 MG tablet Take 1 tablet (40 mg total) by mouth once daily.    metFORMIN (GLUCOPHAGE-XR) 500 MG ER 24hr tablet Take 1,000 mg by mouth 2 (two) times  "daily with meals.    metoprolol succinate (TOPROL-XL) 100 MG 24 hr tablet Take 100 mg by mouth once daily.    NIFEdipine (ADALAT CC) 60 MG TbSR Take 60 mg by mouth every morning.    pantoprazole (PROTONIX) 40 MG tablet Take 40 mg by mouth once daily.    pen needle, diabetic (BD ULTRA-FINE ROSA PEN NEEDLE) 32 gauge x 5/32" Ndle Use with insulin once daily    pen needle, diabetic 32 gauge x 5/32" Ndle Use with injectable DM supplies twice daily as directed    potassium chloride (KLOR-CON) 8 MEQ TbSR TAKE TWO TABLETS BY MOUTH TWICE DAILY @ 9AM & 5PM    TRUE METRIX GO GLUCOSE METER Misc     [DISCONTINUED] aspirin (ECOTRIN) 81 MG EC tablet Take 1 tablet (81 mg total) by mouth once daily.    [DISCONTINUED] insulin detemir U-100, Levemir, 100 unit/mL (3 mL) SubQ InPn pen Inject 40 Units into the skin 2 (two) times daily.     Family History       Problem Relation (Age of Onset)    Arthritis Father    Asthma Daughter    Cataracts Father    Diabetes Mother, Father    Heart disease Sister    Hypertension Father, Sister    No Known Problems Brother, Maternal Aunt, Maternal Uncle, Paternal Aunt, Paternal Uncle, Maternal Grandmother, Maternal Grandfather, Paternal Grandmother, Paternal Grandfather    Stroke Mother, Sister    Thyroid disease Mother, Sister, Daughter          Tobacco Use    Smoking status: Every Day     Current packs/day: 0.00     Types: Cigarettes     Last attempt to quit: 2021     Years since quittin.9    Smokeless tobacco: Never   Substance and Sexual Activity    Alcohol use: Yes     Alcohol/week: 3.0 standard drinks of alcohol     Types: 3 Cans of beer per week     Comment: Rare    Drug use: No    Sexual activity: Yes     Partners: Male     Birth control/protection: None     Review of Systems    General: No fevers, weight changes.   HEENT: No visual changes, neck pain, swallowing problems, hoarseness.  CV: No cp/sob, no palpitations, edema.   Pulm: No cough, sob, hemoptysis. No known CESAR.    GI: No " n/v/d, no abdominal pain, no melena, hematochezia.   : No problems w/ bladder control, dysuria, hematuria.  Musculoskeletal: No joint pains/stiffness, back pain.   Neuro:  No HA, seizure, focal weakness, falls, dizzy spells.     Objective:     Vital Signs (Most Recent):  Temp: 97.8 °F (36.6 °C) (01/06/24 1612)  Pulse: 109 (01/06/24 1612)  Resp: 18 (01/06/24 1612)  BP: 132/72 (01/06/24 1612)  SpO2: 95 % (01/06/24 1612) Vital Signs (24h Range):  Temp:  [97.8 °F (36.6 °C)-98.7 °F (37.1 °C)] 97.8 °F (36.6 °C)  Pulse:  [100-117] 109  Resp:  [18] 18  SpO2:  [93 %-98 %] 95 %  BP: (122-158)/(58-98) 132/72     Weight: 105 kg (231 lb 7.7 oz)  Body mass index is 38.52 kg/m².     Physical Exam        General:  A&O, NAD  HEENT: neck supple, PERRL/EOMI, EAC clear bilaterally, normal bilateral carotid upstroke w/o bruits, inf turbs clear bilaterally, OC/OP clear  Lungs: CTAB  CV: RRR w/o M/G/R  Abdomen: soft, NTND, no HSM, no bruits/masses/pulsations, massively obese  Ext: no edema  : she has a small incision right mons area, currently w/o any drainage/odor, the surrounding soft tissue is a bit indurated but not particularly tender to touch, she has a lot of redundant fatty tissue in the area.  There is some erythema.  No CVAT.   Rectal: def  Neuro: NF    Significant Labs: All pertinent labs within the past 24 hours have been reviewed.    Significant Imaging: I have reviewed all pertinent imaging results/findings within the past 24 hours.

## 2024-01-07 NOTE — NURSING
While doing a surgical bath, pt had a bright red output came from the vagina. No pain/discomfort noted from the vagina. Almita PAC notified. Will continue to monitor.

## 2024-01-07 NOTE — OP NOTE
[facility] Surg  Surgery Department  Operative Note    SUMMARY     Date of Procedure: 1/7/2024     Procedure: Procedure(s) (LRB):  IRRIGATION AND DEBRIDEMENT RIGHT GROIN (Right)  IRRIGATION AND DEBRIDEMENT, LOWER EXTREMITY     Surgeon(s) and Role:     * Chalo Padgett MD - Primary    Assisting Surgeon: Arturo Snyder    Pre-Operative Diagnosis: Groin abscess [L02.214]    Post-Operative Diagnosis: Post-Op Diagnosis Codes:     * Groin abscess [L02.214]    Anesthesia: General    Operative Findings (including complications, if any): Moderate amount of necrotic soft tissue in the right groin.  Did not seem to extend through the external oblique fascia.  Wound left open with packing.    Description of Technical Procedures:  The patient was transported to the operating room and transferred over to the operating room table.  General anesthesia was induced by the anesthesia team without apparent complication.  The patient was placed in supine position with her arms out by her side and padded appropriately.  She was prepped and draped in a normal sterile fashion.  A time-out was called confirming correct patient, site, procedure, and laterality.   The right groin had a already draining sinus in the anterior aspect of the mons pubis.  An elliptical incision was made using Bovie electrocautery in order to excise this sinus tract.  Dissection was taken down with Bovie electrocautery through the subcutaneous tissues.  Dissection taken down to the external oblique fascia.  There was evidence of necrosis of the subcutaneous tissue throughout this field.  Necrosis extended through Miller's fascia down to the external oblique but did not seem to extend through the external oblique fascia.  Necrotic tissue was debrided using a combination of Bovie electrocautery and sharp dissection until clean bleeding edges were seen.  A piece of this tissue was excised and sent off for tissue culture.  Bleeding was stopped using Bovie  electrocautery and a 2-0 silk tie.  The wound was thoroughly irrigated using diluted Betadine solution through a Pulsavac machine.  Final hemostasis was achieved using Bovie electrocautery.  The wound measures 10 cm in length by 5 cm wide by 8 cm in depth.  The wound was packed using a Betadine-soaked Kerlix gauze.  4 x 4 gauze were placed over this followed by an abdominal pad.   The patient was awoken from general anesthesia without complication.  She was transferred to the recovery room.  All needle, sponge, and instrument counts were correct at the end of the case.      Estimated Blood Loss (EBL): 20mL           Implants: * No implants in log *    Specimens:   Specimen (24h ago, onward)       Start     Ordered    01/07/24 0842  Specimen to Pathology, Surgery General Surgery  Once        Comments: Pre-op Diagnosis: Groin abscess [L02.214]Procedure(s):IRRIGATION AND DEBRIDEMENT RIGHT GROINIRRIGATION AND DEBRIDEMENT, LOWER EXTREMITY Number of specimens: 1Name of specimens: right groin tissue     References:    Click here for ordering Quick Tip   Question Answer Comment   Procedure Type: General Surgery    Which provider would you like to cc? CHRISTOS PADEGTT    Release to patient Immediate        01/07/24 0843                            Condition: Good    Disposition: PACU - hemodynamically stable.    Attestation: Dr. Padgett was present and scrubbed for the entire procedure.

## 2024-01-07 NOTE — SUBJECTIVE & OBJECTIVE
Current Facility-Administered Medications on File Prior to Encounter   Medication    [COMPLETED] piperacillin-tazobactam (ZOSYN) 4.5 g in dextrose 5 % in water (D5W) 100 mL IVPB (MB+)    [DISCONTINUED] aspirin EC tablet 81 mg    [DISCONTINUED] atorvastatin tablet 80 mg    [DISCONTINUED] dextrose 10% bolus 125 mL 125 mL    [DISCONTINUED] dextrose 10% bolus 250 mL 250 mL    [DISCONTINUED] ezetimibe tablet 10 mg    [DISCONTINUED] glucagon (human recombinant) injection 1 mg    [DISCONTINUED] glucose chewable tablet 16 g    [DISCONTINUED] glucose chewable tablet 24 g    [DISCONTINUED] insulin aspart U-100 pen 0-10 Units    [DISCONTINUED] insulin detemir U-100 (Levemir) pen 25 Units    [DISCONTINUED] insulin detemir U-100 (Levemir) pen 30 Units    [DISCONTINUED] lisinopriL tablet 40 mg    [DISCONTINUED] NIFEdipine 24 hr tablet 60 mg     Current Outpatient Medications on File Prior to Encounter   Medication Sig    aspirin (ECOTRIN) 81 MG EC tablet Take 81 mg by mouth once daily.    atorvastatin (LIPITOR) 80 MG tablet Take 1 tablet (80 mg total) by mouth every evening.    blood sugar diagnostic Strp Use to test blood glucose two (2) times daily as directed with insurance preferred meter and supplies    dulaglutide (TRULICITY) 0.75 mg/0.5 mL pen injector Inject 0.75 mg into the skin every 7 days. OK to discontinue this med during SNF stay - resume upon discharge (Patient taking differently: Inject 0.75 mg into the skin every Sunday.)    ergocalciferol (ERGOCALCIFEROL) 50,000 unit Cap Take 50,000 Units by mouth every Saturday.    ezetimibe (ZETIA) 10 mg tablet Take 1 tablet (10 mg total) by mouth once daily. (For cholesterol)    FLUoxetine 40 MG capsule Take 40 mg by mouth once daily.    fluticasone propionate (FLONASE) 50 mcg/actuation nasal spray 2 sprays (100 mcg total) by Each Nostril route daily as needed for Allergies or Rhinitis.    furosemide (LASIX) 40 MG tablet Take 1 tablet (40 mg total) by mouth once daily.     "ibuprofen (ADVIL,MOTRIN) 200 MG tablet Take 200 mg by mouth every 6 (six) hours as needed for Pain.    insulin aspart U-100 (NOVOLOG FLEXPEN U-100 INSULIN) 100 unit/mL (3 mL) InPn pen Inject 30 Units into the skin 2 (two) times a day.    lancets (TRUEPLUS LANCETS) 28 gauge Misc Use to test blood glucose two (2) times daily as directed with insurance preferred meter and supplies    LANTUS SOLOSTAR U-100 INSULIN glargine 100 units/mL SubQ pen Inject 80 Units into the skin 2 (two) times a day.    lisinopriL (PRINIVIL,ZESTRIL) 40 MG tablet Take 1 tablet (40 mg total) by mouth once daily.    metFORMIN (GLUCOPHAGE-XR) 500 MG ER 24hr tablet Take 1,000 mg by mouth 2 (two) times daily with meals.    metoprolol succinate (TOPROL-XL) 100 MG 24 hr tablet Take 100 mg by mouth once daily.    NIFEdipine (ADALAT CC) 60 MG TbSR Take 60 mg by mouth every morning.    pantoprazole (PROTONIX) 40 MG tablet Take 40 mg by mouth once daily.    pen needle, diabetic (BD ULTRA-FINE ROSA PEN NEEDLE) 32 gauge x 5/32" Ndle Use with insulin once daily    pen needle, diabetic 32 gauge x 5/32" Ndle Use with injectable DM supplies twice daily as directed    potassium chloride (KLOR-CON) 8 MEQ TbSR TAKE TWO TABLETS BY MOUTH TWICE DAILY @ 9AM & 5PM    TRUE METRIX GO GLUCOSE METER [facility]        Review of patient's allergies indicates:  No Known Allergies    Past Medical History:   Diagnosis Date    Cataract     Cervical high risk HPV (human papillomavirus) test positive 2020    NOT 16 OR 18    CHF (congestive heart failure)     CVA (cerebral infarction)          Depression     Diabetes mellitus, type 2     GERD (gastroesophageal reflux disease)     Hyperlipidemia     Hypertension     Lactic acidosis 10/06/2023    Moderate nonproliferative diabetic retinopathy     Nausea and vomiting 10/06/2023    Obesity     Stroke     Tachycardia 10/04/2023     Past Surgical History:   Procedure Laterality Date     SECTION      CHOLECYSTECTOMY      " DILATION AND CURETTAGE OF UTERUS      EYE SURGERY Bilateral     laser    GALLBLADDER SURGERY  2017    stone removed     Family History       Problem Relation (Age of Onset)    Arthritis Father    Asthma Daughter    Cataracts Father    Diabetes Mother, Father    Heart disease Sister    Hypertension Father, Sister    No Known Problems Brother, Maternal Aunt, Maternal Uncle, Paternal Aunt, Paternal Uncle, Maternal Grandmother, Maternal Grandfather, Paternal Grandmother, Paternal Grandfather    Stroke Mother, Sister    Thyroid disease Mother, Sister, Daughter          Tobacco Use    Smoking status: Every Day     Current packs/day: 0.00     Types: Cigarettes     Last attempt to quit: 2021     Years since quittin.9    Smokeless tobacco: Never   Substance and Sexual Activity    Alcohol use: Yes     Alcohol/week: 3.0 standard drinks of alcohol     Types: 3 Cans of beer per week     Comment: Rare    Drug use: No    Sexual activity: Yes     Partners: Male     Birth control/protection: None     Review of Systems   Constitutional:  Positive for fever.   HENT: Negative.     Respiratory: Negative.     Cardiovascular: Negative.    Gastrointestinal: Negative.    Endocrine: Negative.    Genitourinary: Negative.    Musculoskeletal: Negative.    Skin:  Positive for wound.   Neurological: Negative.    Hematological: Negative.    Psychiatric/Behavioral: Negative.       Objective:     Vital Signs (Most Recent):  Temp: 99.8 °F (37.7 °C) (24 05)  Pulse: 109 (24 035)  Resp: 17 (24)  BP: 129/66 (24)  SpO2: (!) 93 % (24 035) Vital Signs (24h Range):  Temp:  [97.8 °F (36.6 °C)-101.4 °F (38.6 °C)] 99.8 °F (37.7 °C)  Pulse:  [100-114] 109  Resp:  [17-19] 17  SpO2:  [89 %-98 %] 93 %  BP: (101-151)/(52-75) 129/66     Weight: 105 kg (231 lb 7.7 oz)  Body mass index is 38.52 kg/m².     Physical Exam  Vitals and nursing note reviewed.   Constitutional:       Appearance: Normal appearance. She is  not ill-appearing.   HENT:      Head: Normocephalic.      Mouth/Throat:      Mouth: Mucous membranes are moist.      Pharynx: Oropharynx is clear.   Eyes:      General: No scleral icterus.     Extraocular Movements: Extraocular movements intact.      Conjunctiva/sclera: Conjunctivae normal.   Cardiovascular:      Rate and Rhythm: Normal rate.      Pulses: Normal pulses.   Pulmonary:      Effort: Pulmonary effort is normal. No respiratory distress.      Breath sounds: No wheezing.   Abdominal:      General: Abdomen is flat. Bowel sounds are normal. There is no distension.      Palpations: Abdomen is soft.   Musculoskeletal:         General: No swelling or tenderness. Normal range of motion.      Cervical back: Normal range of motion.   Skin:     General: Skin is warm and dry.      Coloration: Skin is not jaundiced or pale.      Comments: Right groin wound with foul-smelling drainage from I and D site.   Neurological:      General: No focal deficit present.      Mental Status: She is alert and oriented to person, place, and time.   Psychiatric:         Mood and Affect: Mood normal.            I have reviewed all pertinent lab results within the past 24 hours.  CBC:   Recent Labs   Lab 01/07/24  0519   WBC 14.84*   RBC 3.55*   HGB 10.1*   HCT 31.2*      MCV 88   MCH 28.5   MCHC 32.4     CMP:   Recent Labs   Lab 01/07/24  0519   *   CALCIUM 8.6*   ALBUMIN 2.2*   PROT 6.8   *   K 3.0*   CO2 23      BUN 9   CREATININE 0.9   ALKPHOS 104   ALT 8*   AST 10   BILITOT 0.5       Significant Diagnostics:  I have reviewed all pertinent imaging results/findings within the past 24 hours.

## 2024-01-07 NOTE — NURSING
[facility] MEWS TRIGGER FOLLOW UP       MEWS Monitoring, Score is: 2  Indication for review: SpO2 89% RA    Bedside NurseLd contacted, no concerns verbalized at this time, instructed to call [phone] for further concerns or assistance.    Primary RN rechecked 93% on RA.

## 2024-01-07 NOTE — PROGRESS NOTES
Vancomycin Consult Follow Up:  Patient reviewed, renal function stable. No new labs. Continue current therapy. Vancomycin trough due 1/7/24 @ 22:00

## 2024-01-07 NOTE — NURSING
[facility]  Nurses Note -- 4 Eyes      1/7/2024       Skin assessed on: Q Shift      [x] No Pressure Injuries Present    []Prevention Measures Documented    [] Yes LDA  for Pressure Injury Previously documented     [] Yes New Pressure Injury Discovered   [] LDA for New Pressure Injury Added      Attending RN:  Mini Luna RN     Second RN:  Ld Bueno

## 2024-01-07 NOTE — ANESTHESIA PROCEDURE NOTES
Intubation    Date/Time: 1/7/2024 8:14 AM    Performed by: Renzo Calvillo CRNA  Authorized by: Rona Isbell MD    Intubation:     Induction:  Rapid sequence induction    Intubated:  Postinduction    Mask Ventilation:  Not attempted    Attempts:  1    Attempted By:  CRNA    Method of Intubation:  Video laryngoscopy    Blade:  Argueta 3    Laryngeal View Grade: Grade I - full view of cords      Difficult Airway Encountered?: No      Complications:  None    Airway Device:  Oral endotracheal tube    Airway Device Size:  7.0    Style/Cuff Inflation:  Cuffed (inflated to minimal occlusive pressure)    Tube secured:  21    Secured at:  The lips    Placement Verified By:  Capnometry    Complicating Factors:  None    Findings Post-Intubation:  BS equal bilateral and atraumatic/condition of teeth unchanged

## 2024-01-07 NOTE — SUBJECTIVE & OBJECTIVE
Past Medical History:   Diagnosis Date    Cataract     Cervical high risk HPV (human papillomavirus) test positive 2020    NOT 16 OR 18    CHF (congestive heart failure)     CVA (cerebral infarction)          Depression     Diabetes mellitus, type 2     GERD (gastroesophageal reflux disease)     Hyperlipidemia     Hypertension     Lactic acidosis 10/06/2023    Moderate nonproliferative diabetic retinopathy     Nausea and vomiting 10/06/2023    Obesity     Stroke     Tachycardia 10/04/2023       Past Surgical History:   Procedure Laterality Date     SECTION      CHOLECYSTECTOMY      DILATION AND CURETTAGE OF UTERUS      EYE SURGERY Bilateral     laser    GALLBLADDER SURGERY  2017    stone removed       Review of patient's allergies indicates:  No Known Allergies    Family History       Problem Relation (Age of Onset)    Arthritis Father    Asthma Daughter    Cataracts Father    Diabetes Mother, Father    Heart disease Sister    Hypertension Father, Sister    No Known Problems Brother, Maternal Aunt, Maternal Uncle, Paternal Aunt, Paternal Uncle, Maternal Grandmother, Maternal Grandfather, Paternal Grandmother, Paternal Grandfather    Stroke Mother, Sister    Thyroid disease Mother, Sister, Daughter            Tobacco Use    Smoking status: Every Day     Current packs/day: 0.00     Types: Cigarettes     Last attempt to quit: 2021     Years since quittin.9    Smokeless tobacco: Never   Substance and Sexual Activity    Alcohol use: Yes     Alcohol/week: 3.0 standard drinks of alcohol     Types: 3 Cans of beer per week     Comment: Rare    Drug use: No    Sexual activity: Yes     Partners: Male     Birth control/protection: None       Review of Systems   Constitutional:  Negative for chills, fatigue and fever.   HENT:  Negative for congestion.    Respiratory:  Negative for chest tightness and shortness of breath.    Cardiovascular:  Negative for chest pain.   Gastrointestinal:  Negative for  abdominal distention, constipation, diarrhea, nausea and vomiting.   Genitourinary:  Positive for genital sores and pelvic pain. Negative for difficulty urinating, dysuria, flank pain, frequency, hematuria and urgency.   Musculoskeletal:  Negative for arthralgias.   Skin:  Negative for rash.   Neurological:  Negative for dizziness.   Psychiatric/Behavioral:  Negative for confusion.        Objective:     Temp:  [97.8 °F (36.6 °C)-101.4 °F (38.6 °C)] 98.7 °F (37.1 °C)  Pulse:  [] 98  Resp:  [16-28] 20  SpO2:  [89 %-99 %] 92 %  BP: (101-151)/(52-89) 124/62  Weight: 105 kg (231 lb 7.7 oz)  Body mass index is 38.52 kg/m².    Date 01/07/24 0700 - 01/08/24 0659   Shift 4898-5147 8037-6103 8618-6702 24 Hour Total   INTAKE   IV Piggyback 750   750   Shift Total(mL/kg) 750(7.1)   750(7.1)   OUTPUT   Shift Total(mL/kg)       Weight (kg) 105 105 105 105          Drains       None                    Physical Exam  Vitals and nursing note reviewed.   Constitutional:       Appearance: She is well-developed.   HENT:      Head: Normocephalic.   Eyes:      Conjunctiva/sclera: Conjunctivae normal.   Neck:      Thyroid: No thyromegaly.      Trachea: No tracheal deviation.   Cardiovascular:      Rate and Rhythm: Normal rate.      Pulses: Normal pulses.      Heart sounds: Normal heart sounds.   Pulmonary:      Effort: Pulmonary effort is normal. No respiratory distress.      Breath sounds: Normal breath sounds. No wheezing.   Abdominal:      General: There is no distension.      Palpations: Abdomen is soft. There is no mass.      Tenderness: There is no abdominal tenderness. There is no right CVA tenderness, left CVA tenderness, guarding or rebound.      Hernia: No hernia is present.      Comments: Groin wound dressed   Musculoskeletal:         General: No tenderness. Normal range of motion.      Cervical back: Normal range of motion.   Lymphadenopathy:      Cervical: No cervical adenopathy.   Skin:     General: Skin is warm and  dry.      Findings: No erythema or rash.   Neurological:      Mental Status: She is alert and oriented to person, place, and time.   Psychiatric:         Behavior: Behavior normal.         Thought Content: Thought content normal.         Judgment: Judgment normal.          Significant Labs:    BMP:  Recent Labs   Lab 01/05/24 1751 01/07/24 0519   * 133*   K 3.7 3.0*   CL 98 100   CO2 18* 23   BUN 6 9   CREATININE 0.9 0.9   CALCIUM 9.6 8.6*       CBC:  Recent Labs   Lab 01/05/24 1751 01/05/24 2039 01/07/24 0519   WBC 18.30*  --  14.84*   HGB 12.4  --  10.1*   HCT 36.5* 41.0 31.2*     --  253       Blood Culture:   Recent Labs   Lab 01/05/24  2106   LABBLOO No Growth to date  No Growth to date     Urine Culture:   Recent Labs   Lab 01/05/24  1818   LABURIN GRAM NEGATIVE EDOUARD  >100,000 cfu/ml  Identification and susceptibility pending  *       Significant Imaging:  CT: I have reviewed all results within the past 24 hours and my personal findings are:  Left perinephric stranding with 2 fluid collections and some lymphadenopathy noted.  Right subcutaneous air in the groin

## 2024-01-07 NOTE — ANESTHESIA PREPROCEDURE EVALUATION
01/06/2024    Pre-operative evaluation for Procedure(s) (LRB):  IRRIGATION AND DEBRIDEMENT, RIGHT GROIN (Right)    Jaimee Quintana is a 48 y.o. female     Patient Active Problem List   Diagnosis    HTN (hypertension) without retinopathy    Hyperlipidemia associated with type 2 diabetes mellitus    Cerebrovascular disease    Class 3 severe obesity due to excess calories with serious comorbidity and body mass index (BMI) of 40.0 to 44.9 in adult    Hypertension associated with diabetes    CESAR (obstructive sleep apnea)    UTI (urinary tract infection)    Fatty liver    Bilateral low back pain without sciatica    History of cholecystectomy    Diabetic peripheral neuropathy associated with type 2 diabetes mellitus    Thyroid nodule    Abnormal finding of blood chemistry     Type 2 diabetes mellitus with other skin ulcer (CODE)    Hypertensive retinopathy, bilateral    Type 2 diabetes mellitus with both eyes affected by proliferative retinopathy and macular edema, with long-term current use of insulin    Mild episode of recurrent major depressive disorder    Abnormal Papanicolaou smear of cervix with positive human papilloma virus (HPV) test    Chronic diastolic congestive heart failure    Type 2 diabetes mellitus    Noncompliance with medications    GERD (gastroesophageal reflux disease)    History of CVA (cerebrovascular accident) without residual deficits    Bilateral claudication of lower limb    Bacteremia    Diabetic ketoacidosis associated with type 2 diabetes mellitus    NSTEMI (non-ST elevated myocardial infarction)    Sinus arrhythmia    Dysphagia    Class 2 severe obesity due to excess calories with serious comorbidity and body mass index (BMI) of 38.0 to 38.9 in adult    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     Current Outpatient Medications on File Prior to Encounter   Medication Sig Dispense Refill    aspirin (ECOTRIN) 81 MG EC tablet Take 81 mg by mouth once daily.      atorvastatin (LIPITOR) 80 MG tablet Take 1 tablet (80 mg total) by mouth every evening. 60 tablet 1    blood sugar diagnostic Strp Use to test blood glucose two (2) times daily as directed with insurance preferred meter and supplies 200 each 3    dulaglutide (TRULICITY) 0.75 mg/0.5 mL pen injector Inject 0.75 mg into the skin every 7 days. OK to discontinue this med during SNF stay - resume upon discharge (Patient taking differently: Inject 0.75 mg into the skin every Sunday.) 4 pen 11    ergocalciferol (ERGOCALCIFEROL) 50,000 unit Cap Take 50,000 Units by mouth every Saturday.      ezetimibe (ZETIA) 10 mg tablet Take 1 tablet (10 mg total) by mouth once daily. (For cholesterol) 90 tablet 1    FLUoxetine 40 MG capsule Take 40 mg by mouth once daily.      fluticasone propionate (FLONASE) 50 mcg/actuation nasal spray 2 sprays (100 mcg total) by Each Nostril route daily as needed for Allergies or Rhinitis. 16 g 0    furosemide (LASIX) 40 MG tablet Take 1 tablet (40 mg total) by mouth once daily. 60 tablet 1    ibuprofen (ADVIL,MOTRIN) 200 MG tablet Take 200 mg by mouth every 6 (six) hours as needed for Pain.      insulin aspart U-100 (NOVOLOG FLEXPEN U-100 INSULIN) 100 unit/mL (3 mL) InPn pen Inject 30 Units into the skin 2 (two) times a day. 30 mL 12    lancets (TRUEPLUS LANCETS) 28 gauge Misc Use to test blood glucose two (2) times daily as directed with insurance preferred meter and supplies 200 each 3    LANTUS SOLOSTAR U-100 INSULIN glargine 100 units/mL SubQ pen Inject 80 Units into the skin 2 (two) times a day.      lisinopriL (PRINIVIL,ZESTRIL) 40 MG tablet Take 1 tablet (40 mg total) by mouth once daily.  "90 tablet 3    metFORMIN (GLUCOPHAGE-XR) 500 MG ER 24hr tablet Take 1,000 mg by mouth 2 (two) times daily with meals.      metoprolol succinate (TOPROL-XL) 100 MG 24 hr tablet Take 100 mg by mouth once daily.      NIFEdipine (ADALAT CC) 60 MG TbSR Take 60 mg by mouth every morning.      pantoprazole (PROTONIX) 40 MG tablet Take 40 mg by mouth once daily.      pen needle, diabetic (BD ULTRA-FINE ROSA PEN NEEDLE) 32 gauge x 5/32" Ndle Use with insulin once daily 100 each 0    pen needle, diabetic 32 gauge x 5/32" Ndle Use with injectable DM supplies twice daily as directed 200 each 0    potassium chloride (KLOR-CON) 8 MEQ TbSR TAKE TWO TABLETS BY MOUTH TWICE DAILY @ 9AM & 5PM 180 tablet 0    TRUE METRIX GO GLUCOSE METER Misc          Past Surgical History:   Procedure Laterality Date     SECTION      CHOLECYSTECTOMY      DILATION AND CURETTAGE OF UTERUS      EYE SURGERY Bilateral     laser    GALLBLADDER SURGERY  2017    stone removed       Social History     Socioeconomic History    Marital status: Single   Occupational History    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   Tobacco Use    Smoking status: Every Day     Current packs/day: 0.00     Types: Cigarettes     Last attempt to quit: 2021     Years since quittin.9    Smokeless tobacco: Never   Substance and Sexual Activity    Alcohol use: Yes     Alcohol/week: 3.0 standard drinks of alcohol     Types: 3 Cans of beer per week     Comment: Rare    Drug use: No    Sexual activity: Yes     Partners: Male     Birth control/protection: None   Social History Narrative    Daughter - Kavon     Social Determinants of Health     Financial Resource Strain: Low Risk  (10/6/2023)    Overall Financial Resource Strain (CARDIA)     Difficulty of Paying Living Expenses: Not hard at all   Food Insecurity: No Food Insecurity (10/6/2023)    Hunger Vital Sign     Worried About Running Out of Food in the Last Year: Never true     Ran Out of Food in the " Last Year: Never true   Transportation Needs: No Transportation Needs (10/6/2023)    PRAPARE - Transportation     Lack of Transportation (Medical): No     Lack of Transportation (Non-Medical): No   Physical Activity: Sufficiently Active (10/6/2023)    Exercise Vital Sign     Days of Exercise per Week: 3 days     Minutes of Exercise per Session: 140 min   Stress: No Stress Concern Present (10/6/2023)    [place] [place] [employer] - Occupational Stress Questionnaire     Feeling of Stress : Only a little   Social Connections: Unknown (10/6/2023)    Social Connection and Isolation Panel [NHANES]     Frequency of Communication with Friends and Family: More than three times a week     Frequency of Social Gatherings with Friends and Family: More than three times a week     Attends Zoroastrianism Services: Never     Active Member of Clubs or Organizations: No     Attends Club or Organization Meetings: Never     Marital Status: Patient declined   Housing Stability: Unknown (10/6/2023)    Housing Stability Vital Sign     Unable to Pay for Housing in the Last Year: No     Unstable Housing in the Last Year: No         CBC:   Recent Labs     01/05/24  1751 01/05/24 2039 01/07/24  0519   WBC 18.30*  --  14.84*   RBC 4.28  --  3.55*   HGB 12.4  --  10.1*   HCT 36.5* 41.0 31.2*     --  253   MCV 85  --  88   MCH 29.0  --  28.5   MCHC 34.0  --  32.4       CMP:   Recent Labs     01/05/24  1751 01/07/24  0519   * 133*   K 3.7 3.0*   CL 98 100   CO2 18* 23   BUN 6 9   CREATININE 0.9 0.9   * 297*   MG  --  1.6   PHOS  --  3.2   CALCIUM 9.6 8.6*   ALBUMIN 2.8* 2.2*   PROT 8.1 6.8   ALKPHOS 88 104   ALT 9* 8*   AST 10 10   BILITOT 0.5 0.5     Recent Labs     01/06/24  0928 01/06/24  1202 01/06/24  1334 01/06/24  1355 01/06/24  1622 01/06/24  1936   POCTGLUCOSE 282* 266* 256* 257* 237* 229*        Pre-op Assessment    I have reviewed the Patient Summary Reports.     I have reviewed the Nursing Notes.        Review of Systems  Social:  Smoker       Cardiovascular:     Hypertension  Past MI        CHF    hyperlipidemia      10/2023 Echo:   Left Ventricle: The left ventricle is normal in size. There is concentric hypertrophy. Mild global hypokinesis present. There is moderately reduced systolic function with a visually estimated ejection fraction of 35 - 40%. Grade II diastolic dysfunction.  ·  Right Ventricle: Normal right ventricular cavity size. Wall thickness is normal. Right ventricle wall motion  is normal. Systolic function is normal.  ·  Tricuspid Valve: There is mild regurgitation.  ·  Pulmonary Artery: The estimated pulmonary artery systolic pressure is 26 mmHg.  ·  IVC/SVC: Normal venous pressure at 3 mmHg.                  Hx of Myocardial Infarction     Congestive Heart Failure (CHF)                Hypertension         Pulmonary:        Sleep Apnea Recent COVID infection; repeat testing negative    Obstructive Sleep Apnea (CESAR).           Hepatic/GI:     GERD Liver Disease,     Gerd       Liver Disease        Musculoskeletal:     Right groin abscess s/p bedside I&D            Neurological:   CVA Neuromuscular Disease,                      CVA - Cerebrovasular Accident               Neuromuscular Disease   Endocrine:  Diabetes, poorly controlled, type 2    Diabetes                      Psych:  Psychiatric History                  Physical Exam  General: Well nourished, Cooperative, Alert and Oriented    Airway:  Mallampati: III   Mouth Opening: Normal  TM Distance: Normal  Neck ROM: Normal ROM    Chest/Lungs:  Normal Respiratory Rate    Heart:  Rate: Normal              Anesthesia Plan  Type of Anesthesia, risks & benefits discussed:    Anesthesia Type: Gen ETT  Intra-op Monitoring Plan: Standard ASA Monitors  Induction:  IV and rapid sequence  Informed Consent: Informed consent signed with the Patient and all parties understand the risks and agree with anesthesia plan.  All questions answered.   ASA  Score: 3    Ready For Surgery From Anesthesia Perspective.     .

## 2024-01-07 NOTE — ASSESSMENT & PLAN NOTE
S/p bedside I&D w/ culture of material in ED at [facility]  Dr Padgett assessed pt and had planned to bring to OR here this afternoon, Dr Snyder is covering for him and has pt scheduled for tomorrow AM  I am comfortable w/ this plan as pt appears to be very stable and this process does not appear fulminant  Not septic but is at risk  Close monitoring, IVF, abx  BCX, urine Cx done at [facility] ED

## 2024-01-07 NOTE — ANESTHESIA POSTPROCEDURE EVALUATION
Anesthesia Post Evaluation    Patient: Jaimee Quintana    Procedure(s) Performed: Procedure(s) (LRB):  IRRIGATION AND DEBRIDEMENT RIGHT GROIN (Right)  IRRIGATION AND DEBRIDEMENT, LOWER EXTREMITY    Final Anesthesia Type: general      Patient location during evaluation: PACU  Patient participation: Yes- Able to Participate  Level of consciousness: awake and alert and oriented  Post-procedure vital signs: reviewed and stable  Pain management: adequate  Airway patency: patent    PONV status at discharge: No PONV  Anesthetic complications: no      Cardiovascular status: blood pressure returned to baseline, hemodynamically stable and stable  Respiratory status: unassisted, spontaneous ventilation and room air  Hydration status: euvolemic  Follow-up not needed.              Vitals Value Taken Time   /67 01/07/24 1002   Temp 36.9 °C (98.4 °F) 01/07/24 0910   Pulse 98 01/07/24 1016   Resp 17 01/07/24 1015   SpO2 94 % 01/07/24 1014   Vitals shown include unvalidated device data.      Event Time   Out of Recovery 01/07/2024 10:16:00         Pain/Deng Score: Pain Rating Prior to Med Admin: 9 (1/7/2024  9:46 AM)  Pain Rating Post Med Admin: 3 (1/7/2024 10:10 AM)  Deng Score: 10 (1/7/2024 10:10 AM)

## 2024-01-07 NOTE — ASSESSMENT & PLAN NOTE
48-year-old female with a past medical history of uncontrolled type 2 diabetes mellitus, hypertension, and obesity who presented to outside hospital with right-sided groin wound.  Transferred to [facility] for surgical options.    - to the operating room today for debridement  - consent in chart  - NPO  - continue broad-spectrum antibiotics.  We will plan to culture today in the operating room  - rest of care per primary team  - general surgery will continue to follow

## 2024-01-07 NOTE — HPI
48-year-old female with a past medical history of uncontrolled type 2 diabetes mellitus and obesity who presented to outside hospital with right groin pain.  Found to have complex soft tissue infection of the right groin/perineum.  Bedside I and D performed.  General surgery was consulted at outside facility, but due to operative restrained she was transferred to [facility] for further care.  Upon evaluation she was afebrile, but slightly tachycardic.  Broad-spectrum antibiotics has been initiated at outside hospital.  Plan for operative debridement today in the OR.

## 2024-01-07 NOTE — H&P
"  [facility] Medicine  History & Physical    Patient Name: Jaimee Quintana  MRN: [id number]  Patient Class: IP- Inpatient  Admission Date: 1/6/2024  Attending Physician: Richard Nesbitt MD  Primary Care Provider: Donald Acosta MD         Patient information was obtained from patient and ER records.     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    Chief Complaint:   Chief Complaint   Patient presents with    Abscess     Pt transferred here from [facility] for surgical exploration of right perineum, [facility] OR down today - pt seen there by Dr Padgett who was on call there and here, he has arranged for Dr Snyder to bring pt to OR in AM        HPI: 47 yo w/ DM, HTN, HLD, CHF, typically active - noted a lesion in right groin a few days ago "thought it was a pimple", it was painful so she went to ED in [place], CT showed changes c/w Jeremiah's gangrene in right inguinum, also has findings c/w pyelonephritis and perinephric abscesses on the contralateral side.  Pt w/o current pain, f/c/n/v, flank pain etc - she was under impression she was sent here for "stomach surgery", we talked to her daughter and 2 of her sisters on the phone, separately, at her request, as well as Dr Snyder who called to talk to her preop, at the bedside, to clear this up.  Appreciate GS attention to pt, asking  to see in AM as well as we appear to have 2 separate processes here, she may need intervention from  as well although most likely will just need to treat the infection and reimage kidneys at some point.  She had I&D at bedside of right [facility] area in [place], presume material was sent for culture.  Incidentally pt was ill w/ COVID 19 12/18, we were advised on transfer of this, she seems to have fully recovered.  Per pt she was tested again at [facility] and was negative, and Epic prompted me "pt has negative COVID test" - but, we were not able to confirm this result, so we did another rapid which was negative.  I " also have requested EKG and CXR preop in this pt w/ CHF, morbid obesity.    Pt is admitted to  services, and Dr Snyder scheduling for OR in AM.      Past Medical History:   Diagnosis Date    Cataract     Cervical high risk HPV (human papillomavirus) test positive 2020    NOT 16 OR 18    CHF (congestive heart failure)     CVA (cerebral infarction)          Depression     Diabetes mellitus, type 2     GERD (gastroesophageal reflux disease)     Hyperlipidemia     Hypertension     Lactic acidosis 10/06/2023    Moderate nonproliferative diabetic retinopathy     Nausea and vomiting 10/06/2023    Obesity     Stroke     Tachycardia 10/04/2023       Past Surgical History:   Procedure Laterality Date     SECTION      CHOLECYSTECTOMY      DILATION AND CURETTAGE OF UTERUS      GALLBLADDER SURGERY  2017    stone removed       Review of patient's allergies indicates:  No Known Allergies    Current Facility-Administered Medications on File Prior to Encounter   Medication    [COMPLETED] insulin regular injection 11 Units 0.11 mL    [COMPLETED] insulin regular injection 11 Units 0.11 mL    [COMPLETED] ketorolac injection 15 mg    [COMPLETED] ondansetron injection 4 mg    [COMPLETED] piperacillin-tazobactam (ZOSYN) 4.5 g in dextrose 5 % in water (D5W) 100 mL IVPB (MB+)    [COMPLETED] piperacillin-tazobactam (ZOSYN) 4.5 g in dextrose 5 % in water (D5W) 100 mL IVPB (MB+)    [COMPLETED] vancomycin (VANCOCIN) 2,500 mg in dextrose 5 % (D5W) 500 mL IVPB    [DISCONTINUED] aspirin EC tablet 81 mg    [DISCONTINUED] atorvastatin tablet 80 mg    [COMPLETED] clindamycin in D5W 600 mg/50 mL IVPB 600 mg    [DISCONTINUED] dextrose 10% bolus 125 mL 125 mL    [DISCONTINUED] dextrose 10% bolus 250 mL 250 mL    [DISCONTINUED] ezetimibe tablet 10 mg    [DISCONTINUED] glucagon (human recombinant) injection 1 mg    [DISCONTINUED] glucose chewable tablet 16 g    [DISCONTINUED] glucose chewable tablet 24 g    [DISCONTINUED] insulin  aspart U-100 pen 0-10 Units    [DISCONTINUED] insulin detemir U-100 (Levemir) pen 25 Units    [DISCONTINUED] insulin detemir U-100 (Levemir) pen 30 Units    [DISCONTINUED] lisinopriL tablet 40 mg    [DISCONTINUED] NIFEdipine 24 hr tablet 60 mg    [DISCONTINUED] vancomycin in dextrose 5 % 1 gram/250 mL IVPB 1,000 mg     Current Outpatient Medications on File Prior to Encounter   Medication Sig    aspirin (ECOTRIN) 81 MG EC tablet Take 81 mg by mouth once daily.    atorvastatin (LIPITOR) 80 MG tablet Take 1 tablet (80 mg total) by mouth every evening.    blood sugar diagnostic Strp Use to test blood glucose two (2) times daily as directed with insurance preferred meter and supplies    dulaglutide (TRULICITY) 0.75 mg/0.5 mL pen injector Inject 0.75 mg into the skin every 7 days. OK to discontinue this med during SNF stay - resume upon discharge (Patient taking differently: Inject 0.75 mg into the skin every Sunday.)    ergocalciferol (ERGOCALCIFEROL) 50,000 unit Cap Take 50,000 Units by mouth every Saturday.    ezetimibe (ZETIA) 10 mg tablet Take 1 tablet (10 mg total) by mouth once daily. (For cholesterol)    FLUoxetine 40 MG capsule Take 40 mg by mouth once daily.    fluticasone propionate (FLONASE) 50 mcg/actuation nasal spray 2 sprays (100 mcg total) by Each Nostril route daily as needed for Allergies or Rhinitis.    furosemide (LASIX) 40 MG tablet Take 1 tablet (40 mg total) by mouth once daily.    ibuprofen (ADVIL,MOTRIN) 200 MG tablet Take 200 mg by mouth every 6 (six) hours as needed for Pain.    insulin aspart U-100 (NOVOLOG FLEXPEN U-100 INSULIN) 100 unit/mL (3 mL) InPn pen Inject 30 Units into the skin 2 (two) times a day.    lancets (TRUEPLUS LANCETS) 28 gauge Misc Use to test blood glucose two (2) times daily as directed with insurance preferred meter and supplies    LANTUS SOLOSTAR U-100 INSULIN glargine 100 units/mL SubQ pen Inject 80 Units into the skin 2 (two) times a day.    lisinopriL  "(PRINIVIL,ZESTRIL) 40 MG tablet Take 1 tablet (40 mg total) by mouth once daily.    metFORMIN (GLUCOPHAGE-XR) 500 MG ER 24hr tablet Take 1,000 mg by mouth 2 (two) times daily with meals.    metoprolol succinate (TOPROL-XL) 100 MG 24 hr tablet Take 100 mg by mouth once daily.    NIFEdipine (ADALAT CC) 60 MG TbSR Take 60 mg by mouth every morning.    pantoprazole (PROTONIX) 40 MG tablet Take 40 mg by mouth once daily.    pen needle, diabetic (BD ULTRA-FINE ROSA PEN NEEDLE) 32 gauge x 5/32" Ndle Use with insulin once daily    pen needle, diabetic 32 gauge x 5/32" Ndle Use with injectable DM supplies twice daily as directed    potassium chloride (KLOR-CON) 8 MEQ TbSR TAKE TWO TABLETS BY MOUTH TWICE DAILY @ 9AM & 5PM    TRUE METRIX GO GLUCOSE METER Misc     [DISCONTINUED] aspirin (ECOTRIN) 81 MG EC tablet Take 1 tablet (81 mg total) by mouth once daily.    [DISCONTINUED] insulin detemir U-100, Levemir, 100 unit/mL (3 mL) SubQ InPn pen Inject 40 Units into the skin 2 (two) times daily.     Family History       Problem Relation (Age of Onset)    Arthritis Father    Asthma Daughter    Cataracts Father    Diabetes Mother, Father    Heart disease Sister    Hypertension Father, Sister    No Known Problems Brother, Maternal Aunt, Maternal Uncle, Paternal Aunt, Paternal Uncle, Maternal Grandmother, Maternal Grandfather, Paternal Grandmother, Paternal Grandfather    Stroke Mother, Sister    Thyroid disease Mother, Sister, Daughter          Tobacco Use    Smoking status: Every Day     Current packs/day: 0.00     Types: Cigarettes     Last attempt to quit: 2021     Years since quittin.9    Smokeless tobacco: Never   Substance and Sexual Activity    Alcohol use: Yes     Alcohol/week: 3.0 standard drinks of alcohol     Types: 3 Cans of beer per week     Comment: Rare    Drug use: No    Sexual activity: Yes     Partners: Male     Birth control/protection: None     Review of Systems    General: No fevers, weight changes. "   HEENT: No visual changes, neck pain, swallowing problems, hoarseness.  CV: No cp/sob, no palpitations, edema.   Pulm: No cough, sob, hemoptysis. No known CESAR.    GI: No n/v/d, no abdominal pain, no melena, hematochezia.   : No problems w/ bladder control, dysuria, hematuria.  Musculoskeletal: No joint pains/stiffness, back pain.   Neuro:  No HA, seizure, focal weakness, falls, dizzy spells.     Objective:     Vital Signs (Most Recent):  Temp: 97.8 °F (36.6 °C) (01/06/24 1612)  Pulse: 109 (01/06/24 1612)  Resp: 18 (01/06/24 1612)  BP: 132/72 (01/06/24 1612)  SpO2: 95 % (01/06/24 1612) Vital Signs (24h Range):  Temp:  [97.8 °F (36.6 °C)-98.7 °F (37.1 °C)] 97.8 °F (36.6 °C)  Pulse:  [100-117] 109  Resp:  [18] 18  SpO2:  [93 %-98 %] 95 %  BP: (122-158)/(58-98) 132/72     Weight: 105 kg (231 lb 7.7 oz)  Body mass index is 38.52 kg/m².     Physical Exam        General:  A&O, NAD  HEENT: neck supple, PERRL/EOMI, EAC clear bilaterally, normal bilateral carotid upstroke w/o bruits, inf turbs clear bilaterally, OC/OP clear  Lungs: CTAB  CV: RRR w/o M/G/R  Abdomen: soft, NTND, no HSM, no bruits/masses/pulsations, massively obese  Ext: no edema  : she has a small incision right mons area, currently w/o any drainage/odor, the surrounding soft tissue is a bit indurated but not particularly tender to touch, she has a lot of redundant fatty tissue in the area.  There is some erythema.  No CVAT.   Rectal: def  Neuro: NF    Significant Labs: All pertinent labs within the past 24 hours have been reviewed.    Significant Imaging: I have reviewed all pertinent imaging results/findings within the past 24 hours.  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  S/p bedside I&D w/ culture of material in ED at [facility]  Dr Padgett assessed pt and had planned to bring to OR here this afternoon, Dr Snyder is covering for him and has pt scheduled for tomorrow AM  I am comfortable w/ this plan as pt appears to be very stable and this  process does not appear fulminant  Not septic but is at risk  Close monitoring, IVF, abx  BCX, urine Cx done at [facility] ED      Pyelonephritis  Seen on CT along w/ perinephric abscesses not seen on u/s  Pt is clinically w/ minimal issues - no dysuria, back/flank pain, f/c/n/v  BCX done in [facility] ED  U/a not suggestive of any substantial UTI but the abscesses may not be communicating w/ the collecting system  Given Zosyn and vancomycin in ED  I had started Zosyn and IV doxy here empirically when I wrote admit orders but given review of all information will start meropenem, vancomycin, and clindamycin  Might need ID input  Suprisingly she does not even meet SIRS criteria at this time  Will ask  to see tomorrow as well      COVID-19  Pt indicates she was acutely ill w/ COVID in mid Dec  We were not able to confirm a negative test at [facility], so we got a rapid here, it's negative  She is w/o f/c/cough/SOB  No indication for treatment or isolation     Chronic combined systolic and diastolic congestive heart failure  Pt w/ combined CHF, not sure if there is ischemic component  Appears to be well compensated  Should undergo OPT assessment for CESAR  Will need cardiology f/u  CXR obtained preop, lungs clear, pt w/o CANELA/SOB/CP    Latest ECHO performed and demonstrates- Results for orders placed during the hospital encounter of 10/04/23    Echo    Interpretation Summary    Left Ventricle: The left ventricle is normal in size. There is concentric hypertrophy. Mild global hypokinesis present. There is moderately reduced systolic function with a visually estimated ejection fraction of 35 - 40%. Grade II diastolic dysfunction.    Right Ventricle: Normal right ventricular cavity size. Wall thickness is normal. Right ventricle wall motion  is normal. Systolic function is normal.    Tricuspid Valve: There is mild regurgitation.    Pulmonary Artery: The estimated pulmonary artery systolic pressure is 26 mmHg.    IVC/SVC: Normal venous  pressure at 3 mmHg.      Class 2 severe obesity due to excess calories with serious comorbidity and body mass index (BMI) of 38.0 to 38.9 in adult  Body mass index is 38.52 kg/m².  Morbid obesity complicates all aspects of disease management from diagnostic modalities to treatment  Weight loss encouraged and health benefits explained to patient        History of CVA (cerebrovascular accident) without residual deficits  Apparently w/o sequelae  Close OPT f/u  Underlying untreated CESAR would increase risk of recurrence        Type 2 diabetes mellitus with both eyes affected by proliferative retinopathy and macular edema, with long-term current use of insulin  A1c 9.5%  SSI      CESAR (obstructive sleep apnea)  Pt denies having CESAR but this record is in her chart  Any recent PSG?  She certainly appears at risk  OPT f/u will be needed  Observe in recovery from surgery, caution w/ narcotics postop        Hyperlipidemia associated with type 2 diabetes mellitus  Home meds as appropriate  Does pt have CAD?      HTN (hypertension) without retinopathy  Home meds as appropriate  Pt is very non toxic appearing and currently w/ normal to high BP      VTE Risk Mitigation (From admission, onward)           Ordered     heparin (porcine) injection 5,000 Units  Every 8 hours         01/06/24 1628     IP VTE HIGH RISK PATIENT  Once         01/06/24 1628     Place sequential compression device  Until discontinued         01/06/24 1628                                    Richard Nesbitt MD  Department of Hospital Medicine  [facility]

## 2024-01-07 NOTE — PLAN OF CARE
Case Management Assessment     PCP: Lakisha Mendez  Pharmacy: [facility] in [place]    Patient Arrived From: home  Existing Help at Home: daughter/sister    Barriers to Discharge: none    Discharge Plan:    A. Home with family   B. home      SW completed brief assessment and discussed discharge planning wit patient at her bedside. Patient stated that she lives with her 2 daughters. Patient's sister will provide transportation for her to get home when discharge from the hospital.      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 Yes   Do you have help at home or someone to help you manage your care at home? Yes   Who are your caregiver(s) and their phone number(s)? Iris Quintana (Sister) [phone] (Mobile)   Prior to hospitilization cognitive status: Alert/Oriented   Current cognitive status: Alert/Oriented   Walking or Climbing Stairs Difficulty no   Dressing/Bathing Difficulty no   Equipment Currently Used at Home none   Readmission within 30 days? No   Patient currently being followed by outpatient case management? No   Do you currently have service(s) that help you manage your care at home? No   Do you take prescription medications? Yes   Do you have prescription coverage? Yes   Coverage [employer]   Do you have any problems affording any of your prescribed medications? No   Is the patient taking medications as prescribed? yes   Who is going to help you get home at discharge? Iris Quintana (Sister) [phone] (Mobile)   How do you get to doctors appointments? health plan  transportation   Are you on dialysis? No   Do you take coumadin? No   Discharge Plan A Home with family   Discharge Plan B Home   DME Needed Upon Discharge  none   Discharge Plan discussed with: Patient   Transition of Care Barriers None

## 2024-01-07 NOTE — ASSESSMENT & PLAN NOTE
Seen on CT along w/ perinephric abscesses not seen on u/s  Pt is clinically w/ minimal issues - no dysuria, back/flank pain, f/c/n/v  BCX done in [facility] ED  U/a not suggestive of any substantial UTI but the abscesses may not be communicating w/ the collecting system  Given Zosyn and vancomycin in ED  I had started Zosyn and IV doxy here empirically when I wrote admit orders but given review of all information will start meropenem, vancomycin, and clindamycin  Might need ID input  Suprisingly she does not even meet SIRS criteria at this time  Will ask  to see

## 2024-01-07 NOTE — NURSING
Received pt on bed. Alert and oriented. Served meal tray at the bedside. Informed NPO at midnight, pt understand well. On RA not in distress. HOB elevated. Pts need attended. Instructed to call for assistance.

## 2024-01-07 NOTE — HPI
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  Pt w/o current pain, f/c/n/v, flank pain etc - she was under impression she was sent here for "stomach surgery", we talked to her daughter and 2 of her sisters on the phone, separately, at her request, as well as Dr Snyder who called to talk to her preop, at the bedside, to clear this up.  Appreciate GS attention to pt, asking  to see in AM as well as we appear to have 2 separate processes here, she may need intervention from  as well although most likely will just need to treat the infection and reimage kidneys at some point.  She had I&D at bedside of right mons area in [place], presume material was sent for culture.  Incidentally pt was ill w/ COVID 19 12/18, we were advised on transfer of this, she seems to have fully recovered.  Per pt she was tested again at [facility] and was negative, and Epic prompted me "pt has negative COVID test" - but, we were not able to confirm this result, so we did another rapid which was negative.  I also have requested EKG and CXR preop in this pt w/ CHF, morbid obesity.    Pt is admitted to  services, and Dr Snyder scheduling for OR in AM.    "

## 2024-01-07 NOTE — PROGRESS NOTES
Pharmacokinetic Initial Assessment: IV Vancomycin    Assessment/Plan:    Initiate intravenous vancomycin with loading dose of 2500 mg once followed by a maintenance dose of vancomycin 1500 mg IV every 12 hours  Desired empiric serum trough concentration is 10 to 20 mcg/mL  Draw vancomycin trough level 60 min prior to fourth dose on 1/7/23 at approximately 2200  Pharmacy will continue to follow and monitor vancomycin.      Please contact pharmacy at extension [phone] with any questions regarding this assessment.     Thank you for the consult,   Sawyer Vázquez       Patient brief summary:  Jaimee Quintana is a 48 y.o. female initiated on antimicrobial therapy with IV Vancomycin for treatment of suspected skin & soft tissue infection    Drug Allergies:   Review of patient's allergies indicates:  No Known Allergies    Actual Body Weight:   105 kg    Renal Function:   Estimated Creatinine Clearance: 93 mL/min (based on SCr of 0.9 mg/dL).,     Dialysis Method (if applicable):  N/A    CBC (last 72 hours):  Recent Labs   Lab Result Units 01/05/24  1751 01/06/24  1032   WBC K/uL 18.30*  --    Hemoglobin g/dL 12.4  --    Hemoglobin A1C %  --  9.5*   Hematocrit % 36.5*  --    Platelets K/uL 255  --    Gran % % 86.6*  --    Lymph % % 7.5*  --    Mono % % 5.0  --    Eosinophil % % 0.1  --    Basophil % % 0.1  --    Differential Method  Automated  --        Metabolic Panel (last 72 hours):  Recent Labs   Lab Result Units 01/05/24  1751 01/05/24  1818   Sodium mmol/L 131*  --    Potassium mmol/L 3.7  --    Chloride mmol/L 98  --    CO2 mmol/L 18*  --    Glucose mg/dL 418*  --    Glucose, UA   --  4+*   BUN mg/dL 6  --    Creatinine mg/dL 0.9  --    Albumin g/dL 2.8*  --    Total Bilirubin mg/dL 0.5  --    Alkaline Phosphatase U/L 88  --    AST U/L 10  --    ALT U/L 9*  --        Drug levels (last 3 results):  Recent Labs   Lab Result Units 01/06/24  2110   Vancomycin, Random ug/mL 7.3       Microbiologic  Results:  Microbiology Results (last 7 days)       ** No results found for the last 168 hours. **

## 2024-01-07 NOTE — HOSPITAL COURSE
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  Pt w/o current pain, f/c/n/v, flank pain etc - she was under impression she was sent here for "stomach surgery", we talked to her daughter and 2 of her sisters on the phone, separately, at her request, as well as Dr Snyder who called to talk to her preop, at the bedside, to clear this up.  Appreciate GS attention to pt, asking  to see in AM as well as we appear to have 2 separate processes here, she may need intervention from  as well although most likely will just need to treat the infection and reimage kidneys at some point.  She had I&D at bedside of right mons area in [place], presume material was sent for culture.  Incidentally pt was ill w/ COVID 19 12/18, we were advised on transfer of this, she seems to have fully recovered.  Per pt she was tested again at [facility] and was negative, and Epic prompted me "pt has negative COVID test" - but, we were not able to confirm this result, so we did another rapid which was negative.  I also have requested EKG and CXR preop in this pt w/ CHF, morbid obesity.    Pt is admitted to  services,surgery is consulted.  S/P I&D  and irrigation and debridement rightly groin and right leg,  S/P drainage of left perinephritic abscess by IR ,  Culture growing staph,strep,Ecoli,per ID on Vanc. And Rocephin,  Urology did another US to check size of kidney.remains same size.  Surgery planing placed wound vac.  Per ID, abx were changged to PO doxy and augmentin prior to dc.  Pt was dc home with HH and follow ups.   "

## 2024-01-07 NOTE — ASSESSMENT & PLAN NOTE
Check renal ultrasound  Consult Interventional Radiology for aspiration versus drainage of the abscesses  NPO after midnight  Follow cultures treat with 2 weeks of antibiotics  I agree with ID consult

## 2024-01-07 NOTE — HPI
Perinephric Abscess  Jaimee Quintana is a 48 y.o. woman transferred here from [place] due to necrotizing fasciitis of the right groin.  CT scan at the time of presentation also showed to left perinephric fluid collections with stranding.  Patient denies flank pain currently.  She was vaguely aware of an issue with the kidney but did not know the details.  She denies dysuria or hematuria.  She has been febrile.

## 2024-01-07 NOTE — PLAN OF CARE
Problem: Adult Inpatient Plan of Care  Goal: Plan of Care Review  Outcome: Ongoing, Progressing  Flowsheets (Taken 1/7/2024 0622)  Plan of Care Reviewed With: patient  Goal: Optimal Comfort and Wellbeing  Outcome: Ongoing, Progressing  Intervention: Monitor Pain and Promote Comfort  Flowsheets (Taken 1/7/2024 0622)  Pain Management Interventions:   position adjusted   pillow support provided   quiet environment facilitated   medication offered     Problem: Diabetes Comorbidity  Goal: Blood Glucose Level Within Targeted Range  Outcome: Ongoing, Progressing     Problem: Impaired Wound Healing  Goal: Optimal Wound Healing  Outcome: Ongoing, Progressing

## 2024-01-07 NOTE — HPI
"48F with h/o DM admitted with a progressively enlarging and more painful skin lesion in R groin since Tuesday; thought it was a pimple. Denies drainage prior to arrival. Lesion kept getting bigger and more painful and then she had fever, so went to hospital. Reports some dysuria. Denies cva tenderness. Denies indwelling hardware. Denies abx allergies.     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     Transferred to [facility] for surgical eval    Today 1/7, s/p  IRRIGATION AND DEBRIDEMENT RIGHT GROIN (Right)  IRRIGATION AND DEBRIDEMENT, LOWER EXTREMITY     Per op note, sinus tract found and debrided. Necrosis extended through Miller's fascia down to the external oblique     Wound culture from admit-  Specimen Information: Abdomen; Abscess   0 Result Notes      Component 2 d ago   Aerobic Bacterial Culture      Abnormal   GRAM NEGATIVE EDOUARD  Many  Identification and susceptibility pending  P      Aerobic Bacterial Culture      Abnormal   STREPTOCOCCUS ANGINOSUS  Many  Susceptibility testing not routinely performed  P             OR cultures today - pending    Ucx GNR      Renal US     3 cm complex collection adjacent to the left kidney in keeping with the patient's known perinephric abscess.     On vanc/meropenem/clindamycin    ID consulted for "abx mgt assistance, right sided Jeremiah's gangrene, contralateral pyelo w/ PN abscesses, DM   "

## 2024-01-07 NOTE — ASSESSMENT & PLAN NOTE
S/p bedside I&D w/ culture of material in ED at [facility]  Dr Padgett assessed pt and had planned to bring to OR here this afternoon, Dr Snyder is covering for him and has pt scheduled for tomorrow AM  I am comfortable w/ this plan as pt appears to be very stable and this process does not appear fulminant  Not septic but is at risk  Close monitoring, IVF, abx  BCX, urine Cx done at [facility] ED.    S/P I&D  and irrigation and debridement rightly groin and right leg,on 1.7.24,cultures pending.

## 2024-01-07 NOTE — TRANSFER OF CARE
"Anesthesia Transfer of Care Note    Patient: Jaimee Quintana    Procedure(s) Performed: Procedure(s) (LRB):  IRRIGATION AND DEBRIDEMENT RIGHT GROIN (Right)  IRRIGATION AND DEBRIDEMENT, LOWER EXTREMITY    Patient location: PACU    Anesthesia Type: general    Transport from OR: Transported from OR on 100% O2 by closed face mask with adequate spontaneous ventilation    Post pain: adequate analgesia    Post assessment: no apparent anesthetic complications and tolerated procedure well    Post vital signs: stable    Level of consciousness: sedated and responds to stimulation    Nausea/Vomiting: no nausea/vomiting    Complications: none    Transfer of care protocol was followed      Last vitals: Visit Vitals  /89 (BP Location: Left arm)   Pulse 97   Temp 36.9 °C (98.4 °F) (Axillary)   Resp 18   Ht 5' 5" (1.651 m)   Wt 105 kg (231 lb 7.7 oz)   LMP 12/10/2023   SpO2 98%   Breastfeeding No   BMI 38.52 kg/m²     "

## 2024-01-07 NOTE — NURSING
Patient arrived to floor via transporter tech from ED. Patient transferred to bed independently. AAOX4. Patient was oriented to room, information on communication board, and medication regimen. Bed low adequate lighting provided, side rails x2 up, call bell in reach. Admission assessment completed. Vitals per chart. Patient denied having any acute distress at this time. None observed. \    [facility] [place]  Nurses Note -- 4 Eyes      1/7/2024       Skin assessed on: Admit      [x] No Pressure Injuries Present    []Prevention Measures Documented    [] Yes LDA  for Pressure Injury Previously documented     [] Yes New Pressure Injury Discovered   [] LDA for New Pressure Injury Added      Attending RN:  Mini Luna, RN     Second RN:  Kat Mustafa RN

## 2024-01-07 NOTE — NURSING
Pt leaving unit to surgery via stretcher transport. Pt voided before leaving room. IV patent. Pt in no acute distress.

## 2024-01-07 NOTE — ASSESSMENT & PLAN NOTE
Pt indicates she was acutely ill w/ COVID in mid Dec  We were not able to confirm a negative test at [facility], so we got a rapid here, it's negative  She is w/o f/c/cough/SOB  No indication for treatment or isolation

## 2024-01-07 NOTE — ASSESSMENT & PLAN NOTE
Seen on CT along w/ perinephric abscesses not seen on u/s  Pt is clinically w/ minimal issues - no dysuria, back/flank pain, f/c/n/v  BCX done in [facility] ED  U/a not suggestive of any substantial UTI but the abscesses may not be communicating w/ the collecting system  Given Zosyn and vancomycin in ED  I had started Zosyn and IV doxy here empirically when I wrote admit orders but given review of all information will start meropenem, vancomycin, and clindamycin  Might need ID input  Suprisingly she does not even meet SIRS criteria at this time  Will ask  to see tomorrow as well

## 2024-01-07 NOTE — SUBJECTIVE & OBJECTIVE
Past Medical History:   Diagnosis Date    Cataract     Cervical high risk HPV (human papillomavirus) test positive 2020    NOT 16 OR 18    CHF (congestive heart failure)     CVA (cerebral infarction)          Depression     Diabetes mellitus, type 2     GERD (gastroesophageal reflux disease)     Hyperlipidemia     Hypertension     Lactic acidosis 10/06/2023    Moderate nonproliferative diabetic retinopathy     Nausea and vomiting 10/06/2023    Obesity     Stroke     Tachycardia 10/04/2023       Past Surgical History:   Procedure Laterality Date     SECTION      CHOLECYSTECTOMY      DILATION AND CURETTAGE OF UTERUS      EYE SURGERY Bilateral     laser    GALLBLADDER SURGERY  2017    stone removed       Review of patient's allergies indicates:  No Known Allergies    Current Facility-Administered Medications on File Prior to Encounter   Medication    [COMPLETED] piperacillin-tazobactam (ZOSYN) 4.5 g in dextrose 5 % in water (D5W) 100 mL IVPB (MB+)    [DISCONTINUED] aspirin EC tablet 81 mg    [DISCONTINUED] atorvastatin tablet 80 mg    [DISCONTINUED] dextrose 10% bolus 125 mL 125 mL    [DISCONTINUED] dextrose 10% bolus 250 mL 250 mL    [DISCONTINUED] ezetimibe tablet 10 mg    [DISCONTINUED] glucagon (human recombinant) injection 1 mg    [DISCONTINUED] glucose chewable tablet 16 g    [DISCONTINUED] glucose chewable tablet 24 g    [DISCONTINUED] insulin aspart U-100 pen 0-10 Units    [DISCONTINUED] insulin detemir U-100 (Levemir) pen 25 Units    [DISCONTINUED] insulin detemir U-100 (Levemir) pen 30 Units    [DISCONTINUED] lisinopriL tablet 40 mg    [DISCONTINUED] NIFEdipine 24 hr tablet 60 mg     Current Outpatient Medications on File Prior to Encounter   Medication Sig    aspirin (ECOTRIN) 81 MG EC tablet Take 81 mg by mouth once daily.    atorvastatin (LIPITOR) 80 MG tablet Take 1 tablet (80 mg total) by mouth every evening.    blood sugar diagnostic Strp Use to test blood glucose two (2) times  "daily as directed with insurance preferred meter and supplies    dulaglutide (TRULICITY) 0.75 mg/0.5 mL pen injector Inject 0.75 mg into the skin every 7 days. OK to discontinue this med during SNF stay - resume upon discharge (Patient taking differently: Inject 0.75 mg into the skin every Sunday.)    ergocalciferol (ERGOCALCIFEROL) 50,000 unit Cap Take 50,000 Units by mouth every Saturday.    ezetimibe (ZETIA) 10 mg tablet Take 1 tablet (10 mg total) by mouth once daily. (For cholesterol)    FLUoxetine 40 MG capsule Take 40 mg by mouth once daily.    fluticasone propionate (FLONASE) 50 mcg/actuation nasal spray 2 sprays (100 mcg total) by Each Nostril route daily as needed for Allergies or Rhinitis.    furosemide (LASIX) 40 MG tablet Take 1 tablet (40 mg total) by mouth once daily.    ibuprofen (ADVIL,MOTRIN) 200 MG tablet Take 200 mg by mouth every 6 (six) hours as needed for Pain.    insulin aspart U-100 (NOVOLOG FLEXPEN U-100 INSULIN) 100 unit/mL (3 mL) InPn pen Inject 30 Units into the skin 2 (two) times a day.    lancets (TRUEPLUS LANCETS) 28 gauge Misc Use to test blood glucose two (2) times daily as directed with insurance preferred meter and supplies    LANTUS SOLOSTAR U-100 INSULIN glargine 100 units/mL SubQ pen Inject 80 Units into the skin 2 (two) times a day.    lisinopriL (PRINIVIL,ZESTRIL) 40 MG tablet Take 1 tablet (40 mg total) by mouth once daily.    metFORMIN (GLUCOPHAGE-XR) 500 MG ER 24hr tablet Take 1,000 mg by mouth 2 (two) times daily with meals.    metoprolol succinate (TOPROL-XL) 100 MG 24 hr tablet Take 100 mg by mouth once daily.    NIFEdipine (ADALAT CC) 60 MG TbSR Take 60 mg by mouth every morning.    pantoprazole (PROTONIX) 40 MG tablet Take 40 mg by mouth once daily.    pen needle, diabetic (BD ULTRA-FINE ROSA PEN NEEDLE) 32 gauge x 5/32" Ndle Use with insulin once daily    pen needle, diabetic 32 gauge x 5/32" Ndle Use with injectable DM supplies twice daily as directed    potassium " chloride (KLOR-CON) 8 MEQ TbSR TAKE TWO TABLETS BY MOUTH TWICE DAILY @ 9AM & 5PM    TRUE METRIX GO GLUCOSE METER Misc      Family History       Problem Relation (Age of Onset)    Arthritis Father    Asthma Daughter    Cataracts Father    Diabetes Mother, Father    Heart disease Sister    Hypertension Father, Sister    No Known Problems Brother, Maternal Aunt, Maternal Uncle, Paternal Aunt, Paternal Uncle, Maternal Grandmother, Maternal Grandfather, Paternal Grandmother, Paternal Grandfather    Stroke Mother, Sister    Thyroid disease Mother, Sister, Daughter          Tobacco Use    Smoking status: Every Day     Current packs/day: 0.00     Types: Cigarettes     Last attempt to quit: 2021     Years since quittin.9    Smokeless tobacco: Never   Substance and Sexual Activity    Alcohol use: Yes     Alcohol/week: 3.0 standard drinks of alcohol     Types: 3 Cans of beer per week     Comment: Rare    Drug use: No    Sexual activity: Yes     Partners: Male     Birth control/protection: None     Review of Systems    General: No fevers, weight changes.   HEENT: No visual changes, neck pain, swallowing problems, hoarseness.  CV: No cp/sob, no palpitations, edema.   Pulm: No cough, sob, hemoptysis. No known CESAR.    GI: No n/v/d, no abdominal pain, no melena, hematochezia.   : No problems w/ bladder control, dysuria, hematuria.  Musculoskeletal: No joint pains/stiffness, back pain.   Neuro:  No HA, seizure, focal weakness, falls, dizzy spells.     Objective:     Vital Signs (Most Recent):  Temp: 98.7 °F (37.1 °C) (24 1102)  Pulse: 98 (24 1102)  Resp: 20 (24 1102)  BP: 124/62 (24 1102)  SpO2: (!) 92 % (24 1102) Vital Signs (24h Range):  Temp:  [97.8 °F (36.6 °C)-101.4 °F (38.6 °C)] 98.7 °F (37.1 °C)  Pulse:  [] 98  Resp:  [16-28] 20  SpO2:  [89 %-99 %] 92 %  BP: (101-151)/(52-89) 124/62     Weight: 105 kg (231 lb 7.7 oz)  Body mass index is 38.52 kg/m².     Physical Exam         General:  A&O, NAD  HEENT: neck supple, PERRL/EOMI, EAC clear bilaterally, normal bilateral carotid upstroke w/o bruits, inf turbs clear bilaterally, OC/OP clear  Lungs: CTAB  CV: RRR w/o M/G/R  Abdomen: soft, NTND, no HSM, no bruits/masses/pulsations, massively obese  Ext: no edema  : she has a small incision right mons area, currently w/o any drainage/odor, the surrounding soft tissue is a bit indurated but not particularly tender to touch, she has a lot of redundant fatty tissue in the area.  There is some erythema.  No CVAT.   Rectal: def  Neuro: NF    Significant Labs: All pertinent labs within the past 24 hours have been reviewed.    Significant Imaging: I have reviewed all pertinent imaging results/findings within the past 24 hours.

## 2024-01-07 NOTE — CONSULTS
[facility] Surg  General Surgery  Consult Note    Patient Name: Jaimee Quintana  MRN: [id number]  Code Status: Full Code  Admission Date: 1/6/2024  Hospital Length of Stay: 1 days  Attending Physician: Campbell Liraino, *  Primary Care Provider: Donald Acosta MD    Patient information was obtained from patient and ER records.     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  Found to have complex soft tissue infection of the right groin/perineum.  Bedside I and D performed.  General surgery was consulted at outside facility, but due to operative restrained she was transferred to [facility] for further care.  Upon evaluation she was afebrile, but slightly tachycardic.  Broad-spectrum antibiotics has been initiated at outside hospital.  Plan for operative debridement today in the OR.    Current Facility-Administered Medications on File Prior to Encounter   Medication    [COMPLETED] piperacillin-tazobactam (ZOSYN) 4.5 g in dextrose 5 % in water (D5W) 100 mL IVPB (MB+)    [DISCONTINUED] aspirin EC tablet 81 mg    [DISCONTINUED] atorvastatin tablet 80 mg    [DISCONTINUED] dextrose 10% bolus 125 mL 125 mL    [DISCONTINUED] dextrose 10% bolus 250 mL 250 mL    [DISCONTINUED] ezetimibe tablet 10 mg    [DISCONTINUED] glucagon (human recombinant) injection 1 mg    [DISCONTINUED] glucose chewable tablet 16 g    [DISCONTINUED] glucose chewable tablet 24 g    [DISCONTINUED] insulin aspart U-100 pen 0-10 Units    [DISCONTINUED] insulin detemir U-100 (Levemir) pen 25 Units    [DISCONTINUED] insulin detemir U-100 (Levemir) pen 30 Units     [DISCONTINUED] lisinopriL tablet 40 mg    [DISCONTINUED] NIFEdipine 24 hr tablet 60 mg     Current Outpatient Medications on File Prior to Encounter   Medication Sig    aspirin (ECOTRIN) 81 MG EC tablet Take 81 mg by mouth once daily.    atorvastatin (LIPITOR) 80 MG tablet Take 1 tablet (80 mg total) by mouth every evening.    blood sugar diagnostic Strp Use to test blood glucose two (2) times daily as directed with insurance preferred meter and supplies    dulaglutide (TRULICITY) 0.75 mg/0.5 mL pen injector Inject 0.75 mg into the skin every 7 days. OK to discontinue this med during SNF stay - resume upon discharge (Patient taking differently: Inject 0.75 mg into the skin every Sunday.)    ergocalciferol (ERGOCALCIFEROL) 50,000 unit Cap Take 50,000 Units by mouth every Saturday.    ezetimibe (ZETIA) 10 mg tablet Take 1 tablet (10 mg total) by mouth once daily. (For cholesterol)    FLUoxetine 40 MG capsule Take 40 mg by mouth once daily.    fluticasone propionate (FLONASE) 50 mcg/actuation nasal spray 2 sprays (100 mcg total) by Each Nostril route daily as needed for Allergies or Rhinitis.    furosemide (LASIX) 40 MG tablet Take 1 tablet (40 mg total) by mouth once daily.    ibuprofen (ADVIL,MOTRIN) 200 MG tablet Take 200 mg by mouth every 6 (six) hours as needed for Pain.    insulin aspart U-100 (NOVOLOG FLEXPEN U-100 INSULIN) 100 unit/mL (3 mL) InPn pen Inject 30 Units into the skin 2 (two) times a day.    lancets (TRUEPLUS LANCETS) 28 gauge Misc Use to test blood glucose two (2) times daily as directed with insurance preferred meter and supplies    LANTUS SOLOSTAR U-100 INSULIN glargine 100 units/mL SubQ pen Inject 80 Units into the skin 2 (two) times a day.    lisinopriL (PRINIVIL,ZESTRIL) 40 MG tablet Take 1 tablet (40 mg total) by mouth once daily.    metFORMIN (GLUCOPHAGE-XR) 500 MG ER 24hr tablet Take 1,000 mg by mouth 2 (two) times daily with meals.    metoprolol succinate (TOPROL-XL) 100 MG 24 hr tablet  "Take 100 mg by mouth once daily.    NIFEdipine (ADALAT CC) 60 MG TbSR Take 60 mg by mouth every morning.    pantoprazole (PROTONIX) 40 MG tablet Take 40 mg by mouth once daily.    pen needle, diabetic (BD ULTRA-FINE ROSA PEN NEEDLE) 32 gauge x 5/32" Ndle Use with insulin once daily    pen needle, diabetic 32 gauge x 5/32" Ndle Use with injectable DM supplies twice daily as directed    potassium chloride (KLOR-CON) 8 MEQ TbSR TAKE TWO TABLETS BY MOUTH TWICE DAILY @ 9AM & 5PM    TRUE METRIX GO GLUCOSE METER Misc        Review of patient's allergies indicates:  No Known Allergies    Past Medical History:   Diagnosis Date    Cataract     Cervical high risk HPV (human papillomavirus) test positive 2020    NOT 16 OR 18    CHF (congestive heart failure)     CVA (cerebral infarction)          Depression     Diabetes mellitus, type 2     GERD (gastroesophageal reflux disease)     Hyperlipidemia     Hypertension     Lactic acidosis 10/06/2023    Moderate nonproliferative diabetic retinopathy     Nausea and vomiting 10/06/2023    Obesity     Stroke     Tachycardia 10/04/2023     Past Surgical History:   Procedure Laterality Date     SECTION      CHOLECYSTECTOMY      DILATION AND CURETTAGE OF UTERUS      EYE SURGERY Bilateral     laser    GALLBLADDER SURGERY  2017    stone removed     Family History       Problem Relation (Age of Onset)    Arthritis Father    Asthma Daughter    Cataracts Father    Diabetes Mother, Father    Heart disease Sister    Hypertension Father, Sister    No Known Problems Brother, Maternal Aunt, Maternal Uncle, Paternal Aunt, Paternal Uncle, Maternal Grandmother, Maternal Grandfather, Paternal Grandmother, Paternal Grandfather    Stroke Mother, Sister    Thyroid disease Mother, Sister, Daughter          Tobacco Use    Smoking status: Every Day     Current packs/day: 0.00     Types: Cigarettes     Last attempt to quit: 2021     Years since quittin.9    Smokeless tobacco: Never "   Substance and Sexual Activity    Alcohol use: Yes     Alcohol/week: 3.0 standard drinks of alcohol     Types: 3 Cans of beer per week     Comment: Rare    Drug use: No    Sexual activity: Yes     Partners: Male     Birth control/protection: None     Review of Systems   Constitutional:  Positive for fever.   HENT: Negative.     Respiratory: Negative.     Cardiovascular: Negative.    Gastrointestinal: Negative.    Endocrine: Negative.    Genitourinary: Negative.    Musculoskeletal: Negative.    Skin:  Positive for wound.   Neurological: Negative.    Hematological: Negative.    Psychiatric/Behavioral: Negative.       Objective:     Vital Signs (Most Recent):  Temp: 99.8 °F (37.7 °C) (01/07/24 0525)  Pulse: 109 (01/07/24 0356)  Resp: 17 (01/07/24 0356)  BP: 129/66 (01/07/24 0356)  SpO2: (!) 93 % (01/07/24 0356) Vital Signs (24h Range):  Temp:  [97.8 °F (36.6 °C)-101.4 °F (38.6 °C)] 99.8 °F (37.7 °C)  Pulse:  [100-114] 109  Resp:  [17-19] 17  SpO2:  [89 %-98 %] 93 %  BP: (101-151)/(52-75) 129/66     Weight: 105 kg (231 lb 7.7 oz)  Body mass index is 38.52 kg/m².     Physical Exam  Vitals and nursing note reviewed.   Constitutional:       Appearance: Normal appearance. She is not ill-appearing.   HENT:      Head: Normocephalic.      Mouth/Throat:      Mouth: Mucous membranes are moist.      Pharynx: Oropharynx is clear.   Eyes:      General: No scleral icterus.     Extraocular Movements: Extraocular movements intact.      Conjunctiva/sclera: Conjunctivae normal.   Cardiovascular:      Rate and Rhythm: Normal rate.      Pulses: Normal pulses.   Pulmonary:      Effort: Pulmonary effort is normal. No respiratory distress.      Breath sounds: No wheezing.   Abdominal:      General: Abdomen is flat. Bowel sounds are normal. There is no distension.      Palpations: Abdomen is soft.   Musculoskeletal:         General: No swelling or tenderness. Normal range of motion.      Cervical back: Normal range of motion.   Skin:      General: Skin is warm and dry.      Coloration: Skin is not jaundiced or pale.      Comments: Right groin wound with foul-smelling drainage from I and D site.   Neurological:      General: No focal deficit present.      Mental Status: She is alert and oriented to person, place, and time.   Psychiatric:         Mood and Affect: Mood normal.            I have reviewed all pertinent lab results within the past 24 hours.  CBC:   Recent Labs   Lab 01/07/24  0519   WBC 14.84*   RBC 3.55*   HGB 10.1*   HCT 31.2*      MCV 88   MCH 28.5   MCHC 32.4     CMP:   Recent Labs   Lab 01/07/24  0519   *   CALCIUM 8.6*   ALBUMIN 2.2*   PROT 6.8   *   K 3.0*   CO2 23      BUN 9   CREATININE 0.9   ALKPHOS 104   ALT 8*   AST 10   BILITOT 0.5       Significant Diagnostics:  I have reviewed all pertinent imaging results/findings within the past 24 hours.    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  Transferred to [facility] for surgical options.    - to the operating room today for debridement  - consent in chart  - NPO  - continue broad-spectrum antibiotics.  We will plan to culture today in the operating room  - rest of care per primary team  - general surgery will continue to follow      VTE Risk Mitigation (From admission, onward)           Ordered     heparin (porcine) injection 5,000 Units  Every 8 hours         01/06/24 1628     IP VTE HIGH RISK PATIENT  Once         01/06/24 1628     Place sequential compression device  Until discontinued         01/06/24 1628                    Thank you for your consult. I will follow-up with patient. Please contact us if you have any additional questions.    Arturo Snyder MD  General Surgery  [facility] [place] - Med Surg

## 2024-01-08 PROBLEM — N15.1 PERINEPHRIC ABSCESS: Status: ACTIVE | Noted: 2024-01-08

## 2024-01-08 PROBLEM — I21.4 NSTEMI (NON-ST ELEVATED MYOCARDIAL INFARCTION): Status: RESOLVED | Noted: 2023-10-06 | Resolved: 2024-01-08

## 2024-01-08 PROBLEM — L02.214 GROIN ABSCESS: Status: ACTIVE | Noted: 2024-01-08

## 2024-01-08 LAB
ALBUMIN SERPL BCP-MCNC: 2 G/DL (ref 3.5–5.2)
ALP SERPL-CCNC: 79 U/L (ref 55–135)
ALT SERPL W/O P-5'-P-CCNC: 11 U/L (ref 10–44)
ANION GAP SERPL CALC-SCNC: 11 MMOL/L (ref 8–16)
AST SERPL-CCNC: 9 U/L (ref 10–40)
B-HCG UR QL: NEGATIVE
BACTERIA UR CULT: ABNORMAL
BASOPHILS # BLD AUTO: 0.02 K/UL (ref 0–0.2)
BASOPHILS NFR BLD: 0.2 % (ref 0–1.9)
BILIRUB SERPL-MCNC: 0.4 MG/DL (ref 0.1–1)
BUN SERPL-MCNC: 10 MG/DL (ref 6–20)
CALCIUM SERPL-MCNC: 8.3 MG/DL (ref 8.7–10.5)
CHLORIDE SERPL-SCNC: 100 MMOL/L (ref 95–110)
CO2 SERPL-SCNC: 23 MMOL/L (ref 23–29)
CREAT SERPL-MCNC: 0.9 MG/DL (ref 0.5–1.4)
CTP QC/QA: YES
DIFFERENTIAL METHOD BLD: ABNORMAL
EOSINOPHIL # BLD AUTO: 0.1 K/UL (ref 0–0.5)
EOSINOPHIL NFR BLD: 1 % (ref 0–8)
ERYTHROCYTE [DISTWIDTH] IN BLOOD BY AUTOMATED COUNT: 12.3 % (ref 11.5–14.5)
EST. GFR  (NO RACE VARIABLE): >60 ML/MIN/1.73 M^2
GLUCOSE SERPL-MCNC: 320 MG/DL (ref 70–110)
HCT VFR BLD AUTO: 30.9 % (ref 37–48.5)
HGB BLD-MCNC: 10.2 G/DL (ref 12–16)
IMM GRANULOCYTES # BLD AUTO: 0.08 K/UL (ref 0–0.04)
IMM GRANULOCYTES NFR BLD AUTO: 0.7 % (ref 0–0.5)
INR PPP: 1 (ref 0.8–1.2)
LYMPHOCYTES # BLD AUTO: 1.5 K/UL (ref 1–4.8)
LYMPHOCYTES NFR BLD: 13.5 % (ref 18–48)
MCH RBC QN AUTO: 28.7 PG (ref 27–31)
MCHC RBC AUTO-ENTMCNC: 33 G/DL (ref 32–36)
MCV RBC AUTO: 87 FL (ref 82–98)
MONOCYTES # BLD AUTO: 0.8 K/UL (ref 0.3–1)
MONOCYTES NFR BLD: 6.6 % (ref 4–15)
NEUTROPHILS # BLD AUTO: 8.9 K/UL (ref 1.8–7.7)
NEUTROPHILS NFR BLD: 78 % (ref 38–73)
NRBC BLD-RTO: 0 /100 WBC
PLATELET # BLD AUTO: 247 K/UL (ref 150–450)
PMV BLD AUTO: 10.7 FL (ref 9.2–12.9)
POCT GLUCOSE: 217 MG/DL (ref 70–110)
POCT GLUCOSE: 241 MG/DL (ref 70–110)
POCT GLUCOSE: 273 MG/DL (ref 70–110)
POCT GLUCOSE: 302 MG/DL (ref 70–110)
POTASSIUM SERPL-SCNC: 3.4 MMOL/L (ref 3.5–5.1)
PROT SERPL-MCNC: 6.7 G/DL (ref 6–8.4)
PROTHROMBIN TIME: 10.7 SEC (ref 9–12.5)
RBC # BLD AUTO: 3.55 M/UL (ref 4–5.4)
SODIUM SERPL-SCNC: 134 MMOL/L (ref 136–145)
WBC # BLD AUTO: 11.43 K/UL (ref 3.9–12.7)

## 2024-01-08 PROCEDURE — 25000003 PHARM REV CODE 250: Mod: HCNC | Performed by: STUDENT IN AN ORGANIZED HEALTH CARE EDUCATION/TRAINING PROGRAM

## 2024-01-08 PROCEDURE — 87070 CULTURE OTHR SPECIMN AEROBIC: CPT | Mod: HCNC | Performed by: STUDENT IN AN ORGANIZED HEALTH CARE EDUCATION/TRAINING PROGRAM

## 2024-01-08 PROCEDURE — 63600175 PHARM REV CODE 636 W HCPCS: Mod: HCNC | Performed by: RADIOLOGY

## 2024-01-08 PROCEDURE — 87186 SC STD MICRODIL/AGAR DIL: CPT | Mod: HCNC | Performed by: STUDENT IN AN ORGANIZED HEALTH CARE EDUCATION/TRAINING PROGRAM

## 2024-01-08 PROCEDURE — 87075 CULTR BACTERIA EXCEPT BLOOD: CPT | Mod: HCNC | Performed by: STUDENT IN AN ORGANIZED HEALTH CARE EDUCATION/TRAINING PROGRAM

## 2024-01-08 PROCEDURE — 85610 PROTHROMBIN TIME: CPT | Mod: HCNC | Performed by: RADIOLOGY

## 2024-01-08 PROCEDURE — 0W9G3ZX DRAINAGE OF PERITONEAL CAVITY, PERCUTANEOUS APPROACH, DIAGNOSTIC: ICD-10-PCS | Performed by: RADIOLOGY

## 2024-01-08 PROCEDURE — 36415 COLL VENOUS BLD VENIPUNCTURE: CPT | Mod: HCNC | Performed by: STUDENT IN AN ORGANIZED HEALTH CARE EDUCATION/TRAINING PROGRAM

## 2024-01-08 PROCEDURE — 80053 COMPREHEN METABOLIC PANEL: CPT | Mod: HCNC | Performed by: STUDENT IN AN ORGANIZED HEALTH CARE EDUCATION/TRAINING PROGRAM

## 2024-01-08 PROCEDURE — 87205 SMEAR GRAM STAIN: CPT | Mod: HCNC | Performed by: STUDENT IN AN ORGANIZED HEALTH CARE EDUCATION/TRAINING PROGRAM

## 2024-01-08 PROCEDURE — 99223 1ST HOSP IP/OBS HIGH 75: CPT | Mod: HCNC,,, | Performed by: RADIOLOGY

## 2024-01-08 PROCEDURE — 87077 CULTURE AEROBIC IDENTIFY: CPT | Mod: HCNC | Performed by: STUDENT IN AN ORGANIZED HEALTH CARE EDUCATION/TRAINING PROGRAM

## 2024-01-08 PROCEDURE — 11000001 HC ACUTE MED/SURG PRIVATE ROOM: Mod: HCNC

## 2024-01-08 PROCEDURE — 81025 URINE PREGNANCY TEST: CPT | Mod: HCNC | Performed by: HOSPITALIST

## 2024-01-08 PROCEDURE — 99233 SBSQ HOSP IP/OBS HIGH 50: CPT | Mod: HCNC,,, | Performed by: STUDENT IN AN ORGANIZED HEALTH CARE EDUCATION/TRAINING PROGRAM

## 2024-01-08 PROCEDURE — 99223 1ST HOSP IP/OBS HIGH 75: CPT | Mod: HCNC,25,, | Performed by: NURSE PRACTITIONER

## 2024-01-08 PROCEDURE — 85025 COMPLETE CBC W/AUTO DIFF WBC: CPT | Mod: HCNC | Performed by: STUDENT IN AN ORGANIZED HEALTH CARE EDUCATION/TRAINING PROGRAM

## 2024-01-08 PROCEDURE — 63600175 PHARM REV CODE 636 W HCPCS: Mod: HCNC | Performed by: STUDENT IN AN ORGANIZED HEALTH CARE EDUCATION/TRAINING PROGRAM

## 2024-01-08 PROCEDURE — 99232 SBSQ HOSP IP/OBS MODERATE 35: CPT | Mod: HCNC,,, | Performed by: SURGERY

## 2024-01-08 PROCEDURE — 25000003 PHARM REV CODE 250: Mod: HCNC | Performed by: HOSPITALIST

## 2024-01-08 PROCEDURE — 25000003 PHARM REV CODE 250: Mod: HCNC | Performed by: RADIOLOGY

## 2024-01-08 PROCEDURE — 36415 COLL VENOUS BLD VENIPUNCTURE: CPT | Mod: HCNC,XB | Performed by: RADIOLOGY

## 2024-01-08 RX ORDER — INSULIN GLARGINE 100 [IU]/ML
80 INJECTION, SOLUTION SUBCUTANEOUS 2 TIMES DAILY
Qty: 30 ML | Refills: 5 | Status: SHIPPED | OUTPATIENT
Start: 2024-01-08

## 2024-01-08 RX ORDER — MIDAZOLAM HYDROCHLORIDE 1 MG/ML
INJECTION INTRAMUSCULAR; INTRAVENOUS
Status: COMPLETED | OUTPATIENT
Start: 2024-01-08 | End: 2024-01-08

## 2024-01-08 RX ORDER — FENTANYL CITRATE 50 UG/ML
INJECTION, SOLUTION INTRAMUSCULAR; INTRAVENOUS
Status: COMPLETED | OUTPATIENT
Start: 2024-01-08 | End: 2024-01-08

## 2024-01-08 RX ORDER — LIDOCAINE HYDROCHLORIDE 10 MG/ML
INJECTION INFILTRATION; PERINEURAL
Status: COMPLETED | OUTPATIENT
Start: 2024-01-08 | End: 2024-01-08

## 2024-01-08 RX ADMIN — MEROPENEM 1 G: 1 INJECTION INTRAVENOUS at 10:01

## 2024-01-08 RX ADMIN — FENTANYL CITRATE 50 MCG: 50 INJECTION INTRAMUSCULAR; INTRAVENOUS at 03:01

## 2024-01-08 RX ADMIN — MUPIROCIN: 20 OINTMENT TOPICAL at 08:01

## 2024-01-08 RX ADMIN — ASPIRIN 81 MG: 81 TABLET, COATED ORAL at 09:01

## 2024-01-08 RX ADMIN — FLUTICASONE PROPIONATE 100 MCG: 50 SPRAY, METERED NASAL at 08:01

## 2024-01-08 RX ADMIN — LISINOPRIL 40 MG: 20 TABLET ORAL at 08:01

## 2024-01-08 RX ADMIN — MEROPENEM 1 G: 1 INJECTION INTRAVENOUS at 05:01

## 2024-01-08 RX ADMIN — INSULIN DETEMIR 20 UNITS: 100 INJECTION, SOLUTION SUBCUTANEOUS at 09:01

## 2024-01-08 RX ADMIN — MEROPENEM 1 G: 1 INJECTION INTRAVENOUS at 01:01

## 2024-01-08 RX ADMIN — ATORVASTATIN CALCIUM 80 MG: 40 TABLET, FILM COATED ORAL at 10:01

## 2024-01-08 RX ADMIN — INSULIN ASPART 1 UNITS: 100 INJECTION, SOLUTION INTRAVENOUS; SUBCUTANEOUS at 10:01

## 2024-01-08 RX ADMIN — HEPARIN SODIUM 5000 UNITS: 5000 INJECTION INTRAVENOUS; SUBCUTANEOUS at 06:01

## 2024-01-08 RX ADMIN — MIDAZOLAM HYDROCHLORIDE 1 MG: 1 INJECTION, SOLUTION INTRAMUSCULAR; INTRAVENOUS at 03:01

## 2024-01-08 RX ADMIN — FLUOXETINE HYDROCHLORIDE 40 MG: 20 CAPSULE ORAL at 08:01

## 2024-01-08 RX ADMIN — METOPROLOL SUCCINATE 100 MG: 50 TABLET, EXTENDED RELEASE ORAL at 08:01

## 2024-01-08 RX ADMIN — NIFEDIPINE 60 MG: 30 TABLET, FILM COATED, EXTENDED RELEASE ORAL at 08:01

## 2024-01-08 RX ADMIN — MUPIROCIN: 20 OINTMENT TOPICAL at 10:01

## 2024-01-08 RX ADMIN — INSULIN ASPART 2 UNITS: 100 INJECTION, SOLUTION INTRAVENOUS; SUBCUTANEOUS at 05:01

## 2024-01-08 RX ADMIN — CLINDAMYCIN IN 5 PERCENT DEXTROSE 600 MG: 12 INJECTION, SOLUTION INTRAVENOUS at 04:01

## 2024-01-08 RX ADMIN — LIDOCAINE HYDROCHLORIDE 10 ML: 10 INJECTION, SOLUTION INFILTRATION; PERINEURAL at 03:01

## 2024-01-08 RX ADMIN — INSULIN ASPART 4 UNITS: 100 INJECTION, SOLUTION INTRAVENOUS; SUBCUTANEOUS at 08:01

## 2024-01-08 RX ADMIN — EZETIMIBE 10 MG: 10 TABLET ORAL at 10:01

## 2024-01-08 RX ADMIN — HEPARIN SODIUM 5000 UNITS: 5000 INJECTION INTRAVENOUS; SUBCUTANEOUS at 10:01

## 2024-01-08 RX ADMIN — INSULIN ASPART 3 UNITS: 100 INJECTION, SOLUTION INTRAVENOUS; SUBCUTANEOUS at 12:01

## 2024-01-08 RX ADMIN — PANTOPRAZOLE SODIUM 40 MG: 40 TABLET, DELAYED RELEASE ORAL at 08:01

## 2024-01-08 NOTE — ASSESSMENT & PLAN NOTE
Seen on CT along w/ perinephric abscesses not seen on u/s  Pt is clinically w/ minimal issues - no dysuria, back/flank pain, f/c/n/v  BCX done in [facility] ED  U/a not suggestive of any substantial UTI but the abscesses may not be communicating w/ the collecting system  Given Zosyn and vancomycin in ED  I had started Zosyn and IV doxy here empirically when I wrote admit orders but given review of all information will start meropenem, vancomycin, and clindamycin  Might need ID input  Suprisingly she does not even meet SIRS criteria at this time  Will have drainage of left perinephritic abscess by IR today.     Illumination Time: 00:16:40

## 2024-01-08 NOTE — PROGRESS NOTES
[facility] - Med Surg  General Surgery  Progress Note    Subjective:     History of Present Illness:  48-year-old female with a past medical history of uncontrolled type 2 diabetes mellitus and obesity who presented to outside hospital with right groin pain.  Found to have complex soft tissue infection of the right groin/perineum.  Bedside I and D performed.  General surgery was consulted at outside facility, but due to operative restrained she was transferred to [facility] for further care.  Upon evaluation she was afebrile, but slightly tachycardic.  Broad-spectrum antibiotics has been initiated at outside hospital.  Plan for operative debridement today in the OR.    Post-Op Info:  Procedure(s) (LRB):  IRRIGATION AND DEBRIDEMENT RIGHT GROIN (Right)   1 Day Post-Op     Interval History: No major changes overnight. WBC now normal. Tolerated bedside dressing change without issue this morning.     Medications:  Continuous Infusions:  Scheduled Meds:   aspirin  81 mg Oral Daily    atorvastatin  80 mg Oral QHS    clindamycin in D5W  600 mg Intravenous Q8H    ezetimibe  10 mg Oral QHS    FLUoxetine  40 mg Oral Daily    fluticasone propionate  2 spray Each Nostril Daily    heparin (porcine)  5,000 Units Subcutaneous Q8H    lisinopriL  40 mg Oral Daily    meropenem (MERREM) IVPB  1 g Intravenous Q8H    metoprolol succinate  100 mg Oral Daily    mupirocin   Nasal BID    NIFEdipine  60 mg Oral Daily    pantoprazole  40 mg Oral Daily    vancomycin (VANCOCIN) IV (PEDS and ADULTS)  1,500 mg Intravenous Q12H     PRN Meds:acetaminophen, acetaminophen, aluminum-magnesium hydroxide-simethicone, dextrose 10%, dextrose 10%, glucagon (human recombinant), glucose, glucose, HYDROcodone-acetaminophen, insulin aspart U-100, melatonin, morphine, naloxone, prochlorperazine, sodium chloride 0.9%, Pharmacy to dose Vancomycin consult **AND** vancomycin - pharmacy to dose     Review of patient's allergies indicates:  No Known  Allergies  Objective:     Vital Signs (Most Recent):  Temp: 97.7 °F (36.5 °C) (01/08/24 0705)  Pulse: 98 (01/08/24 0705)  Resp: 18 (01/08/24 0705)  BP: (!) 147/71 (01/08/24 0705)  SpO2: (!) 92 % (01/08/24 0705) Vital Signs (24h Range):  Temp:  [97.7 °F (36.5 °C)-100.4 °F (38 °C)] 97.7 °F (36.5 °C)  Pulse:  [] 98  Resp:  [16-28] 18  SpO2:  [91 %-99 %] 92 %  BP: (114-147)/(57-89) 147/71     Weight: 105 kg (231 lb 7.7 oz)  Body mass index is 38.52 kg/m².    Intake/Output - Last 3 Shifts         01/06 0700 01/07 0659 01/07 0700 01/08 0659 01/08 0700 01/09 0659    P.O. 0 560     IV Piggyback  1649.2     Total Intake(mL/kg) 0 (0) 2209.2 (21)     Net 0 +2209.2            Urine Occurrence 2 x 2 x     Stool Occurrence 0 x 0 x              Physical Exam  Vitals and nursing note reviewed.   Constitutional:       Appearance: Normal appearance. She is not ill-appearing.   HENT:      Head: Normocephalic.      Mouth/Throat:      Mouth: Mucous membranes are moist.      Pharynx: Oropharynx is clear.   Eyes:      General: No scleral icterus.     Extraocular Movements: Extraocular movements intact.      Conjunctiva/sclera: Conjunctivae normal.   Cardiovascular:      Rate and Rhythm: Normal rate.      Pulses: Normal pulses.   Pulmonary:      Effort: Pulmonary effort is normal. No respiratory distress.      Breath sounds: No wheezing.   Abdominal:      General: Abdomen is flat. Bowel sounds are normal. There is no distension.      Palpations: Abdomen is soft.   Genitourinary:     Comments: Right groin wound with no significant residual necrosis. Wound base appears healthy. No significant discharge. Still with some surrounding cellulitis changes. Clean packing in place.   Musculoskeletal:         General: No swelling or tenderness. Normal range of motion.      Cervical back: Normal range of motion.   Skin:     General: Skin is warm and dry.      Coloration: Skin is not jaundiced or pale.   Neurological:      General: No focal  deficit present.      Mental Status: She is alert and oriented to person, place, and time.   Psychiatric:         Mood and Affect: Mood normal.          Significant Labs:  I have reviewed all pertinent lab results within the past 24 hours.  CBC:   Recent Labs   Lab 01/08/24  0613   WBC 11.43   RBC 3.55*   HGB 10.2*   HCT 30.9*      MCV 87   MCH 28.7   MCHC 33.0     CMP:   Recent Labs   Lab 01/08/24  0613   *   CALCIUM 8.3*   ALBUMIN 2.0*   PROT 6.7   *   K 3.4*   CO2 23      BUN 10   CREATININE 0.9   ALKPHOS 79   ALT 11   AST 9*   BILITOT 0.4       Significant Diagnostics:  I have reviewed all pertinent imaging results/findings within the past 24 hours.  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  Transferred to [facility] for surgical options.      - Antimicrobial regimen per ID team.   - Cultures not yet finalized  - No plans for second OR trip as of now. Wound base looks good and she has no significant discharge.  - Will likely plan for wound vac placement sometime in the next couple of days.   - Rest of care per primary team  - General surgery will continue to follow        Arturo Snyder MD  General Surgery  [facility] - Med Surg

## 2024-01-08 NOTE — CONSULTS
"[facility] Surg  Infectious Disease  Consult Note    Patient Name: Jaimee Quintana  MRN: [id number]  Admission Date: 1/6/2024  Hospital Length of Stay: 1 days  Attending Physician: Campbell Liriano, *  Primary Care Provider: Donald Acosta MD     Isolation Status: No active isolations    Patient information was obtained from patient and ER records.      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 Transferred to [facility] for surgical eval and s/p I&D R groin wound/sinus tract today; necrosis extended through Miller's fascia down to the external oblique. Wound culture- strep anginosis and GNR, pending. Surgical cultures sent today- pending. Ucx GNR. Renal US- 3 cm complex collection adjacent to the left kidney. Urology recommending IR aspirate/drainage of abscess.  On vanc/meropenem/clindamycin. ID consulted for "abx mgt assistance, right sided Héctor's gangrene, contralateral pyelo w/ PN abscesses, DM "    Appreciate surgery help with source control of héctor's gangrene. Infection oftentimes polymicrobial and could include anaerobes. Strep anginosis is very susceptible to b-lactams, but awaiting final culture results. Note prior micro history without significant gram negative resistance    Recommendations:   - continue vanc/meropenem for now pending cultures. Stop clindamycin. If no MRSA in next day or two, stop vancomycin. Can also likely de-escalate the meropenem tomorrow pending identification of the GNR in culture  - agree with IR aspirate of kidney abscess; please send for gram stain/aerobe, anaerobe culture.   - given depth of debridement " "though fascia and abscess, anticipate ~ 4 weeks antibiotics (and until radiographic resolution of abscess) - PO vs IV TBD  - wound care as per surgery            Thank you for your consult. I will follow-up with patient. Please contact us if you have any additional questions.    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 Denies drainage prior to arrival. Lesion kept getting bigger and more painful and then she had fever, so went to hospital. Reports some dysuria. Denies cva tenderness. Denies indwelling hardware. Denies abx allergies.     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     Transferred to [facility] for surgical eval    Today 1/7, s/p  IRRIGATION AND DEBRIDEMENT RIGHT GROIN (Right)  IRRIGATION AND DEBRIDEMENT, LOWER EXTREMITY     Per op note, sinus tract found and debrided. Necrosis extended through Miller's fascia down to the external oblique     Wound culture from admit-  Specimen Information: Abdomen; Abscess   0 Result Notes      Component 2 d ago   Aerobic Bacterial Culture      Abnormal   GRAM NEGATIVE EDOUARD  Many  Identification and susceptibility pending  P      Aerobic Bacterial Culture      Abnormal   STREPTOCOCCUS ANGINOSUS  Many  Susceptibility testing not routinely performed  P             OR cultures today - pending    Ucx GNR      Renal US     3 cm complex collection adjacent to the left kidney in keeping with the patient's known perinephric abscess.     On vanc/meropenem/clindamycin    ID consulted for "abx mgt assistance, right sided Jeremiah's gangrene, contralateral pyelo w/ PN abscesses, DM     Past Medical History:   Diagnosis Date    Cataract     Cervical " high risk HPV (human papillomavirus) test positive 2020    NOT 16 OR 18    CHF (congestive heart failure)     CVA (cerebral infarction)          Depression     Diabetes mellitus, type 2     GERD (gastroesophageal reflux disease)     Hyperlipidemia     Hypertension     Lactic acidosis 10/06/2023    Moderate nonproliferative diabetic retinopathy     Nausea and vomiting 10/06/2023    Obesity     Stroke     Tachycardia 10/04/2023       Past Surgical History:   Procedure Laterality Date     SECTION      CHOLECYSTECTOMY      DILATION AND CURETTAGE OF UTERUS      EYE SURGERY Bilateral     laser    GALLBLADDER SURGERY  2017    stone removed       Review of patient's allergies indicates:  No Known Allergies    No current facility-administered medications on file prior to encounter.     Current Outpatient Medications on File Prior to Encounter   Medication Sig    aspirin (ECOTRIN) 81 MG EC tablet Take 81 mg by mouth once daily.    atorvastatin (LIPITOR) 80 MG tablet Take 1 tablet (80 mg total) by mouth every evening.    blood sugar diagnostic Strp Use to test blood glucose two (2) times daily as directed with insurance preferred meter and supplies    dulaglutide (TRULICITY) 0.75 mg/0.5 mL pen injector Inject 0.75 mg into the skin every 7 days. OK to discontinue this med during SNF stay - resume upon discharge (Patient taking differently: Inject 0.75 mg into the skin every .)    ergocalciferol (ERGOCALCIFEROL) 50,000 unit Cap Take 50,000 Units by mouth every Saturday.    ezetimibe (ZETIA) 10 mg tablet Take 1 tablet (10 mg total) by mouth once daily. (For cholesterol)    FLUoxetine 40 MG capsule Take 40 mg by mouth once daily.    fluticasone propionate (FLONASE) 50 mcg/actuation nasal spray 2 sprays (100 mcg total) by Each Nostril route daily as needed for Allergies or Rhinitis.    furosemide (LASIX) 40 MG tablet Take 1 tablet (40 mg total) by mouth once daily.    ibuprofen (ADVIL,MOTRIN) 200 MG  "tablet Take 200 mg by mouth every 6 (six) hours as needed for Pain.    insulin aspart U-100 (NOVOLOG FLEXPEN U-100 INSULIN) 100 unit/mL (3 mL) InPn pen Inject 30 Units into the skin 2 (two) times a day.    lancets (TRUEPLUS LANCETS) 28 gauge Misc Use to test blood glucose two (2) times daily as directed with insurance preferred meter and supplies    LANTUS SOLOSTAR U-100 INSULIN glargine 100 units/mL SubQ pen Inject 80 Units into the skin 2 (two) times a day.    lisinopriL (PRINIVIL,ZESTRIL) 40 MG tablet Take 1 tablet (40 mg total) by mouth once daily.    metFORMIN (GLUCOPHAGE-XR) 500 MG ER 24hr tablet Take 1,000 mg by mouth 2 (two) times daily with meals.    metoprolol succinate (TOPROL-XL) 100 MG 24 hr tablet Take 100 mg by mouth once daily.    NIFEdipine (ADALAT CC) 60 MG TbSR Take 60 mg by mouth every morning.    pantoprazole (PROTONIX) 40 MG tablet Take 40 mg by mouth once daily.    pen needle, diabetic (BD ULTRA-FINE ROSA PEN NEEDLE) 32 gauge x 5/32" Ndle Use with insulin once daily    pen needle, diabetic 32 gauge x 5/32" Ndle Use with injectable DM supplies twice daily as directed    potassium chloride (KLOR-CON) 8 MEQ TbSR TAKE TWO TABLETS BY MOUTH TWICE DAILY @ 9AM & 5PM    TRUE METRIX GO GLUCOSE METER Misc      Family History       Problem Relation (Age of Onset)    Arthritis Father    Asthma Daughter    Cataracts Father    Diabetes Mother, Father    Heart disease Sister    Hypertension Father, Sister    No Known Problems Brother, Maternal Aunt, Maternal Uncle, Paternal Aunt, Paternal Uncle, Maternal Grandmother, Maternal Grandfather, Paternal Grandmother, Paternal Grandfather    Stroke Mother, Sister    Thyroid disease Mother, Sister, Daughter          Tobacco Use    Smoking status: Every Day     Current packs/day: 0.00     Types: Cigarettes     Last attempt to quit: 2021     Years since quittin.9    Smokeless tobacco: Never   Substance and Sexual Activity    Alcohol use: Yes     Alcohol/week: " 3.0 standard drinks of alcohol     Types: 3 Cans of beer per week     Comment: Rare    Drug use: No    Sexual activity: Yes     Partners: Male     Birth control/protection: None     Review of Systems   Constitutional:  Negative for chills and fever.   Respiratory:  Negative for cough and shortness of breath.    Cardiovascular:  Negative for chest pain and leg swelling.   Gastrointestinal:  Positive for abdominal pain. Negative for abdominal distention.   Genitourinary:  Positive for frequency. Negative for flank pain.   Musculoskeletal:  Negative for back pain.   Skin:  Positive for wound.   Neurological:  Negative for weakness.   Psychiatric/Behavioral:  Negative for confusion.          Objective:     Vital Signs (Most Recent):  Temp: (!) 100.4 °F (38 °C) (01/07/24 1709)  Pulse: 97 (01/07/24 1702)  Resp: 20 (01/07/24 1702)  BP: 127/62 (01/07/24 1702)  SpO2: (!) 93 % (01/07/24 1702) Vital Signs (24h Range):  Temp:  [98.4 °F (36.9 °C)-101.4 °F (38.6 °C)] 100.4 °F (38 °C)  Pulse:  [] 97  Resp:  [16-28] 20  SpO2:  [89 %-99 %] 93 %  BP: (101-146)/(52-89) 127/62     Weight: 105 kg (231 lb 7.7 oz)  Body mass index is 38.52 kg/m².     Physical Exam  Vitals and nursing note reviewed.   Constitutional:       Appearance: Normal appearance.   HENT:      Mouth/Throat:      Mouth: Mucous membranes are dry.      Comments: No teeth (not wearing dentures)  Cardiovascular:      Rate and Rhythm: Normal rate and regular rhythm.      Pulses: Normal pulses.      Heart sounds: Normal heart sounds. No murmur heard.  Pulmonary:      Effort: Pulmonary effort is normal.      Breath sounds: Normal breath sounds.   Abdominal:      Comments: R groin with surgical wound with fresh packing; no drains   Musculoskeletal:         General: No swelling.   Neurological:      General: No focal deficit present.      Mental Status: She is alert and oriented to person, place, and time.   Psychiatric:         Mood and Affect: Mood normal.          Behavior: Behavior normal.                   Photo from prior to surgery      Significant Labs: All pertinent labs within the past 24 hours have been reviewed.    Significant Imaging: I have reviewed all pertinent imaging results/findings within the past 24 hours.

## 2024-01-08 NOTE — NURSING
[facility]  Nurses Note -- 4 Eyes      1/8/2024       Skin assessed on: Q Shift      [x] No Pressure Injuries Present    []Prevention Measures Documented    [] Yes LDA  for Pressure Injury Previously documented     [] Yes New Pressure Injury Discovered   [] LDA for New Pressure Injury Added      Attending RN:  Mini Luna RN     Second RN:  Melinda Duncan RN

## 2024-01-08 NOTE — CONSULTS
"Percutaneous Drain Placement/Aspiration Consult Note  Interventional Radiology    Consult Requested By: Arturo Duran MD  Reason for Consult: "left perinephric abscesses, aspiration or drain placement if possible"    SUBJECTIVE:     Chief Complaint: Lt flank  Principal Problem: Lt-sided reji-nephric complex fluid collections    History of Present Illness:  Jaimee Quintana is a 48 y.o. female with DM II, HTN, HLD, HFrEF (LVEF 35-40% w/ G2 DD), ? CESAR, previous CVA who was admitted on 1/6/23 for right inguinal Jeremiah's gangrene s/p I&D of the right groin and right leg with General Surgery on 1/7. Wound cx are + for E coli and strep anginosus. Urine cx is + for E coli. Patient is on radha and vanc for abx coverage.    CT imaging on this admission reveals pyelonephritis with left perinephric complex fluid collections x 3 including: 1.2 x 2.0 x 2.3-cm Lt posterior midpole, 1.4 x 1.2 x 0.7-cm Lt anterior inferior pole and 1.7 x 1.5 x 1.1-cm Lt central inferior pole complex reji-nephric fluid collections consistent with abscesses.   Patient was evaluated by both Urology and ID who recommend aspiration of left renal reji-nephric abscess. The pt's WBC is 11 and is trending down. She is currently afebrile (last fever 1/7 1700 tmax 100.4). The pt is hemodynamically stable.     Interventional Radiology has been consulted for CT-guided percutaneous posterior-approach aspiration +/- drainage catheter placement for management of left perinephric fluid collections.      Review of Systems   Constitutional:  Positive for fever.   Respiratory:  Negative for shortness of breath.    Cardiovascular:  Negative for chest pain.   Gastrointestinal:  Negative for nausea and vomiting.   Genitourinary:  Negative for flank pain and hematuria.        Mild right groin pain at surgical site. + foul odor in urine   Musculoskeletal:  Negative for back pain.   Skin:         Right groin wound   Neurological:  Negative for dizziness and headaches. "     Scheduled Meds:   aspirin  81 mg Oral Daily    atorvastatin  80 mg Oral QHS    clindamycin in D5W  600 mg Intravenous Q8H    ezetimibe  10 mg Oral QHS    FLUoxetine  40 mg Oral Daily    fluticasone propionate  2 spray Each Nostril Daily    heparin (porcine)  5,000 Units Subcutaneous Q8H    insulin detemir U-100  20 Units Subcutaneous Daily    lisinopriL  40 mg Oral Daily    meropenem (MERREM) IVPB  1 g Intravenous Q8H    metoprolol succinate  100 mg Oral Daily    mupirocin   Nasal BID    NIFEdipine  60 mg Oral Daily    pantoprazole  40 mg Oral Daily    vancomycin (VANCOCIN) IV (PEDS and ADULTS)  1,500 mg Intravenous Q12H     Continuous Infusions:  PRN Meds:acetaminophen, acetaminophen, aluminum-magnesium hydroxide-simethicone, dextrose 10%, dextrose 10%, glucagon (human recombinant), glucose, glucose, HYDROcodone-acetaminophen, insulin aspart U-100, melatonin, morphine, naloxone, prochlorperazine, sodium chloride 0.9%, Pharmacy to dose Vancomycin consult **AND** vancomycin - pharmacy to dose    Review of patient's allergies indicates:  No Known Allergies    Past Medical History:   Diagnosis Date    Cataract     Cervical high risk HPV (human papillomavirus) test positive 2020    NOT 16 OR 18    CHF (congestive heart failure)     CVA (cerebral infarction)          Depression     Diabetes mellitus, type 2     GERD (gastroesophageal reflux disease)     Hyperlipidemia     Hypertension     Lactic acidosis 10/06/2023    Moderate nonproliferative diabetic retinopathy     Nausea and vomiting 10/06/2023    Obesity     Stroke     Tachycardia 10/04/2023     Past Surgical History:   Procedure Laterality Date     SECTION      CHOLECYSTECTOMY      DILATION AND CURETTAGE OF UTERUS      EYE SURGERY Bilateral     laser    GALLBLADDER SURGERY  2017    stone removed     Family History   Problem Relation Age of Onset    Stroke Mother     Thyroid disease Mother     Diabetes Mother     Cataracts Father      Hypertension Father     Arthritis Father     Diabetes Father     Hypertension Sister     Stroke Sister     Thyroid disease Sister     Heart disease Sister     Thyroid disease Daughter     Asthma Daughter     No Known Problems Brother     No Known Problems Maternal Aunt     No Known Problems Maternal Uncle     No Known Problems Paternal Aunt     No Known Problems Paternal Uncle     No Known Problems Maternal Grandmother     No Known Problems Maternal Grandfather     No Known Problems Paternal Grandmother     No Known Problems Paternal Grandfather     Amblyopia Neg Hx     Blindness Neg Hx     Cancer Neg Hx     Glaucoma Neg Hx     Macular degeneration Neg Hx     Retinal detachment Neg Hx     Strabismus Neg Hx      Social History     Tobacco Use    Smoking status: Every Day     Current packs/day: 0.00     Types: Cigarettes     Last attempt to quit: 2021     Years since quittin.9    Smokeless tobacco: Never   Substance Use Topics    Alcohol use: Yes     Alcohol/week: 3.0 standard drinks of alcohol     Types: 3 Cans of beer per week     Comment: Rare    Drug use: No     OBJECTIVE:     Vital Signs (Most Recent)  Temp: 97.7 °F (36.5 °C) (24)  Pulse: 98 (24)  Resp: 18 (24)  BP: (!) 147/71 (24)  SpO2: (!) 92 % (24)    Physical Exam:  Physical Exam  Vitals and nursing note reviewed.   Constitutional:       General: She is not in acute distress.  HENT:      Head: Normocephalic and atraumatic.      Right Ear: External ear normal.      Left Ear: External ear normal.      Mouth/Throat:      Pharynx: Oropharynx is clear.   Cardiovascular:      Rate and Rhythm: Normal rate.   Pulmonary:      Effort: Pulmonary effort is normal. No respiratory distress.   Abdominal:      Palpations: Abdomen is soft.      Tenderness: There is no left CVA tenderness.   Genitourinary:     Comments: Right groin post-op dressings in place  Musculoskeletal:         General: Normal range of  motion.   Skin:     General: Skin is warm and dry.   Neurological:      General: No focal deficit present.      Mental Status: She is alert and oriented to person, place, and time.       Laboratory  I have reviewed all pertinent lab results within the past 24 hours.    ASA/Mallampati  ASA: III  Mallampati: III    Imaging:  See HPI    ASSESSMENT/PLAN:     48 y.o. female with right inguinal Jeremiah's gangrene s/p I&D of the right groin and right leg with General Surgery on 1/7 and Lt-sided perinephric complex fluid collections x 3 including: 1.2 x 2.0 x 2.3-cm Lt posterior midpole, 1.4 x 1.2 x 0.7-cm Lt anterior inferior pole and 1.7 x 1.5 x 1.1-cm Lt central inferior pole complex reji-nephric fluid collections consistent with abscesses.     Plan:  Will attempt  CT-guided percutaneous posterior-approach aspiration of Lt posterior midpole perinephric fluid collections with local anesthetic and up to moderate conscious sedation today. Collections are too small to safely accept a drainage catheter and, the inferior and anterior perinephric collections are in locations that are not amenable to percutaneous aspiration.  Please keep pt NPO (has been NPO since midnight)    Primary team to order any labs/cultures to be drawn on fluid sample collected during the procedure.  Anticoagulation history reviewed. Please hold ppx heparin starting now.  Coagulation labs reviewed.  Thank you for the consult. Please contact with questions via [employer] secure chat    Risks (including, but not limited to, pain, bleeding, infection, damage to nearby structures, failure to obtain sufficient material for a diagnosis, the need for additional procedures, and death), benefits, and alternatives were discussed with the patient. All questions were answered to the best of my abilities. The patient wishes to proceed with the procedure. Written informed consent was obtained.    Thank you for considering IR for the care of your patient.     Brandee Loza  NP  Interventional Radiology    Marcio Garzon MD  Interventional Radiology

## 2024-01-08 NOTE — ASSESSMENT & PLAN NOTE
"48F with h/o DM admitted with enlarging boil in R groin and fever and urinary freq. 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 Transferred to [facility] for surgical eval and s/p I&D R groin wound/sinus tract today; necrosis extended through Miller's fascia down to the external oblique. Wound culture- strep anginosis and GNR, pending. Surgical cultures sent today- pending. Ucx GNR. Renal US- 3 cm complex collection adjacent to the left kidney. Urology recommending IR aspirate/drainage of abscess.  On vanc/meropenem/clindamycin. ID consulted for "abx mgt assistance, right sided Héctor's gangrene, contralateral pyelo w/ PN abscesses, DM "    Appreciate surgery help with source control of héctor's gangrene. Infection oftentimes polymicrobial and could include anaerobes. Strep anginosis is very susceptible to b-lactams, but awaiting final culture results. Note prior micro history without significant gram negative resistance    Recommendations:   - continue vanc/meropenem for now pending cultures. Stop clindamycin. If no MRSA in next day or two, stop vancomycin. Can also likely de-escalate the meropenem tomorrow pending identification of the GNR in culture  - agree with IR aspirate of kidney abscess; please send for gram stain/aerobe, anaerobe culture.   - given depth of debridement though fascia and abscess, anticipate ~ 4 weeks antibiotics (and until radiographic resolution of abscess) - PO vs IV TBD  - wound care as per surgery      "

## 2024-01-08 NOTE — PROGRESS NOTES
Pharmacokinetic Assessment Follow Up: IV Vancomycin    Vancomycin serum concentration assessment(s):    The trough level was drawn correctly and can be used to guide therapy at this time. The measurement is within the desired definitive target range of 10 to 20 mcg/mL.    Vancomycin Regimen Plan:    Continue regimen to Vancomycin 1500 mg IV every 12 hours with next serum trough concentration measured at 1000 prior to 3rd dose on 1/9/24    Drug levels (last 3 results):  Recent Labs   Lab Result Units 01/06/24  2110 01/07/24  2204   Vancomycin, Random ug/mL 7.3  --    Vancomycin-Trough ug/mL  --  18.3       Pharmacy will continue to follow and monitor vancomycin.    Please contact pharmacy at extension [phone] for questions regarding this assessment.    Thank you for the consult,   Sawyer Vázquez       Patient brief summary:  Jaimee Quintana is a 48 y.o. female initiated on antimicrobial therapy with IV Vancomycin for treatment of skin & soft tissue infection    The patient's current regimen is Vancomycin 1500 mg q12h    Drug Allergies:   Review of patient's allergies indicates:  No Known Allergies    Actual Body Weight:   105 kg    Renal Function:   Estimated Creatinine Clearance: 93 mL/min (based on SCr of 0.9 mg/dL).,     Dialysis Method (if applicable):  N/A    CBC (last 72 hours):  Recent Labs   Lab Result Units 01/05/24  1751 01/06/24  1032 01/07/24  0519   WBC K/uL 18.30*  --  14.84*   Hemoglobin g/dL 12.4  --  10.1*   Hemoglobin A1C %  --  9.5*  --    Hematocrit % 36.5*  --  31.2*   Platelets K/uL 255  --  253   Gran % % 86.6*  --  82.2*   Lymph % % 7.5*  --  9.7*   Mono % % 5.0  --  5.7   Eosinophil % % 0.1  --  1.0   Basophil % % 0.1  --  0.1   Differential Method  Automated  --  Automated       Metabolic Panel (last 72 hours):  Recent Labs   Lab Result Units 01/05/24  1751 01/05/24  1818 01/07/24  0519   Sodium mmol/L 131*  --  133*   Potassium mmol/L 3.7  --  3.0*   Chloride mmol/L 98  --  100   CO2  mmol/L 18*  --  23   Glucose mg/dL 418*  --  297*   Glucose, UA   --  4+*  --    BUN mg/dL 6  --  9   Creatinine mg/dL 0.9  --  0.9   Albumin g/dL 2.8*  --  2.2*   Total Bilirubin mg/dL 0.5  --  0.5   Alkaline Phosphatase U/L 88  --  104   AST U/L 10  --  10   ALT U/L 9*  --  8*   Magnesium mg/dL  --   --  1.6   Phosphorus mg/dL  --   --  3.2       Vancomycin Administrations:  vancomycin given in the last 96 hours                     vancomycin 1,500 mg in dextrose 5 % (D5W) 250 mL IVPB (Vial-Mate) ()  Restarted 01/07/24 2259     1,500 mg New Bag  1042     1,500 mg New Bag 01/06/24 2312    vancomycin (VANCOCIN) 2,500 mg in dextrose 5 % (D5W) 500 mL IVPB (mg) 2,500 mg New Bag 01/05/24 2235                    Microbiologic Results:  Microbiology Results (last 7 days)       Procedure Component Value Units Date/Time    Gram stain [[id number]] Collected: 01/07/24 0850    Order Status: Completed Specimen: Wound from Groin Updated: 01/07/24 1217     Gram Stain Result Few WBC's      Few Gram negative rods      Few Gram positive cocci in pairs    Narrative:      Right groin    Culture, Anaerobe [[id number]] Collected: 01/07/24 0850    Order Status: Sent Specimen: Wound from Groin Updated: 01/07/24 1109    Aerobic culture [[id number]] Collected: 01/07/24 0850    Order Status: Sent Specimen: Wound from Groin Updated: 01/07/24 1108    Fungus culture [[id number]] Collected: 01/07/24 0850    Order Status: Sent Specimen: Wound from Groin Updated: 01/07/24 1108    AFB Culture & Smear [[id number]] Collected: 01/07/24 0850    Order Status: Sent Specimen: Wound from Groin Updated: 01/07/24 1107

## 2024-01-08 NOTE — PLAN OF CARE
Chart check complete, pt resting in bed, able to ambulate to the restroom and void with assust x1.  2L oxygen in place.  IV infusing antibiotics as ordered.BG monitored, sliding scale insulin given as ordered.  Dressing to groin with dry drainage.  Pt is NPO at midnight for possible drain placement in AM.  No acute distress noted, pt free from falls or injury this shift. Bed in low position, wheels locked, call light in reach for assistance, will continue to monitor.      Problem: Adult Inpatient Plan of Care  Goal: Plan of Care Review  Outcome: Ongoing, Progressing     Problem: Adult Inpatient Plan of Care  Goal: Patient-Specific Goal (Individualized)  Outcome: Ongoing, Progressing     Problem: Adult Inpatient Plan of Care  Goal: Absence of Hospital-Acquired Illness or Injury  Outcome: Ongoing, Progressing     Problem: Adult Inpatient Plan of Care  Goal: Optimal Comfort and Wellbeing  Outcome: Ongoing, Progressing     Problem: Diabetes Comorbidity  Goal: Blood Glucose Level Within Targeted Range  Outcome: Ongoing, Progressing     Problem: Bariatric Environmental Safety  Goal: Safety Maintained with Care  Outcome: Ongoing, Progressing     Problem: Impaired Wound Healing  Goal: Optimal Wound Healing  Outcome: Ongoing, Progressing

## 2024-01-08 NOTE — ASSESSMENT & PLAN NOTE
48-year-old female with a past medical history of uncontrolled type 2 diabetes mellitus, hypertension, and obesity who presented to outside hospital with right-sided groin wound.  Transferred to [facility] for surgical options.      - Antimicrobial regimen per ID team.   - Cultures not yet finalized  - No plans for second OR trip as of now. Wound base looks good and she has no significant discharge.  - Will likely plan for wound vac placement sometime in the next couple of days.   - Rest of care per primary team  - General surgery will continue to follow

## 2024-01-08 NOTE — PROGRESS NOTES
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 Work up notable for CT findings with FG in R inguinum and pyelonephritis and perinephric abscess. S/p OR 1/7 for washout - cx with GNR thus far. Wound cx on 1/5 with Ecoli and strep, ucx with Ecoli. Renal US with complex L kidney lesion - pending IR evaluation.       Recommendations:   -stop vanc and clinda, cx with GNR and strep so far (discontinued)  -continue meropenem pending cx data - can likely further de-escalate tomorrow  -pending IR aspirate, favor cultures (aerobic, anaerobic, g/s - ordered)  -follow up surgery recs  -follow up urology recs            Thank you for your consult. I will follow-up with patient. Please contact us if you have any additional questions. Above d/w primary team.         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 Denies drainage prior to arrival. Lesion kept getting bigger and more painful and then she had fever, so went to hospital. Reports some dysuria. Denies cva tenderness. Denies indwelling hardware. Denies abx allergies.     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     Transferred to [facility] for surgical  "eval    Today 1/7, s/p  IRRIGATION AND DEBRIDEMENT RIGHT GROIN (Right)  IRRIGATION AND DEBRIDEMENT, LOWER EXTREMITY     Per op note, sinus tract found and debrided. Necrosis extended through Miller's fascia down to the external oblique     Wound culture from admit-  Specimen Information: Abdomen; Abscess   0 Result Notes      Component 2 d ago   Aerobic Bacterial Culture      Abnormal   GRAM NEGATIVE EDOUARD  Many  Identification and susceptibility pending  P      Aerobic Bacterial Culture      Abnormal   STREPTOCOCCUS ANGINOSUS  Many  Susceptibility testing not routinely performed  P             OR cultures today - pending    Ucx GNR      Renal US     3 cm complex collection adjacent to the left kidney in keeping with the patient's known perinephric abscess.     On vanc/meropenem/clindamycin    ID consulted for "abx mgt assistance, right sided Jeremiah's gangrene, contralateral pyelo w/ PN abscesses, DM   Interval History: Febrile last afternoon. Pt reports tolerating abx without issues.     Review of Systems   Constitutional:  Negative for chills and fever.   Gastrointestinal:  Negative for abdominal pain, diarrhea and nausea.   All other systems reviewed and are negative.    Objective:     Vital Signs (Most Recent):  Temp: 97.7 °F (36.5 °C) (01/08/24 0705)  Pulse: 98 (01/08/24 0705)  Resp: 18 (01/08/24 0705)  BP: (!) 147/71 (01/08/24 0705)  SpO2: (!) 92 % (01/08/24 0705) Vital Signs (24h Range):  Temp:  [97.7 °F (36.5 °C)-100.4 °F (38 °C)] 97.7 °F (36.5 °C)  Pulse:  [] 98  Resp:  [16-20] 18  SpO2:  [91 %-95 %] 92 %  BP: (114-147)/(57-71) 147/71     Weight: 105 kg (231 lb 7.7 oz)  Body mass index is 38.52 kg/m².    Estimated Creatinine Clearance: 93 mL/min (based on SCr of 0.9 mg/dL).     Physical Exam  Constitutional:       General: She is not in acute distress.     Appearance: She is not ill-appearing or toxic-appearing.   Eyes:      General:         Right eye: No discharge.         Left eye: No discharge. "   Pulmonary:      Effort: Pulmonary effort is normal. No respiratory distress.   Abdominal:      General: There is no distension.      Palpations: Abdomen is soft.      Tenderness: There is no abdominal tenderness. There is no guarding.   Genitourinary:     Comments: Dressing present  Skin:     General: Skin is warm and dry.   Neurological:      Mental Status: She is alert.          Significant Labs:   Microbiology Results (last 7 days)       Procedure Component Value Units Date/Time    Aerobic culture [[id number]]  (Abnormal) Collected: 01/07/24 0850    Order Status: Completed Specimen: Wound from Groin Updated: 01/08/24 0811     Aerobic Bacterial Culture GRAM NEGATIVE EDOUARD, NON-LACTOSE   Rare  Identification and susceptibility pending      Narrative:      Right groin    Gram stain [[id number]] Collected: 01/07/24 0850    Order Status: Completed Specimen: Wound from Groin Updated: 01/07/24 1217     Gram Stain Result Few WBC's      Few Gram negative rods      Few Gram positive cocci in pairs    Narrative:      Right groin    Culture, Anaerobe [[id number]] Collected: 01/07/24 0850    Order Status: Sent Specimen: Wound from Groin Updated: 01/07/24 1109    Fungus culture [[id number]] Collected: 01/07/24 0850    Order Status: Sent Specimen: Wound from Groin Updated: 01/07/24 1108    AFB Culture & Smear [[id number]] Collected: 01/07/24 0850    Order Status: Sent Specimen: Wound from Groin Updated: 01/07/24 1107            Significant Imaging: I have reviewed all pertinent imaging results/findings within the past 24 hours.

## 2024-01-08 NOTE — PLAN OF CARE
Procedure completed, pt tolerated well. No apparent distress noted. Band aid applied CDI. Labs collected and sent. Patient to be transferred back to patient's room. Report called to MACEY Morse

## 2024-01-08 NOTE — BRIEF OP NOTE
Radiology Post-Procedure Note    Pre Op Diagnosis: 1. Lt-sided reji-nephric complex fluid collections  Post Op Diagnosis: Same    Procedure: 1. CT-guided percutaneous posterior-approach aspiration of Lt posterior midpole perinephric fluid collection    Procedure performed by: Marcio Garzon MD    Written Informed Consent Obtained: Yes  Specimen Removed: YES, 1.0-cc's of mildly thin, frankly purulent fluid  Estimated Blood Loss: Minimal    Findings:   Successful CT-guided percutaneous posterior-approach aspiration of Lt posterior midpole perinephric fluid collection with local anesthetic and up to moderate conscious sedation. Patient tolerated the procedure well. No immediate post-procedural complications noted.     Patient transferred back to floor for 2 hours post-op monitoring and bed rest.    Thank you for considering IR for the care of your patient.     Marcio Garzon MD  Interventional Radiology

## 2024-01-08 NOTE — NURSING
Pt resting in bed, no distress noted, pt denies pain.  IV's flushed, antibiotic in fusing as ordered.  Diet tolerated well, pt informed of NPO status at midnight for possible drain placement in AM.  Pt able to ambulate to the restroom and void without difficulty.      [facility]  Nurses Note -- 4 Eyes      1/7/2024       Skin assessed on: Q Shift      [x] No Pressure Injuries Present    [x]Prevention Measures Documented    [] Yes LDA  for Pressure Injury Previously documented     [] Yes New Pressure Injury Discovered   [] LDA for New Pressure Injury Added      Attending RN:  Melinda Duncan, RN     Second RN:  ESEQUIEL Chaudhary

## 2024-01-08 NOTE — SUBJECTIVE & OBJECTIVE
Past Medical History:   Diagnosis Date    Cataract     Cervical high risk HPV (human papillomavirus) test positive 2020    NOT 16 OR 18    CHF (congestive heart failure)     CVA (cerebral infarction)          Depression     Diabetes mellitus, type 2     GERD (gastroesophageal reflux disease)     Hyperlipidemia     Hypertension     Lactic acidosis 10/06/2023    Moderate nonproliferative diabetic retinopathy     Nausea and vomiting 10/06/2023    Obesity     Stroke     Tachycardia 10/04/2023       Past Surgical History:   Procedure Laterality Date     SECTION      CHOLECYSTECTOMY      DILATION AND CURETTAGE OF UTERUS      EYE SURGERY Bilateral     laser    GALLBLADDER SURGERY  2017    stone removed       Review of patient's allergies indicates:  No Known Allergies    No current facility-administered medications on file prior to encounter.     Current Outpatient Medications on File Prior to Encounter   Medication Sig    aspirin (ECOTRIN) 81 MG EC tablet Take 81 mg by mouth once daily.    atorvastatin (LIPITOR) 80 MG tablet Take 1 tablet (80 mg total) by mouth every evening.    blood sugar diagnostic Strp Use to test blood glucose two (2) times daily as directed with insurance preferred meter and supplies    dulaglutide (TRULICITY) 0.75 mg/0.5 mL pen injector Inject 0.75 mg into the skin every 7 days. OK to discontinue this med during SNF stay - resume upon discharge (Patient taking differently: Inject 0.75 mg into the skin every .)    ergocalciferol (ERGOCALCIFEROL) 50,000 unit Cap Take 50,000 Units by mouth every Saturday.    ezetimibe (ZETIA) 10 mg tablet Take 1 tablet (10 mg total) by mouth once daily. (For cholesterol)    FLUoxetine 40 MG capsule Take 40 mg by mouth once daily.    fluticasone propionate (FLONASE) 50 mcg/actuation nasal spray 2 sprays (100 mcg total) by Each Nostril route daily as needed for Allergies or Rhinitis.    furosemide (LASIX) 40 MG tablet Take 1 tablet (40 mg  "total) by mouth once daily.    ibuprofen (ADVIL,MOTRIN) 200 MG tablet Take 200 mg by mouth every 6 (six) hours as needed for Pain.    insulin (LANTUS SOLOSTAR U-100 INSULIN) glargine 100 units/mL SubQ pen Inject 80 Units into the skin 2 (two) times a day. INJECT 80 UNITS SUBCUTANEOUSLY TWICE DAILY (BULK) Strength: 100 unit/mL (3 mL)    insulin aspart U-100 (NOVOLOG FLEXPEN U-100 INSULIN) 100 unit/mL (3 mL) InPn pen Inject 30 Units into the skin 2 (two) times a day.    lancets (TRUEPLUS LANCETS) 28 gauge Misc Use to test blood glucose two (2) times daily as directed with insurance preferred meter and supplies    LANTUS SOLOSTAR U-100 INSULIN glargine 100 units/mL SubQ pen Inject 80 Units into the skin 2 (two) times a day.    lisinopriL (PRINIVIL,ZESTRIL) 40 MG tablet Take 1 tablet (40 mg total) by mouth once daily.    metFORMIN (GLUCOPHAGE-XR) 500 MG ER 24hr tablet Take 1,000 mg by mouth 2 (two) times daily with meals.    metoprolol succinate (TOPROL-XL) 100 MG 24 hr tablet Take 100 mg by mouth once daily.    NIFEdipine (ADALAT CC) 60 MG TbSR Take 60 mg by mouth every morning.    pantoprazole (PROTONIX) 40 MG tablet Take 40 mg by mouth once daily.    pen needle, diabetic (BD ULTRA-FINE ROSA PEN NEEDLE) 32 gauge x 5/32" Ndle Use with insulin once daily    pen needle, diabetic 32 gauge x 5/32" Ndle Use with injectable DM supplies twice daily as directed    potassium chloride (KLOR-CON) 8 MEQ TbSR TAKE TWO TABLETS BY MOUTH TWICE DAILY @ 9AM & 5PM    TRUE METRIX GO GLUCOSE METER Misc      Family History       Problem Relation (Age of Onset)    Arthritis Father    Asthma Daughter    Cataracts Father    Diabetes Mother, Father    Heart disease Sister    Hypertension Father, Sister    No Known Problems Brother, Maternal Aunt, Maternal Uncle, Paternal Aunt, Paternal Uncle, Maternal Grandmother, Maternal Grandfather, Paternal Grandmother, Paternal Grandfather    Stroke Mother, Sister    Thyroid disease Mother, Sister, " Daughter          Tobacco Use    Smoking status: Every Day     Current packs/day: 0.00     Types: Cigarettes     Last attempt to quit: 2021     Years since quittin.9    Smokeless tobacco: Never   Substance and Sexual Activity    Alcohol use: Yes     Alcohol/week: 3.0 standard drinks of alcohol     Types: 3 Cans of beer per week     Comment: Rare    Drug use: No    Sexual activity: Yes     Partners: Male     Birth control/protection: None     Review of Systems    General: No fevers, weight changes.   HEENT: No visual changes, neck pain, swallowing problems, hoarseness.  CV: No cp/sob, no palpitations, edema.   Pulm: No cough, sob, hemoptysis. No known CESAR.    GI: No n/v/d, no abdominal pain, no melena, hematochezia.   : No problems w/ bladder control, dysuria, hematuria.  Musculoskeletal: No joint pains/stiffness, back pain.   Neuro:  No HA, seizure, focal weakness, falls, dizzy spells.     Objective:     Vital Signs (Most Recent):  Temp: 99 °F (37.2 °C) (24 1113)  Pulse: 91 (24 1113)  Resp: 16 (24 1113)  BP: (!) 145/75 (24 1113)  SpO2: (!) 93 % (24 1113) Vital Signs (24h Range):  Temp:  [97.7 °F (36.5 °C)-100.4 °F (38 °C)] 99 °F (37.2 °C)  Pulse:  [] 91  Resp:  [16-20] 16  SpO2:  [91 %-94 %] 93 %  BP: (114-147)/(57-75) 145/75     Weight: 105 kg (231 lb 7.7 oz)  Body mass index is 38.52 kg/m².     Physical Exam        General:  A&O, NAD  HEENT: neck supple, PERRL/EOMI, EAC clear bilaterally, normal bilateral carotid upstroke w/o bruits, inf turbs clear bilaterally, OC/OP clear  Lungs: CTAB  CV: RRR w/o M/G/R  Abdomen: soft, NTND, no HSM, no bruits/masses/pulsations, massively obese  Ext: no edema  : she has a small incision right mons area, currently w/o any drainage/odor, the surrounding soft tissue is a bit indurated but not particularly tender to touch, she has a lot of redundant fatty tissue in the area.  There is some erythema.  No CVAT.   Rectal: def  Neuro:  NF    Significant Labs: All pertinent labs within the past 24 hours have been reviewed.    Significant Imaging: I have reviewed all pertinent imaging results/findings within the past 24 hours.

## 2024-01-08 NOTE — ASSESSMENT & PLAN NOTE
I independently reviewed patient's lab work and images as documented. 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 Work up notable for CT findings with FG in R inguinum and pyelonephritis and perinephric abscess. S/p OR 1/7 for washout - cx with GNR thus far. Wound cx on 1/5 with Ecoli and strep, ucx with Ecoli. Renal US with complex L kidney lesion - pending IR evaluation.       Recommendations:   -stop vanc and clinda, cx with GNR and strep so far (discontinued)  -continue meropenem pending cx data - can likely further de-escalate tomorrow  -pending IR aspirate, favor cultures (aerobic, anaerobic, g/s - ordered)  -follow up surgery recs

## 2024-01-08 NOTE — SUBJECTIVE & OBJECTIVE
Past Medical History:   Diagnosis Date    Cataract     Cervical high risk HPV (human papillomavirus) test positive 2020    NOT 16 OR 18    CHF (congestive heart failure)     CVA (cerebral infarction)          Depression     Diabetes mellitus, type 2     GERD (gastroesophageal reflux disease)     Hyperlipidemia     Hypertension     Lactic acidosis 10/06/2023    Moderate nonproliferative diabetic retinopathy     Nausea and vomiting 10/06/2023    Obesity     Stroke     Tachycardia 10/04/2023       Past Surgical History:   Procedure Laterality Date     SECTION      CHOLECYSTECTOMY      DILATION AND CURETTAGE OF UTERUS      EYE SURGERY Bilateral     laser    GALLBLADDER SURGERY  2017    stone removed       Review of patient's allergies indicates:  No Known Allergies    No current facility-administered medications on file prior to encounter.     Current Outpatient Medications on File Prior to Encounter   Medication Sig    aspirin (ECOTRIN) 81 MG EC tablet Take 81 mg by mouth once daily.    atorvastatin (LIPITOR) 80 MG tablet Take 1 tablet (80 mg total) by mouth every evening.    blood sugar diagnostic Strp Use to test blood glucose two (2) times daily as directed with insurance preferred meter and supplies    dulaglutide (TRULICITY) 0.75 mg/0.5 mL pen injector Inject 0.75 mg into the skin every 7 days. OK to discontinue this med during SNF stay - resume upon discharge (Patient taking differently: Inject 0.75 mg into the skin every .)    ergocalciferol (ERGOCALCIFEROL) 50,000 unit Cap Take 50,000 Units by mouth every Saturday.    ezetimibe (ZETIA) 10 mg tablet Take 1 tablet (10 mg total) by mouth once daily. (For cholesterol)    FLUoxetine 40 MG capsule Take 40 mg by mouth once daily.    fluticasone propionate (FLONASE) 50 mcg/actuation nasal spray 2 sprays (100 mcg total) by Each Nostril route daily as needed for Allergies or Rhinitis.    furosemide (LASIX) 40 MG tablet Take 1 tablet (40 mg  "total) by mouth once daily.    ibuprofen (ADVIL,MOTRIN) 200 MG tablet Take 200 mg by mouth every 6 (six) hours as needed for Pain.    insulin aspart U-100 (NOVOLOG FLEXPEN U-100 INSULIN) 100 unit/mL (3 mL) InPn pen Inject 30 Units into the skin 2 (two) times a day.    lancets (TRUEPLUS LANCETS) 28 gauge Misc Use to test blood glucose two (2) times daily as directed with insurance preferred meter and supplies    LANTUS SOLOSTAR U-100 INSULIN glargine 100 units/mL SubQ pen Inject 80 Units into the skin 2 (two) times a day.    lisinopriL (PRINIVIL,ZESTRIL) 40 MG tablet Take 1 tablet (40 mg total) by mouth once daily.    metFORMIN (GLUCOPHAGE-XR) 500 MG ER 24hr tablet Take 1,000 mg by mouth 2 (two) times daily with meals.    metoprolol succinate (TOPROL-XL) 100 MG 24 hr tablet Take 100 mg by mouth once daily.    NIFEdipine (ADALAT CC) 60 MG TbSR Take 60 mg by mouth every morning.    pantoprazole (PROTONIX) 40 MG tablet Take 40 mg by mouth once daily.    pen needle, diabetic (BD ULTRA-FINE ROSA PEN NEEDLE) 32 gauge x 5/32" Ndle Use with insulin once daily    pen needle, diabetic 32 gauge x 5/32" Ndle Use with injectable DM supplies twice daily as directed    potassium chloride (KLOR-CON) 8 MEQ TbSR TAKE TWO TABLETS BY MOUTH TWICE DAILY @ 9AM & 5PM    TRUE METRIX GO GLUCOSE METER Misc      Family History       Problem Relation (Age of Onset)    Arthritis Father    Asthma Daughter    Cataracts Father    Diabetes Mother, Father    Heart disease Sister    Hypertension Father, Sister    No Known Problems Brother, Maternal Aunt, Maternal Uncle, Paternal Aunt, Paternal Uncle, Maternal Grandmother, Maternal Grandfather, Paternal Grandmother, Paternal Grandfather    Stroke Mother, Sister    Thyroid disease Mother, Sister, Daughter          Tobacco Use    Smoking status: Every Day     Current packs/day: 0.00     Types: Cigarettes     Last attempt to quit: 2021     Years since quittin.9    Smokeless tobacco: Never "   Substance and Sexual Activity    Alcohol use: Yes     Alcohol/week: 3.0 standard drinks of alcohol     Types: 3 Cans of beer per week     Comment: Rare    Drug use: No    Sexual activity: Yes     Partners: Male     Birth control/protection: None     Review of Systems   Constitutional:  Negative for chills and fever.   Respiratory:  Negative for cough and shortness of breath.    Cardiovascular:  Negative for chest pain and leg swelling.   Gastrointestinal:  Positive for abdominal pain. Negative for abdominal distention.   Genitourinary:  Positive for frequency. Negative for flank pain.   Musculoskeletal:  Negative for back pain.   Skin:  Positive for wound.   Neurological:  Negative for weakness.   Psychiatric/Behavioral:  Negative for confusion.          Objective:     Vital Signs (Most Recent):  Temp: (!) 100.4 °F (38 °C) (01/07/24 1709)  Pulse: 97 (01/07/24 1702)  Resp: 20 (01/07/24 1702)  BP: 127/62 (01/07/24 1702)  SpO2: (!) 93 % (01/07/24 1702) Vital Signs (24h Range):  Temp:  [98.4 °F (36.9 °C)-101.4 °F (38.6 °C)] 100.4 °F (38 °C)  Pulse:  [] 97  Resp:  [16-28] 20  SpO2:  [89 %-99 %] 93 %  BP: (101-146)/(52-89) 127/62     Weight: 105 kg (231 lb 7.7 oz)  Body mass index is 38.52 kg/m².     Physical Exam  Vitals and nursing note reviewed.   Constitutional:       Appearance: Normal appearance.   HENT:      Mouth/Throat:      Mouth: Mucous membranes are dry.      Comments: No teeth (not wearing dentures)  Cardiovascular:      Rate and Rhythm: Normal rate and regular rhythm.      Pulses: Normal pulses.      Heart sounds: Normal heart sounds. No murmur heard.  Pulmonary:      Effort: Pulmonary effort is normal.      Breath sounds: Normal breath sounds.   Abdominal:      Comments: R groin with surgical wound with fresh packing; no drains   Musculoskeletal:         General: No swelling.   Neurological:      General: No focal deficit present.      Mental Status: She is alert and oriented to person, place, and  time.   Psychiatric:         Mood and Affect: Mood normal.         Behavior: Behavior normal.                   Photo from prior to surgery      Significant Labs: All pertinent labs within the past 24 hours have been reviewed.    Significant Imaging: I have reviewed all pertinent imaging results/findings within the past 24 hours.

## 2024-01-08 NOTE — PROGRESS NOTES
Vancomycin consult follow-up:    Patient reviewed, renal function stable, no new levels, continue current therapy; Next levels due: trough due 1/9/2024 at 1000

## 2024-01-08 NOTE — SUBJECTIVE & OBJECTIVE
Interval History: Febrile last afternoon. Pt reports tolerating abx without issues.     Review of Systems   Constitutional:  Negative for chills and fever.   Gastrointestinal:  Negative for abdominal pain, diarrhea and nausea.   All other systems reviewed and are negative.    Objective:     Vital Signs (Most Recent):  Temp: 97.7 °F (36.5 °C) (01/08/24 0705)  Pulse: 98 (01/08/24 0705)  Resp: 18 (01/08/24 0705)  BP: (!) 147/71 (01/08/24 0705)  SpO2: (!) 92 % (01/08/24 0705) Vital Signs (24h Range):  Temp:  [97.7 °F (36.5 °C)-100.4 °F (38 °C)] 97.7 °F (36.5 °C)  Pulse:  [] 98  Resp:  [16-20] 18  SpO2:  [91 %-95 %] 92 %  BP: (114-147)/(57-71) 147/71     Weight: 105 kg (231 lb 7.7 oz)  Body mass index is 38.52 kg/m².    Estimated Creatinine Clearance: 93 mL/min (based on SCr of 0.9 mg/dL).     Physical Exam  Constitutional:       General: She is not in acute distress.     Appearance: She is not ill-appearing or toxic-appearing.   Eyes:      General:         Right eye: No discharge.         Left eye: No discharge.   Pulmonary:      Effort: Pulmonary effort is normal. No respiratory distress.   Abdominal:      General: There is no distension.      Palpations: Abdomen is soft.      Tenderness: There is no abdominal tenderness. There is no guarding.   Genitourinary:     Comments: Dressing present  Skin:     General: Skin is warm and dry.   Neurological:      Mental Status: She is alert.          Significant Labs:   Microbiology Results (last 7 days)       Procedure Component Value Units Date/Time    Aerobic culture [[id number]]  (Abnormal) Collected: 01/07/24 0850    Order Status: Completed Specimen: Wound from Groin Updated: 01/08/24 0811     Aerobic Bacterial Culture GRAM NEGATIVE EDOUARD, NON-LACTOSE   Rare  Identification and susceptibility pending      Narrative:      Right groin    Gram stain [[id number]] Collected: 01/07/24 0850    Order Status: Completed Specimen: Wound from Groin Updated: 01/07/24 1217      Gram Stain Result Few WBC's      Few Gram negative rods      Few Gram positive cocci in pairs    Narrative:      Right groin    Culture, Anaerobe [[id number]] Collected: 01/07/24 0850    Order Status: Sent Specimen: Wound from Groin Updated: 01/07/24 1109    Fungus culture [[id number]] Collected: 01/07/24 0850    Order Status: Sent Specimen: Wound from Groin Updated: 01/07/24 1108    AFB Culture & Smear [[id number]] Collected: 01/07/24 0850    Order Status: Sent Specimen: Wound from Groin Updated: 01/07/24 1107            Significant Imaging: I have reviewed all pertinent imaging results/findings within the past 24 hours.

## 2024-01-08 NOTE — PROGRESS NOTES
"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  Pt w/o current pain, f/c/n/v, flank pain etc - she was under impression she was sent here for "stomach surgery", we talked to her daughter and 2 of her sisters on the phone, separately, at her request, as well as Dr Snyder who called to talk to her preop, at the bedside, to clear this up.  Appreciate GS attention to pt, asking  to see in AM as well as we appear to have 2 separate processes here, she may need intervention from  as well although most likely will just need to treat the infection and reimage kidneys at some point.  She had I&D at bedside of right [facility] area in [place], presume material was sent for culture.  Incidentally pt was ill w/ COVID 19 12/18, we were advised on transfer of this, she seems to have fully recovered.  Per pt she was tested again at [facility] and was negative, and Epic prompted me "pt has negative COVID test" - but, we were not able to confirm this result, so we did another rapid which was negative.  I also have requested EKG and CXR preop in this pt w/ CHF, morbid obesity.    Pt is admitted to  services, and Dr Snyder scheduling for OR in AM.      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  Pt w/o current pain, f/c/n/v, flank pain etc - she was under impression she was sent here for "stomach surgery", we talked to her daughter and 2 of her sisters on the phone, separately, at her request, as well as Dr Snyder who called to talk to her preop, at the bedside, to clear this up.  Appreciate GS attention to pt, asking  to see in AM as well as we appear to have 2 separate processes here, she may need intervention from  as well although most likely will just need to treat the infection and reimage kidneys at some point.  She had I&D at bedside of right mons area in [place], presume material was sent for culture.  Incidentally pt was ill w/ COVID 19 12/18, we were advised on transfer of this, she seems to have fully recovered.  Per pt she was tested again at [facility] and was negative, and Epic prompted me "pt has negative COVID test" - but, we were not able to confirm this result, so we did another rapid which was negative.  I also have requested EKG and CXR preop in this pt w/ CHF, morbid obesity.    Pt is admitted to  services,surgery is consulted.  S/P I&D  and irrigation and debridement rightly groin and right leg,cultures pending.  Will have drainage of left perinephritic abscess by IR today.    Past Medical History:   Diagnosis Date    Cataract     Cervical high risk HPV (human papillomavirus) test positive 09/17/2020    NOT 16 OR 18    CHF (congestive heart failure)     CVA (cerebral infarction) 2003         Depression     Diabetes mellitus, type 2     GERD (gastroesophageal reflux disease)     Hyperlipidemia     Hypertension     Lactic acidosis 10/06/2023    Moderate nonproliferative diabetic retinopathy     Nausea and vomiting 10/06/2023    Obesity     Stroke     Tachycardia 10/04/2023       Past Surgical History:   Procedure " Laterality Date     SECTION      CHOLECYSTECTOMY      DILATION AND CURETTAGE OF UTERUS      EYE SURGERY Bilateral     laser    GALLBLADDER SURGERY  2017    stone removed       Review of patient's allergies indicates:  No Known Allergies    No current facility-administered medications on file prior to encounter.     Current Outpatient Medications on File Prior to Encounter   Medication Sig    aspirin (ECOTRIN) 81 MG EC tablet Take 81 mg by mouth once daily.    atorvastatin (LIPITOR) 80 MG tablet Take 1 tablet (80 mg total) by mouth every evening.    blood sugar diagnostic Strp Use to test blood glucose two (2) times daily as directed with insurance preferred meter and supplies    dulaglutide (TRULICITY) 0.75 mg/0.5 mL pen injector Inject 0.75 mg into the skin every 7 days. OK to discontinue this med during SNF stay - resume upon discharge (Patient taking differently: Inject 0.75 mg into the skin every .)    ergocalciferol (ERGOCALCIFEROL) 50,000 unit Cap Take 50,000 Units by mouth every Saturday.    ezetimibe (ZETIA) 10 mg tablet Take 1 tablet (10 mg total) by mouth once daily. (For cholesterol)    FLUoxetine 40 MG capsule Take 40 mg by mouth once daily.    fluticasone propionate (FLONASE) 50 mcg/actuation nasal spray 2 sprays (100 mcg total) by Each Nostril route daily as needed for Allergies or Rhinitis.    furosemide (LASIX) 40 MG tablet Take 1 tablet (40 mg total) by mouth once daily.    ibuprofen (ADVIL,MOTRIN) 200 MG tablet Take 200 mg by mouth every 6 (six) hours as needed for Pain.    insulin (LANTUS SOLOSTAR U-100 INSULIN) glargine 100 units/mL SubQ pen Inject 80 Units into the skin 2 (two) times a day. INJECT 80 UNITS SUBCUTANEOUSLY TWICE DAILY (BULK) Strength: 100 unit/mL (3 mL)    insulin aspart U-100 (NOVOLOG FLEXPEN U-100 INSULIN) 100 unit/mL (3 mL) InPn pen Inject 30 Units into the skin 2 (two) times a day.    lancets (TRUEPLUS LANCETS) 28 gauge Misc Use to test blood glucose two (2)  "times daily as directed with insurance preferred meter and supplies    LANTUS SOLOSTAR U-100 INSULIN glargine 100 units/mL SubQ pen Inject 80 Units into the skin 2 (two) times a day.    lisinopriL (PRINIVIL,ZESTRIL) 40 MG tablet Take 1 tablet (40 mg total) by mouth once daily.    metFORMIN (GLUCOPHAGE-XR) 500 MG ER 24hr tablet Take 1,000 mg by mouth 2 (two) times daily with meals.    metoprolol succinate (TOPROL-XL) 100 MG 24 hr tablet Take 100 mg by mouth once daily.    NIFEdipine (ADALAT CC) 60 MG TbSR Take 60 mg by mouth every morning.    pantoprazole (PROTONIX) 40 MG tablet Take 40 mg by mouth once daily.    pen needle, diabetic (BD ULTRA-FINE ROSA PEN NEEDLE) 32 gauge x 5/32" Ndle Use with insulin once daily    pen needle, diabetic 32 gauge x 5/32" Ndle Use with injectable DM supplies twice daily as directed    potassium chloride (KLOR-CON) 8 MEQ TbSR TAKE TWO TABLETS BY MOUTH TWICE DAILY @ 9AM & 5PM    TRUE METRIX GO GLUCOSE METER Misc      Family History       Problem Relation (Age of Onset)    Arthritis Father    Asthma Daughter    Cataracts Father    Diabetes Mother, Father    Heart disease Sister    Hypertension Father, Sister    No Known Problems Brother, Maternal Aunt, Maternal Uncle, Paternal Aunt, Paternal Uncle, Maternal Grandmother, Maternal Grandfather, Paternal Grandmother, Paternal Grandfather    Stroke Mother, Sister    Thyroid disease Mother, Sister, Daughter          Tobacco Use    Smoking status: Every Day     Current packs/day: 0.00     Types: Cigarettes     Last attempt to quit: 2021     Years since quittin.9    Smokeless tobacco: Never   Substance and Sexual Activity    Alcohol use: Yes     Alcohol/week: 3.0 standard drinks of alcohol     Types: 3 Cans of beer per week     Comment: Rare    Drug use: No    Sexual activity: Yes     Partners: Male     Birth control/protection: None     Review of Systems    General: No fevers, weight changes.   HEENT: No visual changes, neck pain, " swallowing problems, hoarseness.  CV: No cp/sob, no palpitations, edema.   Pulm: No cough, sob, hemoptysis. No known CESAR.    GI: No n/v/d, no abdominal pain, no melena, hematochezia.   : No problems w/ bladder control, dysuria, hematuria.  Musculoskeletal: No joint pains/stiffness, back pain.   Neuro:  No HA, seizure, focal weakness, falls, dizzy spells.     Objective:     Vital Signs (Most Recent):  Temp: 99 °F (37.2 °C) (01/08/24 1113)  Pulse: 91 (01/08/24 1113)  Resp: 16 (01/08/24 1113)  BP: (!) 145/75 (01/08/24 1113)  SpO2: (!) 93 % (01/08/24 1113) Vital Signs (24h Range):  Temp:  [97.7 °F (36.5 °C)-100.4 °F (38 °C)] 99 °F (37.2 °C)  Pulse:  [] 91  Resp:  [16-20] 16  SpO2:  [91 %-94 %] 93 %  BP: (114-147)/(57-75) 145/75     Weight: 105 kg (231 lb 7.7 oz)  Body mass index is 38.52 kg/m².     Physical Exam        General:  A&O, NAD  HEENT: neck supple, PERRL/EOMI, EAC clear bilaterally, normal bilateral carotid upstroke w/o bruits, inf turbs clear bilaterally, OC/OP clear  Lungs: CTAB  CV: RRR w/o M/G/R  Abdomen: soft, NTND, no HSM, no bruits/masses/pulsations, massively obese  Ext: no edema  : she has a small incision right mons area, currently w/o any drainage/odor, the surrounding soft tissue is a bit indurated but not particularly tender to touch, she has a lot of redundant fatty tissue in the area.  There is some erythema.  No CVAT.   Rectal: def  Neuro: NF    Significant Labs: All pertinent labs within the past 24 hours have been reviewed.    Significant Imaging: I have reviewed all pertinent imaging results/findings within the past 24 hours.    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    S/P I&D  and irrigation and debridement rightly groin and right leg,on 1.7.24,cultures pending.    COVID-19  Pt indicates she was acutely ill w/ COVID in mid Dec  We were not able to confirm a negative test at [facility], so we got a rapid here, it's negative  She is w/o f/c/cough/SOB  No indication for treatment or isolation     Chronic combined systolic and diastolic congestive heart failure  Pt w/ combined CHF, not sure if there is ischemic component  Appears to be well compensated  Should undergo OPT assessment for CESAR  Will need cardiology f/u  CXR obtained preop, lungs clear, pt w/o CANELA/SOB/CP    Latest ECHO performed and demonstrates- Results for orders placed during the hospital encounter of 10/04/23    Echo    Interpretation Summary    Left Ventricle: The left ventricle is normal in size. There is concentric hypertrophy. Mild global hypokinesis present. There is moderately reduced systolic function with a visually estimated ejection fraction of 35 - 40%. Grade II diastolic dysfunction.    Right Ventricle: Normal right ventricular cavity size. Wall thickness is normal. Right ventricle wall motion  is normal. Systolic function is normal.    Tricuspid Valve: There is mild regurgitation.    Pulmonary Artery: The estimated pulmonary artery systolic pressure is 26 mmHg.    IVC/SVC: Normal venous pressure at 3 mmHg.      Pyelonephritis  Seen on CT along w/ perinephric abscesses not seen on u/s  Pt is clinically w/ minimal issues - no dysuria, back/flank pain, f/c/n/v  BCX done in [facility] ED  U/a not suggestive of any substantial UTI but the abscesses may not be communicating w/ the collecting system  Given Zosyn and vancomycin in ED  I had started Zosyn and IV doxy here empirically when I wrote admit orders but given review of all information will start meropenem, vancomycin, and clindamycin  Might need ID input  Suprisingly  she does not even meet SIRS criteria at this time  Will have drainage of left perinephritic abscess by IR today.      Class 2 severe obesity due to excess calories with serious comorbidity and body mass index (BMI) of 38.0 to 38.9 in adult  Body mass index is 38.52 kg/m².  Morbid obesity complicates all aspects of disease management from diagnostic modalities to treatment  Weight loss encouraged and health benefits explained to patient        History of CVA (cerebrovascular accident) without residual deficits  Apparently w/o sequelae  Close OPT f/u  Underlying untreated CESAR would increase risk of recurrence        Type 2 diabetes mellitus with both eyes affected by proliferative retinopathy and macular edema, with long-term current use of insulin  A1c 9.5%  SSI      CESAR (obstructive sleep apnea)  Pt denies having CESAR but this record is in her chart  Any recent PSG?  She certainly appears at risk  OPT f/u will be needed  Observe in recovery from surgery, caution w/ narcotics postop        Hyperlipidemia associated with type 2 diabetes mellitus  Home meds as appropriate  Does pt have CAD?      HTN (hypertension) without retinopathy  Home meds as appropriate  Pt is very non toxic appearing and currently w/ normal to high BP      VTE Risk Mitigation (From admission, onward)           Ordered     heparin (porcine) injection 5,000 Units  Every 8 hours         01/06/24 1628     IP VTE HIGH RISK PATIENT  Once         01/06/24 1628     Place sequential compression device  Until discontinued         01/06/24 1628                    Discharge Planning   MALGORZATA:      Code Status: Full Code   Is the patient medically ready for discharge?:     Reason for patient still in hospital (select all that apply): Patient trending condition  Discharge Plan A: Home with family                  Campbell Liriano MD  Department of Hospital Medicine   [facility] S[facility]

## 2024-01-08 NOTE — SUBJECTIVE & OBJECTIVE
Interval History: No major changes overnight. WBC now normal. Tolerated bedside dressing change without issue this morning.     Medications:  Continuous Infusions:  Scheduled Meds:   aspirin  81 mg Oral Daily    atorvastatin  80 mg Oral QHS    clindamycin in D5W  600 mg Intravenous Q8H    ezetimibe  10 mg Oral QHS    FLUoxetine  40 mg Oral Daily    fluticasone propionate  2 spray Each Nostril Daily    heparin (porcine)  5,000 Units Subcutaneous Q8H    lisinopriL  40 mg Oral Daily    meropenem (MERREM) IVPB  1 g Intravenous Q8H    metoprolol succinate  100 mg Oral Daily    mupirocin   Nasal BID    NIFEdipine  60 mg Oral Daily    pantoprazole  40 mg Oral Daily    vancomycin (VANCOCIN) IV (PEDS and ADULTS)  1,500 mg Intravenous Q12H     PRN Meds:acetaminophen, acetaminophen, aluminum-magnesium hydroxide-simethicone, dextrose 10%, dextrose 10%, glucagon (human recombinant), glucose, glucose, HYDROcodone-acetaminophen, insulin aspart U-100, melatonin, morphine, naloxone, prochlorperazine, sodium chloride 0.9%, Pharmacy to dose Vancomycin consult **AND** vancomycin - pharmacy to dose     Review of patient's allergies indicates:  No Known Allergies  Objective:     Vital Signs (Most Recent):  Temp: 97.7 °F (36.5 °C) (01/08/24 0705)  Pulse: 98 (01/08/24 0705)  Resp: 18 (01/08/24 0705)  BP: (!) 147/71 (01/08/24 0705)  SpO2: (!) 92 % (01/08/24 0705) Vital Signs (24h Range):  Temp:  [97.7 °F (36.5 °C)-100.4 °F (38 °C)] 97.7 °F (36.5 °C)  Pulse:  [] 98  Resp:  [16-28] 18  SpO2:  [91 %-99 %] 92 %  BP: (114-147)/(57-89) 147/71     Weight: 105 kg (231 lb 7.7 oz)  Body mass index is 38.52 kg/m².    Intake/Output - Last 3 Shifts         01/06 0700 01/07 0659 01/07 0700 01/08 0659 01/08 0700 01/09 0659    P.O. 0 560     IV Piggyback  1649.2     Total Intake(mL/kg) 0 (0) 2209.2 (21)     Net 0 +2209.2            Urine Occurrence 2 x 2 x     Stool Occurrence 0 x 0 x              Physical Exam  Vitals and nursing note reviewed.    Constitutional:       Appearance: Normal appearance. She is not ill-appearing.   HENT:      Head: Normocephalic.      Mouth/Throat:      Mouth: Mucous membranes are moist.      Pharynx: Oropharynx is clear.   Eyes:      General: No scleral icterus.     Extraocular Movements: Extraocular movements intact.      Conjunctiva/sclera: Conjunctivae normal.   Cardiovascular:      Rate and Rhythm: Normal rate.      Pulses: Normal pulses.   Pulmonary:      Effort: Pulmonary effort is normal. No respiratory distress.      Breath sounds: No wheezing.   Abdominal:      General: Abdomen is flat. Bowel sounds are normal. There is no distension.      Palpations: Abdomen is soft.   Genitourinary:     Comments: Right groin wound with no significant residual necrosis. Wound base appears healthy. No significant discharge. Still with some surrounding cellulitis changes. Clean packing in place.   Musculoskeletal:         General: No swelling or tenderness. Normal range of motion.      Cervical back: Normal range of motion.   Skin:     General: Skin is warm and dry.      Coloration: Skin is not jaundiced or pale.   Neurological:      General: No focal deficit present.      Mental Status: She is alert and oriented to person, place, and time.   Psychiatric:         Mood and Affect: Mood normal.          Significant Labs:  I have reviewed all pertinent lab results within the past 24 hours.  CBC:   Recent Labs   Lab 01/08/24  0613   WBC 11.43   RBC 3.55*   HGB 10.2*   HCT 30.9*      MCV 87   MCH 28.7   MCHC 33.0     CMP:   Recent Labs   Lab 01/08/24  0613   *   CALCIUM 8.3*   ALBUMIN 2.0*   PROT 6.7   *   K 3.4*   CO2 23      BUN 10   CREATININE 0.9   ALKPHOS 79   ALT 11   AST 9*   BILITOT 0.4       Significant Diagnostics:  I have reviewed all pertinent imaging results/findings within the past 24 hours.

## 2024-01-09 PROBLEM — N15.1 ABSCESS OF LEFT KIDNEY: Status: ACTIVE | Noted: 2024-01-06

## 2024-01-09 LAB
ALBUMIN SERPL BCP-MCNC: 2.2 G/DL (ref 3.5–5.2)
ALP SERPL-CCNC: 93 U/L (ref 55–135)
ALT SERPL W/O P-5'-P-CCNC: 7 U/L (ref 10–44)
ANION GAP SERPL CALC-SCNC: 9 MMOL/L (ref 8–16)
AST SERPL-CCNC: 9 U/L (ref 10–40)
BACTERIA SPEC AEROBE CULT: ABNORMAL
BASOPHILS # BLD AUTO: 0.02 K/UL (ref 0–0.2)
BASOPHILS NFR BLD: 0.2 % (ref 0–1.9)
BILIRUB SERPL-MCNC: 0.4 MG/DL (ref 0.1–1)
BUN SERPL-MCNC: 8 MG/DL (ref 6–20)
CALCIUM SERPL-MCNC: 8.8 MG/DL (ref 8.7–10.5)
CHLORIDE SERPL-SCNC: 102 MMOL/L (ref 95–110)
CO2 SERPL-SCNC: 25 MMOL/L (ref 23–29)
CREAT SERPL-MCNC: 0.8 MG/DL (ref 0.5–1.4)
DIFFERENTIAL METHOD BLD: ABNORMAL
EOSINOPHIL # BLD AUTO: 0.2 K/UL (ref 0–0.5)
EOSINOPHIL NFR BLD: 1.7 % (ref 0–8)
ERYTHROCYTE [DISTWIDTH] IN BLOOD BY AUTOMATED COUNT: 12.5 % (ref 11.5–14.5)
EST. GFR  (NO RACE VARIABLE): >60 ML/MIN/1.73 M^2
GLUCOSE SERPL-MCNC: 192 MG/DL (ref 70–110)
HCT VFR BLD AUTO: 32.5 % (ref 37–48.5)
HGB BLD-MCNC: 10.4 G/DL (ref 12–16)
IMM GRANULOCYTES # BLD AUTO: 0.07 K/UL (ref 0–0.04)
IMM GRANULOCYTES NFR BLD AUTO: 0.8 % (ref 0–0.5)
LYMPHOCYTES # BLD AUTO: 1.8 K/UL (ref 1–4.8)
LYMPHOCYTES NFR BLD: 19.4 % (ref 18–48)
MCH RBC QN AUTO: 27.9 PG (ref 27–31)
MCHC RBC AUTO-ENTMCNC: 32 G/DL (ref 32–36)
MCV RBC AUTO: 87 FL (ref 82–98)
MONOCYTES # BLD AUTO: 0.8 K/UL (ref 0.3–1)
MONOCYTES NFR BLD: 8.6 % (ref 4–15)
NEUTROPHILS # BLD AUTO: 6.3 K/UL (ref 1.8–7.7)
NEUTROPHILS NFR BLD: 69.3 % (ref 38–73)
NRBC BLD-RTO: 0 /100 WBC
PLATELET # BLD AUTO: 281 K/UL (ref 150–450)
PMV BLD AUTO: 10.9 FL (ref 9.2–12.9)
POCT GLUCOSE: 193 MG/DL (ref 70–110)
POCT GLUCOSE: 236 MG/DL (ref 70–110)
POCT GLUCOSE: 311 MG/DL (ref 70–110)
POCT GLUCOSE: 328 MG/DL (ref 70–110)
POTASSIUM SERPL-SCNC: 3.3 MMOL/L (ref 3.5–5.1)
PROT SERPL-MCNC: 7.1 G/DL (ref 6–8.4)
RBC # BLD AUTO: 3.73 M/UL (ref 4–5.4)
SODIUM SERPL-SCNC: 136 MMOL/L (ref 136–145)
WBC # BLD AUTO: 9.06 K/UL (ref 3.9–12.7)

## 2024-01-09 PROCEDURE — 25000003 PHARM REV CODE 250: Mod: HCNC | Performed by: STUDENT IN AN ORGANIZED HEALTH CARE EDUCATION/TRAINING PROGRAM

## 2024-01-09 PROCEDURE — 25000003 PHARM REV CODE 250: Mod: HCNC | Performed by: HOSPITALIST

## 2024-01-09 PROCEDURE — 99233 SBSQ HOSP IP/OBS HIGH 50: CPT | Mod: HCNC,,, | Performed by: STUDENT IN AN ORGANIZED HEALTH CARE EDUCATION/TRAINING PROGRAM

## 2024-01-09 PROCEDURE — 99232 SBSQ HOSP IP/OBS MODERATE 35: CPT | Mod: HCNC,,, | Performed by: SURGERY

## 2024-01-09 PROCEDURE — 63600175 PHARM REV CODE 636 W HCPCS: Mod: JZ,JG,HCNC | Performed by: STUDENT IN AN ORGANIZED HEALTH CARE EDUCATION/TRAINING PROGRAM

## 2024-01-09 PROCEDURE — 99232 SBSQ HOSP IP/OBS MODERATE 35: CPT | Mod: HCNC,,, | Performed by: UROLOGY

## 2024-01-09 PROCEDURE — 63600175 PHARM REV CODE 636 W HCPCS: Mod: HCNC | Performed by: STUDENT IN AN ORGANIZED HEALTH CARE EDUCATION/TRAINING PROGRAM

## 2024-01-09 PROCEDURE — 85025 COMPLETE CBC W/AUTO DIFF WBC: CPT | Mod: HCNC | Performed by: STUDENT IN AN ORGANIZED HEALTH CARE EDUCATION/TRAINING PROGRAM

## 2024-01-09 PROCEDURE — 11000001 HC ACUTE MED/SURG PRIVATE ROOM: Mod: HCNC

## 2024-01-09 PROCEDURE — 80053 COMPREHEN METABOLIC PANEL: CPT | Mod: HCNC | Performed by: STUDENT IN AN ORGANIZED HEALTH CARE EDUCATION/TRAINING PROGRAM

## 2024-01-09 PROCEDURE — 36415 COLL VENOUS BLD VENIPUNCTURE: CPT | Mod: HCNC | Performed by: STUDENT IN AN ORGANIZED HEALTH CARE EDUCATION/TRAINING PROGRAM

## 2024-01-09 PROCEDURE — 63600175 PHARM REV CODE 636 W HCPCS: Mod: HCNC | Performed by: HOSPITALIST

## 2024-01-09 RX ORDER — DOXYCYCLINE HYCLATE 100 MG
100 TABLET ORAL EVERY 12 HOURS
Status: DISCONTINUED | OUTPATIENT
Start: 2024-01-09 | End: 2024-01-09

## 2024-01-09 RX ADMIN — METOPROLOL SUCCINATE 100 MG: 50 TABLET, EXTENDED RELEASE ORAL at 09:01

## 2024-01-09 RX ADMIN — ATORVASTATIN CALCIUM 80 MG: 40 TABLET, FILM COATED ORAL at 10:01

## 2024-01-09 RX ADMIN — MUPIROCIN: 20 OINTMENT TOPICAL at 10:01

## 2024-01-09 RX ADMIN — MEROPENEM 1 G: 1 INJECTION INTRAVENOUS at 11:01

## 2024-01-09 RX ADMIN — INSULIN ASPART 2 UNITS: 100 INJECTION, SOLUTION INTRAVENOUS; SUBCUTANEOUS at 10:01

## 2024-01-09 RX ADMIN — FLUOXETINE HYDROCHLORIDE 40 MG: 20 CAPSULE ORAL at 09:01

## 2024-01-09 RX ADMIN — MEROPENEM 1 G: 1 INJECTION INTRAVENOUS at 06:01

## 2024-01-09 RX ADMIN — PANTOPRAZOLE SODIUM 40 MG: 40 TABLET, DELAYED RELEASE ORAL at 09:01

## 2024-01-09 RX ADMIN — HEPARIN SODIUM 5000 UNITS: 5000 INJECTION INTRAVENOUS; SUBCUTANEOUS at 06:01

## 2024-01-09 RX ADMIN — HYDROCODONE BITARTRATE AND ACETAMINOPHEN 1 TABLET: 5; 325 TABLET ORAL at 10:01

## 2024-01-09 RX ADMIN — INSULIN ASPART 4 UNITS: 100 INJECTION, SOLUTION INTRAVENOUS; SUBCUTANEOUS at 05:01

## 2024-01-09 RX ADMIN — VANCOMYCIN HYDROCHLORIDE 1500 MG: 1.5 INJECTION, POWDER, LYOPHILIZED, FOR SOLUTION INTRAVENOUS at 11:01

## 2024-01-09 RX ADMIN — MEROPENEM 1 G: 1 INJECTION INTRAVENOUS at 02:01

## 2024-01-09 RX ADMIN — MORPHINE SULFATE 4 MG: 4 INJECTION, SOLUTION INTRAMUSCULAR; INTRAVENOUS at 06:01

## 2024-01-09 RX ADMIN — NIFEDIPINE 60 MG: 30 TABLET, FILM COATED, EXTENDED RELEASE ORAL at 09:01

## 2024-01-09 RX ADMIN — LISINOPRIL 40 MG: 20 TABLET ORAL at 09:01

## 2024-01-09 RX ADMIN — MUPIROCIN: 20 OINTMENT TOPICAL at 09:01

## 2024-01-09 RX ADMIN — INSULIN DETEMIR 20 UNITS: 100 INJECTION, SOLUTION SUBCUTANEOUS at 09:01

## 2024-01-09 RX ADMIN — HEPARIN SODIUM 5000 UNITS: 5000 INJECTION INTRAVENOUS; SUBCUTANEOUS at 02:01

## 2024-01-09 RX ADMIN — ASPIRIN 81 MG: 81 TABLET, COATED ORAL at 09:01

## 2024-01-09 RX ADMIN — HEPARIN SODIUM 5000 UNITS: 5000 INJECTION INTRAVENOUS; SUBCUTANEOUS at 10:01

## 2024-01-09 RX ADMIN — EZETIMIBE 10 MG: 10 TABLET ORAL at 10:01

## 2024-01-09 RX ADMIN — FLUTICASONE PROPIONATE 100 MCG: 50 SPRAY, METERED NASAL at 09:01

## 2024-01-09 NOTE — PROGRESS NOTES
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 Work up notable for CT findings with FG in R inguinum and pyelonephritis and perinephric abscess. S/p OR 1/7 for washout - cx with GNR thus far. Wound cx on 1/5 with Ecoli, strep, and staph aureus, ucx with Ecoli. Renal US with complex L kidney lesion - S/p IR aspiration 1/8 - noted to have purulent drainage cx in process. Pt reported that her preference for home abx be oral if feasible. Leukocytosis noted on admit now resolved.      Recommendations:   -reordered vancomycin pharm to dose given new staph aureus growth and purulent drainage on IR aspiration - de-escalate pending cx   -continue meropenem pending cx data - can likely further de-escalate soon  -follow up cx  -follow up surgery recs          Thank you for your consult. I will follow-up with patient. Please contact us if you have any additional questions. Above d/w primary team.       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 Denies drainage prior to arrival. Lesion kept getting bigger and more painful and then she had fever, so went to hospital. Reports some dysuria. Denies cva tenderness. Denies indwelling hardware. Denies abx allergies.     CT showed changes c/w Jeremiah's gangrene in  "right inguinum, also has findings c/w pyelonephritis and perinephric abscesses on the contralateral side.     Transferred to [facility] for surgical eval    Today 1/7, s/p  IRRIGATION AND DEBRIDEMENT RIGHT GROIN (Right)  IRRIGATION AND DEBRIDEMENT, LOWER EXTREMITY     Per op note, sinus tract found and debrided. Necrosis extended through Miller's fascia down to the external oblique     Wound culture from admit-  Specimen Information: Abdomen; Abscess   0 Result Notes      Component 2 d ago   Aerobic Bacterial Culture      Abnormal   GRAM NEGATIVE EDOUARD  Many  Identification and susceptibility pending  P      Aerobic Bacterial Culture      Abnormal   STREPTOCOCCUS ANGINOSUS  Many  Susceptibility testing not routinely performed  P             OR cultures today - pending    Ucx GNR      Renal US     3 cm complex collection adjacent to the left kidney in keeping with the patient's known perinephric abscess.     On vanc/meropenem/clindamycin    ID consulted for "abx mgt assistance, right sided Jeremiah's gangrene, contralateral pyelo w/ PN abscesses, DM   Interval History: Cx from 1/5 now with Staph aureus, susceptibilities pending. S/p IR aspiration of renal lesion - pus noted. Cx in process.     Review of Systems   Constitutional:  Negative for chills and fever.   All other systems reviewed and are negative.    Objective:     Vital Signs (Most Recent):  Temp: 98.4 °F (36.9 °C) (01/09/24 0822)  Pulse: 89 (01/09/24 0822)  Resp: 20 (01/09/24 0822)  BP: 139/63 (01/09/24 0822)  SpO2: (!) 93 % (01/09/24 0822) Vital Signs (24h Range):  Temp:  [98.4 °F (36.9 °C)-99.1 °F (37.3 °C)] 98.4 °F (36.9 °C)  Pulse:  [85-91] 89  Resp:  [13-20] 20  SpO2:  [84 %-96 %] 93 %  BP: (121-167)/(56-83) 139/63     Weight: 105 kg (231 lb 7.7 oz)  Body mass index is 38.52 kg/m².    Estimated Creatinine Clearance: 104.6 mL/min (based on SCr of 0.8 mg/dL).     Physical Exam  Constitutional:       General: She is not in acute distress.     Appearance: " She is not ill-appearing, toxic-appearing or diaphoretic.   Pulmonary:      Effort: Pulmonary effort is normal. No respiratory distress.   Abdominal:      General: There is no distension.      Palpations: Abdomen is soft.      Tenderness: There is no abdominal tenderness. There is no right CVA tenderness or left CVA tenderness.   Genitourinary:     Comments: No purulent drainage seen along R groin wound  Skin:     General: Skin is warm and dry.   Neurological:      Mental Status: She is alert and oriented to person, place, and time.          Significant Labs:   Microbiology Results (last 7 days)       Procedure Component Value Units Date/Time    Culture, Anaerobe [[id number]] Collected: 01/07/24 0850    Order Status: Completed Specimen: Wound from Groin Updated: 01/09/24 0816     Anaerobic Culture Culture in progress    Narrative:      Right groin tissue    Aerobic culture [[id number]]  (Abnormal)  (Susceptibility) Collected: 01/07/24 0850    Order Status: Completed Specimen: Wound from Groin Updated: 01/09/24 0746     Aerobic Bacterial Culture ESCHERICHIA COLI  Rare      Narrative:      Right groin    Culture, Anaerobic [[id number]] Collected: 01/08/24 1607    Order Status: Sent Specimen: Body Fluid from Kidney, Left Updated: 01/08/24 1644    Culture, Body Fluid (Aerobic) w/ Gram Stain [[id number]] Collected: 01/08/24 1607    Order Status: Sent Specimen: Body Fluid from Back Updated: 01/08/24 1644    Culture, Anaerobe [[id number]]     Order Status: No result Specimen: Joint Fluid     Aerobic culture [[id number]]     Order Status: No result Specimen: Bone     AFB Culture & Smear [[id number]] Collected: 01/07/24 0850    Order Status: Sent Specimen: Wound from Groin Updated: 01/08/24 1147    Gram stain [[id number]] Collected: 01/07/24 0850    Order Status: Completed Specimen: Wound from Groin Updated: 01/07/24 1217     Gram Stain Result Few WBC's      Few Gram negative rods      Few Gram positive cocci in pairs     Narrative:      Right groin    Fungus culture [[id number]] Collected: 01/07/24 0850    Order Status: Sent Specimen: Wound from Groin Updated: 01/07/24 1108            Significant Imaging: I have reviewed all pertinent imaging results/findings within the past 24 hours.

## 2024-01-09 NOTE — PLAN OF CARE
Problem: Adult Inpatient Plan of Care  Goal: Plan of Care Review  Outcome: Ongoing, Progressing  Goal: Patient-Specific Goal (Individualized)  Outcome: Ongoing, Progressing  Goal: Optimal Comfort and Wellbeing  Outcome: Ongoing, Progressing  Goal: Readiness for Transition of Care  Outcome: Ongoing, Progressing     Problem: Diabetes Comorbidity  Goal: Blood Glucose Level Within Targeted Range  Outcome: Ongoing, Progressing     Problem: Impaired Wound Healing  Goal: Optimal Wound Healing  Outcome: Ongoing, Progressing     Problem: Pain Acute  Goal: Acceptable Pain Control and Functional Ability  Outcome: Ongoing, Progressing

## 2024-01-09 NOTE — SUBJECTIVE & OBJECTIVE
Interval History: No significant changes. Underwent drainage of perinephric purulent fluid yesterday. WBC continues downtrending, now 9. Tolerating bedside dressing changes. AF, HDS    Medications:  Continuous Infusions:  Scheduled Meds:   aspirin  81 mg Oral Daily    atorvastatin  80 mg Oral QHS    ezetimibe  10 mg Oral QHS    FLUoxetine  40 mg Oral Daily    fluticasone propionate  2 spray Each Nostril Daily    heparin (porcine)  5,000 Units Subcutaneous Q8H    insulin detemir U-100  20 Units Subcutaneous Daily    lisinopriL  40 mg Oral Daily    meropenem (MERREM) IVPB  1 g Intravenous Q8H    metoprolol succinate  100 mg Oral Daily    mupirocin   Nasal BID    NIFEdipine  60 mg Oral Daily    pantoprazole  40 mg Oral Daily    vancomycin (VANCOCIN) IV (PEDS and ADULTS)  1,500 mg Intravenous Q12H     PRN Meds:acetaminophen, acetaminophen, aluminum-magnesium hydroxide-simethicone, dextrose 10%, dextrose 10%, glucagon (human recombinant), glucose, glucose, HYDROcodone-acetaminophen, insulin aspart U-100, melatonin, morphine, naloxone, prochlorperazine, sodium chloride 0.9%, Pharmacy to dose Vancomycin consult **AND** vancomycin - pharmacy to dose     Review of patient's allergies indicates:  No Known Allergies  Objective:     Vital Signs (Most Recent):  Temp: 98.9 °F (37.2 °C) (01/09/24 1230)  Pulse: 86 (01/09/24 1230)  Resp: 20 (01/09/24 1230)  BP: (!) 150/72 (01/09/24 1230)  SpO2: (!) 94 % (01/09/24 1231) Vital Signs (24h Range):  Temp:  [98.4 °F (36.9 °C)-99.1 °F (37.3 °C)] 98.9 °F (37.2 °C)  Pulse:  [85-91] 86  Resp:  [13-20] 20  SpO2:  [84 %-96 %] 94 %  BP: (121-167)/(56-83) 150/72     Weight: 105 kg (231 lb 7.7 oz)  Body mass index is 38.52 kg/m².    Intake/Output - Last 3 Shifts         01/07 0700 01/08 0659 01/08 0700  01/09 0659 01/09 0700  01/10 0659    P.O. 560 360 240    IV Piggyback 1649.2 380.1     Total Intake(mL/kg) 2209.2 (21) 740.1 (7) 240 (2.3)    Net +2209.2 +740.1 +240           Urine Occurrence 2 x  5 x     Stool Occurrence 0 x 0 x 0 x             Physical Exam  Vitals and nursing note reviewed.   Constitutional:       Appearance: Normal appearance. She is not ill-appearing.   HENT:      Head: Normocephalic.      Mouth/Throat:      Mouth: Mucous membranes are moist.      Pharynx: Oropharynx is clear.   Eyes:      General: No scleral icterus.     Extraocular Movements: Extraocular movements intact.      Conjunctiva/sclera: Conjunctivae normal.   Cardiovascular:      Rate and Rhythm: Normal rate.      Pulses: Normal pulses.   Pulmonary:      Effort: Pulmonary effort is normal. No respiratory distress.      Breath sounds: No wheezing.   Abdominal:      General: Abdomen is flat. Bowel sounds are normal. There is no distension.      Palpations: Abdomen is soft.   Genitourinary:     Comments: Right groin wound with no significant residual necrosis. Wound base appears healthy. No significant discharge. Still with some surrounding cellulitis changes. Clean packing in place.   Musculoskeletal:         General: No swelling or tenderness. Normal range of motion.      Cervical back: Normal range of motion.   Skin:     General: Skin is warm and dry.      Coloration: Skin is not jaundiced or pale.   Neurological:      General: No focal deficit present.      Mental Status: She is alert and oriented to person, place, and time.   Psychiatric:         Mood and Affect: Mood normal.          Significant Labs:  I have reviewed all pertinent lab results within the past 24 hours.  CBC:   Recent Labs   Lab 01/09/24  0504   WBC 9.06   RBC 3.73*   HGB 10.4*   HCT 32.5*      MCV 87   MCH 27.9   MCHC 32.0     CMP:   Recent Labs   Lab 01/09/24  0504   *   CALCIUM 8.8   ALBUMIN 2.2*   PROT 7.1      K 3.3*   CO2 25      BUN 8   CREATININE 0.8   ALKPHOS 93   ALT 7*   AST 9*   BILITOT 0.4       Significant Diagnostics:  I have reviewed all pertinent imaging results/findings within the past 24 hours.

## 2024-01-09 NOTE — NURSING
[facility], [facility]  Nurses Note -- 4 Eyes    1/8/2024      Skin assessed on: Q Shift      [x] No Pressure Injuries Present    []Prevention Measures Documented    [] Yes LDA  for Pressure Injury Previously documented     [] Yes New Pressure Injury Discovered   [] LDA for New Pressure Injury Added      Attending RN:  Yolanda Trevino RN     Second RN:  Tamiok Luna RN

## 2024-01-09 NOTE — SUBJECTIVE & OBJECTIVE
Interval History: s/p aspiration of renal abscess 1/8/2024.  No pain currently    Review of Systems   Constitutional:  Negative for chills, fatigue and fever.   HENT:  Negative for congestion.    Respiratory:  Negative for chest tightness and shortness of breath.    Cardiovascular:  Negative for chest pain.   Gastrointestinal:  Negative for abdominal distention, constipation, diarrhea, nausea and vomiting.   Genitourinary:  Negative for difficulty urinating, dysuria, flank pain, frequency, hematuria, pelvic pain and urgency.   Musculoskeletal:  Negative for arthralgias.   Skin:  Negative for rash.   Neurological:  Negative for dizziness.   Psychiatric/Behavioral:  Negative for confusion.      Objective:     Temp:  [98.5 °F (36.9 °C)-99.1 °F (37.3 °C)] 98.9 °F (37.2 °C)  Pulse:  [85-91] 86  Resp:  [13-18] 16  SpO2:  [84 %-96 %] 95 %  BP: (121-167)/(56-83) 150/70     Body mass index is 38.52 kg/m².           Drains       None                    Physical Exam  Vitals and nursing note reviewed.   Constitutional:       Appearance: She is well-developed.   HENT:      Head: Normocephalic.   Eyes:      Conjunctiva/sclera: Conjunctivae normal.   Neck:      Thyroid: No thyromegaly.      Trachea: No tracheal deviation.   Cardiovascular:      Rate and Rhythm: Normal rate.      Pulses: Normal pulses.      Heart sounds: Normal heart sounds.   Pulmonary:      Effort: Pulmonary effort is normal. No respiratory distress.      Breath sounds: Normal breath sounds. No wheezing.   Abdominal:      General: There is no distension.      Palpations: Abdomen is soft. There is no mass.      Tenderness: There is no abdominal tenderness. There is no guarding or rebound.      Hernia: No hernia is present.   Musculoskeletal:         General: No tenderness. Normal range of motion.      Cervical back: Normal range of motion.   Lymphadenopathy:      Cervical: No cervical adenopathy.   Skin:     General: Skin is warm and dry.      Findings: No  erythema or rash.   Neurological:      Mental Status: She is alert and oriented to person, place, and time.   Psychiatric:         Behavior: Behavior normal.         Thought Content: Thought content normal.         Judgment: Judgment normal.           Significant Labs:    BMP:  Recent Labs   Lab 01/07/24  0519 01/08/24  0613 01/09/24  0504   * 134* 136   K 3.0* 3.4* 3.3*    100 102   CO2 23 23 25   BUN 9 10 8   CREATININE 0.9 0.9 0.8   CALCIUM 8.6* 8.3* 8.8       CBC:   Recent Labs   Lab 01/07/24  0519 01/08/24  0613 01/09/24  0504   WBC 14.84* 11.43 9.06   HGB 10.1* 10.2* 10.4*   HCT 31.2* 30.9* 32.5*    247 281       Blood Culture:   Recent Labs   Lab 01/05/24  2106   LABBLOO No Growth to date  No Growth to date  No Growth to date  No Growth to date     Urine Culture:   Recent Labs   Lab 01/05/24  1818   LABURIN ESCHERICHIA COLI  >100,000 cfu/ml  *       Significant Imaging:

## 2024-01-09 NOTE — NURSING
[facility], [facility]  Nurses Note -- 4 Eyes      1/9/2024       Skin assessed on: Q Shift      [x] No Pressure Injuries Present    []Prevention Measures Documented    [] Yes LDA  for Pressure Injury Previously documented     [] Yes New Pressure Injury Discovered   [] LDA for New Pressure Injury Added      Attending RN:  Saira Chan RN     Second RN:  Yolanda

## 2024-01-09 NOTE — PROGRESS NOTES
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  Pt w/o current pain, f/c/n/v, flank pain etc - she was under impression she was sent here for "stomach surgery", we talked to her daughter and 2 of her sisters on the phone, separately, at her request, as well as Dr Snyder who called to talk to her preop, at the bedside, to clear this up.  Appreciate GS attention to pt, asking  to see in AM as well as we appear to have 2 separate processes here, she may need intervention from  as well although most likely will just need to treat the infection and reimage kidneys at some point.  She had I&D at bedside of right [facility] area in [place], presume material was sent for culture.  Incidentally pt was ill w/ COVID 19 12/18, we were advised on transfer of this, she seems to have fully recovered.  Per pt she was tested again at [facility] and was negative, and Epic prompted me "pt has negative COVID test" - but, we were not able to confirm this result, so we did another rapid which was negative.  I also have requested EKG and CXR preop in this pt w/ CHF, morbid obesity.    Pt is admitted to  services, and Dr Snyder scheduling for OR in AM.      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  Pt w/o current pain, f/c/n/v, flank pain etc - she was under impression she was sent here for "stomach surgery", we talked to her daughter and 2 of her sisters on the phone, separately, at her request, as well as Dr Snyder who called to talk to her preop, at the bedside, to clear this up.  Appreciate GS attention to pt, asking  to see in AM as well as we appear to have 2 separate processes here, she may need intervention from  as well although most likely will just need to treat the infection and reimage kidneys at some point.  She had I&D at bedside of right mons area in [place], presume material was sent for culture.  Incidentally pt was ill w/ COVID 19 12/18, we were advised on transfer of this, she seems to have fully recovered.  Per pt she was tested again at [facility] and was negative, and Epic prompted me "pt has negative COVID test" - but, we were not able to confirm this result, so we did another rapid which was negative.  I also have requested EKG and CXR preop in this pt w/ CHF, morbid obesity.    Pt is admitted to  services,surgery is consulted.  S/P I&D  and irrigation and debridement rightly groin and right leg,cultures pending.  S/P drainage of left perinephritic abscess by IR ,  Culture growing staph,strep,Ecoli,per ID on Vanc. And meropenem.    Past Medical History:   Diagnosis Date    Cataract     Cervical high risk HPV (human papillomavirus) test positive 09/17/2020    NOT 16 OR 18    CHF (congestive heart failure)     CVA (cerebral infarction) 2003         Depression     Diabetes mellitus, type 2     GERD (gastroesophageal reflux disease)     Hyperlipidemia     Hypertension     Lactic acidosis 10/06/2023    Moderate nonproliferative diabetic retinopathy     Nausea and vomiting 10/06/2023    Obesity     Stroke     Tachycardia " 10/04/2023       Past Surgical History:   Procedure Laterality Date     SECTION      CHOLECYSTECTOMY      DILATION AND CURETTAGE OF UTERUS      EYE SURGERY Bilateral     laser    GALLBLADDER SURGERY  2017    stone removed       Review of patient's allergies indicates:  No Known Allergies    No current facility-administered medications on file prior to encounter.     Current Outpatient Medications on File Prior to Encounter   Medication Sig    aspirin (ECOTRIN) 81 MG EC tablet Take 81 mg by mouth once daily.    atorvastatin (LIPITOR) 80 MG tablet Take 1 tablet (80 mg total) by mouth every evening.    blood sugar diagnostic Strp Use to test blood glucose two (2) times daily as directed with insurance preferred meter and supplies    dulaglutide (TRULICITY) 0.75 mg/0.5 mL pen injector Inject 0.75 mg into the skin every 7 days. OK to discontinue this med during SNF stay - resume upon discharge (Patient taking differently: Inject 0.75 mg into the skin every .)    ergocalciferol (ERGOCALCIFEROL) 50,000 unit Cap Take 50,000 Units by mouth every Saturday.    ezetimibe (ZETIA) 10 mg tablet Take 1 tablet (10 mg total) by mouth once daily. (For cholesterol)    FLUoxetine 40 MG capsule Take 40 mg by mouth once daily.    fluticasone propionate (FLONASE) 50 mcg/actuation nasal spray 2 sprays (100 mcg total) by Each Nostril route daily as needed for Allergies or Rhinitis.    furosemide (LASIX) 40 MG tablet Take 1 tablet (40 mg total) by mouth once daily.    ibuprofen (ADVIL,MOTRIN) 200 MG tablet Take 200 mg by mouth every 6 (six) hours as needed for Pain.    insulin (LANTUS SOLOSTAR U-100 INSULIN) glargine 100 units/mL SubQ pen Inject 80 Units into the skin 2 (two) times a day. INJECT 80 UNITS SUBCUTANEOUSLY TWICE DAILY (BULK) Strength: 100 unit/mL (3 mL)    insulin aspart U-100 (NOVOLOG FLEXPEN U-100 INSULIN) 100 unit/mL (3 mL) InPn pen Inject 30 Units into the skin 2 (two) times a day.    lancets (TRUEPLUS  "LANCETS) 28 gauge Misc Use to test blood glucose two (2) times daily as directed with insurance preferred meter and supplies    LANTUS SOLOSTAR U-100 INSULIN glargine 100 units/mL SubQ pen Inject 80 Units into the skin 2 (two) times a day.    lisinopriL (PRINIVIL,ZESTRIL) 40 MG tablet Take 1 tablet (40 mg total) by mouth once daily.    metFORMIN (GLUCOPHAGE-XR) 500 MG ER 24hr tablet Take 1,000 mg by mouth 2 (two) times daily with meals.    metoprolol succinate (TOPROL-XL) 100 MG 24 hr tablet Take 100 mg by mouth once daily.    NIFEdipine (ADALAT CC) 60 MG TbSR Take 60 mg by mouth every morning.    pantoprazole (PROTONIX) 40 MG tablet Take 40 mg by mouth once daily.    pen needle, diabetic (BD ULTRA-FINE ROSA PEN NEEDLE) 32 gauge x 5/32" Ndle Use with insulin once daily    pen needle, diabetic 32 gauge x 5/32" Ndle Use with injectable DM supplies twice daily as directed    potassium chloride (KLOR-CON) 8 MEQ TbSR TAKE TWO TABLETS BY MOUTH TWICE DAILY @ 9AM & 5PM    TRUE METRIX GO GLUCOSE METER Misc      Family History       Problem Relation (Age of Onset)    Arthritis Father    Asthma Daughter    Cataracts Father    Diabetes Mother, Father    Heart disease Sister    Hypertension Father, Sister    No Known Problems Brother, Maternal Aunt, Maternal Uncle, Paternal Aunt, Paternal Uncle, Maternal Grandmother, Maternal Grandfather, Paternal Grandmother, Paternal Grandfather    Stroke Mother, Sister    Thyroid disease Mother, Sister, Daughter          Tobacco Use    Smoking status: Every Day     Current packs/day: 0.00     Types: Cigarettes     Last attempt to quit: 2021     Years since quittin.9    Smokeless tobacco: Never   Substance and Sexual Activity    Alcohol use: Yes     Alcohol/week: 3.0 standard drinks of alcohol     Types: 3 Cans of beer per week     Comment: Rare    Drug use: No    Sexual activity: Yes     Partners: Male     Birth control/protection: None     Review of Systems    General: No fevers, " weight changes.   HEENT: No visual changes, neck pain, swallowing problems, hoarseness.  CV: No cp/sob, no palpitations, edema.   Pulm: No cough, sob, hemoptysis. No known CESAR.    GI: No n/v/d, no abdominal pain, no melena, hematochezia.   : No problems w/ bladder control, dysuria, hematuria.  Musculoskeletal: No joint pains/stiffness, back pain.   Neuro:  No HA, seizure, focal weakness, falls, dizzy spells.     Objective:     Vital Signs (Most Recent):  Temp: 98.4 °F (36.9 °C) (01/09/24 0822)  Pulse: 89 (01/09/24 0822)  Resp: 20 (01/09/24 0822)  BP: 139/63 (01/09/24 0822)  SpO2: 96 % (01/09/24 1000) Vital Signs (24h Range):  Temp:  [98.4 °F (36.9 °C)-99.1 °F (37.3 °C)] 98.4 °F (36.9 °C)  Pulse:  [85-91] 89  Resp:  [13-20] 20  SpO2:  [84 %-96 %] 96 %  BP: (121-167)/(56-83) 139/63     Weight: 105 kg (231 lb 7.7 oz)  Body mass index is 38.52 kg/m².     Physical Exam        General:  A&O, NAD  HEENT: neck supple, PERRL/EOMI, EAC clear bilaterally, normal bilateral carotid upstroke w/o bruits, inf turbs clear bilaterally, OC/OP clear  Lungs: CTAB  CV: RRR w/o M/G/R  Abdomen: soft, NTND, no HSM, no bruits/masses/pulsations, massively obese  Ext: no edema  : she has a small incision right mons area, currently w/o any drainage/odor, the surrounding soft tissue is a bit indurated but not particularly tender to touch, she has a lot of redundant fatty tissue in the area.  There is some erythema.  No CVAT.   Rectal: def  Neuro: NF    Significant Labs: All pertinent labs within the past 24 hours have been reviewed.    Significant Imaging: I have reviewed all pertinent imaging results/findings within the past 24 hours.    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    S/P I&D  and irrigation and debridement rightly groin and right leg,on 1.7.24,c  Culture growing staph,strep,Ecoli,per ID on Vanc. And meropenem.    COVID-19  Pt indicates she was acutely ill w/ COVID in mid Dec  We were not able to confirm a negative test at [facility], so we got a rapid here, it's negative  She is w/o f/c/cough/SOB  No indication for treatment or isolation     Chronic combined systolic and diastolic congestive heart failure  Pt w/ combined CHF, not sure if there is ischemic component  Appears to be well compensated  Should undergo OPT assessment for CESAR  Will need cardiology f/u  CXR obtained preop, lungs clear, pt w/o CANELA/SOB/CP    Latest ECHO performed and demonstrates- Results for orders placed during the hospital encounter of 10/04/23    Echo    Interpretation Summary    Left Ventricle: The left ventricle is normal in size. There is concentric hypertrophy. Mild global hypokinesis present. There is moderately reduced systolic function with a visually estimated ejection fraction of 35 - 40%. Grade II diastolic dysfunction.    Right Ventricle: Normal right ventricular cavity size. Wall thickness is normal. Right ventricle wall motion  is normal. Systolic function is normal.    Tricuspid Valve: There is mild regurgitation.    Pulmonary Artery: The estimated pulmonary artery systolic pressure is 26 mmHg.    IVC/SVC: Normal venous pressure at 3 mmHg.      Abscess of left kidney  Seen on CT along w/ perinephric abscesses not seen on u/s  Pt is clinically w/ minimal issues - no dysuria, back/flank pain, f/c/n/v  BCX done in [facility] ED  U/a not suggestive of any substantial UTI but the abscesses may not be communicating w/ the collecting system  Given Zosyn and vancomycin in ED  I had started Zosyn and IV doxy here empirically when I wrote admit orders but given review of  all information will start meropenem, vancomycin, and clindamycin  Might need ID input  Suprisingly she does not even meet SIRS criteria at this time  Culture growing staph,strep,Ecoli,per ID on Vanc. And meropenem.      Class 2 severe obesity due to excess calories with serious comorbidity and body mass index (BMI) of 38.0 to 38.9 in adult  Body mass index is 38.52 kg/m².  Morbid obesity complicates all aspects of disease management from diagnostic modalities to treatment  Weight loss encouraged and health benefits explained to patient        History of CVA (cerebrovascular accident) without residual deficits  Apparently w/o sequelae  Close OPT f/u  Underlying untreated CESAR would increase risk of recurrence        Type 2 diabetes mellitus with both eyes affected by proliferative retinopathy and macular edema, with long-term current use of insulin  A1c 9.5%  SSI      CESAR (obstructive sleep apnea)  Pt denies having CESAR but this record is in her chart  Any recent PSG?  She certainly appears at risk  OPT f/u will be needed  Observe in recovery from surgery, caution w/ narcotics postop        Hyperlipidemia associated with type 2 diabetes mellitus  Home meds as appropriate  Does pt have CAD?      HTN (hypertension) without retinopathy  Home meds as appropriate  Pt is very non toxic appearing and currently w/ normal to high BP      VTE Risk Mitigation (From admission, onward)           Ordered     heparin (porcine) injection 5,000 Units  Every 8 hours         01/06/24 1628     IP VTE HIGH RISK PATIENT  Once         01/06/24 1628     Place sequential compression device  Until discontinued         01/06/24 1628                    Discharge Planning   MALGORZATA:      Code Status: Full Code   Is the patient medically ready for discharge?:     Reason for patient still in hospital (select all that apply): Patient trending condition  Discharge Plan A: Home with family                  Campbell Liriano MD  Department of Hospital  [facility] Surg

## 2024-01-09 NOTE — PROGRESS NOTES
[facility] - Med Surg  General Surgery  Progress Note    Subjective:     History of Present Illness:  48-year-old female with a past medical history of uncontrolled type 2 diabetes mellitus and obesity who presented to outside hospital with right groin pain.  Found to have complex soft tissue infection of the right groin/perineum.  Bedside I and D performed.  General surgery was consulted at outside facility, but due to operative restrained she was transferred to [facility] for further care.  Upon evaluation she was afebrile, but slightly tachycardic.  Broad-spectrum antibiotics has been initiated at outside hospital.  Plan for operative debridement today in the OR.    Post-Op Info:  Procedure(s) (LRB):  IRRIGATION AND DEBRIDEMENT RIGHT GROIN (Right)   2 Days Post-Op     Interval History: No significant changes. Underwent drainage of perinephric purulent fluid yesterday. WBC continues downtrending, now 9. Tolerating bedside dressing changes. AF, HDS    Medications:  Continuous Infusions:  Scheduled Meds:   aspirin  81 mg Oral Daily    atorvastatin  80 mg Oral QHS    ezetimibe  10 mg Oral QHS    FLUoxetine  40 mg Oral Daily    fluticasone propionate  2 spray Each Nostril Daily    heparin (porcine)  5,000 Units Subcutaneous Q8H    insulin detemir U-100  20 Units Subcutaneous Daily    lisinopriL  40 mg Oral Daily    meropenem (MERREM) IVPB  1 g Intravenous Q8H    metoprolol succinate  100 mg Oral Daily    mupirocin   Nasal BID    NIFEdipine  60 mg Oral Daily    pantoprazole  40 mg Oral Daily    vancomycin (VANCOCIN) IV (PEDS and ADULTS)  1,500 mg Intravenous Q12H     PRN Meds:acetaminophen, acetaminophen, aluminum-magnesium hydroxide-simethicone, dextrose 10%, dextrose 10%, glucagon (human recombinant), glucose, glucose, HYDROcodone-acetaminophen, insulin aspart U-100, melatonin, morphine, naloxone, prochlorperazine, sodium chloride 0.9%, Pharmacy to dose Vancomycin consult **AND** vancomycin - pharmacy to dose      Review of patient's allergies indicates:  No Known Allergies  Objective:     Vital Signs (Most Recent):  Temp: 98.9 °F (37.2 °C) (01/09/24 1230)  Pulse: 86 (01/09/24 1230)  Resp: 20 (01/09/24 1230)  BP: (!) 150/72 (01/09/24 1230)  SpO2: (!) 94 % (01/09/24 1231) Vital Signs (24h Range):  Temp:  [98.4 °F (36.9 °C)-99.1 °F (37.3 °C)] 98.9 °F (37.2 °C)  Pulse:  [85-91] 86  Resp:  [13-20] 20  SpO2:  [84 %-96 %] 94 %  BP: (121-167)/(56-83) 150/72     Weight: 105 kg (231 lb 7.7 oz)  Body mass index is 38.52 kg/m².    Intake/Output - Last 3 Shifts         01/07 0700  01/08 0659 01/08 0700 01/09 0659 01/09 0700  01/10 0659    P.O. 560 360 240    IV Piggyback 1649.2 380.1     Total Intake(mL/kg) 2209.2 (21) 740.1 (7) 240 (2.3)    Net +2209.2 +740.1 +240           Urine Occurrence 2 x 5 x     Stool Occurrence 0 x 0 x 0 x             Physical Exam  Vitals and nursing note reviewed.   Constitutional:       Appearance: Normal appearance. She is not ill-appearing.   HENT:      Head: Normocephalic.      Mouth/Throat:      Mouth: Mucous membranes are moist.      Pharynx: Oropharynx is clear.   Eyes:      General: No scleral icterus.     Extraocular Movements: Extraocular movements intact.      Conjunctiva/sclera: Conjunctivae normal.   Cardiovascular:      Rate and Rhythm: Normal rate.      Pulses: Normal pulses.   Pulmonary:      Effort: Pulmonary effort is normal. No respiratory distress.      Breath sounds: No wheezing.   Abdominal:      General: Abdomen is flat. Bowel sounds are normal. There is no distension.      Palpations: Abdomen is soft.   Genitourinary:     Comments: Right groin wound with no significant residual necrosis. Wound base appears healthy. No significant discharge. Still with some surrounding cellulitis changes. Clean packing in place.   Musculoskeletal:         General: No swelling or tenderness. Normal range of motion.      Cervical back: Normal range of motion.   Skin:     General: Skin is warm and dry.       Coloration: Skin is not jaundiced or pale.   Neurological:      General: No focal deficit present.      Mental Status: She is alert and oriented to person, place, and time.   Psychiatric:         Mood and Affect: Mood normal.          Significant Labs:  I have reviewed all pertinent lab results within the past 24 hours.  CBC:   Recent Labs   Lab 01/09/24  0504   WBC 9.06   RBC 3.73*   HGB 10.4*   HCT 32.5*      MCV 87   MCH 27.9   MCHC 32.0     CMP:   Recent Labs   Lab 01/09/24  0504   *   CALCIUM 8.8   ALBUMIN 2.2*   PROT 7.1      K 3.3*   CO2 25      BUN 8   CREATININE 0.8   ALKPHOS 93   ALT 7*   AST 9*   BILITOT 0.4       Significant Diagnostics:  I have reviewed all pertinent imaging results/findings within the past 24 hours.  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  Transferred to [facility] for surgical options.      - Antimicrobial regimen per ID team.   - Cultures not yet finalized  - No plans for second OR trip as of now. Wound base looks good and she has no significant discharge.  - Will likely plan for wound vac placement sometime in the next couple of days.   - Rest of care per primary team  - General surgery will continue to follow        Juan Carlos La MD  General Surgery  [facility] - Med Surg

## 2024-01-09 NOTE — SUBJECTIVE & OBJECTIVE
Past Medical History:   Diagnosis Date    Cataract     Cervical high risk HPV (human papillomavirus) test positive 2020    NOT 16 OR 18    CHF (congestive heart failure)     CVA (cerebral infarction)          Depression     Diabetes mellitus, type 2     GERD (gastroesophageal reflux disease)     Hyperlipidemia     Hypertension     Lactic acidosis 10/06/2023    Moderate nonproliferative diabetic retinopathy     Nausea and vomiting 10/06/2023    Obesity     Stroke     Tachycardia 10/04/2023       Past Surgical History:   Procedure Laterality Date     SECTION      CHOLECYSTECTOMY      DILATION AND CURETTAGE OF UTERUS      EYE SURGERY Bilateral     laser    GALLBLADDER SURGERY  2017    stone removed       Review of patient's allergies indicates:  No Known Allergies    No current facility-administered medications on file prior to encounter.     Current Outpatient Medications on File Prior to Encounter   Medication Sig    aspirin (ECOTRIN) 81 MG EC tablet Take 81 mg by mouth once daily.    atorvastatin (LIPITOR) 80 MG tablet Take 1 tablet (80 mg total) by mouth every evening.    blood sugar diagnostic Strp Use to test blood glucose two (2) times daily as directed with insurance preferred meter and supplies    dulaglutide (TRULICITY) 0.75 mg/0.5 mL pen injector Inject 0.75 mg into the skin every 7 days. OK to discontinue this med during SNF stay - resume upon discharge (Patient taking differently: Inject 0.75 mg into the skin every .)    ergocalciferol (ERGOCALCIFEROL) 50,000 unit Cap Take 50,000 Units by mouth every Saturday.    ezetimibe (ZETIA) 10 mg tablet Take 1 tablet (10 mg total) by mouth once daily. (For cholesterol)    FLUoxetine 40 MG capsule Take 40 mg by mouth once daily.    fluticasone propionate (FLONASE) 50 mcg/actuation nasal spray 2 sprays (100 mcg total) by Each Nostril route daily as needed for Allergies or Rhinitis.    furosemide (LASIX) 40 MG tablet Take 1 tablet (40 mg  "total) by mouth once daily.    ibuprofen (ADVIL,MOTRIN) 200 MG tablet Take 200 mg by mouth every 6 (six) hours as needed for Pain.    insulin (LANTUS SOLOSTAR U-100 INSULIN) glargine 100 units/mL SubQ pen Inject 80 Units into the skin 2 (two) times a day. INJECT 80 UNITS SUBCUTANEOUSLY TWICE DAILY (BULK) Strength: 100 unit/mL (3 mL)    insulin aspart U-100 (NOVOLOG FLEXPEN U-100 INSULIN) 100 unit/mL (3 mL) InPn pen Inject 30 Units into the skin 2 (two) times a day.    lancets (TRUEPLUS LANCETS) 28 gauge Misc Use to test blood glucose two (2) times daily as directed with insurance preferred meter and supplies    LANTUS SOLOSTAR U-100 INSULIN glargine 100 units/mL SubQ pen Inject 80 Units into the skin 2 (two) times a day.    lisinopriL (PRINIVIL,ZESTRIL) 40 MG tablet Take 1 tablet (40 mg total) by mouth once daily.    metFORMIN (GLUCOPHAGE-XR) 500 MG ER 24hr tablet Take 1,000 mg by mouth 2 (two) times daily with meals.    metoprolol succinate (TOPROL-XL) 100 MG 24 hr tablet Take 100 mg by mouth once daily.    NIFEdipine (ADALAT CC) 60 MG TbSR Take 60 mg by mouth every morning.    pantoprazole (PROTONIX) 40 MG tablet Take 40 mg by mouth once daily.    pen needle, diabetic (BD ULTRA-FINE ROSA PEN NEEDLE) 32 gauge x 5/32" Ndle Use with insulin once daily    pen needle, diabetic 32 gauge x 5/32" Ndle Use with injectable DM supplies twice daily as directed    potassium chloride (KLOR-CON) 8 MEQ TbSR TAKE TWO TABLETS BY MOUTH TWICE DAILY @ 9AM & 5PM    TRUE METRIX GO GLUCOSE METER Misc      Family History       Problem Relation (Age of Onset)    Arthritis Father    Asthma Daughter    Cataracts Father    Diabetes Mother, Father    Heart disease Sister    Hypertension Father, Sister    No Known Problems Brother, Maternal Aunt, Maternal Uncle, Paternal Aunt, Paternal Uncle, Maternal Grandmother, Maternal Grandfather, Paternal Grandmother, Paternal Grandfather    Stroke Mother, Sister    Thyroid disease Mother, Sister, " Daughter          Tobacco Use    Smoking status: Every Day     Current packs/day: 0.00     Types: Cigarettes     Last attempt to quit: 2021     Years since quittin.9    Smokeless tobacco: Never   Substance and Sexual Activity    Alcohol use: Yes     Alcohol/week: 3.0 standard drinks of alcohol     Types: 3 Cans of beer per week     Comment: Rare    Drug use: No    Sexual activity: Yes     Partners: Male     Birth control/protection: None     Review of Systems    General: No fevers, weight changes.   HEENT: No visual changes, neck pain, swallowing problems, hoarseness.  CV: No cp/sob, no palpitations, edema.   Pulm: No cough, sob, hemoptysis. No known CESAR.    GI: No n/v/d, no abdominal pain, no melena, hematochezia.   : No problems w/ bladder control, dysuria, hematuria.  Musculoskeletal: No joint pains/stiffness, back pain.   Neuro:  No HA, seizure, focal weakness, falls, dizzy spells.     Objective:     Vital Signs (Most Recent):  Temp: 98.4 °F (36.9 °C) (24)  Pulse: 89 (24)  Resp: 20 (24)  BP: 139/63 (24 08)  SpO2: 96 % (24 1000) Vital Signs (24h Range):  Temp:  [98.4 °F (36.9 °C)-99.1 °F (37.3 °C)] 98.4 °F (36.9 °C)  Pulse:  [85-91] 89  Resp:  [13-20] 20  SpO2:  [84 %-96 %] 96 %  BP: (121-167)/(56-83) 139/63     Weight: 105 kg (231 lb 7.7 oz)  Body mass index is 38.52 kg/m².     Physical Exam        General:  A&O, NAD  HEENT: neck supple, PERRL/EOMI, EAC clear bilaterally, normal bilateral carotid upstroke w/o bruits, inf turbs clear bilaterally, OC/OP clear  Lungs: CTAB  CV: RRR w/o M/G/R  Abdomen: soft, NTND, no HSM, no bruits/masses/pulsations, massively obese  Ext: no edema  : she has a small incision right mons area, currently w/o any drainage/odor, the surrounding soft tissue is a bit indurated but not particularly tender to touch, she has a lot of redundant fatty tissue in the area.  There is some erythema.  No CVAT.   Rectal: def  Neuro:  NF    Significant Labs: All pertinent labs within the past 24 hours have been reviewed.    Significant Imaging: I have reviewed all pertinent imaging results/findings within the past 24 hours.

## 2024-01-09 NOTE — PLAN OF CARE
Problem: Adult Inpatient Plan of Care  Goal: Plan of Care Review  Outcome: Ongoing, Progressing  Flowsheets (Taken 1/9/2024 0815)  Plan of Care Reviewed With: patient  Goal: Patient-Specific Goal (Individualized)  Outcome: Ongoing, Progressing     Problem: Diabetes Comorbidity  Goal: Blood Glucose Level Within Targeted Range  Outcome: Ongoing, Progressing  Intervention: Monitor and Manage Glycemia  Flowsheets (Taken 1/9/2024 0815)  Glycemic Management:   blood glucose monitored   carbohydrate replacement provided   oral hydration promoted   supplemental insulin given     Problem: Adult Inpatient Plan of Care  Goal: Plan of Care Review  Outcome: Ongoing, Progressing  Flowsheets (Taken 1/9/2024 0815)  Plan of Care Reviewed With: patient     Problem: Adult Inpatient Plan of Care  Goal: Plan of Care Review  Outcome: Ongoing, Progressing  Flowsheets (Taken 1/9/2024 0815)  Plan of Care Reviewed With: patient     Problem: Adult Inpatient Plan of Care  Goal: Patient-Specific Goal (Individualized)  Outcome: Ongoing, Progressing     Problem: Adult Inpatient Plan of Care  Goal: Patient-Specific Goal (Individualized)  Outcome: Ongoing, Progressing     Problem: Diabetes Comorbidity  Goal: Blood Glucose Level Within Targeted Range  Outcome: Ongoing, Progressing  Intervention: Monitor and Manage Glycemia  Flowsheets (Taken 1/9/2024 0815)  Glycemic Management:   blood glucose monitored   carbohydrate replacement provided   oral hydration promoted   supplemental insulin given     Problem: Diabetes Comorbidity  Goal: Blood Glucose Level Within Targeted Range  Outcome: Ongoing, Progressing     Problem: Diabetes Comorbidity  Goal: Blood Glucose Level Within Targeted Range  Intervention: Monitor and Manage Glycemia  Flowsheets (Taken 1/9/2024 0815)  Glycemic Management:   blood glucose monitored   carbohydrate replacement provided   oral hydration promoted   supplemental insulin given     Problem: Diabetes Comorbidity  Goal: Blood  Glucose Level Within Targeted Range  Intervention: Monitor and Manage Glycemia  Flowsheets (Taken 1/9/2024 0815)  Glycemic Management:   blood glucose monitored   carbohydrate replacement provided   oral hydration promoted   supplemental insulin given     Problem: Diabetes Comorbidity  Goal: Blood Glucose Level Within Targeted Range  Intervention: Monitor and Manage Glycemia  Flowsheets (Taken 1/9/2024 0815)  Glycemic Management:   blood glucose monitored   carbohydrate replacement provided   oral hydration promoted   supplemental insulin given

## 2024-01-09 NOTE — ASSESSMENT & PLAN NOTE
Seen on CT along w/ perinephric abscesses not seen on u/s  Pt is clinically w/ minimal issues - no dysuria, back/flank pain, f/c/n/v  BCX done in [facility] ED  U/a not suggestive of any substantial UTI but the abscesses may not be communicating w/ the collecting system  Given Zosyn and vancomycin in ED  I had started Zosyn and IV doxy here empirically when I wrote admit orders but given review of all information will start meropenem, vancomycin, and clindamycin  Might need ID input  Suprisingly she does not even meet SIRS criteria at this time  Culture growing staph,strep,Ecoli,per ID on Vanc. And meropenem.

## 2024-01-09 NOTE — ASSESSMENT & PLAN NOTE
S/p bedside I&D w/ culture of material in ED at [facility]  Dr Padgett assessed pt and had planned to bring to OR here this afternoon, Dr Snyder is covering for him and has pt scheduled for tomorrow AM  I am comfortable w/ this plan as pt appears to be very stable and this process does not appear fulminant  Not septic but is at risk  Close monitoring, IVF, abx  BCX, urine Cx done at [facility] ED.    S/P I&D  and irrigation and debridement rightly groin and right leg,on 1.7.24,c  Culture growing staph,strep,Ecoli,per ID on Vanc. And meropenem.

## 2024-01-09 NOTE — PROGRESS NOTES
[facility] Surg  Urology  Progress Note    Patient Name: Jaimee Quintana  MRN: [id number]  Admission Date: 1/6/2024  Hospital Length of Stay: 3 days  Code Status: Full Code   Attending Provider: Campbell Liriano, *   Primary Care Physician: Donald Acosta MD    Subjective:     HPI:  Perinephric Abscess  Jaimee Quintana is a 48 y.o. woman transferred here from [facility] due to necrotizing fasciitis of the right groin.  CT scan at the time of presentation also showed to left perinephric fluid collections with stranding.  Patient denies flank pain currently.  She was vaguely aware of an issue with the kidney but did not know the details.  She denies dysuria or hematuria.  She has been febrile.    Interval History: s/p aspiration of renal abscess 1/8/2024.  No pain currently    Review of Systems   Constitutional:  Negative for chills, fatigue and fever.   HENT:  Negative for congestion.    Respiratory:  Negative for chest tightness and shortness of breath.    Cardiovascular:  Negative for chest pain.   Gastrointestinal:  Negative for abdominal distention, constipation, diarrhea, nausea and vomiting.   Genitourinary:  Negative for difficulty urinating, dysuria, flank pain, frequency, hematuria, pelvic pain and urgency.   Musculoskeletal:  Negative for arthralgias.   Skin:  Negative for rash.   Neurological:  Negative for dizziness.   Psychiatric/Behavioral:  Negative for confusion.      Objective:     Temp:  [98.5 °F (36.9 °C)-99.1 °F (37.3 °C)] 98.9 °F (37.2 °C)  Pulse:  [85-91] 86  Resp:  [13-18] 16  SpO2:  [84 %-96 %] 95 %  BP: (121-167)/(56-83) 150/70     Body mass index is 38.52 kg/m².           Drains       None                    Physical Exam  Vitals and nursing note reviewed.   Constitutional:       Appearance: She is well-developed.   HENT:      Head: Normocephalic.   Eyes:      Conjunctiva/sclera: Conjunctivae normal.   Neck:      Thyroid: No thyromegaly.      Trachea: No tracheal deviation.    Cardiovascular:      Rate and Rhythm: Normal rate.      Pulses: Normal pulses.      Heart sounds: Normal heart sounds.   Pulmonary:      Effort: Pulmonary effort is normal. No respiratory distress.      Breath sounds: Normal breath sounds. No wheezing.   Abdominal:      General: There is no distension.      Palpations: Abdomen is soft. There is no mass.      Tenderness: There is no abdominal tenderness. There is no guarding or rebound.      Hernia: No hernia is present.   Musculoskeletal:         General: No tenderness. Normal range of motion.      Cervical back: Normal range of motion.   Lymphadenopathy:      Cervical: No cervical adenopathy.   Skin:     General: Skin is warm and dry.      Findings: No erythema or rash.   Neurological:      Mental Status: She is alert and oriented to person, place, and time.   Psychiatric:         Behavior: Behavior normal.         Thought Content: Thought content normal.         Judgment: Judgment normal.           Significant Labs:    BMP:  Recent Labs   Lab 01/07/24  0519 01/08/24  0613 01/09/24  0504   * 134* 136   K 3.0* 3.4* 3.3*    100 102   CO2 23 23 25   BUN 9 10 8   CREATININE 0.9 0.9 0.8   CALCIUM 8.6* 8.3* 8.8       CBC:   Recent Labs   Lab 01/07/24  0519 01/08/24  0613 01/09/24  0504   WBC 14.84* 11.43 9.06   HGB 10.1* 10.2* 10.4*   HCT 31.2* 30.9* 32.5*    247 281       Blood Culture:   Recent Labs   Lab 01/05/24  2106   LABBLOO No Growth to date  No Growth to date  No Growth to date  No Growth to date     Urine Culture:   Recent Labs   Lab 01/05/24  1818   LABURIN ESCHERICHIA COLI  >100,000 cfu/ml  *       Significant Imaging:                    Assessment/Plan:     Pyelonephritis  S/p aspiration  KRISS tomorrow to confirm improvement  Follow cultures treat with 2 weeks of antibiotics  Antibiotics per ID recs        VTE Risk Mitigation (From admission, onward)           Ordered     heparin (porcine) injection 5,000 Units  Every 8 hours          01/06/24 1628     IP VTE HIGH RISK PATIENT  Once         01/06/24 1628     Place sequential compression device  Until discontinued         01/06/24 1628                    Arturo Duran MD  Urology  [facility]

## 2024-01-09 NOTE — ASSESSMENT & PLAN NOTE
L perinephric abscess    I independently reviewed patient's lab work and images as documented. 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 Work up notable for CT findings with FG in R inguinum and pyelonephritis and perinephric abscess. S/p OR 1/7 for washout - cx with GNR thus far. Wound cx on 1/5 with Ecoli, strep, and staph aureus, ucx with Ecoli. Renal US with complex L kidney lesion - S/p IR aspiration 1/8 - noted to have purulent drainage cx in process. Pt reported that her preference for home abx be oral if feasible.       Recommendations:   -start vancomycin pharm to dose given new staph aureus growth and purulent drainage on IR aspiration - de-escalate pending cx   -continue meropenem pending cx data - can likely further de-escalate soon  -follow up cx  -follow up surgery recs

## 2024-01-09 NOTE — SUBJECTIVE & OBJECTIVE
Interval History: Cx from 1/5 now with Staph aureus, susceptibilities pending. S/p IR aspiration of renal lesion - pus noted. Cx in process.     Review of Systems   Constitutional:  Negative for chills and fever.   All other systems reviewed and are negative.    Objective:     Vital Signs (Most Recent):  Temp: 98.4 °F (36.9 °C) (01/09/24 0822)  Pulse: 89 (01/09/24 0822)  Resp: 20 (01/09/24 0822)  BP: 139/63 (01/09/24 0822)  SpO2: (!) 93 % (01/09/24 0822) Vital Signs (24h Range):  Temp:  [98.4 °F (36.9 °C)-99.1 °F (37.3 °C)] 98.4 °F (36.9 °C)  Pulse:  [85-91] 89  Resp:  [13-20] 20  SpO2:  [84 %-96 %] 93 %  BP: (121-167)/(56-83) 139/63     Weight: 105 kg (231 lb 7.7 oz)  Body mass index is 38.52 kg/m².    Estimated Creatinine Clearance: 104.6 mL/min (based on SCr of 0.8 mg/dL).     Physical Exam  Constitutional:       General: She is not in acute distress.     Appearance: She is not ill-appearing, toxic-appearing or diaphoretic.   Pulmonary:      Effort: Pulmonary effort is normal. No respiratory distress.   Abdominal:      General: There is no distension.      Palpations: Abdomen is soft.      Tenderness: There is no abdominal tenderness. There is no right CVA tenderness or left CVA tenderness.   Genitourinary:     Comments: No purulent drainage seen along R groin wound  Skin:     General: Skin is warm and dry.   Neurological:      Mental Status: She is alert and oriented to person, place, and time.          Significant Labs:   Microbiology Results (last 7 days)       Procedure Component Value Units Date/Time    Culture, Anaerobe [[id number]] Collected: 01/07/24 0850    Order Status: Completed Specimen: Wound from Groin Updated: 01/09/24 0816     Anaerobic Culture Culture in progress    Narrative:      Right groin tissue    Aerobic culture [[id number]]  (Abnormal)  (Susceptibility) Collected: 01/07/24 0850    Order Status: Completed Specimen: Wound from Groin Updated: 01/09/24 0746     Aerobic Bacterial Culture  ESCHERICHIA COLI  Rare      Narrative:      Right groin    Culture, Anaerobic [[id number]] Collected: 01/08/24 1607    Order Status: Sent Specimen: Body Fluid from Kidney, Left Updated: 01/08/24 1644    Culture, Body Fluid (Aerobic) w/ Gram Stain [[id number]] Collected: 01/08/24 1607    Order Status: Sent Specimen: Body Fluid from Back Updated: 01/08/24 1644    Culture, Anaerobe [[id number]]     Order Status: No result Specimen: Joint Fluid     Aerobic culture [[id number]]     Order Status: No result Specimen: Bone     AFB Culture & Smear [[id number]] Collected: 01/07/24 0850    Order Status: Sent Specimen: Wound from Groin Updated: 01/08/24 1147    Gram stain [[id number]] Collected: 01/07/24 0850    Order Status: Completed Specimen: Wound from Groin Updated: 01/07/24 1217     Gram Stain Result Few WBC's      Few Gram negative rods      Few Gram positive cocci in pairs    Narrative:      Right groin    Fungus culture [[id number]] Collected: 01/07/24 0850    Order Status: Sent Specimen: Wound from Groin Updated: 01/07/24 1108            Significant Imaging: I have reviewed all pertinent imaging results/findings within the past 24 hours.

## 2024-01-10 LAB
BACTERIA FLD AEROBE CULT: ABNORMAL
BACTERIA SPEC AEROBE CULT: ABNORMAL
FINAL PATHOLOGIC DIAGNOSIS: NORMAL
GRAM STN SPEC: ABNORMAL
GROSS: NORMAL
Lab: NORMAL
POCT GLUCOSE: 216 MG/DL (ref 70–110)
POCT GLUCOSE: 242 MG/DL (ref 70–110)
POCT GLUCOSE: 268 MG/DL (ref 70–110)
POCT GLUCOSE: 298 MG/DL (ref 70–110)
POCT GLUCOSE: 301 MG/DL (ref 70–110)
VANCOMYCIN TROUGH SERPL-MCNC: 19.2 UG/ML (ref 10–22)

## 2024-01-10 PROCEDURE — 99233 SBSQ HOSP IP/OBS HIGH 50: CPT | Mod: HCNC,,, | Performed by: SURGERY

## 2024-01-10 PROCEDURE — 25000003 PHARM REV CODE 250: Mod: HCNC | Performed by: STUDENT IN AN ORGANIZED HEALTH CARE EDUCATION/TRAINING PROGRAM

## 2024-01-10 PROCEDURE — 36415 COLL VENOUS BLD VENIPUNCTURE: CPT | Mod: HCNC | Performed by: HOSPITALIST

## 2024-01-10 PROCEDURE — 80202 ASSAY OF VANCOMYCIN: CPT | Mod: HCNC | Performed by: HOSPITALIST

## 2024-01-10 PROCEDURE — 63600175 PHARM REV CODE 636 W HCPCS: Mod: HCNC | Performed by: HOSPITALIST

## 2024-01-10 PROCEDURE — 63600175 PHARM REV CODE 636 W HCPCS: Mod: HCNC | Performed by: STUDENT IN AN ORGANIZED HEALTH CARE EDUCATION/TRAINING PROGRAM

## 2024-01-10 PROCEDURE — 99233 SBSQ HOSP IP/OBS HIGH 50: CPT | Mod: HCNC,,, | Performed by: STUDENT IN AN ORGANIZED HEALTH CARE EDUCATION/TRAINING PROGRAM

## 2024-01-10 PROCEDURE — 11000001 HC ACUTE MED/SURG PRIVATE ROOM: Mod: HCNC

## 2024-01-10 PROCEDURE — 25000003 PHARM REV CODE 250: Mod: HCNC | Performed by: HOSPITALIST

## 2024-01-10 RX ORDER — METRONIDAZOLE 500 MG/1
500 TABLET ORAL EVERY 12 HOURS
Status: DISCONTINUED | OUTPATIENT
Start: 2024-01-10 | End: 2024-01-13 | Stop reason: HOSPADM

## 2024-01-10 RX ADMIN — HEPARIN SODIUM 5000 UNITS: 5000 INJECTION INTRAVENOUS; SUBCUTANEOUS at 01:01

## 2024-01-10 RX ADMIN — HEPARIN SODIUM 5000 UNITS: 5000 INJECTION INTRAVENOUS; SUBCUTANEOUS at 06:01

## 2024-01-10 RX ADMIN — INSULIN DETEMIR 20 UNITS: 100 INJECTION, SOLUTION SUBCUTANEOUS at 09:01

## 2024-01-10 RX ADMIN — METOPROLOL SUCCINATE 100 MG: 50 TABLET, EXTENDED RELEASE ORAL at 09:01

## 2024-01-10 RX ADMIN — VANCOMYCIN HYDROCHLORIDE 1500 MG: 1.5 INJECTION, POWDER, LYOPHILIZED, FOR SOLUTION INTRAVENOUS at 11:01

## 2024-01-10 RX ADMIN — CEFTRIAXONE 2 G: 2 INJECTION, POWDER, FOR SOLUTION INTRAMUSCULAR; INTRAVENOUS at 10:01

## 2024-01-10 RX ADMIN — FLUTICASONE PROPIONATE 100 MCG: 50 SPRAY, METERED NASAL at 09:01

## 2024-01-10 RX ADMIN — MEROPENEM 1 G: 1 INJECTION INTRAVENOUS at 06:01

## 2024-01-10 RX ADMIN — EZETIMIBE 10 MG: 10 TABLET ORAL at 09:01

## 2024-01-10 RX ADMIN — PANTOPRAZOLE SODIUM 40 MG: 40 TABLET, DELAYED RELEASE ORAL at 09:01

## 2024-01-10 RX ADMIN — INSULIN ASPART 3 UNITS: 100 INJECTION, SOLUTION INTRAVENOUS; SUBCUTANEOUS at 11:01

## 2024-01-10 RX ADMIN — MUPIROCIN: 20 OINTMENT TOPICAL at 09:01

## 2024-01-10 RX ADMIN — VANCOMYCIN HYDROCHLORIDE 1500 MG: 1.5 INJECTION, POWDER, LYOPHILIZED, FOR SOLUTION INTRAVENOUS at 10:01

## 2024-01-10 RX ADMIN — INSULIN ASPART 3 UNITS: 100 INJECTION, SOLUTION INTRAVENOUS; SUBCUTANEOUS at 04:01

## 2024-01-10 RX ADMIN — LISINOPRIL 40 MG: 20 TABLET ORAL at 09:01

## 2024-01-10 RX ADMIN — HEPARIN SODIUM 5000 UNITS: 5000 INJECTION INTRAVENOUS; SUBCUTANEOUS at 09:01

## 2024-01-10 RX ADMIN — ASPIRIN 81 MG: 81 TABLET, COATED ORAL at 09:01

## 2024-01-10 RX ADMIN — METRONIDAZOLE 500 MG: 500 TABLET ORAL at 09:01

## 2024-01-10 RX ADMIN — METRONIDAZOLE 500 MG: 500 TABLET ORAL at 10:01

## 2024-01-10 RX ADMIN — ATORVASTATIN CALCIUM 80 MG: 40 TABLET, FILM COATED ORAL at 09:01

## 2024-01-10 RX ADMIN — VANCOMYCIN HYDROCHLORIDE 1500 MG: 1.5 INJECTION, POWDER, LYOPHILIZED, FOR SOLUTION INTRAVENOUS at 12:01

## 2024-01-10 RX ADMIN — INSULIN ASPART 1 UNITS: 100 INJECTION, SOLUTION INTRAVENOUS; SUBCUTANEOUS at 09:01

## 2024-01-10 RX ADMIN — NIFEDIPINE 60 MG: 30 TABLET, FILM COATED, EXTENDED RELEASE ORAL at 09:01

## 2024-01-10 RX ADMIN — INSULIN ASPART 2 UNITS: 100 INJECTION, SOLUTION INTRAVENOUS; SUBCUTANEOUS at 07:01

## 2024-01-10 RX ADMIN — FLUOXETINE HYDROCHLORIDE 40 MG: 20 CAPSULE ORAL at 09:01

## 2024-01-10 NOTE — ASSESSMENT & PLAN NOTE
48-year-old female with a past medical history of uncontrolled type 2 diabetes mellitus, hypertension, and obesity who presented to outside hospital with right-sided groin wound.  Transferred to [facility] for surgical options.      - Antimicrobial regimen per ID team.   - Cultures not yet finalized  - No plans for second OR trip as of now. Wound base looks good and she has no significant discharge.  - wound vac tomorrow  - Rest of care per primary team  - General surgery will continue to follow

## 2024-01-10 NOTE — PROGRESS NOTES
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  Pt w/o current pain, f/c/n/v, flank pain etc - she was under impression she was sent here for "stomach surgery", we talked to her daughter and 2 of her sisters on the phone, separately, at her request, as well as Dr Snyder who called to talk to her preop, at the bedside, to clear this up.  Appreciate GS attention to pt, asking  to see in AM as well as we appear to have 2 separate processes here, she may need intervention from  as well although most likely will just need to treat the infection and reimage kidneys at some point.  She had I&D at bedside of right [facility] area in [place], presume material was sent for culture.  Incidentally pt was ill w/ COVID 19 12/18, we were advised on transfer of this, she seems to have fully recovered.  Per pt she was tested again at [facility] and was negative, and Epic prompted me "pt has negative COVID test" - but, we were not able to confirm this result, so we did another rapid which was negative.  I also have requested EKG and CXR preop in this pt w/ CHF, morbid obesity.    Pt is admitted to  services, and Dr Snyder scheduling for OR in AM.      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  Pt w/o current pain, f/c/n/v, flank pain etc - she was under impression she was sent here for "stomach surgery", we talked to her daughter and 2 of her sisters on the phone, separately, at her request, as well as Dr Snyder who called to talk to her preop, at the bedside, to clear this up.  Appreciate GS attention to pt, asking  to see in AM as well as we appear to have 2 separate processes here, she may need intervention from  as well although most likely will just need to treat the infection and reimage kidneys at some point.  She had I&D at bedside of right mons area in [place], presume material was sent for culture.  Incidentally pt was ill w/ COVID 19 12/18, we were advised on transfer of this, she seems to have fully recovered.  Per pt she was tested again at [facility] and was negative, and Epic prompted me "pt has negative COVID test" - but, we were not able to confirm this result, so we did another rapid which was negative.  I also have requested EKG and CXR preop in this pt w/ CHF, morbid obesity.    Pt is admitted to  services,surgery is consulted.  S/P I&D  and irrigation and debridement rightly groin and right leg,cultures pending.  S/P drainage of left perinephritic abscess by IR ,  Culture growing staph,strep,Ecoli,per ID on Vanc. And meropenem.  Urology planing  another US to check size of kidney.  Surgery planing placing wound vac,.    Past Medical History:   Diagnosis Date    Cataract     Cervical high risk HPV (human papillomavirus) test positive 09/17/2020    NOT 16 OR 18    CHF (congestive heart failure)     CVA (cerebral infarction) 2003         Depression     Diabetes mellitus, type 2     GERD (gastroesophageal reflux disease)     Hyperlipidemia     Hypertension     Lactic acidosis 10/06/2023    Moderate nonproliferative diabetic " retinopathy     Nausea and vomiting 10/06/2023    Obesity     Stroke     Tachycardia 10/04/2023       Past Surgical History:   Procedure Laterality Date     SECTION      CHOLECYSTECTOMY      DILATION AND CURETTAGE OF UTERUS      EYE SURGERY Bilateral     laser    GALLBLADDER SURGERY  2017    stone removed    IRRIGATION AND DEBRIDEMENT Right 2024    Procedure: IRRIGATION AND DEBRIDEMENT RIGHT GROIN;  Surgeon: Chalo Padgett MD;  Location: [facility];  Service: General;  Laterality: Right;       Review of patient's allergies indicates:  No Known Allergies    No current facility-administered medications on file prior to encounter.     Current Outpatient Medications on File Prior to Encounter   Medication Sig    aspirin (ECOTRIN) 81 MG EC tablet Take 81 mg by mouth once daily.    atorvastatin (LIPITOR) 80 MG tablet Take 1 tablet (80 mg total) by mouth every evening.    blood sugar diagnostic Strp Use to test blood glucose two (2) times daily as directed with insurance preferred meter and supplies    dulaglutide (TRULICITY) 0.75 mg/0.5 mL pen injector Inject 0.75 mg into the skin every 7 days. OK to discontinue this med during SNF stay - resume upon discharge (Patient taking differently: Inject 0.75 mg into the skin every .)    ergocalciferol (ERGOCALCIFEROL) 50,000 unit Cap Take 50,000 Units by mouth every Saturday.    ezetimibe (ZETIA) 10 mg tablet Take 1 tablet (10 mg total) by mouth once daily. (For cholesterol)    FLUoxetine 40 MG capsule Take 40 mg by mouth once daily.    fluticasone propionate (FLONASE) 50 mcg/actuation nasal spray 2 sprays (100 mcg total) by Each Nostril route daily as needed for Allergies or Rhinitis.    furosemide (LASIX) 40 MG tablet Take 1 tablet (40 mg total) by mouth once daily.    ibuprofen (ADVIL,MOTRIN) 200 MG tablet Take 200 mg by mouth every 6 (six) hours as needed for Pain.    insulin (LANTUS SOLOSTAR U-100 INSULIN) glargine 100 units/mL SubQ pen Inject 80 Units  "into the skin 2 (two) times a day. INJECT 80 UNITS SUBCUTANEOUSLY TWICE DAILY (BULK) Strength: 100 unit/mL (3 mL)    insulin aspart U-100 (NOVOLOG FLEXPEN U-100 INSULIN) 100 unit/mL (3 mL) InPn pen Inject 30 Units into the skin 2 (two) times a day.    lancets (TRUEPLUS LANCETS) 28 gauge Misc Use to test blood glucose two (2) times daily as directed with insurance preferred meter and supplies    LANTUS SOLOSTAR U-100 INSULIN glargine 100 units/mL SubQ pen Inject 80 Units into the skin 2 (two) times a day.    lisinopriL (PRINIVIL,ZESTRIL) 40 MG tablet Take 1 tablet (40 mg total) by mouth once daily.    metFORMIN (GLUCOPHAGE-XR) 500 MG ER 24hr tablet Take 1,000 mg by mouth 2 (two) times daily with meals.    metoprolol succinate (TOPROL-XL) 100 MG 24 hr tablet Take 100 mg by mouth once daily.    NIFEdipine (ADALAT CC) 60 MG TbSR Take 60 mg by mouth every morning.    pantoprazole (PROTONIX) 40 MG tablet Take 40 mg by mouth once daily.    pen needle, diabetic (BD ULTRA-FINE ROSA PEN NEEDLE) 32 gauge x 5/32" Ndle Use with insulin once daily    pen needle, diabetic 32 gauge x 5/32" Ndle Use with injectable DM supplies twice daily as directed    potassium chloride (KLOR-CON) 8 MEQ TbSR TAKE TWO TABLETS BY MOUTH TWICE DAILY @ 9AM & 5PM    TRUE METRIX GO GLUCOSE METER Misc      Family History       Problem Relation (Age of Onset)    Arthritis Father    Asthma Daughter    Cataracts Father    Diabetes Mother, Father    Heart disease Sister    Hypertension Father, Sister    No Known Problems Brother, Maternal Aunt, Maternal Uncle, Paternal Aunt, Paternal Uncle, Maternal Grandmother, Maternal Grandfather, Paternal Grandmother, Paternal Grandfather    Stroke Mother, Sister    Thyroid disease Mother, Sister, Daughter          Tobacco Use    Smoking status: Every Day     Current packs/day: 0.00     Types: Cigarettes     Last attempt to quit: 2021     Years since quittin.9    Smokeless tobacco: Never   Substance and Sexual " Activity    Alcohol use: Yes     Alcohol/week: 3.0 standard drinks of alcohol     Types: 3 Cans of beer per week     Comment: Rare    Drug use: No    Sexual activity: Yes     Partners: Male     Birth control/protection: None     Review of Systems    General: No fevers, weight changes.   HEENT: No visual changes, neck pain, swallowing problems, hoarseness.  CV: No cp/sob, no palpitations, edema.   Pulm: No cough, sob, hemoptysis. No known CESAR.    GI: No n/v/d, no abdominal pain, no melena, hematochezia.   : No problems w/ bladder control, dysuria, hematuria.  Musculoskeletal: No joint pains/stiffness, back pain.   Neuro:  No HA, seizure, focal weakness, falls, dizzy spells.     Objective:     Vital Signs (Most Recent):  Temp: 97.7 °F (36.5 °C) (01/10/24 1110)  Pulse: 80 (01/10/24 1110)  Resp: 20 (01/10/24 1110)  BP: (!) 162/79 (01/10/24 1110)  SpO2: (!) 94 % (01/10/24 1110) Vital Signs (24h Range):  Temp:  [97.7 °F (36.5 °C)-98.9 °F (37.2 °C)] 97.7 °F (36.5 °C)  Pulse:  [79-93] 80  Resp:  [16-20] 20  SpO2:  [94 %-98 %] 94 %  BP: (123-162)/(60-79) 162/79     Weight: 105 kg (231 lb 7.7 oz)  Body mass index is 38.52 kg/m².     Physical Exam        General:  A&O, NAD  HEENT: neck supple, PERRL/EOMI, EAC clear bilaterally, normal bilateral carotid upstroke w/o bruits, inf turbs clear bilaterally, OC/OP clear  Lungs: CTAB  CV: RRR w/o M/G/R  Abdomen: soft, NTND, no HSM, no bruits/masses/pulsations, massively obese  Ext: no edema  : she has a small incision right mons area, currently w/o any drainage/odor, the surrounding soft tissue is a bit indurated but not particularly tender to touch, she has a lot of redundant fatty tissue in the area.  There is some erythema.  No CVAT.   Rectal: def  Neuro: NF    Significant Labs: All pertinent labs within the past 24 hours have been reviewed.    Significant Imaging: I have reviewed all pertinent imaging results/findings within the past 24 hours.    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    S/P I&D  and irrigation and debridement rightly groin and right leg,on 1.7.24,c  Culture growing staph,strep,Ecoli,per ID on Vanc. And meropenem.    Surgery planing placing wound vac,.    COVID-19  Pt indicates she was acutely ill w/ COVID in mid Dec  We were not able to confirm a negative test at [facility], so we got a rapid here, it's negative  She is w/o f/c/cough/SOB  No indication for treatment or isolation     Chronic combined systolic and diastolic congestive heart failure  Pt w/ combined CHF, not sure if there is ischemic component  Appears to be well compensated  Should undergo OPT assessment for CESAR  Will need cardiology f/u  CXR obtained preop, lungs clear, pt w/o CANELA/SOB/CP    Latest ECHO performed and demonstrates- Results for orders placed during the hospital encounter of 10/04/23    Echo    Interpretation Summary    Left Ventricle: The left ventricle is normal in size. There is concentric hypertrophy. Mild global hypokinesis present. There is moderately reduced systolic function with a visually estimated ejection fraction of 35 - 40%. Grade II diastolic dysfunction.    Right Ventricle: Normal right ventricular cavity size. Wall thickness is normal. Right ventricle wall motion  is normal. Systolic function is normal.    Tricuspid Valve: There is mild regurgitation.    Pulmonary Artery: The estimated pulmonary artery systolic pressure is 26 mmHg.    IVC/SVC: Normal venous pressure at 3 mmHg.      Abscess of left kidney  Seen on CT along w/ perinephric abscesses not seen on u/s  Pt is  clinically w/ minimal issues - no dysuria, back/flank pain, f/c/n/v  BCX done in [facility] ED  U/a not suggestive of any substantial UTI but the abscesses may not be communicating w/ the collecting system  Given Zosyn and vancomycin in ED  I had started Zosyn and IV doxy here empirically when I wrote admit orders but given review of all information will start meropenem, vancomycin, and clindamycin  Might need ID input  Suprisingly she does not even meet SIRS criteria at this time  Culture growing staph,strep,Ecoli,per ID on Vanc. And meropenem.  Urology planing  another US to check size of kidney.  ,.      Class 2 severe obesity due to excess calories with serious comorbidity and body mass index (BMI) of 38.0 to 38.9 in adult  Body mass index is 38.52 kg/m².  Morbid obesity complicates all aspects of disease management from diagnostic modalities to treatment  Weight loss encouraged and health benefits explained to patient        History of CVA (cerebrovascular accident) without residual deficits  Apparently w/o sequelae  Close OPT f/u  Underlying untreated CESAR would increase risk of recurrence        Type 2 diabetes mellitus with both eyes affected by proliferative retinopathy and macular edema, with long-term current use of insulin  A1c 9.5%  SSI      CESAR (obstructive sleep apnea)  Pt denies having CESAR but this record is in her chart  Any recent PSG?  She certainly appears at risk  OPT f/u will be needed  Observe in recovery from surgery, caution w/ narcotics postop        Hyperlipidemia associated with type 2 diabetes mellitus  Home meds as appropriate  Does pt have CAD?      HTN (hypertension) without retinopathy  Home meds as appropriate  Pt is very non toxic appearing and currently w/ normal to high BP      VTE Risk Mitigation (From admission, onward)           Ordered     heparin (porcine) injection 5,000 Units  Every 8 hours         01/06/24 1628     IP VTE HIGH RISK PATIENT  Once         01/06/24 1628     Place  sequential compression device  Until discontinued         01/06/24 1628                    Discharge Planning   MALGORZATA:      Code Status: Full Code   Is the patient medically ready for discharge?:     Reason for patient still in hospital (select all that apply): Patient trending condition  Discharge Plan A: Home with family                  Campbell Liriano MD  Department of Hospital Medicine   [facility]

## 2024-01-10 NOTE — ASSESSMENT & PLAN NOTE
Seen on CT along w/ perinephric abscesses not seen on u/s  Pt is clinically w/ minimal issues - no dysuria, back/flank pain, f/c/n/v  BCX done in [facility] ED  U/a not suggestive of any substantial UTI but the abscesses may not be communicating w/ the collecting system  Given Zosyn and vancomycin in ED  I had started Zosyn and IV doxy here empirically when I wrote admit orders but given review of all information will start meropenem, vancomycin, and clindamycin  Might need ID input  Suprisingly she does not even meet SIRS criteria at this time  Culture growing staph,strep,Ecoli,per ID on Vanc. And meropenem.  Urology planing  another US to check size of kidney.  ,.

## 2024-01-10 NOTE — NURSING
[facility]  Nurses Note -- 4 Eyes      1/10/2024       Skin assessed on: Q Shift      [x] No Pressure Injuries Present    []Prevention Measures Documented    [] Yes LDA  for Pressure Injury Previously documented     [] Yes New Pressure Injury Discovered   [] LDA for New Pressure Injury Added      Attending RN:  Saira Chan RN     Second RN:  Yolanda

## 2024-01-10 NOTE — PROGRESS NOTES
[facility] - Med Surg  General Surgery  Progress Note    Subjective:     History of Present Illness:  48-year-old female with a past medical history of uncontrolled type 2 diabetes mellitus and obesity who presented to outside hospital with right groin pain.  Found to have complex soft tissue infection of the right groin/perineum.  Bedside I and D performed.  General surgery was consulted at outside facility, but due to operative restrained she was transferred to [facility] for further care.  Upon evaluation she was afebrile, but slightly tachycardic.  Broad-spectrum antibiotics has been initiated at outside hospital.  Plan for operative debridement today in the OR.    Post-Op Info:  Procedure(s) (LRB):  IRRIGATION AND DEBRIDEMENT RIGHT GROIN (Right)   3 Days Post-Op     Interval History: No significant changes to clinical status. Tolerating bedside dressing changes    Medications:  Continuous Infusions:  Scheduled Meds:   aspirin  81 mg Oral Daily    atorvastatin  80 mg Oral QHS    cefTRIAXone (ROCEPHIN) IVPB  2 g Intravenous Q24H    ezetimibe  10 mg Oral QHS    FLUoxetine  40 mg Oral Daily    fluticasone propionate  2 spray Each Nostril Daily    heparin (porcine)  5,000 Units Subcutaneous Q8H    insulin detemir U-100  20 Units Subcutaneous Daily    lisinopriL  40 mg Oral Daily    metoprolol succinate  100 mg Oral Daily    metroNIDAZOLE  500 mg Oral Q12H    mupirocin   Nasal BID    NIFEdipine  60 mg Oral Daily    pantoprazole  40 mg Oral Daily    vancomycin (VANCOCIN) IV (PEDS and ADULTS)  1,500 mg Intravenous Q12H     PRN Meds:acetaminophen, acetaminophen, aluminum-magnesium hydroxide-simethicone, dextrose 10%, dextrose 10%, glucagon (human recombinant), glucose, glucose, HYDROcodone-acetaminophen, insulin aspart U-100, melatonin, morphine, naloxone, prochlorperazine, sodium chloride 0.9%, Pharmacy to dose Vancomycin consult **AND** vancomycin - pharmacy to dose     Review of patient's allergies  indicates:  No Known Allergies  Objective:     Vital Signs (Most Recent):  Temp: 97.7 °F (36.5 °C) (01/10/24 1110)  Pulse: 80 (01/10/24 1110)  Resp: 20 (01/10/24 1110)  BP: (!) 162/79 (01/10/24 1110)  SpO2: (!) 94 % (01/10/24 1110) Vital Signs (24h Range):  Temp:  [97.7 °F (36.5 °C)-98.6 °F (37 °C)] 97.7 °F (36.5 °C)  Pulse:  [79-93] 80  Resp:  [16-20] 20  SpO2:  [94 %-98 %] 94 %  BP: (123-162)/(60-79) 162/79     Weight: 105 kg (231 lb 7.7 oz)  Body mass index is 38.52 kg/m².    Intake/Output - Last 3 Shifts         01/08 0700  01/09 0659 01/09 0700  01/10 0659 01/10 0700 01/11 0659    P.O. 360 600 480    IV Piggyback 380.1 350     Total Intake(mL/kg) 740.1 (7) 950 (9) 480 (4.6)    Net +740.1 +950 +480           Urine Occurrence 5 x 2 x 1 x    Stool Occurrence 0 x 1 x 0 x             Physical Exam  Vitals and nursing note reviewed.   Constitutional:       Appearance: Normal appearance. She is not ill-appearing.   HENT:      Head: Normocephalic.      Mouth/Throat:      Mouth: Mucous membranes are moist.      Pharynx: Oropharynx is clear.   Eyes:      General: No scleral icterus.     Extraocular Movements: Extraocular movements intact.      Conjunctiva/sclera: Conjunctivae normal.   Cardiovascular:      Rate and Rhythm: Normal rate.      Pulses: Normal pulses.   Pulmonary:      Effort: Pulmonary effort is normal. No respiratory distress.      Breath sounds: No wheezing.   Abdominal:      General: Abdomen is flat. Bowel sounds are normal. There is no distension.      Palpations: Abdomen is soft.   Genitourinary:     Comments: Right groin wound with no significant residual necrosis. Wound base appears healthy. No significant discharge. Still with some surrounding cellulitis changes. Clean packing in place.   Musculoskeletal:         General: No swelling or tenderness. Normal range of motion.      Cervical back: Normal range of motion.   Skin:     General: Skin is warm and dry.      Coloration: Skin is not jaundiced or  pale.   Neurological:      General: No focal deficit present.      Mental Status: She is alert and oriented to person, place, and time.   Psychiatric:         Mood and Affect: Mood normal.          Significant Labs:  I have reviewed all pertinent lab results within the past 24 hours.  CBC:   Recent Labs   Lab 01/09/24  0504   WBC 9.06   RBC 3.73*   HGB 10.4*   HCT 32.5*      MCV 87   MCH 27.9   MCHC 32.0     CMP:   Recent Labs   Lab 01/09/24  0504   *   CALCIUM 8.8   ALBUMIN 2.2*   PROT 7.1      K 3.3*   CO2 25      BUN 8   CREATININE 0.8   ALKPHOS 93   ALT 7*   AST 9*   BILITOT 0.4       Significant Diagnostics:  I have reviewed all pertinent imaging results/findings within the past 24 hours.  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  Transferred to [facility] for surgical options.      - Antimicrobial regimen per ID team.   - Cultures not yet finalized  - No plans for second OR trip as of now. Wound base looks good and she has no significant discharge.  - wound vac tomorrow  - Rest of care per primary team  - General surgery will continue to follow        Juan Carlos La MD  General Surgery  [facility] - Med Surg

## 2024-01-10 NOTE — ASSESSMENT & PLAN NOTE
L perinephric abscess    I independently reviewed patient's lab work and images as documented. 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 Work up notable for CT findings with FG in R inguinum and pyelonephritis and perinephric abscess. S/p OR 1/7 for washout - cx with GNR thus far. Wound cx on 1/5 with Ecoli, strep, and MRSA, ucx with Ecoli. Renal US with complex L kidney lesion - S/p IR aspiration 1/8 - noted to have purulent drainage, cx with Ecoli thus far.  Pt reported that her preference for home abx be oral if feasible.     Plan for wound vac placement and repeat renal US per chart review.       Recommendations:   -continue vancomycin pharm to dose  -stop meropenem, de-escalate to ceftriaxone 2g iv daily and PO flagyl for empiric anaerobic coverage (ordered)  -follow up surgery recs  -follow up KRISS

## 2024-01-10 NOTE — SUBJECTIVE & OBJECTIVE
Interval History: No significant changes to clinical status. Tolerating bedside dressing changes    Medications:  Continuous Infusions:  Scheduled Meds:   aspirin  81 mg Oral Daily    atorvastatin  80 mg Oral QHS    cefTRIAXone (ROCEPHIN) IVPB  2 g Intravenous Q24H    ezetimibe  10 mg Oral QHS    FLUoxetine  40 mg Oral Daily    fluticasone propionate  2 spray Each Nostril Daily    heparin (porcine)  5,000 Units Subcutaneous Q8H    insulin detemir U-100  20 Units Subcutaneous Daily    lisinopriL  40 mg Oral Daily    metoprolol succinate  100 mg Oral Daily    metroNIDAZOLE  500 mg Oral Q12H    mupirocin   Nasal BID    NIFEdipine  60 mg Oral Daily    pantoprazole  40 mg Oral Daily    vancomycin (VANCOCIN) IV (PEDS and ADULTS)  1,500 mg Intravenous Q12H     PRN Meds:acetaminophen, acetaminophen, aluminum-magnesium hydroxide-simethicone, dextrose 10%, dextrose 10%, glucagon (human recombinant), glucose, glucose, HYDROcodone-acetaminophen, insulin aspart U-100, melatonin, morphine, naloxone, prochlorperazine, sodium chloride 0.9%, Pharmacy to dose Vancomycin consult **AND** vancomycin - pharmacy to dose     Review of patient's allergies indicates:  No Known Allergies  Objective:     Vital Signs (Most Recent):  Temp: 97.7 °F (36.5 °C) (01/10/24 1110)  Pulse: 80 (01/10/24 1110)  Resp: 20 (01/10/24 1110)  BP: (!) 162/79 (01/10/24 1110)  SpO2: (!) 94 % (01/10/24 1110) Vital Signs (24h Range):  Temp:  [97.7 °F (36.5 °C)-98.6 °F (37 °C)] 97.7 °F (36.5 °C)  Pulse:  [79-93] 80  Resp:  [16-20] 20  SpO2:  [94 %-98 %] 94 %  BP: (123-162)/(60-79) 162/79     Weight: 105 kg (231 lb 7.7 oz)  Body mass index is 38.52 kg/m².    Intake/Output - Last 3 Shifts         01/08 0700  01/09 0659 01/09 0700  01/10 0659 01/10 0700 01/11 0659    P.O. 360 600 480    IV Piggyback 380.1 350     Total Intake(mL/kg) 740.1 (7) 950 (9) 480 (4.6)    Net +740.1 +950 +480           Urine Occurrence 5 x 2 x 1 x    Stool Occurrence 0 x 1 x 0 x              Physical Exam  Vitals and nursing note reviewed.   Constitutional:       Appearance: Normal appearance. She is not ill-appearing.   HENT:      Head: Normocephalic.      Mouth/Throat:      Mouth: Mucous membranes are moist.      Pharynx: Oropharynx is clear.   Eyes:      General: No scleral icterus.     Extraocular Movements: Extraocular movements intact.      Conjunctiva/sclera: Conjunctivae normal.   Cardiovascular:      Rate and Rhythm: Normal rate.      Pulses: Normal pulses.   Pulmonary:      Effort: Pulmonary effort is normal. No respiratory distress.      Breath sounds: No wheezing.   Abdominal:      General: Abdomen is flat. Bowel sounds are normal. There is no distension.      Palpations: Abdomen is soft.   Genitourinary:     Comments: Right groin wound with no significant residual necrosis. Wound base appears healthy. No significant discharge. Still with some surrounding cellulitis changes. Clean packing in place.   Musculoskeletal:         General: No swelling or tenderness. Normal range of motion.      Cervical back: Normal range of motion.   Skin:     General: Skin is warm and dry.      Coloration: Skin is not jaundiced or pale.   Neurological:      General: No focal deficit present.      Mental Status: She is alert and oriented to person, place, and time.   Psychiatric:         Mood and Affect: Mood normal.          Significant Labs:  I have reviewed all pertinent lab results within the past 24 hours.  CBC:   Recent Labs   Lab 01/09/24  0504   WBC 9.06   RBC 3.73*   HGB 10.4*   HCT 32.5*      MCV 87   MCH 27.9   MCHC 32.0     CMP:   Recent Labs   Lab 01/09/24  0504   *   CALCIUM 8.8   ALBUMIN 2.2*   PROT 7.1      K 3.3*   CO2 25      BUN 8   CREATININE 0.8   ALKPHOS 93   ALT 7*   AST 9*   BILITOT 0.4       Significant Diagnostics:  I have reviewed all pertinent imaging results/findings within the past 24 hours.

## 2024-01-10 NOTE — PROGRESS NOTES
Pharmacokinetic Assessment Follow Up: IV Vancomycin    Vancomycin serum concentration assessment(s):    The trough level was drawn correctly and can be used to guide therapy at this time. The measurement is within the desired definitive target range of 15 to 20 mcg/mL.    Vancomycin Regimen Plan:    Continue regimen to Vancomycin 1500 mg IV every 12 hours with next serum trough concentration measured at 0930 prior to 3rd dose on 1/11/2024    Drug levels (last 3 results):  Recent Labs   Lab Result Units 01/07/24  2204 01/10/24  0929   Vancomycin-Trough ug/mL 18.3 19.2       Pharmacy will continue to follow and monitor vancomycin.    Please contact pharmacy at extension [phone] for questions regarding this assessment.    Thank you for the consult,   Qasim Ureña Jr       Patient brief summary:  Jaimee Quintana is a 48 y.o. female initiated on antimicrobial therapy with IV Vancomycin for treatment of intra-abdominal infection    Drug Allergies:   Review of patient's allergies indicates:  No Known Allergies    Actual Body Weight:   105 kg    Renal Function:   Estimated Creatinine Clearance: 104.6 mL/min (based on SCr of 0.8 mg/dL).,     Dialysis Method (if applicable):  N/A    CBC (last 72 hours):  Recent Labs   Lab Result Units 01/08/24  0613 01/09/24  0504   WBC K/uL 11.43 9.06   Hemoglobin g/dL 10.2* 10.4*   Hematocrit % 30.9* 32.5*   Platelets K/uL 247 281   Gran % % 78.0* 69.3   Lymph % % 13.5* 19.4   Mono % % 6.6 8.6   Eosinophil % % 1.0 1.7   Basophil % % 0.2 0.2   Differential Method  Automated Automated       Metabolic Panel (last 72 hours):  Recent Labs   Lab Result Units 01/08/24  0613 01/09/24  0504   Sodium mmol/L 134* 136   Potassium mmol/L 3.4* 3.3*   Chloride mmol/L 100 102   CO2 mmol/L 23 25   Glucose mg/dL 320* 192*   BUN mg/dL 10 8   Creatinine mg/dL 0.9 0.8   Albumin g/dL 2.0* 2.2*   Total Bilirubin mg/dL 0.4 0.4   Alkaline Phosphatase U/L 79 93   AST U/L 9* 9*   ALT U/L 11 7*       Vancomycin  Administrations:  vancomycin given in the last 96 hours                     vancomycin 1,500 mg in dextrose 5 % (D5W) 250 mL IVPB (Vial-Mate) (mg) 1,500 mg New Bag 01/10/24 0052     1,500 mg New Bag 01/09/24 1117    vancomycin 1,500 mg in dextrose 5 % (D5W) 250 mL IVPB (Vial-Mate) ()  Restarted 01/07/24 2259     1,500 mg New Bag  1042     1,500 mg New Bag 01/06/24 2312                    Microbiologic Results:  Microbiology Results (last 7 days)       Procedure Component Value Units Date/Time    Culture, Body Fluid (Aerobic) w/ Gram Stain [[id number]]  (Abnormal)  (Susceptibility) Collected: 01/08/24 1607    Order Status: Completed Specimen: Body Fluid from Back Updated: 01/10/24 0830     AEROBIC CULTURE - FLUID ESCHERICHIA COLI  Many       Gram Stain Result Rare WBC's      Few Gram negative rods      Few Gram positive cocci in pairs and chains    Narrative:      IR aspiration of L kidney collection    AFB Culture & Smear [[id number]] Collected: 01/07/24 0850    Order Status: Completed Specimen: Wound from Groin Updated: 01/09/24 2127     AFB Culture & Smear Culture in progress     AFB CULTURE STAIN No acid fast bacilli seen.    Narrative:      Right groin    Culture, Anaerobe [[id number]] Collected: 01/07/24 0850    Order Status: Completed Specimen: Wound from Groin Updated: 01/09/24 0816     Anaerobic Culture Culture in progress    Narrative:      Right groin tissue    Aerobic culture [[id number]]  (Abnormal)  (Susceptibility) Collected: 01/07/24 0850    Order Status: Completed Specimen: Wound from Groin Updated: 01/09/24 0746     Aerobic Bacterial Culture ESCHERICHIA COLI  Rare      Narrative:      Right groin    Culture, Anaerobic [[id number]] Collected: 01/08/24 1607    Order Status: Sent Specimen: Body Fluid from Kidney, Left Updated: 01/08/24 1644    Culture, Anaerobe [[id number]]     Order Status: No result Specimen: Joint Fluid     Aerobic culture [[id number]]     Order Status: No result Specimen: Bone      Gram stain [[id number]] Collected: 01/07/24 0850    Order Status: Completed Specimen: Wound from Groin Updated: 01/07/24 1217     Gram Stain Result Few WBC's      Few Gram negative rods      Few Gram positive cocci in pairs    Narrative:      Right groin    Fungus culture [[id number]] Collected: 01/07/24 0850    Order Status: Sent Specimen: Wound from Groin Updated: 01/07/24 1108

## 2024-01-10 NOTE — SUBJECTIVE & OBJECTIVE
Past Medical History:   Diagnosis Date    Cataract     Cervical high risk HPV (human papillomavirus) test positive 2020    NOT 16 OR 18    CHF (congestive heart failure)     CVA (cerebral infarction)          Depression     Diabetes mellitus, type 2     GERD (gastroesophageal reflux disease)     Hyperlipidemia     Hypertension     Lactic acidosis 10/06/2023    Moderate nonproliferative diabetic retinopathy     Nausea and vomiting 10/06/2023    Obesity     Stroke     Tachycardia 10/04/2023       Past Surgical History:   Procedure Laterality Date     SECTION      CHOLECYSTECTOMY      DILATION AND CURETTAGE OF UTERUS      EYE SURGERY Bilateral     laser    GALLBLADDER SURGERY  2017    stone removed    IRRIGATION AND DEBRIDEMENT Right 2024    Procedure: IRRIGATION AND DEBRIDEMENT RIGHT GROIN;  Surgeon: Chalo Padgett MD;  Location: [facility] OR;  Service: General;  Laterality: Right;       Review of patient's allergies indicates:  No Known Allergies    No current facility-administered medications on file prior to encounter.     Current Outpatient Medications on File Prior to Encounter   Medication Sig    aspirin (ECOTRIN) 81 MG EC tablet Take 81 mg by mouth once daily.    atorvastatin (LIPITOR) 80 MG tablet Take 1 tablet (80 mg total) by mouth every evening.    blood sugar diagnostic Strp Use to test blood glucose two (2) times daily as directed with insurance preferred meter and supplies    dulaglutide (TRULICITY) 0.75 mg/0.5 mL pen injector Inject 0.75 mg into the skin every 7 days. OK to discontinue this med during SNF stay - resume upon discharge (Patient taking differently: Inject 0.75 mg into the skin every .)    ergocalciferol (ERGOCALCIFEROL) 50,000 unit Cap Take 50,000 Units by mouth every Saturday.    ezetimibe (ZETIA) 10 mg tablet Take 1 tablet (10 mg total) by mouth once daily. (For cholesterol)    FLUoxetine 40 MG capsule Take 40 mg by mouth once daily.    fluticasone  "propionate (FLONASE) 50 mcg/actuation nasal spray 2 sprays (100 mcg total) by Each Nostril route daily as needed for Allergies or Rhinitis.    furosemide (LASIX) 40 MG tablet Take 1 tablet (40 mg total) by mouth once daily.    ibuprofen (ADVIL,MOTRIN) 200 MG tablet Take 200 mg by mouth every 6 (six) hours as needed for Pain.    insulin (LANTUS SOLOSTAR U-100 INSULIN) glargine 100 units/mL SubQ pen Inject 80 Units into the skin 2 (two) times a day. INJECT 80 UNITS SUBCUTANEOUSLY TWICE DAILY (BULK) Strength: 100 unit/mL (3 mL)    insulin aspart U-100 (NOVOLOG FLEXPEN U-100 INSULIN) 100 unit/mL (3 mL) InPn pen Inject 30 Units into the skin 2 (two) times a day.    lancets (TRUEPLUS LANCETS) 28 gauge Misc Use to test blood glucose two (2) times daily as directed with insurance preferred meter and supplies    LANTUS SOLOSTAR U-100 INSULIN glargine 100 units/mL SubQ pen Inject 80 Units into the skin 2 (two) times a day.    lisinopriL (PRINIVIL,ZESTRIL) 40 MG tablet Take 1 tablet (40 mg total) by mouth once daily.    metFORMIN (GLUCOPHAGE-XR) 500 MG ER 24hr tablet Take 1,000 mg by mouth 2 (two) times daily with meals.    metoprolol succinate (TOPROL-XL) 100 MG 24 hr tablet Take 100 mg by mouth once daily.    NIFEdipine (ADALAT CC) 60 MG TbSR Take 60 mg by mouth every morning.    pantoprazole (PROTONIX) 40 MG tablet Take 40 mg by mouth once daily.    pen needle, diabetic (BD ULTRA-FINE ROSA PEN NEEDLE) 32 gauge x 5/32" Ndle Use with insulin once daily    pen needle, diabetic 32 gauge x 5/32" Ndle Use with injectable DM supplies twice daily as directed    potassium chloride (KLOR-CON) 8 MEQ TbSR TAKE TWO TABLETS BY MOUTH TWICE DAILY @ 9AM & 5PM    TRUE METRIX GO GLUCOSE METER Misc      Family History       Problem Relation (Age of Onset)    Arthritis Father    Asthma Daughter    Cataracts Father    Diabetes Mother, Father    Heart disease Sister    Hypertension Father, Sister    No Known Problems Brother, Maternal Aunt, " Maternal Uncle, Paternal Aunt, Paternal Uncle, Maternal Grandmother, Maternal Grandfather, Paternal Grandmother, Paternal Grandfather    Stroke Mother, Sister    Thyroid disease Mother, Sister, Daughter          Tobacco Use    Smoking status: Every Day     Current packs/day: 0.00     Types: Cigarettes     Last attempt to quit: 2021     Years since quittin.9    Smokeless tobacco: Never   Substance and Sexual Activity    Alcohol use: Yes     Alcohol/week: 3.0 standard drinks of alcohol     Types: 3 Cans of beer per week     Comment: Rare    Drug use: No    Sexual activity: Yes     Partners: Male     Birth control/protection: None     Review of Systems    General: No fevers, weight changes.   HEENT: No visual changes, neck pain, swallowing problems, hoarseness.  CV: No cp/sob, no palpitations, edema.   Pulm: No cough, sob, hemoptysis. No known CESAR.    GI: No n/v/d, no abdominal pain, no melena, hematochezia.   : No problems w/ bladder control, dysuria, hematuria.  Musculoskeletal: No joint pains/stiffness, back pain.   Neuro:  No HA, seizure, focal weakness, falls, dizzy spells.     Objective:     Vital Signs (Most Recent):  Temp: 97.7 °F (36.5 °C) (01/10/24 1110)  Pulse: 80 (01/10/24 1110)  Resp: 20 (01/10/24 1110)  BP: (!) 162/79 (01/10/24 1110)  SpO2: (!) 94 % (01/10/24 1110) Vital Signs (24h Range):  Temp:  [97.7 °F (36.5 °C)-98.9 °F (37.2 °C)] 97.7 °F (36.5 °C)  Pulse:  [79-93] 80  Resp:  [16-20] 20  SpO2:  [94 %-98 %] 94 %  BP: (123-162)/(60-79) 162/79     Weight: 105 kg (231 lb 7.7 oz)  Body mass index is 38.52 kg/m².     Physical Exam        General:  A&O, NAD  HEENT: neck supple, PERRL/EOMI, EAC clear bilaterally, normal bilateral carotid upstroke w/o bruits, inf turbs clear bilaterally, OC/OP clear  Lungs: CTAB  CV: RRR w/o M/G/R  Abdomen: soft, NTND, no HSM, no bruits/masses/pulsations, massively obese  Ext: no edema  : she has a small incision right mons area, currently w/o any drainage/odor,  the surrounding soft tissue is a bit indurated but not particularly tender to touch, she has a lot of redundant fatty tissue in the area.  There is some erythema.  No CVAT.   Rectal: def  Neuro: NF    Significant Labs: All pertinent labs within the past 24 hours have been reviewed.    Significant Imaging: I have reviewed all pertinent imaging results/findings within the past 24 hours.

## 2024-01-10 NOTE — SUBJECTIVE & OBJECTIVE
Interval History: No fevers documented overnight. Pt reports tolerating abx without issues.       Review of Systems   Constitutional:  Negative for chills and fever.   All other systems reviewed and are negative.    Objective:     Vital Signs (Most Recent):  Temp: 97.9 °F (36.6 °C) (01/10/24 0732)  Pulse: 84 (01/10/24 0732)  Resp: 18 (01/10/24 0732)  BP: (!) 159/74 (01/10/24 0732)  SpO2: 97 % (01/10/24 0732) Vital Signs (24h Range):  Temp:  [97.8 °F (36.6 °C)-98.9 °F (37.2 °C)] 97.9 °F (36.6 °C)  Pulse:  [79-93] 84  Resp:  [16-20] 18  SpO2:  [94 %-98 %] 97 %  BP: (123-159)/(60-77) 159/74     Weight: 105 kg (231 lb 7.7 oz)  Body mass index is 38.52 kg/m².    Estimated Creatinine Clearance: 104.6 mL/min (based on SCr of 0.8 mg/dL).     Physical Exam  Constitutional:       General: She is not in acute distress.     Appearance: She is not ill-appearing, toxic-appearing or diaphoretic.   Pulmonary:      Effort: Pulmonary effort is normal. No respiratory distress.   Abdominal:      General: There is no distension.      Palpations: Abdomen is soft.      Tenderness: There is no abdominal tenderness. There is no right CVA tenderness or left CVA tenderness.   Genitourinary:     Comments: No purulent drainage seen along R groin wound  Skin:     General: Skin is warm and dry.   Neurological:      Mental Status: She is alert and oriented to person, place, and time.          Significant Labs:   Microbiology Results (last 7 days)       Procedure Component Value Units Date/Time    Culture, Body Fluid (Aerobic) w/ Gram Stain [[id number]]  (Abnormal)  (Susceptibility) Collected: 01/08/24 1607    Order Status: Completed Specimen: Body Fluid from Back Updated: 01/10/24 0830     AEROBIC CULTURE - FLUID ESCHERICHIA COLI  Many       Gram Stain Result Rare WBC's      Few Gram negative rods      Few Gram positive cocci in pairs and chains    Narrative:      IR aspiration of L kidney collection    AFB Culture & Smear [[id number]] Collected:  01/07/24 0850    Order Status: Completed Specimen: Wound from Groin Updated: 01/09/24 2127     AFB Culture & Smear Culture in progress     AFB CULTURE STAIN No acid fast bacilli seen.    Narrative:      Right groin    Culture, Anaerobe [[id number]] Collected: 01/07/24 0850    Order Status: Completed Specimen: Wound from Groin Updated: 01/09/24 0816     Anaerobic Culture Culture in progress    Narrative:      Right groin tissue    Aerobic culture [[id number]]  (Abnormal)  (Susceptibility) Collected: 01/07/24 0850    Order Status: Completed Specimen: Wound from Groin Updated: 01/09/24 0746     Aerobic Bacterial Culture ESCHERICHIA COLI  Rare      Narrative:      Right groin    Culture, Anaerobic [[id number]] Collected: 01/08/24 1607    Order Status: Sent Specimen: Body Fluid from Kidney, Left Updated: 01/08/24 1644    Culture, Anaerobe [[id number]]     Order Status: No result Specimen: Joint Fluid     Aerobic culture [[id number]]     Order Status: No result Specimen: Bone     Gram stain [[id number]] Collected: 01/07/24 0850    Order Status: Completed Specimen: Wound from Groin Updated: 01/07/24 1217     Gram Stain Result Few WBC's      Few Gram negative rods      Few Gram positive cocci in pairs    Narrative:      Right groin    Fungus culture [[id number]] Collected: 01/07/24 0850    Order Status: Sent Specimen: Wound from Groin Updated: 01/07/24 1108            Significant Imaging: I have reviewed all pertinent imaging results/findings within the past 24 hours.

## 2024-01-10 NOTE — PLAN OF CARE
Problem: Adult Inpatient Plan of Care  Goal: Plan of Care Review  Outcome: Ongoing, Progressing  Goal: Patient-Specific Goal (Individualized)  Outcome: Ongoing, Progressing  Goal: Optimal Comfort and Wellbeing  Outcome: Ongoing, Progressing  Goal: Readiness for Transition of Care  Outcome: Ongoing, Progressing     Problem: Diabetes Comorbidity  Goal: Blood Glucose Level Within Targeted Range  Outcome: Ongoing, Progressing     Problem: Impaired Wound Healing  Goal: Optimal Wound Healing  Outcome: Ongoing, Progressing     Problem: Skin Injury Risk Increased  Goal: Skin Health and Integrity  Outcome: Ongoing, Progressing     Problem: Pain Acute  Goal: Acceptable Pain Control and Functional Ability  Outcome: Ongoing, Progressing     Problem: Fall Injury Risk  Goal: Absence of Fall and Fall-Related Injury  Outcome: Ongoing, Progressing

## 2024-01-10 NOTE — ASSESSMENT & PLAN NOTE
S/p bedside I&D w/ culture of material in ED at [facility]  Dr aPdgett assessed pt and had planned to bring to OR here this afternoon, Dr Snyder is covering for him and has pt scheduled for tomorrow AM  I am comfortable w/ this plan as pt appears to be very stable and this process does not appear fulminant  Not septic but is at risk  Close monitoring, IVF, abx  BCX, urine Cx done at [facility] ED.    S/P I&D  and irrigation and debridement rightly groin and right leg,on 1.7.24,c  Culture growing staph,strep,Ecoli,per ID on Vanc. And meropenem.    Surgery planing placing wound vac,.

## 2024-01-10 NOTE — NURSING
[facility], [facility]  Nurses Note -- 4 Eyes      1-9-24      Skin assessed on: Q Shift      [x] No Pressure Injuries Present    []Prevention Measures Documented    [] Yes LDA  for Pressure Injury Previously documented     [] Yes New Pressure Injury Discovered   [] LDA for New Pressure Injury Added      Attending RN:  Yolanda Trevino, RN     Second RN:  Saira Chan RN

## 2024-01-10 NOTE — PLAN OF CARE
Problem: Adult Inpatient Plan of Care  Goal: Plan of Care Review  Outcome: Ongoing, Progressing  Flowsheets (Taken 1/10/2024 0815)  Plan of Care Reviewed With: patient  Goal: Patient-Specific Goal (Individualized)  Outcome: Ongoing, Progressing     Problem: Diabetes Comorbidity  Goal: Blood Glucose Level Within Targeted Range  Outcome: Ongoing, Progressing  Intervention: Monitor and Manage Glycemia  Flowsheets (Taken 1/10/2024 0815)  Glycemic Management:   blood glucose monitored   carbohydrate replacement provided   oral hydration promoted   supplemental insulin given     Problem: Adult Inpatient Plan of Care  Goal: Plan of Care Review  Outcome: Ongoing, Progressing  Flowsheets (Taken 1/10/2024 0815)  Plan of Care Reviewed With: patient     Problem: Adult Inpatient Plan of Care  Goal: Plan of Care Review  Outcome: Ongoing, Progressing  Flowsheets (Taken 1/10/2024 0815)  Plan of Care Reviewed With: patient     Problem: Adult Inpatient Plan of Care  Goal: Patient-Specific Goal (Individualized)  Outcome: Ongoing, Progressing     Problem: Adult Inpatient Plan of Care  Goal: Patient-Specific Goal (Individualized)  Outcome: Ongoing, Progressing     Problem: Diabetes Comorbidity  Goal: Blood Glucose Level Within Targeted Range  Outcome: Ongoing, Progressing  Intervention: Monitor and Manage Glycemia  Flowsheets (Taken 1/10/2024 0815)  Glycemic Management:   blood glucose monitored   carbohydrate replacement provided   oral hydration promoted   supplemental insulin given     Problem: Diabetes Comorbidity  Goal: Blood Glucose Level Within Targeted Range  Outcome: Ongoing, Progressing     Problem: Diabetes Comorbidity  Goal: Blood Glucose Level Within Targeted Range  Intervention: Monitor and Manage Glycemia  Flowsheets (Taken 1/10/2024 0815)  Glycemic Management:   blood glucose monitored   carbohydrate replacement provided   oral hydration promoted   supplemental insulin given     Problem: Diabetes Comorbidity  Goal:  Blood Glucose Level Within Targeted Range  Intervention: Monitor and Manage Glycemia  Flowsheets (Taken 1/10/2024 0815)  Glycemic Management:   blood glucose monitored   carbohydrate replacement provided   oral hydration promoted   supplemental insulin given

## 2024-01-10 NOTE — PROGRESS NOTES
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 Work up notable for CT findings with FG in R inguinum and pyelonephritis and perinephric abscess. S/p OR 1/7 for washout - cx with GNR thus far. Wound cx on 1/5 with Ecoli, strep, and MRSA, ucx with Ecoli. Renal US with complex L kidney lesion - S/p IR aspiration 1/8 - noted to have purulent drainage, cx with Ecoli thus far.  Pt reported that her preference for home abx be oral if feasible. Prior leukocytosis now resolved.     Plan for wound vac placement and repeat renal US per chart review.       Recommendations:   -continue vancomycin pharm to dose for MRSA  -stop meropenem, de-escalate to ceftriaxone 2g iv daily and PO flagyl for empiric anaerobic coverage (ordered)  -follow up surgery recs  -follow up KRISS          Thank you for your consult. I will follow-up with patient. Please contact us if you have any additional questions. Above d/w primary team.       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 Denies drainage prior to arrival. Lesion kept getting bigger and more painful and then she had fever, so went to hospital. Reports some dysuria. Denies cva tenderness. Denies indwelling hardware. Denies abx allergies.     CT showed changes c/w  "Jeremiah's gangrene in right inguinum, also has findings c/w pyelonephritis and perinephric abscesses on the contralateral side.     Transferred to [facility] for surgical eval    Today 1/7, s/p  IRRIGATION AND DEBRIDEMENT RIGHT GROIN (Right)  IRRIGATION AND DEBRIDEMENT, LOWER EXTREMITY     Per op note, sinus tract found and debrided. Necrosis extended through Miller's fascia down to the external oblique     Wound culture from admit-  Specimen Information: Abdomen; Abscess   0 Result Notes      Component 2 d ago   Aerobic Bacterial Culture      Abnormal   GRAM NEGATIVE EDOUARD  Many  Identification and susceptibility pending  P      Aerobic Bacterial Culture      Abnormal   STREPTOCOCCUS ANGINOSUS  Many  Susceptibility testing not routinely performed  P             OR cultures today - pending    Ucx GNR      Renal US     3 cm complex collection adjacent to the left kidney in keeping with the patient's known perinephric abscess.     On vanc/meropenem/clindamycin    ID consulted for "abx mgt assistance, right sided Jeremiah's gangrene, contralateral pyelo w/ PN abscesses, DM   Interval History: No fevers documented overnight. Pt reports tolerating abx without issues.       Review of Systems   Constitutional:  Negative for chills and fever.   All other systems reviewed and are negative.    Objective:     Vital Signs (Most Recent):  Temp: 97.9 °F (36.6 °C) (01/10/24 0732)  Pulse: 84 (01/10/24 0732)  Resp: 18 (01/10/24 0732)  BP: (!) 159/74 (01/10/24 0732)  SpO2: 97 % (01/10/24 0732) Vital Signs (24h Range):  Temp:  [97.8 °F (36.6 °C)-98.9 °F (37.2 °C)] 97.9 °F (36.6 °C)  Pulse:  [79-93] 84  Resp:  [16-20] 18  SpO2:  [94 %-98 %] 97 %  BP: (123-159)/(60-77) 159/74     Weight: 105 kg (231 lb 7.7 oz)  Body mass index is 38.52 kg/m².    Estimated Creatinine Clearance: 104.6 mL/min (based on SCr of 0.8 mg/dL).     Physical Exam  Constitutional:       General: She is not in acute distress.     Appearance: She is not ill-appearing, " toxic-appearing or diaphoretic.   Pulmonary:      Effort: Pulmonary effort is normal. No respiratory distress.   Abdominal:      General: There is no distension.      Palpations: Abdomen is soft.      Tenderness: There is no abdominal tenderness. There is no right CVA tenderness or left CVA tenderness.   Genitourinary:     Comments: No purulent drainage seen along R groin wound  Skin:     General: Skin is warm and dry.   Neurological:      Mental Status: She is alert and oriented to person, place, and time.          Significant Labs:   Microbiology Results (last 7 days)       Procedure Component Value Units Date/Time    Culture, Body Fluid (Aerobic) w/ Gram Stain [[id number]]  (Abnormal)  (Susceptibility) Collected: 01/08/24 1607    Order Status: Completed Specimen: Body Fluid from Back Updated: 01/10/24 0830     AEROBIC CULTURE - FLUID ESCHERICHIA COLI  Many       Gram Stain Result Rare WBC's      Few Gram negative rods      Few Gram positive cocci in pairs and chains    Narrative:      IR aspiration of L kidney collection    AFB Culture & Smear [[id number]] Collected: 01/07/24 0850    Order Status: Completed Specimen: Wound from Groin Updated: 01/09/24 2127     AFB Culture & Smear Culture in progress     AFB CULTURE STAIN No acid fast bacilli seen.    Narrative:      Right groin    Culture, Anaerobe [[id number]] Collected: 01/07/24 0850    Order Status: Completed Specimen: Wound from Groin Updated: 01/09/24 0816     Anaerobic Culture Culture in progress    Narrative:      Right groin tissue    Aerobic culture [[id number]]  (Abnormal)  (Susceptibility) Collected: 01/07/24 0850    Order Status: Completed Specimen: Wound from Groin Updated: 01/09/24 0746     Aerobic Bacterial Culture ESCHERICHIA COLI  Rare      Narrative:      Right groin    Culture, Anaerobic [[id number]] Collected: 01/08/24 1607    Order Status: Sent Specimen: Body Fluid from Kidney, Left Updated: 01/08/24 1644    Culture, Anaerobe [[id number]]      Order Status: No result Specimen: Joint Fluid     Aerobic culture [[id number]]     Order Status: No result Specimen: Bone     Gram stain [[id number]] Collected: 01/07/24 0850    Order Status: Completed Specimen: Wound from Groin Updated: 01/07/24 1217     Gram Stain Result Few WBC's      Few Gram negative rods      Few Gram positive cocci in pairs    Narrative:      Right groin    Fungus culture [[id number]] Collected: 01/07/24 0850    Order Status: Sent Specimen: Wound from Groin Updated: 01/07/24 1108            Significant Imaging: I have reviewed all pertinent imaging results/findings within the past 24 hours.

## 2024-01-11 LAB
ANION GAP SERPL CALC-SCNC: 10 MMOL/L (ref 8–16)
BACTERIA BLD CULT: NORMAL
BACTERIA SPEC ANAEROBE CULT: NORMAL
BASOPHILS # BLD AUTO: 0.03 K/UL (ref 0–0.2)
BASOPHILS NFR BLD: 0.4 % (ref 0–1.9)
BUN SERPL-MCNC: 6 MG/DL (ref 6–20)
CALCIUM SERPL-MCNC: 8.9 MG/DL (ref 8.7–10.5)
CHLORIDE SERPL-SCNC: 101 MMOL/L (ref 95–110)
CO2 SERPL-SCNC: 28 MMOL/L (ref 23–29)
CREAT SERPL-MCNC: 0.8 MG/DL (ref 0.5–1.4)
DIFFERENTIAL METHOD BLD: ABNORMAL
EOSINOPHIL # BLD AUTO: 0.2 K/UL (ref 0–0.5)
EOSINOPHIL NFR BLD: 2.2 % (ref 0–8)
ERYTHROCYTE [DISTWIDTH] IN BLOOD BY AUTOMATED COUNT: 12.3 % (ref 11.5–14.5)
EST. GFR  (NO RACE VARIABLE): >60 ML/MIN/1.73 M^2
GLUCOSE SERPL-MCNC: 175 MG/DL (ref 70–110)
HCT VFR BLD AUTO: 36.1 % (ref 37–48.5)
HGB BLD-MCNC: 11.5 G/DL (ref 12–16)
IMM GRANULOCYTES # BLD AUTO: 0.13 K/UL (ref 0–0.04)
IMM GRANULOCYTES NFR BLD AUTO: 1.5 % (ref 0–0.5)
LYMPHOCYTES # BLD AUTO: 1.7 K/UL (ref 1–4.8)
LYMPHOCYTES NFR BLD: 19.9 % (ref 18–48)
MCH RBC QN AUTO: 27.7 PG (ref 27–31)
MCHC RBC AUTO-ENTMCNC: 31.9 G/DL (ref 32–36)
MCV RBC AUTO: 87 FL (ref 82–98)
MONOCYTES # BLD AUTO: 0.6 K/UL (ref 0.3–1)
MONOCYTES NFR BLD: 7.6 % (ref 4–15)
NEUTROPHILS # BLD AUTO: 5.8 K/UL (ref 1.8–7.7)
NEUTROPHILS NFR BLD: 68.4 % (ref 38–73)
NRBC BLD-RTO: 0 /100 WBC
PLATELET # BLD AUTO: 352 K/UL (ref 150–450)
PMV BLD AUTO: 10.7 FL (ref 9.2–12.9)
POCT GLUCOSE: 184 MG/DL (ref 70–110)
POCT GLUCOSE: 311 MG/DL (ref 70–110)
POCT GLUCOSE: 348 MG/DL (ref 70–110)
POCT GLUCOSE: 356 MG/DL (ref 70–110)
POTASSIUM SERPL-SCNC: 3.2 MMOL/L (ref 3.5–5.1)
RBC # BLD AUTO: 4.15 M/UL (ref 4–5.4)
SODIUM SERPL-SCNC: 139 MMOL/L (ref 136–145)
VANCOMYCIN TROUGH SERPL-MCNC: 18.7 UG/ML (ref 10–22)
WBC # BLD AUTO: 8.46 K/UL (ref 3.9–12.7)

## 2024-01-11 PROCEDURE — 63600175 PHARM REV CODE 636 W HCPCS: Mod: HCNC | Performed by: STUDENT IN AN ORGANIZED HEALTH CARE EDUCATION/TRAINING PROGRAM

## 2024-01-11 PROCEDURE — 36415 COLL VENOUS BLD VENIPUNCTURE: CPT | Mod: HCNC,XB | Performed by: HOSPITALIST

## 2024-01-11 PROCEDURE — 85025 COMPLETE CBC W/AUTO DIFF WBC: CPT | Mod: HCNC | Performed by: HOSPITALIST

## 2024-01-11 PROCEDURE — 11000001 HC ACUTE MED/SURG PRIVATE ROOM: Mod: HCNC

## 2024-01-11 PROCEDURE — 25000003 PHARM REV CODE 250: Mod: HCNC | Performed by: STUDENT IN AN ORGANIZED HEALTH CARE EDUCATION/TRAINING PROGRAM

## 2024-01-11 PROCEDURE — 80048 BASIC METABOLIC PNL TOTAL CA: CPT | Mod: HCNC | Performed by: HOSPITALIST

## 2024-01-11 PROCEDURE — 27000207 HC ISOLATION: Mod: HCNC

## 2024-01-11 PROCEDURE — 99232 SBSQ HOSP IP/OBS MODERATE 35: CPT | Mod: HCNC,,, | Performed by: UROLOGY

## 2024-01-11 PROCEDURE — 63600175 PHARM REV CODE 636 W HCPCS: Mod: HCNC | Performed by: HOSPITALIST

## 2024-01-11 PROCEDURE — 25000003 PHARM REV CODE 250: Mod: HCNC | Performed by: HOSPITALIST

## 2024-01-11 PROCEDURE — 93005 ELECTROCARDIOGRAM TRACING: CPT | Mod: HCNC

## 2024-01-11 PROCEDURE — 93010 ELECTROCARDIOGRAM REPORT: CPT | Mod: HCNC,,, | Performed by: INTERNAL MEDICINE

## 2024-01-11 PROCEDURE — 80202 ASSAY OF VANCOMYCIN: CPT | Mod: HCNC | Performed by: HOSPITALIST

## 2024-01-11 PROCEDURE — 99233 SBSQ HOSP IP/OBS HIGH 50: CPT | Mod: HCNC,,, | Performed by: STUDENT IN AN ORGANIZED HEALTH CARE EDUCATION/TRAINING PROGRAM

## 2024-01-11 PROCEDURE — 99233 SBSQ HOSP IP/OBS HIGH 50: CPT | Mod: 57,HCNC,, | Performed by: SURGERY

## 2024-01-11 PROCEDURE — 63600175 PHARM REV CODE 636 W HCPCS: Mod: JZ,JG,HCNC | Performed by: STUDENT IN AN ORGANIZED HEALTH CARE EDUCATION/TRAINING PROGRAM

## 2024-01-11 PROCEDURE — 99499 UNLISTED E&M SERVICE: CPT | Mod: HCNC,,,

## 2024-01-11 RX ORDER — POTASSIUM CHLORIDE 20 MEQ/1
40 TABLET, EXTENDED RELEASE ORAL ONCE
Status: COMPLETED | OUTPATIENT
Start: 2024-01-11 | End: 2024-01-11

## 2024-01-11 RX ADMIN — ATORVASTATIN CALCIUM 80 MG: 40 TABLET, FILM COATED ORAL at 08:01

## 2024-01-11 RX ADMIN — HEPARIN SODIUM 5000 UNITS: 5000 INJECTION INTRAVENOUS; SUBCUTANEOUS at 09:01

## 2024-01-11 RX ADMIN — PANTOPRAZOLE SODIUM 40 MG: 40 TABLET, DELAYED RELEASE ORAL at 08:01

## 2024-01-11 RX ADMIN — HEPARIN SODIUM 5000 UNITS: 5000 INJECTION INTRAVENOUS; SUBCUTANEOUS at 05:01

## 2024-01-11 RX ADMIN — ASPIRIN 81 MG: 81 TABLET, COATED ORAL at 08:01

## 2024-01-11 RX ADMIN — NIFEDIPINE 60 MG: 30 TABLET, FILM COATED, EXTENDED RELEASE ORAL at 08:01

## 2024-01-11 RX ADMIN — INSULIN DETEMIR 20 UNITS: 100 INJECTION, SOLUTION SUBCUTANEOUS at 08:01

## 2024-01-11 RX ADMIN — FLUOXETINE HYDROCHLORIDE 40 MG: 20 CAPSULE ORAL at 08:01

## 2024-01-11 RX ADMIN — METRONIDAZOLE 500 MG: 500 TABLET ORAL at 08:01

## 2024-01-11 RX ADMIN — LISINOPRIL 40 MG: 20 TABLET ORAL at 08:01

## 2024-01-11 RX ADMIN — INSULIN ASPART 2 UNITS: 100 INJECTION, SOLUTION INTRAVENOUS; SUBCUTANEOUS at 08:01

## 2024-01-11 RX ADMIN — CEFTRIAXONE 2 G: 2 INJECTION, POWDER, FOR SOLUTION INTRAMUSCULAR; INTRAVENOUS at 10:01

## 2024-01-11 RX ADMIN — EZETIMIBE 10 MG: 10 TABLET ORAL at 08:01

## 2024-01-11 RX ADMIN — VANCOMYCIN HYDROCHLORIDE 1500 MG: 1.5 INJECTION, POWDER, LYOPHILIZED, FOR SOLUTION INTRAVENOUS at 11:01

## 2024-01-11 RX ADMIN — HEPARIN SODIUM 5000 UNITS: 5000 INJECTION INTRAVENOUS; SUBCUTANEOUS at 01:01

## 2024-01-11 RX ADMIN — FLUTICASONE PROPIONATE 100 MCG: 50 SPRAY, METERED NASAL at 08:01

## 2024-01-11 RX ADMIN — METOPROLOL SUCCINATE 100 MG: 50 TABLET, EXTENDED RELEASE ORAL at 08:01

## 2024-01-11 RX ADMIN — INSULIN ASPART 4 UNITS: 100 INJECTION, SOLUTION INTRAVENOUS; SUBCUTANEOUS at 04:01

## 2024-01-11 RX ADMIN — INSULIN ASPART 5 UNITS: 100 INJECTION, SOLUTION INTRAVENOUS; SUBCUTANEOUS at 11:01

## 2024-01-11 RX ADMIN — POTASSIUM CHLORIDE 40 MEQ: 1500 TABLET, EXTENDED RELEASE ORAL at 12:01

## 2024-01-11 RX ADMIN — MUPIROCIN: 20 OINTMENT TOPICAL at 08:01

## 2024-01-11 RX ADMIN — VANCOMYCIN HYDROCHLORIDE 1500 MG: 1.5 INJECTION, POWDER, LYOPHILIZED, FOR SOLUTION INTRAVENOUS at 12:01

## 2024-01-11 RX ADMIN — MORPHINE SULFATE 4 MG: 4 INJECTION, SOLUTION INTRAMUSCULAR; INTRAVENOUS at 12:01

## 2024-01-11 NOTE — SUBJECTIVE & OBJECTIVE
Interval History: doing well, denies flank pain, no acute events    Review of Systems   Constitutional:  Negative for activity change, appetite change, chills, diaphoresis and fever.   HENT:  Negative for hearing loss.    Eyes:  Negative for visual disturbance.   Respiratory:  Negative for cough, shortness of breath and wheezing.    Gastrointestinal:  Negative for abdominal distention, abdominal pain, constipation, nausea and vomiting.   Genitourinary:  Negative for difficulty urinating, dysuria, flank pain and hematuria.   Musculoskeletal:  Negative for neck pain and neck stiffness.   Neurological:  Negative for dizziness and weakness.     Objective:     Temp:  [97.7 °F (36.5 °C)-98.8 °F (37.1 °C)] 98.8 °F (37.1 °C)  Pulse:  [77-93] 77  Resp:  [16-20] 19  SpO2:  [94 %-97 %] 94 %  BP: (139-162)/(63-79) 155/73     Body mass index is 38.52 kg/m².    Date 01/10/24 0700 - 01/11/24 0659   Shift 6281-4066 2340-0007 9093-9866 24 Hour Total   INTAKE   P.O. 480 240  720   Shift Total(mL/kg) 480(4.6) 240(2.3)  720(6.9)   OUTPUT   Shift Total(mL/kg)       Weight (kg) 105 105 105 105          Drains       None                    Physical Exam  Constitutional:       Appearance: She is well-developed.   HENT:      Head: Normocephalic and atraumatic.   Eyes:      Conjunctiva/sclera: Conjunctivae normal.   Pulmonary:      Effort: Pulmonary effort is normal. No respiratory distress.   Abdominal:      General: Abdomen is flat. There is no distension.      Palpations: Abdomen is soft. There is no mass.   Skin:     General: Skin is warm.      Findings: No rash.   Neurological:      Mental Status: She is alert and oriented to person, place, and time.   Psychiatric:         Behavior: Behavior normal.           Significant Labs:    BMP:  Recent Labs   Lab 01/07/24  0519 01/08/24  0613 01/09/24  0504   * 134* 136   K 3.0* 3.4* 3.3*    100 102   CO2 23 23 25   BUN 9 10 8   CREATININE 0.9 0.9 0.8   CALCIUM 8.6* 8.3* 8.8        CBC:   Recent Labs   Lab 01/07/24  0519 01/08/24  0613 01/09/24  0504   WBC 14.84* 11.43 9.06   HGB 10.1* 10.2* 10.4*   HCT 31.2* 30.9* 32.5*    247 281         KRISS- similar fluid collection, no increased size or new loculations

## 2024-01-11 NOTE — PROGRESS NOTES
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 Work up notable for CT findings with FG in R inguinum and pyelonephritis and perinephric abscess. S/p OR 1/7 for washout - cx with GNR thus far. Wound cx on 1/5 with Ecoli, strep, and MRSA, ucx with Ecoli. Renal US with complex L kidney lesion - S/p IR aspiration 1/8 - noted to have purulent drainage, cx with Ecoli thus far.   Repeat KRISS with known abscess - 3.8x3x2.2 cm complex perinephric fluid collection. Discussed risks/benefits of IV vs PO abx upon discharge, pt voiced preference for PO abx.           Recommendations:   -continue vancomycin pharm to dose   -monitor renal fx while on vanc  -continue Ceftriaxone 2g iv daily and PO flagyl for empiric anaerobic coverage  -f/u surgery recs  -EKG ordered for potential FQ therapy             Thank you for your consult. I will follow-up with patient. Please contact us if you have any additional questions. Above d/w primary team.       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 Denies drainage prior to arrival. Lesion kept getting bigger and more painful and then she had fever, so went to hospital. Reports some dysuria. Denies cva tenderness. Denies indwelling hardware. Denies abx allergies.     CT showed  "changes c/w Jeremiah's gangrene in right inguinum, also has findings c/w pyelonephritis and perinephric abscesses on the contralateral side.     Transferred to [facility] for surgical eval    Today 1/7, s/p  IRRIGATION AND DEBRIDEMENT RIGHT GROIN (Right)  IRRIGATION AND DEBRIDEMENT, LOWER EXTREMITY     Per op note, sinus tract found and debrided. Necrosis extended through Miller's fascia down to the external oblique     Wound culture from admit-  Specimen Information: Abdomen; Abscess   0 Result Notes      Component 2 d ago   Aerobic Bacterial Culture      Abnormal   GRAM NEGATIVE EDOUARD  Many  Identification and susceptibility pending  P      Aerobic Bacterial Culture      Abnormal   STREPTOCOCCUS ANGINOSUS  Many  Susceptibility testing not routinely performed  P             OR cultures today - pending    Ucx GNR      Renal US     3 cm complex collection adjacent to the left kidney in keeping with the patient's known perinephric abscess.     On vanc/meropenem/clindamycin    ID consulted for "abx mgt assistance, right sided Jeremiah's gangrene, contralateral pyelo w/ PN abscesses, DM   Interval History: No fevers documented overnight.   Pt states she is feeling well. Pt reports tolerating antibiotics without adverse reactions. Plan for wound vac placement per surgery.         Review of Systems   Constitutional:  Negative for chills and fever.   All other systems reviewed and are negative.    Objective:     Vital Signs (Most Recent):  Temp: 97.7 °F (36.5 °C) (01/11/24 0733)  Pulse: 78 (01/11/24 0733)  Resp: 18 (01/11/24 0733)  BP: (!) 152/69 (01/11/24 0733)  SpO2: 97 % (01/11/24 0733) Vital Signs (24h Range):  Temp:  [97.4 °F (36.3 °C)-98.8 °F (37.1 °C)] 97.7 °F (36.5 °C)  Pulse:  [77-85] 78  Resp:  [18-20] 18  SpO2:  [86 %-97 %] 97 %  BP: (144-177)/(65-81) 152/69     Weight: 105 kg (231 lb 7.7 oz)  Body mass index is 38.52 kg/m².    Estimated Creatinine Clearance: 104.6 mL/min (based on SCr of 0.8 mg/dL).     Physical " Exam  Constitutional:       General: She is not in acute distress.     Appearance: She is not ill-appearing, toxic-appearing or diaphoretic.   Pulmonary:      Effort: Pulmonary effort is normal. No respiratory distress.   Abdominal:      General: There is no distension.      Palpations: Abdomen is soft.      Tenderness: There is no abdominal tenderness. There is no right CVA tenderness or left CVA tenderness.   Genitourinary:     Comments: No purulent drainage seen along R groin wound; packing present  Skin:     General: Skin is warm and dry.   Neurological:      Mental Status: She is alert and oriented to person, place, and time.          Significant Labs:   Microbiology Results (last 7 days)       Procedure Component Value Units Date/Time    Culture, Anaerobe [[id number]] Collected: 01/07/24 0850    Order Status: Completed Specimen: Wound from Groin Updated: 01/11/24 0906     Anaerobic Culture No anaerobes isolated    Narrative:      Right groin tissue    Culture, Anaerobic [[id number]] Collected: 01/08/24 1607    Order Status: Completed Specimen: Body Fluid from Kidney, Left Updated: 01/10/24 1054     Anaerobic Culture Culture in progress    Narrative:      IR aspiration    Culture, Body Fluid (Aerobic) w/ Gram Stain [[id number]]  (Abnormal)  (Susceptibility) Collected: 01/08/24 1607    Order Status: Completed Specimen: Body Fluid from Back Updated: 01/10/24 0830     AEROBIC CULTURE - FLUID ESCHERICHIA COLI  Many       Gram Stain Result Rare WBC's      Few Gram negative rods      Few Gram positive cocci in pairs and chains    Narrative:      IR aspiration of L kidney collection    AFB Culture & Smear [[id number]] Collected: 01/07/24 0850    Order Status: Completed Specimen: Wound from Groin Updated: 01/09/24 2127     AFB Culture & Smear Culture in progress     AFB CULTURE STAIN No acid fast bacilli seen.    Narrative:      Right groin    Aerobic culture [[id number]]  (Abnormal)  (Susceptibility) Collected:  01/07/24 0850    Order Status: Completed Specimen: Wound from Groin Updated: 01/09/24 0746     Aerobic Bacterial Culture ESCHERICHIA COLI  Rare      Narrative:      Right groin    Culture, Anaerobe [[id number]]     Order Status: No result Specimen: Joint Fluid     Aerobic culture [[id number]]     Order Status: No result Specimen: Bone     Gram stain [[id number]] Collected: 01/07/24 0850    Order Status: Completed Specimen: Wound from Groin Updated: 01/07/24 1217     Gram Stain Result Few WBC's      Few Gram negative rods      Few Gram positive cocci in pairs    Narrative:      Right groin    Fungus culture [[id number]] Collected: 01/07/24 0850    Order Status: Sent Specimen: Wound from Groin Updated: 01/07/24 1108            Significant Imaging: I have reviewed all pertinent imaging results/findings within the past 24 hours.

## 2024-01-11 NOTE — SUBJECTIVE & OBJECTIVE
Interval History: NAEO. No significant pain. AF, HDS    Medications:  Continuous Infusions:  Scheduled Meds:   aspirin  81 mg Oral Daily    atorvastatin  80 mg Oral QHS    cefTRIAXone (ROCEPHIN) IVPB  2 g Intravenous Q24H    ezetimibe  10 mg Oral QHS    FLUoxetine  40 mg Oral Daily    fluticasone propionate  2 spray Each Nostril Daily    heparin (porcine)  5,000 Units Subcutaneous Q8H    insulin detemir U-100  20 Units Subcutaneous Daily    lisinopriL  40 mg Oral Daily    metoprolol succinate  100 mg Oral Daily    metroNIDAZOLE  500 mg Oral Q12H    NIFEdipine  60 mg Oral Daily    pantoprazole  40 mg Oral Daily    vancomycin (VANCOCIN) IV (PEDS and ADULTS)  1,500 mg Intravenous Q12H     PRN Meds:acetaminophen, acetaminophen, aluminum-magnesium hydroxide-simethicone, dextrose 10%, dextrose 10%, glucagon (human recombinant), glucose, glucose, HYDROcodone-acetaminophen, insulin aspart U-100, melatonin, morphine, naloxone, prochlorperazine, sodium chloride 0.9%, Pharmacy to dose Vancomycin consult **AND** vancomycin - pharmacy to dose     Review of patient's allergies indicates:  No Known Allergies  Objective:     Vital Signs (Most Recent):  Temp: 98.4 °F (36.9 °C) (01/11/24 1110)  Pulse: 73 (01/11/24 1110)  Resp: 18 (01/11/24 1110)  BP: (!) 158/75 (01/11/24 1110)  SpO2: 96 % (01/11/24 1110) Vital Signs (24h Range):  Temp:  [97.4 °F (36.3 °C)-98.8 °F (37.1 °C)] 98.4 °F (36.9 °C)  Pulse:  [73-85] 73  Resp:  [18-20] 18  SpO2:  [86 %-97 %] 96 %  BP: (144-177)/(65-81) 158/75     Weight: 105 kg (231 lb 7.7 oz)  Body mass index is 38.52 kg/m².    Intake/Output - Last 3 Shifts         01/09 0700  01/10 0659 01/10 0700 01/11 0659 01/11 0700 01/12 0659    P.O. 600 960 240    IV Piggyback 350      Total Intake(mL/kg) 950 (9) 960 (9.1) 240 (2.3)    Net +950 +960 +240           Urine Occurrence 2 x 4 x     Stool Occurrence 1 x 1 x              Physical Exam  Vitals and nursing note reviewed.   Constitutional:       Appearance:  Normal appearance. She is not ill-appearing.   HENT:      Head: Normocephalic.      Mouth/Throat:      Mouth: Mucous membranes are moist.      Pharynx: Oropharynx is clear.   Eyes:      General: No scleral icterus.     Extraocular Movements: Extraocular movements intact.      Conjunctiva/sclera: Conjunctivae normal.   Cardiovascular:      Rate and Rhythm: Normal rate.      Pulses: Normal pulses.   Pulmonary:      Effort: Pulmonary effort is normal. No respiratory distress.      Breath sounds: No wheezing.   Abdominal:      General: Abdomen is flat. Bowel sounds are normal. There is no distension.      Palpations: Abdomen is soft.   Genitourinary:     Comments: Right groin wound with no significant residual necrosis. Wound base appears healthy. No significant discharge. Clean packing in place.   Musculoskeletal:         General: No swelling or tenderness. Normal range of motion.      Cervical back: Normal range of motion.   Skin:     General: Skin is warm and dry.      Coloration: Skin is not jaundiced or pale.   Neurological:      General: No focal deficit present.      Mental Status: She is alert and oriented to person, place, and time.   Psychiatric:         Mood and Affect: Mood normal.          Significant Labs:  I have reviewed all pertinent lab results within the past 24 hours.  CBC:   Recent Labs   Lab 01/11/24  0418   WBC 8.46   RBC 4.15   HGB 11.5*   HCT 36.1*      MCV 87   MCH 27.7   MCHC 31.9*     CMP:   Recent Labs   Lab 01/09/24  0504 01/11/24  0418   * 175*   CALCIUM 8.8 8.9   ALBUMIN 2.2*  --    PROT 7.1  --     139   K 3.3* 3.2*   CO2 25 28    101   BUN 8 6   CREATININE 0.8 0.8   ALKPHOS 93  --    ALT 7*  --    AST 9*  --    BILITOT 0.4  --        Significant Diagnostics:  I have reviewed all pertinent imaging results/findings within the past 24 hours.

## 2024-01-11 NOTE — ASSESSMENT & PLAN NOTE
L Ecoli perinephric abscess    I independently reviewed patient's lab work and images as documented. 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 Work up notable for CT findings with FG in R inguinum and pyelonephritis and perinephric abscess. S/p OR 1/7 for washout - cx with GNR thus far. Wound cx on 1/5 with Ecoli, strep, and MRSA, ucx with Ecoli. Renal US with complex L kidney lesion - S/p IR aspiration 1/8 - noted to have purulent drainage, cx with Ecoli thus far.  Pt reported that her preference for home abx be oral if feasible. Repeat KRISS with known abscess - 3.8x3x2.2 cm complex perinephric fluid collection.           Recommendations:   -continue vancomycin pharm to dose   -monitor renal fx while on vanc  -continue Ceftriaxone 2g iv daily and PO flagyl for empiric anaerobic coverage  -f/u surgery recs  -EKG ordered for potential FQ therapy

## 2024-01-11 NOTE — SUBJECTIVE & OBJECTIVE
Past Medical History:   Diagnosis Date    Cataract     Cervical high risk HPV (human papillomavirus) test positive 2020    NOT 16 OR 18    CHF (congestive heart failure)     CVA (cerebral infarction)          Depression     Diabetes mellitus, type 2     GERD (gastroesophageal reflux disease)     Hyperlipidemia     Hypertension     Lactic acidosis 10/06/2023    Moderate nonproliferative diabetic retinopathy     Nausea and vomiting 10/06/2023    Obesity     Stroke     Tachycardia 10/04/2023       Past Surgical History:   Procedure Laterality Date     SECTION      CHOLECYSTECTOMY      DILATION AND CURETTAGE OF UTERUS      EYE SURGERY Bilateral     laser    GALLBLADDER SURGERY  2017    stone removed    IRRIGATION AND DEBRIDEMENT Right 2024    Procedure: IRRIGATION AND DEBRIDEMENT RIGHT GROIN;  Surgeon: Chalo Padgett MD;  Location: [facility] OR;  Service: General;  Laterality: Right;       Review of patient's allergies indicates:  No Known Allergies    No current facility-administered medications on file prior to encounter.     Current Outpatient Medications on File Prior to Encounter   Medication Sig    aspirin (ECOTRIN) 81 MG EC tablet Take 81 mg by mouth once daily.    atorvastatin (LIPITOR) 80 MG tablet Take 1 tablet (80 mg total) by mouth every evening.    blood sugar diagnostic Strp Use to test blood glucose two (2) times daily as directed with insurance preferred meter and supplies    dulaglutide (TRULICITY) 0.75 mg/0.5 mL pen injector Inject 0.75 mg into the skin every 7 days. OK to discontinue this med during SNF stay - resume upon discharge (Patient taking differently: Inject 0.75 mg into the skin every .)    ergocalciferol (ERGOCALCIFEROL) 50,000 unit Cap Take 50,000 Units by mouth every Saturday.    ezetimibe (ZETIA) 10 mg tablet Take 1 tablet (10 mg total) by mouth once daily. (For cholesterol)    FLUoxetine 40 MG capsule Take 40 mg by mouth once daily.    fluticasone  "propionate (FLONASE) 50 mcg/actuation nasal spray 2 sprays (100 mcg total) by Each Nostril route daily as needed for Allergies or Rhinitis.    furosemide (LASIX) 40 MG tablet Take 1 tablet (40 mg total) by mouth once daily.    ibuprofen (ADVIL,MOTRIN) 200 MG tablet Take 200 mg by mouth every 6 (six) hours as needed for Pain.    insulin (LANTUS SOLOSTAR U-100 INSULIN) glargine 100 units/mL SubQ pen Inject 80 Units into the skin 2 (two) times a day. INJECT 80 UNITS SUBCUTANEOUSLY TWICE DAILY (BULK) Strength: 100 unit/mL (3 mL)    insulin aspart U-100 (NOVOLOG FLEXPEN U-100 INSULIN) 100 unit/mL (3 mL) InPn pen Inject 30 Units into the skin 2 (two) times a day.    lancets (TRUEPLUS LANCETS) 28 gauge Misc Use to test blood glucose two (2) times daily as directed with insurance preferred meter and supplies    LANTUS SOLOSTAR U-100 INSULIN glargine 100 units/mL SubQ pen Inject 80 Units into the skin 2 (two) times a day.    lisinopriL (PRINIVIL,ZESTRIL) 40 MG tablet Take 1 tablet (40 mg total) by mouth once daily.    metFORMIN (GLUCOPHAGE-XR) 500 MG ER 24hr tablet Take 1,000 mg by mouth 2 (two) times daily with meals.    metoprolol succinate (TOPROL-XL) 100 MG 24 hr tablet Take 100 mg by mouth once daily.    NIFEdipine (ADALAT CC) 60 MG TbSR Take 60 mg by mouth every morning.    pantoprazole (PROTONIX) 40 MG tablet Take 40 mg by mouth once daily.    pen needle, diabetic (BD ULTRA-FINE ROSA PEN NEEDLE) 32 gauge x 5/32" Ndle Use with insulin once daily    pen needle, diabetic 32 gauge x 5/32" Ndle Use with injectable DM supplies twice daily as directed    potassium chloride (KLOR-CON) 8 MEQ TbSR TAKE TWO TABLETS BY MOUTH TWICE DAILY @ 9AM & 5PM    TRUE METRIX GO GLUCOSE METER Misc      Family History       Problem Relation (Age of Onset)    Arthritis Father    Asthma Daughter    Cataracts Father    Diabetes Mother, Father    Heart disease Sister    Hypertension Father, Sister    No Known Problems Brother, Maternal Aunt, " Maternal Uncle, Paternal Aunt, Paternal Uncle, Maternal Grandmother, Maternal Grandfather, Paternal Grandmother, Paternal Grandfather    Stroke Mother, Sister    Thyroid disease Mother, Sister, Daughter          Tobacco Use    Smoking status: Every Day     Current packs/day: 0.00     Types: Cigarettes     Last attempt to quit: 2021     Years since quittin.9    Smokeless tobacco: Never   Substance and Sexual Activity    Alcohol use: Yes     Alcohol/week: 3.0 standard drinks of alcohol     Types: 3 Cans of beer per week     Comment: Rare    Drug use: No    Sexual activity: Yes     Partners: Male     Birth control/protection: None     Review of Systems    General: No fevers, weight changes.   HEENT: No visual changes, neck pain, swallowing problems, hoarseness.  CV: No cp/sob, no palpitations, edema.   Pulm: No cough, sob, hemoptysis. No known CESAR.    GI: No n/v/d, no abdominal pain, no melena, hematochezia.   : No problems w/ bladder control, dysuria, hematuria.  Musculoskeletal: No joint pains/stiffness, back pain.   Neuro:  No HA, seizure, focal weakness, falls, dizzy spells.     Objective:     Vital Signs (Most Recent):  Temp: 98.4 °F (36.9 °C) (24 1110)  Pulse: 73 (24 1110)  Resp: 18 (24 1110)  BP: (!) 158/75 (24 1110)  SpO2: 96 % (24 1110) Vital Signs (24h Range):  Temp:  [97.4 °F (36.3 °C)-98.8 °F (37.1 °C)] 98.4 °F (36.9 °C)  Pulse:  [73-85] 73  Resp:  [18-20] 18  SpO2:  [86 %-97 %] 96 %  BP: (144-177)/(65-81) 158/75     Weight: 105 kg (231 lb 7.7 oz)  Body mass index is 38.52 kg/m².     Physical Exam        General:  A&O, NAD  HEENT: neck supple, PERRL/EOMI, EAC clear bilaterally, normal bilateral carotid upstroke w/o bruits, inf turbs clear bilaterally, OC/OP clear  Lungs: CTAB  CV: RRR w/o M/G/R  Abdomen: soft, NTND, no HSM, no bruits/masses/pulsations, massively obese  Ext: no edema  : she has a small incision right mons area, currently w/o any drainage/odor, the  surrounding soft tissue is a bit indurated but not particularly tender to touch, she has a lot of redundant fatty tissue in the area.  There is some erythema.  No CVAT.   Rectal: def  Neuro: NF    Significant Labs: All pertinent labs within the past 24 hours have been reviewed.    Significant Imaging: I have reviewed all pertinent imaging results/findings within the past 24 hours.

## 2024-01-11 NOTE — CONSULTS
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    History:     Past Medical History:   Diagnosis Date    Cataract     Cervical high risk HPV (human papillomavirus) test positive 2020    NOT 16 OR 18    CHF (congestive heart failure)     CVA (cerebral infarction)          Depression     Diabetes mellitus, type 2     GERD (gastroesophageal reflux disease)     Hyperlipidemia     Hypertension     Lactic acidosis 10/06/2023    Moderate nonproliferative diabetic retinopathy     Nausea and vomiting 10/06/2023    Obesity     Stroke     Tachycardia 10/04/2023       Social History     Socioeconomic History    Marital status: Single   Occupational History    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   Tobacco Use    Smoking status: Every Day     Current packs/day: 0.00     Types: Cigarettes     Last attempt to quit: 2021     Years since quittin.9    Smokeless tobacco: Never   Substance and Sexual Activity    Alcohol use: Yes     Alcohol/week: 3.0 standard drinks of alcohol     Types: 3 Cans of beer per week     Comment: Rare    Drug use: No    Sexual activity: Yes     Partners: Male     Birth control/protection: None   Social History Narrative    Daughter - Kavon     Social Determinants of Health     Financial Resource Strain: Low Risk  (10/6/2023)    Overall Financial Resource Strain (CARDIA)     Difficulty of Paying Living Expenses: Not hard at all   Food Insecurity: No Food Insecurity (10/6/2023)    Hunger Vital Sign     Worried About Running Out of Food in the Last Year: Never true     Ran Out of Food in the Last Year: Never true   Transportation Needs: No Transportation Needs (10/6/2023)    PRAPARE - Transportation     Lack of Transportation (Medical): No     Lack of Transportation (Non-Medical): No   Physical Activity: Sufficiently Active (10/6/2023)    Exercise Vital Sign     Days of  Exercise per Week: 3 days     Minutes of Exercise per Session: 140 min   Stress: No Stress Concern Present (10/6/2023)    [employer] - Occupational Stress Questionnaire     Feeling of Stress : Only a little   Social Connections: Unknown (10/6/2023)    Social Connection and Isolation Panel [NHANES]     Frequency of Communication with Friends and Family: More than three times a week     Frequency of Social Gatherings with Friends and Family: More than three times a week     Attends [employer]ist Services: Never     Active Member of Clubs or Organizations: No     Attends Club or Organization Meetings: Never     Marital Status: Patient declined   Housing Stability: Unknown (10/6/2023)    Housing Stability Vital Sign     Unable to Pay for Housing in the Last Year: No     Unstable Housing in the Last Year: No       Precautions:     Allergies as of 01/06/2024    (No Known Allergies)       WOC Assessment Details/Treatment     Active Problem List with Overview Notes    Diagnosis Date Noted    Perinephric abscess 01/08/2024    Groin abscess 01/08/2024    Class 2 severe obesity due to excess calories with serious comorbidity and body mass index (BMI) of 38.0 to 38.9 in adult 01/06/2024    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    Abscess of left kidney 01/06/2024    Chronic combined systolic and diastolic congestive heart failure 01/06/2024    COVID-19 01/06/2024    Dysphagia 10/08/2023    Diabetic ketoacidosis associated with type 2 diabetes mellitus 10/06/2023    Sinus arrhythmia 10/06/2023    Bacteremia 10/05/2023    Bilateral claudication of lower limb 06/27/2023    Chronic diastolic congestive heart failure 05/22/2023    Type 2 diabetes mellitus 05/22/2023    Noncompliance with medications 05/22/2023    GERD (gastroesophageal reflux disease) 05/22/2023    History of CVA (cerebrovascular accident) without residual deficits 05/22/2023    Mild episode of recurrent major depressive disorder  07/09/2021    Abnormal Papanicolaou smear of cervix with positive human papilloma virus (HPV) test 07/09/2021     + HrHPV  - Negative coloposcopy 9/2020          Type 2 diabetes mellitus with both eyes affected by proliferative retinopathy and macular edema, with long-term current use of insulin 05/11/2021    Hypertensive retinopathy, bilateral 02/11/2020    Type 2 diabetes mellitus with other skin ulcer (CODE) 10/19/2019    Abnormal finding of blood chemistry  05/28/2018    Thyroid nodule 11/08/2017     Multinodular goiter  FINAL PATHOLOGIC DIAGNOSIS  Left thyroid, fine-needle aspiration:  [facility] Thyroid Cytology Category: Benign.        Diabetic peripheral neuropathy associated with type 2 diabetes mellitus 03/23/2017    History of cholecystectomy 03/15/2017    Bilateral low back pain without sciatica 09/28/2016    Fatty liver 08/18/2016     encouraged weight loss      Hypertension associated with diabetes 10/23/2015    CESAR (obstructive sleep apnea) 10/23/2015    Class 3 severe obesity due to excess calories with serious comorbidity and body mass index (BMI) of 40.0 to 44.9 in adult 03/19/2015    Hyperlipidemia associated with type 2 diabetes mellitus     Cerebrovascular disease      2003      HTN (hypertension) without retinopathy 03/13/2014      Consult from Surgery for VAC dressing change 1/15-1/16 with black sponge only. If patient is discharged prior to this, can she be set up for wound care clinic?  A 48 year old female admitted 1/6/24 from [facility] for surgical exploration of right perineum. I&D right mons at [facility] before transfer to [facility] for surgery.  1/11 WBC 8.46 Hgb 11.5 Hct 36.1  1/9 Alb 2.2 Weight 231 lbs  1/6 A1C 9.5  On Isoflex mattress; Matty score 19; smoker  1/7 S/P Irrigation and debridement right groin and lower extremity per Dr. Padgett for groin abscess  1/8 S/P CT guided percutaneous posterior approach aspiration of left posterior midpole perinephric fluid collection per   Duran  1/11 KCI VAC placed per Surgery with orders to change dressing 1/15-1/16. Surgery to sign off at this time. Outpatient wound care clinic for VAC dressing changes twice a week.  Assessment:  KCI/3M VAC in place on right mons pubis connected to NPWT at 125 mmHg continuous suction. Scant pink fluid in VAC canister.   Treatment/Plan:  Due to patient having [employer] insurance, will need to arrange for NPWT contracted with provider. Message to Donna Sevilla to investigate if KCI/3M VAC is covered or will need to get [employer] NPWT for discharge home.   Contacted Emiliano Lloyd with [employer] and can check benefits if facesheet and prescriber information is sent to [phone] or [email or web address].   Spoke with staff at [facility] outpatient wound care and unable to do VAC dressing changes. [facility] can follow wound along with HH doing VAC dressing change. Since patient lives in [place] and was transferred from [facility], may be interested in follow up at [facility]'s outpatient wound care clinic.  Treatment plan discussed with patient and patient prefers follow up at [facility].   Nursing/or WOC nurse to change VAC dressing 1/15 (MLK day) or 1/16, if still in hospital.  Recommendations made to primary team. Orders placed.     01/11/2024

## 2024-01-11 NOTE — SUBJECTIVE & OBJECTIVE
Interval History: Denies flank pain. Voiding well.     Review of Systems   Constitutional:  Negative for activity change, appetite change, chills, diaphoresis and fever.   HENT:  Negative for hearing loss.    Eyes:  Negative for visual disturbance.   Respiratory:  Negative for cough, shortness of breath and wheezing.    Gastrointestinal:  Negative for abdominal distention, abdominal pain, constipation, nausea and vomiting.   Genitourinary:  Negative for difficulty urinating, dysuria, flank pain and hematuria.   Musculoskeletal:  Negative for neck pain and neck stiffness.   Neurological:  Negative for dizziness and weakness.     Objective:     Temp:  [97.4 °F (36.3 °C)-98.8 °F (37.1 °C)] 97.7 °F (36.5 °C)  Pulse:  [77-85] 78  Resp:  [18-20] 18  SpO2:  [86 %-97 %] 97 %  BP: (144-177)/(65-81) 152/69     Body mass index is 38.52 kg/m².    Date 01/11/24 0700 - 01/12/24 0659   Shift 7390-4134 4842-0442 1067-8629 24 Hour Total   INTAKE   P.O. 240   240   Shift Total(mL/kg) 240(2.3)   240(2.3)   OUTPUT   Shift Total(mL/kg)       Weight (kg) 105 105 105 105          Drains       None                    Physical Exam  Constitutional:       Appearance: She is well-developed.   HENT:      Head: Normocephalic and atraumatic.   Eyes:      Conjunctiva/sclera: Conjunctivae normal.   Pulmonary:      Effort: Pulmonary effort is normal. No respiratory distress.   Abdominal:      General: There is no distension.      Palpations: Abdomen is soft. There is no mass.      Tenderness: There is no right CVA tenderness or left CVA tenderness.   Skin:     General: Skin is warm.      Findings: No rash.   Neurological:      Mental Status: She is alert and oriented to person, place, and time.   Psychiatric:         Behavior: Behavior normal.           Significant Labs:    BMP:  Recent Labs   Lab 01/08/24  0613 01/09/24  0504 01/11/24  0418   * 136 139   K 3.4* 3.3* 3.2*    102 101   CO2 23 25 28   BUN 10 8 6   CREATININE 0.9 0.8 0.8    CALCIUM 8.3* 8.8 8.9       CBC:   Recent Labs   Lab 01/08/24  0613 01/09/24  0504 01/11/24  0418   WBC 11.43 9.06 8.46   HGB 10.2* 10.4* 11.5*   HCT 30.9* 32.5* 36.1*    281 352       Blood Culture:   Recent Labs   Lab 01/05/24  2106   LABBLOO No growth after 5 days.     Urine Culture:   Recent Labs   Lab 01/05/24  1818   LABURIN ESCHERICHIA COLI  >100,000 cfu/ml  *     Urine Studies:   Recent Labs   Lab 01/05/24  1818   COLORU Colorless*   APPEARANCEUA Clear   PHUR 7.0   SPECGRAV 1.020   PROTEINUA 1+*   GLUCUA 4+*   KETONESU Negative   BILIRUBINUA Negative   OCCULTUA 1+*   NITRITE Negative   UROBILINOGEN Negative   LEUKOCYTESUR 1+*   RBCUA 3   WBCUA 15*   BACTERIA Rare   HYALINECASTS 0       Significant Imaging:  All pertinent imaging results/findings from the past 24 hours have been reviewed.

## 2024-01-11 NOTE — PLAN OF CARE
Problem: Adult Inpatient Plan of Care  Goal: Plan of Care Review  Outcome: Ongoing, Progressing  Goal: Patient-Specific Goal (Individualized)  Outcome: Ongoing, Progressing  Goal: Absence of Hospital-Acquired Illness or Injury  Outcome: Ongoing, Progressing  Goal: Optimal Comfort and Wellbeing  Outcome: Ongoing, Progressing     Problem: Bariatric Environmental Safety  Goal: Safety Maintained with Care  Outcome: Ongoing, Progressing     Problem: Impaired Wound Healing  Goal: Optimal Wound Healing  Outcome: Ongoing, Progressing     Problem: Skin Injury Risk Increased  Goal: Skin Health and Integrity  Outcome: Ongoing, Progressing     POC reviewed with patient. All questions and concerns addressed. Fall/safety precautions implemented and maintained. No acute events noted this shift. Please see flowsheet for full assessment and vitals. Bed locked in lowest position. Side rails up x2. Call bell within reach.       Patient calls and leaves a message on office voicemail stating that she has had to take off of work for the last 4 days due to anxiety. She stated that her employer is requesting an excuse from work letter from Dr. Rinaldi.   Call was placed to patient to gather more information. Message was left on patient's voicemail stating that a message would be sent to Dr. Rinaldi. Since he is out of the office today, the letter wouldn't be ready until tomorrow at the earliest. She was encouraged to return call if she had other questions or concerns.

## 2024-01-11 NOTE — PROGRESS NOTES
[facility] Surg  Urology  Progress Note    Patient Name: Jaimee Quintana  MRN: [id number]  Admission Date: 1/6/2024  Hospital Length of Stay: 4 days  Code Status: Full Code   Attending Provider: Campbell Liriano, *   Primary Care Physician: Donald Acosta MD    Subjective:     HPI:  Perinephric Abscess  Jaimee Quintana is a 48 y.o. woman transferred here from [place] due to necrotizing fasciitis of the right groin.  CT scan at the time of presentation also showed to left perinephric fluid collections with stranding.  Patient denies flank pain currently.  She was vaguely aware of an issue with the kidney but did not know the details.  She denies dysuria or hematuria.  She has been febrile.    Interval History: doing well, denies flank pain, no acute events    Review of Systems   Constitutional:  Negative for activity change, appetite change, chills, diaphoresis and fever.   HENT:  Negative for hearing loss.    Eyes:  Negative for visual disturbance.   Respiratory:  Negative for cough, shortness of breath and wheezing.    Gastrointestinal:  Negative for abdominal distention, abdominal pain, constipation, nausea and vomiting.   Genitourinary:  Negative for difficulty urinating, dysuria, flank pain and hematuria.   Musculoskeletal:  Negative for neck pain and neck stiffness.   Neurological:  Negative for dizziness and weakness.     Objective:     Temp:  [97.7 °F (36.5 °C)-98.8 °F (37.1 °C)] 98.8 °F (37.1 °C)  Pulse:  [77-93] 77  Resp:  [16-20] 19  SpO2:  [94 %-97 %] 94 %  BP: (139-162)/(63-79) 155/73     Body mass index is 38.52 kg/m².    Date 01/10/24 0700 - 01/11/24 0659   Shift 5251-8412 7062-8629 6083-5719 24 Hour Total   INTAKE   P.O. 480 240  720   Shift Total(mL/kg) 480(4.6) 240(2.3)  720(6.9)   OUTPUT   Shift Total(mL/kg)       Weight (kg) 105 105 105 105          Drains       None                    Physical Exam  Constitutional:       Appearance: She is well-developed.   HENT:      Head: Normocephalic  and atraumatic.   Eyes:      Conjunctiva/sclera: Conjunctivae normal.   Pulmonary:      Effort: Pulmonary effort is normal. No respiratory distress.   Abdominal:      General: Abdomen is flat. There is no distension.      Palpations: Abdomen is soft. There is no mass.   Skin:     General: Skin is warm.      Findings: No rash.   Neurological:      Mental Status: She is alert and oriented to person, place, and time.   Psychiatric:         Behavior: Behavior normal.           Significant Labs:    BMP:  Recent Labs   Lab 01/07/24  0519 01/08/24  0613 01/09/24  0504   * 134* 136   K 3.0* 3.4* 3.3*    100 102   CO2 23 23 25   BUN 9 10 8   CREATININE 0.9 0.9 0.8   CALCIUM 8.6* 8.3* 8.8       CBC:   Recent Labs   Lab 01/07/24  0519 01/08/24  0613 01/09/24  0504   WBC 14.84* 11.43 9.06   HGB 10.1* 10.2* 10.4*   HCT 31.2* 30.9* 32.5*    247 281         KRISS- similar fluid collection, no increased size or new loculations      Assessment/Plan:     Abscess of left kidney  S/p aspiration by IR  KRISS demonstrates stability  Follow cultures treat with 2 weeks of antibiotics  Antibiotics per ID recs        VTE Risk Mitigation (From admission, onward)           Ordered     heparin (porcine) injection 5,000 Units  Every 8 hours         01/06/24 1628     IP VTE HIGH RISK PATIENT  Once         01/06/24 1628     Place sequential compression device  Until discontinued         01/06/24 1628                    Emmett Mei MD  Urology  [facility]

## 2024-01-11 NOTE — PROGRESS NOTES
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  Pt w/o current pain, f/c/n/v, flank pain etc - she was under impression she was sent here for "stomach surgery", we talked to her daughter and 2 of her sisters on the phone, separately, at her request, as well as Dr Snyder who called to talk to her preop, at the bedside, to clear this up.  Appreciate GS attention to pt, asking  to see in AM as well as we appear to have 2 separate processes here, she may need intervention from  as well although most likely will just need to treat the infection and reimage kidneys at some point.  She had I&D at bedside of right [facility] area in [place], presume material was sent for culture.  Incidentally pt was ill w/ COVID 19 12/18, we were advised on transfer of this, she seems to have fully recovered.  Per pt she was tested again at [facility] and was negative, and Epic prompted me "pt has negative COVID test" - but, we were not able to confirm this result, so we did another rapid which was negative.  I also have requested EKG and CXR preop in this pt w/ CHF, morbid obesity.    Pt is admitted to  services, and Dr Snyder scheduling for OR in AM.      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  Pt w/o current pain, f/c/n/v, flank pain etc - she was under impression she was sent here for "stomach surgery", we talked to her daughter and 2 of her sisters on the phone, separately, at her request, as well as Dr Snyder who called to talk to her preop, at the bedside, to clear this up.  Appreciate GS attention to pt, asking  to see in AM as well as we appear to have 2 separate processes here, she may need intervention from  as well although most likely will just need to treat the infection and reimage kidneys at some point.  She had I&D at bedside of right mons area in [place], presume material was sent for culture.  Incidentally pt was ill w/ COVID 19 12/18, we were advised on transfer of this, she seems to have fully recovered.  Per pt she was tested again at [facility] and was negative, and Epic prompted me "pt has negative COVID test" - but, we were not able to confirm this result, so we did another rapid which was negative.  I also have requested EKG and CXR preop in this pt w/ CHF, morbid obesity.    Pt is admitted to  services,surgery is consulted.  S/P I&D  and irrigation and debridement rightly groin and right leg,  S/P drainage of left perinephritic abscess by IR ,  Culture growing staph,strep,Ecoli,per ID on Vanc. And Rocephin,  Urology did another US to check size of kidney.remains same size.  Surgery planing placing wound vac,.    Past Medical History:   Diagnosis Date    Cataract     Cervical high risk HPV (human papillomavirus) test positive 09/17/2020    NOT 16 OR 18    CHF (congestive heart failure)     CVA (cerebral infarction) 2003         Depression     Diabetes mellitus, type 2     GERD (gastroesophageal reflux disease)     Hyperlipidemia     Hypertension     Lactic acidosis 10/06/2023    Moderate nonproliferative diabetic " retinopathy     Nausea and vomiting 10/06/2023    Obesity     Stroke     Tachycardia 10/04/2023       Past Surgical History:   Procedure Laterality Date     SECTION      CHOLECYSTECTOMY      DILATION AND CURETTAGE OF UTERUS      EYE SURGERY Bilateral     laser    GALLBLADDER SURGERY  2017    stone removed    IRRIGATION AND DEBRIDEMENT Right 2024    Procedure: IRRIGATION AND DEBRIDEMENT RIGHT GROIN;  Surgeon: Chalo Padgett MD;  Location: [facility];  Service: General;  Laterality: Right;       Review of patient's allergies indicates:  No Known Allergies    No current facility-administered medications on file prior to encounter.     Current Outpatient Medications on File Prior to Encounter   Medication Sig    aspirin (ECOTRIN) 81 MG EC tablet Take 81 mg by mouth once daily.    atorvastatin (LIPITOR) 80 MG tablet Take 1 tablet (80 mg total) by mouth every evening.    blood sugar diagnostic Strp Use to test blood glucose two (2) times daily as directed with insurance preferred meter and supplies    dulaglutide (TRULICITY) 0.75 mg/0.5 mL pen injector Inject 0.75 mg into the skin every 7 days. OK to discontinue this med during SNF stay - resume upon discharge (Patient taking differently: Inject 0.75 mg into the skin every .)    ergocalciferol (ERGOCALCIFEROL) 50,000 unit Cap Take 50,000 Units by mouth every Saturday.    ezetimibe (ZETIA) 10 mg tablet Take 1 tablet (10 mg total) by mouth once daily. (For cholesterol)    FLUoxetine 40 MG capsule Take 40 mg by mouth once daily.    fluticasone propionate (FLONASE) 50 mcg/actuation nasal spray 2 sprays (100 mcg total) by Each Nostril route daily as needed for Allergies or Rhinitis.    furosemide (LASIX) 40 MG tablet Take 1 tablet (40 mg total) by mouth once daily.    ibuprofen (ADVIL,MOTRIN) 200 MG tablet Take 200 mg by mouth every 6 (six) hours as needed for Pain.    insulin (LANTUS SOLOSTAR U-100 INSULIN) glargine 100 units/mL SubQ pen Inject 80 Units  "into the skin 2 (two) times a day. INJECT 80 UNITS SUBCUTANEOUSLY TWICE DAILY (BULK) Strength: 100 unit/mL (3 mL)    insulin aspart U-100 (NOVOLOG FLEXPEN U-100 INSULIN) 100 unit/mL (3 mL) InPn pen Inject 30 Units into the skin 2 (two) times a day.    lancets (TRUEPLUS LANCETS) 28 gauge Misc Use to test blood glucose two (2) times daily as directed with insurance preferred meter and supplies    LANTUS SOLOSTAR U-100 INSULIN glargine 100 units/mL SubQ pen Inject 80 Units into the skin 2 (two) times a day.    lisinopriL (PRINIVIL,ZESTRIL) 40 MG tablet Take 1 tablet (40 mg total) by mouth once daily.    metFORMIN (GLUCOPHAGE-XR) 500 MG ER 24hr tablet Take 1,000 mg by mouth 2 (two) times daily with meals.    metoprolol succinate (TOPROL-XL) 100 MG 24 hr tablet Take 100 mg by mouth once daily.    NIFEdipine (ADALAT CC) 60 MG TbSR Take 60 mg by mouth every morning.    pantoprazole (PROTONIX) 40 MG tablet Take 40 mg by mouth once daily.    pen needle, diabetic (BD ULTRA-FINE ROSA PEN NEEDLE) 32 gauge x 5/32" Ndle Use with insulin once daily    pen needle, diabetic 32 gauge x 5/32" Ndle Use with injectable DM supplies twice daily as directed    potassium chloride (KLOR-CON) 8 MEQ TbSR TAKE TWO TABLETS BY MOUTH TWICE DAILY @ 9AM & 5PM    TRUE METRIX GO GLUCOSE METER Misc      Family History       Problem Relation (Age of Onset)    Arthritis Father    Asthma Daughter    Cataracts Father    Diabetes Mother, Father    Heart disease Sister    Hypertension Father, Sister    No Known Problems Brother, Maternal Aunt, Maternal Uncle, Paternal Aunt, Paternal Uncle, Maternal Grandmother, Maternal Grandfather, Paternal Grandmother, Paternal Grandfather    Stroke Mother, Sister    Thyroid disease Mother, Sister, Daughter          Tobacco Use    Smoking status: Every Day     Current packs/day: 0.00     Types: Cigarettes     Last attempt to quit: 2021     Years since quittin.9    Smokeless tobacco: Never   Substance and Sexual " Activity    Alcohol use: Yes     Alcohol/week: 3.0 standard drinks of alcohol     Types: 3 Cans of beer per week     Comment: Rare    Drug use: No    Sexual activity: Yes     Partners: Male     Birth control/protection: None     Review of Systems    General: No fevers, weight changes.   HEENT: No visual changes, neck pain, swallowing problems, hoarseness.  CV: No cp/sob, no palpitations, edema.   Pulm: No cough, sob, hemoptysis. No known CESAR.    GI: No n/v/d, no abdominal pain, no melena, hematochezia.   : No problems w/ bladder control, dysuria, hematuria.  Musculoskeletal: No joint pains/stiffness, back pain.   Neuro:  No HA, seizure, focal weakness, falls, dizzy spells.     Objective:     Vital Signs (Most Recent):  Temp: 98.4 °F (36.9 °C) (01/11/24 1110)  Pulse: 73 (01/11/24 1110)  Resp: 18 (01/11/24 1110)  BP: (!) 158/75 (01/11/24 1110)  SpO2: 96 % (01/11/24 1110) Vital Signs (24h Range):  Temp:  [97.4 °F (36.3 °C)-98.8 °F (37.1 °C)] 98.4 °F (36.9 °C)  Pulse:  [73-85] 73  Resp:  [18-20] 18  SpO2:  [86 %-97 %] 96 %  BP: (144-177)/(65-81) 158/75     Weight: 105 kg (231 lb 7.7 oz)  Body mass index is 38.52 kg/m².     Physical Exam        General:  A&O, NAD  HEENT: neck supple, PERRL/EOMI, EAC clear bilaterally, normal bilateral carotid upstroke w/o bruits, inf turbs clear bilaterally, OC/OP clear  Lungs: CTAB  CV: RRR w/o M/G/R  Abdomen: soft, NTND, no HSM, no bruits/masses/pulsations, massively obese  Ext: no edema  : she has a small incision right mons area, currently w/o any drainage/odor, the surrounding soft tissue is a bit indurated but not particularly tender to touch, she has a lot of redundant fatty tissue in the area.  There is some erythema.  No CVAT.   Rectal: def  Neuro: NF    Significant Labs: All pertinent labs within the past 24 hours have been reviewed.    Significant Imaging: I have reviewed all pertinent imaging results/findings within the past 24 hours.    Assessment/Plan:      * Natasha  gangrene in female  S/p bedside I&D w/ culture of material in ED at [facility]  Dr Padgett assessed pt and had planned to bring to OR here this afternoon, Dr Snyder is covering for him and has pt scheduled for tomorrow AM  I am comfortable w/ this plan as pt appears to be very stable and this process does not appear fulminant  Not septic but is at risk  Close monitoring, IVF, abx  BCX, urine Cx done at [facility] ED.    S/P I&D  and irrigation and debridement rightly groin and right leg,on 1.7.24,c  Culture growing staph,\MRSA strep,Ecoli,per ID on Vanc. And rocephin.  Plan for wound vac placement.    Surgery planing placing wound vac,.    Perinephric abscess  Repeat US show stable size,S/P drainage ,culture grow Ecoli.      COVID-19  Pt indicates she was acutely ill w/ COVID in mid Dec  We were not able to confirm a negative test at [facility], so we got a rapid here, it's negative  She is w/o f/c/cough/SOB  No indication for treatment or isolation     Chronic combined systolic and diastolic congestive heart failure  Pt w/ combined CHF, not sure if there is ischemic component  Appears to be well compensated  Should undergo OPT assessment for CESAR  Will need cardiology f/u  CXR obtained preop, lungs clear, pt w/o CANELA/SOB/CP    Latest ECHO performed and demonstrates- Results for orders placed during the hospital encounter of 10/04/23    Echo    Interpretation Summary    Left Ventricle: The left ventricle is normal in size. There is concentric hypertrophy. Mild global hypokinesis present. There is moderately reduced systolic function with a visually estimated ejection fraction of 35 - 40%. Grade II diastolic dysfunction.    Right Ventricle: Normal right ventricular cavity size. Wall thickness is normal. Right ventricle wall motion  is normal. Systolic function is normal.    Tricuspid Valve: There is mild regurgitation.    Pulmonary Artery: The estimated pulmonary artery systolic pressure is 26 mmHg.    IVC/SVC: Normal venous pressure  at 3 mmHg.      Abscess of left kidney  Seen on CT along w/ perinephric abscesses not seen on u/s  Pt is clinically w/ minimal issues - no dysuria, back/flank pain, f/c/n/v  BCX done in [facility] ED  U/a not suggestive of any substantial UTI but the abscesses may not be communicating w/ the collecting system  Given Zosyn and vancomycin in ED  I had started Zosyn and IV doxy here empirically when I wrote admit orders but given review of all information will start meropenem, vancomycin, and clindamycin  Might need ID input  Suprisingly she does not even meet SIRS criteria at this time  Culture growing staph,strep,Ecoli,per ID on Vanc. And Rocephin.  Urology did  another US to check size of kidney.remains stable.  ,.      Class 2 severe obesity due to excess calories with serious comorbidity and body mass index (BMI) of 38.0 to 38.9 in adult  Body mass index is 38.52 kg/m².  Morbid obesity complicates all aspects of disease management from diagnostic modalities to treatment  Weight loss encouraged and health benefits explained to patient        History of CVA (cerebrovascular accident) without residual deficits  Apparently w/o sequelae  Close OPT f/u  Underlying untreated CESAR would increase risk of recurrence        Type 2 diabetes mellitus with both eyes affected by proliferative retinopathy and macular edema, with long-term current use of insulin  A1c 9.5%  SSI      CESAR (obstructive sleep apnea)  Pt denies having CESAR but this record is in her chart  Any recent PSG?  She certainly appears at risk  OPT f/u will be needed  Observe in recovery from surgery, caution w/ narcotics postop        Hyperlipidemia associated with type 2 diabetes mellitus  Home meds as appropriate  Does pt have CAD?      HTN (hypertension) without retinopathy  Home meds as appropriate  Pt is very non toxic appearing and currently w/ normal to high BP      VTE Risk Mitigation (From admission, onward)           Ordered     heparin (porcine) injection  5,000 Units  Every 8 hours         01/06/24 1628     IP VTE HIGH RISK PATIENT  Once         01/06/24 1628     Place sequential compression device  Until discontinued         01/06/24 1628                    Discharge Planning   MALGORZATA:      Code Status: Full Code   Is the patient medically ready for discharge?:     Reason for patient still in hospital (select all that apply): Patient trending condition  Discharge Plan A: Home with family                  Campbell Liriano MD  Department of Hospital Medicine   [facility]

## 2024-01-11 NOTE — ASSESSMENT & PLAN NOTE
48-year-old female with a past medical history of uncontrolled type 2 diabetes mellitus, hypertension, and obesity who presented to outside hospital with right-sided groin wound.  Transferred to [facility] for surgical options.      - Antimicrobial regimen per ID team.   - wound vac placed without issue, good seal  - home vac form completed, discussed with    - wound care consulted for future vac changes if she remains inpatient  - will need outpatient wound care clinic f/u for vac changes twice weekly  - surgery will sign off at this time, please reach back out with further questions or concerns regarding the care of this patient

## 2024-01-11 NOTE — ASSESSMENT & PLAN NOTE
S/p aspiration by IR  KRISS demonstrates stability  Follow cultures treat with 2 weeks of antibiotics  Antibiotics per ID recs

## 2024-01-11 NOTE — NURSING
[facility], [facility] [place]  Nurses Note -- 4 Eyes      1-10-24      Skin assessed on: Q Shift      [x] No Pressure Injuries Present    []Prevention Measures Documented    [] Yes LDA  for Pressure Injury Previously documented     [] Yes New Pressure Injury Discovered   [] LDA for New Pressure Injury Added      Attending RN:  Yolanda Trevino, RN     Second RN:  Saira Chan RN

## 2024-01-11 NOTE — PROGRESS NOTES
[facility] [place] - Med Surg  General Surgery  Progress Note    Subjective:     History of Present Illness:  48-year-old female with a past medical history of uncontrolled type 2 diabetes mellitus and obesity who presented to outside hospital with right groin pain.  Found to have complex soft tissue infection of the right groin/perineum.  Bedside I and D performed.  General surgery was consulted at outside facility, but due to operative restrained she was transferred to [facility] for further care.  Upon evaluation she was afebrile, but slightly tachycardic.  Broad-spectrum antibiotics has been initiated at outside hospital.  Plan for operative debridement today in the OR.    Post-Op Info:  Procedure(s) (LRB):  IRRIGATION AND DEBRIDEMENT RIGHT GROIN (Right)   4 Days Post-Op     Interval History: NAEO. No significant pain. AF, HDS    Medications:  Continuous Infusions:  Scheduled Meds:   aspirin  81 mg Oral Daily    atorvastatin  80 mg Oral QHS    cefTRIAXone (ROCEPHIN) IVPB  2 g Intravenous Q24H    ezetimibe  10 mg Oral QHS    FLUoxetine  40 mg Oral Daily    fluticasone propionate  2 spray Each Nostril Daily    heparin (porcine)  5,000 Units Subcutaneous Q8H    insulin detemir U-100  20 Units Subcutaneous Daily    lisinopriL  40 mg Oral Daily    metoprolol succinate  100 mg Oral Daily    metroNIDAZOLE  500 mg Oral Q12H    NIFEdipine  60 mg Oral Daily    pantoprazole  40 mg Oral Daily    vancomycin (VANCOCIN) IV (PEDS and ADULTS)  1,500 mg Intravenous Q12H     PRN Meds:acetaminophen, acetaminophen, aluminum-magnesium hydroxide-simethicone, dextrose 10%, dextrose 10%, glucagon (human recombinant), glucose, glucose, HYDROcodone-acetaminophen, insulin aspart U-100, melatonin, morphine, naloxone, prochlorperazine, sodium chloride 0.9%, Pharmacy to dose Vancomycin consult **AND** vancomycin - pharmacy to dose     Review of patient's allergies indicates:  No Known Allergies  Objective:     Vital Signs (Most Recent):  Temp:  98.4 °F (36.9 °C) (01/11/24 1110)  Pulse: 73 (01/11/24 1110)  Resp: 18 (01/11/24 1110)  BP: (!) 158/75 (01/11/24 1110)  SpO2: 96 % (01/11/24 1110) Vital Signs (24h Range):  Temp:  [97.4 °F (36.3 °C)-98.8 °F (37.1 °C)] 98.4 °F (36.9 °C)  Pulse:  [73-85] 73  Resp:  [18-20] 18  SpO2:  [86 %-97 %] 96 %  BP: (144-177)/(65-81) 158/75     Weight: 105 kg (231 lb 7.7 oz)  Body mass index is 38.52 kg/m².    Intake/Output - Last 3 Shifts         01/09 0700  01/10 0659 01/10 0700  01/11 0659 01/11 0700  01/12 0659    P.O. 600 960 240    IV Piggyback 350      Total Intake(mL/kg) 950 (9) 960 (9.1) 240 (2.3)    Net +950 +960 +240           Urine Occurrence 2 x 4 x     Stool Occurrence 1 x 1 x              Physical Exam  Vitals and nursing note reviewed.   Constitutional:       Appearance: Normal appearance. She is not ill-appearing.   HENT:      Head: Normocephalic.      Mouth/Throat:      Mouth: Mucous membranes are moist.      Pharynx: Oropharynx is clear.   Eyes:      General: No scleral icterus.     Extraocular Movements: Extraocular movements intact.      Conjunctiva/sclera: Conjunctivae normal.   Cardiovascular:      Rate and Rhythm: Normal rate.      Pulses: Normal pulses.   Pulmonary:      Effort: Pulmonary effort is normal. No respiratory distress.      Breath sounds: No wheezing.   Abdominal:      General: Abdomen is flat. Bowel sounds are normal. There is no distension.      Palpations: Abdomen is soft.   Genitourinary:     Comments: Right groin wound with no significant residual necrosis. Wound base appears healthy. No significant discharge. Clean packing in place.   Musculoskeletal:         General: No swelling or tenderness. Normal range of motion.      Cervical back: Normal range of motion.   Skin:     General: Skin is warm and dry.      Coloration: Skin is not jaundiced or pale.   Neurological:      General: No focal deficit present.      Mental Status: She is alert and oriented to person, place, and time.    Psychiatric:         Mood and Affect: Mood normal.          Significant Labs:  I have reviewed all pertinent lab results within the past 24 hours.  CBC:   Recent Labs   Lab 01/11/24  0418   WBC 8.46   RBC 4.15   HGB 11.5*   HCT 36.1*      MCV 87   MCH 27.7   MCHC 31.9*     CMP:   Recent Labs   Lab 01/09/24  0504 01/11/24  0418   * 175*   CALCIUM 8.8 8.9   ALBUMIN 2.2*  --    PROT 7.1  --     139   K 3.3* 3.2*   CO2 25 28    101   BUN 8 6   CREATININE 0.8 0.8   ALKPHOS 93  --    ALT 7*  --    AST 9*  --    BILITOT 0.4  --        Significant Diagnostics:  I have reviewed all pertinent imaging results/findings within the past 24 hours.  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  Transferred to [facility] for surgical options.      - Antimicrobial regimen per ID team.   - wound vac placed without issue, good seal  - home vac form completed, discussed with    - wound care consulted for future vac changes if she remains inpatient  - will need outpatient wound care clinic f/u for vac changes twice weekly  - surgery will sign off at this time, please reach back out with further questions or concerns regarding the care of this patient        Juan Carlos La MD  General Surgery  [facility] - Med Surg

## 2024-01-11 NOTE — PROGRESS NOTES
[facility] Surg  Urology  Progress Note    Patient Name: Jaimee Quintana  MRN: [id number]  Admission Date: 1/6/2024  Hospital Length of Stay: 5 days  Code Status: Full Code   Attending Provider: Campbell Liriano, *   Primary Care Physician: Donald Acosta MD    Subjective:     HPI:  Perinephric Abscess  Jaimee Quintana is a 48 y.o. woman transferred here from [place] due to necrotizing fasciitis of the right groin.  CT scan at the time of presentation also showed to left perinephric fluid collections with stranding.  Patient denies flank pain currently.  She was vaguely aware of an issue with the kidney but did not know the details.  She denies dysuria or hematuria.  She has been febrile.    Interval History: Denies flank pain. Voiding well.     Review of Systems   Constitutional:  Negative for activity change, appetite change, chills, diaphoresis and fever.   HENT:  Negative for hearing loss.    Eyes:  Negative for visual disturbance.   Respiratory:  Negative for cough, shortness of breath and wheezing.    Gastrointestinal:  Negative for abdominal distention, abdominal pain, constipation, nausea and vomiting.   Genitourinary:  Negative for difficulty urinating, dysuria, flank pain and hematuria.   Musculoskeletal:  Negative for neck pain and neck stiffness.   Neurological:  Negative for dizziness and weakness.     Objective:     Temp:  [97.4 °F (36.3 °C)-98.8 °F (37.1 °C)] 97.7 °F (36.5 °C)  Pulse:  [77-85] 78  Resp:  [18-20] 18  SpO2:  [86 %-97 %] 97 %  BP: (144-177)/(65-81) 152/69     Body mass index is 38.52 kg/m².    Date 01/11/24 0700 - 01/12/24 0659   Shift 0995-6073 8938-1148 7248-2736 24 Hour Total   INTAKE   P.O. 240   240   Shift Total(mL/kg) 240(2.3)   240(2.3)   OUTPUT   Shift Total(mL/kg)       Weight (kg) 105 105 105 105          Drains       None                    Physical Exam  Constitutional:       Appearance: She is well-developed.   HENT:      Head: Normocephalic and atraumatic.   Eyes:       Conjunctiva/sclera: Conjunctivae normal.   Pulmonary:      Effort: Pulmonary effort is normal. No respiratory distress.   Abdominal:      General: There is no distension.      Palpations: Abdomen is soft. There is no mass.      Tenderness: There is no right CVA tenderness or left CVA tenderness.   Skin:     General: Skin is warm.      Findings: No rash.   Neurological:      Mental Status: She is alert and oriented to person, place, and time.   Psychiatric:         Behavior: Behavior normal.           Significant Labs:    BMP:  Recent Labs   Lab 01/08/24  0613 01/09/24  0504 01/11/24  0418   * 136 139   K 3.4* 3.3* 3.2*    102 101   CO2 23 25 28   BUN 10 8 6   CREATININE 0.9 0.8 0.8   CALCIUM 8.3* 8.8 8.9       CBC:   Recent Labs   Lab 01/08/24  0613 01/09/24  0504 01/11/24  0418   WBC 11.43 9.06 8.46   HGB 10.2* 10.4* 11.5*   HCT 30.9* 32.5* 36.1*    281 352       Blood Culture:   Recent Labs   Lab 01/05/24  2106   LABBLOO No growth after 5 days.     Urine Culture:   Recent Labs   Lab 01/05/24  1818   LABURIN ESCHERICHIA COLI  >100,000 cfu/ml  *     Urine Studies:   Recent Labs   Lab 01/05/24  1818   COLORU Colorless*   APPEARANCEUA Clear   PHUR 7.0   SPECGRAV 1.020   PROTEINUA 1+*   GLUCUA 4+*   KETONESU Negative   BILIRUBINUA Negative   OCCULTUA 1+*   NITRITE Negative   UROBILINOGEN Negative   LEUKOCYTESUR 1+*   RBCUA 3   WBCUA 15*   BACTERIA Rare   HYALINECASTS 0       Significant Imaging:  All pertinent imaging results/findings from the past 24 hours have been reviewed.                  Assessment/Plan:     Abscess of left kidney  S/p aspiration by IR  KRISS demonstrates stability  Follow cultures treat with 2 weeks of antibiotics  Antibiotics per ID recs        VTE Risk Mitigation (From admission, onward)           Ordered     heparin (porcine) injection 5,000 Units  Every 8 hours         01/06/24 1628     IP VTE HIGH RISK PATIENT  Once         01/06/24 1628     Place sequential  compression device  Until discontinued         01/06/24 8922                    Yoly Santana MD  Urology  [facility] Surg

## 2024-01-11 NOTE — SUBJECTIVE & OBJECTIVE
Interval History: No fevers documented overnight.   Pt states she is feeling well. Pt reports tolerating antibiotics without adverse reactions. Plan for wound vac placement per surgery.         Review of Systems   Constitutional:  Negative for chills and fever.   All other systems reviewed and are negative.    Objective:     Vital Signs (Most Recent):  Temp: 97.7 °F (36.5 °C) (01/11/24 0733)  Pulse: 78 (01/11/24 0733)  Resp: 18 (01/11/24 0733)  BP: (!) 152/69 (01/11/24 0733)  SpO2: 97 % (01/11/24 0733) Vital Signs (24h Range):  Temp:  [97.4 °F (36.3 °C)-98.8 °F (37.1 °C)] 97.7 °F (36.5 °C)  Pulse:  [77-85] 78  Resp:  [18-20] 18  SpO2:  [86 %-97 %] 97 %  BP: (144-177)/(65-81) 152/69     Weight: 105 kg (231 lb 7.7 oz)  Body mass index is 38.52 kg/m².    Estimated Creatinine Clearance: 104.6 mL/min (based on SCr of 0.8 mg/dL).     Physical Exam  Constitutional:       General: She is not in acute distress.     Appearance: She is not ill-appearing, toxic-appearing or diaphoretic.   Pulmonary:      Effort: Pulmonary effort is normal. No respiratory distress.   Abdominal:      General: There is no distension.      Palpations: Abdomen is soft.      Tenderness: There is no abdominal tenderness. There is no right CVA tenderness or left CVA tenderness.   Genitourinary:     Comments: No purulent drainage seen along R groin wound; packing present  Skin:     General: Skin is warm and dry.   Neurological:      Mental Status: She is alert and oriented to person, place, and time.          Significant Labs:   Microbiology Results (last 7 days)       Procedure Component Value Units Date/Time    Culture, Anaerobe [[id number]] Collected: 01/07/24 0850    Order Status: Completed Specimen: Wound from Groin Updated: 01/11/24 0906     Anaerobic Culture No anaerobes isolated    Narrative:      Right groin tissue    Culture, Anaerobic [[id number]] Collected: 01/08/24 1607    Order Status: Completed Specimen: Body Fluid from Kidney, Left  Updated: 01/10/24 1054     Anaerobic Culture Culture in progress    Narrative:      IR aspiration    Culture, Body Fluid (Aerobic) w/ Gram Stain [[id number]]  (Abnormal)  (Susceptibility) Collected: 01/08/24 1607    Order Status: Completed Specimen: Body Fluid from Back Updated: 01/10/24 0830     AEROBIC CULTURE - FLUID ESCHERICHIA COLI  Many       Gram Stain Result Rare WBC's      Few Gram negative rods      Few Gram positive cocci in pairs and chains    Narrative:      IR aspiration of L kidney collection    AFB Culture & Smear [[id number]] Collected: 01/07/24 0850    Order Status: Completed Specimen: Wound from Groin Updated: 01/09/24 2127     AFB Culture & Smear Culture in progress     AFB CULTURE STAIN No acid fast bacilli seen.    Narrative:      Right groin    Aerobic culture [[id number]]  (Abnormal)  (Susceptibility) Collected: 01/07/24 0850    Order Status: Completed Specimen: Wound from Groin Updated: 01/09/24 0746     Aerobic Bacterial Culture ESCHERICHIA COLI  Rare      Narrative:      Right groin    Culture, Anaerobe [[id number]]     Order Status: No result Specimen: Joint Fluid     Aerobic culture [[id number]]     Order Status: No result Specimen: Bone     Gram stain [[id number]] Collected: 01/07/24 0850    Order Status: Completed Specimen: Wound from Groin Updated: 01/07/24 1217     Gram Stain Result Few WBC's      Few Gram negative rods      Few Gram positive cocci in pairs    Narrative:      Right groin    Fungus culture [[id number]] Collected: 01/07/24 0850    Order Status: Sent Specimen: Wound from Groin Updated: 01/07/24 1108            Significant Imaging: I have reviewed all pertinent imaging results/findings within the past 24 hours.

## 2024-01-11 NOTE — ASSESSMENT & PLAN NOTE
Seen on CT along w/ perinephric abscesses not seen on u/s  Pt is clinically w/ minimal issues - no dysuria, back/flank pain, f/c/n/v  BCX done in [facility] ED  U/a not suggestive of any substantial UTI but the abscesses may not be communicating w/ the collecting system  Given Zosyn and vancomycin in ED  I had started Zosyn and IV doxy here empirically when I wrote admit orders but given review of all information will start meropenem, vancomycin, and clindamycin  Might need ID input  Suprisingly she does not even meet SIRS criteria at this time  Culture growing staph,strep,Ecoli,per ID on Vanc. And Rocephin.  Urology did  another US to check size of kidney.remains stable.  ,.

## 2024-01-11 NOTE — PROGRESS NOTES
Pharmacokinetic Assessment Follow Up: IV Vancomycin    Vancomycin serum concentration assessment(s):    The trough level was drawn correctly and can be used to guide therapy at this time. The measurement is within the desired definitive target range of 15 to 20 mcg/mL.    Vancomycin Regimen Plan:    Continue regimen to Vancomycin 1500 mg IV every 12 hours with next serum trough concentration measured at 2130 prior to 4th dose on 1/12/2024    Drug levels (last 3 results):  Recent Labs   Lab Result Units 01/10/24  0929 01/11/24  0928   Vancomycin-Trough ug/mL 19.2 18.7       Pharmacy will continue to follow and monitor vancomycin.    Please contact pharmacy at extension [phone] for questions regarding this assessment.    Thank you for the consult,   Qasim Ureña Jr       Patient brief summary:  Jaimee Quintana is a 48 y.o. female initiated on antimicrobial therapy with IV Vancomycin for treatment of intra-abdominal infection    Drug Allergies:   Review of patient's allergies indicates:  No Known Allergies    Actual Body Weight:   105 kg    Renal Function:   Estimated Creatinine Clearance: 104.6 mL/min (based on SCr of 0.8 mg/dL).,     Dialysis Method (if applicable):  N/A    CBC (last 72 hours):  Recent Labs   Lab Result Units 01/09/24  0504 01/11/24  0418   WBC K/uL 9.06 8.46   Hemoglobin g/dL 10.4* 11.5*   Hematocrit % 32.5* 36.1*   Platelets K/uL 281 352   Gran % % 69.3 68.4   Lymph % % 19.4 19.9   Mono % % 8.6 7.6   Eosinophil % % 1.7 2.2   Basophil % % 0.2 0.4   Differential Method  Automated Automated       Metabolic Panel (last 72 hours):  Recent Labs   Lab Result Units 01/09/24  0504 01/11/24  0418   Sodium mmol/L 136 139   Potassium mmol/L 3.3* 3.2*   Chloride mmol/L 102 101   CO2 mmol/L 25 28   Glucose mg/dL 192* 175*   BUN mg/dL 8 6   Creatinine mg/dL 0.8 0.8   Albumin g/dL 2.2*  --    Total Bilirubin mg/dL 0.4  --    Alkaline Phosphatase U/L 93  --    AST U/L 9*  --    ALT U/L 7*  --        Vancomycin  Administrations:  vancomycin given in the last 96 hours                     vancomycin 1,500 mg in dextrose 5 % (D5W) 250 mL IVPB (Vial-Mate) (mg) 1,500 mg New Bag 01/10/24 2215     1,500 mg New Bag  1132     1,500 mg New Bag  0052     1,500 mg New Bag 01/09/24 1117    vancomycin 1,500 mg in dextrose 5 % (D5W) 250 mL IVPB (Vial-Mate) ()  Restarted 01/07/24 2259                    Microbiologic Results:  Microbiology Results (last 7 days)       Procedure Component Value Units Date/Time    Culture, Anaerobe [[id number]] Collected: 01/07/24 0850    Order Status: Completed Specimen: Wound from Groin Updated: 01/11/24 0906     Anaerobic Culture No anaerobes isolated    Narrative:      Right groin tissue    Culture, Anaerobic [[id number]] Collected: 01/08/24 1607    Order Status: Completed Specimen: Body Fluid from Kidney, Left Updated: 01/10/24 1054     Anaerobic Culture Culture in progress    Narrative:      IR aspiration    Culture, Body Fluid (Aerobic) w/ Gram Stain [[id number]]  (Abnormal)  (Susceptibility) Collected: 01/08/24 1607    Order Status: Completed Specimen: Body Fluid from Back Updated: 01/10/24 0830     AEROBIC CULTURE - FLUID ESCHERICHIA COLI  Many       Gram Stain Result Rare WBC's      Few Gram negative rods      Few Gram positive cocci in pairs and chains    Narrative:      IR aspiration of L kidney collection    AFB Culture & Smear [[id number]] Collected: 01/07/24 0850    Order Status: Completed Specimen: Wound from Groin Updated: 01/09/24 2127     AFB Culture & Smear Culture in progress     AFB CULTURE STAIN No acid fast bacilli seen.    Narrative:      Right groin    Aerobic culture [[id number]]  (Abnormal)  (Susceptibility) Collected: 01/07/24 0850    Order Status: Completed Specimen: Wound from Groin Updated: 01/09/24 0746     Aerobic Bacterial Culture ESCHERICHIA COLI  Rare      Narrative:      Right groin    Culture, Anaerobe [[id number]]     Order Status: No result Specimen: Joint Fluid      Aerobic culture [[id number]]     Order Status: No result Specimen: Bone     Gram stain [[id number]] Collected: 01/07/24 0850    Order Status: Completed Specimen: Wound from Groin Updated: 01/07/24 1217     Gram Stain Result Few WBC's      Few Gram negative rods      Few Gram positive cocci in pairs    Narrative:      Right groin    Fungus culture [[id number]] Collected: 01/07/24 0850    Order Status: Sent Specimen: Wound from Groin Updated: 01/07/24 1108

## 2024-01-11 NOTE — ASSESSMENT & PLAN NOTE
S/p bedside I&D w/ culture of material in ED at [facility]  Dr Padgett assessed pt and had planned to bring to OR here this afternoon, Dr Snyder is covering for him and has pt scheduled for tomorrow AM  I am comfortable w/ this plan as pt appears to be very stable and this process does not appear fulminant  Not septic but is at risk  Close monitoring, IVF, abx  BCX, urine Cx done at [facility] ED.    S/P I&D  and irrigation and debridement rightly groin and right leg,on 1.7.24,c  Culture growing staph,\MRSA strep,Ecoli,per ID on Vanc. And rocephin.  Plan for wound vac placement.    Surgery planing placing wound vac,.

## 2024-01-12 DIAGNOSIS — N15.1 ABSCESS OF LEFT KIDNEY: Primary | ICD-10-CM

## 2024-01-12 DIAGNOSIS — Z00.00 ENCOUNTER FOR MEDICARE ANNUAL WELLNESS EXAM: ICD-10-CM

## 2024-01-12 LAB
ANION GAP SERPL CALC-SCNC: 10 MMOL/L (ref 8–16)
BACTERIA SPEC ANAEROBE CULT: NORMAL
BASOPHILS # BLD AUTO: 0.02 K/UL (ref 0–0.2)
BASOPHILS NFR BLD: 0.2 % (ref 0–1.9)
BUN SERPL-MCNC: 7 MG/DL (ref 6–20)
CALCIUM SERPL-MCNC: 8.6 MG/DL (ref 8.7–10.5)
CHLORIDE SERPL-SCNC: 100 MMOL/L (ref 95–110)
CO2 SERPL-SCNC: 26 MMOL/L (ref 23–29)
CREAT SERPL-MCNC: 0.8 MG/DL (ref 0.5–1.4)
DIFFERENTIAL METHOD BLD: ABNORMAL
EOSINOPHIL # BLD AUTO: 0.2 K/UL (ref 0–0.5)
EOSINOPHIL NFR BLD: 2 % (ref 0–8)
ERYTHROCYTE [DISTWIDTH] IN BLOOD BY AUTOMATED COUNT: 12.5 % (ref 11.5–14.5)
EST. GFR  (NO RACE VARIABLE): >60 ML/MIN/1.73 M^2
GLUCOSE SERPL-MCNC: 236 MG/DL (ref 70–110)
HCT VFR BLD AUTO: 34 % (ref 37–48.5)
HGB BLD-MCNC: 11 G/DL (ref 12–16)
IMM GRANULOCYTES # BLD AUTO: 0.1 K/UL (ref 0–0.04)
IMM GRANULOCYTES NFR BLD AUTO: 1.2 % (ref 0–0.5)
LYMPHOCYTES # BLD AUTO: 1.7 K/UL (ref 1–4.8)
LYMPHOCYTES NFR BLD: 20 % (ref 18–48)
MCH RBC QN AUTO: 28.1 PG (ref 27–31)
MCHC RBC AUTO-ENTMCNC: 32.4 G/DL (ref 32–36)
MCV RBC AUTO: 87 FL (ref 82–98)
MONOCYTES # BLD AUTO: 0.7 K/UL (ref 0.3–1)
MONOCYTES NFR BLD: 7.7 % (ref 4–15)
NEUTROPHILS # BLD AUTO: 5.8 K/UL (ref 1.8–7.7)
NEUTROPHILS NFR BLD: 68.9 % (ref 38–73)
NRBC BLD-RTO: 0 /100 WBC
PLATELET # BLD AUTO: 354 K/UL (ref 150–450)
PMV BLD AUTO: 10.1 FL (ref 9.2–12.9)
POCT GLUCOSE: 216 MG/DL (ref 70–110)
POCT GLUCOSE: 220 MG/DL (ref 70–110)
POCT GLUCOSE: 252 MG/DL (ref 70–110)
POCT GLUCOSE: 257 MG/DL (ref 70–110)
POTASSIUM SERPL-SCNC: 3.6 MMOL/L (ref 3.5–5.1)
RBC # BLD AUTO: 3.92 M/UL (ref 4–5.4)
SODIUM SERPL-SCNC: 136 MMOL/L (ref 136–145)
VANCOMYCIN TROUGH SERPL-MCNC: 22.6 UG/ML (ref 10–22)
WBC # BLD AUTO: 8.46 K/UL (ref 3.9–12.7)

## 2024-01-12 PROCEDURE — 63600175 PHARM REV CODE 636 W HCPCS: Mod: HCNC | Performed by: HOSPITALIST

## 2024-01-12 PROCEDURE — 99232 SBSQ HOSP IP/OBS MODERATE 35: CPT | Mod: HCNC,,,

## 2024-01-12 PROCEDURE — 85025 COMPLETE CBC W/AUTO DIFF WBC: CPT | Mod: HCNC | Performed by: HOSPITALIST

## 2024-01-12 PROCEDURE — 80048 BASIC METABOLIC PNL TOTAL CA: CPT | Mod: HCNC | Performed by: HOSPITALIST

## 2024-01-12 PROCEDURE — 99233 SBSQ HOSP IP/OBS HIGH 50: CPT | Mod: HCNC,,, | Performed by: STUDENT IN AN ORGANIZED HEALTH CARE EDUCATION/TRAINING PROGRAM

## 2024-01-12 PROCEDURE — 25000003 PHARM REV CODE 250: Mod: HCNC | Performed by: HOSPITALIST

## 2024-01-12 PROCEDURE — 11000001 HC ACUTE MED/SURG PRIVATE ROOM: Mod: HCNC

## 2024-01-12 PROCEDURE — 27000207 HC ISOLATION: Mod: HCNC

## 2024-01-12 PROCEDURE — 25000003 PHARM REV CODE 250: Mod: HCNC | Performed by: STUDENT IN AN ORGANIZED HEALTH CARE EDUCATION/TRAINING PROGRAM

## 2024-01-12 PROCEDURE — 63600175 PHARM REV CODE 636 W HCPCS: Mod: HCNC | Performed by: STUDENT IN AN ORGANIZED HEALTH CARE EDUCATION/TRAINING PROGRAM

## 2024-01-12 PROCEDURE — 97161 PT EVAL LOW COMPLEX 20 MIN: CPT | Mod: HCNC

## 2024-01-12 PROCEDURE — 97165 OT EVAL LOW COMPLEX 30 MIN: CPT | Mod: HCNC

## 2024-01-12 PROCEDURE — 80202 ASSAY OF VANCOMYCIN: CPT | Mod: HCNC | Performed by: HOSPITALIST

## 2024-01-12 PROCEDURE — 36415 COLL VENOUS BLD VENIPUNCTURE: CPT | Mod: HCNC,XB | Performed by: HOSPITALIST

## 2024-01-12 PROCEDURE — 97535 SELF CARE MNGMENT TRAINING: CPT | Mod: HCNC

## 2024-01-12 PROCEDURE — 99232 SBSQ HOSP IP/OBS MODERATE 35: CPT | Mod: HCNC,,, | Performed by: UROLOGY

## 2024-01-12 PROCEDURE — 94761 N-INVAS EAR/PLS OXIMETRY MLT: CPT | Mod: HCNC

## 2024-01-12 RX ORDER — DOXYCYCLINE 100 MG/1
100 CAPSULE ORAL 2 TIMES DAILY
Qty: 52 CAPSULE | Refills: 0 | Status: SHIPPED | OUTPATIENT
Start: 2024-01-12 | End: 2024-02-06 | Stop reason: ALTCHOICE

## 2024-01-12 RX ORDER — AMOXICILLIN AND CLAVULANATE POTASSIUM 875; 125 MG/1; MG/1
1 TABLET, FILM COATED ORAL EVERY 12 HOURS
Qty: 52 TABLET | Refills: 0 | Status: SHIPPED | OUTPATIENT
Start: 2024-01-12 | End: 2024-02-06 | Stop reason: ALTCHOICE

## 2024-01-12 RX ORDER — HYDROCODONE BITARTRATE AND ACETAMINOPHEN 5; 325 MG/1; MG/1
1 TABLET ORAL EVERY 6 HOURS PRN
Qty: 10 TABLET | Refills: 0 | Status: SHIPPED | OUTPATIENT
Start: 2024-01-12 | End: 2024-05-29

## 2024-01-12 RX ADMIN — NIFEDIPINE 60 MG: 30 TABLET, FILM COATED, EXTENDED RELEASE ORAL at 09:01

## 2024-01-12 RX ADMIN — CEFTRIAXONE 2 G: 2 INJECTION, POWDER, FOR SOLUTION INTRAMUSCULAR; INTRAVENOUS at 09:01

## 2024-01-12 RX ADMIN — METRONIDAZOLE 500 MG: 500 TABLET ORAL at 09:01

## 2024-01-12 RX ADMIN — HEPARIN SODIUM 5000 UNITS: 5000 INJECTION INTRAVENOUS; SUBCUTANEOUS at 01:01

## 2024-01-12 RX ADMIN — INSULIN DETEMIR 20 UNITS: 100 INJECTION, SOLUTION SUBCUTANEOUS at 09:01

## 2024-01-12 RX ADMIN — ASPIRIN 81 MG: 81 TABLET, COATED ORAL at 09:01

## 2024-01-12 RX ADMIN — LISINOPRIL 40 MG: 20 TABLET ORAL at 09:01

## 2024-01-12 RX ADMIN — HEPARIN SODIUM 5000 UNITS: 5000 INJECTION INTRAVENOUS; SUBCUTANEOUS at 09:01

## 2024-01-12 RX ADMIN — INSULIN ASPART 3 UNITS: 100 INJECTION, SOLUTION INTRAVENOUS; SUBCUTANEOUS at 06:01

## 2024-01-12 RX ADMIN — EZETIMIBE 10 MG: 10 TABLET ORAL at 08:01

## 2024-01-12 RX ADMIN — INSULIN ASPART 1 UNITS: 100 INJECTION, SOLUTION INTRAVENOUS; SUBCUTANEOUS at 08:01

## 2024-01-12 RX ADMIN — HEPARIN SODIUM 5000 UNITS: 5000 INJECTION INTRAVENOUS; SUBCUTANEOUS at 06:01

## 2024-01-12 RX ADMIN — VANCOMYCIN HYDROCHLORIDE 1500 MG: 1.5 INJECTION, POWDER, LYOPHILIZED, FOR SOLUTION INTRAVENOUS at 01:01

## 2024-01-12 RX ADMIN — FLUOXETINE HYDROCHLORIDE 40 MG: 20 CAPSULE ORAL at 09:01

## 2024-01-12 RX ADMIN — INSULIN ASPART 2 UNITS: 100 INJECTION, SOLUTION INTRAVENOUS; SUBCUTANEOUS at 09:01

## 2024-01-12 RX ADMIN — ATORVASTATIN CALCIUM 80 MG: 40 TABLET, FILM COATED ORAL at 08:01

## 2024-01-12 RX ADMIN — METOPROLOL SUCCINATE 100 MG: 50 TABLET, EXTENDED RELEASE ORAL at 09:01

## 2024-01-12 RX ADMIN — METRONIDAZOLE 500 MG: 500 TABLET ORAL at 08:01

## 2024-01-12 RX ADMIN — INSULIN ASPART 3 UNITS: 100 INJECTION, SOLUTION INTRAVENOUS; SUBCUTANEOUS at 01:01

## 2024-01-12 RX ADMIN — PANTOPRAZOLE SODIUM 40 MG: 40 TABLET, DELAYED RELEASE ORAL at 09:01

## 2024-01-12 RX ADMIN — FLUTICASONE PROPIONATE 100 MCG: 50 SPRAY, METERED NASAL at 09:01

## 2024-01-12 NOTE — SUBJECTIVE & OBJECTIVE
Interval History: S/p wound vac placement. Pt reports tolerating antibiotics without adverse reactions. She states she's looking forward to going home soon.           Review of Systems   Constitutional:  Negative for chills and fever.   All other systems reviewed and are negative.    Objective:     Vital Signs (Most Recent):  Temp: 98.5 °F (36.9 °C) (01/12/24 0740)  Pulse: 83 (01/12/24 0906)  Resp: 18 (01/12/24 0740)  BP: (!) 157/76 (01/12/24 0906)  SpO2: (!) 93 % (01/12/24 0740) Vital Signs (24h Range):  Temp:  [98 °F (36.7 °C)-98.8 °F (37.1 °C)] 98.5 °F (36.9 °C)  Pulse:  [73-83] 83  Resp:  [18-20] 18  SpO2:  [93 %-96 %] 93 %  BP: (144-158)/(67-78) 157/76     Weight: 105 kg (231 lb 7.7 oz)  Body mass index is 38.52 kg/m².    Estimated Creatinine Clearance: 104.6 mL/min (based on SCr of 0.8 mg/dL).     Physical Exam  Constitutional:       General: She is not in acute distress.     Appearance: She is not ill-appearing, toxic-appearing or diaphoretic.   Pulmonary:      Effort: Pulmonary effort is normal. No respiratory distress.   Abdominal:      General: There is no distension.      Palpations: Abdomen is soft.      Tenderness: There is no abdominal tenderness. There is no right CVA tenderness or left CVA tenderness.   Genitourinary:     Comments: Wound vac  Skin:     General: Skin is warm and dry.   Neurological:      Mental Status: She is alert and oriented to person, place, and time.          Significant Labs:   Microbiology Results (last 7 days)       Procedure Component Value Units Date/Time    Culture, Anaerobe [[id number]] Collected: 01/07/24 0850    Order Status: Completed Specimen: Wound from Groin Updated: 01/11/24 0906     Anaerobic Culture No anaerobes isolated    Narrative:      Right groin tissue    Culture, Anaerobic [[id number]] Collected: 01/08/24 1607    Order Status: Completed Specimen: Body Fluid from Kidney, Left Updated: 01/10/24 1054     Anaerobic Culture Culture in progress    Narrative:       IR aspiration    Culture, Body Fluid (Aerobic) w/ Gram Stain [[id number]]  (Abnormal)  (Susceptibility) Collected: 01/08/24 1607    Order Status: Completed Specimen: Body Fluid from Back Updated: 01/10/24 0830     AEROBIC CULTURE - FLUID ESCHERICHIA COLI  Many       Gram Stain Result Rare WBC's      Few Gram negative rods      Few Gram positive cocci in pairs and chains    Narrative:      IR aspiration of L kidney collection    AFB Culture & Smear [[id number]] Collected: 01/07/24 0850    Order Status: Completed Specimen: Wound from Groin Updated: 01/09/24 2127     AFB Culture & Smear Culture in progress     AFB CULTURE STAIN No acid fast bacilli seen.    Narrative:      Right groin    Aerobic culture [[id number]]  (Abnormal)  (Susceptibility) Collected: 01/07/24 0850    Order Status: Completed Specimen: Wound from Groin Updated: 01/09/24 0746     Aerobic Bacterial Culture ESCHERICHIA COLI  Rare      Narrative:      Right groin    Culture, Anaerobe [[id number]]     Order Status: No result Specimen: Joint Fluid     Aerobic culture [[id number]]     Order Status: No result Specimen: Bone     Gram stain [[id number]] Collected: 01/07/24 0850    Order Status: Completed Specimen: Wound from Groin Updated: 01/07/24 1217     Gram Stain Result Few WBC's      Few Gram negative rods      Few Gram positive cocci in pairs    Narrative:      Right groin    Fungus culture [[id number]] Collected: 01/07/24 0850    Order Status: Sent Specimen: Wound from Groin Updated: 01/07/24 1108            Significant Imaging: I have reviewed all pertinent imaging results/findings within the past 24 hours.

## 2024-01-12 NOTE — SUBJECTIVE & OBJECTIVE
Interval History: Patient HDS and afebile. No acute events overnight. No new complaints this morning. Pt is medically stable for DC, pending HH and woudn vac set up.       Review of Systems   Constitutional:  Positive for activity change and fatigue. Negative for fever.   Respiratory:  Negative for shortness of breath.    Cardiovascular:  Negative for chest pain.   Gastrointestinal:  Negative for abdominal pain.   Neurological:  Negative for dizziness and headaches.   All other systems reviewed and are negative.    Objective:     Vital Signs (Most Recent):  Temp: 98 °F (36.7 °C) (01/12/24 1101)  Pulse: 75 (01/12/24 1101)  Resp: 18 (01/12/24 1101)  BP: (!) 173/84 (01/12/24 1101)  SpO2: 95 % (01/12/24 1101) Vital Signs (24h Range):  Temp:  [98 °F (36.7 °C)-98.8 °F (37.1 °C)] 98 °F (36.7 °C)  Pulse:  [75-83] 75  Resp:  [18-20] 18  SpO2:  [93 %-95 %] 95 %  BP: (144-173)/(67-84) 173/84     Weight: 105 kg (231 lb 7.7 oz)  Body mass index is 38.52 kg/m².    Intake/Output Summary (Last 24 hours) at 1/12/2024 1605  Last data filed at 1/12/2024 0905  Gross per 24 hour   Intake 360 ml   Output --   Net 360 ml         Physical Exam  Vitals and nursing note reviewed.   Constitutional:       Appearance: Normal appearance.   HENT:      Head: Normocephalic and atraumatic.   Eyes:      Extraocular Movements: Extraocular movements intact.      Pupils: Pupils are equal, round, and reactive to light.   Cardiovascular:      Rate and Rhythm: Normal rate and regular rhythm.   Pulmonary:      Effort: Pulmonary effort is normal. No respiratory distress.   Abdominal:      General: There is no distension.   Genitourinary:     Comments: Wound vac in place  Musculoskeletal:         General: Normal range of motion.      Cervical back: Normal range of motion.   Neurological:      General: No focal deficit present.      Mental Status: She is alert and oriented to person, place, and time.   Psychiatric:         Mood and Affect: Mood normal.          Behavior: Behavior normal.             Significant Labs: All pertinent labs within the past 24 hours have been reviewed.  CBC:   Recent Labs   Lab 01/11/24 0418 01/12/24 0416   WBC 8.46 8.46   HGB 11.5* 11.0*   HCT 36.1* 34.0*    354     CMP:   Recent Labs   Lab 01/11/24 0418 01/12/24 0416    136   K 3.2* 3.6    100   CO2 28 26   * 236*   BUN 6 7   CREATININE 0.8 0.8   CALCIUM 8.9 8.6*   ANIONGAP 10 10       Significant Imaging: I have reviewed all pertinent imaging results/findings within the past 24 hours.

## 2024-01-12 NOTE — PT/OT/SLP EVAL
Physical Therapy Evaluation and Discharge Note    Patient Name:  Jaimee Quintana   MRN:  [id number]    Recommendations:     Discharge Recommendations: No Therapy Indicated  Discharge Equipment Recommendations: none   Barriers to discharge: None    Assessment:     Jaimee Quintana is a 48 y.o. 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  At this time, patient is functioning at their prior level of function and does not require further acute PT services.     Recent Surgery: Procedure(s) (LRB):  IRRIGATION AND DEBRIDEMENT RIGHT GROIN (Right) 5 Days Post-Op    Plan:     During this hospitalization, patient does not require further acute PT services.  Please re-consult if situation changes.      Subjective     Chief Complaint: N/A  Patient/Family Comments/goals: Pt ready to go home, awaiting on insurance approval of home wound vac.   Pain/Comfort:  Pain Rating 1: 0/10      Living Environment:  Pt lives in [place], [place] with 2 dtrs (20 y.o. and 13 y.o.) on the 1st floor apartment with no concerns at entry.   Prior to admission, patients level of function was independent and driving.  Pt avoids driving at night time 2* decreased vision.  Equipment at home: cane, straight, bedside commode, bath bench. Upon discharge, patient will have assistance from family.    Objective:       Patient found up in chair reclined with wound vac, peripheral IV upon PT entry to room.    General Precautions: Standard, fall, diabetic    Orthopedic Precautions:N/A   Braces: N/A  Respiratory Status: Room air    Exams:  Cognitive Exam:  Patient was able to follow multiple commands.  Gross Motor Coordination:  WFL  Postural Exam:  Patient presented with the following abnormalities:    -       Rounded shoulders  -       Forward head  Sensation:    -       Intact  light/touch BLE  Skin Integrity/Edema:      -       Skin integrity: R groin with wound vac intact  -       Edema: None noted BLE  BLE ROM: WFL  BLE Strength:  WFL    Functional Mobility:  Transfers:     Sit to Stand: independence with no AD  Bed to Chair: independence with  no AD  using  Step Transfer  Gait: Pt ambulated ~60 ft within room 2* contact isolation independently and no major gait deviations.  Balance: Pt with fair+ dynamic standing balance.     AM-PAC 6 CLICK MOBILITY  Total Score:24       Treatment and Education:  Pt encouraged to continue OOB>chair and ambulate within room while in the hospital.  Pt verbalized good understanding.        Patient left up in chair with all lines intact and call button in reach.  Tray table close by.     GOALS:   Multidisciplinary Problems       Physical Therapy Goals       Not on file              Multidisciplinary Problems (Resolved)          Problem: Physical Therapy    Goal Priority Disciplines Outcome Goal Variances Interventions   Physical Therapy Goal   (Resolved)     PT, PT/OT Met                         History:     Past Medical History:   Diagnosis Date    Cataract     Cervical high risk HPV (human papillomavirus) test positive 2020    NOT 16 OR 18    CHF (congestive heart failure)     CVA (cerebral infarction)          Depression     Diabetes mellitus, type 2     GERD (gastroesophageal reflux disease)     Hyperlipidemia     Hypertension     Lactic acidosis 10/06/2023    Moderate nonproliferative diabetic retinopathy     Nausea and vomiting 10/06/2023    Obesity     Stroke     Tachycardia 10/04/2023       Past Surgical History:   Procedure Laterality Date     SECTION      CHOLECYSTECTOMY      DILATION AND CURETTAGE OF UTERUS      EYE SURGERY Bilateral     laser    GALLBLADDER SURGERY  2017    stone removed    IRRIGATION AND DEBRIDEMENT Right 2024    Procedure: IRRIGATION AND DEBRIDEMENT RIGHT GROIN;  Surgeon: Chalo Padgett MD;  Location: [facility];  Service: General;  Laterality: Right;       Time Tracking:     PT Received On: 24  PT Start Time: 1127     PT Stop Time: 1138  PT Total  Time (min): 11 min     Billable Minutes: Evaluation 11 min      01/12/2024

## 2024-01-12 NOTE — PLAN OF CARE
[place] sent completed wound vac [employer] 3M form fax [phone]. [place] will follow up with customer service number [phone]   01/11/24 1358   Post-Acute Status   Post-Acute Authorization E   [facility] Status Referrals Sent   Coverage [employer] [facility]   Discharge Plan   Discharge Plan A Home;Home Health   Discharge Plan B Home;Home Health

## 2024-01-12 NOTE — PLAN OF CARE
Problem: Physical Therapy  Goal: Physical Therapy Goal  Outcome: Met     Pt independent with functional mobility and ambulation, required no further skilled PT services at this time.

## 2024-01-12 NOTE — PLAN OF CARE
Problem: Occupational Therapy  Goal: Occupational Therapy Goal  Outcome: Met     Patient not recommended for low intensity therapy and not needing any DME. Occupational therapy to sign off.

## 2024-01-12 NOTE — PLAN OF CARE
Plan of care is ongoing.    Problem: Adult Inpatient Plan of Care  Goal: Plan of Care Review  Outcome: Ongoing, Progressing  Goal: Patient-Specific Goal (Individualized)  Outcome: Ongoing, Progressing  Goal: Absence of Hospital-Acquired Illness or Injury  Outcome: Ongoing, Progressing  Goal: Optimal Comfort and Wellbeing  Outcome: Ongoing, Progressing  Goal: Readiness for Transition of Care  Outcome: Ongoing, Progressing     Problem: Diabetes Comorbidity  Goal: Blood Glucose Level Within Targeted Range  Outcome: Ongoing, Progressing     Problem: Bariatric Environmental Safety  Goal: Safety Maintained with Care  Outcome: Ongoing, Progressing     Problem: Impaired Wound Healing  Goal: Optimal Wound Healing  Outcome: Ongoing, Progressing     Problem: Skin Injury Risk Increased  Goal: Skin Health and Integrity  Outcome: Ongoing, Progressing     Problem: Pain Acute  Goal: Acceptable Pain Control and Functional Ability  Outcome: Ongoing, Progressing     Problem: Fall Injury Risk  Goal: Absence of Fall and Fall-Related Injury  Outcome: Ongoing, Progressing     Problem: Infection  Goal: Absence of Infection Signs and Symptoms  Outcome: Ongoing, Progressing

## 2024-01-12 NOTE — PROGRESS NOTES
[facility]  Urology  Progress Note    Patient Name: Jaimee Quintana  MRN: [id number]  Admission Date: 1/6/2024  Hospital Length of Stay: 6 days  Code Status: Full Code   Attending Provider: Arlyn Valenzuela MD   Primary Care Physician: Donald Acosta MD    Subjective:     HPI:  Perinephric Abscess  Jaimee Quintana is a 48 y.o. woman transferred here from [place] due to necrotizing fasciitis of the right groin.  CT scan at the time of presentation also showed to left perinephric fluid collections with stranding.  Patient denies flank pain currently.  She was vaguely aware of an issue with the kidney but did not know the details.  She denies dysuria or hematuria.  She has been febrile.    Interval History: She feels well.  No Pain.    Review of Systems   Constitutional:  Negative for chills, fatigue and fever.   HENT:  Negative for congestion.    Respiratory:  Negative for chest tightness and shortness of breath.    Cardiovascular:  Negative for chest pain.   Gastrointestinal:  Negative for abdominal distention, constipation, diarrhea, nausea and vomiting.   Genitourinary:  Negative for difficulty urinating, dysuria, flank pain, frequency, hematuria, pelvic pain and urgency.   Musculoskeletal:  Negative for arthralgias.   Skin:  Negative for rash.   Neurological:  Negative for dizziness.   Psychiatric/Behavioral:  Negative for confusion.      Objective:     Temp:  [98 °F (36.7 °C)-98.8 °F (37.1 °C)] 98 °F (36.7 °C)  Pulse:  [75-83] 75  Resp:  [18-20] 18  SpO2:  [93 %-95 %] 95 %  BP: (144-173)/(67-84) 173/84     Body mass index is 38.52 kg/m².           Drains       None                    Physical Exam  Vitals and nursing note reviewed.   Constitutional:       Appearance: She is well-developed.   HENT:      Head: Normocephalic.   Eyes:      Conjunctiva/sclera: Conjunctivae normal.   Neck:      Thyroid: No thyromegaly.      Trachea: No tracheal deviation.   Cardiovascular:      Rate and Rhythm: Normal rate.       Pulses: Normal pulses.      Heart sounds: Normal heart sounds.   Pulmonary:      Effort: Pulmonary effort is normal. No respiratory distress.      Breath sounds: Normal breath sounds. No wheezing.   Abdominal:      General: There is no distension.      Palpations: Abdomen is soft. There is no mass.      Tenderness: There is no abdominal tenderness. There is no guarding or rebound.      Hernia: No hernia is present.   Musculoskeletal:         General: No tenderness. Normal range of motion.      Cervical back: Normal range of motion.   Lymphadenopathy:      Cervical: No cervical adenopathy.   Skin:     General: Skin is warm and dry.      Findings: No erythema or rash.   Neurological:      Mental Status: She is alert and oriented to person, place, and time.   Psychiatric:         Behavior: Behavior normal.         Thought Content: Thought content normal.         Judgment: Judgment normal.           Significant Labs:    BMP:  Recent Labs   Lab 01/09/24  0504 01/11/24  0418 01/12/24  0416    139 136   K 3.3* 3.2* 3.6    101 100   CO2 25 28 26   BUN 8 6 7   CREATININE 0.8 0.8 0.8   CALCIUM 8.8 8.9 8.6*       CBC:   Recent Labs   Lab 01/09/24  0504 01/11/24  0418 01/12/24  0416   WBC 9.06 8.46 8.46   HGB 10.4* 11.5* 11.0*   HCT 32.5* 36.1* 34.0*    352 354       Blood Culture:   Recent Labs   Lab 01/05/24  2106   LABBLOO No growth after 5 days.     Urine Culture:   Recent Labs   Lab 01/05/24  1818   LABURIN ESCHERICHIA COLI  >100,000 cfu/ml  *       Significant Imaging:                    Assessment/Plan:     Abscess of left kidney  S/p aspiration by IR  KRISS demonstrates stability  Follow cultures treat with 2 weeks of antibiotics  Antibiotics per ID recs    Follow up in 1-2 weeks  Will sign off         VTE Risk Mitigation (From admission, onward)           Ordered     heparin (porcine) injection 5,000 Units  Every 8 hours         01/06/24 1628     IP VTE HIGH RISK PATIENT  Once         01/06/24 1628      Place sequential compression device  Until discontinued         01/06/24 7225                    Arturo Duran MD  Urology  [facility]

## 2024-01-12 NOTE — PLAN OF CARE
Problem: Adult Inpatient Plan of Care  Goal: Plan of Care Review  Outcome: Ongoing, Progressing  Goal: Absence of Hospital-Acquired Illness or Injury  Outcome: Ongoing, Progressing  Goal: Readiness for Transition of Care  Outcome: Ongoing, Progressing     Problem: Bariatric Environmental Safety  Goal: Safety Maintained with Care  Outcome: Ongoing, Progressing     Problem: Impaired Wound Healing  Goal: Optimal Wound Healing  Outcome: Ongoing, Progressing     Problem: Fall Injury Risk  Goal: Absence of Fall and Fall-Related Injury  Outcome: Ongoing, Progressing     Problem: Infection  Goal: Absence of Infection Signs and Symptoms  Outcome: Ongoing, Progressing

## 2024-01-12 NOTE — PLAN OF CARE
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]  Yes    Perinephric abscess [N15.1]  Yes    Groin abscess [L02.214]  No    Class 2 severe obesity due to excess calories with serious comorbidity and body mass index (BMI) of 38.0 to 38.9 in adult [E66.01, Z68.38]  Not Applicable    Abscess of left kidney [N15.1]  Yes    Chronic combined systolic and diastolic congestive heart failure [I50.42]  Yes    COVID-19 [U07.1]  No    History of CVA (cerebrovascular accident) without residual deficits [Z86.73]  Not Applicable    Type 2 diabetes mellitus with both eyes affected by proliferative retinopathy and macular edema, with long-term current use of insulin [E11.3513, Z79.4]  Not Applicable    CESAR (obstructive sleep apnea) [G47.33]  Yes    Class 3 severe obesity due to excess calories with serious comorbidity and body mass index (BMI) of 40.0 to 44.9 in adult [E66.01, Z68.41]  Not Applicable    Hyperlipidemia associated with type 2 diabetes mellitus [E11.69, E78.5]  Yes    HTN (hypertension) without retinopathy [I10]  Yes      Resolved Hospital Problems    Diagnosis Date Resolved POA    Controlled type 2 diabetes mellitus with retinopathy, with long-term current use of insulin [E11.319, Z79.4] 01/06/2024 Not Applicable       Future Appointments   Date Time Provider Department Center   1/30/2024 11:00 AM [facility] US5 [facility]   2/6/2024 10:30 AM Madalyn Gtz MD [facility] ID [address]   2/29/2024  9:40 AM Lakisha Mendez DO [facility] [place]      Follow-up Information       Arturo Duran MD Follow up in 2 week(s).     Specialty: Urology  Why: As needed  Contact information:  [address]  [address]  [place] [address]  [phone]                               I have seen and examined this patient face to face today. My clinical findings that support the need for the home health skilled services and home bound status are the following:  Weakness/numbness causing balance and gait disturbance due to Infection making it taxing to leave home.    Allergies:Review of patient's allergies indicates:  No Known Allergies    Diet: cardiac diet and diabetic diet: 2000 calorie    Activities: activity as tolerated    Nursing:   SN to complete comprehensive assessment including routine vital signs. Instruct on disease process and s/s of complications to report to MD. Review/verify medication list sent home with the patient at time of discharge  and instruct patient/caregiver as needed. Frequency may be adjusted depending on start of care date.    Notify MD if SBP > 160 or < 90; DBP > 90 or < 50; HR > 120 or < 50; Temp > 101;       CONSULTS:    Physical Therapy to evaluate and treat. Evaluate for home safety and equipment needs; Establish/upgrade home exercise program. Perform / instruct on therapeutic exercises, gait training, transfer training, and Range of Motion.  Occupational Therapy to evaluate and treat. Evaluate home environment for safety and equipment needs. Perform/Instruct on transfers, ADL training, ROM, and therapeutic exercises.  Aide to provide assistance with personal care, ADLs, and vital signs.    MISCELLANEOUS CARE:  Routine Skin for Bedridden Patients: Instruct patient/caregiver to apply moisture barrier cream to all skin folds and wet areas in perineal area daily and after baths and all bowel movements.    WOUND CARE ORDERS  Wound vac: dressing change twice a week (M, Th)  Cycle continuous  Negative pressure 125mmhg  Cleanse with Normal saline   a. Ensure that canister is engaged into pump, replace canister weekly or  when full   b. If leak alarm occurs, apply additional thin film at location of leak   c. Alarm troubleshooting contact [facility]. Phone # is [phone], Select option 5. [employer] representative is available 24 hours a day 7 days per week.   e. When therapy is discontinued or patient is discharged, please return non disposable NPWT unit per facility guidelines.      Medications: Review discharge medications with patient and family and provide education.      Current Discharge Medication List        START taking these medications    Details   amoxicillin-clavulanate 875-125mg (AUGMENTIN) 875-125 mg per tablet Take 1 tablet by mouth every 12 (twelve) hours. for 26 days  Qty: 52 tablet, Refills: 0      doxycycline (VIBRAMYCIN) 100 MG Cap Take 1 capsule (100 mg total) by mouth 2 (two) times daily. for 26 days  Qty: 52 capsule, Refills: 0      HYDROcodone-acetaminophen (NORCO) 5-325 mg per tablet Take 1 tablet by mouth every 6 (six) hours as needed for Pain.  Qty: 10 tablet, Refills: 0    Comments: Quantity prescribed more than 7 day supply? No           CONTINUE these medications which have NOT CHANGED    Details   aspirin (ECOTRIN) 81 MG EC tablet Take 81 mg by mouth once daily.      atorvastatin (LIPITOR) 80 MG tablet Take 1 tablet (80 mg total) by mouth every evening.  Qty: 60 tablet, Refills: 1    Comments: Please inactivate all prior scripts with same name and strength including on holds. Please delete all prior scripts with same name and strength including on holds.  Associated Diagnoses: Hyperlipidemia, unspecified hyperlipidemia type      blood sugar diagnostic Strp Use to test blood glucose two (2) times daily as directed with insurance preferred meter and supplies  Qty: 200 each, Refills: 3    Comments: Please inactivate all prior scripts with same name and strength including any scripts on hold.  Associated Diagnoses: Type 2 diabetes mellitus with hyperglycemia, with long-term current use of insulin      dulaglutide  (TRULICITY) 0.75 mg/0.5 mL pen injector Inject 0.75 mg into the skin every 7 days. OK to discontinue this med during SNF stay - resume upon discharge  Qty: 4 pen , Refills: 11      ergocalciferol (ERGOCALCIFEROL) 50,000 unit Cap Take 50,000 Units by mouth every Saturday.      ezetimibe (ZETIA) 10 mg tablet Take 1 tablet (10 mg total) by mouth once daily. (For cholesterol)  Qty: 90 tablet, Refills: 1    Associated Diagnoses: Hyperlipidemia associated with type 2 diabetes mellitus      FLUoxetine 40 MG capsule Take 40 mg by mouth once daily.      fluticasone propionate (FLONASE) 50 mcg/actuation nasal spray 2 sprays (100 mcg total) by Each Nostril route daily as needed for Allergies or Rhinitis.  Qty: 16 g, Refills: 0    Associated Diagnoses: Nasal congestion with rhinorrhea      furosemide (LASIX) 40 MG tablet Take 1 tablet (40 mg total) by mouth once daily.  Qty: 60 tablet, Refills: 1    Associated Diagnoses: Chronic heart failure with preserved ejection fraction      ibuprofen (ADVIL,MOTRIN) 200 MG tablet Take 200 mg by mouth every 6 (six) hours as needed for Pain.      !! insulin (LANTUS SOLOSTAR U-100 INSULIN) glargine 100 units/mL SubQ pen Inject 80 Units into the skin 2 (two) times a day. INJECT 80 UNITS SUBCUTANEOUSLY TWICE DAILY (BULK) Strength: 100 unit/mL (3 mL)  Qty: 30 mL, Refills: 5    Comments: This prescription was filled on 9/19/2023. Any refills authorized will be placed on file.  Associated Diagnoses: Type 2 diabetes mellitus with other skin ulcer (CODE)      insulin aspart U-100 (NOVOLOG FLEXPEN U-100 INSULIN) 100 unit/mL (3 mL) InPn pen Inject 30 Units into the skin 2 (two) times a day.  Qty: 30 mL, Refills: 12    Associated Diagnoses: Type 2 diabetes mellitus with other skin ulcer (CODE)      lancets (TRUEPLUS LANCETS) 28 gauge Misc Use to test blood glucose two (2) times daily as directed with insurance preferred meter and supplies  Qty: 200 each, Refills: 3    Comments: Please inactivate all  "prior scripts with same name and strength including any scripts on hold.  Associated Diagnoses: Type 2 diabetes mellitus with hyperglycemia, with long-term current use of insulin      !! LANTUS SOLOSTAR U-100 INSULIN glargine 100 units/mL SubQ pen Inject 80 Units into the skin 2 (two) times a day.      lisinopriL (PRINIVIL,ZESTRIL) 40 MG tablet Take 1 tablet (40 mg total) by mouth once daily.  Qty: 90 tablet, Refills: 3    Comments: Patient request mail-order medications  Associated Diagnoses: Hypertension associated with diabetes; Chronic diastolic congestive heart failure      metFORMIN (GLUCOPHAGE-XR) 500 MG ER 24hr tablet Take 1,000 mg by mouth 2 (two) times daily with meals.      metoprolol succinate (TOPROL-XL) 100 MG 24 hr tablet Take 100 mg by mouth once daily.      NIFEdipine (ADALAT CC) 60 MG TbSR Take 60 mg by mouth every morning.      pantoprazole (PROTONIX) 40 MG tablet Take 40 mg by mouth once daily.      !! pen needle, diabetic (BD ULTRA-FINE ROSA PEN NEEDLE) 32 gauge x 5/32" Ndle Use with insulin once daily  Qty: 100 each, Refills: 0      !! pen needle, diabetic 32 gauge x 5/32" Ndle Use with injectable DM supplies twice daily as directed  Qty: 200 each, Refills: 0    Comments: Please inactivate all prior scripts with same name and strength including any scripts on hold.  Associated Diagnoses: Type 2 diabetes mellitus with hyperglycemia, with long-term current use of insulin      potassium chloride (KLOR-CON) 8 MEQ TbSR TAKE TWO TABLETS BY MOUTH TWICE DAILY @ 9AM & 5PM  Qty: 180 tablet, Refills: 0    Associated Diagnoses: Low serum potassium      TRUE METRIX GO GLUCOSE METER Misc        !! - Potential duplicate medications found. Please discuss with provider.          I certify that this patient is confined to her home and needs intermittent skilled nursing care, physical therapy and occupational therapy.    Arlyn Valenzuela MD  1/12/2024 2:00 PM  Department of Hospital medicine         "

## 2024-01-12 NOTE — PROGRESS NOTES
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 Work up notable for CT findings with FG in R inguinum and pyelonephritis and perinephric abscess. S/p OR 1/7 for washout - cx with GNR thus far. Wound cx on 1/5 with Ecoli, strep, and MRSA, ucx with Ecoli. Renal US with complex L kidney lesion - S/p IR aspiration 1/8 - noted to have purulent drainage, cx with Ecoli thus far.  Repeat KRISS with known abscess - 3.8x3x2.2 cm complex perinephric fluid collection. Recent .          Recommendations:   -continue vancomycin pharm to dose while admitted   -monitor renal fx while on vanc  -continue Ceftriaxone 2g iv daily and PO flagyl for empiric anaerobic coverage while admitted   -discussed risks/benefits of IV v PO abx therapy with patient, pt voiced understanding and stated she preferred PO   -upon discharge, recommend doxycycline 100 mg bid and augmentin 875-125mg bid (to cover organisms isolated + anaerobes)   -anticipate 4w course of abx therapy, evon 2/7 with repeat KRISS to be done prior to completion of abx therapy   -will arrange follow up in ID clinic          Thank you for your consult. Will sign off, please call with questions, new culture data or clinical changes. Above d/w primary team.       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 Denies drainage prior to arrival. Lesion kept getting bigger and more painful and then she had fever, so went to hospital. Reports some dysuria. Denies cva tenderness. Denies indwelling hardware. Denies abx allergies.     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     Transferred to [facility] for surgical eval    Today 1/7, s/p  IRRIGATION AND DEBRIDEMENT RIGHT GROIN (Right)  IRRIGATION AND DEBRIDEMENT, LOWER EXTREMITY     Per op note, sinus tract found and debrided. Necrosis extended through Miller's fascia down to the external oblique     Wound culture from admit-  Specimen Information: Abdomen; Abscess   0 Result Notes      Component 2 d ago   Aerobic Bacterial Culture      Abnormal   GRAM NEGATIVE EDOUARD  Many  Identification and susceptibility pending  P      Aerobic Bacterial Culture      Abnormal   STREPTOCOCCUS ANGINOSUS  Many  Susceptibility testing not routinely performed  P             OR cultures today - pending    Ucx GNR      Renal US     3 cm complex collection adjacent to the left kidney in keeping with the patient's known perinephric abscess.     On vanc/meropenem/clindamycin    ID consulted for "abx mgt assistance, right sided Jeremiah's gangrene, contralateral pyelo w/ PN abscesses, DM   Interval History: S/p wound vac placement. Pt reports tolerating antibiotics without adverse reactions. She states she's looking forward to going home soon.           Review of Systems   Constitutional:  Negative for chills and fever.   All other systems reviewed and are negative.    Objective:     Vital Signs (Most Recent):  Temp: 98.5 °F (36.9 °C) (01/12/24 0740)  Pulse: 83 (01/12/24 0906)  Resp: 18 (01/12/24 0740)  BP: (!) 157/76 (01/12/24 0906)  SpO2: (!) 93 % (01/12/24 0740) Vital Signs (24h Range):  Temp:  [98 °F (36.7 °C)-98.8 °F (37.1 °C)] 98.5 °F (36.9 °C)  Pulse:  " [73-83] 83  Resp:  [18-20] 18  SpO2:  [93 %-96 %] 93 %  BP: (144-158)/(67-78) 157/76     Weight: 105 kg (231 lb 7.7 oz)  Body mass index is 38.52 kg/m².    Estimated Creatinine Clearance: 104.6 mL/min (based on SCr of 0.8 mg/dL).     Physical Exam  Constitutional:       General: She is not in acute distress.     Appearance: She is not ill-appearing, toxic-appearing or diaphoretic.   Pulmonary:      Effort: Pulmonary effort is normal. No respiratory distress.   Abdominal:      General: There is no distension.      Palpations: Abdomen is soft.      Tenderness: There is no abdominal tenderness. There is no right CVA tenderness or left CVA tenderness.   Genitourinary:     Comments: Wound vac  Skin:     General: Skin is warm and dry.   Neurological:      Mental Status: She is alert and oriented to person, place, and time.          Significant Labs:   Microbiology Results (last 7 days)       Procedure Component Value Units Date/Time    Culture, Anaerobe [[id number]] Collected: 01/07/24 0850    Order Status: Completed Specimen: Wound from Groin Updated: 01/11/24 0906     Anaerobic Culture No anaerobes isolated    Narrative:      Right groin tissue    Culture, Anaerobic [[id number]] Collected: 01/08/24 1607    Order Status: Completed Specimen: Body Fluid from Kidney, Left Updated: 01/10/24 1054     Anaerobic Culture Culture in progress    Narrative:      IR aspiration    Culture, Body Fluid (Aerobic) w/ Gram Stain [[id number]]  (Abnormal)  (Susceptibility) Collected: 01/08/24 1607    Order Status: Completed Specimen: Body Fluid from Back Updated: 01/10/24 0830     AEROBIC CULTURE - FLUID ESCHERICHIA COLI  Many       Gram Stain Result Rare WBC's      Few Gram negative rods      Few Gram positive cocci in pairs and chains    Narrative:      IR aspiration of L kidney collection    AFB Culture & Smear [[id number]] Collected: 01/07/24 0850    Order Status: Completed Specimen: Wound from Groin Updated: 01/09/24 2127     AFB  Culture & Smear Culture in progress     AFB CULTURE STAIN No acid fast bacilli seen.    Narrative:      Right groin    Aerobic culture [[id number]]  (Abnormal)  (Susceptibility) Collected: 01/07/24 0850    Order Status: Completed Specimen: Wound from Groin Updated: 01/09/24 0746     Aerobic Bacterial Culture ESCHERICHIA COLI  Rare      Narrative:      Right groin    Culture, Anaerobe [[id number]]     Order Status: No result Specimen: Joint Fluid     Aerobic culture [[id number]]     Order Status: No result Specimen: Bone     Gram stain [[id number]] Collected: 01/07/24 0850    Order Status: Completed Specimen: Wound from Groin Updated: 01/07/24 1217     Gram Stain Result Few WBC's      Few Gram negative rods      Few Gram positive cocci in pairs    Narrative:      Right groin    Fungus culture [[id number]] Collected: 01/07/24 0850    Order Status: Sent Specimen: Wound from Groin Updated: 01/07/24 1108            Significant Imaging: I have reviewed all pertinent imaging results/findings within the past 24 hours.

## 2024-01-12 NOTE — PROGRESS NOTES
[employer]/3M VAC in place to right mons pubis at 125 mmHg continuous suction. Small amount pink fluid in VAC canister. Patient denies pain at site of dressing.   Awaiting arrangements for NPWT. Due to benefits, patient may need to convert to [facility] for home. Dressing will need to be changed if brand of device is changed.   Nursing will need to change dressing, if dressing change is needed before 1/16.

## 2024-01-12 NOTE — ASSESSMENT & PLAN NOTE
L Ecoli perinephric abscess    I independently reviewed patient's lab work and images as documented. 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 Work up notable for CT findings with FG in R inguinum and pyelonephritis and perinephric abscess. S/p OR 1/7 for washout - cx with GNR thus far. Wound cx on 1/5 with Ecoli, strep, and MRSA, ucx with Ecoli. Renal US with complex L kidney lesion - S/p IR aspiration 1/8 - noted to have purulent drainage, cx with Ecoli thus far.  Repeat KRISS with known abscess - 3.8x3x2.2 cm complex perinephric fluid collection. Recent .          Recommendations:   -continue vancomycin pharm to dose while admitted   -monitor renal fx while on vanc  -continue Ceftriaxone 2g iv daily and PO flagyl for empiric anaerobic coverage while admitted   -discussed risks/benefits of IV v PO abx therapy with patient, pt voiced understanding and stated she preferred PO   -upon discharge, recommend doxycycline 100 mg bid and augmentin 875-125mg bid   -anticipate 4w course of abx therapy, evon 2/7 with repeat KRISS to be done prior to completion of abx therapy   -will arrange follow up in ID clinic

## 2024-01-12 NOTE — SUBJECTIVE & OBJECTIVE
Interval History: She feels well.  No Pain.    Review of Systems   Constitutional:  Negative for chills, fatigue and fever.   HENT:  Negative for congestion.    Respiratory:  Negative for chest tightness and shortness of breath.    Cardiovascular:  Negative for chest pain.   Gastrointestinal:  Negative for abdominal distention, constipation, diarrhea, nausea and vomiting.   Genitourinary:  Negative for difficulty urinating, dysuria, flank pain, frequency, hematuria, pelvic pain and urgency.   Musculoskeletal:  Negative for arthralgias.   Skin:  Negative for rash.   Neurological:  Negative for dizziness.   Psychiatric/Behavioral:  Negative for confusion.      Objective:     Temp:  [98 °F (36.7 °C)-98.8 °F (37.1 °C)] 98 °F (36.7 °C)  Pulse:  [75-83] 75  Resp:  [18-20] 18  SpO2:  [93 %-95 %] 95 %  BP: (144-173)/(67-84) 173/84     Body mass index is 38.52 kg/m².           Drains       None                    Physical Exam  Vitals and nursing note reviewed.   Constitutional:       Appearance: She is well-developed.   HENT:      Head: Normocephalic.   Eyes:      Conjunctiva/sclera: Conjunctivae normal.   Neck:      Thyroid: No thyromegaly.      Trachea: No tracheal deviation.   Cardiovascular:      Rate and Rhythm: Normal rate.      Pulses: Normal pulses.      Heart sounds: Normal heart sounds.   Pulmonary:      Effort: Pulmonary effort is normal. No respiratory distress.      Breath sounds: Normal breath sounds. No wheezing.   Abdominal:      General: There is no distension.      Palpations: Abdomen is soft. There is no mass.      Tenderness: There is no abdominal tenderness. There is no guarding or rebound.      Hernia: No hernia is present.   Musculoskeletal:         General: No tenderness. Normal range of motion.      Cervical back: Normal range of motion.   Lymphadenopathy:      Cervical: No cervical adenopathy.   Skin:     General: Skin is warm and dry.      Findings: No erythema or rash.   Neurological:      Mental  Status: She is alert and oriented to person, place, and time.   Psychiatric:         Behavior: Behavior normal.         Thought Content: Thought content normal.         Judgment: Judgment normal.           Significant Labs:    BMP:  Recent Labs   Lab 01/09/24  0504 01/11/24 0418 01/12/24 0416    139 136   K 3.3* 3.2* 3.6    101 100   CO2 25 28 26   BUN 8 6 7   CREATININE 0.8 0.8 0.8   CALCIUM 8.8 8.9 8.6*       CBC:   Recent Labs   Lab 01/09/24  0504 01/11/24 0418 01/12/24 0416   WBC 9.06 8.46 8.46   HGB 10.4* 11.5* 11.0*   HCT 32.5* 36.1* 34.0*    352 354       Blood Culture:   Recent Labs   Lab 01/05/24  2106   LABBLOO No growth after 5 days.     Urine Culture:   Recent Labs   Lab 01/05/24  1818   LABURIN ESCHERICHIA COLI  >100,000 cfu/ml  *       Significant Imaging:

## 2024-01-12 NOTE — NURSING
came to patient's bedside to place wound vac. No acute distress noted. Will continue to monitor through end of shift.

## 2024-01-12 NOTE — PT/OT/SLP EVAL
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 She presents with performance deficits affecting function including  weakness, impaired endurance, impaired self care due to wound (perineal/bathing). Pt. In bed with HOB elevated, on room air, and wound vac attached with no pain noted. She said she is awaiting insurance approval for a smaller wound vac so she can discharge home.     Rehab Prognosis: Good; patient would not need acute OT services to address these deficits and reach maximum level of function.    Plan:   Plan of Care Expires: 01/12/24  Plan of Care Reviewed with: patient    Subjective     Chief Complaint: none   Patient Comments/Goals: trying to get home to relieve her older daughter   Pain/Comfort:  Pain Rating 1: 0/10    Patients cultural, spiritual, Mosque conflicts given the current situation: no    Social History:  Living Environment: Patient lives with their child(shaylee) in a first floor apartment with number of outside stair(s): 0   Prior Level of Function: Prior to admission, patient was independent  Roles and Routines: Patient was not driving prior to admission. Her older daughter provides transportation for her.   Equipment Used at Home: cane, straight, bedside commode, bath bench  DME owned (not currently used): none  Assistance Upon Discharge: family    Objective:     Communicated with RNGustavo, prior to session. Patient found HOB elevated with wound vac, peripheral IV upon OT entry to  room.    General Precautions: Standard, fall, diabetic   Orthopedic Precautions: N/A   Braces: N/A    Respiratory Status: Room air    Occupational Performance    Gait belt applied - Yes    Bed Mobility:   Rolling/Turning to Right with independence  Scooting anteriorly to EOB to have both feet planted on floor: independence  Supine to sit from right side of bed with independence    Functional Mobility/Transfers:  Sit <> Stand Transfer with independence with no AD  Bed <> Chair Transfer using Step Transfer technique with independence with no AD  Toilet Transfer Step Transfer technique with independence with no AD  Functional Mobility: Patient walked with no AD and with no physical assistance needed from bed to toilet, to sink and then to chair with occupational therapy assisting with wound vac only. She did not have any pain, SOB, or loss of balance while walking.     Activities of Daily Living:  Grooming: independence  Upper Body Dressing: independence  Lower Body Dressing: independence  Toileting: independence    Cognitive/Visual Perceptual:  Cognitive/Psychosocial Skills:    -     Oriented to: Person, Place, Time, Situation  -     Follows Commands/attention: Follows multistep  commands  -     Communication: clear/fluent  -     Memory: No Deficits noted  -     Safety awareness/insight to disability: intact  -     Mood/Affect/Coping skills/emotional control: Appropriate to situation  Visual/Perceptual:    -     Intact    Physical Exam:  Balance:    -     Sitting: independence  -     Standing: independence  Postural examination/scapula alignment:    -       No postural abnormalities identified  Skin integrity: Visible skin intact  Edema:  None noted  Sensation: -       Intact  Motor Planning: Intact  Dominant hand: Right  Upper Extremity Range of Motion:     -       Right Upper Extremity: WNL  -       Left Upper Extremity: WNL  Upper Extremity Strength:    -       Right Upper Extremity: WNL  -       Left Upper  Extremity: WNL   Strength:    -       Right Upper Extremity: WNL  -       Left Upper Extremity: WNL  Fine Motor Coordination:    -       Intact  Gross motor coordination:   WFL    AMPAC 6 Click ADL:  AMPAC Total Score: 23    Treatment & Education:  Patient educated on role of OT, POC, and goals for therapy  Patient educated on importance of OOB activities with staff member assistance and sitting OOB majority of the day  Occupational therapy educated patient re: AROM of B upper extremity with no problems or decreased strength noted.   She demonstrated overhead shoulder reaches with no pain noted.  Occupational therapy discussed safe sponge bathing at home due to perineal wound, but encouraged her to check with wound care re: specific instructions.     Patient clear to ambulate to/from bathroom with RN/PCT, assist x1  .    Patient left up in chair with all lines intact, call button in reach, and RN notified.    GOALS:   Multidisciplinary Problems       Occupational Therapy Goals       Not on file              Multidisciplinary Problems (Resolved)          Problem: Occupational Therapy    Goal Priority Disciplines Outcome Interventions   Occupational Therapy Goal   (Resolved)     OT, PT/OT Met                        History:     Past Medical History:   Diagnosis Date    Cataract     Cervical high risk HPV (human papillomavirus) test positive 2020    NOT 16 OR 18    CHF (congestive heart failure)     CVA (cerebral infarction)          Depression     Diabetes mellitus, type 2     GERD (gastroesophageal reflux disease)     Hyperlipidemia     Hypertension     Lactic acidosis 10/06/2023    Moderate nonproliferative diabetic retinopathy     Nausea and vomiting 10/06/2023    Obesity     Stroke     Tachycardia 10/04/2023         Past Surgical History:   Procedure Laterality Date     SECTION      CHOLECYSTECTOMY      DILATION AND CURETTAGE OF UTERUS      EYE SURGERY Bilateral     laser    GALLBLADDER  SURGERY  03/2017    stone removed    IRRIGATION AND DEBRIDEMENT Right 1/7/2024    Procedure: IRRIGATION AND DEBRIDEMENT RIGHT GROIN;  Surgeon: Chalo Padgett MD;  Location: [facility];  Service: General;  Laterality: Right;       Time Tracking:     OT Date of Treatment: 01/12/24  OT Start Time: 1054  OT Stop Time: 1124  OT Total Time (min): 30 min    Billable Minutes: Evaluation 15  and Self Care/Home Management 15     1/12/2024

## 2024-01-12 NOTE — ASSESSMENT & PLAN NOTE
S/p aspiration by IR  KRISS demonstrates stability  Follow cultures treat with 2 weeks of antibiotics  Antibiotics per ID recs    Follow up in 1-2 weeks  Will sign off

## 2024-01-12 NOTE — NURSING
[facility], [facility]  Nurses Note -- 4 Eyes      1/11/2024       Skin assessed on: Q Shift      [x] No Pressure Injuries Present    []Prevention Measures Documented    [] Yes LDA  for Pressure Injury Previously documented     [] Yes New Pressure Injury Discovered   [] LDA for New Pressure Injury Added      Attending RN:  La Krueger RN     Second RN:  Samantha NIETO RN        Per Rayna, I can add the BMP, but not the CBC because that specimen is a different tube. Do you want me to go ahead and add the BMP so you will have that resulted?

## 2024-01-12 NOTE — CONSULTS
[facility]  Telemedicine Consult Note       Jaimee Quintana has been accepted for transfer to [facility] and will be followed through telemedicine services beginning 01/12/24 at 7 AM.    Arlyn Valenzuela MD  [facility] Staff

## 2024-01-13 VITALS
TEMPERATURE: 98 F | DIASTOLIC BLOOD PRESSURE: 78 MMHG | HEIGHT: 65 IN | BODY MASS INDEX: 38.57 KG/M2 | SYSTOLIC BLOOD PRESSURE: 163 MMHG | WEIGHT: 231.5 LBS | HEART RATE: 73 BPM | RESPIRATION RATE: 18 BRPM | OXYGEN SATURATION: 98 %

## 2024-01-13 LAB
POCT GLUCOSE: 175 MG/DL (ref 70–110)
POCT GLUCOSE: 196 MG/DL (ref 70–110)

## 2024-01-13 PROCEDURE — 63600175 PHARM REV CODE 636 W HCPCS: Mod: HCNC | Performed by: STUDENT IN AN ORGANIZED HEALTH CARE EDUCATION/TRAINING PROGRAM

## 2024-01-13 PROCEDURE — 25000003 PHARM REV CODE 250: Mod: HCNC | Performed by: STUDENT IN AN ORGANIZED HEALTH CARE EDUCATION/TRAINING PROGRAM

## 2024-01-13 PROCEDURE — 63600175 PHARM REV CODE 636 W HCPCS: Mod: HCNC | Performed by: HOSPITALIST

## 2024-01-13 PROCEDURE — 25000003 PHARM REV CODE 250: Mod: HCNC | Performed by: HOSPITALIST

## 2024-01-13 RX ORDER — NIFEDIPINE 30 MG/1
90 TABLET, EXTENDED RELEASE ORAL DAILY
Status: DISCONTINUED | OUTPATIENT
Start: 2024-01-14 | End: 2024-01-13 | Stop reason: HOSPADM

## 2024-01-13 RX ORDER — HYDRALAZINE HYDROCHLORIDE 50 MG/1
50 TABLET, FILM COATED ORAL EVERY 8 HOURS
Qty: 90 TABLET | Refills: 11 | Status: SHIPPED | OUTPATIENT
Start: 2024-01-13 | End: 2025-01-12

## 2024-01-13 RX ORDER — HYDRALAZINE HYDROCHLORIDE 25 MG/1
50 TABLET, FILM COATED ORAL EVERY 8 HOURS
Status: DISCONTINUED | OUTPATIENT
Start: 2024-01-13 | End: 2024-01-13 | Stop reason: HOSPADM

## 2024-01-13 RX ORDER — HYDRALAZINE HYDROCHLORIDE 50 MG/1
50 TABLET, FILM COATED ORAL EVERY 8 HOURS
Qty: 90 TABLET | Refills: 11 | Status: SHIPPED | OUTPATIENT
Start: 2024-01-13 | End: 2024-01-13

## 2024-01-13 RX ORDER — NIFEDIPINE 90 MG/1
90 TABLET, EXTENDED RELEASE ORAL DAILY
Qty: 30 TABLET | Refills: 11 | Status: SHIPPED | OUTPATIENT
Start: 2024-01-14 | End: 2025-01-13

## 2024-01-13 RX ORDER — NIFEDIPINE 90 MG/1
90 TABLET, EXTENDED RELEASE ORAL DAILY
Qty: 30 TABLET | Refills: 11 | Status: SHIPPED | OUTPATIENT
Start: 2024-01-14 | End: 2024-01-13

## 2024-01-13 RX ORDER — HYDRALAZINE HYDROCHLORIDE 20 MG/ML
10 INJECTION INTRAMUSCULAR; INTRAVENOUS EVERY 6 HOURS PRN
Status: DISCONTINUED | OUTPATIENT
Start: 2024-01-13 | End: 2024-01-13 | Stop reason: HOSPADM

## 2024-01-13 RX ADMIN — HYDRALAZINE HYDROCHLORIDE 50 MG: 25 TABLET, FILM COATED ORAL at 05:01

## 2024-01-13 RX ADMIN — FLUOXETINE HYDROCHLORIDE 40 MG: 20 CAPSULE ORAL at 08:01

## 2024-01-13 RX ADMIN — METOPROLOL SUCCINATE 100 MG: 50 TABLET, EXTENDED RELEASE ORAL at 08:01

## 2024-01-13 RX ADMIN — METRONIDAZOLE 500 MG: 500 TABLET ORAL at 08:01

## 2024-01-13 RX ADMIN — FLUTICASONE PROPIONATE 100 MCG: 50 SPRAY, METERED NASAL at 08:01

## 2024-01-13 RX ADMIN — NIFEDIPINE 60 MG: 30 TABLET, FILM COATED, EXTENDED RELEASE ORAL at 08:01

## 2024-01-13 RX ADMIN — INSULIN DETEMIR 20 UNITS: 100 INJECTION, SOLUTION SUBCUTANEOUS at 08:01

## 2024-01-13 RX ADMIN — CEFTRIAXONE 2 G: 2 INJECTION, POWDER, FOR SOLUTION INTRAMUSCULAR; INTRAVENOUS at 09:01

## 2024-01-13 RX ADMIN — HEPARIN SODIUM 5000 UNITS: 5000 INJECTION INTRAVENOUS; SUBCUTANEOUS at 06:01

## 2024-01-13 RX ADMIN — ASPIRIN 81 MG: 81 TABLET, COATED ORAL at 08:01

## 2024-01-13 RX ADMIN — VANCOMYCIN HYDROCHLORIDE 1250 MG: 1.25 INJECTION, POWDER, LYOPHILIZED, FOR SOLUTION INTRAVENOUS at 12:01

## 2024-01-13 RX ADMIN — HEPARIN SODIUM 5000 UNITS: 5000 INJECTION INTRAVENOUS; SUBCUTANEOUS at 05:01

## 2024-01-13 RX ADMIN — LISINOPRIL 40 MG: 20 TABLET ORAL at 08:01

## 2024-01-13 RX ADMIN — PANTOPRAZOLE SODIUM 40 MG: 40 TABLET, DELAYED RELEASE ORAL at 08:01

## 2024-01-13 NOTE — NURSING
[facility], [facility]  Nurses Note -- 4 Eyes      1/12/2024       Skin assessed on: Q Shift      [x] No Pressure Injuries Present    [x]Prevention Measures Documented    [] Yes LDA  for Pressure Injury Previously documented     [] Yes New Pressure Injury Discovered   [] LDA for New Pressure Injury Added      Attending RN:  Hemant Styles RN     Second RN:  La Krueger Rn

## 2024-01-13 NOTE — NURSING
Pt wound vac dressing changed home vac would not work, this nurse, the charge nurse and another staff RN tried still did not work house supervisor informed he cam and tried still did not work two wound vac were tried and two canister but still did not Dr contreras Munshi and Sheyla Brandon wound care consultant informed, Ms. Carrion came to facility, changed dressing again b/c [employer] vac was placed back on once it would not work, so she changed dressing applied home [employer] wound vac  did not work she changed canister and [employer]  wound vac now working on continuous suction 120. Pt now getting dressed.

## 2024-01-13 NOTE — NURSING
[facility], [facility]  Nurses Note -- 4 Eyes      1/13/2024       Skin assessed on: Q Shift      [x] No Pressure Injuries Present    []Prevention Measures Documented    [] Yes LDA  for Pressure Injury Previously documented     [] Yes New Pressure Injury Discovered   [] LDA for New Pressure Injury Added      Attending RN:  Yolanda Hawley LPN     Second RN:  MACEY Bang

## 2024-01-13 NOTE — PLAN OF CARE
Plan of care is ongoing.    Problem: Adult Inpatient Plan of Care  Goal: Plan of Care Review  Outcome: Ongoing, Progressing  Goal: Patient-Specific Goal (Individualized)  Outcome: Ongoing, Progressing  Goal: Absence of Hospital-Acquired Illness or Injury  Outcome: Ongoing, Progressing  Goal: Optimal Comfort and Wellbeing  Outcome: Ongoing, Progressing  Goal: Readiness for Transition of Care  Outcome: Ongoing, Progressing     Problem: Diabetes Comorbidity  Goal: Blood Glucose Level Within Targeted Range  Outcome: Ongoing, Progressing     Problem: Bariatric Environmental Safety  Goal: Safety Maintained with Care  Outcome: Ongoing, Progressing     Problem: Impaired Wound Healing  Goal: Optimal Wound Healing  Outcome: Ongoing, Progressing     Problem: Skin Injury Risk Increased  Goal: Skin Health and Integrity  Outcome: Ongoing, Progressing     Problem: Fall Injury Risk  Goal: Absence of Fall and Fall-Related Injury  Outcome: Ongoing, Progressing     Problem: Infection  Goal: Absence of Infection Signs and Symptoms  Outcome: Ongoing, Progressing

## 2024-01-13 NOTE — PLAN OF CARE
Problem: Adult Inpatient Plan of Care  Goal: Plan of Care Review  Outcome: Adequate for Care Transition  Goal: Patient-Specific Goal (Individualized)  Outcome: Adequate for Care Transition  Goal: Absence of Hospital-Acquired Illness or Injury  Outcome: Adequate for Care Transition  Goal: Optimal Comfort and Wellbeing  Outcome: Adequate for Care Transition  Goal: Readiness for Transition of Care  Outcome: Adequate for Care Transition     Problem: Diabetes Comorbidity  Goal: Blood Glucose Level Within Targeted Range  Outcome: Adequate for Care Transition     Problem: Bariatric Environmental Safety  Goal: Safety Maintained with Care  Outcome: Adequate for Care Transition     Problem: Impaired Wound Healing  Goal: Optimal Wound Healing  Outcome: Adequate for Care Transition     Problem: Skin Injury Risk Increased  Goal: Skin Health and Integrity  Outcome: Adequate for Care Transition     Problem: Pain Acute  Goal: Acceptable Pain Control and Functional Ability  Outcome: Adequate for Care Transition     Problem: Fall Injury Risk  Goal: Absence of Fall and Fall-Related Injury  Outcome: Adequate for Care Transition     Problem: Infection  Goal: Absence of Infection Signs and Symptoms  Outcome: Adequate for Care Transition

## 2024-01-13 NOTE — PROGRESS NOTES
Pharmacokinetic Assessment Follow Up: IV Vancomycin    Vancomycin serum concentration assessment(s):    The trough level was drawn correctly and can be used to guide therapy at this time. The measurement is above the desired definitive target range of 10 to 20 mcg/mL.    Vancomycin Regimen Plan:    Change regimen to Vancomycin 1250 mg IV every 12 hours with next serum trough concentration measured at 2300 prior to fourth dose on 1/14    Drug levels (last 3 results):  Recent Labs   Lab Result Units 01/10/24  0929 01/11/24  0928 01/12/24  2334   Vancomycin-Trough ug/mL 19.2 18.7 22.6*       Pharmacy will continue to follow and monitor vancomycin.    Please contact pharmacy at extension [phone] for questions regarding this assessment.    Thank you for the consult,   Donte Fuentes       Patient brief summary:  Jaimee Quintana is a 48 y.o. female initiated on antimicrobial therapy with IV Vancomycin for treatment of intra-abdominal infection    Drug Allergies:   Review of patient's allergies indicates:  No Known Allergies    Actual Body Weight:   105 kg    Renal Function:   Estimated Creatinine Clearance: 104.6 mL/min (based on SCr of 0.8 mg/dL).,     Dialysis Method (if applicable):  N/A    CBC (last 72 hours):  Recent Labs   Lab Result Units 01/11/24  0418 01/12/24  0416   WBC K/uL 8.46 8.46   Hemoglobin g/dL 11.5* 11.0*   Hematocrit % 36.1* 34.0*   Platelets K/uL 352 354   Gran % % 68.4 68.9   Lymph % % 19.9 20.0   Mono % % 7.6 7.7   Eosinophil % % 2.2 2.0   Basophil % % 0.4 0.2   Differential Method  Automated Automated       Metabolic Panel (last 72 hours):  Recent Labs   Lab Result Units 01/11/24  0418 01/12/24  0416   Sodium mmol/L 139 136   Potassium mmol/L 3.2* 3.6   Chloride mmol/L 101 100   CO2 mmol/L 28 26   Glucose mg/dL 175* 236*   BUN mg/dL 6 7   Creatinine mg/dL 0.8 0.8       Vancomycin Administrations:  vancomycin given in the last 96 hours                     vancomycin 1,500 mg in dextrose 5 %  (D5W) 250 mL IVPB (Vial-Mate) (mg) 1,500 mg New Bag 01/12/24 1320     1,500 mg New Bag 01/11/24 2357     1,500 mg New Bag  1226    vancomycin 1,500 mg in dextrose 5 % (D5W) 250 mL IVPB (Vial-Mate) (mg) 1,500 mg New Bag 01/10/24 2215     1,500 mg New Bag  1132     1,500 mg New Bag  0052     1,500 mg New Bag 01/09/24 1117                    Microbiologic Results:  Microbiology Results (last 7 days)       Procedure Component Value Units Date/Time    Culture, Anaerobic [[id number]] Collected: 01/08/24 1607    Order Status: Completed Specimen: Body Fluid from Kidney, Left Updated: 01/12/24 1032     Anaerobic Culture No anaerobes isolated    Narrative:      IR aspiration    Culture, Anaerobe [[id number]] Collected: 01/07/24 0850    Order Status: Completed Specimen: Wound from Groin Updated: 01/11/24 0906     Anaerobic Culture No anaerobes isolated    Narrative:      Right groin tissue    Culture, Body Fluid (Aerobic) w/ Gram Stain [[id number]]  (Abnormal)  (Susceptibility) Collected: 01/08/24 1607    Order Status: Completed Specimen: Body Fluid from Back Updated: 01/10/24 0830     AEROBIC CULTURE - FLUID ESCHERICHIA COLI  Many       Gram Stain Result Rare WBC's      Few Gram negative rods      Few Gram positive cocci in pairs and chains    Narrative:      IR aspiration of L kidney collection    AFB Culture & Smear [[id number]] Collected: 01/07/24 0850    Order Status: Completed Specimen: Wound from Groin Updated: 01/09/24 2127     AFB Culture & Smear Culture in progress     AFB CULTURE STAIN No acid fast bacilli seen.    Narrative:      Right groin    Aerobic culture [[id number]]  (Abnormal)  (Susceptibility) Collected: 01/07/24 0850    Order Status: Completed Specimen: Wound from Groin Updated: 01/09/24 0746     Aerobic Bacterial Culture ESCHERICHIA COLI  Rare      Narrative:      Right groin    Culture, Anaerobe [[id number]]     Order Status: No result Specimen: Joint Fluid     Aerobic culture [[id number]]     Order  Status: No result Specimen: Bone     Gram stain [[id number]] Collected: 01/07/24 0850    Order Status: Completed Specimen: Wound from Groin Updated: 01/07/24 1217     Gram Stain Result Few WBC's      Few Gram negative rods      Few Gram positive cocci in pairs    Narrative:      Right groin    Fungus culture [[id number]] Collected: 01/07/24 0850    Order Status: Sent Specimen: Wound from Groin Updated: 01/07/24 1108

## 2024-01-13 NOTE — PLAN OF CARE
ADT 30 order placed for Van Transportation.  Requested  time: 5:00pm  If transportation does not arrive at ETA time nurse will be instructed to follow protocol for transportation below:  How can I get in touch directly with dispatch, if needed?                 Non-emergent dispatch: [phone] opt 6    +++NURSING:  If Van does not arrive at requested time please call the above Non Emergent Dispatcher.  If issue not resolved please escalate to your charge nurse for further instructions.    Transport to [facility]   [address] [place] [id number]

## 2024-01-13 NOTE — PLAN OF CARE
Patient alert and oriented x4. Respirations even and unlabored. No distress noted. Skin warm and dry. Iv saline locked. No complications noted. Iv antibiotics given as ordered. Wound vac to groin area going at 125mmHg. No complications noted. Patient denies pain. Pain medication available as needed. Blood sugar monitored. Insulin given per sliding scale. Contact precautions maintained throughout shift. Patient has no complaints at this time.  Bed in lowest position. Call bell within reach. Instructed to call for any needs.     Problem: Adult Inpatient Plan of Care  Goal: Plan of Care Review  Outcome: Ongoing, Progressing  Flowsheets (Taken 1/13/2024 0323)  Plan of Care Reviewed With: patient  Goal: Patient-Specific Goal (Individualized)  Outcome: Ongoing, Progressing  Goal: Absence of Hospital-Acquired Illness or Injury  Outcome: Ongoing, Progressing  Intervention: Identify and Manage Fall Risk  Flowsheets (Taken 1/13/2024 0323)  Safety Promotion/Fall Prevention:   assistive device/personal item within reach   bed alarm refused   Fall Risk reviewed with patient/family   medications reviewed     Problem: Diabetes Comorbidity  Goal: Blood Glucose Level Within Targeted Range  Outcome: Ongoing, Progressing  Intervention: Monitor and Manage Glycemia  Flowsheets (Taken 1/13/2024 0323)  Glycemic Management:   blood glucose monitored   supplemental insulin given     Problem: Skin Injury Risk Increased  Goal: Skin Health and Integrity  Outcome: Ongoing, Progressing     Problem: Pain Acute  Goal: Acceptable Pain Control and Functional Ability  Outcome: Ongoing, Progressing  Intervention: Develop Pain Management Plan  Flowsheets (Taken 1/13/2024 0323)  Pain Management Interventions: pain management plan reviewed with patient/caregiver     Problem: Fall Injury Risk  Goal: Absence of Fall and Fall-Related Injury  Outcome: Ongoing, Progressing  Intervention: Identify and Manage Contributors  Flowsheets (Taken 1/13/2024  0323)  Self-Care Promotion:   independence encouraged   BADL personal objects within reach  Medication Review/Management:   medications reviewed   high-risk medications identified

## 2024-01-13 NOTE — PLAN OF CARE
CHIP received followup from Dixon with [facility] asking if patient could be seen on Tuesday rather than Monday.  CHIP sent a secure chat to Dr. Valenzuela who agreed that Tuesday would be acceptable.  CHIP notified Dixon with [facility].

## 2024-01-13 NOTE — PROGRESS NOTES
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  Pt w/o current pain, f/c/n/v, flank pain etc - she was under impression she was sent here for "stomach surgery", we talked to her daughter and 2 of her sisters on the phone, separately, at her request, as well as Dr Snyder who called to talk to her preop, at the bedside, to clear this up.  Appreciate GS attention to pt, asking  to see in AM as well as we appear to have 2 separate processes here, she may need intervention from  as well although most likely will just need to treat the infection and reimage kidneys at some point.  She had I&D at bedside of right mons area in [place], presume material was sent for culture.  Incidentally pt was ill w/ COVID 19 12/18, we were advised on transfer of this, she seems to have fully recovered.  Per pt she was tested again at [facility] and was negative, and Epic prompted me "pt has negative COVID test" - but, we were not able to confirm this result, so we did another rapid which was negative.  I also have requested EKG and CXR preop in this pt w/ CHF, morbid obesity.    Pt is admitted to  services, and Dr Snyder scheduling for OR in AM.      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  Pt w/o current pain, f/c/n/v, flank pain etc - she was under impression she was sent here for "stomach surgery", we talked to her daughter and 2 of her sisters on the phone, separately, at her request, as well as Dr Snyder who called to talk to her preop, at the bedside, to clear this up.  Appreciate GS attention to pt, asking  to see in AM as well as we appear to have 2 separate processes here, she may need intervention from  as well although most likely will just need to treat the infection and reimage kidneys at some point.  She had I&D at bedside of right mons area in [place], presume material was sent for culture.  Incidentally pt was ill w/ COVID 19 12/18, we were advised on transfer of this, she seems to have fully recovered.  Per pt she was tested again at [facility] and was negative, and Epic prompted me "pt has negative COVID test" - but, we were not able to confirm this result, so we did another rapid which was negative.  I also have requested EKG and CXR preop in this pt w/ CHF, morbid obesity.    Pt is admitted to  services,surgery is consulted.  S/P I&D  and irrigation and debridement rightly groin and right leg,  S/P drainage of left perinephritic abscess by IR ,  Culture growing staph,strep,Ecoli,per ID on Vanc. And Rocephin,  Urology did another US to check size of kidney.remains same size.  Surgery planing placing wound vac,.    Interval History: Patient HDS and afebile. No acute events overnight. No new complaints this morning. Pt is medically stable for DC, pending HH and woudn vac set up.       Review of Systems   Constitutional:  Positive for activity change and fatigue. Negative for fever.   Respiratory:  Negative for shortness of breath.    Cardiovascular:  Negative for chest pain.   Gastrointestinal:  " Negative for abdominal pain.   Neurological:  Negative for dizziness and headaches.   All other systems reviewed and are negative.    Objective:     Vital Signs (Most Recent):  Temp: 98 °F (36.7 °C) (01/12/24 1101)  Pulse: 75 (01/12/24 1101)  Resp: 18 (01/12/24 1101)  BP: (!) 173/84 (01/12/24 1101)  SpO2: 95 % (01/12/24 1101) Vital Signs (24h Range):  Temp:  [98 °F (36.7 °C)-98.8 °F (37.1 °C)] 98 °F (36.7 °C)  Pulse:  [75-83] 75  Resp:  [18-20] 18  SpO2:  [93 %-95 %] 95 %  BP: (144-173)/(67-84) 173/84     Weight: 105 kg (231 lb 7.7 oz)  Body mass index is 38.52 kg/m².    Intake/Output Summary (Last 24 hours) at 1/12/2024 1605  Last data filed at 1/12/2024 0905  Gross per 24 hour   Intake 360 ml   Output --   Net 360 ml         Physical Exam  Vitals and nursing note reviewed.   Constitutional:       Appearance: Normal appearance.   HENT:      Head: Normocephalic and atraumatic.   Eyes:      Extraocular Movements: Extraocular movements intact.      Pupils: Pupils are equal, round, and reactive to light.   Cardiovascular:      Rate and Rhythm: Normal rate and regular rhythm.   Pulmonary:      Effort: Pulmonary effort is normal. No respiratory distress.   Abdominal:      General: There is no distension.   Genitourinary:     Comments: Wound vac in place  Musculoskeletal:         General: Normal range of motion.      Cervical back: Normal range of motion.   Neurological:      General: No focal deficit present.      Mental Status: She is alert and oriented to person, place, and time.   Psychiatric:         Mood and Affect: Mood normal.         Behavior: Behavior normal.             Significant Labs: All pertinent labs within the past 24 hours have been reviewed.  CBC:   Recent Labs   Lab 01/11/24 0418 01/12/24 0416   WBC 8.46 8.46   HGB 11.5* 11.0*   HCT 36.1* 34.0*    354     CMP:   Recent Labs   Lab 01/11/24 0418 01/12/24 0416    136   K 3.2* 3.6    100   CO2 28 26   * 236*   BUN 6 7    CREATININE 0.8 0.8   CALCIUM 8.9 8.6*   ANIONGAP 10 10       Significant Imaging: I have reviewed all pertinent imaging results/findings within the past 24 hours.    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    S/P I&D  and irrigation and debridement rightly groin and right leg,on 1.7.24,c  Culture growing staph,\MRSA strep,Ecoli,per ID on Vanc. And rocephin.  S/p wound vac placement.  Plan to dc on doxy and augmentin per ID recs for total 4 weeks  OP ID f/u      Perinephric abscess  Repeat US show stable size,S/P drainage ,culture grow Ecoli.      COVID-19  Pt indicates she was acutely ill w/ COVID in mid Dec  We were not able to confirm a negative test at [facility], so we got a rapid here, it's negative  She is w/o f/c/cough/SOB  No indication for treatment or isolation     Chronic combined systolic and diastolic congestive heart failure  Pt w/ combined CHF, not sure if there is ischemic component  Appears to be well compensated  Should undergo OPT assessment for CESAR  Will need cardiology f/u  CXR obtained preop, lungs clear, pt w/o CANELA/SOB/CP    Latest ECHO performed and demonstrates- Results for orders placed during the hospital encounter of 10/04/23    Echo    Interpretation Summary    Left Ventricle: The left ventricle is normal in size. There is concentric hypertrophy. Mild global hypokinesis present. There is moderately reduced systolic function with a visually estimated ejection fraction of 35 - 40%. Grade II diastolic dysfunction.    Right Ventricle: Normal right ventricular cavity size. Wall thickness is normal.  Right ventricle wall motion  is normal. Systolic function is normal.    Tricuspid Valve: There is mild regurgitation.    Pulmonary Artery: The estimated pulmonary artery systolic pressure is 26 mmHg.    IVC/SVC: Normal venous pressure at 3 mmHg.      Abscess of left kidney  Seen on CT along w/ perinephric abscesses not seen on u/s  Pt is clinically w/ minimal issues - no dysuria, back/flank pain, f/c/n/v  BCX done in [facility] ED  U/a not suggestive of any substantial UTI but the abscesses may not be communicating w/ the collecting system  Given Zosyn and vancomycin in ED  I had started Zosyn and IV doxy here empirically when I wrote admit orders but given review of all information will start meropenem, vancomycin, and clindamycin  Might need ID input  Suprisingly she does not even meet SIRS criteria at this time  Culture growing staph,strep,Ecoli,per ID on Vanc. And Rocephin.  Urology did  another US to check size of kidney.remains stable.  ,.      Class 2 severe obesity due to excess calories with serious comorbidity and body mass index (BMI) of 38.0 to 38.9 in adult  Body mass index is 38.52 kg/m².  Morbid obesity complicates all aspects of disease management from diagnostic modalities to treatment  Weight loss encouraged and health benefits explained to patient        History of CVA (cerebrovascular accident) without residual deficits  Apparently w/o sequelae  Close OPT f/u  Underlying untreated CESAR would increase risk of recurrence        Type 2 diabetes mellitus with both eyes affected by proliferative retinopathy and macular edema, with long-term current use of insulin  A1c 9.5%  SSI      CESAR (obstructive sleep apnea)  Pt denies having CESAR but this record is in her chart  Any recent PSG?  She certainly appears at risk  OPT f/u will be needed  Observe in recovery from surgery, caution w/ narcotics postop        Hyperlipidemia associated with type 2 diabetes mellitus  Home meds as appropriate  Does pt have  CAD?      HTN (hypertension) without retinopathy  Home meds as appropriate  Pt is very non toxic appearing and currently w/ normal to high BP      VTE Risk Mitigation (From admission, onward)           Ordered     heparin (porcine) injection 5,000 Units  Every 8 hours         01/06/24 1628     IP VTE HIGH RISK PATIENT  Once         01/06/24 1628     Place sequential compression device  Until discontinued         01/06/24 1628                          I have completed this tele-visit without the assistance of a telepresenter.    The attending portion of this evaluation, treatment, and documentation was performed per Arlyn Valenzuela MD via Telemedicine AudioVisual using the secure Veeip software platform with 2 way audio/video. The provider was located off-site and the patient is located in the hospital. The aforementioned video software was utilized to document the relevant history and physical exam    Arlyn Valenzuela MD  Department of Hospital Medicine   [facility]

## 2024-01-13 NOTE — NURSING
Patient's home wound vac delivered to bedside by case management. Patient' home medications are in [facility] office. Patient aware of possible d/c 1/13/24.

## 2024-01-13 NOTE — NURSING
AVS virtually reviewed with patient in its entirety with emphasis on medications, follow-up appointments and reasons to return to the ED or contact the [facility] On Call Nurse Care Line. Patient also encouraged to utilize their patient portal. Ease and convenience of use reiterated. Education complete and patient voiced understanding. All questions answered. Discharge teaching completed.

## 2024-01-13 NOTE — ASSESSMENT & PLAN NOTE
S/p bedside I&D w/ culture of material in ED at [facility]  Dr Padgett assessed pt and had planned to bring to OR here this afternoon, Dr Snyder is covering for him and has pt scheduled for tomorrow AM  I am comfortable w/ this plan as pt appears to be very stable and this process does not appear fulminant  Not septic but is at risk  Close monitoring, IVF, abx  BCX, urine Cx done at [facility] ED.    S/P I&D  and irrigation and debridement rightly groin and right leg,on 1.7.24,c  Culture growing staph,\MRSA strep,Ecoli,per ID on Vanc. And rocephin.  S/p wound vac placement.  Plan to dc on doxy and augmentin per ID recs for total 4 weeks  OP ID f/u

## 2024-01-13 NOTE — PLAN OF CARE
TN  called [employer] unable to process.  TN sent home health orders to [facility].  TN/SW to follow up  TN contacted [facility] wound vac fx [phone] and [phone].  Dr. Rangel will complete form and then TN will email form to Dr Valenzuela to .  Dr. Valenzuela returned signed form at 5:40 PM.(Both physicians had issues with home printers)    TN faxed signed form to [facility].  Representative at [facility] Judi CHAN Gave approval for wound vac  [id number].  TN delivered wound vac to patient after she signed delivery form.  Med surg nurse aware of wound vac delivery.  Dr. Valenzuela informed.   01/12/24 6110   Post-Acute Status   Post-Acute Authorization [facility] Status Pending post-acute provider review/more information requested   Hospital Resources/Appts/Education Provided Provided patient/caregiver with written discharge plan information   Discharge Delays (!) Home Medical Equipment (Insurance, Delivery)   Discharge Plan   Discharge Plan A Home Health;Home with family   Discharge Plan B Home with family;Home Health

## 2024-01-13 NOTE — PLAN OF CARE
Case Management Final Discharge Note      Discharge Disposition: Per Felicita with [facility], pt to be accepted.      New DME ordered / company name: [employer] delivered to patient bedside.     Relevant SDOH / Transition of Care Barriers:  Transportation     Primary Caretaker and contact information: Iris Quintana (Sister)  [phone] (     Scheduled followup appointment:   2/6/2024 10:30 AM Madalyn Gtz MD in [facility] INFECTIOUS DISEASES for 30 minutes      Referrals placed: Gen surgery, Urology and out patient case management.    Transportation: [facility] requested    Patient educated on discharge services and updated on DC plan. Bedside RN notified, patient clear to discharge from Case Management Perspective.      01/13/24 1610   Final Note   Assessment Type Final Discharge Note   Anticipated Discharge Disposition Home-Health   Hospital Resources/Appts/Education Provided Provided patient/caregiver with written discharge plan information   Post-Acute Status   Post-Acute Authorization Home Health;HME   HME Status Set-up Complete/Auth obtained   Home Health Status Set-up Complete/Auth obtained   Discharge Delays None known at this time

## 2024-01-13 NOTE — PLAN OF CARE
Home Health Follow Up:    Left Message for on call nurse for the following:  [facility]  The Medical [facility]   [employer]

## 2024-01-15 PROBLEM — E11.10 DIABETIC KETOACIDOSIS ASSOCIATED WITH TYPE 2 DIABETES MELLITUS: Status: RESOLVED | Noted: 2023-10-06 | Resolved: 2024-01-15

## 2024-01-16 ENCOUNTER — TELEPHONE (OUTPATIENT)
Dept: SURGERY | Facility: CLINIC | Age: 49
End: 2024-01-16
Payer: MEDICARE

## 2024-01-16 NOTE — TELEPHONE ENCOUNTER
----- Message from Juan Carlos La MD sent at 1/13/2024  5:06 PM CST -----  Can we please arrange for this patient to have 1 week follow up with Dr. Padgett. Ask her to bring her wound vac supplies in case he would like to change it during the visit.     Thanks,   Juan Carlos

## 2024-01-17 NOTE — DISCHARGE SUMMARY
"[facility] Medicine  Discharge Summary      Patient Name: Jaimee Quintana  MRN: [id number]  Patient Class: IP- Inpatient  Admission Date: 1/6/2024  Hospital Length of Stay: 7 days  Discharge Date and Time: 1/13/2024  8:26 PM  Attending Physician: No att. 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  Pt w/o current pain, f/c/n/v, flank pain etc - she was under impression she was sent here for "stomach surgery", we talked to her daughter and 2 of her sisters on the phone, separately, at her request, as well as Dr Snyder who called to talk to her preop, at the bedside, to clear this up.  Appreciate GS attention to pt, asking  to see in AM as well as we appear to have 2 separate processes here, she may need intervention from  as well although most likely will just need to treat the infection and reimage kidneys at some point.  She had I&D at bedside of right mons area in [place], presume material was sent for culture.  Incidentally pt was ill w/ COVID 19 12/18, we were advised on transfer of this, she seems to have fully recovered.  Per pt she was tested again at [facility] and was negative, and Epic prompted me "pt has negative COVID test" - but, we were not able to confirm this result, so we did another rapid which was negative.  I also have requested EKG and CXR preop in this pt w/ CHF, morbid obesity.    Pt is admitted to  services, and Dr Snyder scheduling for OR in AM.      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  Pt w/o current pain, f/c/n/v, flank pain etc - she was under impression she was sent here for "stomach surgery", we talked to her daughter and 2 of her sisters on the phone, separately, at her request, as well as Dr Snyder who called to talk to her preop, at the bedside, to clear this up.  Appreciate GS attention to pt, asking  to see in AM as well as we appear to have 2 separate processes here, she may need intervention from  as well although most likely will just need to treat the infection and reimage kidneys at some point.  She had I&D at bedside of right mons area in [place], presume material was sent for culture.  Incidentally pt was ill w/ COVID 19 12/18, we were advised on transfer of this, she seems to have fully recovered.  Per pt she was tested again at [facility] and was negative, and Epic prompted me "pt has negative COVID test" - but, we were not able to confirm this result, so we did another rapid which was negative.  I also have requested EKG and CXR preop in this pt w/ CHF, morbid obesity.    Pt is admitted to  services,surgery is consulted.  S/P I&D  and irrigation and debridement rightly groin and right leg,  S/P drainage of left perinephritic abscess by IR ,  Culture growing staph,strep,Ecoli,per ID on Vanc. And Rocephin,  Urology did another US to check size of kidney.remains same size.  Surgery planing placed wound vac.  Per ID, abx were changged to PO doxy and augmentin prior to dc.  Pt was dc home with HH and follow ups.      Goals of Care Treatment Preferences:  Code Status: Full Code      Consults:   Consults (From admission, onward)          Status Ordering Provider     Inpatient virtual consult to Hospital Medicine  Once      "   Provider:  Arlyn Valenzuela MD    Completed MANDY PAUL     Inpatient consult to Interventional Radiology  Once        Provider:  Brandee Loza NP    Completed ARTURO UDRAN     Inpatient consult to General Surgery  Once        Provider:  (Not yet assigned)    Completed ARTURO LARSEN     Inpatient consult to Urology  Once        Provider:  Arturo Duran MD    Completed ARTURO LARSEN     Inpatient consult to Infectious Diseases  Once        Provider:  Britt Gillespie MD    Completed ARTURO LARSEN            No new Assessment & Plan notes have been filed under this hospital service since the last note was generated.  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] 01/06/2024 Yes    Perinephric abscess [N15.1] 01/08/2024 Yes    Groin abscess [L02.214] 01/08/2024 No    Class 2 severe obesity due to excess calories with serious comorbidity and body mass index (BMI) of 38.0 to 38.9 in adult [E66.01, Z68.38] 01/06/2024 Not Applicable    Abscess of left kidney [N15.1] 01/06/2024 Yes    Chronic combined systolic and diastolic congestive heart failure [I50.42] 01/06/2024 Yes    COVID-19 [U07.1] 01/06/2024 No    History of CVA (cerebrovascular accident) without residual deficits [Z86.73] 05/22/2023 Not Applicable    Type 2 diabetes mellitus with both eyes affected by proliferative retinopathy and macular edema, with long-term current use of insulin [E11.3513, Z79.4] 05/11/2021 Not Applicable    CESAR (obstructive sleep apnea) [G47.33] 10/23/2015 Yes    Class 3 severe obesity due to excess calories with serious comorbidity and body mass index (BMI) of 40.0 to 44.9 in adult [E66.01, Z68.41] 03/19/2015 Not Applicable    Hyperlipidemia associated with type 2 diabetes mellitus [E11.69, E78.5]  Yes    HTN (hypertension) without retinopathy [I10] 03/13/2014 Yes      Problems Resolved  During this Admission:    Diagnosis Date Noted Date Resolved POA    Controlled type 2 diabetes mellitus with retinopathy, with long-term current use of insulin [E11.319, Z79.4] 03/13/2014 01/06/2024 Not Applicable       Discharged Condition: stable    Disposition: Home or Self Care    Follow Up:   Follow-up Information       Arturo Duran MD Follow up in 2 week(s).    Specialty: Urology  Why: As needed  Contact information:  [address]  [address]  [facility] [address]  [phone]               [facility] Follow up.    Why: DME  Wound vac delivered  Contact information:  [address]  [address]  [facility] [phone]             Donald Acosta MD Follow up.    Specialty: Family Medicine  Contact information:  [address]  [facility] [address] [phone]               [facility] Follow up.    Specialties: Home Health Services, Home Therapy Services, Home Living Aide Services  Why: Home Health  Contact information:  [address]  [facility] [address]  [phone]                         Patient Instructions:      Ambulatory referral/consult to Infectious Disease   Standing Status: Future   Referral Priority: Routine Referral Type: Consultation   Referral Reason: Specialty Services Required   Requested Specialty: Infectious Diseases   Number of Visits Requested: 1     Ambulatory referral/consult to Urology   Standing Status: Future   Referral Priority: Routine Referral Type: Consultation   Referral Reason: Specialty Services Required   Requested Specialty: Urology   Number of Visits Requested: 1     Ambulatory referral/consult to General Surgery   Standing Status: Future   Referral Priority: Routine Referral Type: Consultation   Referral Reason: Specialty Services Required   Requested Specialty: General Surgery   Number of Visits Requested: 1     Ambulatory referral/consult to Outpatient Case Management   Referral  "Priority: Routine Referral Type: Consultation   Referral Reason: Specialty Services Required   Number of Visits Requested: 1     Diet Cardiac     Notify your health care provider if you experience any of the following:  temperature >100.4     Notify your health care provider if you experience any of the following:  persistent nausea and vomiting or diarrhea     Notify your health care provider if you experience any of the following:  severe uncontrolled pain     Notify your health care provider if you experience any of the following:  redness, tenderness, or signs of infection (pain, swelling, redness, odor or green/yellow discharge around incision site)     Notify your health care provider if you experience any of the following:  difficulty breathing or increased cough     Notify your health care provider if you experience any of the following:  severe persistent headache     Notify your health care provider if you experience any of the following:  worsening rash     Notify your health care provider if you experience any of the following:  persistent dizziness, light-headedness, or visual disturbances     Notify your health care provider if you experience any of the following:  increased confusion or weakness     Send follow up & questions to   Order Comments: Patient's primary care physician: Donald Acosta MD     Activity as tolerated       Significant Diagnostic Studies: Labs: CMP No results for input(s): "NA", "K", "CL", "CO2", "GLU", "BUN", "CREATININE", "CALCIUM", "PROT", "ALBUMIN", "BILITOT", "ALKPHOS", "AST", "ALT", "ANIONGAP", "ESTGFRAFRICA", "EGFRNONAA" in the last 48 hours. and CBC No results for input(s): "WBC", "HGB", "HCT", "PLT" in the last 48 hours.    Pending Diagnostic Studies:       Procedure Component Value Units Date/Time    EKG 12-lead [[id number]]     Order Status: Sent Lab Status: No result            Medications:  Reconciled Home Medications:      Medication List        START taking these " medications      amoxicillin-clavulanate 875-125mg 875-125 mg per tablet  Commonly known as: AUGMENTIN  Take 1 tablet by mouth every 12 (twelve) hours for 26 days     doxycycline 100 MG Cap  Commonly known as: VIBRAMYCIN  Take 1 capsule (100 mg total) by mouth 2 (two) times daily for 26 days     hydrALAZINE 50 MG tablet  Commonly known as: APRESOLINE  Take 1 tablet (50 mg total) by mouth every 8 (eight) hours.     HYDROcodone-acetaminophen 5-325 mg per tablet  Commonly known as: NORCO  Take 1 tablet by mouth every 6 (six) hours as needed for Pain.     NIFEdipine 90 MG (OSM) 24 hr tablet  Commonly known as: PROCARDIA-XL  Take 1 tablet (90 mg total) by mouth once daily.  Replaces: NIFEdipine 60 MG Tbsr            CHANGE how you take these medications      * LANTUS SOLOSTAR U-100 INSULIN glargine 100 units/mL SubQ pen  Generic drug: insulin  Inject 80 Units into the skin 2 (two) times a day.  What changed: Another medication with the same name was added. Make sure you understand how and when to take each.     * insulin glargine 100 units/mL SubQ pen  Commonly known as: LANTUS SOLOSTAR U-100 INSULIN  Inject 80 Units into the skin 2 (two) times a day. INJECT 80 UNITS SUBCUTANEOUSLY TWICE DAILY (BULK) Strength: 100 unit/mL (3 mL)  What changed: You were already taking a medication with the same name, and this prescription was added. Make sure you understand how and when to take each.     TRULICITY 0.75 mg/0.5 mL pen injector  Generic drug: dulaglutide  Inject 0.75 mg into the skin every 7 days. OK to discontinue this med during SNF stay - resume upon discharge  What changed:   when to take this  additional instructions           * This list has 2 medication(s) that are the same as other medications prescribed for you. Read the directions carefully, and ask your doctor or other care provider to review them with you.                CONTINUE taking these medications      aspirin 81 MG EC tablet  Commonly known as:  "ECOTRIN  Take 81 mg by mouth once daily.     atorvastatin 80 MG tablet  Commonly known as: LIPITOR  Take 1 tablet (80 mg total) by mouth every evening.     blood sugar diagnostic Strp  Use to test blood glucose two (2) times daily as directed with insurance preferred meter and supplies     ergocalciferol 50,000 unit Cap  Commonly known as: ERGOCALCIFEROL  Take 50,000 Units by mouth every Saturday.     ezetimibe 10 mg tablet  Commonly known as: ZETIA  Take 1 tablet (10 mg total) by mouth once daily. (For cholesterol)     FLUoxetine 40 MG capsule  Take 40 mg by mouth once daily.     fluticasone propionate 50 mcg/actuation nasal spray  Commonly known as: FLONASE  2 sprays (100 mcg total) by Each Nostril route daily as needed for Allergies or Rhinitis.     furosemide 40 MG tablet  Commonly known as: LASIX  Take 1 tablet (40 mg total) by mouth once daily.     ibuprofen 200 MG tablet  Commonly known as: ADVIL,MOTRIN  Take 200 mg by mouth every 6 (six) hours as needed for Pain.     insulin aspart U-100 100 unit/mL (3 mL) Inpn pen  Commonly known as: NovoLOG Flexpen U-100 Insulin  Inject 30 Units into the skin 2 (two) times a day.     lancets 28 gauge Misc  Commonly known as: TRUEPLUS LANCETS  Use to test blood glucose two (2) times daily as directed with insurance preferred meter and supplies     lisinopriL 40 MG tablet  Commonly known as: PRINIVIL,ZESTRIL  Take 1 tablet (40 mg total) by mouth once daily.     metFORMIN 500 MG ER 24hr tablet  Commonly known as: GLUCOPHAGE-XR  Take 1,000 mg by mouth 2 (two) times daily with meals.     metoprolol succinate 100 MG 24 hr tablet  Commonly known as: TOPROL-XL  Take 100 mg by mouth once daily.     pantoprazole 40 MG tablet  Commonly known as: PROTONIX  Take 40 mg by mouth once daily.     * pen needle, diabetic 32 gauge x 5/32" Ndle  Commonly known as: BD ULTRA-FINE ROSA PEN NEEDLE  Use with insulin once daily     * pen needle, diabetic 32 gauge x 5/32" Ndle  Use with injectable DM " supplies twice daily as directed     potassium chloride 8 MEQ Tbsr  Commonly known as: KLOR-CON  TAKE TWO TABLETS BY MOUTH TWICE DAILY @ 9AM & 5PM     TRUE METRIX GO GLUCOSE METER Misc  Generic drug: blood-glucose meter           * This list has 2 medication(s) that are the same as other medications prescribed for you. Read the directions carefully, and ask your doctor or other care provider to review them with you.                STOP taking these medications      NIFEdipine 60 MG Tbsr  Commonly known as: ADALAT CC  Replaced by: NIFEdipine 90 MG (OSM) 24 hr tablet              Indwelling Lines/Drains at time of discharge:   Lines/Drains/Airways       None                   Time spent on the discharge of patient: 39 minutes         The attending portion of this evaluation, treatment, and documentation was performed per Arlyn Valenzuela MD via Telemedicine AudioVisual using the secure [employer] 3 software platform with 2 way audio/video. The provider was located off-site and the patient is located in the hospital. The aforementioned video software was utilized to document the relevant history and physical exam    Arlyn Valenzuela MD  Department of Hospital Medicine  [facility]

## 2024-01-22 ENCOUNTER — OUTPATIENT CASE MANAGEMENT (OUTPATIENT)
Dept: ADMINISTRATIVE | Facility: OTHER | Age: 49
End: 2024-01-22
Payer: MEDICARE

## 2024-01-22 NOTE — PROGRESS NOTES
Outpatient Care Management  Initial Patient Assessment    Patient: Jaimee Quintana  MRN: [id number]  Date of Service: 01/22/2024  Completed by: Courtney Blake RN  Referral Date: 01/13/2024  Date of Eligibility: 1/14/2024  Program: High Risk  Status: Ongoing  Effective Dates: 1/22/2024 - present  Responsible Staff: Courtney Blake RN        Reason for Visit   Patient presents with    OPCM Chart Review    Plan Of Care    OPCM Enrollment Call    Initial Assessment       Brief Summary:  Jaimee Quintana was referred by provider for CESAR and DM. Patient qualifies for program based on risk score of 91%.   Active problem list, medical, surgical and social history reviewed. Active comorbidities include DM, HTN, Chronic systolic CHF, Groin abscess,and mild episode recurrent major depressive disorder. Areas of need identified by patient include HTN and DM. Pt reports that she checks her BP and BS twice a day. Pt reports that her bp reading was 179/99 this am. Pt voiced that the reading is higher at times. Pt reports that her bs reading was 170 this am. Pt reports that she has no complaints with her current mode of checking her blood sugar per finger sticks. Pt states that she is seen per home health for her wound and she has an appt with wound care on Thursday.       Disability Status  Hearing Difficulty or Deaf: yes  Visual Difficulty or Blind: no  Visual and Hearing Needs Conclusion: Pt reports no changes at this time.  Difficulty Concentrating, Remembering or Making Decisions: no  Communication Difficulty: no  Eating/Swallowing Difficulty: no  Walking or Climbing Stairs Difficulty: no  Dressing/Bathing Difficulty: no  Toileting : Independent  Continence : Continence - Not a problem  Difficulty Managing Errands Independently: no  Equipment Currently Used at Home: bath bench; blood pressure machine; cane, straight; glucometer; bedside commode  ADL Conclusion Statement: Pt voiced that she has good and bad  days.  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   Tobacco Use    Smoking status: Every Day     Current packs/day: 0.00     Types: Cigarettes     Last attempt to quit: 2021     Years since quittin.9    Smokeless tobacco: Never   Substance and Sexual Activity    Alcohol use: Yes     Alcohol/week: 3.0 standard drinks of alcohol     Types: 3 Cans of beer per week     Comment: Rare    Drug use: No    Sexual activity: Yes     Partners: Male     Birth control/protection: None   Social History Narrative    Daughter - Kavon     Social Determinants of Health     Financial Resource Strain: Low Risk  (10/6/2023)    Overall Financial Resource Strain (CARDIA)     Difficulty of Paying Living Expenses: Not hard at all   Food Insecurity: No Food Insecurity (10/6/2023)    Hunger Vital Sign     Worried About Running Out of Food in the Last Year: Never true     Ran Out of Food in the Last Year: Never true   Transportation Needs: No Transportation Needs (10/6/2023)    PRAPARE - Transportation     Lack of Transportation (Medical): No     Lack of Transportation (Non-Medical): No   Physical Activity: Sufficiently Active (10/6/2023)    Exercise Vital Sign     Days of Exercise per Week: 3 days     Minutes of Exercise per Session: 140 min   Stress: No Stress Concern Present (10/6/2023)    [employer] - Occupational Stress Questionnaire     Feeling of Stress : Only a little   Social Connections: Unknown (10/6/2023)    Social Connection and Isolation Panel [NHANES]     Frequency of Communication with Friends and Family: More than three times a week     Frequency of Social Gatherings with Friends and Family: More than three  times a week     Attends [employer] Services: Never     Active Member of Clubs or Organizations: No     Attends Club or Organization Meetings: Never     Marital Status: Patient declined   Housing Stability: Unknown (10/6/2023)    Housing Stability Vital Sign     Unable to Pay for Housing in the Last Year: No     Unstable Housing in the Last Year: No       Roles and Relationships  Primary Source of Support/Comfort: sibling(s)      Advance Directives (For Healthcare)  Advance Directive  (If Adv Dir status is received, view document under Adv Dir in header or Chart Review Media tab): Patient does not have Advance Directive, declines information.        Patient Reported Insurance  Verified current insurance plan:: [employer]vantage            1/22/2024     4:00 PM 10/4/2023     1:14 PM 8/23/2023    11:48 AM 6/19/2023     1:15 PM 5/31/2023     2:54 PM 9/8/2020     1:31 PM 10/30/2019     2:57 PM   Depression Patient Health Questionnaire   Over the last two weeks how often have you been bothered by little interest or pleasure in doing things More than half the days Not at all More than half the days Several days Several days Several days Several days   Over the last two weeks how often have you been bothered by feeling down, depressed or hopeless Several days Not at all Nearly every day Several days Several days Several days Nearly every day   PHQ-2 Total Score 3 0 5 2 2 2 4   Over the last two weeks how often have you been bothered by trouble falling or staying asleep, or sleeping too much Several days  Several days    Several days   Over the last two weeks how often have you been bothered by feeling tired or having little energy Not at all  Several days    Not at all   Over the last two weeks how often have you been bothered by a poor appetite or overeating Not at all  Not at all    Several days   Over the last two weeks how often have you been bothered by feeling bad about yourself - or that you are a failure or  have let yourself or your family down Not at all  Not at all    Not at all   Over the last two weeks how often have you been bothered by trouble concentrating on things, such as reading the newspaper or watching television Not at all  Several days    Not at all   Over the last two weeks how often have you been bothered by moving or speaking so slowly that other people could have noticed. Or the opposite - being so fidgety or restless that you have been moving around a lot more than usual. Not at all  Not at all    Not at all   Over the last two weeks how often have you been bothered by thoughts that you would be better off dead, or of hurting yourself Not at all  Not at all    Not at all   If you checked off any problems, how difficult have these problems made it for you to do your work, take care of things at home or get along with other people? Not difficult at all      Somewhat difficult   PHQ-9 Score 4  8    6   PHQ-9 Interpretation Minimal or None  Mild           Learning Assessment       01/06/2024 1847 [facility] - Med Surg (1/6/2024 - 1/13/2024)   Created by Mini Luna, RN - RN (Nurse) Status: Complete                 PRIMARY LEARNER     Primary Learner Name:  Jaimee Quintana KA - 01/06/2024 1847    Relationship:  Patient KA - 01/06/2024 1847    Does the primary learner have any barriers to learning?:  No Barriers KA - 01/06/2024 1847    What is the preferred language of the primary learner?:  English KA - 01/06/2024 1847    Is an  required?:  No KA - 01/06/2024 1847    How does the primary learner prefer to learn new concepts?:  Listening, Reading, Demonstration KA - 01/06/2024 1847    How often do you need to have someone help you read instructions, pamphlets, or written material from your doctor or pharmacy?:  Rarely KA - 01/06/2024 1847        CO-LEARNER #1     No question answered        CO-LEARNER #2     No question answered        SPECIAL TOPICS     No question answered         ANSWERED BY:     No question answered        Edit History       Mini Luna, RN - RN (Nurse)   01/06/2024 5449

## 2024-01-22 NOTE — LETTER
Jaimee Quintana  [address]  [address] [place] [address]    Dear Jaimee Quintana,     Welcome to [facility]. We are here to assist patients with multiple long-term (chronic) conditions who often need more personalized healthcare.    It was a pleasure talking with you today. My name is Courtney Blake RN. I look forward to working with you as your Care Manager. I will be contacting you by telephone routinely to help coordinate care and resolve issues.    My goal is to help you function at the healthiest and highest level possible. You can contact me directly at [phone].    As an [facility] patient with [employer], some of the services we provide, at no cost to you, include:     Development of an individualized care plan with a Registered Nurse   Connection with a   Assistance from a Community Health Worker  Connection with available resources and services    Coordinate communication among your care team members   Provide coaching and education  Help you understand your doctor's treatment plan  Help you obtain information about your insurance coverage.    All services provided by [facility] Outpatient Care Managers and other care team members are coordinated with and communicated to your primary care team.      As part of your enrollment, you will be receiving education materials and more information about these services in your [facility] account, by phone, or through the mail. If you do not wish to participate or receive information, you can Opt Out by contacting our office at [phone].      Sincerely,        Courtney Blake RN  [facility]   Outpatient Care Management

## 2024-01-25 ENCOUNTER — PATIENT OUTREACH (OUTPATIENT)
Dept: ADMINISTRATIVE | Facility: OTHER | Age: 49
End: 2024-01-25
Payer: MEDICARE

## 2024-01-25 ENCOUNTER — HOSPITAL ENCOUNTER (OUTPATIENT)
Dept: WOUND CARE | Facility: HOSPITAL | Age: 49
Discharge: HOME | End: 2024-01-25
Attending: SURGERY
Payer: MEDICARE

## 2024-01-25 VITALS
DIASTOLIC BLOOD PRESSURE: 95 MMHG | BODY MASS INDEX: 38.49 KG/M2 | RESPIRATION RATE: 18 BRPM | HEIGHT: 65 IN | TEMPERATURE: 98 F | SYSTOLIC BLOOD PRESSURE: 199 MMHG | HEART RATE: 81 BPM | WEIGHT: 231 LBS

## 2024-01-25 DIAGNOSIS — T81.89XA NON-HEALING SURGICAL WOUND, INITIAL ENCOUNTER: ICD-10-CM

## 2024-01-25 DIAGNOSIS — N76.82 FOURNIER'S GANGRENE IN FEMALE: Primary | ICD-10-CM

## 2024-01-25 PROCEDURE — 12002 RPR S/N/AX/GEN/TRNK2.6-7.5CM: CPT

## 2024-01-25 PROCEDURE — 99203 OFFICE O/P NEW LOW 30 MIN: CPT | Mod: HCNC

## 2024-01-25 NOTE — PROGRESS NOTES
Wound Care & [facility] Medicine Clinic    Subjective:       Patient ID: Jaimee Quintana is a 48 y.o. female.    Chief Complaint: Non-healing Wound Follow Up    New referral for right groin wound. 47 yo w/ DM, HTN, HLD, CHF, that noticed bump to right groin. CT showed changes c/w Jeremiah's gangrene and she underwent surgical debridement on 1/7/2024. Currently on Augmentin and Doxycyline. Arrived with no NPWT in place stating the machine is broken and she is currently awaiting a new one. Clean red wound bed, no signs or symptoms of infection, no induration. 5 ccs 2 % lidocaine injected to wound edges and 3 sutures placed , patient tolerated well, able to loosely approximate edges. Plan to discontinue wound vac at this time.Home health assisting with dressing changes.Return to clinic in 1 week.  Review of Systems   Constitutional: Negative.    HENT: Negative.     Eyes: Negative.    Respiratory: Negative.     Cardiovascular: Negative.    Gastrointestinal: Negative.    Genitourinary: Negative.    Musculoskeletal: Negative.    Skin: Negative.    Neurological: Negative.    Psychiatric/Behavioral: Negative.         Objective:     Vitals:    01/25/24 1009   BP: (!) 199/95   Pulse: 81   Resp: 18   Temp: 98.2 °F (36.8 °C)         Physical Exam  Constitutional:       Appearance: She is well-developed.   HENT:      Head: Normocephalic.   Eyes:      Conjunctiva/sclera: Conjunctivae normal.      Pupils: Pupils are equal, round, and reactive to light.   Cardiovascular:      Rate and Rhythm: Normal rate and regular rhythm.      Heart sounds: Normal heart sounds.   Pulmonary:      Effort: Pulmonary effort is normal.      Breath sounds: Normal breath sounds.   Abdominal:      General: Bowel sounds are normal.      Palpations: Abdomen is soft.   Musculoskeletal:         General: Normal range of motion.      Cervical back: Normal range of motion and neck supple.   Skin:     General: Skin is warm and dry.    Neurological:      Mental Status: She is alert and oriented to person, place, and time.      Deep Tendon Reflexes: Reflexes are normal and symmetric.        Incision/Site 01/07/24 0908 Right Groin (Active)   01/07/24 0908   Present Prior to Hospital Arrival?:    Side: Right   Location: Groin   Orientation:    Incision Type:    Closure Method: Other (see comments)   Additional Comments: incision open with betadine soaked Kerlix/4x4's/ABD dressing and Medipore tape   Removal Indication and Assessment:    Wound Outcome:    Removal Indications:    Wound Image    01/25/24 1009   Dressing Appearance Clean;Moist drainage 01/25/24 1009   Drainage Amount Moderate 01/25/24 1009   Drainage Characteristics/Odor Serosanguineous 01/25/24 1009   Appearance Red 01/25/24 1009   Red (%), Wound Tissue Color 100 % 01/25/24 1009   Periwound Area Intact 01/25/24 1009   Wound Edges Defined 01/25/24 1009   Wound Length (cm) 3 cm 01/25/24 1009   Wound Width (cm) 7 cm 01/25/24 1009   Wound Depth (cm) 4.2 cm 01/25/24 1009   Wound Volume (cm^3) 88.2 cm^3 01/25/24 1009   Wound Surface Area (cm^2) 21 cm^2 01/25/24 1009   Care Cleansed with:;Sterile normal saline;Other (see comments) 01/25/24 1009   Dressing Applied;Other (comment);Non-adherent;Absorptive Pad 01/25/24 1009   Dressing Change Due 01/27/24 01/25/24 1009         Assessment/Plan:         ICD-10-CM ICD-9-CM   1. 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 Non-healing surgical wound, initial encounter  T81.89XA 998.83           Tissue pathology and/or culture taken:  [] Yes [x] No   Sharp debridement performed:   [] Yes [x] No   Labs ordered this visit:   [] Yes [x] No   Imaging ordered this visit:   [] Yes [x] No           Orders Placed This Encounter   Procedures    Change dressing     Right groin:   Cleanse wound with:saline  - leave sutures in place  Lidocaine:prn  Silver nitrate:prn  Periwound care:maintain dry  Primary dressing:DC NPWT. Bactroban, xeroform  Secondary  dressing:small ABD pad, mesh underwear to secure    Frequency:every other day-  patient may change ABD pad daily and prn for drainage  Follow-up:1 week with   [facility] : See above orders. Please change every other day and prn.   Other Orders:RX: Bactroban        Follow up in about 1 week (around 2/1/2024) for .            This includes face to face time and non-face to face time preparing to see the patient (eg, review of tests), obtaining and/or reviewing separately obtained history, documenting clinical information in the electronic or other health record, independently interpreting results and communicating results to the patient/family/caregiver, or care coordinator.

## 2024-01-25 NOTE — PROGRESS NOTES
This Community Health Worker (CHW) completed Social Determinant of Health (SDOH)  Questionnaire with patient/caregiver via telephone today.  Notified [facility] CM Courtney LEE RN, of completion.      Patient denied any SDOH needs at this time.

## 2024-01-25 NOTE — PROCEDURES
[facility] - Wound Care  Operative Note      Date of Procedure:  1/25/24    Procedure: repair wound right groin 3 x 7 cm    Reapir wound 3 x 7 cm    Assisting Surgeon: None    Pre-Operative Diagnosis: non healing wound right groin 3 x 7 cm    Post-Operative Diagnosis: same    Anesthesia: * No surgery found *    Operative Findings (including complications, if any): wound repair right groin 3 x 7 x 5 cm    Description of Technical Procedures:  Patient in supine position time-out was called patient identity confirmed right groin was prepped and draped in a sterile manner using ChloraPrep area of the wound was infiltrated using 1% xylocaine solution wound was then closed using interrupted 2-0 silk suture under local anesthesia sterile gauze dressing was applied patient tolerated well will DC wound VAC we will follow the patient in 1 week patient to applied wound Bactroban and Xeroform every other day RTC 1 week.    Significant Surgical Tasks Conducted by the Assistant(s), if Applicable: na    Estimated Blood Loss (EBL): na           Implants: none    Specimens:   Specimen (24h ago, onward)      None                    Condition: Good    Disposition:       Attestation: I performed the procedure.    Discharge Note    OUTCOME: Patient tolerated treatment/procedure well without complication and is now ready for discharge.    DISPOSITION: Home or Self Care    FINAL DIAGNOSIS:  <principal problem not specified>    FOLLOWUP: In clinic one week.    DISCHARGE INSTRUCTIONS:    Discharge Procedure Orders   Change dressing   Order Comments: Right groin:   Cleanse wound with:saline  - leave sutures in place  Lidocaine:prn  Silver nitrate:prn  Periwound care:maintain dry  Primary dressing:DC NPWT. Bactroban, xeroform  Secondary dressing:small ABD pad, mesh underwear to secure    Frequency:every other day-  patient may change ABD pad daily and prn for drainage  Follow-up:1 week with   [facility]: See above orders.  Please change every other day and prn.   Other Orders:RX: Bactroban     Procedures

## 2024-02-01 ENCOUNTER — HOSPITAL ENCOUNTER (OUTPATIENT)
Dept: WOUND CARE | Facility: HOSPITAL | Age: 49
Discharge: HOME | End: 2024-02-01
Attending: SURGERY
Payer: MEDICARE

## 2024-02-01 VITALS
TEMPERATURE: 97 F | RESPIRATION RATE: 18 BRPM | DIASTOLIC BLOOD PRESSURE: 77 MMHG | SYSTOLIC BLOOD PRESSURE: 147 MMHG | BODY MASS INDEX: 38.49 KG/M2 | WEIGHT: 231 LBS | HEART RATE: 103 BPM | HEIGHT: 65 IN

## 2024-02-01 DIAGNOSIS — N76.82 FOURNIER'S GANGRENE IN FEMALE: ICD-10-CM

## 2024-02-01 DIAGNOSIS — T81.89XD NON-HEALING SURGICAL WOUND, SUBSEQUENT ENCOUNTER: Primary | ICD-10-CM

## 2024-02-01 PROCEDURE — 99213 OFFICE O/P EST LOW 20 MIN: CPT | Mod: HCNC

## 2024-02-01 NOTE — PROGRESS NOTES
Wound Care & Hyperbaric Medicine Clinic    Subjective:       Patient ID: Jaimee Quintana is a 48 y.o. female.    Chief Complaint: Wound Dehiscence    Wound care follow up for right groin surgical wound. Sutures remain in place, no induration. Patient reports compliance with antibiotics. Continue plan of care.   Review of Systems   Constitutional: Negative.    HENT: Negative.     Eyes: Negative.    Respiratory: Negative.     Cardiovascular: Negative.    Gastrointestinal: Negative.    Genitourinary: Negative.    Musculoskeletal: Negative.    Skin: Negative.    Neurological: Negative.    Psychiatric/Behavioral: Negative.         Objective:     Vitals:    02/01/24 1046   BP: (!) 147/77   Pulse: 103   Resp: 18   Temp: 97 °F (36.1 °C)         Physical Exam  Constitutional:       Appearance: She is well-developed.   HENT:      Head: Normocephalic.   Eyes:      Conjunctiva/sclera: Conjunctivae normal.      Pupils: Pupils are equal, round, and reactive to light.   Cardiovascular:      Rate and Rhythm: Normal rate and regular rhythm.      Heart sounds: Normal heart sounds.   Pulmonary:      Effort: Pulmonary effort is normal.      Breath sounds: Normal breath sounds.   Abdominal:      General: Bowel sounds are normal.      Palpations: Abdomen is soft.   Musculoskeletal:         General: Normal range of motion.      Cervical back: Normal range of motion and neck supple.   Skin:     General: Skin is warm and dry.   Neurological:      Mental Status: She is alert and oriented to person, place, and time.      Deep Tendon Reflexes: Reflexes are normal and symmetric.        Incision/Site 01/07/24 0908 Right Groin (Active)   01/07/24 0908   Present Prior to Hospital Arrival?:    Side: Right   Location: Groin   Orientation:    Incision Type:    Closure Method: Other (see comments)   Additional Comments: incision open with betadine soaked Kerlix/4x4's/ABD dressing and Medipore tape   Removal Indication and  Assessment:    Wound Outcome:    Removal Indications:    Wound Image   02/01/24 1054   Dressing Appearance Clean;Moist drainage 02/01/24 1054   Drainage Amount Moderate 02/01/24 1054   Drainage Characteristics/Odor Yellow;Serous 02/01/24 1054   Appearance Red;Sutures intact 02/01/24 1054   Red (%), Wound Tissue Color 100 % 02/01/24 1054   Periwound Area Intact;Moist 02/01/24 1054   Wound Edges Undefined;Other (see comments) 02/01/24 1054   Wound Length (cm) 0.5 cm 02/01/24 1054   Wound Width (cm) 6 cm 02/01/24 1054   Wound Depth (cm) 3.5 cm 02/01/24 1054   Wound Volume (cm^3) 10.5 cm^3 02/01/24 1054   Wound Surface Area (cm^2) 3 cm^2 02/01/24 1054   Care Cleansed with:;Sterile normal saline 02/01/24 1054   Dressing Applied;Other (comment);Non-adherent;Absorptive Pad 02/01/24 1054   Dressing Change Due 02/03/24 02/01/24 1054         Assessment/Plan:         ICD-10-CM ICD-9-CM   1. Non-healing surgical wound, subsequent encounter  T81.89XD V58.89     998.83   2. 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           Tissue pathology and/or culture taken:  [] Yes [x] No   Sharp debridement performed:   [] Yes [x] No   Labs ordered this visit:   [] Yes [x] No   Imaging ordered this visit:   [] Yes [x] No           Orders Placed This Encounter   Procedures    Change dressing     Right groin:  Cleanse wound with:saline  - leave sutures in place  Lidocaine:prn  Silver nitrate:prn  Periwound care:maintain dry  Primary dressing:Bactroban, xeroform  Secondary dressing:small ABD pad, mesh underwear to secure    Frequency:every other day-  patient may change ABD pad daily and prn for drainage  Follow-up:1 week with   [facility]: See above orders. Please change every other day and prn.        Follow up in about 1 week (around 2/8/2024) for .            This includes face to face time and non-face to face time preparing to see the patient (eg, review of tests), obtaining and/or reviewing separately  obtained history, documenting clinical information in the electronic or other health record, independently interpreting results and communicating results to the patient/family/caregiver, or care coordinator.

## 2024-02-02 ENCOUNTER — OUTPATIENT CASE MANAGEMENT (OUTPATIENT)
Dept: ADMINISTRATIVE | Facility: OTHER | Age: 49
End: 2024-02-02
Payer: MEDICARE

## 2024-02-02 ENCOUNTER — TELEPHONE (OUTPATIENT)
Dept: FAMILY MEDICINE | Facility: CLINIC | Age: 49
End: 2024-02-02
Payer: MEDICARE

## 2024-02-06 ENCOUNTER — OFFICE VISIT (OUTPATIENT)
Dept: INFECTIOUS DISEASES | Facility: CLINIC | Age: 49
End: 2024-02-06
Payer: MEDICARE

## 2024-02-06 DIAGNOSIS — N15.1 PERINEPHRIC ABSCESS: ICD-10-CM

## 2024-02-06 DIAGNOSIS — N76.82 FOURNIER'S GANGRENE IN FEMALE: ICD-10-CM

## 2024-02-06 PROCEDURE — 99214 OFFICE O/P EST MOD 30 MIN: CPT | Mod: 95,,, | Performed by: STUDENT IN AN ORGANIZED HEALTH CARE EDUCATION/TRAINING PROGRAM

## 2024-02-06 PROCEDURE — 1159F MED LIST DOCD IN RCRD: CPT | Mod: HCNC,CPTII,95, | Performed by: STUDENT IN AN ORGANIZED HEALTH CARE EDUCATION/TRAINING PROGRAM

## 2024-02-06 PROCEDURE — 3046F HEMOGLOBIN A1C LEVEL >9.0%: CPT | Mod: HCNC,CPTII,95, | Performed by: STUDENT IN AN ORGANIZED HEALTH CARE EDUCATION/TRAINING PROGRAM

## 2024-02-06 PROCEDURE — 1111F DSCHRG MED/CURRENT MED MERGE: CPT | Mod: CPTII,95,, | Performed by: STUDENT IN AN ORGANIZED HEALTH CARE EDUCATION/TRAINING PROGRAM

## 2024-02-06 PROCEDURE — 1160F RVW MEDS BY RX/DR IN RCRD: CPT | Mod: HCNC,CPTII,95, | Performed by: STUDENT IN AN ORGANIZED HEALTH CARE EDUCATION/TRAINING PROGRAM

## 2024-02-06 RX ORDER — SULFAMETHOXAZOLE AND TRIMETHOPRIM 800; 160 MG/1; MG/1
1 TABLET ORAL 2 TIMES DAILY
Qty: 60 TABLET | Refills: 0 | Status: SHIPPED | OUTPATIENT
Start: 2024-02-06 | End: 2024-03-09

## 2024-02-06 NOTE — PROGRESS NOTES
The patient location is: [place]  The chief complaint leading to consultation is: hospital follow up    Visit type: audiovisual    Face to Face time with patient: 5  30 minutes of total time spent on the encounter, which includes face to face time and non-face to face time preparing to see the patient (eg, review of tests), Obtaining and/or reviewing separately obtained history, Documenting clinical information in the electronic or other health record, Independently interpreting results (not separately reported) and communicating results to the patient/family/caregiver, or Care coordination (not separately reported).         Each patient to whom he or she provides medical services by telemedicine is:  (1) informed of the relationship between the physician and patient and the respective role of any other health care provider with respect to management of the patient; and (2) notified that he or she may decline to receive medical services by telemedicine and may withdraw from such care at any time.    Notes:       INFECTIOUS DISEASE CLINIC  02/06/2024     Subjective:      Chief Complaint:   Chief Complaint   Patient presents with    Follow-up       History of Present Illness:    This is a 48 y.o. female with uncontrolled DM with recent admission for R héctor's gangrene s/p washout (cx with Ecoli, strep, MRSA) and L perinephric abscess s/p aspiration (cx Ecoli) who is referred to my clinic for follow up.  Patient known to ID, please see prior notes for full details. Pt states she is doing well since discharge - seeing wound care for R groin wound. Denies issues or drainage - prior wound vac removed and had wound stitched closed. No fevers at home. Tolerating augmentin/doxy without issues.         Review of Systems   Constitutional: Negative for chills and fever.   All other systems reviewed and are negative.        Past Medical History:   Diagnosis Date    Cataract     Cervical high risk HPV (human papillomavirus) test  positive 2020    NOT 16 OR 18    CHF (congestive heart failure)     CVA (cerebral infarction)          Depression     Diabetes mellitus, type 2     GERD (gastroesophageal reflux disease)     Hyperlipidemia     Hypertension     Lactic acidosis 10/06/2023    Moderate nonproliferative diabetic retinopathy     Nausea and vomiting 10/06/2023    Obesity     Stroke     Tachycardia 10/04/2023     Past Surgical History:   Procedure Laterality Date     SECTION      CHOLECYSTECTOMY      DILATION AND CURETTAGE OF UTERUS      EYE SURGERY Bilateral     laser    GALLBLADDER SURGERY  2017    stone removed    IRRIGATION AND DEBRIDEMENT Right 2024    Procedure: IRRIGATION AND DEBRIDEMENT RIGHT GROIN;  Surgeon: Chalo Padgett MD;  Location: [facility];  Service: General;  Laterality: Right;     Family History   Problem Relation Age of Onset    Stroke Mother     Thyroid disease Mother     Diabetes Mother     Cataracts Father     Hypertension Father     Arthritis Father     Diabetes Father     Hypertension Sister     Stroke Sister     Thyroid disease Sister     Heart disease Sister     Thyroid disease Daughter     Asthma Daughter     No Known Problems Brother     No Known Problems Maternal Aunt     No Known Problems Maternal Uncle     No Known Problems Paternal Aunt     No Known Problems Paternal Uncle     No Known Problems Maternal Grandmother     No Known Problems Maternal Grandfather     No Known Problems Paternal Grandmother     No Known Problems Paternal Grandfather     Amblyopia Neg Hx     Blindness Neg Hx     Cancer Neg Hx     Glaucoma Neg Hx     Macular degeneration Neg Hx     Retinal detachment Neg Hx     Strabismus Neg Hx      Social History     Tobacco Use    Smoking status: Every Day     Current packs/day: 0.00     Types: Cigarettes     Last attempt to quit: 2021     Years since quitting: 3.0    Smokeless tobacco: Never   Substance Use Topics    Alcohol use: Yes     Alcohol/week: 3.0 standard  drinks of alcohol     Types: 3 Cans of beer per week     Comment: Rare    Drug use: No       Review of patient's allergies indicates:  No Known Allergies      Objective:   VS (24h): There were no vitals filed for this visit.      Physical Exam  Constitutional:       General: She is not in acute distress.     Appearance: She is not ill-appearing.   Pulmonary:      Effort: Pulmonary effort is normal.   Neurological:      Mental Status: She is alert and oriented to person, place, and time.             Micro:   1/8 L renal aspiration - Ecoli (pansusceptible)  1/7 R groin wound - Ecoli  1/8 Wound cx - Ecoli, strep ang, MRSA  1/5 ucx Ecoli    Radiology:     1/10 KRISS     Impression:     3.8 x 3 x 2.2 cm complex left perirenal fluid collection corresponding with the patient's known abscess, similar when compared to ultrasound dated 01/07/2024.  No new fluid collection.    2/6 KRISS  Impression:     1. Unchanged area of mixed echogenicity along the periphery of the left kidney as above at the site of known abscess. (3.5cm)  2. No hydronephrosis      Immunization History   Administered Date(s) Administered    COVID-19 Vaccine 11/02/2021    COVID-19, MRNA, LN-S, PF (Pfizer) (Purple Cap) 03/20/2021, 04/11/2021    Influenza 09/22/2014, 09/11/2016    Influenza - Quadrivalent 10/23/2015, 11/08/2017    Influenza - Quadrivalent - MDCK - PF 09/07/2019    Influenza - Quadrivalent - PF *Preferred* (6 months and older) 12/17/2018, 09/03/2020    Influenza - Trivalent (ADULT) 10/19/2021    PPD Test 10/10/2023    Pneumococcal Polysaccharide - 23 Valent 11/19/2014    Tdap 11/19/2014         Assessment:     1. Perinephric abscess  - Ambulatory referral/consult to Infectious Disease  - Comprehensive Metabolic Panel; Future  - US Retroperitoneal Complete; Future  - sulfamethoxazole-trimethoprim 800-160mg (BACTRIM DS) 800-160 mg Tab; Take 1 tablet by mouth 2 (two) times daily.  Dispense: 60 tablet; Refill: 0    2. 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 female with uncontrolled DM with recent admission for R héctor's gangrene s/p washout (cx with Ecoli, strep, MRSA) and L perinephric abscess s/p aspiration (cx Ecoli) who is referred to my clinic for follow up. Doing well since discharge - tolerating abx without issues and denies problems with prior R groin wound. Repeat KRISS with persistent L fluid collection, though pt is without worsening systemic symptoms.     Plan:     -stop augmentin/doxy  -switch to bactrim x 30d  -KRISS in 1 month to evaluate improvement, if no improvement, will refer to IR for aspiration  -CMP while on bactrim to monitor renal fx  -follow up with wound care  -continue good BG control    These recommendations have been sent to and/or discussed with the following providers:   - Dr. Arlyn Valenzuela       Follow up in 1 month, [facility]    Management of renal abscess/FG were discussed with patient. Patient was given ample time for questions, all questions answered. Strict return precautions given to patient.            Madalyn Gtz MD  Infectious Disease

## 2024-02-07 ENCOUNTER — TELEPHONE (OUTPATIENT)
Dept: FAMILY MEDICINE | Facility: CLINIC | Age: 49
End: 2024-02-07
Payer: MEDICARE

## 2024-02-07 NOTE — TELEPHONE ENCOUNTER
----- Message from Lashawn Ureña sent at 2/7/2024 10:02 AM CST -----  Type:  Patient Returning Call    Who Called:pt  Who Left Message for Patient:office  Does the patient know what this is regarding?:mobility huv around get electric wheelchair   Would the patient rather a call back or a response via [facility]? call  Best Call Back Number: [phone]  Additional Information:

## 2024-02-07 NOTE — TELEPHONE ENCOUNTER
Called and spoke to pt she is looking to get a motorized scooter, she is needing to see if she is eligable to get the get a wheel chair with huv around. She's wanting to know if she will be able to do that at the appointment on 2/29/24 or if another needs to be scheduled. Please advise

## 2024-02-07 NOTE — TELEPHONE ENCOUNTER
----- Message from Addie Alvarez sent at 2/7/2024 11:06 AM CST -----  Type:  Patient Returning Call    Who Called:pt  Who Left Message for Patient:Yesenia Stanton MA  Does the patient know what this is regarding?:returning call   Would the patient rather a call back or a response via [email or web address]? call  Best Call Back Number:[phone]   Additional Information:

## 2024-02-08 ENCOUNTER — HOSPITAL ENCOUNTER (OUTPATIENT)
Dept: WOUND CARE | Facility: HOSPITAL | Age: 49
Discharge: HOME | End: 2024-02-08
Attending: SURGERY
Payer: MEDICARE

## 2024-02-08 VITALS
RESPIRATION RATE: 18 BRPM | WEIGHT: 231 LBS | TEMPERATURE: 98 F | BODY MASS INDEX: 38.49 KG/M2 | SYSTOLIC BLOOD PRESSURE: 159 MMHG | HEART RATE: 96 BPM | HEIGHT: 65 IN | DIASTOLIC BLOOD PRESSURE: 74 MMHG

## 2024-02-08 DIAGNOSIS — N76.82 FOURNIER'S GANGRENE IN FEMALE: ICD-10-CM

## 2024-02-08 DIAGNOSIS — T81.89XD NON-HEALING SURGICAL WOUND, SUBSEQUENT ENCOUNTER: ICD-10-CM

## 2024-02-08 DIAGNOSIS — L02.214 GROIN ABSCESS: Primary | ICD-10-CM

## 2024-02-08 PROCEDURE — 17250 CHEM CAUT OF GRANLTJ TISSUE: CPT | Mod: HCNC

## 2024-02-08 NOTE — PROGRESS NOTES
[facility]    Subjective:       Patient ID: Jaimee Quintana is a 48 y.o. female.    Chief Complaint: Wound Dehiscence    Wound care follow up for right groin surgical wound. Sutures to area removed, silver nitrate applied to hypergranulation tissue. Wound frances. Continue plan of care.   Review of Systems   Constitutional: Negative.    HENT: Negative.     Eyes: Negative.    Respiratory: Negative.     Cardiovascular: Negative.    Gastrointestinal: Negative.    Genitourinary: Negative.    Musculoskeletal: Negative.    Skin: Negative.    Neurological: Negative.    Psychiatric/Behavioral: Negative.         Objective:     Vitals:    02/08/24 1052   BP: (!) 159/74   Pulse: 96   Resp: 18   Temp: 97.8 °F (36.6 °C)         Physical Exam  Constitutional:       Appearance: She is well-developed.   HENT:      Head: Normocephalic.   Eyes:      Conjunctiva/sclera: Conjunctivae normal.      Pupils: Pupils are equal, round, and reactive to light.   Cardiovascular:      Rate and Rhythm: Normal rate and regular rhythm.      Heart sounds: Normal heart sounds.   Pulmonary:      Effort: Pulmonary effort is normal.      Breath sounds: Normal breath sounds.   Abdominal:      General: Bowel sounds are normal.      Palpations: Abdomen is soft.   Musculoskeletal:         General: Normal range of motion.      Cervical back: Normal range of motion and neck supple.   Skin:     General: Skin is warm and dry.   Neurological:      Mental Status: She is alert and oriented to person, place, and time.      Deep Tendon Reflexes: Reflexes are normal and symmetric.        Incision/Site 01/07/24 0908 Right Groin (Active)   01/07/24 0908   Present Prior to Hospital Arrival?:    Side: Right   Location: Groin   Orientation:    Incision Type:    Closure Method: Other (see comments)   Additional Comments: incision open with betadine soaked Kerlix/4x4's/ABD dressing and Medipore tape   Removal Indication  and Assessment:    Wound Outcome:    Removal Indications:    Wound Image    02/08/24 1109   Dressing Appearance Moist drainage 02/08/24 1109   Drainage Amount Small 02/08/24 1109   Drainage Characteristics/Odor Yellow 02/08/24 1109   Appearance Red;Sutures intact;Hypergranulation 02/08/24 1109   Red (%), Wound Tissue Color 100 % 02/08/24 1109   Periwound Area Intact;Moist 02/08/24 1109   Wound Edges Undefined 02/08/24 1109   Wound Length (cm) 0.2 cm 02/08/24 1109   Wound Width (cm) 5 cm 02/08/24 1109   Wound Depth (cm) 3 cm 02/08/24 1109   Wound Volume (cm^3) 3 cm^3 02/08/24 1109   Wound Surface Area (cm^2) 1 cm^2 02/08/24 1109   Care Cleansed with:;Sterile normal saline;Other (see comments);Sutures removed 02/08/24 1109   Dressing Applied;Other (comment);Non-adherent;Absorptive Pad 02/08/24 1109   Dressing Change Due 02/10/24 02/08/24 1109         Assessment/Plan:         ICD-10-CM ICD-9-CM   1. Groin abscess  L02.214 682.2   2. Non-healing surgical wound, subsequent encounter  T81.89XD V58.89     998.83   3. 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           Tissue pathology and/or culture taken:  [] Yes [x] No   Sharp debridement performed:   [] Yes [x] No   Labs ordered this visit:   [] Yes [x] No   Imaging ordered this visit:   [] Yes [x] No           Orders Placed This Encounter   Procedures    Change dressing     Right groin:  Cleanse wound with:saline, OK for patient to shower prior to dressing changes  Lidocaine:prn  Silver nitrate:prn  Periwound care:maintain dry  Primary dressing:Bactroban, xeroform  Secondary dressing:small ABD pad, mesh underwear to secure    Frequency:every other day-  patient may change ABD pad daily and prn for drainage  Follow-up:2 weeks with   [facility]: See above orders. Please change every other day and prn.   Other: Continue Bactrim as ordered by ID        Follow up in about 2 weeks (around 2/22/2024) for .            This includes face to face  time and non-face to face time preparing to see the patient (eg, review of tests), obtaining and/or reviewing separately obtained history, documenting clinical information in the electronic or other health record, independently interpreting results and communicating results to the patient/family/caregiver, or care coordinator.

## 2024-02-12 LAB — FUNGUS SPEC CULT: NORMAL

## 2024-02-15 ENCOUNTER — OFFICE VISIT (OUTPATIENT)
Dept: UROLOGY | Facility: CLINIC | Age: 49
End: 2024-02-15
Payer: MEDICARE

## 2024-02-15 ENCOUNTER — PATIENT OUTREACH (OUTPATIENT)
Dept: ADMINISTRATIVE | Facility: HOSPITAL | Age: 49
End: 2024-02-15
Payer: MEDICARE

## 2024-02-15 ENCOUNTER — PATIENT MESSAGE (OUTPATIENT)
Dept: ADMINISTRATIVE | Facility: HOSPITAL | Age: 49
End: 2024-02-15
Payer: MEDICARE

## 2024-02-15 VITALS — HEIGHT: 65 IN | BODY MASS INDEX: 37.65 KG/M2 | WEIGHT: 226 LBS

## 2024-02-15 DIAGNOSIS — N76.82 FOURNIER'S GANGRENE IN FEMALE: ICD-10-CM

## 2024-02-15 DIAGNOSIS — N15.1 PERINEPHRIC ABSCESS: ICD-10-CM

## 2024-02-15 PROCEDURE — 3008F BODY MASS INDEX DOCD: CPT | Mod: HCNC,CPTII,S$GLB, | Performed by: UROLOGY

## 2024-02-15 PROCEDURE — 1160F RVW MEDS BY RX/DR IN RCRD: CPT | Mod: HCNC,CPTII,S$GLB, | Performed by: UROLOGY

## 2024-02-15 PROCEDURE — 99999 PR PBB SHADOW E&M-EST. PATIENT-LVL IV: CPT | Mod: PBBFAC,HCNC,, | Performed by: UROLOGY

## 2024-02-15 PROCEDURE — 99214 OFFICE O/P EST MOD 30 MIN: CPT | Mod: HCNC,S$GLB,, | Performed by: UROLOGY

## 2024-02-15 PROCEDURE — 3046F HEMOGLOBIN A1C LEVEL >9.0%: CPT | Mod: HCNC,CPTII,S$GLB, | Performed by: UROLOGY

## 2024-02-15 PROCEDURE — 1159F MED LIST DOCD IN RCRD: CPT | Mod: HCNC,CPTII,S$GLB, | Performed by: UROLOGY

## 2024-02-15 NOTE — PROGRESS NOTES
02/15/2024 Care Everywhere updates requested and reviewed.  Immunizations reconciled. Media reports reviewed.  Duplicate HM overrides and  orders removed.  Overdue HM topic chart audit and/or requested.  Overdue lab testing linked to upcoming lab appointments if applies.            DIS reviewed      Mammogram       Health Maintenance Due   Topic Date Due    Pneumococcal Vaccines (Age 0-64) (2 of 2 - PCV) 2015    Diabetes Urine Screening  2022    Influenza Vaccine (1) 2023    COVID-19 Vaccine (4 - -24 season) 2023    Mammogram  2023    Foot Exam  2023

## 2024-02-16 ENCOUNTER — OUTPATIENT CASE MANAGEMENT (OUTPATIENT)
Dept: ADMINISTRATIVE | Facility: OTHER | Age: 49
End: 2024-02-16
Payer: MEDICARE

## 2024-02-16 ENCOUNTER — EXTERNAL HOME HEALTH (OUTPATIENT)
Dept: HOME HEALTH SERVICES | Facility: HOSPITAL | Age: 49
End: 2024-02-16
Payer: MEDICARE

## 2024-02-16 ENCOUNTER — TELEPHONE (OUTPATIENT)
Dept: FAMILY MEDICINE | Facility: CLINIC | Age: 49
End: 2024-02-16
Payer: MEDICARE

## 2024-02-16 NOTE — PROGRESS NOTES
Outpatient Care Management  Plan of Care Follow Up Visit    Patient: Jaimee Quintana  MRN: [id number]  Date of Service: 02/16/2024  Completed by: Courtney Blake RN  Referral Date: 01/13/2024    Reason for Visit   Patient presents with    Update Plan Of Care       Brief Summary: Pt reports that she was started on amoxicillin once a day. Pt voiced that she has noticed feeling lightheaded and n/v since she started taking this medication. Informed pt that this CM will notify the PCP's staff about her complaints.

## 2024-02-16 NOTE — TELEPHONE ENCOUNTER
Called [facility] to call office back.     Was calling to see how pt is taking her medication, and to see if she eating before she takes the med  too. LVM    Sharing message as an FYI .

## 2024-02-16 NOTE — TELEPHONE ENCOUNTER
----- Message from Courtney Blake RN sent at 2/16/2024  2:17 PM CST -----  Alee this is Cuortney with [facility]. Pt reports that she was started on Amoxicillin. Pt reports that she has some N/V and lightheadedness since she started taking the amoxicillin.    Please advise  Thank you

## 2024-02-22 ENCOUNTER — HOSPITAL ENCOUNTER (OUTPATIENT)
Dept: WOUND CARE | Facility: HOSPITAL | Age: 49
Discharge: HOME | End: 2024-02-22
Attending: SURGERY
Payer: MEDICARE

## 2024-02-22 VITALS
HEART RATE: 97 BPM | HEIGHT: 65 IN | BODY MASS INDEX: 37.49 KG/M2 | WEIGHT: 225 LBS | SYSTOLIC BLOOD PRESSURE: 186 MMHG | DIASTOLIC BLOOD PRESSURE: 95 MMHG | TEMPERATURE: 98 F | RESPIRATION RATE: 18 BRPM

## 2024-02-22 DIAGNOSIS — L92.9 HYPERGRANULATION: ICD-10-CM

## 2024-02-22 DIAGNOSIS — L02.214 GROIN ABSCESS: ICD-10-CM

## 2024-02-22 DIAGNOSIS — N76.82 FOURNIER'S GANGRENE IN FEMALE: ICD-10-CM

## 2024-02-22 DIAGNOSIS — T81.89XD NON-HEALING SURGICAL WOUND, SUBSEQUENT ENCOUNTER: Primary | ICD-10-CM

## 2024-02-22 PROBLEM — T81.89XA SURGICAL WOUND, NON HEALING: Status: ACTIVE | Noted: 2024-02-22

## 2024-02-22 PROCEDURE — 17250 CHEM CAUT OF GRANLTJ TISSUE: CPT | Mod: HCNC

## 2024-02-22 NOTE — PROGRESS NOTES
[facility]    Subjective:       Patient ID: Jaimee Quintana is a 48 y.o. female.    Chief Complaint: Non-healing Wound Follow Up    Wound care follow up for right groin surgical wound. Wound contracted, wound bed flat - no depth. Area of hypergranulation tissue treated with silver nitrate. Discussed with patient ok to stop bactrim due to her GI issues and no signs of infection. Will continue Bactroban topically - patient states she can perform dressing changes - will DC home health. Return to clinic in 2 weeks.   Review of Systems   Constitutional: Negative.    HENT: Negative.     Eyes: Negative.    Respiratory: Negative.     Cardiovascular: Negative.    Gastrointestinal: Negative.    Genitourinary: Negative.    Musculoskeletal: Negative.    Skin: Negative.    Neurological: Negative.    Psychiatric/Behavioral: Negative.         Objective:     Vitals:    02/22/24 1101   BP: (!) 186/95   Pulse: 97   Resp: 18   Temp: 97.8 °F (36.6 °C)         Physical Exam  Constitutional:       Appearance: She is well-developed.   HENT:      Head: Normocephalic.   Eyes:      Conjunctiva/sclera: Conjunctivae normal.      Pupils: Pupils are equal, round, and reactive to light.   Cardiovascular:      Rate and Rhythm: Normal rate and regular rhythm.      Heart sounds: Normal heart sounds.   Pulmonary:      Effort: Pulmonary effort is normal.      Breath sounds: Normal breath sounds.   Abdominal:      General: Bowel sounds are normal.      Palpations: Abdomen is soft.   Musculoskeletal:         General: Normal range of motion.      Cervical back: Normal range of motion and neck supple.   Skin:     General: Skin is warm and dry.   Neurological:      Mental Status: She is alert and oriented to person, place, and time.      Deep Tendon Reflexes: Reflexes are normal and symmetric.        Incision/Site 01/07/24 0908 Right Groin (Active)   01/07/24 0908   Present Prior to Hospital Arrival?:     Side: Right   Location: Groin   Orientation:    Incision Type:    Closure Method: Other (see comments)   Additional Comments: incision open with betadine soaked Kerlix/4x4's/ABD dressing and Medipore tape   Removal Indication and Assessment:    Wound Outcome:    Removal Indications:    Wound Image   02/22/24 1102   Dressing Appearance Moist drainage 02/22/24 1102   Drainage Amount Small 02/22/24 1102   Drainage Characteristics/Odor Serosanguineous 02/22/24 1102   Appearance Red;Hypergranulation 02/22/24 1102   Red (%), Wound Tissue Color 100 % 02/22/24 1102   Periwound Area Intact 02/22/24 1102   Wound Edges Defined 02/22/24 1102   Wound Length (cm) 0.8 cm 02/22/24 1102   Wound Width (cm) 2.4 cm 02/22/24 1102   Wound Depth (cm) 0.1 cm 02/22/24 1102   Wound Volume (cm^3) 0.192 cm^3 02/22/24 1102   Wound Surface Area (cm^2) 1.92 cm^2 02/22/24 1102   Care Cleansed with:;Sterile normal saline;Other (see comments) 02/22/24 1102   Dressing Applied;Other (comment);Island/border 02/22/24 1102   Dressing Change Due 02/23/24 02/22/24 1102         Assessment/Plan:         ICD-10-CM ICD-9-CM   1. Non-healing surgical wound, subsequent encounter  T81.89XD V58.89     998.83   2. 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           Tissue pathology and/or culture taken:  [] Yes [x] No   Sharp debridement performed:   [] Yes [x] No   Labs ordered this visit:   [] Yes [x] No   Imaging ordered this visit:   [] Yes [x] No           Orders Placed This Encounter   Procedures    Change dressing     Right groin:  Cleanse wound with:saline, OK for patient to shower prior to dressing changes  Lidocaine:prn  Silver nitrate:prn  Periwound care:maintain dry  Primary dressing:Bactroban  Secondary dressing:mepore or large bandaid    Frequency:every other day or prn  Follow-up:2 weeks with   [facility]: Discharge from home health  Other: Stop Bactrim        Follow up in about 2 weeks  (around 3/7/2024) for .            This includes face to face time and non-face to face time preparing to see the patient (eg, review of tests), obtaining and/or reviewing separately obtained history, documenting clinical information in the electronic or other health record, independently interpreting results and communicating results to the patient/family/caregiver, or care coordinator.

## 2024-02-26 LAB
ACID FAST MOD KINY STN SPEC: NORMAL
MYCOBACTERIUM SPEC QL CULT: NORMAL

## 2024-02-29 ENCOUNTER — OFFICE VISIT (OUTPATIENT)
Dept: FAMILY MEDICINE | Facility: CLINIC | Age: 49
End: 2024-02-29
Payer: MEDICARE

## 2024-02-29 VITALS
HEIGHT: 65 IN | SYSTOLIC BLOOD PRESSURE: 150 MMHG | TEMPERATURE: 98 F | BODY MASS INDEX: 38.15 KG/M2 | HEART RATE: 88 BPM | DIASTOLIC BLOOD PRESSURE: 108 MMHG | WEIGHT: 228.94 LBS | OXYGEN SATURATION: 98 %

## 2024-02-29 DIAGNOSIS — E11.69 HYPERLIPIDEMIA ASSOCIATED WITH TYPE 2 DIABETES MELLITUS: ICD-10-CM

## 2024-02-29 DIAGNOSIS — R53.1 WEAKNESS: ICD-10-CM

## 2024-02-29 DIAGNOSIS — I15.2 HYPERTENSION ASSOCIATED WITH DIABETES: ICD-10-CM

## 2024-02-29 DIAGNOSIS — E11.65 UNCONTROLLED TYPE 2 DIABETES MELLITUS WITH HYPERGLYCEMIA: Primary | ICD-10-CM

## 2024-02-29 DIAGNOSIS — Z12.31 ENCOUNTER FOR SCREENING MAMMOGRAM FOR MALIGNANT NEOPLASM OF BREAST: ICD-10-CM

## 2024-02-29 DIAGNOSIS — E78.5 HYPERLIPIDEMIA ASSOCIATED WITH TYPE 2 DIABETES MELLITUS: ICD-10-CM

## 2024-02-29 DIAGNOSIS — E11.59 HYPERTENSION ASSOCIATED WITH DIABETES: ICD-10-CM

## 2024-02-29 PROCEDURE — 1159F MED LIST DOCD IN RCRD: CPT | Mod: HCNC,CPTII,S$GLB, | Performed by: STUDENT IN AN ORGANIZED HEALTH CARE EDUCATION/TRAINING PROGRAM

## 2024-02-29 PROCEDURE — G2211 COMPLEX E/M VISIT ADD ON: HCPCS | Mod: HCNC,S$GLB,, | Performed by: STUDENT IN AN ORGANIZED HEALTH CARE EDUCATION/TRAINING PROGRAM

## 2024-02-29 PROCEDURE — 3008F BODY MASS INDEX DOCD: CPT | Mod: HCNC,CPTII,S$GLB, | Performed by: STUDENT IN AN ORGANIZED HEALTH CARE EDUCATION/TRAINING PROGRAM

## 2024-02-29 PROCEDURE — 99215 OFFICE O/P EST HI 40 MIN: CPT | Mod: HCNC,S$GLB,, | Performed by: STUDENT IN AN ORGANIZED HEALTH CARE EDUCATION/TRAINING PROGRAM

## 2024-02-29 PROCEDURE — 3077F SYST BP >= 140 MM HG: CPT | Mod: HCNC,CPTII,S$GLB, | Performed by: STUDENT IN AN ORGANIZED HEALTH CARE EDUCATION/TRAINING PROGRAM

## 2024-02-29 PROCEDURE — 3046F HEMOGLOBIN A1C LEVEL >9.0%: CPT | Mod: HCNC,CPTII,S$GLB, | Performed by: STUDENT IN AN ORGANIZED HEALTH CARE EDUCATION/TRAINING PROGRAM

## 2024-02-29 PROCEDURE — 3080F DIAST BP >= 90 MM HG: CPT | Mod: HCNC,CPTII,S$GLB, | Performed by: STUDENT IN AN ORGANIZED HEALTH CARE EDUCATION/TRAINING PROGRAM

## 2024-02-29 PROCEDURE — 99999 PR PBB SHADOW E&M-EST. PATIENT-LVL V: CPT | Mod: PBBFAC,HCNC,, | Performed by: STUDENT IN AN ORGANIZED HEALTH CARE EDUCATION/TRAINING PROGRAM

## 2024-02-29 RX ORDER — VALSARTAN AND HYDROCHLOROTHIAZIDE 160; 12.5 MG/1; MG/1
1 TABLET, FILM COATED ORAL DAILY
Qty: 90 TABLET | Refills: 3 | Status: SHIPPED | OUTPATIENT
Start: 2024-02-29 | End: 2025-02-28

## 2024-02-29 NOTE — PROGRESS NOTES
Subjective:       Patient ID: Jaimee Quintana is a 48 y.o. female.    Chief Complaint: OTHER (Pt wants scooter eval)  -recent hospital stay in Jan. 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    Abscess of left kidney 01/06/2024    Chronic combined systolic and diastolic congestive heart failure 01/06/2024    COVID-19 01/06/2024    Dysphagia 10/08/2023    Sinus arrhythmia 10/06/2023    Bacteremia 10/05/2023    Bilateral claudication of lower limb 06/27/2023    Chronic diastolic congestive heart failure 05/22/2023    Type 2 diabetes mellitus 05/22/2023    Noncompliance with medications 05/22/2023    GERD (gastroesophageal reflux disease) 05/22/2023    History of CVA (cerebrovascular accident) without residual deficits 05/22/2023    Mild episode of recurrent major depressive disorder 07/09/2021    Abnormal Papanicolaou smear of cervix with positive human papilloma virus (HPV) test 07/09/2021     + HrHPV  - Negative coloposcopy 9/2020          Type 2 diabetes mellitus with both eyes affected by proliferative retinopathy and macular edema, with long-term current use of insulin 05/11/2021    Hypertensive retinopathy, bilateral 02/11/2020    Type 2 diabetes mellitus with other skin ulcer (CODE) 10/19/2019    Abnormal finding of blood chemistry  05/28/2018    Thyroid nodule 11/08/2017     Multinodular goiter  FINAL PATHOLOGIC DIAGNOSIS  Left thyroid, fine-needle  aspiration:  [facility] Thyroid Cytology Category: Benign.        Diabetic peripheral neuropathy associated with type 2 diabetes mellitus 03/23/2017    History of cholecystectomy 03/15/2017    Bilateral low back pain without sciatica 09/28/2016    Fatty liver 08/18/2016     encouraged weight loss      Hypertension associated with diabetes 10/23/2015    CESAR (obstructive sleep apnea) 10/23/2015    Class 3 severe obesity due to excess calories with serious comorbidity and body mass index (BMI) of 40.0 to 44.9 in adult 03/19/2015    Hyperlipidemia associated with type 2 diabetes mellitus     Cerebrovascular disease      2003      HTN (hypertension) without retinopathy 03/13/2014        Review of Systems   Constitutional:  Positive for activity change, fatigue and unexpected weight change. Negative for fever.   Respiratory:  Negative for shortness of breath.    Cardiovascular:  Negative for chest pain, palpitations and leg swelling.   Gastrointestinal:  Positive for nausea. Negative for blood in stool, constipation, diarrhea and vomiting.   Genitourinary:  Negative for difficulty urinating, dysuria and hematuria.   Neurological:  Positive for dizziness, weakness, light-headedness and numbness. Negative for headaches.        A1C:  Recent Labs   Lab 06/19/23  1401 10/04/23  1752 01/06/24  1032   Hemoglobin A1C 11.4 H 10.6 H 9.5 H     CBC:  Recent Labs   Lab 01/09/24  0504 01/11/24  0418 01/12/24  0416   WBC 9.06 8.46 8.46   RBC 3.73 L 4.15 3.92 L   Hemoglobin 10.4 L 11.5 L 11.0 L   Hematocrit 32.5 L 36.1 L 34.0 L   Platelets 281 352 354   MCV 87 87 87   MCH 27.9 27.7 28.1   MCHC 32.0 31.9 L 32.4     CMP:  Recent Labs   Lab 01/07/24  0519 01/08/24  0613 01/09/24  0504 01/11/24  0418 01/12/24  0416   Glucose 297 H 320 H 192 H   < > 236 H   Calcium 8.6 L 8.3 L 8.8   < > 8.6 L   Albumin 2.2 L 2.0 L 2.2 L  --   --    Total Protein 6.8 6.7 7.1  --   --    Sodium 133 L 134 L 136   < > 136   Potassium 3.0 L 3.4 L 3.3 L   < >  3.6   CO2 23 23 25   < > 26   Chloride 100 100 102   < > 100   BUN 9 10 8   < > 7   Creatinine 0.9 0.9 0.8   < > 0.8   Alkaline Phosphatase 104 79 93  --   --    ALT 8 L 11 7 L  --   --    AST 10 9 L 9 L  --   --    Total Bilirubin 0.5 0.4 0.4  --   --     < > = values in this interval not displayed.     LIPIDS:  Recent Labs   Lab 05/07/21 0923 05/23/23  0246 09/29/23 0828 10/04/23  2044 01/07/24  0519   TSH  --   --   --    < > 2.384   HDL 50 49 49  --   --    Cholesterol 174 186 220 H  --   --    Triglycerides 128 120 141  --   --    LDL Cholesterol 98.4 113.0 142.8  --   --    HDL/Cholesterol Ratio 28.7 26.3 22.3  --   --    Non-HDL Cholesterol 124 137 171  --   --    Total Cholesterol/HDL Ratio 3.5 3.8 4.5  --   --     < > = values in this interval not displayed.     TSH:  Recent Labs   Lab 10/04/23  2044 01/07/24  0519   TSH 0.759 2.384        Objective:      Vitals:    02/29/24 0948   BP: (!) 150/108   Pulse: 88   Temp: 97.7 °F (36.5 °C)      Physical Exam  Vitals reviewed.   Constitutional:       Appearance: Normal appearance. She is morbidly obese.   HENT:      Head: Normocephalic and atraumatic.   Eyes:      Conjunctiva/sclera: Conjunctivae normal.   Cardiovascular:      Rate and Rhythm: Normal rate and regular rhythm.      Heart sounds: Normal heart sounds.   Pulmonary:      Effort: Pulmonary effort is normal.      Breath sounds: Normal breath sounds.   Abdominal:      Palpations: Abdomen is soft.      Tenderness: There is no abdominal tenderness.   Musculoskeletal:         General: Normal range of motion.      Cervical back: Normal range of motion.      Right lower leg: No edema.      Left lower leg: No edema.   Neurological:      General: No focal deficit present.      Mental Status: She is alert and oriented to person, place, and time.   Psychiatric:         Mood and Affect: Mood is anxious.         Behavior: Behavior normal.         Cognition and Memory: Cognition is impaired.          Assessment:        1. Uncontrolled type 2 diabetes mellitus with hyperglycemia    2. Hypertension associated with diabetes    3. Hyperlipidemia associated with type 2 diabetes mellitus    4. Weakness    5. Encounter for screening mammogram for malignant neoplasm of breast        Plan:   1. Uncontrolled type 2 diabetes mellitus with hyperglycemia    2. Hypertension associated with diabetes  - valsartan-hydrochlorothiazide (DIOVAN-HCT) 160-12.5 mg per tablet; Take 1 tablet by mouth once daily.  Dispense: 90 tablet; Refill: 3    3. Hyperlipidemia associated with type 2 diabetes mellitus    4. Weakness  - Ambulatory referral/consult to Physical/Occupational Therapy; Future    5. Encounter for screening mammogram for malignant neoplasm of breast  - Mammo Digital Screening Bilat w/ Doug; Future     Poor health literacy overall  Pt her for seating eval to get scooter for home  Diet not great, she has trouble getting food and often runs out of healthy food before end of the week   BP uncontrolled, states is taking meds as prescribed, never got lisinopril; START valsartan/HCTZ   Refer to PT for seating eval   Mammo per request         Lakisha Mendez DO   [facility]  2/29/24      I spent a total of >40 minutes on the day of the visit.This includes face to face time and non-face to face time preparing to see the patient (eg, review of tests), obtaining and/or reviewing separately obtained history, documenting clinical information in the electronic or other health record, independently interpreting results and communicating results to the patient/family/caregiver, or care coordinator.

## 2024-03-05 ENCOUNTER — PATIENT MESSAGE (OUTPATIENT)
Dept: ADMINISTRATIVE | Facility: HOSPITAL | Age: 49
End: 2024-03-05
Payer: MEDICARE

## 2024-03-07 ENCOUNTER — HOSPITAL ENCOUNTER (OUTPATIENT)
Dept: WOUND CARE | Facility: HOSPITAL | Age: 49
Discharge: HOME | End: 2024-03-07
Attending: SURGERY
Payer: MEDICARE

## 2024-03-07 VITALS
BODY MASS INDEX: 37.99 KG/M2 | SYSTOLIC BLOOD PRESSURE: 193 MMHG | RESPIRATION RATE: 18 BRPM | HEART RATE: 94 BPM | TEMPERATURE: 98 F | DIASTOLIC BLOOD PRESSURE: 94 MMHG | HEIGHT: 65 IN | WEIGHT: 228 LBS

## 2024-03-07 DIAGNOSIS — T81.89XD NON-HEALING SURGICAL WOUND, SUBSEQUENT ENCOUNTER: Primary | ICD-10-CM

## 2024-03-07 DIAGNOSIS — L92.9 HYPERGRANULATION: ICD-10-CM

## 2024-03-07 PROCEDURE — 17250 CHEM CAUT OF GRANLTJ TISSUE: CPT | Mod: HCNC

## 2024-03-07 NOTE — PROGRESS NOTES
Wound Care & [facility]    Subjective:       Patient ID: Jaimee Quintana is a 48 y.o. female.    Chief Complaint: Non-healing Wound Follow Up    Wound care follow up for right groin surgical wound. Patient performing her own dressing changes. Area of hypergranulation tissue noted to wound bed - treated with silver nitrate. Return to clinic in 2 weeks.  Review of Systems   Constitutional: Negative.    HENT: Negative.     Eyes: Negative.    Respiratory: Negative.     Cardiovascular: Negative.    Gastrointestinal: Negative.    Genitourinary: Negative.    Musculoskeletal: Negative.    Skin: Negative.    Neurological: Negative.    Psychiatric/Behavioral: Negative.         Objective:     Vitals:    03/07/24 1037   BP: (!) 193/94   Pulse: 94   Resp: 18   Temp: 97.6 °F (36.4 °C)         Physical Exam  Constitutional:       Appearance: She is well-developed.   HENT:      Head: Normocephalic.   Eyes:      Conjunctiva/sclera: Conjunctivae normal.      Pupils: Pupils are equal, round, and reactive to light.   Cardiovascular:      Rate and Rhythm: Normal rate and regular rhythm.      Heart sounds: Normal heart sounds.   Pulmonary:      Effort: Pulmonary effort is normal.      Breath sounds: Normal breath sounds.   Abdominal:      General: Bowel sounds are normal.      Palpations: Abdomen is soft.   Musculoskeletal:         General: Normal range of motion.      Cervical back: Normal range of motion and neck supple.   Skin:     General: Skin is warm and dry.   Neurological:      Mental Status: She is alert and oriented to person, place, and time.      Deep Tendon Reflexes: Reflexes are normal and symmetric.        Incision/Site 01/07/24 0908 Right Groin (Active)   01/07/24 0908   Present Prior to Hospital Arrival?:    Side: Right   Location: Groin   Orientation:    Incision Type:    Closure Method: Other (see comments)   Additional Comments: incision open with betadine soaked Kerlix/4x4's/ABD  dressing and Medipore tape   Removal Indication and Assessment:    Wound Outcome:    Removal Indications:    Wound Image   03/07/24 1045   Dressing Appearance Moist drainage 03/07/24 1045   Drainage Amount Moderate 03/07/24 1045   Drainage Characteristics/Odor Serosanguineous 03/07/24 1045   Appearance Red;Hypergranulation 03/07/24 1045   Red (%), Wound Tissue Color 100 % 03/07/24 1045   Periwound Area Intact 03/07/24 1045   Wound Edges Defined 03/07/24 1045   Wound Length (cm) 0.5 cm 03/07/24 1045   Wound Width (cm) 2 cm 03/07/24 1045   Wound Depth (cm) 0.1 cm 03/07/24 1045   Wound Volume (cm^3) 0.1 cm^3 03/07/24 1045   Wound Surface Area (cm^2) 1 cm^2 03/07/24 1045   Care Cleansed with:;Sterile normal saline;Other (see comments) 03/07/24 1045   Dressing Applied;Other (comment);Gauze 03/07/24 1045   Dressing Change Due 03/08/24 03/07/24 1045         Assessment/Plan:         ICD-10-CM ICD-9-CM   1. Non-healing surgical wound, subsequent encounter  T81.89XD V58.89     998.83           Tissue pathology and/or culture taken:  [] Yes [x] No   Sharp debridement performed:   [] Yes [x] No   Labs ordered this visit:   [] Yes [x] No   Imaging ordered this visit:   [] Yes [x] No           Orders Placed This Encounter   Procedures    Change dressing     Right groin surgical wound:  Cleanse wound with:saline, OK for patient to shower prior to dressing changes   Lidocaine:prn   Silver nitrate:prn   Periwound care:maintain dry   Primary dressing:Bactroban   Secondary dressing:mepliex border    Frequency:every other day or prn   Follow-up:2 weeks with         Follow up in about 2 weeks (around 3/21/2024).            This includes face to face time and non-face to face time preparing to see the patient (eg, review of tests), obtaining and/or reviewing separately obtained history, documenting clinical information in the electronic or other health record, independently interpreting results and communicating results to the  patient/family/caregiver, or care coordinator.

## 2024-03-08 ENCOUNTER — TELEPHONE (OUTPATIENT)
Dept: FAMILY MEDICINE | Facility: CLINIC | Age: 49
End: 2024-03-08
Payer: MEDICARE

## 2024-03-08 NOTE — TELEPHONE ENCOUNTER
----- Message from Michaelcarmencita Sanz sent at 3/8/2024  2:02 PM CST -----  Contact: Denise  Type: Requesting to speak with nurse        Who Called: Denise from [facility]   Regarding: would like to let Doctor know pt is not in network  Would the patient rather a call back or a response via [facility]? Call back  Best Call Back Number: [phone]  Additional Information:

## 2024-03-12 ENCOUNTER — TELEPHONE (OUTPATIENT)
Dept: INFECTIOUS DISEASES | Facility: HOSPITAL | Age: 49
End: 2024-03-12
Payer: MEDICARE

## 2024-03-12 ENCOUNTER — PATIENT OUTREACH (OUTPATIENT)
Dept: ADMINISTRATIVE | Facility: HOSPITAL | Age: 49
End: 2024-03-12
Payer: MEDICARE

## 2024-03-12 DIAGNOSIS — E11.69 TYPE 2 DIABETES MELLITUS WITH OTHER SPECIFIED COMPLICATION, WITH LONG-TERM CURRENT USE OF INSULIN: Primary | ICD-10-CM

## 2024-03-12 DIAGNOSIS — Z79.4 TYPE 2 DIABETES MELLITUS WITH OTHER SPECIFIED COMPLICATION, WITH LONG-TERM CURRENT USE OF INSULIN: Primary | ICD-10-CM

## 2024-03-12 NOTE — TELEPHONE ENCOUNTER
Called patient and discussed renal US - no evidence of residual abscess. Pt denied fevers or chills  - tolerating abx without issues. Discussed with pt, she preferred virtual visit. Answered all questions.

## 2024-03-12 NOTE — PROGRESS NOTES
Population Health Chart Review & Patient Outreach Details      Additional [facility] Notes:               Updates Requested / Reviewed:      Updated Care Coordination Note, Care Everywhere, Care Team Updated, and Immunizations Reconciliation Completed or Queried: [place]         Health Maintenance Topics Overdue:      VB Score: 2     Foot Exam  Uncontrolled BP                       Health Maintenance Topic(s) Outreach Outcomes & Actions Taken:    Lab(s) - Outreach Outcomes & Actions Taken  : Overdue Lab(s) Ordered and Overdue Lab(s) Scheduled

## 2024-03-19 ENCOUNTER — PATIENT MESSAGE (OUTPATIENT)
Dept: INFECTIOUS DISEASES | Facility: CLINIC | Age: 49
End: 2024-03-19
Payer: MEDICARE

## 2024-03-21 ENCOUNTER — HOSPITAL ENCOUNTER (OUTPATIENT)
Dept: WOUND CARE | Facility: HOSPITAL | Age: 49
Discharge: HOME | End: 2024-03-21
Attending: SURGERY
Payer: MEDICARE

## 2024-03-21 VITALS — DIASTOLIC BLOOD PRESSURE: 93 MMHG | RESPIRATION RATE: 18 BRPM | HEART RATE: 97 BPM | SYSTOLIC BLOOD PRESSURE: 184 MMHG

## 2024-03-21 DIAGNOSIS — T81.89XD NON-HEALING SURGICAL WOUND, SUBSEQUENT ENCOUNTER: Primary | ICD-10-CM

## 2024-03-21 DIAGNOSIS — L92.9 HYPERGRANULATION: ICD-10-CM

## 2024-03-21 PROCEDURE — 17250 CHEM CAUT OF GRANLTJ TISSUE: CPT | Mod: HCNC

## 2024-03-21 NOTE — MR AVS SNAPSHOT
[facility] Surgery  [address] [place] [address]  4th Floor [facility]  [place] [address]  Phone: [phone]                  Jaimee Quintana   3/20/2017 10:20 AM   Office Visit    Description:  Female : 1975   Provider:  Lashawn Collier DO   Department:  [facility]                To Do List           Future Appointments        Provider Department Dept Phone    3/23/2017 9:00 AM BLADIMIR Enciso,ANP-C [facility] - Endocrinology [phone]    2017 10:00 AM Lashawn Collier DO [facility] [phone]    2017 8:00 AM [facility], [facility] [phone]    2017 10:00 AM Kory Taylor MD [place] - Internal Medicine [phone]      Goals (5 Years of Data)     None      [facility]     [facility] Nurse Care Line -  Assistance  Registered nurses in the [facility] provide clinical advisement, health education, appointment booking, and other advisory services.  Call for this free service at [phone].             Medications           Message regarding Medications     Verify the changes and/or additions to your medication regime listed below are the same as discussed with your clinician today.  If any of these changes or additions are incorrect, please notify your healthcare provider.             Verify that the below list of medications is an accurate representation of the medications you are currently taking.  If none reported, the list may be blank. If incorrect, please contact your healthcare provider. Carry this list with you in case of emergency.           Current Medications     aspirin 81 MG Chew Take 81 mg by mouth once daily.    atorvastatin (LIPITOR) 80 MG tablet TAKE 1 TABLET EVERY EVENING    blood sugar diagnostic Strp Truemetrix; test BID    blood-glucose meter (FREESTYLE SYSTEM KIT) kit Use as instructed; Truemetrix    insulin glargine (LANTUS SOLOSTAR) 100 unit/mL (3 mL) InPn pen Inject 45  "units SQ BID    insulin regular (NOVOLIN R) 100 unit/mL Inj injection 45 units SQ TID AC    insulin syringe-needle U-100 1 mL 30 gauge x 5/16 Syrg Use as directed; Test BID   DX: E11.9, Z79.4    lancets Misc True Metrix; test BID      DX: E11.9, Z79.4    lisinopril-hydrochlorothiazide (PRINZIDE,ZESTORETIC) 20-12.5 mg per tablet TAKE 1 TABLET BY MOUTH 2 (TWO) TIMES DAILY.    metformin (GLUCOPHAGE) 1000 MG tablet Take 1 tablet (1,000 mg total) by mouth 2 (two) times daily with meals.    nifedipine (ADALAT CC) 90 MG TbSR TAKE 1 TABLET (30 MG TOTAL) BY MOUTH ONCE DAILY.    oxycodone-acetaminophen (PERCOCET) 5-325 mg per tablet Take 1-2 tablets PO q4-6hours PRN pain    pantoprazole (PROTONIX) 40 MG tablet Take 1 tablet (40 mg total) by mouth once daily.    pen needle, diabetic (BD ULTRA-FINE ROSA PEN NEEDLES) 32 gauge x 5/32" Ndle 1 Act by Misc.(Non-Drug; Combo Route) route 5 (five) times daily.           Clinical Reference Information           Your Vitals Were     BP Pulse Temp Height Weight Last Period    138/93 (BP Location: Right arm, Patient Position: Sitting) 96 97.8 °F (36.6 °C) (Oral) 5' 5" (1.651 m) 114.5 kg (252 lb 6.8 oz) 03/15/2017    BMI                42.01 kg/m2          Blood Pressure          Most Recent Value    BP  (!)  138/93      Allergies as of 3/20/2017     No Known Allergies      Immunizations Administered on Date of Encounter - 3/20/2017     None      [facility] Sign-Up     Activating your [facility] account is as easy as 1-2-3!     1) Visit [email or web address], select Sign Up Now, enter this activation code and your date of birth, then select Next.  [email or web address]  Expires: 4/16/2017 12:21 PM      2) Create a username and password to use when you visit [facility] in the future and select a security question in case you lose your password and select Next.    3) Enter your e-mail address and click Sign Up!    Additional Information  If you have questions, please e-mail [email or web address] or call " [phone] to talk to our [facility] staff. Remember, [facility] is NOT to be used for urgent needs. For medical emergencies, dial 911.         Language Assistance Services     ATTENTION: Language assistance services are available, free of charge. Please call [phone].      ATENCIÓN: Si habla meri, tiene a reyes disposición servicios gratuitos de asistencia lingüística. Llame al [phone].     CHÚ Ý: N?u b?n nói Ti?ng Vi?t, có các d?ch v? h? tr? ngôn ng? mi?n phí dành cho b?n. G?i s? [phone].         [facility] complies with applicable Federal civil rights laws and does not discriminate on the basis of race, color, national origin, age, disability, or sex.         Opt out

## 2024-03-21 NOTE — PROGRESS NOTES
[facility]    Subjective:       Patient ID: Jaimee Quintana is a 48 y.o. female.    Chief Complaint: Wound Dehiscence    Wound care follow up for right groin ulcer. Area has contracted in size, remains with hypergranular tissue - treated with silver nitrate. No signs of infection or induration. Patient reports no issues with changing her own dressing. Continue plan of care.     Review of Systems   All other systems reviewed and are negative.        Objective:     Vitals:    03/21/24 1056   BP: (!) 184/93   Pulse: 97   Resp:          Physical Exam       Incision/Site 01/07/24 0908 Right Groin (Active)   01/07/24 0908   Present Prior to Hospital Arrival?:    Side: Right   Location: Groin   Orientation:    Incision Type:    Closure Method: Other (see comments)   Additional Comments: incision open with betadine soaked Kerlix/4x4's/ABD dressing and Medipore tape   Removal Indication and Assessment:    Wound Outcome:    Removal Indications:    Wound Image   03/21/24 1045   Dressing Appearance Moist drainage 03/21/24 1045   Drainage Amount Small 03/21/24 1045   Drainage Characteristics/Odor Serosanguineous 03/21/24 1045   Appearance Red;Hypergranulation 03/21/24 1045   Red (%), Wound Tissue Color 100 % 03/21/24 1045   Periwound Area Intact 03/21/24 1045   Wound Edges Defined 03/21/24 1045   Wound Length (cm) 0.5 cm 03/21/24 1045   Wound Width (cm) 1 cm 03/21/24 1045   Wound Depth (cm) 0.1 cm 03/21/24 1045   Wound Volume (cm^3) 0.05 cm^3 03/21/24 1045   Wound Surface Area (cm^2) 0.5 cm^2 03/21/24 1045   Care Cleansed with:;Sterile normal saline;Other (see comments) 03/21/24 1045   Dressing Applied;Other (comment);Island/border 03/21/24 1045   Dressing Change Due 03/23/24 03/21/24 1045         Assessment/Plan:         ICD-10-CM ICD-9-CM   1. Non-healing surgical wound, subsequent encounter  T81.89XD V58.89     998.83   2. Hypergranulation  L92.9 701.5           Tissue  pathology and/or culture taken:  [] Yes [x] No   Sharp debridement performed:   [] Yes [x] No   Labs ordered this visit:   [] Yes [x] No   Imaging ordered this visit:   [] Yes [x] No           Orders Placed This Encounter   Procedures    Change dressing     Right groin surgical wound:   Cleanse wound with:saline, OK for patient to shower prior to dressing changes   Lidocaine:prn   Silver nitrate:prn   Periwound care:maintain dry   Primary dressing:Bactroban   Secondary dressing:mepliex border     Frequency:every other day or prn   Follow-up:2 weeks with         Follow up in about 2 weeks (around 4/4/2024) for .            This includes face to face time and non-face to face time preparing to see the patient (eg, review of tests), obtaining and/or reviewing separately obtained history, documenting clinical information in the electronic or other health record, independently interpreting results and communicating results to the patient/family/caregiver, or care coordinator.

## 2024-03-28 ENCOUNTER — OFFICE VISIT (OUTPATIENT)
Dept: OBSTETRICS AND GYNECOLOGY | Facility: CLINIC | Age: 49
End: 2024-03-28
Payer: MEDICARE

## 2024-03-28 VITALS
BODY MASS INDEX: 38.08 KG/M2 | DIASTOLIC BLOOD PRESSURE: 78 MMHG | SYSTOLIC BLOOD PRESSURE: 119 MMHG | WEIGHT: 228.81 LBS

## 2024-03-28 DIAGNOSIS — Z01.419 WELL WOMAN EXAM WITH ROUTINE GYNECOLOGICAL EXAM: ICD-10-CM

## 2024-03-28 DIAGNOSIS — Z12.4 SCREENING FOR CERVICAL CANCER: Primary | ICD-10-CM

## 2024-03-28 PROCEDURE — 1160F RVW MEDS BY RX/DR IN RCRD: CPT | Mod: HCNC,CPTII,S$GLB, | Performed by: OBSTETRICS & GYNECOLOGY

## 2024-03-28 PROCEDURE — 1159F MED LIST DOCD IN RCRD: CPT | Mod: HCNC,CPTII,S$GLB, | Performed by: OBSTETRICS & GYNECOLOGY

## 2024-03-28 PROCEDURE — 3046F HEMOGLOBIN A1C LEVEL >9.0%: CPT | Mod: HCNC,CPTII,S$GLB, | Performed by: OBSTETRICS & GYNECOLOGY

## 2024-03-28 PROCEDURE — 3008F BODY MASS INDEX DOCD: CPT | Mod: HCNC,CPTII,S$GLB, | Performed by: OBSTETRICS & GYNECOLOGY

## 2024-03-28 PROCEDURE — 3074F SYST BP LT 130 MM HG: CPT | Mod: HCNC,CPTII,S$GLB, | Performed by: OBSTETRICS & GYNECOLOGY

## 2024-03-28 PROCEDURE — 88175 CYTOPATH C/V AUTO FLUID REDO: CPT | Mod: HCNC | Performed by: OBSTETRICS & GYNECOLOGY

## 2024-03-28 PROCEDURE — 3060F POS MICROALBUMINURIA REV: CPT | Mod: HCNC,CPTII,S$GLB, | Performed by: OBSTETRICS & GYNECOLOGY

## 2024-03-28 PROCEDURE — G0101 CA SCREEN;PELVIC/BREAST EXAM: HCPCS | Mod: HCNC,S$GLB,, | Performed by: OBSTETRICS & GYNECOLOGY

## 2024-03-28 PROCEDURE — 99999 PR PBB SHADOW E&M-EST. PATIENT-LVL IV: CPT | Mod: PBBFAC,HCNC,, | Performed by: OBSTETRICS & GYNECOLOGY

## 2024-03-28 PROCEDURE — 3078F DIAST BP <80 MM HG: CPT | Mod: HCNC,CPTII,S$GLB, | Performed by: OBSTETRICS & GYNECOLOGY

## 2024-03-28 PROCEDURE — 3066F NEPHROPATHY DOC TX: CPT | Mod: HCNC,CPTII,S$GLB, | Performed by: OBSTETRICS & GYNECOLOGY

## 2024-03-28 NOTE — PROGRESS NOTES
CC: Annual check-up    SUBJECTIVE:   48 y.o. female   for annual routine Pap and checkup. Patient's last menstrual period was 2024 (exact date)..  She has no unusual complaints.    Recently admitted to [facility] with large abscess from rt mons up to rt groin and lower abdomen    Past Medical History:   Diagnosis Date    Cataract     Cervical high risk HPV (human papillomavirus) test positive 2020    NOT 16 OR 18    CHF (congestive heart failure)     CVA (cerebral infarction)          Depression     Diabetes mellitus, type 2     GERD (gastroesophageal reflux disease)     Hyperlipidemia     Hypertension     Lactic acidosis 10/06/2023    Moderate nonproliferative diabetic retinopathy     Nausea and vomiting 10/06/2023    Obesity     Stroke     Tachycardia 10/04/2023     Past Surgical History:   Procedure Laterality Date     SECTION      CHOLECYSTECTOMY      DILATION AND CURETTAGE OF UTERUS      EYE SURGERY Bilateral     laser    GALLBLADDER SURGERY  2017    stone removed    IRRIGATION AND DEBRIDEMENT Right 2024    Procedure: IRRIGATION AND DEBRIDEMENT RIGHT GROIN;  Surgeon: Chalo Padgett MD;  Location: [facility];  Service: General;  Laterality: Right;     Social History     Socioeconomic History    Marital status: Single   Occupational History    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   Tobacco Use    Smoking status: Every Day     Current packs/day: 0.00     Types: Cigarettes     Last attempt to quit: 2021     Years since quitting: 3.1    Smokeless tobacco: Never   Substance and Sexual Activity    Alcohol use: Yes     Alcohol/week: 3.0 standard drinks of alcohol     Types: 3 Cans of beer per week     Comment: Rare    Drug use: No    Sexual activity: Yes     Partners: Male     Birth control/protection: None   Social History Narrative    Daughter - Kavon     Social Determinants of Health     Financial Resource Strain: Low Risk  (2024)    Overall Financial  Resource Strain (CARDIA)     Difficulty of Paying Living Expenses: Not hard at all   Food Insecurity: No Food Insecurity (1/25/2024)    Hunger Vital Sign     Worried About Running Out of Food in the Last Year: Never true     Ran Out of Food in the Last Year: Never true   Transportation Needs: No Transportation Needs (1/25/2024)    PRAPARE - Transportation     Lack of Transportation (Medical): No     Lack of Transportation (Non-Medical): No   Physical Activity: Insufficiently Active (1/30/2024)    Exercise Vital Sign     Days of Exercise per Week: 3 days     Minutes of Exercise per Session: 20 min   Stress: Stress Concern Present (1/25/2024)    Citizen of [place] [employer] - Occupational Stress Questionnaire     Feeling of Stress : To some extent   Social Connections: Socially Isolated (1/30/2024)    Social Connection and Isolation Panel [NHANES]     Frequency of Communication with Friends and Family: Twice a week     Frequency of Social Gatherings with Friends and Family: Once a week     Attends Mosque Services: Never     Active Member of Clubs or Organizations: No     Attends Club or Organization Meetings: Never     Marital Status: Never    Housing Stability: Low Risk  (1/30/2024)    Housing Stability Vital Sign     Unable to Pay for Housing in the Last Year: No     Number of Places Lived in the Last Year: 1     Unstable Housing in the Last Year: No     Family History   Problem Relation Age of Onset    Stroke Mother     Thyroid disease Mother     Diabetes Mother     Cataracts Father     Hypertension Father     Arthritis Father     Diabetes Father     Hypertension Sister     Stroke Sister     Thyroid disease Sister     Heart disease Sister     Thyroid disease Daughter     Asthma Daughter     No Known Problems Brother     No Known Problems Maternal Aunt     No Known Problems Maternal Uncle     No Known Problems Paternal Aunt     No Known Problems Paternal Uncle     No Known Problems Maternal  Grandmother     No Known Problems Maternal Grandfather     No Known Problems Paternal Grandmother     No Known Problems Paternal Grandfather     Amblyopia Neg Hx     Blindness Neg Hx     Cancer Neg Hx     Glaucoma Neg Hx     Macular degeneration Neg Hx     Retinal detachment Neg Hx     Strabismus Neg Hx      OB History    Para Term  AB Living   3 2 2 0 1 2   SAB IAB Ectopic Multiple Live Births   1 0 0 0 2      # Outcome Date GA Lbr Jose/2nd Weight Sex Delivery Anes PTL Lv   3 SAB 03/30/15     SAB      2 Term 12/15/10 38w0d   F CS-LTranv   ANNY   1 Term 03 38w0d   F CS-LTranv  N ANNY         Current Outpatient Medications   Medication Sig Dispense Refill    aspirin (ECOTRIN) 81 MG EC tablet Take 81 mg by mouth once daily.      atorvastatin (LIPITOR) 80 MG tablet Take 1 tablet (80 mg total) by mouth every evening. 60 tablet 1    blood sugar diagnostic Strp Use to test blood glucose two (2) times daily as directed with insurance preferred meter and supplies 200 each 3    dulaglutide (TRULICITY) 0.75 mg/0.5 mL pen injector Inject 0.75 mg into the skin every 7 days. OK to discontinue this med during SNF stay - resume upon discharge 4 pen 11    ergocalciferol (ERGOCALCIFEROL) 50,000 unit Cap Take 50,000 Units by mouth every Saturday.      ezetimibe (ZETIA) 10 mg tablet Take 1 tablet (10 mg total) by mouth once daily. (For cholesterol) 90 tablet 1    FLUoxetine 40 MG capsule Take 40 mg by mouth once daily.      hydrALAZINE (APRESOLINE) 50 MG tablet Take 1 tablet (50 mg total) by mouth every 8 (eight) hours. 90 tablet 11    HYDROcodone-acetaminophen (NORCO) 5-325 mg per tablet Take 1 tablet by mouth every 6 (six) hours as needed for Pain. 10 tablet 0    ibuprofen (ADVIL,MOTRIN) 200 MG tablet Take 200 mg by mouth every 6 (six) hours as needed for Pain.      insulin (LANTUS SOLOSTAR U-100 INSULIN) glargine 100 units/mL SubQ pen Inject 80 Units into the skin 2 (two) times a day. INJECT 80 UNITS SUBCUTANEOUSLY  "TWICE DAILY (BULK) Strength: 100 unit/mL (3 mL) 30 mL 5    insulin aspart U-100 (NOVOLOG FLEXPEN U-100 INSULIN) 100 unit/mL (3 mL) InPn pen Inject 30 Units into the skin 2 (two) times a day. 30 mL 12    lancets (TRUEPLUS LANCETS) 28 gauge Misc Use to test blood glucose two (2) times daily as directed with insurance preferred meter and supplies 200 each 3    LANTUS SOLOSTAR U-100 INSULIN glargine 100 units/mL SubQ pen Inject 80 Units into the skin 2 (two) times a day.      metFORMIN (GLUCOPHAGE-XR) 500 MG ER 24hr tablet Take 1,000 mg by mouth 2 (two) times daily with meals.      metoprolol succinate (TOPROL-XL) 100 MG 24 hr tablet Take 100 mg by mouth once daily.      mupirocin (BACTROBAN) 2 % ointment Apply locally every other day 22 g 3    NIFEdipine (PROCARDIA-XL) 90 MG (OSM) 24 hr tablet Take 1 tablet (90 mg total) by mouth once daily. 30 tablet 11    pantoprazole (PROTONIX) 40 MG tablet Take 40 mg by mouth once daily.      pen needle, diabetic (BD ULTRA-FINE ROSA PEN NEEDLE) 32 gauge x 5/32" Ndle Use with insulin once daily 100 each 0    pen needle, diabetic 32 gauge x 5/32" Ndle Use with injectable DM supplies twice daily as directed 200 each 0    potassium chloride (KLOR-CON) 8 MEQ TbSR TAKE TWO TABLETS BY MOUTH TWICE DAILY @ 9AM & 5PM 180 tablet 0    TRUE METRIX GO GLUCOSE METER Misc       valsartan-hydrochlorothiazide (DIOVAN-HCT) 160-12.5 mg per tablet Take 1 tablet by mouth once daily. 90 tablet 3    furosemide (LASIX) 40 MG tablet Take 1 tablet (40 mg total) by mouth once daily. 60 tablet 1     Current Facility-Administered Medications   Medication Dose Route Frequency Provider Last Rate Last Admin    fluorescein 500 mg/5 mL (10 %) injection 500 mg  5 mL Intravenous Once GHAZAL Tillman MD         Allergies: Patient has no known allergies.     ROS:  Constitutional: no weight loss, weight gain, fever, fatigue  Eyes:  No vision changes, glasses/contacts  ENT/Mouth: No ulcers, sinus problems, ears " ringing, headache  Cardiovascular: No inability to lie flat, chest pain, exercise intolerance, swelling, heart palpitations  Respiratory: No wheezing, coughing blood, shortness of breath, or cough  Gastrointestinal: No diarrhea, bloody stool, nausea/vomiting, constipation, gas, hemorrhoids  Genitourinary: No blood in urine, painful urination, urgency of urination, frequency of urination, incomplete emptying, incontinence, abnormal bleeding, painful periods, heavy periods, vaginal discharge, vaginal odor, painful intercourse, sexual problems, bleeding after intercourse.  Musculoskeletal: No muscle weakness  Skin/Breast: No painful breasts, nipple discharge, masses, rash, ulcers  Neurological: No passing out, seizures, numbness, headache  Endocrine: No diabetes, hypothyroid, hyperthyroid, hot flashes, hair loss, abnormal hair growth, ance  Psychiatric: No depression, crying  Hematologic: No bruises, bleeding, swollen lymph nodes, anemia.      OBJECTIVE:   The patient appears well, alert, oriented x 3, in no distress.  /78   Wt 103.8 kg (228 lb 13.4 oz)   LMP 03/05/2024 (Exact Date)   BMI 38.08 kg/m²   NECK: no thyromegaly, trachea midline  SKIN: no acne, striae, hirsutism  BREAST EXAM: not examined  ABDOMEN: no hernias, masses, or hepatosplenomegaly  GENITALIA: normal external genitalia, no erythema, no discharge  URETHRA: normal urethra, normal urethral meatus  VAGINA: mucosal atrophy  CERVIX: no lesions or cervical motion tenderness  UTERUS: normal  ADNEXA: normal adnexa and no mass, fullness, tenderness      ASSESSMENT:   well woman  1. Screening for cervical cancer    2. Well woman exam with routine gynecological exam        PLAN:   Mammogram jusy done  pap smear  return annually or prn  Orders Placed This Encounter    Liquid-Based Pap Smear, Screening

## 2024-04-01 NOTE — NURSING
Home Oxygen Evaluation    Date Performed: 10/9/2023    1) Patient's Home O2 Sat on room air, while at rest: 94%        If O2 sats on room air at rest are 88% or below, patient qualifies. No additional testing needed. Document N/A in steps 2 and 3. If 89% or above, complete steps 2.      2) Patient's O2 Sat on room air while exercisin%        If O2 sats on room air while exercising remain 89% or above patient does not qualify, no further testing needed Document N/A in step 3. If O2 sats on room air while exercising are 88% or below, continue to step 3.      3) Patient's O2 Sat while exercising on O2: n/a at n/a LPM         (Must show improvement from #2 for patients to qualify)    If O2 sats improve on oxygen, patient qualifies for portable oxygen. If not, the patient does not qualify.         HCC coding opportunities       Chart reviewed, no opportunity found: CHART REVIEWED, NO OPPORTUNITY FOUND        Patients Insurance        Commercial Insurance: [employer] Commercial [employer]

## 2024-04-04 ENCOUNTER — HOSPITAL ENCOUNTER (OUTPATIENT)
Dept: WOUND CARE | Facility: HOSPITAL | Age: 49
Discharge: HOME | End: 2024-04-04
Attending: SURGERY
Payer: MEDICARE

## 2024-04-04 VITALS
DIASTOLIC BLOOD PRESSURE: 84 MMHG | WEIGHT: 228.81 LBS | TEMPERATURE: 98 F | BODY MASS INDEX: 38.12 KG/M2 | SYSTOLIC BLOOD PRESSURE: 193 MMHG | HEIGHT: 65 IN | HEART RATE: 94 BPM | RESPIRATION RATE: 18 BRPM

## 2024-04-04 DIAGNOSIS — L92.9 HYPERGRANULATION: ICD-10-CM

## 2024-04-04 DIAGNOSIS — T81.89XD NON-HEALING SURGICAL WOUND, SUBSEQUENT ENCOUNTER: Primary | ICD-10-CM

## 2024-04-04 PROCEDURE — 17250 CHEM CAUT OF GRANLTJ TISSUE: CPT | Mod: HCNC

## 2024-04-04 NOTE — PROGRESS NOTES
Wound Care & Hyperbaric Medicine Clinic    Subjective:       Patient ID: Jaimee Quintana is a 48 y.o. female.    Chief Complaint: Non-healing Wound Follow Up    Wound care follow up for right groin non healing surgical wound. Remains with area of hypergranular tissue. Silver nitrate applied. Continue plan of care.  Review of Systems   Constitutional: Negative.    HENT: Negative.     Eyes: Negative.    Respiratory: Negative.     Cardiovascular: Negative.    Gastrointestinal: Negative.    Genitourinary: Negative.    Musculoskeletal: Negative.    Skin: Negative.    Neurological: Negative.    Psychiatric/Behavioral: Negative.         Objective:     Vitals:    04/04/24 1119   BP: (!) 193/84   Pulse: 94   Resp: 18   Temp: 98 °F (36.7 °C)         Physical Exam  Constitutional:       Appearance: She is well-developed.   HENT:      Head: Normocephalic.   Eyes:      Conjunctiva/sclera: Conjunctivae normal.      Pupils: Pupils are equal, round, and reactive to light.   Cardiovascular:      Rate and Rhythm: Normal rate and regular rhythm.      Heart sounds: Normal heart sounds.   Pulmonary:      Effort: Pulmonary effort is normal.      Breath sounds: Normal breath sounds.   Abdominal:      General: Bowel sounds are normal.      Palpations: Abdomen is soft.   Musculoskeletal:         General: Normal range of motion.      Cervical back: Normal range of motion and neck supple.   Skin:     General: Skin is warm and dry.   Neurological:      Mental Status: She is alert and oriented to person, place, and time.      Deep Tendon Reflexes: Reflexes are normal and symmetric.        Incision/Site 01/07/24 0908 Right Groin (Active)   01/07/24 0908   Present Prior to Hospital Arrival?:    Side: Right   Location: Groin   Orientation:    Incision Type:    Closure Method: Other (see comments)   Additional Comments: incision open with betadine soaked Kerlix/4x4's/ABD dressing and Medipore tape   Removal Indication and  Assessment:    Wound Outcome:    Removal Indications:    Wound Image   04/04/24 1117   Dressing Appearance Moist drainage 04/04/24 1117   Drainage Amount Small 04/04/24 1117   Drainage Characteristics/Odor Serosanguineous 04/04/24 1117   Appearance Red;Hypergranulation 04/04/24 1117   Red (%), Wound Tissue Color 100 % 04/04/24 1117   Periwound Area Intact 04/04/24 1117   Wound Edges Defined 04/04/24 1117   Wound Length (cm) 0.5 cm 04/04/24 1117   Wound Width (cm) 0.9 cm 04/04/24 1117   Wound Depth (cm) 0.1 cm 04/04/24 1117   Wound Volume (cm^3) 0.045 cm^3 04/04/24 1117   Wound Surface Area (cm^2) 0.45 cm^2 04/04/24 1117   Care Cleansed with:;Sterile normal saline;Other (see comments) 04/04/24 1117   Dressing Applied;Other (comment);Island/border 04/04/24 1117   Dressing Change Due 04/06/24 04/04/24 1117         Assessment/Plan:         ICD-10-CM ICD-9-CM   1. Non-healing surgical wound, subsequent encounter  T81.89XD V58.89     998.83           Tissue pathology and/or culture taken:  [] Yes [x] No   Sharp debridement performed:   [] Yes [x] No   Labs ordered this visit:   [] Yes [x] No   Imaging ordered this visit:   [] Yes [x] No           Orders Placed This Encounter   Procedures    Change dressing     Right groin surgical wound:   Cleanse wound with:saline, OK for patient to shower prior to dressing changes   Lidocaine:prn   Silver nitrate:prn   Periwound care:maintain dry   Primary dressing:Bactroban   Secondary dressing:bordered dressing or bandaid    Frequency:every other day or prn   Follow-up:2 weeks with    Other: patient to contact PCP regarding elevated BP        Follow up in about 2 weeks (around 4/18/2024) for .            This includes face to face time and non-face to face time preparing to see the patient (eg, review of tests), obtaining and/or reviewing separately obtained history, documenting clinical information in the electronic or other health record, independently  interpreting results and communicating results to the patient/family/caregiver, or care coordinator.

## 2024-04-05 LAB
CLINICAL INFO: NORMAL
CYTO CVX: NORMAL
CYTOLOGIST CVX/VAG CYTO: NORMAL
CYTOLOGIST CVX/VAG CYTO: NORMAL
CYTOLOGY CMNT CVX/VAG CYTO-IMP: NORMAL
CYTOLOGY PAP THIN PREP EXPLANATION: NORMAL
DATE OF PREVIOUS PAP: NORMAL
DATE PREVIOUS BX: NORMAL
GEN CATEG CVX/VAG CYTO-IMP: NORMAL
LMP START DATE: NORMAL
MICROORGANISM CVX/VAG CYTO: NORMAL
PATHOLOGIST CVX/VAG CYTO: NORMAL
SERVICE CMNT-IMP: NORMAL
SPECIMEN SOURCE CVX/VAG CYTO: NORMAL
STAT OF ADQ CVX/VAG CYTO-IMP: NORMAL

## 2024-04-07 ENCOUNTER — PATIENT MESSAGE (OUTPATIENT)
Dept: ADMINISTRATIVE | Facility: OTHER | Age: 49
End: 2024-04-07
Payer: MEDICARE

## 2024-04-10 ENCOUNTER — TELEPHONE (OUTPATIENT)
Dept: UROLOGY | Facility: CLINIC | Age: 49
End: 2024-04-10
Payer: MEDICARE

## 2024-04-10 NOTE — TELEPHONE ENCOUNTER
----- Message from Lashawn Nascimento sent at 4/9/2024  1:46 PM CDT -----  Needs advice from nurse:      Who Called:pt  Regarding:returning a call to [facility]  Would the patient rather a call back or [facility]IA [facility]?  Best Call Back number: [phone]  Additional Info:

## 2024-04-11 ENCOUNTER — TELEPHONE (OUTPATIENT)
Dept: FAMILY MEDICINE | Facility: CLINIC | Age: 49
End: 2024-04-11
Payer: MEDICARE

## 2024-04-11 NOTE — TELEPHONE ENCOUNTER
----- Message from Viviane Rosa sent at 4/11/2024  1:53 PM CDT -----  Regarding: Wheelchair Eval  Hello,    On 02/29/2024, you ordered a wheelchair eval for this patient. She told me she wanted to use [facility], so I sent her paperwork along to them. A month later, I found out they don't take her insurance, and she would need a new vendor.    I have tried calling the patient numerous times to let her know this so that we could proceed with the wheelchair eval process, but she never answers, and has not returned my calls. At this point, we are going to cancel her order. I just wanted to make sure you were informed of this.    Thank you,    Viviane Rosa

## 2024-04-11 NOTE — TELEPHONE ENCOUNTER
Thank you for the message. I will be sure to share with Dr. Mendez.  We also have tired calling patient in regards of this matter, and we get no response.

## 2024-04-12 ENCOUNTER — TELEPHONE (OUTPATIENT)
Dept: FAMILY MEDICINE | Facility: CLINIC | Age: 49
End: 2024-04-12
Payer: MEDICARE

## 2024-04-12 NOTE — TELEPHONE ENCOUNTER
Got a message through TEAMS in regards of patient.     Message is as follows:Alee! I had sent Dr. Mendez a message yesterday about patient Jaimee Quintana, [id number], and you responded to me about not being able to reach her for her wheelchair eval. Ironically, about a half hour after I sent that message, she called and we got her orders faxed to a different vendor. I just wanted to let y'all know! Thank you!

## 2024-04-15 ENCOUNTER — TELEPHONE (OUTPATIENT)
Dept: OPHTHALMOLOGY | Facility: CLINIC | Age: 49
End: 2024-04-15
Payer: MEDICARE

## 2024-04-18 ENCOUNTER — HOSPITAL ENCOUNTER (OUTPATIENT)
Dept: WOUND CARE | Facility: HOSPITAL | Age: 49
Discharge: HOME | End: 2024-04-18
Attending: SURGERY
Payer: MEDICARE

## 2024-04-18 VITALS
SYSTOLIC BLOOD PRESSURE: 161 MMHG | DIASTOLIC BLOOD PRESSURE: 69 MMHG | WEIGHT: 228.81 LBS | HEIGHT: 65 IN | BODY MASS INDEX: 38.12 KG/M2 | HEART RATE: 104 BPM

## 2024-04-18 DIAGNOSIS — T81.89XD NON-HEALING SURGICAL WOUND, SUBSEQUENT ENCOUNTER: Primary | ICD-10-CM

## 2024-04-18 PROCEDURE — 17250 CHEM CAUT OF GRANLTJ TISSUE: CPT | Mod: HCNC

## 2024-04-18 NOTE — PROGRESS NOTES
[facility]    Subjective:       Patient ID: Jaimee Quintana is a 48 y.o. female.    Chief Complaint: Non-healing Wound    Follow up for right groin wound with Dr. Larson. Hypergranulation treated with silver nitrate. Pt denies pain. Plan of care continued.  Review of Systems   Constitutional: Negative.    HENT: Negative.     Eyes: Negative.    Respiratory: Negative.     Cardiovascular: Negative.    Gastrointestinal: Negative.    Genitourinary: Negative.    Musculoskeletal: Negative.    Skin: Negative.    Neurological: Negative.    Psychiatric/Behavioral: Negative.         Objective:   There were no vitals filed for this visit.      Physical Exam  Constitutional:       Appearance: She is well-developed.   HENT:      Head: Normocephalic.   Eyes:      Conjunctiva/sclera: Conjunctivae normal.      Pupils: Pupils are equal, round, and reactive to light.   Cardiovascular:      Rate and Rhythm: Normal rate and regular rhythm.      Heart sounds: Normal heart sounds.   Pulmonary:      Effort: Pulmonary effort is normal.      Breath sounds: Normal breath sounds.   Abdominal:      General: Bowel sounds are normal.      Palpations: Abdomen is soft.   Musculoskeletal:         General: Normal range of motion.      Cervical back: Normal range of motion and neck supple.   Skin:     General: Skin is warm and dry.   Neurological:      Mental Status: She is alert and oriented to person, place, and time.      Deep Tendon Reflexes: Reflexes are normal and symmetric.        Incision/Site 01/07/24 0908 Right Groin (Active)   01/07/24 0908   Present Prior to Hospital Arrival?:    Side: Right   Location: Groin   Orientation:    Incision Type:    Closure Method: Other (see comments)   Additional Comments: incision open with betadine soaked Kerlix/4x4's/ABD dressing and Medipore tape   Removal Indication and Assessment:    Wound Outcome:    Removal Indications:    Wound Image   04/18/24 105    Dressing Appearance Moist drainage 04/18/24 1055   Drainage Amount Small 04/18/24 1055   Drainage Characteristics/Odor Serosanguineous 04/18/24 1055   Appearance Red;Hypergranulation 04/18/24 1055   Red (%), Wound Tissue Color 100 % 04/18/24 1055   Periwound Area Intact 04/18/24 1055   Wound Edges Defined 04/18/24 1055   Wound Length (cm) 0.7 cm 04/18/24 1055   Wound Width (cm) 1 cm 04/18/24 1055   Wound Depth (cm) 0.1 cm 04/18/24 1055   Wound Volume (cm^3) 0.07 cm^3 04/18/24 1055   Wound Surface Area (cm^2) 0.7 cm^2 04/18/24 1055   Care Cleansed with:;Sterile normal saline 04/18/24 1055   Dressing Applied;Bandaid 04/18/24 1055   Dressing Change Due 04/20/24 04/18/24 1055         Assessment/Plan:         ICD-10-CM ICD-9-CM   1. Non-healing surgical wound, subsequent encounter  T81.89XD V58.89     998.83           Tissue pathology and/or culture taken:  [] Yes [x] No   Sharp debridement performed:   [] Yes [x] No   Labs ordered this visit:   [] Yes [x] No   Imaging ordered this visit:   [] Yes [x] No           Orders Placed This Encounter   Procedures    Change dressing     Right groin surgical wound:  Cleanse wound with:saline, OK for patient to shower prior to dressing changes  Lidocaine:prn  Silver nitrate:prn  Periwound care:maintain dry  Primary dressing:Bactroban  Secondary dressing:bordered dressing or bandaid    Frequency:every other day or prn  Follow-up:2 weeks with         Follow up in about 2 weeks (around 5/2/2024) for follow up with Dr. Larson.            This includes face to face time and non-face to face time preparing to see the patient (eg, review of tests), obtaining and/or reviewing separately obtained history, documenting clinical information in the electronic or other health record, independently interpreting results and communicating results to the patient/family/caregiver, or care coordinator.

## 2024-04-23 ENCOUNTER — OFFICE VISIT (OUTPATIENT)
Dept: OPHTHALMOLOGY | Facility: CLINIC | Age: 49
End: 2024-04-23
Payer: MEDICARE

## 2024-04-23 DIAGNOSIS — E11.3513 TYPE 2 DIABETES MELLITUS WITH BOTH EYES AFFECTED BY PROLIFERATIVE RETINOPATHY AND MACULAR EDEMA, WITH LONG-TERM CURRENT USE OF INSULIN: Primary | ICD-10-CM

## 2024-04-23 DIAGNOSIS — H35.033 HYPERTENSIVE RETINOPATHY, BILATERAL: ICD-10-CM

## 2024-04-23 DIAGNOSIS — H25.13 NS (NUCLEAR SCLEROSIS), BILATERAL: ICD-10-CM

## 2024-04-23 DIAGNOSIS — Z79.4 TYPE 2 DIABETES MELLITUS WITH BOTH EYES AFFECTED BY PROLIFERATIVE RETINOPATHY AND MACULAR EDEMA, WITH LONG-TERM CURRENT USE OF INSULIN: Primary | ICD-10-CM

## 2024-04-23 PROCEDURE — 92014 COMPRE OPH EXAM EST PT 1/>: CPT | Mod: HCNC,S$GLB,, | Performed by: OPHTHALMOLOGY

## 2024-04-23 PROCEDURE — 92134 CPTRZ OPH DX IMG PST SGM RTA: CPT | Mod: HCNC,S$GLB,, | Performed by: OPHTHALMOLOGY

## 2024-04-23 PROCEDURE — 99999 PR PBB SHADOW E&M-EST. PATIENT-LVL III: CPT | Mod: PBBFAC,HCNC,, | Performed by: OPHTHALMOLOGY

## 2024-04-23 PROCEDURE — 92201 OPSCPY EXTND RTA DRAW UNI/BI: CPT | Mod: HCNC,S$GLB,, | Performed by: OPHTHALMOLOGY

## 2024-04-23 PROCEDURE — 1159F MED LIST DOCD IN RCRD: CPT | Mod: HCNC,CPTII,S$GLB, | Performed by: OPHTHALMOLOGY

## 2024-04-23 PROCEDURE — 3066F NEPHROPATHY DOC TX: CPT | Mod: HCNC,CPTII,S$GLB, | Performed by: OPHTHALMOLOGY

## 2024-04-23 PROCEDURE — 3060F POS MICROALBUMINURIA REV: CPT | Mod: HCNC,CPTII,S$GLB, | Performed by: OPHTHALMOLOGY

## 2024-04-23 PROCEDURE — 3046F HEMOGLOBIN A1C LEVEL >9.0%: CPT | Mod: HCNC,CPTII,S$GLB, | Performed by: OPHTHALMOLOGY

## 2024-04-23 PROCEDURE — 1160F RVW MEDS BY RX/DR IN RCRD: CPT | Mod: HCNC,CPTII,S$GLB, | Performed by: OPHTHALMOLOGY

## 2024-04-23 RX ORDER — FLUORESCEIN 500 MG/ML
5 INJECTION INTRAVENOUS ONCE
Status: SHIPPED | OUTPATIENT
Start: 2024-04-23

## 2024-04-23 NOTE — PROGRESS NOTES
Eye Med's: No gtts    47 y.o. female is here for 1 month OCT and dilate. Denies eye pain and   flashes/floaters. Occasional tearing, right eye. No noticeable VA changes   since last visit. No problems with glare.           Last edited by Sharon Mcdermott on 4/23/2024  9:15 AM.               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 PDR OU  T2 uncontrolled on insulin  S/p PRP OU      2. DME OU  4/24 - increase paracentral DME - pt starting to notice some subtle changes    Watch OS more closely, may need tx soon - FU FA    3. HTN Ret OU  BS/BP/chol control      3 month OCT/WFFA OS and dilate

## 2024-04-24 ENCOUNTER — TELEPHONE (OUTPATIENT)
Dept: FAMILY MEDICINE | Facility: CLINIC | Age: 49
End: 2024-04-24
Payer: MEDICARE

## 2024-04-24 ENCOUNTER — PATIENT MESSAGE (OUTPATIENT)
Dept: FAMILY MEDICINE | Facility: CLINIC | Age: 49
End: 2024-04-24
Payer: MEDICARE

## 2024-04-24 RX ORDER — DULAGLUTIDE 0.75 MG/.5ML
INJECTION, SOLUTION SUBCUTANEOUS
Refills: 0 | OUTPATIENT
Start: 2024-04-24

## 2024-04-24 NOTE — TELEPHONE ENCOUNTER
Refill Decision Note   Jaimee Quintana  is requesting a refill authorization.  Brief Assessment and Rationale for Refill:  Quick Discontinue     Medication Therapy Plan:    Pharmacy is requesting new scripts for the following medications without required information, (sig/ frequency/qty/etc)      Medication Reconciliation Completed: No     Comments: Pharmacies have been requesting medications for patients without required information, (sig, frequency, qty, etc.). In addition, requests are sent for medication(s) pt. are currently not taking, and medications patients have never taken.    We have spoken to the pharmacies about these request types and advised their teams previously that we are unable to assess these New Script requests and require all details for these requests. This is a known issue and has been reported.     Note composed:12:53 PM 04/24/2024

## 2024-04-24 NOTE — TELEPHONE ENCOUNTER
No care due was identified.  [facility] Embedded Care Due Messages. Reference number: [id number].   4/24/2024 12:51:06 PM CDT

## 2024-04-30 ENCOUNTER — TELEPHONE (OUTPATIENT)
Dept: FAMILY MEDICINE | Facility: CLINIC | Age: 49
End: 2024-04-30
Payer: MEDICARE

## 2024-04-30 ENCOUNTER — PATIENT OUTREACH (OUTPATIENT)
Dept: ADMINISTRATIVE | Facility: HOSPITAL | Age: 49
End: 2024-04-30
Payer: MEDICARE

## 2024-04-30 NOTE — PROGRESS NOTES
Population Health Chart Review & Patient Outreach Details      Additional [facility] Health Notes:               Updates Requested / Reviewed:      Updated Care Coordination Note, Care Everywhere, and Immunizations Reconciliation Completed or Queried: [place]         Health Maintenance Topics Overdue:      [facility] Score: 2     Hemoglobin A1c  Foot Exam                       Health Maintenance Topic(s) Outreach Outcomes & Actions Taken:    Primary Care Appt - Outreach Outcomes & Actions Taken  :

## 2024-04-30 NOTE — TELEPHONE ENCOUNTER
----- Message from Suzanne Martinezaneda sent at 4/30/2024  2:49 PM CDT -----  Type:  Pharmacy Calling to Clarify an RX    Name of Caller: [facility]  Pharmacy Name:[facility] - [facility] [address]    Prescription Name: pantoprazole (PROTONIX) 40 MG tablet  valsartan-hydrochlorothiazide (DIOVAN-HCT) 160-12.5 mg per tablet  NIFEdipine (PROCARDIA-XL) 90 MG (OSM) 24 hr tablet  hydrALAZINE (APRESOLINE) 50 MG tablet  metFORMIN (GLUCOPHAGE-XR) 500 MG ER 24hr tablet  metoprolol succinate (TOPROL-XL) 100 MG 24 hr tablet  ezetimibe (ZETIA) 10 mg tablet    What do they need to clarify?: new prescriptions for these medications  Best Call Back Number:[phone]  Additional Information:

## 2024-05-02 ENCOUNTER — TELEPHONE (OUTPATIENT)
Dept: OBSTETRICS AND GYNECOLOGY | Facility: CLINIC | Age: 49
End: 2024-05-02
Payer: MEDICARE

## 2024-05-02 ENCOUNTER — HOSPITAL ENCOUNTER (OUTPATIENT)
Dept: WOUND CARE | Facility: HOSPITAL | Age: 49
Discharge: HOME | End: 2024-05-02
Attending: SURGERY
Payer: MEDICARE

## 2024-05-02 VITALS
HEIGHT: 65 IN | WEIGHT: 228 LBS | TEMPERATURE: 98 F | BODY MASS INDEX: 37.99 KG/M2 | DIASTOLIC BLOOD PRESSURE: 106 MMHG | SYSTOLIC BLOOD PRESSURE: 215 MMHG | HEART RATE: 81 BPM

## 2024-05-02 DIAGNOSIS — T81.89XA SURGICAL WOUND, NON HEALING: Primary | ICD-10-CM

## 2024-05-02 DIAGNOSIS — E66.01 CLASS 3 SEVERE OBESITY DUE TO EXCESS CALORIES WITH SERIOUS COMORBIDITY AND BODY MASS INDEX (BMI) OF 40.0 TO 44.9 IN ADULT: ICD-10-CM

## 2024-05-02 DIAGNOSIS — L02.214 GROIN ABSCESS: ICD-10-CM

## 2024-05-02 DIAGNOSIS — N15.1 PERINEPHRIC ABSCESS: ICD-10-CM

## 2024-05-02 DIAGNOSIS — T81.89XA NON-HEALING SURGICAL WOUND, INITIAL ENCOUNTER: ICD-10-CM

## 2024-05-02 PROCEDURE — 99213 OFFICE O/P EST LOW 20 MIN: CPT | Mod: HCNC

## 2024-05-02 NOTE — TELEPHONE ENCOUNTER
----- Message from Shayla Neri sent at 5/2/2024  9:11 AM CDT -----  Regarding: results  Type:  Test Results    Who Called: pt  Name of Test (Lab/Mammo/Etc): Lab  Date of Test: April  Ordering Provider: Fernandez  Where the test was performed: [facility]  Would the patient rather a call back or a response via [facility]? Call  Best Call Back Number: [phone]  Additional Information:  pt would like a call back to explain her test results as well as does she need to come back for a follow up appt she scheduled if not it can be canceled, call pt

## 2024-05-02 NOTE — PROGRESS NOTES
[facility]    Subjective:       Patient ID: Jaimee Quintana is a 48 y.o. female.    Chief Complaint: Wound Care    Follow up for right groin wound.Wound healed. Will follow up if wound worsen.   Review of Systems   Constitutional: Negative.    HENT: Negative.     Eyes: Negative.    Respiratory: Negative.     Cardiovascular: Negative.    Gastrointestinal: Negative.    Genitourinary: Negative.    Musculoskeletal: Negative.    Skin: Negative.    Neurological: Negative.    Psychiatric/Behavioral: Negative.         Objective:     Vitals:    05/02/24 1049   BP: (!) 215/106   Pulse: 81   Temp: 98.3 °F (36.8 °C)         Physical Exam  Constitutional:       Appearance: She is well-developed.   HENT:      Head: Normocephalic.   Eyes:      Conjunctiva/sclera: Conjunctivae normal.      Pupils: Pupils are equal, round, and reactive to light.   Cardiovascular:      Rate and Rhythm: Normal rate and regular rhythm.      Heart sounds: Normal heart sounds.   Pulmonary:      Effort: Pulmonary effort is normal.      Breath sounds: Normal breath sounds.   Abdominal:      General: Bowel sounds are normal.      Palpations: Abdomen is soft.   Musculoskeletal:         General: Normal range of motion.      Cervical back: Normal range of motion and neck supple.   Skin:     General: Skin is warm and dry.   Neurological:      Mental Status: She is alert and oriented to person, place, and time.      Deep Tendon Reflexes: Reflexes are normal and symmetric.        Incision/Site 01/07/24 0908 Right Groin (Active)   01/07/24 0908   Present Prior to Hospital Arrival?:    Side: Right   Location: Groin   Orientation:    Incision Type:    Closure Method: Other (see comments)   Additional Comments: incision open with betadine soaked Kerlix/4x4's/ABD dressing and Medipore tape   Removal Indication and Assessment:    Wound Outcome:    Removal Indications:    Wound Image   05/02/24 1046   Dressing  Appearance Open to air;No dressing 05/02/24 1046   Drainage Amount None 05/02/24 1046   Appearance Red;Hypergranulation 05/02/24 1046   Red (%), Wound Tissue Color 100 % 05/02/24 1046   Periwound Area Intact 05/02/24 1046   Wound Edges Defined 05/02/24 1046   Wound Length (cm) 0.5 cm 05/02/24 1046   Wound Width (cm) 0.5 cm 05/02/24 1046   Wound Depth (cm) 0.1 cm 05/02/24 1046   Wound Volume (cm^3) 0.025 cm^3 05/02/24 1046   Wound Surface Area (cm^2) 0.25 cm^2 05/02/24 1046   Care Cleansed with:;Sterile normal saline 05/02/24 1046         Assessment/Plan:         ICD-10-CM ICD-9-CM   1. Surgical wound, non healing  T81.89XA 998.83   2. Class 3 severe obesity due to excess calories with serious comorbidity and body mass index (BMI) of 40.0 to 44.9 in adult  E66.01 278.01    Z68.41 V85.41   3. Groin abscess  L02.214 682.2   4. Perinephric abscess  N15.1 590.2   5. Non-healing surgical wound, initial encounter  T81.89XA 998.83           Tissue pathology and/or culture taken:  [] Yes [x] No   Sharp debridement performed:   [] Yes [x] No   Labs ordered this visit:   [] Yes [x] No   Imaging ordered this visit:   [] Yes [x] No           Orders Placed This Encounter   Procedures    Change dressing     Leave open to air  Return if wound opens again        Follow up if symptoms worsen or fail to improve.            This includes face to face time and non-face to face time preparing to see the patient (eg, review of tests), obtaining and/or reviewing separately obtained history, documenting clinical information in the electronic or other health record, independently interpreting results and communicating results to the patient/family/caregiver, or care coordinator.

## 2024-05-09 NOTE — PLAN OF CARE
Pt arrived to IR for abscess aspiration. Pt oriented to unit and staff. Plan of care reviewed with patient, patient verbalizes understanding. Comfort measures utilized. Pt safely transferred from stretcher to procedural table. Fall risk reviewed with patient, fall risk interventions maintained. Safety strap applied, positioner pillows utilized to minimize pressure points. Blankets applied. Pt prepped and draped utilizing standard sterile technique. Patient placed on continuous monitoring, as required by sedation policy. Timeouts completed utilizing standard universal time-out, per department and facility policy. RN to remain at bedside, continuous monitoring maintained. Pt resting comfortably. Denies pain/discomfort. Will continue to monitor. See flow sheets for monitoring, medication administration, and updates.      General Sunscreen Counseling: I recommended a broad spectrum sunscreen with a SPF of 30 or higher.  I explained that SPF 30 sunscreens block approximately 97 percent of the sun's harmful rays.  Sunscreens should be applied at least 15 minutes prior to expected sun exposure and then every 2 hours after that as long as sun exposure continues. If swimming or exercising sunscreen should be reapplied every 45 minutes to an hour after getting wet or sweating.  One ounce, or the equivalent of a shot glass full of sunscreen, is adequate to protect the skin not covered by a bathing suit. I also recommended a lip balm with a sunscreen as well. Sun protective clothing can be used in lieu of sunscreen but must be worn the entire time you are exposed to the sun's rays. Detail Level: Detailed

## 2024-05-17 ENCOUNTER — TELEPHONE (OUTPATIENT)
Dept: FAMILY MEDICINE | Facility: CLINIC | Age: 49
End: 2024-05-17
Payer: MEDICARE

## 2024-05-17 ENCOUNTER — PATIENT OUTREACH (OUTPATIENT)
Dept: ADMINISTRATIVE | Facility: HOSPITAL | Age: 49
End: 2024-05-17
Payer: MEDICARE

## 2024-05-17 NOTE — TELEPHONE ENCOUNTER
----- Message from Yoly Lott sent at 5/17/2024 12:17 PM CDT -----  Type:  Needs Medical Advice    Who Called: pt  Would the patient rather a call back or a response via [facility]? Call back  Best Call Back Number: [phone]  Additional Information: pt was told she was getting a power wheelchair but the order is for a manual push wheelchair, requesting a call back

## 2024-05-17 NOTE — PROGRESS NOTES
Population Health Chart Review & Patient Outreach Details      Additional [facility] Health Notes:               Updates Requested / Reviewed:      Updated Care Coordination Note, Care Everywhere, Care Team Updated, and Immunizations Reconciliation Completed or Queried: [facility] Topics Overdue:      [facility] Score: 2     Hemoglobin A1c  Foot Exam                       Health Maintenance Topic(s) Outreach Outcomes & Actions Taken:    Blood Pressure - Outreach Outcomes & Actions Taken  :

## 2024-05-17 NOTE — TELEPHONE ENCOUNTER
----- Message from Jaison Nicholson sent at 5/17/2024 11:14 AM CDT -----  Type:  Patient Returning Call    Who Called:pt  Who Left Message for Patient:Nerissa Ann  Does the patient know what this is regarding?:regarding BP   Would the patient rather a call back or a response via [facility]? call  Best Call Back Number: [phone]  Additional Information:

## 2024-05-17 NOTE — TELEPHONE ENCOUNTER
Spoke with patient in regards of her message. Pt called in regards of wanting to know why a regular wheelchair was called in instead of a power wheelchair. Informed pt that She needs to reach out to the person that she spoke to and did the apt for the wheelchair. Pt also informed me that her BP has been high as well, and she needs to be seen for this matter. Pt made an apt for 5/29/2024 for 2:00 pm to see Dr. Mendez.

## 2024-05-17 NOTE — TELEPHONE ENCOUNTER
----- Message from Yoly Lott sent at 5/17/2024 12:17 PM CDT -----  Type:  Needs Medical Advice    Who Called: pt  Would the patient rather a call back or a response via [facility]? Call back  Best Call Back Number: [phone]  Additional Information: pt was told she was getting a power wheelchair but the order is for a manual push wheelchair, requesting a call back

## 2024-05-20 ENCOUNTER — TELEPHONE (OUTPATIENT)
Dept: FAMILY MEDICINE | Facility: CLINIC | Age: 49
End: 2024-05-20
Payer: MEDICARE

## 2024-05-20 NOTE — TELEPHONE ENCOUNTER
----- Message from Shayla Neri sent at 5/20/2024 10:27 AM CDT -----  Regarding: call  Type:  Call    Who Called: pt   Would the patient rather a call back or a response via [facility]? Call  Best Call Back Number: [phone]  Additional Information: would like to speak with nurse in regards to a correct order for a wheelchair that was put in, but she believes the order was mixed up

## 2024-05-21 ENCOUNTER — TELEPHONE (OUTPATIENT)
Dept: FAMILY MEDICINE | Facility: CLINIC | Age: 49
End: 2024-05-21
Payer: MEDICARE

## 2024-05-21 ENCOUNTER — OUTPATIENT CASE MANAGEMENT (OUTPATIENT)
Dept: ADMINISTRATIVE | Facility: OTHER | Age: 49
End: 2024-05-21
Payer: MEDICARE

## 2024-05-21 NOTE — TELEPHONE ENCOUNTER
----- Message from Jaimee Parham sent at 5/21/2024  1:28 PM CDT -----  Type:  Patient Returning Call    Who Called: Pt   Who Left Message for Patient: Staff   Does the patient know what this is regarding?: Returning a missed call from yesterday regarding wheel chair order   Would the patient rather a call back or a response via [facility]? Call back   Best Call Back Number: [phone]

## 2024-05-22 ENCOUNTER — OUTPATIENT CASE MANAGEMENT (OUTPATIENT)
Dept: ADMINISTRATIVE | Facility: OTHER | Age: 49
End: 2024-05-22
Payer: MEDICARE

## 2024-05-22 ENCOUNTER — TELEPHONE (OUTPATIENT)
Dept: FAMILY MEDICINE | Facility: CLINIC | Age: 49
End: 2024-05-22
Payer: MEDICARE

## 2024-05-22 NOTE — TELEPHONE ENCOUNTER
Spoke with pt in regards of her message. Pt called to get help in regards of her wheelchair order. Pt states that she got a wheelchair, but it is for a regular wheelchair instead of a power wheelchair that she really wants.  Pt informed me that she was told to call you in regards of this matter, since you placed the order for the wheelchair. Pt wants to know can you please place a new order for her to get a power wheelchair instead. Please advise patient message.

## 2024-05-22 NOTE — PROGRESS NOTES
Outpatient Care Management  Plan of Care Follow Up Visit    Patient: Jaimee Quintana  MRN: [id number]  Date of Service: 05/22/2024  Completed by: Courtney Blake RN  Referral Date: 01/13/2024    Reason for Visit   Patient presents with    OPCM RN Third Follow-Up Attempt       Brief Summary: Third attempt to contact the pt per phone without success. Will close the case with OPCM at this time.

## 2024-05-22 NOTE — TELEPHONE ENCOUNTER
This was all done through the wheelchair eval with the insurance and DME company; pt should call insurance in regards to this to see why she was given regular wheelchair rather than electric as paperwork was for electric

## 2024-05-22 NOTE — TELEPHONE ENCOUNTER
----- Message from Leonor Voss sent at 5/21/2024  4:27 PM CDT -----  Type:  Patient Returning Call    Who Called: Jaimee Quintana  Who Left Message for Patient: Maria De Jesus  Does the patient know what this is regarding?: Wheelchair order  Would the patient rather a call back or a response via [facility]?  call  Best Call Back Number:  [phone]  Additional Information:

## 2024-05-23 ENCOUNTER — OFFICE VISIT (OUTPATIENT)
Dept: UROLOGY | Facility: CLINIC | Age: 49
End: 2024-05-23
Payer: MEDICARE

## 2024-05-23 VITALS
WEIGHT: 227.5 LBS | BODY MASS INDEX: 37.9 KG/M2 | DIASTOLIC BLOOD PRESSURE: 76 MMHG | SYSTOLIC BLOOD PRESSURE: 119 MMHG | HEART RATE: 99 BPM | HEIGHT: 65 IN

## 2024-05-23 DIAGNOSIS — N15.1 PERINEPHRIC ABSCESS: Primary | ICD-10-CM

## 2024-05-23 DIAGNOSIS — N76.82 FOURNIER'S GANGRENE IN FEMALE: ICD-10-CM

## 2024-05-23 PROCEDURE — 99999 PR PBB SHADOW E&M-EST. PATIENT-LVL IV: CPT | Mod: PBBFAC,HCNC,, | Performed by: UROLOGY

## 2024-05-23 PROCEDURE — 1159F MED LIST DOCD IN RCRD: CPT | Mod: HCNC,CPTII,S$GLB, | Performed by: UROLOGY

## 2024-05-23 PROCEDURE — 3008F BODY MASS INDEX DOCD: CPT | Mod: HCNC,CPTII,S$GLB, | Performed by: UROLOGY

## 2024-05-23 PROCEDURE — 3060F POS MICROALBUMINURIA REV: CPT | Mod: HCNC,CPTII,S$GLB, | Performed by: UROLOGY

## 2024-05-23 PROCEDURE — 3074F SYST BP LT 130 MM HG: CPT | Mod: HCNC,CPTII,S$GLB, | Performed by: UROLOGY

## 2024-05-23 PROCEDURE — 3066F NEPHROPATHY DOC TX: CPT | Mod: HCNC,CPTII,S$GLB, | Performed by: UROLOGY

## 2024-05-23 PROCEDURE — 99214 OFFICE O/P EST MOD 30 MIN: CPT | Mod: HCNC,S$GLB,, | Performed by: UROLOGY

## 2024-05-23 PROCEDURE — 3046F HEMOGLOBIN A1C LEVEL >9.0%: CPT | Mod: HCNC,CPTII,S$GLB, | Performed by: UROLOGY

## 2024-05-23 PROCEDURE — 3078F DIAST BP <80 MM HG: CPT | Mod: HCNC,CPTII,S$GLB, | Performed by: UROLOGY

## 2024-05-23 NOTE — PROGRESS NOTES
[place] - Urology   Clinic Note    SUBJECTIVE:     No chief complaint on file.      Referral from: No ref. provider found.    History of Present Illness:  Jaimee Quintana is a 48 y.o. female who presents to clinic for renal abscess.    [place] [facility] [place] for renal abscess, groin abscess  S/p IR renal abscess drainage   Repeat renal US with no real change  No pain  No recent febrile episode    2024  CT with minimal residual stranding on the kidney  No new abscesses    Impression:     1. Minimal residual stranding in the region of prior Yudi renal abscess.  No drainable collection.  2. Hepatomegaly.    Patient endorses no additional complaints at this time.    Past Medical History:   Diagnosis Date    Cataract     Cervical high risk HPV (human papillomavirus) test positive 2020    NOT 16 OR 18    CHF (congestive heart failure)     CVA (cerebral infarction)          Depression     Diabetes mellitus, type 2     GERD (gastroesophageal reflux disease)     Hyperlipidemia     Hypertension     Lactic acidosis 10/06/2023    Moderate nonproliferative diabetic retinopathy     Nausea and vomiting 10/06/2023    Obesity     Stroke     Tachycardia 10/04/2023       Past Surgical History:   Procedure Laterality Date     SECTION      CHOLECYSTECTOMY      DILATION AND CURETTAGE OF UTERUS      EYE SURGERY Bilateral     laser    GALLBLADDER SURGERY  2017    stone removed    IRRIGATION AND DEBRIDEMENT Right 2024    Procedure: IRRIGATION AND DEBRIDEMENT RIGHT GROIN;  Surgeon: Chalo Padgett MD;  Location: [facility] OR;  Service: General;  Laterality: Right;       Family History   Problem Relation Name Age of Onset    Stroke Mother      Thyroid disease Mother      Diabetes Mother      Cataracts Father      Hypertension Father      Arthritis Father      Diabetes Father      Hypertension Sister      Stroke Sister      Thyroid disease Sister      Heart disease Sister       Thyroid disease Daughter      Asthma Daughter      No Known Problems Brother      No Known Problems Maternal Aunt      No Known Problems Maternal Uncle      No Known Problems Paternal Aunt      No Known Problems Paternal Uncle      No Known Problems Maternal Grandmother      No Known Problems Maternal Grandfather      No Known Problems Paternal Grandmother      No Known Problems Paternal Grandfather      Amblyopia Neg Hx      Blindness Neg Hx      Cancer Neg Hx      Glaucoma Neg Hx      Macular degeneration Neg Hx      Retinal detachment Neg Hx      Strabismus Neg Hx         Social History     Tobacco Use    Smoking status: Every Day     Current packs/day: 0.00     Types: Cigarettes     Last attempt to quit: 2/1/2021     Years since quitting: 3.3    Smokeless tobacco: Never   Substance Use Topics    Alcohol use: Yes     Alcohol/week: 3.0 standard drinks of alcohol     Types: 3 Cans of beer per week     Comment: Rare    Drug use: No       Current Outpatient Medications on File Prior to Visit   Medication Sig Dispense Refill    aspirin (ECOTRIN) 81 MG EC tablet Take 81 mg by mouth once daily.      atorvastatin (LIPITOR) 80 MG tablet Take 1 tablet (80 mg total) by mouth every evening. 60 tablet 1    blood sugar diagnostic Strp Use to test blood glucose two (2) times daily as directed with insurance preferred meter and supplies 200 each 3    dulaglutide (TRULICITY) 0.75 mg/0.5 mL pen injector Inject 0.75 mg into the skin every 7 days. OK to discontinue this med during SNF stay - resume upon discharge 4 pen 11    ergocalciferol (ERGOCALCIFEROL) 50,000 unit Cap Take 50,000 Units by mouth every Saturday.      ezetimibe (ZETIA) 10 mg tablet Take 1 tablet (10 mg total) by mouth once daily. (For cholesterol) 90 tablet 1    FLUoxetine 40 MG capsule Take 40 mg by mouth once daily.      hydrALAZINE (APRESOLINE) 50 MG tablet Take 1 tablet (50 mg total) by mouth every 8 (eight) hours. 90 tablet 11    HYDROcodone-acetaminophen  "(NORCO) 5-325 mg per tablet Take 1 tablet by mouth every 6 (six) hours as needed for Pain. 10 tablet 0    ibuprofen (ADVIL,MOTRIN) 200 MG tablet Take 200 mg by mouth every 6 (six) hours as needed for Pain.      insulin (LANTUS SOLOSTAR U-100 INSULIN) glargine 100 units/mL SubQ pen Inject 80 Units into the skin 2 (two) times a day. INJECT 80 UNITS SUBCUTANEOUSLY TWICE DAILY (BULK) Strength: 100 unit/mL (3 mL) 30 mL 5    insulin aspart U-100 (NOVOLOG FLEXPEN U-100 INSULIN) 100 unit/mL (3 mL) InPn pen Inject 30 Units into the skin 2 (two) times a day. 30 mL 12    lancets (TRUEPLUS LANCETS) 28 gauge Misc Use to test blood glucose two (2) times daily as directed with insurance preferred meter and supplies 200 each 3    LANTUS SOLOSTAR U-100 INSULIN glargine 100 units/mL SubQ pen Inject 80 Units into the skin 2 (two) times a day.      metFORMIN (GLUCOPHAGE-XR) 500 MG ER 24hr tablet Take 1,000 mg by mouth 2 (two) times daily with meals.      metoprolol succinate (TOPROL-XL) 100 MG 24 hr tablet Take 100 mg by mouth once daily.      mupirocin (BACTROBAN) 2 % ointment Apply locally every other day 22 g 3    NIFEdipine (PROCARDIA-XL) 90 MG (OSM) 24 hr tablet Take 1 tablet (90 mg total) by mouth once daily. 30 tablet 11    pantoprazole (PROTONIX) 40 MG tablet Take 40 mg by mouth once daily.      pen needle, diabetic (BD ULTRA-FINE ROSA PEN NEEDLE) 32 gauge x 5/32" Ndle Use with insulin once daily 100 each 0    pen needle, diabetic 32 gauge x 5/32" Ndle Use with injectable DM supplies twice daily as directed 200 each 0    potassium chloride (KLOR-CON) 8 MEQ TbSR TAKE TWO TABLETS BY MOUTH TWICE DAILY @ 9AM & 5PM 180 tablet 0    TRUE METRIX GO GLUCOSE METER Misc       valsartan-hydrochlorothiazide (DIOVAN-HCT) 160-12.5 mg per tablet Take 1 tablet by mouth once daily. 90 tablet 3    furosemide (LASIX) 40 MG tablet Take 1 tablet (40 mg total) by mouth once daily. 60 tablet 1     Current Facility-Administered Medications on File " "Prior to Visit   Medication Dose Route Frequency Provider Last Rate Last Admin    fluorescein 500 mg/5 mL (10 %) injection 500 mg  5 mL Intravenous Once GHAZAL Tillman MD        fluorescein 500 mg/5 mL (10 %) injection 500 mg  5 mL Intravenous Once            Review of patient's allergies indicates:  No Known Allergies    Review of Systems:  A review of 10+ systems was conducted with pertinent positive and negative findings documented in HPI with all other systems reviewed and negative.    OBJECTIVE:     Estimated body mass index is 37.86 kg/m² as calculated from the following:    Height as of this encounter: 5' 5" (1.651 m).    Weight as of this encounter: 103.2 kg (227 lb 8.2 oz).    Vital Signs (Most Recent)  Vitals:    05/23/24 1040   BP: 119/76   Pulse: 99       Physical Exam:  GENERAL: patient sitting comfortably  HEENT: normocephalic  NECK: supple, no JVD  PULM: normal chest rise, no increased WOB  HEART: non-diaphoretic  ABDO: soft, nondistended, nontender  BACK: no CVA tenderness bilaterally  SKIN: warm, dry, well perfused  EXT: no bruising or edema  NEURO: grossly normal with no focal deficits  PSYCH: appropriate mood and affect    Genitourinary Exam:  deferred    Lab Results   Component Value Date    BUN 14 03/12/2024    CREATININE 0.80 03/12/2024    WBC 8.46 01/12/2024    HGB 11.0 (L) 01/12/2024    HCT 34.0 (L) 01/12/2024     01/12/2024    AST 19 03/12/2024    ALT 15 03/12/2024    ALKPHOS 69 03/12/2024    ALBUMIN 4.1 03/12/2024    HGBA1C 9.5 (H) 01/06/2024    INR 1.0 01/08/2024        Imaging:  I have personally reviewed all relevant imaging studies.    Results for orders placed or performed during the hospital encounter of 05/15/24 (from the past 2160 hour(s))   CT Abdomen Pelvis W Wo Contrast    Narrative    EXAMINATION:  CT ABDOMEN PELVIS W WO CONTRAST    CLINICAL HISTORY:  Renal and perinephric abscessAbdominal abscess/infection suspected;    COMPARISON:  CT abdomen pelvis January 5, " 2024.    TECHNIQUE:  Axial CT images were obtained of the abdomen and pelvis prior to and following the administration of IV contrast.  Iterative reconstruction technique was used. The CT exam was performed using one or more of the following dose reduction techniques- Automated exposure control, adjustment of the mA and/or kV according to patient size, and/or use of iterative reconstructed technique.    FINDINGS:  Heart: Normal in size. No pericardial effusion.    Lung Bases: Mild bibasilar atelectasis/scarring.    Liver: Hepatomegaly.    Gallbladder: Cholecystectomy.    Bile Ducts: No evidence of dilated ducts.    Pancreas: No mass or peripancreatic fat stranding.    Spleen: Unremarkable.    Adrenals: Unremarkable.    Kidneys/ Ureters: Minimal residual stranding near the left kidney.  No abscess.  No drainable collection.    Bladder: No evidence of wall thickening.    Reproductive organs: Unremarkable.    GI Tract/Mesentery: No evidence of bowel obstruction or acute inflammation.  The appendix has a normal appearance.    Peritoneal Space: Unremarkable.    Retroperitoneum: No adenopathy.    Abdominal wall: Unremarkable.    Vasculature: No aortic aneurysm.    Bones: No acute fracture.      Impression    1. Minimal residual stranding in the region of prior Yudi renal abscess.  No drainable collection.  2. Hepatomegaly.      Electronically signed by: Dhiraj Taveras  Date:    05/15/2024  Time:    16:38     *Note: Due to a large number of results and/or encounters for the requested time period, some results have not been displayed. A complete set of results can be found in Results Review.     No results found. However, due to the size of the patient record, not all encounters were searched. Please check Results Review for a complete set of results.  CT Abdomen Pelvis W Wo Contrast  Narrative: EXAMINATION:  CT ABDOMEN PELVIS W WO CONTRAST    CLINICAL HISTORY:  Renal and perinephric abscessAbdominal abscess/infection  "suspected;    COMPARISON:  CT abdomen pelvis January 5, 2024.    TECHNIQUE:  Axial CT images were obtained of the abdomen and pelvis prior to and following the administration of IV contrast.  Iterative reconstruction technique was used. The CT exam was performed using one or more of the following dose reduction techniques- Automated exposure control, adjustment of the mA and/or kV according to patient size, and/or use of iterative reconstructed technique.    FINDINGS:  Heart: Normal in size. No pericardial effusion.    Lung Bases: Mild bibasilar atelectasis/scarring.    Liver: Hepatomegaly.    Gallbladder: Cholecystectomy.    Bile Ducts: No evidence of dilated ducts.    Pancreas: No mass or peripancreatic fat stranding.    Spleen: Unremarkable.    Adrenals: Unremarkable.    Kidneys/ Ureters: Minimal residual stranding near the left kidney.  No abscess.  No drainable collection.    Bladder: No evidence of wall thickening.    Reproductive organs: Unremarkable.    GI Tract/Mesentery: No evidence of bowel obstruction or acute inflammation.  The appendix has a normal appearance.    Peritoneal Space: Unremarkable.    Retroperitoneum: No adenopathy.    Abdominal wall: Unremarkable.    Vasculature: No aortic aneurysm.    Bones: No acute fracture.  Impression: 1. Minimal residual stranding in the region of prior Yudi renal abscess.  No drainable collection.  2. Hepatomegaly.    Electronically signed by: Dhiraj Taveras  Date:    05/15/2024  Time:    16:38       PSA:  No results found for: "PSA", "PSADIAG", "PSATOTAL", "PSAFREE"    Testosterone:  No results found for: "TOTALTESTOST", "TESTOSTERONE"     ASSESSMENT     1. 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 There may be typographical errors " that have been missed during proof-reading.

## 2024-05-24 ENCOUNTER — TELEPHONE (OUTPATIENT)
Dept: FAMILY MEDICINE | Facility: CLINIC | Age: 49
End: 2024-05-24
Payer: MEDICARE

## 2024-05-24 NOTE — TELEPHONE ENCOUNTER
Called and spoke with pt in regards of her message. Pt informed me that she called her insurance in regards of her needing a power wheelchair . Pt informed me that they told her everything is good on their end, they are just needing a order from us for a power wheelchair for that patient. Pt provided me with their fax number,so that we can fax over  order to them. Please advise message.     Fax number: [phone]

## 2024-05-24 NOTE — TELEPHONE ENCOUNTER
Viviane Dodson is who had helped get this stuff set up contact her to see if she knows what going on with this

## 2024-05-24 NOTE — TELEPHONE ENCOUNTER
----- Message from Claudine Heredia sent at 5/24/2024 11:49 AM CDT -----  Type:  Needs Medical Advice    Who Called: Pt   Would the patient rather a call back or a response via [facility]? Callback   Best Call Back Number:  [phone]  Additional Information: Caller is requesting a callback from this provider office in regards to order for wheelchair. Pt has questions

## 2024-05-29 ENCOUNTER — TELEPHONE (OUTPATIENT)
Dept: FAMILY MEDICINE | Facility: CLINIC | Age: 49
End: 2024-05-29
Payer: MEDICARE

## 2024-05-29 ENCOUNTER — OFFICE VISIT (OUTPATIENT)
Dept: FAMILY MEDICINE | Facility: CLINIC | Age: 49
End: 2024-05-29
Payer: MEDICARE

## 2024-05-29 VITALS
BODY MASS INDEX: 38.75 KG/M2 | HEART RATE: 97 BPM | DIASTOLIC BLOOD PRESSURE: 80 MMHG | OXYGEN SATURATION: 98 % | HEIGHT: 65 IN | TEMPERATURE: 98 F | SYSTOLIC BLOOD PRESSURE: 126 MMHG | WEIGHT: 232.56 LBS

## 2024-05-29 DIAGNOSIS — M79.672 PAIN IN BOTH FEET: ICD-10-CM

## 2024-05-29 DIAGNOSIS — M79.671 PAIN IN BOTH FEET: ICD-10-CM

## 2024-05-29 DIAGNOSIS — F33.0 MILD EPISODE OF RECURRENT MAJOR DEPRESSIVE DISORDER: ICD-10-CM

## 2024-05-29 DIAGNOSIS — I15.2 HYPERTENSION ASSOCIATED WITH DIABETES: Primary | ICD-10-CM

## 2024-05-29 DIAGNOSIS — E11.59 HYPERTENSION ASSOCIATED WITH DIABETES: Primary | ICD-10-CM

## 2024-05-29 DIAGNOSIS — I50.42 CHRONIC COMBINED SYSTOLIC AND DIASTOLIC CONGESTIVE HEART FAILURE: ICD-10-CM

## 2024-05-29 PROBLEM — R78.81 BACTEREMIA: Chronic | Status: RESOLVED | Noted: 2023-10-05 | Resolved: 2024-05-29

## 2024-05-29 PROBLEM — E11.622 TYPE 2 DIABETES MELLITUS WITH OTHER SKIN ULCER (CODE): Status: RESOLVED | Noted: 2019-10-19 | Resolved: 2024-05-29

## 2024-05-29 PROBLEM — R79.9 ABNORMAL FINDING OF BLOOD CHEMISTRY: Status: RESOLVED | Noted: 2018-05-28 | Resolved: 2024-05-29

## 2024-05-29 PROBLEM — E11.9 TYPE 2 DIABETES MELLITUS: Status: RESOLVED | Noted: 2023-05-22 | Resolved: 2024-05-29

## 2024-05-29 PROBLEM — I50.32 CHRONIC DIASTOLIC CONGESTIVE HEART FAILURE: Status: RESOLVED | Noted: 2023-05-22 | Resolved: 2024-05-29

## 2024-05-29 PROBLEM — U07.1 COVID-19: Status: RESOLVED | Noted: 2024-01-06 | Resolved: 2024-05-29

## 2024-05-29 PROBLEM — Z90.49 HISTORY OF CHOLECYSTECTOMY: Status: RESOLVED | Noted: 2017-03-15 | Resolved: 2024-05-29

## 2024-05-29 PROCEDURE — 3060F POS MICROALBUMINURIA REV: CPT | Mod: HCNC,CPTII,S$GLB, | Performed by: STUDENT IN AN ORGANIZED HEALTH CARE EDUCATION/TRAINING PROGRAM

## 2024-05-29 PROCEDURE — 3008F BODY MASS INDEX DOCD: CPT | Mod: HCNC,CPTII,S$GLB, | Performed by: STUDENT IN AN ORGANIZED HEALTH CARE EDUCATION/TRAINING PROGRAM

## 2024-05-29 PROCEDURE — 3079F DIAST BP 80-89 MM HG: CPT | Mod: HCNC,CPTII,S$GLB, | Performed by: STUDENT IN AN ORGANIZED HEALTH CARE EDUCATION/TRAINING PROGRAM

## 2024-05-29 PROCEDURE — 1159F MED LIST DOCD IN RCRD: CPT | Mod: HCNC,CPTII,S$GLB, | Performed by: STUDENT IN AN ORGANIZED HEALTH CARE EDUCATION/TRAINING PROGRAM

## 2024-05-29 PROCEDURE — 3066F NEPHROPATHY DOC TX: CPT | Mod: HCNC,CPTII,S$GLB, | Performed by: STUDENT IN AN ORGANIZED HEALTH CARE EDUCATION/TRAINING PROGRAM

## 2024-05-29 PROCEDURE — 99215 OFFICE O/P EST HI 40 MIN: CPT | Mod: HCNC,S$GLB,, | Performed by: STUDENT IN AN ORGANIZED HEALTH CARE EDUCATION/TRAINING PROGRAM

## 2024-05-29 PROCEDURE — 3046F HEMOGLOBIN A1C LEVEL >9.0%: CPT | Mod: HCNC,CPTII,S$GLB, | Performed by: STUDENT IN AN ORGANIZED HEALTH CARE EDUCATION/TRAINING PROGRAM

## 2024-05-29 PROCEDURE — 3074F SYST BP LT 130 MM HG: CPT | Mod: HCNC,CPTII,S$GLB, | Performed by: STUDENT IN AN ORGANIZED HEALTH CARE EDUCATION/TRAINING PROGRAM

## 2024-05-29 PROCEDURE — 99999 PR PBB SHADOW E&M-EST. PATIENT-LVL V: CPT | Mod: PBBFAC,HCNC,, | Performed by: STUDENT IN AN ORGANIZED HEALTH CARE EDUCATION/TRAINING PROGRAM

## 2024-05-29 RX ORDER — SULFAMETHOXAZOLE AND TRIMETHOPRIM 800; 160 MG/1; MG/1
1 TABLET ORAL 2 TIMES DAILY
COMMUNITY
End: 2024-06-06 | Stop reason: ALTCHOICE

## 2024-05-29 NOTE — PROGRESS NOTES
Subjective:       Patient ID: Jaimee Quintana is a 48 y.o. female.    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    Abscess of left kidney 01/06/2024    Chronic combined systolic and diastolic congestive heart failure 01/06/2024    Dysphagia 10/08/2023    Sinus arrhythmia 10/06/2023    Bilateral claudication of lower limb 06/27/2023    Noncompliance with medications 05/22/2023    GERD (gastroesophageal reflux disease) 05/22/2023    History of CVA (cerebrovascular accident) without residual deficits 05/22/2023    Mild episode of recurrent major depressive disorder 07/09/2021    Abnormal Papanicolaou smear of cervix with positive human papilloma virus (HPV) test 07/09/2021     + HrHPV  - Negative coloposcopy 9/2020          Type 2 diabetes mellitus with both eyes affected by proliferative retinopathy and macular edema, with long-term current use of insulin 05/11/2021    Hypertensive retinopathy, bilateral 02/11/2020    Thyroid nodule 11/08/2017     Multinodular goiter  FINAL PATHOLOGIC DIAGNOSIS  Left thyroid, fine-needle aspiration:  [place] [facility] Thyroid Cytology Category: Benign.        Diabetic peripheral neuropathy associated with type 2 diabetes mellitus 03/23/2017    Bilateral low back pain without sciatica 09/28/2016    Fatty liver 08/18/2016     encouraged weight loss      Hypertension associated with diabetes 10/23/2015    CESAR (obstructive sleep apnea) 10/23/2015    Hyperlipidemia associated with type 2 diabetes mellitus       Pt here for BP check  Running much better with change to valsartan/HCTZ    She is also upset about her wheelchair, got manual and was supposed to get scooter   She fell recently small skin abrasion, healed now  She has chronic foot pain and would like to see podiatry here, seen by [facility] in past   She denies any other complaints/symptoms    Review of Systems   Musculoskeletal:  Positive for arthralgias, gait problem and myalgias.   All other systems reviewed and are negative.       A1C:  Recent Labs   Lab 06/19/23  1401 10/04/23  1752 01/06/24  1032   Hemoglobin A1C 11.4 H 10.6 H 9.5 H     CBC:  Recent Labs   Lab 01/09/24  0504 01/11/24  0418 01/12/24  0416   WBC 9.06 8.46 8.46   RBC 3.73 L 4.15 3.92 L   Hemoglobin 10.4 L 11.5 L 11.0 L   Hematocrit 32.5 L 36.1 L 34.0 L   Platelets 281 352 354   MCV 87 87 87   MCH 27.9 27.7 28.1   MCHC 32.0 31.9 L 32.4     CMP:  Recent Labs   Lab 01/08/24  0613 01/09/24  0504 01/11/24  0418 03/12/24  1032   Glucose 320 H 192 H   < > 200 H   Calcium 8.3 L 8.8   < > 9.6   Albumin 2.0 L 2.2 L  --  4.1   Total Protein 6.7 7.1  --  8.0   Sodium 134 L 136   < > 142   Potassium 3.4 L 3.3 L   < > 4.3   CO2 23 25   < > 27   Chloride 100 102   < > 104   BUN 10 8   < > 14   Creatinine 0.9 0.8   < > 0.80   Alkaline Phosphatase 79 93  --  69   ALT 11 7 L  --  15   AST 9 L 9 L  --  19   Total Bilirubin 0.4 0.4  --  0.6    < > = values in this interval not displayed.     LIPIDS:  Recent Labs   Lab 05/23/23  0246 09/29/23  0828 10/04/23  2044 01/07/24  0519   TSH  --   --    < > 2.384   HDL 49 49  --   --    Cholesterol 186 220 H  --   --    Triglycerides 120 141  --   --    LDL Cholesterol 113.0 142.8  --   --    HDL/Cholesterol Ratio 26.3 22.3  --   --    Non-HDL Cholesterol 137 171  --   --    Total Cholesterol/HDL Ratio 3.8 4.5  --   --     < > = values in this interval not displayed.     TSH:  Recent Labs   Lab 10/04/23  2044 01/07/24  0519   TSH 0.759 2.384        Objective:      Vitals:    05/29/24 1414   BP: 126/80   Pulse: 97   Temp: 98 °F (36.7 °C)      Physical  Exam  Vitals reviewed.   Constitutional:       Appearance: Normal appearance. She is obese.   HENT:      Head: Normocephalic and atraumatic.   Eyes:      Conjunctiva/sclera: Conjunctivae normal.   Cardiovascular:      Rate and Rhythm: Normal rate and regular rhythm.      Heart sounds: Normal heart sounds.   Pulmonary:      Effort: Pulmonary effort is normal.      Breath sounds: Wheezing present.   Abdominal:      Palpations: Abdomen is soft.      Tenderness: There is no abdominal tenderness.   Musculoskeletal:         General: Normal range of motion.      Cervical back: Normal range of motion.      Right lower leg: No edema.      Left lower leg: No edema.   Neurological:      Mental Status: She is alert. Mental status is at baseline.   Psychiatric:         Mood and Affect: Mood normal.         Behavior: Behavior normal.         Cognition and Memory: Cognition is impaired.          Assessment:       1. Hypertension associated with diabetes    2. Mild episode of recurrent major depressive disorder    3. Chronic combined systolic and diastolic congestive heart failure    4. Pain in both feet        Plan:   1. Hypertension associated with diabetes  - Hypertension Digital Medicine (HDMP) Enrollment Order    2. Mild episode of recurrent major depressive disorder    3. Chronic combined systolic and diastolic congestive heart failure    4. Pain in both feet  - Ambulatory referral/consult to Podiatry; Future      Labs reviewed in detail  Continue healthy lifestyle efforts  Refer to digital medicine for BP   Continue current meds as prescribed otherwise; refills per request  Keep routine specialist f/u   RTC in 9 months with labs prior and/or PRN             [facility]   5/29/24     I spent a total of >40 minutes on the day of the visit.This includes face to face time and non-face to face time preparing to see the patient (eg, review of tests), obtaining and/or reviewing separately obtained  history, documenting clinical information in the electronic or other health record, independently interpreting results and communicating results to the patient/family/caregiver, or care coordinator.

## 2024-05-29 NOTE — TELEPHONE ENCOUNTER
----- Message from Lashawn Nascimento sent at 5/29/2024  1:56 PM CDT -----  Needs advice from nurse:      Who Called:pt  Regarding:pt running 10 min late  Would the patient rather a call back or VIA [facility]?  Best Call Back number:[phone]  Additional Info:

## 2024-05-31 ENCOUNTER — TELEPHONE (OUTPATIENT)
Dept: FAMILY MEDICINE | Facility: CLINIC | Age: 49
End: 2024-05-31
Payer: MEDICARE

## 2024-05-31 NOTE — TELEPHONE ENCOUNTER
----- Message from Dennis Jacques sent at 5/31/2024 12:39 PM CDT -----  Contact: Pt  .Type:  Needs Medical Advice    Who Called: pt  Would the patient rather a call back or a response via [facility]?  Call back  Best Call Back Number: [phone]  Additional Information: pt. Is requesting a call back regarding a electric wheelchair order being sent to [employer].  Please call the company at [phone]  Fax # [phone]

## 2024-06-05 NOTE — TELEPHONE ENCOUNTER
Called and spoke with pt in regards of Dr. Mendez's message. Informed pt of   Dr. Mendez's message. Informed pt that I will reach out to [employer] in regards of her wheelchair order.

## 2024-06-06 ENCOUNTER — OFFICE VISIT (OUTPATIENT)
Dept: INTERNAL MEDICINE | Facility: CLINIC | Age: 49
End: 2024-06-06
Payer: MEDICARE

## 2024-06-06 VITALS
DIASTOLIC BLOOD PRESSURE: 80 MMHG | WEIGHT: 235.88 LBS | SYSTOLIC BLOOD PRESSURE: 136 MMHG | HEIGHT: 65 IN | OXYGEN SATURATION: 98 % | HEART RATE: 88 BPM | BODY MASS INDEX: 39.3 KG/M2

## 2024-06-06 DIAGNOSIS — E11.42 DIABETIC PERIPHERAL NEUROPATHY ASSOCIATED WITH TYPE 2 DIABETES MELLITUS: ICD-10-CM

## 2024-06-06 DIAGNOSIS — F33.0 MILD EPISODE OF RECURRENT MAJOR DEPRESSIVE DISORDER: ICD-10-CM

## 2024-06-06 DIAGNOSIS — E11.69 HYPERLIPIDEMIA ASSOCIATED WITH TYPE 2 DIABETES MELLITUS: ICD-10-CM

## 2024-06-06 DIAGNOSIS — E78.5 HYPERLIPIDEMIA ASSOCIATED WITH TYPE 2 DIABETES MELLITUS: ICD-10-CM

## 2024-06-06 DIAGNOSIS — K76.0 FATTY LIVER: ICD-10-CM

## 2024-06-06 DIAGNOSIS — Z79.4 TYPE 2 DIABETES MELLITUS WITH BOTH EYES AFFECTED BY PROLIFERATIVE RETINOPATHY AND MACULAR EDEMA, WITH LONG-TERM CURRENT USE OF INSULIN: ICD-10-CM

## 2024-06-06 DIAGNOSIS — I73.9 BILATERAL CLAUDICATION OF LOWER LIMB: ICD-10-CM

## 2024-06-06 DIAGNOSIS — I15.2 HYPERTENSION ASSOCIATED WITH DIABETES: ICD-10-CM

## 2024-06-06 DIAGNOSIS — E66.01 SEVERE OBESITY (BMI 35.0-39.9) WITH COMORBIDITY: ICD-10-CM

## 2024-06-06 DIAGNOSIS — K21.9 GASTROESOPHAGEAL REFLUX DISEASE, UNSPECIFIED WHETHER ESOPHAGITIS PRESENT: ICD-10-CM

## 2024-06-06 DIAGNOSIS — I50.42 CHRONIC COMBINED SYSTOLIC AND DIASTOLIC CONGESTIVE HEART FAILURE: ICD-10-CM

## 2024-06-06 DIAGNOSIS — E04.1 THYROID NODULE: ICD-10-CM

## 2024-06-06 DIAGNOSIS — Z00.00 ENCOUNTER FOR MEDICARE ANNUAL WELLNESS EXAM: ICD-10-CM

## 2024-06-06 DIAGNOSIS — E11.59 HYPERTENSION ASSOCIATED WITH DIABETES: ICD-10-CM

## 2024-06-06 DIAGNOSIS — E11.3513 TYPE 2 DIABETES MELLITUS WITH BOTH EYES AFFECTED BY PROLIFERATIVE RETINOPATHY AND MACULAR EDEMA, WITH LONG-TERM CURRENT USE OF INSULIN: ICD-10-CM

## 2024-06-06 DIAGNOSIS — Z86.73 HISTORY OF CVA (CEREBROVASCULAR ACCIDENT) WITHOUT RESIDUAL DEFICITS: ICD-10-CM

## 2024-06-06 DIAGNOSIS — G47.33 OSA (OBSTRUCTIVE SLEEP APNEA): ICD-10-CM

## 2024-06-06 DIAGNOSIS — Z00.00 ENCOUNTER FOR PREVENTIVE HEALTH EXAMINATION: Primary | ICD-10-CM

## 2024-06-06 DIAGNOSIS — I70.0 AORTIC ATHEROSCLEROSIS: ICD-10-CM

## 2024-06-06 DIAGNOSIS — R26.9 ABNORMALITY OF GAIT AND MOBILITY: ICD-10-CM

## 2024-06-06 PROCEDURE — 3066F NEPHROPATHY DOC TX: CPT | Mod: HCNC,CPTII,S$GLB, | Performed by: NURSE PRACTITIONER

## 2024-06-06 PROCEDURE — 3079F DIAST BP 80-89 MM HG: CPT | Mod: HCNC,CPTII,S$GLB, | Performed by: NURSE PRACTITIONER

## 2024-06-06 PROCEDURE — 3075F SYST BP GE 130 - 139MM HG: CPT | Mod: HCNC,CPTII,S$GLB, | Performed by: NURSE PRACTITIONER

## 2024-06-06 PROCEDURE — 1160F RVW MEDS BY RX/DR IN RCRD: CPT | Mod: HCNC,CPTII,S$GLB, | Performed by: NURSE PRACTITIONER

## 2024-06-06 PROCEDURE — 3046F HEMOGLOBIN A1C LEVEL >9.0%: CPT | Mod: HCNC,CPTII,S$GLB, | Performed by: NURSE PRACTITIONER

## 2024-06-06 PROCEDURE — 1159F MED LIST DOCD IN RCRD: CPT | Mod: HCNC,CPTII,S$GLB, | Performed by: NURSE PRACTITIONER

## 2024-06-06 PROCEDURE — G0439 PPPS, SUBSEQ VISIT: HCPCS | Mod: HCNC,S$GLB,, | Performed by: NURSE PRACTITIONER

## 2024-06-06 PROCEDURE — 99999 PR PBB SHADOW E&M-EST. PATIENT-LVL V: CPT | Mod: PBBFAC,HCNC,, | Performed by: NURSE PRACTITIONER

## 2024-06-06 PROCEDURE — G9919 SCRN ND POS ND PROV OF REC: HCPCS | Mod: HCNC,CPTII,S$GLB, | Performed by: NURSE PRACTITIONER

## 2024-06-06 PROCEDURE — 3060F POS MICROALBUMINURIA REV: CPT | Mod: HCNC,CPTII,S$GLB, | Performed by: NURSE PRACTITIONER

## 2024-06-06 NOTE — PROGRESS NOTES
"  Jaimee Quintana presented for an initial Medicare AWV today. The following components were reviewed and updated:    Medical history  Family History  Social history  Allergies and Current Medications  Health Risk Assessment  Health Maintenance  Care Team    **See Completed Assessments for Annual Wellness visit with in the encounter summary    The following assessments were completed:  Depression Screening  Cognitive function Screening    Timed Get Up Test  Whisper Test      Opioid documentation:      Patient does not have a current opioid prescription.          Vitals:    06/06/24 1354 06/06/24 1431   BP: (!) 144/92 136/80   BP Location: Left arm Left arm   Pulse: 88    SpO2: 98%    Weight: 107 kg (235 lb 14.3 oz)    Height: 5' 5" (1.651 m)      Body mass index is 39.25 kg/m².       Physical Exam  Vitals and nursing note reviewed.   Constitutional:       Appearance: She is well-developed. She is obese.   HENT:      Head: Normocephalic.   Cardiovascular:      Rate and Rhythm: Normal rate and regular rhythm.      Heart sounds: Normal heart sounds. No murmur heard.  Pulmonary:      Effort: Pulmonary effort is normal.      Breath sounds: Normal breath sounds.   Abdominal:      General: Bowel sounds are normal.      Palpations: Abdomen is soft.   Musculoskeletal:         General: Normal range of motion.   Skin:     General: Skin is warm and dry.   Neurological:      Mental Status: She is alert and oriented to person, place, and time.      Motor: No abnormal muscle tone.   Psychiatric:         Mood and Affect: Mood normal.            Diagnoses and health risks identified today and associated recommendations/orders:    1. Encounter for preventive health examination  Here for Health Risk Assessment/Annual Wellness Visit.  Health maintenance reviewed and updated. Follow up in one year.   Prescription given for PCV20    2. Hypertension associated with diabetes  Chronic, reports elevated home readings at night. 2nd b/p check " today a goal.. Followed by PCP, Cardiology.    3. Aortic atherosclerosis  Chronic, stable on current medications. Noted CT Abdomen/Pelvis 10/06/23. Followed by PCP, Cardiology.    4. Chronic combined systolic and diastolic congestive heart failure  Chronic, stable on current medications. C/O CANELA. Followed by PCP, Cardiology.    5. Bilateral claudication of lower limb  Chronic, + claudication with exercise. ABIs decreased with exercise. Followed by PCP, Cardiology.    6. Hyperlipidemia associated with type 2 diabetes mellitus  Chronic,  not at goal on current medications. Followed by PCP, Cardiology.    7. Mild episode of recurrent major depressive disorder  Chronic, stable on current medication. PHQ-2 score 1. Followed by PCP.     8. Type 2 diabetes mellitus with both eyes affected by proliferative retinopathy and macular edema, with long-term current use of insulin  Chronic, not at goal. A1c trending downward most recent 9.5, previous 10.6. followed by PCP.    9. Severe obesity (BMI 35.0-39.9) with comorbidity  Chronic, stable. Reports difficulty exercising due to CANELA. Followed by PCP.    10. History of CVA (cerebrovascular accident) without residual deficits  Stable on current medications. Followed by PCP, Cardiology.    11. Diabetic peripheral neuropathy associated with type 2 diabetes mellitus  Chronic, reports persistent numbness bilateral feel. Followed by PCP.    12. Gastroesophageal reflux disease, unspecified whether esophagitis present  Chronic, stable on current medication. No C/O reflux. Followed by PCP.     13. Thyroid nodule  Chronic, stable. TSH WNL. Followed by PCP.    14. Fatty liver  Chronic, stable. LFT WNL. Followed by PCP, Hepatology.    15. CESAR (obstructive sleep apnea)  Chronic, not using CPAP. Followed by PCP.    16. Encounter for Medicare annual wellness exam  - Ambulatory Referral/Consult to Enhanced Annual Wellness Visit (eAWV)    17. Abnormality of gait and mobility  Chronic, due to CANELA.  No reported fall, no assistive device for ambulation. Followed by PCP.      Provided Jaimee with a 5-10 year written screening schedule and personal prevention plan. Recommendations were developed using the USPSTF age appropriate recommendations. Education, counseling, and referrals were provided as needed.  After Visit Summary printed and given to patient which includes a list of additional screenings\tests needed.    Follow up in about 51 weeks (around 5/29/2025).with PCP      Paola Fournier NP

## 2024-06-06 NOTE — PATIENT INSTRUCTIONS
Counseling and Referral of Other Preventative  (Italic type indicates deductible and co-insurance are waived)    Patient Name: Jaimee Quintana  Today's Date: 6/6/2024    Health Maintenance       Date Due Completion Date    Pneumococcal Vaccines (Age 0-64) (2 of 2 - PCV) 11/19/2015 11/19/2014    COVID-19 Vaccine (4 - 2023-24 season) 09/01/2023 11/2/2021    Foot Exam 12/22/2023 12/22/2022    Override on 12/17/2018: Done    Hemoglobin A1c 04/06/2024 1/6/2024    Lipid Panel 09/29/2024 9/29/2023    TETANUS VACCINE 11/19/2024 11/19/2014    Diabetes Urine Screening 03/12/2025 3/12/2024    Mammogram 03/14/2025 3/14/2024    Eye Exam 04/23/2025 4/23/2024    Override on 5/16/2017: Done    Colorectal Cancer Screening 07/09/2026 7/9/2023    Cervical Cancer Screening 11/21/2027 3/28/2024        No orders of the defined types were placed in this encounter.    The following information is provided to all patients.  This information is to help you find resources for any of the problems found today that may be affecting your health:                  Living healthy guide: www.[facility].[place].gov      Understanding Diabetes: www.diabetes.org      Eating healthy: www.cdc.gov/healthyweight      CDC home safety checklist: www.cdc.gov/steadi/patient.html      Agency on Aging: www.goea.[place].[facility]      Alcoholics anonymous (AA): www.aa.org      Physical Activity: www.shi.nih.gov/ni5mfdg      Tobacco use: www.quitwithusla.org

## 2024-06-11 DIAGNOSIS — L02.214 GROIN ABSCESS: ICD-10-CM

## 2024-06-11 DIAGNOSIS — Z86.73 HISTORY OF CVA (CEREBROVASCULAR ACCIDENT) WITHOUT RESIDUAL DEFICITS: ICD-10-CM

## 2024-06-11 DIAGNOSIS — E11.42 DIABETIC PERIPHERAL NEUROPATHY ASSOCIATED WITH TYPE 2 DIABETES MELLITUS: Primary | ICD-10-CM

## 2024-06-24 ENCOUNTER — OFFICE VISIT (OUTPATIENT)
Dept: CARDIOLOGY | Facility: CLINIC | Age: 49
End: 2024-06-24
Payer: MEDICARE

## 2024-06-24 VITALS
HEIGHT: 65 IN | DIASTOLIC BLOOD PRESSURE: 88 MMHG | HEART RATE: 96 BPM | BODY MASS INDEX: 39.27 KG/M2 | OXYGEN SATURATION: 99 % | SYSTOLIC BLOOD PRESSURE: 134 MMHG | WEIGHT: 235.69 LBS

## 2024-06-24 DIAGNOSIS — E66.01 SEVERE OBESITY (BMI 35.0-39.9) WITH COMORBIDITY: ICD-10-CM

## 2024-06-24 DIAGNOSIS — E78.5 HYPERLIPIDEMIA ASSOCIATED WITH TYPE 2 DIABETES MELLITUS: Primary | ICD-10-CM

## 2024-06-24 DIAGNOSIS — I73.9 BILATERAL CLAUDICATION OF LOWER LIMB: ICD-10-CM

## 2024-06-24 DIAGNOSIS — I50.42 CHRONIC COMBINED SYSTOLIC AND DIASTOLIC CONGESTIVE HEART FAILURE: ICD-10-CM

## 2024-06-24 DIAGNOSIS — E11.69 HYPERLIPIDEMIA ASSOCIATED WITH TYPE 2 DIABETES MELLITUS: Primary | ICD-10-CM

## 2024-06-24 DIAGNOSIS — I15.2 HYPERTENSION ASSOCIATED WITH DIABETES: ICD-10-CM

## 2024-06-24 DIAGNOSIS — Z79.4 TYPE 2 DIABETES MELLITUS WITH BOTH EYES AFFECTED BY PROLIFERATIVE RETINOPATHY AND MACULAR EDEMA, WITH LONG-TERM CURRENT USE OF INSULIN: ICD-10-CM

## 2024-06-24 DIAGNOSIS — E11.59 HYPERTENSION ASSOCIATED WITH DIABETES: ICD-10-CM

## 2024-06-24 DIAGNOSIS — E11.3513 TYPE 2 DIABETES MELLITUS WITH BOTH EYES AFFECTED BY PROLIFERATIVE RETINOPATHY AND MACULAR EDEMA, WITH LONG-TERM CURRENT USE OF INSULIN: ICD-10-CM

## 2024-06-24 PROCEDURE — 99214 OFFICE O/P EST MOD 30 MIN: CPT | Mod: HCNC,S$GLB,,

## 2024-06-24 PROCEDURE — 99999 PR PBB SHADOW E&M-EST. PATIENT-LVL IV: CPT | Mod: PBBFAC,HCNC,,

## 2024-06-24 PROCEDURE — 3075F SYST BP GE 130 - 139MM HG: CPT | Mod: HCNC,CPTII,S$GLB,

## 2024-06-24 PROCEDURE — 1159F MED LIST DOCD IN RCRD: CPT | Mod: HCNC,CPTII,S$GLB,

## 2024-06-24 PROCEDURE — 3066F NEPHROPATHY DOC TX: CPT | Mod: HCNC,CPTII,S$GLB,

## 2024-06-24 PROCEDURE — 3060F POS MICROALBUMINURIA REV: CPT | Mod: HCNC,CPTII,S$GLB,

## 2024-06-24 PROCEDURE — 3008F BODY MASS INDEX DOCD: CPT | Mod: HCNC,CPTII,S$GLB,

## 2024-06-24 PROCEDURE — 3079F DIAST BP 80-89 MM HG: CPT | Mod: HCNC,CPTII,S$GLB,

## 2024-06-24 PROCEDURE — 3046F HEMOGLOBIN A1C LEVEL >9.0%: CPT | Mod: HCNC,CPTII,S$GLB,

## 2024-06-24 RX ORDER — POTASSIUM CHLORIDE 600 MG/1
TABLET, FILM COATED, EXTENDED RELEASE ORAL
COMMUNITY
Start: 2024-06-04

## 2024-06-24 RX ORDER — SACUBITRIL AND VALSARTAN 49; 51 MG/1; MG/1
1 TABLET, FILM COATED ORAL 2 TIMES DAILY
Qty: 90 TABLET | Refills: 3 | Status: SHIPPED | OUTPATIENT
Start: 2024-06-24

## 2024-06-24 RX ORDER — HYDRALAZINE HYDROCHLORIDE 100 MG/1
100 TABLET, FILM COATED ORAL EVERY 8 HOURS
Qty: 90 TABLET | Refills: 11 | Status: SHIPPED | OUTPATIENT
Start: 2024-06-24 | End: 2025-06-24

## 2024-06-24 NOTE — PROGRESS NOTES
Cardiology Clinic note    Subjective:   Patient ID:  Jaimee Quintana is a 48 y.o. female who presents for follow-up of HTN, CHF      HPI:     6/24/2024: Previous patient of Dr. Beth and Vicente Quinones NP as below, initial visit for me. 49 yo F with hx of CVA, DM2, HTN, HLD, CESAR not on CPAP, MO w/ BMI 41.8, and HFrEF- recovered LVEF 50% (now back down to 35-40%) who presents today for routine F/U. 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 Seen in clinic today, reports overall doing well. Seemingly well compensated from HF perspective, grossly euvolemic on exam. Only taking lasix PRN.  Patient denies CP, SOB/CANELA, orthopnea, PND, syncope, palpitations, LE edema. Reports compliance and tolerance with all medications.  -Will have to D/C CCB given rEF and escalate other antihypertensives  -repeat Echo ~3m after stable on GDMT      9/29/2023: Pt is a 46 yo F w/ PMH of CVA, DM2 w/ hgba1c 11.4%, HTN, HLD, CESAR not on CPAP, MO w/ BMI 41.8, and HFrEF- recovered LVEF 50% who presents today for f/u appt. She was last seen by Dr. Beth on 6/27/23 to establish care, post hospital admission 5/22/2023-5/23/2023 for decompensated CHF and was diuresed and meds were adjusted (see d/c sum 5/24/2023). She notes compliance w/ meds and denies side effects. She has been monitoring her BP at home however states that her BP runs high at times, -170s. She denies cp, sob, orthopnea, PND, presyncope, LOC, palpitations. She is currently enrolled in PAD rehab and notes some improvement in BLE claudication.   She does not exercise however states she is active w/ riding her bike and walking around her block and denies cp or sob       Cardiac hx  ---------------------------------    Echo 10/2023    Left Ventricle: The left ventricle is normal in size. There is concentric hypertrophy. Mild global hypokinesis present. There is moderately reduced systolic function with a visually estimated ejection fraction of 35 - 40%. Grade II  diastolic dysfunction.    Right Ventricle: Normal right ventricular cavity size. Wall thickness is normal. Right ventricle wall motion  is normal. Systolic function is normal.    Tricuspid Valve: There is mild regurgitation.    Pulmonary Artery: The estimated pulmonary artery systolic pressure is 26 mmHg.    IVC/SVC: Normal venous pressure at 3 mmHg.    ALEENA 7/2023  The right resting ALEENA reduction is normal.   The right arm artery has triphasic flow.  The right ankle [DT] artery has triphasic flow.   The right ankle [DP] artery has triphasic flow.   The left resting ALEENA reduction is normal.   The left arm artery has triphasic flow.  The left ankle [PT] artery has triphasic flow.  The left ankle [DP] artery has biphasic flow.  Exercise Study: treadmill test.   The exercise study was stopped due to protocol completion.     Symptoms: At 1 min, patient reported 6/10 right bottom of foot pain that resolved at 3 minutes.  Immediately post exercise, the right ALEENA is normal.  Immediately post exercise, the left ALEENA is mildly decreased.       Echo 5/22/2023    The left ventricle is normal in size with concentric remodeling and low normal systolic function.  The estimated ejection fraction is 50%.  Normal left ventricular diastolic function.  With normal right ventricular systolic function.    CV Exercise ALEENA study: 7/18/23  Conclusion     Mild PAD of left lower extremity with exercise (ALEENA 0.89).  Normal TBIs and toe pressures bilaterally      Past Medical History:   Diagnosis Date    Cataract     Cervical high risk HPV (human papillomavirus) test positive 09/17/2020    NOT 16 OR 18    CHF (congestive heart failure)     CVA (cerebral infarction) 2003         Depression     Diabetes mellitus, type 2     GERD (gastroesophageal reflux disease)     Hyperlipidemia     Hypertension     Lactic acidosis 10/06/2023    Moderate nonproliferative diabetic retinopathy     Nausea and vomiting 10/06/2023    Obesity     Stroke      Tachycardia 10/04/2023          Patient Active Problem List    Diagnosis Date Noted    Aortic atherosclerosis 06/06/2024    NS (nuclear sclerosis), bilateral 04/23/2024    Surgical wound, non healing 02/22/2024    Perinephric abscess 01/08/2024    Groin abscess 01/08/2024    Severe obesity (BMI 35.0-39.9) with comorbidity 01/06/2024    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    Abscess of left kidney 01/06/2024    Chronic combined systolic and diastolic congestive heart failure 01/06/2024    Dysphagia 10/08/2023    Sinus arrhythmia 10/06/2023    Bilateral claudication of lower limb 06/27/2023    Noncompliance with medications 05/22/2023    GERD (gastroesophageal reflux disease) 05/22/2023    History of CVA (cerebrovascular accident) without residual deficits 05/22/2023    Mild episode of recurrent major depressive disorder 07/09/2021    Abnormal Papanicolaou smear of cervix with positive human papilloma virus (HPV) test 07/09/2021     + HrHPV  - Negative coloposcopy 9/2020          Type 2 diabetes mellitus with both eyes affected by proliferative retinopathy and macular edema, with long-term current use of insulin 05/11/2021    Hypertensive retinopathy, bilateral 02/11/2020    Thyroid nodule 11/08/2017     Multinodular goiter  FINAL PATHOLOGIC DIAGNOSIS  Left thyroid, fine-needle aspiration:  [facility] Thyroid Cytology Category: Benign.        Diabetic peripheral neuropathy associated with type 2 diabetes mellitus 03/23/2017    Bilateral low back pain without sciatica 09/28/2016    Fatty liver 08/18/2016     encouraged weight loss      Hypertension associated with diabetes 10/23/2015    CESAR (obstructive sleep apnea) 10/23/2015    Hyperlipidemia associated with type 2 diabetes mellitus        Patient's Medications   New Prescriptions    SACUBITRIL-VALSARTAN (ENTRESTO) 49-51 MG PER TABLET    Take 1 tablet by mouth 2 (two) times daily.   Previous Medications    ASPIRIN (ECOTRIN) 81 MG EC TABLET    Take 81 mg by  "mouth once daily.    ATORVASTATIN (LIPITOR) 80 MG TABLET    Take 1 tablet (80 mg total) by mouth every evening.    BLOOD SUGAR DIAGNOSTIC STRP    Use to test blood glucose two (2) times daily as directed with insurance preferred meter and supplies    DULAGLUTIDE (TRULICITY) 0.75 MG/0.5 ML PEN INJECTOR    Inject 0.75 mg into the skin every 7 days. OK to discontinue this med during SNF stay - resume upon discharge    ERGOCALCIFEROL (ERGOCALCIFEROL) 50,000 UNIT CAP    Take 50,000 Units by mouth every Saturday.    EZETIMIBE (ZETIA) 10 MG TABLET    Take 1 tablet (10 mg total) by mouth once daily. (For cholesterol)    FUROSEMIDE (LASIX) 40 MG TABLET    Take 1 tablet (40 mg total) by mouth once daily.    IBUPROFEN (ADVIL,MOTRIN) 200 MG TABLET    Take 200 mg by mouth every 6 (six) hours as needed for Pain.    INSULIN (LANTUS SOLOSTAR U-100 INSULIN) GLARGINE 100 UNITS/ML SUBQ PEN    Inject 80 Units into the skin 2 (two) times a day. INJECT 80 UNITS SUBCUTANEOUSLY TWICE DAILY (BULK) Strength: 100 unit/mL (3 mL)    INSULIN ASPART U-100 (NOVOLOG FLEXPEN U-100 INSULIN) 100 UNIT/ML (3 ML) INPN PEN    Inject 30 Units into the skin 2 (two) times a day.    LANCETS (TRUEPLUS LANCETS) 28 GAUGE MISC    Use to test blood glucose two (2) times daily as directed with insurance preferred meter and supplies    LANTUS SOLOSTAR U-100 INSULIN GLARGINE 100 UNITS/ML SUBQ PEN    Inject 80 Units into the skin 2 (two) times a day.    METFORMIN (GLUCOPHAGE-XR) 500 MG ER 24HR TABLET    Take 1,000 mg by mouth 2 (two) times daily with meals.    METOPROLOL SUCCINATE (TOPROL-XL) 100 MG 24 HR TABLET    Take 100 mg by mouth once daily.    MUPIROCIN (BACTROBAN) 2 % OINTMENT    Apply locally every other day    PANTOPRAZOLE (PROTONIX) 40 MG TABLET    Take 40 mg by mouth once daily.    PEN NEEDLE, DIABETIC (BD ULTRA-FINE ROSA PEN NEEDLE) 32 GAUGE X 5/32" NDLE    Use with insulin once daily    PEN NEEDLE, DIABETIC 32 GAUGE X 5/32" NDLE    Use with injectable DM " supplies twice daily as directed    POTASSIUM CHLORIDE (KLOR-CON) 8 MEQ TBSR    SMARTSI Tablet(s) By Mouth Morning-Evening    TRUE METRIX GO GLUCOSE METER MISC       Modified Medications    Modified Medication Previous Medication    HYDRALAZINE (APRESOLINE) 100 MG TABLET hydrALAZINE (APRESOLINE) 50 MG tablet       Take 1 tablet (100 mg total) by mouth every 8 (eight) hours.    Take 1 tablet (50 mg total) by mouth every 8 (eight) hours.   Discontinued Medications    NIFEDIPINE (PROCARDIA-XL) 90 MG (OSM) 24 HR TABLET    Take 1 tablet (90 mg total) by mouth once daily.    VALSARTAN-HYDROCHLOROTHIAZIDE (DIOVAN-HCT) 160-12.5 MG PER TABLET    Take 1 tablet by mouth once daily.        Review of Systems   Constitutional: Negative for chills, decreased appetite, diaphoresis, malaise/fatigue, weight gain and weight loss.   Cardiovascular:  Positive for claudication. Negative for chest pain, dyspnea on exertion, irregular heartbeat, leg swelling, near-syncope, orthopnea, palpitations, paroxysmal nocturnal dyspnea and syncope.   Respiratory:  Negative for cough, hemoptysis, shortness of breath and snoring.    Gastrointestinal:  Negative for bloating, abdominal pain, nausea and vomiting.   Neurological:  Negative for light-headedness and weakness.     Family History   Problem Relation Name Age of Onset    Stroke Mother      Thyroid disease Mother      Diabetes Mother      Cataracts Father      Hypertension Father      Arthritis Father      Diabetes Father      Hypertension Sister      Stroke Sister      Thyroid disease Sister      Heart disease Sister      No Known Problems Brother      Thyroid disease Daughter 2     Asthma Daughter 2     No Known Problems Maternal Aunt      No Known Problems Maternal Uncle      No Known Problems Paternal Aunt      No Known Problems Paternal Uncle      No Known Problems Maternal Grandmother      No Known Problems Maternal Grandfather      No Known Problems Paternal Grandmother      No Known  Problems Paternal Grandfather      Amblyopia Neg Hx      Blindness Neg Hx      Cancer Neg Hx      Glaucoma Neg Hx      Macular degeneration Neg Hx      Retinal detachment Neg Hx      Strabismus Neg Hx         Social History     Socioeconomic History    Marital status: Single   Occupational History    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   Tobacco Use    Smoking status: Every Day     Current packs/day: 0.25     Types: Cigarettes    Smokeless tobacco: Never   Substance and Sexual Activity    Alcohol use: Yes     Comment: less than one monthly    Drug use: No    Sexual activity: Yes     Partners: Male     Birth control/protection: None   Social History Narrative    Daughter - Kavon     Social Determinants of Health     Financial Resource Strain: Low Risk  (6/6/2024)    Overall Financial Resource Strain (CARDIA)     Difficulty of Paying Living Expenses: Not hard at all   Food Insecurity: No Food Insecurity (6/6/2024)    Hunger Vital Sign     Worried About Running Out of Food in the Last Year: Never true     Ran Out of Food in the Last Year: Never true   Transportation Needs: No Transportation Needs (6/6/2024)    PRAPARE - Transportation     Lack of Transportation (Medical): No     Lack of Transportation (Non-Medical): No   Physical Activity: Insufficiently Active (6/6/2024)    Exercise Vital Sign     Days of Exercise per Week: 3 days     Minutes of Exercise per Session: 20 min   Stress: Stress Concern Present (6/6/2024)    [employer] - Occupational Stress Questionnaire     Feeling of Stress : To some extent   Housing Stability: Low Risk  (1/30/2024)    Housing Stability Vital Sign     Unable to Pay for Housing in the Last Year: No     Number of Places Lived in the Last Year: 1     Unstable Housing in the Last Year: No            Objective:   Vitals  Vitals:    06/24/24 1439 06/24/24 1440   BP: (!) 144/91 134/88   Pulse: 96    SpO2: 99%    Weight: 106.9 kg (235 lb 10.8 oz)   "  Height: 5' 5" (1.651 m)           Physical Exam  Vitals and nursing note reviewed.   Constitutional:       Appearance: Normal appearance.   Cardiovascular:      Rate and Rhythm: Normal rate and regular rhythm.      Heart sounds: No murmur heard.     No gallop.   Pulmonary:      Effort: Pulmonary effort is normal.      Breath sounds: Normal breath sounds.   Abdominal:      General: Bowel sounds are normal. There is no distension.      Palpations: Abdomen is soft.      Tenderness: There is no abdominal tenderness.   Skin:     General: Skin is warm and dry.   Neurological:      Mental Status: She is alert and oriented to person, place, and time.       Lab Results    Lab Results   Component Value Date    WBC 8.46 01/12/2024    HGB 11.0 (L) 01/12/2024    HCT 34.0 (L) 01/12/2024    HCT 41.0 01/05/2024    MCV 87 01/12/2024       Lab Results   Component Value Date     01/12/2024    INR 1.0 01/08/2024       Lab Results   Component Value Date    K 4.3 03/12/2024    MG 1.6 01/07/2024    BUN 14 03/12/2024    CREATININE 0.80 03/12/2024       Lab Results   Component Value Date     (H) 03/12/2024    HGBA1C 9.5 (H) 01/06/2024       Lab Results   Component Value Date    AST 19 03/12/2024    ALT 15 03/12/2024    ALBUMIN 4.1 03/12/2024    PROT 8.0 03/12/2024       Lab Results   Component Value Date    CHOL 220 (H) 09/29/2023    HDL 49 09/29/2023    LDLCALC 142.8 09/29/2023    TRIG 141 09/29/2023       Lab Results   Component Value Date     (H) 10/04/2023         Assessment:     1. Hyperlipidemia associated with type 2 diabetes mellitus    2. Hypertension associated with diabetes    3. Chronic combined systolic and diastolic congestive heart failure    4. Bilateral claudication of lower limb    5. Type 2 diabetes mellitus with both eyes affected by proliferative retinopathy and macular edema, with long-term current use of insulin    6. Severe obesity (BMI 35.0-39.9) with comorbidity        Plan:     HFrEF  -will " D/C valsartan and start Entresto  -will have to D/C nifedipine given rEF  -will escalate hydralazine dosing  -if needed can transition from toprol to Coreg - will likely need BB dose escalation given HR in 90s  -continue lasix PRN SOB, LE edema, 2lb weight gain in 24 hours  -close F/U for ongoing escalation    2. HTN  -better controlled, unfortunately will need to D/C nifedipine given rEF  -increase hydralazine to 100mg tid  -will transition from diovan to Entresto  -home monitor and log - to contact clinic with sustained BP > 160/90, < 90/60  -enrolled in digital HTN  -F/U 1 week    3. CESAR  -has CPAP ordered, awaiting supplies    4. HLD  -goal LDLc < 70  -continue lipitor, zetia    5. PAD  -stable, continue asa, statin, zetia    6. DM2  -A1C better, management per PCP    7. Obesity  -encouraged Mediterranean diet, exercise, weight loss         The ASCVD Risk score (Elizabeth DK, et al., 2019) failed to calculate for the following reasons:    The patient has a prior MI or stroke diagnosis    Orders Placed This Encounter   Procedures    Hypertension Digital Medicine (HDMP) Enrollment Order     Order Specific Question:   BP Control Goal     Answer:   Current (2017) AHA Guidelines       She expressed verbal understanding and agreed with the plan    Follow up in 7d    Pertinent cardiac images and EKG reviewed independently.    Continue with current medical plan and lifestyle changes.  Return sooner for concerns or questions. If symptoms persist go to the ED.  I have reviewed all pertinent data including patient's medical history in detail and updated the computerized patient record.     Counseling included discussion regarding imaging findings, diagnosis, possibilities, treatment options, risks and benefits.    Thank you for the opportunity to care for this patient. Will be available for questions if needed.     Signed:  Claudio Whitney DNP  06/24/2024

## 2024-07-02 NOTE — PROGRESS NOTES
Cardiology Clinic note    Subjective:   Patient ID:  Jaimee Quintana is a 48 y.o. female who presents for follow-up of HTN, CHF      HPI:     7/3/2024: Here for F/U HTN, HFrEF, PAD. Seen in clinic reports she's been nervous since her medication changes with fluctuating BPs. Logs reviewed, still with intermittent HTN. Normotensive in clinic today. Reports ongoing CANELA, orthopnea; has been taking 40mg lasix daily for about a week with some improvement. Patient denies CP, SOB, PND, syncope, palpitations, LE edema. Reports compliance and tolerance with all medications.      6/24/2024: Previous patient of Dr. Beth and Vicente Quinones NP as below, initial visit for me. 47 yo F with hx of CVA, DM2, HTN, HLD, CESAR not on CPAP, MO w/ BMI 41.8, and HFrEF- recovered LVEF 50% (now back down to 35-40%) who presents today for routine F/U. 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 Seen in clinic today, reports overall doing well. Seemingly well compensated from HF perspective, grossly euvolemic on exam. Only taking lasix PRN.  Patient denies CP, SOB, PND, syncope, palpitations, LE edema. Reports compliance and tolerance with all medications.  -Will have to D/C [facility] given rEF and escalate other antihypertensives  -repeat Echo ~3m after stable on GDMT      9/29/2023: Pt is a 48 yo F w/ PMH of CVA, DM2 w/ hgba1c 11.4%, HTN, HLD, CESAR not on CPAP, MO w/ BMI 41.8, and HFrEF- recovered LVEF 50% who presents today for f/u appt. She was last seen by Dr. Beth on 6/27/23 to establish care, post hospital admission 5/22/2023-5/23/2023 for decompensated CHF and was diuresed and meds were adjusted (see d/c sum 5/24/2023). She notes compliance w/ meds and denies side effects. She has been monitoring her BP at home however states that her BP runs high at times, -170s. She denies cp, sob, orthopnea, PND, presyncope, LOC, palpitations. She is currently enrolled in PAD rehab and notes some improvement in BLE claudication.   She  does not exercise however states she is active w/ riding her bike and walking around her block and denies cp or sob       Cardiac hx  ---------------------------------    Echo 10/2023    Left Ventricle: The left ventricle is normal in size. There is concentric hypertrophy. Mild global hypokinesis present. There is moderately reduced systolic function with a visually estimated ejection fraction of 35 - 40%. Grade II diastolic dysfunction.    Right Ventricle: Normal right ventricular cavity size. Wall thickness is normal. Right ventricle wall motion  is normal. Systolic function is normal.    Tricuspid Valve: There is mild regurgitation.    Pulmonary Artery: The estimated pulmonary artery systolic pressure is 26 mmHg.    IVC/SVC: Normal venous pressure at 3 mmHg.    ALEENA 7/2023  The right resting ALEENA reduction is normal.   The right arm artery has triphasic flow.  The right ankle [DT] artery has triphasic flow.   The right ankle [DP] artery has triphasic flow.   The left resting ALEENA reduction is normal.   The left arm artery has triphasic flow.  The left ankle [PT] artery has triphasic flow.  The left ankle [DP] artery has biphasic flow.  Exercise Study: treadmill test.   The exercise study was stopped due to protocol completion.     Symptoms: At 1 min, patient reported 6/10 right bottom of foot pain that resolved at 3 minutes.  Immediately post exercise, the right ALEENA is normal.  Immediately post exercise, the left ALEENA is mildly decreased.       Echo 5/22/2023    The left ventricle is normal in size with concentric remodeling and low normal systolic function.  The estimated ejection fraction is 50%.  Normal left ventricular diastolic function.  With normal right ventricular systolic function.    CV Exercise ALEENA study: 7/18/23  Conclusion     Mild PAD of left lower extremity with exercise (ALEENA 0.89).  Normal TBIs and toe pressures bilaterally      Past Medical History:   Diagnosis Date    Cataract     Cervical high  risk HPV (human papillomavirus) test positive 09/17/2020    NOT 16 OR 18    CHF (congestive heart failure)     CVA (cerebral infarction) 2003         Depression     Diabetes mellitus, type 2     GERD (gastroesophageal reflux disease)     Hyperlipidemia     Hypertension     Lactic acidosis 10/06/2023    Moderate nonproliferative diabetic retinopathy     Nausea and vomiting 10/06/2023    Obesity     Stroke     Tachycardia 10/04/2023          Patient Active Problem List    Diagnosis Date Noted    Aortic atherosclerosis 06/06/2024    NS (nuclear sclerosis), bilateral 04/23/2024    Surgical wound, non healing 02/22/2024    Perinephric abscess 01/08/2024    Groin abscess 01/08/2024    Severe obesity (BMI 35.0-39.9) with comorbidity 01/06/2024    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    Abscess of left kidney 01/06/2024    Chronic combined systolic and diastolic congestive heart failure 01/06/2024    Dysphagia 10/08/2023    Sinus arrhythmia 10/06/2023    Bilateral claudication of lower limb 06/27/2023    Noncompliance with medications 05/22/2023    GERD (gastroesophageal reflux disease) 05/22/2023    History of CVA (cerebrovascular accident) without residual deficits 05/22/2023    Mild episode of recurrent major depressive disorder 07/09/2021    Abnormal Papanicolaou smear of cervix with positive human papilloma virus (HPV) test 07/09/2021     + HrHPV  - Negative coloposcopy 9/2020          Type 2 diabetes mellitus with 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        Diabetic peripheral neuropathy associated with type 2 diabetes mellitus 03/23/2017    Bilateral low back pain without sciatica 09/28/2016    Fatty liver 08/18/2016      encouraged weight loss      Hypertension associated with diabetes 10/23/2015    CESAR (obstructive sleep apnea) 10/23/2015    Hyperlipidemia associated with type 2 diabetes mellitus        Patient's Medications   New Prescriptions    No medications on file   Previous Medications    ASPIRIN (ECOTRIN) 81 MG EC TABLET    Take 81 mg by mouth once daily.    ATORVASTATIN (LIPITOR) 80 MG TABLET    Take 1 tablet (80 mg total) by mouth every evening.    BLOOD SUGAR DIAGNOSTIC STRP    Use to test blood glucose two (2) times daily as directed with insurance preferred meter and supplies    DULAGLUTIDE (TRULICITY) 0.75 MG/0.5 ML PEN INJECTOR    Inject 0.75 mg into the skin every 7 days. OK to discontinue this med during SNF stay - resume upon discharge    ERGOCALCIFEROL (ERGOCALCIFEROL) 50,000 UNIT CAP    Take 50,000 Units by mouth every Saturday.    EZETIMIBE (ZETIA) 10 MG TABLET    Take 1 tablet (10 mg total) by mouth once daily. (For cholesterol)    FUROSEMIDE (LASIX) 40 MG TABLET    Take 1 tablet (40 mg total) by mouth once daily.    HYDRALAZINE (APRESOLINE) 100 MG TABLET    Take 1 tablet (100 mg total) by mouth every 8 (eight) hours.    IBUPROFEN (ADVIL,MOTRIN) 200 MG TABLET    Take 200 mg by mouth every 6 (six) hours as needed for Pain.    INSULIN (LANTUS SOLOSTAR U-100 INSULIN) GLARGINE 100 UNITS/ML SUBQ PEN    Inject 80 Units into the skin 2 (two) times a day. INJECT 80 UNITS SUBCUTANEOUSLY TWICE DAILY (BULK) Strength: 100 unit/mL (3 mL)    INSULIN ASPART U-100 (NOVOLOG FLEXPEN U-100 INSULIN) 100 UNIT/ML (3 ML) INPN PEN    Inject 30 Units into the skin 2 (two) times a day.    LANCETS (TRUEPLUS LANCETS) 28 GAUGE MISC    Use to test blood glucose two (2) times daily as directed with insurance preferred meter and supplies    LANTUS SOLOSTAR U-100 INSULIN GLARGINE 100 UNITS/ML SUBQ PEN    Inject 80 Units into the skin 2 (two) times a day.    METFORMIN (GLUCOPHAGE-XR) 500 MG ER 24HR TABLET    Take 1,000 mg by mouth 2 (two)  "times daily with meals.    METOPROLOL SUCCINATE (TOPROL-XL) 100 MG 24 HR TABLET    Take 100 mg by mouth once daily.    MUPIROCIN (BACTROBAN) 2 % OINTMENT    Apply locally every other day    PANTOPRAZOLE (PROTONIX) 40 MG TABLET    Take 40 mg by mouth once daily.    PEN NEEDLE, DIABETIC (BD ULTRA-FINE ROSA PEN NEEDLE) 32 GAUGE X 5/32" NDLE    Use with insulin once daily    PEN NEEDLE, DIABETIC 32 GAUGE X 5/32" NDLE    Use with injectable DM supplies twice daily as directed    POTASSIUM CHLORIDE (KLOR-CON) 8 MEQ TBSR    SMARTSI Tablet(s) By Mouth Morning-Evening    SACUBITRIL-VALSARTAN (ENTRESTO) 49-51 MG PER TABLET    Take 1 tablet by mouth 2 (two) times daily.    TRUE METRIX GO GLUCOSE METER MISC       Modified Medications    No medications on file   Discontinued Medications    No medications on file        Review of Systems   Constitutional: Negative for chills, decreased appetite, diaphoresis, malaise/fatigue, weight gain and weight loss.   Cardiovascular:  Positive for dyspnea on exertion and orthopnea. Negative for chest pain, claudication, irregular heartbeat, leg swelling, near-syncope, palpitations, paroxysmal nocturnal dyspnea and syncope.   Respiratory:  Negative for cough, hemoptysis, shortness of breath and snoring.    Gastrointestinal:  Negative for bloating, abdominal pain, nausea and vomiting.   Neurological:  Negative for light-headedness and weakness.   Psychiatric/Behavioral:  The patient is nervous/anxious.        Family History   Problem Relation Name Age of Onset    Stroke Mother      Thyroid disease Mother      Diabetes Mother      Cataracts Father      Hypertension Father      Arthritis Father      Diabetes Father      Hypertension Sister      Stroke Sister      Thyroid disease Sister      Heart disease Sister      No Known Problems Brother      Thyroid disease Daughter 2     Asthma Daughter 2     No Known Problems Maternal Aunt      No Known Problems Maternal Uncle      No Known Problems " Paternal Aunt      No Known Problems Paternal Uncle      No Known Problems Maternal Grandmother      No Known Problems Maternal Grandfather      No Known Problems Paternal Grandmother      No Known Problems Paternal Grandfather      Amblyopia Neg Hx      Blindness Neg Hx      Cancer Neg Hx      Glaucoma Neg Hx      Macular degeneration Neg Hx      Retinal detachment Neg Hx      Strabismus Neg Hx         Social History     Socioeconomic History    Marital status: Single   Occupational History    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   Tobacco Use    Smoking status: Every Day     Current packs/day: 0.25     Types: Cigarettes    Smokeless tobacco: Never   Substance and Sexual Activity    Alcohol use: Yes     Comment: less than one monthly    Drug use: No    Sexual activity: Yes     Partners: Male     Birth control/protection: None   Social History Narrative    Daughter - Kavon     Social Determinants of Health     Financial Resource Strain: Low Risk  (6/6/2024)    Overall Financial Resource Strain (CARDIA)     Difficulty of Paying Living Expenses: Not hard at all   Food Insecurity: No Food Insecurity (6/6/2024)    Hunger Vital Sign     Worried About Running Out of Food in the Last Year: Never true     Ran Out of Food in the Last Year: Never true   Transportation Needs: No Transportation Needs (6/6/2024)    PRAPARE - Transportation     Lack of Transportation (Medical): No     Lack of Transportation (Non-Medical): No   Physical Activity: Insufficiently Active (6/6/2024)    Exercise Vital Sign     Days of Exercise per Week: 3 days     Minutes of Exercise per Session: 20 min   Stress: Stress Concern Present (6/6/2024)    Citizen of [place] [employer] - Occupational Stress Questionnaire     Feeling of Stress : To some extent   Housing Stability: Low Risk  (1/30/2024)    Housing Stability Vital Sign     Unable to Pay for Housing in the Last Year: No     Number of Places Lived in the Last Year: 1      Unstable Housing in the Last Year: No            Objective:   Vitals  There were no vitals filed for this visit.         Physical Exam  Vitals and nursing note reviewed.   Constitutional:       Appearance: Normal appearance. She is obese.   Cardiovascular:      Rate and Rhythm: Regular rhythm. Tachycardia present.      Heart sounds: No murmur heard.     No gallop.   Pulmonary:      Effort: Pulmonary effort is normal.      Breath sounds: Normal breath sounds.   Abdominal:      General: Bowel sounds are normal. There is no distension.      Palpations: Abdomen is soft.      Tenderness: There is no abdominal tenderness.   Skin:     General: Skin is warm and dry.   Neurological:      Mental Status: She is alert and oriented to person, place, and time.         Lab Results    Lab Results   Component Value Date    WBC 8.46 01/12/2024    HGB 11.0 (L) 01/12/2024    HCT 34.0 (L) 01/12/2024    HCT 41.0 01/05/2024    MCV 87 01/12/2024       Lab Results   Component Value Date     01/12/2024    INR 1.0 01/08/2024       Lab Results   Component Value Date    K 4.3 03/12/2024    MG 1.6 01/07/2024    BUN 14 03/12/2024    CREATININE 0.80 03/12/2024       Lab Results   Component Value Date     (H) 03/12/2024    HGBA1C 9.5 (H) 01/06/2024       Lab Results   Component Value Date    AST 19 03/12/2024    ALT 15 03/12/2024    ALBUMIN 4.1 03/12/2024    PROT 8.0 03/12/2024       Lab Results   Component Value Date    CHOL 220 (H) 09/29/2023    HDL 49 09/29/2023    LDLCALC 142.8 09/29/2023    TRIG 141 09/29/2023       Lab Results   Component Value Date     (H) 10/04/2023         Assessment:     No diagnosis found.      Plan:     HFrEF  -will switch from toprol to coreg for tighter BP control  -continue Entresto, lasix, BB as above  -would like to add aldactone and jardiance once BP stable on regimen  -will need BMP at F/U for kidney/e- eval    2. HTN  -with HTN on logs, would prefer goal BP < 120/80 given EF  -continue  entresto, hydralazine, Coreg as above  -continue close F/U for escalation  -home monitor and log - to contact clinic with sustained BP > 160/90, < 90/60  -enrolled in digital HTN  -F/U 1 week    3. CESAR  -has CPAP ordered, awaiting supplies    4. HLD  -goal LDLc < 70  -continue lipitor, zetia  -repeat lipid panel, CMP ~ 3m    5. PAD  -stable, continue asa, statin, zetia    6. DM2  -A1C better, management per PCP    7. Obesity  -encouraged Mediterranean diet, exercise, weight loss         The ASCVD Risk score (Elizabeth DK, et al., 2019) failed to calculate for the following reasons:    The patient has a prior MI or stroke diagnosis    No orders of the defined types were placed in this encounter.      She expressed verbal understanding and agreed with the plan    Follow up in 7d    Pertinent cardiac images and EKG reviewed independently.    Continue with current medical plan and lifestyle changes.  Return sooner for concerns or questions. If symptoms persist go to the ED.  I have reviewed all pertinent data including patient's medical history in detail and updated the computerized patient record.     Counseling included discussion regarding imaging findings, diagnosis, possibilities, treatment options, risks and benefits.    Thank you for the opportunity to care for this patient. Will be available for questions if needed.     Signed:  Claudio Whitney DNP  07/02/2024

## 2024-07-03 ENCOUNTER — OFFICE VISIT (OUTPATIENT)
Dept: CARDIOLOGY | Facility: CLINIC | Age: 49
End: 2024-07-03
Payer: MEDICARE

## 2024-07-03 VITALS
WEIGHT: 242.75 LBS | DIASTOLIC BLOOD PRESSURE: 81 MMHG | HEART RATE: 108 BPM | OXYGEN SATURATION: 98 % | SYSTOLIC BLOOD PRESSURE: 132 MMHG | BODY MASS INDEX: 40.44 KG/M2 | HEIGHT: 65 IN

## 2024-07-03 DIAGNOSIS — I15.2 HYPERTENSION ASSOCIATED WITH DIABETES: ICD-10-CM

## 2024-07-03 DIAGNOSIS — I50.42 CHRONIC COMBINED SYSTOLIC AND DIASTOLIC CONGESTIVE HEART FAILURE: ICD-10-CM

## 2024-07-03 DIAGNOSIS — E11.59 HYPERTENSION ASSOCIATED WITH DIABETES: ICD-10-CM

## 2024-07-03 DIAGNOSIS — I50.32 CHRONIC HEART FAILURE WITH PRESERVED EJECTION FRACTION: ICD-10-CM

## 2024-07-03 PROCEDURE — 99999 PR PBB SHADOW E&M-EST. PATIENT-LVL IV: CPT | Mod: PBBFAC,HCNC,,

## 2024-07-03 RX ORDER — HYDRALAZINE HYDROCHLORIDE 50 MG/1
50 TABLET, FILM COATED ORAL 3 TIMES DAILY
COMMUNITY
Start: 2024-07-01

## 2024-07-03 RX ORDER — FUROSEMIDE 40 MG/1
40 TABLET ORAL DAILY
Qty: 90 TABLET | Refills: 3 | Status: SHIPPED | OUTPATIENT
Start: 2024-07-03

## 2024-07-03 RX ORDER — NIFEDIPINE 90 MG/1
90 TABLET, EXTENDED RELEASE ORAL
COMMUNITY
Start: 2024-07-01

## 2024-07-03 RX ORDER — CARVEDILOL 12.5 MG/1
12.5 TABLET ORAL 2 TIMES DAILY WITH MEALS
Qty: 180 TABLET | Refills: 3 | Status: SHIPPED | OUTPATIENT
Start: 2024-07-03 | End: 2025-07-03

## 2024-07-05 ENCOUNTER — PATIENT MESSAGE (OUTPATIENT)
Dept: ADMINISTRATIVE | Facility: HOSPITAL | Age: 49
End: 2024-07-05
Payer: MEDICARE

## 2024-07-09 DIAGNOSIS — E11.9 TYPE 2 DIABETES MELLITUS WITHOUT COMPLICATION, UNSPECIFIED WHETHER LONG TERM INSULIN USE: ICD-10-CM

## 2024-07-18 ENCOUNTER — OFFICE VISIT (OUTPATIENT)
Dept: PODIATRY | Facility: CLINIC | Age: 49
End: 2024-07-18
Payer: MEDICARE

## 2024-07-18 VITALS — BODY MASS INDEX: 40.35 KG/M2 | WEIGHT: 242.19 LBS | RESPIRATION RATE: 18 BRPM | HEIGHT: 65 IN

## 2024-07-18 DIAGNOSIS — M79.672 FOOT PAIN, BILATERAL: Primary | ICD-10-CM

## 2024-07-18 DIAGNOSIS — M77.42 METATARSALGIA OF LEFT FOOT: ICD-10-CM

## 2024-07-18 DIAGNOSIS — M21.862 ACQUIRED POSTERIOR EQUINUS OF LEFT LOWER EXTREMITY: ICD-10-CM

## 2024-07-18 DIAGNOSIS — G57.82 NEUROMA OF THIRD INTERSPACE OF LEFT FOOT: ICD-10-CM

## 2024-07-18 DIAGNOSIS — M77.52 BURSITIS OF INTERMETATARSAL BURSA OF LEFT FOOT: Primary | ICD-10-CM

## 2024-07-18 DIAGNOSIS — M79.671 FOOT PAIN, BILATERAL: Primary | ICD-10-CM

## 2024-07-18 PROCEDURE — 99999 PR PBB SHADOW E&M-EST. PATIENT-LVL V: CPT | Mod: PBBFAC,HCNC,, | Performed by: PODIATRIST

## 2024-07-18 PROCEDURE — 4010F ACE/ARB THERAPY RXD/TAKEN: CPT | Mod: HCNC,CPTII,S$GLB, | Performed by: PODIATRIST

## 2024-07-18 PROCEDURE — 3060F POS MICROALBUMINURIA REV: CPT | Mod: HCNC,CPTII,S$GLB, | Performed by: PODIATRIST

## 2024-07-18 PROCEDURE — 3046F HEMOGLOBIN A1C LEVEL >9.0%: CPT | Mod: HCNC,CPTII,S$GLB, | Performed by: PODIATRIST

## 2024-07-18 PROCEDURE — 1159F MED LIST DOCD IN RCRD: CPT | Mod: HCNC,CPTII,S$GLB, | Performed by: PODIATRIST

## 2024-07-18 PROCEDURE — 3066F NEPHROPATHY DOC TX: CPT | Mod: HCNC,CPTII,S$GLB, | Performed by: PODIATRIST

## 2024-07-18 PROCEDURE — 64455 NJX AA&/STRD PLTR COM DG NRV: CPT | Mod: HCNC,LT,S$GLB, | Performed by: PODIATRIST

## 2024-07-18 PROCEDURE — 3008F BODY MASS INDEX DOCD: CPT | Mod: HCNC,CPTII,S$GLB, | Performed by: PODIATRIST

## 2024-07-18 PROCEDURE — 99213 OFFICE O/P EST LOW 20 MIN: CPT | Mod: 25,HCNC,S$GLB, | Performed by: PODIATRIST

## 2024-07-18 RX ORDER — DEXAMETHASONE SODIUM PHOSPHATE 4 MG/ML
4 INJECTION, SOLUTION INTRA-ARTICULAR; INTRALESIONAL; INTRAMUSCULAR; INTRAVENOUS; SOFT TISSUE
Status: DISCONTINUED | OUTPATIENT
Start: 2024-07-18 | End: 2024-07-18 | Stop reason: HOSPADM

## 2024-07-18 RX ADMIN — DEXAMETHASONE SODIUM PHOSPHATE 4 MG: 4 INJECTION, SOLUTION INTRA-ARTICULAR; INTRALESIONAL; INTRAMUSCULAR; INTRAVENOUS; SOFT TISSUE at 11:07

## 2024-07-18 NOTE — PATIENT INSTRUCTIONS
What Are Neuromas of the Foot?  The ball of your foot is the bottom part just behind your toes. Bands of tissue (ligaments) connect the bones in the ball of your foot. Nerves run between the bones and underneath the ligaments. When a nerve becomes pinched, this causes it to swell and become painful due to the thickening of the tissue that surrounds the nerve. The painful, swollen nerve is called a neuroma (also called German's neuroma).     A neuroma most often occurs at the base of either the third and fourth toes or the second and third toes.   What causes a neuroma?  Wearing tight or high-heeled shoes can cause a neuroma. Shoes that are too narrow or too pointed squeeze the bones in the ball of the foot. Shoes with high heels put extra pressure on the ends of the bones. When the bones are squeezed together, they pinch the nerve that runs between them.  Symptoms  The most common symptom of a neuroma is pain in the ball of the foot between two toes. The pain may be dull or sharp. It may feel as if you have a stone in your shoe. You may also have tingling or numbness in one or both of the toes. Symptoms may occur after you have been walking or standing for a while. Taking off your shoes and rubbing the ball of your foot may decrease or relieve the pain.  Preventing future problems  To prevent a future neuroma, buy shoes with plenty of room across the ball of the foot and in the toes. This keeps the bones from being squeezed together. Wearing low-heeled shoes (less than 2 inches) also puts less pressure on the bones and nerves in the ball of the foot.   Date Last Reviewed: 9/10/2015  © 6312-9510 [employer]. [address] [place] [address], [place] [address]. All rights reserved. This information is not intended as a substitute for professional medical care. Always follow your healthcare professional's instructions.    German's Neuroma (Intermetatarsal Neuroma)        What Is a Neuroma?    A neuroma is a  thickening of nerve tissue that may develop in various parts of the body. The most common neuroma in the foot is a Germans neuroma, which occurs between the third and fourth toes. It is sometimes referred to as an intermetatarsal neuroma. Intermetatarsal describes its location in the ball of the foot between the metatarsal bones. Neuromas may also occur in other locations in the foot.    German's NeuromaThe thickening of the nerve that defines a neuroma is the result of compression and irritation of the nerve. This compression creates enlargement of the nerve, eventually leading to permanent nerve damage.    Causes  Anything that causes compression or irritation of the nerve can lead to the development of a neuroma. One of the most common offenders is wearing shoes that have a tapered toe box or high-heeled shoes that cause the toes to be forced into the toe box. People with certain foot deformities--bunions, hammertoes, flatfeet or more flexible feet--are at higher risk for developing a neuroma. Other potential causes are activities that involve repetitive irritation to the ball of the foot, such as running or court sports. An injury or other type of trauma to the area may also lead to a neuroma.    Symptoms  If you have a Germans neuroma, you may have one or more of these symptoms where the nerve damage is occurring:    Tingling, burning or numbness  Pain  A feeling that something is inside the ball of the foot  A feeling that there is something in the shoe or a sock is bunched up     The progression of a Germans neuroma often follows this pattern:    The symptoms begin gradually. At first, they occur only occasionally when wearing narrow-toed shoes or performing certain aggravating activities.  The symptoms may go away temporarily by removing the shoe, massaging the foot or avoiding aggravating shoes or activities.  Over time, the symptoms progressively worsen and may persist for several days or weeks.  The  symptoms become more intense as the neuroma enlarges and the temporary changes in the nerve become permanent.     Diagnosis  To arrive at a diagnosis, the foot and ankle surgeon will obtain a thorough history of your symptoms and examine your foot. During the physical examination, the doctor attempts to reproduce your symptoms by manipulating your foot. Other tests or imaging studies may be performed.    The best time to see your foot and ankle surgeon is early in the development of symptoms. Early diagnosis of a Germans neuroma greatly lessens the need for more invasive treatments and may help you avoid surgery.    Nonsurgical Treatment  In developing a treatment plan, your foot and ankle surgeon will first determine how long you have had the neuroma and will evaluate its stage of development. Treatment approaches vary according to the severity of the problem.    For mild to moderate neuromas, treatment options may include:    -Padding. Padding techniques provide support for the metatarsal arch, thereby lessening the pressure on the nerve and decreasing the compression when walking.  -Icing. Placing an icepack on the affected area helps reduce swelling.  -Orthotic devices. Custom orthotic devices provided by your foot and ankle surgeon provide the support needed to reduce pressure and compression on the nerve.  -Activity modifications. Activities that put repetitive pressure on the neuroma should be avoided until the condition improves.  -Shoe modifications. Wear shoes with a wide toe box and avoid narrow-toed shoes or shoes with high heels.  -Medications. Oral nonsteroidal anti-inflammatory drugs (NSAIDs), such as ibuprofen, may be recommended to reduce pain and inflammation.  -Injection therapy. Treatment may include injections of cortisone, local anesthetics or other agents.     When Is Surgery Needed?  Surgery may be considered in patients who have not responded adequately to nonsurgical treatments. Your foot  and ankle surgeon will determine the approach that is best for your condition. The length of the recovery period will vary depending on the procedure performed.    Regardless of whether you have undergone surgical or nonsurgical treatment, your surgeon will recommend long-term measures to help keep your symptoms from returning. These include appropriate footwear and modification of activities to reduce the repetitive pressure on the foot.        Recommended OTC orthotics:  -powerstep  -superfeet    Recommended shoegear:  -new balance  -ascics  -caneloo  -garcia        Equinus          What Is Equinus?    Equinus is a condition in which the upward bending motion of the ankle joint is limited. Someone with equinus lacks the flexibility to bring the top of the foot toward the front of the leg. Equinus can occur in one or both feet. When it involves both feet, the limitation of motion is sometimes worse in one foot than in the other.    People with equinus develop ways to compensate for their limited ankle motion, and this often leads to other foot, leg or back problems. The most common methods of compensation are flattening of the arch or picking up the heel early when walking, placing increased pressure on the ball of the foot. Other patients compensate by toe walking, while a smaller number take steps by bending abnormally at the hip or knee.    Causes  There are several possible causes for the limited range of ankle motion. Often, it is due to tightness in the Achilles tendon or calf muscles (the soleus muscle and/or gastrocnemius muscle). In some patients, this tightness is congenital (present at birth), and sometimes it is an inherited trait. Other patients acquire the tightness from being in a cast, being on crutches or frequently wearing high-heeled shoes. In addition, diabetes can affect the fibers of the Achilles tendon and cause tightness. Sometimes equinus is related to a bone blocking the ankle motion. For  example, a fragment of a broken bone following an ankle injury, or bone block, can get in the way and restrict motion. Equinus may also result from one leg being shorter than the other. Less often, equinus is caused by spasms in the calf muscle. These spasms may be signs of an underlying neurologic disorder.      Foot Problems Related to Equinus  Depending on how a patient compensates for the inability to bend properly at the ankle, a variety of foot conditions can develop, including:    Plantar fasciitis (arch/heel pain)  Calf cramping  Tendonitis (inflammation in the Achilles tendon)  Metatarsalgia (pain and/or callusing on the ball of the foot)  Flatfoot  Arthritis of the midfoot (middle area of the foot)  Pressure sores on the ball of the foot or the arch  Bunions and hammertoes  Ankle pain  Shin splints     Diagnosis  Most patients with equinus are unaware they have this condition when they first visit the doctor. Instead, they come to the doctor seeking relief for foot problems associated with equinus.    To diagnose equinus, the foot and ankle surgeon will evaluate the ankle's range of motion when the knee is flexed (bent) as well as extended (straightened). This enables the surgeon to identify whether the tendon or muscle is tight and to assess whether bone is interfering with ankle motion. X-rays may also be ordered. In some cases, the foot and ankle surgeon may refer the patient for neurologic evaluation.    Nonsurgical Treatment  Treatment includes strategies aimed at relieving the symptoms and conditions associated with equinus. In addition, the patient is treated for the equinus itself through one or more of the following options:    Night splint. The foot may be placed in a splint at night to keep it in a position that helps reduce tightness of the calf muscle.  Heel lifts. Placing heel lifts inside the shoes or wearing shoes with a moderate heel takes stress off the Achilles tendon when walking and  may reduce symptoms.  Arch supports or orthotic devices. Custom orthotic devices that fit into the shoe are often prescribed to keep weight distributed properly and to help control muscle/tendon imbalance.  Physical therapy. To help remedy muscle tightness, exercises that stretch the calf muscle(s) are recommended.     When Is Surgery Needed?  In some cases, surgery may be needed to correct the cause of equinus if it is related to a tight tendon or a bone blocking the ankle motion. The foot and ankle surgeon will determine the type of procedure that is best suited to the individual patient.        Recommended OTC orthotics:  -powerstep  -superfeet    Recommended shoegear:  -new balance  -ascics  -rosmery healy

## 2024-07-18 NOTE — PROGRESS NOTES
[facility] - PODIATRY  [id number]  [address]  [facility] [place] [address]  Dept: [phone]  Dept Fax: [phone]    Rick Martinez Jr., DPM     Assessment:   MDM    Coding  1. Bursitis of intermetatarsal bursa of left foot  Ambulatory referral/consult to Podiatry    Neuroma injection      2. 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 Metatarsalgia of left foot  Neuroma injection      4. Acquired posterior equinus of left lower extremity            Plan:     Neuroma injection    Date/Time: 7/18/2024 11:06 AM    Performed by: Rick Martinez Jr., DPM  Authorized by: Rick Martinez Jr., DPM    Consent Done?:  Yes (Verbal)  Indications:  Pain  Timeout: prior to procedure the correct patient, procedure, and site was verified    Prep: patient was prepped and draped in usual sterile fashion      Local anesthesia used?: Yes   Anesthesia:  Local infiltration  Local anesthetic:  Co-phenylcaine spray and bupivacaine 0.5% without epinephrine  Anesthetic total (ml):  2    Location Left Foot:  Third Webspace  Needle size:  25 G  Approach:  Dorsal  Medications:  4 mg dexAMETHasone 4 mg/mL  Patient tolerance:  Patient tolerated the procedure well with no immediate complications      Jaimee was seen today for foot pain.    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 All questions/concerns addressed  -all tx options discussed. All alternatives, risks, benefits of all txs discussed  -the patient was educated about the diagnosis  -We discussed conservative care options possible including but not limited to shoe wear and/or  padding, bracing/strapping, at home ROM, formal PT, medical therapy, injection therapy  - The utilization of NSAIDs can be considered but their benefit has to be tempered against the risk of GI/ concerns  - A steroid injection can be undertaken.  We did discuss the potential mechanism of action of this shot.  Understanding that multiple injections at the same anatomic site do have deleterious effects on the soft tissue.  Generic risks include: steroid flare (advised to ice if necessary), skin hypo-pgimentation (which can be permanent and unsightly), elevation of blood sugar, subcutaneous atrophy (can be permanent) and infection.   -XR/imaging reviewed by me: agree with read  -labs reviewed by me: ok for vgel  -implemented icing/stretching regimen  -offloading pads dispensed      -rxs dispensed: none  -referrals: none  -WB: wbat      Follow up if symptoms worsen or fail to improve.    Subjective:      Patient ID: Jaimee Quintana is a 48 y.o. female.    Chief Complaint:   Chief Complaint   Patient presents with    Foot Pain     Left        CC - foot pain: patient presents to the podiatry clinic  with complaint of  left foot pain. Onset of the symptoms was several weeks ago. Precipitating event: unk. Current symptoms include: ability to bear weight, but with some pain, jonathan the ball of the foot, swelling and worsening symptoms after a period of activity, burning/numbness/tingling of toes. Aggravating factors: walking and certain shoegear. Symptoms have gradually worsened. Patient has had no prior foot problems. Evaluation to date: none. Treatment to date: avoidance of offending activity. Patients rates pain 7/10 on pain scale.      Foot Pain        Last Podiatry Enc: Visit date not found  Last Enc w/ Me: Visit date not found    Outside reports reviewed: historical medical records.  Family hx: as below  Past Medical History:   Diagnosis Date    Cataract     Cervical high risk HPV (human papillomavirus) test positive  2020    NOT 16 OR 18    CHF (congestive heart failure)     CVA (cerebral infarction)          Depression     Diabetes mellitus, type 2     GERD (gastroesophageal reflux disease)     Hyperlipidemia     Hypertension     Lactic acidosis 10/06/2023    Moderate nonproliferative diabetic retinopathy     Nausea and vomiting 10/06/2023    Obesity     Stroke     Tachycardia 10/04/2023     Past Surgical History:   Procedure Laterality Date     SECTION      CHOLECYSTECTOMY      DILATION AND CURETTAGE OF UTERUS      EYE SURGERY Bilateral     laser    GALLBLADDER SURGERY  2017    stone removed    IRRIGATION AND DEBRIDEMENT Right 2024    Procedure: IRRIGATION AND DEBRIDEMENT RIGHT GROIN;  Surgeon: Chalo Padgett MD;  Location: [facility];  Service: General;  Laterality: Right;     Family History   Problem Relation Name Age of Onset    Stroke Mother      Thyroid disease Mother      Diabetes Mother      Cataracts Father      Hypertension Father      Arthritis Father      Diabetes Father      Hypertension Sister      Stroke Sister      Thyroid disease Sister      Heart disease Sister      No Known Problems Brother      Thyroid disease Daughter 2     Asthma Daughter 2     No Known Problems Maternal Aunt      No Known Problems Maternal Uncle      No Known Problems Paternal Aunt      No Known Problems Paternal Uncle      No Known Problems Maternal Grandmother      No Known Problems Maternal Grandfather      No Known Problems Paternal Grandmother      No Known Problems Paternal Grandfather      Amblyopia Neg Hx      Blindness Neg Hx      Cancer Neg Hx      Glaucoma Neg Hx      Macular degeneration Neg Hx      Retinal detachment Neg Hx      Strabismus Neg Hx       Current Outpatient Medications   Medication Sig Dispense Refill    aspirin (ECOTRIN) 81 MG EC tablet Take 81 mg by mouth once daily.      atorvastatin (LIPITOR) 80 MG tablet Take 1 tablet (80 mg total) by mouth every evening. 60 tablet 1    blood sugar  "diagnostic Strp Use to test blood glucose two (2) times daily as directed with insurance preferred meter and supplies 200 each 3    carvediloL (COREG) 12.5 MG tablet Take 1 tablet (12.5 mg total) by mouth 2 (two) times daily with meals. 180 tablet 3    dulaglutide (TRULICITY) 0.75 mg/0.5 mL pen injector Inject 0.75 mg into the skin every 7 days. OK to discontinue this med during SNF stay - resume upon discharge 4 pen 11    ergocalciferol (ERGOCALCIFEROL) 50,000 unit Cap Take 50,000 Units by mouth every Saturday.      ezetimibe (ZETIA) 10 mg tablet Take 1 tablet (10 mg total) by mouth once daily. (For cholesterol) 90 tablet 1    furosemide (LASIX) 40 MG tablet Take 1 tablet (40 mg total) by mouth once daily. 90 tablet 3    hydrALAZINE (APRESOLINE) 50 MG tablet Take 50 mg by mouth 3 (three) times daily.      ibuprofen (ADVIL,MOTRIN) 200 MG tablet Take 200 mg by mouth every 6 (six) hours as needed for Pain.      insulin (LANTUS SOLOSTAR U-100 INSULIN) glargine 100 units/mL SubQ pen Inject 80 Units into the skin 2 (two) times a day. INJECT 80 UNITS SUBCUTANEOUSLY TWICE DAILY (BULK) Strength: 100 unit/mL (3 mL) 30 mL 5    lancets (TRUEPLUS LANCETS) 28 gauge Misc Use to test blood glucose two (2) times daily as directed with insurance preferred meter and supplies 200 each 3    LANTUS SOLOSTAR U-100 INSULIN glargine 100 units/mL SubQ pen Inject 80 Units into the skin 2 (two) times a day.      metFORMIN (GLUCOPHAGE-XR) 500 MG ER 24hr tablet Take 1,000 mg by mouth 2 (two) times daily with meals.      mupirocin (BACTROBAN) 2 % ointment Apply locally every other day 22 g 3    pantoprazole (PROTONIX) 40 MG tablet Take 40 mg by mouth once daily.      pen needle, diabetic (BD ULTRA-FINE ROSA PEN NEEDLE) 32 gauge x 5/32" Ndle Use with insulin once daily 100 each 0    pen needle, diabetic 32 gauge x 5/32" Ndle Use with injectable DM supplies twice daily as directed 200 each 0    potassium chloride (KLOR-CON) 8 MEQ TbSR SMARTSI " Tablet(s) By Mouth Morning-Evening      sacubitriL-valsartan (ENTRESTO) 49-51 mg per tablet Take 1 tablet by mouth 2 (two) times daily. 90 tablet 3    TRUE METRIX GO GLUCOSE METER Misc       insulin aspart U-100 (NOVOLOG FLEXPEN U-100 INSULIN) 100 unit/mL (3 mL) InPn pen Inject 30 Units into the skin 2 (two) times a day. 30 mL 12    NIFEdipine (PROCARDIA-XL) 90 MG (OSM) 24 hr tablet Take 90 mg by mouth.       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   Tobacco Use    Smoking status: Every Day     Current packs/day: 0.25     Types: Cigarettes    Smokeless tobacco: Never   Substance and Sexual Activity    Alcohol use: Yes     Comment: less than one monthly    Drug use: No    Sexual activity: Yes     Partners: Male     Birth control/protection: None   Social History Narrative    Daughter - Kavon     Social Determinants of Health     Financial Resource Strain: Low Risk  (6/6/2024)    Overall Financial Resource Strain (CARDIA)     Difficulty of Paying Living Expenses: Not hard at all   Food Insecurity: No Food Insecurity (6/6/2024)    Hunger Vital Sign     Worried About Running Out of Food in the Last Year: Never true     Ran Out of Food in the Last Year: Never true   Transportation Needs: No Transportation Needs (6/6/2024)    PRAPARE - Transportation     Lack of Transportation (Medical): No     Lack of Transportation (Non-Medical): No   Physical Activity: Insufficiently Active (6/6/2024)    Exercise Vital Sign     Days of Exercise per Week: 3 days     Minutes of  "Exercise per Session: 20 min   Stress: Stress Concern Present (6/6/2024)    Citizen of [place] [employer] - Occupational Stress Questionnaire     Feeling of Stress : To some extent   Housing Stability: Low Risk  (1/30/2024)    Housing Stability Vital Sign     Unable to Pay for Housing in the Last Year: No     Number of Places Lived in the Last Year: 1     Unstable Housing in the Last Year: No       ROS    REVIEW OF SYSTEMS: Negative as documented below as well as positive findings in bold.       Constitutional  Respiratory  Gastrointestinal  Skin   - Fever - Cough - Heartburn - Rash   - Chills - Spit blood - Nausea - Itching   - Weight Loss - Shortness of breath - Vomiting - Nail pain   - Malaise/Fatigue - Wheezing - Abdominal Pain  Wound/Ulcer   - Weight Gain   - Blood in Stool  Poor wound healing       - Diarrhea          Cardiovascular  Genitourinary  Neurological  HEENT   - Chest Pain - Dysuria - Burning Sensation of feet - Headache   - Palpitations - Hematuria - Tingling / Paresthesia - Congestion   - Pain at night in legs - Flank Pain - Dizziness - Sore Throat   - Cramping   - Tremor - Blurred Vision   - Leg Swelling   - Sensory Change - Double Vision   - Dizzy when standing   - Speech Change - Eye Redness       - Focal Weakness - Dry Eyes       - Loss of Consciousness          Endocrine  Musculoskeletal  Psychiatric   - Cold intolerance - Muscle Pain - Depression   - Heat intolerance - Neck Pain - Insomnia   - Anemia - Joint Pain - Memory Loss   -  Easy bruising, bleeding - Heel pain - Anxiety      Toe Pain        Leg/Ankle/Foot Pain         Objective:     Resp 18   Ht 5' 5" (1.651 m)   Wt 109.8 kg (242 lb 2.8 oz)   BMI 40.30 kg/m²   Vitals:    07/18/24 1046   Resp: 18   Weight: 109.8 kg (242 lb 2.8 oz)   Height: 5' 5" (1.651 m)   PainSc:   8   PainLoc: Foot       Physical Exam    General Appearance:   Patient appears well developed, well nourished  Patient appears stated age    Psychiatric: "   Patient is oriented to time, place, and person.  Patient has appropriate mood and affect    Neck:  Trachea Midline  No visible masses    Respiratory/Ears:  No distress or labored breathing.  Able to differentiate between normal talking voice and whisper.  Able to follow commands    Eyes:  Visual Acuity intact  Lids and conjunctivae normal. No discoloration noted.    Foot Exam  Physical Exam  Ortho Exam  Ortho/SPM Exam  Foot/Ankle Musculoskeletal Exam    L LE exam con't:  V:  DP 0/4, PT 1/4   CRT< 3s to all digits tested   Tibial and popliteal lymph nodes are w/o abnormality   Edema: present, varicosities: present    N:  Patient displays normal ankle reflexes   sensory deficit present    Ortho: +Motor EHL/FHL/TA/GA   equinus deformity present  There is moderate pain with palpation of 3rd IS  There is is moderate pain at 3rd IS with lateral compression of metatarsal heads  Compartments soft/compressible. No pain on passive stretch of big toe. No calf  Pain.    Derm:  skin intact, skin warm and dry, skin without ulcers or lesions, skin without induration, nails normal, no erythema and no ecchymosis      Imaging / Labs:      No results found.      Note: This was dictated using a computer transcription program. Although proofread, it may contain computer transcription errors and phonetic errors. Other human proofreading errors may also exist. Corrections may be performed at a later time. Please contact us for any clarification if needed.    Rick Martinez DPM  [facility]

## 2024-07-19 ENCOUNTER — TELEPHONE (OUTPATIENT)
Dept: PODIATRY | Facility: CLINIC | Age: 49
End: 2024-07-19
Payer: MEDICARE

## 2024-07-19 NOTE — TELEPHONE ENCOUNTER
Called patient, no answer. Left message stating no acute abnormalities found on patient's xray. Provided call back number.

## 2024-07-23 ENCOUNTER — CLINICAL SUPPORT (OUTPATIENT)
Dept: OPHTHALMOLOGY | Facility: CLINIC | Age: 49
End: 2024-07-23
Payer: MEDICARE

## 2024-07-23 ENCOUNTER — OFFICE VISIT (OUTPATIENT)
Dept: OPHTHALMOLOGY | Facility: CLINIC | Age: 49
End: 2024-07-23
Payer: MEDICARE

## 2024-07-23 DIAGNOSIS — E11.3513 TYPE 2 DIABETES MELLITUS WITH BOTH EYES AFFECTED BY PROLIFERATIVE RETINOPATHY AND MACULAR EDEMA, WITH LONG-TERM CURRENT USE OF INSULIN: Primary | ICD-10-CM

## 2024-07-23 DIAGNOSIS — E11.3513 TYPE 2 DIABETES MELLITUS WITH BOTH EYES AFFECTED BY PROLIFERATIVE RETINOPATHY AND MACULAR EDEMA, WITH LONG-TERM CURRENT USE OF INSULIN: ICD-10-CM

## 2024-07-23 DIAGNOSIS — Z79.4 TYPE 2 DIABETES MELLITUS WITH BOTH EYES AFFECTED BY PROLIFERATIVE RETINOPATHY AND MACULAR EDEMA, WITH LONG-TERM CURRENT USE OF INSULIN: ICD-10-CM

## 2024-07-23 DIAGNOSIS — H35.033 HYPERTENSIVE RETINOPATHY, BILATERAL: ICD-10-CM

## 2024-07-23 DIAGNOSIS — Z79.4 TYPE 2 DIABETES MELLITUS WITH BOTH EYES AFFECTED BY PROLIFERATIVE RETINOPATHY AND MACULAR EDEMA, WITH LONG-TERM CURRENT USE OF INSULIN: Primary | ICD-10-CM

## 2024-07-23 DIAGNOSIS — H25.13 NS (NUCLEAR SCLEROSIS), BILATERAL: ICD-10-CM

## 2024-07-23 PROCEDURE — 99999 PR PBB SHADOW E&M-EST. PATIENT-LVL II: CPT | Mod: PBBFAC,HCNC,, | Performed by: OPHTHALMOLOGY

## 2024-07-23 RX ORDER — NIFEDIPINE 60 MG/1
60 TABLET, EXTENDED RELEASE ORAL EVERY MORNING
COMMUNITY
Start: 2024-07-03

## 2024-07-23 RX ADMIN — Medication 1.25 MG: at 10:07

## 2024-07-23 RX ADMIN — FLUORESCEIN 500 MG: 500 INJECTION INTRAVENOUS at 09:07

## 2024-07-23 NOTE — PROGRESS NOTES
HPI    DLS:04/23/2024 Layne    3 month OCT/FA OS.      Last edited by Elli Silverman MA on 7/23/2024  8:49 AM.           OCT OD no central ME  OS increase paracentral DME    WFFA 7/24 - OD sig NP with NV  OS - Regressed NV after BP  with late macular leakage - some increase in NV      A/P    1. PDR OU  T2 uncontrolled on insulin  S/p PRP OU    Will need fill in PRP OS      2. DME OU  4/24 - increase paracentral DME - pt starting to notice some subtle changes  7/24 -  increased DME      Avastin OS today    3. HTN Ret OU  BS/BP/chol control    4. NS OU      6 weeks OCT no dilate    Risks, benefits, and alternatives to treatment discussed in detail with the patient.  The patient voiced understanding and wished to proceed with the procedure    Injection Procedure Note:  Diagnosis: PDR with VH OS    Patient Identified and Time Out complete  Pt marked  Topical Proparacaine and Betadine.  Inject Avastin OS at 6:00 @ 3.5-4mm posterior to limbus  Post Operative Dx: Same  Complications: None  Follow up as above.

## 2024-09-03 ENCOUNTER — CLINICAL SUPPORT (OUTPATIENT)
Dept: OPHTHALMOLOGY | Facility: CLINIC | Age: 49
End: 2024-09-03
Payer: MEDICARE

## 2024-09-03 ENCOUNTER — PROCEDURE VISIT (OUTPATIENT)
Dept: OPHTHALMOLOGY | Facility: CLINIC | Age: 49
End: 2024-09-03
Payer: MEDICARE

## 2024-09-03 DIAGNOSIS — E11.3513 TYPE 2 DIABETES MELLITUS WITH BOTH EYES AFFECTED BY PROLIFERATIVE RETINOPATHY AND MACULAR EDEMA, WITH LONG-TERM CURRENT USE OF INSULIN: Primary | ICD-10-CM

## 2024-09-03 DIAGNOSIS — Z79.4 TYPE 2 DIABETES MELLITUS WITH BOTH EYES AFFECTED BY PROLIFERATIVE RETINOPATHY AND MACULAR EDEMA, WITH LONG-TERM CURRENT USE OF INSULIN: Primary | ICD-10-CM

## 2024-09-03 DIAGNOSIS — E11.3513 TYPE 2 DIABETES MELLITUS WITH BOTH EYES AFFECTED BY PROLIFERATIVE RETINOPATHY AND MACULAR EDEMA, WITH LONG-TERM CURRENT USE OF INSULIN: ICD-10-CM

## 2024-09-03 DIAGNOSIS — Z79.4 TYPE 2 DIABETES MELLITUS WITH BOTH EYES AFFECTED BY PROLIFERATIVE RETINOPATHY AND MACULAR EDEMA, WITH LONG-TERM CURRENT USE OF INSULIN: ICD-10-CM

## 2024-09-03 RX ADMIN — Medication 1.25 MG: at 11:09

## 2024-09-03 NOTE — PROGRESS NOTES
HPI     DM     AVASTIN  INJECTION  OS      Additional comments: AVASTIN INJECTION   OS     DM   LAST BS   171  LAST A1C   UNSURE    Blurry and fuzzy  va   DLS  07/23/24  No flashes no floaters          Last edited by Sharon Mcdermott on 9/3/2024 11:10 AM.      OCT OD no central ME  Prior OS decrease paracentral DME    Prior WFFA 7/24 - OD sig NP with NV  OS - Regressed NV after BP  with late macular leakage - some increase in NV      A/P    1. PDR OU  T2 uncontrolled on insulin  S/p PRP OU    Will need fill in PRP OS      2. DME OU  S/P- Avastin OS x 1  4/24 - increase paracentral DME - pt starting to notice some subtle changes  7/24 -  increased DME      Avastin OS today    3. HTN Ret OU  BS/BP/chol control    4. NS OU      6 weeks OCT no dilate    Risks, benefits, and alternatives to treatment discussed in detail with the patient.  The patient voiced understanding and wished to proceed with the procedure    Injection Procedure Note:  Diagnosis: PDR with VH OS    Patient Identified and Time Out complete  Pt marked  Topical Proparacaine and Betadine.  Inject Avastin OS at 6:00 @ 3.5-4mm posterior to limbus  Post Operative Dx: Same  Complications: None  Follow up as above.

## 2024-09-04 PROBLEM — R53.1 WEAKNESS: Status: ACTIVE | Noted: 2024-09-04

## 2024-09-04 PROBLEM — R53.1 WEAKNESS: Status: RESOLVED | Noted: 2024-09-04 | Resolved: 2024-09-04

## 2024-09-04 PROBLEM — R68.89 IMPAIRED TOLERANCE OF ACTIVITY: Status: ACTIVE | Noted: 2024-09-04

## 2024-09-04 PROBLEM — R68.89 IMPAIRED TOLERANCE OF ACTIVITY: Status: RESOLVED | Noted: 2024-09-04 | Resolved: 2024-09-04

## 2024-09-19 ENCOUNTER — TELEPHONE (OUTPATIENT)
Dept: FAMILY MEDICINE | Facility: CLINIC | Age: 49
End: 2024-09-19
Payer: MEDICARE

## 2024-09-19 NOTE — TELEPHONE ENCOUNTER
----- Message from Shayla Neri sent at 9/18/2024  3:32 PM CDT -----  Regarding: call  Type:  Needs Medical Advice    Who Called: pt  Would the patient rather a call back or a response via [facility]? Call   Best Call Back Number: [phone]  Additional Information: pt completed her wheelchair evaluation at the physical therapy and they have the information at [address] [place] [address] the number [phone]

## 2024-09-27 ENCOUNTER — PATIENT MESSAGE (OUTPATIENT)
Dept: CARDIOLOGY | Facility: CLINIC | Age: 49
End: 2024-09-27
Payer: MEDICARE

## 2024-09-27 DIAGNOSIS — I50.42 CHRONIC COMBINED SYSTOLIC AND DIASTOLIC CONGESTIVE HEART FAILURE: Primary | ICD-10-CM

## 2024-09-27 DIAGNOSIS — I50.42 CHRONIC COMBINED SYSTOLIC AND DIASTOLIC CONGESTIVE HEART FAILURE: ICD-10-CM

## 2024-09-27 RX ORDER — POTASSIUM CHLORIDE 600 MG/1
8 TABLET, FILM COATED, EXTENDED RELEASE ORAL 2 TIMES DAILY
Qty: 60 TABLET | Refills: 0 | Status: SHIPPED | OUTPATIENT
Start: 2024-09-27 | End: 2024-09-27 | Stop reason: SDUPTHER

## 2024-09-27 RX ORDER — POTASSIUM CHLORIDE 600 MG/1
8 TABLET, FILM COATED, EXTENDED RELEASE ORAL 2 TIMES DAILY
Qty: 60 TABLET | Refills: 0 | Status: SHIPPED | OUTPATIENT
Start: 2024-09-27

## 2024-09-30 ENCOUNTER — TELEPHONE (OUTPATIENT)
Dept: FAMILY MEDICINE | Facility: CLINIC | Age: 49
End: 2024-09-30
Payer: MEDICARE

## 2024-09-30 NOTE — TELEPHONE ENCOUNTER
Called and spoke with pt in regards of her message. Pt is calling to follow up on her scooter. Informed patient that I don't know the next steps, but I will find out and give her a call back in regards of this matter. Pt verbalized understanding of message.

## 2024-09-30 NOTE — TELEPHONE ENCOUNTER
----- Message from Beatrice sent at 9/27/2024 11:08 AM CDT -----  Type:  Patient Returning Call    Who Called:Pt   Who Left Message for Patient:Maria De Jesus   Does the patient know what this is regarding?:yes   Would the patient rather a call back or a response via [email or web address]? Call back  Best Call Back Number: [phone]  Additional Information:

## 2024-10-02 DIAGNOSIS — E11.65 TYPE 2 DIABETES MELLITUS WITH HYPERGLYCEMIA, WITH LONG-TERM CURRENT USE OF INSULIN: ICD-10-CM

## 2024-10-02 DIAGNOSIS — Z79.4 TYPE 2 DIABETES MELLITUS WITH HYPERGLYCEMIA, WITH LONG-TERM CURRENT USE OF INSULIN: ICD-10-CM

## 2024-10-02 DIAGNOSIS — E11.622 TYPE 2 DIABETES MELLITUS WITH OTHER SKIN ULCER (CODE): ICD-10-CM

## 2024-10-02 NOTE — TELEPHONE ENCOUNTER
No care due was identified.  [facility] Embedded Care Due Messages. Reference number: [id number].   10/02/2024 10:26:54 AM CDT

## 2024-10-03 RX ORDER — INSULIN GLARGINE 100 [IU]/ML
80 INJECTION, SOLUTION SUBCUTANEOUS 2 TIMES DAILY
Qty: 30 ML | Refills: 5 | OUTPATIENT
Start: 2024-10-03

## 2024-10-04 ENCOUNTER — TELEPHONE (OUTPATIENT)
Dept: FAMILY MEDICINE | Facility: CLINIC | Age: 49
End: 2024-10-04
Payer: MEDICARE

## 2024-10-04 NOTE — TELEPHONE ENCOUNTER
----- Message from Shayla sent at 10/4/2024  4:24 PM CDT -----  Regarding: pc  Type:  Pharmacy Calling to Clarify an RX      Pharmacy Name:[facility]  Prescription Name:potassium chloride (KLOR-CON) 8 MEQ TbSR  What do they need to clarify?:refill request   Best Call Back Number:[phone] Fax: [phone]  Additional Information:

## 2024-10-07 DIAGNOSIS — I50.42 CHRONIC COMBINED SYSTOLIC AND DIASTOLIC CONGESTIVE HEART FAILURE: ICD-10-CM

## 2024-10-07 RX ORDER — POTASSIUM CHLORIDE 600 MG/1
8 TABLET, FILM COATED, EXTENDED RELEASE ORAL 2 TIMES DAILY
Qty: 60 TABLET | Refills: 3 | Status: SHIPPED | OUTPATIENT
Start: 2024-10-07

## 2024-10-07 NOTE — TELEPHONE ENCOUNTER
Care Due:                  Date            Visit Type   Department     Provider  --------------------------------------------------------------------------------                                EP -                              PRIMARY      DESC FAMILY  Last Visit: 05-      [facility]  Lakisha BOWMAN Vanessa  Next Visit: None Scheduled  None         None Found                                                            Last  Test          Frequency    Reason                     Performed    Due Date  --------------------------------------------------------------------------------    Lipid Panel.  12 months..  ezetimibe................  09- 09-    [facility] Embedded Care Due Messages. Reference number: [id number].   10/07/2024 7:55:29 AM CDT   stretcher

## 2024-10-07 NOTE — TELEPHONE ENCOUNTER
----- Message from Shayla sent at 10/4/2024  4:24 PM CDT -----  Regarding: pc  Type:  Pharmacy Calling to Clarify an RX      Pharmacy Name:[facility]  Prescription Name:potassium chloride (KLOR-CON) 8 MEQ TbSR  What do they need to clarify?:refill request   Best Call Back Number:[phone] Fax: [phone]  Additional Information:

## 2024-10-09 ENCOUNTER — TELEPHONE (OUTPATIENT)
Dept: FAMILY MEDICINE | Facility: CLINIC | Age: 49
End: 2024-10-09
Payer: MEDICARE

## 2024-10-09 NOTE — TELEPHONE ENCOUNTER
Got a call from Daric in regards of the directions on patient's  potassium chloride medication.    Are these the correct directions on how patient should be taking medication? :  Take 1 tablet (8 mEq total) by mouth 2 (two) times daily. - Oral     Please advise message.

## 2024-10-09 NOTE — TELEPHONE ENCOUNTER
----- Message from Shayla sent at 10/9/2024 10:29 AM CDT -----  Regarding: pc  Type:  Pharmacy Calling to Clarify an RX      [place] [facility] [place] [address]  Prescription Name:potassium chloride (KLOR-CON) 8 MEQ TbSR  What do they need to clarify?:need clarification on directions   Best Call Back Number: Fax: [phone]  / [phone]   Additional Information:

## 2024-10-09 NOTE — TELEPHONE ENCOUNTER
Spoke with [facility] in regards of them needing the correct dosage on patient's medication. Spoke with Mandy in regards of this message on pt's medication and gave her the correct directions on how pt is to take medication, along with the dispense amount and refill amount as well. Mandy verbalized understanding of message.

## 2024-10-15 ENCOUNTER — PROCEDURE VISIT (OUTPATIENT)
Dept: OPHTHALMOLOGY | Facility: CLINIC | Age: 49
End: 2024-10-15
Payer: MEDICARE

## 2024-10-15 ENCOUNTER — CLINICAL SUPPORT (OUTPATIENT)
Dept: OPHTHALMOLOGY | Facility: CLINIC | Age: 49
End: 2024-10-15
Payer: MEDICARE

## 2024-10-15 VITALS — DIASTOLIC BLOOD PRESSURE: 88 MMHG | SYSTOLIC BLOOD PRESSURE: 145 MMHG

## 2024-10-15 DIAGNOSIS — Z79.4 TYPE 2 DIABETES MELLITUS WITH BOTH EYES AFFECTED BY PROLIFERATIVE RETINOPATHY AND MACULAR EDEMA, WITH LONG-TERM CURRENT USE OF INSULIN: Primary | ICD-10-CM

## 2024-10-15 DIAGNOSIS — E11.3513 TYPE 2 DIABETES MELLITUS WITH BOTH EYES AFFECTED BY PROLIFERATIVE RETINOPATHY AND MACULAR EDEMA, WITH LONG-TERM CURRENT USE OF INSULIN: Primary | ICD-10-CM

## 2024-10-15 PROCEDURE — 92134 CPTRZ OPH DX IMG PST SGM RTA: CPT | Mod: HCNC,S$GLB,, | Performed by: OPHTHALMOLOGY

## 2024-10-15 PROCEDURE — 67028 INJECTION EYE DRUG: CPT | Mod: HCNC,LT,S$GLB, | Performed by: OPHTHALMOLOGY

## 2024-10-15 PROCEDURE — 92012 INTRM OPH EXAM EST PATIENT: CPT | Mod: 25,HCNC,S$GLB, | Performed by: OPHTHALMOLOGY

## 2024-10-15 RX ADMIN — Medication 1.25 MG: at 11:10

## 2024-10-15 NOTE — PROGRESS NOTES
Oct macula done ou, per Dr. Tillman./MA        There are no diagnoses linked to this encounter.     49 y.o. y/o here for screening for Diabetic Renopathy with non-dilated fundus photos per Lakisha Mendez DO

## 2024-10-15 NOTE — PROGRESS NOTES
HPI    6 week avastin     Pt states she has been having blurry vision. Pt states her bs is   controlled by medication.   Pt states her medications make her feel dizzy, and have blurred vision she   states her bp medication does not make her feel good and she would like to   stop taking it.   Lbs was 179      Hemoglobin A1C       Date                     Value               Ref Range             Status                01/06/2024               9.5 (H)             4.0 - 5.6 %           Final                10/04/2023               10.6 (H)            4.0 - 5.6 %         Final                   06/19/2023               11.4 (H)            4.0 - 5.6 %           Final                Last edited by Lashawn Land on 10/15/2024 10:29 AM.         OCT OD no central ME  Prior OS decrease paracentral DME    Prior WFFA 7/24 - OD sig NP with NV  OS - Regressed NV after BP  with late macular leakage - some increase in NV      A/P    1. PDR OU  T2 uncontrolled on insulin  S/p PRP OU    Will need fill in PRP OS-Soon      2. DME OU  S/P- Avastin OS x 2  4/24 - increase paracentral DME - pt starting to notice some subtle changes  7/24 -  increased DME      Avastin OS today-Extend    3. HTN Ret OU  BS/BP/chol control    4. NS OU      8 weeks OCT no dilate    Risks, benefits, and alternatives to treatment discussed in detail with the patient.  The patient voiced understanding and wished to proceed with the procedure    Injection Procedure Note:  Diagnosis: PDR with VH OS    Patient Identified and Time Out complete  Pt marked  Topical Proparacaine and Betadine.  Inject Avastin OS at 6:00 @ 3.5-4mm posterior to limbus  Post Operative Dx: Same  Complications: None  Follow up as above.

## 2024-10-16 RX ORDER — ERGOCALCIFEROL 1.25 MG/1
50000 CAPSULE ORAL
Qty: 12 CAPSULE | Refills: 3 | Status: ON HOLD | OUTPATIENT
Start: 2024-10-19

## 2024-10-17 ENCOUNTER — TELEPHONE (OUTPATIENT)
Dept: FAMILY MEDICINE | Facility: CLINIC | Age: 49
End: 2024-10-17
Payer: MEDICARE

## 2024-10-17 NOTE — TELEPHONE ENCOUNTER
Called Tana and spoke with her in regards of what patient is needing to get her power wheel chair. Tana informed that she has been sending e-mails and etc to Dr. Mendez in regards of this matter for patient. Informed Tana that I don't  know what is going with pt and the power wheel chair that's why I was calling her to get information on what was going all. Informed Tana that I will call [facility] to see what they needs from us. Tana verbalized understanding of message.

## 2024-10-17 NOTE — TELEPHONE ENCOUNTER
Called [facility]  and spoke with Sulma  to see if she can fax over a form for their power wheelchairs. Sulma informed me that they just need a script for a power wheelchair along with pt's recent clinic notes.     [facility] [phone]

## 2024-10-17 NOTE — TELEPHONE ENCOUNTER
----- Message from Michael sent at 10/17/2024  1:10 PM CDT -----  Contact: pt  Type: Requesting to speak with nurse        Who Called: PT  Regarding:checking status of  WHEELCHAIR ORDER from [facility].  Would the patient rather a call back or a response via [facility]? Call back  Best Call Back Number: [phone]   Additional Information:

## 2024-10-17 NOTE — TELEPHONE ENCOUNTER
Called and spoke with pt in regards of her message. Pt was calling to follow up on the orders for [employer] for her power wheelchair. Informed pt that I will have to follow up on this matter, because I am lost on what is going on now, and that I [facility] not [facility]. Patient also provided me with Tana number from [facility], so that I can her in regards of this matter    Patient gave me [phone] to reach Tana.

## 2024-10-19 ENCOUNTER — HOSPITAL ENCOUNTER (INPATIENT)
Facility: HOSPITAL | Age: 49
LOS: 3 days | Discharge: HOME | DRG: 291 | End: 2024-10-22
Attending: EMERGENCY MEDICINE | Admitting: FAMILY MEDICINE
Payer: MEDICARE

## 2024-10-19 DIAGNOSIS — I50.9 ACUTE HEART FAILURE, UNSPECIFIED HEART FAILURE TYPE: Primary | ICD-10-CM

## 2024-10-19 DIAGNOSIS — I50.41 ACUTE COMBINED SYSTOLIC AND DIASTOLIC HF (HEART FAILURE): ICD-10-CM

## 2024-10-19 DIAGNOSIS — G47.33 OSA (OBSTRUCTIVE SLEEP APNEA): ICD-10-CM

## 2024-10-19 DIAGNOSIS — R06.02 SHORTNESS OF BREATH: ICD-10-CM

## 2024-10-19 DIAGNOSIS — J96.01 ACUTE RESPIRATORY FAILURE WITH HYPOXIA: ICD-10-CM

## 2024-10-19 DIAGNOSIS — J96.01 ACUTE HYPOXEMIC RESPIRATORY FAILURE: ICD-10-CM

## 2024-10-19 PROBLEM — Z79.4 TYPE 2 DIABETES MELLITUS, WITH LONG-TERM CURRENT USE OF INSULIN: Status: ACTIVE | Noted: 2024-10-19

## 2024-10-19 PROBLEM — F17.200 TOBACCO DEPENDENCY: Status: ACTIVE | Noted: 2024-10-19

## 2024-10-19 PROBLEM — I10 HYPERTENSION: Status: ACTIVE | Noted: 2024-10-19

## 2024-10-19 PROBLEM — E11.9 TYPE 2 DIABETES MELLITUS, WITH LONG-TERM CURRENT USE OF INSULIN: Status: ACTIVE | Noted: 2024-10-19

## 2024-10-19 PROBLEM — I50.43 ACUTE ON CHRONIC COMBINED SYSTOLIC AND DIASTOLIC HEART FAILURE: Status: ACTIVE | Noted: 2024-10-19

## 2024-10-19 LAB
ALBUMIN SERPL BCP-MCNC: 3.2 G/DL (ref 3.5–5.2)
ALLENS TEST: ABNORMAL
ALP SERPL-CCNC: 73 U/L (ref 40–150)
ALT SERPL W/O P-5'-P-CCNC: 54 U/L (ref 10–44)
ANION GAP SERPL CALC-SCNC: 12 MMOL/L (ref 8–16)
AST SERPL-CCNC: 33 U/L (ref 10–40)
BASOPHILS # BLD AUTO: 0.02 K/UL (ref 0–0.2)
BASOPHILS NFR BLD: 0.2 % (ref 0–1.9)
BILIRUB SERPL-MCNC: 0.9 MG/DL (ref 0.1–1)
BNP SERPL-MCNC: 631 PG/ML (ref 0–99)
BUN SERPL-MCNC: 10 MG/DL (ref 6–20)
CALCIUM SERPL-MCNC: 9.3 MG/DL (ref 8.7–10.5)
CHLORIDE SERPL-SCNC: 103 MMOL/L (ref 95–110)
CO2 SERPL-SCNC: 19 MMOL/L (ref 23–29)
CREAT SERPL-MCNC: 1 MG/DL (ref 0.5–1.4)
DIFFERENTIAL METHOD BLD: ABNORMAL
EOSINOPHIL # BLD AUTO: 0 K/UL (ref 0–0.5)
EOSINOPHIL NFR BLD: 0.3 % (ref 0–8)
ERYTHROCYTE [DISTWIDTH] IN BLOOD BY AUTOMATED COUNT: 13.2 % (ref 11.5–14.5)
EST. GFR  (NO RACE VARIABLE): >60 ML/MIN/1.73 M^2
FIO2: 30 %
GLUCOSE SERPL-MCNC: 353 MG/DL (ref 70–110)
HCT VFR BLD AUTO: 39.5 % (ref 37–48.5)
HGB BLD-MCNC: 13.2 G/DL (ref 12–16)
IMM GRANULOCYTES # BLD AUTO: 0.04 K/UL (ref 0–0.04)
IMM GRANULOCYTES NFR BLD AUTO: 0.4 % (ref 0–0.5)
LYMPHOCYTES # BLD AUTO: 0.5 K/UL (ref 1–4.8)
LYMPHOCYTES NFR BLD: 4.5 % (ref 18–48)
MCH RBC QN AUTO: 29.5 PG (ref 27–31)
MCHC RBC AUTO-ENTMCNC: 33.4 G/DL (ref 32–36)
MCV RBC AUTO: 88 FL (ref 82–98)
MONOCYTES # BLD AUTO: 0.5 K/UL (ref 0.3–1)
MONOCYTES NFR BLD: 4.3 % (ref 4–15)
NEUTROPHILS # BLD AUTO: 9.5 K/UL (ref 1.8–7.7)
NEUTROPHILS NFR BLD: 90.3 % (ref 38–73)
NRBC BLD-RTO: 0 /100 WBC
PCO2 BLDA: 39.4 MMHG (ref 35–45)
PEEP: 5
PH SMN: 7.41 [PH] (ref 7.35–7.45)
PLATELET # BLD AUTO: 195 K/UL (ref 150–450)
PMV BLD AUTO: 11.4 FL (ref 9.2–12.9)
PO2 BLDA: 56.1 MMHG (ref 40–60)
POC BASE DEFICIT: 0.2 MMOL/L (ref -2–2)
POC HCO3: 24.8 MMOL/L (ref 24–28)
POC PERFORMED BY: ABNORMAL
POC SATURATED O2: 87.1 % (ref 95–100)
POCT GLUCOSE: 289 MG/DL (ref 70–110)
POCT GLUCOSE: 328 MG/DL (ref 70–110)
POTASSIUM SERPL-SCNC: 4.1 MMOL/L (ref 3.5–5.1)
PROT SERPL-MCNC: 7.2 G/DL (ref 6–8.4)
RBC # BLD AUTO: 4.47 M/UL (ref 4–5.4)
SODIUM SERPL-SCNC: 134 MMOL/L (ref 136–145)
SPECIMEN SOURCE: ABNORMAL
TROPONIN I SERPL DL<=0.01 NG/ML-MCNC: 0.02 NG/ML (ref 0–0.03)
WBC # BLD AUTO: 10.56 K/UL (ref 3.9–12.7)

## 2024-10-19 PROCEDURE — 93010 ELECTROCARDIOGRAM REPORT: CPT | Mod: HCNC,,, | Performed by: STUDENT IN AN ORGANIZED HEALTH CARE EDUCATION/TRAINING PROGRAM

## 2024-10-19 PROCEDURE — 25000003 PHARM REV CODE 250: Mod: HCNC | Performed by: EMERGENCY MEDICINE

## 2024-10-19 PROCEDURE — 94761 N-INVAS EAR/PLS OXIMETRY MLT: CPT | Mod: HCNC

## 2024-10-19 PROCEDURE — 83880 ASSAY OF NATRIURETIC PEPTIDE: CPT | Mod: HCNC | Performed by: EMERGENCY MEDICINE

## 2024-10-19 PROCEDURE — 99900035 HC TECH TIME PER 15 MIN (STAT): Mod: HCNC

## 2024-10-19 PROCEDURE — 5A09357 ASSISTANCE WITH RESPIRATORY VENTILATION, LESS THAN 24 CONSECUTIVE HOURS, CONTINUOUS POSITIVE AIRWAY PRESSURE: ICD-10-PCS | Performed by: EMERGENCY MEDICINE

## 2024-10-19 PROCEDURE — 27100171 HC OXYGEN HIGH FLOW UP TO 24 HOURS: Mod: HCNC

## 2024-10-19 PROCEDURE — 84484 ASSAY OF TROPONIN QUANT: CPT | Mod: HCNC | Performed by: EMERGENCY MEDICINE

## 2024-10-19 PROCEDURE — 94660 CPAP INITIATION&MGMT: CPT | Mod: HCNC

## 2024-10-19 PROCEDURE — 99291 CRITICAL CARE FIRST HOUR: CPT | Mod: HCNC

## 2024-10-19 PROCEDURE — 63600175 PHARM REV CODE 636 W HCPCS: Mod: HCNC

## 2024-10-19 PROCEDURE — 25000003 PHARM REV CODE 250: Mod: HCNC

## 2024-10-19 PROCEDURE — 93005 ELECTROCARDIOGRAM TRACING: CPT | Mod: HCNC

## 2024-10-19 PROCEDURE — 85025 COMPLETE CBC W/AUTO DIFF WBC: CPT | Mod: HCNC | Performed by: EMERGENCY MEDICINE

## 2024-10-19 PROCEDURE — 27000190 HC CPAP FULL FACE MASK W/VALVE: Mod: HCNC

## 2024-10-19 PROCEDURE — 96374 THER/PROPH/DIAG INJ IV PUSH: CPT | Mod: HCNC

## 2024-10-19 PROCEDURE — 11000001 HC ACUTE MED/SURG PRIVATE ROOM: Mod: HCNC

## 2024-10-19 PROCEDURE — 63600175 PHARM REV CODE 636 W HCPCS: Mod: HCNC | Performed by: EMERGENCY MEDICINE

## 2024-10-19 PROCEDURE — 80053 COMPREHEN METABOLIC PANEL: CPT | Mod: HCNC | Performed by: EMERGENCY MEDICINE

## 2024-10-19 RX ORDER — TALC
6 POWDER (GRAM) TOPICAL NIGHTLY PRN
Status: DISCONTINUED | OUTPATIENT
Start: 2024-10-19 | End: 2024-10-22 | Stop reason: HOSPADM

## 2024-10-19 RX ORDER — INSULIN ASPART 100 [IU]/ML
0-5 INJECTION, SOLUTION INTRAVENOUS; SUBCUTANEOUS
Status: DISCONTINUED | OUTPATIENT
Start: 2024-10-19 | End: 2024-10-19 | Stop reason: SDUPTHER

## 2024-10-19 RX ORDER — HYDRALAZINE HYDROCHLORIDE 25 MG/1
50 TABLET, FILM COATED ORAL 3 TIMES DAILY
Status: DISCONTINUED | OUTPATIENT
Start: 2024-10-19 | End: 2024-10-22 | Stop reason: HOSPADM

## 2024-10-19 RX ORDER — ACETAMINOPHEN 325 MG/1
650 TABLET ORAL EVERY 4 HOURS PRN
Status: DISCONTINUED | OUTPATIENT
Start: 2024-10-19 | End: 2024-10-22 | Stop reason: HOSPADM

## 2024-10-19 RX ORDER — GLUCAGON 1 MG
1 KIT INJECTION
Status: DISCONTINUED | OUTPATIENT
Start: 2024-10-19 | End: 2024-10-22 | Stop reason: HOSPADM

## 2024-10-19 RX ORDER — ERGOCALCIFEROL 1.25 MG/1
50000 CAPSULE ORAL
Status: DISCONTINUED | OUTPATIENT
Start: 2024-10-19 | End: 2024-10-22 | Stop reason: HOSPADM

## 2024-10-19 RX ORDER — FUROSEMIDE 10 MG/ML
80 INJECTION INTRAMUSCULAR; INTRAVENOUS EVERY 12 HOURS
Status: DISCONTINUED | OUTPATIENT
Start: 2024-10-19 | End: 2024-10-22 | Stop reason: HOSPADM

## 2024-10-19 RX ORDER — INSULIN ASPART 100 [IU]/ML
0-15 INJECTION, SOLUTION INTRAVENOUS; SUBCUTANEOUS
Status: DISCONTINUED | OUTPATIENT
Start: 2024-10-19 | End: 2024-10-21

## 2024-10-19 RX ORDER — ENOXAPARIN SODIUM 100 MG/ML
40 INJECTION SUBCUTANEOUS EVERY 12 HOURS
Status: DISCONTINUED | OUTPATIENT
Start: 2024-10-19 | End: 2024-10-22 | Stop reason: HOSPADM

## 2024-10-19 RX ORDER — POLYETHYLENE GLYCOL 3350 17 G/17G
17 POWDER, FOR SOLUTION ORAL DAILY PRN
Status: DISCONTINUED | OUTPATIENT
Start: 2024-10-19 | End: 2024-10-22 | Stop reason: HOSPADM

## 2024-10-19 RX ORDER — AMOXICILLIN 250 MG
1 CAPSULE ORAL 2 TIMES DAILY
Status: DISCONTINUED | OUTPATIENT
Start: 2024-10-19 | End: 2024-10-22 | Stop reason: HOSPADM

## 2024-10-19 RX ORDER — ONDANSETRON HYDROCHLORIDE 2 MG/ML
4 INJECTION, SOLUTION INTRAVENOUS EVERY 8 HOURS PRN
Status: DISCONTINUED | OUTPATIENT
Start: 2024-10-19 | End: 2024-10-22 | Stop reason: HOSPADM

## 2024-10-19 RX ORDER — IBUPROFEN 200 MG
16 TABLET ORAL
Status: DISCONTINUED | OUTPATIENT
Start: 2024-10-19 | End: 2024-10-22 | Stop reason: HOSPADM

## 2024-10-19 RX ORDER — FUROSEMIDE 10 MG/ML
80 INJECTION INTRAMUSCULAR; INTRAVENOUS
Status: COMPLETED | OUTPATIENT
Start: 2024-10-19 | End: 2024-10-19

## 2024-10-19 RX ORDER — ASPIRIN 325 MG
325 TABLET ORAL
Status: COMPLETED | OUTPATIENT
Start: 2024-10-19 | End: 2024-10-19

## 2024-10-19 RX ORDER — ACETAMINOPHEN 500 MG
1000 TABLET ORAL
Status: COMPLETED | OUTPATIENT
Start: 2024-10-19 | End: 2024-10-19

## 2024-10-19 RX ORDER — NALOXONE HCL 0.4 MG/ML
0.02 VIAL (ML) INJECTION
Status: DISCONTINUED | OUTPATIENT
Start: 2024-10-19 | End: 2024-10-22 | Stop reason: HOSPADM

## 2024-10-19 RX ORDER — ASPIRIN 81 MG/1
81 TABLET ORAL DAILY
Status: DISCONTINUED | OUTPATIENT
Start: 2024-10-20 | End: 2024-10-22 | Stop reason: HOSPADM

## 2024-10-19 RX ORDER — INSULIN GLARGINE 100 [IU]/ML
80 INJECTION, SOLUTION SUBCUTANEOUS 2 TIMES DAILY
Status: DISCONTINUED | OUTPATIENT
Start: 2024-10-19 | End: 2024-10-20

## 2024-10-19 RX ORDER — IBUPROFEN 200 MG
24 TABLET ORAL
Status: DISCONTINUED | OUTPATIENT
Start: 2024-10-19 | End: 2024-10-22 | Stop reason: HOSPADM

## 2024-10-19 RX ORDER — SODIUM CHLORIDE 0.9 % (FLUSH) 0.9 %
5 SYRINGE (ML) INJECTION
Status: DISCONTINUED | OUTPATIENT
Start: 2024-10-19 | End: 2024-10-22 | Stop reason: HOSPADM

## 2024-10-19 RX ORDER — EZETIMIBE 10 MG/1
10 TABLET ORAL DAILY
Status: DISCONTINUED | OUTPATIENT
Start: 2024-10-20 | End: 2024-10-22 | Stop reason: HOSPADM

## 2024-10-19 RX ORDER — CARVEDILOL 12.5 MG/1
12.5 TABLET ORAL 2 TIMES DAILY WITH MEALS
Status: DISCONTINUED | OUTPATIENT
Start: 2024-10-19 | End: 2024-10-22 | Stop reason: HOSPADM

## 2024-10-19 RX ADMIN — ACETAMINOPHEN 1000 MG: 500 TABLET ORAL at 01:10

## 2024-10-19 RX ADMIN — NITROGLYCERIN 1 INCH: 20 OINTMENT TOPICAL at 01:10

## 2024-10-19 RX ADMIN — FUROSEMIDE 80 MG: 10 INJECTION, SOLUTION INTRAVENOUS at 09:10

## 2024-10-19 RX ADMIN — ASPIRIN 325 MG ORAL TABLET 325 MG: 325 PILL ORAL at 01:10

## 2024-10-19 RX ADMIN — INSULIN GLARGINE 80 UNITS: 100 INJECTION, SOLUTION SUBCUTANEOUS at 09:10

## 2024-10-19 RX ADMIN — HYDRALAZINE HYDROCHLORIDE 50 MG: 25 TABLET ORAL at 09:10

## 2024-10-19 RX ADMIN — SACUBITRIL AND VALSARTAN 1 TABLET: 49; 51 TABLET, FILM COATED ORAL at 09:10

## 2024-10-19 RX ADMIN — CARVEDILOL 12.5 MG: 12.5 TABLET, FILM COATED ORAL at 06:10

## 2024-10-19 RX ADMIN — ENOXAPARIN SODIUM 40 MG: 40 INJECTION SUBCUTANEOUS at 09:10

## 2024-10-19 RX ADMIN — INSULIN ASPART 9 UNITS: 100 INJECTION, SOLUTION INTRAVENOUS; SUBCUTANEOUS at 06:10

## 2024-10-19 RX ADMIN — INSULIN ASPART 8 UNITS: 100 INJECTION, SOLUTION INTRAVENOUS; SUBCUTANEOUS at 09:10

## 2024-10-19 RX ADMIN — FUROSEMIDE 80 MG: 10 INJECTION, SOLUTION INTRAVENOUS at 02:10

## 2024-10-19 NOTE — PHARMACY MED REC
"[facility]           Pharmacy  Admission Medication History     The home medication history was taken by Silvia Padgett.      Medication history obtained from Medications listed below were obtained from: [employer]- [employer]    Based on information gathered for medication list, you may go to "Admission" then "Reconcile Home Medications" tabs to review and/or act upon those items.     The home medication list has been updated by the Pharmacy department.   Please read ALL comments highlighted in yellow.   Please address this information as you see fit.    Feel free to contact us if you have any questions or require assistance.        Current Facility-Administered Medications on File Prior to Encounter   Medication Dose Route Frequency Provider Last Rate Last Admin    fluorescein 500 mg/5 mL (10 %) injection 500 mg  5 mL Intravenous Once GHAZAL Tillman MD         Current Outpatient Medications on File Prior to Encounter   Medication Sig Dispense Refill    carvediloL (COREG) 12.5 MG tablet Take 1 tablet (12.5 mg total) by mouth 2 (two) times daily with meals. 180 tablet 3    dulaglutide (TRULICITY) 0.75 mg/0.5 mL pen injector Inject 0.75 mg into the skin every 7 days. OK to discontinue this med during SNF stay - resume upon discharge 4 pen 11    ergocalciferol (ERGOCALCIFEROL) 50,000 unit Cap Take 1 capsule (50,000 Units total) by mouth every Saturday. 12 capsule 3    ezetimibe (ZETIA) 10 mg tablet Take 1 tablet (10 mg total) by mouth once daily. (For cholesterol) 90 tablet 1    furosemide (LASIX) 40 MG tablet Take 1 tablet (40 mg total) by mouth once daily. 90 tablet 3    hydrALAZINE (APRESOLINE) 50 MG tablet Take 50 mg by mouth 3 (three) times daily.      NIFEdipine (ADALAT CC) 60 MG TbSR Take 60 mg by mouth every morning.      pantoprazole (PROTONIX) 40 MG tablet Take 40 mg by mouth once daily.      potassium chloride (KLOR-CON) 8 MEQ TbSR Take 1 tablet (8 mEq total) by mouth 2 " "(two) times daily. 60 tablet 3    sacubitriL-valsartan (ENTRESTO) 49-51 mg per tablet Take 1 tablet by mouth 2 (two) times daily. 90 tablet 3    aspirin (ECOTRIN) 81 MG EC tablet Take 81 mg by mouth once daily.      blood sugar diagnostic Strp Use to test blood glucose two (2) times daily as directed with insurance preferred meter and supplies 200 each 3    ibuprofen (ADVIL,MOTRIN) 200 MG tablet Take 200 mg by mouth every 6 (six) hours as needed for Pain.      insulin (LANTUS SOLOSTAR U-100 INSULIN) glargine 100 units/mL SubQ pen Inject 80 Units into the skin 2 (two) times a day. INJECT 80 UNITS SUBCUTANEOUSLY TWICE DAILY (BULK) Strength: 100 unit/mL (3 mL) 30 mL 5    insulin aspart U-100 (NOVOLOG FLEXPEN U-100 INSULIN) 100 unit/mL (3 mL) InPn pen Inject 30 Units into the skin 2 (two) times a day. 30 mL 12    lancets (TRUEPLUS LANCETS) 28 gauge Misc Use to test blood glucose two (2) times daily as directed with insurance preferred meter and supplies 200 each 3    mupirocin (BACTROBAN) 2 % ointment Apply locally every other day 22 g 3    pen needle, diabetic (BD ULTRA-FINE ROSA PEN NEEDLE) 32 gauge x 5/32" Ndle Use with insulin once daily 100 each 0    pen needle, diabetic 32 gauge x 5/32" Ndle Use with injectable DM supplies twice daily as directed 200 each 0    TRUE METRIX GO GLUCOSE METER [facility]          Please address this information as you see fit.  Feel free to contact us if you have any questions or require assistance.    Silvia Padgett  [phone]                  .          "

## 2024-10-19 NOTE — Clinical Note
Diagnosis: Acute heart failure, unspecified heart failure type [8117034]   Reason for IP Medical Treatment  (Clinical interventions that can only be accomplished in the IP setting? ) :: chf

## 2024-10-19 NOTE — PROGRESS NOTES
VIRTUAL NURSE: Pt arrived to unit. Permission received per patient to turn camera to view patient. VIP model explained; patient informed this VN will be working with bedside nurse and the rest of the care team. Plan of care reviewed with patient.  Educated patient on  fall risk and fall risk precautions in place. Call light within reach, side rails up x2. Admission questions completed. Patient instucted to ask staff for assistance. Patient verbalized complete understanding. Patient denies complaints or any needs at this time. Will continue to be available and intervene as needed.             10/19/24 1830   Admission   Initial VN Admission Questions Complete   Shift   Virtual Nurse - Rounding Complete   Virtual Nurse - Patient Verbalized Approval Of Camera Use   Safety/Activity   Patient Rounds bed in low position;call light in patient/parent reach;clutter free environment maintained;visualized patient   Safety Promotion/Fall Prevention side rails raised x 2   Positioning   Body Position supine   Head of Bed (HOB) Positioning HOB elevated         Labs, notes, orders, and careplan reviewed.

## 2024-10-19 NOTE — ASSESSMENT & PLAN NOTE
Patient presents with 3 day history of worsening SOB  Patient hypertensive, afebrile SPO2 93% on 3L O2; A&O x 3  Echo on 10/2023:  EF~35-40%; Grade II diastolic dysfunction.    Plan:         - Furosemide IV 80 mg BID         - Strict I/O; 1500 mls fluid restriction         - Echocardiogram         - PRN Supplemental Oxygen.

## 2024-10-19 NOTE — ED PROVIDER NOTES
Encounter Date: 10/19/2024       History     Chief Complaint   Patient presents with    Shortness of Breath     Patient presents to the ED complaining of shortness of breath that started three days ago. Hx CHF. Patient appears distressed in triage, tachypneic RR 50. 92% RA     Patient is a 49-year-old female with a past medical history of congestive heart failure type 2 diabetes hypertension hyperlipidemia stroke on Entresto and Lasix who presents emergency room for evaluation of worsening shortness of breath.  She states she was out of her meds for a little while but then they came in the mail a week or 2 ago.  She has been compliant since that time.  Denies any chest pain but definitely has shortness of breath.  She feels like she may have an exacerbation of her heart failure.  She states she is compliant with her Lasix currently.  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  and does not think any of them are sick at this time      Review of patient's allergies indicates:  No Known Allergies  Past Medical History:   Diagnosis Date    Cataract     Cervical high risk HPV (human papillomavirus) test positive 2020    NOT 16 OR 18    CHF (congestive heart failure)     CVA (cerebral infarction)          Depression     Diabetes mellitus, type 2     GERD (gastroesophageal reflux disease)     Hyperlipidemia     Hypertension     Lactic acidosis 10/06/2023    Moderate nonproliferative diabetic retinopathy     Nausea and vomiting 10/06/2023    Obesity     Stroke     Tachycardia 10/04/2023     Past Surgical History:   Procedure Laterality Date     SECTION      CHOLECYSTECTOMY      DILATION AND CURETTAGE OF UTERUS      EYE SURGERY Bilateral     laser    GALLBLADDER SURGERY  2017    stone removed    IRRIGATION AND DEBRIDEMENT Right 2024    Procedure: IRRIGATION AND DEBRIDEMENT RIGHT GROIN;  Surgeon: Chalo Padgett MD;  Location: [facility];  Service: General;  Laterality: Right;     Family History    Problem Relation Name Age of Onset    Stroke Mother      Thyroid disease Mother      Diabetes Mother      Cataracts Father      Hypertension Father      Arthritis Father      Diabetes Father      Hypertension Sister      Stroke Sister      Thyroid disease Sister      Heart disease Sister      No Known Problems Brother      Thyroid disease Daughter 2     Asthma Daughter 2     No Known Problems Maternal Aunt      No Known Problems Maternal Uncle      No Known Problems Paternal Aunt      No Known Problems Paternal Uncle      No Known Problems Maternal Grandmother      No Known Problems Maternal Grandfather      No Known Problems Paternal Grandmother      No Known Problems Paternal Grandfather      Amblyopia Neg Hx      Blindness Neg Hx      Cancer Neg Hx      Glaucoma Neg Hx      Macular degeneration Neg Hx      Retinal detachment Neg Hx      Strabismus Neg Hx       Social History     Tobacco Use    Smoking status: Every Day     Current packs/day: 0.25     Types: Cigarettes    Smokeless tobacco: Never   Substance Use Topics    Alcohol use: Yes     Comment: less than one monthly    Drug use: No     Review of Systems   Constitutional:  Negative for fatigue and fever.   HENT:  Negative for congestion, ear pain, rhinorrhea and sore throat.    Eyes:  Negative for pain and visual disturbance.   Respiratory:  Positive for cough and shortness of breath. Negative for chest tightness, wheezing and stridor.         Orthopnea PND   Cardiovascular:  Negative for chest pain.   Gastrointestinal:  Negative for abdominal pain, diarrhea, nausea and vomiting.   Genitourinary:  Negative for difficulty urinating.   Musculoskeletal:  Negative for arthralgias.   Skin:  Negative for rash.   Neurological:  Negative for weakness, numbness and headaches.   All other systems reviewed and are negative.      Physical Exam     Initial Vitals [10/19/24 1216]   BP Pulse Resp Temp SpO2   (!) 186/95 92 (!) 50 97.8 °F (36.6 °C) (!) 92 %      MAP        --         Physical Exam    Nursing note and vitals reviewed.  Constitutional: She appears well-developed and well-nourished.  Non-toxic appearance. No distress.   HENT:   Head: Normocephalic and atraumatic. Mouth/Throat: Oropharynx is clear and moist.   Eyes: Conjunctivae and EOM are normal. Pupils are equal, round, and reactive to light.   Neck: Neck supple. JVD present.   Normal range of motion.  Cardiovascular:  Normal rate, regular rhythm and intact distal pulses.     Exam reveals gallop. Exam reveals no friction rub.       No murmur heard.  Pulmonary/Chest: No respiratory distress. She has no decreased breath sounds. She has no wheezes. She has no rhonchi. She has rales.   Abdominal: Abdomen is soft. Bowel sounds are normal. She exhibits no distension. There is no abdominal tenderness.   Musculoskeletal:         General: No edema. Normal range of motion.      Cervical back: Normal range of motion and neck supple.     Neurological: She is alert and oriented to person, place, and time. She has normal strength. No sensory deficit.   Skin: Skin is warm and dry. No rash noted.   Psychiatric: She has a normal mood and affect.         ED Course   Critical Care    Date/Time: 10/19/2024 12:45 PM    Performed by: Girish Romero MD  Authorized by: Girish Romero MD  Direct patient critical care time: 20 minutes  Additional history critical care time: 10 minutes  Ordering / reviewing critical care time: 5 minutes  Documentation critical care time: 5 minutes  Consulting other physicians critical care time: 5 minutes  Consult with family critical care time: 5 minutes  Total critical care time (exclusive of procedural time) : 50 minutes  Critical care was necessary to treat or prevent imminent or life-threatening deterioration of the following conditions: cardiac failure.  Critical care was time spent personally by me on the following activities: discussions with consultants, evaluation of patient's response to  treatment, examination of patient, ordering and review of laboratory studies, ordering and review of radiographic studies, pulse oximetry and re-evaluation of patient's condition.        Labs Reviewed   B-TYPE NATRIURETIC PEPTIDE - Abnormal       Result Value     (*)    CBC W/ AUTO DIFFERENTIAL - Abnormal    WBC 10.56      RBC 4.47      Hemoglobin 13.2      Hematocrit 39.5      MCV 88      MCH 29.5      MCHC 33.4      RDW 13.2      Platelets 195      MPV 11.4      Immature Granulocytes 0.4      Gran # (ANC) 9.5 (*)     Immature Grans (Abs) 0.04      Lymph # 0.5 (*)     Mono # 0.5      Eos # 0.0      Baso # 0.02      nRBC 0      Gran % 90.3 (*)     Lymph % 4.5 (*)     Mono % 4.3      Eosinophil % 0.3      Basophil % 0.2      Differential Method Automated     COMPREHENSIVE METABOLIC PANEL - Abnormal    Sodium 134 (*)     Potassium 4.1      Chloride 103      CO2 19 (*)     Glucose 353 (*)     BUN 10      Creatinine 1.0      Calcium 9.3      Total Protein 7.2      Albumin 3.2 (*)     Total Bilirubin 0.9      Alkaline Phosphatase 73      AST 33      ALT 54 (*)     eGFR >60      Anion Gap 12     TROPONIN I    Troponin I 0.019     POCT GLUCOSE, HAND-HELD DEVICE          Imaging Results               X-Ray Chest AP Portable (Final result)  Result time 10/19/24 12:58:15      Final result by Yu Nugyen MD (10/19/24 12:58:15)                   Impression:      Patchy perihilar airspace opacities suspicious for pulmonary edema in this patient with an enlarged cardiac silhouette.    This report was flagged in Epic as abnormal.      Electronically signed by: Yu Nguyen  Date:    10/19/2024  Time:    12:58               Narrative:    EXAMINATION:  XR CHEST AP PORTABLE    CLINICAL HISTORY:  CHF;    TECHNIQUE:  Single frontal view of the chest was performed.    COMPARISON:  01/06/2024    FINDINGS:  Lungs are well expanded.  Patchy perihilar opacities in both lungs..    Cardiac silhouette is enlarged, stable.                                        Medications   aspirin EC tablet 81 mg (81 mg Oral Given 10/20/24 0822)   sacubitriL-valsartan 49-51 mg per tablet 1 tablet (0 tablets Oral Hold 10/20/24 0900)   NIFEdipine 24 hr tablet 90 mg (0 mg Oral Hold 10/20/24 0900)   hydrALAZINE tablet 50 mg (0 mg Oral Hold 10/20/24 1500)   ezetimibe tablet 10 mg (10 mg Oral Given 10/20/24 0822)   ergocalciferol capsule 50,000 Units (0 Units Oral Hold 10/19/24 1824)   carvediloL tablet 12.5 mg (12.5 mg Oral Given 10/20/24 0822)   sodium chloride 0.9% flush 5 mL (has no administration in time range)   melatonin tablet 6 mg (has no administration in time range)   ondansetron injection 4 mg (has no administration in time range)   polyethylene glycol packet 17 g (has no administration in time range)   senna-docusate 8.6-50 mg per tablet 1 tablet (1 tablet Oral Given 10/20/24 0822)   acetaminophen tablet 650 mg (has no administration in time range)   naloxone 0.4 mg/mL injection 0.02 mg (has no administration in time range)   glucose chewable tablet 16 g (has no administration in time range)   glucose chewable tablet 24 g (has no administration in time range)   glucagon (human recombinant) injection 1 mg (has no administration in time range)   enoxaparin injection 40 mg (40 mg Subcutaneous Given 10/20/24 0821)   insulin aspart U-100 pen 0-15 Units (6 Units Subcutaneous Given 10/20/24 1200)   dextrose 10% bolus 125 mL 125 mL (has no administration in time range)   dextrose 10% bolus 250 mL 250 mL (has no administration in time range)   furosemide injection 80 mg (80 mg Intravenous Given 10/20/24 0822)   influenza (Flulaval, Fluzone, Fluarix) 45 mcg/0.5 mL IM vaccine (> or = 6 mo) 0.5 mL (has no administration in time range)   mupirocin 2 % ointment ( Nasal Given 10/20/24 0822)   insulin glargine U-100 (Lantus) pen 83 Units (has no administration in time range)   furosemide injection 80 mg (80 mg Intravenous Given 10/19/24 1418)   nitroGLYCERIN 2% TD  oint ointment 1 inch (1 inch Topical (Top) Given 10/19/24 1317)   acetaminophen tablet 1,000 mg (1,000 mg Oral Given 10/19/24 1318)   aspirin tablet 325 mg (325 mg Oral Given 10/19/24 1318)   potassium chloride SA CR tablet 20 mEq (20 mEq Oral Given 10/20/24 0622)   magnesium sulfate 2g in water 50mL IVPB (premix) (0 g Intravenous Stopped 10/20/24 1203)     Medical Decision Making             ED Course as of 10/20/24 1435   Sat Oct 19, 2024   1245 EKG independently interpreted by me as rate 92 normal sinus rhythm normal axis T-wave inversion inferior lateral leads could be secondary to LVH versus ischemia. [JS]   1246 Patient has no active chest pain but appears to have heart failure.  Will get a cardiac workup and anticipate admission for diuresis.  Will place on BiPAP given mild respiratory distress.  She was also hypoxic on arrival and does not wear oxygen at home.  Need to rule out pneumonia pneumothorax.  Doubt COPD.  Doubt pulmonary embolism [JS]   1308 Patient has heart failure on chest x-ray.  She will need to be admitted to the ICU because she is on BiPAP.  Awaiting labs.  Spoke to family Medicine who will admit. [JS]      ED Course User Index  [JS] Girish Romero MD                           Clinical Impression:  Final diagnoses:  [R06.02] Shortness of breath  [I50.9] Acute heart failure, unspecified heart failure type (Primary)  [J96.01] Acute respiratory failure with hypoxia          ED Disposition Condition    Admit Stable                Girish Romero MD  10/20/24 1435     Yes

## 2024-10-19 NOTE — ASSESSMENT & PLAN NOTE
Patients blood pressure range in the last 24 hours was: BP  Min: 130/73  Max: 190/84.The patient's inpatient anti-hypertensive regimen is listed below:  Current Antihypertensives  NIFEdipine 24 hr tablet 90 mg, Daily, Oral  hydrALAZINE tablet 50 mg, 3 times daily, Oral  carvediloL tablet 12.5 mg, 2 times daily with meals, Oral  furosemide injection 80 mg, Every 12 hours, Intravenous    Plan    Continue current medications

## 2024-10-19 NOTE — SUBJECTIVE & OBJECTIVE
Past Medical History:   Diagnosis Date    Cataract     Cervical high risk HPV (human papillomavirus) test positive 2020    NOT 16 OR 18    CHF (congestive heart failure)     CVA (cerebral infarction)          Depression     Diabetes mellitus, type 2     GERD (gastroesophageal reflux disease)     Hyperlipidemia     Hypertension     Lactic acidosis 10/06/2023    Moderate nonproliferative diabetic retinopathy     Nausea and vomiting 10/06/2023    Obesity     Stroke     Tachycardia 10/04/2023       Past Surgical History:   Procedure Laterality Date     SECTION      CHOLECYSTECTOMY      DILATION AND CURETTAGE OF UTERUS      EYE SURGERY Bilateral     laser    GALLBLADDER SURGERY  2017    stone removed    IRRIGATION AND DEBRIDEMENT Right 2024    Procedure: IRRIGATION AND DEBRIDEMENT RIGHT GROIN;  Surgeon: Chalo Padgett MD;  Location: [facility];  Service: General;  Laterality: Right;       Review of patient's allergies indicates:  No Known Allergies    Current Facility-Administered Medications on File Prior to Encounter   Medication    [DISCONTINUED] fluorescein 500 mg/5 mL (10 %) injection 500 mg     Current Outpatient Medications on File Prior to Encounter   Medication Sig    carvediloL (COREG) 12.5 MG tablet Take 1 tablet (12.5 mg total) by mouth 2 (two) times daily with meals.    ergocalciferol (ERGOCALCIFEROL) 50,000 unit Cap Take 1 capsule (50,000 Units total) by mouth every Saturday.    ezetimibe (ZETIA) 10 mg tablet Take 1 tablet (10 mg total) by mouth once daily. (For cholesterol)    hydrALAZINE (APRESOLINE) 50 MG tablet Take 50 mg by mouth 3 (three) times daily.    NIFEdipine (ADALAT CC) 60 MG TbSR Take 60 mg by mouth every morning.    sacubitriL-valsartan (ENTRESTO) 49-51 mg per tablet Take 1 tablet by mouth 2 (two) times daily.    [DISCONTINUED] dulaglutide (TRULICITY) 0.75 mg/0.5 mL pen injector Inject 0.75 mg into the skin every 7 days. OK to discontinue this med during SNF stay -  "resume upon discharge    [DISCONTINUED] furosemide (LASIX) 40 MG tablet Take 1 tablet (40 mg total) by mouth once daily.    [DISCONTINUED] pantoprazole (PROTONIX) 40 MG tablet Take 40 mg by mouth once daily.    [DISCONTINUED] potassium chloride (KLOR-CON) 8 MEQ TbSR Take 1 tablet (8 mEq total) by mouth 2 (two) times daily.    aspirin (ECOTRIN) 81 MG EC tablet Take 81 mg by mouth once daily.    atorvastatin (LIPITOR) 80 MG tablet Take 1 tablet (80 mg total) by mouth every evening. (Patient not taking: Reported on 10/19/2024)    insulin (LANTUS SOLOSTAR U-100 INSULIN) glargine 100 units/mL SubQ pen Inject 80 Units into the skin 2 (two) times a day. INJECT 80 UNITS SUBCUTANEOUSLY TWICE DAILY (BULK) Strength: 100 unit/mL (3 mL)    insulin aspart U-100 (NOVOLOG FLEXPEN U-100 INSULIN) 100 unit/mL (3 mL) InPn pen Inject 30 Units into the skin 2 (two) times a day.    mupirocin (BACTROBAN) 2 % ointment Apply locally every other day    [DISCONTINUED] blood sugar diagnostic Strp Use to test blood glucose two (2) times daily as directed with insurance preferred meter and supplies    [DISCONTINUED] ibuprofen (ADVIL,MOTRIN) 200 MG tablet Take 200 mg by mouth every 6 (six) hours as needed for Pain.    [DISCONTINUED] lancets (TRUEPLUS LANCETS) 28 gauge Misc Use to test blood glucose two (2) times daily as directed with insurance preferred meter and supplies    [DISCONTINUED] LANTUS SOLOSTAR U-100 INSULIN glargine 100 units/mL SubQ pen Inject 80 Units into the skin 2 (two) times a day.    [DISCONTINUED] metFORMIN (GLUCOPHAGE-XR) 500 MG ER 24hr tablet Take 1,000 mg by mouth 2 (two) times daily with meals. (Patient not taking: Reported on 10/19/2024)    [DISCONTINUED] pen needle, diabetic (BD ULTRA-FINE ROSA PEN NEEDLE) 32 gauge x 5/32" Ndle Use with insulin once daily    [DISCONTINUED] pen needle, diabetic 32 gauge x 5/32" Ndle Use with injectable DM supplies twice daily as directed    [DISCONTINUED] TRUE METRIX GO GLUCOSE METER Misc "      Family History       Problem Relation (Age of Onset)    Arthritis Father    Asthma Daughter    Cataracts Father    Diabetes Mother, Father    Heart disease Sister    Hypertension Father, Sister    No Known Problems Brother, Maternal Aunt, Maternal Uncle, Paternal Aunt, Paternal Uncle, Maternal Grandmother, Maternal Grandfather, Paternal Grandmother, Paternal Grandfather    Stroke Mother, Sister    Thyroid disease Mother, Sister, Daughter          Tobacco Use    Smoking status: Every Day     Current packs/day: 0.25     Types: Cigarettes    Smokeless tobacco: Never   Substance and Sexual Activity    Alcohol use: Yes     Comment: less than one monthly    Drug use: No    Sexual activity: Yes     Partners: Male     Birth control/protection: None     Review of Systems   Respiratory:  Positive for shortness of breath.    Cardiovascular:  Positive for leg swelling. Negative for chest pain.   Gastrointestinal:  Negative for abdominal pain, nausea and vomiting.   Genitourinary:  Negative for difficulty urinating and dysuria.     Objective:     Vital Signs (Most Recent):  Temp: 97.8 °F (36.6 °C) (10/19/24 1216)  Pulse: 90 (10/19/24 1633)  Resp: (!) 30 (10/19/24 1633)  BP: (!) 158/84 (10/19/24 1633)  SpO2: 95 % (10/19/24 1633) Vital Signs (24h Range):  Temp:  [97.8 °F (36.6 °C)] 97.8 °F (36.6 °C)  Pulse:  [87-99] 90  Resp:  [30-57] 30  SpO2:  [88 %-98 %] 95 %  BP: (130-190)/(73-95) 158/84     Weight: 113.4 kg (250 lb)  Body mass index is 41.6 kg/m².     Physical Exam  Vitals reviewed.   Constitutional:       Appearance: She is obese.   HENT:      Mouth/Throat:      Pharynx: Oropharynx is clear.   Eyes:      Extraocular Movements: Extraocular movements intact.      Pupils: Pupils are equal, round, and reactive to light.   Cardiovascular:      Rate and Rhythm: Normal rate and regular rhythm.      Pulses: Normal pulses.      Heart sounds: Normal heart sounds.   Pulmonary:      Breath sounds: Rales present.   Abdominal:       Palpations: Abdomen is soft.      Tenderness: There is abdominal tenderness.      Comments: Suprapubic tenderness attributed to holding bladder   Musculoskeletal:         General: Swelling present. No tenderness.      Right lower leg: Edema present.      Left lower leg: Edema present.   Neurological:      General: No focal deficit present.      Mental Status: She is oriented to person, place, and time.              CRANIAL NERVES     CN III, IV, VI   Pupils are equal, round, and reactive to light.       Significant Labs: All pertinent labs within the past 24 hours have been reviewed.  CBC:   Recent Labs   Lab 10/19/24  1416   WBC 10.56   HGB 13.2   HCT 39.5        CMP:   Recent Labs   Lab 10/19/24  1416   *   K 4.1      CO2 19*   *   BUN 10   CREATININE 1.0   CALCIUM 9.3   PROT 7.2   ALBUMIN 3.2*   BILITOT 0.9   ALKPHOS 73   AST 33   ALT 54*   ANIONGAP 12     Cardiac Markers:   Recent Labs   Lab 10/19/24  1416   *       Significant Imaging: I have reviewed all pertinent imaging results/findings within the past 24 hours.

## 2024-10-19 NOTE — ASSESSMENT & PLAN NOTE
Glucose 353 on initial CMP  HbA1C 9.5% 1/2024      Plan:            - Insulin Glargine 80 units BID            - SSI (0-15 units) TIDWM            - HbA1C pending

## 2024-10-19 NOTE — H&P
[facility]  History & Physical    Patient Name: Jaimee Quintana  MRN: [id number]  Patient Class: IP- Inpatient  Admission Date: 10/19/2024  Attending Physician: Evangelina De Jesus MD   Primary Care Provider: Lakisha Mendez DO         Patient information was obtained from patient and ER records.     Subjective:     Principal Problem:<principal problem not specified>    Chief Complaint:   Chief Complaint   Patient presents with    Shortness of Breath     Patient presents to the ED complaining of shortness of breath that started three days ago. Hx CHF. Patient appears distressed in triage, tachypneic RR 50. 92% RA        HPI: Patient is a 48 yo F w/ PMHx of Combined CHF (EF~35-40% on 10/23) , DM2, HLD,  HLD,  CVA who presents to ED with worsening SOB.  Patient states that she had a gap in medication adherence, but that she received medications 1 week ago and has been compliant since then.  Patient endorses taking Lasix PRN.  Patient denies any chest pain, nausea, or abdominal pain.    In the ED, initial vital signs /95 , HR 92 , Temp 97.8 F, SpO2 92% on room air. Labs include CBC with stable H/H  and WBC within normal limits . CMP with Na 134 K 4.1 and BUN/Cr 10/1.0 (baseline Cr 0.9). Glucose 353. CXR showed patchy perihilar opacities bilaterally suspicious for pulmonary edema with an enlarged cardiac silhouette . EKG showed normal sinus rhythm with T-wave abnormalities. Troponin wnl,   , VBG PH 7.408, CO2 39.4, HCO3 24.8.  Patient given Lasix 80 mg IV,  mg, Acetominophen 1000 mg, Nitroglycerin paste and placed on Bipap.  [facility] consulted for evaluation for admission for acute heart failure exacerbation.     Past Medical History:   Diagnosis Date    Cataract     Cervical high risk HPV (human papillomavirus) test positive 09/17/2020    NOT 16 OR 18    CHF (congestive heart failure)     CVA (cerebral infarction) 2003         Depression     Diabetes  mellitus, type 2     GERD (gastroesophageal reflux disease)     Hyperlipidemia     Hypertension     Lactic acidosis 10/06/2023    Moderate nonproliferative diabetic retinopathy     Nausea and vomiting 10/06/2023    Obesity     Stroke     Tachycardia 10/04/2023       Past Surgical History:   Procedure Laterality Date     SECTION      CHOLECYSTECTOMY      DILATION AND CURETTAGE OF UTERUS      EYE SURGERY Bilateral     laser    GALLBLADDER SURGERY  2017    stone removed    IRRIGATION AND DEBRIDEMENT Right 2024    Procedure: IRRIGATION AND DEBRIDEMENT RIGHT GROIN;  Surgeon: Chalo Padgett MD;  Location: [facility];  Service: General;  Laterality: Right;       Review of patient's allergies indicates:  No Known Allergies    Current Facility-Administered Medications on File Prior to Encounter   Medication    [DISCONTINUED] fluorescein 500 mg/5 mL (10 %) injection 500 mg     Current Outpatient Medications on File Prior to Encounter   Medication Sig    carvediloL (COREG) 12.5 MG tablet Take 1 tablet (12.5 mg total) by mouth 2 (two) times daily with meals.    ergocalciferol (ERGOCALCIFEROL) 50,000 unit Cap Take 1 capsule (50,000 Units total) by mouth every Saturday.    ezetimibe (ZETIA) 10 mg tablet Take 1 tablet (10 mg total) by mouth once daily. (For cholesterol)    hydrALAZINE (APRESOLINE) 50 MG tablet Take 50 mg by mouth 3 (three) times daily.    NIFEdipine (ADALAT CC) 60 MG TbSR Take 60 mg by mouth every morning.    sacubitriL-valsartan (ENTRESTO) 49-51 mg per tablet Take 1 tablet by mouth 2 (two) times daily.    [DISCONTINUED] dulaglutide (TRULICITY) 0.75 mg/0.5 mL pen injector Inject 0.75 mg into the skin every 7 days. OK to discontinue this med during SNF stay - resume upon discharge    [DISCONTINUED] furosemide (LASIX) 40 MG tablet Take 1 tablet (40 mg total) by mouth once daily.    [DISCONTINUED] pantoprazole (PROTONIX) 40 MG tablet Take 40 mg by mouth once daily.    [DISCONTINUED] potassium chloride  "(KLOR-CON) 8 MEQ TbSR Take 1 tablet (8 mEq total) by mouth 2 (two) times daily.    aspirin (ECOTRIN) 81 MG EC tablet Take 81 mg by mouth once daily.    atorvastatin (LIPITOR) 80 MG tablet Take 1 tablet (80 mg total) by mouth every evening. (Patient not taking: Reported on 10/19/2024)    insulin (LANTUS SOLOSTAR U-100 INSULIN) glargine 100 units/mL SubQ pen Inject 80 Units into the skin 2 (two) times a day. INJECT 80 UNITS SUBCUTANEOUSLY TWICE DAILY (BULK) Strength: 100 unit/mL (3 mL)    insulin aspart U-100 (NOVOLOG FLEXPEN U-100 INSULIN) 100 unit/mL (3 mL) InPn pen Inject 30 Units into the skin 2 (two) times a day.    mupirocin (BACTROBAN) 2 % ointment Apply locally every other day    [DISCONTINUED] blood sugar diagnostic Strp Use to test blood glucose two (2) times daily as directed with insurance preferred meter and supplies    [DISCONTINUED] ibuprofen (ADVIL,MOTRIN) 200 MG tablet Take 200 mg by mouth every 6 (six) hours as needed for Pain.    [DISCONTINUED] lancets (TRUEPLUS LANCETS) 28 gauge Misc Use to test blood glucose two (2) times daily as directed with insurance preferred meter and supplies    [DISCONTINUED] LANTUS SOLOSTAR U-100 INSULIN glargine 100 units/mL SubQ pen Inject 80 Units into the skin 2 (two) times a day.    [DISCONTINUED] metFORMIN (GLUCOPHAGE-XR) 500 MG ER 24hr tablet Take 1,000 mg by mouth 2 (two) times daily with meals. (Patient not taking: Reported on 10/19/2024)    [DISCONTINUED] pen needle, diabetic (BD ULTRA-FINE ROSA PEN NEEDLE) 32 gauge x 5/32" Ndle Use with insulin once daily    [DISCONTINUED] pen needle, diabetic 32 gauge x 5/32" Ndle Use with injectable DM supplies twice daily as directed    [DISCONTINUED] TRUE METRIX GO GLUCOSE METER Misc      Family History       Problem Relation (Age of Onset)    Arthritis Father    Asthma Daughter    Cataracts Father    Diabetes Mother, Father    Heart disease Sister    Hypertension Father, Sister    No Known Problems Brother, Maternal Aunt, " Maternal Uncle, Paternal Aunt, Paternal Uncle, Maternal Grandmother, Maternal Grandfather, Paternal Grandmother, Paternal Grandfather    Stroke Mother, Sister    Thyroid disease Mother, Sister, Daughter          Tobacco Use    Smoking status: Every Day     Current packs/day: 0.25     Types: Cigarettes    Smokeless tobacco: Never   Substance and Sexual Activity    Alcohol use: Yes     Comment: less than one monthly    Drug use: No    Sexual activity: Yes     Partners: Male     Birth control/protection: None     Review of Systems   Respiratory:  Positive for shortness of breath.    Cardiovascular:  Positive for leg swelling. Negative for chest pain.   Gastrointestinal:  Negative for abdominal pain, nausea and vomiting.   Genitourinary:  Negative for difficulty urinating and dysuria.     Objective:     Vital Signs (Most Recent):  Temp: 97.8 °F (36.6 °C) (10/19/24 1216)  Pulse: 90 (10/19/24 1633)  Resp: (!) 30 (10/19/24 1633)  BP: (!) 158/84 (10/19/24 1633)  SpO2: 95 % (10/19/24 1633) Vital Signs (24h Range):  Temp:  [97.8 °F (36.6 °C)] 97.8 °F (36.6 °C)  Pulse:  [87-99] 90  Resp:  [30-57] 30  SpO2:  [88 %-98 %] 95 %  BP: (130-190)/(73-95) 158/84     Weight: 113.4 kg (250 lb)  Body mass index is 41.6 kg/m².     Physical Exam  Vitals reviewed.   Constitutional:       Appearance: She is obese.   HENT:      Mouth/Throat:      Pharynx: Oropharynx is clear.   Eyes:      Extraocular Movements: Extraocular movements intact.      Pupils: Pupils are equal, round, and reactive to light.   Cardiovascular:      Rate and Rhythm: Normal rate and regular rhythm.      Pulses: Normal pulses.      Heart sounds: Normal heart sounds.   Pulmonary:      Breath sounds: Rales present.   Abdominal:      Palpations: Abdomen is soft.      Tenderness: There is abdominal tenderness.      Comments: Suprapubic tenderness attributed to holding bladder   Musculoskeletal:         General: Swelling present. No tenderness.      Right lower leg: Edema  present.      Left lower leg: Edema present.   Neurological:      General: No focal deficit present.      Mental Status: She is oriented to person, place, and time.              CRANIAL NERVES     CN III, IV, VI   Pupils are equal, round, and reactive to light.       Significant Labs: All pertinent labs within the past 24 hours have been reviewed.  CBC:   Recent Labs   Lab 10/19/24  1416   WBC 10.56   HGB 13.2   HCT 39.5        CMP:   Recent Labs   Lab 10/19/24  1416   *   K 4.1      CO2 19*   *   BUN 10   CREATININE 1.0   CALCIUM 9.3   PROT 7.2   ALBUMIN 3.2*   BILITOT 0.9   ALKPHOS 73   AST 33   ALT 54*   ANIONGAP 12     Cardiac Markers:   Recent Labs   Lab 10/19/24  1416   *       Significant Imaging: I have reviewed all pertinent imaging results/findings within the past 24 hours.  Assessment/Plan:     Hypertension  Patients blood pressure range in the last 24 hours was: BP  Min: 130/73  Max: 190/84.The patient's inpatient anti-hypertensive regimen is listed below:  Current Antihypertensives  NIFEdipine 24 hr tablet 90 mg, Daily, Oral  hydrALAZINE tablet 50 mg, 3 times daily, Oral  carvediloL tablet 12.5 mg, 2 times daily with meals, Oral  furosemide injection 80 mg, Every 12 hours, Intravenous    Plan    Continue current medications    Type 2 diabetes mellitus, with long-term current use of insulin  Glucose 353 on initial CMP  HbA1C 9.5% 1/2024      Plan:            - Insulin Glargine 80 units BID            - SSI (0-15 units) TIDWM            - HbA1C pending      Acute on chronic combined systolic and diastolic heart failure  Patient presents with 3 day history of worsening SOB  Patient hypertensive, afebrile SPO2 93% on 3L O2; A&O x 3  Echo on 10/2023:  EF~35-40%; Grade II diastolic dysfunction.    Plan:         - Furosemide IV 80 mg BID         - Strict I/O; 1500 mls fluid restriction         - Echocardiogram         - PRN Supplemental Oxygen.           Tobacco  dependency  Patient endorses smoking irregularly.    Plan:        - Will provide tobacco cessation counseling when appropriate    History of CVA (cerebrovascular accident) without residual deficits  Continue home ASA 81 mg      CESAR (obstructive sleep apnea)  BIPAP QHS    Hyperlipidemia associated with type 2 diabetes mellitus  Continue home Ezetimibe 10 mg daily        VTE Risk Mitigation (From admission, onward)           Ordered     enoxaparin injection 40 mg  Every 12 hours         10/19/24 1632     IP VTE HIGH RISK PATIENT  Once         10/19/24 1632     Place sequential compression device  Until discontinued         10/19/24 1632                                    Zane Shannon MD  Department of Hospital Medicine  [place] - Emergency Dept

## 2024-10-19 NOTE — HPI
Patient is a 48 yo F w/ PMHx of Combined CHF (EF~35-40% on 10/23) , DM2, HLD,  HLD,  CVA who presents to ED with worsening SOB.  Patient states that she had a gap in medication adherence, but that she received medications 1 week ago and has been compliant since then.  Patient endorses taking Lasix PRN.  Patient denies any chest pain, nausea, or abdominal pain.    In the ED, initial vital signs /95 , HR 92 , Temp 97.8 F, SpO2 92% on room air. Labs include CBC with stable H/H  and WBC within normal limits . CMP with Na 134 K 4.1 and BUN/Cr 10/1.0 (baseline Cr 0.9). Glucose 353. CXR showed patchy perihilar opacities bilaterally suspicious for pulmonary edema with an enlarged cardiac silhouette . EKG showed normal sinus rhythm with T-wave abnormalities. Troponin wnl,   , VBG PH 7.408, CO2 39.4, HCO3 24.8.  Patient given Lasix 80 mg IV,  mg, Acetominophen 1000 mg, Nitroglycerin paste and placed on Bipap.  [facility] consulted for evaluation for admission for acute heart failure exacerbation.

## 2024-10-19 NOTE — ED NOTES
Pt to ED c/o cough, SOB x 3 days Pt 87% on RA, placed on 3L NC. AAOx4, hypertensive, tachypneic.Denies CP. Appears is distress.

## 2024-10-19 NOTE — ASSESSMENT & PLAN NOTE
Patient endorses smoking irregularly.    Plan:        - Will provide tobacco cessation counseling when appropriate

## 2024-10-20 LAB
ALBUMIN SERPL BCP-MCNC: 2.7 G/DL (ref 3.5–5.2)
ALP SERPL-CCNC: 94 U/L (ref 40–150)
ALT SERPL W/O P-5'-P-CCNC: 39 U/L (ref 10–44)
ANION GAP SERPL CALC-SCNC: 13 MMOL/L (ref 8–16)
AST SERPL-CCNC: 16 U/L (ref 10–40)
BASOPHILS # BLD AUTO: 0.05 K/UL (ref 0–0.2)
BASOPHILS NFR BLD: 0.3 % (ref 0–1.9)
BILIRUB SERPL-MCNC: 0.9 MG/DL (ref 0.1–1)
BUN SERPL-MCNC: 16 MG/DL (ref 6–20)
CALCIUM SERPL-MCNC: 9 MG/DL (ref 8.7–10.5)
CHLORIDE SERPL-SCNC: 102 MMOL/L (ref 95–110)
CO2 SERPL-SCNC: 21 MMOL/L (ref 23–29)
CREAT SERPL-MCNC: 1.1 MG/DL (ref 0.5–1.4)
DIFFERENTIAL METHOD BLD: ABNORMAL
EOSINOPHIL # BLD AUTO: 0 K/UL (ref 0–0.5)
EOSINOPHIL NFR BLD: 0.2 % (ref 0–8)
ERYTHROCYTE [DISTWIDTH] IN BLOOD BY AUTOMATED COUNT: 13 % (ref 11.5–14.5)
EST. GFR  (NO RACE VARIABLE): >60 ML/MIN/1.73 M^2
ESTIMATED AVG GLUCOSE: 209 MG/DL (ref 68–131)
GLUCOSE SERPL-MCNC: 267 MG/DL (ref 70–110)
HBA1C MFR BLD: 8.9 % (ref 4–5.6)
HCT VFR BLD AUTO: 34.4 % (ref 37–48.5)
HGB BLD-MCNC: 11.3 G/DL (ref 12–16)
IMM GRANULOCYTES # BLD AUTO: 0.14 K/UL (ref 0–0.04)
IMM GRANULOCYTES NFR BLD AUTO: 0.8 % (ref 0–0.5)
LYMPHOCYTES # BLD AUTO: 1.1 K/UL (ref 1–4.8)
LYMPHOCYTES NFR BLD: 6.5 % (ref 18–48)
MAGNESIUM SERPL-MCNC: 1.4 MG/DL (ref 1.6–2.6)
MCH RBC QN AUTO: 29.4 PG (ref 27–31)
MCHC RBC AUTO-ENTMCNC: 32.8 G/DL (ref 32–36)
MCV RBC AUTO: 90 FL (ref 82–98)
MONOCYTES # BLD AUTO: 0.6 K/UL (ref 0.3–1)
MONOCYTES NFR BLD: 3.6 % (ref 4–15)
NEUTROPHILS # BLD AUTO: 14.9 K/UL (ref 1.8–7.7)
NEUTROPHILS NFR BLD: 88.6 % (ref 38–73)
NRBC BLD-RTO: 0 /100 WBC
PHOSPHATE SERPL-MCNC: 3.4 MG/DL (ref 2.7–4.5)
PLATELET # BLD AUTO: 174 K/UL (ref 150–450)
PMV BLD AUTO: 12.1 FL (ref 9.2–12.9)
POCT GLUCOSE: 173 MG/DL (ref 70–110)
POCT GLUCOSE: 205 MG/DL (ref 70–110)
POCT GLUCOSE: 208 MG/DL (ref 70–110)
POCT GLUCOSE: 248 MG/DL (ref 70–110)
POTASSIUM SERPL-SCNC: 3.8 MMOL/L (ref 3.5–5.1)
PROT SERPL-MCNC: 6.5 G/DL (ref 6–8.4)
RBC # BLD AUTO: 3.84 M/UL (ref 4–5.4)
SODIUM SERPL-SCNC: 136 MMOL/L (ref 136–145)
WBC # BLD AUTO: 16.82 K/UL (ref 3.9–12.7)

## 2024-10-20 PROCEDURE — 83036 HEMOGLOBIN GLYCOSYLATED A1C: CPT | Mod: HCNC

## 2024-10-20 PROCEDURE — 97530 THERAPEUTIC ACTIVITIES: CPT | Mod: HCNC

## 2024-10-20 PROCEDURE — 63600175 PHARM REV CODE 636 W HCPCS: Mod: HCNC

## 2024-10-20 PROCEDURE — 83735 ASSAY OF MAGNESIUM: CPT | Mod: HCNC

## 2024-10-20 PROCEDURE — 36415 COLL VENOUS BLD VENIPUNCTURE: CPT | Mod: HCNC

## 2024-10-20 PROCEDURE — 80053 COMPREHEN METABOLIC PANEL: CPT | Mod: HCNC

## 2024-10-20 PROCEDURE — 97165 OT EVAL LOW COMPLEX 30 MIN: CPT | Mod: HCNC

## 2024-10-20 PROCEDURE — 85007 BL SMEAR W/DIFF WBC COUNT: CPT | Mod: HCNC

## 2024-10-20 PROCEDURE — 25000003 PHARM REV CODE 250: Mod: HCNC | Performed by: FAMILY MEDICINE

## 2024-10-20 PROCEDURE — 25000003 PHARM REV CODE 250: Mod: HCNC

## 2024-10-20 PROCEDURE — 85027 COMPLETE CBC AUTOMATED: CPT | Mod: HCNC

## 2024-10-20 PROCEDURE — 99900035 HC TECH TIME PER 15 MIN (STAT): Mod: HCNC

## 2024-10-20 PROCEDURE — 94761 N-INVAS EAR/PLS OXIMETRY MLT: CPT | Mod: HCNC

## 2024-10-20 PROCEDURE — 25000003 PHARM REV CODE 250: Mod: HCNC | Performed by: NURSE PRACTITIONER

## 2024-10-20 PROCEDURE — 84100 ASSAY OF PHOSPHORUS: CPT | Mod: HCNC

## 2024-10-20 PROCEDURE — 11000001 HC ACUTE MED/SURG PRIVATE ROOM: Mod: HCNC

## 2024-10-20 PROCEDURE — 27000221 HC OXYGEN, UP TO 24 HOURS: Mod: HCNC

## 2024-10-20 PROCEDURE — 97162 PT EVAL MOD COMPLEX 30 MIN: CPT | Mod: HCNC

## 2024-10-20 RX ORDER — MUPIROCIN 20 MG/G
OINTMENT TOPICAL 2 TIMES DAILY
Status: DISCONTINUED | OUTPATIENT
Start: 2024-10-20 | End: 2024-10-22 | Stop reason: HOSPADM

## 2024-10-20 RX ORDER — MAGNESIUM SULFATE HEPTAHYDRATE 40 MG/ML
2 INJECTION, SOLUTION INTRAVENOUS
Status: DISCONTINUED | OUTPATIENT
Start: 2024-10-20 | End: 2024-10-20

## 2024-10-20 RX ORDER — INSULIN GLARGINE 100 [IU]/ML
83 INJECTION, SOLUTION SUBCUTANEOUS 2 TIMES DAILY
Status: DISCONTINUED | OUTPATIENT
Start: 2024-10-20 | End: 2024-10-21

## 2024-10-20 RX ORDER — MAGNESIUM SULFATE HEPTAHYDRATE 40 MG/ML
2 INJECTION, SOLUTION INTRAVENOUS
Status: COMPLETED | OUTPATIENT
Start: 2024-10-20 | End: 2024-10-20

## 2024-10-20 RX ORDER — POTASSIUM CHLORIDE 20 MEQ/1
20 TABLET, EXTENDED RELEASE ORAL ONCE
Status: COMPLETED | OUTPATIENT
Start: 2024-10-20 | End: 2024-10-20

## 2024-10-20 RX ADMIN — FUROSEMIDE 80 MG: 10 INJECTION, SOLUTION INTRAVENOUS at 08:10

## 2024-10-20 RX ADMIN — ACETAMINOPHEN 650 MG: 325 TABLET ORAL at 08:10

## 2024-10-20 RX ADMIN — CARVEDILOL 12.5 MG: 12.5 TABLET, FILM COATED ORAL at 08:10

## 2024-10-20 RX ADMIN — INSULIN ASPART 6 UNITS: 100 INJECTION, SOLUTION INTRAVENOUS; SUBCUTANEOUS at 04:10

## 2024-10-20 RX ADMIN — FUROSEMIDE 80 MG: 10 INJECTION, SOLUTION INTRAVENOUS at 09:10

## 2024-10-20 RX ADMIN — MUPIROCIN: 20 OINTMENT TOPICAL at 08:10

## 2024-10-20 RX ADMIN — INSULIN ASPART 6 UNITS: 100 INJECTION, SOLUTION INTRAVENOUS; SUBCUTANEOUS at 06:10

## 2024-10-20 RX ADMIN — SENNOSIDES AND DOCUSATE SODIUM 1 TABLET: 8.6; 5 TABLET ORAL at 08:10

## 2024-10-20 RX ADMIN — HYDRALAZINE HYDROCHLORIDE 50 MG: 25 TABLET ORAL at 09:10

## 2024-10-20 RX ADMIN — POTASSIUM CHLORIDE 20 MEQ: 1500 TABLET, EXTENDED RELEASE ORAL at 06:10

## 2024-10-20 RX ADMIN — MAGNESIUM SULFATE HEPTAHYDRATE 2 G: 40 INJECTION, SOLUTION INTRAVENOUS at 10:10

## 2024-10-20 RX ADMIN — ENOXAPARIN SODIUM 40 MG: 40 INJECTION SUBCUTANEOUS at 08:10

## 2024-10-20 RX ADMIN — INSULIN ASPART 6 UNITS: 100 INJECTION, SOLUTION INTRAVENOUS; SUBCUTANEOUS at 12:10

## 2024-10-20 RX ADMIN — INSULIN GLARGINE 83 UNITS: 100 INJECTION, SOLUTION SUBCUTANEOUS at 09:10

## 2024-10-20 RX ADMIN — MAGNESIUM SULFATE HEPTAHYDRATE 2 G: 40 INJECTION, SOLUTION INTRAVENOUS at 08:10

## 2024-10-20 RX ADMIN — ASPIRIN 81 MG: 81 TABLET, COATED ORAL at 08:10

## 2024-10-20 RX ADMIN — MUPIROCIN: 20 OINTMENT TOPICAL at 09:10

## 2024-10-20 RX ADMIN — EZETIMIBE 10 MG: 10 TABLET ORAL at 08:10

## 2024-10-20 RX ADMIN — ENOXAPARIN SODIUM 40 MG: 40 INJECTION SUBCUTANEOUS at 09:10

## 2024-10-20 RX ADMIN — INSULIN GLARGINE 80 UNITS: 100 INJECTION, SOLUTION SUBCUTANEOUS at 08:10

## 2024-10-20 RX ADMIN — SACUBITRIL AND VALSARTAN 1 TABLET: 49; 51 TABLET, FILM COATED ORAL at 09:10

## 2024-10-20 NOTE — PLAN OF CARE
Problem: Physical Therapy  Goal: Physical Therapy Goal  Description: Goals to be met by: 24     Patient will increase functional independence with mobility by performin. Supine to sit with Modified [place]  2. Sit to supine with Modified [place]  3. Rolling with Modified [place].  4. Sit to stand transfer modified independence using least restrictive AD  5. Bed to chair transfer modified independence using least restrictive assistive device  6. Gait  2 x 150 feet with modified independence with least restrictive assistive device  7. Ascend/descend 6 stair with bilateral Handrails Stand-by Assistance   8. Lower extremity exercise program  2 x10 reps with supervision   Outcome: Progressing    Pt seen for initial evaluation.  Prior to admission patient was ambulating with straight cane, modified independent.  Pt motivated to return to PLOF.

## 2024-10-20 NOTE — PLAN OF CARE
Problem: Occupational Therapy  Goal: Occupational Therapy Goal  Description: Goals to be met by: 11/19/24     Patient will increase functional independence with ADLs by performing:    UE Dressing with Modified [place].  LE Dressing with Modified [place].  Grooming while standing at sink with Modified [place].  Toileting from toilet with Modified [place] for hygiene and clothing management.   Toilet transfer to toilet with Modified [place].  Upper extremity exercise program 3x15 reps per handout, with supervision.    Outcome: Progressing     Pt was agreeable to and participated in OT/PT evaluation.  Pt lives with her children in first floor apartment and reports being mod I with all mobility and self care skills.  Pt completed her evaluation and noted to require setup- min A with func mobility and set up A with ADLS.  Pt noted with poor endurance and SOB throughout her evaluation.   Goals established to assist pt with returning to PLOF regarding ADLs and func mobility.  Pt will benefit from skilled OT services in order to increase her level of safety and independence with ADLs and mobility.      Donna Cohen, OT  10/20/2024

## 2024-10-20 NOTE — NURSING
Notified team of patient current /58, received orders to hold scheduled hydralazine 50 mg, nifedipine 90 mg, and sacubitril-valsartan 49-51 mg.

## 2024-10-20 NOTE — PLAN OF CARE
Problem: Adult Inpatient Plan of Care  Goal: Plan of Care Review  10/20/2024 0749 by Aracely Edwards RN  Outcome: Progressing

## 2024-10-20 NOTE — PT/OT/SLP EVAL
Occupational Therapy   Evaluation & Tx    Name: Jaimee Quintana  MRN: [id number]  Admitting Diagnosis: Acute on chronic combined systolic and diastolic heart failure  Recent Surgery: * No surgery found *      Recommendations:     Discharge Recommendations: Low Intensity Therapy  Discharge Equipment Recommendations:  walker, rolling, CPAP  The mobility limitation cannot be sufficiently resolved by the use of a cane.   Patient's functional mobility deficit can be sufficiently resolved with the use of a rolling walker. Patient's mobility limitation significantly impairs their ability to participate in one of more activities of daily living. The use of a rolling walker will significantly improve the patient's ability to participate in MRADLS and the patient will use it on regular basis in the home.    Barriers to discharge:  Decreased caregiver support    Assessment:     Jaimee Quintana is a 49 y.o. female with a medical diagnosis of Acute on chronic combined systolic and diastolic heart failure.  She presents with the following performance deficits affecting function: weakness, impaired endurance, impaired functional mobility, gait instability, impaired self care skills, decreased upper extremity function, decreased lower extremity function, decreased safety awareness, decreased ROM, impaired cardiopulmonary response to activity.      Pt was agreeable to and participated in OT/PT evaluation.  Pt lives with her children in first floor apartment and reports being mod I with all mobility and self care skills.  Pt completed her evaluation and noted to require setup- min A with func mobility and set up A with ADLS.  Pt noted with poor endurance and SOB throughout her evaluation.   Goals established to assist pt with returning to PLOF regarding ADLs and func mobility.  Pt will benefit from skilled OT services in order to increase her level of safety and independence with ADLs and mobility.        Rehab Prognosis: Good;  patient would benefit from acute skilled OT services to address these deficits and reach maximum level of function.       Plan:     Patient to be seen 3 x/week to address the above listed problems via self-care/home management, therapeutic activities, therapeutic exercises  Plan of Care Expires: 11/19/24  Plan of Care Reviewed with: patient    Subjective     Chief Complaint: SOB and weak  Patient/Family Comments/goals: return to PLOF    Occupational Profile:  Living Environment: pt lives with her daughter (one minor and one adult) in a first floor apartment with no [place] - t/s combo for bathing  Previous level of function: mod I with mobility (used SPC) and ADLS  Equipment Used at Home: cane, straight, bath bench (step stool to get into bed)  Assistance upon Discharge: limited assist from daughters due to school and work    Pain/Comfort:  Pain Rating 1: 0/10  Pain Rating Post-Intervention 1: 0/10    Patients cultural, spiritual, Latter day conflicts given the current situation: no    Objective:     Communicated with: nurse prior to session.  Patient found HOB elevated with bed alarm, oxygen, telemetry, peripheral IV, PureWick upon OT entry to room.    General Precautions: Standard, fall  Orthopedic Precautions: N/A  Braces: N/A  Respiratory Status: Nasal cannula, flow 3 L/min    Occupational Performance:    Bed Mobility:    Patient completed Rolling/Turning to Left with  modified independence and with side rail  Patient completed Scooting/Bridging with modified independence  Patient completed Supine to Sit with moderate assistance  Patient completed Sit to Supine with min A to lift L LE    Functional Mobility/Transfers:  Patient completed Sit <> Stand Transfer with contact guard assistance  with  rolling walker   Functional Mobility: pt walked from bedside <-> door 2x with PT - first attempt, pt with flexed forward posture;  2nd attempt, pt cued to stand more upright and able to follow cues but noted with significant  fatigue    Activities of Daily Living:  Feeding:  independence    Grooming: set-up A sitting EOB  Upper Body Dressing: set up A to [place] hospital gown as robe  Lower Body Dressing: set up A to [place]/doff socks sitting EOB    Cognitive/Visual Perceptual:  Cognitive/Psychosocial Skills:     -       Oriented to: Person, Place, Time, and Situation   -       Follows Commands/attention:Follows two-step commands  -       Communication: clear/fluent  -       Memory: No Deficits noted  -       Safety awareness/insight to disability: impaired   -       Mood/Affect/Coping skills/emotional control: tired    Physical Exam:  Balance: -       sitting = good;  standing with RW = CGA  Postural examination/scapula alignment:    -       Rounded shoulders  -       Forward head  -       Forward flexed at hips during first trial walking but corrected with cues  Skin integrity: Visible skin intact  Edema:  None noted  Upper Extremity Range of Motion:   BUEs = WFL for ADLS  Upper Extremity Strength:  BUEs = WFL   Strength:  BUEs = WFL    AMPAC 6 Click ADL:  AMPAC Total Score: 19    Treatment & Education:  Pt completed ADLs and func mobility activities for tx session as noted above  Discussed DME needs with pt - pt states she had a CPAP but it was recalled and she never got  another one - also discussed that pt qualifies for a hospital bed due to dx of CHF for her to consider in the futuer  Pt educated on role of OT and POC      Patient left left sidelying with all lines intact, call button in reach, bed alarm on, and nurse notified    GOALS:   Multidisciplinary Problems       Occupational Therapy Goals          Problem: Occupational Therapy    Goal Priority Disciplines Outcome Interventions   Occupational Therapy Goal     OT, PT/OT Progressing    Description: Goals to be met by: 11/19/24     Patient will increase functional independence with ADLs by performing:    UE Dressing with Modified [place].  LE Dressing with Modified  [place].  Grooming while standing at sink with Modified [place].  Toileting from toilet with Modified [place] for hygiene and clothing management.   Toilet transfer to toilet with Modified [place].  Upper extremity exercise program 3x15 reps per handout, with supervision.                         History:     Past Medical History:   Diagnosis Date    Cataract     Cervical high risk HPV (human papillomavirus) test positive 2020    NOT 16 OR 18    CHF (congestive heart failure)     CVA (cerebral infarction)          Depression     Diabetes mellitus, type 2     GERD (gastroesophageal reflux disease)     Hyperlipidemia     Hypertension     Lactic acidosis 10/06/2023    Moderate nonproliferative diabetic retinopathy     Nausea and vomiting 10/06/2023    Obesity     Stroke     Tachycardia 10/04/2023         Past Surgical History:   Procedure Laterality Date     SECTION      CHOLECYSTECTOMY      DILATION AND CURETTAGE OF UTERUS      EYE SURGERY Bilateral     laser    GALLBLADDER SURGERY  2017    stone removed    IRRIGATION AND DEBRIDEMENT Right 2024    Procedure: IRRIGATION AND DEBRIDEMENT RIGHT GROIN;  Surgeon: Chalo Padgett MD;  Location[facility];  Service: General;  Laterality: Right;       Time Tracking:     OT Date of Treatment: 10/20/24  OT Start Time: 1103  OT Stop Time: 1126  OT Total Time (min): 23 min - cotx with PT    Billable Minutes:Evaluation 10  Therapeutic Activity 13    10/20/2024

## 2024-10-20 NOTE — PT/OT/SLP EVAL
Physical Therapy Evaluation    Patient Name:  Jaimee Quintana   MRN:  [id number]    Recommendations:     Discharge Recommendations:   Low Intensity Therapy  Discharge Equipment Recommendations: walker, rolling CPAP  Barriers to discharge: None    Assessment:     Jaimee Quintana is a 49 y.o. female admitted with a medical diagnosis of Acute on chronic combined systolic and diastolic heart failure.  She presents with the following impairments/functional limitations: weakness, impaired endurance, impaired functional mobility, gait instability, impaired cardiopulmonary response to activity .  Pt seen for co-evaluation with occupational therapy due to decreased activity tolerance and shortness of breath.  Pt agreeable to participate in evaluation.  Pt lives with two children in first floor apartment, she was independent with BADLs and mobility prior to admission.  Pt motivated to return to [facility] and is a good candidate for rehab services.    Rehab Prognosis: Good; patient would benefit from acute skilled PT services to address these deficits and reach maximum level of function.    Recent Surgery: * No surgery found *      Plan:     During this hospitalization, patient to be seen 4 x/week to address the identified rehab impairments via gait training, therapeutic activities, therapeutic exercises, neuromuscular re-education and progress toward the following goals:    Plan of Care Expires:   11/10/24    Subjective     Chief Complaint: fatigue, shortness of breath.  She reports some discomfort in left upper arm due to repeated injections in same spot.   Patient/Family Comments/goals: pt agreeable to evaluation.  Pain/Comfort:  Pain Rating 1:  (pt reports soreness in arms from injections, did not quantify)  Location 1: arm  Pain Addressed 1: Distraction    Patients cultural, spiritual, Christian conflicts given the current situation: no    Living Environment:  Pt lives with two children in first floor apartment  Prior to  admission, patients level of function was independent with mobility and BADLs.  Equipment used at home: cane, straight, shower chair.  DME owned (not currently used):  CPAP (pt says her device was recalled, she has not replaced it .  Upon discharge, patient will have limited assistance from children.    Objective:     Communicated with nurse prior to session.  Patient found right sidelying with   bed alarm, oxygen, telemetry, peripheral IV, PureWick upon OT entry to room.  upon PT entry to room.    General Precautions: Standard, fall  Orthopedic Precautions:N/A   Braces:  N/A  Respiratory Status: Nasal cannula, flow 3 L/min    Exams:  Cognitive Exam:  Patient is oriented to Person, Place, Time, and Situation  RLE ROM: WFL  RLE Strength: WFL  LLE ROM: grossly 3/5 t/o  LLE Strength: grossly 3/5 t/o    Functional Mobility:  Bed Mobility:     Rolling Left:  modified independence and with use of bed rail  Scooting: modified independence  Supine to Sit: moderate assistance and of 1 persons  Sit to Supine: minimum assistance, of 1 persons, and to lift Left lower extremity into bed  Transfers:     Sit to Stand:  contact guard assistance with rolling walker  Gait: pt ambulated ~5 feet with rolling walker with contact guard, forward bend posture with increased weight bearing onto assistive device.  Pt was able to assume upright posture with cuing.   Balance: sitting unsupported at edge of bed good, standing with support fair plus    Patient donned non slip socks and gait belt for OOB mobility    AM-PAC 6 CLICK MOBILITY  Total Score:16       Treatment & Education:   PT educated patient:  PT plan of care/role of PT  Safety with OOB mobility  Use of RW for household and community ambulation.   Energy conservation techniques   Discharge disposition    Pt  verbalized understanding        Patient left left sidelying with all lines intact, call button in reach, and bed alarm on.    GOALS:   Multidisciplinary Problems       Physical  Therapy Goals          Problem: Physical Therapy    Goal Priority Disciplines Outcome Interventions   Physical Therapy Goal     PT, PT/OT Progressing    Description: Goals to be met by: 24     Patient will increase functional independence with mobility by performin. Supine to sit with Modified [place]  2. Sit to supine with Modified [place]  3. Rolling with Modified [place].  4. Sit to stand transfer modified independence using least restrictive AD  5. Bed to chair transfer modified independence using least restrictive assistive device  6. Gait  2 x 150 feet with modified independence with least restrictive assistive device  7. Ascend/descend 6 stair with bilateral Handrails Stand-by Assistance   8. Lower extremity exercise program  2 x10 reps with supervision                        History:     Past Medical History:   Diagnosis Date    Cataract     Cervical high risk HPV (human papillomavirus) test positive 2020    NOT 16 OR 18    CHF (congestive heart failure)     CVA (cerebral infarction)          Depression     Diabetes mellitus, type 2     GERD (gastroesophageal reflux disease)     Hyperlipidemia     Hypertension     Lactic acidosis 10/06/2023    Moderate nonproliferative diabetic retinopathy     Nausea and vomiting 10/06/2023    Obesity     Stroke     Tachycardia 10/04/2023       Past Surgical History:   Procedure Laterality Date     SECTION      CHOLECYSTECTOMY      DILATION AND CURETTAGE OF UTERUS      EYE SURGERY Bilateral     laser    GALLBLADDER SURGERY  2017    stone removed    IRRIGATION AND DEBRIDEMENT Right 2024    Procedure: IRRIGATION AND DEBRIDEMENT RIGHT GROIN;  Surgeon: Chalo Padgett MD;  Location: [facility];  Service: General;  Laterality: Right;       Time Tracking:     PT Received On: 10/20/24  PT Start Time: 1103     PT Stop Time: 1126  PT Total Time (min): 23 min co-tx with OT    Billable Minutes: Evaluation 10 and Therapeutic Activity  13      10/20/2024

## 2024-10-20 NOTE — PLAN OF CARE
1900 Pt appeared to be in no distress. Reported no pain.     2100 accu ck: 328    Tele: SR,  HR  90,  No alarms.     Bed in lowest position, wheels locked, non skid socks, ID band worn, personal items and call bell with in reach, bed alarm set.

## 2024-10-20 NOTE — PROGRESS NOTES
[facility] Medicine  Progress Note    Patient Name: Jaimee Quintana  MRN: [id number]  Patient Class: IP- Inpatient   Admission Date: 10/19/2024  Length of Stay: 1 days  Attending Physician: Evangelina De Jesus MD  Primary Care Provider: Lakisha Mendez DO        Subjective:     Principal Problem:Acute on chronic combined systolic and diastolic heart failure        HPI:  Patient is a 48 yo F w/ PMHx of Combined CHF (EF~35-40% on 10/23) , DM2, HLD,  HLD,  CVA who presents to ED with worsening SOB.  Patient states that she had a gap in medication adherence, but that she received medications 1 week ago and has been compliant since then.  Patient endorses taking Lasix PRN.  Patient denies any chest pain, nausea, or abdominal pain.    In the ED, initial vital signs /95 , HR 92 , Temp 97.8 F, SpO2 92% on room air. Labs include CBC with stable H/H  and WBC within normal limits . CMP with Na 134 K 4.1 and BUN/Cr 10/1.0 (baseline Cr 0.9). Glucose 353. CXR showed patchy perihilar opacities bilaterally suspicious for pulmonary edema with an enlarged cardiac silhouette . EKG showed normal sinus rhythm with T-wave abnormalities. Troponin wnl,   , VBG PH 7.408, CO2 39.4, HCO3 24.8.  Patient given Lasix 80 mg IV,  mg, Acetominophen 1000 mg, Nitroglycerin paste and placed on Bipap.  [facility] consulted for evaluation for admission for acute heart failure exacerbation.     Overview/Hospital Course:  No notes on file    Interval History: NAEON, patient seen this morning and states she feels well. Continuing with diuresis, on 3L O2. Had some dizziness with PT.    Review of Systems   Constitutional:  Negative for chills and fever.   Respiratory:  Negative for shortness of breath.    Cardiovascular:  Negative for chest pain.   Gastrointestinal:  Negative for abdominal pain, nausea and vomiting.     Objective:     Vital Signs (Most Recent):  Temp: 99.1 °F (37.3 °C) (10/20/24 1629)  Pulse:  88 (10/20/24 1629)  Resp: 18 (10/20/24 1629)  BP: (!) 109/56 (10/20/24 1629)  SpO2: 98 % (10/20/24 1629) Vital Signs (24h Range):  Temp:  [97.6 °F (36.4 °C)-99.1 °F (37.3 °C)] 99.1 °F (37.3 °C)  Pulse:  [79-96] 88  Resp:  [18-22] 18  SpO2:  [92 %-98 %] 98 %  BP: (102-148)/(55-78) 109/56     Weight: 119.3 kg (263 lb 0.1 oz)  Body mass index is 43.77 kg/m².    Intake/Output Summary (Last 24 hours) at 10/20/2024 1641  Last data filed at 10/20/2024 0624  Gross per 24 hour   Intake 295 ml   Output 1500 ml   Net -1205 ml         Physical Exam  Constitutional:       General: She is not in acute distress.     Appearance: She is not toxic-appearing.   HENT:      Nose: Nose normal.   Eyes:      Extraocular Movements: Extraocular movements intact.   Cardiovascular:      Pulses: Normal pulses.      Heart sounds: Normal heart sounds.   Pulmonary:      Effort: Pulmonary effort is normal.      Breath sounds: Normal breath sounds.   Musculoskeletal:      Right lower leg: Edema present.   Skin:     General: Skin is warm and dry.   Neurological:      Mental Status: She is alert.   Psychiatric:         Mood and Affect: Mood normal.             Significant Labs: All pertinent labs within the past 24 hours have been reviewed.  CBC:   Recent Labs   Lab 10/19/24  1416 10/20/24  0239   WBC 10.56 16.82*   HGB 13.2 11.3*   HCT 39.5 34.4*    174     CMP:   Recent Labs   Lab 10/19/24  1416 10/20/24  0238   * 136   K 4.1 3.8    102   CO2 19* 21*   * 267*   BUN 10 16   CREATININE 1.0 1.1   CALCIUM 9.3 9.0   PROT 7.2 6.5   ALBUMIN 3.2* 2.7*   BILITOT 0.9 0.9   ALKPHOS 73 94   AST 33 16   ALT 54* 39   ANIONGAP 12 13       Significant Imaging: I have reviewed all pertinent imaging results/findings within the past 24 hours.    Assessment/Plan:      * Acute on chronic combined systolic and diastolic heart failure  Patient presents with 3 day history of worsening SOB  Patient hypertensive, afebrile SPO2 93% on 3L O2; A&O x  3  Echo on 10/2023:  EF~35-40%; Grade II diastolic dysfunction.    Plan:         - Furosemide IV 80 mg BID         - Strict I/O; 1500 mls fluid restriction         - Echocardiogram         - PRN Supplemental Oxygen.           Hypertension  Patients blood pressure range in the last 24 hours was: BP  Min: 130/73  Max: 190/84.The patient's inpatient anti-hypertensive regimen is listed below:  Current Antihypertensives  NIFEdipine 24 hr tablet 90 mg, Daily, Oral  hydrALAZINE tablet 50 mg, 3 times daily, Oral  carvediloL tablet 12.5 mg, 2 times daily with meals, Oral  furosemide injection 80 mg, Every 12 hours, Intravenous    Plan    Continue current medications    Type 2 diabetes mellitus, with long-term current use of insulin  Glucose 353 on initial CMP  HbA1C 9.5% 1/2024      Plan:            - Insulin Glargine 80 units BID            - SSI (0-15 units) TIDWM            - HbA1C pending      Tobacco dependency  Patient endorses smoking irregularly.    Plan:        - Will provide tobacco cessation counseling when appropriate    History of CVA (cerebrovascular accident) without residual deficits  Continue home ASA 81 mg      CESAR (obstructive sleep apnea)  BIPAP QHS    Hyperlipidemia associated with type 2 diabetes mellitus  Continue home Ezetimibe 10 mg daily        VTE Risk Mitigation (From admission, onward)           Ordered     enoxaparin injection 40 mg  Every 12 hours         10/19/24 1632     IP VTE HIGH RISK PATIENT  Once         10/19/24 1632     Place sequential compression device  Until discontinued         10/19/24 1632                    Discharge Planning   MALGORZATA:      Code Status: Full Code   Is the patient medically ready for discharge?:     Reason for patient still in hospital (select all that apply): Treatment                     Jas Bynum MD  Department of Hospital Medicine   [place] [facility]

## 2024-10-20 NOTE — NURSING
RAPID RESPONSE NURSE PROACTIVE ROUNDING NOTE       Time of Visit: 0850    Admit Date: 10/19/2024  LOS: 1  Code Status: Full Code   Date of Visit: 10/20/2024  : 1975  Age: 49 y.o.  Sex: female  Race: Black or   Bed: K456/K456 A:   MRN: [id number]  Was the patient discharged from an ICU this admission? No   Was the patient discharged from a PACU within last 24 hours? No   Did the patient receive conscious sedation/general anesthesia in last 24 hours? No   Was the patient in the ED within the past 24 hours? Yes   Was the patient on NIPPV within the past 24 hours? Yes   Attending Physician: Evangelina De Jesus MD  Primary Service: Family Medicine   Time spent at the bedside: < 15 min    SITUATION    Notified by charge RN during rounding  Reason for alert: CHF exacerbation     Diagnosis: Acute on chronic combined systolic and diastolic heart failure   has a past medical history of Cataract, Cervical high risk HPV (human papillomavirus) test positive, CHF (congestive heart failure), CVA (cerebral infarction), Depression, Diabetes mellitus, type 2, GERD (gastroesophageal reflux disease), Hyperlipidemia, Hypertension, Lactic acidosis, Moderate nonproliferative diabetic retinopathy, Nausea and vomiting, Obesity, Stroke, and Tachycardia.    Last Vitals:  Temp: 98.9 °F (37.2 °C) (10/20 0715)  Pulse: 83 (10/20 0715)  Resp: 22 (10/20 0715)  BP: 117/58 (10/20 0715)  SpO2: 96 % (10/20 0727)    24 Hour Vitals Range:  Temp:  [97.6 °F (36.4 °C)-98.9 °F (37.2 °C)]   Pulse:  [83-99]   Resp:  [19-57]   BP: (117-190)/(58-95)   SpO2:  [88 %-98 %]     Clinical Issues: Respiratory    ASSESSMENT/INTERVENTIONS    Pt laying in bed on 3L NC. Pt denies chest pain/SON. Received nitro paste for chest pain yesterday and was on BiPAP. Scheduled lasix 80mg and potassium replacements ordered.    RECOMMENDATIONS  Monitor VS  Monitor labs  Monitor UOP  Notify primary team or Pr for any acute changes    Discussed plan of care with  charge RNNeha    PROVIDER ESCALATION    Physician escalation: No    Disposition:Remain in room 456    FOLLOW UP    Call back the Rapid Response NurseYasmany at [phone] for additional questions or concerns.

## 2024-10-20 NOTE — SUBJECTIVE & OBJECTIVE
Interval History: PATT, patient seen this morning and states she feels well. Continuing with diuresis, on 3L O2. Had some dizziness with PT.    Review of Systems   Constitutional:  Negative for chills and fever.   Respiratory:  Negative for shortness of breath.    Cardiovascular:  Negative for chest pain.   Gastrointestinal:  Negative for abdominal pain, nausea and vomiting.     Objective:     Vital Signs (Most Recent):  Temp: 99.1 °F (37.3 °C) (10/20/24 1629)  Pulse: 88 (10/20/24 1629)  Resp: 18 (10/20/24 1629)  BP: (!) 109/56 (10/20/24 1629)  SpO2: 98 % (10/20/24 1629) Vital Signs (24h Range):  Temp:  [97.6 °F (36.4 °C)-99.1 °F (37.3 °C)] 99.1 °F (37.3 °C)  Pulse:  [79-96] 88  Resp:  [18-22] 18  SpO2:  [92 %-98 %] 98 %  BP: (102-148)/(55-78) 109/56     Weight: 119.3 kg (263 lb 0.1 oz)  Body mass index is 43.77 kg/m².    Intake/Output Summary (Last 24 hours) at 10/20/2024 1641  Last data filed at 10/20/2024 0624  Gross per 24 hour   Intake 295 ml   Output 1500 ml   Net -1205 ml         Physical Exam  Constitutional:       General: She is not in acute distress.     Appearance: She is not toxic-appearing.   HENT:      Nose: Nose normal.   Eyes:      Extraocular Movements: Extraocular movements intact.   Cardiovascular:      Pulses: Normal pulses.      Heart sounds: Normal heart sounds.   Pulmonary:      Effort: Pulmonary effort is normal.      Breath sounds: Normal breath sounds.   Musculoskeletal:      Right lower leg: Edema present.   Skin:     General: Skin is warm and dry.   Neurological:      Mental Status: She is alert.   Psychiatric:         Mood and Affect: Mood normal.             Significant Labs: All pertinent labs within the past 24 hours have been reviewed.  CBC:   Recent Labs   Lab 10/19/24  1416 10/20/24  0239   WBC 10.56 16.82*   HGB 13.2 11.3*   HCT 39.5 34.4*    174     CMP:   Recent Labs   Lab 10/19/24  1416 10/20/24  0238   * 136   K 4.1 3.8    102   CO2 19* 21*   * 267*    BUN 10 16   CREATININE 1.0 1.1   CALCIUM 9.3 9.0   PROT 7.2 6.5   ALBUMIN 3.2* 2.7*   BILITOT 0.9 0.9   ALKPHOS 73 94   AST 33 16   ALT 54* 39   ANIONGAP 12 13       Significant Imaging: I have reviewed all pertinent imaging results/findings within the past 24 hours.

## 2024-10-21 ENCOUNTER — CLINICAL SUPPORT (OUTPATIENT)
Dept: SMOKING CESSATION | Facility: CLINIC | Age: 49
End: 2024-10-21

## 2024-10-21 DIAGNOSIS — F17.210 CIGARETTE SMOKER: Primary | ICD-10-CM

## 2024-10-21 PROBLEM — J96.01 ACUTE HYPOXEMIC RESPIRATORY FAILURE: Status: ACTIVE | Noted: 2024-10-21

## 2024-10-21 LAB
ALBUMIN SERPL BCP-MCNC: 2.5 G/DL (ref 3.5–5.2)
ALP SERPL-CCNC: 123 U/L (ref 40–150)
ALT SERPL W/O P-5'-P-CCNC: 29 U/L (ref 10–44)
ANION GAP SERPL CALC-SCNC: 11 MMOL/L (ref 8–16)
ASCENDING AORTA: 2.85 CM
AST SERPL-CCNC: 16 U/L (ref 10–40)
AV INDEX (PROSTH): 0.73
AV MEAN GRADIENT: 3.7 MMHG
AV PEAK GRADIENT: 6.8 MMHG
AV VALVE AREA BY VELOCITY RATIO: 2.2 CM²
AV VALVE AREA: 2.3 CM²
AV VELOCITY RATIO: 0.69
BASOPHILS # BLD AUTO: 0.02 K/UL (ref 0–0.2)
BASOPHILS NFR BLD: 0.1 % (ref 0–1.9)
BILIRUB SERPL-MCNC: 0.7 MG/DL (ref 0.1–1)
BSA FOR ECHO PROCEDURE: 2.34 M2
BUN SERPL-MCNC: 21 MG/DL (ref 6–20)
CALCIUM SERPL-MCNC: 8.8 MG/DL (ref 8.7–10.5)
CHLORIDE SERPL-SCNC: 101 MMOL/L (ref 95–110)
CO2 SERPL-SCNC: 24 MMOL/L (ref 23–29)
CREAT SERPL-MCNC: 1.1 MG/DL (ref 0.5–1.4)
CV ECHO LV RWT: 0.56 CM
DIFFERENTIAL METHOD BLD: ABNORMAL
DOP CALC AO PEAK VEL: 1.3 M/S
DOP CALC AO VTI: 18.4 CM
DOP CALC LVOT AREA: 3.1 CM2
DOP CALC LVOT DIAMETER: 2 CM
DOP CALC LVOT PEAK VEL: 0.9 M/S
DOP CALC LVOT STROKE VOLUME: 42.1 CM3
DOP CALC MV VTI: 27.7 CM
DOP CALCLVOT PEAK VEL VTI: 13.4 CM
E WAVE DECELERATION TIME: 180.05 MSEC
E/A RATIO: 2.09
E/E' RATIO: 19.5 M/S
ECHO LV POSTERIOR WALL: 1.4 CM (ref 0.6–1.1)
EOSINOPHIL # BLD AUTO: 0.2 K/UL (ref 0–0.5)
EOSINOPHIL NFR BLD: 1.5 % (ref 0–8)
ERYTHROCYTE [DISTWIDTH] IN BLOOD BY AUTOMATED COUNT: 13.4 % (ref 11.5–14.5)
EST. GFR  (NO RACE VARIABLE): >60 ML/MIN/1.73 M^2
FRACTIONAL SHORTENING: 14 % (ref 28–44)
GLUCOSE SERPL-MCNC: 105 MG/DL (ref 70–110)
HCT VFR BLD AUTO: 34.9 % (ref 37–48.5)
HGB BLD-MCNC: 11.2 G/DL (ref 12–16)
IMM GRANULOCYTES # BLD AUTO: 0.13 K/UL (ref 0–0.04)
IMM GRANULOCYTES NFR BLD AUTO: 1 % (ref 0–0.5)
INTERVENTRICULAR SEPTUM: 1.2 CM (ref 0.6–1.1)
LEFT ATRIUM AREA SYSTOLIC (APICAL 2 CHAMBER): 18.42 CM2
LEFT ATRIUM AREA SYSTOLIC (APICAL 4 CHAMBER): 23.99 CM2
LEFT ATRIUM SIZE: 3.84 CM
LEFT ATRIUM VOLUME INDEX MOD: 30.3 ML/M2
LEFT ATRIUM VOLUME MOD: 67.37 ML
LEFT INTERNAL DIMENSION IN SYSTOLE: 4.3 CM (ref 2.1–4)
LEFT VENTRICLE DIASTOLIC VOLUME INDEX: 53.04 ML/M2
LEFT VENTRICLE DIASTOLIC VOLUME: 117.74 ML
LEFT VENTRICLE END SYSTOLIC VOLUME APICAL 2 CHAMBER: 52.51 ML
LEFT VENTRICLE END SYSTOLIC VOLUME APICAL 4 CHAMBER: 72.46 ML
LEFT VENTRICLE MASS INDEX: 117.9 G/M2
LEFT VENTRICLE SYSTOLIC VOLUME INDEX: 36.9 ML/M2
LEFT VENTRICLE SYSTOLIC VOLUME: 81.83 ML
LEFT VENTRICULAR INTERNAL DIMENSION IN DIASTOLE: 5 CM (ref 3.5–6)
LEFT VENTRICULAR MASS: 261.8 G
LV LATERAL E/E' RATIO: 19.5 M/S
LV SEPTAL E/E' RATIO: 19.5 M/S
LVED V (TEICH): 117.74 ML
LVES V (TEICH): 81.83 ML
LVOT MG: 1.66 MMHG
LVOT MV: 0.59 CM/S
LYMPHOCYTES # BLD AUTO: 1.5 K/UL (ref 1–4.8)
LYMPHOCYTES NFR BLD: 11.1 % (ref 18–48)
MAGNESIUM SERPL-MCNC: 2.2 MG/DL (ref 1.6–2.6)
MCH RBC QN AUTO: 29.1 PG (ref 27–31)
MCHC RBC AUTO-ENTMCNC: 32.1 G/DL (ref 32–36)
MCV RBC AUTO: 91 FL (ref 82–98)
MONOCYTES # BLD AUTO: 0.8 K/UL (ref 0.3–1)
MONOCYTES NFR BLD: 5.5 % (ref 4–15)
MV PEAK A VEL: 0.56 M/S
MV PEAK E VEL: 1.17 M/S
MV PEAK GRADIENT: 5 MMHG
MV VALVE AREA BY CONTINUITY EQUATION: 1.52 CM2
NEUTROPHILS # BLD AUTO: 11 K/UL (ref 1.8–7.7)
NEUTROPHILS NFR BLD: 80.8 % (ref 38–73)
NRBC BLD-RTO: 0 /100 WBC
OHS CV RV/LV RATIO: 0.78 CM
OHS QRS DURATION: 68 MS
OHS QTC CALCULATION: 450 MS
PHOSPHATE SERPL-MCNC: 2.4 MG/DL (ref 2.7–4.5)
PISA MRMAX VEL: 4.02 M/S
PISA TR MAX VEL: 2.56 M/S
PLATELET # BLD AUTO: 198 K/UL (ref 150–450)
PMV BLD AUTO: 11.7 FL (ref 9.2–12.9)
POCT GLUCOSE: 102 MG/DL (ref 70–110)
POCT GLUCOSE: 122 MG/DL (ref 70–110)
POCT GLUCOSE: 123 MG/DL (ref 70–110)
POCT GLUCOSE: 129 MG/DL (ref 70–110)
POTASSIUM SERPL-SCNC: 3.5 MMOL/L (ref 3.5–5.1)
PROT SERPL-MCNC: 6.9 G/DL (ref 6–8.4)
PULM VEIN S/D RATIO: 0.36
PV MV: 0.55 M/S
PV PEAK D VEL: 0.53 M/S
PV PEAK GRADIENT: 2 MMHG
PV PEAK S VEL: 0.19 M/S
PV PEAK VELOCITY: 0.76 M/S
RA PRESSURE ESTIMATED: 3 MMHG
RA VOL SYS: 54.96 ML
RBC # BLD AUTO: 3.85 M/UL (ref 4–5.4)
RIGHT ATRIAL AREA: 17.9 CM2
RIGHT ATRIUM VOLUME AREA LENGTH APICAL 4 CHAMBER: 51.79 ML
RIGHT VENTRICLE DIASTOLIC BASEL DIMENSION: 3.9 CM
RV TB RVSP: 6 MMHG
RV TISSUE DOPPLER FREE WALL SYSTOLIC VELOCITY 1 (APICAL 4 CHAMBER VIEW): 9.95 CM/S
SINUS: 2.56 CM
SODIUM SERPL-SCNC: 136 MMOL/L (ref 136–145)
STJ: 2.29 CM
TDI LATERAL: 0.06 M/S
TDI SEPTAL: 0.06 M/S
TDI: 0.06 M/S
TR MAX PG: 26 MMHG
TRICUSPID ANNULAR PLANE SYSTOLIC EXCURSION: 1.24 CM
TV REST PULMONARY ARTERY PRESSURE: 29 MMHG
WBC # BLD AUTO: 13.64 K/UL (ref 3.9–12.7)
Z-SCORE OF LEFT VENTRICULAR DIMENSION IN END DIASTOLE: -4.39
Z-SCORE OF LEFT VENTRICULAR DIMENSION IN END SYSTOLE: -0.71

## 2024-10-21 PROCEDURE — 97530 THERAPEUTIC ACTIVITIES: CPT | Mod: HCNC

## 2024-10-21 PROCEDURE — 83735 ASSAY OF MAGNESIUM: CPT | Mod: HCNC

## 2024-10-21 PROCEDURE — 63600175 PHARM REV CODE 636 W HCPCS: Mod: HCNC

## 2024-10-21 PROCEDURE — 99900035 HC TECH TIME PER 15 MIN (STAT): Mod: HCNC

## 2024-10-21 PROCEDURE — 94761 N-INVAS EAR/PLS OXIMETRY MLT: CPT | Mod: HCNC

## 2024-10-21 PROCEDURE — 84100 ASSAY OF PHOSPHORUS: CPT | Mod: HCNC

## 2024-10-21 PROCEDURE — 25000003 PHARM REV CODE 250: Mod: HCNC

## 2024-10-21 PROCEDURE — 80053 COMPREHEN METABOLIC PANEL: CPT | Mod: HCNC

## 2024-10-21 PROCEDURE — 27000221 HC OXYGEN, UP TO 24 HOURS: Mod: HCNC

## 2024-10-21 PROCEDURE — 36415 COLL VENOUS BLD VENIPUNCTURE: CPT | Mod: HCNC

## 2024-10-21 PROCEDURE — 25000003 PHARM REV CODE 250: Mod: HCNC | Performed by: FAMILY MEDICINE

## 2024-10-21 PROCEDURE — 97535 SELF CARE MNGMENT TRAINING: CPT | Mod: HCNC

## 2024-10-21 PROCEDURE — 97116 GAIT TRAINING THERAPY: CPT | Mod: HCNC

## 2024-10-21 PROCEDURE — 11000001 HC ACUTE MED/SURG PRIVATE ROOM: Mod: HCNC

## 2024-10-21 PROCEDURE — 25500020 PHARM REV CODE 255: Mod: HCNC | Performed by: INTERNAL MEDICINE

## 2024-10-21 PROCEDURE — 94660 CPAP INITIATION&MGMT: CPT | Mod: HCNC

## 2024-10-21 PROCEDURE — 94640 AIRWAY INHALATION TREATMENT: CPT | Mod: HCNC

## 2024-10-21 PROCEDURE — 25000242 PHARM REV CODE 250 ALT 637 W/ HCPCS: Mod: HCNC | Performed by: NURSE PRACTITIONER

## 2024-10-21 PROCEDURE — 99406 BEHAV CHNG SMOKING 3-10 MIN: CPT | Mod: ,,,

## 2024-10-21 PROCEDURE — S4991 NICOTINE PATCH NONLEGEND: HCPCS | Mod: HCNC

## 2024-10-21 PROCEDURE — 85025 COMPLETE CBC W/AUTO DIFF WBC: CPT | Mod: HCNC

## 2024-10-21 RX ORDER — IPRATROPIUM BROMIDE AND ALBUTEROL SULFATE 2.5; .5 MG/3ML; MG/3ML
3 SOLUTION RESPIRATORY (INHALATION) ONCE
Status: COMPLETED | OUTPATIENT
Start: 2024-10-21 | End: 2024-10-21

## 2024-10-21 RX ORDER — INSULIN GLARGINE 100 [IU]/ML
83 INJECTION, SOLUTION SUBCUTANEOUS 2 TIMES DAILY
Status: DISCONTINUED | OUTPATIENT
Start: 2024-10-21 | End: 2024-10-21

## 2024-10-21 RX ORDER — BENZONATATE 100 MG/1
100 CAPSULE ORAL 3 TIMES DAILY PRN
Status: DISCONTINUED | OUTPATIENT
Start: 2024-10-21 | End: 2024-10-22 | Stop reason: HOSPADM

## 2024-10-21 RX ORDER — INSULIN GLARGINE 100 [IU]/ML
45 INJECTION, SOLUTION SUBCUTANEOUS 2 TIMES DAILY
Status: DISCONTINUED | OUTPATIENT
Start: 2024-10-21 | End: 2024-10-22

## 2024-10-21 RX ORDER — INSULIN GLARGINE 100 [IU]/ML
40 INJECTION, SOLUTION SUBCUTANEOUS ONCE
Status: COMPLETED | OUTPATIENT
Start: 2024-10-21 | End: 2024-10-21

## 2024-10-21 RX ORDER — SPIRONOLACTONE 25 MG/1
25 TABLET ORAL DAILY
Status: DISCONTINUED | OUTPATIENT
Start: 2024-10-22 | End: 2024-10-22 | Stop reason: HOSPADM

## 2024-10-21 RX ORDER — INSULIN ASPART 100 [IU]/ML
0-10 INJECTION, SOLUTION INTRAVENOUS; SUBCUTANEOUS
Status: DISCONTINUED | OUTPATIENT
Start: 2024-10-21 | End: 2024-10-22 | Stop reason: HOSPADM

## 2024-10-21 RX ORDER — IBUPROFEN 200 MG
1 TABLET ORAL DAILY
Status: DISCONTINUED | OUTPATIENT
Start: 2024-10-21 | End: 2024-10-22 | Stop reason: HOSPADM

## 2024-10-21 RX ORDER — INSULIN GLARGINE 100 [IU]/ML
45 INJECTION, SOLUTION SUBCUTANEOUS 2 TIMES DAILY
Status: DISCONTINUED | OUTPATIENT
Start: 2024-10-22 | End: 2024-10-21

## 2024-10-21 RX ORDER — SODIUM,POTASSIUM PHOSPHATES 280-250MG
1 POWDER IN PACKET (EA) ORAL ONCE
Status: COMPLETED | OUTPATIENT
Start: 2024-10-21 | End: 2024-10-21

## 2024-10-21 RX ADMIN — HYDRALAZINE HYDROCHLORIDE 50 MG: 25 TABLET ORAL at 12:10

## 2024-10-21 RX ADMIN — INSULIN GLARGINE 40 UNITS: 100 INJECTION, SOLUTION SUBCUTANEOUS at 12:10

## 2024-10-21 RX ADMIN — BENZONATATE 100 MG: 100 CAPSULE ORAL at 04:10

## 2024-10-21 RX ADMIN — SACUBITRIL AND VALSARTAN 1 TABLET: 49; 51 TABLET, FILM COATED ORAL at 09:10

## 2024-10-21 RX ADMIN — POTASSIUM & SODIUM PHOSPHATES POWDER PACK 280-160-250 MG 1 PACKET: 280-160-250 PACK at 12:10

## 2024-10-21 RX ADMIN — INSULIN GLARGINE 45 UNITS: 100 INJECTION, SOLUTION SUBCUTANEOUS at 09:10

## 2024-10-21 RX ADMIN — HYDRALAZINE HYDROCHLORIDE 50 MG: 25 TABLET ORAL at 09:10

## 2024-10-21 RX ADMIN — ENOXAPARIN SODIUM 40 MG: 40 INJECTION SUBCUTANEOUS at 09:10

## 2024-10-21 RX ADMIN — ENOXAPARIN SODIUM 40 MG: 40 INJECTION SUBCUTANEOUS at 12:10

## 2024-10-21 RX ADMIN — SACUBITRIL AND VALSARTAN 1 TABLET: 49; 51 TABLET, FILM COATED ORAL at 12:10

## 2024-10-21 RX ADMIN — FUROSEMIDE 80 MG: 10 INJECTION, SOLUTION INTRAVENOUS at 09:10

## 2024-10-21 RX ADMIN — FUROSEMIDE 80 MG: 10 INJECTION, SOLUTION INTRAVENOUS at 12:10

## 2024-10-21 RX ADMIN — CARVEDILOL 12.5 MG: 12.5 TABLET, FILM COATED ORAL at 12:10

## 2024-10-21 RX ADMIN — PERFLUTREN 1.5 ML: 6.52 INJECTION, SUSPENSION INTRAVENOUS at 10:10

## 2024-10-21 RX ADMIN — ASPIRIN 81 MG: 81 TABLET, COATED ORAL at 12:10

## 2024-10-21 RX ADMIN — EZETIMIBE 10 MG: 10 TABLET ORAL at 12:10

## 2024-10-21 RX ADMIN — IPRATROPIUM BROMIDE AND ALBUTEROL SULFATE 3 ML: .5; 3 SOLUTION RESPIRATORY (INHALATION) at 02:10

## 2024-10-21 RX ADMIN — MUPIROCIN: 20 OINTMENT TOPICAL at 12:10

## 2024-10-21 RX ADMIN — MUPIROCIN: 20 OINTMENT TOPICAL at 09:10

## 2024-10-21 RX ADMIN — CARVEDILOL 12.5 MG: 12.5 TABLET, FILM COATED ORAL at 04:10

## 2024-10-21 RX ADMIN — NICOTINE 1 PATCH: 14 PATCH TRANSDERMAL at 03:10

## 2024-10-21 RX ADMIN — SENNOSIDES AND DOCUSATE SODIUM 1 TABLET: 8.6; 5 TABLET ORAL at 09:10

## 2024-10-21 NOTE — PLAN OF CARE
CM met with pt on [employer] to discuss discharge planning. She live at home and her daughters live with her. Pt is independent with ADL's. Pt has a Cane, Rollator and a foot stool. No HH services. Noted pt is on 02 at present time, but not on 02 at her home. Upon discharge, pts family member Kimberly Quintana (daughter)  [phone]  will provide transport home if able to leave work, if not hospital will provide transport to home. Pt made aware to contact CM with any questions or concerns. CM will continue to follow and assist with any discharge needs.    Future Appointments   Date Time Provider Department Center   12/17/2024 10:20 AM GHAZAL Tillman MD [facility] OPHTHAL [facility] - Telemetry  Initial Discharge Assessment       Primary Care Provider: Lakisha Mendez DO    Admission Diagnosis: Shortness of breath [R06.02]  Acute combined systolic and diastolic HF (heart failure) [I50.41]  Acute respiratory failure with hypoxia [J96.01]  Acute heart failure, unspecified heart failure type [I50.9]    Admission Date: 10/19/2024  Expected Discharge Date:     Transition of Care Barriers: (P) Transportation    Payor: HU[employer] MANAGED MEDICARE / Plan: [employer] HMO PPO SPECIAL NEEDS / Product Type: Medicare Advantage /     Extended Emergency Contact Information  Primary Emergency Contact: Kimberly Quintana  Mobile Phone: [phone]  Relation: Daughter  Secondary Emergency Contact: Iris Quintana  Address: [address] [place] [address] [address]  Mobile Phone: [phone]  Relation: Sister   needed? No  Father: Abilio Quintana  Mobile Phone: [phone]    Discharge Plan A: (P) Home, Home with family  Discharge Plan B: (P) Home, Home with family, Home Health      [id number] [place] - [address]  [address] [place] [address]  Phone: [phone] Fax: [phone]    [facility] - [address]  [address]  [address]  [facility] [address]  Phone: [phone] Fax: [phone]    [id number] [place] - [address]  [address]  [address]  [place] [address]  Phone: [phone] Fax: [phone]    [facility]  [address]  [place] [address]  Phone: [phone] Fax: [phone]    [facility] [place] - [facility] [place] - [address] [facility]  [address] [facility] [place] [address]  Phone: [phone] Fax: [phone]    [facility]  [address]  [place] [address]  Phone: [phone] Fax: [phone]      Initial Assessment (most recent)       Adult Discharge Assessment - 10/21/24 1357          Discharge Assessment    Assessment Type Discharge Planning Assessment     Confirmed/corrected address, phone number and insurance Yes     Confirmed Demographics Correct on Facesheet     Source of Information patient     When was your last doctors appointment? 07/23/24 (P)      Communicated MALGORZATA with patient/caregiver Date not available/Unable to determine (P)      Reason For Admission CHF (P)      People in Home child(shaylee), adult (P)      Do you expect to return to your current living situation? Yes (P)      Do you have help at home or someone to help you manage your care at home? Yes (P)      Who are your caregiver(s) and their phone number(s)? Kimberly Quintana [phone] (P)      Prior to hospitilization cognitive status: Alert/Oriented (P)      Current cognitive status: Alert/Oriented (P)      Walking or Climbing Stairs Difficulty yes (P)      Walking or Climbing Stairs ambulation difficulty, requires equipment (P)      Mobility Management Rollator, Cane (P)      Dressing/Bathing Difficulty no (P)      Equipment Currently Used at Home rollator;cane, straight;other (see comments) (P)    Foot stool    Readmission within 30 days? No (P)      Patient currently being followed by outpatient case management? No  (P)      Do you currently have service(s) that help you manage your care at home? No (P)      Do you take prescription medications? Yes (P)      Do you have prescription coverage? Yes (P)      Coverage [employer]/Medicaid (P)      Do you have any problems affording any of your prescribed medications? No (P)      Is the patient taking medications as prescribed? yes (P)      Who is going to help you get home at discharge? Kimberly Quintana [phone] (P)      How do you get to doctors appointments? health plan transportation (P)      Are you on dialysis? No (P)      Do you take coumadin? No (P)      Discharge Plan A Home;Home with family (P)      Discharge Plan B Home;Home with family;Home Health (P)      DME Needed Upon Discharge  none (P)      Discharge Plan discussed with: Patient (P)      Transition of Care Barriers Transportation (P)         Physical Activity    On average, how many days per week do you engage in moderate to strenuous exercise (like a brisk walk)? 0 days (P)      On average, how many minutes do you engage in exercise at this level? 0 min (P)         Financial Resource Strain    How hard is it for you to pay for the very basics like food, housing, medical care, and heating? Not hard at all (P)         Housing Stability    In the last 12 months, was there a time when you were not able to pay the mortgage or rent on time? No (P)      At any time in the past 12 months, were you homeless or living in a shelter (including now)? No (P)         Transportation Needs    Has the lack of transportation kept you from medical appointments, meetings, work or from getting things needed for daily living? No (P)         Food Insecurity    Within the past 12 months, you worried that your food would run out before you got the money to buy more. Never true (P)      Within the past 12 months, the food you bought just didn't last and you didn't have money to get more. Never true (P)         Stress    Do you feel stress -  tense, restless, nervous, or anxious, or unable to sleep at night because your mind is troubled all the time - these days? To some extent (P)         Social Isolation    How often do you feel lonely or isolated from those around you?  Never (P)         Alcohol Use    Q1: How often do you have a drink containing alcohol? Monthly or less (P)      Q2: How many drinks containing alcohol do you have on a typical day when you are drinking? 1 or 2 (P)      Q3: How often do you have six or more drinks on one occasion? Never (P)         Utilities    In the past 12 months has the electric, gas, oil, or water company threatened to shut off services in your home? No (P)         Health Literacy    How often do you need to have someone help you when you read instructions, pamphlets, or other written material from your doctor or pharmacy? Never (P)         OTHER    Name(s) of People in Home Kimberly Quintana [phone] (P)

## 2024-10-21 NOTE — PT/OT/SLP PROGRESS
Occupational Therapy   Treatment    Name: Jaimee Quintana  MRN: [id number]  Admitting Diagnosis:  Acute on chronic combined systolic and diastolic heart failure       Recommendations:     Discharge Recommendations: Low Intensity Therapy  Discharge Equipment Recommendations:  walker, rolling, CPAP  Barriers to discharge:  Decreased caregiver support    Assessment:     Jaimee Quintana is a 49 y.o. female with a medical diagnosis of Acute on chronic combined systolic and diastolic heart failure.  She presents with the following performance deficits affecting function: weakness, impaired endurance, impaired sensation, impaired self care skills, impaired functional mobility, gait instability, impaired balance, decreased coordination, decreased lower extremity function, decreased safety awareness, impaired coordination, impaired cardiopulmonary response to activity.     Pt was agreeable to and participated in OT/PT session.  Co-tx provided today due to nursing concerns of pt desaturation when ambulating.  O2 saturation taken throughout session with it remaining 94-97% with O2 set on 4L.  Pt continues to perform func mobility with SBA - CGA, but needed min A 2x to assist with balance management during 2 cases of LOB when walking/standing with therapy.  Pt continues to require set up - CGA with ADLS, but noted to be more fatigued today compared to yesterday's session.  Pt's goals remain appropriate at this time.  She will continue to benefit from skilled OT services in order to assist her with increasing her safety and level of independence with self care and mobility tasks.       Rehab Prognosis:  Good; patient would benefit from acute skilled OT services to address these deficits and reach maximum level of function.       Plan:     Patient to be seen 3 x/week to address the above listed problems via self-care/home management, therapeutic activities, therapeutic exercises  Plan of Care Expires: 11/19/24  Plan of Care  Reviewed with: patient    Subjective     Chief Complaint: fatigue  Patient/Family Comments/goals: return to PLOF  Pain/Comfort:  Pain Rating 1: 0/10  Pain Rating Post-Intervention 1: 0/10    Objective:     Communicated with: nurse prior to session.  Patient found HOB elevated with bed alarm, telemetry, PureWick upon OT entry to room.    General Precautions: Standard, fall    Orthopedic Precautions:N/A  Braces: N/A  Respiratory Status: Nasal cannula, flow 4 L/min     Occupational Performance:     Bed Mobility:    Patient completed Rolling/Turning to Left with  stand by assistance  Patient completed Scooting/Bridging with stand by assistance  Patient completed Supine to Sit with stand by assistance     Functional Mobility/Transfers:  Patient completed Sit <> Stand Transfer with stand by assistance and  rolling walker   Functional Mobility: Pt walked around room using barriatric RW and needed CGA for stability - needed sitting break following standing at sink for grooming - pt noted with 3 LOB (when standing, adjusting brief, and walking away from sink) and needed min A to regain balance for 2 of those 3 occurrences.      Activities of Daily Living:  Feeding:  independence    Grooming: contact guard assistance standing at sink to wash hands and mouth  Upper Body Dressing: set up A to [facility] gown seated EOB  Lower Body Dressing: set up A and increased time with R sock - seated EOB      AMPAC 6 Click ADL:      Treatment & Education:  Pt completed ADLs and func mobility activities for tx session as noted above  Pt educated on role of OT and POC      Patient left up in chair with all lines intact, call button in reach, and chair alarm on    GOALS:   Multidisciplinary Problems       Occupational Therapy Goals          Problem: Occupational Therapy    Goal Priority Disciplines Outcome Interventions   Occupational Therapy Goal     OT, PT/OT Progressing    Description: Goals to be met by: 11/19/24     Patient will  increase functional independence with ADLs by performing:    UE Dressing with Modified [place].  LE Dressing with Modified [place].  Grooming while standing at sink with Modified [place].  Toileting from toilet with Modified [place] for hygiene and clothing management.   Toilet transfer to toilet with Modified [place].  Upper extremity exercise program 3x15 reps per handout, with supervision.                         Time Tracking:     OT Date of Treatment: 10/21/24  OT Start Time: 0917  OT Stop Time: 0941  OT Total Time (min): 24 min - cotx with PT    Billable Minutes:Self Care/Home Management 10  Therapeutic Activity 14    OT/SLICK: OT          10/21/2024

## 2024-10-21 NOTE — ASSESSMENT & PLAN NOTE
Glucose 353 on initial CMP  HbA1C 9.5% 1/2024      Plan:           - Am glucose  68             -Decreased  Insulin Glargine 40 units BID            -Monitor for hypoglycemia           - SSI (0-15 units) TIDWM            - HbA1C   8.9

## 2024-10-21 NOTE — PROGRESS NOTES
[facility] Medicine  Progress Note    Patient Name: Jaimee Quintana  MRN: [id number]  Patient Class: IP- Inpatient   Admission Date: 10/19/2024  Length of Stay: 2 days  Attending Physician: Harjeet Harris,*  Primary Care Provider: Lakisha Mendez DO        Subjective:     Principal Problem:Acute on chronic combined systolic and diastolic heart failure        HPI:  Patient is a 50 yo F w/ PMHx of Combined CHF (EF~35-40% on 10/23) , DM2, HLD,  HLD,  CVA who presents to ED with worsening SOB.  Patient states that she had a gap in medication adherence, but that she received medications 1 week ago and has been compliant since then.  Patient endorses taking Lasix PRN.  Patient denies any chest pain, nausea, or abdominal pain.    In the ED, initial vital signs /95 , HR 92 , Temp 97.8 F, SpO2 92% on room air. Labs include CBC with stable H/H  and WBC within normal limits . CMP with Na 134 K 4.1 and BUN/Cr 10/1.0 (baseline Cr 0.9). Glucose 353. CXR showed patchy perihilar opacities bilaterally suspicious for pulmonary edema with an enlarged cardiac silhouette . EKG showed normal sinus rhythm with T-wave abnormalities. Troponin wnl,   , VBG PH 7.408, CO2 39.4, HCO3 24.8.  Patient given Lasix 80 mg IV,  mg, Acetominophen 1000 mg, Nitroglycerin paste and placed on Bipap.  [facility] consulted for evaluation for admission for acute heart failure exacerbation.     Overview/Hospital Course:  50 yo female with a PMHx of combined CHF (EF ~35-40%), DM, HLD, and CVA admitted on 10/19 for CHF excerebration. She did have one isolated temperature of 100.6 on 10/20 that abated with Tylenol; she has since remained afebrile. CXR from 10/19 was significant for patchy perihilar airspace opacities suspicious for pulmonary edema and an enlarged cardiac silhouette. EKG with normal sinus rhythm, right atrial enlargement, and T wave abnormalities. PT/OT consulted for dizziness. PT/OT  recommends acute skilled PT and OT services. Echo significant for Pending. Continue diuresis and supplemental oxygen as needed. Begin Spironolactone 25 mg daily tomorrow. Nifedipine discontinued on 10/21. Continue to monitor blood pressure and blood glucose.     Interval History: Pt was febrile with a temp of 100.6 patient seen this morning and on the BIPAP machine tolerating well. She also endorsed RUQ abd pain. She denies any nuasea/vomiting/diarrhea, chest pain, palpitations or SOB.      Review of Systems   Constitutional:  Negative for chills and fever.   Respiratory:  Negative for shortness of breath.    Cardiovascular:  Negative for chest pain.   Gastrointestinal:  Negative for abdominal pain, nausea and vomiting.     Objective:     Vital Signs (Most Recent):  Temp: 99.5 °F (37.5 °C) (10/21/24 0000)  Pulse: 84 (10/21/24 0011)  Resp: (!) 22 (10/21/24 0000)  BP: (!) 106/51 (10/21/24 0000)  SpO2: 96 % (10/21/24 0000) Vital Signs (24h Range):  Temp:  [97.6 °F (36.4 °C)-100.6 °F (38.1 °C)] 99.5 °F (37.5 °C)  Pulse:  [79-91] 84  Resp:  [18-26] 22  SpO2:  [92 %-98 %] 96 %  BP: (102-148)/(51-78) 106/51     Weight: 119.3 kg (263 lb 0.1 oz)  Body mass index is 43.77 kg/m².    Intake/Output Summary (Last 24 hours) at 10/21/2024 0248  Last data filed at 10/21/2024 0030  Gross per 24 hour   Intake 490 ml   Output 900 ml   Net -410 ml         Physical Exam  Constitutional:       General: She is not in acute distress.     Appearance: She is not toxic-appearing.   HENT:      Nose: Nose normal.   Eyes:      Extraocular Movements: Extraocular movements intact.   Cardiovascular:      Pulses: Normal pulses.      Heart sounds: Normal heart sounds.   Pulmonary:      Effort: Pulmonary effort is normal.      Breath sounds: Normal breath sounds.   Musculoskeletal:      Right lower leg: Edema present.   Skin:     General: Skin is warm and dry.   Neurological:      Mental Status: She is alert.   Psychiatric:         Mood and Affect:  Mood normal.             Significant Labs: All pertinent labs within the past 24 hours have been reviewed.  CBC:   Recent Labs   Lab 10/19/24  1416 10/20/24  0239   WBC 10.56 16.82*   HGB 13.2 11.3*   HCT 39.5 34.4*    174     CMP:   Recent Labs   Lab 10/19/24  1416 10/20/24  0238   * 136   K 4.1 3.8    102   CO2 19* 21*   * 267*   BUN 10 16   CREATININE 1.0 1.1   CALCIUM 9.3 9.0   PROT 7.2 6.5   ALBUMIN 3.2* 2.7*   BILITOT 0.9 0.9   ALKPHOS 73 94   AST 33 16   ALT 54* 39   ANIONGAP 12 13       Significant Imaging: I have reviewed all pertinent imaging results/findings within the past 24 hours.    Assessment/Plan:      * Acute on chronic combined systolic and diastolic heart failure  Patient presents with 3 day history of worsening SOB  Patient hypertensive, afebrile SPO2 93% on 3L O2; A&O x 3  Echo on 10/2023:  EF~35-40%; Grade II diastolic dysfunction.  Pt wheezing on physical exam      Plan:         - Continuee Furosemide IV 80 mg BID         - Continue BIPAP         - Duoneb treatment x1 and reevaluate today         - Strict I/O; 1500 mls fluid restriction         - Echocardiogram pending and to start the cardiac diet         - PRN Supplemental Oxygen and currently utilizing 2 liters per NC         -Continue PT/OT      Hypertension  Patients blood pressure range in the last 24 hours was: BP  Min: 130/73  Max: 190/84.The patient's inpatient anti-hypertensive regimen is listed below:  Current Antihypertensives  NIFEdipine 24 hr tablet 90 mg, Daily, Oral  hydrALAZINE tablet 50 mg, 3 times daily, Oral  carvediloL tablet 12.5 mg, 2 times daily with meals, Oral  furosemide injection 80 mg, Every 12 hours, Intravenous    Plan    D/C the nifedipine   Started pt on the spironolactone 25 mg daily  Will continue to monitor the BP     Type 2 diabetes mellitus, with long-term current use of insulin  Glucose 353 on initial CMP  HbA1C 9.5% 1/2024  Pt's glucose was this am was 87    Plan:       -Decreased the Lantus by half this am and will continue the pm dose            -Will continue the Insulin Glargine  at 83 units BID            -Will CTM but if remains low, consider decreasing the Glargine            - SSI (0-15 units) TIDWM            - HbA1C 8.9      Tobacco dependency  Patient endorses smoking irregularly.    Plan:        - Will provide tobacco cessation counseling when appropriate    History of CVA (cerebrovascular accident) without residual deficits  Continue home ASA 81 mg      CESAR (obstructive sleep apnea)  Continue BIPAP QHS    Hyperlipidemia associated with type 2 diabetes mellitus  Continue home Ezetimibe 10 mg daily        VTE Risk Mitigation (From admission, onward)           Ordered     enoxaparin injection 40 mg  Every 12 hours         10/19/24 1632     IP VTE HIGH RISK PATIENT  Once         10/19/24 1632     Place sequential compression device  Until discontinued         10/19/24 1632                    Discharge Planning   MALGORZATA:      Code Status: Full Code   Is the patient medically ready for discharge?:     Reason for patient still in hospital (select all that apply): Treatment           Michelle Garcia MD  Department of Hospital Medicine   [facility] - Telemetry

## 2024-10-21 NOTE — SUBJECTIVE & OBJECTIVE
Interval History: VSS, NAD continues to require O2 therapy.    Review of Systems   Constitutional:  Negative for chills and fever.   Respiratory:  Positive for shortness of breath.    Cardiovascular:  Negative for chest pain.   Gastrointestinal:  Negative for abdominal pain, nausea and vomiting.     Objective:     Vital Signs (Most Recent):  Temp: 99.5 °F (37.5 °C) (10/21/24 0000)  Pulse: 84 (10/21/24 0011)  Resp: (!) 22 (10/21/24 0000)  BP: (!) 106/51 (10/21/24 0000)  SpO2: 96 % (10/21/24 0000) Vital Signs (24h Range):  Temp:  [97.6 °F (36.4 °C)-100.6 °F (38.1 °C)] 99.5 °F (37.5 °C)  Pulse:  [79-91] 84  Resp:  [18-26] 22  SpO2:  [92 %-98 %] 96 %  BP: (102-148)/(51-78) 106/51     Weight: 119.3 kg (263 lb 0.1 oz)  Body mass index is 43.77 kg/m².    Intake/Output Summary (Last 24 hours) at 10/21/2024 0248  Last data filed at 10/21/2024 0030  Gross per 24 hour   Intake 490 ml   Output 900 ml   Net -410 ml         Physical Exam  Constitutional:       General: She is not in acute distress.     Appearance: She is not toxic-appearing.   HENT:      Nose: Nose normal.   Eyes:      Extraocular Movements: Extraocular movements intact.   Cardiovascular:      Pulses: Normal pulses.      Heart sounds: Normal heart sounds.   Pulmonary:      Effort: Pulmonary effort is normal.      Breath sounds: Normal breath sounds.   Musculoskeletal:      Right lower leg: No edema.   Skin:     General: Skin is warm and dry.   Neurological:      Mental Status: She is alert.   Psychiatric:         Mood and Affect: Mood normal.             Significant Labs: All pertinent labs within the past 24 hours have been reviewed.  CBC:   Recent Labs   Lab 10/19/24  1416 10/20/24  0239   WBC 10.56 16.82*   HGB 13.2 11.3*   HCT 39.5 34.4*    174     CMP:   Recent Labs   Lab 10/19/24  1416 10/20/24  0238   * 136   K 4.1 3.8    102   CO2 19* 21*   * 267*   BUN 10 16   CREATININE 1.0 1.1   CALCIUM 9.3 9.0   PROT 7.2 6.5   ALBUMIN 3.2*  2.7*   BILITOT 0.9 0.9   ALKPHOS 73 94   AST 33 16   ALT 54* 39   ANIONGAP 12 13       Significant Imaging: I have reviewed all pertinent imaging results/findings within the past 24 hours.

## 2024-10-21 NOTE — PT/OT/SLP PROGRESS
Digital Medicine: Clinician Follow-Up        Follow Up  Follow-up reason(s): reading review    Patient's BP is stable.           Per 30 day average, 125/78 mmHg, patient's BP is at goal.     I will continue to monitor regularly and will follow up in 4-6 months, sooner if BP begins to trend upward or downward.    Asked patient to call or message with questions or concerns.           Last 5 Patient Entered Readings                                      Current 30 Day Average: 125/78     Recent Readings 12/1/2019 11/30/2019 11/28/2019 11/20/2019 11/10/2019    SBP (mmHg) 114 126 122 124 137    DBP (mmHg) 74 76 76 76 88    Pulse 73 72 92 73 73             Hypertension Medications             metoprolol succinate (TOPROL-XL) 50 MG 24 hr tablet Take 1 tablet (50 mg total) by mouth once daily.    valsartan (DIOVAN) 320 MG tablet Take 1 tablet (320 mg total) by mouth once daily.                    Physical Therapy Treatment    Patient Name:  Jaimee Quintana   MRN:  [id number]    Recommendations:     Discharge Recommendations:  (moderate intensity therapy vs low intensity therapy (with increased family assistance))  Discharge Equipment Recommendations: walker, rolling  Barriers to discharge:  limited functional endurance/independence; decreased caregiver support    The mobility limitation cannot be sufficiently resolved by the use of a cane.   Patient's functional mobility deficit can be sufficiently resolved with the use of a rolling walker. Patient's mobility limitation significantly impairs their ability to participate in one of more activities of daily living. The use of a rolling walker will significantly improve the patient's ability to participate in MRADLS and the patient will use it on regular basis in the home. *bariatric      Assessment:     Jaimee Quintana is a 49 y.o. female admitted with a medical diagnosis of Acute on chronic combined systolic and diastolic heart failure.  She presents with the following impairments/functional limitations: weakness, impaired endurance, impaired functional mobility, gait instability, impaired balance, impaired cardiopulmonary response to activity.    PT/OT co-session due to pt's limited functional endurance. Pt participates in bed mobility, transfers to standing, standing balance at sink to wash hands and short distance ambulation around bedside. Therapy recommending moderate intensity therapy vs low intensity therapy with increased family assistance. Therapy will continue to progress pt as able.     Rehab Prognosis: Good; patient would benefit from acute skilled PT services to address these deficits and reach maximum level of function.    Recent Surgery: * No surgery found *      Plan:     During this hospitalization, patient to be seen 4 x/week to address the identified rehab impairments via gait training, therapeutic activities, therapeutic exercises,  neuromuscular re-education and progress toward the following goals:    Plan of Care Expires:  11/10/24    Subjective     Chief Complaint: mild dizziness  Patient/Family Comments/goals: to increase functional independence  Pain/Comfort:  Pain Rating 1: 0/10  Pain Rating Post-Intervention 1: 0/10      Objective:     Communicated with Nurse prior to session.  Patient found HOB elevated with bed alarm, telemetry, PureWick, oxygen upon PT entry to room.     General Precautions: Standard, fall  Orthopedic Precautions: N/A  Braces: N/A  Respiratory Status: Nasal cannula, flow 2 L/min     Functional Mobility:  Bed Mobility:     Supine to Sit: stand by assistance  Transfers:     Sit to Stand:  contact guard assistance with bariatric RW  Gait: ~5ft to sink with use of RW and CGA increased to MIN A for brief moments of instability in stance/with mobility; seated break then additional 15ft around bedside to chair with MIN A for again ~1 brief moment of instability in stance      AM-PAC 6 CLICK MOBILITY  Turning over in bed (including adjusting bedclothes, sheets and blankets)?: 3  Sitting down on and standing up from a chair with arms (e.g., wheelchair, bedside commode, etc.): 3  Moving from lying on back to sitting on the side of the bed?: 3  Moving to and from a bed to a chair (including a wheelchair)?: 3  Need to walk in hospital room?: 3  Climbing 3-5 steps with a railing?: 2  Basic Mobility Total Score: 17       Treatment & Education:  Pt's SpO2 remains between 95-97% on 2L O2 during session; cued on proper breathing technique.   Pt with brief moments of instability in stance/with ambulation requiring increased MIN A to correct balance as well as use of bariatric RW.   Pt educated to call for staff assistance when needing to return to bed from bedside chair- pt understanding.     Patient left up in chair with all lines intact, call button in reach, chair alarm on, and Nurse notified.    GOALS:   Multidisciplinary Problems        Physical Therapy Goals          Problem: Physical Therapy    Goal Priority Disciplines Outcome Interventions   Physical Therapy Goal     PT, PT/OT Progressing    Description: Goals to be met by: 24     Patient will increase functional independence with mobility by performin. Supine to sit with Modified [place]  2. Sit to supine with Modified [place]  3. Rolling with Modified [place].  4. Sit to stand transfer modified independence using least restrictive AD  5. Bed to chair transfer modified independence using least restrictive assistive device  6. Gait  2 x 150 feet with modified independence with least restrictive assistive device  7. Ascend/descend 6 stair with bilateral Handrails Stand-by Assistance   8. Lower extremity exercise program  2 x10 reps with supervision                        Time Tracking:     PT Received On: 10/21/24  PT Start Time: 917     PT Stop Time: 940  PT Total Time (min): 23 min With OT    Billable Minutes: Gait Training 8 and Therapeutic Activity 12    Treatment Type: Treatment  PT/PTA: PT     Number of PTA visits since last PT visit: 0     10/21/2024   severe

## 2024-10-21 NOTE — PLAN OF CARE
1900 Pt appeared to be in no distress. Reported no pain.     2100 accu ck: 173. Pt requested only 40 units of lantis instead of 83 units ordered.     Tele: SR,  HR  92,  No alarms.     Bed in lowest position, wheels locked, non skid socks, ID band worn, personal items and call bell with in reach, bed alarm set.

## 2024-10-21 NOTE — HOSPITAL COURSE
Patient admitted to hospital service in setting of acute heart failure exacerbation. Noted to be hemodynamically stable throughout admission. Patient started diuresis with IV lasix. Echo done during admission notable for EF of 45-50% with mildly reduced ejection fraction. EKG  NSR with T wave abnormality in inferior leads. Troponin negative. Patient initiated on Began Spironolactone 25 mg daily to maximize GDMT. Nifedipine discontinued on 10/21/24 in light of decompensated heart failure. Glucose regimen adjusted as patient with BG in the mid 60's. Lantus decreased from 45 units to 40 units. PT/OT consulted for deconditioning. Discharged with rollating walker and home health services. Patient discharged home with advise to have close follow up with PCP and cardiology for further management of DM type II/Insulin adjustment and heart failure.Return precautions dicussed with patient. Patient verbalized understanding and agreement to discharge reccomendations. All questions answered.

## 2024-10-21 NOTE — ASSESSMENT & PLAN NOTE
Patient presents with 3 day history of worsening SOB  Patient hypertensive, afebrile SPO2 93% on 3L O2; A&O x 3  Echo on 10/2023:  EF~35-40%; Grade II diastolic dysfunction.  Pt wheezing on physical exam      Plan:         -  Continuee Furosemide IV 80 mg BID         - Continue BIPAP         - Strict I/O; 1500 mls fluid restriction         - Echocardiogram showed an improvement compared to the prior Echo EF of EF~35-40% (10/23/24) that the left ventricle is normal in size. There is concentric hypertrophy. Mild global hypokinesis present. There is mildly reduced systolic function with a visually estimated ejection fraction of 45 - 50%.          -Started Spironolactone 25 mg daily         - PRN Supplemental Oxygen         -PT recommends moderate intensity therapy vs low intensity therapy (with increased family assistance)                    -Continue PT/OT/RT

## 2024-10-21 NOTE — PLAN OF CARE
Problem: Adult Inpatient Plan of Care  Goal: Plan of Care Review  Outcome: Progressing  Goal: Patient-Specific Goal (Individualized)  Outcome: Progressing  Goal: Absence of Hospital-Acquired Illness or Injury  Outcome: Progressing  Goal: Optimal Comfort and Wellbeing  Outcome: Progressing  Goal: Readiness for Transition of Care  Outcome: Progressing     Problem: Bariatric Environmental Safety  Goal: Safety Maintained with Care  Outcome: Progressing     Problem: Diabetes Comorbidity  Goal: Blood Glucose Level Within Targeted Range  Outcome: Progressing     Problem: Wound  Goal: Optimal Coping  Outcome: Progressing  Goal: Optimal Functional Ability  Outcome: Progressing  Goal: Absence of Infection Signs and Symptoms  Outcome: Progressing  Goal: Improved Oral Intake  Outcome: Progressing  Goal: Optimal Pain Control and Function  Outcome: Progressing  Goal: Skin Health and Integrity  Outcome: Progressing  Goal: Optimal Wound Healing  Outcome: Progressing     Problem: Heart Failure  Goal: Optimal Coping  Outcome: Progressing  Goal: Optimal Cardiac Output  Outcome: Progressing  Goal: Stable Heart Rate and Rhythm  Outcome: Progressing  Goal: Optimal Functional Ability  Outcome: Progressing  Goal: Fluid and Electrolyte Balance  Outcome: Progressing  Goal: Improved Oral Intake  Outcome: Progressing  Goal: Effective Oxygenation and Ventilation  Outcome: Progressing  Goal: Effective Breathing Pattern During Sleep  Outcome: Progressing     Problem: Comorbidity Management  Goal: Maintenance of Heart Failure Symptom Control  Outcome: Progressing  Goal: Blood Pressure in Desired Range  Outcome: Progressing     Problem: Fall Injury Risk  Goal: Absence of Fall and Fall-Related Injury  Outcome: Progressing

## 2024-10-21 NOTE — PLAN OF CARE
Problem: Physical Therapy  Goal: Physical Therapy Goal  Description: Goals to be met by: 24     Patient will increase functional independence with mobility by performin. Supine to sit with Modified [place]  2. Sit to supine with Modified [place]  3. Rolling with Modified [place].  4. Sit to stand transfer modified independence using least restrictive AD  5. Bed to chair transfer modified independence using least restrictive assistive device  6. Gait  2 x 150 feet with modified independence with least restrictive assistive device  7. Ascend/descend 6 stair with bilateral Handrails Stand-by Assistance   8. Lower extremity exercise program  2 x10 reps with supervision   Outcome: Progressing     PT/OT co-session due to pt's limited functional endurance. Pt participates in bed mobility, transfers to standing, standing balance at sink to wash hands and short distance ambulation around bedside. Therapy recommending moderate intensity therapy vs low intensity therapy with increased family assistance. Therapy will continue to progress pt as able.

## 2024-10-22 ENCOUNTER — CLINICAL SUPPORT (OUTPATIENT)
Dept: SMOKING CESSATION | Facility: CLINIC | Age: 49
End: 2024-10-22

## 2024-10-22 VITALS
WEIGHT: 262.38 LBS | HEIGHT: 65 IN | DIASTOLIC BLOOD PRESSURE: 70 MMHG | SYSTOLIC BLOOD PRESSURE: 132 MMHG | OXYGEN SATURATION: 94 % | BODY MASS INDEX: 43.71 KG/M2 | TEMPERATURE: 97 F | HEART RATE: 72 BPM | RESPIRATION RATE: 18 BRPM

## 2024-10-22 DIAGNOSIS — F17.210 CIGARETTE SMOKER: Primary | ICD-10-CM

## 2024-10-22 LAB
ALBUMIN SERPL BCP-MCNC: 2.5 G/DL (ref 3.5–5.2)
ALP SERPL-CCNC: 111 U/L (ref 40–150)
ALT SERPL W/O P-5'-P-CCNC: 19 U/L (ref 10–44)
ANION GAP SERPL CALC-SCNC: 9 MMOL/L (ref 8–16)
AST SERPL-CCNC: 13 U/L (ref 10–40)
BASOPHILS # BLD AUTO: 0.03 K/UL (ref 0–0.2)
BASOPHILS NFR BLD: 0.3 % (ref 0–1.9)
BILIRUB SERPL-MCNC: 0.7 MG/DL (ref 0.1–1)
BUN SERPL-MCNC: 21 MG/DL (ref 6–20)
CALCIUM SERPL-MCNC: 8.8 MG/DL (ref 8.7–10.5)
CHLORIDE SERPL-SCNC: 100 MMOL/L (ref 95–110)
CO2 SERPL-SCNC: 27 MMOL/L (ref 23–29)
CREAT SERPL-MCNC: 0.9 MG/DL (ref 0.5–1.4)
DIFFERENTIAL METHOD BLD: ABNORMAL
EOSINOPHIL # BLD AUTO: 0.2 K/UL (ref 0–0.5)
EOSINOPHIL NFR BLD: 1.5 % (ref 0–8)
ERYTHROCYTE [DISTWIDTH] IN BLOOD BY AUTOMATED COUNT: 13.1 % (ref 11.5–14.5)
EST. GFR  (NO RACE VARIABLE): >60 ML/MIN/1.73 M^2
GLUCOSE SERPL-MCNC: 75 MG/DL (ref 70–110)
HCT VFR BLD AUTO: 33.4 % (ref 37–48.5)
HGB BLD-MCNC: 10.8 G/DL (ref 12–16)
IMM GRANULOCYTES # BLD AUTO: 0.12 K/UL (ref 0–0.04)
IMM GRANULOCYTES NFR BLD AUTO: 1 % (ref 0–0.5)
LYMPHOCYTES # BLD AUTO: 1.6 K/UL (ref 1–4.8)
LYMPHOCYTES NFR BLD: 13.3 % (ref 18–48)
MAGNESIUM SERPL-MCNC: 2.1 MG/DL (ref 1.6–2.6)
MCH RBC QN AUTO: 28.9 PG (ref 27–31)
MCHC RBC AUTO-ENTMCNC: 32.3 G/DL (ref 32–36)
MCV RBC AUTO: 89 FL (ref 82–98)
MONOCYTES # BLD AUTO: 0.9 K/UL (ref 0.3–1)
MONOCYTES NFR BLD: 7.3 % (ref 4–15)
NEUTROPHILS # BLD AUTO: 9.2 K/UL (ref 1.8–7.7)
NEUTROPHILS NFR BLD: 76.6 % (ref 38–73)
NRBC BLD-RTO: 0 /100 WBC
PHOSPHATE SERPL-MCNC: 3.1 MG/DL (ref 2.7–4.5)
PLATELET # BLD AUTO: 227 K/UL (ref 150–450)
PMV BLD AUTO: 11.2 FL (ref 9.2–12.9)
POCT GLUCOSE: 129 MG/DL (ref 70–110)
POCT GLUCOSE: 159 MG/DL (ref 70–110)
POCT GLUCOSE: 68 MG/DL (ref 70–110)
POTASSIUM SERPL-SCNC: 3.6 MMOL/L (ref 3.5–5.1)
PROT SERPL-MCNC: 6.7 G/DL (ref 6–8.4)
RBC # BLD AUTO: 3.74 M/UL (ref 4–5.4)
SODIUM SERPL-SCNC: 136 MMOL/L (ref 136–145)
WBC # BLD AUTO: 11.97 K/UL (ref 3.9–12.7)

## 2024-10-22 PROCEDURE — 63600175 PHARM REV CODE 636 W HCPCS: Mod: HCNC

## 2024-10-22 PROCEDURE — 25000003 PHARM REV CODE 250: Mod: HCNC | Performed by: HOSPITALIST

## 2024-10-22 PROCEDURE — 97116 GAIT TRAINING THERAPY: CPT | Mod: HCNC,CQ

## 2024-10-22 PROCEDURE — 97535 SELF CARE MNGMENT TRAINING: CPT | Mod: HCNC,CO

## 2024-10-22 PROCEDURE — 99406 BEHAV CHNG SMOKING 3-10 MIN: CPT | Mod: ,,,

## 2024-10-22 PROCEDURE — 99900035 HC TECH TIME PER 15 MIN (STAT): Mod: HCNC

## 2024-10-22 PROCEDURE — 85025 COMPLETE CBC W/AUTO DIFF WBC: CPT | Mod: HCNC

## 2024-10-22 PROCEDURE — 83735 ASSAY OF MAGNESIUM: CPT | Mod: HCNC

## 2024-10-22 PROCEDURE — 84100 ASSAY OF PHOSPHORUS: CPT | Mod: HCNC

## 2024-10-22 PROCEDURE — 94761 N-INVAS EAR/PLS OXIMETRY MLT: CPT | Mod: HCNC

## 2024-10-22 PROCEDURE — S4991 NICOTINE PATCH NONLEGEND: HCPCS | Mod: HCNC

## 2024-10-22 PROCEDURE — 80053 COMPREHEN METABOLIC PANEL: CPT | Mod: HCNC

## 2024-10-22 PROCEDURE — 27000221 HC OXYGEN, UP TO 24 HOURS: Mod: HCNC

## 2024-10-22 PROCEDURE — 94660 CPAP INITIATION&MGMT: CPT | Mod: HCNC

## 2024-10-22 PROCEDURE — 36415 COLL VENOUS BLD VENIPUNCTURE: CPT | Mod: HCNC

## 2024-10-22 PROCEDURE — 97110 THERAPEUTIC EXERCISES: CPT | Mod: HCNC,CQ

## 2024-10-22 PROCEDURE — 25000003 PHARM REV CODE 250: Mod: HCNC

## 2024-10-22 PROCEDURE — 97530 THERAPEUTIC ACTIVITIES: CPT | Mod: HCNC,CO

## 2024-10-22 RX ORDER — SPIRONOLACTONE 25 MG/1
25 TABLET ORAL DAILY
Qty: 30 TABLET | Refills: 11 | Status: SHIPPED | OUTPATIENT
Start: 2024-10-23 | End: 2025-10-23

## 2024-10-22 RX ORDER — IBUPROFEN 200 MG
1 TABLET ORAL DAILY
Qty: 28 PATCH | Refills: 0 | Status: SHIPPED | OUTPATIENT
Start: 2024-10-22 | End: 2024-11-21

## 2024-10-22 RX ORDER — INSULIN GLARGINE 100 [IU]/ML
40 INJECTION, SOLUTION SUBCUTANEOUS 2 TIMES DAILY
Status: DISCONTINUED | OUTPATIENT
Start: 2024-10-22 | End: 2024-10-22 | Stop reason: HOSPADM

## 2024-10-22 RX ORDER — INSULIN GLARGINE 100 [IU]/ML
40 INJECTION, SOLUTION SUBCUTANEOUS 2 TIMES DAILY
Qty: 75 ML | Refills: 0 | Status: SHIPPED | OUTPATIENT
Start: 2024-10-22 | End: 2024-10-31

## 2024-10-22 RX ORDER — EZETIMIBE 10 MG/1
10 TABLET ORAL DAILY
Qty: 90 TABLET | Refills: 3 | Status: SHIPPED | OUTPATIENT
Start: 2024-10-23 | End: 2025-10-23

## 2024-10-22 RX ADMIN — NICOTINE 1 PATCH: 14 PATCH TRANSDERMAL at 09:10

## 2024-10-22 RX ADMIN — INSULIN GLARGINE 40 UNITS: 100 INJECTION, SOLUTION SUBCUTANEOUS at 09:10

## 2024-10-22 RX ADMIN — SPIRONOLACTONE 25 MG: 25 TABLET, FILM COATED ORAL at 09:10

## 2024-10-22 RX ADMIN — CARVEDILOL 12.5 MG: 12.5 TABLET, FILM COATED ORAL at 09:10

## 2024-10-22 RX ADMIN — MUPIROCIN: 20 OINTMENT TOPICAL at 09:10

## 2024-10-22 RX ADMIN — FUROSEMIDE 80 MG: 10 INJECTION, SOLUTION INTRAVENOUS at 09:10

## 2024-10-22 RX ADMIN — ASPIRIN 81 MG: 81 TABLET, COATED ORAL at 09:10

## 2024-10-22 RX ADMIN — HYDRALAZINE HYDROCHLORIDE 50 MG: 25 TABLET ORAL at 03:10

## 2024-10-22 RX ADMIN — ENOXAPARIN SODIUM 40 MG: 40 INJECTION SUBCUTANEOUS at 09:10

## 2024-10-22 RX ADMIN — Medication 16 G: at 06:10

## 2024-10-22 RX ADMIN — HYDRALAZINE HYDROCHLORIDE 50 MG: 25 TABLET ORAL at 09:10

## 2024-10-22 RX ADMIN — INSULIN ASPART 2 UNITS: 100 INJECTION, SOLUTION INTRAVENOUS; SUBCUTANEOUS at 12:10

## 2024-10-22 RX ADMIN — SENNOSIDES AND DOCUSATE SODIUM 1 TABLET: 8.6; 5 TABLET ORAL at 09:10

## 2024-10-22 RX ADMIN — SACUBITRIL AND VALSARTAN 1 TABLET: 49; 51 TABLET, FILM COATED ORAL at 09:10

## 2024-10-22 RX ADMIN — EZETIMIBE 10 MG: 10 TABLET ORAL at 09:10

## 2024-10-22 NOTE — PLAN OF CARE
CM met with pt - for d/c to home today   Pt states she doesn't have transportation until 8pm  - needs WC Trans to home   No need for home O2   Referral sent to [facility] per [facility] - orders pending   f/u apt in place:    Future Appointments   Date Time Provider Department Center   10/31/2024 11:00 AM Flora Donis MD [facility]   11/4/2024  1:00 PM Nerissa Lr, [facility] [place]   12/17/2024 10:20 AM GHAZAL Tillman MD [facility]        10/22/24 1251   Final Note   Assessment Type Final Discharge Note   Anticipated Discharge Disposition Home-Health   What phone number can be called within the next 1-3 days to see how you are doing after discharge? [phone]   Hospital Resources/Appts/Education Provided Appointments scheduled and added to AVS   Post-Acute Status   Post-Acute Authorization Home Health   Home Health Status Referrals Sent   Discharge Delays None known at this time

## 2024-10-22 NOTE — PROGRESS NOTES
Smoking cessation education follow-up: Pt denies nicotine withdrawal symptoms with nicotine patch in use. Order requested on discharge for 14 mg nicotine patch Q day. Reviewed details of pt's Ambulatory Smoking Cessation clinic (virtual) appointment: 11/4/24 at 1 o'clock pm.

## 2024-10-22 NOTE — PROGRESS NOTES
[facility]  Progress Note    Patient Name: Jaimee Quintana  MRN: [id number]  Patient Class: IP- Inpatient   Admission Date: 10/19/2024  Length of Stay: 3 days  Attending Physician: Harjeet Harris,*  Primary Care Provider: Lakisha Mendez DO        Subjective:     Principal Problem:Acute on chronic combined systolic and diastolic heart failure        HPI:  Patient is a 50 yo F w/ PMHx of Combined CHF (EF~35-40% on 10/23) , DM2, HLD,  HLD,  CVA who presents to ED with worsening SOB.  Patient states that she had a gap in medication adherence, but that she received medications 1 week ago and has been compliant since then.  Patient endorses taking Lasix PRN.  Patient denies any chest pain, nausea, or abdominal pain.    In the ED, initial vital signs /95 , HR 92 , Temp 97.8 F, SpO2 92% on room air. Labs include CBC with stable H/H  and WBC within normal limits . CMP with Na 134 K 4.1 and BUN/Cr 10/1.0 (baseline Cr 0.9). Glucose 353. CXR showed patchy perihilar opacities bilaterally suspicious for pulmonary edema with an enlarged cardiac silhouette . EKG showed normal sinus rhythm with T-wave abnormalities. Troponin wnl,   , VBG PH 7.408, CO2 39.4, HCO3 24.8.  Patient given Lasix 80 mg IV,  mg, Acetominophen 1000 mg, Nitroglycerin paste and placed on Bipap.  [facility] consulted for evaluation for admission for acute heart failure exacerbation.     Overview/Hospital Course:  50 yo female with a PMHx of combined CHF (EF ~35-40%), DM, HLD, and CVA admitted on 10/19 for CHF excerebration. She did have one isolated temperature of 100.6 on 10/20 that abated with Tylenol; she has since remained afebrile. CXR from 10/19 was significant for patchy perihilar airspace opacities suspicious for pulmonary edema and an enlarged cardiac silhouette. EKG with normal sinus rhythm, right atrial enlargement, and T wave abnormalities. PT/OT consulted for dizziness. PT/OT  recommends acute skilled PT and OT services. Echo significant for the left ventricle is normal in size. There is concentric hypertrophy. Mild global hypokinesis present. There is mildly reduced systolic function with a visually estimated ejection fraction of 45 - 50%. . Continued diuresis and supplemental oxygen as needed. Began Spironolactone 25 mg daily to maximize GDMT. Nifedipine discontinued on 10/21/24 in light of decompensated heart failure.     Interval History: VSS, NAD continues to require O2 therapy.    Review of Systems   Constitutional:  Negative for chills and fever.   Respiratory:  Positive for shortness of breath.    Cardiovascular:  Negative for chest pain.   Gastrointestinal:  Negative for abdominal pain, nausea and vomiting.     Objective:     Vital Signs (Most Recent):  Temp: 99.5 °F (37.5 °C) (10/21/24 0000)  Pulse: 84 (10/21/24 0011)  Resp: (!) 22 (10/21/24 0000)  BP: (!) 106/51 (10/21/24 0000)  SpO2: 96 % (10/21/24 0000) Vital Signs (24h Range):  Temp:  [97.6 °F (36.4 °C)-100.6 °F (38.1 °C)] 99.5 °F (37.5 °C)  Pulse:  [79-91] 84  Resp:  [18-26] 22  SpO2:  [92 %-98 %] 96 %  BP: (102-148)/(51-78) 106/51     Weight: 119.3 kg (263 lb 0.1 oz)  Body mass index is 43.77 kg/m².    Intake/Output Summary (Last 24 hours) at 10/21/2024 0248  Last data filed at 10/21/2024 0030  Gross per 24 hour   Intake 490 ml   Output 900 ml   Net -410 ml         Physical Exam  Constitutional:       General: She is not in acute distress.     Appearance: She is not toxic-appearing.   HENT:      Nose: Nose normal.   Eyes:      Extraocular Movements: Extraocular movements intact.   Cardiovascular:      Pulses: Normal pulses.      Heart sounds: Normal heart sounds.   Pulmonary:      Effort: Pulmonary effort is normal.      Breath sounds: Normal breath sounds.   Musculoskeletal:      Right lower leg: No edema.   Skin:     General: Skin is warm and dry.   Neurological:      Mental Status: She is alert.   Psychiatric:          Mood and Affect: Mood normal.             Significant Labs: All pertinent labs within the past 24 hours have been reviewed.  CBC:   Recent Labs   Lab 10/19/24  1416 10/20/24  0239   WBC 10.56 16.82*   HGB 13.2 11.3*   HCT 39.5 34.4*    174     CMP:   Recent Labs   Lab 10/19/24  1416 10/20/24  0238   * 136   K 4.1 3.8    102   CO2 19* 21*   * 267*   BUN 10 16   CREATININE 1.0 1.1   CALCIUM 9.3 9.0   PROT 7.2 6.5   ALBUMIN 3.2* 2.7*   BILITOT 0.9 0.9   ALKPHOS 73 94   AST 33 16   ALT 54* 39   ANIONGAP 12 13       Significant Imaging: I have reviewed all pertinent imaging results/findings within the past 24 hours.    Assessment/Plan:      * Acute on chronic combined systolic and diastolic heart failure  Patient presents with 3 day history of worsening SOB  Patient hypertensive, afebrile SPO2 93% on 3L O2; A&O x 3  Echo on 10/2023:  EF~35-40%; Grade II diastolic dysfunction.  Pt wheezing on physical exam      Plan:         -  Continuee Furosemide IV 80 mg BID         - Continue BIPAP         - Strict I/O; 1500 mls fluid restriction         - Echocardiogram showed an improvement compared to the prior Echo EF of EF~35-40% (10/23/24) that the left ventricle is normal in size. There is concentric hypertrophy. Mild global hypokinesis present. There is mildly reduced systolic function with a visually estimated ejection fraction of 45 - 50%.          -Started Spironolactone 25 mg daily         - PRN Supplemental Oxygen         -PT recommends moderate intensity therapy vs low intensity therapy (with increased family assistance)                    -Continue PT/OT/RT                    Hypertension  Patients blood pressure range in the last 24 hours was: BP  Min: 130/73  Max: 190/84.The patient's inpatient anti-hypertensive regimen is listed below:  Current Antihypertensives  NIFEdipine 24 hr tablet 90 mg, Daily, Oral  hydrALAZINE tablet 50 mg, 3 times daily, Oral  carvediloL tablet 12.5 mg, 2 times  daily with meals, Oral  furosemide injection 80 mg, Every 12 hours, Intravenous    Plan    Continue current medications    Type 2 diabetes mellitus, with long-term current use of insulin  Glucose 353 on initial CMP  HbA1C 9.5% 1/2024      Plan:           - Am glucose  68             -Decreased  Insulin Glargine 40 units BID            -Monitor for hypoglycemia           - SSI (0-15 units) TIDWM            - HbA1C   8.9      Tobacco dependency  Patient endorses smoking irregularly.    Plan:        - Will provide tobacco cessation counseling when appropriate    History of CVA (cerebrovascular accident) without residual deficits  Continue home ASA 81 mg      CESAR (obstructive sleep apnea)  Continue the BIPAP QHS    Hyperlipidemia associated with type 2 diabetes mellitus  Continue home Ezetimibe 10 mg daily        VTE Risk Mitigation (From admission, onward)           Ordered     enoxaparin injection 40 mg  Every 12 hours         10/19/24 1632     IP VTE HIGH RISK PATIENT  Once         10/19/24 1632     Place sequential compression device  Until discontinued         10/19/24 1632                    Discharge Planning   MALGORZATA:      Code Status: Full Code   Is the patient medically ready for discharge?:     Reason for patient still in hospital (select all that apply): Patient trending condition  Discharge Plan A: Home, Home with family        Michelle Garcia MD  Department of Hospital Medicine   [place] - Telemetry

## 2024-10-22 NOTE — PT/OT/SLP PROGRESS
Physical Therapy Treatment    Patient Name:  Jaimee Quintana   MRN:  [id number]    Recommendations:     Discharge Recommendations: Low Intensity Therapy  Discharge Equipment Recommendations:  (RW in pt's room)  Barriers to discharge:  decreased mobility,strength and endurance    Assessment:     Jaimee Quintana is a 49 y.o. female admitted with a medical diagnosis of Acute on chronic combined systolic and diastolic heart failure.  She presents with the following impairments/functional limitations: weakness, impaired endurance, impaired functional mobility, gait instability, impaired balance, decreased lower extremity function, impaired coordination,pt with improving status and will benefit from low intensity therapy upon discharge.    Rehab Prognosis: Good; patient would benefit from acute skilled PT services to address these deficits and reach maximum level of function.    Recent Surgery: * No surgery found *      Plan:     During this hospitalization, patient to be seen 4 x/week to address the identified rehab impairments via gait training, therapeutic activities, therapeutic exercises, neuromuscular re-education and progress toward the following goals:    Plan of Care Expires:  11/10/24    Subjective     Chief Complaint: n/a  Patient/Family Comments/goals: pt may be leaving.  Pain/Comfort:  Pain Rating 1: 0/10      Objective:     Communicated with nsg prior to session.  Patient found up in chair with chair check, telemetry upon PT entry to room.     General Precautions: Standard, fall  Orthopedic Precautions: N/A  Braces: N/A  Respiratory Status: Room air     Functional Mobility:  Transfers:     Sit to Stand:  stand by assistance with rolling walker  Gait: amb ~65' with RW and SBA  Balance: fair standing balance with RW      AM-PAC 6 CLICK MOBILITY  Turning over in bed (including adjusting bedclothes, sheets and blankets)?: 3  Sitting down on and standing up from a chair with arms (e.g., wheelchair, bedside  commode, etc.): 3  Moving from lying on back to sitting on the side of the bed?: 3  Moving to and from a bed to a chair (including a wheelchair)?: 3  Need to walk in hospital room?: 3  Climbing 3-5 steps with a railing?: 2  Basic Mobility Total Score: 17       Treatment & Education: le seatted ex's x 10-12 reps inc: ap,laq and hip flex.      Patient left up in chair with all lines intact, call button in reach, and chair alarm on..    GOALS: see general POC  Multidisciplinary Problems       Physical Therapy Goals          Problem: Physical Therapy    Goal Priority Disciplines Outcome Interventions   Physical Therapy Goal     PT, PT/OT Progressing    Description: Goals to be met by: 24     Patient will increase functional independence with mobility by performin. Supine to sit with Modified [place]  2. Sit to supine with Modified [place]  3. Rolling with Modified [place].  4. Sit to stand transfer modified independence using least restrictive AD  5. Bed to chair transfer modified independence using least restrictive assistive device  6. Gait  2 x 150 feet with modified independence with least restrictive assistive device  7. Ascend/descend 6 stair with bilateral Handrails Stand-by Assistance   8. Lower extremity exercise program  2 x10 reps with supervision                        Time Tracking:     PT Received On: 10/22/24  PT Start Time: 1314     PT Stop Time: 1337  PT Total Time (min): 23 min     Billable Minutes: Gait Training 13 and Therapeutic Exercise 10    Treatment Type: Treatment  PT/PTA: PTA     Number of PTA visits since last PT visit: 1     10/22/2024

## 2024-10-22 NOTE — DISCHARGE SUMMARY
[facility]  Discharge Summary      Patient Name: Jaimee Quintana  MRN: [id number]  JEREMY: [id number]  Patient Class: IP- Inpatient  Admission Date: 10/19/2024  Hospital Length of Stay: 3 days  Discharge Date and Time: No discharge date for patient encounter.  Attending Physician: Harjeet Harris,*   Discharging Provider: Candace Smith MD  Primary Care Provider: Lakisha Mendez DO    Primary Care Team: Networked reference to record PCT     HPI:   Patient is a 50 yo F w/ PMHx of Combined CHF (EF~35-40% on 10/23) , DM2, HLD,  HLD,  CVA who presents to ED with worsening SOB.  Patient states that she had a gap in medication adherence, but that she received medications 1 week ago and has been compliant since then.  Patient endorses taking Lasix PRN.  Patient denies any chest pain, nausea, or abdominal pain.    In the ED, initial vital signs /95 , HR 92 , Temp 97.8 F, SpO2 92% on room air. Labs include CBC with stable H/H  and WBC within normal limits . CMP with Na 134 K 4.1 and BUN/Cr 10/1.0 (baseline Cr 0.9). Glucose 353. CXR showed patchy perihilar opacities bilaterally suspicious for pulmonary edema with an enlarged cardiac silhouette . EKG showed normal sinus rhythm with T-wave abnormalities. Troponin wnl,   , VBG PH 7.408, CO2 39.4, HCO3 24.8.  Patient given Lasix 80 mg IV,  mg, Acetominophen 1000 mg, Nitroglycerin paste and placed on Bipap.  [facility] consulted for evaluation for admission for acute heart failure exacerbation.     * No surgery found *      Hospital Course:   Patient admitted to hospital service in setting of acute heart failure exacerbation. Noted to be hemodynamically stable throughout admission. Patient started diuresis with IV lasix. Echo done during admission notable for EF of 45-50% with mildly reduced ejection fraction. EKG  NSR with T wave abnormality in inferior leads. Troponin negative. Patient initiated on Began  Spironolactone 25 mg daily to maximize GDMT. Nifedipine discontinued on 10/21/24 in light of decompensated heart failure. Glucose regimen adjusted as patient with BG in the mid 60's. Lantus decreased from 45 units to 40 units. PT/OT consulted for deconditioning. Discharged with rollating walker and home health services. Patient discharged home with advise to have close follow up with PCP and cardiology for further management of DM type II/Insulin adjustment and heart failure.Return precautions dicussed with patient. Patient verbalized understanding and agreement to discharge reccomendations. All questions answered.         Goals of Care Treatment Preferences:  Code Status: Full Code      SDOH Screening:  The patient was screened for utility difficulties, food insecurity, transport difficulties, housing insecurity, and interpersonal safety and there were no concerns identified this admission.     Consults:     No new Assessment & Plan notes have been filed under this hospital service since the last note was generated.  Service: Hospital Medicine    Final Active Diagnoses:    Diagnosis Date Noted POA    PRINCIPAL PROBLEM:  Acute on chronic combined systolic and diastolic heart failure [I50.43] 10/19/2024 Yes    Acute hypoxemic respiratory failure [J96.01] 10/21/2024 Yes    Tobacco dependency [F17.200] 10/19/2024 Yes    Type 2 diabetes mellitus, with long-term current use of insulin [E11.9, Z79.4] 10/19/2024 Not Applicable    Hypertension [I10] 10/19/2024 Yes    History of CVA (cerebrovascular accident) without residual deficits [Z86.73] 05/22/2023 Not Applicable    CESAR (obstructive sleep apnea) [G47.33] 10/23/2015 Yes    Hyperlipidemia associated with type 2 diabetes mellitus [E11.69, E78.5]  Yes      Problems Resolved During this Admission:    Diagnosis Date Noted Date Resolved POA    Anxiety [F41.9] 12/23/2015 10/22/2024 Yes       Discharged Condition: stable    Disposition: Home or Self Care    Follow Up:    "Follow-up Information       Flora Donis MD Follow up on 10/31/2024.    Specialty: Internal Medicine  Why: 11:00 am  Contact information:  [address]  [address]  [place] [address]  [phone]               [facility] Follow up.    Specialties: Home Health Services, Home Therapy Services, Home Living Aide Services  Why: Home Health  1st visit Friday 10/25  Contact information:  [address] [facility] [place] [facility] [place] [id number] [phone]                         Patient Instructions:      WALKER FOR HOME USE     Order Specific Question Answer Comments   Type of Walker: Adult (5'4"-6'6")    With wheels? Yes    Height: 5' 5" (1.651 m)    Weight: 119 kg (262 lb 5.6 oz)    Length of need (1-99 months): 99    Does patient have medical equipment at home? rollator Foot stool / Foot stool / Foot stool   Does patient have medical equipment at home? cane, straight    Does patient have medical equipment at home? other (see comments)    Please check all that apply: Patient's condition impairs ambulation.    Please check all that apply: Patient is unable to safely ambulate without equipment.      Ambulatory referral/consult to Home Health   Standing Status: Future   Referral Priority: Routine Referral Type: Home Health   Referral Reason: Specialty Services Required   Requested Specialty: Home Health Services   Number of Visits Requested: 1     Diet diabetic     Diet Cardiac     Reason for not Ordering Smoking Cessation Referral   Order Comments: Patient scheduled for follow up     Order Specific Question Answer Comments   Reason for not ordering: Not medically appropriate at this time      Activity as tolerated       Significant Diagnostic Studies: N/A    Pending Diagnostic Studies:       None           Medications:  Reconciled Home Medications:      Medication List        START taking these medications      nicotine 21 mg/24 hr  Commonly known as: NICODERM CQ  Place 1 patch " onto the skin once daily.     spironolactone 25 MG tablet  Commonly known as: ALDACTONE  Take 1 tablet (25 mg total) by mouth once daily.  Start taking on: October 23, 2024            CHANGE how you take these medications      ezetimibe 10 mg tablet  Commonly known as: ZETIA  Take 1 tablet (10 mg total) by mouth once daily.  Start taking on: October 23, 2024  What changed: additional instructions     LANTUS SOLOSTAR U-100 INSULIN 100 unit/mL (3 mL) Inpn pen  Generic drug: insulin glargine U-100 (Lantus)  Inject 40 Units into the skin 2 (two) times a day.  What changed:   how much to take  additional instructions            CONTINUE taking these medications      aspirin 81 MG EC tablet  Commonly known as: ECOTRIN  Take 81 mg by mouth once daily.     atorvastatin 80 MG tablet  Commonly known as: LIPITOR  Take 1 tablet (80 mg total) by mouth every evening.     carvediloL 12.5 MG tablet  Commonly known as: COREG  Take 1 tablet (12.5 mg total) by mouth 2 (two) times daily with meals.     dulaglutide 0.75 mg/0.5 mL pen injector  Commonly known as: TRULICITY  Inject 0.75 mg into the skin every 7 days.     ENTRESTO 49-51 mg per tablet  Generic drug: sacubitriL-valsartan  Take 1 tablet by mouth 2 (two) times daily.     ergocalciferol 50,000 unit Cap  Commonly known as: ERGOCALCIFEROL  Take 1 capsule (50,000 Units total) by mouth every Saturday.     hydrALAZINE 50 MG tablet  Commonly known as: APRESOLINE  Take 50 mg by mouth 3 (three) times daily.            STOP taking these medications      insulin aspart U-100 100 unit/mL (3 mL) Inpn pen  Commonly known as: NovoLOG Flexpen U-100 Insulin     mupirocin 2 % ointment  Commonly known as: BACTROBAN     NIFEdipine 60 MG Tbsr  Commonly known as: ADALAT CC              Indwelling Lines/Drains at time of discharge:   Lines/Drains/Airways       Drain  Duration             Female External Urinary Catheter w/ Suction 10/20/24 2 days                  Time spent on the discharge of  patient: >30 minutes      Candace Smith MD  [facility], PGY-3  10/22/2024

## 2024-10-22 NOTE — PLAN OF CARE
Problem: Physical Therapy  Goal: Physical Therapy Goal  Description: Goals to be met by: 24     Patient will increase functional independence with mobility by performin. Supine to sit with Modified [place]  2. Sit to supine with Modified [place]  3. Rolling with Modified [place].  4. Sit to stand transfer modified independence using least restrictive AD  5. Bed to chair transfer modified independence using least restrictive assistive device  6. Gait  2 x 150 feet with modified independence with least restrictive assistive device  7. Ascend/descend 6 stair with bilateral Handrails Stand-by Assistance   8. Lower extremity exercise program  2 x10 reps with supervision   Outcome: Progressing

## 2024-10-22 NOTE — NURSING
Patient discharging home. All discharge instructions reviewed with patient. IV and telemetry removed, patient tolerated well. Medications and Walker delivered to the bed side. [facility].

## 2024-10-22 NOTE — PROGRESS NOTES
Future Appointments   Date Time Provider Department Center   10/31/2024 11:00 AM Flora Donis MD [facility]   11/4/2024  1:00 PM Nerissa Lr [facility] [place]   12/17/2024 10:20 AM GHAZAL Tillman MD [facility]

## 2024-10-22 NOTE — PT/OT/SLP PROGRESS
Occupational Therapy   Treatment    Name: Jaimee Quintana  MRN: [id number]  Admitting Diagnosis:  Acute on chronic combined systolic and diastolic heart failure       Recommendations:     Discharge Recommendations: Low Intensity Therapy  Discharge Equipment Recommendations:  walker, rolling  Barriers to discharge:  Decreased caregiver support    Assessment:     Jaimee Quintana is a 49 y.o. female with a medical diagnosis of Acute on chronic combined systolic and diastolic heart failure. Performance deficits affecting function are weakness, impaired endurance, impaired self care skills, impaired functional mobility, gait instability, decreased ROM, decreased lower extremity function.     Rehab Prognosis:  Good; patient would benefit from acute skilled OT services to address these deficits and reach maximum level of function.       Plan:     Patient to be seen 3 x/week to address the above listed problems via self-care/home management, therapeutic activities, therapeutic exercises  Plan of Care Expires: 11/19/24  Plan of Care Reviewed with: patient    Subjective     Chief Complaint: none  Patient/Family Comments/goals: return to PLOF  Pain/Comfort:  Pain Rating 1: 0/10  Pain Rating Post-Intervention 1: 0/10    Objective:     Communicated with: Makenzie fonseca prior to session.  Patient found HOB elevated with bed alarm, telemetry, PureWick upon OT entry to room.    General Precautions: Standard, fall    Orthopedic Precautions:N/A  Braces: N/A  Respiratory Status: Room air     Bed Mobility:    Patient completed Supine to Sit with supervision and with side rail     Functional Mobility/Transfers:  Patient completed Sit <> Stand Transfer with stand by assistance  with  rolling walker   Patient completed Bed <> Chair Transfer using Step Transfer technique with stand by assistance with rolling walker  Patient completed Toilet Transfer Step Transfer technique with stand by assistance with  rolling walker and skinny  bars  Functional Mobility: ambulated in room and in hallway using BRW; SBA and VCs for RW mgmt/proximity, as well as postural awareness    Activities of Daily Living:  Upper Body Dressing: contact guard assistance basilia gown   Lower Body Dressing: stand by assistance seated EOB; educated on fig 4/modified fig 4  Toileting: stand by assistance      [facility] 6 Click ADL: 20    Treatment & Education:  Educated on purpose/role of OT  Issued handout on energy conservation and PLB technique  ADLs as noted above  Functional mobility as above  No SOB reported during activity this date  All questions answered in OT scope    Patient left up in chair with all lines intact, call button in reach, bed alarm on, and nsg notified    GOALS:   Multidisciplinary Problems       Occupational Therapy Goals          Problem: Occupational Therapy    Goal Priority Disciplines Outcome Interventions   Occupational Therapy Goal     OT, PT/OT Progressing    Description: Goals to be met by: 11/19/24     Patient will increase functional independence with ADLs by performing:    UE Dressing with Modified [place].  LE Dressing with Modified [place].  Grooming while standing at sink with Modified [place].  Toileting from toilet with Modified [place] for hygiene and clothing management.   Toilet transfer to toilet with Modified [place].  Upper extremity exercise program 3x15 reps per handout, with supervision.                         Time Tracking:     OT Date of Treatment: 10/22/24  OT Start Time: 0922  OT Stop Time: 1009  OT Total Time (min): 47 min    Billable Minutes:Self Care/Home Management 38  Therapeutic Activity 9    10/22/2024

## 2024-10-22 NOTE — PLAN OF CARE
0502 POCT glucose 68. Pt sweating. Glucose tablet 16 g PRN given @8095. 6151 POCT glucose rechecked - 129. MD notified. Pt resting comfortably in bed. Will continue to monitor.    Problem: Adult Inpatient Plan of Care  Goal: Plan of Care Review  Outcome: Progressing     Problem: Diabetes Comorbidity  Goal: Blood Glucose Level Within Targeted Range  Outcome: Progressing     Problem: Heart Failure  Goal: Effective Oxygenation and Ventilation  Outcome: Progressing

## 2024-10-23 ENCOUNTER — PATIENT OUTREACH (OUTPATIENT)
Dept: ADMINISTRATIVE | Facility: CLINIC | Age: 49
End: 2024-10-23
Payer: MEDICARE

## 2024-10-23 NOTE — PROGRESS NOTES
C3 nurse spoke with Jaimee Quintana  for a [facility]CC post hospital discharge follow up call. The patient has a scheduled HOSFU appointment with Flora Donis MD on 10/31/2024 at 11 AM.

## 2024-10-29 ENCOUNTER — PATIENT OUTREACH (OUTPATIENT)
Dept: ADMINISTRATIVE | Facility: HOSPITAL | Age: 49
End: 2024-10-29
Payer: MEDICARE

## 2024-10-31 ENCOUNTER — LAB VISIT (OUTPATIENT)
Dept: LAB | Facility: HOSPITAL | Age: 49
End: 2024-10-31
Attending: INTERNAL MEDICINE
Payer: MEDICARE

## 2024-10-31 ENCOUNTER — OFFICE VISIT (OUTPATIENT)
Dept: PRIMARY CARE CLINIC | Facility: CLINIC | Age: 49
End: 2024-10-31
Payer: MEDICARE

## 2024-10-31 ENCOUNTER — TELEPHONE (OUTPATIENT)
Dept: PRIMARY CARE CLINIC | Facility: CLINIC | Age: 49
End: 2024-10-31

## 2024-10-31 VITALS
BODY MASS INDEX: 41.62 KG/M2 | SYSTOLIC BLOOD PRESSURE: 126 MMHG | HEIGHT: 65 IN | OXYGEN SATURATION: 97 % | HEART RATE: 86 BPM | WEIGHT: 249.81 LBS | DIASTOLIC BLOOD PRESSURE: 69 MMHG

## 2024-10-31 DIAGNOSIS — I50.43 ACUTE ON CHRONIC COMBINED SYSTOLIC (CONGESTIVE) AND DIASTOLIC (CONGESTIVE) HEART FAILURE: Primary | ICD-10-CM

## 2024-10-31 DIAGNOSIS — E66.01 CLASS 3 SEVERE OBESITY WITH SERIOUS COMORBIDITY AND BODY MASS INDEX (BMI) OF 40.0 TO 44.9 IN ADULT, UNSPECIFIED OBESITY TYPE: ICD-10-CM

## 2024-10-31 DIAGNOSIS — Z79.4 TYPE 2 DIABETES MELLITUS WITH HYPERGLYCEMIA, WITH LONG-TERM CURRENT USE OF INSULIN: ICD-10-CM

## 2024-10-31 DIAGNOSIS — G47.33 OSA (OBSTRUCTIVE SLEEP APNEA): ICD-10-CM

## 2024-10-31 DIAGNOSIS — E66.813 CLASS 3 SEVERE OBESITY WITH SERIOUS COMORBIDITY AND BODY MASS INDEX (BMI) OF 40.0 TO 44.9 IN ADULT, UNSPECIFIED OBESITY TYPE: ICD-10-CM

## 2024-10-31 DIAGNOSIS — F17.200 TOBACCO DEPENDENCY: ICD-10-CM

## 2024-10-31 DIAGNOSIS — E11.65 TYPE 2 DIABETES MELLITUS WITH HYPERGLYCEMIA, WITH LONG-TERM CURRENT USE OF INSULIN: ICD-10-CM

## 2024-10-31 DIAGNOSIS — I50.43 ACUTE ON CHRONIC COMBINED SYSTOLIC (CONGESTIVE) AND DIASTOLIC (CONGESTIVE) HEART FAILURE: ICD-10-CM

## 2024-10-31 PROBLEM — T81.89XA SURGICAL WOUND, NON HEALING: Status: RESOLVED | Noted: 2024-02-22 | Resolved: 2024-10-31

## 2024-10-31 PROBLEM — L02.214 GROIN ABSCESS: Status: RESOLVED | Noted: 2024-01-08 | Resolved: 2024-10-31

## 2024-10-31 PROBLEM — J96.01 ACUTE HYPOXEMIC RESPIRATORY FAILURE: Status: RESOLVED | Noted: 2024-10-21 | Resolved: 2024-10-31

## 2024-10-31 PROBLEM — N76.82 FOURNIER'S GANGRENE IN FEMALE: Status: RESOLVED | Noted: 2024-01-06 | Resolved: 2024-10-31

## 2024-10-31 LAB
ANION GAP SERPL CALC-SCNC: 12 MMOL/L (ref 8–16)
BUN SERPL-MCNC: 12 MG/DL (ref 6–20)
CALCIUM SERPL-MCNC: 9.5 MG/DL (ref 8.7–10.5)
CHLORIDE SERPL-SCNC: 100 MMOL/L (ref 95–110)
CO2 SERPL-SCNC: 23 MMOL/L (ref 23–29)
CREAT SERPL-MCNC: 0.9 MG/DL (ref 0.5–1.4)
EST. GFR  (NO RACE VARIABLE): >60 ML/MIN/1.73 M^2
ESTIMATED AVG GLUCOSE: 214 MG/DL (ref 68–131)
GLUCOSE SERPL-MCNC: 167 MG/DL (ref 70–110)
HBA1C MFR BLD: 9.1 % (ref 4–5.6)
POTASSIUM SERPL-SCNC: 4.5 MMOL/L (ref 3.5–5.1)
SODIUM SERPL-SCNC: 135 MMOL/L (ref 136–145)

## 2024-10-31 PROCEDURE — 83036 HEMOGLOBIN GLYCOSYLATED A1C: CPT | Mod: HCNC | Performed by: INTERNAL MEDICINE

## 2024-10-31 PROCEDURE — 80048 BASIC METABOLIC PNL TOTAL CA: CPT | Mod: HCNC | Performed by: INTERNAL MEDICINE

## 2024-10-31 PROCEDURE — 99999 PR PBB SHADOW E&M-EST. PATIENT-LVL III: CPT | Mod: PBBFAC,HCNC,, | Performed by: INTERNAL MEDICINE

## 2024-10-31 PROCEDURE — 36415 COLL VENOUS BLD VENIPUNCTURE: CPT | Mod: HCNC | Performed by: INTERNAL MEDICINE

## 2024-10-31 RX ORDER — INSULIN GLARGINE 100 [IU]/ML
80 INJECTION, SOLUTION SUBCUTANEOUS 2 TIMES DAILY
Qty: 75 ML | Refills: 0 | Status: SHIPPED | OUTPATIENT
Start: 2024-10-31 | End: 2025-01-29

## 2024-10-31 RX ORDER — ERGOCALCIFEROL 1.25 MG/1
50000 CAPSULE ORAL
Qty: 12 CAPSULE | Refills: 3 | OUTPATIENT
Start: 2024-11-02

## 2024-10-31 RX ORDER — METFORMIN HYDROCHLORIDE 1000 MG/1
1000 TABLET ORAL 2 TIMES DAILY WITH MEALS
Qty: 180 TABLET | Refills: 3 | Status: SHIPPED | OUTPATIENT
Start: 2024-10-31 | End: 2025-10-31

## 2024-10-31 RX ORDER — POTASSIUM CHLORIDE 600 MG/1
16 TABLET, FILM COATED, EXTENDED RELEASE ORAL 2 TIMES DAILY
Qty: 120 TABLET | Refills: 11 | Status: SHIPPED | OUTPATIENT
Start: 2024-10-31

## 2024-10-31 RX ORDER — FUROSEMIDE 40 MG/1
40 TABLET ORAL DAILY
Qty: 90 TABLET | Refills: 3 | Status: SHIPPED | OUTPATIENT
Start: 2024-10-31 | End: 2025-10-31

## 2024-11-04 ENCOUNTER — CLINICAL SUPPORT (OUTPATIENT)
Dept: SMOKING CESSATION | Facility: CLINIC | Age: 49
End: 2024-11-04
Payer: MEDICARE

## 2024-11-04 DIAGNOSIS — F17.200 NICOTINE DEPENDENCE: Primary | ICD-10-CM

## 2024-11-04 RX ORDER — DULAGLUTIDE 0.75 MG/.5ML
0.75 INJECTION, SOLUTION SUBCUTANEOUS
Qty: 4 PEN | Refills: 11 | OUTPATIENT
Start: 2024-11-04 | End: 2025-11-04

## 2024-11-04 RX ORDER — MICONAZOLE NITRATE 2 %
2 CREAM (GRAM) TOPICAL
Qty: 100 EACH | Refills: 0 | Status: SHIPPED | OUTPATIENT
Start: 2024-11-04

## 2024-11-04 NOTE — PROGRESS NOTES
The patient location is:     [place], [place]  The chief complaint leading to consultation is: Nicotine dependence    Visit type: audiovisual    Face to Face time with patient:   60 minutes of total time spent on the encounter, which includes face to face time and non-face to face time preparing to see the patient (eg, review of tests), Obtaining and/or reviewing separately obtained history, Documenting clinical information in the electronic or other health record, Independently interpreting results (not separately reported) and communicating results to the patient/family/caregiver, or Care coordination (not separately reported).         Each patient to whom he or she provides medical services by telemedicine is:  (1) informed of the relationship between the physician and patient and the respective role of any other health care provider with respect to management of the patient; and (2) notified that he or she may decline to receive medical services by telemedicine and may withdraw from such care at any time.      Patient will be participating in biweekly tobacco cessation meetings and will begin the prescribed tobacco cessation medication regimen of 2 mg nicotine gum. Instructed pt on use of gum. Pt stated she never was able to get nicotine patches because they were charging her $45, so she told them to place it on hold. Informed pt Medicaid covers NRT products. Advised her to reach out to Medicaid if she is told gum is not covered. Patient denies any low moods or SI/HI , FAYE-D score is 15, no mental distress/probable depression at this time. FTND score was 2, low level of nicotine dependence.  Patient currently smokes a pack every three days. Pt started on rate reduction and wait time of 15 min prior to smoking.  Will follow up next week via virtual appointment.

## 2024-11-04 NOTE — Clinical Note
Patient will be participating in biweekly tobacco cessation meetings and will begin the prescribed tobacco cessation medication regimen of 2 mg nicotine gum. Instructed pt on use of gum. Pt stated she never was able to get nicotine patches because they were charging her $45, so she told them to place it on hold. Informed pt Medicaid covers NRT products. Advised her to reach out to Medicaid if she is told gum is not covered. Patient denies any low moods or SI/HI , FAYE-D score is 15, no mental distress/probable depression at this time. FTND score was 2, low level of nicotine dependence.  Patient currently smokes a pack every three days. Pt started on rate reduction and wait time of 15 min prior to smoking.  Will follow up next week via virtual appointment.

## 2024-11-05 DIAGNOSIS — F17.200 NICOTINE DEPENDENCE: ICD-10-CM

## 2024-11-05 RX ORDER — MICONAZOLE NITRATE 2 %
2 CREAM (GRAM) TOPICAL
Qty: 100 EACH | Refills: 0 | OUTPATIENT
Start: 2024-11-05

## 2024-11-05 NOTE — PROGRESS NOTES
Cardiology Clinic note    Subjective:   Patient ID:  Jaimee Quintana is a 49 y.o. female who presents for follow-up of HTN, CHF    HPI:     11/11/2024: Here for F/U HTN, HFrEF. Lost to F/U after previous clinic visit, recent admission 10/19/2024 with acute on chronic HFrEF. Seen in clinic unaccompanied reports overall doing okay. Visibly nervous/anxious.   Long conversation about CHF diagnosis, importance of medication compliance and close clinic follow-up. Discussed lasix PRN parameters in detail and importance of home monitoring weights and blood pressures. Reports ongoing 4-pillow orthopnea and CANELA, though improved since hospital D/C. No other new CV s/s, denies recurrent LE edema. No new CP, SOB, PND, syncope, palps. Reports compliance and tolerance with all medications.     7/3/2024: Here for F/U HTN, HFrEF, PAD. Seen in clinic reports she's been nervous since her medication changes with fluctuating BPs. Logs reviewed, still with intermittent HTN. Normotensive in clinic today. Reports ongoing CANELA, orthopnea; has been taking 40mg lasix daily for about a week with some improvement. Patient denies CP, SOB, PND, syncope, palpitations, LE edema. Reports compliance and tolerance with all medications.    6/24/2024: Previous patient of Dr. Beth and Vicente Quinones NP as below, initial visit for me. 49 yo F with hx of CVA, DM2, HTN, HLD, CESAR not on CPAP, MO w/ BMI 41.8, and HFrEF- recovered LVEF 50% (now back down to 35-40%) who presents today for routine F/U. 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 Seen in clinic today, reports overall doing well. Seemingly well compensated from HF perspective, grossly euvolemic on exam. Only taking lasix PRN.  Patient denies CP, SOB, PND, syncope, palpitations, LE edema. Reports compliance and tolerance with all medications.  -Will have to D/C [facility] given rEF and escalate other antihypertensives  -repeat Echo ~3m after stable on GDMT      9/29/2023: Pt is a 46 yo F w/ PMH  of CVA, DM2 w/ hgba1c 11.4%, HTN, HLD, CESAR not on CPAP, MO w/ BMI 41.8, and HFrEF- recovered LVEF 50% who presents today for f/u appt. She was last seen by Dr. Beth on 6/27/23 to establish care, post hospital admission 5/22/2023-5/23/2023 for decompensated CHF and was diuresed and meds were adjusted (see d/c sum 5/24/2023). She notes compliance w/ meds and denies side effects. She has been monitoring her BP at home however states that her BP runs high at times, -170s. She denies cp, sob, orthopnea, PND, presyncope, LOC, palpitations. She is currently enrolled in PAD rehab and notes some improvement in BLE claudication.   She does not exercise however states she is active w/ riding her bike and walking around her block and denies cp or sob       Cardiac hx  ---------------------------------    Echo 10/2024    The left ventricle is normal in size. There is concentric hypertrophy. Mild global hypokinesis present. There is mildly reduced systolic function with a visually estimated ejection fraction of 45 - 50%. Unable to assess diastolic function due to poor image quality.    Right Ventricle: Normal right ventricular cavity size. Systolic function is reduced.TAPSE is 1.24 cm.    Mitral Valve: There is mild regurgitation.    Tricuspid Valve: There is mild regurgitation.    Pulmonary Artery: The estimated pulmonary artery systolic pressure is 29 mmHg.    IVC/SVC: Normal venous pressure at 3 mmHg.    The study was difficult due to patient's body habitus.    Echo 10/2023    Left Ventricle: The left ventricle is normal in size. There is concentric hypertrophy. Mild global hypokinesis present. There is moderately reduced systolic function with a visually estimated ejection fraction of 35 - 40%. Grade II diastolic dysfunction.    Right Ventricle: Normal right ventricular cavity size. Wall thickness is normal. Right ventricle wall motion  is normal. Systolic function is normal.    Tricuspid Valve: There is mild  regurgitation.    Pulmonary Artery: The estimated pulmonary artery systolic pressure is 26 mmHg.    IVC/SVC: Normal venous pressure at 3 mmHg.    ALEENA 7/2023  The right resting ALEENA reduction is normal.   The right arm artery has triphasic flow.  The right ankle [DT] artery has triphasic flow.   The right ankle [DP] artery has triphasic flow.   The left resting ALEENA reduction is normal.   The left arm artery has triphasic flow.  The left ankle [PT] artery has triphasic flow.  The left ankle [DP] artery has biphasic flow.  Exercise Study: treadmill test.   The exercise study was stopped due to protocol completion.     Symptoms: At 1 min, patient reported 6/10 right bottom of foot pain that resolved at 3 minutes.  Immediately post exercise, the right ALEENA is normal.  Immediately post exercise, the left ALEENA is mildly decreased.       Echo 5/22/2023    The left ventricle is normal in size with concentric remodeling and low normal systolic function.  The estimated ejection fraction is 50%.  Normal left ventricular diastolic function.  With normal right ventricular systolic function.    CV Exercise ALEENA study: 7/18/23  Conclusion     Mild PAD of left lower extremity with exercise (ALEENA 0.89).  Normal TBIs and toe pressures bilaterally      Past Medical History:   Diagnosis Date    Anxiety     Cataract     Cervical high risk HPV (human papillomavirus) test positive 09/17/2020    NOT 16 OR 18    CHF (congestive heart failure)     CVA (cerebral infarction) 2003         Depression     Diabetes mellitus, type 2     GERD (gastroesophageal reflux disease)     Hyperlipidemia     Hypertension     Lactic acidosis 10/06/2023    Moderate nonproliferative diabetic retinopathy     Nausea and vomiting 10/06/2023    Obesity     Stroke     Tachycardia 10/04/2023          Patient Active Problem List    Diagnosis Date Noted    Tobacco dependency 10/19/2024    Acute on chronic combined systolic and diastolic heart failure 10/19/2024    Type 2  diabetes mellitus, with long-term current use of insulin 10/19/2024    Hypertension 10/19/2024    Aortic atherosclerosis 06/06/2024    NS (nuclear sclerosis), bilateral 04/23/2024    Perinephric abscess 01/08/2024    Abscess of left kidney 01/06/2024    Chronic combined systolic and diastolic congestive heart failure 01/06/2024    Dysphagia 10/08/2023    Sinus arrhythmia 10/06/2023    Bilateral claudication of lower limb 06/27/2023    Noncompliance with medications 05/22/2023    GERD (gastroesophageal reflux disease) 05/22/2023    History of CVA (cerebrovascular accident) without residual deficits 05/22/2023    Mild episode of recurrent major depressive disorder 07/09/2021    Abnormal Papanicolaou smear of cervix with positive human papilloma virus (HPV) test 07/09/2021     + HrHPV  - Negative coloposcopy 9/2020          Type 2 diabetes mellitus with both eyes affected by proliferative retinopathy and macular edema, with long-term current use of insulin 05/11/2021    Hypertensive retinopathy, bilateral 02/11/2020    Thyroid nodule 11/08/2017     Multinodular goiter  FINAL PATHOLOGIC DIAGNOSIS  Left thyroid, fine-needle aspiration:  [facility] Thyroid Cytology Category: Benign.        Diabetic peripheral neuropathy associated with type 2 diabetes mellitus 03/23/2017    Bilateral low back pain without sciatica 09/28/2016    Fatty liver 08/18/2016     encouraged weight loss      Hypertension associated with diabetes 10/23/2015    CESAR (obstructive sleep apnea) 10/23/2015    Hyperlipidemia associated with type 2 diabetes mellitus        Patient's Medications   New Prescriptions    No medications on file   Previous Medications    ASPIRIN (ECOTRIN) 81 MG EC TABLET    Take 81 mg by mouth once daily.    ATORVASTATIN (LIPITOR) 80 MG TABLET    Take 1 tablet (80 mg total) by mouth every evening.    CARVEDILOL (COREG) 12.5 MG TABLET    Take 1 tablet (12.5 mg total) by mouth 2 (two) times daily with meals.    DULAGLUTIDE  "(TRULICITY) 0.75 MG/0.5 ML PEN INJECTOR    Inject 0.75 mg into the skin every 7 days.    ERGOCALCIFEROL (ERGOCALCIFEROL) 50,000 UNIT CAP    Take 1 capsule (50,000 Units total) by mouth every Saturday.    EZETIMIBE (ZETIA) 10 MG TABLET    Take 1 tablet (10 mg total) by mouth once daily.    HYDRALAZINE (APRESOLINE) 50 MG TABLET    Take 50 mg by mouth 3 (three) times daily.    INSULIN GLARGINE U-100, LANTUS, (LANTUS SOLOSTAR U-100 INSULIN) 100 UNIT/ML (3 ML) INPN PEN    Inject 80 Units into the skin 2 (two) times a day.    METFORMIN (GLUCOPHAGE) 1000 MG TABLET    Take 1 tablet (1,000 mg total) by mouth 2 (two) times daily with meals.    NICOTINE (NICODERM CQ) 21 MG/24 HR    Place 1 patch onto the skin once daily.    NICOTINE, POLACRILEX, (NICORETTE) 2 MG GUM    Take 1 each (2 mg total) by mouth as needed (1-2 pieces / hr in place of a cigarette, maximum of 15 pieces/day).    NIFEDIPINE (ADALAT CC) 60 MG TBSR    Take 60 mg by mouth every morning.    PANTOPRAZOLE (PROTONIX) 40 MG TABLET    Take 40 mg by mouth every morning.    PEN NEEDLE, DIABETIC 32 GAUGE X 5/32" NDLE    Use to inject insulin 2 times per day    POTASSIUM CHLORIDE (KLOR-CON 8) 8 MEQ TBSR    Take 2 tablets (16 mEq total) by mouth 2 (two) times daily.    SACUBITRIL-VALSARTAN (ENTRESTO) 49-51 MG PER TABLET    Take 1 tablet by mouth 2 (two) times daily.    SPIRONOLACTONE (ALDACTONE) 25 MG TABLET    Take 1 tablet (25 mg total) by mouth once daily.   Modified Medications    Modified Medication Previous Medication    FUROSEMIDE (LASIX) 40 MG TABLET furosemide (LASIX) 40 MG tablet       Take 1 tablet (40 mg total) by mouth 2 (two) times a day for 5 days, THEN 1 tablet (40 mg total) once daily for 360 days, THEN 1 tablet (40 mg total) daily as needed (> 2 pound weight gain in 24 hours, > 5 pound weight gain in a week, lower extrimety swelling, shortness of breath).    Take 1 tablet (40 mg total) by mouth once daily.   Discontinued Medications    No medications on " file        Review of Systems   Constitutional: Negative for chills, decreased appetite, diaphoresis, malaise/fatigue, weight gain and weight loss.   Cardiovascular:  Positive for dyspnea on exertion and orthopnea. Negative for chest pain, claudication, irregular heartbeat, leg swelling, near-syncope, palpitations, paroxysmal nocturnal dyspnea and syncope.   Respiratory:  Negative for cough, hemoptysis, shortness of breath and snoring.    Gastrointestinal:  Negative for bloating, abdominal pain, nausea and vomiting.   Neurological:  Negative for light-headedness and weakness.   Psychiatric/Behavioral:  The patient is nervous/anxious.        Family History   Problem Relation Name Age of Onset    Stroke Mother      Thyroid disease Mother      Diabetes Mother      Cataracts Father      Hypertension Father      Arthritis Father      Diabetes Father      Hypertension Sister      Stroke Sister      Thyroid disease Sister      Heart disease Sister      No Known Problems Brother      Thyroid disease Daughter 2     Asthma Daughter 2     No Known Problems Maternal Aunt      No Known Problems Maternal Uncle      No Known Problems Paternal Aunt      No Known Problems Paternal Uncle      No Known Problems Maternal Grandmother      No Known Problems Maternal Grandfather      No Known Problems Paternal Grandmother      No Known Problems Paternal Grandfather      Amblyopia Neg Hx      Blindness Neg Hx      Cancer Neg Hx      Glaucoma Neg Hx      Macular degeneration Neg Hx      Retinal detachment Neg Hx      Strabismus Neg Hx         Social History     Socioeconomic History    Marital status: Single   Occupational History    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 She states ready to quit smoking. Ambulatory referral to Smoking Cessation clinic following hospital discharge.    Substance and Sexual Activity    Alcohol use: Yes     Comment: less than one monthly    Drug use: No    Sexual activity: Yes     Partners: Male     Birth control/protection: None   Social History Narrative    Daughter - Kavon     Social Drivers of Health     Financial Resource Strain: Low Risk  (10/21/2024)    Overall Financial Resource Strain (CARDIA)     Difficulty of Paying Living Expenses: Not hard at all   Recent Concern: Financial Resource Strain - Medium Risk (10/19/2024)    Overall Financial Resource Strain (CARDIA)     Difficulty of Paying Living Expenses: Somewhat hard   Food Insecurity: No Food Insecurity (10/21/2024)    Hunger Vital Sign     Worried About Running Out of Food in the Last Year: Never true     Ran Out of Food in the Last Year: Never true   Recent Concern: Food Insecurity - Food Insecurity Present (10/19/2024)    Hunger Vital Sign     Worried About Running Out of Food in the Last Year: Sometimes true     Ran Out of Food in the Last Year: Sometimes true   Transportation Needs: No Transportation Needs (10/21/2024)    TRANSPORTATION NEEDS     Transportation : No   Recent Concern: Transportation Needs - Unmet Transportation Needs (10/19/2024)    TRANSPORTATION NEEDS     Transportation : Yes, it has kept me from non-medical meetings, appointments, work or from getting things that I need.   Physical Activity: Inactive (10/21/2024)    Exercise Vital Sign     Days of Exercise per Week: 0 days     Minutes of Exercise per Session: 0 min   Stress: Stress Concern Present (10/21/2024)    [employer] [place] [employer] - Occupational Stress Questionnaire     Feeling of Stress : To some extent   Housing Stability: Low Risk  (10/21/2024)    Housing Stability Vital Sign     Unable to Pay for Housing in the Last Year: No     Homeless in the Last Year: No     "        Objective:   Vitals  Vitals:    11/11/24 1509 11/11/24 1513   BP: 119/71 116/76   Pulse: 84    SpO2: 96%    Weight: 114.8 kg (252 lb 15.7 oz)    Height: 5' 5" (1.651 m)             Physical Exam  Vitals and nursing note reviewed.   Constitutional:       Appearance: Normal appearance. She is obese.   Cardiovascular:      Rate and Rhythm: Normal rate and regular rhythm.      Heart sounds: No murmur heard.     No gallop.   Pulmonary:      Effort: Pulmonary effort is normal.      Breath sounds: Normal breath sounds. No wheezing or rales.   Abdominal:      General: Bowel sounds are normal. There is no distension.      Palpations: Abdomen is soft.      Tenderness: There is no abdominal tenderness.   Musculoskeletal:      Right lower leg: No edema.      Left lower leg: No edema.   Skin:     General: Skin is warm and dry.   Neurological:      Mental Status: She is alert and oriented to person, place, and time.         Lab Results    Lab Results   Component Value Date    WBC 11.97 10/22/2024    HGB 10.8 (L) 10/22/2024    HCT 33.4 (L) 10/22/2024    HCT 41.0 01/05/2024    MCV 89 10/22/2024       Lab Results   Component Value Date     10/22/2024    INR 1.0 01/08/2024       Lab Results   Component Value Date    K 4.5 10/31/2024    MG 2.1 10/22/2024    BUN 12 10/31/2024    CREATININE 0.9 10/31/2024       Lab Results   Component Value Date     (H) 10/31/2024    HGBA1C 9.1 (H) 10/31/2024       Lab Results   Component Value Date    AST 13 10/22/2024    ALT 19 10/22/2024    ALBUMIN 2.5 (L) 10/22/2024    PROT 6.7 10/22/2024       Lab Results   Component Value Date    CHOL 220 (H) 09/29/2023    HDL 49 09/29/2023    LDLCALC 142.8 09/29/2023    TRIG 141 09/29/2023       Lab Results   Component Value Date     (H) 10/19/2024         Assessment:     1. Chronic combined systolic and diastolic congestive heart failure    2. Acute on chronic combined systolic (congestive) and diastolic (congestive) heart failure  "   3. Aortic atherosclerosis    4. Acute on chronic combined systolic and diastolic heart failure    5. Hypertension, unspecified type    6. Hyperlipidemia associated with type 2 diabetes mellitus    7. Hypertension associated with diabetes    8. CESAR (obstructive sleep apnea)    9. Tobacco dependency          Plan:     HFrEF  -educated on the importance of medication compliance and close clinic F/U  -long discussion about lasix PRN parameters, daily weights, home monitoring of BP  -fluid status difficult to assess given body habitus - respiratory status stable, clear to auscultation, -ve LE edema; still with CANELA/orthopnea, Zoll HF monitor with elevated TFI  -increase lasix to 40mg BID x 5d; following 5d lasix dose escalation continue 40mg daily with additional 40mg PRN  -continue Entresto, lasix, BB, aldactone as above  -will add jardiance/farxiga at F/U if kindey function stable  -BMP ~ 1 weeks    2. HTN  -goal BP < 120/80, well controlled  -continue entresto, hydralazine, Coreg as above  -continue home monitoring, enrolled in digital HTN  -F/U 1 week    3. CESAR  -has CPAP ordered, awaiting supplies    4. HLD  -goal LDLc < 70  -continue lipitor, zetia    5. PAD  -stable, continue asa, statin, zetia    6. DM2  -A1C better, management per PCP    7. Obesity  -encouraged Mediterranean diet, exercise, weight loss         The ASCVD Risk score (Elizabeth DK, et al., 2019) failed to calculate for the following reasons:    Risk score cannot be calculated because patient has a medical history suggesting prior/existing ASCVD    Orders Placed This Encounter   Procedures    Basic metabolic panel     Standing Status:   Future     Standing Expiration Date:   2/9/2026     Order Specific Question:   Send normal result to authorizing provider's In Basket if patient is active on MyChart:     Answer:   Yes    Magnesium     Standing Status:   Future     Standing Expiration Date:   2/9/2026     Order Specific Question:   Send normal result  to authorizing provider's In Basket if patient is active on MyChart:     Answer:   Yes       She expressed verbal understanding and agreed with the plan    Follow up in 7d, labs prior     Pertinent cardiac images and EKG reviewed independently.    Continue with current medical plan and lifestyle changes.  Return sooner for concerns or questions. If symptoms persist go to the ED.  I have reviewed all pertinent data including patient's medical history in detail and updated the computerized patient record.     Counseling included discussion regarding imaging findings, diagnosis, possibilities, treatment options, risks and benefits.    Thank you for the opportunity to care for this patient. Will be available for questions if needed.     Signed:  Claudio Whitney DNP  11/11/2024

## 2024-11-05 NOTE — TELEPHONE ENCOUNTER
No care due was identified.  [facility] Embedded Care Due Messages. Reference number: [id number].   11/05/2024 7:04:43 AM CST

## 2024-11-05 NOTE — TELEPHONE ENCOUNTER
Refill Decision Note   Jaimee Quintana  is requesting a refill authorization.  Brief Assessment and Rationale for Refill:  Quick Discontinue     Medication Therapy Plan:  Receipt confirmed by pharmacy (11/4/2024  2:03 PM CST)      Comments:     Note composed:7:28 AM 11/05/2024             Appointments     Last Visit   5/29/2024 Lakisha Mendez DO   Next Visit   Visit date not found Lakisha Mendez DO

## 2024-11-08 ENCOUNTER — EXTERNAL HOME HEALTH (OUTPATIENT)
Dept: HOME HEALTH SERVICES | Facility: HOSPITAL | Age: 49
End: 2024-11-08
Payer: MEDICARE

## 2024-11-11 ENCOUNTER — OFFICE VISIT (OUTPATIENT)
Dept: CARDIOLOGY | Facility: CLINIC | Age: 49
End: 2024-11-11
Payer: MEDICARE

## 2024-11-11 ENCOUNTER — CLINICAL SUPPORT (OUTPATIENT)
Dept: SMOKING CESSATION | Facility: CLINIC | Age: 49
End: 2024-11-11
Payer: MEDICARE

## 2024-11-11 VITALS
HEIGHT: 65 IN | DIASTOLIC BLOOD PRESSURE: 76 MMHG | WEIGHT: 253 LBS | SYSTOLIC BLOOD PRESSURE: 116 MMHG | BODY MASS INDEX: 42.15 KG/M2 | HEART RATE: 84 BPM | OXYGEN SATURATION: 96 %

## 2024-11-11 DIAGNOSIS — E11.59 HYPERTENSION ASSOCIATED WITH DIABETES: ICD-10-CM

## 2024-11-11 DIAGNOSIS — I70.0 AORTIC ATHEROSCLEROSIS: ICD-10-CM

## 2024-11-11 DIAGNOSIS — I10 HYPERTENSION, UNSPECIFIED TYPE: ICD-10-CM

## 2024-11-11 DIAGNOSIS — E11.69 HYPERLIPIDEMIA ASSOCIATED WITH TYPE 2 DIABETES MELLITUS: ICD-10-CM

## 2024-11-11 DIAGNOSIS — E78.5 HYPERLIPIDEMIA ASSOCIATED WITH TYPE 2 DIABETES MELLITUS: ICD-10-CM

## 2024-11-11 DIAGNOSIS — I50.43 ACUTE ON CHRONIC COMBINED SYSTOLIC AND DIASTOLIC HEART FAILURE: ICD-10-CM

## 2024-11-11 DIAGNOSIS — G47.33 OSA (OBSTRUCTIVE SLEEP APNEA): ICD-10-CM

## 2024-11-11 DIAGNOSIS — I15.2 HYPERTENSION ASSOCIATED WITH DIABETES: ICD-10-CM

## 2024-11-11 DIAGNOSIS — F17.200 NICOTINE DEPENDENCE: Primary | ICD-10-CM

## 2024-11-11 DIAGNOSIS — F17.200 TOBACCO DEPENDENCY: ICD-10-CM

## 2024-11-11 DIAGNOSIS — I50.42 CHRONIC COMBINED SYSTOLIC AND DIASTOLIC CONGESTIVE HEART FAILURE: Primary | ICD-10-CM

## 2024-11-11 DIAGNOSIS — I50.43 ACUTE ON CHRONIC COMBINED SYSTOLIC (CONGESTIVE) AND DIASTOLIC (CONGESTIVE) HEART FAILURE: ICD-10-CM

## 2024-11-11 PROCEDURE — 1111F DSCHRG MED/CURRENT MED MERGE: CPT | Mod: HCNC,CPTII,S$GLB,

## 2024-11-11 PROCEDURE — 3046F HEMOGLOBIN A1C LEVEL >9.0%: CPT | Mod: HCNC,CPTII,S$GLB,

## 2024-11-11 PROCEDURE — 4010F ACE/ARB THERAPY RXD/TAKEN: CPT | Mod: HCNC,CPTII,S$GLB,

## 2024-11-11 PROCEDURE — 99215 OFFICE O/P EST HI 40 MIN: CPT | Mod: HCNC,S$GLB,,

## 2024-11-11 PROCEDURE — 3008F BODY MASS INDEX DOCD: CPT | Mod: HCNC,CPTII,S$GLB,

## 2024-11-11 PROCEDURE — 99999 PR PBB SHADOW E&M-EST. PATIENT-LVL V: CPT | Mod: PBBFAC,HCNC,,

## 2024-11-11 PROCEDURE — 1159F MED LIST DOCD IN RCRD: CPT | Mod: HCNC,CPTII,S$GLB,

## 2024-11-11 PROCEDURE — 3060F POS MICROALBUMINURIA REV: CPT | Mod: HCNC,CPTII,S$GLB,

## 2024-11-11 PROCEDURE — 3066F NEPHROPATHY DOC TX: CPT | Mod: HCNC,CPTII,S$GLB,

## 2024-11-11 PROCEDURE — 3078F DIAST BP <80 MM HG: CPT | Mod: HCNC,CPTII,S$GLB,

## 2024-11-11 PROCEDURE — 3074F SYST BP LT 130 MM HG: CPT | Mod: HCNC,CPTII,S$GLB,

## 2024-11-11 RX ORDER — PANTOPRAZOLE SODIUM 40 MG/1
40 TABLET, DELAYED RELEASE ORAL EVERY MORNING
COMMUNITY
Start: 2024-11-01

## 2024-11-11 RX ORDER — FUROSEMIDE 40 MG/1
TABLET ORAL
Qty: 90 TABLET | Refills: 3 | Status: SHIPPED | OUTPATIENT
Start: 2024-11-11 | End: 2026-11-06

## 2024-11-11 RX ORDER — NIFEDIPINE 60 MG/1
60 TABLET, EXTENDED RELEASE ORAL EVERY MORNING
COMMUNITY
Start: 2024-11-01

## 2024-11-11 NOTE — PROGRESS NOTES
Individual Follow-Up Form #2    11/11/2024    Quit Date: TBD    Clinical Status of Patient: Outpatient    Length of Service: 30 minutes    Continuing Medication: no    Other Medications: has not picked up gum     Target Symptoms: Withdrawal and medication side effects. The following were  rated moderate (3) to severe (4) on TCRS:  Moderate (3): desires or craves nicotine; reviewed with patient  Severe (4): none    Comments: Followed up with patient via telephone call after she did not check in for virtual visit. Pt reports she has not smoked since Friday. Congratulated pt on remaining tobacco free for 4 days. Pt stated she has not picked up the nicotine gum that was prescribed. Pt stated she has no cigarettes and has been trying to stay distracted when urges arise. Discussed the 4 D's (delay, distract, drink water, and deep breathing) exercises when urges or cravings arise.) Reviewed strategies, cues, and triggers. Introduced the negative impact of tobacco on health, the health advantages of discontinuing the use of tobacco, time line improved health changes after a quit, withdrawal issues to expect from nicotine and habit, and ways to achieve the goal of a quit. Reminded pt keep in mind her reasons for wanting to quit. Pt does report she has been having strong urges, advised pt to use nicotine gum to help with cravings or desires to smoke. Pt plans on picking up gum soon. The patient denies any abnormal behavioral or mental changes at this time. The patient will continue with  therapy sessions and medication monitoring by CTTS. Prescribed medication management will be by physician.      Diagnosis: F17.200    Next Visit: 1 week

## 2024-11-12 NOTE — PROGRESS NOTES
Cardiology Clinic note    Subjective:   Patient ID:  Jaimee Quintana is a 49 y.o. female who presents for follow-up of HTN, CHF    HPI:     11/20/2024: Here for F/U HTN, HFrEF. Seen in clinic unaccompanied reports overall doing pretty well. Weight stable, compliant with home monitoring, taking lasix 40mg BID. Tolerating GDMT. Ongoing CANELA, improving, chronic 3-4 pillow orthopnea. No recurrent LE edema, no resting SOB. Denies CP, PND, syncope, palps. Reports compliance and tolerance with all medications.     11/11/2024: Here for F/U HTN, HFrEF. Lost to F/U after previous clinic visit, recent admission 10/19/2024 with acute on chronic HFrEF. Seen in clinic unaccompanied reports overall doing okay. Visibly nervous/anxious.   Long conversation about CHF diagnosis, importance of medication compliance and close clinic follow-up. Discussed lasix PRN parameters in detail and importance of home monitoring weights and blood pressures. Reports ongoing 4-pillow orthopnea and CANELA, though improved since hospital D/C. No other new CV s/s, denies recurrent LE edema. No new CP, SOB, PND, syncope, palps. Reports compliance and tolerance with all medications.     7/3/2024: Here for F/U HTN, HFrEF, PAD. Seen in clinic reports she's been nervous since her medication changes with fluctuating BPs. Logs reviewed, still with intermittent HTN. Normotensive in clinic today. Reports ongoing CANELA, orthopnea; has been taking 40mg lasix daily for about a week with some improvement. Patient denies CP, SOB, PND, syncope, palpitations, LE edema. Reports compliance and tolerance with all medications.    6/24/2024: Previous patient of Dr. Beth and Vicente Quinones NP as below, initial visit for me. 47 yo F with hx of CVA, DM2, HTN, HLD, CESAR not on CPAP, MO w/ BMI 41.8, and HFrEF- recovered LVEF 50% (now back down to 35-40%) who presents today for routine F/U. 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 Seen in clinic today, reports overall doing  well. Seemingly well compensated from HF perspective, grossly euvolemic on exam. Only taking lasix PRN.  Patient denies CP, SOB, PND, syncope, palpitations, LE edema. Reports compliance and tolerance with all medications.  -Will have to D/C [facility] given rEF and escalate other antihypertensives  -repeat Echo ~3m after stable on GDMT      9/29/2023: Pt is a 48 yo F w/ PMH of CVA, DM2 w/ hgba1c 11.4%, HTN, HLD, CESAR not on CPAP, MO w/ BMI 41.8, and HFrEF- recovered LVEF 50% who presents today for f/u appt. She was last seen by Dr. Beth on 6/27/23 to establish care, post hospital admission 5/22/2023-5/23/2023 for decompensated CHF and was diuresed and meds were adjusted (see d/c sum 5/24/2023). She notes compliance w/ meds and denies side effects. She has been monitoring her BP at home however states that her BP runs high at times, -170s. She denies cp, sob, orthopnea, PND, presyncope, LOC, palpitations. She is currently enrolled in PAD rehab and notes some improvement in BLE claudication.   She does not exercise however states she is active w/ riding her bike and walking around her block and denies cp or sob       Cardiac hx  ---------------------------------    Echo 10/2024    The left ventricle is normal in size. There is concentric hypertrophy. Mild global hypokinesis present. There is mildly reduced systolic function with a visually estimated ejection fraction of 45 - 50%. Unable to assess diastolic function due to poor image quality.    Right Ventricle: Normal right ventricular cavity size. Systolic function is reduced.TAPSE is 1.24 cm.    Mitral Valve: There is mild regurgitation.    Tricuspid Valve: There is mild regurgitation.    Pulmonary Artery: The estimated pulmonary artery systolic pressure is 29 mmHg.    IVC/SVC: Normal venous pressure at 3 mmHg.    The study was difficult due to patient's body habitus.    Echo 10/2023    Left Ventricle: The left ventricle is normal in size. There is concentric  hypertrophy. Mild global hypokinesis present. There is moderately reduced systolic function with a visually estimated ejection fraction of 35 - 40%. Grade II diastolic dysfunction.    Right Ventricle: Normal right ventricular cavity size. Wall thickness is normal. Right ventricle wall motion  is normal. Systolic function is normal.    Tricuspid Valve: There is mild regurgitation.    Pulmonary Artery: The estimated pulmonary artery systolic pressure is 26 mmHg.    IVC/SVC: Normal venous pressure at 3 mmHg.    ALEENA 7/2023  The right resting ALEENA reduction is normal.   The right arm artery has triphasic flow.  The right ankle [DT] artery has triphasic flow.   The right ankle [DP] artery has triphasic flow.   The left resting ALEENA reduction is normal.   The left arm artery has triphasic flow.  The left ankle [PT] artery has triphasic flow.  The left ankle [DP] artery has biphasic flow.  Exercise Study: treadmill test.   The exercise study was stopped due to protocol completion.     Symptoms: At 1 min, patient reported 6/10 right bottom of foot pain that resolved at 3 minutes.  Immediately post exercise, the right ALEENA is normal.  Immediately post exercise, the left ALEENA is mildly decreased.       Echo 5/22/2023    The left ventricle is normal in size with concentric remodeling and low normal systolic function.  The estimated ejection fraction is 50%.  Normal left ventricular diastolic function.  With normal right ventricular systolic function.    CV Exercise ALEENA study: 7/18/23  Conclusion     Mild PAD of left lower extremity with exercise (ALEENA 0.89).  Normal TBIs and toe pressures bilaterally      Past Medical History:   Diagnosis Date    Anxiety     Cataract     Cervical high risk HPV (human papillomavirus) test positive 09/17/2020    NOT 16 OR 18    CHF (congestive heart failure)     CVA (cerebral infarction) 2003         Depression     Diabetes mellitus, type 2     GERD (gastroesophageal reflux disease)     Hyperlipidemia      Hypertension     Lactic acidosis 10/06/2023    Moderate nonproliferative diabetic retinopathy     Nausea and vomiting 10/06/2023    Obesity     Stroke     Tachycardia 10/04/2023          Patient Active Problem List    Diagnosis Date Noted    Class 3 severe obesity with serious comorbidity and body mass index (BMI) of 40.0 to 44.9 in adult 11/19/2024    Tobacco dependency 10/19/2024    Acute on chronic combined systolic and diastolic heart failure 10/19/2024    Type 2 diabetes mellitus with hyperglycemia, with long-term current use of insulin 10/19/2024    Hypertension 10/19/2024    Aortic atherosclerosis 06/06/2024    NS (nuclear sclerosis), bilateral 04/23/2024    Perinephric abscess 01/08/2024    Abscess of left kidney 01/06/2024    Chronic combined systolic and diastolic congestive heart failure 01/06/2024    Dysphagia 10/08/2023    Sinus arrhythmia 10/06/2023    Bilateral claudication of lower limb 06/27/2023    Noncompliance with medications 05/22/2023    GERD (gastroesophageal reflux disease) 05/22/2023    History of CVA (cerebrovascular accident) without residual deficits 05/22/2023    Mild episode of recurrent major depressive disorder 07/09/2021    Abnormal Papanicolaou smear of cervix with positive human papilloma virus (HPV) test 07/09/2021     + HrHPV  - Negative coloposcopy 9/2020          Type 2 diabetes mellitus with 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        Diabetic peripheral neuropathy associated with type 2 diabetes mellitus 03/23/2017    Bilateral low back pain without sciatica 09/28/2016    Fatty liver 08/18/2016     encouraged weight loss      Hypertension associated with diabetes 10/23/2015  "   CESAR (obstructive sleep apnea) 10/23/2015    Hyperlipidemia associated with type 2 diabetes mellitus        Patient's Medications   New Prescriptions    No medications on file   Previous Medications    ASPIRIN (ECOTRIN) 81 MG EC TABLET    Take 81 mg by mouth once daily.    ATORVASTATIN (LIPITOR) 80 MG TABLET    Take 1 tablet (80 mg total) by mouth every evening.    CARVEDILOL (COREG) 12.5 MG TABLET    Take 1 tablet (12.5 mg total) by mouth 2 (two) times daily with meals.    DULAGLUTIDE (TRULICITY) 0.75 MG/0.5 ML PEN INJECTOR    Inject 0.75 mg into the skin every 7 days.    EMPAGLIFLOZIN (JARDIANCE) 10 MG TABLET    Take 1 tablet (10 mg total) by mouth once daily.    ERGOCALCIFEROL (ERGOCALCIFEROL) 50,000 UNIT CAP    Take 1 capsule (50,000 Units total) by mouth every Saturday.    EZETIMIBE (ZETIA) 10 MG TABLET    Take 1 tablet (10 mg total) by mouth once daily.    HYDRALAZINE (APRESOLINE) 50 MG TABLET    Take 50 mg by mouth 3 (three) times daily.    INSULIN GLARGINE U-100, LANTUS, (LANTUS SOLOSTAR U-100 INSULIN) 100 UNIT/ML (3 ML) INPN PEN    Inject 80 Units into the skin 2 (two) times a day.    METFORMIN (GLUCOPHAGE) 1000 MG TABLET    Take 1 tablet (1,000 mg total) by mouth 2 (two) times daily with meals.    NICOTINE (NICODERM CQ) 21 MG/24 HR    Place 1 patch onto the skin once daily.    NICOTINE, POLACRILEX, (NICORETTE) 2 MG GUM    Take 1 each (2 mg total) by mouth as needed (1-2 pieces / hr in place of a cigarette, maximum of 15 pieces/day).    PEN NEEDLE, DIABETIC 32 GAUGE X 5/32" NDLE    Use to inject insulin 2 times per day    POTASSIUM CHLORIDE (KLOR-CON 8) 8 MEQ TBSR    Take 2 tablets (16 mEq total) by mouth 2 (two) times daily.    SACUBITRIL-VALSARTAN (ENTRESTO) 49-51 MG PER TABLET    Take 1 tablet by mouth 2 (two) times daily.    SPIRONOLACTONE (ALDACTONE) 25 MG TABLET    Take 1 tablet (25 mg total) by mouth once daily.   Modified Medications    Modified Medication Previous Medication    FUROSEMIDE (LASIX) " 40 MG TABLET furosemide (LASIX) 40 MG tablet       Take 1 tablet (40 mg total) by mouth 2 (two) times a day. May also take 1 tablet (40 mg total) daily as needed (> 2 pound weight gain in 24 hours, > 5 pound weight gain in a week, lower extrimety swelling, shortness of breath).    Take 1 tablet (40 mg total) by mouth 2 (two) times a day for 5 days, THEN 1 tablet (40 mg total) once daily for 360 days, THEN 1 tablet (40 mg total) daily as needed (> 2 pound weight gain in 24 hours, > 5 pound weight gain in a week, lower extrimety swelling, shortness of breath).   Discontinued Medications    No medications on file        Review of Systems   Constitutional: Negative for chills, decreased appetite, diaphoresis, malaise/fatigue, weight gain and weight loss.   Cardiovascular:  Positive for dyspnea on exertion and orthopnea. Negative for chest pain, claudication, irregular heartbeat, leg swelling, near-syncope, palpitations, paroxysmal nocturnal dyspnea and syncope.   Respiratory:  Negative for cough, hemoptysis, shortness of breath and snoring.    Gastrointestinal:  Negative for bloating, abdominal pain, nausea and vomiting.   Neurological:  Negative for light-headedness and weakness.   Psychiatric/Behavioral:  The patient is nervous/anxious.        Family History   Problem Relation Name Age of Onset    Stroke Mother      Thyroid disease Mother      Diabetes Mother      Cataracts Father      Hypertension Father      Arthritis Father      Diabetes Father      Hypertension Sister      Stroke Sister      Thyroid disease Sister      Heart disease Sister      No Known Problems Brother      Thyroid disease Daughter 2     Asthma Daughter 2     No Known Problems Maternal Aunt      No Known Problems Maternal Uncle      No Known Problems Paternal Aunt      No Known Problems Paternal Uncle      No Known Problems Maternal Grandmother      No Known Problems Maternal Grandfather      No Known Problems Paternal Grandmother      No Known  Problems Paternal Grandfather      Amblyopia Neg Hx      Blindness Neg Hx      Cancer Neg Hx      Glaucoma Neg Hx      Macular degeneration Neg Hx      Retinal detachment Neg Hx      Strabismus Neg Hx         Social History     Socioeconomic History    Marital status: Single   Occupational History    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 She states ready to quit smoking. Ambulatory referral to Smoking Cessation clinic following hospital discharge.    Substance and Sexual Activity    Alcohol use: Yes     Comment: less than one monthly    Drug use: No    Sexual activity: Yes     Partners: Male     Birth control/protection: None   Social History Narrative    Daughter - Kavon     Social Drivers of Health     Financial Resource Strain: Low Risk  (10/21/2024)    Overall Financial Resource Strain (CARDIA)     Difficulty of Paying Living Expenses: Not hard at all   Recent Concern: Financial Resource Strain - Medium Risk (10/19/2024)    Overall Financial Resource Strain (CARDIA)     Difficulty of Paying Living Expenses: Somewhat hard   Food Insecurity: No Food Insecurity (10/21/2024)    Hunger Vital Sign     Worried About Running Out of Food in the Last Year: Never true     Ran Out of Food in the Last Year: Never true   Recent Concern: Food Insecurity - Food Insecurity Present (10/19/2024)    Hunger Vital Sign     Worried About Running Out of Food in the Last Year: Sometimes true     Ran Out of Food in the Last Year: Sometimes true   Transportation Needs: No Transportation Needs (10/21/2024)    TRANSPORTATION NEEDS     Transportation : No   Recent Concern: Transportation Needs - Unmet Transportation Needs (10/19/2024)     "TRANSPORTATION NEEDS     Transportation : Yes, it has kept me from non-medical meetings, appointments, work or from getting things that I need.   Physical Activity: Inactive (10/21/2024)    Exercise Vital Sign     Days of Exercise per Week: 0 days     Minutes of Exercise per Session: 0 min   Stress: Stress Concern Present (10/21/2024)    [employer] - Occupational Stress Questionnaire     Feeling of Stress : To some extent   Housing Stability: Low Risk  (10/21/2024)    Housing Stability Vital Sign     Unable to Pay for Housing in the Last Year: No     Homeless in the Last Year: No            Objective:   Vitals  Vitals:    11/20/24 1420 11/20/24 1423   BP: 127/81 118/74   Pulse: 86    SpO2: 97%    Weight: 114.1 kg (251 lb 10.5 oz)    Height: 5' 5" (1.651 m)               Physical Exam  Vitals and nursing note reviewed.   Constitutional:       Appearance: Normal appearance. She is obese.   Cardiovascular:      Rate and Rhythm: Normal rate and regular rhythm.      Heart sounds: No murmur heard.     No gallop.   Pulmonary:      Effort: Pulmonary effort is normal.      Breath sounds: Normal breath sounds. No wheezing or rales.   Abdominal:      General: Bowel sounds are normal. There is no distension.      Palpations: Abdomen is soft.      Tenderness: There is no abdominal tenderness.   Musculoskeletal:      Right lower leg: No edema.      Left lower leg: No edema.   Skin:     General: Skin is warm and dry.   Neurological:      Mental Status: She is alert and oriented to person, place, and time.         Lab Results    Lab Results   Component Value Date    WBC 11.97 10/22/2024    HGB 10.8 (L) 10/22/2024    HCT 33.4 (L) 10/22/2024    HCT 41.0 01/05/2024    MCV 89 10/22/2024       Lab Results   Component Value Date     10/22/2024    INR 1.0 01/08/2024       Lab Results   Component Value Date    K 4.0 11/18/2024    MG 1.6 11/18/2024    BUN 18 11/18/2024    CREATININE 1.1 11/18/2024 "       Lab Results   Component Value Date     (H) 11/18/2024    HGBA1C 9.1 (H) 10/31/2024       Lab Results   Component Value Date    AST 13 10/22/2024    ALT 19 10/22/2024    ALBUMIN 2.5 (L) 10/22/2024    PROT 6.7 10/22/2024       Lab Results   Component Value Date    CHOL 220 (H) 09/29/2023    HDL 49 09/29/2023    LDLCALC 142.8 09/29/2023    TRIG 141 09/29/2023       Lab Results   Component Value Date     (H) 10/19/2024         Assessment:     1. Chronic combined systolic and diastolic congestive heart failure    2. Acute on chronic combined systolic (congestive) and diastolic (congestive) heart failure    3. Sinus arrhythmia    4. Hypertension associated with diabetes    5. Hypertension, unspecified type    6. Hyperlipidemia associated with type 2 diabetes mellitus    7. Aortic atherosclerosis    8. CESAR (obstructive sleep apnea)    9. Tobacco dependency            Plan:     HFrEF  -educated on the importance of medication compliance and close clinic F/U - doing very well  -long discussion about lasix PRN parameters, daily weights, home monitoring of BP  -fluid status difficult to assess given body habitus - respiratory status stable, clear to auscultation, -ve LE edema; still with CANELA/orthopnea  -continue lasix 40mg BID with additional dosing PRN  -on great GDMT, continue Entresto, lasix, BB, aldactone, jardiance as above - update labs in ~ 2 weeks  -BP at goal    2. HTN  -goal BP < 120/80, well controlled  -continue entresto, hydralazine, Coreg as above  -continue home monitoring, enrolled in digital HTN  -F/U 1 week    3. CESAR  -has CPAP ordered, awaiting supplies    4. HLD  -goal LDLc < 70  -continue lipitor, zetia    5. PAD  -stable, continue asa, statin, zetia    6. DM2  -A1C better, management per PCP    7. Obesity  -encouraged Mediterranean diet, exercise, weight loss    8. Tobacco use  -cessation education  -F/U with cessation team as scheduled         The ASCVD Risk score (Elizabeth DK, et al.,  2019) failed to calculate for the following reasons:    Risk score cannot be calculated because patient has a medical history suggesting prior/existing ASCVD    Orders Placed This Encounter   Procedures    Basic metabolic panel     Standing Status:   Future     Standing Expiration Date:   2/18/2026     Order Specific Question:   Send normal result to authorizing provider's In Basket if patient is active on MyChart:     Answer:   Yes    Magnesium     Standing Status:   Future     Standing Expiration Date:   2/18/2026     Order Specific Question:   Send normal result to authorizing provider's In Basket if patient is active on MyChart:     Answer:   Yes       She expressed verbal understanding and agreed with the plan    Follow up in 2 weeks, labs prior    Pertinent cardiac images and EKG reviewed independently.    Continue with current medical plan and lifestyle changes.  Return sooner for concerns or questions. If symptoms persist go to the ED.  I have reviewed all pertinent data including patient's medical history in detail and updated the computerized patient record.     Counseling included discussion regarding imaging findings, diagnosis, possibilities, treatment options, risks and benefits.    Thank you for the opportunity to care for this patient. Will be available for questions if needed.     Signed:  Claudio Whitney DNP  11/20/2024

## 2024-11-18 ENCOUNTER — LAB VISIT (OUTPATIENT)
Dept: LAB | Facility: HOSPITAL | Age: 49
End: 2024-11-18
Payer: MEDICARE

## 2024-11-18 DIAGNOSIS — I50.43 ACUTE ON CHRONIC COMBINED SYSTOLIC (CONGESTIVE) AND DIASTOLIC (CONGESTIVE) HEART FAILURE: ICD-10-CM

## 2024-11-18 LAB
ANION GAP SERPL CALC-SCNC: 8 MMOL/L (ref 8–16)
BUN SERPL-MCNC: 18 MG/DL (ref 6–20)
CALCIUM SERPL-MCNC: 9.6 MG/DL (ref 8.7–10.5)
CHLORIDE SERPL-SCNC: 105 MMOL/L (ref 95–110)
CO2 SERPL-SCNC: 22 MMOL/L (ref 23–29)
CREAT SERPL-MCNC: 1.1 MG/DL (ref 0.5–1.4)
EST. GFR  (NO RACE VARIABLE): >60 ML/MIN/1.73 M^2
GLUCOSE SERPL-MCNC: 354 MG/DL (ref 70–110)
MAGNESIUM SERPL-MCNC: 1.6 MG/DL (ref 1.6–2.6)
POTASSIUM SERPL-SCNC: 4 MMOL/L (ref 3.5–5.1)
SODIUM SERPL-SCNC: 135 MMOL/L (ref 136–145)

## 2024-11-18 PROCEDURE — 36415 COLL VENOUS BLD VENIPUNCTURE: CPT | Mod: HCNC

## 2024-11-18 PROCEDURE — 83735 ASSAY OF MAGNESIUM: CPT | Mod: HCNC

## 2024-11-18 PROCEDURE — 80048 BASIC METABOLIC PNL TOTAL CA: CPT | Mod: HCNC

## 2024-11-19 ENCOUNTER — OFFICE VISIT (OUTPATIENT)
Dept: PRIMARY CARE CLINIC | Facility: CLINIC | Age: 49
End: 2024-11-19
Payer: MEDICARE

## 2024-11-19 ENCOUNTER — TELEPHONE (OUTPATIENT)
Dept: SMOKING CESSATION | Facility: CLINIC | Age: 49
End: 2024-11-19
Payer: MEDICARE

## 2024-11-19 VITALS
HEIGHT: 65 IN | WEIGHT: 254 LBS | BODY MASS INDEX: 42.32 KG/M2 | HEART RATE: 83 BPM | SYSTOLIC BLOOD PRESSURE: 114 MMHG | TEMPERATURE: 98 F | DIASTOLIC BLOOD PRESSURE: 64 MMHG | OXYGEN SATURATION: 97 %

## 2024-11-19 DIAGNOSIS — F17.200 TOBACCO DEPENDENCY: ICD-10-CM

## 2024-11-19 DIAGNOSIS — E11.65 TYPE 2 DIABETES MELLITUS WITH HYPERGLYCEMIA, WITH LONG-TERM CURRENT USE OF INSULIN: ICD-10-CM

## 2024-11-19 DIAGNOSIS — E66.01 CLASS 3 SEVERE OBESITY WITH SERIOUS COMORBIDITY AND BODY MASS INDEX (BMI) OF 40.0 TO 44.9 IN ADULT, UNSPECIFIED OBESITY TYPE: ICD-10-CM

## 2024-11-19 DIAGNOSIS — I50.43 ACUTE ON CHRONIC COMBINED SYSTOLIC AND DIASTOLIC HEART FAILURE: Primary | ICD-10-CM

## 2024-11-19 DIAGNOSIS — G47.33 OSA (OBSTRUCTIVE SLEEP APNEA): ICD-10-CM

## 2024-11-19 DIAGNOSIS — Z79.4 TYPE 2 DIABETES MELLITUS WITH HYPERGLYCEMIA, WITH LONG-TERM CURRENT USE OF INSULIN: ICD-10-CM

## 2024-11-19 DIAGNOSIS — E66.813 CLASS 3 SEVERE OBESITY WITH SERIOUS COMORBIDITY AND BODY MASS INDEX (BMI) OF 40.0 TO 44.9 IN ADULT, UNSPECIFIED OBESITY TYPE: ICD-10-CM

## 2024-11-19 PROCEDURE — 1111F DSCHRG MED/CURRENT MED MERGE: CPT | Mod: HCNC,CPTII,S$GLB, | Performed by: INTERNAL MEDICINE

## 2024-11-19 PROCEDURE — 3078F DIAST BP <80 MM HG: CPT | Mod: HCNC,CPTII,S$GLB, | Performed by: INTERNAL MEDICINE

## 2024-11-19 PROCEDURE — 4010F ACE/ARB THERAPY RXD/TAKEN: CPT | Mod: HCNC,CPTII,S$GLB, | Performed by: INTERNAL MEDICINE

## 2024-11-19 PROCEDURE — 99214 OFFICE O/P EST MOD 30 MIN: CPT | Mod: HCNC,S$GLB,, | Performed by: INTERNAL MEDICINE

## 2024-11-19 PROCEDURE — 3060F POS MICROALBUMINURIA REV: CPT | Mod: HCNC,CPTII,S$GLB, | Performed by: INTERNAL MEDICINE

## 2024-11-19 PROCEDURE — 1159F MED LIST DOCD IN RCRD: CPT | Mod: HCNC,CPTII,S$GLB, | Performed by: INTERNAL MEDICINE

## 2024-11-19 PROCEDURE — 3046F HEMOGLOBIN A1C LEVEL >9.0%: CPT | Mod: HCNC,CPTII,S$GLB, | Performed by: INTERNAL MEDICINE

## 2024-11-19 PROCEDURE — 3008F BODY MASS INDEX DOCD: CPT | Mod: HCNC,CPTII,S$GLB, | Performed by: INTERNAL MEDICINE

## 2024-11-19 PROCEDURE — 99999 PR PBB SHADOW E&M-EST. PATIENT-LVL IV: CPT | Mod: PBBFAC,HCNC,, | Performed by: INTERNAL MEDICINE

## 2024-11-19 PROCEDURE — 3066F NEPHROPATHY DOC TX: CPT | Mod: HCNC,CPTII,S$GLB, | Performed by: INTERNAL MEDICINE

## 2024-11-19 PROCEDURE — 3074F SYST BP LT 130 MM HG: CPT | Mod: HCNC,CPTII,S$GLB, | Performed by: INTERNAL MEDICINE

## 2024-11-19 NOTE — TELEPHONE ENCOUNTER
Called pt for missed virtual appointment for smoking cessation. Unable to reach, left name and number to call back. - Nerissa Lr, CTTS [phone].

## 2024-11-19 NOTE — PROGRESS NOTES
Subjective:       Patient ID: Jaimee Quintana is a 49 y.o. female.    Chief Complaint: Hospital Follow Up    HPI:  Hospitalized 10/19/24 - 10/22/24 for management of decompensated heart failure.  Non adherent to home medication regimen.   Admitted to [facility] service.  Initially required continuous Bipap.   Diuresed with IV lasix with good response.   TTE with EF 45-50%.   Home medication regimen adjusted.  Patient discharged to home.     10/31/24-   [facility] visit.   Multiple medication discrepancies addressed; epic medcard updated.   CHF education provided.   Zoll monitor referral placed.     11/11/24-   Had Zoll monitor elevated TFI alert- Cardiology team increased lasix to 40 mg BID x 5 days, then resume 40 mg daily .     11/19/24-  [facility] to [facility].   Reviewed yesterdays labs- renal function stable.   She is still taking Lasix 40 mg BID beyond the prescribed 5 days due to home weight being higher than desired.   Out of Trulicity.   Home weight log reviewed- does not appear accurate.   Zoll monitor in place.     Review of Systems   Constitutional:  Negative for chills, fever and malaise/fatigue.   Respiratory:  Negative for shortness of breath and wheezing.    Cardiovascular:  Negative for orthopnea and leg swelling.   Gastrointestinal:  Negative for abdominal pain, nausea and vomiting.   Genitourinary:  Negative for dysuria.   Musculoskeletal:  Negative for falls.   Neurological:  Negative for focal weakness.   Psychiatric/Behavioral:  The patient is not nervous/anxious.            Objective:      Vital Signs:  Vitals:    11/19/24 1041   BP: 114/64   Pulse: 83   Temp: 98.2 °F (36.8 °C)     Wt Readings from Last 3 Encounters:   11/19/24 1041 115.2 kg (253 lb 15.5 oz)   11/11/24 1509 114.8 kg (252 lb 15.7 oz)   10/31/24 1157 113.3 kg (249 lb 12.5 oz)     There is no height or weight on file to calculate BMI.   SpO2 Readings from Last 1 Encounters:   11/19/24 97%       Physical  Exam  Constitutional:       General: She is not in acute distress.     Appearance: She is obese. She is not ill-appearing.   Cardiovascular:      Rate and Rhythm: Normal rate.   Pulmonary:      Effort: Pulmonary effort is normal.      Breath sounds: No wheezing.   Abdominal:      Palpations: Abdomen is soft.      Tenderness: There is no abdominal tenderness.   Musculoskeletal:         General: No swelling.      Cervical back: Neck supple.   Skin:     General: Skin is warm and dry.   Neurological:      General: No focal deficit present.      Mental Status: She is oriented to person, place, and time.   Psychiatric:         Mood and Affect: Mood normal.             Assessment:       1. Acute on chronic combined systolic and diastolic heart failure    2. Type 2 diabetes mellitus with hyperglycemia, with long-term current use of insulin    3. Tobacco dependency    4. Class 3 severe obesity with serious comorbidity and body mass index (BMI) of 40.0 to 44.9 in adult, unspecified obesity type    5. CESAR (obstructive sleep apnea)        Plan:         Acute on chronic combined systolic (congestive) and diastolic (congestive) heart failure  - recent hospitalization as above  - currently on Lasix 40 mg BID - she has continued beyond the 5 day course due to ongoing weight gain   - return to Cardiology 11/20/24  - Zoll monitor in place   - counseling and education provided regarding dietary sodium restriction, caution with upcoming holidays  -     empagliflozin (JARDIANCE) 10 mg tablet; Take 1 tablet (10 mg total) by mouth once daily.    Type 2 diabetes mellitus with hyperglycemia, with long-term current use of insulin  - HgBA1C 9.1  - continue Lantus, Trulicity and Metformin   - adding Jardiance today  - refilled Trulicity    -     dulaglutide (TRULICITY) 0.75 mg/0.5 mL pen injector; Inject 0.75 mg into the skin every 7 days.    Tobacco dependency  - saw tobacco cessation team 11/4/24      Class 3 severe obesity with serious  "comorbidity and body mass index (BMI) of 40.0 to 44.9 in adult, unspecified obesity type  - education and counseling provided     CESAR (obstructive sleep apnea)  - last seen by sleep medicine team 2016   - she stopped using her home CPAP due to recall and did not get a new one  - arranged Sleep Medicine evaluation 2/13/25   -     Ambulatory referral/consult to Sleep Disorders; Future      I will see patient again in hospital follow up clinic 12/3/24.       Scheduled Follow-up :  Future Appointments   Date Time Provider Department Center   11/19/2024  2:30 PM [facility] [place]   11/20/2024  2:20 PM Claudio Whitney DNP [facility] CARDIO [place] [facility]   12/3/2024 10:30 AM Flora Donis MD [facility] IMPRI [facility]   12/17/2024 10:20 AM GHAZAL Tillman MD [facility]   2/13/2025  9:30 AM Domi Moreira MD [facility] SLEEP [place] [facility]       Post Visit Medication List:     Medication List            Accurate as of November 19, 2024 11:17 AM. If you have any questions, ask your nurse or doctor.                START taking these medications      empagliflozin 10 mg tablet  Commonly known as: JARDIANCE  Take 1 tablet (10 mg total) by mouth once daily.  Started by: Flora Donis MD            CONTINUE taking these medications      aspirin 81 MG EC tablet  Commonly known as: ECOTRIN     atorvastatin 80 MG tablet  Commonly known as: LIPITOR  Take 1 tablet (80 mg total) by mouth every evening.     BD ULTRA-FINE ROSA PEN NEEDLE 32 gauge x 5/32" Ndle  Generic drug: pen needle, diabetic  Use to inject insulin 2 times per day     carvediloL 12.5 MG tablet  Commonly known as: COREG  Take 1 tablet (12.5 mg total) by mouth 2 (two) times daily with meals.     dulaglutide 0.75 mg/0.5 mL pen injector  Commonly known as: TRULICITY  Inject 0.75 mg into the skin every 7 days.     ENTRESTO 49-51 mg per tablet  Generic drug: sacubitriL-valsartan  Take 1 tablet by mouth 2 (two) times daily.   "   ergocalciferol 50,000 unit Cap  Commonly known as: ERGOCALCIFEROL  Take 1 capsule (50,000 Units total) by mouth every Saturday.     ezetimibe 10 mg tablet  Commonly known as: ZETIA  Take 1 tablet (10 mg total) by mouth once daily.     furosemide 40 MG tablet  Commonly known as: LASIX  Take 1 tablet (40 mg total) by mouth 2 (two) times a day for 5 days, THEN 1 tablet (40 mg total) once daily for 360 days, THEN 1 tablet (40 mg total) daily as needed (> 2 pound weight gain in 24 hours, > 5 pound weight gain in a week, lower extrimety swelling, shortness of breath).  Start taking on: November 11, 2024     hydrALAZINE 50 MG tablet  Commonly known as: APRESOLINE     LANTUS SOLOSTAR U-100 INSULIN 100 unit/mL (3 mL) Inpn pen  Generic drug: insulin glargine U-100 (Lantus)  Inject 80 Units into the skin 2 (two) times a day.     metFORMIN 1000 MG tablet  Commonly known as: GLUCOPHAGE  Take 1 tablet (1,000 mg total) by mouth 2 (two) times daily with meals.     nicotine (polacrilex) 2 mg Gum  Commonly known as: NICORETTE  Take 1 each (2 mg total) by mouth as needed (1-2 pieces / hr in place of a cigarette, maximum of 15 pieces/day).     nicotine 21 mg/24 hr  Commonly known as: NICODERM CQ  Place 1 patch onto the skin once daily.     potassium chloride 8 MEQ Tbsr  Commonly known as: KLOR-CON 8  Take 2 tablets (16 mEq total) by mouth 2 (two) times daily.     spironolactone 25 MG tablet  Commonly known as: ALDACTONE  Take 1 tablet (25 mg total) by mouth once daily.               Where to Get Your Medications        These medications were sent to [facility]  [address], [place] [address]      Hours: Mon-Fri, 8a-5:30p Phone: [phone]   dulaglutide 0.75 mg/0.5 mL pen injector       These medications were sent to [facility]  [address] [facility] [address], [place] [address]      Hours: Mon-Fri, 8a-5:30p Phone: [phone]   empagliflozin 10 mg tablet

## 2024-11-20 ENCOUNTER — OFFICE VISIT (OUTPATIENT)
Dept: CARDIOLOGY | Facility: CLINIC | Age: 49
End: 2024-11-20
Payer: MEDICARE

## 2024-11-20 VITALS
WEIGHT: 251.63 LBS | HEART RATE: 86 BPM | DIASTOLIC BLOOD PRESSURE: 74 MMHG | BODY MASS INDEX: 41.92 KG/M2 | SYSTOLIC BLOOD PRESSURE: 118 MMHG | HEIGHT: 65 IN | OXYGEN SATURATION: 97 %

## 2024-11-20 DIAGNOSIS — I50.43 ACUTE ON CHRONIC COMBINED SYSTOLIC (CONGESTIVE) AND DIASTOLIC (CONGESTIVE) HEART FAILURE: ICD-10-CM

## 2024-11-20 DIAGNOSIS — I50.42 CHRONIC COMBINED SYSTOLIC AND DIASTOLIC CONGESTIVE HEART FAILURE: Primary | ICD-10-CM

## 2024-11-20 DIAGNOSIS — I10 HYPERTENSION, UNSPECIFIED TYPE: ICD-10-CM

## 2024-11-20 DIAGNOSIS — E11.69 HYPERLIPIDEMIA ASSOCIATED WITH TYPE 2 DIABETES MELLITUS: ICD-10-CM

## 2024-11-20 DIAGNOSIS — E78.5 HYPERLIPIDEMIA ASSOCIATED WITH TYPE 2 DIABETES MELLITUS: ICD-10-CM

## 2024-11-20 DIAGNOSIS — G47.33 OSA (OBSTRUCTIVE SLEEP APNEA): ICD-10-CM

## 2024-11-20 DIAGNOSIS — F17.200 TOBACCO DEPENDENCY: ICD-10-CM

## 2024-11-20 DIAGNOSIS — I49.8 SINUS ARRHYTHMIA: ICD-10-CM

## 2024-11-20 DIAGNOSIS — E11.59 HYPERTENSION ASSOCIATED WITH DIABETES: ICD-10-CM

## 2024-11-20 DIAGNOSIS — I70.0 AORTIC ATHEROSCLEROSIS: ICD-10-CM

## 2024-11-20 DIAGNOSIS — I15.2 HYPERTENSION ASSOCIATED WITH DIABETES: ICD-10-CM

## 2024-11-20 PROCEDURE — 99999 PR PBB SHADOW E&M-EST. PATIENT-LVL IV: CPT | Mod: PBBFAC,HCNC,,

## 2024-11-20 RX ORDER — FUROSEMIDE 40 MG/1
TABLET ORAL
Qty: 180 TABLET | Refills: 3 | Status: SHIPPED | OUTPATIENT
Start: 2024-11-20 | End: 2025-11-20

## 2024-12-04 ENCOUNTER — LAB VISIT (OUTPATIENT)
Dept: LAB | Facility: HOSPITAL | Age: 49
End: 2024-12-04
Payer: MEDICARE

## 2024-12-04 DIAGNOSIS — I50.42 CHRONIC COMBINED SYSTOLIC AND DIASTOLIC CONGESTIVE HEART FAILURE: ICD-10-CM

## 2024-12-04 LAB
ANION GAP SERPL CALC-SCNC: 10 MMOL/L (ref 8–16)
BUN SERPL-MCNC: 17 MG/DL (ref 6–20)
CALCIUM SERPL-MCNC: 9.4 MG/DL (ref 8.7–10.5)
CHLORIDE SERPL-SCNC: 107 MMOL/L (ref 95–110)
CO2 SERPL-SCNC: 21 MMOL/L (ref 23–29)
CREAT SERPL-MCNC: 1.1 MG/DL (ref 0.5–1.4)
EST. GFR  (NO RACE VARIABLE): >60 ML/MIN/1.73 M^2
GLUCOSE SERPL-MCNC: 222 MG/DL (ref 70–110)
MAGNESIUM SERPL-MCNC: 2.1 MG/DL (ref 1.6–2.6)
POTASSIUM SERPL-SCNC: 4.3 MMOL/L (ref 3.5–5.1)
SODIUM SERPL-SCNC: 138 MMOL/L (ref 136–145)

## 2024-12-04 PROCEDURE — 36415 COLL VENOUS BLD VENIPUNCTURE: CPT | Mod: HCNC

## 2024-12-04 PROCEDURE — 80048 BASIC METABOLIC PNL TOTAL CA: CPT | Mod: HCNC

## 2024-12-04 PROCEDURE — 83735 ASSAY OF MAGNESIUM: CPT | Mod: HCNC

## 2024-12-05 ENCOUNTER — TELEPHONE (OUTPATIENT)
Dept: FAMILY MEDICINE | Facility: CLINIC | Age: 49
End: 2024-12-05
Payer: MEDICARE

## 2024-12-05 NOTE — TELEPHONE ENCOUNTER
Called [employer] in regards of forms I got from them in regards of patient. Spoke with Tae in regards of patient and informed him that Dr. Mendez is out on maternity leave , and that forms will have to be done by an NP since pt's PCP is out. He informed me that is ok. Informed him that I will have to call patient to make her an apt, so that forms can be completed. Tae verbalized understanding of message.

## 2024-12-05 NOTE — TELEPHONE ENCOUNTER
Called pt to make her an apt, unable to lvm for patient due to voicemail being full. Made patient an apt for 12/12/2024 for 11:00 am with LEE Clark. I will call patient back in regards of apt

## 2024-12-10 ENCOUNTER — TELEPHONE (OUTPATIENT)
Dept: FAMILY MEDICINE | Facility: CLINIC | Age: 49
End: 2024-12-10
Payer: MEDICARE

## 2024-12-10 NOTE — TELEPHONE ENCOUNTER
Called and spoke with pt in regards of her needing to come into the clinic to get more forms filled for [employer], so that she can get her Power wheelchair. Informed pt that I have 12/12/2024 for 11:00 am . Pt verbalized understanding and will come to appointment.

## 2024-12-12 ENCOUNTER — OFFICE VISIT (OUTPATIENT)
Dept: FAMILY MEDICINE | Facility: CLINIC | Age: 49
End: 2024-12-12
Payer: MEDICARE

## 2024-12-12 VITALS
DIASTOLIC BLOOD PRESSURE: 72 MMHG | WEIGHT: 252.44 LBS | OXYGEN SATURATION: 98 % | SYSTOLIC BLOOD PRESSURE: 118 MMHG | HEART RATE: 94 BPM | TEMPERATURE: 98 F | HEIGHT: 65 IN | BODY MASS INDEX: 42.06 KG/M2

## 2024-12-12 DIAGNOSIS — Z23 FLU VACCINE NEED: ICD-10-CM

## 2024-12-12 DIAGNOSIS — E66.813 CLASS 3 SEVERE OBESITY DUE TO EXCESS CALORIES WITH SERIOUS COMORBIDITY AND BODY MASS INDEX (BMI) OF 40.0 TO 44.9 IN ADULT: ICD-10-CM

## 2024-12-12 DIAGNOSIS — E11.3513 TYPE 2 DIABETES MELLITUS WITH BOTH EYES AFFECTED BY PROLIFERATIVE RETINOPATHY AND MACULAR EDEMA, WITH LONG-TERM CURRENT USE OF INSULIN: ICD-10-CM

## 2024-12-12 DIAGNOSIS — Z79.4 TYPE 2 DIABETES MELLITUS WITH BOTH EYES AFFECTED BY PROLIFERATIVE RETINOPATHY AND MACULAR EDEMA, WITH LONG-TERM CURRENT USE OF INSULIN: ICD-10-CM

## 2024-12-12 DIAGNOSIS — Z74.09 IMPAIRED FUNCTIONAL MOBILITY, BALANCE, AND ENDURANCE: Primary | ICD-10-CM

## 2024-12-12 DIAGNOSIS — E66.01 CLASS 3 SEVERE OBESITY DUE TO EXCESS CALORIES WITH SERIOUS COMORBIDITY AND BODY MASS INDEX (BMI) OF 40.0 TO 44.9 IN ADULT: ICD-10-CM

## 2024-12-12 DIAGNOSIS — I50.42 CHRONIC COMBINED SYSTOLIC AND DIASTOLIC CONGESTIVE HEART FAILURE: ICD-10-CM

## 2024-12-12 LAB — GLUCOSE SERPL-MCNC: 240 MG/DL (ref 70–110)

## 2024-12-12 PROCEDURE — 99999 PR PBB SHADOW E&M-EST. PATIENT-LVL IV: CPT | Mod: PBBFAC,HCNC,, | Performed by: NURSE PRACTITIONER

## 2024-12-12 RX ORDER — AZITHROMYCIN 250 MG/1
TABLET, FILM COATED ORAL
COMMUNITY
Start: 2024-12-11

## 2024-12-12 NOTE — PROGRESS NOTES
" Patient ID: Jaimee Quintana is a 49 y.o. female.     Chief Complaint: Power wheelchair forms      HPI:  Mrs Quintana is here for Face to Face encounter for Mobility Device approval. Since last visit she has been hospitalized with Acute on chronic CHF exacerbation. She has been doing outpatient PT/OT and is getting stronger but balance and endurance are still challenging her to complete her Adls. She is currently using a cane or a walker depending on "how bad Im feeling".         Review of Systems  Review of Systems   Constitutional:  Positive for malaise/fatigue. Negative for chills, fever and weight loss.   HENT:  Negative for congestion, ear pain and sore throat.    Eyes:  Negative for blurred vision, photophobia and pain.   Respiratory:  Positive for shortness of breath. Negative for cough and wheezing.    Cardiovascular:  Positive for orthopnea and PND. Negative for chest pain, palpitations and leg swelling.   Gastrointestinal:  Negative for constipation, diarrhea, nausea and vomiting.   Genitourinary:  Negative for dysuria and frequency.   Musculoskeletal:  Positive for back pain and myalgias.   Skin:  Negative for rash.   Neurological:  Positive for dizziness and weakness. Negative for speech change, focal weakness, loss of consciousness and headaches.   Endo/Heme/Allergies:  Negative for polydipsia. Does not bruise/bleed easily.   Psychiatric/Behavioral:  Negative for depression. The patient is not nervous/anxious and does not have insomnia.        Currently Medications  Current Outpatient Medications on File Prior to Visit   Medication Sig Dispense Refill    aspirin (ECOTRIN) 81 MG EC tablet Take 81 mg by mouth once daily.      atorvastatin (LIPITOR) 80 MG tablet Take 1 tablet (80 mg total) by mouth every evening. 60 tablet 1    azithromycin (Z-HERBER) 250 MG tablet Take 2 tablets on day one then 1 tablet a day until gone      carvediloL (COREG) 12.5 MG tablet Take 1 tablet (12.5 mg total) by mouth 2 (two) times " "daily with meals. 180 tablet 3    dulaglutide (TRULICITY) 0.75 mg/0.5 mL pen injector Inject 0.75 mg into the skin every 7 days. 12 Pen 3    empagliflozin (JARDIANCE) 10 mg tablet Take 1 tablet (10 mg total) by mouth once daily. 30 tablet 6    ergocalciferol (ERGOCALCIFEROL) 50,000 unit Cap Take 1 capsule (50,000 Units total) by mouth every Saturday. 12 capsule 3    ezetimibe (ZETIA) 10 mg tablet Take 1 tablet (10 mg total) by mouth once daily. 90 tablet 3    furosemide (LASIX) 40 MG tablet Take 1 tablet (40 mg total) by mouth 2 (two) times a day. May also take 1 tablet (40 mg total) daily as needed (> 2 pound weight gain in 24 hours, > 5 pound weight gain in a week, lower extrimety swelling, shortness of breath). 180 tablet 3    hydrALAZINE (APRESOLINE) 50 MG tablet Take 50 mg by mouth 3 (three) times daily.      insulin glargine U-100, Lantus, (LANTUS SOLOSTAR U-100 INSULIN) 100 unit/mL (3 mL) InPn pen Inject 80 Units into the skin 2 (two) times a day. 75 mL 0    metFORMIN (GLUCOPHAGE) 1000 MG tablet Take 1 tablet (1,000 mg total) by mouth 2 (two) times daily with meals. 180 tablet 3    nicotine, polacrilex, (NICORETTE) 2 mg Gum Take 1 each (2 mg total) by mouth as needed (1-2 pieces / hr in place of a cigarette, maximum of 15 pieces/day). 110 each 0    pen needle, diabetic 32 gauge x 5/32" Ndle Use to inject insulin 2 times per day 100 each 0    potassium chloride (KLOR-CON 8) 8 MEQ TbSR Take 2 tablets (16 mEq total) by mouth 2 (two) times daily. 120 tablet 11    sacubitriL-valsartan (ENTRESTO) 49-51 mg per tablet Take 1 tablet by mouth 2 (two) times daily. 90 tablet 3    spironolactone (ALDACTONE) 25 MG tablet Take 1 tablet (25 mg total) by mouth once daily. 30 tablet 11     No current facility-administered medications on file prior to visit.       Allergies  Review of patient's allergies indicates:  No Known Allergies     Health Maintenance  Health Maintenance Topics with due status: Not Due       Topic Last " "Completion Date    Colorectal Cancer Screening 2023    Diabetes Urine Screening 2024    Mammogram 2024    Cervical Cancer Screening 2024    Eye Exam 2024    Hemoglobin A1c 10/31/2024    RSV Vaccine (Age 60+ and Pregnant patients) Not Due          PMH:  Past Medical History:   Diagnosis Date    Anxiety     Cataract     Cervical high risk HPV (human papillomavirus) test positive 2020    NOT 16 OR 18    CHF (congestive heart failure)     CVA (cerebral infarction)          Depression     Diabetes mellitus, type 2     GERD (gastroesophageal reflux disease)     Hyperlipidemia     Hypertension     Lactic acidosis 10/06/2023    Moderate nonproliferative diabetic retinopathy     Nausea and vomiting 10/06/2023    Obesity     Stroke     Tachycardia 10/04/2023      Past Surgical History:   Procedure Laterality Date     SECTION      CHOLECYSTECTOMY      DILATION AND CURETTAGE OF UTERUS      EYE SURGERY Bilateral     laser    GALLBLADDER SURGERY  2017    stone removed    IRRIGATION AND DEBRIDEMENT Right 2024    Procedure: IRRIGATION AND DEBRIDEMENT RIGHT GROIN;  Surgeon: Chalo Padgett MD;  Location: [facility];  Service: General;  Laterality: Right;       Physical  Exam  Vitals:    24 1048   BP: 118/72   BP Location: Left arm   Patient Position: Sitting   Pulse: 94   Temp: 98.1 °F (36.7 °C)   TempSrc: Temporal   SpO2: 98%   Weight: 114.5 kg (252 lb 6.8 oz)   Height: 5' 5" (1.651 m)      Body mass index is 42.01 kg/m².  Wt Readings from Last 3 Encounters:   24 114.5 kg (252 lb 6.8 oz)   24 114.1 kg (251 lb 10.5 oz)   24 115.2 kg (253 lb 15.5 oz)         Physical Exam  Constitutional:       Appearance: She is obese.   HENT:      Head: Normocephalic and atraumatic.      Mouth/Throat:      Mouth: Mucous membranes are moist.      Pharynx: Oropharynx is clear.   Eyes:      Extraocular Movements: Extraocular movements intact.      Pupils: Pupils are equal, " round, and reactive to light.   Cardiovascular:      Rate and Rhythm: Normal rate and regular rhythm.      Pulses: Normal pulses.      Heart sounds: Normal heart sounds.   Pulmonary:      Effort: Pulmonary effort is normal.      Breath sounds: Normal breath sounds.   Musculoskeletal:         General: Normal range of motion.      Cervical back: Normal range of motion.   Skin:     General: Skin is warm and dry.      Capillary Refill: Capillary refill takes less than 2 seconds.   Neurological:      Mental Status: She is alert and oriented to person, place, and time. Mental status is at baseline.   Psychiatric:         Mood and Affect: Mood normal.             Assessment/Plan:    1. Impaired functional mobility, balance, and endurance   Face to Face paperwork for Mobility Device completed and faxed   Continue HH PT/OT   Follow up if worsening     2. Chronic combined systolic and diastolic congestive heart failure   Continue Cardiology follow up and Recs (has appt today)   Continue all medications    3. Class 3 severe obesity due to excess calories with serious comorbidity and body mass index (BMI) of 40.0 to 44.9 in adult   Lifestyle changes, Improved diet, weight loss gaol of 2-5 pounds per month     4. Type 2 diabetes mellitus with both eyes affected by proliferative retinopathy and macular edema, with long-term current use of insulin   Monitor BG more closely   Compliance with medications and improved diet   Follow up with endocrine (due next month)   -     POCT Glucose, Hand-Held Device    5. Flu vaccine need  -     influenza (Flulaval, Fluzone, Fluarix) 45 mcg/0.5 mL IM vaccine (> or = 6 mo) 0.5 mL         Jaimee Quintana has a mobility limitation that significantly impairs their ability to participate in one or more mobility related activities of daily living (MRADL's) such as toileting, feeding, dressing, grooming, and bathing in customary locations in the home due to balance issues, dizziness, decreased  endurance and SOB. The mobility limitation cannot be sufficiently resolved by the use of a cane or walker. The use of a mobility device will significantly improve the patient's ability to participate in MRADLS and the patient will use it on regular basis in the home. Jaimee Quintana  has expressed their willingness to use a mobility device in the home and has the ability.  The Mobility Device will assist the patient to complete MRADLs in the home. The patient does have the upper body strength, Motor coordination, ability to stand and pivot, and upright balance to use Mobility device in home.  Jaimee Quintana has the mental and physical capabilities to safely operate the mobility device . The patient also has a caregiver who is available, willing, and able to provide assistance with the wheelchair when needed.         31 minutes of total time spent on the encounter, which includes face to face time and non-face to face time preparing to see the patient (eg, review of tests), Obtaining and/or reviewing separately obtained history, Documenting clinical information in the electronic or other health record, Independently interpreting results (not separately reported) and communicating results to the patient/family/caregiver, or Care coordination (not separately reported).      Follow up in about 3 months (around 3/12/2025), or if symptoms worsen or fail to improve, for check up .         Lauren Clark NP  [facility]

## 2024-12-17 ENCOUNTER — PROCEDURE VISIT (OUTPATIENT)
Dept: OPHTHALMOLOGY | Facility: CLINIC | Age: 49
End: 2024-12-17
Payer: MEDICARE

## 2024-12-17 ENCOUNTER — CLINICAL SUPPORT (OUTPATIENT)
Dept: OPHTHALMOLOGY | Facility: CLINIC | Age: 49
End: 2024-12-17
Payer: MEDICARE

## 2024-12-17 DIAGNOSIS — Z79.4 TYPE 2 DIABETES MELLITUS WITH BOTH EYES AFFECTED BY PROLIFERATIVE RETINOPATHY AND MACULAR EDEMA, WITH LONG-TERM CURRENT USE OF INSULIN: Primary | ICD-10-CM

## 2024-12-17 DIAGNOSIS — E11.3513 TYPE 2 DIABETES MELLITUS WITH BOTH EYES AFFECTED BY PROLIFERATIVE RETINOPATHY AND MACULAR EDEMA, WITH LONG-TERM CURRENT USE OF INSULIN: ICD-10-CM

## 2024-12-17 DIAGNOSIS — E11.3513 TYPE 2 DIABETES MELLITUS WITH BOTH EYES AFFECTED BY PROLIFERATIVE RETINOPATHY AND MACULAR EDEMA, WITH LONG-TERM CURRENT USE OF INSULIN: Primary | ICD-10-CM

## 2024-12-17 DIAGNOSIS — Z79.4 TYPE 2 DIABETES MELLITUS WITH BOTH EYES AFFECTED BY PROLIFERATIVE RETINOPATHY AND MACULAR EDEMA, WITH LONG-TERM CURRENT USE OF INSULIN: ICD-10-CM

## 2024-12-17 PROCEDURE — 92134 CPTRZ OPH DX IMG PST SGM RTA: CPT | Mod: HCNC,S$GLB,, | Performed by: OPHTHALMOLOGY

## 2024-12-17 PROCEDURE — 92012 INTRM OPH EXAM EST PATIENT: CPT | Mod: 25,HCNC,S$GLB, | Performed by: OPHTHALMOLOGY

## 2024-12-17 PROCEDURE — 67028 INJECTION EYE DRUG: CPT | Mod: HCNC,LT,S$GLB, | Performed by: OPHTHALMOLOGY

## 2024-12-17 RX ADMIN — Medication 1.25 MG: at 12:12

## 2024-12-17 NOTE — PROGRESS NOTES
HPI     Follow-up     Additional comments: 8 week Avastin OS           Comments    VA stable ou, no pain ou and denies floaters or flashes ou.            Last edited by Elena Massey on 12/17/2024 10:37 AM.           OCT OD no central ME  OS decrease paracentral DME    Prior WFFA 7/24 - OD sig NP with NV  OS - Regressed NV after BP  with late macular leakage - some increase in NV      A/P    1. PDR OU  T2 uncontrolled on insulin  S/p PRP OU    Will need fill in PRP OS-Soon      2. DME OU  S/P- Avastin OS x 3  4/24 - increase paracentral DME - pt starting to notice some subtle changes  7/24 -  increased DME      Avastin OS today-Extend    3. HTN Ret OU  BS/BP/chol control    4. NS OU      12 weeks OCT and dilate    Risks, benefits, and alternatives to treatment discussed in detail with the patient.  The patient voiced understanding and wished to proceed with the procedure    Injection Procedure Note:  Diagnosis: PDR with VH OS    Patient Identified and Time Out complete  Pt marked  Topical Proparacaine and Betadine.  Inject Avastin OS at 6:00 @ 3.5-4mm posterior to limbus  Post Operative Dx: Same  Complications: None  Follow up as above.

## 2024-12-26 ENCOUNTER — OFFICE VISIT (OUTPATIENT)
Dept: CARDIOLOGY | Facility: CLINIC | Age: 49
End: 2024-12-26
Payer: MEDICARE

## 2024-12-26 VITALS
SYSTOLIC BLOOD PRESSURE: 126 MMHG | DIASTOLIC BLOOD PRESSURE: 83 MMHG | BODY MASS INDEX: 42.66 KG/M2 | OXYGEN SATURATION: 95 % | HEIGHT: 65 IN | HEART RATE: 84 BPM | WEIGHT: 256.06 LBS

## 2024-12-26 DIAGNOSIS — I70.0 AORTIC ATHEROSCLEROSIS: ICD-10-CM

## 2024-12-26 DIAGNOSIS — G47.33 OSA (OBSTRUCTIVE SLEEP APNEA): ICD-10-CM

## 2024-12-26 DIAGNOSIS — I10 HYPERTENSION, UNSPECIFIED TYPE: ICD-10-CM

## 2024-12-26 DIAGNOSIS — I50.42 CHRONIC COMBINED SYSTOLIC AND DIASTOLIC CONGESTIVE HEART FAILURE: Primary | ICD-10-CM

## 2024-12-26 DIAGNOSIS — I15.2 HYPERTENSION ASSOCIATED WITH DIABETES: ICD-10-CM

## 2024-12-26 DIAGNOSIS — E11.69 HYPERLIPIDEMIA ASSOCIATED WITH TYPE 2 DIABETES MELLITUS: ICD-10-CM

## 2024-12-26 DIAGNOSIS — E11.59 HYPERTENSION ASSOCIATED WITH DIABETES: ICD-10-CM

## 2024-12-26 DIAGNOSIS — E66.01 CLASS 3 SEVERE OBESITY DUE TO EXCESS CALORIES WITH SERIOUS COMORBIDITY AND BODY MASS INDEX (BMI) OF 40.0 TO 44.9 IN ADULT: ICD-10-CM

## 2024-12-26 DIAGNOSIS — E66.813 CLASS 3 SEVERE OBESITY DUE TO EXCESS CALORIES WITH SERIOUS COMORBIDITY AND BODY MASS INDEX (BMI) OF 40.0 TO 44.9 IN ADULT: ICD-10-CM

## 2024-12-26 DIAGNOSIS — E78.5 HYPERLIPIDEMIA ASSOCIATED WITH TYPE 2 DIABETES MELLITUS: ICD-10-CM

## 2024-12-26 PROCEDURE — 99999 PR PBB SHADOW E&M-EST. PATIENT-LVL IV: CPT | Mod: PBBFAC,HCNC,,

## 2024-12-26 NOTE — PROGRESS NOTES
Cardiology Clinic note    Subjective:   Patient ID:  Jaimee Quintana is a 49 y.o. female who presents for follow-up of HTN, CHF    HPI:     12/26/2024: Here for F/U HTN, HFrEF. Seen in clinic, reports overall doing pretty well. Weight stable, tolerating meds. Taking lasix 40mg BID, has not needed additional dosing. Ongoing CANELA/orthopnea. Resp status stable, grossly euvolemic on exam. Patient denies CP, SOB, PND, syncope, palpitations, LE edema. Reports compliance and tolerance with all medications.    11/20/2024: Here for F/U HTN, HFrEF. Seen in clinic unaccompanied reports overall doing pretty well. Weight stable, compliant with home monitoring, taking lasix 40mg BID. Tolerating GDMT. Ongoing CANELA, improving, chronic 3-4 pillow orthopnea. No recurrent LE edema, no resting SOB. Denies CP, PND, syncope, palps. Reports compliance and tolerance with all medications.     11/11/2024: Here for F/U HTN, HFrEF. Lost to F/U after previous clinic visit, recent admission 10/19/2024 with acute on chronic HFrEF. Seen in clinic unaccompanied reports overall doing okay. Visibly nervous/anxious.   Long conversation about CHF diagnosis, importance of medication compliance and close clinic follow-up. Discussed lasix PRN parameters in detail and importance of home monitoring weights and blood pressures. Reports ongoing 4-pillow orthopnea and CANELA, though improved since hospital D/C. No other new CV s/s, denies recurrent LE edema. No new CP, SOB, PND, syncope, palps. Reports compliance and tolerance with all medications.     7/3/2024: Here for F/U HTN, HFrEF, PAD. Seen in clinic reports she's been nervous since her medication changes with fluctuating BPs. Logs reviewed, still with intermittent HTN. Normotensive in clinic today. Reports ongoing CANELA, orthopnea; has been taking 40mg lasix daily for about a week with some improvement. Patient denies CP, SOB, PND, syncope, palpitations, LE edema. Reports compliance and tolerance with all  medications.    6/24/2024: Previous patient of Dr. Beth and Vicente Quinones NP as below, initial visit for me. 49 yo F with hx of CVA, DM2, HTN, HLD, CESAR not on CPAP, MO w/ BMI 41.8, and HFrEF- recovered LVEF 50% (now back down to 35-40%) who presents today for routine F/U. 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 Seen in clinic today, reports overall doing well. Seemingly well compensated from HF perspective, grossly euvolemic on exam. Only taking lasix PRN.  Patient denies CP, SOB, PND, syncope, palpitations, LE edema. Reports compliance and tolerance with all medications.  -Will have to D/C [facility] given rEF and escalate other antihypertensives  -repeat Echo ~3m after stable on GDMT      9/29/2023: Pt is a 46 yo F w/ PMH of CVA, DM2 w/ hgba1c 11.4%, HTN, HLD, CESAR not on CPAP, MO w/ BMI 41.8, and HFrEF- recovered LVEF 50% who presents today for f/u appt. She was last seen by Dr. Beth on 6/27/23 to establish care, post hospital admission 5/22/2023-5/23/2023 for decompensated CHF and was diuresed and meds were adjusted (see d/c sum 5/24/2023). She notes compliance w/ meds and denies side effects. She has been monitoring her BP at home however states that her BP runs high at times, -170s. She denies cp, sob, orthopnea, PND, presyncope, LOC, palpitations. She is currently enrolled in PAD rehab and notes some improvement in BLE claudication.   She does not exercise however states she is active w/ riding her bike and walking around her block and denies cp or sob       Cardiac hx  ---------------------------------    Echo 10/2024    The left ventricle is normal in size. There is concentric hypertrophy. Mild global hypokinesis present. There is mildly reduced systolic function with a visually estimated ejection fraction of 45 - 50%. Unable to assess diastolic function due to poor image quality.    Right Ventricle: Normal right ventricular cavity size. Systolic function is reduced.TAPSE is 1.24 cm.    Mitral  Valve: There is mild regurgitation.    Tricuspid Valve: There is mild regurgitation.    Pulmonary Artery: The estimated pulmonary artery systolic pressure is 29 mmHg.    IVC/SVC: Normal venous pressure at 3 mmHg.    The study was difficult due to patient's body habitus.    Echo 10/2023    Left Ventricle: The left ventricle is normal in size. There is concentric hypertrophy. Mild global hypokinesis present. There is moderately reduced systolic function with a visually estimated ejection fraction of 35 - 40%. Grade II diastolic dysfunction.    Right Ventricle: Normal right ventricular cavity size. Wall thickness is normal. Right ventricle wall motion  is normal. Systolic function is normal.    Tricuspid Valve: There is mild regurgitation.    Pulmonary Artery: The estimated pulmonary artery systolic pressure is 26 mmHg.    IVC/SVC: Normal venous pressure at 3 mmHg.    ALEENA 7/2023  The right resting ALEENA reduction is normal.   The right arm artery has triphasic flow.  The right ankle [DT] artery has triphasic flow.   The right ankle [DP] artery has triphasic flow.   The left resting ALEENA reduction is normal.   The left arm artery has triphasic flow.  The left ankle [PT] artery has triphasic flow.  The left ankle [DP] artery has biphasic flow.  Exercise Study: treadmill test.   The exercise study was stopped due to protocol completion.     Symptoms: At 1 min, patient reported 6/10 right bottom of foot pain that resolved at 3 minutes.  Immediately post exercise, the right ALEENA is normal.  Immediately post exercise, the left ALEENA is mildly decreased.       Echo 5/22/2023    The left ventricle is normal in size with concentric remodeling and low normal systolic function.  The estimated ejection fraction is 50%.  Normal left ventricular diastolic function.  With normal right ventricular systolic function.    CV Exercise ALEENA study: 7/18/23  Conclusion     Mild PAD of left lower extremity with exercise (ALEENA 0.89).  Normal TBIs and  toe pressures bilaterally      Past Medical History:   Diagnosis Date    Anxiety     Cataract     Cervical high risk HPV (human papillomavirus) test positive 09/17/2020    NOT 16 OR 18    CHF (congestive heart failure)     CVA (cerebral infarction) 2003         Depression     Diabetes mellitus, type 2     GERD (gastroesophageal reflux disease)     Hyperlipidemia     Hypertension     Lactic acidosis 10/06/2023    Moderate nonproliferative diabetic retinopathy     Nausea and vomiting 10/06/2023    Obesity     Stroke     Tachycardia 10/04/2023          Patient Active Problem List    Diagnosis Date Noted    Impaired functional mobility, balance, and endurance 12/12/2024    Class 3 severe obesity with serious comorbidity and body mass index (BMI) of 40.0 to 44.9 in adult 11/19/2024    Tobacco dependency 10/19/2024    Acute on chronic combined systolic and diastolic heart failure 10/19/2024    Type 2 diabetes mellitus with hyperglycemia, with long-term current use of insulin 10/19/2024    Hypertension 10/19/2024    Aortic atherosclerosis 06/06/2024    NS (nuclear sclerosis), bilateral 04/23/2024    Perinephric abscess 01/08/2024    Abscess of left kidney 01/06/2024    Chronic combined systolic and diastolic congestive heart failure 01/06/2024    Dysphagia 10/08/2023    Sinus arrhythmia 10/06/2023    Bilateral claudication of lower limb 06/27/2023    Noncompliance with medications 05/22/2023    GERD (gastroesophageal reflux disease) 05/22/2023    History of CVA (cerebrovascular accident) without residual deficits 05/22/2023    Mild episode of recurrent major depressive disorder 07/09/2021    Abnormal Papanicolaou smear of cervix with positive human papilloma virus (HPV) test 07/09/2021     + HrHPV  - Negative coloposcopy 9/2020          Type 2 diabetes mellitus with both eyes affected by proliferative retinopathy and macular edema, with long-term current use of insulin 05/11/2021    Hypertensive retinopathy, bilateral  02/11/2020    Thyroid nodule 11/08/2017     Multinodular goiter  FINAL PATHOLOGIC DIAGNOSIS  Left thyroid, fine-needle aspiration:  [facility] Thyroid Cytology Category: Benign.        Diabetic peripheral neuropathy associated with type 2 diabetes mellitus 03/23/2017    Bilateral low back pain without sciatica 09/28/2016    Fatty liver 08/18/2016     encouraged weight loss      Hypertension associated with diabetes 10/23/2015    CESAR (obstructive sleep apnea) 10/23/2015    Hyperlipidemia associated with type 2 diabetes mellitus        Patient's Medications   New Prescriptions    No medications on file   Previous Medications    ASPIRIN (ECOTRIN) 81 MG EC TABLET    Take 81 mg by mouth once daily.    ATORVASTATIN (LIPITOR) 80 MG TABLET    Take 1 tablet (80 mg total) by mouth every evening.    AZITHROMYCIN (Z-HERBER) 250 MG TABLET    Take 2 tablets on day one then 1 tablet a day until gone    CARVEDILOL (COREG) 12.5 MG TABLET    Take 1 tablet (12.5 mg total) by mouth 2 (two) times daily with meals.    DULAGLUTIDE (TRULICITY) 0.75 MG/0.5 ML PEN INJECTOR    Inject 0.75 mg into the skin every 7 days.    EMPAGLIFLOZIN (JARDIANCE) 10 MG TABLET    Take 1 tablet (10 mg total) by mouth once daily.    ERGOCALCIFEROL (ERGOCALCIFEROL) 50,000 UNIT CAP    Take 1 capsule (50,000 Units total) by mouth every Saturday.    EZETIMIBE (ZETIA) 10 MG TABLET    Take 1 tablet (10 mg total) by mouth once daily.    FUROSEMIDE (LASIX) 40 MG TABLET    Take 1 tablet (40 mg total) by mouth 2 (two) times a day. May also take 1 tablet (40 mg total) daily as needed (> 2 pound weight gain in 24 hours, > 5 pound weight gain in a week, lower extrimety swelling, shortness of breath).    HYDRALAZINE (APRESOLINE) 50 MG TABLET    Take 50 mg by mouth 3 (three) times daily.    INSULIN GLARGINE U-100, LANTUS, (LANTUS SOLOSTAR U-100 INSULIN) 100 UNIT/ML (3 ML) INPN PEN    Inject 80 Units into the skin 2 (two) times a day.    METFORMIN (GLUCOPHAGE) 1000 MG TABLET  "   Take 1 tablet (1,000 mg total) by mouth 2 (two) times daily with meals.    NICOTINE, POLACRILEX, (NICORETTE) 2 MG GUM    Take 1 each (2 mg total) by mouth as needed (1-2 pieces / hr in place of a cigarette, maximum of 15 pieces/day).    PEN NEEDLE, DIABETIC 32 GAUGE X 5/32" NDLE    Use to inject insulin 2 times per day    POTASSIUM CHLORIDE (KLOR-CON 8) 8 MEQ TBSR    Take 2 tablets (16 mEq total) by mouth 2 (two) times daily.    SACUBITRIL-VALSARTAN (ENTRESTO) 49-51 MG PER TABLET    Take 1 tablet by mouth 2 (two) times daily.    SPIRONOLACTONE (ALDACTONE) 25 MG TABLET    Take 1 tablet (25 mg total) by mouth once daily.   Modified Medications    No medications on file   Discontinued Medications    No medications on file        Review of Systems   Constitutional: Negative for chills, decreased appetite, diaphoresis, malaise/fatigue, weight gain and weight loss.   Cardiovascular:  Positive for dyspnea on exertion and orthopnea. Negative for chest pain, claudication, irregular heartbeat, leg swelling, near-syncope, palpitations, paroxysmal nocturnal dyspnea and syncope.   Respiratory:  Negative for cough, hemoptysis, shortness of breath and snoring.    Gastrointestinal:  Negative for bloating, abdominal pain, nausea and vomiting.   Neurological:  Negative for light-headedness and weakness.   Psychiatric/Behavioral:  The patient is nervous/anxious.        Family History   Problem Relation Name Age of Onset    Stroke Mother      Thyroid disease Mother      Diabetes Mother      Cataracts Father      Hypertension Father      Arthritis Father      Diabetes Father      Hypertension Sister      Stroke Sister      Thyroid disease Sister      Heart disease Sister      No Known Problems Brother      Thyroid disease Daughter 2     Asthma Daughter 2     No Known Problems Maternal Aunt      No Known Problems Maternal Uncle      No Known Problems Paternal Aunt      No Known Problems Paternal Uncle      No Known Problems Maternal " Grandmother      No Known Problems Maternal Grandfather      No Known Problems Paternal Grandmother      No Known Problems Paternal Grandfather      Amblyopia Neg Hx      Blindness Neg Hx      Cancer Neg Hx      Glaucoma Neg Hx      Macular degeneration Neg Hx      Retinal detachment Neg Hx      Strabismus Neg Hx         Social History     Socioeconomic History    Marital status: Single   Occupational History    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 She states ready to quit smoking. Ambulatory referral to Smoking Cessation clinic following hospital discharge. Pt is currently chewing nicotine gum.    Substance and Sexual Activity    Alcohol use: Yes     Comment: less than one monthly    Drug use: No    Sexual activity: Yes     Partners: Male     Birth control/protection: None   Social History Narrative    Daughter - Kavon     Social Drivers of Health     Financial Resource Strain: Low Risk  (10/21/2024)    Overall Financial Resource Strain (CARDIA)     Difficulty of Paying Living Expenses: Not hard at all   Recent Concern: Financial Resource Strain - Medium Risk (10/19/2024)    Overall Financial Resource Strain (CARDIA)     Difficulty of Paying Living Expenses: Somewhat hard   Food Insecurity: No Food Insecurity (10/21/2024)    Hunger Vital Sign     Worried About Running Out of Food in the Last Year: Never true     Ran Out of Food in the Last Year: Never true   Recent Concern: Food Insecurity - Food Insecurity Present (10/19/2024)    Hunger Vital Sign     Worried About Running Out of Food in the Last Year: Sometimes true     Ran Out of Food in the Last Year: Sometimes true   Transportation Needs: No Transportation Needs  "(10/21/2024)    TRANSPORTATION NEEDS     Transportation : No   Recent Concern: Transportation Needs - Unmet Transportation Needs (10/19/2024)    TRANSPORTATION NEEDS     Transportation : Yes, it has kept me from non-medical meetings, appointments, work or from getting things that I need.   Physical Activity: Inactive (10/21/2024)    Exercise Vital Sign     Days of Exercise per Week: 0 days     Minutes of Exercise per Session: 0 min   Stress: Stress Concern Present (10/21/2024)    [employer] [place] [employer] - Occupational Stress Questionnaire     Feeling of Stress : To some extent   Housing Stability: Low Risk  (10/21/2024)    Housing Stability Vital Sign     Unable to Pay for Housing in the Last Year: No     Homeless in the Last Year: No            Objective:   Vitals  Vitals:    12/26/24 1316 12/26/24 1318   BP: 138/82 126/83   Pulse: 84    SpO2: 95%    Weight: 116.2 kg (256 lb 1 oz)    Height: 5' 5" (1.651 m)                 Physical Exam  Vitals and nursing note reviewed.   Constitutional:       Appearance: Normal appearance. She is obese.   Cardiovascular:      Rate and Rhythm: Normal rate and regular rhythm.      Heart sounds: No murmur heard.     No gallop.   Pulmonary:      Effort: Pulmonary effort is normal.      Breath sounds: Normal breath sounds. No wheezing or rales.   Abdominal:      General: Bowel sounds are normal. There is no distension.      Palpations: Abdomen is soft.      Tenderness: There is no abdominal tenderness.   Musculoskeletal:      Right lower leg: No edema.      Left lower leg: No edema.   Skin:     General: Skin is warm and dry.   Neurological:      Mental Status: She is alert and oriented to person, place, and time.         Lab Results    Lab Results   Component Value Date    WBC 11.97 10/22/2024    HGB 10.8 (L) 10/22/2024    HCT 33.4 (L) 10/22/2024    HCT 41.0 01/05/2024    MCV 89 10/22/2024       Lab Results   Component Value Date     10/22/2024    INR 1.0 " 01/08/2024       Lab Results   Component Value Date    K 4.3 12/04/2024    MG 2.1 12/04/2024    BUN 17 12/04/2024    CREATININE 1.1 12/04/2024       Lab Results   Component Value Date     (H) 12/04/2024    HGBA1C 9.1 (H) 10/31/2024       Lab Results   Component Value Date    AST 13 10/22/2024    ALT 19 10/22/2024    ALBUMIN 2.5 (L) 10/22/2024    PROT 6.7 10/22/2024       Lab Results   Component Value Date    CHOL 220 (H) 09/29/2023    HDL 49 09/29/2023    LDLCALC 142.8 09/29/2023    TRIG 141 09/29/2023       Lab Results   Component Value Date     (H) 10/19/2024         Assessment:     1. Chronic combined systolic and diastolic congestive heart failure    2. Hypertension associated with diabetes    3. Hypertension, unspecified type    4. Hyperlipidemia associated with type 2 diabetes mellitus    5. Aortic atherosclerosis    6. Class 3 severe obesity due to excess calories with serious comorbidity and body mass index (BMI) of 40.0 to 44.9 in adult    7. CESAR (obstructive sleep apnea)              Plan:     HFrEF  -educated on the importance of medication compliance and close clinic F/U - doing very well  -long discussion about lasix PRN parameters, daily weights, home monitoring of BP  -fluid status difficult to assess given body habitus - respiratory status stable, clear to auscultation, -ve LE edema; still with CANELA/orthopnea  -continue lasix 40mg BID with additional dosing PRN  -on great GDMT, continue Entresto, lasix, BB, aldactone, jardiance as above - update labs in ~ 2 weeks  -BP at goal    2. HTN  -goal BP < 120/80, well controlled  -continue entresto, hydralazine, Coreg as above  -continue home monitoring, enrolled in digital HTN    3. CESAR  -has CPAP ordered, awaiting supplies    4. HLD  -goal LDLc < 70  -continue lipitor, zetia    5. PAD  -stable, continue asa, statin, zetia    6. DM2  -A1C better, management per PCP    7. Obesity  -encouraged Mediterranean diet, exercise, weight loss    8.  Tobacco use  -cessation education  -F/U with cessation team as scheduled         -labs 2 weeks  -F/U ~ 6 weeks, Echo prior        The ASCVD Risk score (Elizabeth DK, et al., 2019) failed to calculate for the following reasons:    Risk score cannot be calculated because patient has a medical history suggesting prior/existing ASCVD    Orders Placed This Encounter   Procedures    Basic metabolic panel     Standing Status:   Future     Standing Expiration Date:   3/26/2026     Order Specific Question:   Send normal result to authorizing provider's In Basket if patient is active on MyChart:     Answer:   Yes    Magnesium     Standing Status:   Future     Standing Expiration Date:   3/26/2026     Order Specific Question:   Send normal result to authorizing provider's In Basket if patient is active on MyChart:     Answer:   Yes    Echo     Standing Status:   Future     Standing Expiration Date:   12/26/2025     Order Specific Question:   Release to patient     Answer:   Immediate       She expressed verbal understanding and agreed with the plan    Follow up in 2 weeks, labs prior    Pertinent cardiac images and EKG reviewed independently.    Continue with current medical plan and lifestyle changes.  Return sooner for concerns or questions. If symptoms persist go to the ED.  I have reviewed all pertinent data including patient's medical history in detail and updated the computerized patient record.     Counseling included discussion regarding imaging findings, diagnosis, possibilities, treatment options, risks and benefits.    Thank you for the opportunity to care for this patient. Will be available for questions if needed.     Signed:  Claudio Whitney DNP  12/26/2024

## 2024-12-30 ENCOUNTER — OFFICE VISIT (OUTPATIENT)
Dept: PRIMARY CARE CLINIC | Facility: CLINIC | Age: 49
End: 2024-12-30
Payer: MEDICARE

## 2024-12-30 VITALS
HEIGHT: 65 IN | HEART RATE: 81 BPM | OXYGEN SATURATION: 97 % | BODY MASS INDEX: 43.02 KG/M2 | WEIGHT: 258.19 LBS | DIASTOLIC BLOOD PRESSURE: 84 MMHG | SYSTOLIC BLOOD PRESSURE: 151 MMHG | TEMPERATURE: 98 F

## 2024-12-30 DIAGNOSIS — I50.43 ACUTE ON CHRONIC COMBINED SYSTOLIC AND DIASTOLIC HEART FAILURE: Primary | ICD-10-CM

## 2024-12-30 DIAGNOSIS — F17.200 TOBACCO DEPENDENCY: ICD-10-CM

## 2024-12-30 DIAGNOSIS — G47.33 OSA (OBSTRUCTIVE SLEEP APNEA): ICD-10-CM

## 2024-12-30 DIAGNOSIS — Z79.4 TYPE 2 DIABETES MELLITUS WITH HYPERGLYCEMIA, WITH LONG-TERM CURRENT USE OF INSULIN: ICD-10-CM

## 2024-12-30 DIAGNOSIS — E11.65 TYPE 2 DIABETES MELLITUS WITH HYPERGLYCEMIA, WITH LONG-TERM CURRENT USE OF INSULIN: ICD-10-CM

## 2024-12-30 PROCEDURE — 3046F HEMOGLOBIN A1C LEVEL >9.0%: CPT | Mod: HCNC,CPTII,, | Performed by: INTERNAL MEDICINE

## 2024-12-30 PROCEDURE — 99213 OFFICE O/P EST LOW 20 MIN: CPT | Mod: PBBFAC,HCNC,PO | Performed by: INTERNAL MEDICINE

## 2024-12-30 PROCEDURE — 3077F SYST BP >= 140 MM HG: CPT | Mod: HCNC,CPTII,, | Performed by: INTERNAL MEDICINE

## 2024-12-30 PROCEDURE — 3066F NEPHROPATHY DOC TX: CPT | Mod: HCNC,CPTII,, | Performed by: INTERNAL MEDICINE

## 2024-12-30 PROCEDURE — 3008F BODY MASS INDEX DOCD: CPT | Mod: HCNC,CPTII,, | Performed by: INTERNAL MEDICINE

## 2024-12-30 PROCEDURE — 99999 PR PBB SHADOW E&M-EST. PATIENT-LVL III: CPT | Mod: PBBFAC,HCNC,, | Performed by: INTERNAL MEDICINE

## 2024-12-30 PROCEDURE — 99214 OFFICE O/P EST MOD 30 MIN: CPT | Mod: S$PBB,HCNC,, | Performed by: INTERNAL MEDICINE

## 2024-12-30 PROCEDURE — 3060F POS MICROALBUMINURIA REV: CPT | Mod: HCNC,CPTII,, | Performed by: INTERNAL MEDICINE

## 2024-12-30 PROCEDURE — 3079F DIAST BP 80-89 MM HG: CPT | Mod: HCNC,CPTII,, | Performed by: INTERNAL MEDICINE

## 2024-12-30 PROCEDURE — 4010F ACE/ARB THERAPY RXD/TAKEN: CPT | Mod: HCNC,CPTII,, | Performed by: INTERNAL MEDICINE

## 2024-12-30 NOTE — PROGRESS NOTES
Subjective:       Patient ID: Jaimee Quintana is a 49 y.o. female.    Chief Complaint: Hospital Follow Up    HPI:  Hospitalized 10/19/24 - 10/22/24 for management of decompensated heart failure.  Non adherent to home medication regimen.   Admitted to [facility] service.  Initially required continuous Bipap.   Diuresed with IV lasix with good response.   TTE with EF 45-50%.   Home medication regimen adjusted.  Patient discharged to home.      12/26/24- Cardiology visit- notes reviewed.  Repeat labs and echo ordered.     12/30/24-   Returns to [facility].  I have reviewed Zoll monitor report thru 12/23/24.   She tells me she took monitor OFF on 12/27/24 and she plans to put it back on today.   Did not bring medication bottles for review.  Has Metformin at home but not taking- she self discontinued - I have advised her to restart this.   Self reported  - 184, no values over 200.  Taking Lasix 40 mg BID and has not required extra doses recently.       Review of Systems   Constitutional:  Negative for malaise/fatigue and weight loss.   Respiratory:  Negative for shortness of breath.    Cardiovascular:  Negative for chest pain, orthopnea and leg swelling.   Gastrointestinal:  Negative for heartburn, nausea and vomiting.   Musculoskeletal:  Negative for falls.   Neurological:  Negative for focal weakness.   Psychiatric/Behavioral:  The patient is not nervous/anxious.            Objective:      Vital Signs:  Vitals:    12/30/24 0915   BP: (!) 151/84   Pulse: 81   Temp: 97.6 °F (36.4 °C)     Wt Readings from Last 3 Encounters:   12/30/24 0915 117.1 kg (258 lb 2.5 oz)   12/26/24 1316 116.2 kg (256 lb 1 oz)   12/12/24 1048 114.5 kg (252 lb 6.8 oz)     Body mass index is 42.96 kg/m².   SpO2 Readings from Last 1 Encounters:   12/30/24 97%       Physical Exam  Constitutional:       Appearance: She is obese. She is not ill-appearing.   Eyes:      Pupils: Pupils are equal, round, and reactive to light.    Cardiovascular:      Rate and Rhythm: Normal rate.   Pulmonary:      Effort: Pulmonary effort is normal. No respiratory distress.      Breath sounds: No wheezing.   Abdominal:      Palpations: Abdomen is soft.   Musculoskeletal:         General: No swelling.      Cervical back: Neck supple.   Neurological:      General: No focal deficit present.   Psychiatric:         Mood and Affect: Mood normal.             Assessment:       1. Acute on chronic combined systolic and diastolic heart failure    2. Type 2 diabetes mellitus with hyperglycemia, with long-term current use of insulin    3. Tobacco dependency    4. CESAR (obstructive sleep apnea)        Plan:       Diagnoses and all orders for this visit:      Acute on chronic combined systolic (congestive) and diastolic (congestive) heart failure  - recent hospitalization as above  - continue current medication regimen   - She self discontinued Zoll monitor on 12/27/24 but will put back on today   - repeat TTE 1/22/25   - return to Cardiology 1/30/25   - counseling and education provided regarding dietary sodium restriction, caution with upcoming holidays     Type 2 diabetes mellitus with hyperglycemia, with long-term current use of insulin  - HgBA1C 9.1  - continue Lantus, Trulicity, Jardiance  - restart Metformin (she self discontinued)      Tobacco dependency  - saw tobacco cessation team 11/4/24 then lost to follow up  - I have asked tobacco cessation team to reschedule      Class 3 severe obesity with serious comorbidity and body mass index (BMI) of 40.0 to 44.9 in adult, unspecified obesity type  - education and counseling provided      CESAR (obstructive sleep apnea)  - last seen by sleep medicine team 2016   - she stopped using her home CPAP due to recall and did not get a new one  -Sleep Medicine evaluation pending 2/13/25     Patient will be released from hospital follow up clinic.  She will see her PCP, Dr Lakisha Mendez, 1/31/25.       Scheduled Follow-up  ":  Future Appointments   Date Time Provider Department Center   1/22/2025  1:00 PM CARDIOLOGY, ECHO [facility]   1/30/2025  1:00 PM Claudio Whitney DNP [facility] CARDIO [facility]   1/31/2025  1:00 PM Lakisha Mendez DO [facility]   2/13/2025  9:30 AM Domi Moreira MD [facility] SLEEP [facility]   2/25/2025 10:20 AM GHAZAL Tillman MD [facility]       Post Visit Medication List:     Medication List            Accurate as of December 30, 2024  9:46 AM. If you have any questions, ask your nurse or doctor.                CONTINUE taking these medications      aspirin 81 MG EC tablet  Commonly known as: ECOTRIN     atorvastatin 80 MG tablet  Commonly known as: LIPITOR  Take 1 tablet (80 mg total) by mouth every evening.     azithromycin 250 MG tablet  Commonly known as: Z-HERBER     BD ULTRA-FINE ROSA PEN NEEDLE 32 gauge x 5/32" Ndle  Generic drug: pen needle, diabetic  Use to inject insulin 2 times per day     carvediloL 12.5 MG tablet  Commonly known as: COREG  Take 1 tablet (12.5 mg total) by mouth 2 (two) times daily with meals.     ENTRESTO 49-51 mg per tablet  Generic drug: sacubitriL-valsartan  Take 1 tablet by mouth 2 (two) times daily.     ergocalciferol 50,000 unit Cap  Commonly known as: ERGOCALCIFEROL  Take 1 capsule (50,000 Units total) by mouth every Saturday.     ezetimibe 10 mg tablet  Commonly known as: ZETIA  Take 1 tablet (10 mg total) by mouth once daily.     furosemide 40 MG tablet  Commonly known as: LASIX  Take 1 tablet (40 mg total) by mouth 2 (two) times a day. May also take 1 tablet (40 mg total) daily as needed (> 2 pound weight gain in 24 hours, > 5 pound weight gain in a week, lower extrimety swelling, shortness of breath).     hydrALAZINE 50 MG tablet  Commonly known as: APRESOLINE     JARDIANCE 10 mg tablet  Generic drug: empagliflozin  Take 1 tablet (10 mg total) by mouth once daily.     LANTUS SOLOSTAR U-100 INSULIN 100 unit/mL (3 mL) Inpn " pen  Generic drug: insulin glargine U-100 (Lantus)  Inject 80 Units into the skin 2 (two) times a day.     metFORMIN 1000 MG tablet  Commonly known as: GLUCOPHAGE  Take 1 tablet (1,000 mg total) by mouth 2 (two) times daily with meals.     nicotine (polacrilex) 2 mg Gum  Commonly known as: NICORETTE  Take 1 each (2 mg total) by mouth as needed (1-2 pieces / hr in place of a cigarette, maximum of 15 pieces/day).     potassium chloride 8 MEQ Tbsr  Commonly known as: KLOR-CON 8  Take 2 tablets (16 mEq total) by mouth 2 (two) times daily.     spironolactone 25 MG tablet  Commonly known as: ALDACTONE  Take 1 tablet (25 mg total) by mouth once daily.     TRULICITY 0.75 mg/0.5 mL pen injector  Generic drug: dulaglutide  Inject 0.75 mg into the skin every 7 days.

## 2025-01-02 ENCOUNTER — TELEPHONE (OUTPATIENT)
Dept: SMOKING CESSATION | Facility: CLINIC | Age: 50
End: 2025-01-02
Payer: MEDICARE

## 2025-01-02 NOTE — TELEPHONE ENCOUNTER
Called pt in regards to smoking cessation status and receiving a message from Dr. Donis to reach out to patient to get her back in program. Unable to reach patient, left message questioning tobacco use as well as name and number to call back. - Nerissa Lr, CTTS [phone].

## 2025-01-06 ENCOUNTER — TELEPHONE (OUTPATIENT)
Dept: FAMILY MEDICINE | Facility: CLINIC | Age: 50
End: 2025-01-06
Payer: MEDICARE

## 2025-01-06 NOTE — TELEPHONE ENCOUNTER
----- Message from Brenda sent at 1/6/2025  2:20 PM CST -----  Type:   Call    Who Called:pt  Does the patient know what this is regarding?:paperwork  Would the patient rather a call back or a response via [facility]? call  Best Call Back Number: [phone]  Additional Information: Paperwork or power wheel chair needs to be faxed to [employer] at [phone]. Phone #[phone]

## 2025-01-06 NOTE — TELEPHONE ENCOUNTER
Called and spoke with pt in regards of her message. Pt informed me that she has [employer] and she was informed by [facility] that they don't take [employer] insurance. Pt informed me that all her forms for the power wheelchair needs to faxed to [employer] instead. Informed pt that I will call [employer] in regards of this matter. Pt verbalized understanding of message.      Tried calling [employer] in regards of patient and what forms they are needing. Unable to reach anyone.

## 2025-01-09 ENCOUNTER — TELEPHONE (OUTPATIENT)
Dept: FAMILY MEDICINE | Facility: CLINIC | Age: 50
End: 2025-01-09
Payer: MEDICARE

## 2025-01-09 NOTE — TELEPHONE ENCOUNTER
----- Message from Mercedes sent at 1/9/2025  2:23 PM CST -----  .Type:  Needs Medical Advice    Who Called: pt    Would the patient rather a call back or a response via [facility]? Call back  Best Call Back Number: [phone]  Additional Information:     Pt stated she would like a call back about her power wheel chair because the company she is with is called [employer] and they are waiting on the fax

## 2025-01-14 NOTE — PLAN OF CARE
Pt continues on Insulin gtt at 0.1 units/kg/hr. Long acting insulin administered this AM. Last , and trending down. IVFs switched to D5% in LR at 125/hr. K+, PHOS, and Mag replaced overnight. Febrile and tachycardic overnight. Tylenol 500 mg given for max temp 100.9. Temp now 99.0. Pt is more awake and alert this AM. Plan of care reviewed with pt. Report given to, MACEY Pepper.    Problem: Adult Inpatient Plan of Care  Goal: Plan of Care Review  Outcome: Ongoing, Progressing  Goal: Patient-Specific Goal (Individualized)  Outcome: Ongoing, Progressing  Goal: Absence of Hospital-Acquired Illness or Injury  Outcome: Ongoing, Progressing  Goal: Optimal Comfort and Wellbeing  Outcome: Ongoing, Progressing  Goal: Readiness for Transition of Care  Outcome: Ongoing, Progressing     Problem: Diabetes Comorbidity  Goal: Blood Glucose Level Within Targeted Range  Outcome: Ongoing, Progressing     Problem: Skin Injury Risk Increased  Goal: Skin Health and Integrity  Outcome: Ongoing, Progressing      alert

## 2025-01-20 ENCOUNTER — PATIENT MESSAGE (OUTPATIENT)
Dept: OBSTETRICS AND GYNECOLOGY | Facility: CLINIC | Age: 50
End: 2025-01-20
Payer: MEDICARE

## 2025-01-21 ENCOUNTER — PATIENT MESSAGE (OUTPATIENT)
Dept: CARDIOLOGY | Facility: CLINIC | Age: 50
End: 2025-01-21
Payer: MEDICARE

## 2025-01-29 NOTE — PROGRESS NOTES
Cardiology Clinic note    Subjective:   Patient ID:  Jaimee Quintana is a 49 y.o. female who presents for follow-up of HTN, CHF    HPI:     1/30/2025: Here for F/U HTN, HFrEF. Seen in clinic unaccompanied reports overall doing okay. Reports BPs have been up a bit the last month. Taking lasix 40mg TID. Weights stable, grossly euvolemic on exam. Chronic orthopnea, ongoing CANELA/ fatigue. Denies CP, SOB, PND, syncope, palps, LE edema. Reports compliance and tolerance with all medications.     12/26/2024: Here for F/U HTN, HFrEF. Seen in clinic, reports overall doing pretty well. Weight stable, tolerating meds. Taking lasix 40mg BID, has not needed additional dosing. Ongoing CANELA/orthopnea. Resp status stable, grossly euvolemic on exam. Patient denies CP, SOB, PND, syncope, palpitations, LE edema. Reports compliance and tolerance with all medications.    11/20/2024: Here for F/U HTN, HFrEF. Seen in clinic unaccompanied reports overall doing pretty well. Weight stable, compliant with home monitoring, taking lasix 40mg BID. Tolerating GDMT. Ongoing CANELA, improving, chronic 3-4 pillow orthopnea. No recurrent LE edema, no resting SOB. Denies CP, PND, syncope, palps. Reports compliance and tolerance with all medications.     11/11/2024: Here for F/U HTN, HFrEF. Lost to F/U after previous clinic visit, recent admission 10/19/2024 with acute on chronic HFrEF. Seen in clinic unaccompanied reports overall doing okay. Visibly nervous/anxious.   Long conversation about CHF diagnosis, importance of medication compliance and close clinic follow-up. Discussed lasix PRN parameters in detail and importance of home monitoring weights and blood pressures. Reports ongoing 4-pillow orthopnea and CANELA, though improved since hospital D/C. No other new CV s/s, denies recurrent LE edema. No new CP, SOB, PND, syncope, palps. Reports compliance and tolerance with all medications.     7/3/2024: Here for F/U HTN, HFrEF, PAD. Seen in clinic reports  she's been nervous since her medication changes with fluctuating BPs. Logs reviewed, still with intermittent HTN. Normotensive in clinic today. Reports ongoing CANELA, orthopnea; has been taking 40mg lasix daily for about a week with some improvement. Patient denies CP, SOB, PND, syncope, palpitations, LE edema. Reports compliance and tolerance with all medications.    6/24/2024: Previous patient of Dr. Beth and Vicente Quinones, NP as below, initial visit for me. 47 yo F with hx of CVA, DM2, HTN, HLD, CESAR not on CPAP, MO w/ BMI 41.8, and HFrEF- recovered LVEF 50% (now back down to 35-40%) who presents today for routine F/U. 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 Seen in clinic today, reports overall doing well. Seemingly well compensated from HF perspective, grossly euvolemic on exam. Only taking lasix PRN.  Patient denies CP, SOB, PND, syncope, palpitations, LE edema. Reports compliance and tolerance with all medications.  -Will have to D/C [facility] given rEF and escalate other antihypertensives  -repeat Echo ~3m after stable on GDMT      9/29/2023: Pt is a 48 yo F w/ PMH of CVA, DM2 w/ hgba1c 11.4%, HTN, HLD, CESAR not on CPAP, MO w/ BMI 41.8, and HFrEF- recovered LVEF 50% who presents today for f/u appt. She was last seen by Dr. Beth on 6/27/23 to establish care, post hospital admission 5/22/2023-5/23/2023 for decompensated CHF and was diuresed and meds were adjusted (see d/c sum 5/24/2023). She notes compliance w/ meds and denies side effects. She has been monitoring her BP at home however states that her BP runs high at times, -170s. She denies cp, sob, orthopnea, PND, presyncope, LOC, palpitations. She is currently enrolled in PAD rehab and notes some improvement in BLE claudication.   She does not exercise however states she is active w/ riding her bike and walking around her block and denies cp or sob       Cardiac hx  ---------------------------------    Echo 10/2024    The left ventricle is  normal in size. There is concentric hypertrophy. Mild global hypokinesis present. There is mildly reduced systolic function with a visually estimated ejection fraction of 45 - 50%. Unable to assess diastolic function due to poor image quality.    Right Ventricle: Normal right ventricular cavity size. Systolic function is reduced.TAPSE is 1.24 cm.    Mitral Valve: There is mild regurgitation.    Tricuspid Valve: There is mild regurgitation.    Pulmonary Artery: The estimated pulmonary artery systolic pressure is 29 mmHg.    IVC/SVC: Normal venous pressure at 3 mmHg.    The study was difficult due to patient's body habitus.    Echo 10/2023    Left Ventricle: The left ventricle is normal in size. There is concentric hypertrophy. Mild global hypokinesis present. There is moderately reduced systolic function with a visually estimated ejection fraction of 35 - 40%. Grade II diastolic dysfunction.    Right Ventricle: Normal right ventricular cavity size. Wall thickness is normal. Right ventricle wall motion  is normal. Systolic function is normal.    Tricuspid Valve: There is mild regurgitation.    Pulmonary Artery: The estimated pulmonary artery systolic pressure is 26 mmHg.    IVC/SVC: Normal venous pressure at 3 mmHg.    ALEENA 7/2023  The right resting ALEENA reduction is normal.   The right arm artery has triphasic flow.  The right ankle [DT] artery has triphasic flow.   The right ankle [DP] artery has triphasic flow.   The left resting ALEENA reduction is normal.   The left arm artery has triphasic flow.  The left ankle [PT] artery has triphasic flow.  The left ankle [DP] artery has biphasic flow.  Exercise Study: treadmill test.   The exercise study was stopped due to protocol completion.     Symptoms: At 1 min, patient reported 6/10 right bottom of foot pain that resolved at 3 minutes.  Immediately post exercise, the right ALEENA is normal.  Immediately post exercise, the left ALEENA is mildly decreased.       Echo  5/22/2023    The left ventricle is normal in size with concentric remodeling and low normal systolic function.  The estimated ejection fraction is 50%.  Normal left ventricular diastolic function.  With normal right ventricular systolic function.    CV Exercise ALEENA study: 7/18/23  Conclusion     Mild PAD of left lower extremity with exercise (ALEENA 0.89).  Normal TBIs and toe pressures bilaterally      Past Medical History:   Diagnosis Date    Anxiety     Cataract     Cervical high risk HPV (human papillomavirus) test positive 09/17/2020    NOT 16 OR 18    CHF (congestive heart failure)     CVA (cerebral infarction) 2003         Depression     Diabetes mellitus, type 2     GERD (gastroesophageal reflux disease)     Hyperlipidemia     Hypertension     Lactic acidosis 10/06/2023    Moderate nonproliferative diabetic retinopathy     Nausea and vomiting 10/06/2023    Obesity     Stroke     Tachycardia 10/04/2023          Patient Active Problem List    Diagnosis Date Noted    CANELA (dyspnea on exertion) 01/30/2025    Impaired functional mobility, balance, and endurance 12/12/2024    Class 3 severe obesity with serious comorbidity and body mass index (BMI) of 40.0 to 44.9 in adult 11/19/2024    Tobacco dependency 10/19/2024    Acute on chronic combined systolic and diastolic heart failure 10/19/2024    Type 2 diabetes mellitus with hyperglycemia, with long-term current use of insulin 10/19/2024    Hypertension 10/19/2024    Aortic atherosclerosis 06/06/2024    NS (nuclear sclerosis), bilateral 04/23/2024    Perinephric abscess 01/08/2024    Abscess of left kidney 01/06/2024    Chronic combined systolic and diastolic congestive heart failure 01/06/2024    Dysphagia 10/08/2023    Sinus arrhythmia 10/06/2023    Bilateral claudication of lower limb 06/27/2023    Noncompliance with medications 05/22/2023    GERD (gastroesophageal reflux disease) 05/22/2023    History of CVA (cerebrovascular accident) without residual deficits  05/22/2023    Mild episode of recurrent major depressive disorder 07/09/2021    Abnormal Papanicolaou smear of cervix with positive human papilloma virus (HPV) test 07/09/2021     + HrHPV  - Negative coloposcopy 9/2020          Type 2 diabetes mellitus with 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        Diabetic peripheral neuropathy associated with type 2 diabetes mellitus 03/23/2017    Bilateral low back pain without sciatica 09/28/2016    Fatty liver 08/18/2016     encouraged weight loss      Hypertension associated with diabetes 10/23/2015    CESAR (obstructive sleep apnea) 10/23/2015    Hyperlipidemia associated with type 2 diabetes mellitus        Patient's Medications   New Prescriptions    No medications on file   Previous Medications    ASPIRIN (ECOTRIN) 81 MG EC TABLET    Take 81 mg by mouth once daily.    ATORVASTATIN (LIPITOR) 80 MG TABLET    Take 1 tablet (80 mg total) by mouth every evening.    AZITHROMYCIN (Z-HERBER) 250 MG TABLET        DULAGLUTIDE (TRULICITY) 0.75 MG/0.5 ML PEN INJECTOR    Inject 0.75 mg into the skin every 7 days.    EMPAGLIFLOZIN (JARDIANCE) 10 MG TABLET    Take 1 tablet (10 mg total) by mouth once daily.    ERGOCALCIFEROL (ERGOCALCIFEROL) 50,000 UNIT CAP    Take 1 capsule (50,000 Units total) by mouth every Saturday.    EZETIMIBE (ZETIA) 10 MG TABLET    Take 1 tablet (10 mg total) by mouth once daily.    HYDRALAZINE (APRESOLINE) 50 MG TABLET    Take 50 mg by mouth 3 (three) times daily.    INSULIN GLARGINE U-100, LANTUS, (LANTUS SOLOSTAR U-100 INSULIN) 100 UNIT/ML (3 ML) INPN PEN    Inject 80 Units into the skin 2 (two) times a day.    METFORMIN (GLUCOPHAGE) 1000 MG TABLET    Take 1 tablet  "(1,000 mg total) by mouth 2 (two) times daily with meals.    NICOTINE, POLACRILEX, (NICORETTE) 2 MG GUM    Take 1 each (2 mg total) by mouth as needed (1-2 pieces / hr in place of a cigarette, maximum of 15 pieces/day).    PEN NEEDLE, DIABETIC 32 GAUGE X 5/32" NDLE    Use to inject insulin 2 times per day    POTASSIUM CHLORIDE (KLOR-CON 8) 8 MEQ TBSR    Take 2 tablets (16 mEq total) by mouth 2 (two) times daily.    SPIRONOLACTONE (ALDACTONE) 25 MG TABLET    Take 1 tablet (25 mg total) by mouth once daily.   Modified Medications    Modified Medication Previous Medication    CARVEDILOL (COREG) 25 MG TABLET carvediloL (COREG) 12.5 MG tablet       Take 1 tablet (25 mg total) by mouth 2 (two) times daily with meals.    Take 1 tablet (12.5 mg total) by mouth 2 (two) times daily with meals.    FUROSEMIDE (LASIX) 40 MG TABLET furosemide (LASIX) 40 MG tablet       Take 2 tablets (80 mg total) by mouth every morning AND 1 tablet (40 mg total) every evening. May also take 1 tablet (40 mg total) daily as needed (> 2 pound weight gain in 24 hours, > 5 pound weight gain in a week, lower extrimety swelling, shortness of breath).    Take 1 tablet (40 mg total) by mouth 2 (two) times a day. May also take 1 tablet (40 mg total) daily as needed (> 2 pound weight gain in 24 hours, > 5 pound weight gain in a week, lower extrimety swelling, shortness of breath).   Discontinued Medications    SACUBITRIL-VALSARTAN (ENTRESTO) 49-51 MG PER TABLET    Take 1 tablet by mouth 2 (two) times daily.        Review of Systems   Constitutional: Negative for chills, decreased appetite, diaphoresis, malaise/fatigue, weight gain and weight loss.   Cardiovascular:  Positive for dyspnea on exertion and orthopnea. Negative for chest pain, claudication, irregular heartbeat, leg swelling, near-syncope, palpitations, paroxysmal nocturnal dyspnea and syncope.   Respiratory:  Negative for cough, hemoptysis, shortness of breath and snoring.    Gastrointestinal:  " Negative for bloating, abdominal pain, nausea and vomiting.   Neurological:  Negative for light-headedness and weakness.   Psychiatric/Behavioral:  The patient is nervous/anxious.        Family History   Problem Relation Name Age of Onset    Stroke Mother      Thyroid disease Mother      Diabetes Mother      Cataracts Father      Hypertension Father      Arthritis Father      Diabetes Father      Hypertension Sister      Stroke Sister      Thyroid disease Sister      Heart disease Sister      No Known Problems Brother      Thyroid disease Daughter 2     Asthma Daughter 2     No Known Problems Maternal Aunt      No Known Problems Maternal Uncle      No Known Problems Paternal Aunt      No Known Problems Paternal Uncle      No Known Problems Maternal Grandmother      No Known Problems Maternal Grandfather      No Known Problems Paternal Grandmother      No Known Problems Paternal Grandfather      Amblyopia Neg Hx      Blindness Neg Hx      Cancer Neg Hx      Glaucoma Neg Hx      Macular degeneration Neg Hx      Retinal detachment Neg Hx      Strabismus Neg Hx         Social History     Socioeconomic History    Marital status: Single   Occupational History    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 She states ready to quit smoking. Ambulatory referral to Smoking Cessation clinic following hospital discharge. Pt is currently chewing nicotine gum.    Substance and Sexual Activity    Alcohol use: Yes     Comment: less than one monthly    Drug use: No    Sexual activity: Yes     Partners: Male     Birth control/protection: None   Social History Narrative    Daughter - Kavon     Social Drivers of Health     Financial Resource Strain:  Low Risk  (10/21/2024)    Overall Financial Resource Strain (CARDIA)     Difficulty of Paying Living Expenses: Not hard at all   Recent Concern: Financial Resource Strain - Medium Risk (10/19/2024)    Overall Financial Resource Strain (CARDIA)     Difficulty of Paying Living Expenses: Somewhat hard   Food Insecurity: No Food Insecurity (10/21/2024)    Hunger Vital Sign     Worried About Running Out of Food in the Last Year: Never true     Ran Out of Food in the Last Year: Never true   Recent Concern: Food Insecurity - Food Insecurity Present (10/19/2024)    Hunger Vital Sign     Worried About Running Out of Food in the Last Year: Sometimes true     Ran Out of Food in the Last Year: Sometimes true   Transportation Needs: No Transportation Needs (10/21/2024)    TRANSPORTATION NEEDS     Transportation : No   Recent Concern: Transportation Needs - Unmet Transportation Needs (10/19/2024)    TRANSPORTATION NEEDS     Transportation : Yes, it has kept me from non-medical meetings, appointments, work or from getting things that I need.   Physical Activity: Inactive (10/21/2024)    Exercise Vital Sign     Days of Exercise per Week: 0 days     Minutes of Exercise per Session: 0 min   Stress: Stress Concern Present (10/21/2024)    [employer] - Occupational Stress Questionnaire     Feeling of Stress : To some extent   Housing Stability: Low Risk  (10/21/2024)    Housing Stability Vital Sign     Unable to Pay for Housing in the Last Year: No     Homeless in the Last Year: No            Objective:   Vitals  Vitals:    01/30/25 1240 01/30/25 1245   BP: (!) 159/91 (!) 155/87   Pulse: 91    SpO2: 96%    Weight: 116.7 kg (257 lb 6.2 oz)                   Physical Exam  Vitals and nursing note reviewed.   Constitutional:       Appearance: Normal appearance. She is obese.   Cardiovascular:      Rate and Rhythm: Normal rate and regular rhythm.      Heart sounds: No murmur heard.     No gallop.   Pulmonary:       Effort: Pulmonary effort is normal.      Breath sounds: Normal breath sounds. No wheezing or rales.   Abdominal:      General: Bowel sounds are normal. There is no distension.      Palpations: Abdomen is soft.      Tenderness: There is no abdominal tenderness.   Musculoskeletal:      Right lower leg: No edema.      Left lower leg: No edema.   Skin:     General: Skin is warm and dry.   Neurological:      Mental Status: She is alert and oriented to person, place, and time.         Lab Results    Lab Results   Component Value Date    WBC 11.97 10/22/2024    HGB 10.8 (L) 10/22/2024    HCT 33.4 (L) 10/22/2024    HCT 41.0 01/05/2024    MCV 89 10/22/2024       Lab Results   Component Value Date     10/22/2024    INR 1.0 01/08/2024       Lab Results   Component Value Date    K 4.3 12/04/2024    MG 2.1 12/04/2024    BUN 17 12/04/2024    CREATININE 1.1 12/04/2024       Lab Results   Component Value Date     (H) 12/04/2024    HGBA1C 9.1 (H) 10/31/2024       Lab Results   Component Value Date    AST 13 10/22/2024    ALT 19 10/22/2024    ALBUMIN 2.5 (L) 10/22/2024    PROT 6.7 10/22/2024       Lab Results   Component Value Date    CHOL 220 (H) 09/29/2023    HDL 49 09/29/2023    LDLCALC 142.8 09/29/2023    TRIG 141 09/29/2023       Lab Results   Component Value Date     (H) 10/19/2024         Assessment:     1. Chronic heart failure with preserved ejection fraction    2. Hypertension associated with diabetes    3. Acute on chronic combined systolic (congestive) and diastolic (congestive) heart failure    4. CANELA (dyspnea on exertion)    5. Hypertension, unspecified type    6. Hyperlipidemia associated with type 2 diabetes mellitus    7. Chronic combined systolic and diastolic congestive heart failure    8. Class 3 severe obesity due to excess calories with serious comorbidity and body mass index (BMI) of 40.0 to 44.9 in adult    9. Tobacco dependency    10. CESAR (obstructive sleep apnea)                 Plan:     HFrEF  -educated on the importance of medication compliance and close clinic F/U - doing very well  -long discussion about lasix PRN parameters, daily weights, home monitoring of BP  -fluid status difficult to assess given body habitus - respiratory status stable, lungs clear to auscultation, -ve LE edema; still with CANELA/orthopnea  -taking lasix 40mg TID - will adjust to 80mg AM, 40mg PM (okay to take PM dose mid-day to avoid significant nocturia)  -reports worsening HTN ~ 1m - escalate coreg to 25mg BID and will F/U close  -on great GDMT, continue Entresto, lasix, BB, aldactone, jardiance as above - labs/ Echo pending  -low sodium, heart healthy diet  -continue home monitoring of BPs/ weights - did not bring logs - asked to bring to F/U    2. HTN  -goal BP < 120/80, above goal  -continue entresto, hydralazine; escalate Coreg as above  -continue home monitoring, enrolled in digital HTN  -can consider escalation of entresto if needed    3. CESAR  -has CPAP ordered, awaiting supplies    4. HLD  -goal LDLc < 70  -continue lipitor, zetia    5. PAD  -stable, continue asa, statin, zetia    6. DM2  -A1C better, management per PCP    7. Obesity  -encouraged Mediterranean diet, exercise, weight loss    8. Tobacco use  -cessation education  -F/U with cessation team as scheduled    9. CANELA  -ongoing, significant CANELA despite euvolemia  -given risk factors, c/f ischemic etiology  -rec PET stress  -continue med management as above        -F/U ~ 3 weeks        The ASCVD Risk score (Elizabeth DK, et al., 2019) failed to calculate for the following reasons:    Risk score cannot be calculated because patient has a medical history suggesting prior/existing ASCVD    Orders Placed This Encounter   Procedures    Cardiac PET Scan Stress     Standing Status:   Future     Standing Expiration Date:   1/30/2026     Order Specific Question:   Which medicaton for the stress procedure?     Answer:   [facility]     Order  Specific Question:   Release to patient     Answer:   Immediate       She expressed verbal understanding and agreed with the plan    Follow up in 2 weeks, labs prior    Pertinent cardiac images and EKG reviewed independently.    Continue with current medical plan and lifestyle changes.  Return sooner for concerns or questions. If symptoms persist go to the ED.  I have reviewed all pertinent data including patient's medical history in detail and updated the computerized patient record.     Counseling included discussion regarding imaging findings, diagnosis, possibilities, treatment options, risks and benefits.    Thank you for the opportunity to care for this patient. Will be available for questions if needed.     Signed:  Claudio Whitney DNP  01/30/2025

## 2025-01-30 ENCOUNTER — HOSPITAL ENCOUNTER (OUTPATIENT)
Dept: CARDIOLOGY | Facility: HOSPITAL | Age: 50
Discharge: HOME | End: 2025-01-30
Payer: MEDICARE

## 2025-01-30 ENCOUNTER — OFFICE VISIT (OUTPATIENT)
Dept: CARDIOLOGY | Facility: CLINIC | Age: 50
End: 2025-01-30
Payer: MEDICARE

## 2025-01-30 VITALS
OXYGEN SATURATION: 96 % | HEART RATE: 91 BPM | DIASTOLIC BLOOD PRESSURE: 87 MMHG | WEIGHT: 257.38 LBS | BODY MASS INDEX: 42.83 KG/M2 | SYSTOLIC BLOOD PRESSURE: 155 MMHG

## 2025-01-30 VITALS — BODY MASS INDEX: 42.99 KG/M2 | HEART RATE: 73 BPM | WEIGHT: 258 LBS | HEIGHT: 65 IN

## 2025-01-30 DIAGNOSIS — I50.42 CHRONIC COMBINED SYSTOLIC AND DIASTOLIC CONGESTIVE HEART FAILURE: ICD-10-CM

## 2025-01-30 DIAGNOSIS — I50.32 CHRONIC HEART FAILURE WITH PRESERVED EJECTION FRACTION: Primary | ICD-10-CM

## 2025-01-30 DIAGNOSIS — I50.43 ACUTE ON CHRONIC COMBINED SYSTOLIC (CONGESTIVE) AND DIASTOLIC (CONGESTIVE) HEART FAILURE: ICD-10-CM

## 2025-01-30 DIAGNOSIS — E11.69 HYPERLIPIDEMIA ASSOCIATED WITH TYPE 2 DIABETES MELLITUS: ICD-10-CM

## 2025-01-30 DIAGNOSIS — E11.59 HYPERTENSION ASSOCIATED WITH DIABETES: ICD-10-CM

## 2025-01-30 DIAGNOSIS — I15.2 HYPERTENSION ASSOCIATED WITH DIABETES: ICD-10-CM

## 2025-01-30 DIAGNOSIS — E66.01 CLASS 3 SEVERE OBESITY DUE TO EXCESS CALORIES WITH SERIOUS COMORBIDITY AND BODY MASS INDEX (BMI) OF 40.0 TO 44.9 IN ADULT: ICD-10-CM

## 2025-01-30 DIAGNOSIS — F17.200 TOBACCO DEPENDENCY: ICD-10-CM

## 2025-01-30 DIAGNOSIS — G47.33 OSA (OBSTRUCTIVE SLEEP APNEA): ICD-10-CM

## 2025-01-30 DIAGNOSIS — R06.09 DOE (DYSPNEA ON EXERTION): ICD-10-CM

## 2025-01-30 DIAGNOSIS — I10 HYPERTENSION, UNSPECIFIED TYPE: ICD-10-CM

## 2025-01-30 DIAGNOSIS — E78.5 HYPERLIPIDEMIA ASSOCIATED WITH TYPE 2 DIABETES MELLITUS: ICD-10-CM

## 2025-01-30 DIAGNOSIS — E66.813 CLASS 3 SEVERE OBESITY DUE TO EXCESS CALORIES WITH SERIOUS COMORBIDITY AND BODY MASS INDEX (BMI) OF 40.0 TO 44.9 IN ADULT: ICD-10-CM

## 2025-01-30 LAB
APICAL FOUR CHAMBER EJECTION FRACTION: 54 %
APICAL TWO CHAMBER EJECTION FRACTION: 53 %
ASCENDING AORTA: 2.97 CM
AV INDEX (PROSTH): 1.08
AV MEAN GRADIENT: 3 MMHG
AV PEAK GRADIENT: 4 MMHG
AV VALVE AREA BY VELOCITY RATIO: 3.1 CM²
AV VALVE AREA: 3.4 CM²
AV VELOCITY RATIO: 1
BSA FOR ECHO PROCEDURE: 2.32 M2
CV ECHO LV RWT: 0.41 CM
DOP CALC AO PEAK VEL: 1 M/S
DOP CALC AO VTI: 16.8 CM
DOP CALC LVOT AREA: 3.1 CM2
DOP CALC LVOT DIAMETER: 2 CM
DOP CALC LVOT PEAK VEL: 1 M/S
DOP CALC LVOT STROKE VOLUME: 56.8 CM3
DOP CALCLVOT PEAK VEL VTI: 18.1 CM
E WAVE DECELERATION TIME: 92 MSEC
E/A RATIO: 1.43
E/E' RATIO: 12 M/S
ECHO LV POSTERIOR WALL: 1 CM (ref 0.6–1.1)
FRACTIONAL SHORTENING: 24.5 % (ref 28–44)
INTERVENTRICULAR SEPTUM: 1.1 CM (ref 0.6–1.1)
IVRT: 103 MSEC
LEFT ATRIUM AREA SYSTOLIC (APICAL 2 CHAMBER): 20.84 CM2
LEFT ATRIUM AREA SYSTOLIC (APICAL 4 CHAMBER): 18.64 CM2
LEFT ATRIUM VOLUME INDEX MOD: 26 ML/M2
LEFT ATRIUM VOLUME MOD: 58 ML
LEFT INTERNAL DIMENSION IN SYSTOLE: 3.7 CM (ref 2.1–4)
LEFT VENTRICLE DIASTOLIC VOLUME INDEX: 50.99 ML/M2
LEFT VENTRICLE DIASTOLIC VOLUME: 112.17 ML
LEFT VENTRICLE END DIASTOLIC VOLUME APICAL 2 CHAMBER: 74.12 ML
LEFT VENTRICLE END DIASTOLIC VOLUME APICAL 4 CHAMBER: 65.34 ML
LEFT VENTRICLE END SYSTOLIC VOLUME APICAL 2 CHAMBER: 65.57 ML
LEFT VENTRICLE END SYSTOLIC VOLUME APICAL 4 CHAMBER: 50.89 ML
LEFT VENTRICLE MASS INDEX: 85.5 G/M2
LEFT VENTRICLE SYSTOLIC VOLUME INDEX: 25.9 ML/M2
LEFT VENTRICLE SYSTOLIC VOLUME: 56.92 ML
LEFT VENTRICULAR INTERNAL DIMENSION IN DIASTOLE: 4.9 CM (ref 3.5–6)
LEFT VENTRICULAR MASS: 188.1 G
LV LATERAL E/E' RATIO: 13.8 M/S
LV SEPTAL E/E' RATIO: 10.4 M/S
LVED V (TEICH): 112.17 ML
LVES V (TEICH): 56.92 ML
LVOT MG: 2.32 MMHG
LVOT MV: 0.72 CM/S
MV PEAK A VEL: 0.58 M/S
MV PEAK E VEL: 0.83 M/S
MV STENOSIS PRESSURE HALF TIME: 26.54 MS
MV VALVE AREA P 1/2 METHOD: 8.29 CM2
OHS CV RV/LV RATIO: 0.59 CM
OHS LV EJECTION FRACTION SIMPSONS BIPLANE MOD: 52 %
RA PRESSURE ESTIMATED: 0 MMHG
RA VOL SYS: 37.24 ML
RIGHT ATRIAL AREA: 14.2 CM2
RIGHT ATRIUM VOLUME AREA LENGTH APICAL 4 CHAMBER: 36.39 ML
RIGHT VENTRICLE DIASTOLIC BASEL DIMENSION: 2.9 CM
RV TISSUE DOPPLER FREE WALL SYSTOLIC VELOCITY 1 (APICAL 4 CHAMBER VIEW): 10.92 CM/S
SINUS: 2.63 CM
STJ: 2.31 CM
TDI LATERAL: 0.06 M/S
TDI SEPTAL: 0.08 M/S
TDI: 0.07 M/S
TRICUSPID ANNULAR PLANE SYSTOLIC EXCURSION: 1.49 CM
Z-SCORE OF LEFT VENTRICULAR DIMENSION IN END DIASTOLE: -4.27
Z-SCORE OF LEFT VENTRICULAR DIMENSION IN END SYSTOLE: -1.66

## 2025-01-30 PROCEDURE — 93306 TTE W/DOPPLER COMPLETE: CPT

## 2025-01-30 PROCEDURE — 93306 TTE W/DOPPLER COMPLETE: CPT | Mod: 26,,, | Performed by: INTERNAL MEDICINE

## 2025-01-30 PROCEDURE — 99999 PR PBB SHADOW E&M-EST. PATIENT-LVL IV: CPT | Mod: PBBFAC,,,

## 2025-01-30 RX ORDER — FUROSEMIDE 40 MG/1
TABLET ORAL
Qty: 180 TABLET | Refills: 3 | Status: SHIPPED | OUTPATIENT
Start: 2025-01-30 | End: 2026-01-30

## 2025-01-30 RX ORDER — CARVEDILOL 25 MG/1
25 TABLET ORAL 2 TIMES DAILY WITH MEALS
Qty: 180 TABLET | Refills: 3 | Status: SHIPPED | OUTPATIENT
Start: 2025-01-30 | End: 2026-01-30

## 2025-01-31 ENCOUNTER — OFFICE VISIT (OUTPATIENT)
Dept: FAMILY MEDICINE | Facility: CLINIC | Age: 50
End: 2025-01-31
Payer: MEDICARE

## 2025-01-31 VITALS
WEIGHT: 252.31 LBS | DIASTOLIC BLOOD PRESSURE: 96 MMHG | SYSTOLIC BLOOD PRESSURE: 130 MMHG | HEIGHT: 65 IN | OXYGEN SATURATION: 95 % | HEART RATE: 109 BPM | BODY MASS INDEX: 42.04 KG/M2 | TEMPERATURE: 98 F

## 2025-01-31 DIAGNOSIS — E11.65 TYPE 2 DIABETES MELLITUS WITH HYPERGLYCEMIA, WITH LONG-TERM CURRENT USE OF INSULIN: ICD-10-CM

## 2025-01-31 DIAGNOSIS — Z86.73 HISTORY OF CVA (CEREBROVASCULAR ACCIDENT) WITHOUT RESIDUAL DEFICITS: Primary | ICD-10-CM

## 2025-01-31 DIAGNOSIS — I73.9 BILATERAL CLAUDICATION OF LOWER LIMB: ICD-10-CM

## 2025-01-31 DIAGNOSIS — I50.42 CHRONIC COMBINED SYSTOLIC AND DIASTOLIC CONGESTIVE HEART FAILURE: ICD-10-CM

## 2025-01-31 DIAGNOSIS — E66.813 CLASS 3 SEVERE OBESITY DUE TO EXCESS CALORIES WITH SERIOUS COMORBIDITY AND BODY MASS INDEX (BMI) OF 40.0 TO 44.9 IN ADULT: ICD-10-CM

## 2025-01-31 DIAGNOSIS — E66.01 CLASS 3 SEVERE OBESITY DUE TO EXCESS CALORIES WITH SERIOUS COMORBIDITY AND BODY MASS INDEX (BMI) OF 40.0 TO 44.9 IN ADULT: ICD-10-CM

## 2025-01-31 DIAGNOSIS — K21.9 GASTROESOPHAGEAL REFLUX DISEASE, UNSPECIFIED WHETHER ESOPHAGITIS PRESENT: ICD-10-CM

## 2025-01-31 DIAGNOSIS — E78.5 HYPERLIPIDEMIA ASSOCIATED WITH TYPE 2 DIABETES MELLITUS: ICD-10-CM

## 2025-01-31 DIAGNOSIS — G47.33 OSA (OBSTRUCTIVE SLEEP APNEA): ICD-10-CM

## 2025-01-31 DIAGNOSIS — E11.59 HYPERTENSION ASSOCIATED WITH DIABETES: ICD-10-CM

## 2025-01-31 DIAGNOSIS — I15.2 HYPERTENSION ASSOCIATED WITH DIABETES: ICD-10-CM

## 2025-01-31 DIAGNOSIS — K76.0 FATTY LIVER: ICD-10-CM

## 2025-01-31 DIAGNOSIS — E11.42 DIABETIC PERIPHERAL NEUROPATHY ASSOCIATED WITH TYPE 2 DIABETES MELLITUS: ICD-10-CM

## 2025-01-31 DIAGNOSIS — E11.69 HYPERLIPIDEMIA ASSOCIATED WITH TYPE 2 DIABETES MELLITUS: ICD-10-CM

## 2025-01-31 DIAGNOSIS — Z79.4 TYPE 2 DIABETES MELLITUS WITH HYPERGLYCEMIA, WITH LONG-TERM CURRENT USE OF INSULIN: ICD-10-CM

## 2025-01-31 DIAGNOSIS — Z91.148 NONCOMPLIANCE WITH MEDICATIONS: ICD-10-CM

## 2025-01-31 PROBLEM — N15.1 ABSCESS OF LEFT KIDNEY: Status: RESOLVED | Noted: 2024-01-06 | Resolved: 2025-01-31

## 2025-01-31 PROBLEM — I70.0 AORTIC ATHEROSCLEROSIS: Status: RESOLVED | Noted: 2024-06-06 | Resolved: 2025-01-31

## 2025-01-31 PROBLEM — Z74.09 IMPAIRED FUNCTIONAL MOBILITY, BALANCE, AND ENDURANCE: Status: RESOLVED | Noted: 2024-12-12 | Resolved: 2025-01-31

## 2025-01-31 PROBLEM — H25.13 NS (NUCLEAR SCLEROSIS), BILATERAL: Status: RESOLVED | Noted: 2024-04-23 | Resolved: 2025-01-31

## 2025-01-31 PROBLEM — F33.0 MILD EPISODE OF RECURRENT MAJOR DEPRESSIVE DISORDER: Status: RESOLVED | Noted: 2021-07-09 | Resolved: 2025-01-31

## 2025-01-31 PROBLEM — I10 HYPERTENSION: Status: RESOLVED | Noted: 2024-10-19 | Resolved: 2025-01-31

## 2025-01-31 PROBLEM — H35.033 HYPERTENSIVE RETINOPATHY, BILATERAL: Status: RESOLVED | Noted: 2020-02-11 | Resolved: 2025-01-31

## 2025-01-31 PROBLEM — I50.43 ACUTE ON CHRONIC COMBINED SYSTOLIC AND DIASTOLIC HEART FAILURE: Status: RESOLVED | Noted: 2024-10-19 | Resolved: 2025-01-31

## 2025-01-31 PROBLEM — I49.8 SINUS ARRHYTHMIA: Status: RESOLVED | Noted: 2023-10-06 | Resolved: 2025-01-31

## 2025-01-31 PROBLEM — R87.618 ABNORMAL PAPANICOLAOU SMEAR OF CERVIX WITH POSITIVE HUMAN PAPILLOMA VIRUS (HPV) TEST: Status: RESOLVED | Noted: 2021-07-09 | Resolved: 2025-01-31

## 2025-01-31 PROBLEM — N15.1 PERINEPHRIC ABSCESS: Status: RESOLVED | Noted: 2024-01-08 | Resolved: 2025-01-31

## 2025-01-31 PROBLEM — R13.10 DYSPHAGIA: Status: RESOLVED | Noted: 2023-10-08 | Resolved: 2025-01-31

## 2025-01-31 PROCEDURE — 3080F DIAST BP >= 90 MM HG: CPT | Mod: CPTII,S$GLB,, | Performed by: STUDENT IN AN ORGANIZED HEALTH CARE EDUCATION/TRAINING PROGRAM

## 2025-01-31 PROCEDURE — 3008F BODY MASS INDEX DOCD: CPT | Mod: CPTII,S$GLB,, | Performed by: STUDENT IN AN ORGANIZED HEALTH CARE EDUCATION/TRAINING PROGRAM

## 2025-01-31 PROCEDURE — 3075F SYST BP GE 130 - 139MM HG: CPT | Mod: CPTII,S$GLB,, | Performed by: STUDENT IN AN ORGANIZED HEALTH CARE EDUCATION/TRAINING PROGRAM

## 2025-01-31 PROCEDURE — 1160F RVW MEDS BY RX/DR IN RCRD: CPT | Mod: CPTII,S$GLB,, | Performed by: STUDENT IN AN ORGANIZED HEALTH CARE EDUCATION/TRAINING PROGRAM

## 2025-01-31 PROCEDURE — 99999 PR PBB SHADOW E&M-EST. PATIENT-LVL V: CPT | Mod: PBBFAC,,, | Performed by: STUDENT IN AN ORGANIZED HEALTH CARE EDUCATION/TRAINING PROGRAM

## 2025-01-31 PROCEDURE — 1159F MED LIST DOCD IN RCRD: CPT | Mod: CPTII,S$GLB,, | Performed by: STUDENT IN AN ORGANIZED HEALTH CARE EDUCATION/TRAINING PROGRAM

## 2025-01-31 PROCEDURE — G2211 COMPLEX E/M VISIT ADD ON: HCPCS | Mod: S$GLB,,, | Performed by: STUDENT IN AN ORGANIZED HEALTH CARE EDUCATION/TRAINING PROGRAM

## 2025-01-31 PROCEDURE — 99215 OFFICE O/P EST HI 40 MIN: CPT | Mod: S$GLB,,, | Performed by: STUDENT IN AN ORGANIZED HEALTH CARE EDUCATION/TRAINING PROGRAM

## 2025-01-31 RX ORDER — INSULIN ASPART 100 [IU]/ML
20 INJECTION, SOLUTION INTRAVENOUS; SUBCUTANEOUS
Qty: 54 ML | Refills: 3 | Status: SHIPPED | OUTPATIENT
Start: 2025-01-31 | End: 2026-01-31

## 2025-01-31 RX ORDER — INSULIN DEGLUDEC 100 U/ML
50 INJECTION, SOLUTION SUBCUTANEOUS 2 TIMES DAILY
Qty: 90 ML | Refills: 3 | Status: SHIPPED | OUTPATIENT
Start: 2025-01-31 | End: 2026-01-31

## 2025-01-31 RX ORDER — DULAGLUTIDE 1.5 MG/.5ML
1.5 INJECTION, SOLUTION SUBCUTANEOUS
Qty: 12 PEN | Refills: 0 | Status: SHIPPED | OUTPATIENT
Start: 2025-01-31 | End: 2026-01-31

## 2025-01-31 RX ORDER — SACUBITRIL AND VALSARTAN 49; 51 MG/1; MG/1
1 TABLET, FILM COATED ORAL 2 TIMES DAILY
Qty: 90 TABLET | Refills: 3 | Status: SHIPPED | OUTPATIENT
Start: 2025-01-31 | End: 2025-02-03

## 2025-01-31 NOTE — PROGRESS NOTES
Subjective:       Patient ID: Jaimee Quintana is a 49 y.o. female.    Chief Complaint: Follow-up (Pt here for a follow up )      Review of Systems   All other systems reviewed and are negative.       A1C:  Recent Labs   Lab 01/06/24  1032 10/20/24  0238 10/31/24  1240   Hemoglobin A1C 9.5 H 8.9 H 9.1 H     CBC:  Recent Labs   Lab 10/20/24  0239 10/21/24  0204 10/22/24  0239   WBC 16.82 H 13.64 H 11.97   RBC 3.84 L 3.85 L 3.74 L   Hemoglobin 11.3 L 11.2 L 10.8 L   Hematocrit 34.4 L 34.9 L 33.4 L   Platelets 174 198 227   MCV 90 91 89   MCH 29.4 29.1 28.9   MCHC 32.8 32.1 32.3     CMP:  Recent Labs   Lab 10/20/24  0238 10/21/24  0204 10/22/24  0239 10/31/24  1240 01/30/25  1006   Glucose 267 H 105 75   < > 366 H   Calcium 9.0 8.8 8.8   < > 9.7   Albumin 2.7 L 2.5 L 2.5 L  --   --    Total Protein 6.5 6.9 6.7  --   --    Sodium 136 136 136   < > 136   Potassium 3.8 3.5 3.6   < > 4.8   CO2 21 L 24 27   < > 21 L   Chloride 102 101 100   < > 103   BUN 16 21 H 21 H   < > 13   Creatinine 1.1 1.1 0.9   < > 1.2   Alkaline Phosphatase 94 123 111  --   --    ALT 39 29 19  --   --    AST 16 16 13  --   --    Total Bilirubin 0.9 0.7 0.7  --   --     < > = values in this interval not displayed.     LIPIDS:  Recent Labs   Lab 05/23/23  0246 09/29/23  0828 10/04/23  2044 01/07/24  0519   TSH  --   --    < > 2.384   HDL 49 49  --   --    Cholesterol 186 220 H  --   --    Triglycerides 120 141  --   --    LDL Cholesterol 113.0 142.8  --   --    HDL/Cholesterol Ratio 26.3 22.3  --   --    Non-HDL Cholesterol 137 171  --   --    Total Cholesterol/HDL Ratio 3.8 4.5  --   --     < > = values in this interval not displayed.     TSH:  Recent Labs   Lab 10/04/23  2044 01/07/24  0519   TSH 0.759 2.384        Objective:      Vitals:    01/31/25 1312   BP: (!) 130/96   Pulse: 109   Temp: 97.7 °F (36.5 °C)      Physical Exam  Vitals reviewed.   Constitutional:       Appearance: Normal appearance. She is morbidly obese.   HENT:      Head:  Normocephalic and atraumatic.   Eyes:      Conjunctiva/sclera: Conjunctivae normal.   Cardiovascular:      Rate and Rhythm: Normal rate and regular rhythm.      Heart sounds: Normal heart sounds.   Pulmonary:      Effort: Pulmonary effort is normal.      Breath sounds: Normal breath sounds.   Abdominal:      Palpations: Abdomen is soft.      Tenderness: There is no abdominal tenderness.   Musculoskeletal:         General: Swelling present. Normal range of motion.      Cervical back: Normal range of motion.      Right lower leg: Edema present.      Left lower leg: Edema present.   Neurological:      Mental Status: She is alert. Mental status is at baseline.      Motor: Weakness present.      Gait: Gait abnormal.   Psychiatric:         Mood and Affect: Mood normal. Affect is flat.         Speech: Speech is slurred and tangential.         Behavior: Behavior normal.         Cognition and Memory: Cognition is impaired. Memory is impaired.          Assessment:       1. History of CVA (cerebrovascular accident) without residual deficits    2. Type 2 diabetes mellitus with hyperglycemia, with long-term current use of insulin    3. Diabetic peripheral neuropathy associated with type 2 diabetes mellitus    4. Hypertension associated with diabetes    5. Chronic combined systolic and diastolic congestive heart failure    6. Hyperlipidemia associated with type 2 diabetes mellitus    7. Bilateral claudication of lower limb    8. Class 3 severe obesity due to excess calories with serious comorbidity and body mass index (BMI) of 40.0 to 44.9 in adult    9. Fatty liver    10. Gastroesophageal reflux disease, unspecified whether esophagitis present    11. Noncompliance with medications    12. CESAR (obstructive sleep apnea)        Plan:     Problem List Items Addressed This Visit          Neuro    History of CVA (cerebrovascular accident) without residual deficits - Primary    Overview     Asa; statin   Stable            Diabetic  peripheral neuropathy associated with type 2 diabetes mellitus    Overview     Stable   Needs better glucose control   Refer to endo for help         Relevant Medications    insulin degludec (TRESIBA FLEXTOUCH U-100) 100 unit/mL (3 mL) insulin pen    insulin aspart U-100 (NOVOLOG FLEXPEN U-100 INSULIN) 100 unit/mL (3 mL) InPn pen    dulaglutide (TRULICITY) 1.5 mg/0.5 mL pen injector       Cardiac/Vascular    Hypertension associated with diabetes    Overview     Coreg   Hydralazine  Spironolactone  Lasix   entresto         Relevant Medications    insulin degludec (TRESIBA FLEXTOUCH U-100) 100 unit/mL (3 mL) insulin pen    insulin aspart U-100 (NOVOLOG FLEXPEN U-100 INSULIN) 100 unit/mL (3 mL) InPn pen    dulaglutide (TRULICITY) 1.5 mg/0.5 mL pen injector    sacubitriL-valsartan (ENTRESTO) 49-51 mg per tablet    Hyperlipidemia associated with type 2 diabetes mellitus    Overview     Statin  stable         Relevant Medications    insulin degludec (TRESIBA FLEXTOUCH U-100) 100 unit/mL (3 mL) insulin pen    insulin aspart U-100 (NOVOLOG FLEXPEN U-100 INSULIN) 100 unit/mL (3 mL) InPn pen    dulaglutide (TRULICITY) 1.5 mg/0.5 mL pen injector    Chronic combined systolic and diastolic congestive heart failure    Overview     Follows with cards  Entresto  Lasix, BB, spironolactone          Relevant Medications    sacubitriL-valsartan (ENTRESTO) 49-51 mg per tablet    Bilateral claudication of lower limb    Overview     Follows with cards   stable            Endocrine    Type 2 diabetes mellitus with hyperglycemia, with long-term current use of insulin    Overview     Sugars uncontrolled  Poor historian   Unsure if taking insulin at all   She thinks she takes lantus   Needs novolog   Taking metformin but does not like due to size; also taking trulicity but has not increased dose  Need endocrine help to get her straight and on appropriate meds will refer         Relevant Medications    insulin degludec (TRESIBA FLEXTOUCH  U-100) 100 unit/mL (3 mL) insulin pen    insulin aspart U-100 (NOVOLOG FLEXPEN U-100 INSULIN) 100 unit/mL (3 mL) InPn pen    dulaglutide (TRULICITY) 1.5 mg/0.5 mL pen injector    Other Relevant Orders    Ambulatory referral/consult to Endocrinology    Class 3 severe obesity with serious comorbidity and body mass index (BMI) of 40.0 to 44.9 in adult    Overview     Diet and exercise advised             GI    GERD (gastroesophageal reflux disease)    Overview     PPI PRN         Fatty liver    Overview     encouraged weight loss            Palliative Care    Noncompliance with medications    Overview     POOR Historian   Does not consistently take medications  Unsure of what she is taking               Other    CESAR (obstructive sleep apnea)    Overview     Bipap QHS          Labs reviewed in detail  Problem list reviewed in detail   REFER to endocrine   Continue healthy lifestyle efforts  Continue current meds as prescribed otherwise; refills per request  Keep routine specialist f/u   RTC in 3 months  with labs prior and/or PRN          Lakisha Hansensshir[facility]   1/31/25     I spent a total of 41 minutes on the day of the visit.This includes face to face time and non-face to face time preparing to see the patient (eg, review of tests), obtaining and/or reviewing separately obtained history, documenting clinical information in the electronic or other health record, independently interpreting results and communicating results to the patient/family/caregiver, or care coordinator.

## 2025-02-03 RX ORDER — SACUBITRIL AND VALSARTAN 49; 51 MG/1; MG/1
1 TABLET, FILM COATED ORAL 2 TIMES DAILY
Qty: 90 TABLET | Refills: 3 | Status: SHIPPED | OUTPATIENT
Start: 2025-02-03 | End: 2025-02-20

## 2025-02-05 ENCOUNTER — TELEPHONE (OUTPATIENT)
Dept: SMOKING CESSATION | Facility: CLINIC | Age: 50
End: 2025-02-05
Payer: MEDICARE

## 2025-02-05 DIAGNOSIS — I50.43 ACUTE ON CHRONIC COMBINED SYSTOLIC (CONGESTIVE) AND DIASTOLIC (CONGESTIVE) HEART FAILURE: ICD-10-CM

## 2025-02-05 RX ORDER — POTASSIUM CHLORIDE 600 MG/1
TABLET, FILM COATED, EXTENDED RELEASE ORAL
Qty: 180 TABLET | Refills: 3 | Status: SHIPPED | OUTPATIENT
Start: 2025-02-05

## 2025-02-05 NOTE — TELEPHONE ENCOUNTER
Refill Routing Note   Medication(s) are not appropriate for processing by [facility] for the following reason(s):        No active prescription written by provider    [facility] action(s):  Defer     Requires labs : Yes             Appointments  past 12m or future 3m with PCP    Date Provider   Last Visit   1/31/2025 Lakisha Mendez, DO   Next Visit   Visit date not found Lakisha Mendez, DO   ED visits in past 90 days: 0        Note composed:3:51 AM 02/05/2025

## 2025-02-05 NOTE — TELEPHONE ENCOUNTER
Care Due:                  Date            Visit Type   Department     Provider  --------------------------------------------------------------------------------                                EP -                              PRIMARY      DESC FAMILY  Last Visit: 01-      [facility]  Lakisha BOWMAN Vanessa  Next Visit: None Scheduled  None         None Found                                                            Last  Test          Frequency    Reason                     Performed    Due Date  --------------------------------------------------------------------------------    HBA1C.......  6 months...  dulaglutide,               10-   04-                             empagliflozin, insulin,                             metFORMIN................    [employer] Embedded Care Due Messages. Reference number: [id number].   2/05/2025 1:06:47 AM CST

## 2025-02-12 ENCOUNTER — HOSPITAL ENCOUNTER (OUTPATIENT)
Dept: CARDIOLOGY | Facility: HOSPITAL | Age: 50
Discharge: HOME | End: 2025-02-12
Payer: MEDICARE

## 2025-02-12 VITALS
HEIGHT: 65 IN | SYSTOLIC BLOOD PRESSURE: 211 MMHG | WEIGHT: 252 LBS | BODY MASS INDEX: 41.99 KG/M2 | DIASTOLIC BLOOD PRESSURE: 111 MMHG | HEART RATE: 95 BPM

## 2025-02-12 DIAGNOSIS — R06.09 DOE (DYSPNEA ON EXERTION): ICD-10-CM

## 2025-02-12 LAB
CFR FLOW - ANTERIOR: 1.38
CFR FLOW - INFERIOR: 1.28
CFR FLOW - LATERAL: 1.22
CFR FLOW - MAX: 1.67
CFR FLOW - MIN: 0.81
CFR FLOW - SEPTAL: 1.3
CFR FLOW - WHOLE HEART: 1.3
CFR FLOW- DEFECT 1: 1.11
CFR FLOW- DEFECT 2: 1.1
CV PHARM DOSE: 0.4 MG
CV STRESS BASE HR: 95 BPM
DIASTOLIC BLOOD PRESSURE: 111 MMHG
EJECTION FRACTION- HIGH: 59 %
END DIASTOLIC INDEX-HIGH: 155 ML/M2
END DIASTOLIC INDEX-LOW: 91 ML/M2
END SYSTOLIC INDEX-HIGH: 78 ML/M2
END SYSTOLIC INDEX-LOW: 40 ML/M2
NUC REST DIASTOLIC VOLUME INDEX: 95
NUC REST EJECTION FRACTION: 40
NUC REST SYSTOLIC VOLUME INDEX: 57
NUC STRESS DIASTOLIC VOLUME INDEX: 123
NUC STRESS EJECTION FRACTION: 42 %
NUC STRESS SYSTOLIC VOLUME INDEX: 71
OHS CV CPX 85 PERCENT MAX PREDICTED HEART RATE MALE: 146
OHS CV CPX MAX PREDICTED HEART RATE: 172
OHS CV CPX PATIENT IS FEMALE: 1
OHS CV CPX PATIENT IS MALE: 0
OHS CV CPX PEAK DIASTOLIC BLOOD PRESSURE: 73 MMHG
OHS CV CPX PEAK HEAR RATE: 96 BPM
OHS CV CPX PEAK RATE PRESSURE PRODUCT: NORMAL
OHS CV CPX PEAK SYSTOLIC BLOOD PRESSURE: 145 MMHG
OHS CV CPX PERCENT MAX PREDICTED HEART RATE ACHIEVED: 59
OHS CV CPX RATE PRESSURE PRODUCT PRESENTING: NORMAL
OHS CV INITIAL DOSE: 34.9 MCG/KG/MIN
OHS CV MODERATELY REDUCED FLOW CAPACITY: 57 %
OHS CV MYOCARDIAL STEAL: 4 %
OHS CV NO ISCHEMIA MILDLY REDUCED FLOW CAPACTY: 16 %
OHS CV NO ISCHEMIA MINIMALLY REDUCED FLOW CAPACITY: 0 %
OHS CV NON-TRANSMURAL MYOCARDIAL INFARCTION SINGLE CONTINUOUS REGION: 0 %
OHS CV NORMAL FLOW CAPACITY COMPARABLE TO HEALTHY YOUNG VOLUNTEERS: 0 %
OHS CV PEAK DOSE: 34.7 MCG/KG/MIN
OHS CV PET ID: 8159
OHS CV PRE-DOMINANTLY MYOCARDIAL SCAR: 0 %
OHS CV SEVERELY REDUCED FLOW CAPACITY: 27 %
OHS CV TOTAL EXAM DLP: 614.03 MGY-CM
PERFUSION DEFECT 1 SIZE IN %: 4 %
PERFUSION DEFECT 2 SIZE IN %: 4 %
REST FLOW - ANTERIOR: 0.66 CC/MIN/G
REST FLOW - INFERIOR: 0.65 CC/MIN/G
REST FLOW - LATERAL: 0.79 CC/MIN/G
REST FLOW - MAX: 1.04 CC/MIN/G
REST FLOW - MIN: 0.28 CC/MIN/G
REST FLOW - SEPTAL: 0.5 CC/MIN/G
REST FLOW - WHOLE HEART: 0.65 CC/MIN/G
REST FLOW- DEFECT 1: 0.42 CC/MIN/G
REST FLOW- DEFECT 2: 0.48 CC/MIN/G
RETIRED EF AND QEF - SEE NOTES: 47 %
STRESS FLOW - ANTERIOR: 0.91 CC/MIN/G
STRESS FLOW - INFERIOR: 0.83 CC/MIN/G
STRESS FLOW - LATERAL: 0.97 CC/MIN/G
STRESS FLOW - MAX: 1.37 CC/MIN/G
STRESS FLOW - MIN: 0.31 CC/MIN/G
STRESS FLOW - SEPTAL: 0.65 CC/MIN/G
STRESS FLOW - WHOLE HEART: 0.84 CC/MIN/G
STRESS FLOW- DEFECT 1: 0.46 CC/MIN/G
STRESS FLOW- DEFECT 2: 0.52 CC/MIN/G
SYSTOLIC BLOOD PRESSURE: 211 MMHG

## 2025-02-12 PROCEDURE — 78434 AQMBF PET REST & RX STRESS: CPT | Mod: 26,,, | Performed by: INTERNAL MEDICINE

## 2025-02-12 PROCEDURE — 78431 MYOCRD IMG PET RST&STRS CT: CPT | Mod: 26,,, | Performed by: INTERNAL MEDICINE

## 2025-02-12 PROCEDURE — 63600175 PHARM REV CODE 636 W HCPCS

## 2025-02-12 PROCEDURE — 93018 CV STRESS TEST I&R ONLY: CPT | Mod: ,,, | Performed by: INTERNAL MEDICINE

## 2025-02-12 PROCEDURE — 78431 MYOCRD IMG PET RST&STRS CT: CPT

## 2025-02-12 PROCEDURE — A9555 RB82 RUBIDIUM: HCPCS

## 2025-02-12 PROCEDURE — 93016 CV STRESS TEST SUPVJ ONLY: CPT | Mod: ,,, | Performed by: INTERNAL MEDICINE

## 2025-02-12 RX ORDER — REGADENOSON 0.08 MG/ML
0.4 INJECTION, SOLUTION INTRAVENOUS
Status: COMPLETED | OUTPATIENT
Start: 2025-02-12 | End: 2025-02-12

## 2025-02-12 RX ORDER — AMINOPHYLLINE 25 MG/ML
75 INJECTION, SOLUTION INTRAVENOUS ONCE
Status: COMPLETED | OUTPATIENT
Start: 2025-02-12 | End: 2025-02-12

## 2025-02-12 RX ADMIN — AMINOPHYLLINE 75 MG: 25 INJECTION, SOLUTION INTRAVENOUS at 09:02

## 2025-02-12 RX ADMIN — RUBIDIUM CHLORIDE RB-82 34.7 MILLICURIE: 150 INJECTION, SOLUTION INTRAVENOUS at 09:02

## 2025-02-12 RX ADMIN — REGADENOSON 0.4 MG: 0.08 INJECTION, SOLUTION INTRAVENOUS at 09:02

## 2025-02-12 RX ADMIN — RUBIDIUM CHLORIDE RB-82 34.9 MILLICURIE: 150 INJECTION, SOLUTION INTRAVENOUS at 09:02

## 2025-02-18 PROBLEM — I25.118 CORONARY ARTERY DISEASE OF NATIVE ARTERY OF NATIVE HEART WITH STABLE ANGINA PECTORIS: Chronic | Status: ACTIVE | Noted: 2025-02-18

## 2025-02-18 NOTE — PROGRESS NOTES
Cardiology Clinic note    Subjective:   Patient ID:  Jaimee Quintana is a 49 y.o. female who presents for follow-up of HTN, CHF    HPI:     2/20/2025: Routine F/U HTN, CHF. Seen in clinic unaccompanied, reports overall doing okay. Poor historian, difficult to assess medication compliance and dosing. Went through all medications, reports compliance and tolerance - has all bottles with correct dosing and reports taking them correctly. BP and weight up significantly today. No reported dietary indiscretion, reports she hasn't been missing medication dosing. Does endorse worsening CANELA x 1-2 weeks, abdominal swelling. No CP, resting SOB, orthopnea, PND, syncope, palps, LE edema.     1/30/2025: Here for F/U HTN, HFrEF. Seen in clinic unaccompanied reports overall doing okay. Reports BPs have been up a bit the last month. Taking lasix 40mg TID. Weights stable, grossly euvolemic on exam. Chronic orthopnea, ongoing CANELA/ fatigue. Denies CP, SOB, PND, syncope, palps, LE edema. Reports compliance and tolerance with all medications.     12/26/2024: Here for F/U HTN, HFrEF. Seen in clinic, reports overall doing pretty well. Weight stable, tolerating meds. Taking lasix 40mg BID, has not needed additional dosing. Ongoing CANELA/orthopnea. Resp status stable, grossly euvolemic on exam. Patient denies CP, SOB, PND, syncope, palpitations, LE edema. Reports compliance and tolerance with all medications.    11/20/2024: Here for F/U HTN, HFrEF. Seen in clinic unaccompanied reports overall doing pretty well. Weight stable, compliant with home monitoring, taking lasix 40mg BID. Tolerating GDMT. Ongoing CANELA, improving, chronic 3-4 pillow orthopnea. No recurrent LE edema, no resting SOB. Denies CP, PND, syncope, palps. Reports compliance and tolerance with all medications.     11/11/2024: Here for F/U HTN, HFrEF. Lost to F/U after previous clinic visit, recent admission 10/19/2024 with acute on chronic HFrEF. Seen in clinic unaccompanied  reports overall doing okay. Visibly nervous/anxious.   Long conversation about CHF diagnosis, importance of medication compliance and close clinic follow-up. Discussed lasix PRN parameters in detail and importance of home monitoring weights and blood pressures. Reports ongoing 4-pillow orthopnea and CANELA, though improved since hospital D/C. No other new CV s/s, denies recurrent LE edema. No new CP, SOB, PND, syncope, palps. Reports compliance and tolerance with all medications.     7/3/2024: Here for F/U HTN, HFrEF, PAD. Seen in clinic reports she's been nervous since her medication changes with fluctuating BPs. Logs reviewed, still with intermittent HTN. Normotensive in clinic today. Reports ongoing CANELA, orthopnea; has been taking 40mg lasix daily for about a week with some improvement. Patient denies CP, SOB, PND, syncope, palpitations, LE edema. Reports compliance and tolerance with all medications.    6/24/2024: Previous patient of Dr. Beth and Vicente Quinones, NP as below, initial visit for me. 49 yo F with hx of CVA, DM2, HTN, HLD, CESAR not on CPAP, MO w/ BMI 41.8, and HFrEF- recovered LVEF 50% (now back down to 35-40%) who presents today for routine F/U. 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 Seen in clinic today, reports overall doing well. Seemingly well compensated from HF perspective, grossly euvolemic on exam. Only taking lasix PRN.  Patient denies CP, SOB, PND, syncope, palpitations, LE edema. Reports compliance and tolerance with all medications.  -Will have to D/C [facility] given rEF and escalate other antihypertensives  -repeat Echo ~3m after stable on GDMT      9/29/2023: Pt is a 46 yo F w/ PMH of CVA, DM2 w/ hgba1c 11.4%, HTN, HLD, CESAR not on CPAP, MO w/ BMI 41.8, and HFrEF- recovered LVEF 50% who presents today for f/u appt. She was last seen by Dr. Beth on 6/27/23 to establish care, post hospital admission 5/22/2023-5/23/2023 for decompensated CHF and was diuresed and meds were adjusted (see  d/c sum 5/24/2023). She notes compliance w/ meds and denies side effects. She has been monitoring her BP at home however states that her BP runs high at times, -170s. She denies cp, sob, orthopnea, PND, presyncope, LOC, palpitations. She is currently enrolled in PAD rehab and notes some improvement in BLE claudication.   She does not exercise however states she is active w/ riding her bike and walking around her block and denies cp or sob       Cardiac hx  ---------------------------------    PET Stress 2/2025    There are two significant perfusion abnormalities.    Perfusion Abnormality #1 - There is a very small (< 5%) sized, mild intensity basal anteroseptal reversible perfusion abnormality involving 4% of LV myocardium in the distribution of the 1st septal territory.    Within the perfusion abnormality, absolute myocardial perfusion (cc/min/gm) averaged 0.42 cc/min/g at rest, 0.46 cc/min/g at stress, and CFR was 1.11, which equates to severely reduced coronary flow capacity within the 1st septal territory.    Perfusion Abnormality #2 - There is a very small (<5%) sized, mild intensity distal anteroseptal reversible perfusion abnormality involving 4% of LV myocardium in the distribution of the distal LAD territory.    Within the perfusion abnormality, absolute myocardial perfusion (cc/min/gm) averaged 0.48 cc/min/g at rest, 0.52 cc/min/g at stress, and CFR was 1.10, which equates to severely reduced coronary flow capacity within the LAD territory.    There are no other significant perfusion abnormalities.    The whole heart absolute myocardial perfusion values were normal at rest, moderately reduced during stress and CFR is moderately reduced.    CT attenuation images demonstrate no coronary calcifications and no aortic calcifications.    The gated perfusion images showed an ejection fraction of 40% at rest and 42% during stress. A normal ejection fraction is greater than 47%.    There is normal wall  motion at rest and normal wall motion during stress.    The study's ECG is negative for ischemia.    There is no prior study for comparison.    There coronary flow capacity is severly reduced in all coronary territories and may be indicative of multivessel disease.    Echo 10/2024    The left ventricle is normal in size. There is concentric hypertrophy. Mild global hypokinesis present. There is mildly reduced systolic function with a visually estimated ejection fraction of 45 - 50%. Unable to assess diastolic function due to poor image quality.    Right Ventricle: Normal right ventricular cavity size. Systolic function is reduced.TAPSE is 1.24 cm.    Mitral Valve: There is mild regurgitation.    Tricuspid Valve: There is mild regurgitation.    Pulmonary Artery: The estimated pulmonary artery systolic pressure is 29 mmHg.    IVC/SVC: Normal venous pressure at 3 mmHg.    The study was difficult due to patient's body habitus.    Echo 10/2023    Left Ventricle: The left ventricle is normal in size. There is concentric hypertrophy. Mild global hypokinesis present. There is moderately reduced systolic function with a visually estimated ejection fraction of 35 - 40%. Grade II diastolic dysfunction.    Right Ventricle: Normal right ventricular cavity size. Wall thickness is normal. Right ventricle wall motion  is normal. Systolic function is normal.    Tricuspid Valve: There is mild regurgitation.    Pulmonary Artery: The estimated pulmonary artery systolic pressure is 26 mmHg.    IVC/SVC: Normal venous pressure at 3 mmHg.    ALEENA 7/2023  The right resting ALEENA reduction is normal.   The right arm artery has triphasic flow.  The right ankle [DT] artery has triphasic flow.   The right ankle [DP] artery has triphasic flow.   The left resting ALEENA reduction is normal.   The left arm artery has triphasic flow.  The left ankle [PT] artery has triphasic flow.  The left ankle [DP] artery has biphasic flow.  Exercise Study: treadmill  test.   The exercise study was stopped due to protocol completion.     Symptoms: At 1 min, patient reported 6/10 right bottom of foot pain that resolved at 3 minutes.  Immediately post exercise, the right ALEENA is normal.  Immediately post exercise, the left ALEENA is mildly decreased.       Echo 5/22/2023    The left ventricle is normal in size with concentric remodeling and low normal systolic function.  The estimated ejection fraction is 50%.  Normal left ventricular diastolic function.  With normal right ventricular systolic function.    CV Exercise ALEENA study: 7/18/23  Conclusion     Mild PAD of left lower extremity with exercise (ALEENA 0.89).  Normal TBIs and toe pressures bilaterally      Past Medical History:   Diagnosis Date    Anxiety     Cataract     Cervical high risk HPV (human papillomavirus) test positive 09/17/2020    NOT 16 OR 18    CHF (congestive heart failure)     CVA (cerebral infarction) 2003         Depression     Diabetes mellitus, type 2     GERD (gastroesophageal reflux disease)     Hyperlipidemia     Hypertension     Lactic acidosis 10/06/2023    Moderate nonproliferative diabetic retinopathy     Nausea and vomiting 10/06/2023    Obesity     Stroke     Tachycardia 10/04/2023          Patient Active Problem List    Diagnosis Date Noted    Acute heart failure with preserved ejection fraction (HFpEF) 02/20/2025    Coronary artery disease of native artery of native heart with stable angina pectoris 02/18/2025    CANELA (dyspnea on exertion) 01/30/2025    Class 3 severe obesity with serious comorbidity and body mass index (BMI) of 40.0 to 44.9 in adult 11/19/2024     Diet and exercise advised       Tobacco dependency 10/19/2024    Type 2 diabetes mellitus with hyperglycemia, with long-term current use of insulin 10/19/2024     Sugars uncontrolled  Poor historian   Unsure if taking insulin at all   She thinks she takes lantus   Needs novolog   Taking metformin but does not like due to size; also taking  trulicity but has not increased dose  Need endocrine help to get her straight and on appropriate meds will refer      Chronic combined systolic and diastolic congestive heart failure 01/06/2024     Follows with cards  Entresto  Lasix, BB, spironolactone       Bilateral claudication of lower limb 06/27/2023     Follows with cards   stable      Noncompliance with medications 05/22/2023     POOR Historian   Does not consistently take medications  Unsure of what she is taking         GERD (gastroesophageal reflux disease) 05/22/2023     PPI PRN      History of CVA (cerebrovascular accident) without residual deficits 05/22/2023     Asa; statin   Stable         Type 2 diabetes mellitus with both eyes affected by proliferative retinopathy and macular edema, with long-term current use of insulin 05/11/2021    Thyroid nodule 11/08/2017     Multinodular goiter  FINAL PATHOLOGIC DIAGNOSIS  Left thyroid, fine-needle aspiration:  [facility] Thyroid Cytology Category: Benign.        Diabetic peripheral neuropathy associated with type 2 diabetes mellitus 03/23/2017     Stable   Needs better glucose control   Refer to endo for help      Fatty liver 08/18/2016     encouraged weight loss      Hypertension associated with diabetes 10/23/2015     Coreg   Hydralazine  Spironolactone  Lasix   entresto      CESAR (obstructive sleep apnea) 10/23/2015     Bipap QHS      Hyperlipidemia associated with type 2 diabetes mellitus      Statin  stable         Patient's Medications   New Prescriptions    SACUBITRIL-VALSARTAN (ENTRESTO)  MG PER TABLET    Take 1 tablet by mouth 2 (two) times daily.   Previous Medications    ASPIRIN (ECOTRIN) 81 MG EC TABLET    Take 81 mg by mouth once daily.    ATORVASTATIN (LIPITOR) 80 MG TABLET    Take 1 tablet (80 mg total) by mouth every evening.    CARVEDILOL (COREG) 25 MG TABLET    Take 1 tablet (25 mg total) by mouth 2 (two) times daily with meals.    DULAGLUTIDE (TRULICITY) 1.5 MG/0.5 ML PEN INJECTOR  "   Inject 1.5 mg into the skin every 7 days.    EMPAGLIFLOZIN (JARDIANCE) 25 MG TABLET    Take 1 tablet (25 mg total) by mouth once daily.    ERGOCALCIFEROL (ERGOCALCIFEROL) 50,000 UNIT CAP    Take 1 capsule (50,000 Units total) by mouth every Saturday.    EZETIMIBE (ZETIA) 10 MG TABLET    Take 1 tablet (10 mg total) by mouth once daily.    INSULIN ASPART U-100 (NOVOLOG FLEXPEN U-100 INSULIN) 100 UNIT/ML (3 ML) INPN PEN    Inject 20 Units into the skin 3 (three) times daily with meals.    INSULIN DEGLUDEC (TRESIBA FLEXTOUCH U-100) 100 UNIT/ML (3 ML) INSULIN PEN    Inject 50 Units into the skin 2 (two) times a day.    METFORMIN (GLUCOPHAGE) 1000 MG TABLET    Take 1 tablet (1,000 mg total) by mouth 2 (two) times daily with meals.    NICOTINE, POLACRILEX, (NICORETTE) 2 MG GUM    Take 1 each (2 mg total) by mouth as needed (1-2 pieces / hr in place of a cigarette, maximum of 15 pieces/day).    PEN NEEDLE, DIABETIC 32 GAUGE X 5/32" NDLE    Use to inject insulin 2 times per day    POTASSIUM CHLORIDE (KLOR-CON) 8 MEQ TBSR    TAKE ONE TABLET (8 meq total) BY MOUTH TWICE DAILY @ 9AM & 5PM   Modified Medications    Modified Medication Previous Medication    FUROSEMIDE (LASIX) 40 MG TABLET furosemide (LASIX) 40 MG tablet       Take 2 tablets (80 mg total) by mouth 3 (three) times daily for 5 days, THEN 1 tablet (40 mg total) 3 (three) times daily.    Take 2 tablets (80 mg total) by mouth every morning AND 1 tablet (40 mg total) every evening. May also take 1 tablet (40 mg total) daily as needed (> 2 pound weight gain in 24 hours, > 5 pound weight gain in a week, lower extrimety swelling, shortness of breath).    HYDRALAZINE (APRESOLINE) 50 MG TABLET hydrALAZINE (APRESOLINE) 50 MG tablet       Take 1 tablet (50 mg total) by mouth 3 (three) times daily.    Take 50 mg by mouth 3 (three) times daily.    SPIRONOLACTONE (ALDACTONE) 25 MG TABLET spironolactone (ALDACTONE) 25 MG tablet       Take 1 tablet (25 mg total) by mouth once " daily.    Take 1 tablet (25 mg total) by mouth once daily.   Discontinued Medications    ENTRESTO 49-51 MG PER TABLET    TAKE 1 TABLET BY MOUTH TWICE A DAY        Review of Systems   Constitutional: Negative for chills, decreased appetite, diaphoresis, malaise/fatigue, weight gain and weight loss.   Cardiovascular:  Positive for dyspnea on exertion and orthopnea. Negative for chest pain, claudication, irregular heartbeat, leg swelling, near-syncope, palpitations, paroxysmal nocturnal dyspnea and syncope.   Respiratory:  Negative for cough, hemoptysis, shortness of breath and snoring.    Gastrointestinal:  Positive for bloating. Negative for abdominal pain, nausea and vomiting.   Neurological:  Negative for light-headedness and weakness.   Psychiatric/Behavioral:  The patient is nervous/anxious.        Family History   Problem Relation Name Age of Onset    Stroke Mother      Thyroid disease Mother      Diabetes Mother      Cataracts Father      Hypertension Father      Arthritis Father      Diabetes Father      Hypertension Sister      Stroke Sister      Thyroid disease Sister      Heart disease Sister      No Known Problems Brother      Thyroid disease Daughter 2     Asthma Daughter 2     No Known Problems Maternal Aunt      No Known Problems Maternal Uncle      No Known Problems Paternal Aunt      No Known Problems Paternal Uncle      No Known Problems Maternal Grandmother      No Known Problems Maternal Grandfather      No Known Problems Paternal Grandmother      No Known Problems Paternal Grandfather      Amblyopia Neg Hx      Blindness Neg Hx      Cancer Neg Hx      Glaucoma Neg Hx      Macular degeneration Neg Hx      Retinal detachment Neg Hx      Strabismus Neg Hx         Social History     Socioeconomic History    Marital status: Single   Occupational History    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 She states ready to quit smoking. Ambulatory referral to Smoking Cessation clinic following hospital discharge. Pt is currently chewing nicotine gum.    Substance and Sexual Activity    Alcohol use: Yes     Comment: less than one monthly    Drug use: No    Sexual activity: Yes     Partners: Male     Birth control/protection: None   Social History Narrative    Daughter - Kavno     Social Drivers of Health     Financial Resource Strain: Low Risk  (10/21/2024)    Overall Financial Resource Strain (CARDIA)     Difficulty of Paying Living Expenses: Not hard at all   Recent Concern: Financial Resource Strain - Medium Risk (10/19/2024)    Overall Financial Resource Strain (CARDIA)     Difficulty of Paying Living Expenses: Somewhat hard   Food Insecurity: No Food Insecurity (10/21/2024)    Hunger Vital Sign     Worried About Running Out of Food in the Last Year: Never true     Ran Out of Food in the Last Year: Never true   Recent Concern: Food Insecurity - Food Insecurity Present (10/19/2024)    Hunger Vital Sign     Worried About Running Out of Food in the Last Year: Sometimes true     Ran Out of Food in the Last Year: Sometimes true   Transportation Needs: No Transportation Needs (10/21/2024)    TRANSPORTATION NEEDS     Transportation : No   Recent Concern: Transportation Needs - Unmet Transportation Needs (10/19/2024)    TRANSPORTATION NEEDS     Transportation : Yes, it has kept me from non-medical meetings, appointments, work or from getting things that I need.   Physical Activity: Inactive (10/21/2024)    Exercise Vital Sign     Days of Exercise per Week: 0 days     Minutes of Exercise per Session: 0 min   Stress: Stress Concern Present (10/21/2024)    [employer] [place] [employer] - Occupational Stress Questionnaire      "Feeling of Stress : To some extent   Housing Stability: Unknown (10/21/2024)    Housing Stability Vital Sign     Unable to Pay for Housing in the Last Year: No     Homeless in the Last Year: No            Objective:   Vitals  Vitals:    02/20/25 0943 02/20/25 0944   BP: (!) 165/95 (!) 171/105   Pulse: 87    SpO2: 98%    Weight: 122.1 kg (269 lb 4.7 oz)    Height: 5' 5" (1.651 m)                     Physical Exam  Vitals and nursing note reviewed.   Constitutional:       Appearance: Normal appearance. She is obese.   Cardiovascular:      Rate and Rhythm: Normal rate and regular rhythm.      Heart sounds: No murmur heard.     No gallop.   Pulmonary:      Effort: Pulmonary effort is normal.      Breath sounds: Rales present. No wheezing.   Abdominal:      General: Bowel sounds are normal. There is distension.      Palpations: Abdomen is soft.      Tenderness: There is no abdominal tenderness.   Musculoskeletal:      Right lower leg: Edema present.      Left lower leg: Edema present.      Comments: trace   Skin:     General: Skin is warm and dry.   Neurological:      Mental Status: She is alert and oriented to person, place, and time.         Lab Results    Lab Results   Component Value Date    WBC 11.97 10/22/2024    HGB 10.8 (L) 10/22/2024    HCT 33.4 (L) 10/22/2024    HCT 41.0 01/05/2024    MCV 89 10/22/2024       Lab Results   Component Value Date     10/22/2024    INR 1.0 01/08/2024       Lab Results   Component Value Date    K 4.8 01/30/2025    MG 1.6 01/30/2025    BUN 13 01/30/2025    CREATININE 1.2 01/30/2025       Lab Results   Component Value Date     (H) 01/30/2025    HGBA1C 9.1 (H) 10/31/2024       Lab Results   Component Value Date    AST 13 10/22/2024    ALT 19 10/22/2024    ALBUMIN 2.5 (L) 10/22/2024    PROT 6.7 10/22/2024       Lab Results   Component Value Date    CHOL 220 (H) 09/29/2023    HDL 49 09/29/2023    LDLCALC 142.8 09/29/2023    TRIG 141 09/29/2023       Lab Results   Component " Value Date     (H) 10/19/2024         Assessment:     1. Chronic combined systolic and diastolic congestive heart failure    2. CANELA (dyspnea on exertion)    3. Coronary artery disease of native artery of native heart with stable angina pectoris    4. Hypertension associated with diabetes    5. Hyperlipidemia associated with type 2 diabetes mellitus    6. Class 3 severe obesity due to excess calories with serious comorbidity and body mass index (BMI) of 40.0 to 44.9 in adult    7. Impaired tolerance of activity    8. Tobacco dependency    9. Acute on chronic combined systolic (congestive) and diastolic (congestive) heart failure    10. CESAR (obstructive sleep apnea)    11. Acute heart failure with preserved ejection fraction (HFpEF)                Plan:     HFrEF, acute on chronic  -acute on chronic exacerbation - weight up ~ 17lbs, abdominal swelling, +ve CANELA - seemingly in the setting of worsening HTN  -no c/o CP, resting SOB, orthopnea  -poor historian but reports compliance with all medications and no dietary indiscretion/ changes - reviewed home medications both before and after visit   -increase Entresto to ; increase lasix to 80mg TID x 5d; will F/U close with labs  -on good GDMT, continue BB, aldactone, jardiance, hydral as above  -low sodium, heart healthy diet  -continue home monitor BPs and log - bring logs to F/U  -educated on the importance of medication compliance and close clinic F/U - doing very well  -long discussion about lasix PRN parameters, daily weights, home monitoring of BP    2. CAD  -PET as above  -continue aggressive medical management, asa, statin  -update lipid panel prior to F/U - will likely need statin dose escalation  -once euvolemic can consider transition from asa to plavix monotherapy for secondary prevention  -tight lipid control  -absolute tobacco cessation education     2. HTN  -goal BP < 120/80, above goal  -continue hydral, coreg; escalate entresto  -continue home  monitoring, enrolled in digital HTN - not compliant with home monitoring    3. CESAR  -has CPAP ordered, awaiting supplies    4. HLD  -goal LDLc < 70  -continue lipitor, zetia  -update lipid panel prior to f/U    5. PAD  -stable, continue asa, statin, zetia    6. DM2  -A1C better, management per PCP    7. Obesity  -encouraged Mediterranean diet, exercise, weight loss    8. Tobacco use  -cessation education  -F/U with cessation team as scheduled    9. CANELA  -ongoing, significant CANELA despite euvolemia  -given risk factors, c/f ischemic etiology  -rec PET stress  -continue med management as above        -F/U ~ 1 week, labs prior      Visit today included increased complexity associated with the care of the episodic problem acute HFpEF, HTN, HLD, PAD, CANELA, tobacco use addressed and managing the longitudinal care of the patient due to the serious and/or complex managed problem(s) acute HFpEF, HTN, HLD, PAD, CANELA, tobacco use.        The ASCVD Risk score (Elizabeth LÓPEZ, et al., 2019) failed to calculate for the following reasons:    Risk score cannot be calculated because patient has a medical history suggesting prior/existing ASCVD    Orders Placed This Encounter   Procedures    Basic metabolic panel     Standing Status:   Future     Expected Date:   2/27/2025     Expiration Date:   5/21/2026     Send normal result to authorizing provider's In Basket if patient is active on MyChart::   Yes    Magnesium     Standing Status:   Future     Expected Date:   2/27/2025     Expiration Date:   5/21/2026     Send normal result to authorizing provider's In Basket if patient is active on MyChart::   Yes    Hepatic Function Panel     Standing Status:   Future     Expected Date:   2/27/2025     Expiration Date:   5/21/2026     Send normal result to authorizing provider's In Basket if patient is active on MyChart::   Yes    Lipid Panel     Standing Status:   Future     Expected Date:   2/27/2025     Expiration Date:   5/21/2026     Send normal  result to authorizing provider's In Basket if patient is active on MyChart::   Yes       She expressed verbal understanding and agreed with the plan    Pertinent cardiac images and EKG reviewed independently.    Continue with current medical plan and lifestyle changes.  Return sooner for concerns or questions. If symptoms persist go to the ED.  I have reviewed all pertinent data including patient's medical history in detail and updated the computerized patient record.     Counseling included discussion regarding imaging findings, diagnosis, possibilities, treatment options, risks and benefits.    Thank you for the opportunity to care for this patient. Will be available for questions if needed.     Signed:  Claudio Whitney DNP  02/20/2025

## 2025-02-20 ENCOUNTER — OFFICE VISIT (OUTPATIENT)
Dept: CARDIOLOGY | Facility: CLINIC | Age: 50
End: 2025-02-20
Payer: MEDICARE

## 2025-02-20 VITALS
HEIGHT: 65 IN | WEIGHT: 269.31 LBS | HEART RATE: 87 BPM | DIASTOLIC BLOOD PRESSURE: 105 MMHG | OXYGEN SATURATION: 98 % | SYSTOLIC BLOOD PRESSURE: 171 MMHG | BODY MASS INDEX: 44.87 KG/M2

## 2025-02-20 DIAGNOSIS — I50.31 ACUTE HEART FAILURE WITH PRESERVED EJECTION FRACTION (HFPEF): ICD-10-CM

## 2025-02-20 DIAGNOSIS — E66.01 CLASS 3 SEVERE OBESITY DUE TO EXCESS CALORIES WITH SERIOUS COMORBIDITY AND BODY MASS INDEX (BMI) OF 40.0 TO 44.9 IN ADULT: ICD-10-CM

## 2025-02-20 DIAGNOSIS — E66.813 CLASS 3 SEVERE OBESITY DUE TO EXCESS CALORIES WITH SERIOUS COMORBIDITY AND BODY MASS INDEX (BMI) OF 40.0 TO 44.9 IN ADULT: ICD-10-CM

## 2025-02-20 DIAGNOSIS — I15.2 HYPERTENSION ASSOCIATED WITH DIABETES: ICD-10-CM

## 2025-02-20 DIAGNOSIS — E11.59 HYPERTENSION ASSOCIATED WITH DIABETES: ICD-10-CM

## 2025-02-20 DIAGNOSIS — R06.09 DOE (DYSPNEA ON EXERTION): ICD-10-CM

## 2025-02-20 DIAGNOSIS — F17.200 TOBACCO DEPENDENCY: ICD-10-CM

## 2025-02-20 DIAGNOSIS — E78.5 HYPERLIPIDEMIA ASSOCIATED WITH TYPE 2 DIABETES MELLITUS: ICD-10-CM

## 2025-02-20 DIAGNOSIS — G47.33 OSA (OBSTRUCTIVE SLEEP APNEA): ICD-10-CM

## 2025-02-20 DIAGNOSIS — I25.118 CORONARY ARTERY DISEASE OF NATIVE ARTERY OF NATIVE HEART WITH STABLE ANGINA PECTORIS: Chronic | ICD-10-CM

## 2025-02-20 DIAGNOSIS — R68.89 IMPAIRED TOLERANCE OF ACTIVITY: ICD-10-CM

## 2025-02-20 DIAGNOSIS — I50.43 ACUTE ON CHRONIC COMBINED SYSTOLIC (CONGESTIVE) AND DIASTOLIC (CONGESTIVE) HEART FAILURE: ICD-10-CM

## 2025-02-20 DIAGNOSIS — E11.69 HYPERLIPIDEMIA ASSOCIATED WITH TYPE 2 DIABETES MELLITUS: ICD-10-CM

## 2025-02-20 DIAGNOSIS — I50.42 CHRONIC COMBINED SYSTOLIC AND DIASTOLIC CONGESTIVE HEART FAILURE: Primary | ICD-10-CM

## 2025-02-20 RX ORDER — SACUBITRIL AND VALSARTAN 97; 103 MG/1; MG/1
1 TABLET, FILM COATED ORAL 2 TIMES DAILY
Qty: 180 TABLET | Refills: 3 | Status: SHIPPED | OUTPATIENT
Start: 2025-02-20

## 2025-02-20 RX ORDER — SPIRONOLACTONE 25 MG/1
25 TABLET ORAL DAILY
Qty: 30 TABLET | Refills: 11 | Status: SHIPPED | OUTPATIENT
Start: 2025-02-20 | End: 2026-02-20

## 2025-02-20 RX ORDER — HYDRALAZINE HYDROCHLORIDE 50 MG/1
50 TABLET, FILM COATED ORAL 3 TIMES DAILY
Qty: 90 TABLET | Refills: 11 | Status: SHIPPED | OUTPATIENT
Start: 2025-02-20

## 2025-02-20 RX ORDER — FUROSEMIDE 40 MG/1
TABLET ORAL
Qty: 180 TABLET | Refills: 3 | Status: SHIPPED | OUTPATIENT
Start: 2025-02-20 | End: 2026-02-20

## 2025-02-25 ENCOUNTER — CLINICAL SUPPORT (OUTPATIENT)
Dept: OPHTHALMOLOGY | Facility: CLINIC | Age: 50
End: 2025-02-25
Payer: MEDICARE

## 2025-02-25 ENCOUNTER — OFFICE VISIT (OUTPATIENT)
Dept: OPHTHALMOLOGY | Facility: CLINIC | Age: 50
End: 2025-02-25
Payer: MEDICARE

## 2025-02-25 DIAGNOSIS — E11.3513 TYPE 2 DIABETES MELLITUS WITH BOTH EYES AFFECTED BY PROLIFERATIVE RETINOPATHY AND MACULAR EDEMA, WITH LONG-TERM CURRENT USE OF INSULIN: Primary | ICD-10-CM

## 2025-02-25 DIAGNOSIS — Z79.4 TYPE 2 DIABETES MELLITUS WITH BOTH EYES AFFECTED BY PROLIFERATIVE RETINOPATHY AND MACULAR EDEMA, WITH LONG-TERM CURRENT USE OF INSULIN: Primary | ICD-10-CM

## 2025-02-25 PROCEDURE — 1159F MED LIST DOCD IN RCRD: CPT | Mod: CPTII,S$GLB,, | Performed by: OPHTHALMOLOGY

## 2025-02-25 PROCEDURE — 92014 COMPRE OPH EXAM EST PT 1/>: CPT | Mod: 25,S$GLB,, | Performed by: OPHTHALMOLOGY

## 2025-02-25 PROCEDURE — 4010F ACE/ARB THERAPY RXD/TAKEN: CPT | Mod: CPTII,S$GLB,, | Performed by: OPHTHALMOLOGY

## 2025-02-25 PROCEDURE — 92134 CPTRZ OPH DX IMG PST SGM RTA: CPT | Mod: S$GLB,,, | Performed by: OPHTHALMOLOGY

## 2025-02-25 PROCEDURE — 67228 TREATMENT X10SV RETINOPATHY: CPT | Mod: RT,S$GLB,, | Performed by: OPHTHALMOLOGY

## 2025-02-25 PROCEDURE — 99999 PR PBB SHADOW E&M-EST. PATIENT-LVL III: CPT | Mod: PBBFAC,,, | Performed by: OPHTHALMOLOGY

## 2025-02-25 PROCEDURE — 1160F RVW MEDS BY RX/DR IN RCRD: CPT | Mod: CPTII,S$GLB,, | Performed by: OPHTHALMOLOGY

## 2025-02-25 RX ORDER — BENZONATATE 200 MG/1
200 CAPSULE ORAL 3 TIMES DAILY PRN
COMMUNITY
Start: 2024-12-11

## 2025-02-25 RX ORDER — PANTOPRAZOLE SODIUM 40 MG/1
40 TABLET, DELAYED RELEASE ORAL EVERY MORNING
COMMUNITY

## 2025-02-25 NOTE — PROGRESS NOTES
HPI     Follow-up     Additional comments: 8 week Avastin OS           Comments    Patient her today with c/o dark shadow in [facility] OD x 4 days, no pain ou and   floaters without flashes ou.            Last edited by Elena Massey on 2/25/2025 10:32 AM.         OCT OD no central ME  OS decrease paracentral DME    Prior [facility] 7/24 - OD sig NP with NV  OS - Regressed NV after BP  with late macular leakage - some increase in NV      A/P    1. PDR OU  T2 uncontrolled on insulin  S/p PRP OU  2/25 - small [facility] OD    Will need fill in PRP OD plan today      2. DME OU  S/P- Avastin OS x 4 Last 12/24 4/24 - increase paracentral DME - pt starting to notice some subtle changes  7/24 -  increased DME    DME OS stable - obs without tx    3. HTN Ret OU  BS/BP/chol control    4. NS OU      3 months OCT and  dilate      Risks, benefits, and alternatives to treatment discussed in detail with the patient.  The patient voiced understanding and wished to proceed with the procedure    Laser Procedure Note  Dx: PDR OD  Laser: PRP fill in OD  Argon  Spot: 200  Power: 150-  Dur: 0.1s  #:     Complications: None  F/U as above

## 2025-02-27 ENCOUNTER — TELEPHONE (OUTPATIENT)
Dept: FAMILY MEDICINE | Facility: CLINIC | Age: 50
End: 2025-02-27
Payer: MEDICARE

## 2025-02-27 NOTE — TELEPHONE ENCOUNTER
----- Message from Meghana sent at 2/27/2025  1:15 PM CST -----  Type:  Needs pharmacy authorizationWho Called: pt REBA FLORES [[id number]]Would the patient rather a call back or a response via [facility]? Call Ricardo Call Back Number: [phone] Information: pt request a call back in regard to prescription   dulaglutide (TRULICITY) 1.5 mg/0.5 mL pen injector pt advise [facility] needs someone from clinic to authorize medication due to change of insurance pt has 75.00 co pay  [id number] - [place] - [id number] [place] [id number]: [phone] Fax: [phone]: Not open 24 hours

## 2025-02-27 NOTE — PROGRESS NOTES
Cardiology Clinic note    Subjective:   Patient ID:  Jaimee Quintana is a 49 y.o. female who presents for follow-up of HTN, CHF    HPI:     2/28/2025: F/U after medication escalation for HTN, acute on chronic HFrEF. Seen in clinic unaccompanied, reports overall doing much better. Breathing significantly improved. Home BP logs reviewed, improving. No recurrent CP. Reports abdominal bloating improving. Patient denies CP, SOB, orthopnea, PND, syncope, palpitations, LE edema. Reports compliance and tolerance with all medications.    2/20/2025: Routine F/U HTN, CHF. Seen in clinic unaccompanied, reports overall doing okay. Poor historian, difficult to assess medication compliance and dosing. Went through all medications, reports compliance and tolerance - has all bottles with correct dosing and reports taking them correctly. BP and weight up significantly today. No reported dietary indiscretion, reports she hasn't been missing medication dosing. Does endorse worsening CANELA x 1-2 weeks, abdominal swelling. No CP, resting SOB, orthopnea, PND, syncope, palps, LE edema.     1/30/2025: Here for F/U HTN, HFrEF. Seen in clinic unaccompanied reports overall doing okay. Reports BPs have been up a bit the last month. Taking lasix 40mg TID. Weights stable, grossly euvolemic on exam. Chronic orthopnea, ongoing CANELA/ fatigue. Denies CP, SOB, PND, syncope, palps, LE edema. Reports compliance and tolerance with all medications.     12/26/2024: Here for F/U HTN, HFrEF. Seen in clinic, reports overall doing pretty well. Weight stable, tolerating meds. Taking lasix 40mg BID, has not needed additional dosing. Ongoing CANELA/orthopnea. Resp status stable, grossly euvolemic on exam. Patient denies CP, SOB, PND, syncope, palpitations, LE edema. Reports compliance and tolerance with all medications.    11/20/2024: Here for F/U HTN, HFrEF. Seen in clinic unaccompanied reports overall doing pretty well. Weight stable, compliant with home  monitoring, taking lasix 40mg BID. Tolerating GDMT. Ongoing CANELA, improving, chronic 3-4 pillow orthopnea. No recurrent LE edema, no resting SOB. Denies CP, PND, syncope, palps. Reports compliance and tolerance with all medications.     11/11/2024: Here for F/U HTN, HFrEF. Lost to F/U after previous clinic visit, recent admission 10/19/2024 with acute on chronic HFrEF. Seen in clinic unaccompanied reports overall doing okay. Visibly nervous/anxious.   Long conversation about CHF diagnosis, importance of medication compliance and close clinic follow-up. Discussed lasix PRN parameters in detail and importance of home monitoring weights and blood pressures. Reports ongoing 4-pillow orthopnea and CANELA, though improved since hospital D/C. No other new CV s/s, denies recurrent LE edema. No new CP, SOB, PND, syncope, palps. Reports compliance and tolerance with all medications.     7/3/2024: Here for F/U HTN, HFrEF, PAD. Seen in clinic reports she's been nervous since her medication changes with fluctuating BPs. Logs reviewed, still with intermittent HTN. Normotensive in clinic today. Reports ongoing CANELA, orthopnea; has been taking 40mg lasix daily for about a week with some improvement. Patient denies CP, SOB, PND, syncope, palpitations, LE edema. Reports compliance and tolerance with all medications.    6/24/2024: Previous patient of Dr. Beth and Vicente Quinones, NP as below, initial visit for me. 47 yo F with hx of CVA, DM2, HTN, HLD, CESAR not on CPAP, MO w/ BMI 41.8, and HFrEF- recovered LVEF 50% (now back down to 35-40%) who presents today for routine F/U. 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 Seen in clinic today, reports overall doing well. Seemingly well compensated from HF perspective, grossly euvolemic on exam. Only taking lasix PRN.  Patient denies CP, SOB, PND, syncope, palpitations, LE edema. Reports compliance and tolerance with all medications.  -Will have to D/C [facility] given rEF and escalate other  antihypertensives  -repeat Echo ~3m after stable on GDMT    9/29/2023: Pt is a 48 yo F w/ PMH of CVA, DM2 w/ hgba1c 11.4%, HTN, HLD, CESAR not on CPAP, MO w/ BMI 41.8, and HFrEF- recovered LVEF 50% who presents today for f/u appt. She was last seen by Dr. Beth on 6/27/23 to establish care, post hospital admission 5/22/2023-5/23/2023 for decompensated CHF and was diuresed and meds were adjusted (see d/c sum 5/24/2023). She notes compliance w/ meds and denies side effects. She has been monitoring her BP at home however states that her BP runs high at times, -170s. She denies cp, sob, orthopnea, PND, presyncope, LOC, palpitations. She is currently enrolled in PAD rehab and notes some improvement in BLE claudication.   She does not exercise however states she is active w/ riding her bike and walking around her block and denies cp or sob       Cardiac hx  ---------------------------------    Echo 1/2025    Left Ventricle: The left ventricle is normal in size. Mildly increased wall thickness. There is low normal systolic function. Quantitated ejection fraction is 52%. Grade I diastolic dysfunction.    Right Ventricle: Normal right ventricular cavity size. Systolic function is normal.    PET Stress 2/2025    There are two significant perfusion abnormalities.    Perfusion Abnormality #1 - There is a very small (< 5%) sized, mild intensity basal anteroseptal reversible perfusion abnormality involving 4% of LV myocardium in the distribution of the 1st septal territory.    Within the perfusion abnormality, absolute myocardial perfusion (cc/min/gm) averaged 0.42 cc/min/g at rest, 0.46 cc/min/g at stress, and CFR was 1.11, which equates to severely reduced coronary flow capacity within the 1st septal territory.    Perfusion Abnormality #2 - There is a very small (<5%) sized, mild intensity distal anteroseptal reversible perfusion abnormality involving 4% of LV myocardium in the distribution of the distal LAD  territory.    Within the perfusion abnormality, absolute myocardial perfusion (cc/min/gm) averaged 0.48 cc/min/g at rest, 0.52 cc/min/g at stress, and CFR was 1.10, which equates to severely reduced coronary flow capacity within the LAD territory.    There are no other significant perfusion abnormalities.    The whole heart absolute myocardial perfusion values were normal at rest, moderately reduced during stress and CFR is moderately reduced.    CT attenuation images demonstrate no coronary calcifications and no aortic calcifications.    The gated perfusion images showed an ejection fraction of 40% at rest and 42% during stress. A normal ejection fraction is greater than 47%.    There is normal wall motion at rest and normal wall motion during stress.    The study's ECG is negative for ischemia.    There is no prior study for comparison.    There coronary flow capacity is severly reduced in all coronary territories and may be indicative of multivessel disease.    Echo 10/2024    The left ventricle is normal in size. There is concentric hypertrophy. Mild global hypokinesis present. There is mildly reduced systolic function with a visually estimated ejection fraction of 45 - 50%. Unable to assess diastolic function due to poor image quality.    Right Ventricle: Normal right ventricular cavity size. Systolic function is reduced.TAPSE is 1.24 cm.    Mitral Valve: There is mild regurgitation.    Tricuspid Valve: There is mild regurgitation.    Pulmonary Artery: The estimated pulmonary artery systolic pressure is 29 mmHg.    IVC/SVC: Normal venous pressure at 3 mmHg.    The study was difficult due to patient's body habitus.    Echo 10/2023    Left Ventricle: The left ventricle is normal in size. There is concentric hypertrophy. Mild global hypokinesis present. There is moderately reduced systolic function with a visually estimated ejection fraction of 35 - 40%. Grade II diastolic dysfunction.    Right Ventricle: Normal  right ventricular cavity size. Wall thickness is normal. Right ventricle wall motion  is normal. Systolic function is normal.    Tricuspid Valve: There is mild regurgitation.    Pulmonary Artery: The estimated pulmonary artery systolic pressure is 26 mmHg.    IVC/SVC: Normal venous pressure at 3 mmHg.    ALEENA 7/2023  The right resting ALEENA reduction is normal.   The right arm artery has triphasic flow.  The right ankle [DT] artery has triphasic flow.   The right ankle [DP] artery has triphasic flow.   The left resting ALEENA reduction is normal.   The left arm artery has triphasic flow.  The left ankle [PT] artery has triphasic flow.  The left ankle [DP] artery has biphasic flow.  Exercise Study: treadmill test.   The exercise study was stopped due to protocol completion.     Symptoms: At 1 min, patient reported 6/10 right bottom of foot pain that resolved at 3 minutes.  Immediately post exercise, the right ALEENA is normal.  Immediately post exercise, the left ALEENA is mildly decreased.       Echo 5/22/2023    The left ventricle is normal in size with concentric remodeling and low normal systolic function.  The estimated ejection fraction is 50%.  Normal left ventricular diastolic function.  With normal right ventricular systolic function.    CV Exercise ALEENA study: 7/18/23  Conclusion     Mild PAD of left lower extremity with exercise (ALEENA 0.89).  Normal TBIs and toe pressures bilaterally      Past Medical History:   Diagnosis Date    Anxiety     Cataract     Cervical high risk HPV (human papillomavirus) test positive 09/17/2020    NOT 16 OR 18    CHF (congestive heart failure)     CVA (cerebral infarction) 2003         Depression     Diabetes mellitus, type 2     GERD (gastroesophageal reflux disease)     Hyperlipidemia     Hypertension     Lactic acidosis 10/06/2023    Moderate nonproliferative diabetic retinopathy     Nausea and vomiting 10/06/2023    Obesity     Stroke     Tachycardia 10/04/2023          Patient Active  Problem List    Diagnosis Date Noted    Coronary artery disease of native artery of native heart with stable angina pectoris 02/18/2025    CANELA (dyspnea on exertion) 01/30/2025    Class 3 severe obesity with serious comorbidity and body mass index (BMI) of 40.0 to 44.9 in adult 11/19/2024     Diet and exercise advised       Tobacco dependency 10/19/2024    Type 2 diabetes mellitus with hyperglycemia, with long-term current use of insulin 10/19/2024     Sugars uncontrolled  Poor historian   Unsure if taking insulin at all   She thinks she takes lantus   Needs novolog   Taking metformin but does not like due to size; also taking trulicity but has not increased dose  Need endocrine help to get her straight and on appropriate meds will refer      Chronic combined systolic and diastolic congestive heart failure 01/06/2024     Follows with cards  Entresto  Lasix, BB, spironolactone       Bilateral claudication of lower limb 06/27/2023     Follows with cards   stable      Noncompliance with medications 05/22/2023     POOR Historian   Does not consistently take medications  Unsure of what she is taking         GERD (gastroesophageal reflux disease) 05/22/2023     PPI PRN      History of CVA (cerebrovascular accident) without residual deficits 05/22/2023     Asa; statin   Stable         Type 2 diabetes mellitus with both eyes affected by proliferative retinopathy and macular edema, with long-term current use of insulin 05/11/2021    Thyroid nodule 11/08/2017     Multinodular goiter  FINAL PATHOLOGIC DIAGNOSIS  Left thyroid, fine-needle aspiration:  [facility] Thyroid Cytology Category: Benign.        Diabetic peripheral neuropathy associated with type 2 diabetes mellitus 03/23/2017     Stable   Needs better glucose control   Refer to endo for help      Fatty liver 08/18/2016     encouraged weight loss      Hypertension associated with diabetes 10/23/2015     Coreg   Hydralazine  Spironolactone  Lasix   entresto      CESAR  "(obstructive sleep apnea) [facility]23[facility]      Hyperlipidemia associated with type 2 diabetes mellitus      Statin  stable         Patient's Medications   New Prescriptions    No medications on file   Previous Medications    ASPIRIN (ECOTRIN) 81 MG EC TABLET    Take 81 mg by mouth once daily.    ATORVASTATIN (LIPITOR) 80 MG TABLET    Take 1 tablet (80 mg total) by mouth every evening.    BENZONATATE (TESSALON) 200 MG CAPSULE    Take 200 mg by mouth 3 (three) times daily as needed.    CARVEDILOL (COREG) 25 MG TABLET    Take 1 tablet (25 mg total) by mouth 2 (two) times daily with meals.    DULAGLUTIDE (TRULICITY) 1.5 MG/0.5 ML PEN INJECTOR    Inject 1.5 mg into the skin every 7 days.    EMPAGLIFLOZIN (JARDIANCE) 25 MG TABLET    Take 1 tablet (25 mg total) by mouth once daily.    ERGOCALCIFEROL (ERGOCALCIFEROL) 50,000 UNIT CAP    Take 1 capsule (50,000 Units total) by mouth every Saturday.    EZETIMIBE (ZETIA) 10 MG TABLET    Take 1 tablet (10 mg total) by mouth once daily.    FUROSEMIDE (LASIX) 40 MG TABLET    Take 2 tablets (80 mg total) by mouth 3 (three) times daily for 5 days, THEN 1 tablet (40 mg total) 3 (three) times daily.    HYDRALAZINE (APRESOLINE) 50 MG TABLET    Take 1 tablet (50 mg total) by mouth 3 (three) times daily.    INSULIN ASPART U-100 (NOVOLOG FLEXPEN U-100 INSULIN) 100 UNIT/ML (3 ML) INPN PEN    Inject 20 Units into the skin 3 (three) times daily with meals.    INSULIN DEGLUDEC (TRESIBA FLEXTOUCH U-100) 100 UNIT/ML (3 ML) INSULIN PEN    Inject 50 Units into the skin 2 (two) times a day.    METFORMIN (GLUCOPHAGE) 1000 MG TABLET    Take 1 tablet (1,000 mg total) by mouth 2 (two) times daily with meals.    NICOTINE, POLACRILEX, (NICORETTE) 2 MG GUM    Take 1 each (2 mg total) by mouth as needed (1-2 pieces / hr in place of a cigarette, maximum of 15 pieces/day).    PANTOPRAZOLE (PROTONIX) 40 MG TABLET    Take 40 mg by mouth every morning.    PEN NEEDLE, DIABETIC 32 GAUGE X 5/32" NDLE    " Use to inject insulin 2 times per day    POTASSIUM CHLORIDE (KLOR-CON) 8 MEQ TBSR    TAKE ONE TABLET (8 meq total) BY MOUTH TWICE DAILY @ 9AM & 5PM    SACUBITRIL-VALSARTAN (ENTRESTO)  MG PER TABLET    Take 1 tablet by mouth 2 (two) times daily.    SPIRONOLACTONE (ALDACTONE) 25 MG TABLET    Take 1 tablet (25 mg total) by mouth once daily.   Modified Medications    No medications on file   Discontinued Medications    No medications on file        Review of Systems   Constitutional: Negative for chills, decreased appetite, diaphoresis, malaise/fatigue, weight gain and weight loss.   Cardiovascular:  Positive for dyspnea on exertion. Negative for chest pain, claudication, irregular heartbeat, leg swelling, near-syncope, orthopnea, palpitations, paroxysmal nocturnal dyspnea and syncope.   Respiratory:  Negative for cough, hemoptysis, shortness of breath and snoring.    Gastrointestinal:  Negative for bloating, abdominal pain, nausea and vomiting.   Neurological:  Negative for light-headedness and weakness.   Psychiatric/Behavioral:  The patient is nervous/anxious.        Family History   Problem Relation Name Age of Onset    Stroke Mother      Thyroid disease Mother      Diabetes Mother      Cataracts Father      Hypertension Father      Arthritis Father      Diabetes Father      Hypertension Sister      Stroke Sister      Thyroid disease Sister      Heart disease Sister      No Known Problems Brother      Thyroid disease Daughter 2     Asthma Daughter 2     No Known Problems Maternal Aunt      No Known Problems Maternal Uncle      No Known Problems Paternal Aunt      No Known Problems Paternal Uncle      No Known Problems Maternal Grandmother      No Known Problems Maternal Grandfather      No Known Problems Paternal Grandmother      No Known Problems Paternal Grandfather      Amblyopia Neg Hx      Blindness Neg Hx      Cancer Neg Hx      Glaucoma Neg Hx      Macular degeneration Neg Hx      Retinal detachment Neg  Hx      Strabismus Neg Hx         Social History     Socioeconomic History    Marital status: Single   Occupational History    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 She states ready to quit smoking. Ambulatory referral to Smoking Cessation clinic following hospital discharge. Pt is currently chewing nicotine gum.    Substance and Sexual Activity    Alcohol use: Yes     Comment: less than one monthly    Drug use: No    Sexual activity: Yes     Partners: Male     Birth control/protection: None   Social History Narrative    Daughter - Kavon     Social Drivers of Health     Financial Resource Strain: Low Risk  (10/21/2024)    Overall Financial Resource Strain (CARDIA)     Difficulty of Paying Living Expenses: Not hard at all   Recent Concern: Financial Resource Strain - Medium Risk (10/19/2024)    Overall Financial Resource Strain (CARDIA)     Difficulty of Paying Living Expenses: Somewhat hard   Food Insecurity: No Food Insecurity (10/21/2024)    Hunger Vital Sign     Worried About Running Out of Food in the Last Year: Never true     Ran Out of Food in the Last Year: Never true   Recent Concern: Food Insecurity - Food Insecurity Present (10/19/2024)    Hunger Vital Sign     Worried About Running Out of Food in the Last Year: Sometimes true     Ran Out of Food in the Last Year: Sometimes true   Transportation Needs: No Transportation Needs (10/21/2024)    TRANSPORTATION NEEDS     Transportation : No   Recent Concern: Transportation Needs - Unmet Transportation Needs (10/19/2024)    TRANSPORTATION NEEDS     Transportation : Yes, it has kept me from non-medical meetings, appointments, work or from getting things that I need.    Physical Activity: Inactive (10/21/2024)    Exercise Vital Sign     Days of Exercise per Week: 0 days     Minutes of Exercise per Session: 0 min   Stress: Stress Concern Present (10/21/2024)    [employer] [place] [employer] - Occupational Stress Questionnaire     Feeling of Stress : To some extent   Housing Stability: Unknown (10/21/2024)    Housing Stability Vital Sign     Unable to Pay for Housing in the Last Year: No     Homeless in the Last Year: No            Objective:   Vitals  Vitals:    02/28/25 1412 02/28/25 1415   BP: (!) 162/95 (!) 160/91   Pulse: 87    SpO2: 96%    Weight: 116 kg (255 lb 11.7 oz)                       Physical Exam  Vitals and nursing note reviewed.   Constitutional:       Appearance: Normal appearance. She is obese.   Cardiovascular:      Rate and Rhythm: Normal rate and regular rhythm.      Heart sounds: No murmur heard.     No gallop.   Pulmonary:      Effort: Pulmonary effort is normal.      Breath sounds: No wheezing or rales.   Abdominal:      General: Bowel sounds are normal. There is no distension.      Palpations: Abdomen is soft.      Tenderness: There is no abdominal tenderness.   Musculoskeletal:      Right lower leg: Edema present.      Left lower leg: Edema present.      Comments: Trace, improving   Skin:     General: Skin is warm and dry.   Neurological:      Mental Status: She is alert and oriented to person, place, and time.         Lab Results    Lab Results   Component Value Date    WBC 11.97 10/22/2024    HGB 10.8 (L) 10/22/2024    HCT 33.4 (L) 10/22/2024    HCT 41.0 01/05/2024    MCV 89 10/22/2024       Lab Results   Component Value Date     10/22/2024    INR 1.0 01/08/2024       Lab Results   Component Value Date    K 4.8 01/30/2025    MG 1.6 01/30/2025    BUN 13 01/30/2025    CREATININE 1.2 01/30/2025       Lab Results   Component Value Date     (H) 01/30/2025    HGBA1C 9.1 (H) 10/31/2024       Lab Results   Component Value Date    AST  13 10/22/2024    ALT 19 10/22/2024    ALBUMIN 2.5 (L) 10/22/2024    PROT 6.7 10/22/2024       Lab Results   Component Value Date    CHOL 220 (H) 09/29/2023    HDL 49 09/29/2023    LDLCALC 142.8 09/29/2023    TRIG 141 09/29/2023       Lab Results   Component Value Date     (H) 10/19/2024         Assessment:     1. Hypertension associated with diabetes    2. CANELA (dyspnea on exertion)    3. Hyperlipidemia associated with type 2 diabetes mellitus    4. Coronary artery disease of native artery of native heart with stable angina pectoris    5. Chronic combined systolic and diastolic congestive heart failure    6. Class 3 severe obesity due to excess calories with serious comorbidity and body mass index (BMI) of 40.0 to 44.9 in adult    7. CESAR (obstructive sleep apnea)    8. Tobacco dependency                  Plan:     HFrEF, acute on chronic  -stable, significant improvement with lasix dose escalation and better BP control - weight down ~14 lbs on my scale  -on good GDMT, continue lasix, BB, aldactone, jardiance, hydral, lasix as above  -low sodium, heart healthy diet  -continue home monitor BPs and log - bring logs again to F/U - still above goal but significantly improved, will likely continue escalation pending repeat blood work  -educated on the importance of medication compliance and close clinic F/U - doing very well  -long discussion about lasix PRN parameters, daily weights, home monitoring of BP    2. CAD  -PET as above  -continue aggressive medical management, asa, statin  -update lipid panel prior to F/U - will likely need statin dose escalation  -once euvolemic can consider transition from asa to plavix monotherapy for secondary prevention  -tight lipid control  -absolute tobacco cessation education     2. HTN  -goal BP < 120/80, above goal  -continue hydral, coreg, entresto as above for now - will F/U close for escalation  -continue home monitoring, enrolled in digital HTN - not compliant with home  monitoring    3. CESAR  -has CPAP ordered, awaiting supplies    4. HLD  -goal LDLc < 70  -continue lipitor, zetia  -update lipid panel prior to f/U    5. PAD  -stable, continue asa, statin, zetia    6. DM2  -A1C better, management per PCP    7. Obesity  -encouraged Mediterranean diet, exercise, weight loss    8. Tobacco use  -cessation education  -F/U with cessation team as scheduled        -F/U ~ 2-3 weeks            The ASCVD Risk score (Elizabeth LÓPEZ, et al., 2019) failed to calculate for the following reasons:    Risk score cannot be calculated because patient has a medical history suggesting prior/existing ASCVD    No orders of the defined types were placed in this encounter.      She expressed verbal understanding and agreed with the plan    Pertinent cardiac images and EKG reviewed independently.    Continue with current medical plan and lifestyle changes.  Return sooner for concerns or questions. If symptoms persist go to the ED.  I have reviewed all pertinent data including patient's medical history in detail and updated the computerized patient record.     Counseling included discussion regarding imaging findings, diagnosis, possibilities, treatment options, risks and benefits.    Thank you for the opportunity to care for this patient. Will be available for questions if needed.     Signed:  Claudio Whitney DNP  02/28/2025

## 2025-02-28 ENCOUNTER — OFFICE VISIT (OUTPATIENT)
Dept: CARDIOLOGY | Facility: CLINIC | Age: 50
End: 2025-02-28
Payer: MEDICARE

## 2025-02-28 ENCOUNTER — LAB VISIT (OUTPATIENT)
Dept: LAB | Facility: HOSPITAL | Age: 50
End: 2025-02-28
Payer: MEDICARE

## 2025-02-28 VITALS
BODY MASS INDEX: 42.56 KG/M2 | OXYGEN SATURATION: 96 % | HEART RATE: 87 BPM | SYSTOLIC BLOOD PRESSURE: 160 MMHG | WEIGHT: 255.75 LBS | DIASTOLIC BLOOD PRESSURE: 91 MMHG

## 2025-02-28 DIAGNOSIS — F17.200 TOBACCO DEPENDENCY: ICD-10-CM

## 2025-02-28 DIAGNOSIS — E11.69 HYPERLIPIDEMIA ASSOCIATED WITH TYPE 2 DIABETES MELLITUS: ICD-10-CM

## 2025-02-28 DIAGNOSIS — I25.118 CORONARY ARTERY DISEASE OF NATIVE ARTERY OF NATIVE HEART WITH STABLE ANGINA PECTORIS: Chronic | ICD-10-CM

## 2025-02-28 DIAGNOSIS — E66.01 CLASS 3 SEVERE OBESITY DUE TO EXCESS CALORIES WITH SERIOUS COMORBIDITY AND BODY MASS INDEX (BMI) OF 40.0 TO 44.9 IN ADULT: ICD-10-CM

## 2025-02-28 DIAGNOSIS — E78.5 HYPERLIPIDEMIA ASSOCIATED WITH TYPE 2 DIABETES MELLITUS: ICD-10-CM

## 2025-02-28 DIAGNOSIS — G47.33 OSA (OBSTRUCTIVE SLEEP APNEA): ICD-10-CM

## 2025-02-28 DIAGNOSIS — E11.59 HYPERTENSION ASSOCIATED WITH DIABETES: Primary | ICD-10-CM

## 2025-02-28 DIAGNOSIS — I15.2 HYPERTENSION ASSOCIATED WITH DIABETES: Primary | ICD-10-CM

## 2025-02-28 DIAGNOSIS — I50.42 CHRONIC COMBINED SYSTOLIC AND DIASTOLIC CONGESTIVE HEART FAILURE: ICD-10-CM

## 2025-02-28 DIAGNOSIS — E66.813 CLASS 3 SEVERE OBESITY DUE TO EXCESS CALORIES WITH SERIOUS COMORBIDITY AND BODY MASS INDEX (BMI) OF 40.0 TO 44.9 IN ADULT: ICD-10-CM

## 2025-02-28 DIAGNOSIS — R06.09 DOE (DYSPNEA ON EXERTION): ICD-10-CM

## 2025-02-28 PROBLEM — I50.31 ACUTE HEART FAILURE WITH PRESERVED EJECTION FRACTION (HFPEF): Status: RESOLVED | Noted: 2025-02-20 | Resolved: 2025-02-28

## 2025-02-28 LAB
ALBUMIN SERPL BCP-MCNC: 3.2 G/DL (ref 3.5–5.2)
ALP SERPL-CCNC: 61 U/L (ref 40–150)
ALT SERPL W/O P-5'-P-CCNC: 19 U/L (ref 10–44)
ANION GAP SERPL CALC-SCNC: 10 MMOL/L (ref 8–16)
AST SERPL-CCNC: 13 U/L (ref 10–40)
BILIRUB DIRECT SERPL-MCNC: 0.2 MG/DL (ref 0.1–0.3)
BILIRUB SERPL-MCNC: 0.4 MG/DL (ref 0.1–1)
BUN SERPL-MCNC: 11 MG/DL (ref 6–20)
CALCIUM SERPL-MCNC: 9.4 MG/DL (ref 8.7–10.5)
CHLORIDE SERPL-SCNC: 105 MMOL/L (ref 95–110)
CHOLEST SERPL-MCNC: 221 MG/DL (ref 120–199)
CHOLEST/HDLC SERPL: 4.7 {RATIO} (ref 2–5)
CO2 SERPL-SCNC: 21 MMOL/L (ref 23–29)
CREAT SERPL-MCNC: 0.8 MG/DL (ref 0.5–1.4)
EST. GFR  (NO RACE VARIABLE): >60 ML/MIN/1.73 M^2
GLUCOSE SERPL-MCNC: 191 MG/DL (ref 70–110)
HDLC SERPL-MCNC: 47 MG/DL (ref 40–75)
HDLC SERPL: 21.3 % (ref 20–50)
LDLC SERPL CALC-MCNC: 131.2 MG/DL (ref 63–159)
MAGNESIUM SERPL-MCNC: 1.7 MG/DL (ref 1.6–2.6)
NONHDLC SERPL-MCNC: 174 MG/DL
POTASSIUM SERPL-SCNC: 4.3 MMOL/L (ref 3.5–5.1)
PROT SERPL-MCNC: 8.1 G/DL (ref 6–8.4)
SODIUM SERPL-SCNC: 136 MMOL/L (ref 136–145)
TRIGL SERPL-MCNC: 214 MG/DL (ref 30–150)

## 2025-02-28 PROCEDURE — 83735 ASSAY OF MAGNESIUM: CPT

## 2025-02-28 PROCEDURE — 36415 COLL VENOUS BLD VENIPUNCTURE: CPT

## 2025-02-28 PROCEDURE — 80048 BASIC METABOLIC PNL TOTAL CA: CPT

## 2025-02-28 PROCEDURE — 80076 HEPATIC FUNCTION PANEL: CPT

## 2025-02-28 PROCEDURE — 99999 PR PBB SHADOW E&M-EST. PATIENT-LVL IV: CPT | Mod: PBBFAC,,,

## 2025-02-28 PROCEDURE — 80061 LIPID PANEL: CPT

## 2025-03-13 ENCOUNTER — OFFICE VISIT (OUTPATIENT)
Dept: OBSTETRICS AND GYNECOLOGY | Facility: CLINIC | Age: 50
End: 2025-03-13
Payer: MEDICARE

## 2025-03-13 VITALS — BODY MASS INDEX: 44.06 KG/M2 | WEIGHT: 264.75 LBS

## 2025-03-13 DIAGNOSIS — Z86.73 HISTORY OF CVA (CEREBROVASCULAR ACCIDENT) WITHOUT RESIDUAL DEFICITS: ICD-10-CM

## 2025-03-13 DIAGNOSIS — Z91.148 NONCOMPLIANCE WITH MEDICATIONS: ICD-10-CM

## 2025-03-13 DIAGNOSIS — Z12.31 ENCOUNTER FOR SCREENING MAMMOGRAM FOR MALIGNANT NEOPLASM OF BREAST: ICD-10-CM

## 2025-03-13 DIAGNOSIS — Z01.419 WELL WOMAN EXAM WITH ROUTINE GYNECOLOGICAL EXAM: Primary | ICD-10-CM

## 2025-03-13 PROCEDURE — 1159F MED LIST DOCD IN RCRD: CPT | Mod: CPTII,S$GLB,, | Performed by: OBSTETRICS & GYNECOLOGY

## 2025-03-13 PROCEDURE — 3008F BODY MASS INDEX DOCD: CPT | Mod: CPTII,S$GLB,, | Performed by: OBSTETRICS & GYNECOLOGY

## 2025-03-13 PROCEDURE — 88175 CYTOPATH C/V AUTO FLUID REDO: CPT | Performed by: OBSTETRICS & GYNECOLOGY

## 2025-03-13 PROCEDURE — 99396 PREV VISIT EST AGE 40-64: CPT | Mod: S$GLB,,, | Performed by: OBSTETRICS & GYNECOLOGY

## 2025-03-13 PROCEDURE — 4010F ACE/ARB THERAPY RXD/TAKEN: CPT | Mod: CPTII,S$GLB,, | Performed by: OBSTETRICS & GYNECOLOGY

## 2025-03-13 PROCEDURE — 1160F RVW MEDS BY RX/DR IN RCRD: CPT | Mod: CPTII,S$GLB,, | Performed by: OBSTETRICS & GYNECOLOGY

## 2025-03-13 PROCEDURE — 99999 PR PBB SHADOW E&M-EST. PATIENT-LVL III: CPT | Mod: PBBFAC,,, | Performed by: OBSTETRICS & GYNECOLOGY

## 2025-03-13 NOTE — PROGRESS NOTES
CC: Annual check-up    SUBJECTIVE:   49 y.o. female   for annual routine Pap and checkup. Patient's last menstrual period was 2025 (approximate)..  She has no unusual complaints and has some elevated BP's in clinic today..    Says doesn't like the way her BP meds make her feel when takes them, weak dizzy, says feels better when BP's are high  Fels fine now  Past Medical History:   Diagnosis Date    Anxiety     Cataract     Cervical high risk HPV (human papillomavirus) test positive 2020    NOT 16 OR 18    CHF (congestive heart failure)     CVA (cerebral infarction)          Depression     Diabetes mellitus, type 2     GERD (gastroesophageal reflux disease)     Hyperlipidemia     Hypertension     Lactic acidosis 10/06/2023    Moderate nonproliferative diabetic retinopathy     Nausea and vomiting 10/06/2023    Obesity     Stroke     Tachycardia 10/04/2023     Past Surgical History:   Procedure Laterality Date     SECTION      CHOLECYSTECTOMY      DILATION AND CURETTAGE OF UTERUS      EYE SURGERY Bilateral     laser    GALLBLADDER SURGERY  2017    stone removed    IRRIGATION AND DEBRIDEMENT Right 2024    Procedure: IRRIGATION AND DEBRIDEMENT RIGHT GROIN;  Surgeon: Chalo Padgett MD;  Location: [facility];  Service: General;  Laterality: Right;     Social History[1]  Family History   Problem Relation Name Age of Onset    Stroke Mother      Thyroid disease Mother      Diabetes Mother      Cataracts Father      Hypertension Father      Arthritis Father      Diabetes Father      Hypertension Sister      Stroke Sister      Thyroid disease Sister      Heart disease Sister      No Known Problems Brother      Thyroid disease Daughter 2     Asthma Daughter 2     No Known Problems Maternal Aunt      No Known Problems Maternal Uncle      No Known Problems Paternal Aunt      No Known Problems Paternal Uncle      No Known Problems Maternal Grandmother      No Known Problems Maternal  Grandfather      No Known Problems Paternal Grandmother      No Known Problems Paternal Grandfather      Amblyopia Neg Hx      Blindness Neg Hx      Cancer Neg Hx      Glaucoma Neg Hx      Macular degeneration Neg Hx      Retinal detachment Neg Hx      Strabismus Neg Hx       OB History    Para Term  AB Living   3 2 2 0 1 2   SAB IAB Ectopic Multiple Live Births   1 0 0 0 2      # Outcome Date GA Lbr Jose/2nd Weight Sex Type Anes PTL Lv   3 SAB 03/30/15     SAB      2 Term 12/15/10 38w0d   F CS-LTranv   ANNY   1 Term 03 38w0d   F CS-LTranv  N ANNY         Current Medications[2]  Allergies: Patient has no known allergies.     ROS:  Constitutional: no weight loss, weight gain, fever, fatigue  Eyes:  No vision changes, glasses/contacts  ENT/Mouth: No ulcers, sinus problems, ears ringing, headache  Cardiovascular: No inability to lie flat, chest pain, exercise intolerance, swelling, heart palpitations  Respiratory: No wheezing, coughing blood, shortness of breath, or cough  Gastrointestinal: No diarrhea, bloody stool, nausea/vomiting, constipation, gas, hemorrhoids  Genitourinary: No blood in urine, painful urination, urgency of urination, frequency of urination, incomplete emptying, incontinence, abnormal bleeding, painful periods, heavy periods, vaginal discharge, vaginal odor, painful intercourse, sexual problems, bleeding after intercourse.  Musculoskeletal: No muscle weakness  Skin/Breast: No painful breasts, nipple discharge, masses, rash, ulcers  Neurological: No passing out, seizures, numbness, headache  Endocrine: No diabetes, hypothyroid, hyperthyroid, hot flashes, hair loss, abnormal hair growth, ance  Psychiatric: No depression, crying  Hematologic: No bruises, bleeding, swollen lymph nodes, anemia.      OBJECTIVE:   The patient appears well, alert, oriented x 3, in no distress.  Wt 120.1 kg (264 lb 12.4 oz)   LMP 2025 (Approximate)   BMI 44.06 kg/m²   NECK: no thyromegaly, trachea  midline  SKIN: no acne, striae, hirsutism  BREAST EXAM: breasts appear normal, no suspicious masses, no skin or nipple changes or axillary nodes, symmetric fibrous changes in both upper outer quadrants  ABDOMEN: no hernias, masses, or hepatosplenomegaly  GENITALIA: normal external genitalia, no erythema, no discharge  URETHRA: normal urethra, normal urethral meatus  VAGINA: vaginal discharge scant and white  CERVIX: no lesions or cervical motion tenderness  UTERUS: normal  ADNEXA: normal adnexa and no mass, fullness, tenderness ltd exam due to habitus      ASSESSMENT:   well woman  1. Well woman exam with routine gynecological exam    2. Noncompliance with medications    3. History of CVA (cerebrovascular accident) without residual deficits    4. 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 She states ready to quit smoking. Ambulatory referral to Smoking Cessation clinic following hospital discharge. Pt is currently chewing nicotine gum.    Substance and Sexual Activity    Alcohol use: Yes     Comment: less than one monthly    Drug use: No    Sexual activity: Yes     Partners: Male     Birth control/protection: None   Social History Narrative    Daughter - Kavon     Social Drivers of Health      Financial Resource Strain: Low Risk  (10/21/2024)    Overall Financial Resource Strain (CARDIA)     Difficulty of Paying Living Expenses: Not hard at all   Recent Concern: Financial Resource Strain - Medium Risk (10/19/2024)    Overall Financial Resource Strain (CARDIA)     Difficulty of Paying Living Expenses: Somewhat hard   Food Insecurity: No Food Insecurity (10/21/2024)    Hunger Vital Sign     Worried About Running Out of Food in the Last Year: Never true     Ran Out of Food in the Last Year: Never true   Recent Concern: Food Insecurity - Food Insecurity Present (10/19/2024)    Hunger Vital Sign     Worried About Running Out of Food in the Last Year: Sometimes true     Ran Out of Food in the Last Year: Sometimes true   Transportation Needs: No Transportation Needs (10/21/2024)    TRANSPORTATION NEEDS     Transportation : No   Recent Concern: Transportation Needs - Unmet Transportation Needs (10/19/2024)    TRANSPORTATION NEEDS     Transportation : Yes, it has kept me from non-medical meetings, appointments, work or from getting things that I need.   Physical Activity: Inactive (10/21/2024)    Exercise Vital Sign     Days of Exercise per Week: 0 days     Minutes of Exercise per Session: 0 min   Stress: Stress Concern Present (10/21/2024)    [employer] [place] - Occupational Stress Questionnaire     Feeling of Stress : To some extent   Housing Stability: Unknown (10/21/2024)    Housing Stability Vital Sign     Unable to Pay for Housing in the Last Year: No     Homeless in the Last Year: No   [2]   Current Outpatient Medications   Medication Sig Dispense Refill    aspirin (ECOTRIN) 81 MG EC tablet Take 81 mg by mouth once daily.      atorvastatin (LIPITOR) 80 MG tablet Take 1 tablet (80 mg total) by mouth every evening. 60 tablet 1    benzonatate (TESSALON) 200 MG capsule Take 200 mg by mouth 3 (three) times daily as needed.      carvediloL (COREG) 25 MG tablet Take 1 tablet (25 mg  "total) by mouth 2 (two) times daily with meals. 180 tablet 3    dulaglutide (TRULICITY) 1.5 mg/0.5 mL pen injector Inject 1.5 mg into the skin every 7 days. 12 pen 0    empagliflozin (JARDIANCE) 25 mg tablet Take 1 tablet (25 mg total) by mouth once daily. 90 tablet 3    ergocalciferol (ERGOCALCIFEROL) 50,000 unit Cap Take 1 capsule (50,000 Units total) by mouth every Saturday. 12 capsule 3    ezetimibe (ZETIA) 10 mg tablet Take 1 tablet (10 mg total) by mouth once daily. 90 tablet 3    furosemide (LASIX) 40 MG tablet Take 2 tablets (80 mg total) by mouth 3 (three) times daily for 5 days, THEN 1 tablet (40 mg total) 3 (three) times daily. 180 tablet 3    hydrALAZINE (APRESOLINE) 50 MG tablet Take 1 tablet (50 mg total) by mouth 3 (three) times daily. 90 tablet 11    insulin aspart U-100 (NOVOLOG FLEXPEN U-100 INSULIN) 100 unit/mL (3 mL) InPn pen Inject 20 Units into the skin 3 (three) times daily with meals. 54 mL 3    insulin degludec (TRESIBA FLEXTOUCH U-100) 100 unit/mL (3 mL) insulin pen Inject 50 Units into the skin 2 (two) times a day. 90 mL 3    metFORMIN (GLUCOPHAGE) 1000 MG tablet Take 1 tablet (1,000 mg total) by mouth 2 (two) times daily with meals. 180 tablet 3    nicotine, polacrilex, (NICORETTE) 2 mg Gum Take 1 each (2 mg total) by mouth as needed (1-2 pieces / hr in place of a cigarette, maximum of 15 pieces/day). 110 each 0    pantoprazole (PROTONIX) 40 MG tablet Take 40 mg by mouth every morning.      pen needle, diabetic 32 gauge x 5/32" Ndle Use to inject insulin 2 times per day 100 each 0    potassium chloride (KLOR-CON) 8 MEQ TbSR TAKE ONE TABLET (8 meq total) BY MOUTH TWICE DAILY @ 9AM & 5PM 180 tablet 3    sacubitriL-valsartan (ENTRESTO)  mg per tablet Take 1 tablet by mouth 2 (two) times daily. 180 tablet 3    spironolactone (ALDACTONE) 25 MG tablet Take 1 tablet (25 mg total) by mouth once daily. 30 tablet 11     No current facility-administered medications for this visit.     "

## 2025-03-13 NOTE — PROGRESS NOTES
Cardiology Clinic note    Subjective:   Patient ID:  Jaimee Quintana is a 49 y.o. female who presents for follow-up of HTN, CHF    HPI:     3/19/2025: Routine F/U. Seen in clinic unaccompanied, reports overall doing okay. Labile blood pressures of recent in setting of medication non-compliance. Has been holding some medications due to intermittent dizziness. Home BP logs reviewed, significantly improved when taking meds (120s/70s), though with a couple episodes of low BP and pre-syncope. Stopped medication for a few days due to dizziness and BP escalated to 200s/100s. BP improved today. Stable from CHF perspective, grossly euvolemic on exam. Chronic CANELA. Patient denies CP, SOB, orthopnea, PND, syncope, palpitations, LE edema.     2/28/2025: F/U after medication escalation for HTN, acute on chronic HFrEF. Seen in clinic unaccompanied, reports overall doing much better. Breathing significantly improved. Home BP logs reviewed, improving. No recurrent CP. Reports abdominal bloating improving. Patient denies CP, SOB, orthopnea, PND, syncope, palpitations, LE edema. Reports compliance and tolerance with all medications.    2/20/2025: Routine F/U HTN, CHF. Seen in clinic unaccompanied, reports overall doing okay. Poor historian, difficult to assess medication compliance and dosing. Went through all medications, reports compliance and tolerance - has all bottles with correct dosing and reports taking them correctly. BP and weight up significantly today. No reported dietary indiscretion, reports she hasn't been missing medication dosing. Does endorse worsening CANELA x 1-2 weeks, abdominal swelling. No CP, resting SOB, orthopnea, PND, syncope, palps, LE edema.     1/30/2025: Here for F/U HTN, HFrEF. Seen in clinic unaccompanied reports overall doing okay. Reports BPs have been up a bit the last month. Taking lasix 40mg TID. Weights stable, grossly euvolemic on exam. Chronic orthopnea, ongoing CANELA/ fatigue. Denies CP, SOB,  PND, syncope, palps, LE edema. Reports compliance and tolerance with all medications.     12/26/2024: Here for F/U HTN, HFrEF. Seen in clinic, reports overall doing pretty well. Weight stable, tolerating meds. Taking lasix 40mg BID, has not needed additional dosing. Ongoing CANELA/orthopnea. Resp status stable, grossly euvolemic on exam. Patient denies CP, SOB, PND, syncope, palpitations, LE edema. Reports compliance and tolerance with all medications.    11/20/2024: Here for F/U HTN, HFrEF. Seen in clinic unaccompanied reports overall doing pretty well. Weight stable, compliant with home monitoring, taking lasix 40mg BID. Tolerating GDMT. Ongoing CANELA, improving, chronic 3-4 pillow orthopnea. No recurrent LE edema, no resting SOB. Denies CP, PND, syncope, palps. Reports compliance and tolerance with all medications.     11/11/2024: Here for F/U HTN, HFrEF. Lost to F/U after previous clinic visit, recent admission 10/19/2024 with acute on chronic HFrEF. Seen in clinic unaccompanied reports overall doing okay. Visibly nervous/anxious.   Long conversation about CHF diagnosis, importance of medication compliance and close clinic follow-up. Discussed lasix PRN parameters in detail and importance of home monitoring weights and blood pressures. Reports ongoing 4-pillow orthopnea and CANELA, though improved since hospital D/C. No other new CV s/s, denies recurrent LE edema. No new CP, SOB, PND, syncope, palps. Reports compliance and tolerance with all medications.     7/3/2024: Here for F/U HTN, HFrEF, PAD. Seen in clinic reports she's been nervous since her medication changes with fluctuating BPs. Logs reviewed, still with intermittent HTN. Normotensive in clinic today. Reports ongoing CANELA, orthopnea; has been taking 40mg lasix daily for about a week with some improvement. Patient denies CP, SOB, PND, syncope, palpitations, LE edema. Reports compliance and tolerance with all medications.    6/24/2024: Previous patient of   Kirit and Vicente Quinones NP as below, initial visit for me. 49 yo F with hx of CVA, DM2, HTN, HLD, CESAR not on CPAP, MO w/ BMI 41.8, and HFrEF- recovered LVEF 50% (now back down to 35-40%) who presents today for routine F/U. 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 Seen in clinic today, reports overall doing well. Seemingly well compensated from HF perspective, grossly euvolemic on exam. Only taking lasix PRN.  Patient denies CP, SOB, PND, syncope, palpitations, LE edema. Reports compliance and tolerance with all medications.  -Will have to D/C [facility] given rEF and escalate other antihypertensives  -repeat Echo ~3m after stable on GDMT    9/29/2023: Pt is a 48 yo F w/ PMH of CVA, DM2 w/ hgba1c 11.4%, HTN, HLD, CESAR not on CPAP, MO w/ BMI 41.8, and HFrEF- recovered LVEF 50% who presents today for f/u appt. She was last seen by Dr. Beth on 6/27/23 to establish care, post hospital admission 5/22/2023-5/23/2023 for decompensated CHF and was diuresed and meds were adjusted (see d/c sum 5/24/2023). She notes compliance w/ meds and denies side effects. She has been monitoring her BP at home however states that her BP runs high at times, -170s. She denies cp, sob, orthopnea, PND, presyncope, LOC, palpitations. She is currently enrolled in PAD rehab and notes some improvement in BLE claudication.   She does not exercise however states she is active w/ riding her bike and walking around her block and denies cp or sob       Cardiac hx  ---------------------------------    Echo 1/2025    Left Ventricle: The left ventricle is normal in size. Mildly increased wall thickness. There is low normal systolic function. Quantitated ejection fraction is 52%. Grade I diastolic dysfunction.    Right Ventricle: Normal right ventricular cavity size. Systolic function is normal.    PET Stress 2/2025    There are two significant perfusion abnormalities.    Perfusion Abnormality #1 - There is a very small (< 5%) sized, mild  intensity basal anteroseptal reversible perfusion abnormality involving 4% of LV myocardium in the distribution of the 1st septal territory.    Within the perfusion abnormality, absolute myocardial perfusion (cc/min/gm) averaged 0.42 cc/min/g at rest, 0.46 cc/min/g at stress, and CFR was 1.11, which equates to severely reduced coronary flow capacity within the 1st septal territory.    Perfusion Abnormality #2 - There is a very small (<5%) sized, mild intensity distal anteroseptal reversible perfusion abnormality involving 4% of LV myocardium in the distribution of the distal LAD territory.    Within the perfusion abnormality, absolute myocardial perfusion (cc/min/gm) averaged 0.48 cc/min/g at rest, 0.52 cc/min/g at stress, and CFR was 1.10, which equates to severely reduced coronary flow capacity within the LAD territory.    There are no other significant perfusion abnormalities.    The whole heart absolute myocardial perfusion values were normal at rest, moderately reduced during stress and CFR is moderately reduced.    CT attenuation images demonstrate no coronary calcifications and no aortic calcifications.    The gated perfusion images showed an ejection fraction of 40% at rest and 42% during stress. A normal ejection fraction is greater than 47%.    There is normal wall motion at rest and normal wall motion during stress.    The study's ECG is negative for ischemia.    There is no prior study for comparison.    There coronary flow capacity is severly reduced in all coronary territories and may be indicative of multivessel disease.    Echo 10/2024    The left ventricle is normal in size. There is concentric hypertrophy. Mild global hypokinesis present. There is mildly reduced systolic function with a visually estimated ejection fraction of 45 - 50%. Unable to assess diastolic function due to poor image quality.    Right Ventricle: Normal right ventricular cavity size. Systolic function is reduced.TAPSE is 1.24  cm.    Mitral Valve: There is mild regurgitation.    Tricuspid Valve: There is mild regurgitation.    Pulmonary Artery: The estimated pulmonary artery systolic pressure is 29 mmHg.    IVC/SVC: Normal venous pressure at 3 mmHg.    The study was difficult due to patient's body habitus.    Echo 10/2023    Left Ventricle: The left ventricle is normal in size. There is concentric hypertrophy. Mild global hypokinesis present. There is moderately reduced systolic function with a visually estimated ejection fraction of 35 - 40%. Grade II diastolic dysfunction.    Right Ventricle: Normal right ventricular cavity size. Wall thickness is normal. Right ventricle wall motion  is normal. Systolic function is normal.    Tricuspid Valve: There is mild regurgitation.    Pulmonary Artery: The estimated pulmonary artery systolic pressure is 26 mmHg.    IVC/SVC: Normal venous pressure at 3 mmHg.    ALEENA 7/2023  The right resting ALEENA reduction is normal.   The right arm artery has triphasic flow.  The right ankle [DT] artery has triphasic flow.   The right ankle [DP] artery has triphasic flow.   The left resting ALEENA reduction is normal.   The left arm artery has triphasic flow.  The left ankle [PT] artery has triphasic flow.  The left ankle [DP] artery has biphasic flow.  Exercise Study: treadmill test.   The exercise study was stopped due to protocol completion.     Symptoms: At 1 min, patient reported 6/10 right bottom of foot pain that resolved at 3 minutes.  Immediately post exercise, the right ALEENA is normal.  Immediately post exercise, the left ALEENA is mildly decreased.       Echo 5/22/2023    The left ventricle is normal in size with concentric remodeling and low normal systolic function.  The estimated ejection fraction is 50%.  Normal left ventricular diastolic function.  With normal right ventricular systolic function.    CV Exercise ALEENA study: 7/18/23  Conclusion     Mild PAD of left lower extremity with exercise (ALEENA  0.89).  Normal TBIs and toe pressures bilaterally      Past Medical History:   Diagnosis Date    Anxiety     Cataract     Cervical high risk HPV (human papillomavirus) test positive 09/17/2020    NOT 16 OR 18    CHF (congestive heart failure)     CVA (cerebral infarction) 2003         Depression     Diabetes mellitus, type 2     GERD (gastroesophageal reflux disease)     Hyperlipidemia     Hypertension     Lactic acidosis 10/06/2023    Moderate nonproliferative diabetic retinopathy     Nausea and vomiting 10/06/2023    Obesity     Stroke     Tachycardia 10/04/2023          Patient Active Problem List    Diagnosis Date Noted    Coronary artery disease of native artery of native heart with stable angina pectoris 02/18/2025    CANELA (dyspnea on exertion) 01/30/2025    Class 3 severe obesity with serious comorbidity and body mass index (BMI) of 40.0 to 44.9 in adult 11/19/2024     Diet and exercise advised       Tobacco dependency 10/19/2024    Type 2 diabetes mellitus with hyperglycemia, with long-term current use of insulin 10/19/2024     Sugars uncontrolled  Poor historian   Unsure if taking insulin at all   She thinks she takes lantus   Needs novolog   Taking metformin but does not like due to size; also taking trulicity but has not increased dose  Need endocrine help to get her straight and on appropriate meds will refer      Chronic combined systolic and diastolic congestive heart failure 01/06/2024     Follows with cards  Entresto  Lasix, BB, spironolactone       Bilateral claudication of lower limb 06/27/2023     Follows with cards   stable      Noncompliance with medications 05/22/2023     POOR Historian   Does not consistently take medications  Unsure of what she is taking         GERD (gastroesophageal reflux disease) 05/22/2023     PPI PRN      History of CVA (cerebrovascular accident) without residual deficits 05/22/2023     Asa; statin   Stable         Type 2 diabetes mellitus with both eyes affected by  proliferative retinopathy and macular edema, with long-term current use of insulin 05/11/2021    Thyroid nodule 11/08/2017     Multinodular goiter  FINAL PATHOLOGIC DIAGNOSIS  Left thyroid, fine-needle aspiration:  [facility] Thyroid Cytology Category: Benign.        Diabetic peripheral neuropathy associated with type 2 diabetes mellitus 03/23/2017     Stable   Needs better glucose control   Refer to endo for help      Fatty liver 08/18/2016     encouraged weight loss      Hypertension associated with diabetes 10/23/2015     Coreg   Hydralazine  Spironolactone  Lasix   entresto      CESAR (obstructive sleep apnea) 10/23/2015     Bipap QHS      Hyperlipidemia associated with type 2 diabetes mellitus      Statin  stable         Patient's Medications   New Prescriptions    No medications on file   Previous Medications    ASPIRIN (ECOTRIN) 81 MG EC TABLET    Take 81 mg by mouth once daily.    ATORVASTATIN (LIPITOR) 80 MG TABLET    Take 1 tablet (80 mg total) by mouth every evening.    BENZONATATE (TESSALON) 200 MG CAPSULE    Take 200 mg by mouth 3 (three) times daily as needed.    CARVEDILOL (COREG) 25 MG TABLET    Take 1 tablet (25 mg total) by mouth 2 (two) times daily with meals.    DULAGLUTIDE (TRULICITY) 1.5 MG/0.5 ML PEN INJECTOR    Inject 1.5 mg into the skin every 7 days.    EMPAGLIFLOZIN (JARDIANCE) 25 MG TABLET    Take 1 tablet (25 mg total) by mouth once daily.    ERGOCALCIFEROL (ERGOCALCIFEROL) 50,000 UNIT CAP    Take 1 capsule (50,000 Units total) by mouth every Saturday.    EZETIMIBE (ZETIA) 10 MG TABLET    Take 1 tablet (10 mg total) by mouth once daily.    FUROSEMIDE (LASIX) 40 MG TABLET    Take 2 tablets (80 mg total) by mouth 3 (three) times daily for 5 days, THEN 1 tablet (40 mg total) 3 (three) times daily.    INSULIN ASPART U-100 (NOVOLOG FLEXPEN U-100 INSULIN) 100 UNIT/ML (3 ML) INPN PEN    Inject 20 Units into the skin 3 (three) times daily with meals.    INSULIN DEGLUDEC (TRESIBA FLEXTOUCH  "U-100) 100 UNIT/ML (3 ML) INSULIN PEN    Inject 50 Units into the skin 2 (two) times a day.    METFORMIN (GLUCOPHAGE) 1000 MG TABLET    Take 1 tablet (1,000 mg total) by mouth 2 (two) times daily with meals.    NICOTINE, POLACRILEX, (NICORETTE) 2 MG GUM    Take 1 each (2 mg total) by mouth as needed (1-2 pieces / hr in place of a cigarette, maximum of 15 pieces/day).    PANTOPRAZOLE (PROTONIX) 40 MG TABLET    Take 40 mg by mouth every morning.    PEN NEEDLE, DIABETIC 32 GAUGE X 5/32" NDLE    Use to inject insulin 2 times per day    POTASSIUM CHLORIDE (KLOR-CON) 8 MEQ TBSR    TAKE ONE TABLET (8 meq total) BY MOUTH TWICE DAILY @ 9AM & 5PM    SACUBITRIL-VALSARTAN (ENTRESTO)  MG PER TABLET    Take 1 tablet by mouth 2 (two) times daily.    SPIRONOLACTONE (ALDACTONE) 25 MG TABLET    Take 1 tablet (25 mg total) by mouth once daily.   Modified Medications    No medications on file   Discontinued Medications    HYDRALAZINE (APRESOLINE) 50 MG TABLET    Take 1 tablet (50 mg total) by mouth 3 (three) times daily.        Review of Systems   Constitutional: Negative for chills, decreased appetite, diaphoresis, malaise/fatigue, weight gain and weight loss.   Cardiovascular:  Positive for dyspnea on exertion. Negative for chest pain, claudication, irregular heartbeat, leg swelling, near-syncope, orthopnea, palpitations, paroxysmal nocturnal dyspnea and syncope.   Respiratory:  Negative for cough, hemoptysis, shortness of breath and snoring.    Gastrointestinal:  Negative for bloating, abdominal pain, nausea and vomiting.   Neurological:  Negative for light-headedness and weakness.   Psychiatric/Behavioral:  The patient is nervous/anxious.        Family History   Problem Relation Name Age of Onset    Stroke Mother      Thyroid disease Mother      Diabetes Mother      Cataracts Father      Hypertension Father      Arthritis Father      Diabetes Father      Hypertension Sister      Stroke Sister      Thyroid disease Sister      " Heart disease Sister      No Known Problems Brother      Thyroid disease Daughter 2     Asthma Daughter 2     No Known Problems Maternal Aunt      No Known Problems Maternal Uncle      No Known Problems Paternal Aunt      No Known Problems Paternal Uncle      No Known Problems Maternal Grandmother      No Known Problems Maternal Grandfather      No Known Problems Paternal Grandmother      No Known Problems Paternal Grandfather      Amblyopia Neg Hx      Blindness Neg Hx      Cancer Neg Hx      Glaucoma Neg Hx      Macular degeneration Neg Hx      Retinal detachment Neg Hx      Strabismus Neg Hx         Social History     Socioeconomic History    Marital status: Single   Occupational History    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 She states ready to quit smoking. Ambulatory referral to Smoking Cessation clinic following hospital discharge. Pt is currently chewing nicotine gum.    Substance and Sexual Activity    Alcohol use: Yes     Comment: less than one monthly    Drug use: No    Sexual activity: Yes     Partners: Male     Birth control/protection: None   Social History Narrative    Daughter - Kavon     Social Drivers of Health     Financial Resource Strain: Low Risk  (10/21/2024)    Overall Financial Resource Strain (CARDIA)     Difficulty of Paying Living Expenses: Not hard at all   Recent Concern: Financial Resource Strain - Medium Risk (10/19/2024)    Overall Financial Resource Strain (CARDIA)     Difficulty of Paying Living Expenses: Somewhat hard   Food Insecurity: No Food Insecurity (10/21/2024)    Hunger Vital Sign     Worried About Running Out of Food in the Last Year: Never true     Ran Out of Food in the Last  Year: Never true   Recent Concern: Food Insecurity - Food Insecurity Present (10/19/2024)    Hunger Vital Sign     Worried About Running Out of Food in the Last Year: Sometimes true     Ran Out of Food in the Last Year: Sometimes true   Transportation Needs: No Transportation Needs (10/21/2024)    TRANSPORTATION NEEDS     Transportation : No   Recent Concern: Transportation Needs - Unmet Transportation Needs (10/19/2024)    TRANSPORTATION NEEDS     Transportation : Yes, it has kept me from non-medical meetings, appointments, work or from getting things that I need.   Physical Activity: Inactive (10/21/2024)    Exercise Vital Sign     Days of Exercise per Week: 0 days     Minutes of Exercise per Session: 0 min   Stress: Stress Concern Present (10/21/2024)    [employer] - Occupational Stress Questionnaire     Feeling of Stress : To some extent   Housing Stability: Unknown (10/21/2024)    Housing Stability Vital Sign     Unable to Pay for Housing in the Last Year: No     Homeless in the Last Year: No            Objective:   Vitals  Vitals:    03/19/25 1113 03/19/25 1117   BP: (!) 174/99 (!) 142/88   Pulse: 94    SpO2: 98%    Weight: 118.8 kg (261 lb 12.7 oz)                         Physical Exam  Vitals and nursing note reviewed.   Constitutional:       Appearance: Normal appearance. She is obese.   Cardiovascular:      Rate and Rhythm: Normal rate and regular rhythm.      Heart sounds: No murmur heard.     No gallop.   Pulmonary:      Effort: Pulmonary effort is normal.      Breath sounds: No wheezing or rales.   Abdominal:      General: Bowel sounds are normal. There is no distension.      Palpations: Abdomen is soft.      Tenderness: There is no abdominal tenderness.   Musculoskeletal:      Right lower leg: Edema present.      Left lower leg: Edema present.      Comments: Trace, improving   Skin:     General: Skin is warm and dry.   Neurological:      Mental Status: She is alert and  oriented to person, place, and time.         Lab Results    Lab Results   Component Value Date    WBC 11.97 10/22/2024    HGB 10.8 (L) 10/22/2024    HCT 33.4 (L) 10/22/2024    HCT 41.0 01/05/2024    MCV 89 10/22/2024       Lab Results   Component Value Date     10/22/2024    INR 1.0 01/08/2024       Lab Results   Component Value Date    K 4.3 02/28/2025    MG 1.7 02/28/2025    BUN 11 02/28/2025    CREATININE 0.8 02/28/2025       Lab Results   Component Value Date     (H) 02/28/2025    HGBA1C 9.1 (H) 10/31/2024       Lab Results   Component Value Date    AST 13 02/28/2025    ALT 19 02/28/2025    ALBUMIN 3.2 (L) 02/28/2025    PROT 8.1 02/28/2025       Lab Results   Component Value Date    CHOL 221 (H) 02/28/2025    HDL 47 02/28/2025    LDLCALC 131.2 02/28/2025    TRIG 214 (H) 02/28/2025       Lab Results   Component Value Date     (H) 10/19/2024         Assessment:     1. Hypertension associated with diabetes    2. Hyperlipidemia associated with type 2 diabetes mellitus    3. CANELA (dyspnea on exertion)    4. Coronary artery disease of native artery of native heart with stable angina pectoris    5. Chronic combined systolic and diastolic congestive heart failure    6. Class 3 severe obesity due to excess calories with serious comorbidity and body mass index (BMI) of 40.0 to 44.9 in adult    7. Tobacco dependency                    Plan:     HFrEF, since recovered  -stable, significant improvement with lasix dose escalation and better BP control   -unfortunately now with intermittent low BPs, intermittent dizziness leading to medication noncompliance  -will hold hydral for now  -on good GDMT, continue lasix, BB, aldactone, jardiance, lasix as above  -low sodium, heart healthy diet  -continue home monitor BPs and log - has been very compliant - bring logs again to F/U  -educated on the importance of medication compliance and close clinic F/U - doing very well  -long discussion about lasix PRN  parameters, daily weights, home monitoring of BP    2. CAD  -PET as above  -continue aggressive medical management, asa, statin, zetia  -tight lipid control  -absolute tobacco cessation education     2. HTN  -goal BP < 120/80, at goal on home logs  -unfortunately with episodes of dizziness/ hypotension leading to medication noncompliance and significant episodes of HTN  -hold hydral for now  -continue  coreg, entresto, aldactone as above for now - will F/U close for escalation  -continue home monitoring, log; doing much better with compliance    3. CESAR  -has CPAP ordered, awaiting supplies    4. HLD  -goal LDLc < 70  -continue lipitor, zetia  -educated on importance of compliance, reports frequent missed doses    5. PAD  -stable, continue asa, statin, zetia    6. DM2  -A1C better, management per PCP    7. Obesity  -encouraged Mediterranean diet, exercise, weight loss    8. Tobacco use  -cessation education  -F/U with cessation team as scheduled        -F/U ~ 2-3 weeks            The ASCVD Risk score (Elizabeth DK, et al., 2019) failed to calculate for the following reasons:    Risk score cannot be calculated because patient has a medical history suggesting prior/existing ASCVD    No orders of the defined types were placed in this encounter.      She expressed verbal understanding and agreed with the plan    Pertinent cardiac images and EKG reviewed independently.    Continue with current medical plan and lifestyle changes.  Return sooner for concerns or questions. If symptoms persist go to the ED.  I have reviewed all pertinent data including patient's medical history in detail and updated the computerized patient record.     Counseling included discussion regarding imaging findings, diagnosis, possibilities, treatment options, risks and benefits.    Thank you for the opportunity to care for this patient. Will be available for questions if needed.     Signed:  Claudio Whitney, KOURTNEY  03/19/2025

## 2025-03-14 LAB
CLINICAL INFO: NORMAL
DATE OF PREVIOUS PAP: NORMAL
DATE PREVIOUS BX: NORMAL
LMP START DATE: NORMAL
SPECIMEN SOURCE CVX/VAG CYTO: NORMAL

## 2025-03-19 ENCOUNTER — OFFICE VISIT (OUTPATIENT)
Dept: CARDIOLOGY | Facility: CLINIC | Age: 50
End: 2025-03-19
Payer: MEDICARE

## 2025-03-19 VITALS
SYSTOLIC BLOOD PRESSURE: 142 MMHG | WEIGHT: 261.81 LBS | OXYGEN SATURATION: 98 % | HEART RATE: 94 BPM | BODY MASS INDEX: 43.57 KG/M2 | DIASTOLIC BLOOD PRESSURE: 88 MMHG

## 2025-03-19 DIAGNOSIS — F17.200 TOBACCO DEPENDENCY: ICD-10-CM

## 2025-03-19 DIAGNOSIS — I15.2 HYPERTENSION ASSOCIATED WITH DIABETES: Primary | ICD-10-CM

## 2025-03-19 DIAGNOSIS — E11.59 HYPERTENSION ASSOCIATED WITH DIABETES: Primary | ICD-10-CM

## 2025-03-19 DIAGNOSIS — E78.5 HYPERLIPIDEMIA ASSOCIATED WITH TYPE 2 DIABETES MELLITUS: ICD-10-CM

## 2025-03-19 DIAGNOSIS — I25.118 CORONARY ARTERY DISEASE OF NATIVE ARTERY OF NATIVE HEART WITH STABLE ANGINA PECTORIS: Chronic | ICD-10-CM

## 2025-03-19 DIAGNOSIS — I50.42 CHRONIC COMBINED SYSTOLIC AND DIASTOLIC CONGESTIVE HEART FAILURE: ICD-10-CM

## 2025-03-19 DIAGNOSIS — R06.09 DOE (DYSPNEA ON EXERTION): ICD-10-CM

## 2025-03-19 DIAGNOSIS — E66.01 CLASS 3 SEVERE OBESITY DUE TO EXCESS CALORIES WITH SERIOUS COMORBIDITY AND BODY MASS INDEX (BMI) OF 40.0 TO 44.9 IN ADULT: ICD-10-CM

## 2025-03-19 DIAGNOSIS — E11.69 HYPERLIPIDEMIA ASSOCIATED WITH TYPE 2 DIABETES MELLITUS: ICD-10-CM

## 2025-03-19 DIAGNOSIS — E66.813 CLASS 3 SEVERE OBESITY DUE TO EXCESS CALORIES WITH SERIOUS COMORBIDITY AND BODY MASS INDEX (BMI) OF 40.0 TO 44.9 IN ADULT: ICD-10-CM

## 2025-03-19 PROCEDURE — 3008F BODY MASS INDEX DOCD: CPT | Mod: CPTII,S$GLB,,

## 2025-03-19 PROCEDURE — 3079F DIAST BP 80-89 MM HG: CPT | Mod: CPTII,S$GLB,,

## 2025-03-19 PROCEDURE — 1159F MED LIST DOCD IN RCRD: CPT | Mod: CPTII,S$GLB,,

## 2025-03-19 PROCEDURE — 4010F ACE/ARB THERAPY RXD/TAKEN: CPT | Mod: CPTII,S$GLB,,

## 2025-03-19 PROCEDURE — 3077F SYST BP >= 140 MM HG: CPT | Mod: CPTII,S$GLB,,

## 2025-03-19 PROCEDURE — 99214 OFFICE O/P EST MOD 30 MIN: CPT | Mod: S$GLB,,,

## 2025-03-19 PROCEDURE — 99999 PR PBB SHADOW E&M-EST. PATIENT-LVL IV: CPT | Mod: PBBFAC,,,

## 2025-03-20 DIAGNOSIS — I50.42 CHRONIC COMBINED SYSTOLIC AND DIASTOLIC CONGESTIVE HEART FAILURE: ICD-10-CM

## 2025-03-20 RX ORDER — SPIRONOLACTONE 25 MG/1
25 TABLET ORAL DAILY
Qty: 30 TABLET | Refills: 11 | Status: SHIPPED | OUTPATIENT
Start: 2025-03-20 | End: 2026-03-20

## 2025-04-01 RX ORDER — DULAGLUTIDE 0.75 MG/.5ML
0.75 INJECTION, SOLUTION SUBCUTANEOUS
Qty: 4 PEN | Refills: 11 | Status: SHIPPED | OUTPATIENT
Start: 2025-04-01 | End: 2026-04-01

## 2025-04-04 NOTE — PROGRESS NOTES
Cardiology Clinic note    Subjective:   Patient ID:  Jaimee Quintana is a 49 y.o. female who presents for follow-up of HTN, CHF    HPI:     4/9/2025: Routine F/U. Seen in clinic unaccompanied, reports overall doing very well. Feeling much better since hydral D/C'd, tolerating meds, CANELA significantly improved. BP stable. No recurrent pre-syncope. Grossly euvolemic on exam. Patient denies CP, SOB, orthopnea, PND, syncope, palpitations, LE edema. Reports compliance and tolerance with all medications.    3/19/2025: Routine F/U. Seen in clinic unaccompanied, reports overall doing okay. Labile blood pressures of recent in setting of medication non-compliance. Has been holding some medications due to intermittent dizziness. Home BP logs reviewed, significantly improved when taking meds (120s/70s), though with a couple episodes of low BP and pre-syncope. Stopped medication for a few days due to dizziness and BP escalated to 200s/100s. BP improved today. Stable from CHF perspective, grossly euvolemic on exam. Chronic CANELA. Patient denies CP, SOB, orthopnea, PND, syncope, palpitations, LE edema.     2/28/2025: F/U after medication escalation for HTN, acute on chronic HFrEF. Seen in clinic unaccompanied, reports overall doing much better. Breathing significantly improved. Home BP logs reviewed, improving. No recurrent CP. Reports abdominal bloating improving. Patient denies CP, SOB, orthopnea, PND, syncope, palpitations, LE edema. Reports compliance and tolerance with all medications.    2/20/2025: Routine F/U HTN, CHF. Seen in clinic unaccompanied, reports overall doing okay. Poor historian, difficult to assess medication compliance and dosing. Went through all medications, reports compliance and tolerance - has all bottles with correct dosing and reports taking them correctly. BP and weight up significantly today. No reported dietary indiscretion, reports she hasn't been missing medication dosing. Does endorse worsening  CANELA x 1-2 weeks, abdominal swelling. No CP, resting SOB, orthopnea, PND, syncope, palps, LE edema.     1/30/2025: Here for F/U HTN, HFrEF. Seen in clinic unaccompanied reports overall doing okay. Reports BPs have been up a bit the last month. Taking lasix 40mg TID. Weights stable, grossly euvolemic on exam. Chronic orthopnea, ongoing CANELA/ fatigue. Denies CP, SOB, PND, syncope, palps, LE edema. Reports compliance and tolerance with all medications.     12/26/2024: Here for F/U HTN, HFrEF. Seen in clinic, reports overall doing pretty well. Weight stable, tolerating meds. Taking lasix 40mg BID, has not needed additional dosing. Ongoing CANELA/orthopnea. Resp status stable, grossly euvolemic on exam. Patient denies CP, SOB, PND, syncope, palpitations, LE edema. Reports compliance and tolerance with all medications.    11/20/2024: Here for F/U HTN, HFrEF. Seen in clinic unaccompanied reports overall doing pretty well. Weight stable, compliant with home monitoring, taking lasix 40mg BID. Tolerating GDMT. Ongoing CANELA, improving, chronic 3-4 pillow orthopnea. No recurrent LE edema, no resting SOB. Denies CP, PND, syncope, palps. Reports compliance and tolerance with all medications.     11/11/2024: Here for F/U HTN, HFrEF. Lost to F/U after previous clinic visit, recent admission 10/19/2024 with acute on chronic HFrEF. Seen in clinic unaccompanied reports overall doing okay. Visibly nervous/anxious.   Long conversation about CHF diagnosis, importance of medication compliance and close clinic follow-up. Discussed lasix PRN parameters in detail and importance of home monitoring weights and blood pressures. Reports ongoing 4-pillow orthopnea and CANELA, though improved since hospital D/C. No other new CV s/s, denies recurrent LE edema. No new CP, SOB, PND, syncope, palps. Reports compliance and tolerance with all medications.     7/3/2024: Here for F/U HTN, HFrEF, PAD. Seen in clinic reports she's been nervous since her medication  changes with fluctuating BPs. Logs reviewed, still with intermittent HTN. Normotensive in clinic today. Reports ongoing CANELA, orthopnea; has been taking 40mg lasix daily for about a week with some improvement. Patient denies CP, SOB, PND, syncope, palpitations, LE edema. Reports compliance and tolerance with all medications.    6/24/2024: Previous patient of Dr. Beth and Vicente Quinones NP as below, initial visit for me. 47 yo F with hx of CVA, DM2, HTN, HLD, CESAR not on CPAP, MO w/ BMI 41.8, and HFrEF- recovered LVEF 50% (now back down to 35-40%) who presents today for routine F/U. 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 Seen in clinic today, reports overall doing well. Seemingly well compensated from HF perspective, grossly euvolemic on exam. Only taking lasix PRN.  Patient denies CP, SOB, PND, syncope, palpitations, LE edema. Reports compliance and tolerance with all medications.  -Will have to D/C [facility] given rEF and escalate other antihypertensives  -repeat Echo ~3m after stable on GDMT    9/29/2023: Pt is a 48 yo F w/ PMH of CVA, DM2 w/ hgba1c 11.4%, HTN, HLD, CESAR not on CPAP, MO w/ BMI 41.8, and HFrEF- recovered LVEF 50% who presents today for f/u appt. She was last seen by Dr. Beth on 6/27/23 to establish care, post hospital admission 5/22/2023-5/23/2023 for decompensated CHF and was diuresed and meds were adjusted (see d/c sum 5/24/2023). She notes compliance w/ meds and denies side effects. She has been monitoring her BP at home however states that her BP runs high at times, -170s. She denies cp, sob, orthopnea, PND, presyncope, LOC, palpitations. She is currently enrolled in PAD rehab and notes some improvement in BLE claudication.   She does not exercise however states she is active w/ riding her bike and walking around her block and denies cp or sob       Cardiac hx  ---------------------------------    Echo 1/2025    Left Ventricle: The left ventricle is normal in size. Mildly increased  wall thickness. There is low normal systolic function. Quantitated ejection fraction is 52%. Grade I diastolic dysfunction.    Right Ventricle: Normal right ventricular cavity size. Systolic function is normal.    PET Stress 2/2025    There are two significant perfusion abnormalities.    Perfusion Abnormality #1 - There is a very small (< 5%) sized, mild intensity basal anteroseptal reversible perfusion abnormality involving 4% of LV myocardium in the distribution of the 1st septal territory.    Within the perfusion abnormality, absolute myocardial perfusion (cc/min/gm) averaged 0.42 cc/min/g at rest, 0.46 cc/min/g at stress, and CFR was 1.11, which equates to severely reduced coronary flow capacity within the 1st septal territory.    Perfusion Abnormality #2 - There is a very small (<5%) sized, mild intensity distal anteroseptal reversible perfusion abnormality involving 4% of LV myocardium in the distribution of the distal LAD territory.    Within the perfusion abnormality, absolute myocardial perfusion (cc/min/gm) averaged 0.48 cc/min/g at rest, 0.52 cc/min/g at stress, and CFR was 1.10, which equates to severely reduced coronary flow capacity within the LAD territory.    There are no other significant perfusion abnormalities.    The whole heart absolute myocardial perfusion values were normal at rest, moderately reduced during stress and CFR is moderately reduced.    CT attenuation images demonstrate no coronary calcifications and no aortic calcifications.    The gated perfusion images showed an ejection fraction of 40% at rest and 42% during stress. A normal ejection fraction is greater than 47%.    There is normal wall motion at rest and normal wall motion during stress.    The study's ECG is negative for ischemia.    There is no prior study for comparison.    There coronary flow capacity is severly reduced in all coronary territories and may be indicative of multivessel disease.    Echo 10/2024    The left  ventricle is normal in size. There is concentric hypertrophy. Mild global hypokinesis present. There is mildly reduced systolic function with a visually estimated ejection fraction of 45 - 50%. Unable to assess diastolic function due to poor image quality.    Right Ventricle: Normal right ventricular cavity size. Systolic function is reduced.TAPSE is 1.24 cm.    Mitral Valve: There is mild regurgitation.    Tricuspid Valve: There is mild regurgitation.    Pulmonary Artery: The estimated pulmonary artery systolic pressure is 29 mmHg.    IVC/SVC: Normal venous pressure at 3 mmHg.    The study was difficult due to patient's body habitus.    Echo 10/2023    Left Ventricle: The left ventricle is normal in size. There is concentric hypertrophy. Mild global hypokinesis present. There is moderately reduced systolic function with a visually estimated ejection fraction of 35 - 40%. Grade II diastolic dysfunction.    Right Ventricle: Normal right ventricular cavity size. Wall thickness is normal. Right ventricle wall motion  is normal. Systolic function is normal.    Tricuspid Valve: There is mild regurgitation.    Pulmonary Artery: The estimated pulmonary artery systolic pressure is 26 mmHg.    IVC/SVC: Normal venous pressure at 3 mmHg.    ALEENA 7/2023  The right resting ALEENA reduction is normal.   The right arm artery has triphasic flow.  The right ankle [DT] artery has triphasic flow.   The right ankle [DP] artery has triphasic flow.   The left resting ALEENA reduction is normal.   The left arm artery has triphasic flow.  The left ankle [PT] artery has triphasic flow.  The left ankle [DP] artery has biphasic flow.  Exercise Study: treadmill test.   The exercise study was stopped due to protocol completion.     Symptoms: At 1 min, patient reported 6/10 right bottom of foot pain that resolved at 3 minutes.  Immediately post exercise, the right ALEENA is normal.  Immediately post exercise, the left ALEENA is mildly decreased.       Echo  5/22/2023    The left ventricle is normal in size with concentric remodeling and low normal systolic function.  The estimated ejection fraction is 50%.  Normal left ventricular diastolic function.  With normal right ventricular systolic function.    CV Exercise ALEENA study: 7/18/23  Conclusion     Mild PAD of left lower extremity with exercise (ALEENA 0.89).  Normal TBIs and toe pressures bilaterally      Past Medical History:   Diagnosis Date    Anxiety     Cataract     Cervical high risk HPV (human papillomavirus) test positive 09/17/2020    NOT 16 OR 18    CHF (congestive heart failure)     CVA (cerebral infarction) 2003         Depression     Diabetes mellitus, type 2     GERD (gastroesophageal reflux disease)     Hyperlipidemia     Hypertension     Lactic acidosis 10/06/2023    Moderate nonproliferative diabetic retinopathy     Nausea and vomiting 10/06/2023    Obesity     Stroke     Tachycardia 10/04/2023          Patient Active Problem List    Diagnosis Date Noted    Coronary artery disease of native artery of native heart with stable angina pectoris 02/18/2025    CANELA (dyspnea on exertion) 01/30/2025    Class 3 severe obesity with serious comorbidity and body mass index (BMI) of 40.0 to 44.9 in adult 11/19/2024     Diet and exercise advised       Tobacco dependency 10/19/2024    Type 2 diabetes mellitus with hyperglycemia, with long-term current use of insulin 10/19/2024     Sugars uncontrolled  Poor historian   Unsure if taking insulin at all   She thinks she takes lantus   Needs novolog   Taking metformin but does not like due to size; also taking trulicity but has not increased dose  Need endocrine help to get her straight and on appropriate meds will refer      Chronic combined systolic and diastolic congestive heart failure 01/06/2024     Follows with cards  Entresto  Lasix, BB, spironolactone       Bilateral claudication of lower limb 06/27/2023     Follows with cards   stable      Noncompliance with  medications 05/22/2023     POOR Historian   Does not consistently take medications  Unsure of what she is taking         GERD (gastroesophageal reflux disease) 05/22/2023     PPI PRN      History of CVA (cerebrovascular accident) without residual deficits 05/22/2023     Asa; statin   Stable         Type 2 diabetes mellitus with both eyes affected by proliferative retinopathy and macular edema, with long-term current use of insulin 05/11/2021    Thyroid nodule 11/08/2017     Multinodular goiter  FINAL PATHOLOGIC DIAGNOSIS  Left thyroid, fine-needle aspiration:  [place] [facility] Thyroid Cytology Category: Benign.        Diabetic peripheral neuropathy associated with type 2 diabetes mellitus 03/23/2017     Stable   Needs better glucose control   Refer to endo for help      Fatty liver 08/18/2016     encouraged weight loss      Hypertension associated with diabetes 10/23/2015     Coreg   Hydralazine  Spironolactone  Lasix   entresto      CESAR (obstructive sleep apnea) 10/23/2015     Bipap QHS      Hyperlipidemia associated with type 2 diabetes mellitus      Statin  stable         Patient's Medications   New Prescriptions    No medications on file   Previous Medications    ASPIRIN (ECOTRIN) 81 MG EC TABLET    Take 81 mg by mouth once daily.    ATORVASTATIN (LIPITOR) 80 MG TABLET    Take 1 tablet (80 mg total) by mouth every evening.    BENZONATATE (TESSALON) 200 MG CAPSULE    Take 200 mg by mouth 3 (three) times daily as needed.    CARVEDILOL (COREG) 25 MG TABLET    Take 1 tablet (25 mg total) by mouth 2 (two) times daily with meals.    DULAGLUTIDE (TRULICITY) 0.75 MG/0.5 ML PEN INJECTOR    Inject 0.75 mg into the skin every 7 days. OK to discontinue this med during SNF stay - resume upon discharge    DULAGLUTIDE (TRULICITY) 1.5 MG/0.5 ML PEN INJECTOR    Inject 1.5 mg into the skin every 7 days.    EMPAGLIFLOZIN (JARDIANCE) 25 MG TABLET    Take 1 tablet (25 mg total) by mouth once daily.    ERGOCALCIFEROL (ERGOCALCIFEROL)  "50,000 UNIT CAP    Take 1 capsule (50,000 Units total) by mouth every Saturday.    EZETIMIBE (ZETIA) 10 MG TABLET    Take 1 tablet (10 mg total) by mouth once daily.    FUROSEMIDE (LASIX) 40 MG TABLET    Take 2 tablets (80 mg total) by mouth 3 (three) times daily for 5 days, THEN 1 tablet (40 mg total) 3 (three) times daily.    INSULIN ASPART U-100 (NOVOLOG FLEXPEN U-100 INSULIN) 100 UNIT/ML (3 ML) INPN PEN    Inject 20 Units into the skin 3 (three) times daily with meals.    INSULIN DEGLUDEC (TRESIBA FLEXTOUCH U-100) 100 UNIT/ML (3 ML) INSULIN PEN    Inject 50 Units into the skin 2 (two) times a day.    METFORMIN (GLUCOPHAGE) 1000 MG TABLET    Take 1 tablet (1,000 mg total) by mouth 2 (two) times daily with meals.    NICOTINE, POLACRILEX, (NICORETTE) 2 MG GUM    Take 1 each (2 mg total) by mouth as needed (1-2 pieces / hr in place of a cigarette, maximum of 15 pieces/day).    PANTOPRAZOLE (PROTONIX) 40 MG TABLET    Take 40 mg by mouth every morning.    PEN NEEDLE, DIABETIC 32 GAUGE X 5/32" NDLE    Use to inject insulin 2 times per day    POTASSIUM CHLORIDE (KLOR-CON) 8 MEQ TBSR    TAKE ONE TABLET (8 meq total) BY MOUTH TWICE DAILY @ 9AM & 5PM    SACUBITRIL-VALSARTAN (ENTRESTO)  MG PER TABLET    Take 1 tablet by mouth 2 (two) times daily.    SPIRONOLACTONE (ALDACTONE) 25 MG TABLET    Take 1 tablet (25 mg total) by mouth once daily.   Modified Medications    No medications on file   Discontinued Medications    No medications on file        Review of Systems   Constitutional: Negative for chills, decreased appetite, diaphoresis, malaise/fatigue, weight gain and weight loss.   Cardiovascular:  Negative for chest pain, claudication, dyspnea on exertion, irregular heartbeat, leg swelling, near-syncope, orthopnea, palpitations, paroxysmal nocturnal dyspnea and syncope.   Respiratory:  Negative for cough, hemoptysis, shortness of breath and snoring.    Gastrointestinal:  Negative for bloating, abdominal pain, nausea " and vomiting.   Neurological:  Negative for light-headedness and weakness.   Psychiatric/Behavioral:  The patient is nervous/anxious.        Family History   Problem Relation Name Age of Onset    Stroke Mother      Thyroid disease Mother      Diabetes Mother      Cataracts Father      Hypertension Father      Arthritis Father      Diabetes Father      Hypertension Sister      Stroke Sister      Thyroid disease Sister      Heart disease Sister      No Known Problems Brother      Thyroid disease Daughter 2     Asthma Daughter 2     No Known Problems Maternal Aunt      No Known Problems Maternal Uncle      No Known Problems Paternal Aunt      No Known Problems Paternal Uncle      No Known Problems Maternal Grandmother      No Known Problems Maternal Grandfather      No Known Problems Paternal Grandmother      No Known Problems Paternal Grandfather      Amblyopia Neg Hx      Blindness Neg Hx      Cancer Neg Hx      Glaucoma Neg Hx      Macular degeneration Neg Hx      Retinal detachment Neg Hx      Strabismus Neg Hx         Social History     Socioeconomic History    Marital status: Single   Occupational History    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 She states ready to quit smoking. Ambulatory referral to Smoking Cessation clinic following hospital discharge. Pt is currently chewing nicotine gum.    Substance and Sexual Activity    Alcohol use: Yes     Comment: less than one monthly    Drug use: No    Sexual activity: Yes     Partners: Male     Birth control/protection: None   Social History Narrative    Daughter - Kavon     Social Drivers of Health     Financial Resource Strain: Low Risk  (10/21/2024)    Overall Financial  "Resource Strain (CARDIA)     Difficulty of Paying Living Expenses: Not hard at all   Recent Concern: Financial Resource Strain - Medium Risk (10/19/2024)    Overall Financial Resource Strain (CARDIA)     Difficulty of Paying Living Expenses: Somewhat hard   Food Insecurity: No Food Insecurity (10/21/2024)    Hunger Vital Sign     Worried About Running Out of Food in the Last Year: Never true     Ran Out of Food in the Last Year: Never true   Recent Concern: Food Insecurity - Food Insecurity Present (10/19/2024)    Hunger Vital Sign     Worried About Running Out of Food in the Last Year: Sometimes true     Ran Out of Food in the Last Year: Sometimes true   Transportation Needs: No Transportation Needs (10/21/2024)    TRANSPORTATION NEEDS     Transportation : No   Recent Concern: Transportation Needs - Unmet Transportation Needs (10/19/2024)    TRANSPORTATION NEEDS     Transportation : Yes, it has kept me from non-medical meetings, appointments, work or from getting things that I need.   Physical Activity: Inactive (10/21/2024)    Exercise Vital Sign     Days of Exercise per Week: 0 days     Minutes of Exercise per Session: 0 min   Stress: Stress Concern Present (10/21/2024)    [employer] - Occupational Stress Questionnaire     Feeling of Stress : To some extent   Housing Stability: Unknown (10/21/2024)    Housing Stability Vital Sign     Unable to Pay for Housing in the Last Year: No     Homeless in the Last Year: No            Objective:   Vitals  Vitals:    04/09/25 1123 04/09/25 1127   BP: 137/84 124/83   Pulse: 81    SpO2: 96%    Weight: 117 kg (257 lb 15 oz)    Height: 5' 5" (1.651 m)                           Physical Exam  Vitals and nursing note reviewed.   Constitutional:       Appearance: Normal appearance. She is obese.   Cardiovascular:      Rate and Rhythm: Normal rate and regular rhythm.      Heart sounds: No murmur heard.     No gallop.   Pulmonary:      Effort: Pulmonary " effort is normal.      Breath sounds: No wheezing or rales.   Abdominal:      General: Bowel sounds are normal. There is no distension.      Palpations: Abdomen is soft.      Tenderness: There is no abdominal tenderness.   Musculoskeletal:      Right lower leg: No edema.      Left lower leg: No edema.   Skin:     General: Skin is warm and dry.   Neurological:      Mental Status: She is alert and oriented to person, place, and time.       Lab Results    Lab Results   Component Value Date    WBC 11.97 10/22/2024    HGB 10.8 (L) 10/22/2024    HCT 33.4 (L) 10/22/2024    HCT 41.0 01/05/2024    MCV 89 10/22/2024       Lab Results   Component Value Date     10/22/2024    INR 1.0 01/08/2024       Lab Results   Component Value Date    K 4.3 02/28/2025    MG 1.7 02/28/2025    BUN 11 02/28/2025    CREATININE 0.8 02/28/2025       Lab Results   Component Value Date     (H) 02/28/2025    HGBA1C 9.1 (H) 10/31/2024       Lab Results   Component Value Date    AST 13 02/28/2025    ALT 19 02/28/2025    ALBUMIN 3.2 (L) 02/28/2025    PROT 8.1 02/28/2025       Lab Results   Component Value Date    CHOL 221 (H) 02/28/2025    HDL 47 02/28/2025    LDLCALC 131.2 02/28/2025    TRIG 214 (H) 02/28/2025       Lab Results   Component Value Date     (H) 10/19/2024         Assessment:     1. Chronic combined systolic and diastolic congestive heart failure    2. CANELA (dyspnea on exertion)    3. Coronary artery disease of native artery of native heart with stable angina pectoris    4. Hyperlipidemia associated with type 2 diabetes mellitus    5. Hypertension associated with diabetes    6. Class 3 severe obesity due to excess calories with serious comorbidity and body mass index (BMI) of 40.0 to 44.9 in adult    7. Type 2 diabetes mellitus with hyperglycemia, with long-term current use of insulin    8. CESAR (obstructive sleep apnea)    9. Tobacco dependency                      Plan:     HFrEF, since recovered  -stable, significant  improvement with lasix dose escalation and better BP control   -on good GDMT, continue lasix, BB, aldactone, jardiance, lasix as above  -low sodium, heart healthy diet  -continue home monitor BPs and log, doing better, home logs reviewed and stable  -educated on the importance of medication compliance and close clinic F/U - doing very well  -long discussion about lasix PRN parameters, daily weights, home monitoring of BP    2. CAD  -PET as above  -continue aggressive medical management, asa, statin, zetia  -tight lipid control  -absolute tobacco cessation education     2. HTN  -goal BP < 120/80, at goal  -continue  coreg, entresto, aldactone as above for now  -continue home monitoring, log; doing much better with compliance    3. CESAR  -has CPAP ordered, awaiting supplies    4. HLD  -goal LDLc < 70  -continue lipitor, zetia  -educated on importance of compliance, reports frequent missed doses    5. PAD  -stable, continue asa, statin, zetia    6. DM2  -A1C better, management per PCP  -continue meds as above  -will update A1C prior to F/U    7. Obesity  -encouraged Mediterranean diet, exercise, weight loss    8. Tobacco use  -cessation education  -F/U with cessation team as scheduled        -F/U ~ 6 weeks            The ASCVD Risk score (Elizabeth DK, et al., 2019) failed to calculate for the following reasons:    Risk score cannot be calculated because patient has a medical history suggesting prior/existing ASCVD    Orders Placed This Encounter   Procedures    Basic metabolic panel     Standing Status:   Future     Expected Date:   4/23/2025     Expiration Date:   7/8/2026     Send normal result to authorizing provider's In Basket if patient is active on MyChart::   Yes    BNP     Standing Status:   Future     Expected Date:   4/23/2025     Expiration Date:   7/8/2026    Magnesium     Standing Status:   Future     Expected Date:   4/23/2025     Expiration Date:   7/8/2026     Send normal result to authorizing provider's  In Basket if patient is active on MyChart::   Yes    Hemoglobin A1C     Standing Status:   Future     Expected Date:   4/23/2025     Expiration Date:   4/9/2026     Send normal result to authorizing provider's In Basket if patient is active on MyChart::   Yes       She expressed verbal understanding and agreed with the plan    Pertinent cardiac images and EKG reviewed independently.    Continue with current medical plan and lifestyle changes.  Return sooner for concerns or questions. If symptoms persist go to the ED.  I have reviewed all pertinent data including patient's medical history in detail and updated the computerized patient record.     Counseling included discussion regarding imaging findings, diagnosis, possibilities, treatment options, risks and benefits.    Thank you for the opportunity to care for this patient. Will be available for questions if needed.     Signed:  Claudio Whitney DNP  04/09/2025

## 2025-04-09 ENCOUNTER — OFFICE VISIT (OUTPATIENT)
Dept: CARDIOLOGY | Facility: CLINIC | Age: 50
End: 2025-04-09
Payer: MEDICARE

## 2025-04-09 VITALS
WEIGHT: 257.94 LBS | SYSTOLIC BLOOD PRESSURE: 124 MMHG | HEART RATE: 81 BPM | DIASTOLIC BLOOD PRESSURE: 83 MMHG | HEIGHT: 65 IN | OXYGEN SATURATION: 96 % | BODY MASS INDEX: 42.98 KG/M2

## 2025-04-09 DIAGNOSIS — G47.33 OSA (OBSTRUCTIVE SLEEP APNEA): ICD-10-CM

## 2025-04-09 DIAGNOSIS — E11.59 HYPERTENSION ASSOCIATED WITH DIABETES: ICD-10-CM

## 2025-04-09 DIAGNOSIS — E11.69 HYPERLIPIDEMIA ASSOCIATED WITH TYPE 2 DIABETES MELLITUS: ICD-10-CM

## 2025-04-09 DIAGNOSIS — E66.01 CLASS 3 SEVERE OBESITY DUE TO EXCESS CALORIES WITH SERIOUS COMORBIDITY AND BODY MASS INDEX (BMI) OF 40.0 TO 44.9 IN ADULT: ICD-10-CM

## 2025-04-09 DIAGNOSIS — R06.09 DOE (DYSPNEA ON EXERTION): ICD-10-CM

## 2025-04-09 DIAGNOSIS — E66.813 CLASS 3 SEVERE OBESITY DUE TO EXCESS CALORIES WITH SERIOUS COMORBIDITY AND BODY MASS INDEX (BMI) OF 40.0 TO 44.9 IN ADULT: ICD-10-CM

## 2025-04-09 DIAGNOSIS — I25.118 CORONARY ARTERY DISEASE OF NATIVE ARTERY OF NATIVE HEART WITH STABLE ANGINA PECTORIS: Chronic | ICD-10-CM

## 2025-04-09 DIAGNOSIS — E78.5 HYPERLIPIDEMIA ASSOCIATED WITH TYPE 2 DIABETES MELLITUS: ICD-10-CM

## 2025-04-09 DIAGNOSIS — I15.2 HYPERTENSION ASSOCIATED WITH DIABETES: ICD-10-CM

## 2025-04-09 DIAGNOSIS — Z79.4 TYPE 2 DIABETES MELLITUS WITH HYPERGLYCEMIA, WITH LONG-TERM CURRENT USE OF INSULIN: ICD-10-CM

## 2025-04-09 DIAGNOSIS — F17.200 TOBACCO DEPENDENCY: ICD-10-CM

## 2025-04-09 DIAGNOSIS — I50.42 CHRONIC COMBINED SYSTOLIC AND DIASTOLIC CONGESTIVE HEART FAILURE: Primary | ICD-10-CM

## 2025-04-09 DIAGNOSIS — E11.65 TYPE 2 DIABETES MELLITUS WITH HYPERGLYCEMIA, WITH LONG-TERM CURRENT USE OF INSULIN: ICD-10-CM

## 2025-04-09 PROCEDURE — 99213 OFFICE O/P EST LOW 20 MIN: CPT | Mod: S$GLB,,,

## 2025-04-09 PROCEDURE — 3074F SYST BP LT 130 MM HG: CPT | Mod: CPTII,S$GLB,,

## 2025-04-09 PROCEDURE — 3079F DIAST BP 80-89 MM HG: CPT | Mod: CPTII,S$GLB,,

## 2025-04-09 PROCEDURE — 99999 PR PBB SHADOW E&M-EST. PATIENT-LVL IV: CPT | Mod: PBBFAC,,,

## 2025-04-09 PROCEDURE — 3008F BODY MASS INDEX DOCD: CPT | Mod: CPTII,S$GLB,,

## 2025-04-09 PROCEDURE — 1159F MED LIST DOCD IN RCRD: CPT | Mod: CPTII,S$GLB,,

## 2025-04-09 PROCEDURE — 4010F ACE/ARB THERAPY RXD/TAKEN: CPT | Mod: CPTII,S$GLB,,

## 2025-04-11 ENCOUNTER — PATIENT OUTREACH (OUTPATIENT)
Dept: ADMINISTRATIVE | Facility: HOSPITAL | Age: 50
End: 2025-04-11
Payer: MEDICARE

## 2025-04-23 ENCOUNTER — LAB VISIT (OUTPATIENT)
Dept: LAB | Facility: HOSPITAL | Age: 50
End: 2025-04-23
Payer: MEDICARE

## 2025-04-23 ENCOUNTER — RESULTS FOLLOW-UP (OUTPATIENT)
Dept: CARDIOLOGY | Facility: CLINIC | Age: 50
End: 2025-04-23

## 2025-04-23 DIAGNOSIS — E11.65 TYPE 2 DIABETES MELLITUS WITH HYPERGLYCEMIA, WITH LONG-TERM CURRENT USE OF INSULIN: ICD-10-CM

## 2025-04-23 DIAGNOSIS — I50.42 CHRONIC COMBINED SYSTOLIC AND DIASTOLIC CONGESTIVE HEART FAILURE: ICD-10-CM

## 2025-04-23 DIAGNOSIS — Z79.4 TYPE 2 DIABETES MELLITUS WITH HYPERGLYCEMIA, WITH LONG-TERM CURRENT USE OF INSULIN: ICD-10-CM

## 2025-04-23 LAB
ANION GAP (OHS): 12 MMOL/L (ref 8–16)
BNP SERPL-MCNC: 120 PG/ML (ref 0–99)
BUN SERPL-MCNC: 14 MG/DL (ref 6–20)
CALCIUM SERPL-MCNC: 9.9 MG/DL (ref 8.7–10.5)
CHLORIDE SERPL-SCNC: 104 MMOL/L (ref 95–110)
CO2 SERPL-SCNC: 22 MMOL/L (ref 23–29)
CREAT SERPL-MCNC: 1.1 MG/DL (ref 0.5–1.4)
EAG (OHS): 266 MG/DL (ref 68–131)
GFR SERPLBLD CREATININE-BSD FMLA CKD-EPI: >60 ML/MIN/1.73/M2
GLUCOSE SERPL-MCNC: 325 MG/DL (ref 70–110)
HBA1C MFR BLD: 10.9 % (ref 4–5.6)
MAGNESIUM SERPL-MCNC: 1.8 MG/DL (ref 1.6–2.6)
POTASSIUM SERPL-SCNC: 4.2 MMOL/L (ref 3.5–5.1)
SODIUM SERPL-SCNC: 138 MMOL/L (ref 136–145)

## 2025-04-23 PROCEDURE — 83036 HEMOGLOBIN GLYCOSYLATED A1C: CPT

## 2025-04-23 PROCEDURE — 83880 ASSAY OF NATRIURETIC PEPTIDE: CPT

## 2025-04-23 PROCEDURE — 80048 BASIC METABOLIC PNL TOTAL CA: CPT

## 2025-04-23 PROCEDURE — 36415 COLL VENOUS BLD VENIPUNCTURE: CPT

## 2025-04-23 PROCEDURE — 83735 ASSAY OF MAGNESIUM: CPT

## 2025-04-25 ENCOUNTER — PATIENT OUTREACH (OUTPATIENT)
Dept: ADMINISTRATIVE | Facility: HOSPITAL | Age: 50
End: 2025-04-25
Payer: MEDICARE

## 2025-04-25 DIAGNOSIS — I15.2 HYPERTENSION ASSOCIATED WITH DIABETES: ICD-10-CM

## 2025-04-25 DIAGNOSIS — E11.59 HYPERTENSION ASSOCIATED WITH DIABETES: ICD-10-CM

## 2025-04-25 NOTE — PROGRESS NOTES
04/25/2025  VB chart audit performed. Care Everywhere updates requested and reviewed  Overdue HM topic chart audit and/or requested. LINKS triggered and reconciled. Media reviewed Lvm/portal sent regarding overdue health topics

## 2025-05-01 ENCOUNTER — TELEPHONE (OUTPATIENT)
Dept: SMOKING CESSATION | Facility: CLINIC | Age: 50
End: 2025-05-01
Payer: MEDICARE

## 2025-05-19 NOTE — PROGRESS NOTES
Cardiology Clinic note    Subjective:   Patient ID:  Jaimee Quintana is a 49 y.o. female who presents for follow-up of HTN, CHF, CAD    HPI:     5/21/2025: Routine F/U HTN, HFpEF, CAD. Seen in clinic unaccompanied, reports overall doing pretty well. Feeling much better since medication changes, no recurrent pre-syncope. Euvolemic on exam, weight down on my scale. Has not had to hold any medications. Patient denies CP, SOB, orthopnea, PND, syncope, palpitations, LE edema. Reports compliance and tolerance with all medications.    4/9/2025: Routine F/U. Seen in clinic unaccompanied, reports overall doing very well. Feeling much better since hydral D/C'd, tolerating meds, CANELA significantly improved. BP stable. No recurrent pre-syncope. Grossly euvolemic on exam. Patient denies CP, SOB, orthopnea, PND, syncope, palpitations, LE edema. Reports compliance and tolerance with all medications.    3/19/2025: Routine F/U. Seen in clinic unaccompanied, reports overall doing okay. Labile blood pressures of recent in setting of medication non-compliance. Has been holding some medications due to intermittent dizziness. Home BP logs reviewed, significantly improved when taking meds (120s/70s), though with a couple episodes of low BP and pre-syncope. Stopped medication for a few days due to dizziness and BP escalated to 200s/100s. BP improved today. Stable from CHF perspective, grossly euvolemic on exam. Chronic CANELA. Patient denies CP, SOB, orthopnea, PND, syncope, palpitations, LE edema.     2/28/2025: F/U after medication escalation for HTN, acute on chronic HFrEF. Seen in clinic unaccompanied, reports overall doing much better. Breathing significantly improved. Home BP logs reviewed, improving. No recurrent CP. Reports abdominal bloating improving. Patient denies CP, SOB, orthopnea, PND, syncope, palpitations, LE edema. Reports compliance and tolerance with all medications.    2/20/2025: Routine F/U HTN, CHF. Seen in clinic  unaccompanied, reports overall doing okay. Poor historian, difficult to assess medication compliance and dosing. Went through all medications, reports compliance and tolerance - has all bottles with correct dosing and reports taking them correctly. BP and weight up significantly today. No reported dietary indiscretion, reports she hasn't been missing medication dosing. Does endorse worsening CANELA x 1-2 weeks, abdominal swelling. No CP, resting SOB, orthopnea, PND, syncope, palps, LE edema.     1/30/2025: Here for F/U HTN, HFrEF. Seen in clinic unaccompanied reports overall doing okay. Reports BPs have been up a bit the last month. Taking lasix 40mg TID. Weights stable, grossly euvolemic on exam. Chronic orthopnea, ongoing CANELA/ fatigue. Denies CP, SOB, PND, syncope, palps, LE edema. Reports compliance and tolerance with all medications.     12/26/2024: Here for F/U HTN, HFrEF. Seen in clinic, reports overall doing pretty well. Weight stable, tolerating meds. Taking lasix 40mg BID, has not needed additional dosing. Ongoing CANELA/orthopnea. Resp status stable, grossly euvolemic on exam. Patient denies CP, SOB, PND, syncope, palpitations, LE edema. Reports compliance and tolerance with all medications.    11/20/2024: Here for F/U HTN, HFrEF. Seen in clinic unaccompanied reports overall doing pretty well. Weight stable, compliant with home monitoring, taking lasix 40mg BID. Tolerating GDMT. Ongoing CANELA, improving, chronic 3-4 pillow orthopnea. No recurrent LE edema, no resting SOB. Denies CP, PND, syncope, palps. Reports compliance and tolerance with all medications.     11/11/2024: Here for F/U HTN, HFrEF. Lost to F/U after previous clinic visit, recent admission 10/19/2024 with acute on chronic HFrEF. Seen in clinic unaccompanied reports overall doing okay. Visibly nervous/anxious.   Long conversation about CHF diagnosis, importance of medication compliance and close clinic follow-up. Discussed lasix PRN parameters in  detail and importance of home monitoring weights and blood pressures. Reports ongoing 4-pillow orthopnea and CANELA, though improved since hospital D/C. No other new CV s/s, denies recurrent LE edema. No new CP, SOB, PND, syncope, palps. Reports compliance and tolerance with all medications.     7/3/2024: Here for F/U HTN, HFrEF, PAD. Seen in clinic reports she's been nervous since her medication changes with fluctuating BPs. Logs reviewed, still with intermittent HTN. Normotensive in clinic today. Reports ongoing CANELA, orthopnea; has been taking 40mg lasix daily for about a week with some improvement. Patient denies CP, SOB, PND, syncope, palpitations, LE edema. Reports compliance and tolerance with all medications.    6/24/2024: Previous patient of Dr. Beth and Vicente Quinones, NP as below, initial visit for me. 49 yo F with hx of CVA, DM2, HTN, HLD, CESAR not on CPAP, MO w/ BMI 41.8, and HFrEF- recovered LVEF 50% (now back down to 35-40%) who presents today for routine F/U. 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 Seen in clinic today, reports overall doing well. Seemingly well compensated from HF perspective, grossly euvolemic on exam. Only taking lasix PRN.  Patient denies CP, SOB, PND, syncope, palpitations, LE edema. Reports compliance and tolerance with all medications.  -Will have to D/C [facility] given rEF and escalate other antihypertensives  -repeat Echo ~3m after stable on GDMT    9/29/2023: Pt is a 48 yo F w/ PMH of CVA, DM2 w/ hgba1c 11.4%, HTN, HLD, CESAR not on CPAP, MO w/ BMI 41.8, and HFrEF- recovered LVEF 50% who presents today for f/u appt. She was last seen by Dr. Beth on 6/27/23 to establish care, post hospital admission 5/22/2023-5/23/2023 for decompensated CHF and was diuresed and meds were adjusted (see d/c sum 5/24/2023). She notes compliance w/ meds and denies side effects. She has been monitoring her BP at home however states that her BP runs high at times, -170s. She denies cp, sob,  orthopnea, PND, presyncope, LOC, palpitations. She is currently enrolled in PAD rehab and notes some improvement in BLE claudication.   She does not exercise however states she is active w/ riding her bike and walking around her block and denies cp or sob       Cardiac hx  ---------------------------------    Echo 1/2025    Left Ventricle: The left ventricle is normal in size. Mildly increased wall thickness. There is low normal systolic function. Quantitated ejection fraction is 52%. Grade I diastolic dysfunction.    Right Ventricle: Normal right ventricular cavity size. Systolic function is normal.    PET Stress 2/2025    There are two significant perfusion abnormalities.    Perfusion Abnormality #1 - There is a very small (< 5%) sized, mild intensity basal anteroseptal reversible perfusion abnormality involving 4% of LV myocardium in the distribution of the 1st septal territory.    Within the perfusion abnormality, absolute myocardial perfusion (cc/min/gm) averaged 0.42 cc/min/g at rest, 0.46 cc/min/g at stress, and CFR was 1.11, which equates to severely reduced coronary flow capacity within the 1st septal territory.    Perfusion Abnormality #2 - There is a very small (<5%) sized, mild intensity distal anteroseptal reversible perfusion abnormality involving 4% of LV myocardium in the distribution of the distal LAD territory.    Within the perfusion abnormality, absolute myocardial perfusion (cc/min/gm) averaged 0.48 cc/min/g at rest, 0.52 cc/min/g at stress, and CFR was 1.10, which equates to severely reduced coronary flow capacity within the LAD territory.    There are no other significant perfusion abnormalities.    The whole heart absolute myocardial perfusion values were normal at rest, moderately reduced during stress and CFR is moderately reduced.    CT attenuation images demonstrate no coronary calcifications and no aortic calcifications.    The gated perfusion images showed an ejection fraction of 40% at  rest and 42% during stress. A normal ejection fraction is greater than 47%.    There is normal wall motion at rest and normal wall motion during stress.    The study's ECG is negative for ischemia.    There is no prior study for comparison.    There coronary flow capacity is severly reduced in all coronary territories and may be indicative of multivessel disease.    Echo 10/2024    The left ventricle is normal in size. There is concentric hypertrophy. Mild global hypokinesis present. There is mildly reduced systolic function with a visually estimated ejection fraction of 45 - 50%. Unable to assess diastolic function due to poor image quality.    Right Ventricle: Normal right ventricular cavity size. Systolic function is reduced.TAPSE is 1.24 cm.    Mitral Valve: There is mild regurgitation.    Tricuspid Valve: There is mild regurgitation.    Pulmonary Artery: The estimated pulmonary artery systolic pressure is 29 mmHg.    IVC/SVC: Normal venous pressure at 3 mmHg.    The study was difficult due to patient's body habitus.    Echo 10/2023    Left Ventricle: The left ventricle is normal in size. There is concentric hypertrophy. Mild global hypokinesis present. There is moderately reduced systolic function with a visually estimated ejection fraction of 35 - 40%. Grade II diastolic dysfunction.    Right Ventricle: Normal right ventricular cavity size. Wall thickness is normal. Right ventricle wall motion  is normal. Systolic function is normal.    Tricuspid Valve: There is mild regurgitation.    Pulmonary Artery: The estimated pulmonary artery systolic pressure is 26 mmHg.    IVC/SVC: Normal venous pressure at 3 mmHg.    ALEENA 7/2023  The right resting ALEENA reduction is normal.   The right arm artery has triphasic flow.  The right ankle [DT] artery has triphasic flow.   The right ankle [DP] artery has triphasic flow.   The left resting ALEENA reduction is normal.   The left arm artery has triphasic flow.  The left ankle [PT]  artery has triphasic flow.  The left ankle [DP] artery has biphasic flow.  Exercise Study: treadmill test.   The exercise study was stopped due to protocol completion.     Symptoms: At 1 min, patient reported 6/10 right bottom of foot pain that resolved at 3 minutes.  Immediately post exercise, the right ALEENA is normal.  Immediately post exercise, the left ALEENA is mildly decreased.       Echo 5/22/2023    The left ventricle is normal in size with concentric remodeling and low normal systolic function.  The estimated ejection fraction is 50%.  Normal left ventricular diastolic function.  With normal right ventricular systolic function.    CV Exercise ALEENA study: 7/18/23  Conclusion     Mild PAD of left lower extremity with exercise (ALEENA 0.89).  Normal TBIs and toe pressures bilaterally      Past Medical History:   Diagnosis Date    Anxiety     Cataract     Cervical high risk HPV (human papillomavirus) test positive 09/17/2020    NOT 16 OR 18    CHF (congestive heart failure)     CVA (cerebral infarction) 2003         Depression     Diabetes mellitus, type 2     GERD (gastroesophageal reflux disease)     Hyperlipidemia     Hypertension     Lactic acidosis 10/06/2023    Moderate nonproliferative diabetic retinopathy     Nausea and vomiting 10/06/2023    Obesity     Stroke     Tachycardia 10/04/2023          Patient Active Problem List    Diagnosis Date Noted    Coronary artery disease of native artery of native heart with stable angina pectoris 02/18/2025    CANELA (dyspnea on exertion) 01/30/2025    Class 3 severe obesity with serious comorbidity and body mass index (BMI) of 40.0 to 44.9 in adult 11/19/2024     Diet and exercise advised       Tobacco dependency 10/19/2024    Type 2 diabetes mellitus with hyperglycemia, with long-term current use of insulin 10/19/2024     Sugars uncontrolled  Poor historian   Unsure if taking insulin at all   She thinks she takes lantus   Needs novolog   Taking metformin but does not like  due to size; also taking trulicity but has not increased dose  Need endocrine help to get her straight and on appropriate meds will refer      Chronic combined systolic and diastolic congestive heart failure 01/06/2024     Follows with cards  Entresto  Lasix, BB, spironolactone       Bilateral claudication of lower limb 06/27/2023     Follows with cards   stable      Noncompliance with medications 05/22/2023     POOR Historian   Does not consistently take medications  Unsure of what she is taking         GERD (gastroesophageal reflux disease) 05/22/2023     PPI PRN      History of CVA (cerebrovascular accident) without residual deficits 05/22/2023     Asa; statin   Stable         Type 2 diabetes mellitus with both eyes affected by proliferative retinopathy and macular edema, with long-term current use of insulin 05/11/2021    Thyroid nodule 11/08/2017     Multinodular goiter  FINAL PATHOLOGIC DIAGNOSIS  Left thyroid, fine-needle aspiration:  [facility] Thyroid Cytology Category: Benign.        Diabetic peripheral neuropathy associated with type 2 diabetes mellitus 03/23/2017     Stable   Needs better glucose control   Refer to endo for help      Fatty liver 08/18/2016     encouraged weight loss      Hypertension associated with diabetes 10/23/2015     Coreg   Hydralazine  Spironolactone  Lasix   entresto      CESAR (obstructive sleep apnea) 10/23/2015     Bipap QHS      Hyperlipidemia associated with type 2 diabetes mellitus      Statin  stable         Patient's Medications   New Prescriptions    No medications on file   Previous Medications    ASPIRIN (ECOTRIN) 81 MG EC TABLET    Take 81 mg by mouth once daily.    BENZONATATE (TESSALON) 200 MG CAPSULE    Take 200 mg by mouth 3 (three) times daily as needed.    CARVEDILOL (COREG) 25 MG TABLET    Take 1 tablet (25 mg total) by mouth 2 (two) times daily with meals.    DULAGLUTIDE (TRULICITY) 0.75 MG/0.5 ML PEN INJECTOR    Inject 0.75 mg into the skin every 7 days.  "OK to discontinue this med during SNF stay - resume upon discharge    DULAGLUTIDE (TRULICITY) 1.5 MG/0.5 ML PEN INJECTOR    Inject 1.5 mg into the skin every 7 days.    EMPAGLIFLOZIN (JARDIANCE) 25 MG TABLET    Take 1 tablet (25 mg total) by mouth once daily.    ERGOCALCIFEROL (ERGOCALCIFEROL) 50,000 UNIT CAP    Take 1 capsule (50,000 Units total) by mouth every Saturday.    EZETIMIBE (ZETIA) 10 MG TABLET    Take 1 tablet (10 mg total) by mouth once daily.    FUROSEMIDE (LASIX) 40 MG TABLET    Take 2 tablets (80 mg total) by mouth 3 (three) times daily for 5 days, THEN 1 tablet (40 mg total) 3 (three) times daily.    INSULIN ASPART U-100 (NOVOLOG FLEXPEN U-100 INSULIN) 100 UNIT/ML (3 ML) INPN PEN    Inject 20 Units into the skin 3 (three) times daily with meals.    INSULIN DEGLUDEC (TRESIBA FLEXTOUCH U-100) 100 UNIT/ML (3 ML) INSULIN PEN    Inject 50 Units into the skin 2 (two) times a day.    METFORMIN (GLUCOPHAGE) 1000 MG TABLET    Take 1 tablet (1,000 mg total) by mouth 2 (two) times daily with meals.    NICOTINE, POLACRILEX, (NICORETTE) 2 MG GUM    Take 1 each (2 mg total) by mouth as needed (1-2 pieces / hr in place of a cigarette, maximum of 15 pieces/day).    PANTOPRAZOLE (PROTONIX) 40 MG TABLET    Take 40 mg by mouth every morning.    PEN NEEDLE, DIABETIC 32 GAUGE X 5/32" NDLE    Use to inject insulin 2 times per day    POTASSIUM CHLORIDE (KLOR-CON) 8 MEQ TBSR    TAKE ONE TABLET (8 meq total) BY MOUTH TWICE DAILY @ 9AM & 5PM    SACUBITRIL-VALSARTAN (ENTRESTO)  MG PER TABLET    Take 1 tablet by mouth 2 (two) times daily.    SPIRONOLACTONE (ALDACTONE) 25 MG TABLET    Take 1 tablet (25 mg total) by mouth once daily.   Modified Medications    Modified Medication Previous Medication    ATORVASTATIN (LIPITOR) 80 MG TABLET atorvastatin (LIPITOR) 80 MG tablet       Take 1 tablet (80 mg total) by mouth every evening.    Take 1 tablet (80 mg total) by mouth every evening.   Discontinued Medications    No " medications on file        Review of Systems   Constitutional: Negative for chills, decreased appetite, diaphoresis, malaise/fatigue, weight gain and weight loss.   Cardiovascular:  Negative for chest pain, claudication, dyspnea on exertion, irregular heartbeat, leg swelling, near-syncope, orthopnea, palpitations, paroxysmal nocturnal dyspnea and syncope.   Respiratory:  Negative for cough, hemoptysis, shortness of breath and snoring.    Gastrointestinal:  Negative for bloating, abdominal pain, nausea and vomiting.   Neurological:  Negative for light-headedness and weakness.   Psychiatric/Behavioral:  The patient is nervous/anxious.        Family History   Problem Relation Name Age of Onset    Stroke Mother      Thyroid disease Mother      Diabetes Mother      Cataracts Father      Hypertension Father      Arthritis Father      Diabetes Father      Hypertension Sister      Stroke Sister      Thyroid disease Sister      Heart disease Sister      No Known Problems Brother      Thyroid disease Daughter 2     Asthma Daughter 2     No Known Problems Maternal Aunt      No Known Problems Maternal Uncle      No Known Problems Paternal Aunt      No Known Problems Paternal Uncle      No Known Problems Maternal Grandmother      No Known Problems Maternal Grandfather      No Known Problems Paternal Grandmother      No Known Problems Paternal Grandfather      Amblyopia Neg Hx      Blindness Neg Hx      Cancer Neg Hx      Glaucoma Neg Hx      Macular degeneration Neg Hx      Retinal detachment Neg Hx      Strabismus Neg Hx         Social History     Socioeconomic History    Marital status: Single   Occupational History    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 She states ready to quit smoking. Ambulatory referral to Smoking Cessation clinic following hospital discharge. Pt is currently chewing nicotine gum.    Substance and Sexual Activity    Alcohol use: Yes     Comment: less than one monthly    Drug use: No    Sexual activity: Yes     Partners: Male     Birth control/protection: None   Social History Narrative    Daughter - Kavon     Social Drivers of Health     Financial Resource Strain: High Risk (3/11/2025)    Received from [facility] SDOH Screening     In the past year, have you been unable to get any of the following when you really needed them? choose all that apply.: Clothing   Food Insecurity: High Risk (3/10/2025)    Received from [facility] SDOH Screening     In the past 2 months, did you or others you live with eat smaller meals or skip meals because you didn't have money for food?: Yes   Transportation Needs: High Risk (3/11/2025)    Received from [facility] SDOH Screening     Has lack of transportation kept you from medical appointments, meetings, work or from getting things needed for daily living? choose all that apply.: Yes, it has kept me from medical appointments or from getting my medications     Has lack of transportation kept you from medical appointments, meetings, work or from getting things needed for daily living? choose all that apply.: Yes, it has kept me from non-medical meetings, appointments, work or from getting things that i need   Physical Activity: Inactive (10/21/2024)    Exercise Vital Sign     Days of Exercise per Week: 0 days     Minutes of Exercise per Session: 0 min   Stress: Stress Concern Present (10/21/2024)    [employer] - Occupational Stress Questionnaire     Feeling of Stress : To some extent   Housing Stability: Unknown (10/21/2024)    Housing Stability Vital Sign     Unable to Pay for Housing in the Last Year: No     Homeless in  "the Last Year: No            Objective:   Vitals  Vitals:    05/21/25 0949 05/21/25 0952   BP: 106/73 110/73   Pulse: 84    SpO2: 97%    Weight: 115.6 kg (254 lb 15.4 oz)    Height: 5' 5" (1.651 m)                             Physical Exam  Vitals and nursing note reviewed.   Constitutional:       Appearance: Normal appearance. She is obese.   Cardiovascular:      Rate and Rhythm: Normal rate and regular rhythm.      Heart sounds: No murmur heard.     No gallop.   Pulmonary:      Effort: Pulmonary effort is normal.      Breath sounds: No wheezing or rales.   Abdominal:      General: Bowel sounds are normal. There is no distension.      Palpations: Abdomen is soft.      Tenderness: There is no abdominal tenderness.   Musculoskeletal:      Right lower leg: No edema.      Left lower leg: No edema.   Skin:     General: Skin is warm and dry.   Neurological:      Mental Status: She is alert and oriented to person, place, and time.         Lab Results    Lab Results   Component Value Date    WBC 11.97 10/22/2024    HGB 10.8 (L) 10/22/2024    HCT 33.4 (L) 10/22/2024    HCT 41.0 01/05/2024    MCV 89 10/22/2024       Lab Results   Component Value Date     10/22/2024    INR 1.0 01/08/2024       Lab Results   Component Value Date    K 4.2 04/23/2025    K 4.3 02/28/2025    MG 1.8 04/23/2025    BUN 14 04/23/2025    CREATININE 1.1 04/23/2025       Lab Results   Component Value Date     (H) 04/23/2025     (H) 02/28/2025    HGBA1C 10.9 (H) 04/23/2025    HGBA1C 9.1 (H) 10/31/2024       Lab Results   Component Value Date    AST 13 02/28/2025    ALT 19 02/28/2025    ALBUMIN 3.2 (L) 02/28/2025    PROT 8.1 02/28/2025       Lab Results   Component Value Date    CHOL 221 (H) 02/28/2025    HDL 47 02/28/2025    LDLCALC 131.2 02/28/2025    TRIG 214 (H) 02/28/2025       Lab Results   Component Value Date     (H) 04/23/2025         Assessment:     1. Chronic combined systolic and diastolic congestive heart failure  "   2. Hyperlipidemia, unspecified hyperlipidemia type Sub-optimally controlled   3. Acute on chronic combined systolic (congestive) and diastolic (congestive) heart failure    4. Coronary artery disease of native artery of native heart with stable angina pectoris    5. Hyperlipidemia associated with type 2 diabetes mellitus    6. Hypertension associated with diabetes    7. Bilateral claudication of lower limb    8. Class 3 severe obesity due to excess calories with serious comorbidity and body mass index (BMI) of 40.0 to 44.9 in adult    9. CESAR (obstructive sleep apnea)    10. Tobacco dependency                        Plan:     HFrEF, since recovered  -stable, significant improvement with lasix dose escalation and better BP control   -on good GDMT, continue lasix, BB, aldactone, jardiance, lasix as above; currently taking lasix 40mg TID  -low sodium, heart healthy diet  -continue home monitor BPs and log; contact clinic with any issues  -educated on the importance of medication compliance and close clinic F/U - doing very well  -long discussion about lasix PRN parameters, daily weights, home monitoring of BP    2. CAD  -PET as above  -continue aggressive medical management, asa, statin, zetia  -tight lipid control  -absolute tobacco cessation education     2. HTN  -goal BP < 120/80, at goal  -continue  coreg, entresto, aldactone as above for now  -continue home monitoring, log; doing much better with compliance    3. CESAR  -has CPAP ordered, awaiting supplies    4. HLD  -goal LDLc < 70  -continue lipitor, zetia  -educated on importance of compliance, reports frequent missed doses    5. PAD  -stable, continue asa, statin, zetia    6. DM2  -worsening management per PCP  -continue meds as above  -educated on importance of glucose control in setting of CAD, PAD; low threshold for endo referral if unimproved     7. Obesity  -encouraged Mediterranean diet, exercise, weight loss    8. Tobacco use  -cessation education  -F/U  with cessation team as scheduled      -update labs today   -F/U ~ 3m or sooner PRN            The ASCVD Risk score (Elizabeth LÓPEZ, et al., 2019) failed to calculate for the following reasons:    Risk score cannot be calculated because patient has a medical history suggesting prior/existing ASCVD    Orders Placed This Encounter   Procedures    Basic metabolic panel     Standing Status:   Future     Number of Occurrences:   1     Expected Date:   5/21/2025     Expiration Date:   8/19/2026     Send normal result to authorizing provider's In Basket if patient is active on MyChart::   Yes    BNP     Standing Status:   Future     Number of Occurrences:   1     Expected Date:   5/21/2025     Expiration Date:   8/19/2026    Magnesium     Standing Status:   Future     Number of Occurrences:   1     Expected Date:   5/21/2025     Expiration Date:   8/19/2026     Send normal result to authorizing provider's In Basket if patient is active on MyChart::   Yes       She expressed verbal understanding and agreed with the plan    Pertinent cardiac images and EKG reviewed independently.    Continue with current medical plan and lifestyle changes.  Return sooner for concerns or questions. If symptoms persist go to the ED.  I have reviewed all pertinent data including patient's medical history in detail and updated the computerized patient record.     Counseling included discussion regarding imaging findings, diagnosis, possibilities, treatment options, risks and benefits.    Thank you for the opportunity to care for this patient. Will be available for questions if needed.     Signed:  Claudio Whitney DNP  05/21/2025

## 2025-05-21 ENCOUNTER — OFFICE VISIT (OUTPATIENT)
Dept: CARDIOLOGY | Facility: CLINIC | Age: 50
End: 2025-05-21
Payer: MEDICARE

## 2025-05-21 ENCOUNTER — LAB VISIT (OUTPATIENT)
Dept: LAB | Facility: HOSPITAL | Age: 50
End: 2025-05-21
Payer: MEDICARE

## 2025-05-21 ENCOUNTER — RESULTS FOLLOW-UP (OUTPATIENT)
Dept: CARDIOLOGY | Facility: CLINIC | Age: 50
End: 2025-05-21

## 2025-05-21 VITALS
SYSTOLIC BLOOD PRESSURE: 110 MMHG | OXYGEN SATURATION: 97 % | BODY MASS INDEX: 42.48 KG/M2 | HEIGHT: 65 IN | HEART RATE: 84 BPM | DIASTOLIC BLOOD PRESSURE: 73 MMHG | WEIGHT: 254.94 LBS

## 2025-05-21 DIAGNOSIS — E66.01 CLASS 3 SEVERE OBESITY DUE TO EXCESS CALORIES WITH SERIOUS COMORBIDITY AND BODY MASS INDEX (BMI) OF 40.0 TO 44.9 IN ADULT: ICD-10-CM

## 2025-05-21 DIAGNOSIS — E66.813 CLASS 3 SEVERE OBESITY DUE TO EXCESS CALORIES WITH SERIOUS COMORBIDITY AND BODY MASS INDEX (BMI) OF 40.0 TO 44.9 IN ADULT: ICD-10-CM

## 2025-05-21 DIAGNOSIS — F17.200 TOBACCO DEPENDENCY: ICD-10-CM

## 2025-05-21 DIAGNOSIS — I25.118 CORONARY ARTERY DISEASE OF NATIVE ARTERY OF NATIVE HEART WITH STABLE ANGINA PECTORIS: Chronic | ICD-10-CM

## 2025-05-21 DIAGNOSIS — G47.33 OSA (OBSTRUCTIVE SLEEP APNEA): ICD-10-CM

## 2025-05-21 DIAGNOSIS — I15.2 HYPERTENSION ASSOCIATED WITH DIABETES: ICD-10-CM

## 2025-05-21 DIAGNOSIS — E78.5 HYPERLIPIDEMIA, UNSPECIFIED HYPERLIPIDEMIA TYPE: ICD-10-CM

## 2025-05-21 DIAGNOSIS — I50.42 CHRONIC COMBINED SYSTOLIC AND DIASTOLIC CONGESTIVE HEART FAILURE: ICD-10-CM

## 2025-05-21 DIAGNOSIS — I73.9 BILATERAL CLAUDICATION OF LOWER LIMB: ICD-10-CM

## 2025-05-21 DIAGNOSIS — I50.42 CHRONIC COMBINED SYSTOLIC AND DIASTOLIC CONGESTIVE HEART FAILURE: Primary | ICD-10-CM

## 2025-05-21 DIAGNOSIS — E11.69 HYPERLIPIDEMIA ASSOCIATED WITH TYPE 2 DIABETES MELLITUS: ICD-10-CM

## 2025-05-21 DIAGNOSIS — I50.43 ACUTE ON CHRONIC COMBINED SYSTOLIC (CONGESTIVE) AND DIASTOLIC (CONGESTIVE) HEART FAILURE: ICD-10-CM

## 2025-05-21 DIAGNOSIS — E11.59 HYPERTENSION ASSOCIATED WITH DIABETES: ICD-10-CM

## 2025-05-21 DIAGNOSIS — E78.5 HYPERLIPIDEMIA ASSOCIATED WITH TYPE 2 DIABETES MELLITUS: ICD-10-CM

## 2025-05-21 LAB
ANION GAP (OHS): 9 MMOL/L (ref 8–16)
BNP SERPL-MCNC: 41 PG/ML (ref 0–99)
BUN SERPL-MCNC: 18 MG/DL (ref 6–20)
CALCIUM SERPL-MCNC: 9.3 MG/DL (ref 8.7–10.5)
CHLORIDE SERPL-SCNC: 108 MMOL/L (ref 95–110)
CO2 SERPL-SCNC: 18 MMOL/L (ref 23–29)
CREAT SERPL-MCNC: 1.2 MG/DL (ref 0.5–1.4)
GFR SERPLBLD CREATININE-BSD FMLA CKD-EPI: 56 ML/MIN/1.73/M2
GLUCOSE SERPL-MCNC: 288 MG/DL (ref 70–110)
MAGNESIUM SERPL-MCNC: 1.9 MG/DL (ref 1.6–2.6)
POTASSIUM SERPL-SCNC: 4.9 MMOL/L (ref 3.5–5.1)
SODIUM SERPL-SCNC: 135 MMOL/L (ref 136–145)

## 2025-05-21 PROCEDURE — 36415 COLL VENOUS BLD VENIPUNCTURE: CPT

## 2025-05-21 PROCEDURE — 99999 PR PBB SHADOW E&M-EST. PATIENT-LVL IV: CPT | Mod: PBBFAC,,,

## 2025-05-21 PROCEDURE — 83735 ASSAY OF MAGNESIUM: CPT

## 2025-05-21 PROCEDURE — 83880 ASSAY OF NATRIURETIC PEPTIDE: CPT

## 2025-05-21 PROCEDURE — 80048 BASIC METABOLIC PNL TOTAL CA: CPT

## 2025-05-21 RX ORDER — ATORVASTATIN CALCIUM 80 MG/1
80 TABLET, FILM COATED ORAL NIGHTLY
Qty: 90 TABLET | Refills: 3 | Status: SHIPPED | OUTPATIENT
Start: 2025-05-21

## 2025-05-26 ENCOUNTER — OFFICE VISIT (OUTPATIENT)
Dept: OPHTHALMOLOGY | Facility: CLINIC | Age: 50
End: 2025-05-26
Payer: MEDICARE

## 2025-05-26 ENCOUNTER — CLINICAL SUPPORT (OUTPATIENT)
Dept: OPHTHALMOLOGY | Facility: CLINIC | Age: 50
End: 2025-05-26
Payer: MEDICARE

## 2025-05-26 DIAGNOSIS — H35.033 HYPERTENSIVE RETINOPATHY, BILATERAL: ICD-10-CM

## 2025-05-26 DIAGNOSIS — E11.3513 TYPE 2 DIABETES MELLITUS WITH BOTH EYES AFFECTED BY PROLIFERATIVE RETINOPATHY AND MACULAR EDEMA, WITH LONG-TERM CURRENT USE OF INSULIN: ICD-10-CM

## 2025-05-26 DIAGNOSIS — E11.3513 TYPE 2 DIABETES MELLITUS WITH BOTH EYES AFFECTED BY PROLIFERATIVE RETINOPATHY AND MACULAR EDEMA, WITH LONG-TERM CURRENT USE OF INSULIN: Primary | ICD-10-CM

## 2025-05-26 DIAGNOSIS — Z79.4 TYPE 2 DIABETES MELLITUS WITH BOTH EYES AFFECTED BY PROLIFERATIVE RETINOPATHY AND MACULAR EDEMA, WITH LONG-TERM CURRENT USE OF INSULIN: ICD-10-CM

## 2025-05-26 DIAGNOSIS — Z79.4 TYPE 2 DIABETES MELLITUS WITH BOTH EYES AFFECTED BY PROLIFERATIVE RETINOPATHY AND MACULAR EDEMA, WITH LONG-TERM CURRENT USE OF INSULIN: Primary | ICD-10-CM

## 2025-05-26 PROCEDURE — 99999 PR PBB SHADOW E&M-EST. PATIENT-LVL III: CPT | Mod: PBBFAC,,, | Performed by: OPHTHALMOLOGY

## 2025-05-26 PROCEDURE — 92201 OPSCPY EXTND RTA DRAW UNI/BI: CPT | Mod: S$GLB,,, | Performed by: OPHTHALMOLOGY

## 2025-05-26 PROCEDURE — 1160F RVW MEDS BY RX/DR IN RCRD: CPT | Mod: CPTII,S$GLB,, | Performed by: OPHTHALMOLOGY

## 2025-05-26 PROCEDURE — 92014 COMPRE OPH EXAM EST PT 1/>: CPT | Mod: S$GLB,,, | Performed by: OPHTHALMOLOGY

## 2025-05-26 PROCEDURE — 1159F MED LIST DOCD IN RCRD: CPT | Mod: CPTII,S$GLB,, | Performed by: OPHTHALMOLOGY

## 2025-05-26 PROCEDURE — 92134 CPTRZ OPH DX IMG PST SGM RTA: CPT | Mod: S$GLB,,, | Performed by: OPHTHALMOLOGY

## 2025-05-26 PROCEDURE — 4010F ACE/ARB THERAPY RXD/TAKEN: CPT | Mod: CPTII,S$GLB,, | Performed by: OPHTHALMOLOGY

## 2025-05-26 PROCEDURE — 3046F HEMOGLOBIN A1C LEVEL >9.0%: CPT | Mod: CPTII,S$GLB,, | Performed by: OPHTHALMOLOGY

## 2025-05-26 NOTE — PROGRESS NOTES
HPI     dm      pdr      me          oct     Additional comments: Dm   Last bs      unsure  Last A1c    unsure       Me   Blurry va     No gtts   Dls  02/25/25          Last edited by Sharon Mcdermott on 5/26/2025  9:19 AM.              OCT OD no central ME  OS decrease paracentral DME    Prior [facility] 7/24 - OD sig NP with NV  OS - Regressed NV after BP  with late macular leakage - some increase in NV      A/P    1. PDR OU  T2 uncontrolled on insulin  S/p PRP OU, fill OD 2/25 2/25 - small VH OD    2. DME OU  S/P- Avastin OS x 4 Last 12/24 4/24 - increase paracentral DME - pt starting to notice some subtle changes  7/24 -  increased DME    Non central DME OU stable    3. HTN Ret OU  BS/BP/chol control  Needs IMproved BP control    4. NS OU      6  months OCT and  dilate  - LDFE 5/25

## 2025-05-28 ENCOUNTER — TELEPHONE (OUTPATIENT)
Dept: SMOKING CESSATION | Facility: CLINIC | Age: 50
End: 2025-05-28
Payer: MEDICARE

## 2025-05-30 ENCOUNTER — NURSE TRIAGE (OUTPATIENT)
Dept: ADMINISTRATIVE | Facility: CLINIC | Age: 50
End: 2025-05-30
Payer: MEDICARE

## 2025-05-30 NOTE — TELEPHONE ENCOUNTER
"Pt calling in with a question in regards to dropping off a "stool sample".  Pt has a form with the labs for drop off and the times.  This was reviewed with the pt and she was able to locate a location that is close to her home.  Care advice is information only call.  Patient verbally understands, all questions answered, advised to call back for any symptoms or further needs.     Reason for Disposition   [1] Other NON-URGENT information for PCP AND [2] does not require PCP response    Additional Information   Lab result questions    Protocols used: Information Only Call - No Triage-A-AH, PCP Call - No Triage-A-AH    "

## 2025-05-30 NOTE — TELEPHONE ENCOUNTER
Spoke with pt in regards of Triage message. Pt informed me that she has already turned in stool testing to lab already.

## 2025-07-22 ENCOUNTER — PATIENT MESSAGE (OUTPATIENT)
Dept: ADMINISTRATIVE | Facility: HOSPITAL | Age: 50
End: 2025-07-22
Payer: MEDICARE

## 2025-07-22 ENCOUNTER — PATIENT OUTREACH (OUTPATIENT)
Dept: ADMINISTRATIVE | Facility: HOSPITAL | Age: 50
End: 2025-07-22
Payer: MEDICARE

## 2025-07-22 NOTE — PROGRESS NOTES
07/22/2025  VB chart audit performed. Care Everywhere updates requested and reviewed  Overdue HM topic chart audit and/or requested. LINKS triggered and reconciled. Media reviewed Lvm/portal sent regarding overdue health topics. Schedule Diabetic Appointment / Labs

## 2025-08-21 ENCOUNTER — OFFICE VISIT (OUTPATIENT)
Dept: CARDIOLOGY | Facility: CLINIC | Age: 50
End: 2025-08-21
Payer: MEDICARE

## 2025-08-21 ENCOUNTER — LAB VISIT (OUTPATIENT)
Dept: LAB | Facility: HOSPITAL | Age: 50
End: 2025-08-21
Payer: MEDICARE

## 2025-08-21 VITALS
DIASTOLIC BLOOD PRESSURE: 71 MMHG | HEIGHT: 65 IN | OXYGEN SATURATION: 96 % | WEIGHT: 256.75 LBS | BODY MASS INDEX: 42.78 KG/M2 | SYSTOLIC BLOOD PRESSURE: 102 MMHG | HEART RATE: 82 BPM

## 2025-08-21 DIAGNOSIS — E11.65 TYPE 2 DIABETES MELLITUS WITH HYPERGLYCEMIA, WITH LONG-TERM CURRENT USE OF INSULIN: ICD-10-CM

## 2025-08-21 DIAGNOSIS — Z79.4 TYPE 2 DIABETES MELLITUS WITH HYPERGLYCEMIA, WITH LONG-TERM CURRENT USE OF INSULIN: ICD-10-CM

## 2025-08-21 DIAGNOSIS — I50.43 ACUTE ON CHRONIC COMBINED SYSTOLIC (CONGESTIVE) AND DIASTOLIC (CONGESTIVE) HEART FAILURE: ICD-10-CM

## 2025-08-21 DIAGNOSIS — E11.59 HYPERTENSION ASSOCIATED WITH DIABETES: ICD-10-CM

## 2025-08-21 DIAGNOSIS — G47.33 OSA (OBSTRUCTIVE SLEEP APNEA): ICD-10-CM

## 2025-08-21 DIAGNOSIS — I73.9 BILATERAL CLAUDICATION OF LOWER LIMB: ICD-10-CM

## 2025-08-21 DIAGNOSIS — I15.2 HYPERTENSION ASSOCIATED WITH DIABETES: ICD-10-CM

## 2025-08-21 DIAGNOSIS — R06.09 DOE (DYSPNEA ON EXERTION): ICD-10-CM

## 2025-08-21 DIAGNOSIS — I50.42 CHRONIC COMBINED SYSTOLIC AND DIASTOLIC CONGESTIVE HEART FAILURE: Primary | ICD-10-CM

## 2025-08-21 DIAGNOSIS — F17.200 TOBACCO DEPENDENCY: ICD-10-CM

## 2025-08-21 DIAGNOSIS — I50.42 CHRONIC COMBINED SYSTOLIC AND DIASTOLIC CONGESTIVE HEART FAILURE: ICD-10-CM

## 2025-08-21 LAB
ANION GAP (OHS): 8 MMOL/L (ref 8–16)
BUN SERPL-MCNC: 16 MG/DL (ref 6–20)
CALCIUM SERPL-MCNC: 9.4 MG/DL (ref 8.7–10.5)
CHLORIDE SERPL-SCNC: 103 MMOL/L (ref 95–110)
CO2 SERPL-SCNC: 25 MMOL/L (ref 23–29)
CREAT SERPL-MCNC: 1.1 MG/DL (ref 0.5–1.4)
GFR SERPLBLD CREATININE-BSD FMLA CKD-EPI: >60 ML/MIN/1.73/M2
GLUCOSE SERPL-MCNC: 230 MG/DL (ref 70–110)
MAGNESIUM SERPL-MCNC: 2 MG/DL (ref 1.6–2.6)
NT-PROBNP SERPL-MCNC: 970 PG/ML
POTASSIUM SERPL-SCNC: 4.8 MMOL/L (ref 3.5–5.1)
SODIUM SERPL-SCNC: 136 MMOL/L (ref 136–145)

## 2025-08-21 PROCEDURE — 83735 ASSAY OF MAGNESIUM: CPT

## 2025-08-21 PROCEDURE — 99999 PR PBB SHADOW E&M-EST. PATIENT-LVL IV: CPT | Mod: PBBFAC,,,

## 2025-08-21 PROCEDURE — 83880 ASSAY OF NATRIURETIC PEPTIDE: CPT

## 2025-08-21 PROCEDURE — 82565 ASSAY OF CREATININE: CPT

## 2025-08-21 PROCEDURE — 36415 COLL VENOUS BLD VENIPUNCTURE: CPT

## 2025-08-21 RX ORDER — FUROSEMIDE 40 MG/1
40 TABLET ORAL 3 TIMES DAILY
Qty: 180 TABLET | Refills: 3 | Status: SHIPPED | OUTPATIENT
Start: 2025-08-21 | End: 2026-08-16

## 2025-08-21 RX ORDER — TIRZEPATIDE 12.5 MG/.5ML
INJECTION, SOLUTION SUBCUTANEOUS
COMMUNITY
Start: 2025-08-06

## (undated) DEVICE — SOL IRR SOD CHL .9% POUR

## (undated) DEVICE — NDL INSUF ULTRA VERESS 120MM

## (undated) DEVICE — NDL HYPO REG 25G X 1 1/2

## (undated) DEVICE — CUP SPECIMEN LID

## (undated) DEVICE — SYR 10CC LUER LOCK

## (undated) DEVICE — IRRIGATOR ENDOSCOPY DISP.

## (undated) DEVICE — TROCAR ENDOPATH XCEL 5MM 7.5CM

## (undated) DEVICE — GLOVE BIOGEL 7.5

## (undated) DEVICE — BLADE SURG CARBON STEEL SZ11

## (undated) DEVICE — APPLIER CLIP ENDO LIGAMAX 5MM

## (undated) DEVICE — PULSAVAC ZIMMER

## (undated) DEVICE — KIT ANTIFOG

## (undated) DEVICE — GLOVE BIOGEL ECLIPSE SZ 6.5

## (undated) DEVICE — SUT 0 VICRYL / UR6 (J603)

## (undated) DEVICE — ELECTRODE REM PLYHSV RETURN 9

## (undated) DEVICE — MANIFOLD 4 PORT

## (undated) DEVICE — SUT MONOCRYL 4-0 PS-2

## (undated) DEVICE — TOWEL OR DISP STRL BLUE 4/PK

## (undated) DEVICE — CLOSURE SKIN STERI STRIP 1/2X4

## (undated) DEVICE — COVER OVERHEAD SURG LT BLUE

## (undated) DEVICE — BAG TISS RETRV MONARCH 10MM

## (undated) DEVICE — SEE MEDLINE ITEM 156955

## (undated) DEVICE — PACK BASIC

## (undated) DEVICE — SUT MCRYL PLUS 4-0 PS2 27IN

## (undated) DEVICE — GOWN NONREINF SET-IN SLV XL

## (undated) DEVICE — CANISTER SUCTION 2 LTR

## (undated) DEVICE — SEE MEDLINE ITEM 157116

## (undated) DEVICE — PACK ENDOSCOPY GENERAL

## (undated) DEVICE — SOL IRR NACL .9% 3000ML

## (undated) DEVICE — TROCAR ENDOPATH XCEL 11MM 10CM

## (undated) DEVICE — SCISSOR 5MMX35CM DIRECT DRIVE

## (undated) DEVICE — Device

## (undated) DEVICE — PAD ABD 8X10 STERILE

## (undated) DEVICE — SPONGE DERMA 8PLY 2X2

## (undated) DEVICE — DRESSING TEGADERM 2 3/8 X 2.75

## (undated) DEVICE — TUBING INSUFFLATION 10

## (undated) DEVICE — BANDAGE KERLIX AMD

## (undated) DEVICE — APPLICATOR CHLORAPREP ORN 26ML

## (undated) DEVICE — TROCAR ENDOPATH XCEL 5X75MM

## (undated) DEVICE — DRAPE ABDOMINAL TIBURON 14X11

## (undated) DEVICE — UNDERGLOVES BIOGEL PI SZ 7 LF

## (undated) DEVICE — SEE MEDLINE ITEM 146372

## (undated) DEVICE — TRAY VAGINAL WET PREP

## (undated) DEVICE — SUT MONOCYRL 4-0 PS2 UND